# Patient Record
Sex: MALE | Race: WHITE | HISPANIC OR LATINO | Employment: OTHER | URBAN - METROPOLITAN AREA
[De-identification: names, ages, dates, MRNs, and addresses within clinical notes are randomized per-mention and may not be internally consistent; named-entity substitution may affect disease eponyms.]

---

## 2018-01-08 ENCOUNTER — HOSPITAL ENCOUNTER (OUTPATIENT)
Dept: RADIOLOGY | Facility: MEDICAL CENTER | Age: 73
End: 2018-01-08

## 2018-01-26 ENCOUNTER — APPOINTMENT (OUTPATIENT)
Dept: ADMISSIONS | Facility: MEDICAL CENTER | Age: 73
End: 2018-01-26
Attending: SURGERY
Payer: MEDICARE

## 2018-01-26 DIAGNOSIS — Z01.812 PRE-OPERATIVE LABORATORY EXAMINATION: ICD-10-CM

## 2018-01-26 LAB
INR PPP: 0.97 (ref 0.87–1.13)
PLATELET # BLD AUTO: 213 K/UL (ref 164–446)
PROTHROMBIN TIME: 12.6 SEC (ref 12–14.6)

## 2018-01-26 PROCEDURE — 85049 AUTOMATED PLATELET COUNT: CPT

## 2018-01-26 PROCEDURE — 85610 PROTHROMBIN TIME: CPT

## 2018-01-26 PROCEDURE — 36415 COLL VENOUS BLD VENIPUNCTURE: CPT

## 2018-01-26 RX ORDER — LOSARTAN POTASSIUM 100 MG/1
100 TABLET ORAL EVERY MORNING
COMMUNITY
End: 2018-04-13

## 2018-01-26 RX ORDER — AMLODIPINE BESYLATE 5 MG/1
5 TABLET ORAL EVERY MORNING
COMMUNITY
End: 2021-07-02

## 2018-01-29 ENCOUNTER — HOSPITAL ENCOUNTER (OUTPATIENT)
Facility: MEDICAL CENTER | Age: 73
End: 2018-01-29
Attending: SURGERY | Admitting: SURGERY
Payer: MEDICARE

## 2018-01-29 ENCOUNTER — APPOINTMENT (OUTPATIENT)
Dept: RADIOLOGY | Facility: MEDICAL CENTER | Age: 73
End: 2018-01-29
Attending: SURGERY
Payer: MEDICARE

## 2018-01-29 ENCOUNTER — APPOINTMENT (OUTPATIENT)
Dept: RADIOLOGY | Facility: MEDICAL CENTER | Age: 73
End: 2018-01-29
Attending: RADIOLOGY
Payer: MEDICARE

## 2018-01-29 VITALS
OXYGEN SATURATION: 93 % | BODY MASS INDEX: 25.96 KG/M2 | RESPIRATION RATE: 18 BRPM | TEMPERATURE: 98.2 F | WEIGHT: 171.3 LBS | HEART RATE: 81 BPM | HEIGHT: 68 IN | DIASTOLIC BLOOD PRESSURE: 86 MMHG | SYSTOLIC BLOOD PRESSURE: 147 MMHG

## 2018-01-29 DIAGNOSIS — R91.8 LUNG MASS: ICD-10-CM

## 2018-01-29 PROCEDURE — 71045 X-RAY EXAM CHEST 1 VIEW: CPT

## 2018-01-29 PROCEDURE — 160002 HCHG RECOVERY MINUTES (STAT)

## 2018-01-29 PROCEDURE — 700111 HCHG RX REV CODE 636 W/ 250 OVERRIDE (IP): Performed by: RADIOLOGY

## 2018-01-29 PROCEDURE — 99153 MOD SED SAME PHYS/QHP EA: CPT

## 2018-01-29 PROCEDURE — 88305 TISSUE EXAM BY PATHOLOGIST: CPT

## 2018-01-29 PROCEDURE — 700111 HCHG RX REV CODE 636 W/ 250 OVERRIDE (IP)

## 2018-01-29 RX ORDER — ONDANSETRON 2 MG/ML
4 INJECTION INTRAMUSCULAR; INTRAVENOUS PRN
Status: ACTIVE | OUTPATIENT
Start: 2018-01-29 | End: 2018-01-29

## 2018-01-29 RX ORDER — OXYCODONE HYDROCHLORIDE 5 MG/1
5 TABLET ORAL
Status: DISCONTINUED | OUTPATIENT
Start: 2018-01-29 | End: 2018-01-29 | Stop reason: HOSPADM

## 2018-01-29 RX ORDER — SODIUM CHLORIDE 9 MG/ML
INJECTION, SOLUTION INTRAVENOUS
Status: DISCONTINUED | OUTPATIENT
Start: 2018-01-29 | End: 2018-01-29 | Stop reason: HOSPADM

## 2018-01-29 RX ORDER — MIDAZOLAM HYDROCHLORIDE 1 MG/ML
.5-2 INJECTION INTRAMUSCULAR; INTRAVENOUS PRN
Status: ACTIVE | OUTPATIENT
Start: 2018-01-29 | End: 2018-01-29

## 2018-01-29 RX ORDER — HYDROMORPHONE HYDROCHLORIDE 2 MG/ML
0.5 INJECTION, SOLUTION INTRAMUSCULAR; INTRAVENOUS; SUBCUTANEOUS
Status: DISCONTINUED | OUTPATIENT
Start: 2018-01-29 | End: 2018-01-29 | Stop reason: HOSPADM

## 2018-01-29 RX ORDER — OXYCODONE HYDROCHLORIDE 5 MG/1
10 TABLET ORAL
Status: DISCONTINUED | OUTPATIENT
Start: 2018-01-29 | End: 2018-01-29 | Stop reason: HOSPADM

## 2018-01-29 RX ORDER — MIDAZOLAM HYDROCHLORIDE 1 MG/ML
INJECTION INTRAMUSCULAR; INTRAVENOUS
Status: COMPLETED
Start: 2018-01-29 | End: 2018-01-29

## 2018-01-29 RX ORDER — SODIUM CHLORIDE 9 MG/ML
500 INJECTION, SOLUTION INTRAVENOUS
Status: ACTIVE | OUTPATIENT
Start: 2018-01-29 | End: 2018-01-29

## 2018-01-29 RX ORDER — ONDANSETRON 2 MG/ML
INJECTION INTRAMUSCULAR; INTRAVENOUS
Status: COMPLETED
Start: 2018-01-29 | End: 2018-01-29

## 2018-01-29 RX ADMIN — FENTANYL CITRATE 50 MCG: 50 INJECTION, SOLUTION INTRAMUSCULAR; INTRAVENOUS at 10:59

## 2018-01-29 RX ADMIN — SODIUM CHLORIDE: 9 INJECTION, SOLUTION INTRAVENOUS at 09:25

## 2018-01-29 RX ADMIN — FENTANYL CITRATE 50 MCG: 50 INJECTION, SOLUTION INTRAMUSCULAR; INTRAVENOUS at 11:06

## 2018-01-29 RX ADMIN — MIDAZOLAM HYDROCHLORIDE 1 MG: 1 INJECTION INTRAMUSCULAR; INTRAVENOUS at 11:02

## 2018-01-29 RX ADMIN — MIDAZOLAM 1 MG: 1 INJECTION INTRAMUSCULAR; INTRAVENOUS at 11:02

## 2018-01-29 RX ADMIN — ONDANSETRON 4 MG: 2 INJECTION INTRAMUSCULAR; INTRAVENOUS at 10:56

## 2018-01-29 ASSESSMENT — PAIN SCALES - GENERAL
PAINLEVEL_OUTOF10: 0

## 2018-01-29 NOTE — OR NURSING
1138 patient arrived from IR s/p CT biopsy, anterior approach dressing cdi, plan of care discussed with patient and family agreeable. Vss, denies chest pain, s.o.b,  1336 small pneumo notified Dr montilla order received to keep patient NPO until 2nd x-ray. Patient not in respiratory distress.  1533 2nd x-ray still  Small pneumothorax notified Dr montilla order received to discharge patient home, follow up x-ray for tomorrow at 0845, currently patient denies shortness of breath, oxygen sat 94% RA  1545 discharge instructions given to patient, patient and family verbalize understanding of the orders, iv discontinued tip intact, currently vss, no c/o cp, s.o.b, patient wide awake, criteria met to transfer patient out of ppu.  1600 patient escorted via w/c with all his personal belongings.

## 2018-01-29 NOTE — PROGRESS NOTES
"CT guided biopsy of MARIA M lung lesion performed by Dr Andres via anterior approach. Patient tolerated procedure very well and was returned to PPU in good condition. Family updated and at bedside. Patient states he \"feels great\". Core sample (x 3) delivered to lab in formalin.  "

## 2018-01-29 NOTE — OR SURGEON
Immediate Post- Operative Note        PostOp Diagnosis:MARIA M MASS      Procedure(s): CT BX      Estimated Blood Loss: Less than 5 ml        Complications: None            1/29/2018     11:38 AM     Chuck Anrdes

## 2018-01-29 NOTE — DISCHARGE INSTRUCTIONS
ACTIVITY: Rest and take it easy for the first 24 hours.  A responsible adult is recommended to remain with you during that time.  It is normal to feel sleepy.  We encourage you to not do anything that requires balance, judgment or coordination.    MILD FLU-LIKE SYMPTOMS ARE NORMAL. YOU MAY EXPERIENCE GENERALIZED MUSCLE ACHES, THROAT IRRITATION, HEADACHE AND/OR SOME NAUSEA.    FOR 24 HOURS DO NOT:  Drive, operate machinery or run household appliances.  Drink beer or alcoholic beverages.   Make important decisions or sign legal documents.    SPECIAL INSTRUCTIONS: follow up with primary care physician as needed  If you experience chest pain, severe shortness of breath call 911 return to ER   Call Dr montilla with any questions [phone]  Follow up x-ray tomorrow at 0845 [facility] 0830 at [facility].    DIET: To avoid nausea, slowly advance diet as tolerated, avoiding spicy or greasy foods for the first day.  Add more substantial food to your diet according to your physician's instructions.  Babies can be fed formula or breast milk as soon as they are hungry.  INCREASE FLUIDS AND FIBER TO AVOID CONSTIPATION.    SURGICAL DRESSING/BATHING: keep band aid clean dry intact for 24 hours, you may remove dressing clean     FOLLOW-UP APPOINTMENT:  A follow-up appointment should be arranged with your doctor in [phone]; call to schedule.    You should CALL YOUR PHYSICIAN if you develop:  Fever greater than 101 degrees F.  Pain not relieved by medication, or persistent nausea or vomiting.  Excessive bleeding (blood soaking through dressing) or unexpected drainage from the wound.  Extreme redness or swelling around the incision site, drainage of pus or foul smelling drainage.  Inability to urinate or empty your bladder within 8 hours.  Problems with breathing or chest pain.    You should call 911 if you develop problems with breathing or chest pain.  If you are unable to contact your doctor or surgical  center, you should go to the nearest emergency room or urgent care center.  Physician's telephone #: [phone]    If any questions arise, call your doctor.  If your doctor is not available, please feel free to call the Surgical Center at [phone].  The Center is open Monday through Friday from 7AM to 7PM.  You can also call the HEALTH HOTLINE open 24 hours/day, 7 days/week and speak to a nurse at [phone], or toll free at [phone].    A registered nurse may call you a few days after your surgery to see how you are doing after your procedure.    MEDICATIONS: Resume taking daily medication.  Take prescribed pain medication with food.  If no medication is prescribed, you may take non-aspirin pain medication if needed.  PAIN MEDICATION CAN BE VERY CONSTIPATING.  Take a stool softener or laxative such as senokot, pericolace, or milk of magnesia if needed.  Lung Biopsy  A lung biopsy is a procedure in which a tissue sample is removed from the lung. The tissue can be examined under a microscope to help diagnose various lung disorders.   LET YOUR HEALTH CARE PROVIDER KNOW ABOUT:  · Any allergies you have.  · All medicines you are taking, including vitamins, herbs, eye drops, creams, and over-the-counter medicines.  · Previous problems you or members of your family have had with the use of anesthetics.  · Any blood disorders or bleeding problems that you have.  · Previous surgeries you have had.  · Medical conditions you have.  RISKS AND COMPLICATIONS  Generally, a lung biopsy is a safe procedure. However, problems can occur and include:  · Collapse of the lung.    · Bleeding.    · Infection.    BEFORE THE PROCEDURE  · Do not eat or drink anything after midnight on the night before the procedure or as directed by your health care provider.  · Ask your health care provider about changing or stopping your regular medicines. This is especially important if you are taking diabetes medicines or blood  thinners.  · Plan to have someone take you home after the procedure.  PROCEDURE  Various methods can be used to perform a lung biopsy:   · Needle biopsy. A biopsy needle is inserted into the lung. The needle is used to collect the tissue sample. A CT scanner may be used to guide the needle to the right place in the lung. For this method, a medicine is used to numb the area where the biopsy sample will be taken (local anesthetic).  · Bronchoscopy. A flexible tube (bronchoscope) is inserted into your lungs by going through your mouth or nose. A needle or forceps is passed through the bronchoscope to remove the tissue sample. For this method, medicine may be used to numb the back of your throat.  · Open biopsy. A cut (incision) is made in your chest. The tissue sample is then removed using surgical tools. The incision is closed with skin glue, skin adhesive strips, or stitches. For this method, you will be given medicine to make you sleep through the procedure (general anesthetic).  AFTER THE PROCEDURE  · Your recovery will be assessed and monitored.  · You might have soreness and tenderness at the site of the biopsy for a few days after the procedure.  · You might have a cough and some soreness in your throat for a few days if a bronchoscope was used.     This information is not intended to replace advice given to you by your health care provider. Make sure you discuss any questions you have with your health care provider.     Document Released: 03/08/2006 Document Revised: 01/08/2016 Document Reviewed: 06/01/2014  [employer] Interactive Patient Education ©2016 [employer]      If your physician has prescribed pain medication that includes Acetaminophen (Tylenol), do not take additional Acetaminophen (Tylenol) while taking the prescribed medication.    Depression / Suicide Risk    As you are discharged from this [facility] facility, it is important to learn how to keep safe from harming yourself.    Recognize the  warning signs:  · Abrupt changes in personality, positive or negative- including increase in energy   · Giving away possessions  · Change in eating patterns- significant weight changes-  positive or negative  · Change in sleeping patterns- unable to sleep or sleeping all the time   · Unwillingness or inability to communicate  · Depression  · Unusual sadness, discouragement and loneliness  · Talk of wanting to die  · Neglect of personal appearance   · Rebelliousness- reckless behavior  · Withdrawal from people/activities they love  · Confusion- inability to concentrate     If you or a loved one observes any of these behaviors or has concerns about self-harm, here's what you can do:  · Talk about it- your feelings and reasons for harming yourself  · Remove any means that you might use to hurt yourself (examples: pills, rope, extension cords, firearm)  · Get professional help from the community (Mental Health, Substance Abuse, psychological counseling)  · Do not be alone:Call your Safe Contact- someone whom you trust who will be there for you.  · Call your local CRISIS HOTLINE 819-8756 or 467-508-6185  · Call your local Children's Mobile Crisis Response Team [place] (593) 294-0674 or www.[employer]  · Call the toll free National Suicide Prevention Hotlines   · National Suicide Prevention Lifeline 797-240-QFIS (4965)  · National Hope Line Network 800-SUICIDE (116-0142)

## 2018-01-30 ENCOUNTER — HOSPITAL ENCOUNTER (EMERGENCY)
Facility: MEDICAL CENTER | Age: 73
End: 2018-01-30
Attending: EMERGENCY MEDICINE
Payer: MEDICARE

## 2018-01-30 ENCOUNTER — APPOINTMENT (OUTPATIENT)
Dept: RADIOLOGY | Facility: MEDICAL CENTER | Age: 73
End: 2018-01-30
Attending: EMERGENCY MEDICINE
Payer: MEDICARE

## 2018-01-30 ENCOUNTER — APPOINTMENT (OUTPATIENT)
Dept: RADIOLOGY | Facility: MEDICAL CENTER | Age: 73
End: 2018-01-30
Attending: RADIOLOGY
Payer: MEDICARE

## 2018-01-30 VITALS
WEIGHT: 177 LBS | RESPIRATION RATE: 14 BRPM | HEART RATE: 82 BPM | TEMPERATURE: 97.6 F | BODY MASS INDEX: 26.83 KG/M2 | HEIGHT: 68 IN | OXYGEN SATURATION: 94 % | SYSTOLIC BLOOD PRESSURE: 153 MMHG | DIASTOLIC BLOOD PRESSURE: 88 MMHG

## 2018-01-30 DIAGNOSIS — R91.8 LUNG MASS: ICD-10-CM

## 2018-01-30 DIAGNOSIS — J95.811 POSTPROCEDURAL PNEUMOTHORAX: ICD-10-CM

## 2018-01-30 DIAGNOSIS — J95.811 PNEUMOTHORAX OF LEFT LUNG AFTER BIOPSY: ICD-10-CM

## 2018-01-30 PROCEDURE — 99152 MOD SED SAME PHYS/QHP 5/>YRS: CPT

## 2018-01-30 PROCEDURE — A9270 NON-COVERED ITEM OR SERVICE: HCPCS | Performed by: RADIOLOGY

## 2018-01-30 PROCEDURE — 71045 X-RAY EXAM CHEST 1 VIEW: CPT

## 2018-01-30 PROCEDURE — 99284 EMERGENCY DEPT VISIT MOD MDM: CPT

## 2018-01-30 PROCEDURE — 700102 HCHG RX REV CODE 250 W/ 637 OVERRIDE(OP): Performed by: RADIOLOGY

## 2018-01-30 PROCEDURE — 36415 COLL VENOUS BLD VENIPUNCTURE: CPT

## 2018-01-30 PROCEDURE — 700111 HCHG RX REV CODE 636 W/ 250 OVERRIDE (IP)

## 2018-01-30 RX ORDER — MIDAZOLAM HYDROCHLORIDE 1 MG/ML
.5-2 INJECTION INTRAMUSCULAR; INTRAVENOUS PRN
Status: DISCONTINUED | OUTPATIENT
Start: 2018-01-30 | End: 2018-01-30 | Stop reason: HOSPADM

## 2018-01-30 RX ORDER — MIDAZOLAM HYDROCHLORIDE 1 MG/ML
INJECTION INTRAMUSCULAR; INTRAVENOUS
Status: COMPLETED
Start: 2018-01-30 | End: 2018-01-30

## 2018-01-30 RX ORDER — HYDROMORPHONE HYDROCHLORIDE 2 MG/ML
0.5 INJECTION, SOLUTION INTRAMUSCULAR; INTRAVENOUS; SUBCUTANEOUS
Status: DISCONTINUED | OUTPATIENT
Start: 2018-01-30 | End: 2018-01-30 | Stop reason: HOSPADM

## 2018-01-30 RX ORDER — SODIUM CHLORIDE 9 MG/ML
500 INJECTION, SOLUTION INTRAVENOUS
Status: DISCONTINUED | OUTPATIENT
Start: 2018-01-30 | End: 2018-01-30 | Stop reason: HOSPADM

## 2018-01-30 RX ORDER — ONDANSETRON 2 MG/ML
4 INJECTION INTRAMUSCULAR; INTRAVENOUS EVERY 8 HOURS PRN
Status: DISCONTINUED | OUTPATIENT
Start: 2018-01-30 | End: 2018-01-30 | Stop reason: HOSPADM

## 2018-01-30 RX ORDER — ONDANSETRON 2 MG/ML
4 INJECTION INTRAMUSCULAR; INTRAVENOUS PRN
Status: DISCONTINUED | OUTPATIENT
Start: 2018-01-30 | End: 2018-01-30 | Stop reason: HOSPADM

## 2018-01-30 RX ORDER — OXYCODONE HYDROCHLORIDE 5 MG/1
5 TABLET ORAL
Status: DISCONTINUED | OUTPATIENT
Start: 2018-01-30 | End: 2018-01-30 | Stop reason: HOSPADM

## 2018-01-30 RX ORDER — OXYCODONE HYDROCHLORIDE 10 MG/1
10 TABLET ORAL
Status: DISCONTINUED | OUTPATIENT
Start: 2018-01-30 | End: 2018-01-30 | Stop reason: HOSPADM

## 2018-01-30 RX ADMIN — FENTANYL CITRATE 25 MCG: 50 INJECTION, SOLUTION INTRAMUSCULAR; INTRAVENOUS at 12:09

## 2018-01-30 RX ADMIN — MIDAZOLAM HYDROCHLORIDE 1 MG: 1 INJECTION INTRAMUSCULAR; INTRAVENOUS at 12:09

## 2018-01-30 RX ADMIN — MIDAZOLAM 1 MG: 1 INJECTION INTRAMUSCULAR; INTRAVENOUS at 12:09

## 2018-01-30 RX ADMIN — OXYCODONE HYDROCHLORIDE 10 MG: 10 TABLET ORAL at 14:24

## 2018-01-30 ASSESSMENT — PAIN SCALES - GENERAL
PAINLEVEL_OUTOF10: 0

## 2018-01-30 ASSESSMENT — ENCOUNTER SYMPTOMS
ABDOMINAL PAIN: 0
DIZZINESS: 0
COUGH: 0
HEADACHES: 0
SHORTNESS OF BREATH: 0
FEVER: 0
BACK PAIN: 0

## 2018-01-30 NOTE — ED NOTES
Pt via EC to room, sent my MD After Xray results this am for R/O pneumothorax secondarfy to lung biopsy 1/29/18.  Denies C/P, SOB or increased WOB.  [facility]

## 2018-01-30 NOTE — OR SURGEON
Immediate Post- Operative Note        PostOp Diagnosis: INCREASING LEFT PNEUMOTHORAX S/P LUNG BIOPSY    Procedure(s): LEFT CHEST TUBE PLACEMENT    Estimated Blood Loss: Less than 5 ml        Complications: None            1/30/2018     12:34 PM     Chuck Andres

## 2018-01-30 NOTE — ED NOTES
received 2nd call from IR, some concern over blood collection. They want to monitor chest tube/valve to ensure patency. Will update family

## 2018-01-30 NOTE — ED NOTES
Received report from Berhane samuel. Pt will be back from IR. Family at bedside, will continue to monitor.

## 2018-01-30 NOTE — ED PROVIDER NOTES
ED PROVIDER NOTE    Scribed for Bijan Segal M.D. by Shi Nicholson. 1/30/2018, 1:27 PM.    This is an addendum to the note on Pito Black. For further details and full chart entry, see the previously signed ED Provider Note written by Dr. Pantoja (ERP).      12:05 PM - I discussed the patient's case with Dr. Pantoja (ERP) who will transfer care of the patient to me at this time. The patient has a final diagnosis of heimlich valve due to pneumothorax complication after lung biopsy.     1:28 PM - Patient was reevaluated at bedside. He reports to be feeling comfortable. I am waiting on results from chest x-ray.     DX-CHEST-PORTABLE (1 VIEW)   Final Result      Interval placement left chest tube and extension left lung. Trace if any residual left pneumothorax.      IR-CHEST TUBE FOR PNEUMO   Final Result         Successful percutaneous placement of left chest tube utilizing fluoroscopic guidance.            2:20 PM - Patient was reevaluated at bedside. He states he has minimal pain at this time. Patient and family informed of radiology results. The pneumothorax has radically improved. He will be discharged at this time. Patient knows how to operate his Heimlich valve should that be necessary. He will follow-up in 2 days for removal of this chest tube.    FINAL IMPRESSION   1. Postprocedural pneumothorax         Shi BUCK (Dimple), am scribing for, and in the presence of, Bijan Segal M.D..    Electronically signed by: Shi Nicholson (Dimple), 1/30/2018    Bijan BUCK M.D. personally performed the services described in this documentation, as scribed by Shi Nicholson in my presence, and it is both accurate and complete.    The note accurately reflects work and decisions made by me.  Bijan Segal  1/30/2018  8:06 PM

## 2018-01-30 NOTE — ED PROVIDER NOTES
ED Provider Note    ED Provider Note      Primary care provider: Fred Garcia P.A.-C.  Means of arrival: POV  History obtained from: Patient  History limited by: NOne    CHIEF COMPLAINT  Chief Complaint   Patient presents with   • Post-Op Complications       HPI  Pito Black is a 73 y.o. male who presents to the Emergency Department with a chief complaint of a pneumothorax. Patient was instructed to come by his physician. Patient underwent a lung biopsy 2 days ago. He was noted to have a pneumothorax yesterday. He was told to return today for reevaluation of this. It was noted to be slightly larger and he was instructed to come to the emergency department for chest tube insertion. They were told, that Dr. Andres from radiology, would meet him here. The patient has no complaints. He feels well. No fever, no shortness of breath, no chest pain he's been in his normal state of health.    REVIEW OF SYSTEMS  Review of Systems   Constitutional: Negative for fever.   HENT: Negative for congestion.    Respiratory: Negative for cough and shortness of breath.    Cardiovascular: Negative for chest pain.   Gastrointestinal: Negative for abdominal pain.   Musculoskeletal: Negative for back pain.   Neurological: Negative for dizziness and headaches.   All other systems reviewed and are negative.      PAST MEDICAL HISTORY   has a past medical history of Arthritis (2015); Asthma; Backpain; Breath shortness (2015); Cataract; Dental disorder; Diabetes; High cholesterol; and Hypertension (2015).    SURGICAL HISTORY   has a past surgical history that includes other orthopedic surgery; other; ear middle exploration (Right, 6/12/2015); and ossicular reconstruction (Right, 6/12/2015).    SOCIAL HISTORY  Social History   Substance Use Topics   • Smoking status: Former Smoker     Packs/day: 0.25     Years: 40.00     Types: Cigarettes     Quit date: 1/1/1995   • Smokeless tobacco: Never Used   • Alcohol use Yes      Comment: 6 beers a  "day      History   Drug Use No       FAMILY HISTORY  No family history on file.    CURRENT MEDICATIONS  Home Medications     Reviewed by Grey France, Student (Student Nurse) on 01/30/18 at 1012  Med List Status: Complete   Medication Last Dose Status   amlodipine (NORVASC) 5 MG Tab 1/30/2018 Active   aspirin EC (ECOTRIN) 81 MG TBEC 1/18/2018 Active   ezetimibe (ZETIA) 10 MG TABS 1/30/2018 Active   Fluticasone Furoate-Vilanterol (BREO ELLIPTA INH) 1/30/2018 Active   losartan (COZAAR) 100 MG Tab 1/30/2018 Active   psyllium (METAMUCIL) 58.12 % Pack 1/30/2018 Active                ALLERGIES  No Known Allergies    PHYSICAL EXAM  VITAL SIGNS: /88   Pulse 87   Temp 36.4 °C (97.6 °F)   Resp 19   Ht 1.727 m (5' 8\")   Wt 80.3 kg (177 lb)   SpO2 97%   BMI 26.91 kg/m²   Vitals reviewed.  Constitutional: Patient is oriented to person, place, and time. Appears well-developed and well-nourished. No distress.    Head: Normocephalic and atraumatic.   Eyes: Conjunctivae are normal.  Cardiovascular: Normal rate, regular rhythm and normal heart sounds.   Pulmonary/Chest: Effort normal and breath sounds normal. No respiratory distress, no wheezes, rhonchi, or rales.   Abdominal: Soft. Bowel sounds are normal. There is no tenderness, rebound or guarding, or peritoneal signs  Musculoskeletal: No edema   Neurological: No focal deficits.   Skin: Skin is warm and dry. No erythema. No pallor.   Psychiatric: Patient has a normal mood and affect.     LABS  Results for orders placed or performed in visit on 01/26/18   PT   Result Value Ref Range    PT 12.6 12.0 - 14.6 sec    INR 0.97 0.87 - 1.13   PLATELET COUNT   Result Value Ref Range    Platelet Count 213 164 - 446 K/uL       All labs reviewed by me.      RADIOLOGY  IR-CHEST TUBE FOR PNEUMO    (Results Pending)     The radiologist's interpretation of all radiological studies have been reviewed by me.    COURSE & MEDICAL DECISION MAKING  Pertinent Labs & Imaging studies " reviewed. (See chart for details)    Obtained and reviewed past medical records. I review chest x-rays from the 29th as well as today that showed a left-sided pneumothorax.    11:09 AM - Patient seen and examined at bedside. Is a well-appearing 73-year-old male who is being worked up for lung cancer. Recently underwent a lung biopsy and has a os procedural pneumothorax.     11:15 AM discussed with family, they were told Dr. Andres, would meet them here that he is Dr. Andres anticipating their arrival and planned for placemetn of Chest Tube.    11:25 AM, discussed with Dr. Andres, radiologist, who is indeed planning to place a Heimlich valve chest tube into this patient. He'll be taken to the IR suite. He will then need to be observed for 2 hours after this procedure if no problems develop, patient can be discharged to home. He will then follow-up in 2 days to have this removed. Patient was made aware of this plan and family and patient both in agreement.    Discussed with my partner, this plan. He'll be observed here, after Heimlich valve chest tube inserted. I anticipate discharge to home and return in a few days for removal.       FINAL IMPRESSION  1. Postprocedural pneumothorax     Status post Heimlich valve chest tube insertion

## 2018-01-30 NOTE — PROGRESS NOTES
IR Procedure Note:    Site Marked and Confirmed with MD, patient and RN pre procedure. Dr. Andres placed a left chest tube.  The pt tolerated the procedure well; ETCo2 baseline 15, with consistent waveform during the procedure.  Gauze, medipore, and sutures applied to chest tube, CDI and soft.  Pt alert and verbally appropriate post procedure, vital signs stable during procedure and transport, see flow sheet for vital signs.  Report given to Muna HORN.  RN transported pt to [facility].      [employer] Flexima APDL. REF: [id number].  Lot: [id number]

## 2018-01-30 NOTE — ED NOTES
Pt given discharge instructions. Pt verbalized understanding. VSS no acute distress noted, pt helped to lobby via wheelchair.

## 2018-02-02 ENCOUNTER — HOSPITAL ENCOUNTER (OUTPATIENT)
Dept: RADIOLOGY | Facility: MEDICAL CENTER | Age: 73
End: 2018-02-02
Attending: RADIOLOGY
Payer: MEDICARE

## 2018-02-02 DIAGNOSIS — J95.811 PNEUMOTHORAX OF LEFT LUNG AFTER BIOPSY: ICD-10-CM

## 2018-02-02 PROCEDURE — 71045 X-RAY EXAM CHEST 1 VIEW: CPT

## 2018-02-02 NOTE — PROGRESS NOTES
"CXR reviewed with Dr. Salazar. Slight increase in Lt PNTX following biopsy. Pt remains asymptomatic, denies dyspnea and pain. C/o discomfort at site of tube insertion. Has been \"taking it easy\" with tube in place.     No dyspnea or crepitus noted. Speaking in full sentences, no accessory muscle use noted. Chest tube to heimlich valve in place with dressing occluding end of heimlich valve, potentially trapping air. Heimlich valve replaced. Pt to return Monday 2/5 for f/u XR- ordered in EPIC.     Education done with patient regarding leaving end of valve unobstructed. Pt lives in [place] and instructed to present to his local ER if tube dislodges or if he experiences dyspnea or chest pain.   "

## 2018-02-05 ENCOUNTER — HOSPITAL ENCOUNTER (OUTPATIENT)
Dept: RADIOLOGY | Facility: MEDICAL CENTER | Age: 73
DRG: 310 | End: 2018-02-05
Attending: NURSE PRACTITIONER
Payer: MEDICARE

## 2018-02-05 DIAGNOSIS — J95.811 PNEUMOTHORAX OF LEFT LUNG AFTER BIOPSY: ICD-10-CM

## 2018-02-05 PROCEDURE — 71045 X-RAY EXAM CHEST 1 VIEW: CPT

## 2018-02-05 NOTE — PROGRESS NOTES
CXR reviewed with Dr. Salazar: improvement in Lt PNTX with chest tube to heimlich valve. Pt asymptomatic this am. No air leak noted. Tube clamped, will repeat CXR in 1 hour. If no enlargement in Lt PNTX will d/c chest tube. Pt and family educated on unclamping tube first if pt has onset of SOB or chest discomfort while tube is clamped, and instructed to then call radiology from phone in waiting room.

## 2018-02-05 NOTE — PROGRESS NOTES
CXR reviewed by Dr. Salazar and chest tube removed, dressed with petroleum guaze and tegaderm.  Patient tolerated well, instructed to return to ER if he has sudden onset of shortness of breath or other complications.

## 2018-02-06 ENCOUNTER — HOSPITAL ENCOUNTER (INPATIENT)
Facility: MEDICAL CENTER | Age: 73
LOS: 1 days | DRG: 310 | End: 2018-02-07
Attending: EMERGENCY MEDICINE | Admitting: HOSPITALIST
Payer: MEDICARE

## 2018-02-06 ENCOUNTER — RESOLUTE PROFESSIONAL BILLING HOSPITAL PROF FEE (OUTPATIENT)
Dept: HOSPITALIST | Facility: MEDICAL CENTER | Age: 73
End: 2018-02-06
Payer: MEDICARE

## 2018-02-06 ENCOUNTER — APPOINTMENT (OUTPATIENT)
Dept: RADIOLOGY | Facility: MEDICAL CENTER | Age: 73
DRG: 310 | End: 2018-02-06
Attending: EMERGENCY MEDICINE
Payer: MEDICARE

## 2018-02-06 DIAGNOSIS — I48.91 ATRIAL FIBRILLATION, UNSPECIFIED TYPE (HCC): ICD-10-CM

## 2018-02-06 PROBLEM — I10 HTN (HYPERTENSION): Status: ACTIVE | Noted: 2018-02-06

## 2018-02-06 PROBLEM — R91.8 LUNG MASS: Status: ACTIVE | Noted: 2018-02-06

## 2018-02-06 LAB
ALBUMIN SERPL BCP-MCNC: 3.9 G/DL (ref 3.2–4.9)
ALBUMIN/GLOB SERPL: 1.1 G/DL
ALP SERPL-CCNC: 49 U/L (ref 30–99)
ALT SERPL-CCNC: 28 U/L (ref 2–50)
ANION GAP SERPL CALC-SCNC: 8 MMOL/L (ref 0–11.9)
APTT PPP: 32.6 SEC (ref 24.7–36)
AST SERPL-CCNC: 26 U/L (ref 12–45)
BASOPHILS # BLD AUTO: 0.3 % (ref 0–1.8)
BASOPHILS # BLD: 0.03 K/UL (ref 0–0.12)
BILIRUB SERPL-MCNC: 0.6 MG/DL (ref 0.1–1.5)
BNP SERPL-MCNC: 133 PG/ML (ref 0–100)
BUN SERPL-MCNC: 19 MG/DL (ref 8–22)
CALCIUM SERPL-MCNC: 9.6 MG/DL (ref 8.5–10.5)
CHLORIDE SERPL-SCNC: 106 MMOL/L (ref 96–112)
CO2 SERPL-SCNC: 22 MMOL/L (ref 20–33)
CREAT SERPL-MCNC: 1.4 MG/DL (ref 0.5–1.4)
EKG IMPRESSION: NORMAL
EOSINOPHIL # BLD AUTO: 0.45 K/UL (ref 0–0.51)
EOSINOPHIL NFR BLD: 5.1 % (ref 0–6.9)
ERYTHROCYTE [DISTWIDTH] IN BLOOD BY AUTOMATED COUNT: 43.9 FL (ref 35.9–50)
GLOBULIN SER CALC-MCNC: 3.4 G/DL (ref 1.9–3.5)
GLUCOSE SERPL-MCNC: 117 MG/DL (ref 65–99)
HCT VFR BLD AUTO: 46.1 % (ref 42–52)
HGB BLD-MCNC: 16.3 G/DL (ref 14–18)
IMM GRANULOCYTES # BLD AUTO: 0.03 K/UL (ref 0–0.11)
IMM GRANULOCYTES NFR BLD AUTO: 0.3 % (ref 0–0.9)
INR PPP: 1.04 (ref 0.87–1.13)
LIPASE SERPL-CCNC: 54 U/L (ref 11–82)
LYMPHOCYTES # BLD AUTO: 1.93 K/UL (ref 1–4.8)
LYMPHOCYTES NFR BLD: 22 % (ref 22–41)
MAGNESIUM SERPL-MCNC: 2 MG/DL (ref 1.5–2.5)
MCH RBC QN AUTO: 33.9 PG (ref 27–33)
MCHC RBC AUTO-ENTMCNC: 35.4 G/DL (ref 33.7–35.3)
MCV RBC AUTO: 95.8 FL (ref 81.4–97.8)
MONOCYTES # BLD AUTO: 0.64 K/UL (ref 0–0.85)
MONOCYTES NFR BLD AUTO: 7.3 % (ref 0–13.4)
NEUTROPHILS # BLD AUTO: 5.71 K/UL (ref 1.82–7.42)
NEUTROPHILS NFR BLD: 65 % (ref 44–72)
NRBC # BLD AUTO: 0 K/UL
NRBC BLD-RTO: 0 /100 WBC
PLATELET # BLD AUTO: 186 K/UL (ref 164–446)
PMV BLD AUTO: 9.8 FL (ref 9–12.9)
POTASSIUM SERPL-SCNC: 4.6 MMOL/L (ref 3.6–5.5)
PROT SERPL-MCNC: 7.3 G/DL (ref 6–8.2)
PROTHROMBIN TIME: 13.3 SEC (ref 12–14.6)
RBC # BLD AUTO: 4.81 M/UL (ref 4.7–6.1)
SODIUM SERPL-SCNC: 136 MMOL/L (ref 135–145)
TROPONIN I SERPL-MCNC: 0.02 NG/ML (ref 0–0.04)
TSH SERPL DL<=0.005 MIU/L-ACNC: 0.63 UIU/ML (ref 0.38–5.33)
WBC # BLD AUTO: 8.8 K/UL (ref 4.8–10.8)

## 2018-02-06 PROCEDURE — 71045 X-RAY EXAM CHEST 1 VIEW: CPT

## 2018-02-06 PROCEDURE — 93005 ELECTROCARDIOGRAM TRACING: CPT | Performed by: EMERGENCY MEDICINE

## 2018-02-06 PROCEDURE — 96376 TX/PRO/DX INJ SAME DRUG ADON: CPT

## 2018-02-06 PROCEDURE — A9270 NON-COVERED ITEM OR SERVICE: HCPCS | Performed by: HOSPITALIST

## 2018-02-06 PROCEDURE — 99285 EMERGENCY DEPT VISIT HI MDM: CPT

## 2018-02-06 PROCEDURE — 85025 COMPLETE CBC W/AUTO DIFF WBC: CPT

## 2018-02-06 PROCEDURE — 700111 HCHG RX REV CODE 636 W/ 250 OVERRIDE (IP): Performed by: EMERGENCY MEDICINE

## 2018-02-06 PROCEDURE — 83735 ASSAY OF MAGNESIUM: CPT

## 2018-02-06 PROCEDURE — 84484 ASSAY OF TROPONIN QUANT: CPT

## 2018-02-06 PROCEDURE — 96374 THER/PROPH/DIAG INJ IV PUSH: CPT

## 2018-02-06 PROCEDURE — 84443 ASSAY THYROID STIM HORMONE: CPT

## 2018-02-06 PROCEDURE — 94760 N-INVAS EAR/PLS OXIMETRY 1: CPT

## 2018-02-06 PROCEDURE — 93005 ELECTROCARDIOGRAM TRACING: CPT

## 2018-02-06 PROCEDURE — 80053 COMPREHEN METABOLIC PANEL: CPT

## 2018-02-06 PROCEDURE — 85610 PROTHROMBIN TIME: CPT

## 2018-02-06 PROCEDURE — 99223 1ST HOSP IP/OBS HIGH 75: CPT | Mod: AI | Performed by: HOSPITALIST

## 2018-02-06 PROCEDURE — 83880 ASSAY OF NATRIURETIC PEPTIDE: CPT

## 2018-02-06 PROCEDURE — 770020 HCHG ROOM/CARE - TELE (206)

## 2018-02-06 PROCEDURE — 83690 ASSAY OF LIPASE: CPT

## 2018-02-06 PROCEDURE — 700111 HCHG RX REV CODE 636 W/ 250 OVERRIDE (IP): Performed by: HOSPITALIST

## 2018-02-06 PROCEDURE — 700105 HCHG RX REV CODE 258: Performed by: HOSPITALIST

## 2018-02-06 PROCEDURE — 700101 HCHG RX REV CODE 250: Performed by: EMERGENCY MEDICINE

## 2018-02-06 PROCEDURE — 96372 THER/PROPH/DIAG INJ SC/IM: CPT

## 2018-02-06 PROCEDURE — 85730 THROMBOPLASTIN TIME PARTIAL: CPT

## 2018-02-06 PROCEDURE — 36415 COLL VENOUS BLD VENIPUNCTURE: CPT

## 2018-02-06 PROCEDURE — 700102 HCHG RX REV CODE 250 W/ 637 OVERRIDE(OP): Performed by: HOSPITALIST

## 2018-02-06 RX ORDER — SODIUM CHLORIDE 9 MG/ML
INJECTION, SOLUTION INTRAVENOUS CONTINUOUS
Status: DISCONTINUED | OUTPATIENT
Start: 2018-02-06 | End: 2018-02-07 | Stop reason: HOSPADM

## 2018-02-06 RX ORDER — EZETIMIBE 10 MG/1
10 TABLET ORAL EVERY MORNING
Status: DISCONTINUED | OUTPATIENT
Start: 2018-02-07 | End: 2018-02-07 | Stop reason: HOSPADM

## 2018-02-06 RX ORDER — LORAZEPAM 2 MG/ML
1 INJECTION INTRAMUSCULAR ONCE
Status: COMPLETED | OUTPATIENT
Start: 2018-02-06 | End: 2018-02-06

## 2018-02-06 RX ORDER — POLYETHYLENE GLYCOL 3350 17 G/17G
1 POWDER, FOR SOLUTION ORAL
Status: DISCONTINUED | OUTPATIENT
Start: 2018-02-06 | End: 2018-02-07 | Stop reason: HOSPADM

## 2018-02-06 RX ORDER — BISACODYL 10 MG
10 SUPPOSITORY, RECTAL RECTAL
Status: DISCONTINUED | OUTPATIENT
Start: 2018-02-06 | End: 2018-02-07 | Stop reason: HOSPADM

## 2018-02-06 RX ORDER — METOPROLOL TARTRATE 1 MG/ML
5 INJECTION, SOLUTION INTRAVENOUS
Status: DISCONTINUED | OUTPATIENT
Start: 2018-02-06 | End: 2018-02-07 | Stop reason: HOSPADM

## 2018-02-06 RX ORDER — LOSARTAN POTASSIUM 50 MG/1
100 TABLET ORAL EVERY MORNING
Status: DISCONTINUED | OUTPATIENT
Start: 2018-02-07 | End: 2018-02-07 | Stop reason: HOSPADM

## 2018-02-06 RX ORDER — BUDESONIDE AND FORMOTEROL FUMARATE DIHYDRATE 160; 4.5 UG/1; UG/1
2 AEROSOL RESPIRATORY (INHALATION) 2 TIMES DAILY
Status: DISCONTINUED | OUTPATIENT
Start: 2018-02-06 | End: 2018-02-07 | Stop reason: HOSPADM

## 2018-02-06 RX ORDER — AMOXICILLIN 250 MG
2 CAPSULE ORAL 2 TIMES DAILY
Status: DISCONTINUED | OUTPATIENT
Start: 2018-02-06 | End: 2018-02-07 | Stop reason: HOSPADM

## 2018-02-06 RX ADMIN — METOPROLOL TARTRATE 5 MG: 5 INJECTION INTRAVENOUS at 15:27

## 2018-02-06 RX ADMIN — ENOXAPARIN SODIUM 80 MG: 100 INJECTION SUBCUTANEOUS at 17:18

## 2018-02-06 RX ADMIN — METOPROLOL TARTRATE 25 MG: 25 TABLET, FILM COATED ORAL at 19:58

## 2018-02-06 RX ADMIN — METOPROLOL TARTRATE 5 MG: 5 INJECTION INTRAVENOUS at 16:40

## 2018-02-06 RX ADMIN — SODIUM CHLORIDE: 9 INJECTION, SOLUTION INTRAVENOUS at 19:36

## 2018-02-06 RX ADMIN — ASPIRIN 81 MG: 81 TABLET, COATED ORAL at 19:58

## 2018-02-06 RX ADMIN — LORAZEPAM 1 MG: 2 INJECTION INTRAMUSCULAR; INTRAVENOUS at 19:57

## 2018-02-06 ASSESSMENT — ENCOUNTER SYMPTOMS
PND: 0
BACK PAIN: 0
ORTHOPNEA: 0
EYE PAIN: 0
SHORTNESS OF BREATH: 0
MYALGIAS: 0
SPEECH CHANGE: 0
TREMORS: 0
DEPRESSION: 0
DOUBLE VISION: 0
MEMORY LOSS: 0
FEVER: 0
NERVOUS/ANXIOUS: 0
PALPITATIONS: 0
STRIDOR: 0
WEAKNESS: 0
CLAUDICATION: 0
SORE THROAT: 0
HEARTBURN: 0
HEADACHES: 0
NECK PAIN: 0
CHILLS: 0
BLOOD IN STOOL: 0
VOMITING: 0
HEMOPTYSIS: 0
SENSORY CHANGE: 0
TINGLING: 0
CONSTIPATION: 0
NAUSEA: 0
BLURRED VISION: 0
PHOTOPHOBIA: 0
SPUTUM PRODUCTION: 0
DIZZINESS: 0
COUGH: 0

## 2018-02-06 ASSESSMENT — PAIN SCALES - GENERAL: PAINLEVEL_OUTOF10: 0

## 2018-02-06 ASSESSMENT — LIFESTYLE VARIABLES
TOTAL SCORE: 0
HAVE YOU EVER FELT YOU SHOULD CUT DOWN ON YOUR DRINKING: NO
DO YOU DRINK ALCOHOL: YES
TOTAL SCORE: 0
CONSUMPTION TOTAL: INCOMPLETE
EVER FELT BAD OR GUILTY ABOUT YOUR DRINKING: NO
EVER HAD A DRINK FIRST THING IN THE MORNING TO STEADY YOUR NERVES TO GET RID OF A HANGOVER: NO
EVER_SMOKED: YES
TOTAL SCORE: 0
HAVE PEOPLE ANNOYED YOU BY CRITICIZING YOUR DRINKING: NO

## 2018-02-06 ASSESSMENT — PATIENT HEALTH QUESTIONNAIRE - PHQ9
2. FEELING DOWN, DEPRESSED, IRRITABLE, OR HOPELESS: NOT AT ALL
1. LITTLE INTEREST OR PLEASURE IN DOING THINGS: NOT AT ALL
SUM OF ALL RESPONSES TO PHQ QUESTIONS 1-9: 0
SUM OF ALL RESPONSES TO PHQ9 QUESTIONS 1 AND 2: 0

## 2018-02-06 NOTE — ED PROVIDER NOTES
ED Provider Note    Scribed for Roger Rose M.D. by Ekaterina Person. 2/6/2018, 3:11 PM.    Primary care provider: Fred Garcia P.A.-C.  Means of arrival: Walk-in  History obtained from: Patient   History limited by: None    CHIEF COMPLAINT  Chief Complaint   Patient presents with   • Sent by MD     Irregular heart beat       HPI  Pito Black is a 73 y.o. male who presents to the Emergency Department for an irregular heart beat that was discovered while patient was at his doctors appointment just prior to arrival in the ED. He was told by doctor that he has new onset Atrial Fibrillation and advised to report to ED. Patient denies any palpitations, chest pain, or shortness of breath associated. He had a chest tube placed on 01/20/2018 secondary to a pneumothorax .     REVIEW OF SYSTEMS  Review of Systems   Constitutional: Negative for chills and fever.   Respiratory: Negative for shortness of breath.    Cardiovascular: Negative for chest pain and palpitations.        Positive for irregular heart beat   Gastrointestinal: Negative for nausea and vomiting.   All other systems reviewed and are negative.  C.    PAST MEDICAL HISTORY   has a past medical history of Arthritis (2015); Asthma; Backpain; Breath shortness (2015); Cataract; Dental disorder; Diabetes; High cholesterol; and Hypertension (2015).    SURGICAL HISTORY   has a past surgical history that includes other orthopedic surgery; other; ear middle exploration (Right, 6/12/2015); and ossicular reconstruction (Right, 6/12/2015).    SOCIAL HISTORY  Social History   Substance Use Topics   • Smoking status: Former Smoker     Packs/day: 0.25     Years: 40.00     Types: Cigarettes     Quit date: 1/1/1995   • Smokeless tobacco: Never Used   • Alcohol use Yes      Comment: 6 beers a day      History   Drug Use No       FAMILY HISTORY  No family history noted    CURRENT MEDICATIONS  No current facility-administered medications on file prior to encounter.   "    Current Outpatient Prescriptions on File Prior to Encounter   Medication Sig Dispense Refill   • psyllium (METAMUCIL) 58.12 % Pack Take 1 Packet by mouth every day.     • amlodipine (NORVASC) 5 MG Tab Take 5 mg by mouth every morning.     • losartan (COZAAR) 100 MG Tab Take 100 mg by mouth every morning.     • Fluticasone Furoate-Vilanterol (BREO ELLIPTA INH) Inhale 1 Puff by mouth every morning.     • aspirin EC (ECOTRIN) 81 MG TBEC Take 81 mg by mouth every morning.     • ezetimibe (ZETIA) 10 MG TABS Take 10 mg by mouth every morning.         ALLERGIES  No Known Allergies    PHYSICAL EXAM  VITAL SIGNS: /100   Pulse 97 Comment: Inconsistent pulse rate  Temp 36.9 °C (98.4 °F)   Resp 16   Ht 1.727 m (5' 8\")   Wt 79.4 kg (175 lb)   SpO2 96%   BMI 26.61 kg/m²     Constitutional: Well developed, Well nourished, No acute distress, Non-toxic appearance.   HENT: Normocephalic, Atraumatic, Bilateral external ears normal, oropharynx moist, No oral exudates, Nose normal.   Eyes: Pupils are equal round and react to light, extraocular motions are intact, conjunctiva is normal, there are no signs of exudate.   Neck: Supple, no meningeal signs.  Lymphatic: No lymphadenopathy noted.   Cardiovascular: Tachycardic rate and irregular rhythm without murmurs gallops or rubs.   Thorax & Lungs: No respiratory distress. Breathing comfortably. Lungs are clear to auscultation bilaterally, there are no wheezes no rales. Chest wall is nontender.  Abdomen: Soft, nontender, nondistended. Bowel sounds are present.   Skin: Warm, Dry, No erythema,   Back: No tenderness, No CVA tenderness.  Musculoskeletal: Good range of motion in all major joints. No tenderness to palpation or major deformities noted. Intact distal pulses, no clubbing, no cyanosis, no edema,   Neurologic: Alert & oriented x 3, Moving all extremities. No gross abnormalities.    Psychiatric: Affect normal, Judgment normal, Mood normal.     LABS  Results for orders " placed or performed during the hospital encounter of 02/06/18   CBC with Differential   Result Value Ref Range    WBC 8.8 4.8 - 10.8 K/uL    RBC 4.81 4.70 - 6.10 M/uL    Hemoglobin 16.3 14.0 - 18.0 g/dL    Hematocrit 46.1 42.0 - 52.0 %    MCV 95.8 81.4 - 97.8 fL    MCH 33.9 (H) 27.0 - 33.0 pg    MCHC 35.4 (H) 33.7 - 35.3 g/dL    RDW 43.9 35.9 - 50.0 fL    Platelet Count 186 164 - 446 K/uL    MPV 9.8 9.0 - 12.9 fL    Neutrophils-Polys 65.00 44.00 - 72.00 %    Lymphocytes 22.00 22.00 - 41.00 %    Monocytes 7.30 0.00 - 13.40 %    Eosinophils 5.10 0.00 - 6.90 %    Basophils 0.30 0.00 - 1.80 %    Immature Granulocytes 0.30 0.00 - 0.90 %    Nucleated RBC 0.00 /100 WBC    Neutrophils (Absolute) 5.71 1.82 - 7.42 K/uL    Lymphs (Absolute) 1.93 1.00 - 4.80 K/uL    Monos (Absolute) 0.64 0.00 - 0.85 K/uL    Eos (Absolute) 0.45 0.00 - 0.51 K/uL    Baso (Absolute) 0.03 0.00 - 0.12 K/uL    Immature Granulocytes (abs) 0.03 0.00 - 0.11 K/uL    NRBC (Absolute) 0.00 K/uL   Complete Metabolic Panel (CMP)   Result Value Ref Range    Sodium 136 135 - 145 mmol/L    Potassium 4.6 3.6 - 5.5 mmol/L    Chloride 106 96 - 112 mmol/L    Co2 22 20 - 33 mmol/L    Anion Gap 8.0 0.0 - 11.9    Glucose 117 (H) 65 - 99 mg/dL    Bun 19 8 - 22 mg/dL    Creatinine 1.40 0.50 - 1.40 mg/dL    Calcium 9.6 8.5 - 10.5 mg/dL    AST(SGOT) 26 12 - 45 U/L    ALT(SGPT) 28 2 - 50 U/L    Alkaline Phosphatase 49 30 - 99 U/L    Total Bilirubin 0.6 0.1 - 1.5 mg/dL    Albumin 3.9 3.2 - 4.9 g/dL    Total Protein 7.3 6.0 - 8.2 g/dL    Globulin 3.4 1.9 - 3.5 g/dL    A-G Ratio 1.1 g/dL   Btype Natriuretic Peptide (BNP)   Result Value Ref Range    B Natriuretic Peptide 133 (H) 0 - 100 pg/mL   Prothrombin Time (PT/INR)   Result Value Ref Range    PT 13.3 12.0 - 14.6 sec    INR 1.04 0.87 - 1.13   APTT   Result Value Ref Range    APTT 32.6 24.7 - 36.0 sec   Lipase   Result Value Ref Range    Lipase 54 11 - 82 U/L   Troponin STAT   Result Value Ref Range    Troponin I 0.02 0.00 -  0.04 ng/mL   TSH   Result Value Ref Range    TSH 0.630 0.380 - 5.330 uIU/mL   ESTIMATED GFR   Result Value Ref Range    GFR If African American >60 >60 mL/min/1.73 m 2    GFR If Non African American 50 (A) >60 mL/min/1.73 m 2   MAGNESIUM   Result Value Ref Range    Magnesium 2.0 1.5 - 2.5 mg/dL   EKG (NOW)   Result Value Ref Range    Report       [facility] Emergency Dept.    Test Date:  2018  Pt Name:    DOROTHEA SALINAS                  Department: ER  MRN:        [id number]                      Room:  Gender:     Male                         Technician: [id number]  :        1945                   Requested By:ER TRIAGE PROTOCOL  Order #:    [id number]                    Reading MD: WARNER REYES MD    Measurements  Intervals                                Axis  Rate:       120                          P:  RI:                                      QRS:        47  QRSD:       88                           T:          4  QT:         320  QTc:        453    Interpretive Statements  ATRIAL FIBRILLATION, V-RATE    BORDERLINE ST DEPRESSION, ANTEROLATERAL LEADS  Compared to ECG 2015 07:58:57  ST (T wave) deviation now present  Sinus rhythm no longer present  Early repolarization no longer present    Electronically Signed On 2018 15:09:26 PST by WARNER REYES MD       All labs reviewed by me.    EKG  See above.     RADIOLOGY  DX-CHEST-PORTABLE (1 VIEW)   Final Result      1.  No acute cardiac or pulmonary abnormalities are identified.      2.  No evidence of residual left-sided pneumothorax status post chest tube removal.      Echocardiogram Comp W/O Cont    (Results Pending)     The radiologist's interpretation of all radiological studies have been reviewed by me.    COURSE & MEDICAL DECISION MAKING  Pertinent Labs & Imaging studies reviewed. (See chart for details)    3:11 PM - Patient seen and examined at bedside. Discussed that patient will be started on anticoagulants and  placed in hospital for further evaluation. . Ordered EKG, TSH, CBC, CMP, BNP, PT/INR, APTT, lipase, troponin STAT, DX-chest  to evaluate his symptoms. The differential diagnoses include but are not limited to: A-fib RVR, new onset.      4:39 PM Patient treated with 5mg Lopressor.     5:00 PM Patient treated with Lovenox 80mg.     4:41 PM - I discussed the patient's case and the above findings with Dr. Jenkins (Hospitalist) who agrees to admit the patient.     4:52 PM Patient's pulse is back into the 90's.         Decision Making:  Patient presents for evaluation. Clinically, patient is a nature fibrillation with rapid ventricular response. I did give him 5 mg of metoprolol and his rate has gone down to the 80s 100s range. He'll be given a 2nd dose of 5 mg. The patient was also anticoagulated with Lovenox. At this point, we will admit the patient for further evaluation and care of his new onset atrial fibrillation of spoken to the hospitalist for this admission.    DISPOSITION:  Patient will be admitted to Dr. Jenkins (Hospitalist) in guarded condition.      FINAL IMPRESSION  1. Atrial fibrillation, unspecified type (CMS-HCC)          Ekaterina BUCK (Scribe), am scribing for, and in the presence of, Roger Rose M.D..    Electronically signed by: Ekaterina Person (Scribe), 2/6/2018    Rgoer BUCK M.D. personally performed the services described in this documentation, as scribed by Ekaterina Person in my presence, and it is both accurate and complete.    The note accurately reflects work and decisions made by me.  Roger Rose  2/6/2018  9:29 PM

## 2018-02-06 NOTE — ED NOTES
"Chief Complaint   Patient presents with   • Sent by MD     Irregular heart beat       /100   Pulse 97 Comment: Inconsistent pulse rate  Temp 36.9 °C (98.4 °F)   Resp 16   Ht 1.727 m (5' 8\")   Wt 79.4 kg (175 lb)   SpO2 96%   BMI 26.61 kg/m²     Sent by Dr. Galeano, during follow on biopsy on mass in left lung MD heard irregular heart beat, pt has no hx of afib. Came into [facility] ER. No complaints of dizziness. Pt had chest tube removed 2/6,  placed on 1/30 for a pneumothorax.   "

## 2018-02-07 ENCOUNTER — PATIENT OUTREACH (OUTPATIENT)
Dept: HEALTH INFORMATION MANAGEMENT | Facility: OTHER | Age: 73
End: 2018-02-07

## 2018-02-07 VITALS
BODY MASS INDEX: 26.06 KG/M2 | WEIGHT: 171.96 LBS | HEART RATE: 82 BPM | TEMPERATURE: 98.3 F | HEIGHT: 68 IN | RESPIRATION RATE: 18 BRPM | OXYGEN SATURATION: 97 % | DIASTOLIC BLOOD PRESSURE: 86 MMHG | SYSTOLIC BLOOD PRESSURE: 128 MMHG

## 2018-02-07 LAB
ALBUMIN SERPL BCP-MCNC: 3.3 G/DL (ref 3.2–4.9)
ALBUMIN/GLOB SERPL: 1 G/DL
ALP SERPL-CCNC: 55 U/L (ref 30–99)
ALT SERPL-CCNC: 21 U/L (ref 2–50)
ANION GAP SERPL CALC-SCNC: 6 MMOL/L (ref 0–11.9)
AST SERPL-CCNC: 17 U/L (ref 12–45)
BASOPHILS # BLD AUTO: 0.6 % (ref 0–1.8)
BASOPHILS # BLD: 0.05 K/UL (ref 0–0.12)
BILIRUB SERPL-MCNC: 0.6 MG/DL (ref 0.1–1.5)
BUN SERPL-MCNC: 20 MG/DL (ref 8–22)
CALCIUM SERPL-MCNC: 9 MG/DL (ref 8.5–10.5)
CHLORIDE SERPL-SCNC: 108 MMOL/L (ref 96–112)
CO2 SERPL-SCNC: 23 MMOL/L (ref 20–33)
CREAT SERPL-MCNC: 1.22 MG/DL (ref 0.5–1.4)
EOSINOPHIL # BLD AUTO: 0.23 K/UL (ref 0–0.51)
EOSINOPHIL NFR BLD: 2.6 % (ref 0–6.9)
ERYTHROCYTE [DISTWIDTH] IN BLOOD BY AUTOMATED COUNT: 44.7 FL (ref 35.9–50)
EST. AVERAGE GLUCOSE BLD GHB EST-MCNC: 128 MG/DL
GLOBULIN SER CALC-MCNC: 3.2 G/DL (ref 1.9–3.5)
GLUCOSE SERPL-MCNC: 113 MG/DL (ref 65–99)
HBA1C MFR BLD: 6.1 % (ref 0–5.6)
HCT VFR BLD AUTO: 47 % (ref 42–52)
HGB BLD-MCNC: 15.7 G/DL (ref 14–18)
IMM GRANULOCYTES # BLD AUTO: 0.04 K/UL (ref 0–0.11)
IMM GRANULOCYTES NFR BLD AUTO: 0.4 % (ref 0–0.9)
LV EJECT FRACT  99904: 55
LV EJECT FRACT MOD 2C 99903: 57.08
LV EJECT FRACT MOD 4C 99902: 59.96
LV EJECT FRACT MOD BP 99901: 57.64
LYMPHOCYTES # BLD AUTO: 2.1 K/UL (ref 1–4.8)
LYMPHOCYTES NFR BLD: 23.6 % (ref 22–41)
MCH RBC QN AUTO: 32.4 PG (ref 27–33)
MCHC RBC AUTO-ENTMCNC: 33.4 G/DL (ref 33.7–35.3)
MCV RBC AUTO: 97.1 FL (ref 81.4–97.8)
MONOCYTES # BLD AUTO: 0.6 K/UL (ref 0–0.85)
MONOCYTES NFR BLD AUTO: 6.7 % (ref 0–13.4)
NEUTROPHILS # BLD AUTO: 5.88 K/UL (ref 1.82–7.42)
NEUTROPHILS NFR BLD: 66.1 % (ref 44–72)
NRBC # BLD AUTO: 0 K/UL
NRBC BLD-RTO: 0 /100 WBC
PLATELET # BLD AUTO: 206 K/UL (ref 164–446)
PMV BLD AUTO: 9.8 FL (ref 9–12.9)
POTASSIUM SERPL-SCNC: 4.5 MMOL/L (ref 3.6–5.5)
PROT SERPL-MCNC: 6.5 G/DL (ref 6–8.2)
RBC # BLD AUTO: 4.84 M/UL (ref 4.7–6.1)
SODIUM SERPL-SCNC: 137 MMOL/L (ref 135–145)
WBC # BLD AUTO: 8.9 K/UL (ref 4.8–10.8)

## 2018-02-07 PROCEDURE — 99239 HOSP IP/OBS DSCHRG MGMT >30: CPT | Performed by: HOSPITALIST

## 2018-02-07 PROCEDURE — 80053 COMPREHEN METABOLIC PANEL: CPT

## 2018-02-07 PROCEDURE — 700105 HCHG RX REV CODE 258: Performed by: HOSPITALIST

## 2018-02-07 PROCEDURE — 700111 HCHG RX REV CODE 636 W/ 250 OVERRIDE (IP): Performed by: HOSPITALIST

## 2018-02-07 PROCEDURE — 93306 TTE W/DOPPLER COMPLETE: CPT

## 2018-02-07 PROCEDURE — 93306 TTE W/DOPPLER COMPLETE: CPT | Mod: 26 | Performed by: INTERNAL MEDICINE

## 2018-02-07 PROCEDURE — 83036 HEMOGLOBIN GLYCOSYLATED A1C: CPT

## 2018-02-07 PROCEDURE — 700102 HCHG RX REV CODE 250 W/ 637 OVERRIDE(OP): Performed by: HOSPITALIST

## 2018-02-07 PROCEDURE — A9270 NON-COVERED ITEM OR SERVICE: HCPCS | Performed by: HOSPITALIST

## 2018-02-07 PROCEDURE — 36415 COLL VENOUS BLD VENIPUNCTURE: CPT

## 2018-02-07 PROCEDURE — 85025 COMPLETE CBC W/AUTO DIFF WBC: CPT

## 2018-02-07 RX ADMIN — METOPROLOL TARTRATE 25 MG: 25 TABLET, FILM COATED ORAL at 10:19

## 2018-02-07 RX ADMIN — ASPIRIN 81 MG: 81 TABLET, COATED ORAL at 10:19

## 2018-02-07 RX ADMIN — LOSARTAN POTASSIUM 100 MG: 50 TABLET, FILM COATED ORAL at 10:19

## 2018-02-07 RX ADMIN — STANDARDIZED SENNA CONCENTRATE AND DOCUSATE SODIUM 2 TABLET: 8.6; 5 TABLET, FILM COATED ORAL at 10:18

## 2018-02-07 RX ADMIN — EZETIMIBE 10 MG: 10 TABLET ORAL at 10:19

## 2018-02-07 RX ADMIN — ENOXAPARIN SODIUM 80 MG: 100 INJECTION SUBCUTANEOUS at 10:18

## 2018-02-07 RX ADMIN — SODIUM CHLORIDE: 9 INJECTION, SOLUTION INTRAVENOUS at 05:19

## 2018-02-07 ASSESSMENT — PAIN SCALES - GENERAL
PAINLEVEL_OUTOF10: 0

## 2018-02-07 NOTE — ASSESSMENT & PLAN NOTE
Received 2X doses of IV lopressor 5mg, HR in the mid 90s.  Started metoprolol 25mg BID.  IV lopressor PRN if HR sustained >140's.  Echocardiogram ordered  Weight based lovenox started.  We will consult cardiology in the am.

## 2018-02-07 NOTE — CARE PLAN
Problem: Communication  Goal: The ability to communicate needs accurately and effectively will improve  Outcome: PROGRESSING AS EXPECTED    Intervention: [place] patient and significant other/support system to call light to alert staff of needs   02/07/18 0130   OTHER   Oriented to: All of the Following : Location of Bathroom, Visiting Policy, Unit Routine, Call Light and Bedside Controls, Bedside Rail Policy, Smoking Policy, Rights and Responsibilities, Bedside Report, and Patient Education Notebook         Problem: Safety  Goal: Will remain free from falls  Outcome: PROGRESSING AS EXPECTED  Fall risk assessed, fall precautions in place, pt verbalizes understanding of fall risk.

## 2018-02-07 NOTE — PROGRESS NOTES
Bedside report received, pt care assumed, tele box on. VSS, pt assessment complete. Pt aaox4, no signs of distress noted at this time. POC discussed with pt and verbalizes no questions. Pt reports no pain at this time. Pt denies any additional needs at this time. Bed in lowest position, pt educated on fall risk and verbalized understanding, call light within reach, hourly rounding in place.

## 2018-02-07 NOTE — PROGRESS NOTES
Patient arrived to the floor alert and oriented. Able to ambulate to the bed without assistance. Denies chest pain, SOB, or any discomfort at this time. Tele hooked up and reading afib at 100.

## 2018-02-07 NOTE — ASSESSMENT & PLAN NOTE
Controlled we will hold his home dose of Norvasc and start patient on metoprolol and continue his home dose of losartan

## 2018-02-07 NOTE — ED NOTES
Med rec complete per patient's family at bedside with a list.  Allergies reviewed - NKDA.  No antibiotics in last 30 days.

## 2018-02-07 NOTE — PROGRESS NOTES
Pt continues to deny pain or shortness of breath. All needs met, bed locked and in lowest position, call light within reach.

## 2018-02-07 NOTE — ASSESSMENT & PLAN NOTE
Hx of 2x biopsies, inconclusive per patient's son  Will defer to outpatient management, they follow regularly with Dr. kinney

## 2018-02-07 NOTE — DISCHARGE SUMMARY
CHIEF COMPLAINT ON ADMISSION  Chief Complaint   Patient presents with   • Sent by MD     Irregular heart beat       CODE STATUS  Full Code    HPI & HOSPITAL COURSE  This is a 73 y.o. male with asymptomatic new Afib, the patient underwent a biopsy for left upper lobe mass and they found irregular rhythm and EKG showed Afib, the patient denies any symptoms no chest pain or palpitation no syncope or SOB, the patient was admitted for a new Afib for workup, TSH was normal, Echo showed Mild aortic insufficiency and mild mitral regurgitation with EF 55%, the patient does not have tachycardia no RVR, and his rate was controlled with metoprolol, according to [id number] score which is 2 the patient maybe benefits from anticoagulation, since the patient needs more workup for the lung mass and maybe he will need a surgery we will continue on aspirin and follow up with cardiology clinic in 2-3 weeks and start with anticoagulation meds if needed, we discussed the plan of treatment with the patient and family and we answered all their questions, they understood and they accepted the low risk of stroke and  They agreed with the plan.  The patient will be discharged on a good situation on Asprin and metoprolol 25 mg 2 times a day (new med) and continue home meds lisinopril and norvasc and follow up with cardiology and pulmonary clinic.     The patient recovered much more quickly than anticipated on admission.    Therefore, he is discharged in good and stable condition with close outpatient follow-up.    SPECIFIC OUTPATIENT FOLLOW-UP  Cardiologist   pulmonologist   PCP      DISCHARGE PROBLEM LIST  Principal Problem:    New onset atrial fibrillation (CMS-HCC) POA: Yes  Active Problems:    Lung mass POA: Yes    HTN (hypertension) POA: Yes  Resolved Problems:    * No resolved hospital problems. *      FOLLOW UP  Future Appointments  Date Time Provider Department Center   3/1/2018 10:40 AM Chuck Rangel M.D. [facility] None     Fred Garcia,  [place]  [address] [place] [id number]  [phone]          Brittany Donato M.D.  [address]  [address]  [place] [id number] [phone]    In 2 weeks        MEDICATIONS ON DISCHARGE   Pito Black   Home Medication Instructions ANITRA:[id number]    Printed on:02/07/18 1129   Medication Information                      amlodipine (NORVASC) 5 MG Tab  Take 5 mg by mouth every morning.             aspirin EC (ECOTRIN) 81 MG TBEC  Take 81 mg by mouth every morning.             ezetimibe (ZETIA) 10 MG TABS  Take 10 mg by mouth every morning.             Fluticasone Furoate-Vilanterol (BREO ELLIPTA INH)  Inhale 1 Puff by mouth every morning.             losartan (COZAAR) 100 MG Tab  Take 100 mg by mouth every morning.             metoprolol (LOPRESSOR) 25 MG Tab  Take 1 Tab by mouth 2 Times a Day.             psyllium (METAMUCIL) 58.12 % Pack  Take 1 Packet by mouth every day.                 DIET  Orders Placed This Encounter   Procedures   • Diet NPO     Standing Status:   Standing     Number of Occurrences:   1     Order Specific Question:   Restrict to:     Answer:   Sips with Medications [3]       ACTIVITY  As tolerated.  Weight bearing as tolerated      CONSULTATIONS  None     PROCEDURES  None     LABORATORY  Lab Results   Component Value Date/Time    SODIUM 137 02/07/2018 03:27 AM    POTASSIUM 4.5 02/07/2018 03:27 AM    CHLORIDE 108 02/07/2018 03:27 AM    CO2 23 02/07/2018 03:27 AM    GLUCOSE 113 (H) 02/07/2018 03:27 AM    BUN 20 02/07/2018 03:27 AM    CREATININE 1.22 02/07/2018 03:27 AM        Lab Results   Component Value Date/Time    WBC 8.9 02/07/2018 03:27 AM    HEMOGLOBIN 15.7 02/07/2018 03:27 AM    HEMATOCRIT 47.0 02/07/2018 03:27 AM    PLATELETCT 206 02/07/2018 03:27 AM        Total time of the discharge process exceeds 45 minutes

## 2018-02-07 NOTE — H&P
Hospital Medicine History and Physical    Date of Service  2/6/2018    Chief Complaint  Chief Complaint   Patient presents with   • Sent by MD     Irregular heart beat       History of Presenting Illness  73 y.o. male with a past medical history of left upper lobe mass being followed by Dr. kinney, patient has had 2 IR biopsies which have been inconclusive. He also had a pneumothorax after lung biopsy which she had a chest tube that was placed and removed 1/20/18. Hence, patient presented 2/6/2018 after being told by his pulmonologist that he has an irregular heartbeat, and apparently was having a rapid heart rate. Patient denies having previous diagnosis of any arrhythmias or atrial fibrillation. On arrival patient's heart rate was noted to be 90s-150s. Patient was given 2 doses of IV metoprolol 5 mg which improved his heart rates to the mid 90s. Patient says that he has been asymptomatic all along hence we are unable to determine when he started having atrial fibrillation. At this time patient will be admitted for further workup including heart rate control, echocardiogram, anticoagulation and a cardiology consultation. Patient denies fevers/chills, chest pain, shortness of breath or nausea/vommiting.        Primary Care Physician  JAMI Rahman.MARGARITO.    Consultants  None    Code Status  Code: Full code    Review of Systems  Review of Systems   Constitutional: Negative for chills, fever and malaise/fatigue.   HENT: Negative for congestion, hearing loss, sore throat and tinnitus.    Eyes: Negative for blurred vision, double vision, photophobia and pain.   Respiratory: Negative for cough, hemoptysis, sputum production, shortness of breath and stridor.    Cardiovascular: Negative for chest pain, palpitations, orthopnea, claudication and PND.   Gastrointestinal: Negative for blood in stool, constipation, heartburn, melena, nausea and vomiting.   Genitourinary: Negative for dysuria, frequency and urgency.  "  Musculoskeletal: Negative for back pain, myalgias and neck pain.   Neurological: Negative for dizziness, tingling, tremors, sensory change, speech change, weakness and headaches.   Psychiatric/Behavioral: Negative for depression, memory loss and suicidal ideas. The patient is not nervous/anxious.           Past Medical History  Past Medical History:   Diagnosis Date   • Breath shortness 2015    with exertion   • Arthritis 2015    generalized    • Hypertension 2015    well controlled on meds   • Asthma    • Backpain    • Cataract    • Dental disorder     partial upper   • Diabetes     diet controlled   • High cholesterol        Surgical History  Past Surgical History:   Procedure Laterality Date   • EAR MIDDLE EXPLORATION Right 6/12/2015    Procedure: EAR MIDDLE EXPLORATION;  Surgeon: William Brandon M.D.;  Location: SURGERY SAME DAY [facility] OR[facility];  Service:    • OSSICULAR RECONSTRUCTION Right 6/12/2015    Procedure: OSSICULAR RECONSTRUCTION CHAIN POSSIBLE;  Surgeon: William Brandon M.D.;  Location: SURGERY SAME DAY [facility] ORS;  Service:    • OTHER      ear replacement \"many years ago\"   • OTHER ORTHOPEDIC SURGERY      right knee replacement 2005       Medications  No current facility-administered medications on file prior to encounter.      Current Outpatient Prescriptions on File Prior to Encounter   Medication Sig Dispense Refill   • psyllium (METAMUCIL) 58.12 % Pack Take 1 Packet by mouth every day.     • amlodipine (NORVASC) 5 MG Tab Take 5 mg by mouth every morning.     • losartan (COZAAR) 100 MG Tab Take 100 mg by mouth every morning.     • Fluticasone Furoate-Vilanterol (BREO ELLIPTA INH) Inhale 1 Puff by mouth every morning.     • aspirin EC (ECOTRIN) 81 MG TBEC Take 81 mg by mouth every morning.     • ezetimibe (ZETIA) 10 MG TABS Take 10 mg by mouth every morning.         Family History  No family history on file.   Mother had a stroke at a later age    Social History  Social History   Substance Use " Topics   • Smoking status: Former Smoker     Packs/day: 0.25     Years: 40.00     Types: Cigarettes     Quit date: 1995   • Smokeless tobacco: Never Used   • Alcohol use Yes      Comment: 6 beers a day       Allergies  No Known Allergies     Physical Exam  Laboratory   Hemodynamics  Temp (24hrs), Av.8 °C (98.2 °F), Min:36.4 °C (97.5 °F), Max:37.1 °C (98.7 °F)   Temperature: 37.1 °C (98.7 °F)  Pulse  Av.6  Min: 81  Max: 114 Heart Rate (Monitored): (!) 105  Blood Pressure : 130/87, NIBP: 131/99      Respiratory      Respiration: 16, Pulse Oximetry: 95 %             Physical Exam   Constitutional: He is oriented to person, place, and time. He appears well-developed and well-nourished. No distress.   HENT:   Head: Normocephalic and atraumatic.   Mouth/Throat: No oropharyngeal exudate.   Eyes: Conjunctivae are normal. Pupils are equal, round, and reactive to light. Right eye exhibits no discharge. No scleral icterus.   Neck: Neck supple. No JVD present. No thyromegaly present.   Cardiovascular: Intact distal pulses.    No murmur heard.  Irregularly irregular.     Pulmonary/Chest: Effort normal and breath sounds normal. No stridor. No respiratory distress. He has no wheezes. He has no rales.   Abdominal: Soft. Bowel sounds are normal. He exhibits no distension. There is no tenderness. There is no rebound.   Musculoskeletal: Normal range of motion. He exhibits no edema.   Neurological: He is alert and oriented to person, place, and time. No cranial nerve deficit.   Skin: Skin is warm. He is not diaphoretic. No erythema.   Psychiatric: He has a normal mood and affect. His behavior is normal. Thought content normal.         Assessment/Plan  * New onset atrial fibrillation (CMS-HCC)- (present on admission)   Assessment & Plan    Received 2X doses of IV lopressor 5mg, HR in the mid 90s.  Started metoprolol 25mg BID.  IV lopressor PRN if HR sustained >140's.  Echocardiogram ordered  Weight based lovenox  started.  We will consult cardiology in the am.         HTN (hypertension)- (present on admission)   Assessment & Plan    Controlled we will hold his home dose of Norvasc and start patient on metoprolol and continue his home dose of losartan        Lung mass- (present on admission)   Assessment & Plan    Hx of 2x biopsies, inconclusive per patient's son  Will defer to outpatient management, they follow regularly with Dr. kinney            I anticipate this patient will require at least two midnights for appropriate medical management, necessitating inpatient admission.    Prophylaxis: lovenox    Recent Labs      02/06/18   1504   WBC  8.8   RBC  4.81   HEMOGLOBIN  16.3   HEMATOCRIT  46.1   MCV  95.8   MCH  33.9*   MCHC  35.4*   RDW  43.9   PLATELETCT  186   MPV  9.8     Recent Labs      02/06/18   1504   SODIUM  136   POTASSIUM  4.6   CHLORIDE  106   CO2  22   GLUCOSE  117*   BUN  19   CREATININE  1.40   CALCIUM  9.6     Recent Labs      02/06/18   1504   ALTSGPT  28   ASTSGOT  26   ALKPHOSPHAT  49   TBILIRUBIN  0.6   LIPASE  54   GLUCOSE  117*     Recent Labs      02/06/18   1504   APTT  32.6   INR  1.04     Recent Labs      02/06/18   1504   BNPBTYPENAT  133*         Lab Results   Component Value Date    TROPONINI 0.02 02/06/2018       Imaging  DX-CHEST-PORTABLE (1 VIEW)   Final Result      1.  No acute cardiac or pulmonary abnormalities are identified.      2.  No evidence of residual left-sided pneumothorax status post chest tube removal.      Echocardiogram Comp W/O Cont    (Results Pending)

## 2018-02-07 NOTE — PROGRESS NOTES
Bedside report complete, assumed care. Patient is alert and oriented and able to make needs known. Denies pain, would like to eat. Asked primary team if patient could have diet but they would like for him to remain NPO until cardiology sees patient. Called cardiology and they have not been consulted. Will follow up with primary team. Discussed POC with patient and family. Educated on fall risk and call light use. Will continue to monitor.

## 2018-02-07 NOTE — PROGRESS NOTES
2 RN skin check:   Bilat elbows red and blanching. Sacrum red and blanching. Bilat heels dark pink and blanching. Healed scar on right knee. Pt is ambulatory. Pt wears glasses and hearing aid, ears red and blanching. Dressing in place over left chest from chest tube removal on 02/05/18 per patient. Dressing clean, dry and intact.

## 2018-02-07 NOTE — PROGRESS NOTES
Patient A&O x 4. Discharge instructions, medications and follow-up reviewed with pt. Pt educated on medication types and methods of action, educated on purpose and expected outcomes. Follow up appointment scheduled and reviewed with pt. Pt verbalized understanding and denies questions. Discharge paperwork and prescriptions given to pt. PIV removed, TeleBox removed. Pt discharged via transport with wheelchair.

## 2018-02-07 NOTE — DISCHARGE INSTRUCTIONS
Discharge Instructions    Discharged to home by car with relative. Discharged via wheelchair, hospital escort: Yes.  Special equipment needed: Not Applicable    Be sure to schedule a follow-up appointment with your primary care doctor or any specialists as instructed.     Discharge Plan:   Diet Plan: Discussed  Activity Level: Discussed  Confirmed Follow up Appointment: Appointment Scheduled  Confirmed Symptoms Management: Discussed  Medication Reconciliation Updated: Yes  Influenza Vaccine Indication: Patient Refuses    I understand that a diet low in cholesterol, fat, and sodium is recommended for good health. Unless I have been given specific instructions below for another diet, I accept this instruction as my diet prescription.   Other diet: regular    Special Instructions: None    · Is patient discharged on Warfarin / Coumadin?   No     Depression / Suicide Risk    As you are discharged from this [facility] facility, it is important to learn how to keep safe from harming yourself.    Recognize the warning signs:  · Abrupt changes in personality, positive or negative- including increase in energy   · Giving away possessions  · Change in eating patterns- significant weight changes-  positive or negative  · Change in sleeping patterns- unable to sleep or sleeping all the time   · Unwillingness or inability to communicate  · Depression  · Unusual sadness, discouragement and loneliness  · Talk of wanting to die  · Neglect of personal appearance   · Rebelliousness- reckless behavior  · Withdrawal from people/activities they love  · Confusion- inability to concentrate     If you or a loved one observes any of these behaviors or has concerns about self-harm, here's what you can do:  · Talk about it- your feelings and reasons for harming yourself  · Remove any means that you might use to hurt yourself (examples: pills, rope, extension cords, firearm)  · Get professional help from the community (Mental Health,  Substance Abuse, psychological counseling)  · Do not be alone:Call your Safe Contact- someone whom you trust who will be there for you.  · Call your local CRISIS HOTLINE 603-1685 or 747-314-7759  · Call your local Children's Mobile Crisis Response Team [place] (228) 690-6591 or www.PrintLess Plans  · Call the toll free National Suicide Prevention Hotlines   · National Suicide Prevention Lifeline 167-980-KZCB (0938)  · [place] [facility] 800-SUICIDE (293-2411)              Metoprolol extended-release tablets  What is this medicine?  METOPROLOL (me TOE proe lole) is a beta-blocker. Beta-blockers reduce the workload on the heart and help it to beat more regularly. This medicine is used to treat high blood pressure and to prevent chest pain. It is also used to after a heart attack and to prevent an additional heart attack from occurring.  This medicine may be used for other purposes; ask your health care provider or pharmacist if you have questions.  COMMON BRAND NAME(S): Toprol XL  What should I tell my health care provider before I take this medicine?  They need to know if you have any of these conditions:  -diabetes  -heart or vessel disease like slow heart rate, worsening heart failure, heart block, sick sinus syndrome or Raynaud's disease  -kidney disease  -liver disease  -lung or breathing disease, like asthma or emphysema  -pheochromocytoma  -thyroid disease  -an unusual or allergic reaction to metoprolol, other beta-blockers, medicines, foods, dyes, or preservatives  -pregnant or trying to get pregnant  -breast-feeding  How should I use this medicine?  Take this medicine by mouth with a glass of water. Follow the directions on the prescription label. Do not crush or chew. Take this medicine with or immediately after meals. Take your doses at regular intervals. Do not take more medicine than directed. Do not stop taking this medicine suddenly. This could lead to serious heart-related effects.  Talk  to your pediatrician regarding the use of this medicine in children. While this drug may be prescribed for children as young as 6 years for selected conditions, precautions do apply.  Overdosage: If you think you have taken too much of this medicine contact a poison control center or emergency room at once.  NOTE: This medicine is only for you. Do not share this medicine with others.  What if I miss a dose?  If you miss a dose, take it as soon as you can. If it is almost time for your next dose, take only that dose. Do not take double or extra doses.  What may interact with this medicine?  Do not take this medicine with any of the following medications:  -sotalol  This medicine may also interact with the following medications:  -clonidine  -digoxin  -dobutamine  -epinephrine  -isoproterenol  -medicine to control heart rhythm like quinidine, propafenone  -medicine for depression like monoamine oxidase (MAO) inhibitors, fluoxetine, and paroxetine  -medicine for blood pressure like calcium channel blockers  -reserpine  This list may not describe all possible interactions. Give your health care provider a list of all the medicines, herbs, non-prescription drugs, or dietary supplements you use. Also tell them if you smoke, drink alcohol, or use illegal drugs. Some items may interact with your medicine.  What should I watch for while using this medicine?  Visit your doctor or health care professional for regular check ups. Contact your doctor right away if your symptoms worsen. Check your blood pressure and pulse rate regularly. Ask your health care professional what your blood pressure and pulse rate should be, and when you should contact them.  You may get drowsy or dizzy. Do not drive, use machinery, or do anything that needs mental alertness until you know how this medicine affects you. Do not sit or stand up quickly, especially if you are an older patient. This reduces the risk of dizzy or fainting spells. Contact  your doctor if these symptoms continue. Alcohol may interfere with the effect of this medicine. Avoid alcoholic drinks.  What side effects may I notice from receiving this medicine?  Side effects that you should report to your doctor or health care professional as soon as possible:  -allergic reactions like skin rash, itching or hives  -cold or numb hands or feet  -depression  -difficulty breathing  -faint  -fever with sore throat  -irregular heartbeat, chest pain  -rapid weight gain  -swollen legs or ankles  Side effects that usually do not require medical attention (report to your doctor or health care professional if they continue or are bothersome):  -anxiety or nervousness  -change in sex drive or performance  -dry skin  -headache  -nightmares or trouble sleeping  -short term memory loss  -stomach upset or diarrhea  -unusually tired  This list may not describe all possible side effects. Call your doctor for medical advice about side effects. You may report side effects to FDA at 3-563-FDA-4440.  Where should I keep my medicine?  Keep out of the reach of children.  Store at room temperature between 15 and 30 degrees C (59 and 86 degrees F). Throw away any unused medicine after the expiration date.  NOTE: This sheet is a summary. It may not cover all possible information. If you have questions about this medicine, talk to your doctor, pharmacist, or health care provider.  © 2014, [employer]. (2/27/2009 5:08:10 PM)  Atrial Fibrillation  Atrial fibrillation is a type of irregular heart rhythm (arrhythmia). During atrial fibrillation, the upper chambers of the heart (atria) quiver continuously in a chaotic pattern. This causes an irregular and often rapid heart rate.   Atrial fibrillation is the result of the heart becoming overloaded with disorganized signals that tell it to beat. These signals are normally released one at a time by a part of the right atrium called the sinoatrial node. They then travel  from the atria to the lower chambers of the heart (ventricles), causing the atria and ventricles to contract and pump blood as they pass. In atrial fibrillation, parts of the atria outside of the sinoatrial node also release these signals. This results in two problems. First, the atria receive so many signals that they do not have time to fully contract. Second, the ventricles, which can only receive one signal at a time, beat irregularly and out of rhythm with the atria.   There are three types of atrial fibrillation:   · Paroxysmal. Paroxysmal atrial fibrillation starts suddenly and stops on its own within a week.  · Persistent. Persistent atrial fibrillation lasts for more than a week. It may stop on its own or with treatment.  · Permanent. Permanent atrial fibrillation does not go away. Episodes of atrial fibrillation may lead to permanent atrial fibrillation.  Atrial fibrillation can prevent your heart from pumping blood normally. It increases your risk of stroke and can lead to heart failure.   CAUSES   · Heart conditions, including a heart attack, heart failure, coronary artery disease, and heart valve conditions.    · Inflammation of the sac that surrounds the heart (pericarditis).  · Blockage of an artery in the lungs (pulmonary embolism).  · Pneumonia or other infections.  · Chronic lung disease.  · Thyroid problems, especially if the thyroid is overactive (hyperthyroidism).  · Caffeine, excessive alcohol use, and use of some illegal drugs.    · Use of some medicines, including certain decongestants and diet pills.  · Heart surgery.    · Birth defects.    Sometimes, no cause can be found. When this happens, the atrial fibrillation is called lone atrial fibrillation. The risk of complications from atrial fibrillation increases if you have lone atrial fibrillation and you are age 60 years or older.  RISK FACTORS  · Heart failure.  · Coronary artery disease.  · Diabetes mellitus.    · High blood pressure  (hypertension).    · Obesity.    · Other arrhythmias.    · Increased age.  SIGNS AND SYMPTOMS   · A feeling that your heart is beating rapidly or irregularly.    · A feeling of discomfort or pain in your chest.    · Shortness of breath.    · Sudden light-headedness or weakness.    · Getting tired easily when exercising.    · Urinating more often than normal (mainly when atrial fibrillation first begins).    In paroxysmal atrial fibrillation, symptoms may start and suddenly stop.  DIAGNOSIS   Your health care provider may be able to detect atrial fibrillation when taking your pulse. Your health care provider may have you take a test called an ambulatory electrocardiogram (ECG). An ECG records your heartbeat patterns over a 24-hour period. You may also have other tests, such as:  · Transthoracic echocardiogram (TTE). During echocardiography, sound waves are used to evaluate how blood flows through your heart.  · Transesophageal echocardiogram (JP).  · Stress test. There is more than one type of stress test. If a stress test is needed, ask your health care provider about which type is best for you.  · Chest X-ray exam.  · Blood tests.  · Computed tomography (CT).  TREATMENT   Treatment may include:  · Treating any underlying conditions. For example, if you have an overactive thyroid, treating the condition may correct atrial fibrillation.  · Taking medicine. Medicines may be given to control a rapid heart rate or to prevent blood clots, heart failure, or a stroke.  · Having a procedure to correct the rhythm of the heart:  ¨ Electrical cardioversion. During electrical cardioversion, a controlled, low-energy shock is delivered to the heart through your skin. If you have chest pain, very low blood pressure, or sudden heart failure, this procedure may need to be done as an emergency.  ¨ Catheter ablation. During this procedure, heart tissues that send the signals that cause atrial fibrillation are destroyed.  ¨ Surgical  ablation. During this surgery, thin lines of heart tissue that carry the abnormal signals are destroyed. This procedure can either be an open-heart surgery or a minimally invasive surgery. With the minimally invasive surgery, small cuts are made to access the heart instead of a large opening.  ¨ Pulmonary venous isolation. During this surgery, tissue around the veins that carry blood from the lungs (pulmonary veins) is destroyed. This tissue is thought to carry the abnormal signals.  HOME CARE INSTRUCTIONS   · Take medicines only as directed by your health care provider. Some medicines can make atrial fibrillation worse or recur.  · If blood thinners were prescribed by your health care provider, take them exactly as directed. Too much blood-thinning medicine can cause bleeding. If you take too little, you will not have the needed protection against stroke and other problems.  · Perform blood tests at home if directed by your health care provider. Perform blood tests exactly as directed.  · Quit smoking if you smoke.  · Do not drink alcohol.  · Do not drink caffeinated beverages such as coffee, soda, and some teas. You may drink decaffeinated coffee, soda, or tea.    · Maintain a healthy weight. Do not use diet pills unless your health care provider approves. They may make heart problems worse.    · Follow diet instructions as directed by your health care provider.  · Exercise regularly as directed by your health care provider.  · Keep all follow-up visits as directed by your health care provider. This is important.  PREVENTION   The following substances can cause atrial fibrillation to recur:   · Caffeinated beverages.  · Alcohol.  · Certain medicines, especially those used for breathing problems.  · Certain herbs and herbal medicines, such as those containing ephedra or ginseng.  · Illegal drugs, such as cocaine and amphetamines.  Sometimes medicines are given to prevent atrial fibrillation from recurring. Proper  treatment of any underlying condition is also important in helping prevent recurrence.   SEEK MEDICAL CARE IF:  · You notice a change in the rate, rhythm, or strength of your heartbeat.  · You suddenly begin urinating more frequently.  · You tire more easily when exerting yourself or exercising.  SEEK IMMEDIATE MEDICAL CARE IF:   · You have chest pain, abdominal pain, sweating, or weakness.  · You feel nauseous.  · You have shortness of breath.  · You suddenly have swollen feet and ankles.  · You feel dizzy.  · Your face or limbs feel numb or weak.  · You have a change in your vision or speech.  MAKE SURE YOU:   · Understand these instructions.  · Will watch your condition.  · Will get help right away if you are not doing well or get worse.     This information is not intended to replace advice given to you by your health care provider. Make sure you discuss any questions you have with your health care provider.     Document Released: 12/18/2006 Document Revised: 01/08/2016 Document Reviewed: 04/13/2016  [employer] Interactive Patient Education ©2016 [employer]

## 2018-02-07 NOTE — DISCHARGE PLANNING
WILLA met with pt and family bedside. WILLA informed pt of f/u heart appointment at [facility] for 03/01/18. WILLA printed and gave pt printed appointment. Willa gave pt IMM and signed copy was placed to go to medical records. Pt agreeable with dc plan.     Plan: WILLA to be available for any needs for pt.

## 2018-02-08 ENCOUNTER — TELEPHONE (OUTPATIENT)
Dept: CARDIOLOGY | Facility: MEDICAL CENTER | Age: 73
End: 2018-02-08

## 2018-02-08 NOTE — TELEPHONE ENCOUNTER
Pt son, Pito, calling to discuss if OK to wait for hospital follow up with Dr. Robin on 3/1/18. Pt was discharged from hospital yesterday but was not rounded on by Cardiology. Pt was told to schedule FU with Cardiology at discharge. Educated pt that I would not be able to put pt in with a APRN due to him being a new patient and not seeing any provider in hospital. Son states understanding and is OK with waiting until FU with Dr. Rangel. Pt to discuss cataract surgery on 2/13 with eye doctor and possibly reschedule until after FU with TF.

## 2018-02-14 ENCOUNTER — HOSPITAL ENCOUNTER (OUTPATIENT)
Dept: OTHER | Facility: MEDICAL CENTER | Age: 73
End: 2018-02-14
Attending: PHYSICIAN ASSISTANT
Payer: MEDICARE

## 2018-02-14 PROCEDURE — 94729 DIFFUSING CAPACITY: CPT | Mod: 26 | Performed by: INTERNAL MEDICINE

## 2018-02-14 PROCEDURE — 94726 PLETHYSMOGRAPHY LUNG VOLUMES: CPT | Mod: 26 | Performed by: INTERNAL MEDICINE

## 2018-02-14 PROCEDURE — 94060 EVALUATION OF WHEEZING: CPT

## 2018-02-14 PROCEDURE — 94060 EVALUATION OF WHEEZING: CPT | Mod: 26 | Performed by: INTERNAL MEDICINE

## 2018-02-14 PROCEDURE — 94729 DIFFUSING CAPACITY: CPT

## 2018-02-14 PROCEDURE — 94726 PLETHYSMOGRAPHY LUNG VOLUMES: CPT

## 2018-02-14 ASSESSMENT — PULMONARY FUNCTION TESTS
FVC_PERCENT_PREDICTED: 102
FEV1/FVC_PERCENT_LLN: 64
FEV1/FVC_PERCENT_PREDICTED: 93
FEV1_LLN: 2.26
FEV1_PERCENT_CHANGE: 0
FVC_PERCENT_PREDICTED: 100
FEV1/FVC_PERCENT_LLN: 64
FEV1_PERCENT_PREDICTED: 95
FEV1/FVC: 72.27
FEV1/FVC_PERCENT_PREDICTED: 92
FEV1/FVC: 71
FEV1/FVC_PERCENT_PREDICTED: 76
FEV1/FVC: 71
FVC: 3.64
FEV1/FVC_PREDICTED: 77
FEV1/FVC_PERCENT_PREDICTED: 95
FVC_LLN: 2.96
FVC_PREDICTED: 3.55
FEV1/FVC_PERCENT_CHANGE: 0
FEV1_PERCENT_CHANGE: -1
FEV1/FVC: 72
FVC: 3.57
FEV1/FVC_PERCENT_PREDICTED: 93
FEV1/FVC_PERCENT_CHANGE: 1
FEV1_PERCENT_PREDICTED: 95
FEV1_LLN: 2.26
FEV1: 2.58
FEV1_PREDICTED: 2.71
FVC_LLN: 2.96
FEV1: 2.59

## 2018-02-18 NOTE — PROCEDURES
DATE OF TEST:  02/14/2018    Technician comment, the patient had good effort and cooperation.  The results   of the test meet ATS standards for acceptability and repeatability.    SPIROMETRY:  1.  FVC was 3.64 liters, 102% of predicted.  2.  FEV1 was 2.59 liters, 95% of predicted.  3.  FEV1/FVC ratio was 71%.  4.  There was no significant response to bronchodilators.  5.  Flow volume loop was normal in shape and size.    LUNG VOLUMES:  1.  Total lung capacity was normal at 6.66 liters, 100% of predicted.  2.  Residual volume was mildly increased at 3.01 liters, 125% of predicted.    Diffusion capacity was normal at 114% of predicted.    IMPRESSION:  Other than mild air trapping, the patient had normal pulmonary   function test.  Clinical correlation is required.       ____________________________________     MD JAMEL Muhammad / FAUSTINA    DD:  02/18/2018 16:24:53  DT:  02/18/2018 13:15:18    D#:  [id number]  Job#:  [id number]

## 2018-02-21 ENCOUNTER — HOSPITAL ENCOUNTER (OUTPATIENT)
Dept: RADIOLOGY | Facility: MEDICAL CENTER | Age: 73
End: 2018-02-21
Attending: SURGERY
Payer: MEDICARE

## 2018-02-21 DIAGNOSIS — R91.8 LUNG MASS: ICD-10-CM

## 2018-02-21 PROCEDURE — 71260 CT THORAX DX C+: CPT

## 2018-03-01 ENCOUNTER — TELEPHONE (OUTPATIENT)
Dept: CARDIOLOGY | Facility: MEDICAL CENTER | Age: 73
End: 2018-03-01

## 2018-03-01 ENCOUNTER — OFFICE VISIT (OUTPATIENT)
Dept: CARDIOLOGY | Facility: MEDICAL CENTER | Age: 73
End: 2018-03-01
Payer: MEDICARE

## 2018-03-01 VITALS
HEIGHT: 68 IN | HEART RATE: 66 BPM | WEIGHT: 168 LBS | SYSTOLIC BLOOD PRESSURE: 120 MMHG | OXYGEN SATURATION: 95 % | RESPIRATION RATE: 14 BRPM | BODY MASS INDEX: 25.46 KG/M2 | DIASTOLIC BLOOD PRESSURE: 70 MMHG

## 2018-03-01 DIAGNOSIS — I48.91 NEW ONSET ATRIAL FIBRILLATION (HCC): ICD-10-CM

## 2018-03-01 LAB — EKG IMPRESSION: NORMAL

## 2018-03-01 PROCEDURE — 99205 OFFICE O/P NEW HI 60 MIN: CPT | Mod: 25 | Performed by: INTERNAL MEDICINE

## 2018-03-01 PROCEDURE — 93000 ELECTROCARDIOGRAM COMPLETE: CPT | Performed by: INTERNAL MEDICINE

## 2018-03-01 RX ORDER — DUREZOL 0.5 MG/ML
EMULSION OPHTHALMIC
Refills: 1 | COMMUNITY
Start: 2018-02-01 | End: 2018-04-13

## 2018-03-01 RX ORDER — BROMFENAC 0.76 MG/ML
SOLUTION/ DROPS OPHTHALMIC
Refills: 1 | COMMUNITY
Start: 2018-02-02 | End: 2018-04-13

## 2018-03-01 RX ORDER — AMLODIPINE BESYLATE 2.5 MG/1
TABLET ORAL
COMMUNITY
Start: 2018-02-20 | End: 2018-04-13

## 2018-03-01 RX ORDER — BESIFLOXACIN 6 MG/ML
SUSPENSION OPHTHALMIC
Refills: 1 | COMMUNITY
Start: 2018-02-02 | End: 2018-04-13

## 2018-03-01 NOTE — PROGRESS NOTES
Subjective:   Pito Black is a 73 y.o. male who presents today for evaluation of atrial fibrillation and preoperative state. The patient has been healthy all of his life and exercises regularly without symptoms. He was noted to have a mass in his chest which was biopsied  via bronchoscopy with pneumothorax occurring. He was admitted to the hospital overnight for that and was noted to be in atrial fibrillation. The patient was unaware of this rhythm and has no sensation of palpitations or irregular beating of his heart. There has been no change in his degree of exercise tolerance. No one has noted this arrhythmia previously. He was given metoprolol and generally takes 81 mg of aspirin a day. He comes in today for preop assessment. Anticoagulation was not started because he is scheduled for pulmonary resection.    There is no history of previous heart problems. He does have a brother with heart attack and coronary disease having undergone bypass surgery. Patient has been a smoker but quit 12 years ago. He does not have diabetes but has been noted to have elevated cholesterol and blood pressure.    Patient is asymptomatic with no exertional dyspnea, chest pain, pressure, tightness. No exercise impairment has been noted. He has no symptoms of stroke, TIA, or claudication. No symptoms of congestive heart failure such as orthopnea, PND, pedal edema.    Chief Complaint: Atrial fibrillation   Past Medical History:   Diagnosis Date   • Arthritis 2015    generalized    • Asthma    • Backpain    • Breath shortness 2015    with exertion   • Cataract    • Dental disorder     partial upper   • Diabetes     diet controlled   • High cholesterol    • Hypertension 2015    well controlled on meds     Past Surgical History:   Procedure Laterality Date   • EAR MIDDLE EXPLORATION Right 6/12/2015    Procedure: EAR MIDDLE EXPLORATION;  Surgeon: William Brandon M.D.;  Location: SURGERY SAME DAY [facility];  Service:    • OSSICULAR  "RECONSTRUCTION Right 6/12/2015    Procedure: OSSICULAR RECONSTRUCTION CHAIN POSSIBLE;  Surgeon: William Brandon M.D.;  Location: SURGERY SAME DAY [facility];  Service:    • OTHER      ear replacement \"many years ago\"   • OTHER ORTHOPEDIC SURGERY      right knee replacement 2005     History reviewed. No pertinent family history.  History   Smoking Status   • Former Smoker   • Packs/day: 0.25   • Years: 40.00   • Types: Cigarettes   • Quit date: 1/1/1995   Smokeless Tobacco   • Never Used     No Known Allergies  Outpatient Encounter Prescriptions as of 3/1/2018   Medication Sig Dispense Refill   • BREO ELLIPTA 200-25 MCG/INH AEROSOL POWDER, BREATH ACTIVATED Inhale 1 Puff by mouth every day.     • metoprolol (LOPRESSOR) 25 MG Tab Take 1 Tab by mouth 2 Times a Day. 60 Tab 4   • psyllium (METAMUCIL) 58.12 % Pack Take 1 Packet by mouth every day.     • amlodipine (NORVASC) 5 MG Tab Take 5 mg by mouth every morning.     • losartan (COZAAR) 100 MG Tab Take 100 mg by mouth every morning.     • Fluticasone Furoate-Vilanterol (BREO ELLIPTA INH) Inhale 1 Puff by mouth every morning.     • aspirin EC (ECOTRIN) 81 MG TBEC Take 81 mg by mouth every morning.     • ezetimibe (ZETIA) 10 MG TABS Take 10 mg by mouth every morning.     • amLODIPine (NORVASC) 2.5 MG Tab      • BESIVANCE 0.6 % Suspension   1   • BROMSITE 0.075 % Solution   1   • DUREZOL 0.05 % Emulsion   1     No facility-administered encounter medications on file as of 3/1/2018.      ROS. The review of systems she was evaluated with the patient. His only  positive response is hearing loss. All other responses were questioned and negative.     Objective:   /70   Pulse 66   Resp 14   Ht 1.727 m (5' 8\")   Wt 76.2 kg (168 lb)   SpO2 95%   BMI 25.54 kg/m²     Physical Exam   Exam:    Vitals:    03/01/18 1038   BP: 120/70   Pulse: 66   Resp: 14   SpO2: 95%   Weight: 76.2 kg (168 lb)   Height: 1.727 m (5' 8\")     Constitutional: Well developed, well nourished male " in no acute distress. Appears approximate stated age and provides a good history.   HEENT: Normocephalic, pupils are equal and responsive. bilateral arcus. Conjunctiva and sclera are clear. No xanthelasma. No diagonal ear lobe crease noted. Mucus membranes are moist and pink. Good oral hygiene  Neck: No JVD or HJR. JVP = 4-5cm H2O. Good carotid upstroke with no bruit. No lymphadenopathy, No thyroid enlargement.  Cardiovascular: Regular rhythm, no ectopics.  midsystolic blowing murmur at the apex and lower left sternal border. No diastolic murmur, gallop, rub or click. No lift, heave,  thrill, or cardiomegaly  Lungs: Normal effort, Clear to auscultation and percussion. No rales, rhonchi, wheezing Abdomen: Soft, non tender. No liver, kidney, spleen or mass palpable. The abdominal aorta is not palpable. Active bowel sounds are noted and there is no bruit  Extremities:  No cyanosis, edema, clubbing. Palpable posterior tibial and dorsal pedal pulses bilaterally.   Skin:   Warm and dry, no active lesions  Neurologic: Alert & oriented. Strength and sensation grossly intact. No lateralizing signs  Psychiatric:  Affect, mood, and judgement are appropriate    EKG reveals normal sinus rhythm today    Assessment:     1. New onset atrial fibrillation (CMS-HCC)  [facility] EKG (Clinic Performed)       Medical Decision Making:  Today's Assessment / Status / Plan:   Atrial fibrillation has been documented but the patient is in normal sinus rhythm today. He has what sounds like mitral valve prolapse and mild mitral regurgitation which is documented on echocardiography. His left ventricular function is normal. He was in atrial fibrillation time at the time of his echo. His rhythm is regular and his cardiac exam is normal today. He has no neck vein distention and no symptoms or signs of congestive heart failure. He has no angina or anginal like symptoms. Because he is in sinus rhythm I think it is reasonable to discontinue the  metoprolol which may provide some advantage during his surgery. If he develops atrial fibrillation during surgery, which is possible, rate control can be achieved with metoprolol and rhythm control with amiodarone, if necessary. Currently the patient takes 81 mg of aspirin a day which he has done for quite a while. This is probably reasonable for stroke prevention currently, but I suggested that when the risk of bleeding has passed, he could resume the aspirin at 162 mg per day. At age 75 there may be enough stroke risk to justify full anticoagulation. It would be reasonable to reevaluate the patient in one year if he remains stable.    The degree of mitral regurgitation is mild and he appears to have mitral valve prolapse by exam. He is asymptomatic so no therapy or further evaluation of mitral regurgitation is indicated at this point. He will  return in a year for repeat evaluation. At that time a decision regarding the necessity of repeat echo and anticoagulation will be made. If he has a malignancy in the chest, anticoagulation is probably indicated in spite of the low CHADS2-VAS score. He understands that aspirin gradually loses effectiveness as a stroke preventative as one ages. His daughter was present during this discussion and they both express understanding.    Because he is asymptomatic, his cardiac risk for the proposed surgical procedure is acceptably low. No further testing is indicated.

## 2018-03-01 NOTE — TELEPHONE ENCOUNTER
"Pending further pulmonary procedure information / MD information.     Dr. Robin informed this RN he dictated in his notes clearance for pulmonary surgery.    \"Because he is asymptomatic, his cardiac risk for the proposed surgical procedure is acceptably low. No further testing is indicated.\"  "

## 2018-03-02 NOTE — TELEPHONE ENCOUNTER
Spoke with patient's daughter.  She states that the following providers should be informed of clearance:    Dr. Cristhian Galeano with [facility], Consultation appointment only - scheduled for March 6th.  S/w Zaida in scheduling, she was informed to fax over clearance if needed.      Cc:  Dr. Jc Moyer - Pulmonolgist  And PCP VINCENT Rahman

## 2018-04-13 DIAGNOSIS — Z01.812 PRE-PROCEDURAL LABORATORY EXAMINATION: ICD-10-CM

## 2018-04-13 DIAGNOSIS — Z01.810 PRE-OPERATIVE CARDIOVASCULAR EXAMINATION: ICD-10-CM

## 2018-04-13 LAB
ANION GAP SERPL CALC-SCNC: 9 MMOL/L (ref 0–11.9)
BUN SERPL-MCNC: 20 MG/DL (ref 8–22)
CALCIUM SERPL-MCNC: 10 MG/DL (ref 8.5–10.5)
CHLORIDE SERPL-SCNC: 104 MMOL/L (ref 96–112)
CO2 SERPL-SCNC: 26 MMOL/L (ref 20–33)
CREAT SERPL-MCNC: 1.1 MG/DL (ref 0.5–1.4)
ERYTHROCYTE [DISTWIDTH] IN BLOOD BY AUTOMATED COUNT: 47.5 FL (ref 35.9–50)
GLUCOSE SERPL-MCNC: 99 MG/DL (ref 65–99)
HCT VFR BLD AUTO: 46.3 % (ref 42–52)
HGB BLD-MCNC: 15.9 G/DL (ref 14–18)
MCH RBC QN AUTO: 33.5 PG (ref 27–33)
MCHC RBC AUTO-ENTMCNC: 34.3 G/DL (ref 33.7–35.3)
MCV RBC AUTO: 97.7 FL (ref 81.4–97.8)
PLATELET # BLD AUTO: 186 K/UL (ref 164–446)
PMV BLD AUTO: 9.5 FL (ref 9–12.9)
POTASSIUM SERPL-SCNC: 4.7 MMOL/L (ref 3.6–5.5)
RBC # BLD AUTO: 4.74 M/UL (ref 4.7–6.1)
SODIUM SERPL-SCNC: 139 MMOL/L (ref 135–145)
WBC # BLD AUTO: 7.9 K/UL (ref 4.8–10.8)

## 2018-04-13 PROCEDURE — 85027 COMPLETE CBC AUTOMATED: CPT

## 2018-04-13 PROCEDURE — 80048 BASIC METABOLIC PNL TOTAL CA: CPT

## 2018-04-13 PROCEDURE — 36415 COLL VENOUS BLD VENIPUNCTURE: CPT

## 2018-04-13 RX ORDER — LOSARTAN POTASSIUM 100 MG/1
100 TABLET ORAL EVERY MORNING
COMMUNITY
End: 2021-10-14

## 2018-04-19 ENCOUNTER — HOSPITAL ENCOUNTER (INPATIENT)
Facility: MEDICAL CENTER | Age: 73
LOS: 1 days | DRG: 168 | End: 2018-04-20
Attending: SURGERY | Admitting: SURGERY
Payer: MEDICARE

## 2018-04-19 ENCOUNTER — APPOINTMENT (OUTPATIENT)
Dept: RADIOLOGY | Facility: MEDICAL CENTER | Age: 73
DRG: 168 | End: 2018-04-19
Attending: SURGERY
Payer: MEDICARE

## 2018-04-19 DIAGNOSIS — R91.8 LUNG MASS: ICD-10-CM

## 2018-04-19 LAB
ABO GROUP BLD: NORMAL
ABO GROUP BLD: NORMAL
BLD GP AB SCN SERPL QL: NORMAL
EKG IMPRESSION: NORMAL
GLUCOSE BLD-MCNC: 112 MG/DL (ref 65–99)
RH BLD: NORMAL
RH BLD: NORMAL

## 2018-04-19 PROCEDURE — 160002 HCHG RECOVERY MINUTES (STAT): Performed by: SURGERY

## 2018-04-19 PROCEDURE — 160041 HCHG SURGERY MINUTES - EA ADDL 1 MIN LEVEL 4: Performed by: SURGERY

## 2018-04-19 PROCEDURE — 160035 HCHG PACU - 1ST 60 MINS PHASE I: Performed by: SURGERY

## 2018-04-19 PROCEDURE — 93005 ELECTROCARDIOGRAM TRACING: CPT | Performed by: ANESTHESIOLOGY

## 2018-04-19 PROCEDURE — 700111 HCHG RX REV CODE 636 W/ 250 OVERRIDE (IP)

## 2018-04-19 PROCEDURE — 700101 HCHG RX REV CODE 250: Performed by: SURGERY

## 2018-04-19 PROCEDURE — 88331 PATH CONSLTJ SURG 1 BLK 1SPC: CPT

## 2018-04-19 PROCEDURE — 160009 HCHG ANES TIME/MIN: Performed by: SURGERY

## 2018-04-19 PROCEDURE — 501581 HCHG TROCAR: Performed by: SURGERY

## 2018-04-19 PROCEDURE — 500800 HCHG LAPAROSCOPIC J/L HOOK: Performed by: SURGERY

## 2018-04-19 PROCEDURE — 86901 BLOOD TYPING SEROLOGIC RH(D): CPT

## 2018-04-19 PROCEDURE — 501570 HCHG TROCAR, SEPARATOR: Performed by: SURGERY

## 2018-04-19 PROCEDURE — 700102 HCHG RX REV CODE 250 W/ 637 OVERRIDE(OP): Performed by: SURGERY

## 2018-04-19 PROCEDURE — 110454 HCHG SHELL REV 250: Performed by: SURGERY

## 2018-04-19 PROCEDURE — 501838 HCHG SUTURE GENERAL: Performed by: SURGERY

## 2018-04-19 PROCEDURE — 160029 HCHG SURGERY MINUTES - 1ST 30 MINS LEVEL 4: Performed by: SURGERY

## 2018-04-19 PROCEDURE — 82962 GLUCOSE BLOOD TEST: CPT

## 2018-04-19 PROCEDURE — 700101 HCHG RX REV CODE 250

## 2018-04-19 PROCEDURE — 500385 HCHG DRAIN, PLEUROVAC ADUL: Performed by: SURGERY

## 2018-04-19 PROCEDURE — 71045 X-RAY EXAM CHEST 1 VIEW: CPT

## 2018-04-19 PROCEDURE — 0BBG4ZX EXCISION OF LEFT UPPER LUNG LOBE, PERCUTANEOUS ENDOSCOPIC APPROACH, DIAGNOSTIC: ICD-10-PCS | Performed by: SURGERY

## 2018-04-19 PROCEDURE — 700102 HCHG RX REV CODE 250 W/ 637 OVERRIDE(OP)

## 2018-04-19 PROCEDURE — 93010 ELECTROCARDIOGRAM REPORT: CPT | Performed by: INTERNAL MEDICINE

## 2018-04-19 PROCEDURE — 500378 HCHG DRAIN, J-VAC ROUND 19FR: Performed by: SURGERY

## 2018-04-19 PROCEDURE — 501463 HCHG STAPLES, UNIV. ROTIC: Performed by: SURGERY

## 2018-04-19 PROCEDURE — 88341 IMHCHEM/IMCYTCHM EA ADD ANTB: CPT

## 2018-04-19 PROCEDURE — 86850 RBC ANTIBODY SCREEN: CPT

## 2018-04-19 PROCEDURE — A9270 NON-COVERED ITEM OR SERVICE: HCPCS

## 2018-04-19 PROCEDURE — 770006 HCHG ROOM/CARE - MED/SURG/GYN SEMI*

## 2018-04-19 PROCEDURE — 501445 HCHG STAPLER, SKIN DISP: Performed by: SURGERY

## 2018-04-19 PROCEDURE — A4314 CATH W/DRAINAGE 2-WAY LATEX: HCPCS | Performed by: SURGERY

## 2018-04-19 PROCEDURE — 700111 HCHG RX REV CODE 636 W/ 250 OVERRIDE (IP): Performed by: SURGERY

## 2018-04-19 PROCEDURE — 160036 HCHG PACU - EA ADDL 30 MINS PHASE I: Performed by: SURGERY

## 2018-04-19 PROCEDURE — 88342 IMHCHEM/IMCYTCHM 1ST ANTB: CPT

## 2018-04-19 PROCEDURE — A9270 NON-COVERED ITEM OR SERVICE: HCPCS | Performed by: SURGERY

## 2018-04-19 PROCEDURE — 88307 TISSUE EXAM BY PATHOLOGIST: CPT

## 2018-04-19 PROCEDURE — 86900 BLOOD TYPING SEROLOGIC ABO: CPT

## 2018-04-19 PROCEDURE — 500312 HCHG CONNECTOR, BLAKE DRAIN 1-WAY: Performed by: SURGERY

## 2018-04-19 PROCEDURE — 160048 HCHG OR STATISTICAL LEVEL 1-5: Performed by: SURGERY

## 2018-04-19 RX ORDER — ACETAMINOPHEN 500 MG
1000 TABLET ORAL EVERY 6 HOURS
Status: DISCONTINUED | OUTPATIENT
Start: 2018-04-19 | End: 2018-04-20 | Stop reason: HOSPADM

## 2018-04-19 RX ORDER — CELECOXIB 200 MG/1
CAPSULE ORAL
Status: COMPLETED
Start: 2018-04-19 | End: 2018-04-19

## 2018-04-19 RX ORDER — HALOPERIDOL 5 MG/ML
1 INJECTION INTRAMUSCULAR EVERY 6 HOURS PRN
Status: DISCONTINUED | OUTPATIENT
Start: 2018-04-19 | End: 2018-04-20 | Stop reason: HOSPADM

## 2018-04-19 RX ORDER — DEXTROSE MONOHYDRATE, SODIUM CHLORIDE, AND POTASSIUM CHLORIDE 50; 1.49; 9 G/1000ML; G/1000ML; G/1000ML
INJECTION, SOLUTION INTRAVENOUS CONTINUOUS
Status: DISCONTINUED | OUTPATIENT
Start: 2018-04-19 | End: 2018-04-20 | Stop reason: HOSPADM

## 2018-04-19 RX ORDER — CEFAZOLIN SODIUM 2 G/100ML
2 INJECTION, SOLUTION INTRAVENOUS EVERY 8 HOURS
Status: COMPLETED | OUTPATIENT
Start: 2018-04-19 | End: 2018-04-20

## 2018-04-19 RX ORDER — ACETAMINOPHEN 500 MG
TABLET ORAL
Status: COMPLETED
Start: 2018-04-19 | End: 2018-04-19

## 2018-04-19 RX ORDER — DIPHENHYDRAMINE HYDROCHLORIDE 50 MG/ML
25 INJECTION INTRAMUSCULAR; INTRAVENOUS EVERY 6 HOURS PRN
Status: DISCONTINUED | OUTPATIENT
Start: 2018-04-19 | End: 2018-04-19

## 2018-04-19 RX ORDER — GABAPENTIN 300 MG/1
CAPSULE ORAL
Status: COMPLETED
Start: 2018-04-19 | End: 2018-04-19

## 2018-04-19 RX ORDER — SODIUM CHLORIDE 9 MG/ML
INJECTION, SOLUTION INTRAVENOUS CONTINUOUS
Status: DISCONTINUED | OUTPATIENT
Start: 2018-04-19 | End: 2018-04-20 | Stop reason: HOSPADM

## 2018-04-19 RX ORDER — SCOLOPAMINE TRANSDERMAL SYSTEM 1 MG/1
1 PATCH, EXTENDED RELEASE TRANSDERMAL
Status: DISCONTINUED | OUTPATIENT
Start: 2018-04-19 | End: 2018-04-20 | Stop reason: HOSPADM

## 2018-04-19 RX ORDER — BUPIVACAINE HYDROCHLORIDE AND EPINEPHRINE 5; 5 MG/ML; UG/ML
INJECTION, SOLUTION EPIDURAL; INTRACAUDAL; PERINEURAL
Status: DISCONTINUED | OUTPATIENT
Start: 2018-04-19 | End: 2018-04-19 | Stop reason: HOSPADM

## 2018-04-19 RX ORDER — OXYCODONE HYDROCHLORIDE 5 MG/1
2.5 TABLET ORAL
Status: DISCONTINUED | OUTPATIENT
Start: 2018-04-19 | End: 2018-04-20 | Stop reason: HOSPADM

## 2018-04-19 RX ORDER — SODIUM CHLORIDE, SODIUM LACTATE, POTASSIUM CHLORIDE, CALCIUM CHLORIDE 600; 310; 30; 20 MG/100ML; MG/100ML; MG/100ML; MG/100ML
INJECTION, SOLUTION INTRAVENOUS
Status: DISCONTINUED | OUTPATIENT
Start: 2018-04-19 | End: 2018-04-19 | Stop reason: HOSPADM

## 2018-04-19 RX ORDER — DEXAMETHASONE SODIUM PHOSPHATE 4 MG/ML
4 INJECTION, SOLUTION INTRA-ARTICULAR; INTRALESIONAL; INTRAMUSCULAR; INTRAVENOUS; SOFT TISSUE
Status: DISCONTINUED | OUTPATIENT
Start: 2018-04-19 | End: 2018-04-20 | Stop reason: HOSPADM

## 2018-04-19 RX ORDER — CELECOXIB 200 MG/1
200 CAPSULE ORAL 2 TIMES DAILY WITH MEALS
Status: DISCONTINUED | OUTPATIENT
Start: 2018-04-19 | End: 2018-04-20 | Stop reason: HOSPADM

## 2018-04-19 RX ORDER — ONDANSETRON 2 MG/ML
4 INJECTION INTRAMUSCULAR; INTRAVENOUS EVERY 4 HOURS PRN
Status: DISCONTINUED | OUTPATIENT
Start: 2018-04-19 | End: 2018-04-20 | Stop reason: HOSPADM

## 2018-04-19 RX ORDER — TRAZODONE HYDROCHLORIDE 50 MG/1
50 TABLET ORAL
Status: COMPLETED | OUTPATIENT
Start: 2018-04-19 | End: 2018-04-20

## 2018-04-19 RX ORDER — OXYCODONE HCL 5 MG/5 ML
SOLUTION, ORAL ORAL
Status: DISPENSED
Start: 2018-04-19 | End: 2018-04-20

## 2018-04-19 RX ADMIN — SODIUM CHLORIDE: 9 INJECTION, SOLUTION INTRAVENOUS at 14:00

## 2018-04-19 RX ADMIN — CELECOXIB 200 MG: 200 CAPSULE ORAL at 14:55

## 2018-04-19 RX ADMIN — GABAPENTIN 300 MG: 300 CAPSULE ORAL at 14:55

## 2018-04-19 RX ADMIN — ACETAMINOPHEN 1000 MG: 500 TABLET, FILM COATED ORAL at 14:55

## 2018-04-19 RX ADMIN — ACETAMINOPHEN 1000 MG: 500 TABLET ORAL at 20:30

## 2018-04-19 RX ADMIN — POTASSIUM CHLORIDE, DEXTROSE MONOHYDRATE AND SODIUM CHLORIDE: 150; 5; 900 INJECTION, SOLUTION INTRAVENOUS at 20:33

## 2018-04-19 RX ADMIN — CELECOXIB 200 MG: 200 CAPSULE ORAL at 20:31

## 2018-04-19 RX ADMIN — CEFAZOLIN SODIUM 2 G: 2 INJECTION, SOLUTION INTRAVENOUS at 22:54

## 2018-04-19 ASSESSMENT — LIFESTYLE VARIABLES
EVER_SMOKED: YES
EVER FELT BAD OR GUILTY ABOUT YOUR DRINKING: NO
ON A TYPICAL DAY WHEN YOU DRINK ALCOHOL HOW MANY DRINKS DO YOU HAVE: 3
TOTAL SCORE: 0
EVER HAD A DRINK FIRST THING IN THE MORNING TO STEADY YOUR NERVES TO GET RID OF A HANGOVER: NO
ALCOHOL_USE: YES
HOW MANY TIMES IN THE PAST YEAR HAVE YOU HAD 5 OR MORE DRINKS IN A DAY: 120
CONSUMPTION TOTAL: POSITIVE
HAVE YOU EVER FELT YOU SHOULD CUT DOWN ON YOUR DRINKING: NO
EVER_SMOKED: YES
AVERAGE NUMBER OF DAYS PER WEEK YOU HAVE A DRINK CONTAINING ALCOHOL: 7
TOTAL SCORE: 0
HAVE PEOPLE ANNOYED YOU BY CRITICIZING YOUR DRINKING: NO
TOTAL SCORE: 0

## 2018-04-19 ASSESSMENT — PAIN SCALES - GENERAL
PAINLEVEL_OUTOF10: 0
PAINLEVEL_OUTOF10: 4
PAINLEVEL_OUTOF10: ASSUMED PAIN PRESENT
PAINLEVEL_OUTOF10: 0
PAINLEVEL_OUTOF10: ASSUMED PAIN PRESENT
PAINLEVEL_OUTOF10: 0
PAINLEVEL_OUTOF10: 0

## 2018-04-19 ASSESSMENT — PATIENT HEALTH QUESTIONNAIRE - PHQ9
2. FEELING DOWN, DEPRESSED, IRRITABLE, OR HOPELESS: NOT AT ALL
1. LITTLE INTEREST OR PLEASURE IN DOING THINGS: NOT AT ALL
SUM OF ALL RESPONSES TO PHQ9 QUESTIONS 1 AND 2: 0

## 2018-04-19 NOTE — PROGRESS NOTES
The Medication Reconciliation process has been completed by interviewing the patient with permission to do so in front of family.    Allergies have been reviewed  Antibiotic use in 30 days - none    Home Pharmacy:  [facility]

## 2018-04-20 ENCOUNTER — APPOINTMENT (OUTPATIENT)
Dept: RADIOLOGY | Facility: MEDICAL CENTER | Age: 73
DRG: 168 | End: 2018-04-20
Attending: SURGERY
Payer: MEDICARE

## 2018-04-20 VITALS
TEMPERATURE: 97.6 F | DIASTOLIC BLOOD PRESSURE: 71 MMHG | SYSTOLIC BLOOD PRESSURE: 133 MMHG | HEIGHT: 68 IN | OXYGEN SATURATION: 97 % | RESPIRATION RATE: 18 BRPM | HEART RATE: 121 BPM | BODY MASS INDEX: 25.49 KG/M2 | WEIGHT: 168.21 LBS

## 2018-04-20 PROCEDURE — 71045 X-RAY EXAM CHEST 1 VIEW: CPT

## 2018-04-20 PROCEDURE — 700102 HCHG RX REV CODE 250 W/ 637 OVERRIDE(OP): Performed by: SURGERY

## 2018-04-20 PROCEDURE — 700111 HCHG RX REV CODE 636 W/ 250 OVERRIDE (IP): Performed by: SURGERY

## 2018-04-20 PROCEDURE — A9270 NON-COVERED ITEM OR SERVICE: HCPCS | Performed by: SURGERY

## 2018-04-20 RX ORDER — OXYCODONE HYDROCHLORIDE 5 MG/1
2.5 TABLET ORAL EVERY 6 HOURS PRN
Qty: 10 TAB | Refills: 0 | Status: SHIPPED | OUTPATIENT
Start: 2018-04-20 | End: 2018-04-20

## 2018-04-20 RX ORDER — OXYCODONE HYDROCHLORIDE 5 MG/1
2.5 TABLET ORAL EVERY 6 HOURS PRN
Qty: 5 TAB | Refills: 0 | Status: SHIPPED | OUTPATIENT
Start: 2018-04-20 | End: 2018-04-23

## 2018-04-20 RX ORDER — ACETAMINOPHEN 500 MG
1000 TABLET ORAL EVERY 6 HOURS
Qty: 56 TAB | Refills: 0 | Status: SHIPPED | OUTPATIENT
Start: 2018-04-20 | End: 2018-04-27

## 2018-04-20 RX ADMIN — CEFAZOLIN SODIUM 2 G: 2 INJECTION, SOLUTION INTRAVENOUS at 05:32

## 2018-04-20 RX ADMIN — ACETAMINOPHEN 1000 MG: 500 TABLET ORAL at 05:23

## 2018-04-20 RX ADMIN — TRAZODONE HYDROCHLORIDE 50 MG: 50 TABLET ORAL at 00:45

## 2018-04-20 RX ADMIN — ACETAMINOPHEN 1000 MG: 500 TABLET ORAL at 00:44

## 2018-04-20 RX ADMIN — CELECOXIB 200 MG: 200 CAPSULE ORAL at 07:57

## 2018-04-20 RX ADMIN — OXYCODONE HYDROCHLORIDE 2.5 MG: 5 TABLET ORAL at 05:24

## 2018-04-20 ASSESSMENT — PAIN SCALES - GENERAL
PAINLEVEL_OUTOF10: 5
PAINLEVEL_OUTOF10: 7
PAINLEVEL_OUTOF10: 4

## 2018-04-20 NOTE — OR SURGEON
Immediate Post OP Note    PreOp Diagnosis:   1.  Left upper lobe mass  2.  Bilateral pulmonary ground glass opacities    PostOp Diagnosis: same    Procedure(s):  1.  Left THORACOSCOPY, WEDGE biopsy left upper lobe    Surgeon(s):  Cristhian Galeano M.D.    Anesthesiologist/Type of Anesthesia:  Anesthesiologist: Black Haynes M.D./General    Surgical Staff:  Circulator: Milady Guadalupe R.N.  Operating Room Assistant (ORA): Richie Taylor  Scrub Person: Junie Ewing  First Assist: JILL West    Specimens removed if any:  1.  Left upper lobe wedge for frozen section    Estimated Blood Loss: 25mL    Findings: History of stable MARIA M mass on serial imaging with no lymphadenopathy, and nondiagnostic biopsy x2.  Left upper lobe mass, frozen section consistent with probable NSCLC but not definitive, no plural based masses or pleural effusion    Complications: none    Outcome:  Transferred to PACU in stable condition    4/19/2018 5:09 PM Cristhian Galeano M.D.

## 2018-04-20 NOTE — PROGRESS NOTES
Bedside report received.  Assessment complete.  A&O x 4. Patient calls appropriately.  Patient up with obey assist.   Patient has 4/10 pain. Medicated per MAR.  Denies N&V. Tolerating full liquid diet.  Chest tube to L rib/chest, to wall suction  + void, + flatus  Patient denies SOB.  SCD's in place.  Patient family at bedside.  Review plan with of care with patient. Call light and personal belongings with in reach. Hourly rounding in place. All needs met at this time.

## 2018-04-20 NOTE — PROGRESS NOTES
Patient discharged to home. All lines removed. Discharge instructions and prescription reviewed and understanding verbalized. Patient states they will fill prescriptions. Patient states they will return to emergency if discussed symptoms arise. Patient left walking with family. Medications from drawer removed and sent macias to pharmacy or disposed of per protocol. Chart given to unit clerk.

## 2018-04-20 NOTE — PROGRESS NOTES
Pt arrived on unit around 2020 via [facility] with transport.  Ambulated from [facility] to hospital bed with SBA.   Assessment complete.  AA&Ox4. VSS. SpO2 >90% on 2L via NC. Denies SOB. Reporting 4/10 pain. Medicated per MAR.  Left sided chest tube set to LCS. Patent, no leaks noted. Dressing CDI.  Admit profile complete. No vax indicated.  Education provided regarding oral hygiene. Pt verbalized understanding.  Pt and family oriented to unit, room, and introduced to staff.  All needs met at this time. Call light within reach.

## 2018-04-20 NOTE — PROGRESS NOTES
2 RN skin check complete. Slight redness noted to bilateral ears. Silicone O2 tubing applied. No other areas of skin breakdown noted.      Patient refused use of bed alarm despite receiving education regarding safety. Treaded slippers in place and bed locked in lowest position. Reinforced proper use of call light prior to ambulation. Patient verbalized understanding.

## 2018-04-20 NOTE — CARE PLAN
Problem: Pain Management  Goal: Pain level will decrease to patient's comfort goal  Outcome: PROGRESSING AS EXPECTED  Pain well controlled with scheduled PO med at this time.    Problem: Respiratory:  Goal: Respiratory status will improve  Outcome: PROGRESSING AS EXPECTED  Patient weaned down to 0.5L of supplemental oxygen. SpO2 >90%.

## 2018-04-20 NOTE — OR NURSING
Pt stable w/o complaints of pain or nausea. EKG performed now for possible Afib. CT draining bloody fluid at 20cm continous suction.

## 2018-04-20 NOTE — DISCHARGE INSTRUCTIONS
Discharge Instructions    Discharged to home by car with relative. Discharged via wheelchair, hospital escort: Yes.  Special equipment needed: Not Applicable    Be sure to schedule a follow-up appointment with your primary care doctor or any specialists as instructed.     Discharge Plan:   Influenza Vaccine Indication: Not indicated: Previously immunized this influenza season and > 8 years of age    I understand that a diet low in cholesterol, fat, and sodium is recommended for good health. Unless I have been given specific instructions below for another diet, I accept this instruction as my diet prescription.   Other diet: Regular    Special Instructions: Use incentive Spirometer    · Is patient discharged on Warfarin / Coumadin?   No     Thoracoscopy, Care After  Refer to this sheet in the next few weeks. These instructions provide you with information about caring for yourself after your procedure. Your health care provider may also give you more specific instructions. Your treatment has been planned according to current medical practices, but problems sometimes occur. Call your health care provider if you have any problems or questions after your procedure.  WHAT TO EXPECT AFTER THE PROCEDURE:  After your procedure, it is common to feel sore for up to two weeks.  HOME CARE INSTRUCTIONS  · There are many different ways to close and cover an incision, including stitches (sutures), skin glue, and adhesive strips. Follow your health care provider's instructions about:  ¨ Incision care.  ¨ Bandage (dressing) changes and removal.  ¨ Incision closure removal.  · Check your incision area every day for signs of infection. Watch for:  ¨ Redness, swelling, or pain.  ¨ Fluid, blood, or pus.  · Take medicines only as directed by your health care provider.  · Try to cough often. Coughing helps to protect against lung infection (pneumonia). It may hurt to cough. If this happens, hold a pillow against your chest when you  cough.  · Take deep breaths. This also helps to protect against pneumonia.  · If you were given an incentive spirometer, use it as directed by your health care provider.  · Do not take baths, swim, or use a hot tub until your health care provider approves. You may take showers.  · Avoid lifting until your health care provider approves.  · Avoid driving until your health care provider approves.  · Do not travel by airplane after the chest tube is removed until your health care provider approves.  SEEK MEDICAL CARE IF:  · You have a fever.  · Pain medicines do not ease your pain.  · You have redness, swelling, or increasing pain in your incision area.  · You develop a cough that does not go away, or you are coughing up mucus that is yellow or green.  SEEK IMMEDIATE MEDICAL CARE IF:  · You have fluid, blood, or pus coming from your incision.  · There is a bad smell coming from your incision or dressing.  · You develop a rash.  · You have difficulty breathing.  · You cough up blood.  · You develop light-headedness or you feel faint.  · You develop chest pain.  · Your heartbeat feels irregular or very fast.     This information is not intended to replace advice given to you by your health care provider. Make sure you discuss any questions you have with your health care provider.     Document Released: 07/07/2006 Document Revised: 01/08/2016 Document Reviewed: 09/02/2015  [employer] Patient Education ©2016 [employer]      Depression / Suicide Risk    As you are discharged from this [facility] facility, it is important to learn how to keep safe from harming yourself.    Recognize the warning signs:  · Abrupt changes in personality, positive or negative- including increase in energy   · Giving away possessions  · Change in eating patterns- significant weight changes-  positive or negative  · Change in sleeping patterns- unable to sleep or sleeping all the time   · Unwillingness or inability to  communicate  · Depression  · Unusual sadness, discouragement and loneliness  · Talk of wanting to die  · Neglect of personal appearance   · Rebelliousness- reckless behavior  · Withdrawal from people/activities they love  · Confusion- inability to concentrate     If you or a loved one observes any of these behaviors or has concerns about self-harm, here's what you can do:  · Talk about it- your feelings and reasons for harming yourself  · Remove any means that you might use to hurt yourself (examples: pills, rope, extension cords, firearm)  · Get professional help from the community (Mental Health, Substance Abuse, psychological counseling)  · Do not be alone:Call your Safe Contact- someone whom you trust who will be there for you.  · Call your local CRISIS HOTLINE 990-9619 or 874-505-7914  · Call your local Children's Mobile Crisis Response Team [place] (497) 886-2093 or www.[employer]  · Call the toll free National Suicide Prevention Hotlines   · National Suicide Prevention Lifeline 277-804-IAWS (7948)  · National Hope Line Network 800-SUICIDE (315-7248)

## 2018-04-20 NOTE — DISCHARGE SUMMARY
CHIEF COMPLAINT ON ADMISSION  Lung mass, asymptomatic    CODE STATUS  Full Code    HPI & HOSPITAL COURSE  This is a 73 y.o. male here with a left upper lobe lung mass, s/p L thoracoscopy and wedge resection.  Final pathology pending, frozen section indicates probable NSCLC.  No acute postoperative events. His chest tube was removed on POD1.  At time of discharge he was eating, walking, voiding, and his pain was well controlled.  He was breathing comfortably on room air.      Exam on day of discharge:  NAD, awake and alert  Breathing nonlabored, room air  L chest VATS incisions intact, clean, dry    Therefore, he is discharged in good and stable condition with close outpatient follow-up.      DISCHARGE PROBLEM LIST  -Lung mass, left upper lobe    FOLLOW UP  Follow up with me in office 1-2 weeks    MEDICATIONS ON DISCHARGE   Pito Black   Home Medication Instructions ANITRA:[id number]    Printed on:04/20/18 0837   Medication Information                      acetaminophen (TYLENOL) 500 MG Tab  Take 2 Tabs by mouth every 6 hours for 7 days.             amlodipine (NORVASC) 5 MG Tab  Take 5 mg by mouth every morning.             aspirin EC (ECOTRIN) 81 MG TBEC  Take 81 mg by mouth every morning.             BREO ELLIPTA 200-25 MCG/INH AEROSOL POWDER, BREATH ACTIVATED  Inhale 1 Puff by mouth every day.             ezetimibe (ZETIA) 10 MG TABS  Take 10 mg by mouth every morning.             losartan (COZAAR) 100 MG Tab  Take 100 mg by mouth every day.             oxyCODONE immediate-release (ROXICODONE) 5 MG Tab  Take 0.5 Tabs by mouth every 6 hours as needed for up to 3 days.             psyllium (METAMUCIL) 58.12 % Pack  Take 1 Packet by mouth every day.                 DIET  Orders Placed This Encounter   Procedures   • Diet Order     Standing Status:   Standing     Number of Occurrences:   1     Order Specific Question:   Diet:     Answer:   Full Liquid [11]       ACTIVITY  As tolerated.  Weight bearing as  tolerated      CONSULTATIONS  none    PROCEDURES  4/19/18  Left Thoracoscopy, wedge resection left upper lobe lung mass    LABORATORY  Lab Results   Component Value Date/Time    SODIUM 139 04/13/2018 10:55 AM    POTASSIUM 4.7 04/13/2018 10:55 AM    CHLORIDE 104 04/13/2018 10:55 AM    CO2 26 04/13/2018 10:55 AM    GLUCOSE 99 04/13/2018 10:55 AM    BUN 20 04/13/2018 10:55 AM    CREATININE 1.10 04/13/2018 10:55 AM        Lab Results   Component Value Date/Time    WBC 7.9 04/13/2018 10:55 AM    HEMOGLOBIN 15.9 04/13/2018 10:55 AM    HEMATOCRIT 46.3 04/13/2018 10:55 AM    PLATELETCT 186 04/13/2018 10:55 AM        Total time of the discharge process exceeds 31 minutes       Thoracoscopy D/C instructions:    1. DIET: Upon discharge from the hospital you may resume your normal preoperative diet. Depending on how you are feeling and whether you have nausea or not, you may wish to stay with a bland diet for the first few days. However, you can advance this as quickly as you feel ready.    2. ACTIVITIES: After discharge from the hospital, you may resume full routine activities. However, there should be no heavy lifting (greater than 15 pounds) and no strenuous activities until after your follow-up visit. Otherwise, routine activities of daily living are acceptable.    3. DRIVING: You may drive whenever you are off pain medications and are able to perform the activities needed to drive, i.e. turning, bending, twisting, etc.    4. BATHING: You may get the wound wet at any time after leaving the hospital. You may shower, but do not submerge in a bath for at least a week. Dressings may come off after 24 hours.    5. BOWEL FUNCTION: A few patients, after this operation, will develop either frequent or loose stools after meals. This usually corrects itself after a few days, to a few weeks. If this occurs, do not worry; it is not unusual and will resolve. Much more common than loose stools, is constipation. The combination of pain  medication and decreased activity level can cause constipation in otherwise normal patients. If you feel this is occurring, take a laxative (Milk of Magnesia, Ex-Lax, Senokot, etc.) until the problem has resolved.    6. PAIN MEDICATION: You will be given a prescription for pain medication at discharge. Please take these as directed. It is important to remember not to take medications on an empty stomach as this may cause nausea.    7.CALL IF YOU HAVE: (1) Fevers to more than 1010 F, (2) Unusual chest or leg pain, (3) Drainage or fluid from incision that may be foul smelling, increased tenderness or soreness at the wound or the wound edges are no longer together, redness or swelling at the incision site. Please do not hesitate to call with any other questions.     8. APPOINTMENT: Contact our office at [phone] for a follow-up appointment in 1 to 2 weeks following your procedure.    If you have any additional questions, please do not hesitate to call the office and speak to either myself or the physician on call.    Office address:  [address] [place] [address], [place] [address]    Cristhian Galeano M.D.  [facility]  [phone]

## 2018-04-20 NOTE — OP REPORT
Operative Note    Date: 4/19/2018    PreOp Diagnosis:   1.  Left upper lobe mass  2.  Bilateral pulmonary ground glass opacities     PostOp Diagnosis: same     Procedure(s):  1.  Left THORACOSCOPY, WEDGE biopsy left upper lobe     Surgeon(s):  Cristhian Galeano M.D.     Anesthesiologist/Type of Anesthesia:  Anesthesiologist: Black Haynes M.D./General     Surgical Staff:  Circulator: Milady Guadalupe R.N.  Operating Room Assistant (ORA): Richie Taylor  Scrub Person: Junie Ewing  First Assist: JILL West     Specimens removed if any:  1.  Left upper lobe wedge for frozen section     Estimated Blood Loss: 25mL     Findings:  Left upper lobe mass, frozen section consistent with probable NSCLC but not definitive, no plural based masses or pleural effusion.  Given inconclusive result, elected to wait for final pathology and discussed with the patient before proceeding with interval phlebectomy versus radiation, versus close follow-up.     Complications: none     Outcome:  Transferred to PACU in stable condition    Indications:  73-year-old male with history of near indolent MARIA M mass on serial imaging with no lymphadenopathy, and nondiagnostic biopsy x2. Remote history of smoking. We discussed surgical wedge biopsy with possible completion lobectomy given frozen section findings.      Procedure in detail:  The patient was in the operating room and placed on the operating table in supine position. General endotracheal anesthesia was induced. The patient was then placed in right lateral decubitus position exposing the left chest. Care was taken to pad all pressure points. A sterile prep and drape was performed. A timeout was performed. Following injection of local anesthetic, a 1 cm incision was made in the posterior axillary line approximately the 8th intercostal space. The thoracoscope was introduced. There were 2 calcified pleural based lesions posteriorly, but no suspicious-looking lesions or pleural  effusion noted. There was a subpleural mass in the lateral portion of the left upper lobe which was palpable. This was removed with serial firings of the Endo LUIS FERNANDO stapler using blue loads. This was sent to pathology for frozen section analysis. Multiple pathologists reviewed it and were leaning towards probable malignancy. The margins appeared close. I elected to complete the operation, wait for final pathology to determine further treatment.  The staple line was covered with Evicel, and a 19 Swedish Casey drain was placed in an apical posterior position, sutured in with 0 silk, and connected to Pleur-evac drainage and suction. The lung was allowed to reinflate under direct vision and came up well. The thorascopic incision was closed in layers using 2-0 Vicryl and 4-0 Vicryl with Dermabond for the skin. Appropriate chest tube dressing was applied. Sponge, instrument, needle counts correct ×2. The patient was allowed to emerge from general anesthesia and was extubated and taken to the PACU in stable condition. Chest x-rays pending. I discussed my findings and plan with the patient's family who are in agreement    Cristhian Galeano M.D.  [facility]  [phone]

## 2018-05-22 ENCOUNTER — PATIENT OUTREACH (OUTPATIENT)
Dept: OTHER | Facility: MEDICAL CENTER | Age: 73
End: 2018-05-22

## 2018-05-22 NOTE — PROGRESS NOTES
Call placed to patient to introduce self and per request of Dr Galeano.  Spoke with wife, Cydney.  Provided information on cancer nurse navigation and role.  She is aware that patient will have follow up scan in about 3 months, but this has not been scheduled yet due to being so far out.  Pt currently not in treatment.  Wife, Cydney, is also a cancer patient and states did have paperwork on cancer nurse navigator for her diagnosis.  Provided contact information for this navigator and encouraged to call if needs or barriers to care arise.

## 2018-08-06 ENCOUNTER — APPOINTMENT (OUTPATIENT)
Dept: ADMISSIONS | Facility: MEDICAL CENTER | Age: 73
End: 2018-08-06
Attending: OPHTHALMOLOGY
Payer: MEDICARE

## 2018-08-06 RX ORDER — SENNOSIDES 8.6 MG
650 CAPSULE ORAL PRN
COMMUNITY
End: 2021-06-28

## 2018-08-07 ENCOUNTER — HOSPITAL ENCOUNTER (OUTPATIENT)
Facility: MEDICAL CENTER | Age: 73
End: 2018-08-07
Attending: OPHTHALMOLOGY | Admitting: OPHTHALMOLOGY
Payer: MEDICARE

## 2018-08-07 VITALS
SYSTOLIC BLOOD PRESSURE: 150 MMHG | RESPIRATION RATE: 18 BRPM | DIASTOLIC BLOOD PRESSURE: 88 MMHG | HEART RATE: 74 BPM | TEMPERATURE: 98.5 F | OXYGEN SATURATION: 96 % | WEIGHT: 164.68 LBS | BODY MASS INDEX: 25.04 KG/M2

## 2018-08-07 PROCEDURE — 99152 MOD SED SAME PHYS/QHP 5/>YRS: CPT | Performed by: OPHTHALMOLOGY

## 2018-08-07 PROCEDURE — 160035 HCHG PACU - 1ST 60 MINS PHASE I: Performed by: OPHTHALMOLOGY

## 2018-08-07 PROCEDURE — 500855 HCHG NEEDLE, IRRIG CYSTOTOME 27G: Performed by: OPHTHALMOLOGY

## 2018-08-07 PROCEDURE — 160002 HCHG RECOVERY MINUTES (STAT): Performed by: OPHTHALMOLOGY

## 2018-08-07 PROCEDURE — 700111 HCHG RX REV CODE 636 W/ 250 OVERRIDE (IP)

## 2018-08-07 PROCEDURE — 700101 HCHG RX REV CODE 250

## 2018-08-07 PROCEDURE — 160029 HCHG SURGERY MINUTES - 1ST 30 MINS LEVEL 4: Performed by: OPHTHALMOLOGY

## 2018-08-07 PROCEDURE — 501749 HCHG SHELL REV 276: Performed by: OPHTHALMOLOGY

## 2018-08-07 PROCEDURE — 160048 HCHG OR STATISTICAL LEVEL 1-5: Performed by: OPHTHALMOLOGY

## 2018-08-07 PROCEDURE — 500791 HCHG KNIFE, SLIT: Performed by: OPHTHALMOLOGY

## 2018-08-07 DEVICE — LENS PRE-LOADED TECNIS CLEAR MONO 6.0MM 22.5D: Type: IMPLANTABLE DEVICE | Status: FUNCTIONAL

## 2018-08-07 RX ORDER — PHENYLEPHRINE HYDROCHLORIDE 25 MG/ML
SOLUTION/ DROPS OPHTHALMIC
Status: COMPLETED
Start: 2018-08-07 | End: 2018-08-07

## 2018-08-07 RX ORDER — MOXIFLOXACIN 5 MG/ML
SOLUTION/ DROPS OPHTHALMIC
Status: COMPLETED
Start: 2018-08-07 | End: 2018-08-07

## 2018-08-07 RX ORDER — KETOROLAC TROMETHAMINE 5 MG/ML
SOLUTION OPHTHALMIC
Status: COMPLETED
Start: 2018-08-07 | End: 2018-08-07

## 2018-08-07 RX ORDER — TROPICAMIDE 10 MG/ML
SOLUTION/ DROPS OPHTHALMIC
Status: COMPLETED
Start: 2018-08-07 | End: 2018-08-07

## 2018-08-07 RX ORDER — TETRACAINE HYDROCHLORIDE 5 MG/ML
SOLUTION OPHTHALMIC
Status: DISCONTINUED
Start: 2018-08-07 | End: 2018-08-07 | Stop reason: HOSPADM

## 2018-08-07 RX ORDER — SODIUM CHLORIDE, SODIUM LACTATE, POTASSIUM CHLORIDE, CALCIUM CHLORIDE 600; 310; 30; 20 MG/100ML; MG/100ML; MG/100ML; MG/100ML
INJECTION, SOLUTION INTRAVENOUS CONTINUOUS
Status: DISCONTINUED | OUTPATIENT
Start: 2018-08-07 | End: 2018-08-07 | Stop reason: HOSPADM

## 2018-08-07 RX ORDER — TETRACAINE HYDROCHLORIDE 5 MG/ML
SOLUTION OPHTHALMIC
Status: COMPLETED
Start: 2018-08-07 | End: 2018-08-07

## 2018-08-07 RX ADMIN — PHENYLEPHRINE HYDROCHLORIDE 3 DROP: 2.5 SOLUTION/ DROPS OPHTHALMIC at 08:00

## 2018-08-07 RX ADMIN — SODIUM CHLORIDE, SODIUM LACTATE, POTASSIUM CHLORIDE, CALCIUM CHLORIDE: 600; 310; 30; 20 INJECTION, SOLUTION INTRAVENOUS at 08:10

## 2018-08-07 RX ADMIN — MOXIFLOXACIN HYDROCHLORIDE 3 DROP: 5 SOLUTION/ DROPS OPHTHALMIC at 08:00

## 2018-08-07 RX ADMIN — TROPICAMIDE 3 DROP: 10 SOLUTION/ DROPS OPHTHALMIC at 08:00

## 2018-08-07 RX ADMIN — KETOROLAC TROMETHAMINE 3 DROP: 5 SOLUTION OPHTHALMIC at 08:00

## 2018-08-07 RX ADMIN — TETRACAINE HYDROCHLORIDE 3 DROP: 5 SOLUTION OPHTHALMIC at 08:00

## 2018-08-07 ASSESSMENT — PAIN SCALES - GENERAL
PAINLEVEL_OUTOF10: 0
PAINLEVEL_OUTOF10: 0

## 2018-08-07 NOTE — DISCHARGE INSTRUCTIONS
HOME CARE INSTRUCTIONS FOR CATARACT SURGERY    ACTIVITY: Rest and take it easy for the first 24 hours. We strongly suggest that a responsible adult remain with you during that time. It is normal to feel sleepy. We encourage you to not do anything that requires balance, judgment or coordination. Be extra careful when walking (with a dilated eye, it is easier to trip and fall).     FOR 24 HOURS, DO NOT:       Drive, operate machinery or run household appliances.        Drink beer or alcoholic beverages.        Make important decisions or sign legal documents.     DIET: To avoid nausea, slowly advance diet as tolerated, avoiding spicy or greasy foods for the first meal.     MEDICATIONS: Resume taking daily medication. You may take Tylenol for mild discomfort, if needed.     SURGICAL DRESSING: Eye shield as instructed by your doctor. Dark glasses should be worn while in the sunlight.     Follow your Physician's instruction Sheet. Eye Kit Given    A follow-up appointment is scheduled with your doctor tomorrow at __0745_____ am.     You should call 911 if you develop problems with breathing or chest pain.  You should CALL YOUR PHYSICIAN if you develop: Sharp stabbing pain or sudden change in vision in your operative eye. If you are unable to contact your doctor or the surgical center, you should go to the nearest emergency room or urgent care center.   Physician's telephone # ____[phone]______________________    If any questions arise, call your doctor. If your doctor is not available, please feel free to call Same Day Surgery at [phone]. You can also call the [facility] Hotline open 24 hours/day, 7 days/week and speak to a nurse at [phone] or [phone].     I acknowledge receipt and understanding of these Home Care Instructions.    ______________________________     _______________________________            Signature of Patient / Responsible Adult                                                       RN  Signature    A registered nurse may call you a few days after your surgery to see how you are doing.   You may also receive a survey in the mail within the next two weeks and we ask that you take a few moments to complete and return the survey. Our goal is to provide you with very good care and we value your comments. Thank you for choosing [facility].

## 2018-08-07 NOTE — OR NURSING
0954 Pt transferred to PACU. Report received from OR RN and anesthesia. Pt is awake and alert. VS stable, respirations even and unlabored. R eye is dilated. No complaints of pain.     1005 Pt tolerating sips of coffee. Spouse and grandson on their way. Pt disconnected from monitor.     1022 Pt and spouse educated on discharge instructions. Verbalized understanding. All questions answered. All belongings are with patient.

## 2018-08-21 ENCOUNTER — HOSPITAL ENCOUNTER (OUTPATIENT)
Facility: MEDICAL CENTER | Age: 73
End: 2018-08-21
Attending: OPHTHALMOLOGY | Admitting: OPHTHALMOLOGY
Payer: MEDICARE

## 2018-08-21 VITALS
DIASTOLIC BLOOD PRESSURE: 68 MMHG | HEIGHT: 68 IN | TEMPERATURE: 98.1 F | OXYGEN SATURATION: 96 % | SYSTOLIC BLOOD PRESSURE: 150 MMHG | BODY MASS INDEX: 24.83 KG/M2 | RESPIRATION RATE: 19 BRPM | HEART RATE: 101 BPM | WEIGHT: 163.8 LBS

## 2018-08-21 PROCEDURE — 700101 HCHG RX REV CODE 250

## 2018-08-21 PROCEDURE — 160035 HCHG PACU - 1ST 60 MINS PHASE I: Performed by: OPHTHALMOLOGY

## 2018-08-21 PROCEDURE — 160048 HCHG OR STATISTICAL LEVEL 1-5: Performed by: OPHTHALMOLOGY

## 2018-08-21 PROCEDURE — 160002 HCHG RECOVERY MINUTES (STAT): Performed by: OPHTHALMOLOGY

## 2018-08-21 PROCEDURE — 700111 HCHG RX REV CODE 636 W/ 250 OVERRIDE (IP)

## 2018-08-21 PROCEDURE — 99152 MOD SED SAME PHYS/QHP 5/>YRS: CPT | Performed by: OPHTHALMOLOGY

## 2018-08-21 PROCEDURE — 160029 HCHG SURGERY MINUTES - 1ST 30 MINS LEVEL 4: Performed by: OPHTHALMOLOGY

## 2018-08-21 PROCEDURE — 501749 HCHG SHELL REV 276: Performed by: OPHTHALMOLOGY

## 2018-08-21 PROCEDURE — 500855 HCHG NEEDLE, IRRIG CYSTOTOME 27G: Performed by: OPHTHALMOLOGY

## 2018-08-21 PROCEDURE — 500791 HCHG KNIFE, SLIT: Performed by: OPHTHALMOLOGY

## 2018-08-21 DEVICE — LENS PRE-LOADED TECNIS CLEAR MONO 6.0MM 21D: Type: IMPLANTABLE DEVICE | Status: FUNCTIONAL

## 2018-08-21 RX ORDER — PHENYLEPHRINE HYDROCHLORIDE 25 MG/ML
SOLUTION/ DROPS OPHTHALMIC
Status: COMPLETED
Start: 2018-08-21 | End: 2018-08-21

## 2018-08-21 RX ORDER — TETRACAINE HYDROCHLORIDE 5 MG/ML
SOLUTION OPHTHALMIC
Status: COMPLETED
Start: 2018-08-21 | End: 2018-08-21

## 2018-08-21 RX ORDER — MOXIFLOXACIN 5 MG/ML
SOLUTION/ DROPS OPHTHALMIC
Status: DISCONTINUED
Start: 2018-08-21 | End: 2018-08-21 | Stop reason: HOSPADM

## 2018-08-21 RX ORDER — TETRACAINE HYDROCHLORIDE 5 MG/ML
SOLUTION OPHTHALMIC
Status: DISCONTINUED
Start: 2018-08-21 | End: 2018-08-21 | Stop reason: HOSPADM

## 2018-08-21 RX ORDER — KETOROLAC TROMETHAMINE 5 MG/ML
SOLUTION OPHTHALMIC
Status: COMPLETED
Start: 2018-08-21 | End: 2018-08-21

## 2018-08-21 RX ORDER — SODIUM CHLORIDE, SODIUM LACTATE, POTASSIUM CHLORIDE, CALCIUM CHLORIDE 600; 310; 30; 20 MG/100ML; MG/100ML; MG/100ML; MG/100ML
INJECTION, SOLUTION INTRAVENOUS CONTINUOUS
Status: DISCONTINUED | OUTPATIENT
Start: 2018-08-21 | End: 2018-08-21 | Stop reason: HOSPADM

## 2018-08-21 RX ORDER — TROPICAMIDE 10 MG/ML
SOLUTION/ DROPS OPHTHALMIC
Status: COMPLETED
Start: 2018-08-21 | End: 2018-08-21

## 2018-08-21 RX ORDER — MOXIFLOXACIN 5 MG/ML
SOLUTION/ DROPS OPHTHALMIC
Status: COMPLETED
Start: 2018-08-21 | End: 2018-08-21

## 2018-08-21 RX ADMIN — TETRACAINE HYDROCHLORIDE 1 DROP: 5 SOLUTION OPHTHALMIC at 08:20

## 2018-08-21 RX ADMIN — TROPICAMIDE 1 DROP: 10 SOLUTION/ DROPS OPHTHALMIC at 08:20

## 2018-08-21 RX ADMIN — MOXIFLOXACIN HYDROCHLORIDE 1 DROP: 5 SOLUTION/ DROPS OPHTHALMIC at 08:20

## 2018-08-21 RX ADMIN — SODIUM CHLORIDE, SODIUM LACTATE, POTASSIUM CHLORIDE, CALCIUM CHLORIDE 1000 ML: 600; 310; 30; 20 INJECTION, SOLUTION INTRAVENOUS at 08:35

## 2018-08-21 RX ADMIN — PHENYLEPHRINE HYDROCHLORIDE 1 DROP: 2.5 SOLUTION/ DROPS OPHTHALMIC at 08:20

## 2018-08-21 RX ADMIN — KETOROLAC TROMETHAMINE 1 DROP: 5 SOLUTION OPHTHALMIC at 08:20

## 2018-08-21 ASSESSMENT — PAIN SCALES - GENERAL: PAINLEVEL_OUTOF10: 0

## 2018-08-21 NOTE — OR NURSING
Received from OR.  Patient awake without complaint.  VSS Operative eye dilated.  Physician at side with instructions.   Family at side.  Patient given juice.  Discharge instructions to patient and family.

## 2018-08-21 NOTE — DISCHARGE INSTRUCTIONS
HOME CARE INSTRUCTIONS FOR CATARACT SURGERY    ACTIVITY: Rest and take it easy for the first 24 hours. We strongly suggest that a responsible adult remain with you during that time. It is normal to feel sleepy. We encourage you to not do anything that requires balance, judgment or coordination. Be extra careful when walking (with a dilated eye, it is easier to trip and fall).     FOR 24 HOURS, DO NOT:       Drive, operate machinery or run household appliances.        Drink beer or alcoholic beverages.        Make important decisions or sign legal documents.     DIET: To avoid nausea, slowly advance diet as tolerated, avoiding spicy or greasy foods for the first meal.     MEDICATIONS: Resume taking daily medication. You may take Tylenol for mild discomfort, if needed.     SURGICAL DRESSING: Eye shield as instructed by your doctor. Dark glasses should be worn while in the sunlight.     Follow your Physician's instruction Sheet. Eye Kit Given    A follow-up appointment is scheduled with your doctor tomorrow at 7:45 am.     You should call 911 if you develop problems with breathing or chest pain.  You should CALL YOUR PHYSICIAN if you develop: Sharp stabbing pain or sudden change in vision in your operative eye. If you are unable to contact your doctor or the surgical center, you should go to the nearest emergency room or urgent care center.   Physician's telephone # [phone]    If any questions arise, call your doctor. If your doctor is not available, please feel free to call Same Day Surgery at [phone]. You can also call the [employer] Hotline open 24 hours/day, 7 days/week and speak to a nurse at [phone] or [phone].     I acknowledge receipt and understanding of these Home Care Instructions.    ______________________________     _______________________________            Signature of Patient / Responsible Adult                                                       RN Signature    A registered nurse may  call you a few days after your surgery to see how you are doing.   You may also receive a survey in the mail within the next two weeks and we ask that you take a few moments to complete and return the survey. Our goal is to provide you with very good care and we value your comments. Thank you for choosing [facility].

## 2018-08-23 ENCOUNTER — HOSPITAL ENCOUNTER (OUTPATIENT)
Dept: RADIOLOGY | Facility: MEDICAL CENTER | Age: 73
End: 2018-08-23
Attending: SURGERY
Payer: MEDICARE

## 2018-08-23 DIAGNOSIS — C34.90 MALIGNANT NEOPLASM OF BRONCHUS AND LUNG (HCC): ICD-10-CM

## 2018-08-23 DIAGNOSIS — R91.8 PULMONARY NODULES: ICD-10-CM

## 2018-08-23 PROCEDURE — 700117 HCHG RX CONTRAST REV CODE 255: Performed by: SURGERY

## 2018-08-23 PROCEDURE — 78815 PET IMAGE W/CT SKULL-THIGH: CPT

## 2018-08-23 PROCEDURE — 71260 CT THORAX DX C+: CPT

## 2018-08-23 RX ADMIN — IOHEXOL 100 ML: 350 INJECTION, SOLUTION INTRAVENOUS at 16:02

## 2018-12-03 ENCOUNTER — PATIENT OUTREACH (OUTPATIENT)
Dept: OTHER | Facility: MEDICAL CENTER | Age: 73
End: 2018-12-03

## 2018-12-03 NOTE — LETTER
[facility]   [address]  [place] [address]  Phone: [phone] - Fax: [phone]              Date: 12/03/18    Pito Black  [address] [place], [place] [address]  [facility] [address]    Re: Treatment Summary and Survivorship Care Plan    Dear Pito,    Please find enclosed your cancer Treatment Summary and Follow Up Care Plan, also known as a survivorship care plan.  It contains important information for you including: a contact list of your treatment team, a summary of your diagnosis and treatment, follow up care and potential late effects of treatment, and resources you may be interested in.  Also enclosed is information about free classes and workshops for cancer survivors.    Why is a survivorship care plan important? This is a record for you to keep on file for future reference, but it is also is a communication tool that you can share with your non-cancer medical providers to inform them of potential long-term or late effects of your cancer treatment and recommended follow up screening needed to maintain optimal health.     We will be calling you to review the document and answer any questions you may have.  If you are interested we can schedule a meeting to review in person.    You can contact us at [phone] if you have questions or if you would like to schedule an appointment to review in person, at no charge, with a certified oncology nurse.  Our scheduling hours are from 8:00am - 4:30pm Monday through Friday.    Kind Regards,       Stefanie Beltrán RN, OCN  Cancer Nurse Navigator  [facility] Cancer Survivorship program

## 2018-12-10 ENCOUNTER — OFFICE VISIT (OUTPATIENT)
Dept: CARDIOLOGY | Facility: MEDICAL CENTER | Age: 73
End: 2018-12-10
Payer: MEDICARE

## 2018-12-10 VITALS
BODY MASS INDEX: 25.61 KG/M2 | DIASTOLIC BLOOD PRESSURE: 70 MMHG | SYSTOLIC BLOOD PRESSURE: 128 MMHG | HEIGHT: 68 IN | WEIGHT: 169 LBS | HEART RATE: 100 BPM | OXYGEN SATURATION: 95 % | RESPIRATION RATE: 14 BRPM

## 2018-12-10 DIAGNOSIS — I48.0 PAROXYSMAL ATRIAL FIBRILLATION (HCC): ICD-10-CM

## 2018-12-10 DIAGNOSIS — I10 ESSENTIAL HYPERTENSION: ICD-10-CM

## 2018-12-10 PROBLEM — I48.91 NEW ONSET ATRIAL FIBRILLATION (HCC): Status: RESOLVED | Noted: 2018-02-06 | Resolved: 2018-12-10

## 2018-12-10 PROCEDURE — 99213 OFFICE O/P EST LOW 20 MIN: CPT | Performed by: INTERNAL MEDICINE

## 2018-12-10 RX ORDER — GABAPENTIN 100 MG/1
CAPSULE ORAL
Refills: 1 | COMMUNITY
Start: 2018-11-24 | End: 2021-06-28

## 2018-12-10 RX ORDER — ESCITALOPRAM OXALATE 10 MG/1
TABLET ORAL
Refills: 1 | COMMUNITY
Start: 2018-11-08 | End: 2019-01-15

## 2018-12-10 NOTE — PROGRESS NOTES
Chief Complaint   Patient presents with   • Atrial Fibrillation       Subjective:   Pito Black is a 73 y.o. male who presents today in follow-up of atrial fibrillation.  At the time of his last visit the rhythm was normal.  In the meantime he has had a left upper lobe resection for adenocarcinoma of the lung.  There was no atrial fibrillation and occurred after the surgery.  The patient has no symptoms of palpitations but did not feel poorly when he was in atrial fibrillation previously.  He has no orthopnea, PND, pedal edema.  His appetite is been good.  Recent PET and CT revealed abnormalities which may be carcinoma but follow-up studies are due soon.  Patient takes aspirin for stroke prevention and nothing is necessary for rate control.  He uses losartan and amlodipine for blood pressure control.  He is having no trouble with these medications    Past Medical History:   Diagnosis Date   • Arrhythmia     hx of a fib   • Arthritis 2015    generalized, DDD back   • Asthma     inhaler    • Backpain 08/06/2018    9/10   • Cancer (HCC) 07/2017    left lung   • Cataract     bilateral, no surgery   • Dental disorder     lower partial, removable bridge   • Diabetes 08/06/2018    on no meds at this time, diet controlled   • Heart valve disease     mild mitral valve prolapse   • High cholesterol    • Hypertension      Past Surgical History:   Procedure Laterality Date   • CATARACT PHACO WITH IOL Left 8/21/2018    Procedure: CATARACT PHACO WITH IOL;  Surgeon: Juanito Hager M.D.;  Location: SURGERY SAME DAY [facility];  Service: Ophthalmology   • CATARACT PHACO WITH IOL Right 8/7/2018    Procedure: CATARACT PHACO WITH IOL;  Surgeon: Juanito Hager M.D.;  Location: SURGERY SAME DAY [facility];  Service: Ophthalmology   • THORACOSCOPY Left 4/19/2018    Procedure: THORACOSCOPY- WEDGE BIOPSY W/FROZEN SECTION;  Surgeon: Cristhian Galeano M.D.;  Location: SURGERY [facility];  Service: Thoracic   • EAR MIDDLE  "EXPLORATION Right 6/12/2015    Procedure: EAR MIDDLE EXPLORATION;  Surgeon: William Brandon M.D.;  Location: SURGERY SAME DAY [facility];  Service:    • OSSICULAR RECONSTRUCTION Right 6/12/2015    Procedure: OSSICULAR RECONSTRUCTION CHAIN POSSIBLE;  Surgeon: William Brandon M.D.;  Location: SURGERY SAME DAY [facility];  Service:    • OTHER      ear replacement \"many years ago\"   • OTHER ORTHOPEDIC SURGERY      right knee replacement 2005     History reviewed. No pertinent family history.  Social History     Social History   • Marital status:      Spouse name: N/A   • Number of children: N/A   • Years of education: N/A     Occupational History   • Not on file.     Social History Main Topics   • Smoking status: Former Smoker     Packs/day: 0.25     Years: 40.00     Types: Cigarettes     Quit date: 1/1/2005   • Smokeless tobacco: Never Used   • Alcohol use Yes      Comment: 6 beers a day   • Drug use: No   • Sexual activity: Not on file     Other Topics Concern   • Not on file     Social History Narrative   • No narrative on file     No Known Allergies  Outpatient Encounter Prescriptions as of 12/10/2018   Medication Sig Dispense Refill   • gabapentin (NEURONTIN) 100 MG Cap TK 1 C PO TID  1   • Doxylamine Succinate, Sleep, (SLEEP AID PO) Take 2 Tabs by mouth at bedtime as needed.     • acetaminophen (TYLENOL ARTHRITIS PAIN) 650 MG CR tablet Take 650 mg by mouth as needed for Moderate Pain.     • losartan (COZAAR) 100 MG Tab Take 100 mg by mouth every morning.     • BREO ELLIPTA 200-25 MCG/INH AEROSOL POWDER, BREATH ACTIVATED Inhale 1 Puff by mouth every morning.     • psyllium (METAMUCIL) 58.12 % Pack Take 1 Packet by mouth every day.     • amlodipine (NORVASC) 5 MG Tab Take 5 mg by mouth every morning.     • aspirin EC (ECOTRIN) 81 MG TBEC Take 81 mg by mouth every morning.     • ezetimibe (ZETIA) 10 MG TABS Take 10 mg by mouth every morning.     • escitalopram (LEXAPRO) 10 MG Tab TK 1 T PO QD  1     No " "facility-administered encounter medications on file as of 12/10/2018.      ROS.  See HPI     Objective:   /70 (BP Location: Left arm, Patient Position: Sitting, BP Cuff Size: Adult)   Pulse 100   Resp 14   Ht 1.727 m (5' 8\")   Wt 76.7 kg (169 lb)   SpO2 95%   BMI 25.70 kg/m²     Physical Exam General: WD, WN, male in NAD. Weight is unchanged  Neck: No  JVD.  Good carotid upstroke and no bruit  Chest: Clear to A & P  Heart: Regular rhythm, Normal S1 and S2. No murmur, gallop, rub, click  Ext: No edema  Neuro: Alert, oriented  Psych: normal mood, affect    Assessment:     1. Essential hypertension     2. Paroxysmal atrial fibrillation (HCC)         Medical Decision Making:  Today's Assessment / Status / Plan:   Patient's blood pressure is well controlled on his current regimen.  No changes are made.  He has a history of atrial fibrillation but made it through left upper lobe resection without this arrhythmia.  He remains in normal sinus rhythm at this time.  Follow-up CT scan has been scheduled.  The patient feels well and his weight is stable and he has no pulmonary symptoms.  We discussed the risk of stroke in this setting. If his lung cancer shows evidence of advancement on current CT scan, full anticoagulation would be indicated.  He has a history of mild mitral valve prolapse but the murmur was not audible today.  No specific treatment is necessary.  He will return in a year or sooner if symptoms dictate.  "

## 2018-12-13 ENCOUNTER — PATIENT OUTREACH (OUTPATIENT)
Dept: OTHER | Facility: MEDICAL CENTER | Age: 73
End: 2018-12-13

## 2018-12-13 NOTE — PROGRESS NOTES
Call placed for follow up and to review treatment summary.  Pt does have PET scan scheduled for tomorrow.  Left msg.

## 2018-12-14 ENCOUNTER — HOSPITAL ENCOUNTER (OUTPATIENT)
Dept: RADIOLOGY | Facility: MEDICAL CENTER | Age: 73
End: 2018-12-14
Attending: SURGERY
Payer: MEDICARE

## 2018-12-14 DIAGNOSIS — C34.90 MALIGNANT NEOPLASM OF LUNG, UNSPECIFIED LATERALITY, UNSPECIFIED PART OF LUNG (HCC): ICD-10-CM

## 2018-12-14 DIAGNOSIS — R91.8 PULMONARY NODULES: ICD-10-CM

## 2018-12-14 PROCEDURE — A9552 F18 FDG: HCPCS

## 2019-01-14 ENCOUNTER — PATIENT OUTREACH (OUTPATIENT)
Dept: OTHER | Facility: MEDICAL CENTER | Age: 74
End: 2019-01-14

## 2019-01-14 NOTE — PROGRESS NOTES
Received information from radiation therapy that patient had some concerns regarding transportation and housing.  Call placed to patient and spoke with wife, Cydney.  She states that she recently finished cancer treatment and patient does most of the driving.  She talked on it being difficult/wearing if he has to drive everyday for treatment.  She is thinking about maybe staying in town during treatment.  Provided her number for [employer] for possible resources.  She said Atilio also has some resources but they do not need assistance as much as others.  Encouraged her to follow up on resources and if they may qualify.  Discussed importance of accepting help/assistance to aid in getting treatment and decreasing hardship.  Will leave lodging form for them in radiation therapy, as navigator already has scheduled appointment.  Waiting to here what plan of care will be for future assistance/plan.  Continue to follow.

## 2019-01-15 ENCOUNTER — HOSPITAL ENCOUNTER (OUTPATIENT)
Dept: RADIATION ONCOLOGY | Facility: MEDICAL CENTER | Age: 74
End: 2019-01-31
Attending: RADIOLOGY
Payer: MEDICARE

## 2019-01-15 VITALS
HEIGHT: 68 IN | BODY MASS INDEX: 26.31 KG/M2 | OXYGEN SATURATION: 97 % | WEIGHT: 173.58 LBS | SYSTOLIC BLOOD PRESSURE: 162 MMHG | TEMPERATURE: 97.9 F | DIASTOLIC BLOOD PRESSURE: 94 MMHG | HEART RATE: 86 BPM

## 2019-01-15 DIAGNOSIS — R91.8 LUNG MASS: ICD-10-CM

## 2019-01-15 PROCEDURE — 99214 OFFICE O/P EST MOD 30 MIN: CPT | Performed by: RADIOLOGY

## 2019-01-15 PROCEDURE — 99205 OFFICE O/P NEW HI 60 MIN: CPT | Performed by: RADIOLOGY

## 2019-01-15 ASSESSMENT — PAIN SCALES - GENERAL: PAINLEVEL: NO PAIN

## 2019-01-15 NOTE — NON-PROVIDER
"Patient was seen today in clinic with Dr. Martino for lung mass.  Vitals signs and weight were obtained and pain assessment was completed.  Allergies and medications were reviewed with the patient.  Review of systems completed.     Vitals/Pain:  Vitals:    01/15/19 1058   BP: (!) 162/94   BP Location: Right arm   Patient Position: Sitting   Pulse: 86   Temp: 36.6 °C (97.9 °F)   TempSrc: Temporal   SpO2: 97%   Weight: 78.7 kg (173 lb 9.3 oz)   Height: 1.727 m (5' 8\")   Pain Score: No pain        Allergies:   Patient has no known allergies.    Current Medications:  Current Outpatient Prescriptions   Medication Sig Dispense Refill   • gabapentin (NEURONTIN) 100 MG Cap TK 1 C PO TID  1   • Doxylamine Succinate, Sleep, (SLEEP AID PO) Take 2 Tabs by mouth at bedtime as needed.     • acetaminophen (TYLENOL ARTHRITIS PAIN) 650 MG CR tablet Take 650 mg by mouth as needed for Moderate Pain.     • losartan (COZAAR) 100 MG Tab Take 100 mg by mouth every morning.     • BREO ELLIPTA 200-25 MCG/INH AEROSOL POWDER, BREATH ACTIVATED Inhale 1 Puff by mouth every morning.     • psyllium (METAMUCIL) 58.12 % Pack Take 1 Packet by mouth every day.     • amlodipine (NORVASC) 5 MG Tab Take 5 mg by mouth every morning.     • aspirin EC (ECOTRIN) 81 MG TBEC Take 81 mg by mouth every morning.     • ezetimibe (ZETIA) 10 MG TABS Take 10 mg by mouth every morning.       No current facility-administered medications for this encounter.          PCP:  Jose Contreras R.N.  "

## 2019-01-15 NOTE — CONSULTS
RADIATION ONCOLOGY CONSULT    DATE OF SERVICE: 1/15/2019    IDENTIFICATION:   1. Stage I non-small cell lung carcinoma, moderately differentiated adenocarcinoma histology, status post wedge resection in April 2018  2.  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 Black today in consultation at the request of Dr. Galeano for his lung cancer.  Patient is a 73-year-old gentleman who is a former smoker.  In 2018 he was identified with multiple bilateral pulmonary nodules.  At that time the dominant lesion was in the left upper lobe and was the only PET avid area.  Biopsy was attempted of this area.  This resulted in a pneumothorax and was negative for malignancy.  This was felt to be discordant with his imaging.  Therefore Dr. Galeano took him for a wedge resection on April 23, 2018.  This confirmed a moderately differentiated adenocarcinoma.  There were positive margins at the time of surgery.  No identifiable radiographic lesions remained.  Therefore at tumor board careful surveillance was recommended.  Patient has had stable findings with the exception of now increase in size of the right upper lobe lesion with PET avid uptake.  This is the only area of radiographic change.  The lesion currently measures 1.7 cm in size.  Given the multifocality of his lesions he has been sent for consideration of stereotactic radiosurgery.  Given the location patient would like to avoid biopsy given his previous history.    PAST MEDICAL HISTORY:   Past Medical History:   Diagnosis Date   • Arrhythmia     hx of a fib   • Arthritis 2015    generalized, DDD back   • Asthma     inhaler    • Backpain 08/06/2018    9/10   • Cancer (HCC) 07/2017    left lung   • Cataract     bilateral, no surgery   • Dental disorder     lower partial, removable bridge   • Diabetes 08/06/2018    on no meds at this time, diet controlled  "  • Heart valve disease     mild mitral valve prolapse   • High cholesterol    • Hypertension        PAST SURGICAL HISTORY:  Past Surgical History:   Procedure Laterality Date   • CATARACT PHACO WITH IOL Left 8/21/2018    Procedure: CATARACT PHACO WITH IOL;  Surgeon: Juanito Hager M.D.;  Location: SURGERY SAME DAY [facility];  Service: Ophthalmology   • CATARACT PHACO WITH IOL Right 8/7/2018    Procedure: CATARACT PHACO WITH IOL;  Surgeon: Juanito Hager M.D.;  Location: SURGERY SAME DAY [facility];  Service: Ophthalmology   • THORACOSCOPY Left 4/19/2018    Procedure: THORACOSCOPY- WEDGE BIOPSY W/FROZEN SECTION;  Surgeon: Cristhian Galeano M.D.;  Location: SURGERY [facility];  Service: Thoracic   • EAR MIDDLE EXPLORATION Right 6/12/2015    Procedure: EAR MIDDLE EXPLORATION;  Surgeon: William Brandon M.D.;  Location: SURGERY SAME DAY [facility];  Service:    • OSSICULAR RECONSTRUCTION Right 6/12/2015    Procedure: OSSICULAR RECONSTRUCTION CHAIN POSSIBLE;  Surgeon: William Brandon M.D.;  Location: SURGERY SAME DAY [facility];  Service:    • OTHER      ear replacement \"many years ago\"   • OTHER ORTHOPEDIC SURGERY      right knee replacement 2005       CURRENT MEDICATIONS:  Current Outpatient Prescriptions   Medication Sig Dispense Refill   • escitalopram (LEXAPRO) 10 MG Tab TK 1 T PO QD  1   • gabapentin (NEURONTIN) 100 MG Cap TK 1 C PO TID  1   • Doxylamine Succinate, Sleep, (SLEEP AID PO) Take 2 Tabs by mouth at bedtime as needed.     • acetaminophen (TYLENOL ARTHRITIS PAIN) 650 MG CR tablet Take 650 mg by mouth as needed for Moderate Pain.     • losartan (COZAAR) 100 MG Tab Take 100 mg by mouth every morning.     • BREO ELLIPTA 200-25 MCG/INH AEROSOL POWDER, BREATH ACTIVATED Inhale 1 Puff by mouth every morning.     • psyllium (METAMUCIL) 58.12 % Pack Take 1 Packet by mouth every day.     • amlodipine (NORVASC) 5 MG Tab Take 5 mg by mouth every morning.     • aspirin EC (ECOTRIN) 81 MG TBEC Take 81 " "mg by mouth every morning.     • ezetimibe (ZETIA) 10 MG TABS Take 10 mg by mouth every morning.       No current facility-administered medications for this encounter.        ALLERGIES:    Patient has no known allergies.    FAMILY HISTORY:    No family history on file.    SOCIAL HISTORY:    Social History   Substance Use Topics   • Smoking status: Former Smoker     Packs/day: 0.25     Years: 40.00     Types: Cigarettes     Quit date: 1/1/2005   • Smokeless tobacco: Never Used   • Alcohol use Yes      Comment: 6 beers a day     Patient is retired from Work  Lives with: Wife in [facility], she is a previous patient of mine for breast cancer    REVIEW OF SYSTEMS:  A complete review of systems was completed in patient's chart on 1/15/2019.  All are negative with relationship to this diagnosis with the exception of:  Patient denies any respiratory changes, he does not exhibit any shortness of breath, denies any chest pain, no new musculoskeletal aches or pains, no headaches or neurologic deficits    PHYSICAL EXAM:    Vitals:    01/15/19 1058   BP: (!) 162/94   BP Location: Right arm   Patient Position: Sitting   Pulse: 86   Temp: 36.6 °C (97.9 °F)   TempSrc: Temporal   SpO2: 97%   Weight: 78.7 kg (173 lb 9.3 oz)   Height: 1.727 m (5' 8\")   Pain Score: No pain      1= Restricted in physically strenuous activity, but ambulatory and able to carry out work of a light sedentary nature, e.g., light housework, office work.    PAIN:  0    GENERAL: No apparent distress.  HEENT:  Pupils are equal, round, and reactive to light.  Extraocular muscles   are intact. Sclerae nonicteric.  Conjunctivae pink.  Oral cavity, tongue   protrudes midline.   NECK:  Supple without evidence of thyromegaly.  NODES:  No peripheral adenopathy of the neck, supraclavicular fossa or axillae   bilaterally.  LUNGS:  Clear to ascultation bilaterally   HEART:  Regular rate and rhythm.  No murmur appreciated  ABDOMEN:  Soft. No evidence of " hepatosplenomegaly.  Positive bowel sounds.  EXTREMITIES:  Without Edema.  NEUROLOGIC:  Cranial nerves II through XII were intact. Normal stance and gait motor and sensory grossly within normal limits      IMPRESSION:   1. Stage I non-small cell lung carcinoma, moderately differentiated adenocarcinoma histology, status post wedge resection in April 2018  2.  Stage IA2 ([id number]) non-small cell lung carcinoma of the right upper lobe of lung, medically inoperable      RECOMMENDATIONS:   I discussed the diagnosis, prognosis, and treatment options over a 1 hr 5 min time period, 95% of that time dedicated to ongoing treatment management.  I discussed with the patient and his family his current imaging findings.  We discussed that without histologic confirmation we cannot guarantee this is of similar histology.  However with the radiographic changes by CT and PET scan the probability of a malignant process is overwhelming.  I agree this is a challenging location for CT-guided biopsy without the risk of pneumothorax given the depth and central location.  It is also felt to be challenging for super D transbronchial biopsy.  Therefore we discussed the possibility of stereotactic body radiosurgery without biopsy confirmation.  Given the lack of adenopathy or distant disease I have stage this as two independent stage I tumors.  He will need careful observation as well for the other nodularity in the lungs.  We plan on simulation with 4-dimensional planning to assess target motion and normal organ motion.  He was given a simulation for Thursday at 2 PM.  I anticipate 5 fractions given on an every other day basis to 6000 cGy.    We discussed the risks, benefits and side effects of treatment and the patient is amenable to treatment.  If patient has any questions or concerns, he should feel free to contact me.    Thank you for the opportunity to participate in his care.  If any questions or comments, please do not hesitate in  calling.

## 2019-01-23 ENCOUNTER — HOSPITAL ENCOUNTER (OUTPATIENT)
Dept: RADIATION ONCOLOGY | Facility: MEDICAL CENTER | Age: 74
End: 2019-01-23

## 2019-01-23 PROCEDURE — 77290 THER RAD SIMULAJ FIELD CPLX: CPT | Performed by: RADIOLOGY

## 2019-01-23 PROCEDURE — 77470 SPECIAL RADIATION TREATMENT: CPT | Mod: 26 | Performed by: RADIOLOGY

## 2019-01-23 PROCEDURE — 77334 RADIATION TREATMENT AID(S): CPT | Performed by: RADIOLOGY

## 2019-01-23 PROCEDURE — 77263 THER RADIOLOGY TX PLNG CPLX: CPT | Performed by: RADIOLOGY

## 2019-01-23 PROCEDURE — 77290 THER RAD SIMULAJ FIELD CPLX: CPT | Mod: 26 | Performed by: RADIOLOGY

## 2019-01-23 PROCEDURE — 77470 SPECIAL RADIATION TREATMENT: CPT | Performed by: RADIOLOGY

## 2019-01-23 PROCEDURE — 77334 RADIATION TREATMENT AID(S): CPT | Mod: 26 | Performed by: RADIOLOGY

## 2019-01-24 PROCEDURE — 77370 RADIATION PHYSICS CONSULT: CPT | Performed by: RADIOLOGY

## 2019-01-25 PROCEDURE — 77334 RADIATION TREATMENT AID(S): CPT | Performed by: RADIOLOGY

## 2019-01-25 PROCEDURE — 77300 RADIATION THERAPY DOSE PLAN: CPT | Performed by: RADIOLOGY

## 2019-01-25 PROCEDURE — 77295 3-D RADIOTHERAPY PLAN: CPT | Mod: 26 | Performed by: RADIOLOGY

## 2019-01-25 PROCEDURE — 77295 3-D RADIOTHERAPY PLAN: CPT | Performed by: RADIOLOGY

## 2019-01-25 PROCEDURE — 77293 RESPIRATOR MOTION MGMT SIMUL: CPT | Mod: 26 | Performed by: RADIOLOGY

## 2019-01-25 PROCEDURE — 77300 RADIATION THERAPY DOSE PLAN: CPT | Mod: 26 | Performed by: RADIOLOGY

## 2019-01-25 PROCEDURE — 77293 RESPIRATOR MOTION MGMT SIMUL: CPT | Performed by: RADIOLOGY

## 2019-01-25 PROCEDURE — 77334 RADIATION TREATMENT AID(S): CPT | Mod: 26 | Performed by: RADIOLOGY

## 2019-01-29 ENCOUNTER — HOSPITAL ENCOUNTER (OUTPATIENT)
Dept: RADIATION ONCOLOGY | Facility: MEDICAL CENTER | Age: 74
End: 2019-01-29

## 2019-01-29 PROCEDURE — 77435 SBRT MANAGEMENT: CPT | Performed by: RADIOLOGY

## 2019-01-29 PROCEDURE — 77280 THER RAD SIMULAJ FIELD SMPL: CPT | Performed by: RADIOLOGY

## 2019-01-29 PROCEDURE — 77373 STRTCTC BDY RAD THER TX DLVR: CPT | Performed by: RADIOLOGY

## 2019-01-29 PROCEDURE — 77280 THER RAD SIMULAJ FIELD SMPL: CPT | Mod: 26 | Performed by: RADIOLOGY

## 2019-01-31 ENCOUNTER — HOSPITAL ENCOUNTER (OUTPATIENT)
Dept: RADIATION ONCOLOGY | Facility: MEDICAL CENTER | Age: 74
End: 2019-01-31

## 2019-01-31 PROCEDURE — 77373 STRTCTC BDY RAD THER TX DLVR: CPT | Performed by: RADIOLOGY

## 2019-01-31 PROCEDURE — 77280 THER RAD SIMULAJ FIELD SMPL: CPT | Performed by: RADIOLOGY

## 2019-01-31 PROCEDURE — 77280 THER RAD SIMULAJ FIELD SMPL: CPT | Mod: 26 | Performed by: RADIOLOGY

## 2019-02-04 ENCOUNTER — HOSPITAL ENCOUNTER (OUTPATIENT)
Dept: RADIATION ONCOLOGY | Facility: MEDICAL CENTER | Age: 74
End: 2019-02-28
Attending: RADIOLOGY
Payer: MEDICARE

## 2019-02-04 ENCOUNTER — HOSPITAL ENCOUNTER (OUTPATIENT)
Dept: RADIATION ONCOLOGY | Facility: MEDICAL CENTER | Age: 74
End: 2019-02-04

## 2019-02-04 PROCEDURE — 77336 RADIATION PHYSICS CONSULT: CPT | Mod: XU | Performed by: RADIOLOGY

## 2019-02-04 PROCEDURE — 77280 THER RAD SIMULAJ FIELD SMPL: CPT | Performed by: RADIOLOGY

## 2019-02-04 PROCEDURE — 77280 THER RAD SIMULAJ FIELD SMPL: CPT | Mod: 26 | Performed by: RADIOLOGY

## 2019-02-04 PROCEDURE — 77373 STRTCTC BDY RAD THER TX DLVR: CPT | Performed by: RADIOLOGY

## 2019-02-06 ENCOUNTER — HOSPITAL ENCOUNTER (OUTPATIENT)
Dept: RADIATION ONCOLOGY | Facility: MEDICAL CENTER | Age: 74
End: 2019-02-06

## 2019-02-06 PROCEDURE — 77373 STRTCTC BDY RAD THER TX DLVR: CPT | Performed by: RADIOLOGY

## 2019-02-06 PROCEDURE — 77280 THER RAD SIMULAJ FIELD SMPL: CPT | Mod: 26 | Performed by: RADIOLOGY

## 2019-02-06 PROCEDURE — 77280 THER RAD SIMULAJ FIELD SMPL: CPT | Performed by: RADIOLOGY

## 2019-02-08 ENCOUNTER — HOSPITAL ENCOUNTER (OUTPATIENT)
Dept: RADIATION ONCOLOGY | Facility: MEDICAL CENTER | Age: 74
End: 2019-02-08

## 2019-02-08 PROCEDURE — 77373 STRTCTC BDY RAD THER TX DLVR: CPT | Performed by: RADIOLOGY

## 2019-02-08 PROCEDURE — 77280 THER RAD SIMULAJ FIELD SMPL: CPT | Performed by: RADIOLOGY

## 2019-02-08 PROCEDURE — 77280 THER RAD SIMULAJ FIELD SMPL: CPT | Mod: 26 | Performed by: RADIOLOGY

## 2019-03-04 ENCOUNTER — PATIENT OUTREACH (OUTPATIENT)
Dept: OTHER | Facility: MEDICAL CENTER | Age: 74
End: 2019-03-04

## 2019-03-04 NOTE — PROGRESS NOTES
"On March 4, 2019, Oncology Social Worker Katy Nelson attempted telephone call with pt.  Pt.'s wife Cydney answered the phone call and stated she would be happy to take message for pt.  Pt.'s wife states pt. has completed his radiation treatment and has been told he will not need chemotherapy.  Pt.'s wife states when she asks her  how he is doing he tells her \"I'm fine\" and states she knows he worries about it.  Pt.'s wife shared she asked pt.'s PCP to help but pt. refuses to take medication to help with his mental health.  Pt.'s wife states if he refuses there is nothing she can do to change his mind.  Next follow up appointment is scheduled for March 21, 2019.  No other concerns at this time.    "

## 2019-03-21 ENCOUNTER — HOSPITAL ENCOUNTER (OUTPATIENT)
Dept: RADIATION ONCOLOGY | Facility: MEDICAL CENTER | Age: 74
End: 2019-03-31
Attending: RADIOLOGY
Payer: MEDICARE

## 2019-03-21 ENCOUNTER — PATIENT OUTREACH (OUTPATIENT)
Dept: OTHER | Facility: MEDICAL CENTER | Age: 74
End: 2019-03-21

## 2019-03-21 VITALS
TEMPERATURE: 97.8 F | SYSTOLIC BLOOD PRESSURE: 159 MMHG | DIASTOLIC BLOOD PRESSURE: 97 MMHG | HEART RATE: 102 BPM | BODY MASS INDEX: 26.15 KG/M2 | OXYGEN SATURATION: 97 % | WEIGHT: 172 LBS

## 2019-03-21 DIAGNOSIS — Z65.8 PSYCHOSOCIAL DISTRESS: ICD-10-CM

## 2019-03-21 PROCEDURE — 99212 OFFICE O/P EST SF 10 MIN: CPT | Performed by: RADIOLOGY

## 2019-03-21 ASSESSMENT — PAIN SCALES - GENERAL: PAINLEVEL: NO PAIN

## 2019-03-21 NOTE — PROGRESS NOTES
RADIATION ONCOLOGY FOLLOW UP    DATE OF SERVICE: 3/21/2019    IDENTIFICATION:   1. Stage I non-small cell lung carcinoma, moderately differentiated adenocarcinoma histology, status post wedge resection in April 2018  2.  Stage IA2 ([id number]) non-small cell lung carcinoma of the right upper lobe of lung, medically inoperable, completing stereotactic radiosurgery to 6000 cGy in February 2019    INTERVAL HISTORY: I had the pleasure of seeing Mr. Black today in follow up for his lung cancer.  Patient states from a symptom standpoint he has not had any untoward effects he would attribute to his lung cancer lung cancer treatment.  He has not had any change in his respiratory status.  He does have a PET scan scheduled by Dr. Ruiz already on May 9, 2019.    PHYSICAL EXAM:    Vitals:    03/21/19 0938   BP: 159/97   Pulse: (!) 102   Temp: 36.6 °C (97.8 °F)   SpO2: 97%   Weight: 78 kg (172 lb)   Pain Score: No pain      1= Restricted in physically strenuous activity, but ambulatory and able to carry out work of a light sedentary nature, e.g., light housework, office work.      GENERAL: No apparent distress.  HEENT:  Pupils are equal, round, and reactive to light.  Extraocular muscles   are intact. Sclerae nonicteric.  Conjunctivae pink.  Oral cavity, tongue   protrudes midline.   NECK:  Supple without evidence of thyromegaly.  NODES:  No peripheral adenopathy of the neck, supraclavicular fossa or axillae   bilaterally.  LUNGS:  Clear to ascultation bilaterally   HEART:  Regular rate and rhythm.  No murmur appreciated  ABDOMEN:  Soft. No evidence of hepatosplenomegaly.  Positive bowel sounds.  EXTREMITIES:  Without Edema.  NEUROLOGIC:  Cranial nerves II through XII were intact. Normal stance and gait motor and sensory grossly within normal limits      IMPRESSION:   1. Stage I non-small cell lung carcinoma, moderately differentiated adenocarcinoma histology, status post wedge resection in April 2018  2.  Stage IA2 ([id number])  non-small cell lung carcinoma of the right upper lobe of lung, medically inoperable, completing stereotactic radiosurgery to 6000 cGy in February 2019    RECOMMENDATIONS:   I discussed the patient his findings over a 35 minute time period, 95% of that time dedicated to an ongoing survivorship plan.  Patient has his PET scan scheduled for May 9, 2019.  My office is trying to coordinate a follow-up visit on the same date as Dr. Ruiz as they travel from [place].  In addition we discussed with the patient we will try to minimize his visits to [place] if possible.  His wife sees Dr. Ruiz for another condition.  If it is easier for them to continue follow-up only and Dr. Ruiz's office I am happy to follow peripherally or try to coordinate visits on same date.    If patient has any questions or concerns, he should feel free to contact me.

## 2019-03-21 NOTE — PROGRESS NOTES
"Met with patient and wife after radiation oncology follow up.  Provided treatment summaries for both previous early stage lung cancer treated with surgery and current lung cancer treated with radiation.  Reviewed information and provided packet to patient.  Answered questions.  Pt scored self 5/10 on oncology distress scale.  He reports this is related to the \"unknown\".  He talked about concern regarding his response to treatment.  Provided additional education and support.  Pt does already have next scan and follow ups scheduled.  Treatment summaries scanned into EPIC.  Pt has completed active cancer nurse navigation.  Available if needed.  "

## 2019-03-21 NOTE — NON-PROVIDER
Patient was seen today in clinic with Dr. Martino for follow up.  Vitals signs and weight were obtained and pain assessment was completed.  Allergies and medications were reviewed with the patient.  Toxicities of treatment assessed.     Vitals/Pain:  Vitals:    03/21/19 0938   BP: 159/97   Pulse: (!) 102   Temp: 36.6 °C (97.8 °F)   SpO2: 97%   Weight: 78 kg (172 lb)   Pain Score: No pain        Allergies:   Patient has no known allergies.    Current Medications:  Current Outpatient Prescriptions   Medication Sig Dispense Refill   • gabapentin (NEURONTIN) 100 MG Cap TK 1 C PO TID  1   • Doxylamine Succinate, Sleep, (SLEEP AID PO) Take 2 Tabs by mouth at bedtime as needed.     • acetaminophen (TYLENOL ARTHRITIS PAIN) 650 MG CR tablet Take 650 mg by mouth as needed for Moderate Pain.     • losartan (COZAAR) 100 MG Tab Take 100 mg by mouth every morning.     • BREO ELLIPTA 200-25 MCG/INH AEROSOL POWDER, BREATH ACTIVATED Inhale 1 Puff by mouth every morning.     • psyllium (METAMUCIL) 58.12 % Pack Take 1 Packet by mouth every day.     • amlodipine (NORVASC) 5 MG Tab Take 5 mg by mouth every morning.     • aspirin EC (ECOTRIN) 81 MG TBEC Take 81 mg by mouth every morning.     • ezetimibe (ZETIA) 10 MG TABS Take 10 mg by mouth every morning.       No current facility-administered medications for this encounter.          PCP:  Jose rBadley, Med Ass't

## 2019-05-09 ENCOUNTER — HOSPITAL ENCOUNTER (OUTPATIENT)
Dept: RADIOLOGY | Facility: MEDICAL CENTER | Age: 74
End: 2019-05-09
Attending: INTERNAL MEDICINE
Payer: MEDICARE

## 2019-05-09 DIAGNOSIS — C34.11 MALIGNANT NEOPLASM OF UPPER LOBE OF RIGHT LUNG (HCC): ICD-10-CM

## 2019-05-09 DIAGNOSIS — C34.12 SQUAMOUS CELL CARCINOMA OF BRONCHUS IN LEFT UPPER LOBE (HCC): ICD-10-CM

## 2019-05-09 PROCEDURE — A9552 F18 FDG: HCPCS

## 2019-05-16 ENCOUNTER — HOSPITAL ENCOUNTER (OUTPATIENT)
Dept: RADIATION ONCOLOGY | Facility: MEDICAL CENTER | Age: 74
End: 2019-05-31
Attending: RADIOLOGY
Payer: MEDICARE

## 2019-05-16 VITALS
DIASTOLIC BLOOD PRESSURE: 81 MMHG | HEART RATE: 72 BPM | WEIGHT: 172 LBS | TEMPERATURE: 97.6 F | BODY MASS INDEX: 26.15 KG/M2 | SYSTOLIC BLOOD PRESSURE: 154 MMHG | OXYGEN SATURATION: 95 %

## 2019-05-16 DIAGNOSIS — C34.11 PRIMARY CANCER OF RIGHT UPPER LOBE OF LUNG (HCC): ICD-10-CM

## 2019-05-16 PROCEDURE — 99212 OFFICE O/P EST SF 10 MIN: CPT | Performed by: RADIOLOGY

## 2019-05-16 PROCEDURE — 99214 OFFICE O/P EST MOD 30 MIN: CPT | Performed by: RADIOLOGY

## 2019-05-16 ASSESSMENT — PAIN SCALES - GENERAL: PAINLEVEL: 7=MODERATE-SEVERE PAIN

## 2019-05-16 NOTE — PROGRESS NOTES
RADIATION ONCOLOGY FOLLOW UP    DATE OF SERVICE: 5/16/2019    IDENTIFICATION:   1. Stage I non-small cell lung carcinoma, moderately differentiated adenocarcinoma histology, status post wedge resection in April 2018  2.  Stage IA2 ([id number]) non-small cell lung carcinoma of the right upper lobe of lung, medically inoperable, completing stereotactic radiosurgery to 6000 cGy in February 2019    INTERVAL HISTORY: I had the pleasure of seeing Mr. Black today in follow up for his lung cancer.  Patient states from a symptom standpoint he continues to do quite well.  He does not have any symptoms that he would attribute to his lung cancer or lung cancer treatment.  [place] 1 to 2 hours/day without limitation.  His most recent PET scan shows no increased size in the right upper lobe lung lesion with only faint uptake consistent with posttreatment artifact.  No evidence of regional or distant spread is present.    PHYSICAL EXAM:    Vitals:    05/16/19 1058   BP: 154/81   Pulse: 72   Temp: 36.4 °C (97.6 °F)   SpO2: 95%   Weight: 78 kg (172 lb)   Pain Score: 7=Moderate-Severe Pain (chronic back pain )      1= Restricted in physically strenuous activity, but ambulatory and able to carry out work of a light sedentary nature, e.g., light housework, office work.        GENERAL: No apparent distress.  HEENT:  Pupils are equal, round, and reactive to light.  Extraocular muscles   are intact. Sclerae nonicteric.  Conjunctivae pink.  Oral cavity, tongue   protrudes midline.   NECK:  Supple without evidence of thyromegaly.  NODES:  No peripheral adenopathy of the neck, supraclavicular fossa or axillae   bilaterally.  LUNGS:  Clear to ascultation bilaterally   HEART:  Regular rate and rhythm.  No murmur appreciated  ABDOMEN:  Soft. No evidence of hepatosplenomegaly.  Positive bowel sounds.  EXTREMITIES:  Without Edema.  NEUROLOGIC:  Cranial nerves II through XII were intact. Normal stance and gait  motor and sensory grossly within normal limits      IMPRESSION:   1. Stage I non-small cell lung carcinoma, moderately differentiated adenocarcinoma histology, status post wedge resection in April 2018  2.  Stage IA2 ([id number]) non-small cell lung carcinoma of the right upper lobe of lung, medically inoperable, completing stereotactic radiosurgery to 6000 cGy in February 2019    RECOMMENDATIONS:   I discussed the patient his findings over a 30 minute time period, 95% of that time dedicated to an ongoing survivorship plan.  I will continue with his imaging on an every three-month basis for the first year.  This next coming scan will be CT scan of the chest with contrast.    If patient has any questions or concerns, he should feel free to contact me.

## 2019-05-16 NOTE — NON-PROVIDER
Patient was seen today in clinic with Dr. Martino for Follow up.  Vitals signs and weight were obtained and pain assessment was completed.  Allergies and medications were reviewed with the patient.  Toxicities of treatment assessed.     Vitals/Pain:  Vitals:    05/16/19 1058   BP: 154/81   Pulse: 72   Temp: 36.4 °C (97.6 °F)   SpO2: 95%   Weight: 78 kg (172 lb)   Pain Score: 7=Moderate-Severe Pain (chronic back pain )        Allergies:   Patient has no known allergies.    Current Medications:  Current Outpatient Prescriptions   Medication Sig Dispense Refill   • gabapentin (NEURONTIN) 100 MG Cap TK 1 C PO TID  1   • Doxylamine Succinate, Sleep, (SLEEP AID PO) Take 2 Tabs by mouth at bedtime as needed.     • acetaminophen (TYLENOL ARTHRITIS PAIN) 650 MG CR tablet Take 650 mg by mouth as needed for Moderate Pain.     • losartan (COZAAR) 100 MG Tab Take 100 mg by mouth every morning.     • BREO ELLIPTA 200-25 MCG/INH AEROSOL POWDER, BREATH ACTIVATED Inhale 1 Puff by mouth every morning.     • psyllium (METAMUCIL) 58.12 % Pack Take 1 Packet by mouth every day.     • amlodipine (NORVASC) 5 MG Tab Take 5 mg by mouth every morning.     • aspirin EC (ECOTRIN) 81 MG TBEC Take 81 mg by mouth every morning.     • ezetimibe (ZETIA) 10 MG TABS Take 10 mg by mouth every morning.       No current facility-administered medications for this encounter.          PCP:  Jose Bradley Med Ass't

## 2019-08-12 ENCOUNTER — HOSPITAL ENCOUNTER (OUTPATIENT)
Dept: RADIOLOGY | Facility: MEDICAL CENTER | Age: 74
End: 2019-08-12
Attending: SPECIALIST
Payer: MEDICARE

## 2019-08-12 ENCOUNTER — HOSPITAL ENCOUNTER (OUTPATIENT)
Dept: RADIOLOGY | Facility: MEDICAL CENTER | Age: 74
End: 2019-08-12
Attending: RADIOLOGY
Payer: MEDICARE

## 2019-08-12 DIAGNOSIS — R07.89 CHEST WALL PAIN: ICD-10-CM

## 2019-08-12 DIAGNOSIS — C34.11 PRIMARY CANCER OF RIGHT UPPER LOBE OF LUNG (HCC): ICD-10-CM

## 2019-08-12 PROCEDURE — 71260 CT THORAX DX C+: CPT

## 2019-08-12 PROCEDURE — 700117 HCHG RX CONTRAST REV CODE 255: Performed by: RADIOLOGY

## 2019-08-12 RX ADMIN — IOHEXOL 75 ML: 350 INJECTION, SOLUTION INTRAVENOUS at 11:30

## 2019-08-20 ENCOUNTER — HOSPITAL ENCOUNTER (OUTPATIENT)
Dept: RADIATION ONCOLOGY | Facility: MEDICAL CENTER | Age: 74
End: 2019-08-31
Attending: RADIOLOGY
Payer: MEDICARE

## 2019-08-20 VITALS
DIASTOLIC BLOOD PRESSURE: 93 MMHG | HEART RATE: 86 BPM | SYSTOLIC BLOOD PRESSURE: 150 MMHG | OXYGEN SATURATION: 97 % | TEMPERATURE: 98.1 F

## 2019-08-20 DIAGNOSIS — C34.11 PRIMARY CANCER OF RIGHT UPPER LOBE OF LUNG (HCC): ICD-10-CM

## 2019-08-20 PROCEDURE — 99212 OFFICE O/P EST SF 10 MIN: CPT | Performed by: RADIOLOGY

## 2019-08-20 PROCEDURE — 99214 OFFICE O/P EST MOD 30 MIN: CPT | Performed by: RADIOLOGY

## 2019-08-20 NOTE — NON-PROVIDER
Patient was seen today in clinic with Dr. Martino for Follow up.  Vitals signs and weight were obtained and pain assessment was completed.  Allergies and medications were reviewed with the patient.  Toxicities of treatment assessed.     Vitals/Pain:  Vitals:    08/20/19 1012   BP: 150/93   Pulse: 86   Temp: 36.7 °C (98.1 °F)   SpO2: 97%            Allergies:   Patient has no known allergies.    Current Medications:  Current Outpatient Medications   Medication Sig Dispense Refill   • gabapentin (NEURONTIN) 100 MG Cap TK 1 C PO TID  1   • Doxylamine Succinate, Sleep, (SLEEP AID PO) Take 2 Tabs by mouth at bedtime as needed.     • acetaminophen (TYLENOL ARTHRITIS PAIN) 650 MG CR tablet Take 650 mg by mouth as needed for Moderate Pain.     • losartan (COZAAR) 100 MG Tab Take 100 mg by mouth every morning.     • BREO ELLIPTA 200-25 MCG/INH AEROSOL POWDER, BREATH ACTIVATED Inhale 1 Puff by mouth every morning.     • psyllium (METAMUCIL) 58.12 % Pack Take 1 Packet by mouth every day.     • amlodipine (NORVASC) 5 MG Tab Take 5 mg by mouth every morning.     • aspirin EC (ECOTRIN) 81 MG TBEC Take 81 mg by mouth every morning.     • ezetimibe (ZETIA) 10 MG TABS Take 10 mg by mouth every morning.       No current facility-administered medications for this encounter.          PCP:  Jose Bradley, Med Ass't  \

## 2019-08-20 NOTE — PROGRESS NOTES
RADIATION ONCOLOGY FOLLOW UP    DATE OF SERVICE: 8/20/2019    IDENTIFICATION:   1. Stage I non-small cell lung carcinoma, moderately differentiated adenocarcinoma histology, status post wedge resection in April 2018  2.  Stage IA2 ([id number]) non-small cell lung carcinoma of the right upper lobe of lung, medically inoperable, completing stereotactic radiosurgery to 6000 cGy in February 2019    INTERVAL HISTORY: I had the pleasure of seeing Mr. Black today in follow up for his lung cancer.  From a symptom standpoint patient continues to do well from a respiratory standpoint.  His only complaint is of chronic back pain.  He is currently followed in the pain clinic for this.  He had a recent trigger point injection that only helped for a temporary time period.  His current CT scans continues to show a notable changes.  In the right upper lobe there is a airspace process likely consistent with post radiation changes.  There are enlarged lymph nodes which are stable.  These were previously seen on PET scan without any uptake therefore felt to be reactive.  There is a 7 mm spiculated nodule in the right upper lobe of lung of unknown significance.  There are bilateral groundglass opacities.  All these findings are consistent with previous scans we have had in his case.  Given the most recent PET scan it is felt that these are unlikely to be malignant.    PHYSICAL EXAM:    Vitals:    08/20/19 1012   BP: 150/93   Pulse: 86   Temp: 36.7 °C (98.1 °F)   SpO2: 97%          0= Fully active, able to carry on all pre-disease performance without restriction.      GENERAL: No apparent distress.  HEENT:  Pupils are equal, round, and reactive to light.  Extraocular muscles   are intact. Sclerae nonicteric.  Conjunctivae pink.  Oral cavity, tongue   protrudes midline.   NECK:  Supple without evidence of thyromegaly.  NODES:  No peripheral adenopathy of the neck, supraclavicular fossa or axillae   bilaterally.  LUNGS:  Clear to  ascultation bilaterally   HEART:  Regular rate and rhythm.  No murmur appreciated  ABDOMEN:  Soft. No evidence of hepatosplenomegaly.  Positive bowel sounds.  EXTREMITIES:  Without Edema.  NEUROLOGIC:  Cranial nerves II through XII were intact. Normal stance and gait motor and sensory grossly within normal limits      IMPRESSION:   1. Stage I non-small cell lung carcinoma, moderately differentiated adenocarcinoma histology, status post wedge resection in April 2018  2.  Stage IA2 ([id number]) non-small cell lung carcinoma of the right upper lobe of lung, medically inoperable, completing stereotactic radiosurgery to 6000 cGy in February 2019    RECOMMENDATIONS:   I discussed the patient his findings over a 35 minute time period, 95% of that time dedicated to an ongoing survivorship plan.  We discussed the findings in his lung.  All of his scans have shown evolving and resolving changes since of known him.  Given the time.  To the PET scan it is felt that these are most likely benign in nature.  I did state however will need to continue to follow these areas especially the 7 mm right upper lobe lesion.  Therefore I would like to see him back in 3 months time with a repeat CT scan.  I did discuss if his back pain continues to change he should contact me and we could order an MRI scan of the spine.  Currently this seems consistent with chronic changes he has had in the back and I am not recommending an MRI at this time.    If patient has any questions or concerns, he should feel free to contact me.

## 2019-09-20 ENCOUNTER — HOSPITAL ENCOUNTER (OUTPATIENT)
Dept: RADIOLOGY | Facility: MEDICAL CENTER | Age: 74
End: 2019-09-20
Attending: PHYSICIAN ASSISTANT
Payer: MEDICARE

## 2019-09-20 DIAGNOSIS — M54.6 PAIN IN THORACIC SPINE: ICD-10-CM

## 2019-09-20 PROCEDURE — 72146 MRI CHEST SPINE W/O DYE: CPT

## 2019-09-20 PROCEDURE — 72074 X-RAY EXAM THORAC SPINE4/>VW: CPT

## 2019-11-14 ENCOUNTER — HOSPITAL ENCOUNTER (OUTPATIENT)
Dept: RADIOLOGY | Facility: MEDICAL CENTER | Age: 74
End: 2019-11-14
Attending: RADIOLOGY
Payer: MEDICARE

## 2019-11-14 DIAGNOSIS — C34.11 PRIMARY CANCER OF RIGHT UPPER LOBE OF LUNG (HCC): ICD-10-CM

## 2019-11-14 PROCEDURE — 700117 HCHG RX CONTRAST REV CODE 255: Performed by: RADIOLOGY

## 2019-11-14 PROCEDURE — 71260 CT THORAX DX C+: CPT

## 2019-11-14 RX ADMIN — IOHEXOL 75 ML: 350 INJECTION, SOLUTION INTRAVENOUS at 10:45

## 2019-11-18 ENCOUNTER — HOSPITAL ENCOUNTER (OUTPATIENT)
Dept: RADIATION ONCOLOGY | Facility: MEDICAL CENTER | Age: 74
End: 2019-11-30
Attending: RADIOLOGY
Payer: MEDICARE

## 2019-11-18 VITALS
SYSTOLIC BLOOD PRESSURE: 126 MMHG | OXYGEN SATURATION: 98 % | DIASTOLIC BLOOD PRESSURE: 68 MMHG | WEIGHT: 177.32 LBS | BODY MASS INDEX: 26.96 KG/M2 | HEART RATE: 88 BPM | TEMPERATURE: 98 F

## 2019-11-18 DIAGNOSIS — C34.11 PRIMARY CANCER OF RIGHT UPPER LOBE OF LUNG (HCC): ICD-10-CM

## 2019-11-18 PROCEDURE — 99214 OFFICE O/P EST MOD 30 MIN: CPT | Performed by: RADIOLOGY

## 2019-11-18 PROCEDURE — 99212 OFFICE O/P EST SF 10 MIN: CPT | Performed by: RADIOLOGY

## 2019-11-18 RX ORDER — IBUPROFEN 200 MG
200 TABLET ORAL EVERY 6 HOURS PRN
COMMUNITY
End: 2021-06-28

## 2019-11-18 ASSESSMENT — PAIN SCALES - GENERAL: PAINLEVEL: NO PAIN

## 2019-11-18 NOTE — PROGRESS NOTES
RADIATION ONCOLOGY FOLLOW UP    DATE OF SERVICE: 11/18/2019    IDENTIFICATION:   1. Stage I non-small cell lung carcinoma, moderately differentiated adenocarcinoma histology, status post wedge resection in April 2018  2.  Stage IA2 ([id number]) non-small cell lung carcinoma of the right upper lobe of lung, medically inoperable, completing stereotactic radiosurgery to 6000 cGy in February 2019     INTERVAL HISTORY: I had the pleasure of seeing Mr. Black today in follow up for his lung cancer.  Patient states from a symptom standpoint he continues to do quite well.  He does not have any new respiratory symptoms he would attribute to his lung cancer or treatment.  He recently helped a friend build a garage without any limitations.  He is not requiring any supplemental oxygen.  His most recent CT scan does not show any overt areas of concern.  The post treatment and chronic changes in his lung appears stable.  There was a 4 millimeter new pulmonary nodule however he has had changes like this many times over the years without progression to disease.  We will continue to keep a close monitor on his lung status by CT scan.    PHYSICAL EXAM:    Vitals:    11/18/19 0919   BP: 126/68   BP Location: Left arm   Patient Position: Sitting   BP Cuff Size: Adult   Pulse: 88   Temp: 36.7 °C (98 °F)   TempSrc: Temporal   SpO2: 98%   Weight: 80.4 kg (177 lb 5.1 oz)   Pain Score: No pain      0= Fully active, able to carry on all pre-disease performance without restriction.    PAIN:  0  GENERAL: No apparent distress.  HEENT:  Pupils are equal, round, and reactive to light.  Extraocular muscles   are intact. Sclerae nonicteric.  Conjunctivae pink.  Oral cavity, tongue   protrudes midline.   NECK:  Supple without evidence of thyromegaly.  NODES:  No peripheral adenopathy of the neck, supraclavicular fossa or axillae   bilaterally.  LUNGS:  Clear to ascultation bilaterally   HEART:  Regular rate and rhythm.  No murmur appreciated  ABDOMEN:   Soft. No evidence of hepatosplenomegaly.  Positive bowel sounds.  EXTREMITIES:  Without Edema.  NEUROLOGIC:  Cranial nerves II through XII were intact. Normal stance and gait motor and sensory grossly within normal limits      IMPRESSION:   1. Stage I non-small cell lung carcinoma, moderately differentiated adenocarcinoma histology, status post wedge resection in April 2018  2.  Stage IA2 ([id number]) non-small cell lung carcinoma of the right upper lobe of lung, medically inoperable, completing stereotactic radiosurgery to 6000 cGy in February 2019     RECOMMENDATIONS:   I discussed with the patient his findings over a 35 minute time period, 95% of that time dedicated to an ongoing survivorship plan.  I will continue with his CT chest monitoring at an every 3-month interval.  He likes to coordinate his imaging and visits to accommodate seen both myself and Dr. Ruiz.    If patient has any questions or concerns, he should feel free to contact me.

## 2019-11-18 NOTE — NON-PROVIDER
Patient was seen today in clinic with Dr. Martino for follow-up.  Vitals signs and weight were obtained and pain assessment was completed.  Allergies and medications were reviewed with the patient.  Toxicities of treatment assessed.     Vitals/Pain:  Vitals:    11/18/19 0919   BP: 126/68   BP Location: Left arm   Patient Position: Sitting   BP Cuff Size: Adult   Pulse: 88   Temp: 36.7 °C (98 °F)   TempSrc: Temporal   SpO2: 98%   Weight: 80.4 kg (177 lb 5.1 oz)   Pain Score: No pain        Allergies:   Patient has no known allergies.    Current Medications:  Current Outpatient Medications   Medication Sig Dispense Refill   • ibuprofen (MOTRIN) 200 MG Tab Take 200 mg by mouth every 6 hours as needed (Q noc).     • gabapentin (NEURONTIN) 100 MG Cap TK 1 C PO TID  1   • losartan (COZAAR) 100 MG Tab Take 100 mg by mouth every morning.     • BREO ELLIPTA 200-25 MCG/INH AEROSOL POWDER, BREATH ACTIVATED Inhale 1 Puff by mouth every morning.     • psyllium (METAMUCIL) 58.12 % Pack Take 1 Packet by mouth every day.     • amlodipine (NORVASC) 5 MG Tab Take 5 mg by mouth every morning.     • aspirin EC (ECOTRIN) 81 MG TBEC Take 81 mg by mouth every morning.     • ezetimibe (ZETIA) 10 MG TABS Take 10 mg by mouth every morning.     • Doxylamine Succinate, Sleep, (SLEEP AID PO) Take 2 Tabs by mouth at bedtime as needed.     • acetaminophen (TYLENOL ARTHRITIS PAIN) 650 MG CR tablet Take 650 mg by mouth as needed for Moderate Pain.       No current facility-administered medications for this encounter.          PCP:  Jose Hernandez R.N.

## 2020-02-14 ENCOUNTER — HOSPITAL ENCOUNTER (OUTPATIENT)
Dept: RADIOLOGY | Facility: MEDICAL CENTER | Age: 75
End: 2020-02-14
Attending: RADIOLOGY
Payer: MEDICARE

## 2020-02-14 DIAGNOSIS — C34.11 PRIMARY CANCER OF RIGHT UPPER LOBE OF LUNG (HCC): ICD-10-CM

## 2020-02-14 PROCEDURE — 71260 CT THORAX DX C+: CPT

## 2020-02-14 PROCEDURE — 700117 HCHG RX CONTRAST REV CODE 255: Performed by: RADIOLOGY

## 2020-02-14 RX ADMIN — IOHEXOL 75 ML: 350 INJECTION, SOLUTION INTRAVENOUS at 15:39

## 2020-02-18 ENCOUNTER — HOSPITAL ENCOUNTER (OUTPATIENT)
Dept: RADIATION ONCOLOGY | Facility: MEDICAL CENTER | Age: 75
End: 2020-02-29
Attending: RADIOLOGY
Payer: MEDICARE

## 2020-02-18 VITALS
DIASTOLIC BLOOD PRESSURE: 77 MMHG | TEMPERATURE: 97.4 F | SYSTOLIC BLOOD PRESSURE: 133 MMHG | OXYGEN SATURATION: 98 % | HEART RATE: 51 BPM

## 2020-02-18 DIAGNOSIS — C34.11 PRIMARY CANCER OF RIGHT UPPER LOBE OF LUNG (HCC): ICD-10-CM

## 2020-02-18 PROCEDURE — 99406 BEHAV CHNG SMOKING 3-10 MIN: CPT | Performed by: RADIOLOGY

## 2020-02-18 PROCEDURE — 99212 OFFICE O/P EST SF 10 MIN: CPT | Performed by: RADIOLOGY

## 2020-02-18 PROCEDURE — 99214 OFFICE O/P EST MOD 30 MIN: CPT | Performed by: RADIOLOGY

## 2020-02-18 NOTE — PROGRESS NOTES
RADIATION ONCOLOGY FOLLOW UP    DATE OF SERVICE: 2/18/2020    IDENTIFICATION:   1. Stage I non-small cell lung carcinoma, moderately differentiated adenocarcinoma histology, status post wedge resection in April 2018  2.  Stage IA2 ([id number]) non-small cell lung carcinoma of the right upper lobe of lung, medically inoperable, completing stereotactic radiosurgery to 6000 cGy in February 2019    INTERVAL HISTORY: I had the pleasure of seeing Mr. Black today in follow up for his lung cancer.  From a symptom standpoint patient states he has been doing remarkably well.  He and his wife have continued to advance their physical activity with regular walks.  He has not noted any restrictions or respiratory changes.  His most recent CT scan shows no evidence of disease.    PHYSICAL EXAM:    Vitals:    02/18/20 1042   BP: 133/77   Pulse: (!) 51   Temp: 36.3 °C (97.4 °F)   SpO2: 98%   Pain Score: (New back pain- not on pain scale today)      1= Restricted in physically strenuous activity, but ambulatory and able to carry out work of a light sedentary nature, e.g., light housework, office work.    GENERAL: No apparent distress.  HEENT:  Pupils are equal, round, and reactive to light.  Extraocular muscles   are intact. Sclerae nonicteric.  Conjunctivae pink.  Oral cavity, tongue   protrudes midline.   NECK:  Supple without evidence of thyromegaly.  NODES:  No peripheral adenopathy of the neck, supraclavicular fossa or axillae   bilaterally.  LUNGS:  Clear to ascultation bilaterally   HEART:  Regular rate and rhythm.  No murmur appreciated  ABDOMEN:  Soft. No evidence of hepatosplenomegaly.  Positive bowel sounds.  EXTREMITIES:  Without Edema.  NEUROLOGIC:  Cranial nerves II through XII were intact. Normal stance and gait motor and sensory grossly within normal limits      IMPRESSION:   1. Stage I non-small cell lung carcinoma, moderately differentiated adenocarcinoma histology, status post wedge resection in April 2018  2.  Stage  IA2 ([id number]) non-small cell lung carcinoma of the right upper lobe of lung, medically inoperable, completing stereotactic radiosurgery to 6000 cGy in February 2019    RECOMMENDATIONS:   I discussed with the patient his findings over a 25 minute time period, 95% of that time dedicated to an ongoing survivorship plan.  I will now switch the periodicity of his CT scan of chest to every 6 months.  I will plan on seeing him back after his next imaging.    If patient has any questions or concerns, he should feel free to contact me.

## 2020-02-18 NOTE — NON-PROVIDER
Patient was seen today in clinic with Dr. Martino for Follow up.  Vitals signs and weight were obtained and pain assessment was completed.  Allergies and medications were reviewed with the patient.  Toxicities of treatment assessed.     Vitals/Pain:  Vitals:    02/18/20 1042   BP: 133/77   Pulse: (!) 51   Temp: 36.3 °C (97.4 °F)   SpO2: 98%   Pain Score: (New back pain- not on pain scale today)        Allergies:   Patient has no known allergies.    Current Medications:  Current Outpatient Medications   Medication Sig Dispense Refill   • ibuprofen (MOTRIN) 200 MG Tab Take 200 mg by mouth every 6 hours as needed (Q noc).     • gabapentin (NEURONTIN) 100 MG Cap TK 1 C PO TID  1   • Doxylamine Succinate, Sleep, (SLEEP AID PO) Take 2 Tabs by mouth at bedtime as needed.     • acetaminophen (TYLENOL ARTHRITIS PAIN) 650 MG CR tablet Take 650 mg by mouth as needed for Moderate Pain.     • losartan (COZAAR) 100 MG Tab Take 100 mg by mouth every morning.     • BREO ELLIPTA 200-25 MCG/INH AEROSOL POWDER, BREATH ACTIVATED Inhale 1 Puff by mouth every morning.     • psyllium (METAMUCIL) 58.12 % Pack Take 1 Packet by mouth every day.     • amlodipine (NORVASC) 5 MG Tab Take 5 mg by mouth every morning.     • aspirin EC (ECOTRIN) 81 MG TBEC Take 81 mg by mouth every morning.     • ezetimibe (ZETIA) 10 MG TABS Take 10 mg by mouth every morning.       No current facility-administered medications for this encounter.          PCP:  [facility]

## 2020-08-17 ENCOUNTER — HOSPITAL ENCOUNTER (OUTPATIENT)
Dept: RADIOLOGY | Facility: MEDICAL CENTER | Age: 75
End: 2020-08-17
Attending: RADIOLOGY
Payer: MEDICARE

## 2020-08-17 DIAGNOSIS — C34.11 PRIMARY CANCER OF RIGHT UPPER LOBE OF LUNG (HCC): ICD-10-CM

## 2020-08-17 PROCEDURE — 71260 CT THORAX DX C+: CPT

## 2020-08-17 PROCEDURE — 700117 HCHG RX CONTRAST REV CODE 255: Performed by: RADIOLOGY

## 2020-08-17 RX ADMIN — IOHEXOL 75 ML: 350 INJECTION, SOLUTION INTRAVENOUS at 09:00

## 2020-08-18 ENCOUNTER — HOSPITAL ENCOUNTER (OUTPATIENT)
Dept: RADIATION ONCOLOGY | Facility: MEDICAL CENTER | Age: 75
End: 2020-08-31
Attending: RADIOLOGY
Payer: MEDICARE

## 2020-08-18 VITALS — OXYGEN SATURATION: 96 % | HEART RATE: 67 BPM | TEMPERATURE: 97.2 F

## 2020-08-18 DIAGNOSIS — C34.11 PRIMARY CANCER OF RIGHT UPPER LOBE OF LUNG (HCC): ICD-10-CM

## 2020-08-18 PROCEDURE — 99212 OFFICE O/P EST SF 10 MIN: CPT | Performed by: RADIOLOGY

## 2020-08-18 PROCEDURE — 99214 OFFICE O/P EST MOD 30 MIN: CPT | Performed by: RADIOLOGY

## 2020-08-18 ASSESSMENT — PAIN SCALES - GENERAL: PAINLEVEL: NO PAIN

## 2020-08-18 NOTE — NON-PROVIDER
Patient was seen today in clinic with Dr. Martino for follow up.  Vitals signs and weight were obtained and pain assessment was completed.  Allergies and medications were reviewed with the patient.  Toxicities of treatment assessed.     Vitals/Pain:  Vitals:    08/18/20 1111   Pulse: 67   Temp: 36.2 °C (97.2 °F)   SpO2: 96%   Pain Score: No pain        Allergies:   Patient has no known allergies.    Current Medications:  Current Outpatient Medications   Medication Sig Dispense Refill   • ibuprofen (MOTRIN) 200 MG Tab Take 200 mg by mouth every 6 hours as needed (Q noc).     • gabapentin (NEURONTIN) 100 MG Cap TK 1 C PO TID  1   • Doxylamine Succinate, Sleep, (SLEEP AID PO) Take 2 Tabs by mouth at bedtime as needed.     • acetaminophen (TYLENOL ARTHRITIS PAIN) 650 MG CR tablet Take 650 mg by mouth as needed for Moderate Pain.     • losartan (COZAAR) 100 MG Tab Take 100 mg by mouth every morning.     • BREO ELLIPTA 200-25 MCG/INH AEROSOL POWDER, BREATH ACTIVATED Inhale 1 Puff by mouth every morning.     • psyllium (METAMUCIL) 58.12 % Pack Take 1 Packet by mouth every day.     • amlodipine (NORVASC) 5 MG Tab Take 5 mg by mouth every morning.     • aspirin EC (ECOTRIN) 81 MG TBEC Take 81 mg by mouth every morning.     • ezetimibe (ZETIA) 10 MG TABS Take 10 mg by mouth every morning.       No current facility-administered medications for this encounter.          PCP:  Jose Snyder, Med [facility]

## 2020-08-18 NOTE — PROGRESS NOTES
RADIATION ONCOLOGY FOLLOW UP    DATE OF SERVICE: 8/18/2020    IDENTIFICATION:   1. Stage I non-small cell lung carcinoma, moderately differentiated adenocarcinoma histology, status post wedge resection in April 2018  2.  Stage IA2 ([id number]) non-small cell lung carcinoma of the right upper lobe of lung, medically inoperable, completing stereotactic radiosurgery to 6000 cGy in February 2019     INTERVAL HISTORY: I had the pleasure of seeing Mr. lBack today in follow up for his lung cancer.  Patient states from a symptom standpoint he has had more of a nonproductive cough.  He has not developed any fever.  His most recent CT scan of chest does reveal some changes.  The area in the right upper lobe of lung appears consistent with posttreatment changes.  In the past he is always had some groundglass appearance and other nodularity but these have been stable.  On this exam he shows interval increase in most of these areas bilaterally in the lung.  There is not any evidence of mediastinal or hilar adenopathy or areas of concern for distant spread.    PHYSICAL EXAM:    Vitals:    08/18/20 1111   Pulse: 67   Temp: 36.2 °C (97.2 °F)   SpO2: 96%   Pain Score: No pain      0= Fully active, able to carry on all pre-disease performance without restriction.    GENERAL: No apparent distress.  HEENT:  Pupils are equal, round, and reactive to light.  Extraocular muscles   are intact. Sclerae nonicteric.  Conjunctivae pink.  Oral cavity, tongue   protrudes midline.   NECK:  Supple without evidence of thyromegaly.  NODES:  No peripheral adenopathy of the neck, supraclavicular fossa or axillae   bilaterally.  LUNGS:  Clear to ascultation bilaterally   HEART:  Regular rate and rhythm.  No murmur appreciated  ABDOMEN:  Soft. No evidence of hepatosplenomegaly.  Positive bowel sounds.  EXTREMITIES:  Without Edema.  NEUROLOGIC:  Cranial nerves II through XII were intact. Normal stance and gait motor and sensory grossly within normal  limits      IMPRESSION:   1. Stage I non-small cell lung carcinoma, moderately differentiated adenocarcinoma histology, status post wedge resection in April 2018  2.  Stage IA2 ([id number]) non-small cell lung carcinoma of the right upper lobe of lung, medically inoperable, completing stereotactic radiosurgery to 6000 cGy in February 2019    RECOMMENDATIONS:   I discussed with the patient his findings over a 25 minute time period, 95% of that time dedicated to an ongoing survivorship plan.  Discussed with the patient and his wife the current findings on his CT scan of chest.  Although he has had chronic changes on all of his scans these appear to be consistently enlarged.  This would be more concerning for multifocal lung disease as opposed to regional or distant spread.  I have suggested we proceed with a PET scan to further evaluate.  Patient is in agreement.  I will plan on seeing him after his PET scan.  In addition he is planning on seeing Dr. Ruiz today and follow-up in medical oncology.        If patient has any questions or concerns, he should feel free to contact me.

## 2020-08-24 ENCOUNTER — OFFICE VISIT (OUTPATIENT)
Dept: CARDIOLOGY | Facility: MEDICAL CENTER | Age: 75
End: 2020-08-24
Payer: MEDICARE

## 2020-08-24 VITALS
HEART RATE: 64 BPM | HEIGHT: 68 IN | WEIGHT: 158 LBS | OXYGEN SATURATION: 97 % | DIASTOLIC BLOOD PRESSURE: 58 MMHG | BODY MASS INDEX: 23.95 KG/M2 | SYSTOLIC BLOOD PRESSURE: 122 MMHG

## 2020-08-24 DIAGNOSIS — I49.9 IRREGULAR HEART RHYTHM: ICD-10-CM

## 2020-08-24 DIAGNOSIS — Z86.79 HISTORY OF ATRIAL FIBRILLATION: ICD-10-CM

## 2020-08-24 DIAGNOSIS — I10 ESSENTIAL HYPERTENSION: ICD-10-CM

## 2020-08-24 LAB — EKG IMPRESSION: NORMAL

## 2020-08-24 PROCEDURE — 99214 OFFICE O/P EST MOD 30 MIN: CPT | Performed by: INTERNAL MEDICINE

## 2020-08-24 PROCEDURE — 93000 ELECTROCARDIOGRAM COMPLETE: CPT | Performed by: INTERNAL MEDICINE

## 2020-08-24 ASSESSMENT — ENCOUNTER SYMPTOMS
MYALGIAS: 0
SHORTNESS OF BREATH: 1
WEIGHT LOSS: 1
SENSORY CHANGE: 0
BLURRED VISION: 0
INSOMNIA: 0
BLOOD IN STOOL: 0
FOCAL WEAKNESS: 0
PALPITATIONS: 0
DIZZINESS: 0
DOUBLE VISION: 0

## 2020-08-24 NOTE — PROGRESS NOTES
Chief Complaint   Patient presents with   • HTN (Controlled)     follow up   History of atrial fibrillation    Subjective:   Piot Black is a 75 y.o. male who presents today follow-up of above issues    He is a former patient of Dr. Chuck Robin.  He was last seen in December 2018.  In February 2018 he was noted to be in atrial fibrillation when he underwent a bronchoscopy.  The procedure was complicated by pneumothorax.  The patient was subsequently referred to our clinic but when he was seen a few weeks later he was already back in sinus rhythm without any intervention.  It was his only documented A. fib and there is no evidence of recurrent atrial fibrillation since then.    He has adenocarcinoma of the lung was treated with XRTin 4/2018.    Echocardiography in February 2018 show normal left finger systolic function and wall motion.   The left atrial dimensions was normal.  There were mild aortic and mitral insufficiency.    Past Medical History:   Diagnosis Date   • Arrhythmia     hx of a fib   • Arthritis 2015    generalized, DDD back   • Asthma     inhaler    • Backpain 08/06/2018 9/10   • Cancer (HCC) 07/2017    left lung   • Cataract     bilateral, no surgery   • Dental disorder     lower partial, removable bridge   • Diabetes 08/06/2018    on no meds at this time, diet controlled   • Heart valve disease     mild mitral valve prolapse   • High cholesterol    • Hypertension      Past Surgical History:   Procedure Laterality Date   • CATARACT PHACO WITH IOL Left 8/21/2018    Procedure: CATARACT PHACO WITH IOL;  Surgeon: Juanito Hager M.D.;  Location: SURGERY SAME DAY [facility];  Service: Ophthalmology   • CATARACT PHACO WITH IOL Right 8/7/2018    Procedure: CATARACT PHACO WITH IOL;  Surgeon: Juanito Hager M.D.;  Location: SURGERY SAME DAY [facility];  Service: Ophthalmology   • THORACOSCOPY Left 4/19/2018    Procedure: THORACOSCOPY- WEDGE BIOPSY W/FROZEN SECTION;  Surgeon: Cristhian SPENCE  "COREY Galeano;  Location: SURGERY [facility];  Service: Thoracic   • EAR MIDDLE EXPLORATION Right 6/12/2015    Procedure: EAR MIDDLE EXPLORATION;  Surgeon: William Brandon M.D.;  Location: SURGERY SAME DAY [facility];  Service:    • OSSICULAR RECONSTRUCTION Right 6/12/2015    Procedure: OSSICULAR RECONSTRUCTION CHAIN POSSIBLE;  Surgeon: William Brandon M.D.;  Location: SURGERY SAME DAY [facility];  Service:    • OTHER      ear replacement \"many years ago\"   • OTHER ORTHOPEDIC SURGERY      right knee replacement 2005     Family History   Problem Relation Age of Onset   • Cancer Father         stomach cancer     Social History     Socioeconomic History   • Marital status:      Spouse name: Not on file   • Number of children: Not on file   • Years of education: Not on file   • Highest education level: Not on file   Occupational History   • Not on file   Social Needs   • Financial resource strain: Not on file   • Food insecurity     Worry: Not on file     Inability: Not on file   • Transportation needs     Medical: Not on file     Non-medical: Not on file   Tobacco Use   • Smoking status: Former Smoker     Packs/day: 0.25     Years: 40.00     Pack years: 10.00     Types: Cigarettes     Quit date: 1/1/2005     Years since quitting: 15.6   • Smokeless tobacco: Never Used   Substance and Sexual Activity   • Alcohol use: Yes     Comment: 6 beers a day   • Drug use: No   • Sexual activity: Not on file   Lifestyle   • Physical activity     Days per week: Not on file     Minutes per session: Not on file   • Stress: Not on file   Relationships   • Social connections     Talks on phone: Not on file     Gets together: Not on file     Attends [employer]ars[employer] service: Not on file     Active member of club or organization: Not on file     Attends meetings of clubs or organizations: Not on file     Relationship status: Not on file   • Intimate partner violence     Fear of current or ex partner: Not on file     Emotionally " "abused: Not on file     Physically abused: Not on file     Forced sexual activity: Not on file   Other Topics Concern   • Not on file   Social History Narrative   • Not on file     No Known Allergies  Outpatient Encounter Medications as of 8/24/2020   Medication Sig Dispense Refill   • diphenhydrAMINE-APAP, sleep, (TYLENOL PM EXTRA STRENGTH PO) Take  by mouth.     • acetaminophen (TYLENOL ARTHRITIS PAIN) 650 MG CR tablet Take 650 mg by mouth as needed for Moderate Pain.     • losartan (COZAAR) 100 MG Tab Take 100 mg by mouth every morning.     • BREO ELLIPTA 200-25 MCG/INH AEROSOL POWDER, BREATH ACTIVATED Inhale 1 Puff by mouth every morning.     • amlodipine (NORVASC) 5 MG Tab Take 5 mg by mouth every morning.     • aspirin EC (ECOTRIN) 81 MG TBEC Take 81 mg by mouth every morning.     • ezetimibe (ZETIA) 10 MG TABS Take 10 mg by mouth every morning.     • ibuprofen (MOTRIN) 200 MG Tab Take 200 mg by mouth every 6 hours as needed (Q noc).     • gabapentin (NEURONTIN) 100 MG Cap TK 1 C PO TID  1   • Doxylamine Succinate, Sleep, (SLEEP AID PO) Take 2 Tabs by mouth at bedtime as needed.     • psyllium (METAMUCIL) 58.12 % Pack Take 1 Packet by mouth every day.       No facility-administered encounter medications on file as of 8/24/2020.      Review of Systems   Constitutional: Positive for weight loss.   HENT: Negative for nosebleeds.    Eyes: Negative for blurred vision and double vision.   Respiratory: Positive for shortness of breath.    Cardiovascular: Negative for chest pain and palpitations.   Gastrointestinal: Negative for blood in stool.   Genitourinary: Negative for hematuria.   Musculoskeletal: Negative for myalgias.   Neurological: Negative for dizziness, sensory change and focal weakness.   Psychiatric/Behavioral: The patient does not have insomnia.         Objective:   /58 (BP Location: Left arm, Patient Position: Sitting)   Pulse 64   Ht 1.727 m (5' 8\")   Wt 71.7 kg (158 lb)   SpO2 97%   BMI " 24.02 kg/m²     Physical Exam   Constitutional: He is oriented to person, place, and time. He appears well-developed and well-nourished.   Neck: No JVD present.   Cardiovascular: Normal rate and regular rhythm.  Occasional extrasystoles are present. Exam reveals no gallop.   No murmur heard.  Pulmonary/Chest: Effort normal and breath sounds normal. No respiratory distress. He has no wheezes. He has no rales.   Abdominal: Soft. He exhibits no distension. There is no abdominal tenderness.   Musculoskeletal:         General: No edema.   Neurological: He is alert and oriented to person, place, and time.   Skin: Skin is warm and dry. No erythema.   Psychiatric: He has a normal mood and affect. His behavior is normal.     EKG today by my review shows sinus rhythm with occasional PACs otherwise unremarkable.    Assessment:     1. History of atrial fibrillation  EKG   2. Essential hypertension     3. Irregular heart rhythm  EKG       Medical Decision Making:  Today's Assessment / Status / Plan:     His past A. fib was likely due to his pneumothorax/acute pulmonary issue at that time. There is no indication of any recurrent atrial fibrillation.  I advised him to follow-up with his primary care provider.  We will be happy to see him on an as-needed basis.  Thank you for allowing us to participate in the care of this patient

## 2020-08-28 ENCOUNTER — HOSPITAL ENCOUNTER (OUTPATIENT)
Dept: RADIOLOGY | Facility: MEDICAL CENTER | Age: 75
End: 2020-08-28
Attending: RADIOLOGY
Payer: MEDICARE

## 2020-08-28 DIAGNOSIS — C34.11 PRIMARY CANCER OF RIGHT UPPER LOBE OF LUNG (HCC): ICD-10-CM

## 2020-08-28 PROCEDURE — A9552 F18 FDG: HCPCS

## 2020-09-01 ENCOUNTER — HOSPITAL ENCOUNTER (OUTPATIENT)
Dept: RADIATION ONCOLOGY | Facility: MEDICAL CENTER | Age: 75
End: 2020-09-30
Attending: RADIOLOGY
Payer: MEDICARE

## 2020-09-01 VITALS — OXYGEN SATURATION: 97 % | HEART RATE: 88 BPM | TEMPERATURE: 97.9 F

## 2020-09-01 DIAGNOSIS — C34.11 PRIMARY CANCER OF RIGHT UPPER LOBE OF LUNG (HCC): ICD-10-CM

## 2020-09-01 PROCEDURE — 99212 OFFICE O/P EST SF 10 MIN: CPT | Performed by: RADIOLOGY

## 2020-09-01 PROCEDURE — 99214 OFFICE O/P EST MOD 30 MIN: CPT | Performed by: RADIOLOGY

## 2020-09-01 ASSESSMENT — PAIN SCALES - GENERAL: PAINLEVEL: NO PAIN

## 2020-09-01 NOTE — PROGRESS NOTES
RADIATION ONCOLOGY FOLLOW UP    DATE OF SERVICE: 9/1/2020    IDENTIFICATION:   1. Stage I non-small cell lung carcinoma, moderately differentiated adenocarcinoma histology, status post wedge resection in April 2018  2.  Stage IA2 ([id number]) non-small cell lung carcinoma of the right upper lobe of lung, medically inoperable, completing stereotactic radiosurgery to 6000 cGy in February 2019      INTERVAL HISTORY: I had the pleasure of seeing Mr. Black today in follow up for his lung cancer.  Patient presents back after his PET scan for reevaluation of his posttreatment and other pulmonary nodule work-up.  Fortunately on patient's PET scan is uptake does not suggest any meaningful active disease.  The lesion in the right upper lobe appears consistent with post radiation changes.  The more apical lesion in the right upper lobe and left lower lobe lesion have SUV of 2 or less lower than background.  Therefore these appear not overtly active.  There is no evidence of regional or distant spread.  From a symptom standpoint patient continues to do well he is exercising in the gym most days of the week.    PHYSICAL EXAM:    Vitals:    09/01/20 1119   Pulse: 88   Temp: 36.6 °C (97.9 °F)   SpO2: 97%   Pain Score: No pain      0= Fully active, able to carry on all pre-disease performance without restriction.      GENERAL: No apparent distress.  HEENT:  Pupils are equal, round, and reactive to light.  Extraocular muscles   are intact. Sclerae nonicteric.  Conjunctivae pink.  Oral cavity, tongue   protrudes midline.   NECK:  Supple without evidence of thyromegaly.  NODES:  No peripheral adenopathy of the neck, supraclavicular fossa or axillae   bilaterally.  LUNGS:  Clear to ascultation bilaterally   HEART:  Regular rate and rhythm.  No murmur appreciated  ABDOMEN:  Soft. No evidence of hepatosplenomegaly.  Positive bowel sounds.  EXTREMITIES:  Without Edema.  NEUROLOGIC:  Cranial nerves II through XII were intact. Normal stance  and gait motor and sensory grossly within normal limits      IMPRESSION:   1. Stage I non-small cell lung carcinoma, moderately differentiated adenocarcinoma histology, status post wedge resection in April 2018  2.  Stage IA2 ([id number]) non-small cell lung carcinoma of the right upper lobe of lung, medically inoperable, completing stereotactic radiosurgery to 6000 cGy in February 2019      RECOMMENDATIONS:   I discussed with the patient his findings over a 35 minute time period, 95% of that time dedicated to an ongoing survivorship plan.  I discussed with the patient and his family the findings on PET scan.  In reviewing his films these changes in the apex of the right lung and left lower lobe have been present for quite some time.  They have showed slight increase in size over this time.  Currently however they show no uptake on PET scan.  I plan on presenting his case at our multidisciplinary tumor board on Thursday.  He was also presented in the radiation oncology department.  Anticipate the recommendation will be for continued active surveillance with a 3-month CT scan.  I have currently ordered that CT scan of chest if there are any separate recommendations from the tumor board I will contact them directly.        If patient has any questions or concerns, he should feel free to contact me.

## 2020-09-01 NOTE — NON-PROVIDER
Patient was seen today in clinic with Dr. Martino for Follow up.  Vitals signs and weight were obtained and pain assessment was completed.  Allergies and medications were reviewed with the patient.  Toxicities of treatment assessed.     Vitals/Pain:  Vitals:    09/01/20 1119   Pulse: 88   Temp: 36.6 °C (97.9 °F)   SpO2: 97%   Pain Score: No pain        Allergies:   Patient has no known allergies.    Current Medications:  Current Outpatient Medications   Medication Sig Dispense Refill   • diphenhydrAMINE-APAP, sleep, (TYLENOL PM EXTRA STRENGTH PO) Take  by mouth.     • ibuprofen (MOTRIN) 200 MG Tab Take 200 mg by mouth every 6 hours as needed (Q noc).     • gabapentin (NEURONTIN) 100 MG Cap TK 1 C PO TID  1   • Doxylamine Succinate, Sleep, (SLEEP AID PO) Take 2 Tabs by mouth at bedtime as needed.     • acetaminophen (TYLENOL ARTHRITIS PAIN) 650 MG CR tablet Take 650 mg by mouth as needed for Moderate Pain.     • losartan (COZAAR) 100 MG Tab Take 100 mg by mouth every morning.     • BREO ELLIPTA 200-25 MCG/INH AEROSOL POWDER, BREATH ACTIVATED Inhale 1 Puff by mouth every morning.     • psyllium (METAMUCIL) 58.12 % Pack Take 1 Packet by mouth every day.     • amlodipine (NORVASC) 5 MG Tab Take 5 mg by mouth every morning.     • aspirin EC (ECOTRIN) 81 MG TBEC Take 81 mg by mouth every morning.     • ezetimibe (ZETIA) 10 MG TABS Take 10 mg by mouth every morning.       No current facility-administered medications for this encounter.          PCP:  Jose Snyder, Med Ass't

## 2020-12-03 ENCOUNTER — HOSPITAL ENCOUNTER (OUTPATIENT)
Dept: RADIOLOGY | Facility: MEDICAL CENTER | Age: 75
End: 2020-12-03
Attending: RADIOLOGY
Payer: MEDICARE

## 2020-12-03 DIAGNOSIS — C34.11 PRIMARY CANCER OF RIGHT UPPER LOBE OF LUNG (HCC): ICD-10-CM

## 2020-12-03 PROCEDURE — 71260 CT THORAX DX C+: CPT

## 2020-12-03 PROCEDURE — 700117 HCHG RX CONTRAST REV CODE 255: Performed by: RADIOLOGY

## 2020-12-03 RX ADMIN — IOHEXOL 75 ML: 350 INJECTION, SOLUTION INTRAVENOUS at 10:30

## 2020-12-07 ENCOUNTER — HOSPITAL ENCOUNTER (OUTPATIENT)
Dept: RADIATION ONCOLOGY | Facility: MEDICAL CENTER | Age: 75
End: 2020-12-31
Attending: RADIOLOGY
Payer: MEDICARE

## 2020-12-07 VITALS
TEMPERATURE: 97.9 F | OXYGEN SATURATION: 98 % | HEART RATE: 95 BPM | SYSTOLIC BLOOD PRESSURE: 153 MMHG | DIASTOLIC BLOOD PRESSURE: 78 MMHG

## 2020-12-07 DIAGNOSIS — C34.11 PRIMARY CANCER OF RIGHT UPPER LOBE OF LUNG (HCC): ICD-10-CM

## 2020-12-07 PROCEDURE — 99214 OFFICE O/P EST MOD 30 MIN: CPT | Performed by: RADIOLOGY

## 2020-12-07 PROCEDURE — 99212 OFFICE O/P EST SF 10 MIN: CPT | Performed by: RADIOLOGY

## 2020-12-07 ASSESSMENT — PAIN SCALES - GENERAL: PAINLEVEL: NO PAIN

## 2020-12-07 NOTE — PROGRESS NOTES
RADIATION ONCOLOGY FOLLOW UP    DATE OF SERVICE: 12/7/2020    IDENTIFICATION:   1. Stage I non-small cell lung carcinoma, moderately differentiated adenocarcinoma histology, status post wedge resection in April 2018  2.  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 Black today in follow up for his lung cancer.  From a symptom standpoint patient continues to do extremely well.  He continues to workout at the gym 4 days a week inclusive of very brisk 2 mile walk.  He states he has not had any respiratory difficulties doing this.  Patient's most recent CT scan shows either regression or only stable findings.  No current areas of concern.    PHYSICAL EXAM:    Vitals:    12/07/20 1041   BP: 153/78   Pulse: 95   Temp: 36.6 °C (97.9 °F)   SpO2: 98%   Pain Score: No pain      0= Fully active, able to carry on all pre-disease performance without restriction.      GENERAL: No apparent distress.  HEENT:  Pupils are equal, round, and reactive to light.  Extraocular muscles   are intact. Sclerae nonicteric.  Conjunctivae pink.  Oral cavity, tongue   protrudes midline.   NECK:  Supple without evidence of thyromegaly.  NODES:  No peripheral adenopathy of the neck, supraclavicular fossa or axillae   bilaterally.  LUNGS:  Clear to ascultation bilaterally   HEART:  Regular rate and rhythm.  No murmur appreciated  ABDOMEN:  Soft. No evidence of hepatosplenomegaly.  Positive bowel sounds.  EXTREMITIES:  Without Edema.  NEUROLOGIC:  Cranial nerves II through XII were intact. Normal stance and gait motor and sensory grossly within normal limits      IMPRESSION:   1. Stage I non-small cell lung carcinoma, moderately differentiated adenocarcinoma histology, status post wedge resection in April 2018  2.  Stage IA2 ([id number]) non-small cell lung carcinoma of the  right upper lobe of lung, medically inoperable, completing stereotactic radiosurgery to 6000 cGy in February 2019      RECOMMENDATIONS:   I discussed with the patient his findings over a 30 minute time period, 95% of that time dedicated to an ongoing survivorship plan.  I discussed with the patient and his wife the current findings on CT scan.  He areas which have either been treated or under observation are either showing evidence of regression or stability.  In addition he is currently asymptomatic and has an excellent performance status for his age and comorbidities.  Therefore I am comfortable to extending his CT follow-up to every 6 months.        If patient has any questions or concerns, he should feel free to contact me.

## 2020-12-07 NOTE — NON-PROVIDER
Patient was seen today in clinic with Dr. Martino for Follow up.  Vitals signs and weight were obtained and pain assessment was completed.  Allergies and medications were reviewed with the patient.  Toxicities of treatment assessed.     Vitals/Pain:  Vitals:    12/07/20 1041   BP: 153/78   Pulse: 95   Temp: 36.6 °C (97.9 °F)   SpO2: 98%   Pain Score: No pain        Allergies:   Patient has no known allergies.    Current Medications:  Current Outpatient Medications   Medication Sig Dispense Refill   • diphenhydrAMINE-APAP, sleep, (TYLENOL PM EXTRA STRENGTH PO) Take  by mouth.     • ibuprofen (MOTRIN) 200 MG Tab Take 200 mg by mouth every 6 hours as needed (Q noc).     • gabapentin (NEURONTIN) 100 MG Cap TK 1 C PO TID  1   • Doxylamine Succinate, Sleep, (SLEEP AID PO) Take 2 Tabs by mouth at bedtime as needed.     • acetaminophen (TYLENOL ARTHRITIS PAIN) 650 MG CR tablet Take 650 mg by mouth as needed for Moderate Pain.     • losartan (COZAAR) 100 MG Tab Take 100 mg by mouth every morning.     • BREO ELLIPTA 200-25 MCG/INH AEROSOL POWDER, BREATH ACTIVATED Inhale 1 Puff by mouth every morning.     • psyllium (METAMUCIL) 58.12 % Pack Take 1 Packet by mouth every day.     • amlodipine (NORVASC) 5 MG Tab Take 5 mg by mouth every morning.     • aspirin EC (ECOTRIN) 81 MG TBEC Take 81 mg by mouth every morning.     • ezetimibe (ZETIA) 10 MG TABS Take 10 mg by mouth every morning.       No current facility-administered medications for this encounter.          PCP:  Jose Snyder, Med Ass't

## 2021-06-07 ENCOUNTER — HOSPITAL ENCOUNTER (OUTPATIENT)
Dept: RADIOLOGY | Facility: MEDICAL CENTER | Age: 76
End: 2021-06-07
Attending: RADIOLOGY
Payer: MEDICARE

## 2021-06-07 DIAGNOSIS — C34.11 PRIMARY CANCER OF RIGHT UPPER LOBE OF LUNG (HCC): ICD-10-CM

## 2021-06-07 PROCEDURE — 71260 CT THORAX DX C+: CPT | Mod: MG

## 2021-06-07 PROCEDURE — 700117 HCHG RX CONTRAST REV CODE 255: Performed by: RADIOLOGY

## 2021-06-07 RX ADMIN — IOHEXOL 75 ML: 350 INJECTION, SOLUTION INTRAVENOUS at 10:30

## 2021-06-08 ENCOUNTER — HOSPITAL ENCOUNTER (OUTPATIENT)
Dept: RADIATION ONCOLOGY | Facility: MEDICAL CENTER | Age: 76
End: 2021-06-30
Attending: RADIOLOGY
Payer: MEDICARE

## 2021-06-08 VITALS
OXYGEN SATURATION: 97 % | DIASTOLIC BLOOD PRESSURE: 74 MMHG | SYSTOLIC BLOOD PRESSURE: 133 MMHG | HEART RATE: 67 BPM | TEMPERATURE: 98.3 F

## 2021-06-08 DIAGNOSIS — C34.90 MALIGNANT NEOPLASM OF UNSPECIFIED PART OF UNSPECIFIED BRONCHUS OR LUNG (HCC): ICD-10-CM

## 2021-06-08 DIAGNOSIS — C34.11 PRIMARY CANCER OF RIGHT UPPER LOBE OF LUNG (HCC): ICD-10-CM

## 2021-06-08 PROCEDURE — 99212 OFFICE O/P EST SF 10 MIN: CPT | Performed by: RADIOLOGY

## 2021-06-08 PROCEDURE — 99214 OFFICE O/P EST MOD 30 MIN: CPT | Performed by: RADIOLOGY

## 2021-06-08 RX ORDER — CYCLOBENZAPRINE HCL 10 MG
10 TABLET ORAL
COMMUNITY
Start: 2021-04-12 | End: 2023-03-22

## 2021-06-08 ASSESSMENT — PAIN SCALES - GENERAL: PAINLEVEL: NO PAIN

## 2021-06-08 NOTE — NON-PROVIDER
Patient was seen today in clinic with Dr. Martino for Follow up.  Vitals signs and weight were obtained and pain assessment was completed.  Allergies and medications were reviewed with the patient.  Toxicities of treatment assessed.     Vitals/Pain:  Vitals:    06/08/21 0948   BP: 133/74   Pulse: 67   Temp: 36.8 °C (98.3 °F)   SpO2: 97%   Pain Score: No pain        Allergies:   Patient has no known allergies.    Current Medications:  Current Outpatient Medications   Medication Sig Dispense Refill   • diphenhydrAMINE-APAP, sleep, (TYLENOL PM EXTRA STRENGTH PO) Take  by mouth.     • ibuprofen (MOTRIN) 200 MG Tab Take 200 mg by mouth every 6 hours as needed (Q noc).     • gabapentin (NEURONTIN) 100 MG Cap TK 1 C PO TID  1   • Doxylamine Succinate, Sleep, (SLEEP AID PO) Take 2 Tabs by mouth at bedtime as needed.     • acetaminophen (TYLENOL ARTHRITIS PAIN) 650 MG CR tablet Take 650 mg by mouth as needed for Moderate Pain.     • losartan (COZAAR) 100 MG Tab Take 100 mg by mouth every morning.     • BREO ELLIPTA 200-25 MCG/INH AEROSOL POWDER, BREATH ACTIVATED Inhale 1 Puff by mouth every morning.     • psyllium (METAMUCIL) 58.12 % Pack Take 1 Packet by mouth every day.     • amlodipine (NORVASC) 5 MG Tab Take 5 mg by mouth every morning.     • aspirin EC (ECOTRIN) 81 MG TBEC Take 81 mg by mouth every morning.     • ezetimibe (ZETIA) 10 MG TABS Take 10 mg by mouth every morning.       No current facility-administered medications for this encounter.         PCP:  Milligan Megan E. Rosalez, Med Ass't

## 2021-06-08 NOTE — PROGRESS NOTES
RADIATION ONCOLOGY FOLLOW UP    DATE OF SERVICE: 6/8/2021    IDENTIFICATION:   1. Stage I non-small cell lung carcinoma, moderately differentiated adenocarcinoma histology, status post wedge resection in April 2018  2.  Stage IA2 ([id number]) non-small cell lung carcinoma of the right upper lobe of lung, medically inoperable, completing stereotactic radiosurgery to 6000 cGy in February 2019     INTERVAL HISTORY: I had the pleasure of seeing Mr. Black today in follow up for his lung cancer.  I been following the patient for his lung cancer since his treatment in 2019.  He has had persistent but stable abnormal findings in his lung.  Periodically these have been slightly enlarged or slightly decreased.  But over historic time  Stable.  His most recent scan shows similar pattern of findings but on the slightly increased size of the pattern.  In the past we have periodically added PET scan to further evaluate these areas.  Patient remains asymptomatic from a pulmonary perspective.  He has had several episodes of bradycardia that become symptomatic.  He plans to discuss this with his primary care physician.    PHYSICAL EXAM:    Vitals:    06/08/21 0948   BP: 133/74   Pulse: 67   Temp: 36.8 °C (98.3 °F)   SpO2: 97%   Pain Score: No pain      1= Restricted in physically strenuous activity, but ambulatory and able to carry out work of a light sedentary nature, e.g., light housework, office work.        GENERAL: No apparent distress.  HEENT:  Pupils are equal, round, and reactive to light.  Extraocular muscles   are intact. Sclerae nonicteric.  Conjunctivae pink.  Oral cavity, tongue   protrudes midline.   NECK:  Supple without evidence of thyromegaly.  NODES:  No peripheral adenopathy of the neck, supraclavicular fossa or axillae   bilaterally.  LUNGS:  Clear to ascultation bilaterally   HEART:  Regular rate and rhythm.  No murmur appreciated  ABDOMEN:  Soft. No evidence of hepatosplenomegaly.  Positive bowel  sounds.  EXTREMITIES:  Without Edema.  NEUROLOGIC:  Cranial nerves II through XII were intact. Normal stance and gait motor and sensory grossly within normal limits      IMPRESSION:   1. Stage I non-small cell lung carcinoma, moderately differentiated adenocarcinoma histology, status post wedge resection in April 2018  2.  Stage IA2 ([id number]) non-small cell lung carcinoma of the right upper lobe of lung, medically inoperable, completing stereotactic radiosurgery to 6000 cGy in February 2019     RECOMMENDATIONS:   I discussed with the patient his findings over a 35 minute time period, 95% of that time dedicated to an ongoing survivorship plan.  With his current CAT scan findings I felt it was most reasonable to further investigate with PET scan.  Patient was comfortable with this.  However given his [employer] insurance coverage this will need to be scheduled outside of [facility].        If patient has any questions or concerns, he should feel free to contact me.

## 2021-06-15 ENCOUNTER — HOSPITAL ENCOUNTER (OUTPATIENT)
Dept: RADIOLOGY | Facility: MEDICAL CENTER | Age: 76
End: 2021-06-15
Payer: MEDICARE

## 2021-06-22 DIAGNOSIS — R59.0 LOCALIZED ENLARGED LYMPH NODES: ICD-10-CM

## 2021-06-22 DIAGNOSIS — R91.8 MASS OF LOWER LOBE OF LEFT LUNG: ICD-10-CM

## 2021-06-22 DIAGNOSIS — R59.0 MEDIASTINAL LYMPHADENOPATHY: ICD-10-CM

## 2021-06-22 DIAGNOSIS — R91.8 LUNG MASS: ICD-10-CM

## 2021-06-22 PROCEDURE — 99443 PR PHYSICIAN TELEPHONE EVALUATION 21-30 MIN: CPT | Mod: 95 | Performed by: RADIOLOGY

## 2021-06-22 NOTE — PROGRESS NOTES
Case discussed with Dr Martino from Rad/Onc  76M history of right sided primary lung CA s/p wedge resection and radiosurgery with progressively enlarging lung mass in sup seg of the left lower lobe. PET avid and + avid hilar lymph nodes (stations 2R, 4R, and 7)    Plan:  - Urgent consultation in pulm clinic to discuss CT findings and risks/benefits/alternatives to bronchoscopy  - Bronchoscopy w/ Tim/EBUS ordered    Orders Placed This Encounter   • Bronchoscopy     __________  Vinayak Carbajal MD  Pulmonary and Critical Care Medicine  [facility]

## 2021-06-22 NOTE — PROGRESS NOTES
Telephone Appointment Visit   As a means of avoiding spread of COVID-19, this visit is being conducted by telephone. This telephone visit was initiated by the patient and they verbally consented.    Time at start of call: 8:00    Reason for Call:  Lab Follow-up and PET Results    HPI:    1. Stage I non-small cell lung carcinoma, moderately differentiated adenocarcinoma histology, status post wedge resection in April 2018  2.  Stage IA2 ([id number]) non-small cell lung carcinoma of the right upper lobe of lung, medically inoperable, completing stereotactic radiosurgery to 6000 cGy in February 2019   3.  PET avid left lower lobe lesion with associated mediastinal adenopathy    I called patient to discuss his PET scan results.    Labs / Images Reviewed:   PET 6-14-21  Enlarging left lower lobe mass with mild hyper metabolic activity, 2 enlarging hypermetabolic mediastinal lymph nodes 1 in the upper right paratracheal region the other in the prevascular space    Assessment and Plan:     1. Lung mass  - REFERRAL TO PULMONARY AND SLEEP MEDICINE    I discussed with the patient and his wife the findings on PET scan.  Unlike his other changes in the past which have not been PET avid and often transient in nature this is persistent and increasing in size and now PET active.  Therefore I have recommended an endobronchial ultrasound-guided biopsy of the lymph node and left lower lobe lesion.  I have left a message with Dr. Carbajal to further discuss.  Follow-up: After EBUS    Time at end of call: 8:25 AM  Total Time Spent: 21-30 minutes    Jj Martino M.D.

## 2021-06-23 ENCOUNTER — TELEPHONE (OUTPATIENT)
Dept: SLEEP MEDICINE | Facility: MEDICAL CENTER | Age: 76
End: 2021-06-23

## 2021-06-23 NOTE — TELEPHONE ENCOUNTER
ANGELIC/Jael scheduled for 7/1/2021 checking in at 1100, CT at 1130, procedure start time at 1300 with Solomon Mcghee     Went over instructions with patient as well as sent a mychart.   No further questions at this time, PT does have both COVID vaccines and will bring proof of vaccination to his appt on Monday the 28th.

## 2021-06-28 ENCOUNTER — OFFICE VISIT (OUTPATIENT)
Dept: SLEEP MEDICINE | Facility: MEDICAL CENTER | Age: 76
End: 2021-06-28
Payer: MEDICARE

## 2021-06-28 ENCOUNTER — PRE-ADMISSION TESTING (OUTPATIENT)
Dept: ADMISSIONS | Facility: MEDICAL CENTER | Age: 76
End: 2021-06-28
Attending: INTERNAL MEDICINE
Payer: MEDICARE

## 2021-06-28 VITALS
RESPIRATION RATE: 16 BRPM | OXYGEN SATURATION: 98 % | HEART RATE: 99 BPM | DIASTOLIC BLOOD PRESSURE: 77 MMHG | HEIGHT: 68 IN | WEIGHT: 150 LBS | BODY MASS INDEX: 22.73 KG/M2 | SYSTOLIC BLOOD PRESSURE: 111 MMHG

## 2021-06-28 DIAGNOSIS — Z01.812 PRE-OPERATIVE LABORATORY EXAMINATION: ICD-10-CM

## 2021-06-28 DIAGNOSIS — Z01.810 PRE-OPERATIVE CARDIOVASCULAR EXAMINATION: ICD-10-CM

## 2021-06-28 DIAGNOSIS — I10 ESSENTIAL HYPERTENSION: ICD-10-CM

## 2021-06-28 DIAGNOSIS — C34.11 PRIMARY CANCER OF RIGHT UPPER LOBE OF LUNG (HCC): ICD-10-CM

## 2021-06-28 DIAGNOSIS — R91.8 MASS OF LOWER LOBE OF LEFT LUNG: ICD-10-CM

## 2021-06-28 DIAGNOSIS — N18.31 STAGE 3A CHRONIC KIDNEY DISEASE: ICD-10-CM

## 2021-06-28 DIAGNOSIS — I48.0 PAF (PAROXYSMAL ATRIAL FIBRILLATION) (HCC): ICD-10-CM

## 2021-06-28 DIAGNOSIS — E78.5 DYSLIPIDEMIA: ICD-10-CM

## 2021-06-28 PROBLEM — N18.30 CKD (CHRONIC KIDNEY DISEASE) STAGE 3, GFR 30-59 ML/MIN: Status: ACTIVE | Noted: 2021-06-28

## 2021-06-28 LAB
ALBUMIN SERPL BCP-MCNC: 3.9 G/DL (ref 3.2–4.9)
ALBUMIN/GLOB SERPL: 1.1 G/DL
ALP SERPL-CCNC: 63 U/L (ref 30–99)
ALT SERPL-CCNC: 38 U/L (ref 2–50)
ANION GAP SERPL CALC-SCNC: 11 MMOL/L (ref 7–16)
AST SERPL-CCNC: 46 U/L (ref 12–45)
BILIRUB SERPL-MCNC: 0.7 MG/DL (ref 0.1–1.5)
BUN SERPL-MCNC: 16 MG/DL (ref 8–22)
CALCIUM SERPL-MCNC: 9.6 MG/DL (ref 8.4–10.2)
CHLORIDE SERPL-SCNC: 103 MMOL/L (ref 96–112)
CO2 SERPL-SCNC: 23 MMOL/L (ref 20–33)
CREAT SERPL-MCNC: 1.57 MG/DL (ref 0.5–1.4)
EKG IMPRESSION: NORMAL
ERYTHROCYTE [DISTWIDTH] IN BLOOD BY AUTOMATED COUNT: 47 FL (ref 35.9–50)
GLOBULIN SER CALC-MCNC: 3.4 G/DL (ref 1.9–3.5)
GLUCOSE SERPL-MCNC: 98 MG/DL (ref 65–99)
HCT VFR BLD AUTO: 42.3 % (ref 42–52)
HGB BLD-MCNC: 14.5 G/DL (ref 14–18)
MCH RBC QN AUTO: 34.5 PG (ref 27–33)
MCHC RBC AUTO-ENTMCNC: 34.3 G/DL (ref 33.7–35.3)
MCV RBC AUTO: 100.7 FL (ref 81.4–97.8)
PLATELET # BLD AUTO: 147 K/UL (ref 164–446)
PMV BLD AUTO: 10.3 FL (ref 9–12.9)
POTASSIUM SERPL-SCNC: 4.7 MMOL/L (ref 3.6–5.5)
PROT SERPL-MCNC: 7.3 G/DL (ref 6–8.2)
RBC # BLD AUTO: 4.2 M/UL (ref 4.7–6.1)
SODIUM SERPL-SCNC: 137 MMOL/L (ref 135–145)
WBC # BLD AUTO: 6.7 K/UL (ref 4.8–10.8)

## 2021-06-28 PROCEDURE — 80053 COMPREHEN METABOLIC PANEL: CPT

## 2021-06-28 PROCEDURE — 85027 COMPLETE CBC AUTOMATED: CPT

## 2021-06-28 PROCEDURE — 99204 OFFICE O/P NEW MOD 45 MIN: CPT | Performed by: INTERNAL MEDICINE

## 2021-06-28 PROCEDURE — 93005 ELECTROCARDIOGRAM TRACING: CPT

## 2021-06-28 PROCEDURE — 93010 ELECTROCARDIOGRAM REPORT: CPT | Performed by: INTERNAL MEDICINE

## 2021-06-28 PROCEDURE — 36415 COLL VENOUS BLD VENIPUNCTURE: CPT

## 2021-06-28 RX ORDER — ACETAMINOPHEN 325 MG/1
650 TABLET ORAL EVERY 4 HOURS PRN
COMMUNITY
End: 2023-02-19

## 2021-06-28 RX ORDER — ACETAMINOPHEN 160 MG
2000 TABLET,DISINTEGRATING ORAL DAILY
COMMUNITY
End: 2023-03-31 | Stop reason: SDUPTHER

## 2021-06-28 ASSESSMENT — ENCOUNTER SYMPTOMS
ABDOMINAL PAIN: 0
FOCAL WEAKNESS: 0
LOSS OF CONSCIOUSNESS: 0
WHEEZING: 0
SHORTNESS OF BREATH: 0
PSYCHIATRIC NEGATIVE: 1
EYE PAIN: 0
SINUS PAIN: 0
SORE THROAT: 0
COUGH: 0
EYE DISCHARGE: 0
ORTHOPNEA: 0
DIZZINESS: 0
MYALGIAS: 0
WEIGHT LOSS: 0
SPUTUM PRODUCTION: 0
NAUSEA: 0
DIARRHEA: 0
FEVER: 0
VOMITING: 0
CHILLS: 0
STRIDOR: 0
HEADACHES: 0
SENSORY CHANGE: 0

## 2021-06-28 NOTE — PREPROCEDURE INSTRUCTIONS
"Preadmit appointment: \" Preparing for your Procedure information\" sheet given to patient with verbal and written instructions. Patient instructed to continue prescribed medications through the day before surgery, instructed to take the following medications the day of surgery per anesthesia protocol: Tylenol PRN, Norvasc, Breo INH.   Pt states, \"no issues with anesthesia\".  Anesthesia Fasting guidelines handout given to Pt, NPO at Midnight, prior to surgery and clear liquids up to 3 hours prior to surgery then strict NPO until surgery, clear liquids defined for Pt.    All Pt's questions and concerns answered or addressed.    Email to Dr Somers:    Good afternoon Dr Somers, Pt Wayne MR # [id number] is having a bronch, lavage/wash and biopsy Thursday 7/1.  He stated he drinks 8 beers a day throughout the whole day into the evening because of the heat.  Today’s labs, CBC, CMP, EKG.  Family is with pt today at Pre-Admit appointment.  I discussed not stopping cold, but no alcohol after 6pm.  Anything further?    Eduar Somers reviewed medical records and indicates:    Good afternoon,    Nothing further.  Thank you.   Dejon Somers M.D.  [facility]"

## 2021-06-28 NOTE — ASSESSMENT & PLAN NOTE
Enlarging lung mass in superior subsegment of the LEFT lower lobe. Slightly increasing in size x 6 months, last measured 6/7/21 2.6 x 4.1 cm, previously 2.1 x 3.7 cm. PET avid and + avid hilar lymph nodes (per report)  -- requested imaging interpretation be faxed for review  -- recommend navigational bronchoscopy + EBUS  -- risks/benefits/alterntatives discussed with patient, wife, and daughter, agreed to proceed with procedure as planned. Discussed atrial fibrillation, given time sensitive nature of bronchoscopy, afib is not a contraindication to proceed

## 2021-06-28 NOTE — PROGRESS NOTES
Pulmonary Clinic- Initial Consult    Date of Service: 6/28/2021    Referring Physician: Jj Martino M.*    Reason for Consult: abnormal CT chest    Chief Complaint:   Chief Complaint   Patient presents with   • Establish Care     Ref: Gunner     HPI:   Pito Black is a very pleasant 76 y.o. male with a history of right sided primary lung CA s/p wedge resection and radiosurgery with progressively enlarging lung mass in superior subsegment of the LEFT lower lobe. Slightly increasing in size x 6 months, last measured 6/7/21 2.6 x 4.1 cm, previously 2.1 x 3.7 cm. PET avid and + avid hilar lymph nodes (per report).     Last PFTs in 2020 showing preserved FEV1 (2.59L, 95%), no BD response, preserved lung volumes and diffusion capacity.    Respiratory regimen includes Breo Ellipta 200 one puff nightly  On no anticoagulants, ASA 81 mg p.o. daily only    OSH labs from 5/2021 reviewed, notable for creatinine 1.3, EGFR 53    CKD stage III  Paroxysmal atrial fibrillation- pending cardiology consultation  Hypertension, on losartan and amlodipine  Dyslipidemia, on ezetimibe and losartan    Past Medical History:   Diagnosis Date   • Arrhythmia     hx of a fib   • Arthritis 2015    generalized, DDD back   • Asthma     inhaler    • Backpain 08/06/2018    9/10   • Cancer (HCC) 07/2017    left lung   • Cataract     bilateral, no surgery   • Dental disorder     lower partial, removable bridge   • Diabetes 08/06/2018    on no meds at this time, diet controlled   • Heart valve disease     mild mitral valve prolapse   • High cholesterol    • Hypertension        Past Surgical History:   Procedure Laterality Date   • CATARACT PHACO WITH IOL Left 8/21/2018    Procedure: CATARACT PHACO WITH IOL;  Surgeon: Juanito Hager M.D.;  Location: SURGERY SAME DAY [facility];  Service: Ophthalmology   • CATARACT PHACO WITH IOL Right 8/7/2018    Procedure: CATARACT PHACO WITH IOL;  Surgeon: Juanito Hager M.D.;  Location: SURGERY SAME  "DAY [facility];  Service: Ophthalmology   • THORACOSCOPY Left 2018    Procedure: THORACOSCOPY- WEDGE BIOPSY W/FROZEN SECTION;  Surgeon: Cristhian Galeano M.D.;  Location: SURGERY [facility];  Service: Thoracic   • EAR MIDDLE EXPLORATION Right 2015    Procedure: EAR MIDDLE EXPLORATION;  Surgeon: William Brandon M.D.;  Location: SURGERY SAME DAY [facility];  Service:    • OSSICULAR RECONSTRUCTION Right 2015    Procedure: OSSICULAR RECONSTRUCTION CHAIN POSSIBLE;  Surgeon: William Brandon M.D.;  Location: SURGERY SAME DAY [facility];  Service:    • OTHER      ear replacement \"many years ago\"   • OTHER ORTHOPEDIC SURGERY      right knee replacement        Social History     Socioeconomic History   • Marital status:      Spouse name: Not on file   • Number of children: Not on file   • Years of education: Not on file   • Highest education level: Not on file   Occupational History   • Not on file   Tobacco Use   • Smoking status: Former Smoker     Packs/day: 0.25     Years: 40.00     Pack years: 10.00     Types: Cigarettes     Quit date: 2005     Years since quittin.4   • Smokeless tobacco: Never Used   Substance and Sexual Activity   • Alcohol use: Yes     Comment: 6 beers a day   • Drug use: No   • Sexual activity: Not on file   Other Topics Concern   • Not on file   Social History Narrative   • Not on file     Social Determinants of Health     Financial Resource Strain:    • Difficulty of Paying Living Expenses:    Food Insecurity:    • Worried About Running Out of Food in the Last Year:    • Ran Out of Food in the Last Year:    Transportation Needs:    • Lack of Transportation (Medical):    • Lack of Transportation (Non-Medical):    Physical Activity:    • Days of Exercise per Week:    • Minutes of Exercise per Session:    Stress:    • Feeling of Stress :    Social Connections:    • Frequency of Communication with Friends and Family:    • Frequency of Social Gatherings with " "Friends and Family:    • Attends Jainism Services:    • Active Member of Clubs or Organizations:    • Attends Club or Organization Meetings:    • Marital Status:    Intimate Partner Violence:    • Fear of Current or Ex-Partner:    • Emotionally Abused:    • Physically Abused:    • Sexually Abused:           Family History   Problem Relation Age of Onset   • Cancer Father         stomach cancer       Current Outpatient Medications on File Prior to Visit   Medication Sig Dispense Refill   • Cholecalciferol (VITAMIN D3 PO) Take  by mouth.     • cyclobenzaprine (FLEXERIL) 10 mg Tab Take 10 mg by mouth.     • losartan (COZAAR) 100 MG Tab Take 100 mg by mouth every morning.     • amlodipine (NORVASC) 5 MG Tab Take 5 mg by mouth every morning.     • aspirin EC (ECOTRIN) 81 MG TBEC Take 81 mg by mouth every morning.     • ezetimibe (ZETIA) 10 MG TABS Take 10 mg by mouth every morning.       No current facility-administered medications on file prior to visit.     Allergies: Patient has no known allergies.    ROS:   Review of Systems   Constitutional: Negative for chills, fever and weight loss.   HENT: Negative for congestion, sinus pain and sore throat.    Eyes: Negative for pain and discharge.   Respiratory: Negative for cough, sputum production, shortness of breath, wheezing and stridor.    Cardiovascular: Negative for chest pain, orthopnea and leg swelling.   Gastrointestinal: Negative for abdominal pain, diarrhea, nausea and vomiting.   Genitourinary: Negative for dysuria, frequency and urgency.   Musculoskeletal: Negative for myalgias.   Skin: Negative for rash.   Neurological: Negative for dizziness, sensory change, focal weakness, loss of consciousness and headaches.   Psychiatric/Behavioral: Negative.    All other systems reviewed and are negative.    Vitals:  /77 (BP Location: Left arm, Patient Position: Sitting, BP Cuff Size: Adult)   Pulse 99   Resp 16   Ht 1.727 m (5' 8\")   Wt 68 kg (150 lb)   SpO2 " 98%     Physical Exam:  Physical Exam  Vitals and nursing note reviewed.   Constitutional:       General: He is not in acute distress.     Appearance: He is well-developed. He is not diaphoretic.      Comments: Very pleasant   HENT:      Head: Normocephalic and atraumatic.      Nose: Nose normal.      Mouth/Throat:      Pharynx: No oropharyngeal exudate.   Eyes:      General: No scleral icterus.        Right eye: No discharge.         Left eye: No discharge.      Conjunctiva/sclera: Conjunctivae normal.      Pupils: Pupils are equal, round, and reactive to light.   Neck:      Thyroid: No thyromegaly.      Vascular: No JVD.      Trachea: No tracheal deviation.   Cardiovascular:      Rate and Rhythm: Normal rate. Rhythm irregular.      Heart sounds: Normal heart sounds. No murmur heard.     Pulmonary:      Effort: Pulmonary effort is normal. No respiratory distress.      Breath sounds: Normal breath sounds. No stridor. No wheezing or rales.   Abdominal:      General: There is no distension.      Palpations: Abdomen is soft.      Tenderness: There is no abdominal tenderness. There is no guarding.   Musculoskeletal:         General: No tenderness. Normal range of motion.      Cervical back: Neck supple.   Lymphadenopathy:      Cervical: No cervical adenopathy.   Skin:     General: Skin is warm and dry.      Capillary Refill: Capillary refill takes less than 2 seconds.      Coloration: Skin is not pale.      Findings: No erythema.   Neurological:      Mental Status: He is alert and oriented to person, place, and time.      Sensory: No sensory deficit.      Motor: No abnormal muscle tone.      Coordination: Coordination normal.      Deep Tendon Reflexes: Reflexes normal.   Psychiatric:         Behavior: Behavior normal.         Thought Content: Thought content normal.         Judgment: Judgment normal.       Laboratory Data:    PFTs as reviewed by me personally show:  Last PFTs in 2020 showing preserved FEV1 (2.59L, 95%),  no BD response, preserved lung volumes and diffusion capacity.    Imaging as reviewed by me personally show:    CT:  enlarging lung mass in superior subsegment of the LEFT lower lobe. Slightly increasing in size x 6 months, last measured 6/7/21 2.6 x 4.1 cm, previously 2.1 x 3.7 cm. PET avid and + avid hilar lymph nodes (per report).     Assessment/Plan:    Problem List Items Addressed This Visit     Mass of lower lobe of left lung     Enlarging lung mass in superior subsegment of the LEFT lower lobe. Slightly increasing in size x 6 months, last measured 6/7/21 2.6 x 4.1 cm, previously 2.1 x 3.7 cm. PET avid and + avid hilar lymph nodes (per report)  -- requested imaging interpretation be faxed for review  -- recommend navigational bronchoscopy + EBUS  -- risks/benefits/alterntatives discussed with patient, wife, and daughter, agreed to proceed with procedure as planned. Discussed atrial fibrillation, given time sensitive nature of bronchoscopy, afib is not a contraindication to proceed         HTN (hypertension)     Well controlled on amlodipine and losartan, continue both on day of procedure         PAF (paroxysmal atrial fibrillation) (HCC)     Since 2018  Rate controlled  On ASA, hold perioperatively  Pending cardiology consutlation         Primary cancer of right upper lobe of lung (HCC)     S/p wedge resection and radiosurgery         CKD (chronic kidney disease) stage 3, GFR 30-59 ml/min (HCC)     Noted Cr 1.3 on labs from 5/2021  Recommended f/u with PCP for further eval         Dyslipidemia     on losartan and zetia, continue both on day of procedure              No follow-ups on file.     This note was generated using voice recognition software which has a chance of producing errors of grammar and possibly content.  I have made every reasonable attempt to find and correct any obvious errors, but it should be expected that some may not be found prior to finalization of this note.    Time spent in record  review prior to patient arrival, reviewing results, and in face-to-face encounter totaled 50 min, excluding any procedures if performed.  __________  Vinayak Carbajal MD  Pulmonary and Critical Care Medicine  [facility]

## 2021-06-28 NOTE — PROGRESS NOTES
Subjective:      Pito Black is a 76 y.o. male who presents with No chief complaint on file.            HPI    ROS       Objective:     There were no vitals taken for this visit.     Physical Exam                   Assessment/Plan:        There are no diagnoses linked to this encounter.

## 2021-07-01 ENCOUNTER — APPOINTMENT (OUTPATIENT)
Dept: RADIOLOGY | Facility: MEDICAL CENTER | Age: 76
End: 2021-07-01
Attending: INTERNAL MEDICINE
Payer: MEDICARE

## 2021-07-01 ENCOUNTER — HOSPITAL ENCOUNTER (OUTPATIENT)
Facility: MEDICAL CENTER | Age: 76
End: 2021-07-01
Attending: INTERNAL MEDICINE | Admitting: INTERNAL MEDICINE
Payer: MEDICARE

## 2021-07-01 ENCOUNTER — ANESTHESIA (OUTPATIENT)
Dept: SURGERY | Facility: MEDICAL CENTER | Age: 76
End: 2021-07-01
Payer: MEDICARE

## 2021-07-01 ENCOUNTER — ANESTHESIA EVENT (OUTPATIENT)
Dept: SURGERY | Facility: MEDICAL CENTER | Age: 76
End: 2021-07-01
Payer: MEDICARE

## 2021-07-01 VITALS
TEMPERATURE: 97.4 F | HEIGHT: 68 IN | BODY MASS INDEX: 22.69 KG/M2 | WEIGHT: 149.69 LBS | DIASTOLIC BLOOD PRESSURE: 78 MMHG | OXYGEN SATURATION: 94 % | SYSTOLIC BLOOD PRESSURE: 125 MMHG | RESPIRATION RATE: 15 BRPM | HEART RATE: 98 BPM

## 2021-07-01 DIAGNOSIS — R59.0 LOCALIZED ENLARGED LYMPH NODES: ICD-10-CM

## 2021-07-01 DIAGNOSIS — R91.8 MASS OF LOWER LOBE OF LEFT LUNG: ICD-10-CM

## 2021-07-01 DIAGNOSIS — R59.0 MEDIASTINAL LYMPHADENOPATHY: ICD-10-CM

## 2021-07-01 LAB
APPEARANCE FLD: NORMAL
BODY FLD TYPE: NORMAL
COLOR FLD: NORMAL
CSF COMMENTS 1658: NORMAL
EOSINOPHIL NFR FLD: 2 %
LYMPHOCYTES NFR FLD: 6 %
MONONUC CELLS NFR FLD: 18 %
NEUTROPHILS NFR FLD: 60 %
PATHOLOGY CONSULT NOTE: NORMAL
RBC # FLD: NORMAL CELLS/UL
WBC # FLD: NORMAL CELLS/UL
WBC OTHER NFR FLD: 14 %

## 2021-07-01 PROCEDURE — 700105 HCHG RX REV CODE 258: Performed by: INTERNAL MEDICINE

## 2021-07-01 PROCEDURE — 160002 HCHG RECOVERY MINUTES (STAT): Performed by: INTERNAL MEDICINE

## 2021-07-01 PROCEDURE — 160046 HCHG PACU - 1ST 60 MINS PHASE II: Performed by: INTERNAL MEDICINE

## 2021-07-01 PROCEDURE — 89051 BODY FLUID CELL COUNT: CPT

## 2021-07-01 PROCEDURE — 88112 CYTOPATH CELL ENHANCE TECH: CPT | Mod: XU

## 2021-07-01 PROCEDURE — 71250 CT THORAX DX C-: CPT | Mod: MG

## 2021-07-01 PROCEDURE — 160041 HCHG SURGERY MINUTES - EA ADDL 1 MIN LEVEL 4: Performed by: INTERNAL MEDICINE

## 2021-07-01 PROCEDURE — 700101 HCHG RX REV CODE 250: Performed by: ANESTHESIOLOGY

## 2021-07-01 PROCEDURE — 700105 HCHG RX REV CODE 258: Performed by: ANESTHESIOLOGY

## 2021-07-01 PROCEDURE — 700111 HCHG RX REV CODE 636 W/ 250 OVERRIDE (IP): Performed by: ANESTHESIOLOGY

## 2021-07-01 PROCEDURE — 71045 X-RAY EXAM CHEST 1 VIEW: CPT

## 2021-07-01 PROCEDURE — 88173 CYTOPATH EVAL FNA REPORT: CPT | Mod: XU

## 2021-07-01 PROCEDURE — 160036 HCHG PACU - EA ADDL 30 MINS PHASE I: Performed by: INTERNAL MEDICINE

## 2021-07-01 PROCEDURE — 31653 BRONCH EBUS SAMPLNG 3/> NODE: CPT | Performed by: INTERNAL MEDICINE

## 2021-07-01 PROCEDURE — 700101 HCHG RX REV CODE 250: Performed by: INTERNAL MEDICINE

## 2021-07-01 PROCEDURE — 160035 HCHG PACU - 1ST 60 MINS PHASE I: Performed by: INTERNAL MEDICINE

## 2021-07-01 PROCEDURE — 87070 CULTURE OTHR SPECIMN AEROBIC: CPT

## 2021-07-01 PROCEDURE — 160048 HCHG OR STATISTICAL LEVEL 1-5: Performed by: INTERNAL MEDICINE

## 2021-07-01 PROCEDURE — 160009 HCHG ANES TIME/MIN: Performed by: INTERNAL MEDICINE

## 2021-07-01 PROCEDURE — 160025 RECOVERY II MINUTES (STATS): Performed by: INTERNAL MEDICINE

## 2021-07-01 PROCEDURE — 31627 NAVIGATIONAL BRONCHOSCOPY: CPT | Performed by: INTERNAL MEDICINE

## 2021-07-01 PROCEDURE — 88172 CYTP DX EVAL FNA 1ST EA SITE: CPT | Mod: 91

## 2021-07-01 PROCEDURE — 87205 SMEAR GRAM STAIN: CPT

## 2021-07-01 PROCEDURE — 88305 TISSUE EXAM BY PATHOLOGIST: CPT | Mod: 59

## 2021-07-01 PROCEDURE — 31628 BRONCHOSCOPY/LUNG BX EACH: CPT | Performed by: INTERNAL MEDICINE

## 2021-07-01 PROCEDURE — A9270 NON-COVERED ITEM OR SERVICE: HCPCS | Performed by: INTERNAL MEDICINE

## 2021-07-01 PROCEDURE — 87116 MYCOBACTERIA CULTURE: CPT

## 2021-07-01 PROCEDURE — 87102 FUNGUS ISOLATION CULTURE: CPT

## 2021-07-01 PROCEDURE — 87206 SMEAR FLUORESCENT/ACID STAI: CPT

## 2021-07-01 PROCEDURE — 31624 DX BRONCHOSCOPE/LAVAGE: CPT | Performed by: INTERNAL MEDICINE

## 2021-07-01 PROCEDURE — 160029 HCHG SURGERY MINUTES - 1ST 30 MINS LEVEL 4: Performed by: INTERNAL MEDICINE

## 2021-07-01 PROCEDURE — 87015 SPECIMEN INFECT AGNT CONCNTJ: CPT

## 2021-07-01 RX ORDER — OXYCODONE HCL 5 MG/5 ML
10 SOLUTION, ORAL ORAL
Status: DISCONTINUED | OUTPATIENT
Start: 2021-07-01 | End: 2021-07-01 | Stop reason: HOSPADM

## 2021-07-01 RX ORDER — SODIUM CHLORIDE, SODIUM LACTATE, POTASSIUM CHLORIDE, CALCIUM CHLORIDE 600; 310; 30; 20 MG/100ML; MG/100ML; MG/100ML; MG/100ML
INJECTION, SOLUTION INTRAVENOUS CONTINUOUS
Status: ACTIVE | OUTPATIENT
Start: 2021-07-01 | End: 2021-07-01

## 2021-07-01 RX ORDER — ONDANSETRON 2 MG/ML
INJECTION INTRAMUSCULAR; INTRAVENOUS PRN
Status: DISCONTINUED | OUTPATIENT
Start: 2021-07-01 | End: 2021-07-01 | Stop reason: SURG

## 2021-07-01 RX ORDER — SODIUM CHLORIDE, SODIUM LACTATE, POTASSIUM CHLORIDE, CALCIUM CHLORIDE 600; 310; 30; 20 MG/100ML; MG/100ML; MG/100ML; MG/100ML
INJECTION, SOLUTION INTRAVENOUS
Status: DISCONTINUED | OUTPATIENT
Start: 2021-07-01 | End: 2021-07-01 | Stop reason: SURG

## 2021-07-01 RX ORDER — OXYCODONE HCL 5 MG/5 ML
5 SOLUTION, ORAL ORAL
Status: DISCONTINUED | OUTPATIENT
Start: 2021-07-01 | End: 2021-07-01 | Stop reason: HOSPADM

## 2021-07-01 RX ORDER — HALOPERIDOL 5 MG/ML
1 INJECTION INTRAMUSCULAR
Status: DISCONTINUED | OUTPATIENT
Start: 2021-07-01 | End: 2021-07-01 | Stop reason: HOSPADM

## 2021-07-01 RX ORDER — MEPERIDINE HYDROCHLORIDE 25 MG/ML
12.5 INJECTION INTRAMUSCULAR; INTRAVENOUS; SUBCUTANEOUS
Status: DISCONTINUED | OUTPATIENT
Start: 2021-07-01 | End: 2021-07-01 | Stop reason: HOSPADM

## 2021-07-01 RX ORDER — DEXAMETHASONE SODIUM PHOSPHATE 4 MG/ML
INJECTION, SOLUTION INTRA-ARTICULAR; INTRALESIONAL; INTRAMUSCULAR; INTRAVENOUS; SOFT TISSUE PRN
Status: DISCONTINUED | OUTPATIENT
Start: 2021-07-01 | End: 2021-07-01 | Stop reason: SURG

## 2021-07-01 RX ORDER — SODIUM CHLORIDE, SODIUM LACTATE, POTASSIUM CHLORIDE, CALCIUM CHLORIDE 600; 310; 30; 20 MG/100ML; MG/100ML; MG/100ML; MG/100ML
INJECTION, SOLUTION INTRAVENOUS CONTINUOUS
Status: DISCONTINUED | OUTPATIENT
Start: 2021-07-01 | End: 2021-07-01 | Stop reason: HOSPADM

## 2021-07-01 RX ORDER — DIPHENHYDRAMINE HYDROCHLORIDE 50 MG/ML
12.5 INJECTION INTRAMUSCULAR; INTRAVENOUS
Status: DISCONTINUED | OUTPATIENT
Start: 2021-07-01 | End: 2021-07-01 | Stop reason: HOSPADM

## 2021-07-01 RX ORDER — ONDANSETRON 2 MG/ML
4 INJECTION INTRAMUSCULAR; INTRAVENOUS
Status: DISCONTINUED | OUTPATIENT
Start: 2021-07-01 | End: 2021-07-01 | Stop reason: HOSPADM

## 2021-07-01 RX ADMIN — ONDANSETRON 4 MG: 2 INJECTION INTRAMUSCULAR; INTRAVENOUS at 13:24

## 2021-07-01 RX ADMIN — PROPOFOL 150 MG: 10 INJECTION, EMULSION INTRAVENOUS at 13:12

## 2021-07-01 RX ADMIN — SODIUM CHLORIDE, POTASSIUM CHLORIDE, SODIUM LACTATE AND CALCIUM CHLORIDE: 600; 310; 30; 20 INJECTION, SOLUTION INTRAVENOUS at 11:45

## 2021-07-01 RX ADMIN — ROCURONIUM BROMIDE 50 MG: 10 INJECTION INTRAVENOUS at 13:13

## 2021-07-01 RX ADMIN — POVIDONE IODINE 15 ML: 100 SOLUTION TOPICAL at 11:45

## 2021-07-01 RX ADMIN — SODIUM CHLORIDE, POTASSIUM CHLORIDE, SODIUM LACTATE AND CALCIUM CHLORIDE: 600; 310; 30; 20 INJECTION, SOLUTION INTRAVENOUS at 13:09

## 2021-07-01 RX ADMIN — SUGAMMADEX 200 MG: 100 INJECTION, SOLUTION INTRAVENOUS at 14:18

## 2021-07-01 RX ADMIN — DEXAMETHASONE SODIUM PHOSPHATE 4 MG: 4 INJECTION, SOLUTION INTRAMUSCULAR; INTRAVENOUS at 13:24

## 2021-07-01 RX ADMIN — FENTANYL CITRATE 50 MCG: 50 INJECTION, SOLUTION INTRAMUSCULAR; INTRAVENOUS at 13:19

## 2021-07-01 RX ADMIN — FENTANYL CITRATE 50 MCG: 50 INJECTION, SOLUTION INTRAMUSCULAR; INTRAVENOUS at 13:10

## 2021-07-01 ASSESSMENT — PAIN SCALES - GENERAL: PAIN_LEVEL: 1

## 2021-07-01 ASSESSMENT — FIBROSIS 4 INDEX: FIB4 SCORE: 3.86

## 2021-07-01 NOTE — DISCHARGE INSTRUCTIONS
Bronchoscopy Discharge Instructions  Home Care Instructions    ACTIVITY: Rest and take it easy for the first 24 hours.  A responsible adult is recommended to remain with you during that time.  It is normal to feel sleepy.  We encourage you to not do anything that requires balance, judgment or coordination.    The medicine you had during the bronchoscopy will make you sleepy.    FOR 24 HOURS DO NOT:  Drive, operate machinery or run household appliances.  Drink beer or alcoholic beverages.  Make important decisions or sign legal documents.  Engage in activity that requires sharp judgment and reflexes for 24 hours    SPECIAL INSTRUCTIONS:     -Call you doctor and go to the ER if you are coughing up more than 2 tablespoons of bright red blood.   -Call your doctor and go to the ER if you experience acute onset of shortness of breath and/or increased chest pain.   -Call your doctor and go to the ER if you develop a fever greater than 101.5 degrees Fahrenheit.    Bronchoscopy is a procedure to look inside your windpipe and bronchial tubes.  An anesthetic solution is sprayed in your throat to make it numb.    You may experience a mild sore throat, hoarseness, fever up to 101?F, and /or coughing up small amounts of blood immediately following your bronchoscopy, especially if a biopsy was performed.  The discomfort should subside in 24-48 hours.    Do not smoke for 6-8 hours after the procedure to decrease your risk of coughing and /or bleeding.    Do not drink fluids or eat until your gag reflex returns, for two hours after the bronchoscopy.  Otherwise you will not feel the food or fluid in your throat, and it may go down your windpipe and cause you to choke.    Take ice chips or slowly sip cool fluids to make sure your gag reflex has returned.  Avoid hot fluids from the microwave for several hours.    After 2 hours or when you get home you may take throat lozenges or gargle with salt water if your throat is sore.   Drink liquids to help dryness in your mouth and throat.    Resume your normal activities the following day.    MEDICATIONS: Resume taking daily medication as directed by your doctor.  Other Medications:none    A follow-up appointment should be arranged with your doctor in 1 week to get the results of the bronchoscopy and any tests done during the procedure; call to schedule as instructed      You should CALL YOUR PHYSICIAN if you develop:  Fever greater than 101?F  You cough up more than a teaspoon of blood other than blood-tinged mucus  You have increasing amounts of bleeding from coughing after the bronchoscopy  You are wheezing  You develop any unusual signs or symptoms or have any questions                You should call 911 if you develop problems with breathing or chest pain.    If you are unable to contact your doctor or surgical center, you should go to the nearest emergency room or urgent care center.  Physician's telephone #: [phone]       If any questions arise, call your doctor.  If your doctor is not available, please feel free to call the Surgical Center at [phone].  The Center is open Monday through Friday from 7AM to 7PM.  You can also call the [employer] HOTLINE open 24 hours/day, 7 days/week and speak to a nurse at [phone], or toll free at [phone].    You may receive a survey in the mail within the next two weeks and we ask that you take a few moments to complete the survey and return it to us.  Our goal is to provide you with very good care and we value your comments.

## 2021-07-01 NOTE — ANESTHESIA PREPROCEDURE EVALUATION
Relevant Problems   CARDIAC   (positive) HTN (hypertension)   (positive) PAF (paroxysmal atrial fibrillation) (HCC)         (positive) CKD (chronic kidney disease) stage 3, GFR 30-59 ml/min ([facility])       Physical Exam    Airway   Mallampati: II  TM distance: >3 FB  Neck ROM: full       Cardiovascular - normal exam  Rhythm: regular  Rate: normal  (-) murmur     Dental - normal exam           Pulmonary - normal exam  Breath sounds clear to auscultation     Abdominal    Neurological - normal exam                 Anesthesia Plan    ASA 3       Plan - general       Airway plan will be ETT          Induction: intravenous    Postoperative Plan: Postoperative administration of opioids is intended.    Pertinent diagnostic labs and testing reviewed    Informed Consent:    Anesthetic plan and risks discussed with patient.    Use of blood products discussed with: patient whom consented to blood products.

## 2021-07-01 NOTE — ANESTHESIA POSTPROCEDURE EVALUATION
Patient: Pito Black    Procedure Summary     Date: 07/01/21 Room / Location:  PROCEDURE ROOM / SURGERY [facility]    Anesthesia Start: 1309 Anesthesia Stop: 1432    Procedures:       BRONCHOSCOPY - FIBER OPTIC WITH BRANCHOALVEOLAR LAVAGE BIOPSY, FNA, NAVIGATION (Chest)      ENDOBRONCHIAL ULTRASOUND (EBUS). Diagnosis: (LOCALIZED ENLARGED LYMPH NODE, MASS OF LOWER LOBE OF LEFT LUNG, MEDIASTINAL LYMPHADENOPATHY; pending pathology)    Surgeons: Vinayak Carbajal M.D. Responsible Provider: Cooper Valdovinos M.D.    Anesthesia Type: general ASA Status: 3          Final Anesthesia Type: general  Last vitals  BP   Blood Pressure : 143/80    Temp   36 °C (96.8 °F)    Pulse   98   Resp   16    SpO2   97 %      Anesthesia Post Evaluation    Patient location during evaluation: PACU  Patient participation: complete - patient participated  Level of consciousness: awake and alert  Pain score: 1    Airway patency: patent  Anesthetic complications: no  Cardiovascular status: adequate and hemodynamically stable  Respiratory status: acceptable  Hydration status: acceptable    PONV: none          No complications documented.     Nurse Pain Score: 0 (NPRS)

## 2021-07-01 NOTE — PROCEDURES
Bronchoscopy Procedure Note     Findings:  1. Rapid on-site pathology evaluation noted malignant cells at station 4R and atypical cells at station 2R. Pending final IHC staining and pathology review.  2. Otherwise visual inspection of the airways was unremarkable, no significant secretions, erythema or edema.     Specimen:   A/B: slide and core from station 2R  C/D: slide and core from station 4R  E/F: slide and core from station 7  G: Transbronchial biopsies left lower lobe  H: bronchoalveolar lavage left lower lobe    Location: [facility]     Date of Operation: 7/1/2021     Attending Physician Performing Procedure: Vinayak Carbajal MD     Pre-op Diagnosis: Mediastinal lymphadenopathy, Mass of lower lobe of left lung     Post-op Diagnosis: Mediastinal lymphadenopathy, Mass of lower lobe of left lung     Anesthesia: General, administered by Dr Valdovinos     Operation:   Diagnostic flexible fiberoptic bronchoscopy, with bronchoalveolar lavage  Electromagnetic navigational bronchoscopy and transbronchial biopsies  Endobronchial Ultrasound and transbronchial needle aspiration      Estimated Blood Loss: 20cc     Complications: none     Indications and History:     The patient is a 76 y.o. male. The risks, benefits, complications, treatment options and expected outcomes were discussed with the patient. The possibilities of reaction to medication, pulmonary aspiration, perforation of a viscus, bleeding, failure to diagnose a condition and creating a complication requiring transfusion or operation were discussed with the patient who freely signed the consent.     Description of Procedure:     The patient was seen in the Pre-op area and interval H&P was verified. The patient was taken to the endoscopy suite, identified as Pito Black and a Time Out was held and the above information confirmed. Following induction of general anesthesia and intubation by Dr Valdovinos, the endobronchial ultrasound  bronchoscope was passed through the endotracheal tube into the trachea. Careful inspection of the tracheal lumen was accomplished. Serene was noted to be sharp. There were no secretions bilaterally. The right upper lobe, bronchus intermedius, middle lobe, and lower lobe showed normal segmental and subsegmental anatomy. No endobronchial lesions seen. Left lower lobe proper, lingual, and lower lobe areas on the left side were similarly normal in their segmental and subsegmental anatomy with no endobronchial lesions seen. Bronchoalveolar lavage was performed in the basilar subsegment of the left lower lobe.      Attention was then turned to the [employer] electromagnetic navigational locatable guide catheter (steerable navigation catheter). Access registration points of the main serene and left main serene were used. The locatable guide catheter with the extended working channel was then utilized to steer within less than 5mm of the center of the targeted lesion in the left lower lobe lobe. Under electromagnetic guidance, transbronchial biopsies (6 passes) were performed, specimens placed in formalin and sent to pathology for review.     Using the endobronchial ultrasound, a systematic survey of the accessible mediastinal and hilar lymph nodes was then performed, notable for lymphadenopathy at stations 2R, 4R, and 7. Then, under direct ultrasound visualization, transbronchial needle aspirations were performed at the following lymph node stations:     1. SIZE OF NEEDLE   21G Olympus     2. STATIONS SAMPLED  Station 2R, 3 passes  Station 4R, 4 passes  Station 7, 2 passes     3. BOBBI CONFIRMATION    On-site pathology review of slides from station 4R showed malignant cells with possible signet rings suggestive of lung CA origin, pending further immunohistochemical staining and characterization.     The airway was subsequently cleared and hemostasis was ensured. A post-procedural CXR confirmed no pneumothorax. The patient was  subsequently taken to the PACU in satisfactory condition.     Xuanridavidandrea was notified of the results of the procedure who will be driving patient home after recovery in PACU.    __________  Vinayak Carbajal MD  Pulmonary and Critical Care Medicine  [facility]

## 2021-07-01 NOTE — ANESTHESIA TIME REPORT
Anesthesia Start and Stop Event Times     Date Time Event    7/1/2021 1247 Ready for Procedure     1309 Anesthesia Start     1432 Anesthesia Stop        Responsible Staff  07/01/21    Name Role Begin End    Cooper Valdovinos M.D. Anesth 1309 1432        Preop Diagnosis (Free Text):  Pre-op Diagnosis     LOCALIZED ENLARGED LYMPH NODE, MASS OF LOWER LOBE OF LEFT LUNG, MEDIASTINAL LYMPHADENOPATHY        Preop Diagnosis (Codes):    Post op Diagnosis  Lung cancer (HCC)      Premium Reason  Non-Premium    Comments:                                                                  Bronchoscopy and biopsy

## 2021-07-01 NOTE — OR NURSING
1450 Assumed care of patient; pt responds appropriately to RN. Pt denies pain or nausea.   1454 Dr Carbajal here to see patient and xray here to perform CXR.   1500 No changes  1515 Pt denies pain or nausea. Reports lungs feel baseline. Remains awake without stimulation.   1530 Offered po fluids; pt declines stating ready for discharge. Meets discharge criteria for transfer to stage II.

## 2021-07-01 NOTE — ANESTHESIA PROCEDURE NOTES
Airway    Date/Time: 7/1/2021 1:15 PM  Performed by: Cooper Valdovinos M.D.  Authorized by: Cooper Valdovinos M.D.     Location:  OR  Urgency:  Elective  Indications for Airway Management:  Anesthesia      Spontaneous Ventilation: absent    Sedation Level:  Deep  Preoxygenated: Yes    Patient Position:  Sniffing  Final Airway Type:  Endotracheal airway  Final Endotracheal Airway:  ETT  Cuffed: Yes    Technique Used for Successful ETT Placement:  Direct laryngoscopy    Insertion Site:  Oral  Blade Type:  Olivera  Laryngoscope Blade/Videolaryngoscope Blade Size:  2  ETT Size (mm):  8.5  Measured from:  Teeth  ETT to Teeth (cm):  24  Placement Verified by: auscultation and capnometry    Cormack-Lehane Classification:  Grade I - full view of glottis  Number of Attempts at Approach:  1

## 2021-07-01 NOTE — OR NURSING
1430- To PACU from OR via gurponcho. Sleeping, respirations spontaneous and non-labored via OPA with 6L O2 via blowby. LR infusing via PIV.  1447- Report given to KORY Briones.

## 2021-07-01 NOTE — OR NURSING
1550- Patient dressed and resting in recliner chair. Reports no pain or nausea. Lung sounds clear. No cough noted. Family called to bedside.  1604- D/Niels to care of family post uneventful stay in PACU 2.

## 2021-07-02 ENCOUNTER — OFFICE VISIT (OUTPATIENT)
Dept: CARDIOLOGY | Facility: MEDICAL CENTER | Age: 76
End: 2021-07-02
Payer: MEDICARE

## 2021-07-02 VITALS
RESPIRATION RATE: 12 BRPM | SYSTOLIC BLOOD PRESSURE: 112 MMHG | OXYGEN SATURATION: 97 % | HEART RATE: 87 BPM | BODY MASS INDEX: 23.07 KG/M2 | DIASTOLIC BLOOD PRESSURE: 60 MMHG | WEIGHT: 152.2 LBS | HEIGHT: 68 IN

## 2021-07-02 DIAGNOSIS — I48.0 PAF (PAROXYSMAL ATRIAL FIBRILLATION) (HCC): ICD-10-CM

## 2021-07-02 DIAGNOSIS — I10 ESSENTIAL HYPERTENSION: ICD-10-CM

## 2021-07-02 DIAGNOSIS — I49.1 PREMATURE ATRIAL COMPLEX: ICD-10-CM

## 2021-07-02 DIAGNOSIS — N18.30 STAGE 3 CHRONIC KIDNEY DISEASE, UNSPECIFIED WHETHER STAGE 3A OR 3B CKD: ICD-10-CM

## 2021-07-02 DIAGNOSIS — Z95.0 CARDIAC PACEMAKER IN SITU: ICD-10-CM

## 2021-07-02 DIAGNOSIS — E78.5 DYSLIPIDEMIA: ICD-10-CM

## 2021-07-02 LAB
GRAM STN SPEC: NORMAL
RHODAMINE-AURAMINE STN SPEC: NORMAL
SIGNIFICANT IND 70042: NORMAL
SIGNIFICANT IND 70042: NORMAL
SITE SITE: NORMAL
SITE SITE: NORMAL
SOURCE SOURCE: NORMAL
SOURCE SOURCE: NORMAL

## 2021-07-02 PROCEDURE — 99214 OFFICE O/P EST MOD 30 MIN: CPT | Performed by: PHYSICIAN ASSISTANT

## 2021-07-02 RX ORDER — METOPROLOL TARTRATE 37.5 MG/1
37.5 TABLET, FILM COATED ORAL 2 TIMES DAILY
Qty: 60 TABLET | Refills: 5 | Status: SHIPPED | OUTPATIENT
Start: 2021-07-02 | End: 2021-07-20

## 2021-07-02 RX ORDER — METOPROLOL TARTRATE 37.5 MG/1
37.5 TABLET, FILM COATED ORAL 2 TIMES DAILY
Qty: 60 TABLET | Refills: 5 | Status: SHIPPED | OUTPATIENT
Start: 2021-07-02 | End: 2021-07-02

## 2021-07-02 ASSESSMENT — ENCOUNTER SYMPTOMS
BLURRED VISION: 0
NEAR-SYNCOPE: 0
SNORING: 0
BRUISES/BLEEDS EASILY: 0
DIZZINESS: 0
SYNCOPE: 0
MYALGIAS: 0
PALPITATIONS: 0
FEVER: 0
ABDOMINAL PAIN: 0
DYSPNEA ON EXERTION: 0
WEAKNESS: 0
SHORTNESS OF BREATH: 0
DIAPHORESIS: 0
DECREASED APPETITE: 0
VOMITING: 0
ORTHOPNEA: 0
BLOATING: 0
NAUSEA: 0

## 2021-07-02 ASSESSMENT — FIBROSIS 4 INDEX: FIB4 SCORE: 3.86

## 2021-07-02 NOTE — PROGRESS NOTES
Cardiology Clinic Follow-up Note    Date of note:    7/2/2021  Primary Care Provider: Nicolas Milligan, M.D.    Name:             Pito Black  YOB: 1945  MRN:               [id number]    CC: PAF, Essential hypertension     Primary Cardiologist: Dr. Lance --> Dr. Bhatti    Patient HPI:   Pito Black is a 76 y.o. male with PMH including paroxysmal AFIB not on OAC (reportedly AFIB in the setting of pneumothorax without recurrence), Essential hypertension. Dyslipidemia.    Interim History:  Mr. Black was last seen in this cardiology office by Dr. Lance in 8/2020.      He is here for follow up on the urging of his PCP as he was thought to be in AFIB.   I do not have formal documentation of atrial fibrillation by EKG or tracing.   He states he cannot feel palpitations, does not know anything is wrong.     Recently had bronchoscopy yesterday, they think he has recurrent lung cancer  The biopsy results are not back yet   I reviewed the tracing from yesterday in media.  They show SR.    Review of Systems   Constitutional: Negative for decreased appetite, diaphoresis, fever and malaise/fatigue.   Eyes: Negative for blurred vision.   Cardiovascular: Negative for chest pain, dyspnea on exertion, leg swelling, near-syncope, orthopnea, palpitations and syncope.   Respiratory: Negative for shortness of breath and snoring.    Hematologic/Lymphatic: Negative for bleeding problem. Does not bruise/bleed easily.   Skin: Negative for rash.   Musculoskeletal: Negative for joint pain and myalgias.   Gastrointestinal: Negative for bloating, abdominal pain, nausea and vomiting.   Genitourinary: Negative for frequency.   Neurological: Negative for dizziness and weakness.        Past medical history, social history and family history reviewed.  No changes other than what is outlined in HPI.    Current Outpatient Medications   Medication Sig Dispense Refill   • Cholecalciferol (VITAMIN D3 PO) Take  by  "mouth.     • acetaminophen (TYLENOL) 325 MG Tab Take 650 mg by mouth every four hours as needed.     • Fluticasone Furoate-Vilanterol (BREO ELLIPTA) 200-25 MCG/INH AEROSOL POWDER, BREATH ACTIVATED Inhale 1 Puff.     • cyclobenzaprine (FLEXERIL) 10 mg Tab Take 10 mg by mouth.     • losartan (COZAAR) 100 MG Tab Take 100 mg by mouth every morning.     • amlodipine (NORVASC) 5 MG Tab Take 5 mg by mouth every morning.     • aspirin EC (ECOTRIN) 81 MG TBEC Take 81 mg by mouth every morning.     • ezetimibe (ZETIA) 10 MG TABS Take 10 mg by mouth every morning.       No current facility-administered medications for this visit.       No Known Allergies      Physical Exam:  Ambulatory Vitals  /60 (BP Location: Left arm, Patient Position: Sitting, BP Cuff Size: Adult)   Pulse 87   Resp 12   Ht 1.727 m (5' 8\")   Wt 69 kg (152 lb 3.2 oz)   SpO2 97%    BP Readings from Last 4 Encounters:   07/02/21 112/60   07/01/21 125/78   06/08/21 133/74   06/28/21 111/77       Weight/BMI: Body mass index is 23.14 kg/m².  Wt Readings from Last 4 Encounters:   07/02/21 69 kg (152 lb 3.2 oz)   07/01/21 67.9 kg (149 lb 11.1 oz)   06/28/21 68 kg (150 lb)   08/24/20 71.7 kg (158 lb)       General: no apparent distress  Lungs: normal effort, without crackles, no wheezing or rhonchi.  Heart: normal rate, IRregular rhythm, no murmur, no rub  Ext:  no lower extremity edema.  Neurological: No focal deficits, no facial asymmetry.  Normal speech.  Psychiatric: Appropriate affect, alert and oriented x 3.   Skin: Warm extremities, no rash.      Lab Data Review:  No results found for: CHOLSTRLTOT, LDL, HDL, TRIGLYCERIDE    Lab Results   Component Value Date/Time    SODIUM 137 06/28/2021 03:00 PM    POTASSIUM 4.7 06/28/2021 03:00 PM    CHLORIDE 103 06/28/2021 03:00 PM    CO2 23 06/28/2021 03:00 PM    GLUCOSE 98 06/28/2021 03:00 PM    BUN 16 06/28/2021 03:00 PM    CREATININE 1.57 (H) 06/28/2021 03:00 PM     Lab Results   Component Value Date/Time    " ALKPHOSPHAT 63 2021 03:00 PM    ASTSGOT 46 (H) 2021 03:00 PM    ALTSGPT 38 2021 03:00 PM    TBILIRUBIN 0.7 2021 03:00 PM      Lab Results   Component Value Date/Time    WBC 6.7 2021 03:10 PM         Cardiac Imaging and Procedures Review:      Personal interpretation of EKG dated 21:  SR with HR 90, frequent PACs, no ST changes.    [facility] 2018:  CONCLUSIONS  Normal left ventricular systolic function. Left ventricular ejection   fraction is visually estimated to be 55%.   Diastolic function is difficult to assess with atrial fibrillation.  Mild aortic insufficiency.   Mild mitral regurgitation.  No prior study is available for comparison.       Assessment and Clinical Decision Makin  Essential hypertension   2  Dyslipidemia  3  Hx of AFIB during pneumothorax without recurrrence.  4  PACs    I did not see any objective evidence of recurrent AFIB on chart review, but the EKG from  does show frequent PACs, and this is consistent with his exam today.  I recommend we get a 14 day event monitor to look for occult AFIB.  Will also d/c amlodipine and start metoprolol 37.5 mg BID.     Follow up by phone or in the office if needed after the event monitor is resulted.    Will transition his care to Dr. Bhatti.    Yulisa Farley PA-C     [facility]

## 2021-07-03 LAB
BACTERIA SPEC RESP CULT: NORMAL
GRAM STN SPEC: NORMAL
SIGNIFICANT IND 70042: NORMAL
SITE SITE: NORMAL
SOURCE SOURCE: NORMAL

## 2021-07-06 ENCOUNTER — HOSPITAL ENCOUNTER (OUTPATIENT)
Dept: RADIATION ONCOLOGY | Facility: MEDICAL CENTER | Age: 76
End: 2021-07-31
Attending: RADIOLOGY
Payer: MEDICARE

## 2021-07-06 DIAGNOSIS — C34.32 PRIMARY CANCER OF LEFT LOWER LOBE OF LUNG (HCC): ICD-10-CM

## 2021-07-06 PROBLEM — C34.12 PRIMARY CANCER OF LEFT UPPER LOBE OF LUNG (HCC): Status: ACTIVE | Noted: 2021-07-06

## 2021-07-06 PROCEDURE — 99443 PR PHYSICIAN TELEPHONE EVALUATION 21-30 MIN: CPT | Mod: 95 | Performed by: RADIOLOGY

## 2021-07-06 NOTE — PROGRESS NOTES
Telephone Appointment Visit   As a means of avoiding spread of COVID-19 and patient distance from care, this visit is being conducted by telephone. This telephone visit was initiated by the patient and they verbally consented.    Time at start of call: 9:00    Reason for Call:  Lab Follow-up    HPI:    1. Stage I non-small cell lung carcinoma, moderately differentiated adenocarcinoma histology, status post wedge resection in April 2018  2.  Stage IA2 ([id number]) non-small cell lung carcinoma of the right upper lobe of lung, medically inoperable, completing stereotactic radiosurgery to 6000 cGy in February 2019   3.  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  This does show malignancy consistent with non-small cell lung carcinoma.  This favors adenocarcinoma histology.  That the disease was positive in the station 4R location.  Therefore this is consistent with stage IIIa disease.  Per PET scan there is no evidence of any further regional or distant disease.  I discussed with the patient and his wife the difference between this and his previous malignancies.  With the regional involvement this would now require combined chemoradiation.  Anticipate 30 fractions of external beam radiation given over 6-week time in combination with weekly chemotherapy.  I have sent a message to Dr. Ruiz for him to evaluate the patient for chemotherapy.  I will set up a simulation for his radiation.    Labs / Images Reviewed:   Pathology from 7/1/2021  FINAL DIAGNOSIS:     A. Biopsy slide-station 2R:          Rare atypical cells, not diagnostic for malignancy.          Scattered lymphoid cells present.   B. Biopsy core-station 2R:          Scant cells with no diagnostic evidence of malignancy.   C. Biopsy slide-station 4R:          Positive for malignancy, consistent with adenocarcinoma.   D. Biopsy core-station  4R:          Rare atypical cells associated with blood clot and cartilage.   E. Biopsy slide-station 7:          No malignant cells or lymphoid tissue identified.          Predominantly blood present.   F. Biopsy core-station 7:          Rare atypical cells associated with blood clot and cartilage.   G. Transbronchial biopsy-lung mass left lower lobe:          Benign alveolar lung parenchyma with increased hemosiderin-laden           macrophages.   H. Left lower lobe bronchoalveolar lavage:          No malignant cells identified.          Reactive bronchial epithelial cells and hemosiderin-laden           macrophages present.     COMMENT:   None of the tissue blocks (B, D, F, G, H) contain adequate amounts of   tumor for ancillary studies. Some of the tumor cells have a signet ring   configuration, which is not exclusive to stomach but can occur in   almost any adenocarcinoma.  Assessment and Plan:     1. Primary cancer of left lower lobe of lung (HCC)  The plan is to proceed with combined chemoradiotherapy.    Follow-up: Simulation will be scheduled    Time at end of call: 9:25  Total Time Spent: 21-30 minutes    Jj Martino M.D.

## 2021-07-06 NOTE — CT SIMULATION
PATIENT NAME Pito Black   PRIMARY PHYSICIAN Milligan, Nicolas [phone]   REFERRING PHYSICIAN Cristhian Galeano M.D. 1945     Primary cancer of left lower lobe of lung (HCC)  Staging form: Lung, AJCC 8th Edition  - Clinical: Stage IIIA (cT2a, cN2, cM0) - Signed by Jj Martino M.D. on 7/6/2021         Treatment Planning CT Simulation      Order Questions     Question Answer Comment    Is this for a new course of treatment? Yes     Is this an Addendum? No     Implanted Device/Pacemaker No     Simulation Status Initial     Treatment Site Lung     Laterality None     Treatment Technique 3D CRT     Other Technique(s) 3D     Treatment Pattern/Frequency Daily     Concurrent Chemotherapy Yes     CT Technique 3D     Slice Thickness 3mm     Scan Extent Chest     Treatment Device(s) Vac Carolee     Patient Attire Gown     Patient Position Supine     Patient Orientation Head First     Arm Position Up     Treatment Machine No preference     Treatment Image Guidance CBCT     Frequency (CBCT) Daily     Image Guidance Match Bone     Treatment Planning Image Fusion CT/PET     Other Orders Special Tx Procedure      Weekly Physics Check             Comments     Previous SBRT on the R, special physics

## 2021-07-08 ENCOUNTER — HOSPITAL ENCOUNTER (OUTPATIENT)
Dept: RADIATION ONCOLOGY | Facility: MEDICAL CENTER | Age: 76
End: 2021-07-08
Payer: MEDICARE

## 2021-07-08 PROCEDURE — 77334 RADIATION TREATMENT AID(S): CPT | Mod: 26 | Performed by: RADIOLOGY

## 2021-07-08 PROCEDURE — 77334 RADIATION TREATMENT AID(S): CPT | Performed by: RADIOLOGY

## 2021-07-08 PROCEDURE — 77470 SPECIAL RADIATION TREATMENT: CPT | Mod: 26 | Performed by: RADIOLOGY

## 2021-07-08 PROCEDURE — 77263 THER RADIOLOGY TX PLNG CPLX: CPT | Performed by: RADIOLOGY

## 2021-07-08 PROCEDURE — 77290 THER RAD SIMULAJ FIELD CPLX: CPT | Performed by: RADIOLOGY

## 2021-07-08 PROCEDURE — 77470 SPECIAL RADIATION TREATMENT: CPT | Performed by: RADIOLOGY

## 2021-07-09 ENCOUNTER — PATIENT OUTREACH (OUTPATIENT)
Dept: OTHER | Facility: MEDICAL CENTER | Age: 76
End: 2021-07-09

## 2021-07-09 NOTE — PROGRESS NOTES
Sergio RN from [facility] called back.  Provided him with radiation time on Monday.  He scheduled patient for 11:30 chemo on 7/19.  They will give patient information when he comes in for the education on 7/15.    Call placed to wife with updated information.  Updated radiation oncology as well.

## 2021-07-09 NOTE — PROGRESS NOTES
Call placed to patient and spoke with wife, Cydney. Re-introduced self as worked with patient on previous lung cancer diagnosis.  Reviewed role.  Pt will be driving self for treatment, lives in [place].  He does have son who lives in [place], that he can stay with if needs to.  Currently no financial stressors and has support.  Discussed additional support resources available if needed.  Cydney said patient does have appointment at oncology office on 7/15, but no chemo appointment scheduled yet.  Navigator will follow up with office regarding chemo and need for concurrent treatment.  She denies other questions at this time.  Contact information provided.  She will call if questions or needs come up.    Call placed to [facility].  Left message for nurse regarding patient starting radiation on 7/19 and hoping to get chemo date set.

## 2021-07-13 NOTE — H&P
H&P reviewed. No change since prior consult/H&P dated 6/28/2021. Discussed about risks and benefits of undergoing bronchoscopy, including but not limited to bleeding, pneumothorax, respiratory failure requiring ventilatory support, infection, and possible death. Questions were encouraged and answered. Patient endorsed understanding and agreed to proceed as planned.  __________  Vinayak Carbajal MD  Pulmonary and Critical Care Medicine  [facility]

## 2021-07-14 ENCOUNTER — TELEPHONE (OUTPATIENT)
Dept: CARDIOLOGY | Facility: MEDICAL CENTER | Age: 76
End: 2021-07-14

## 2021-07-14 NOTE — TELEPHONE ENCOUNTER
Received fax from [facility] stating Metoprolol Tartrate 37.5mg tablet is not covered by plan but metoprolol tartrate 25mg tab is preferred.    Notification relayed to PAC for advise.  Fax sent to scanning via [email or web address], completed status received.

## 2021-07-15 ENCOUNTER — HOSPITAL ENCOUNTER (OUTPATIENT)
Facility: MEDICAL CENTER | Age: 76
End: 2021-07-15
Attending: NURSE PRACTITIONER
Payer: MEDICARE

## 2021-07-15 PROCEDURE — 86803 HEPATITIS C AB TEST: CPT

## 2021-07-15 PROCEDURE — 77338 DESIGN MLC DEVICE FOR IMRT: CPT | Performed by: RADIOLOGY

## 2021-07-15 PROCEDURE — 77338 DESIGN MLC DEVICE FOR IMRT: CPT | Mod: 26 | Performed by: RADIOLOGY

## 2021-07-15 PROCEDURE — 77301 RADIOTHERAPY DOSE PLAN IMRT: CPT | Performed by: RADIOLOGY

## 2021-07-15 PROCEDURE — 77300 RADIATION THERAPY DOSE PLAN: CPT | Performed by: RADIOLOGY

## 2021-07-15 PROCEDURE — 87340 HEPATITIS B SURFACE AG IA: CPT | Mod: GZ

## 2021-07-15 PROCEDURE — 77293 RESPIRATOR MOTION MGMT SIMUL: CPT | Mod: 26 | Performed by: RADIOLOGY

## 2021-07-15 PROCEDURE — 77293 RESPIRATOR MOTION MGMT SIMUL: CPT | Performed by: RADIOLOGY

## 2021-07-15 PROCEDURE — 77301 RADIOTHERAPY DOSE PLAN IMRT: CPT | Mod: 26 | Performed by: RADIOLOGY

## 2021-07-15 PROCEDURE — 86706 HEP B SURFACE ANTIBODY: CPT | Mod: GZ

## 2021-07-15 PROCEDURE — 77300 RADIATION THERAPY DOSE PLAN: CPT | Mod: 26 | Performed by: RADIOLOGY

## 2021-07-15 PROCEDURE — 86704 HEP B CORE ANTIBODY TOTAL: CPT | Mod: GZ

## 2021-07-16 ENCOUNTER — TELEPHONE (OUTPATIENT)
Dept: CARDIOLOGY | Facility: MEDICAL CENTER | Age: 76
End: 2021-07-16

## 2021-07-16 ENCOUNTER — TELEPHONE (OUTPATIENT)
Dept: SLEEP MEDICINE | Facility: MEDICAL CENTER | Age: 76
End: 2021-07-16

## 2021-07-16 DIAGNOSIS — I10 ESSENTIAL HYPERTENSION: ICD-10-CM

## 2021-07-16 LAB
HBV CORE AB SERPL QL IA: NONREACTIVE
HBV SURFACE AB SERPL IA-ACNC: 3.5 MIU/ML (ref 0–10)
HBV SURFACE AG SER QL: NORMAL
HCV AB SER QL: NORMAL

## 2021-07-16 PROCEDURE — 77370 RADIATION PHYSICS CONSULT: CPT | Performed by: RADIOLOGY

## 2021-07-16 NOTE — TELEPHONE ENCOUNTER
AH    Pts wife Cydney called stating AH changed Pts medication at his appt on 7/2 and Pt is starting chemo on Monday. Cydney wants to know if it is a good time for Pt to be changing medications. Please call Cydney back at [phone].    Thank you

## 2021-07-16 NOTE — TELEPHONE ENCOUNTER
Returned Cydney's call and reviewed findings. Advised Cydney to follow up with oncologist regarding update in medication list as she states PCP was only notified of medication changes.  She is requesting clarification regarding holter monitor and radiation treatment.  Advised to also ask Care Team monitoring pt with oncology treatment regarding heart monitor contraindications.  She verbalizes understanding and states no other concerns or questions at this time.  Pt is appreciative of information given.

## 2021-07-16 NOTE — TELEPHONE ENCOUNTER
Cydney contacted me today and had a few questions regarding changes in medications and a heart monitor ordered by cardiology. She states that cardio is putting him on Metoprolol and going to have him complete a heart monitor and they wanted to know if it was a good time to change the med and do the heart monitor. I advised she should follow up with Oncology as well as Cardiology. They would like a call back if possible.

## 2021-07-19 ENCOUNTER — HOSPITAL ENCOUNTER (OUTPATIENT)
Dept: RADIATION ONCOLOGY | Facility: MEDICAL CENTER | Age: 76
End: 2021-07-19
Payer: MEDICARE

## 2021-07-19 ENCOUNTER — PATIENT OUTREACH (OUTPATIENT)
Dept: OTHER | Facility: MEDICAL CENTER | Age: 76
End: 2021-07-19

## 2021-07-19 LAB
CHEMOTHERAPY INFUSION START DATE: NORMAL
CHEMOTHERAPY RECORDS: 2
CHEMOTHERAPY RECORDS: 6000
CHEMOTHERAPY RECORDS: NORMAL
CHEMOTHERAPY RX CANCER: NORMAL
DATE 1ST CHEMO CANCER: NORMAL
RAD ONC ARIA COURSE LAST TREATMENT DATE: NORMAL
RAD ONC ARIA COURSE TREATMENT ELAPSED DAYS: NORMAL
RAD ONC ARIA REFERENCE POINT DOSAGE GIVEN TO DATE: 2
RAD ONC ARIA REFERENCE POINT DOSAGE GIVEN TO DATE: 2.09
RAD ONC ARIA REFERENCE POINT ID: NORMAL
RAD ONC ARIA REFERENCE POINT ID: NORMAL
RAD ONC ARIA REFERENCE POINT SESSION DOSAGE GIVEN: 2
RAD ONC ARIA REFERENCE POINT SESSION DOSAGE GIVEN: 2.09

## 2021-07-19 PROCEDURE — 77280 THER RAD SIMULAJ FIELD SMPL: CPT | Performed by: RADIOLOGY

## 2021-07-19 PROCEDURE — 77386 HCHG IMRT DELIVERY COMPLEX: CPT | Performed by: RADIOLOGY

## 2021-07-19 NOTE — PROGRESS NOTES
Cydney left message on Friday with questions regarding changing heart medication and starting treatment.  Agree with information provided that they need to update treating providers with medication.  Appropriate management of all health aspects is also important.  Discussed with radiation oncology regarding heart monitor.  They have had patients with monitors before and have removed during treatment, document stop time and restart time as long as doing this was ok with cardiology.  Return call placed to wife, left message.  In message encouraged her to talk to radiation tomorrow after treatment for information regarding her request.

## 2021-07-20 ENCOUNTER — HOSPITAL ENCOUNTER (OUTPATIENT)
Dept: RADIATION ONCOLOGY | Facility: MEDICAL CENTER | Age: 76
End: 2021-07-20
Payer: MEDICARE

## 2021-07-20 LAB
CHEMOTHERAPY INFUSION START DATE: NORMAL
CHEMOTHERAPY RECORDS: 2
CHEMOTHERAPY RECORDS: 6000
CHEMOTHERAPY RECORDS: NORMAL
CHEMOTHERAPY RX CANCER: NORMAL
DATE 1ST CHEMO CANCER: NORMAL
RAD ONC ARIA COURSE LAST TREATMENT DATE: NORMAL
RAD ONC ARIA COURSE TREATMENT ELAPSED DAYS: NORMAL
RAD ONC ARIA REFERENCE POINT DOSAGE GIVEN TO DATE: 4
RAD ONC ARIA REFERENCE POINT DOSAGE GIVEN TO DATE: 4.18
RAD ONC ARIA REFERENCE POINT ID: NORMAL
RAD ONC ARIA REFERENCE POINT ID: NORMAL
RAD ONC ARIA REFERENCE POINT SESSION DOSAGE GIVEN: 2
RAD ONC ARIA REFERENCE POINT SESSION DOSAGE GIVEN: 2.09

## 2021-07-20 PROCEDURE — 77386 HCHG IMRT DELIVERY COMPLEX: CPT | Performed by: RADIOLOGY

## 2021-07-20 PROCEDURE — 77014 PR CT GUIDANCE PLACEMENT RAD THERAPY FIELDS: CPT | Mod: 26 | Performed by: RADIOLOGY

## 2021-07-20 RX ORDER — METOPROLOL TARTRATE 37.5 MG/1
37.5 TABLET, FILM COATED ORAL 2 TIMES DAILY
Qty: 60 TABLET | Refills: 5 | Status: SHIPPED | OUTPATIENT
Start: 2021-07-20 | End: 2021-07-20

## 2021-07-21 ENCOUNTER — NON-PROVIDER VISIT (OUTPATIENT)
Dept: CARDIOLOGY | Facility: MEDICAL CENTER | Age: 76
End: 2021-07-21
Payer: MEDICARE

## 2021-07-21 ENCOUNTER — TELEPHONE (OUTPATIENT)
Dept: CARDIOLOGY | Facility: MEDICAL CENTER | Age: 76
End: 2021-07-21

## 2021-07-21 ENCOUNTER — HOSPITAL ENCOUNTER (OUTPATIENT)
Dept: RADIATION ONCOLOGY | Facility: MEDICAL CENTER | Age: 76
End: 2021-07-21

## 2021-07-21 VITALS
HEART RATE: 87 BPM | TEMPERATURE: 97.8 F | DIASTOLIC BLOOD PRESSURE: 91 MMHG | SYSTOLIC BLOOD PRESSURE: 147 MMHG | OXYGEN SATURATION: 96 %

## 2021-07-21 DIAGNOSIS — R00.2 PALPITATIONS: ICD-10-CM

## 2021-07-21 DIAGNOSIS — I49.3 PVC (PREMATURE VENTRICULAR CONTRACTION): ICD-10-CM

## 2021-07-21 LAB
CHEMOTHERAPY INFUSION START DATE: NORMAL
CHEMOTHERAPY RECORDS: 2
CHEMOTHERAPY RECORDS: 6000
CHEMOTHERAPY RECORDS: NORMAL
CHEMOTHERAPY RX CANCER: NORMAL
DATE 1ST CHEMO CANCER: NORMAL
RAD ONC ARIA COURSE LAST TREATMENT DATE: NORMAL
RAD ONC ARIA COURSE TREATMENT ELAPSED DAYS: NORMAL
RAD ONC ARIA REFERENCE POINT DOSAGE GIVEN TO DATE: 6
RAD ONC ARIA REFERENCE POINT DOSAGE GIVEN TO DATE: 6.27
RAD ONC ARIA REFERENCE POINT ID: NORMAL
RAD ONC ARIA REFERENCE POINT ID: NORMAL
RAD ONC ARIA REFERENCE POINT SESSION DOSAGE GIVEN: 2
RAD ONC ARIA REFERENCE POINT SESSION DOSAGE GIVEN: 2.09

## 2021-07-21 PROCEDURE — 77014 PR CT GUIDANCE PLACEMENT RAD THERAPY FIELDS: CPT | Mod: 26 | Performed by: RADIOLOGY

## 2021-07-21 PROCEDURE — 77386 HCHG IMRT DELIVERY COMPLEX: CPT | Performed by: RADIOLOGY

## 2021-07-21 PROCEDURE — 93268 ECG RECORD/REVIEW: CPT | Performed by: INTERNAL MEDICINE

## 2021-07-21 RX ORDER — ONDANSETRON 4 MG/1
TABLET, FILM COATED ORAL
COMMUNITY
Start: 2021-07-18 | End: 2021-10-14

## 2021-07-21 ASSESSMENT — PAIN SCALES - GENERAL: PAINLEVEL: NO PAIN

## 2021-07-21 NOTE — TELEPHONE ENCOUNTER
Returned pt call and reviewed findings with pt wife, Cydney.  She states she had rx transferred from [facility] to [facility].  She states she will call [facility] to clarify cost of medication as 37.5 tab was $13.  [id number] given to Cydney to return this call tomorrow with decision.  She verbalizes understanding and states no other concerns or questions at this time.  Cdyney is appreciative of information given.    Awaiting for Cydney to return call.

## 2021-07-21 NOTE — TELEPHONE ENCOUNTER
Patient wife called and said they will stay with the Metoprolol 37.5 mg. She states they will pay out of pocket.    Thank you,    [facility]

## 2021-07-21 NOTE — ON TREATMENT VISIT
ON TREATMENT  NOTE  RADIATION ONCOLOGY DEPARTMENT    Patient name:  Pito Black    Primary Physician:  Nicolas Milligan, M.D. MRN: [id number]  CSN: [id number]   Referring physician:  Cristhian Galeano M.D. : 1945, 76 y.o.     ENCOUNTER DATE:  21    DIAGNOSIS:    Primary cancer of left lower lobe of lung (HCC)  Staging form: Lung, AJCC 8th Edition  - Clinical: Stage IIIA (cT2a, cN2, cM0) - Signed by Jj Martino M.D. on 2021      TREATMENT SUMMARY:  [facility] Treatment Information        Some values may be hidden. Unless noted otherwise, only the newest values recorded on each date are displayed.         Aria Treatment Summary 21   Course First Treatment Date 2021   Course Last Treatment Date 2021   L Lung&Nodes Plan from Course C2_LLLLung   Fraction 3 of 30   Elapsed Course Days 2 @ [id number]   Prescribed Fraction Dose 200 cGy   Prescribed Total Dose 6,000 cGy   LLL cp Reference Point from Course C2_LLLLung   Elapsed Course Days 2 @ [id number]   Session Dose 209 cGy   Total Dose 627 cGy   PTV_6000 LLL Reference Point from Course C2_LLLLung   Elapsed Course Days 2 @    Session Dose 200 cGy   Total Dose 600 cGy             SUBJECTIVE:  Tolerating therapy without event        VITAL SIGNS:  KPS: 100, Fully active, able to carry on all pre-disease performed without restriction (ECOG equivalent 0)  Encounter Vitals  Temperature: 36.6 °C (97.8 °F)  Blood Pressure : 147/91  Pulse: 87  Pulse Oximetry: 96 %  Pain Score: No pain  Pain Assessment 2021   Pain Score 0 0   Some recent data might be hidden          PHYSICAL EXAM:  No change      Toxicity Assessment 2019   Toxicity Assessment Chest Chest   Fatigue (lethargy, malaise, asthenia) None None   Radiation Dermatitis None None   Anorexia None None   Dyspepsia/heartburn None None   Dysphagia, Esophagitis, odynophagoa (painful swallowing) None None   RT Dysphagia-Esophageal None None    Cough Absent Absent   Dyspnea Normal Normal           IMPRESSION:  Cancer Staging  Primary cancer of left lower lobe of lung (HCC)  Staging form: Lung, AJCC 8th Edition  - Clinical: Stage IIIA (cT2a, cN2, cM0) - Signed by Jj Martino M.D. on 7/6/2021      PLAN:  No change in treatment plan    Disposition:  Treatment plan reviewed. Questions answered. Continue therapy outlined.     Angelic Thomas M.D.    Orders Placed This Encounter   • ondansetron (ZOFRAN) 4 MG Tab tablet

## 2021-07-21 NOTE — TELEPHONE ENCOUNTER
Home enrollment completed for the 14 day Zio AT (Live) Heart monitoring program per Yulisa Farley PA-C.  Monitor to be mailed to patient by [employer].  >Pending Baseline.  >Pending EOS.  .

## 2021-07-22 ENCOUNTER — HOSPITAL ENCOUNTER (OUTPATIENT)
Dept: RADIATION ONCOLOGY | Facility: MEDICAL CENTER | Age: 76
End: 2021-07-22
Payer: MEDICARE

## 2021-07-22 LAB
CHEMOTHERAPY INFUSION START DATE: NORMAL
CHEMOTHERAPY RECORDS: 2
CHEMOTHERAPY RECORDS: 6000
CHEMOTHERAPY RECORDS: NORMAL
CHEMOTHERAPY RX CANCER: NORMAL
DATE 1ST CHEMO CANCER: NORMAL
RAD ONC ARIA COURSE LAST TREATMENT DATE: NORMAL
RAD ONC ARIA COURSE TREATMENT ELAPSED DAYS: NORMAL
RAD ONC ARIA REFERENCE POINT DOSAGE GIVEN TO DATE: 8
RAD ONC ARIA REFERENCE POINT DOSAGE GIVEN TO DATE: 8.36
RAD ONC ARIA REFERENCE POINT ID: NORMAL
RAD ONC ARIA REFERENCE POINT ID: NORMAL
RAD ONC ARIA REFERENCE POINT SESSION DOSAGE GIVEN: 2
RAD ONC ARIA REFERENCE POINT SESSION DOSAGE GIVEN: 2.09

## 2021-07-22 PROCEDURE — 77386 HCHG IMRT DELIVERY COMPLEX: CPT | Performed by: RADIOLOGY

## 2021-07-22 PROCEDURE — 77014 PR CT GUIDANCE PLACEMENT RAD THERAPY FIELDS: CPT | Mod: 26 | Performed by: RADIOLOGY

## 2021-07-22 RX ORDER — METOPROLOL TARTRATE 37.5 MG/1
25 TABLET, FILM COATED ORAL 2 TIMES DAILY
COMMUNITY
Start: 2021-07-22 | End: 2021-08-05 | Stop reason: CLARIF

## 2021-07-23 ENCOUNTER — HOSPITAL ENCOUNTER (OUTPATIENT)
Dept: RADIATION ONCOLOGY | Facility: MEDICAL CENTER | Age: 76
End: 2021-07-23
Payer: MEDICARE

## 2021-07-23 LAB
CHEMOTHERAPY INFUSION START DATE: NORMAL
CHEMOTHERAPY RECORDS: 2
CHEMOTHERAPY RECORDS: 6000
CHEMOTHERAPY RECORDS: NORMAL
CHEMOTHERAPY RX CANCER: NORMAL
DATE 1ST CHEMO CANCER: NORMAL
RAD ONC ARIA COURSE LAST TREATMENT DATE: NORMAL
RAD ONC ARIA COURSE TREATMENT ELAPSED DAYS: NORMAL
RAD ONC ARIA REFERENCE POINT DOSAGE GIVEN TO DATE: 10
RAD ONC ARIA REFERENCE POINT DOSAGE GIVEN TO DATE: 10.46
RAD ONC ARIA REFERENCE POINT ID: NORMAL
RAD ONC ARIA REFERENCE POINT ID: NORMAL
RAD ONC ARIA REFERENCE POINT SESSION DOSAGE GIVEN: 2
RAD ONC ARIA REFERENCE POINT SESSION DOSAGE GIVEN: 2.09

## 2021-07-23 PROCEDURE — 77427 RADIATION TX MANAGEMENT X5: CPT | Performed by: RADIOLOGY

## 2021-07-23 PROCEDURE — 77386 HCHG IMRT DELIVERY COMPLEX: CPT | Performed by: RADIOLOGY

## 2021-07-23 PROCEDURE — 77336 RADIATION PHYSICS CONSULT: CPT | Performed by: RADIOLOGY

## 2021-07-23 PROCEDURE — 77014 PR CT GUIDANCE PLACEMENT RAD THERAPY FIELDS: CPT | Mod: 26 | Performed by: RADIOLOGY

## 2021-07-26 ENCOUNTER — HOSPITAL ENCOUNTER (OUTPATIENT)
Dept: RADIATION ONCOLOGY | Facility: MEDICAL CENTER | Age: 76
End: 2021-07-26
Payer: MEDICARE

## 2021-07-26 ENCOUNTER — TELEPHONE (OUTPATIENT)
Dept: CARDIOLOGY | Facility: MEDICAL CENTER | Age: 76
End: 2021-07-26

## 2021-07-26 LAB
CHEMOTHERAPY INFUSION START DATE: NORMAL
CHEMOTHERAPY RECORDS: 2
CHEMOTHERAPY RECORDS: 6000
CHEMOTHERAPY RECORDS: NORMAL
CHEMOTHERAPY RX CANCER: NORMAL
DATE 1ST CHEMO CANCER: NORMAL
RAD ONC ARIA COURSE LAST TREATMENT DATE: NORMAL
RAD ONC ARIA COURSE TREATMENT ELAPSED DAYS: NORMAL
RAD ONC ARIA REFERENCE POINT DOSAGE GIVEN TO DATE: 12
RAD ONC ARIA REFERENCE POINT DOSAGE GIVEN TO DATE: 12.55
RAD ONC ARIA REFERENCE POINT ID: NORMAL
RAD ONC ARIA REFERENCE POINT ID: NORMAL
RAD ONC ARIA REFERENCE POINT SESSION DOSAGE GIVEN: 2
RAD ONC ARIA REFERENCE POINT SESSION DOSAGE GIVEN: 2.09

## 2021-07-26 PROCEDURE — 77014 PR CT GUIDANCE PLACEMENT RAD THERAPY FIELDS: CPT | Mod: 26 | Performed by: RADIOLOGY

## 2021-07-26 PROCEDURE — 77386 HCHG IMRT DELIVERY COMPLEX: CPT | Performed by: RADIOLOGY

## 2021-07-26 NOTE — TELEPHONE ENCOUNTER
S/w with Katelynn regarding switching to Biotel event monitoring before tx begins for pt. Confirmed with Elizabeth at [facility]; pt can come by the office on 7- to return the Ziopatch for a Biotel before tx begins.

## 2021-07-27 ENCOUNTER — NON-PROVIDER VISIT (OUTPATIENT)
Dept: CARDIOLOGY | Facility: MEDICAL CENTER | Age: 76
End: 2021-07-27
Payer: MEDICARE

## 2021-07-27 ENCOUNTER — HOSPITAL ENCOUNTER (OUTPATIENT)
Dept: RADIATION ONCOLOGY | Facility: MEDICAL CENTER | Age: 76
End: 2021-07-27
Payer: MEDICARE

## 2021-07-27 ENCOUNTER — TELEPHONE (OUTPATIENT)
Dept: CARDIOLOGY | Facility: MEDICAL CENTER | Age: 76
End: 2021-07-27

## 2021-07-27 DIAGNOSIS — R00.2 PALPITATIONS: ICD-10-CM

## 2021-07-27 DIAGNOSIS — I49.3 PVC (PREMATURE VENTRICULAR CONTRACTION): ICD-10-CM

## 2021-07-27 LAB
CHEMOTHERAPY INFUSION START DATE: NORMAL
CHEMOTHERAPY RECORDS: 2
CHEMOTHERAPY RECORDS: 6000
CHEMOTHERAPY RECORDS: NORMAL
CHEMOTHERAPY RX CANCER: NORMAL
DATE 1ST CHEMO CANCER: NORMAL
RAD ONC ARIA COURSE LAST TREATMENT DATE: NORMAL
RAD ONC ARIA COURSE TREATMENT ELAPSED DAYS: NORMAL
RAD ONC ARIA REFERENCE POINT DOSAGE GIVEN TO DATE: 14
RAD ONC ARIA REFERENCE POINT DOSAGE GIVEN TO DATE: 14.64
RAD ONC ARIA REFERENCE POINT ID: NORMAL
RAD ONC ARIA REFERENCE POINT ID: NORMAL
RAD ONC ARIA REFERENCE POINT SESSION DOSAGE GIVEN: 2
RAD ONC ARIA REFERENCE POINT SESSION DOSAGE GIVEN: 2.09

## 2021-07-27 PROCEDURE — 77386 HCHG IMRT DELIVERY COMPLEX: CPT | Performed by: RADIOLOGY

## 2021-07-27 PROCEDURE — 77014 PR CT GUIDANCE PLACEMENT RAD THERAPY FIELDS: CPT | Mod: 26 | Performed by: RADIOLOGY

## 2021-07-27 NOTE — TELEPHONE ENCOUNTER
Patient received this device but isn't going to wear it because he is having radiation therapy and has been advised that will ruin this monitor.  I've called [employer] and advised them that he is sending this device back the the company unused.  He is getting a Contorion MCT today, 7/27/21, instead. See new enrollment note.

## 2021-07-27 NOTE — TELEPHONE ENCOUNTER
Patient enrolled in the 14 day Bio-Tel MCT Heart monitoring program per Yulisa Farley PA-C.  >In office hookup with Baseline transmitted.  >Pending EOS.

## 2021-07-28 ENCOUNTER — HOSPITAL ENCOUNTER (OUTPATIENT)
Dept: RADIATION ONCOLOGY | Facility: MEDICAL CENTER | Age: 76
End: 2021-07-28
Payer: MEDICARE

## 2021-07-28 VITALS
TEMPERATURE: 96.8 F | OXYGEN SATURATION: 99 % | SYSTOLIC BLOOD PRESSURE: 112 MMHG | DIASTOLIC BLOOD PRESSURE: 62 MMHG | HEART RATE: 85 BPM

## 2021-07-28 LAB
CHEMOTHERAPY INFUSION START DATE: NORMAL
CHEMOTHERAPY RECORDS: 2
CHEMOTHERAPY RECORDS: 6000
CHEMOTHERAPY RECORDS: NORMAL
CHEMOTHERAPY RX CANCER: NORMAL
DATE 1ST CHEMO CANCER: NORMAL
FUNGUS SPEC CULT: NORMAL
RAD ONC ARIA COURSE LAST TREATMENT DATE: NORMAL
RAD ONC ARIA COURSE TREATMENT ELAPSED DAYS: NORMAL
RAD ONC ARIA REFERENCE POINT DOSAGE GIVEN TO DATE: 16
RAD ONC ARIA REFERENCE POINT DOSAGE GIVEN TO DATE: 16.73
RAD ONC ARIA REFERENCE POINT ID: NORMAL
RAD ONC ARIA REFERENCE POINT ID: NORMAL
RAD ONC ARIA REFERENCE POINT SESSION DOSAGE GIVEN: 2
RAD ONC ARIA REFERENCE POINT SESSION DOSAGE GIVEN: 2.09
SIGNIFICANT IND 70042: NORMAL
SITE SITE: NORMAL
SOURCE SOURCE: NORMAL

## 2021-07-28 PROCEDURE — 77386 HCHG IMRT DELIVERY COMPLEX: CPT | Performed by: RADIOLOGY

## 2021-07-28 PROCEDURE — 77014 PR CT GUIDANCE PLACEMENT RAD THERAPY FIELDS: CPT | Mod: 26 | Performed by: RADIOLOGY

## 2021-07-28 ASSESSMENT — PAIN SCALES - GENERAL: PAINLEVEL: NO PAIN

## 2021-07-28 NOTE — ON TREATMENT VISIT
ON TREATMENT NOTE  RADIATION ONCOLOGY DEPARTMENT    Patient name:  Pito Black    Primary Physician:  Nicolas Milligan, M.D. MRN: [id number]  CSN: [id number]   Referring physician:  Cristhian Galeano M.D. : 1945, 76 y.o.     ENCOUNTER DATE:  21    DIAGNOSIS:    Primary cancer of left lower lobe of lung (HCC)  Staging form: Lung, AJCC 8th Edition  - Clinical: Stage IIIA (cT2a, cN2, cM0) - Signed by Jj Martino M.D. on 2021      TREATMENT SUMMARY:  [facility] Treatment Information        Some values may be hidden. Unless noted otherwise, only the newest values recorded on each date are displayed.         Aria Treatment Summary 21   Course First Treatment Date 2021   Course Last Treatment Date 2021   L Lung&Nodes Plan from Course C2_LLLLung   Fraction 7 of 30   Elapsed Course Days 8 @    Prescribed Fraction Dose 200 cGy   Prescribed Total Dose 6,000 cGy   LLL cp Reference Point from Course C2_LLLLung   Elapsed Course Days 8 @    Session Dose 209 cGy   Total Dose 1,464 cGy   PTV_6000 LLL Reference Point from Course C2_LLLLung   Elapsed Course Days 8 @    Session Dose 200 cGy   Total Dose 1,400 cGy              SUBJECTIVE:   Patient is doing well.  He does not have any untoward effects he would attribute to his radiation.    VITAL SIGNS:    Encounter Vitals  Temperature: 36 °C (96.8 °F)  Blood Pressure : 112/62  Pulse: 85  Pulse Oximetry: 99 %  Pain Score: No pain  Pain Assessment 2021   Pain Score 0 0 0   Some recent data might be hidden          PHYSICAL EXAM:  Physical Exam  Pulmonary:      Effort: Pulmonary effort is normal.      Breath sounds: Normal breath sounds.          TOXICITY  Toxicity Assessment 2019   Toxicity Assessment Chest Chest Chest   Fatigue (lethargy, malaise, asthenia) None None None   Radiation Dermatitis None None None   Anorexia None None None   Dyspepsia/heartburn  None None None   Dysphagia, Esophagitis, odynophagoa (painful swallowing) None None None   RT Dysphagia-Esophageal None None None   Cough Absent Absent Absent   Dyspnea Normal Normal Normal         IMPRESSION:  Cancer Staging  Primary cancer of left lower lobe of lung (HCC)  Staging form: Lung, AJCC 8th Edition  - Clinical: Stage IIIA (cT2a, cN2, cM0) - Signed by Jj Martino M.D. on 7/6/2021      PLAN:  No change in treatment plan    Disposition:  Treatment plan reviewed. Questions answered. Continue therapy outlined.     Jj Martino M.D.    No orders of the defined types were placed in this encounter.

## 2021-07-29 ENCOUNTER — TELEPHONE (OUTPATIENT)
Dept: CARDIOLOGY | Facility: MEDICAL CENTER | Age: 76
End: 2021-07-29

## 2021-07-29 ENCOUNTER — HOSPITAL ENCOUNTER (OUTPATIENT)
Dept: RADIATION ONCOLOGY | Facility: MEDICAL CENTER | Age: 76
End: 2021-07-29
Payer: MEDICARE

## 2021-07-29 LAB
CHEMOTHERAPY INFUSION START DATE: NORMAL
CHEMOTHERAPY RECORDS: 2
CHEMOTHERAPY RECORDS: 6000
CHEMOTHERAPY RECORDS: NORMAL
CHEMOTHERAPY RX CANCER: NORMAL
DATE 1ST CHEMO CANCER: NORMAL
RAD ONC ARIA COURSE LAST TREATMENT DATE: NORMAL
RAD ONC ARIA COURSE TREATMENT ELAPSED DAYS: NORMAL
RAD ONC ARIA REFERENCE POINT DOSAGE GIVEN TO DATE: 18
RAD ONC ARIA REFERENCE POINT DOSAGE GIVEN TO DATE: 18.82
RAD ONC ARIA REFERENCE POINT ID: NORMAL
RAD ONC ARIA REFERENCE POINT ID: NORMAL
RAD ONC ARIA REFERENCE POINT SESSION DOSAGE GIVEN: 2
RAD ONC ARIA REFERENCE POINT SESSION DOSAGE GIVEN: 2.09

## 2021-07-29 PROCEDURE — 77386 HCHG IMRT DELIVERY COMPLEX: CPT | Performed by: RADIOLOGY

## 2021-07-29 PROCEDURE — 77014 PR CT GUIDANCE PLACEMENT RAD THERAPY FIELDS: CPT | Mod: 26 | Performed by: RADIOLOGY

## 2021-07-29 NOTE — TELEPHONE ENCOUNTER
CATHERINE Mo from [facility] called asking for the last visit notes faxed to [phone].    Thank you

## 2021-07-30 ENCOUNTER — HOSPITAL ENCOUNTER (OUTPATIENT)
Dept: RADIATION ONCOLOGY | Facility: MEDICAL CENTER | Age: 76
End: 2021-07-30
Payer: MEDICARE

## 2021-07-30 LAB
CHEMOTHERAPY INFUSION START DATE: NORMAL
CHEMOTHERAPY RECORDS: 2
CHEMOTHERAPY RECORDS: 6000
CHEMOTHERAPY RECORDS: NORMAL
CHEMOTHERAPY RX CANCER: NORMAL
DATE 1ST CHEMO CANCER: NORMAL
RAD ONC ARIA COURSE LAST TREATMENT DATE: NORMAL
RAD ONC ARIA COURSE TREATMENT ELAPSED DAYS: NORMAL
RAD ONC ARIA REFERENCE POINT DOSAGE GIVEN TO DATE: 20
RAD ONC ARIA REFERENCE POINT DOSAGE GIVEN TO DATE: 20.91
RAD ONC ARIA REFERENCE POINT ID: NORMAL
RAD ONC ARIA REFERENCE POINT ID: NORMAL
RAD ONC ARIA REFERENCE POINT SESSION DOSAGE GIVEN: 2
RAD ONC ARIA REFERENCE POINT SESSION DOSAGE GIVEN: 2.09

## 2021-07-30 PROCEDURE — 77336 RADIATION PHYSICS CONSULT: CPT | Performed by: RADIOLOGY

## 2021-07-30 PROCEDURE — 77386 HCHG IMRT DELIVERY COMPLEX: CPT | Performed by: RADIOLOGY

## 2021-07-30 PROCEDURE — 77427 RADIATION TX MANAGEMENT X5: CPT | Performed by: RADIOLOGY

## 2021-07-30 PROCEDURE — 77014 PR CT GUIDANCE PLACEMENT RAD THERAPY FIELDS: CPT | Mod: 26 | Performed by: RADIOLOGY

## 2021-08-02 ENCOUNTER — HOSPITAL ENCOUNTER (OUTPATIENT)
Dept: RADIATION ONCOLOGY | Facility: MEDICAL CENTER | Age: 76
End: 2021-08-31
Attending: RADIOLOGY
Payer: MEDICARE

## 2021-08-02 ENCOUNTER — HOSPITAL ENCOUNTER (OUTPATIENT)
Dept: RADIATION ONCOLOGY | Facility: MEDICAL CENTER | Age: 76
End: 2021-08-02

## 2021-08-02 LAB
CHEMOTHERAPY INFUSION START DATE: NORMAL
CHEMOTHERAPY RECORDS: 2
CHEMOTHERAPY RECORDS: 6000
CHEMOTHERAPY RECORDS: NORMAL
CHEMOTHERAPY RX CANCER: NORMAL
DATE 1ST CHEMO CANCER: NORMAL
RAD ONC ARIA COURSE LAST TREATMENT DATE: NORMAL
RAD ONC ARIA COURSE TREATMENT ELAPSED DAYS: NORMAL
RAD ONC ARIA REFERENCE POINT DOSAGE GIVEN TO DATE: 22
RAD ONC ARIA REFERENCE POINT DOSAGE GIVEN TO DATE: 23
RAD ONC ARIA REFERENCE POINT ID: NORMAL
RAD ONC ARIA REFERENCE POINT ID: NORMAL
RAD ONC ARIA REFERENCE POINT SESSION DOSAGE GIVEN: 2
RAD ONC ARIA REFERENCE POINT SESSION DOSAGE GIVEN: 2.09

## 2021-08-02 PROCEDURE — 77386 HCHG IMRT DELIVERY COMPLEX: CPT | Performed by: RADIOLOGY

## 2021-08-02 PROCEDURE — 77014 PR CT GUIDANCE PLACEMENT RAD THERAPY FIELDS: CPT | Mod: 26 | Performed by: RADIOLOGY

## 2021-08-03 ENCOUNTER — HOSPITAL ENCOUNTER (OUTPATIENT)
Dept: RADIATION ONCOLOGY | Facility: MEDICAL CENTER | Age: 76
End: 2021-08-03
Payer: MEDICARE

## 2021-08-03 LAB
CHEMOTHERAPY INFUSION START DATE: NORMAL
CHEMOTHERAPY RECORDS: 2
CHEMOTHERAPY RECORDS: 6000
CHEMOTHERAPY RECORDS: NORMAL
CHEMOTHERAPY RX CANCER: NORMAL
DATE 1ST CHEMO CANCER: NORMAL
RAD ONC ARIA COURSE LAST TREATMENT DATE: NORMAL
RAD ONC ARIA COURSE TREATMENT ELAPSED DAYS: NORMAL
RAD ONC ARIA REFERENCE POINT DOSAGE GIVEN TO DATE: 24
RAD ONC ARIA REFERENCE POINT DOSAGE GIVEN TO DATE: 25.09
RAD ONC ARIA REFERENCE POINT ID: NORMAL
RAD ONC ARIA REFERENCE POINT ID: NORMAL
RAD ONC ARIA REFERENCE POINT SESSION DOSAGE GIVEN: 2
RAD ONC ARIA REFERENCE POINT SESSION DOSAGE GIVEN: 2.09

## 2021-08-03 PROCEDURE — 77014 PR CT GUIDANCE PLACEMENT RAD THERAPY FIELDS: CPT | Mod: 26 | Performed by: RADIOLOGY

## 2021-08-03 PROCEDURE — 77386 HCHG IMRT DELIVERY COMPLEX: CPT | Performed by: RADIOLOGY

## 2021-08-04 ENCOUNTER — HOSPITAL ENCOUNTER (OUTPATIENT)
Dept: RADIATION ONCOLOGY | Facility: MEDICAL CENTER | Age: 76
End: 2021-08-04
Payer: MEDICARE

## 2021-08-04 ENCOUNTER — TELEPHONE (OUTPATIENT)
Dept: CARDIOLOGY | Facility: MEDICAL CENTER | Age: 76
End: 2021-08-04

## 2021-08-04 VITALS
TEMPERATURE: 96.9 F | WEIGHT: 146 LBS | SYSTOLIC BLOOD PRESSURE: 112 MMHG | OXYGEN SATURATION: 99 % | BODY MASS INDEX: 22.2 KG/M2 | HEART RATE: 78 BPM | DIASTOLIC BLOOD PRESSURE: 59 MMHG

## 2021-08-04 LAB
CHEMOTHERAPY INFUSION START DATE: NORMAL
CHEMOTHERAPY RECORDS: 2
CHEMOTHERAPY RECORDS: 6000
CHEMOTHERAPY RECORDS: NORMAL
CHEMOTHERAPY RX CANCER: NORMAL
DATE 1ST CHEMO CANCER: NORMAL
RAD ONC ARIA COURSE LAST TREATMENT DATE: NORMAL
RAD ONC ARIA COURSE TREATMENT ELAPSED DAYS: NORMAL
RAD ONC ARIA REFERENCE POINT DOSAGE GIVEN TO DATE: 26
RAD ONC ARIA REFERENCE POINT DOSAGE GIVEN TO DATE: 27.19
RAD ONC ARIA REFERENCE POINT ID: NORMAL
RAD ONC ARIA REFERENCE POINT ID: NORMAL
RAD ONC ARIA REFERENCE POINT SESSION DOSAGE GIVEN: 2
RAD ONC ARIA REFERENCE POINT SESSION DOSAGE GIVEN: 2.09

## 2021-08-04 PROCEDURE — 77386 HCHG IMRT DELIVERY COMPLEX: CPT | Performed by: RADIOLOGY

## 2021-08-04 PROCEDURE — 77014 PR CT GUIDANCE PLACEMENT RAD THERAPY FIELDS: CPT | Mod: 26 | Performed by: RADIOLOGY

## 2021-08-04 ASSESSMENT — PAIN SCALES - GENERAL: PAINLEVEL: NO PAIN

## 2021-08-04 ASSESSMENT — FIBROSIS 4 INDEX: FIB4 SCORE: 3.86

## 2021-08-04 NOTE — ON TREATMENT VISIT
ON TREATMENT NOTE  RADIATION ONCOLOGY DEPARTMENT    Patient name:  Pito Black    Primary Physician:  Nicolas Milligan, M.D. MRN: [id number]  CSN: [id number]   Referring physician:  Cristhian Galeano M.D. : 1945, 76 y.o.     ENCOUNTER DATE:  21    DIAGNOSIS:    Primary cancer of left lower lobe of lung (HCC)  Staging form: Lung, AJCC 8th Edition  - Clinical: Stage IIIA (cT2a, cN2, cM0) - Signed by Jj Martino M.D. on 2021      TREATMENT SUMMARY:  [facility] Treatment Information        Some values may be hidden. Unless noted otherwise, only the newest values recorded on each date are displayed.         Aria Treatment Summary 21   Course First Treatment Date 2021   Course Last Treatment Date 2021   L Lung&Nodes Plan from Course C2_LLLLung   Fraction 13 of 30   Elapsed Course Days 16 @ [id number]   Prescribed Fraction Dose 200 cGy   Prescribed Total Dose 6,000 cGy   LLL cp Reference Point from Course C2_LLLLung   Elapsed Course Days 16 @ [id number]   Session Dose 209 cGy   Total Dose 2,719 cGy   PTV_6000 LLL Reference Point from Course C2_LLLLung   Elapsed Course Days 16 @ [id number]   Session Dose 200 cGy   Total Dose 2,600 cGy              SUBJECTIVE:   Patient is doing well.  He does not have any untoward effects he would attribute to his treatment.    VITAL SIGNS:    Encounter Vitals  Temperature: 36.1 °C (96.9 °F)  Blood Pressure : 112/59  Pulse: 78  Pulse Oximetry: 99 %  Weight: 66.2 kg (146 lb)  Pain Score: No pain  Pain Assessment 2021   Pain Score 0 0 0 0   Some recent data might be hidden          PHYSICAL EXAM:  Physical Exam  Pulmonary:      Effort: Pulmonary effort is normal.      Breath sounds: Normal breath sounds.          TOXICITY  Toxicity Assessment 2019   Toxicity Assessment Chest Chest Chest Chest   Fatigue (lethargy, malaise, asthenia) None None None None   Radiation Dermatitis None  None None None   Anorexia None None None None   Dyspepsia/heartburn None None None None   Dysphagia, Esophagitis, odynophagoa (painful swallowing) None None None None   RT Dysphagia-Esophageal None None None None   Cough Absent Absent Absent Absent   Dyspnea Normal Normal Normal Normal         IMPRESSION:  Cancer Staging  Primary cancer of left lower lobe of lung (HCC)  Staging form: Lung, AJCC 8th Edition  - Clinical: Stage IIIA (cT2a, cN2, cM0) - Signed by Jj Martino M.D. on 7/6/2021      PLAN:  No change in treatment plan    Disposition:  Treatment plan reviewed. Questions answered. Continue therapy outlined.     Jj Martino M.D.    No orders of the defined types were placed in this encounter.

## 2021-08-05 ENCOUNTER — HOSPITAL ENCOUNTER (OUTPATIENT)
Dept: RADIATION ONCOLOGY | Facility: MEDICAL CENTER | Age: 76
End: 2021-08-05
Payer: MEDICARE

## 2021-08-05 LAB
CHEMOTHERAPY INFUSION START DATE: NORMAL
CHEMOTHERAPY RECORDS: 2
CHEMOTHERAPY RECORDS: 6000
CHEMOTHERAPY RECORDS: NORMAL
CHEMOTHERAPY RX CANCER: NORMAL
DATE 1ST CHEMO CANCER: NORMAL
RAD ONC ARIA COURSE LAST TREATMENT DATE: NORMAL
RAD ONC ARIA COURSE TREATMENT ELAPSED DAYS: NORMAL
RAD ONC ARIA REFERENCE POINT DOSAGE GIVEN TO DATE: 28
RAD ONC ARIA REFERENCE POINT DOSAGE GIVEN TO DATE: 29.28
RAD ONC ARIA REFERENCE POINT ID: NORMAL
RAD ONC ARIA REFERENCE POINT ID: NORMAL
RAD ONC ARIA REFERENCE POINT SESSION DOSAGE GIVEN: 2
RAD ONC ARIA REFERENCE POINT SESSION DOSAGE GIVEN: 2.09

## 2021-08-05 PROCEDURE — 77014 PR CT GUIDANCE PLACEMENT RAD THERAPY FIELDS: CPT | Mod: 26 | Performed by: RADIOLOGY

## 2021-08-05 PROCEDURE — 77386 HCHG IMRT DELIVERY COMPLEX: CPT | Performed by: RADIOLOGY

## 2021-08-05 RX ORDER — METOPROLOL TARTRATE 37.5 MG/1
37.5 TABLET, FILM COATED ORAL 2 TIMES DAILY
COMMUNITY
End: 2021-08-19

## 2021-08-05 RX ORDER — METOPROLOL SUCCINATE 25 MG/1
37.5 TABLET, EXTENDED RELEASE ORAL 2 TIMES DAILY
COMMUNITY
End: 2021-08-05 | Stop reason: CLARIF

## 2021-08-06 ENCOUNTER — HOSPITAL ENCOUNTER (OUTPATIENT)
Dept: RADIATION ONCOLOGY | Facility: MEDICAL CENTER | Age: 76
End: 2021-08-06
Payer: MEDICARE

## 2021-08-06 LAB
CHEMOTHERAPY INFUSION START DATE: NORMAL
CHEMOTHERAPY RECORDS: 2
CHEMOTHERAPY RECORDS: 6000
CHEMOTHERAPY RECORDS: NORMAL
CHEMOTHERAPY RX CANCER: NORMAL
DATE 1ST CHEMO CANCER: NORMAL
RAD ONC ARIA COURSE LAST TREATMENT DATE: NORMAL
RAD ONC ARIA COURSE TREATMENT ELAPSED DAYS: NORMAL
RAD ONC ARIA REFERENCE POINT DOSAGE GIVEN TO DATE: 30
RAD ONC ARIA REFERENCE POINT DOSAGE GIVEN TO DATE: 31.37
RAD ONC ARIA REFERENCE POINT ID: NORMAL
RAD ONC ARIA REFERENCE POINT ID: NORMAL
RAD ONC ARIA REFERENCE POINT SESSION DOSAGE GIVEN: 2
RAD ONC ARIA REFERENCE POINT SESSION DOSAGE GIVEN: 2.09

## 2021-08-06 PROCEDURE — 77336 RADIATION PHYSICS CONSULT: CPT | Performed by: RADIOLOGY

## 2021-08-06 PROCEDURE — 77014 PR CT GUIDANCE PLACEMENT RAD THERAPY FIELDS: CPT | Mod: 26 | Performed by: RADIOLOGY

## 2021-08-06 PROCEDURE — 77427 RADIATION TX MANAGEMENT X5: CPT | Performed by: RADIOLOGY

## 2021-08-06 PROCEDURE — 77386 HCHG IMRT DELIVERY COMPLEX: CPT | Performed by: RADIOLOGY

## 2021-08-09 ENCOUNTER — HOSPITAL ENCOUNTER (OUTPATIENT)
Dept: RADIATION ONCOLOGY | Facility: MEDICAL CENTER | Age: 76
End: 2021-08-09
Payer: MEDICARE

## 2021-08-09 LAB
CHEMOTHERAPY INFUSION START DATE: NORMAL
CHEMOTHERAPY RECORDS: 2
CHEMOTHERAPY RECORDS: 6000
CHEMOTHERAPY RECORDS: NORMAL
CHEMOTHERAPY RX CANCER: NORMAL
DATE 1ST CHEMO CANCER: NORMAL
RAD ONC ARIA COURSE LAST TREATMENT DATE: NORMAL
RAD ONC ARIA COURSE TREATMENT ELAPSED DAYS: NORMAL
RAD ONC ARIA REFERENCE POINT DOSAGE GIVEN TO DATE: 32
RAD ONC ARIA REFERENCE POINT DOSAGE GIVEN TO DATE: 33.46
RAD ONC ARIA REFERENCE POINT ID: NORMAL
RAD ONC ARIA REFERENCE POINT ID: NORMAL
RAD ONC ARIA REFERENCE POINT SESSION DOSAGE GIVEN: 2
RAD ONC ARIA REFERENCE POINT SESSION DOSAGE GIVEN: 2.09

## 2021-08-09 PROCEDURE — 77014 PR CT GUIDANCE PLACEMENT RAD THERAPY FIELDS: CPT | Mod: 26 | Performed by: RADIOLOGY

## 2021-08-09 PROCEDURE — 77386 HCHG IMRT DELIVERY COMPLEX: CPT | Performed by: RADIOLOGY

## 2021-08-10 ENCOUNTER — HOSPITAL ENCOUNTER (OUTPATIENT)
Dept: RADIATION ONCOLOGY | Facility: MEDICAL CENTER | Age: 76
End: 2021-08-10
Payer: MEDICARE

## 2021-08-10 LAB
CHEMOTHERAPY INFUSION START DATE: NORMAL
CHEMOTHERAPY RECORDS: 2
CHEMOTHERAPY RECORDS: 6000
CHEMOTHERAPY RECORDS: NORMAL
CHEMOTHERAPY RX CANCER: NORMAL
DATE 1ST CHEMO CANCER: NORMAL
RAD ONC ARIA COURSE LAST TREATMENT DATE: NORMAL
RAD ONC ARIA COURSE TREATMENT ELAPSED DAYS: NORMAL
RAD ONC ARIA REFERENCE POINT DOSAGE GIVEN TO DATE: 34
RAD ONC ARIA REFERENCE POINT DOSAGE GIVEN TO DATE: 35.55
RAD ONC ARIA REFERENCE POINT ID: NORMAL
RAD ONC ARIA REFERENCE POINT ID: NORMAL
RAD ONC ARIA REFERENCE POINT SESSION DOSAGE GIVEN: 2
RAD ONC ARIA REFERENCE POINT SESSION DOSAGE GIVEN: 2.09

## 2021-08-10 PROCEDURE — 77386 HCHG IMRT DELIVERY COMPLEX: CPT | Performed by: RADIOLOGY

## 2021-08-10 PROCEDURE — 77014 PR CT GUIDANCE PLACEMENT RAD THERAPY FIELDS: CPT | Mod: 26 | Performed by: RADIOLOGY

## 2021-08-11 ENCOUNTER — HOSPITAL ENCOUNTER (OUTPATIENT)
Dept: RADIATION ONCOLOGY | Facility: MEDICAL CENTER | Age: 76
End: 2021-08-11
Payer: MEDICARE

## 2021-08-11 VITALS
SYSTOLIC BLOOD PRESSURE: 107 MMHG | TEMPERATURE: 97.2 F | HEART RATE: 40 BPM | OXYGEN SATURATION: 91 % | DIASTOLIC BLOOD PRESSURE: 70 MMHG

## 2021-08-11 LAB
CHEMOTHERAPY INFUSION START DATE: NORMAL
CHEMOTHERAPY RECORDS: 2
CHEMOTHERAPY RECORDS: 6000
CHEMOTHERAPY RECORDS: NORMAL
CHEMOTHERAPY RX CANCER: NORMAL
DATE 1ST CHEMO CANCER: NORMAL
RAD ONC ARIA COURSE LAST TREATMENT DATE: NORMAL
RAD ONC ARIA COURSE TREATMENT ELAPSED DAYS: NORMAL
RAD ONC ARIA REFERENCE POINT DOSAGE GIVEN TO DATE: 36
RAD ONC ARIA REFERENCE POINT DOSAGE GIVEN TO DATE: 37.64
RAD ONC ARIA REFERENCE POINT ID: NORMAL
RAD ONC ARIA REFERENCE POINT ID: NORMAL
RAD ONC ARIA REFERENCE POINT SESSION DOSAGE GIVEN: 2
RAD ONC ARIA REFERENCE POINT SESSION DOSAGE GIVEN: 2.09

## 2021-08-11 PROCEDURE — 77386 HCHG IMRT DELIVERY COMPLEX: CPT | Performed by: RADIOLOGY

## 2021-08-11 PROCEDURE — 77014 PR CT GUIDANCE PLACEMENT RAD THERAPY FIELDS: CPT | Mod: 26 | Performed by: RADIOLOGY

## 2021-08-11 ASSESSMENT — PAIN SCALES - GENERAL: PAINLEVEL: NO PAIN

## 2021-08-11 NOTE — ON TREATMENT VISIT
ON TREATMENT NOTE  RADIATION ONCOLOGY DEPARTMENT    Patient name:  Pito Black    Primary Physician:  Nicolas Milligan, M.D. MRN: [id number]  CSN: [id number]   Referring physician:  Cristhian Galeano M.D. : 1945, 76 y.o.     ENCOUNTER DATE:  21    DIAGNOSIS:    Primary cancer of left lower lobe of lung (HCC)  Staging form: Lung, AJCC 8th Edition  - Clinical: Stage IIIA (cT2a, cN2, cM0) - Signed by Jj Martino M.D. on 2021      TREATMENT SUMMARY:  [facility] Treatment Information        Some values may be hidden. Unless noted otherwise, only the newest values recorded on each date are displayed.         Aria Treatment Summary 21   Course First Treatment Date 2021   Course Last Treatment Date 2021   L Lung&Nodes Plan from Course C2_LLLLung   Fraction 18 of 30   Elapsed Course Days  @ [id number]   Prescribed Fraction Dose 200 cGy   Prescribed Total Dose 6,000 cGy   LLL cp Reference Point from Course C2_LLLLung   Elapsed Course Days  @ [id number]   Session Dose 209 cGy   Total Dose 3,764 cGy   PTV_6000 LLL Reference Point from Course C2_LLLLung   Elapsed Course Days  @ [id number]   Session Dose 200 cGy   Total Dose 3,600 cGy              SUBJECTIVE:   Patient did not self-report but on direct questioning is starting to experience some faint dysphagia.  He does not feel any intervention or medication is required at this point.  We did discuss the natural history and possible progression of the symptoms and what action would be taken.    VITAL SIGNS:    Encounter Vitals  Temperature: 36.2 °C (97.2 °F)  Blood Pressure : 107/70  Pulse: (!) 40  Pulse Oximetry: 91 %  Pain Score: No pain  Pain Assessment 2021   Pain Score 0 0 0 0 0   Some recent data might be hidden          PHYSICAL EXAM:  Physical Exam  Genitourinary:     Comments: Normal         TOXICITY  Toxicity Assessment 2019    Toxicity Assessment Chest Chest Chest Chest Chest   Fatigue (lethargy, malaise, asthenia) None None None None None   Radiation Dermatitis None None None None None   Anorexia None None None None None   Dyspepsia/heartburn None None None None None   Dysphagia, Esophagitis, odynophagoa (painful swallowing) None None None None None   RT Dysphagia-Esophageal None None None None None   Cough Absent Absent Absent Absent Absent   Dyspnea Normal Normal Normal Normal Normal         IMPRESSION:  Cancer Staging  Primary cancer of left lower lobe of lung (HCC)  Staging form: Lung, AJCC 8th Edition  - Clinical: Stage IIIA (cT2a, cN2, cM0) - Signed by Jj Martino M.D. on 7/6/2021      PLAN:  No change in treatment plan    Disposition:  Treatment plan reviewed. Questions answered. Continue therapy outlined.     Jj Martino M.D.    No orders of the defined types were placed in this encounter.

## 2021-08-12 ENCOUNTER — HOSPITAL ENCOUNTER (OUTPATIENT)
Dept: RADIATION ONCOLOGY | Facility: MEDICAL CENTER | Age: 76
End: 2021-08-12
Payer: MEDICARE

## 2021-08-12 LAB
CHEMOTHERAPY INFUSION START DATE: NORMAL
CHEMOTHERAPY RECORDS: 2
CHEMOTHERAPY RECORDS: 6000
CHEMOTHERAPY RECORDS: NORMAL
CHEMOTHERAPY RX CANCER: NORMAL
DATE 1ST CHEMO CANCER: NORMAL
RAD ONC ARIA COURSE LAST TREATMENT DATE: NORMAL
RAD ONC ARIA COURSE TREATMENT ELAPSED DAYS: NORMAL
RAD ONC ARIA REFERENCE POINT DOSAGE GIVEN TO DATE: 38
RAD ONC ARIA REFERENCE POINT DOSAGE GIVEN TO DATE: 39.73
RAD ONC ARIA REFERENCE POINT ID: NORMAL
RAD ONC ARIA REFERENCE POINT ID: NORMAL
RAD ONC ARIA REFERENCE POINT SESSION DOSAGE GIVEN: 2
RAD ONC ARIA REFERENCE POINT SESSION DOSAGE GIVEN: 2.09

## 2021-08-12 PROCEDURE — 77014 PR CT GUIDANCE PLACEMENT RAD THERAPY FIELDS: CPT | Mod: 26 | Performed by: RADIOLOGY

## 2021-08-12 PROCEDURE — 77386 HCHG IMRT DELIVERY COMPLEX: CPT | Performed by: RADIOLOGY

## 2021-08-13 ENCOUNTER — HOSPITAL ENCOUNTER (OUTPATIENT)
Dept: RADIATION ONCOLOGY | Facility: MEDICAL CENTER | Age: 76
End: 2021-08-13
Payer: MEDICARE

## 2021-08-13 ENCOUNTER — TELEPHONE (OUTPATIENT)
Dept: CARDIOLOGY | Facility: MEDICAL CENTER | Age: 76
End: 2021-08-13

## 2021-08-13 LAB
CHEMOTHERAPY INFUSION START DATE: NORMAL
CHEMOTHERAPY RECORDS: 2
CHEMOTHERAPY RECORDS: 6000
CHEMOTHERAPY RECORDS: NORMAL
CHEMOTHERAPY RX CANCER: NORMAL
DATE 1ST CHEMO CANCER: NORMAL
RAD ONC ARIA COURSE LAST TREATMENT DATE: NORMAL
RAD ONC ARIA COURSE TREATMENT ELAPSED DAYS: NORMAL
RAD ONC ARIA REFERENCE POINT DOSAGE GIVEN TO DATE: 40
RAD ONC ARIA REFERENCE POINT DOSAGE GIVEN TO DATE: 41.82
RAD ONC ARIA REFERENCE POINT ID: NORMAL
RAD ONC ARIA REFERENCE POINT ID: NORMAL
RAD ONC ARIA REFERENCE POINT SESSION DOSAGE GIVEN: 2
RAD ONC ARIA REFERENCE POINT SESSION DOSAGE GIVEN: 2.09

## 2021-08-13 PROCEDURE — 77336 RADIATION PHYSICS CONSULT: CPT | Performed by: RADIOLOGY

## 2021-08-13 PROCEDURE — 77014 PR CT GUIDANCE PLACEMENT RAD THERAPY FIELDS: CPT | Mod: 26 | Performed by: RADIOLOGY

## 2021-08-13 PROCEDURE — 77427 RADIATION TX MANAGEMENT X5: CPT | Performed by: RADIOLOGY

## 2021-08-13 PROCEDURE — 93228 REMOTE 30 DAY ECG REV/REPORT: CPT | Performed by: INTERNAL MEDICINE

## 2021-08-13 PROCEDURE — 77386 HCHG IMRT DELIVERY COMPLEX: CPT | Performed by: RADIOLOGY

## 2021-08-13 NOTE — TELEPHONE ENCOUNTER
Jay Matos M.D.   8/13/2021  3:50 PM PDT Back to Top    I agree with Ms. 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   Yulisa Farley P.A.-C.   8/13/2021  2:33 PM PDT   ------------------------------------------------------------------  Niraj Rodriges, would call and let patient know he is NOT having any atrial fibrillation.   He is have a lot of PACs (where the top of the heart is beating earlier than it's supposed to).        If he can tolerate more metoprolol, we could increase from 1.5 tab BID to 2 tabs (I would write him a new prescription for 50 mg tab to be taken BID.     Check and see what his BP and HR are doing at home.   He is getting chemotherapy, so he should have his BP and HR monitored there as well.     I am also happy to call him personally on Monday if needed.      Thanks, Yulisa Farley PA-C  8/13/2021  ----------------------------------------------------------------------  Called pt [phone] no answer, left VM for pt to call office at [phone] regarding monitor event and medication change per MICHELLE and CATHERINE.

## 2021-08-16 ENCOUNTER — HOSPITAL ENCOUNTER (OUTPATIENT)
Dept: RADIATION ONCOLOGY | Facility: MEDICAL CENTER | Age: 76
End: 2021-08-16
Payer: MEDICARE

## 2021-08-16 LAB
CHEMOTHERAPY INFUSION START DATE: NORMAL
CHEMOTHERAPY RECORDS: 2
CHEMOTHERAPY RECORDS: 6000
CHEMOTHERAPY RECORDS: NORMAL
CHEMOTHERAPY RX CANCER: NORMAL
DATE 1ST CHEMO CANCER: NORMAL
RAD ONC ARIA COURSE LAST TREATMENT DATE: NORMAL
RAD ONC ARIA COURSE TREATMENT ELAPSED DAYS: NORMAL
RAD ONC ARIA REFERENCE POINT DOSAGE GIVEN TO DATE: 42
RAD ONC ARIA REFERENCE POINT DOSAGE GIVEN TO DATE: 43.92
RAD ONC ARIA REFERENCE POINT ID: NORMAL
RAD ONC ARIA REFERENCE POINT ID: NORMAL
RAD ONC ARIA REFERENCE POINT SESSION DOSAGE GIVEN: 2
RAD ONC ARIA REFERENCE POINT SESSION DOSAGE GIVEN: 2.09

## 2021-08-16 PROCEDURE — 77014 PR CT GUIDANCE PLACEMENT RAD THERAPY FIELDS: CPT | Mod: 26 | Performed by: RADIOLOGY

## 2021-08-16 PROCEDURE — 77386 HCHG IMRT DELIVERY COMPLEX: CPT | Performed by: RADIOLOGY

## 2021-08-16 NOTE — TELEPHONE ENCOUNTER
Called pt [phone] no answer, left VM for pt to call me at [phone] to relay AH and CW recommendations.

## 2021-08-17 LAB
MYCOBACTERIUM SPEC CULT: NORMAL
RHODAMINE-AURAMINE STN SPEC: NORMAL
SIGNIFICANT IND 70042: NORMAL
SITE SITE: NORMAL
SOURCE SOURCE: NORMAL

## 2021-08-18 ENCOUNTER — PATIENT OUTREACH (OUTPATIENT)
Dept: OTHER | Facility: MEDICAL CENTER | Age: 76
End: 2021-08-18

## 2021-08-18 NOTE — PROGRESS NOTES
On August 18, 2021, Oncology Social Worker Katy Nelson attempted telephone contact with pt.  OSW Omar left voicemail message for pt. requesting pt. call back at earliest convenience as she was notified pt. is in need of assistance with lodging.  OSW Omar left contact information in voicemail message.

## 2021-08-19 ENCOUNTER — TELEPHONE (OUTPATIENT)
Dept: CARDIOLOGY | Facility: MEDICAL CENTER | Age: 76
End: 2021-08-19

## 2021-08-19 ENCOUNTER — HOSPITAL ENCOUNTER (OUTPATIENT)
Dept: RADIATION ONCOLOGY | Facility: MEDICAL CENTER | Age: 76
End: 2021-08-19
Payer: MEDICARE

## 2021-08-19 ENCOUNTER — PATIENT OUTREACH (OUTPATIENT)
Dept: OTHER | Facility: MEDICAL CENTER | Age: 76
End: 2021-08-19

## 2021-08-19 VITALS
DIASTOLIC BLOOD PRESSURE: 62 MMHG | SYSTOLIC BLOOD PRESSURE: 98 MMHG | TEMPERATURE: 97.4 F | OXYGEN SATURATION: 98 % | HEART RATE: 54 BPM

## 2021-08-19 LAB
CHEMOTHERAPY INFUSION START DATE: NORMAL
CHEMOTHERAPY RECORDS: 2
CHEMOTHERAPY RECORDS: 6000
CHEMOTHERAPY RECORDS: NORMAL
CHEMOTHERAPY RX CANCER: NORMAL
DATE 1ST CHEMO CANCER: NORMAL
RAD ONC ARIA COURSE LAST TREATMENT DATE: NORMAL
RAD ONC ARIA COURSE TREATMENT ELAPSED DAYS: NORMAL
RAD ONC ARIA REFERENCE POINT DOSAGE GIVEN TO DATE: 44
RAD ONC ARIA REFERENCE POINT DOSAGE GIVEN TO DATE: 46.01
RAD ONC ARIA REFERENCE POINT ID: NORMAL
RAD ONC ARIA REFERENCE POINT ID: NORMAL
RAD ONC ARIA REFERENCE POINT SESSION DOSAGE GIVEN: 2
RAD ONC ARIA REFERENCE POINT SESSION DOSAGE GIVEN: 2.09

## 2021-08-19 PROCEDURE — 77014 PR CT GUIDANCE PLACEMENT RAD THERAPY FIELDS: CPT | Mod: 26 | Performed by: RADIOLOGY

## 2021-08-19 PROCEDURE — 77386 HCHG IMRT DELIVERY COMPLEX: CPT | Performed by: RADIOLOGY

## 2021-08-19 RX ORDER — METOPROLOL TARTRATE 50 MG/1
50 TABLET, FILM COATED ORAL 2 TIMES DAILY
Qty: 180 TABLET | Refills: 3 | Status: SHIPPED | OUTPATIENT
Start: 2021-08-19 | End: 2021-09-24

## 2021-08-19 ASSESSMENT — PAIN SCALES - GENERAL: PAINLEVEL: NO PAIN
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
## 2021-08-19 NOTE — PROGRESS NOTES
On August 19, 2021, Oncology Social Worker Katy Nelson processed and submitted [facility] completed application on pt.'s behalf to [facility] for review.

## 2021-08-19 NOTE — TELEPHONE ENCOUNTER
----- Message from Zahira Godinez R.N. sent at 8/13/2021 12:26 PM PDT -----  Please advise on what to say to pt. No FV. Thank you.
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
## 2021-08-19 NOTE — TELEPHONE ENCOUNTER
Called pt [phone] spoke with pt wife Cydney. Pt and wife are on the way to appt. She wants me to call back in 20 min.   I will follow up.
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
## 2021-08-20 ENCOUNTER — HOSPITAL ENCOUNTER (OUTPATIENT)
Dept: RADIATION ONCOLOGY | Facility: MEDICAL CENTER | Age: 76
End: 2021-08-20
Payer: MEDICARE

## 2021-08-20 LAB
CHEMOTHERAPY INFUSION START DATE: NORMAL
CHEMOTHERAPY RECORDS: 2
CHEMOTHERAPY RECORDS: 6000
CHEMOTHERAPY RECORDS: NORMAL
CHEMOTHERAPY RX CANCER: NORMAL
DATE 1ST CHEMO CANCER: NORMAL
RAD ONC ARIA COURSE LAST TREATMENT DATE: NORMAL
RAD ONC ARIA COURSE TREATMENT ELAPSED DAYS: NORMAL
RAD ONC ARIA REFERENCE POINT DOSAGE GIVEN TO DATE: 46
RAD ONC ARIA REFERENCE POINT DOSAGE GIVEN TO DATE: 48.1
RAD ONC ARIA REFERENCE POINT ID: NORMAL
RAD ONC ARIA REFERENCE POINT ID: NORMAL
RAD ONC ARIA REFERENCE POINT SESSION DOSAGE GIVEN: 2
RAD ONC ARIA REFERENCE POINT SESSION DOSAGE GIVEN: 2.09

## 2021-08-20 PROCEDURE — 77386 HCHG IMRT DELIVERY COMPLEX: CPT | Performed by: RADIOLOGY

## 2021-08-20 PROCEDURE — 77014 PR CT GUIDANCE PLACEMENT RAD THERAPY FIELDS: CPT | Mod: 26 | Performed by: RADIOLOGY

## 2021-08-23 ENCOUNTER — HOSPITAL ENCOUNTER (OUTPATIENT)
Dept: RADIATION ONCOLOGY | Facility: MEDICAL CENTER | Age: 76
End: 2021-08-23
Payer: MEDICARE

## 2021-08-23 LAB
CHEMOTHERAPY INFUSION START DATE: NORMAL
CHEMOTHERAPY RECORDS: 2
CHEMOTHERAPY RECORDS: 6000
CHEMOTHERAPY RECORDS: NORMAL
CHEMOTHERAPY RX CANCER: NORMAL
DATE 1ST CHEMO CANCER: NORMAL
RAD ONC ARIA COURSE LAST TREATMENT DATE: NORMAL
RAD ONC ARIA COURSE TREATMENT ELAPSED DAYS: NORMAL
RAD ONC ARIA REFERENCE POINT DOSAGE GIVEN TO DATE: 48
RAD ONC ARIA REFERENCE POINT DOSAGE GIVEN TO DATE: 50.19
RAD ONC ARIA REFERENCE POINT ID: NORMAL
RAD ONC ARIA REFERENCE POINT ID: NORMAL
RAD ONC ARIA REFERENCE POINT SESSION DOSAGE GIVEN: 2
RAD ONC ARIA REFERENCE POINT SESSION DOSAGE GIVEN: 2.09

## 2021-08-23 PROCEDURE — 77386 HCHG IMRT DELIVERY COMPLEX: CPT | Performed by: RADIOLOGY

## 2021-08-23 PROCEDURE — 77014 PR CT GUIDANCE PLACEMENT RAD THERAPY FIELDS: CPT | Mod: 26 | Performed by: RADIOLOGY

## 2021-08-24 ENCOUNTER — HOSPITAL ENCOUNTER (OUTPATIENT)
Dept: RADIATION ONCOLOGY | Facility: MEDICAL CENTER | Age: 76
End: 2021-08-24
Payer: MEDICARE

## 2021-08-24 LAB
CHEMOTHERAPY INFUSION START DATE: NORMAL
CHEMOTHERAPY RECORDS: 2
CHEMOTHERAPY RECORDS: 6000
CHEMOTHERAPY RECORDS: NORMAL
CHEMOTHERAPY RX CANCER: NORMAL
DATE 1ST CHEMO CANCER: NORMAL
RAD ONC ARIA COURSE LAST TREATMENT DATE: NORMAL
RAD ONC ARIA COURSE TREATMENT ELAPSED DAYS: NORMAL
RAD ONC ARIA REFERENCE POINT DOSAGE GIVEN TO DATE: 50
RAD ONC ARIA REFERENCE POINT DOSAGE GIVEN TO DATE: 52.28
RAD ONC ARIA REFERENCE POINT ID: NORMAL
RAD ONC ARIA REFERENCE POINT ID: NORMAL
RAD ONC ARIA REFERENCE POINT SESSION DOSAGE GIVEN: 2
RAD ONC ARIA REFERENCE POINT SESSION DOSAGE GIVEN: 2.09

## 2021-08-24 PROCEDURE — 77427 RADIATION TX MANAGEMENT X5: CPT | Performed by: RADIOLOGY

## 2021-08-24 PROCEDURE — 77386 HCHG IMRT DELIVERY COMPLEX: CPT | Performed by: RADIOLOGY

## 2021-08-24 PROCEDURE — 77014 PR CT GUIDANCE PLACEMENT RAD THERAPY FIELDS: CPT | Mod: 26 | Performed by: RADIOLOGY

## 2021-08-24 PROCEDURE — 77336 RADIATION PHYSICS CONSULT: CPT | Performed by: RADIOLOGY

## 2021-08-25 ENCOUNTER — HOSPITAL ENCOUNTER (OUTPATIENT)
Dept: RADIATION ONCOLOGY | Facility: MEDICAL CENTER | Age: 76
End: 2021-08-25
Payer: MEDICARE

## 2021-08-25 VITALS
SYSTOLIC BLOOD PRESSURE: 108 MMHG | BODY MASS INDEX: 22.56 KG/M2 | WEIGHT: 148.37 LBS | HEART RATE: 64 BPM | TEMPERATURE: 98.1 F | DIASTOLIC BLOOD PRESSURE: 72 MMHG | OXYGEN SATURATION: 99 %

## 2021-08-25 LAB
CHEMOTHERAPY INFUSION START DATE: NORMAL
CHEMOTHERAPY RECORDS: 2
CHEMOTHERAPY RECORDS: 6000
CHEMOTHERAPY RECORDS: NORMAL
CHEMOTHERAPY RX CANCER: NORMAL
DATE 1ST CHEMO CANCER: NORMAL
RAD ONC ARIA COURSE LAST TREATMENT DATE: NORMAL
RAD ONC ARIA COURSE TREATMENT ELAPSED DAYS: NORMAL
RAD ONC ARIA REFERENCE POINT DOSAGE GIVEN TO DATE: 52
RAD ONC ARIA REFERENCE POINT DOSAGE GIVEN TO DATE: 54.37
RAD ONC ARIA REFERENCE POINT ID: NORMAL
RAD ONC ARIA REFERENCE POINT ID: NORMAL
RAD ONC ARIA REFERENCE POINT SESSION DOSAGE GIVEN: 2
RAD ONC ARIA REFERENCE POINT SESSION DOSAGE GIVEN: 2.09

## 2021-08-25 PROCEDURE — 77014 PR CT GUIDANCE PLACEMENT RAD THERAPY FIELDS: CPT | Mod: 26 | Performed by: RADIOLOGY

## 2021-08-25 PROCEDURE — 77386 HCHG IMRT DELIVERY COMPLEX: CPT | Performed by: RADIOLOGY

## 2021-08-25 ASSESSMENT — PAIN SCALES - GENERAL: PAINLEVEL: NO PAIN

## 2021-08-25 ASSESSMENT — FIBROSIS 4 INDEX: FIB4 SCORE: 3.86

## 2021-08-25 NOTE — ON TREATMENT VISIT
ON TREATMENT NOTE  RADIATION ONCOLOGY DEPARTMENT    Patient name:  Pito Black    Primary Physician:  Nicolas Milligan, M.D. MRN: [id number]  [facility]: [id number]   Referring physician:  Cristhian Galeano M.D. : 1945, 76 y.o.     ENCOUNTER DATE:  21    DIAGNOSIS:    Primary cancer of left lower lobe of lung (HCC)  Staging form: Lung, AJCC 8th Edition  - Clinical: Stage IIIA (cT2a, cN2, cM0) - Signed by Jj Martino M.D. on 2021      TREATMENT SUMMARY:  [facility] Treatment Information        Some values may be hidden. Unless noted otherwise, only the newest values recorded on each date are displayed.         Aria Treatment Summary 21   Course First Treatment Date 2021   Course Last Treatment Date 2021   L Lung&Nodes Plan from Course C2_LLLLung   Fraction 26 of 30   Elapsed Course Days 37 @ [id number]   Prescribed Fraction Dose 200 cGy   Prescribed Total Dose 6,000 cGy   LLL cp Reference Point from Course C2_LLLLung   Elapsed Course Days 37 @ [id number]   Session Dose 209 cGy   Total Dose 5,437 cGy   PTV_6000 LLL Reference Point from Course C2_LLLLung   Elapsed Course Days 37 @ [id number]   Session Dose 200 cGy   Total Dose 5,200 cGy              SUBJECTIVE:   Patient is doing well. He does not have any dysphagia or symptoms he would attribute to his radiation.    VITAL SIGNS:    Encounter Vitals  Temperature: 36.7 °C (98.1 °F)  Temp src: Temporal  Blood Pressure : 108/72  Pulse: 64  Pulse Oximetry: 99 %  Weight: 67.3 kg (148 lb 5.9 oz)  Pain Score: No pain  Pain Assessment 2021   Pain Assessment Denies Pain - - - -   Pain Score 0 0 0 0 0   Some recent data might be hidden          PHYSICAL EXAM:  Physical Exam  Pulmonary:      Effort: Pulmonary effort is normal.      Breath sounds: Normal breath sounds.          TOXICITY  Toxicity Assessment 2019   Toxicity  Assessment Chest Chest Chest Chest Chest Chest Chest   Fatigue (lethargy, malaise, asthenia) None None None None None None None   Radiation Dermatitis None None None None None None None   Anorexia None None None None None None None   Dyspepsia/heartburn None None None None None None None   Dysphagia, Esophagitis, odynophagoa (painful swallowing) None None None None None None None   RT Dysphagia-Esophageal None None None None None None None   Cough Absent Absent Absent Absent Absent Absent Absent   Dyspnea Normal Normal Normal Normal Normal Normal Normal         IMPRESSION:  Cancer Staging  Primary cancer of left lower lobe of lung (HCC)  Staging form: Lung, AJCC 8th Edition  - Clinical: Stage IIIA (cT2a, cN2, cM0) - Signed by Jj Martino M.D. on 7/6/2021      PLAN:  No change in treatment plan    Disposition:  Treatment plan reviewed. Questions answered. Continue therapy outlined.     Jj Martino M.D.    No orders of the defined types were placed in this encounter.

## 2021-08-26 ENCOUNTER — HOSPITAL ENCOUNTER (OUTPATIENT)
Dept: RADIATION ONCOLOGY | Facility: MEDICAL CENTER | Age: 76
End: 2021-08-26
Payer: MEDICARE

## 2021-08-26 LAB
CHEMOTHERAPY INFUSION START DATE: NORMAL
CHEMOTHERAPY RECORDS: 2
CHEMOTHERAPY RECORDS: 6000
CHEMOTHERAPY RECORDS: NORMAL
CHEMOTHERAPY RX CANCER: NORMAL
DATE 1ST CHEMO CANCER: NORMAL
RAD ONC ARIA COURSE LAST TREATMENT DATE: NORMAL
RAD ONC ARIA COURSE TREATMENT ELAPSED DAYS: NORMAL
RAD ONC ARIA REFERENCE POINT DOSAGE GIVEN TO DATE: 54
RAD ONC ARIA REFERENCE POINT DOSAGE GIVEN TO DATE: 56.46
RAD ONC ARIA REFERENCE POINT ID: NORMAL
RAD ONC ARIA REFERENCE POINT ID: NORMAL
RAD ONC ARIA REFERENCE POINT SESSION DOSAGE GIVEN: 2
RAD ONC ARIA REFERENCE POINT SESSION DOSAGE GIVEN: 2.09

## 2021-08-26 PROCEDURE — 77014 PR CT GUIDANCE PLACEMENT RAD THERAPY FIELDS: CPT | Mod: 26 | Performed by: RADIOLOGY

## 2021-08-26 PROCEDURE — 77386 HCHG IMRT DELIVERY COMPLEX: CPT | Performed by: RADIOLOGY

## 2021-08-27 ENCOUNTER — HOSPITAL ENCOUNTER (OUTPATIENT)
Dept: RADIATION ONCOLOGY | Facility: MEDICAL CENTER | Age: 76
End: 2021-08-27
Payer: MEDICARE

## 2021-08-27 LAB
CHEMOTHERAPY INFUSION START DATE: NORMAL
CHEMOTHERAPY RECORDS: 2
CHEMOTHERAPY RECORDS: 6000
CHEMOTHERAPY RECORDS: NORMAL
CHEMOTHERAPY RX CANCER: NORMAL
DATE 1ST CHEMO CANCER: NORMAL
RAD ONC ARIA COURSE LAST TREATMENT DATE: NORMAL
RAD ONC ARIA COURSE TREATMENT ELAPSED DAYS: NORMAL
RAD ONC ARIA REFERENCE POINT DOSAGE GIVEN TO DATE: 56
RAD ONC ARIA REFERENCE POINT DOSAGE GIVEN TO DATE: 58.55
RAD ONC ARIA REFERENCE POINT ID: NORMAL
RAD ONC ARIA REFERENCE POINT ID: NORMAL
RAD ONC ARIA REFERENCE POINT SESSION DOSAGE GIVEN: 2
RAD ONC ARIA REFERENCE POINT SESSION DOSAGE GIVEN: 2.09

## 2021-08-27 PROCEDURE — 77014 PR CT GUIDANCE PLACEMENT RAD THERAPY FIELDS: CPT | Mod: 26 | Performed by: RADIOLOGY

## 2021-08-27 PROCEDURE — 77386 HCHG IMRT DELIVERY COMPLEX: CPT | Performed by: RADIOLOGY

## 2021-08-30 ENCOUNTER — HOSPITAL ENCOUNTER (OUTPATIENT)
Dept: RADIATION ONCOLOGY | Facility: MEDICAL CENTER | Age: 76
End: 2021-08-30
Payer: MEDICARE

## 2021-08-30 LAB
CHEMOTHERAPY INFUSION START DATE: NORMAL
CHEMOTHERAPY RECORDS: 2
CHEMOTHERAPY RECORDS: 6000
CHEMOTHERAPY RECORDS: NORMAL
CHEMOTHERAPY RX CANCER: NORMAL
DATE 1ST CHEMO CANCER: NORMAL
RAD ONC ARIA COURSE LAST TREATMENT DATE: NORMAL
RAD ONC ARIA COURSE TREATMENT ELAPSED DAYS: NORMAL
RAD ONC ARIA REFERENCE POINT DOSAGE GIVEN TO DATE: 58
RAD ONC ARIA REFERENCE POINT DOSAGE GIVEN TO DATE: 60.65
RAD ONC ARIA REFERENCE POINT ID: NORMAL
RAD ONC ARIA REFERENCE POINT ID: NORMAL
RAD ONC ARIA REFERENCE POINT SESSION DOSAGE GIVEN: 2
RAD ONC ARIA REFERENCE POINT SESSION DOSAGE GIVEN: 2.09

## 2021-08-30 PROCEDURE — 77014 PR CT GUIDANCE PLACEMENT RAD THERAPY FIELDS: CPT | Mod: 26 | Performed by: RADIOLOGY

## 2021-08-30 PROCEDURE — 77386 HCHG IMRT DELIVERY COMPLEX: CPT | Performed by: RADIOLOGY

## 2021-08-31 ENCOUNTER — HOSPITAL ENCOUNTER (OUTPATIENT)
Dept: RADIATION ONCOLOGY | Facility: MEDICAL CENTER | Age: 76
End: 2021-08-31
Payer: MEDICARE

## 2021-08-31 LAB
CHEMOTHERAPY INFUSION START DATE: NORMAL
CHEMOTHERAPY RECORDS: 2
CHEMOTHERAPY RECORDS: 6000
CHEMOTHERAPY RECORDS: NORMAL
CHEMOTHERAPY RX CANCER: NORMAL
DATE 1ST CHEMO CANCER: NORMAL
RAD ONC ARIA COURSE LAST TREATMENT DATE: NORMAL
RAD ONC ARIA COURSE TREATMENT ELAPSED DAYS: NORMAL
RAD ONC ARIA REFERENCE POINT DOSAGE GIVEN TO DATE: 60
RAD ONC ARIA REFERENCE POINT DOSAGE GIVEN TO DATE: 62.74
RAD ONC ARIA REFERENCE POINT ID: NORMAL
RAD ONC ARIA REFERENCE POINT ID: NORMAL
RAD ONC ARIA REFERENCE POINT SESSION DOSAGE GIVEN: 2
RAD ONC ARIA REFERENCE POINT SESSION DOSAGE GIVEN: 2.09

## 2021-08-31 PROCEDURE — 77014 PR CT GUIDANCE PLACEMENT RAD THERAPY FIELDS: CPT | Mod: 26 | Performed by: RADIOLOGY

## 2021-08-31 PROCEDURE — 77386 HCHG IMRT DELIVERY COMPLEX: CPT | Performed by: RADIOLOGY

## 2021-08-31 PROCEDURE — 77427 RADIATION TX MANAGEMENT X5: CPT | Performed by: RADIOLOGY

## 2021-08-31 PROCEDURE — 77336 RADIATION PHYSICS CONSULT: CPT | Performed by: RADIOLOGY

## 2021-09-01 LAB
CHEMOTHERAPY INFUSION START DATE: NORMAL
CHEMOTHERAPY INFUSION STOP DATE: NORMAL
CHEMOTHERAPY RECORDS: 2
CHEMOTHERAPY RECORDS: 6000
CHEMOTHERAPY RECORDS: NORMAL
CHEMOTHERAPY RX CANCER: NORMAL
DATE 1ST CHEMO CANCER: NORMAL
RAD ONC ARIA COURSE LAST TREATMENT DATE: NORMAL
RAD ONC ARIA COURSE TREATMENT ELAPSED DAYS: NORMAL
RAD ONC ARIA REFERENCE POINT DOSAGE GIVEN TO DATE: 60
RAD ONC ARIA REFERENCE POINT DOSAGE GIVEN TO DATE: 62.74
RAD ONC ARIA REFERENCE POINT ID: NORMAL
RAD ONC ARIA REFERENCE POINT ID: NORMAL

## 2021-09-08 ENCOUNTER — PATIENT MESSAGE (OUTPATIENT)
Dept: CARDIOLOGY | Facility: MEDICAL CENTER | Age: 76
End: 2021-09-08

## 2021-09-08 ENCOUNTER — TELEPHONE (OUTPATIENT)
Dept: CARDIOLOGY | Facility: MEDICAL CENTER | Age: 76
End: 2021-09-08

## 2021-09-08 NOTE — TELEPHONE ENCOUNTER
Attempted to call pt, unable to reach.  Left voicemail to return this call at their earliest convenience.    [employer] message sent to pt regarding MD recommendations, awaiting response.

## 2021-09-08 NOTE — TELEPHONE ENCOUNTER
AH    Pts wife Cydney states since increasing Pts dose of metoprolol he is dizzy and so tired he cannot walk. Cydney states Pt has stopped metoprolol. Please call Cydney back at [phone].    Thank you

## 2021-09-16 NOTE — TELEPHONE ENCOUNTER
Received call from reji Duong, regarding Cydney returning call.  Call transferred to this RN.  S/w Cydney and assisted her with attaching BP log to [employer] message.  [phone] given to Cydney in case of further assistance needed.  Offered to review BP log at this time, she states she will go ahead and attach log to [employer].  She is requesting advise if FV is needed, reassurance given once BP log is received further advise will be made.  She verbalizes understanding and states no other concerns or questions at this time.  She is appreciative of information received.    Awaiting for BP log to be received.

## 2021-09-17 NOTE — PROGRESS NOTES
Shyanne,     His BP/HR numbers look great, that should not account for him being dizzy.    Is he dizzy when he stands up? Changing position? At rest or with exertion?     Did is start after we changed his medication from amlodipine to metoprolol?    Is he ill? Or exposed to covid - have had several people with dizziness as a symptom of covid.    Does he know if his oxygen levels are ok?    Let me know,     Yulisa

## 2021-09-23 ENCOUNTER — TELEPHONE (OUTPATIENT)
Dept: CARDIOLOGY | Facility: MEDICAL CENTER | Age: 76
End: 2021-09-23

## 2021-09-23 NOTE — TELEPHONE ENCOUNTER
Upon chart review, received MyChart message from pt.    Findings relayed to covering APRN for advise.

## 2021-09-23 NOTE — TELEPHONE ENCOUNTER
AH     PT wife Cydney called because PT is still experiencing symptoms from the metoprolol tartrate. She stated he is not currently taking the medication.     Last PB reading 9/11/2021 /62 Heart Rate 64    Symptoms: dizziness, weakness, cough and cold hands and feet.     Thank you,    Erendira SPENCE

## 2021-09-24 ENCOUNTER — PATIENT MESSAGE (OUTPATIENT)
Dept: CARDIOLOGY | Facility: MEDICAL CENTER | Age: 76
End: 2021-09-24

## 2021-09-24 RX ORDER — METOPROLOL TARTRATE 50 MG/1
25 TABLET, FILM COATED ORAL 2 TIMES DAILY
COMMUNITY
End: 2021-10-14

## 2021-09-24 NOTE — TELEPHONE ENCOUNTER
Reason for Call    Medical Advice        Medical Advice  LULÚ Arriaza.  You 23 hours ago (4:50 PM)     Probably best to not suddenly stop metoprolol, can have rebound tachycardia/HTN, better to wean off. Take 25 mg bid instead of the 50mg, for 1 week and get back to us if there is any improvement with dizziness, continue to check BP and HR 2 hours after taking. Sounds like Metop was started for frequent PAC's.   With those BP's should not need to restart amlodipine, but it will help to see daily BP log.     Thanks   Isamar    Message text      Attempted to call pt, unable to reach.  Left voicemail to return this call at their earliest convenience.    [employer] message sent to pt regarding APRN recommendations.    Medication list updated, see pt message encounter.

## 2021-09-28 NOTE — TELEPHONE ENCOUNTER
Attempted to return Cydney's call, unable to reach.  Left voicemail to return this call at their earliest convenience.    Upon chart review, pt reviewed [employer] message this morning at 0956 that was sent to pt 9/24/2021

## 2021-09-28 NOTE — TELEPHONE ENCOUNTER
Pts wife Cydney returned call. Tried reaching nurse, but unavailable. Please call Cydney back at [phone].    Thank you

## 2021-09-28 NOTE — TELEPHONE ENCOUNTER
"Received call from Joe,  regarding Cydney returning call.  Call transferred to this RN.  She states pt was seen by PCP and was deferred to ED for fluids and blood transfusion as his BP was \"running 90's systolic\" since starting Metoprolol 25mg, BID.  She is requesting for pt to be scheduled for FV with HK.  Reassurance given that message will be sent to scheduling pool if call is disconnected.  She verbalizes understanding and states no other concerns or questions at this time.  Call transferred to schedulers, [phone].  "

## 2021-10-14 ENCOUNTER — OFFICE VISIT (OUTPATIENT)
Dept: CARDIOLOGY | Facility: MEDICAL CENTER | Age: 76
End: 2021-10-14
Payer: MEDICARE

## 2021-10-14 VITALS
RESPIRATION RATE: 14 BRPM | OXYGEN SATURATION: 99 % | DIASTOLIC BLOOD PRESSURE: 72 MMHG | BODY MASS INDEX: 22.13 KG/M2 | WEIGHT: 146 LBS | SYSTOLIC BLOOD PRESSURE: 136 MMHG | HEIGHT: 68 IN | HEART RATE: 68 BPM

## 2021-10-14 DIAGNOSIS — E78.5 DYSLIPIDEMIA: ICD-10-CM

## 2021-10-14 DIAGNOSIS — N18.30 STAGE 3 CHRONIC KIDNEY DISEASE, UNSPECIFIED WHETHER STAGE 3A OR 3B CKD: ICD-10-CM

## 2021-10-14 DIAGNOSIS — I10 PRIMARY HYPERTENSION: ICD-10-CM

## 2021-10-14 DIAGNOSIS — I48.0 PAF (PAROXYSMAL ATRIAL FIBRILLATION) (HCC): ICD-10-CM

## 2021-10-14 PROCEDURE — 99214 OFFICE O/P EST MOD 30 MIN: CPT | Performed by: INTERNAL MEDICINE

## 2021-10-14 ASSESSMENT — ENCOUNTER SYMPTOMS
CHILLS: 0
BRUISES/BLEEDS EASILY: 0
ORTHOPNEA: 0
HEMOPTYSIS: 0
EYES NEGATIVE: 1
MUSCULOSKELETAL NEGATIVE: 1
STRIDOR: 0
WEAKNESS: 0
CLAUDICATION: 0
LOSS OF CONSCIOUSNESS: 0
WHEEZING: 0
PALPITATIONS: 0
COUGH: 0
GASTROINTESTINAL NEGATIVE: 1
CARDIOVASCULAR NEGATIVE: 1
PND: 0
SPUTUM PRODUCTION: 0
RESPIRATORY NEGATIVE: 1
NEUROLOGICAL NEGATIVE: 1
SHORTNESS OF BREATH: 0
CONSTITUTIONAL NEGATIVE: 1
DIZZINESS: 0
SORE THROAT: 0
FEVER: 0

## 2021-10-14 ASSESSMENT — FIBROSIS 4 INDEX: FIB4 SCORE: 3.86

## 2021-10-14 NOTE — PROGRESS NOTES
Chief Complaint   Patient presents with   • HTN (Controlled)   • Atrial Fibrillation     F/V Dx: History of atrial fibrillation       Subjective     Pito Black is a 76 y.o. male who presents today as a follow-up for remote history of atrial fibrillation lung cancer irregular heart disease had mitral valve prolapse.  Since he was last seen his been seen by numerous providers he was noted a tachycardia with abnormal heart rate.  They told him he has atrial fibrillation he has had numerous EKGs in the system which I reviewed which all showed normal sinus rhythm with frequent PACs.  He wore an event recorder which showed no atrial fibrillation but again only PACs.  He gets no symptoms with this.  He was briefly taken losartan amlodipine and metoprolol high-dose but was getting dizzy and lightheaded.  He since stopped all medication is feeling signal improved.  He brings in a log of his blood pressure which I reviewed with him with it shows controlled heart rate and an average systolic less than 130.    Past Medical History:   Diagnosis Date   • Arrhythmia     hx of a fib   • Arthritis 2015    generalized, DDD back   • Asthma     inhaler    • Backpain 08/06/2018    9/10   • Cancer (HCC) 07/2017    left lung   • Cataract     bilateral, no surgery   • Dental disorder     lower partial, removable bridge   • Diabetes 08/06/2018    on no meds at this time, diet controlled   • Heart valve disease     mild mitral valve prolapse   • High cholesterol    • Hypertension      Past Surgical History:   Procedure Laterality Date   • PB BRONCHOSCOPY,DIAGNOSTIC  7/1/2021    Procedure: BRONCHOSCOPY - FIBER OPTIC WITH BRANCHOALVEOLAR LAVAGE BIOPSY, FNA, NAVIGATION;  Surgeon: Vinayak Carbajal M.D.;  Location: [facility];  Service: Pulmonary   • ENDOBRONCHIAL US ADD-ON  7/1/2021    Procedure: ENDOBRONCHIAL ULTRASOUND (EBUS).;  Surgeon: Vinayak Carbajal M.D.;  Location: [facility];  Service: Pulmonary   •  "CATARACT PHACO WITH IOL Left 2018    Procedure: CATARACT PHACO WITH IOL;  Surgeon: Juanito Hager M.D.;  Location: SURGERY SAME DAY [facility];  Service: Ophthalmology   • CATARACT PHACO WITH IOL Right 2018    Procedure: CATARACT PHACO WITH IOL;  Surgeon: Juanito Hager M.D.;  Location: SURGERY SAME DAY [facility];  Service: Ophthalmology   • THORACOSCOPY Left 2018    Procedure: THORACOSCOPY- WEDGE BIOPSY W/FROZEN SECTION;  Surgeon: Cristhian Galeano M.D.;  Location: SURGERY [facility];  Service: Thoracic   • EAR MIDDLE EXPLORATION Right 2015    Procedure: EAR MIDDLE EXPLORATION;  Surgeon: William Brandon M.D.;  Location: SURGERY SAME DAY [facility];  Service:    • OSSICULAR RECONSTRUCTION Right 2015    Procedure: OSSICULAR RECONSTRUCTION CHAIN POSSIBLE;  Surgeon: William Brandon M.D.;  Location: SURGERY SAME DAY [facility];  Service:    • OTHER      ear replacement \"many years ago\"   • OTHER ORTHOPEDIC SURGERY      right knee replacement      Family History   Problem Relation Age of Onset   • Cancer Father         stomach cancer     Social History     Socioeconomic History   • Marital status:      Spouse name: Not on file   • Number of children: Not on file   • Years of education: Not on file   • Highest education level: Not on file   Occupational History   • Not on file   Tobacco Use   • Smoking status: Former Smoker     Packs/day: 0.25     Years: 40.00     Pack years: 10.00     Types: Cigarettes     Quit date: 2005     Years since quittin.7   • Smokeless tobacco: Never Used   Vaping Use   • Vaping Use: Never used   Substance and Sexual Activity   • Alcohol use: Yes     Alcohol/week: 12.6 - 21.0 oz     Types: 21 - 35 Cans of beer per week     Comment: 3-5 beers a day   • Drug use: No   • Sexual activity: Not on file   Other Topics Concern   • Not on file   Social History Narrative   • Not on file     Social Determinants of Health     Financial Resource " Strain:    • Difficulty of Paying Living Expenses:    Food Insecurity:    • Worried About Running Out of Food in the Last Year:    • Ran Out of Food in the Last Year:    Transportation Needs:    • Lack of Transportation (Medical):    • Lack of Transportation (Non-Medical):    Physical Activity:    • Days of Exercise per Week:    • Minutes of Exercise per Session:    Stress:    • Feeling of Stress :    Social Connections:    • Frequency of Communication with Friends and Family:    • Frequency of Social Gatherings with Friends and Family:    • Attends [employer] Services:    • Active Member of Clubs or Organizations:    • Attends Club or Organization Meetings:    • Marital Status:    Intimate Partner Violence:    • Fear of Current or Ex-Partner:    • Emotionally Abused:    • Physically Abused:    • Sexually Abused:      No Known Allergies  Outpatient Encounter Medications as of 10/14/2021   Medication Sig Dispense Refill   • Cholecalciferol (VITAMIN D3 PO) Take  by mouth.     • acetaminophen (TYLENOL) 325 MG Tab Take 650 mg by mouth every four hours as needed.     • Fluticasone Furoate-Vilanterol (BREO ELLIPTA) 200-25 MCG/INH AEROSOL POWDER, BREATH ACTIVATED Inhale 1 Puff.     • cyclobenzaprine (FLEXERIL) 10 mg Tab Take 10 mg by mouth.     • ezetimibe (ZETIA) 10 MG TABS Take 10 mg by mouth every morning.     • [DISCONTINUED] metoprolol tartrate (LOPRESSOR) 50 MG Tab Take 25 mg by mouth 2 times a day.     • [DISCONTINUED] ondansetron (ZOFRAN) 4 MG Tab tablet      • [DISCONTINUED] losartan (COZAAR) 100 MG Tab Take 100 mg by mouth every morning.     • [DISCONTINUED] aspirin EC (ECOTRIN) 81 MG TBEC Take 81 mg by mouth every morning.       No facility-administered encounter medications on file as of 10/14/2021.     Review of Systems   Constitutional: Negative.  Negative for chills, fever and malaise/fatigue.   HENT: Negative.  Negative for sore throat.    Eyes: Negative.    Respiratory: Negative.  Negative for cough,  "hemoptysis, sputum production, shortness of breath, wheezing and stridor.    Cardiovascular: Negative.  Negative for chest pain, palpitations, orthopnea, claudication, leg swelling and PND.   Gastrointestinal: Negative.    Genitourinary: Negative.    Musculoskeletal: Negative.    Skin: Negative.    Neurological: Negative.  Negative for dizziness, loss of consciousness and weakness.   Endo/Heme/Allergies: Negative.  Does not bruise/bleed easily.   All other systems reviewed and are negative.             Objective     /72 (BP Location: Left arm, Patient Position: Sitting, BP Cuff Size: Adult)   Pulse 68   Resp 14   Ht 1.727 m (5' 8\")   Wt 66.2 kg (146 lb)   SpO2 99%   BMI 22.20 kg/m²     Physical Exam  Vitals and nursing note reviewed.   Constitutional:       General: He is not in acute distress.     Appearance: He is well-developed. He is not diaphoretic.   HENT:      Head: Normocephalic and atraumatic.      Right Ear: External ear normal.      Left Ear: External ear normal.      Nose: Nose normal.      Mouth/Throat:      Pharynx: No oropharyngeal exudate.   Eyes:      General: No scleral icterus.        Right eye: No discharge.         Left eye: No discharge.      Conjunctiva/sclera: Conjunctivae normal.      Pupils: Pupils are equal, round, and reactive to light.   Neck:      Vascular: No JVD.   Cardiovascular:      Rate and Rhythm: Normal rate and regular rhythm.      Heart sounds: No murmur heard.   No friction rub. No gallop.    Pulmonary:      Effort: Pulmonary effort is normal. No respiratory distress.      Breath sounds: No stridor. No wheezing or rales.   Chest:      Chest wall: No tenderness.   Abdominal:      General: There is no distension.      Palpations: Abdomen is soft.      Tenderness: There is no guarding.   Musculoskeletal:         General: No tenderness or deformity. Normal range of motion.      Cervical back: Neck supple.   Skin:     General: Skin is warm and dry.      Coloration: " Skin is not pale.      Findings: No erythema or rash.   Neurological:      Mental Status: He is alert.      Cranial Nerves: No cranial nerve deficit.      Motor: No abnormal muscle tone.      Coordination: Coordination normal.      Deep Tendon Reflexes: Reflexes are normal and symmetric. Reflexes normal.   Psychiatric:         Behavior: Behavior normal.         Thought Content: Thought content normal.         Judgment: Judgment normal.            Holter monitor: Scanned into media reviewed by myself showing frequent PACs but no atrial fibrillation.    EKG: Dated 6/28/2021 personally viewed inter myself showing normal sinus rhythm with PACs.    EKG: Dated 8/24/2020 personally viewed inter myself showing normal sinus rhythm with frequent PACs.    EKG: Dated 4/19/2018 personally viewed inter myself showing normal sinus rhythm with frequent PACs.    Assessment & Plan     1. Stage 3 chronic kidney disease, unspecified whether stage 3a or 3b CKD (HCC)     2. Dyslipidemia     3. Primary hypertension     4. PAF (paroxysmal atrial fibrillation) ([facility])         Medical Decision Making: Today's Assessment/Status/Plan:        76-year-old male with remote atrial fibrillation in the setting of a pneumothorax and no recurrence in 2 years by EKG history or Holter monitor.  At this point I advised him he can continue to be off medications.  Recommend test.  Guidance as to what to look for.  I would not recommend any further medical therapy.  He can follow-up as needed.

## 2021-10-18 ENCOUNTER — HOSPITAL ENCOUNTER (OUTPATIENT)
Dept: RADIOLOGY | Facility: MEDICAL CENTER | Age: 76
End: 2021-10-18

## 2022-01-30 ENCOUNTER — HOSPITAL ENCOUNTER (OUTPATIENT)
Dept: RADIOLOGY | Facility: MEDICAL CENTER | Age: 77
End: 2022-01-30
Attending: INTERNAL MEDICINE
Payer: MEDICARE

## 2022-04-21 ENCOUNTER — HOSPITAL ENCOUNTER (OUTPATIENT)
Dept: RADIOLOGY | Facility: MEDICAL CENTER | Age: 77
End: 2022-04-21
Attending: INTERNAL MEDICINE
Payer: MEDICARE

## 2022-06-24 ENCOUNTER — HOSPITAL ENCOUNTER (OUTPATIENT)
Facility: MEDICAL CENTER | Age: 77
End: 2022-06-24
Attending: NURSE PRACTITIONER
Payer: MEDICARE

## 2022-06-24 LAB
ANION GAP SERPL CALC-SCNC: 11 MMOL/L (ref 7–16)
BUN SERPL-MCNC: 16 MG/DL (ref 8–22)
CALCIUM SERPL-MCNC: 9.5 MG/DL (ref 8.5–10.5)
CHLORIDE SERPL-SCNC: 105 MMOL/L (ref 96–112)
CO2 SERPL-SCNC: 25 MMOL/L (ref 20–33)
CREAT SERPL-MCNC: 1.37 MG/DL (ref 0.5–1.4)
GFR SERPLBLD CREATININE-BSD FMLA CKD-EPI: 53 ML/MIN/1.73 M 2
GLUCOSE SERPL-MCNC: 84 MG/DL (ref 65–99)
POTASSIUM SERPL-SCNC: 5 MMOL/L (ref 3.6–5.5)
SODIUM SERPL-SCNC: 141 MMOL/L (ref 135–145)

## 2022-06-24 PROCEDURE — 80048 BASIC METABOLIC PNL TOTAL CA: CPT

## 2022-11-14 NOTE — PROGRESS NOTES
Cancer treatment summary/Survivorship care plan and information packet mailed to patient. Will follow up with phone call to review and answer questions.  Pt currently in close surveillance.   DATE OF ADMISSION:  11/15/2022     PREOPERATIVE DIAGNOSES:  1.  Dysmenorrhea, dyspareunia, history of endometriosis.  2.  Pelvic pain.  3.  Desires conservative surgical management.     PLANNED PROCEDURE:  Pelvic examination under anesthesia, pelviscopy, lysis of   adhesions, excision and fulguration of endometriosis implants, cystoscopy with   bladder distention and other medically necessary procedures.     HISTORY OF PRESENT ILLNESS:  The patient is a 23-year-old nulligravid,   sexually active woman with an unsure last menstrual period and a Liletta IUD   in place for contraception and cycle control, who has a history of   endometriosis and who has been having worsening pelvic pain.  The patient has   the Liletta IUD, but this is not helping her dysmenorrhea, dyspareunia, or   dysuria.  The patient underwent a transvaginal pelvic ultrasound on 6/22/2022,   which demonstrated that her uterus measured 5.8x3.5x3.7 cm.  Her endometrial   stripe measures 0.37 cm.  An IUD was noted in the proper fundal position.  Her   right ovary measured 2.6x1.6x2.3 cm.  Her left ovary measured 3.4x2.1x2.9 cm.     The patient had previously taken Orilissa for her endometriosis, but this is   also not helping her pelvic pain.     The risks, benefits and alternatives of the above procedure were discussed   with the patient and she agrees to proceed.     PAST MEDICAL HISTORY:  Pelvic pain, migraine headaches and endometriosis.     PAST SURGICAL HISTORY:  Laparoscopic excision and fulguration of endometriosis   implants on 12/2013, nasal septoplasty in 2013.     OBSTETRICAL HISTORY:  She is nulligravid.     GYNECOLOGIC HISTORY:  She has a history of an ASCUS Pap smear, history of   chlamydia.  She uses a Liletta IUD for contraception and cycle control.     ALLERGIES:  No known drug allergies.     FAMILY HISTORY:  Noncontributory.     REVIEW OF SYSTEMS:  Negative.     CURRENT MEDICATIONS:  Phentermine.     PHYSICAL EXAMINATION:  VITAL  SIGNS:  Blood pressure 122/80, pulse 103, temperature 98.2.  GENERAL:  Alert, awake and oriented x3, in no acute distress.  LUNGS:  Clear to auscultation bilaterally.  HEART:  Regular rate and rhythm.  No murmurs, rubs or gallops.  S1, S2.  ABDOMEN:  Soft and nontender.  GENITOURINARY:  Deferred.  EXTREMITIES:  No clubbing, cyanosis or edema.     LABORATORY DATA:  Preoperative laboratory studies are pending.     ASSESSMENT AND PLAN:  A 23-year-old nulligravid, sexually active woman with an   unsure last menstrual period and a Liletta IUD in place for contraception,   cycle control and control of her endometriosis, who has worsening pelvic pain   along with dysmenorrhea, dyspareunia and dysuria, who desires conservative   surgical management.     We will proceed to the operating room for a pelvic examination under   anesthesia, pelviscopy, lysis of adhesions, excision and fulguration of   endometriosis implants, cystoscopy with bladder distention and other medically   necessary procedures.     Risks, benefits and alternatives were discussed with the patient and she   agrees to proceed.        ______________________________  MD JASVIR Mantilla/DALILA/CHARLES    DD:  11/14/2022 09:07  DT:  11/14/2022 09:53    Job#:  [id number]

## 2023-01-31 ENCOUNTER — HOSPITAL ENCOUNTER (INPATIENT)
Facility: MEDICAL CENTER | Age: 78
LOS: 2 days | DRG: 180 | End: 2023-02-02
Attending: HOSPITALIST | Admitting: STUDENT IN AN ORGANIZED HEALTH CARE EDUCATION/TRAINING PROGRAM
Payer: MEDICARE

## 2023-01-31 ENCOUNTER — HOSPITAL ENCOUNTER (OUTPATIENT)
Dept: RADIOLOGY | Facility: MEDICAL CENTER | Age: 78
End: 2023-01-31

## 2023-01-31 ENCOUNTER — HOSPITAL ENCOUNTER (EMERGENCY)
Facility: MEDICAL CENTER | Age: 78
End: 2023-01-31
Payer: MEDICARE

## 2023-01-31 ENCOUNTER — TELEPHONE (OUTPATIENT)
Dept: SLEEP MEDICINE | Facility: MEDICAL CENTER | Age: 78
End: 2023-01-31
Payer: MEDICARE

## 2023-01-31 VITALS
HEART RATE: 60 BPM | DIASTOLIC BLOOD PRESSURE: 77 MMHG | WEIGHT: 150.13 LBS | OXYGEN SATURATION: 94 % | RESPIRATION RATE: 14 BRPM | SYSTOLIC BLOOD PRESSURE: 142 MMHG | BODY MASS INDEX: 22.75 KG/M2 | TEMPERATURE: 97.2 F | HEIGHT: 68 IN

## 2023-01-31 DIAGNOSIS — R73.9 HYPERGLYCEMIA, DRUG-INDUCED: ICD-10-CM

## 2023-01-31 DIAGNOSIS — J90 BILATERAL PLEURAL EFFUSION: ICD-10-CM

## 2023-01-31 DIAGNOSIS — J98.4 PNEUMONITIS: ICD-10-CM

## 2023-01-31 DIAGNOSIS — T50.905A HYPERGLYCEMIA, DRUG-INDUCED: ICD-10-CM

## 2023-01-31 PROCEDURE — 99204 OFFICE O/P NEW MOD 45 MIN: CPT | Mod: AI,GC | Performed by: STUDENT IN AN ORGANIZED HEALTH CARE EDUCATION/TRAINING PROGRAM

## 2023-01-31 PROCEDURE — 93005 ELECTROCARDIOGRAM TRACING: CPT | Performed by: STUDENT IN AN ORGANIZED HEALTH CARE EDUCATION/TRAINING PROGRAM

## 2023-01-31 PROCEDURE — 770004 HCHG ROOM/CARE - ONCOLOGY PRIVATE *

## 2023-01-31 PROCEDURE — 302449 STATCHG TRIAGE ONLY (STATISTIC)

## 2023-01-31 RX ORDER — POLYETHYLENE GLYCOL 3350 17 G/17G
1 POWDER, FOR SOLUTION ORAL
Status: DISCONTINUED | OUTPATIENT
Start: 2023-01-31 | End: 2023-02-02 | Stop reason: HOSPADM

## 2023-01-31 RX ORDER — BISACODYL 10 MG
10 SUPPOSITORY, RECTAL RECTAL
Status: DISCONTINUED | OUTPATIENT
Start: 2023-01-31 | End: 2023-02-02 | Stop reason: HOSPADM

## 2023-01-31 RX ORDER — ASPIRIN 81 MG/1
81 TABLET, CHEWABLE ORAL DAILY
Status: DISCONTINUED | OUTPATIENT
Start: 2023-02-01 | End: 2023-02-02 | Stop reason: HOSPADM

## 2023-01-31 RX ORDER — BUDESONIDE AND FORMOTEROL FUMARATE DIHYDRATE 160; 4.5 UG/1; UG/1
2 AEROSOL RESPIRATORY (INHALATION)
Status: DISCONTINUED | OUTPATIENT
Start: 2023-02-01 | End: 2023-02-01

## 2023-01-31 RX ORDER — ASPIRIN 81 MG/1
81 TABLET ORAL DAILY
Status: ON HOLD | COMMUNITY
End: 2023-02-21

## 2023-01-31 RX ORDER — AMOXICILLIN 250 MG
2 CAPSULE ORAL 2 TIMES DAILY
Status: DISCONTINUED | OUTPATIENT
Start: 2023-02-01 | End: 2023-02-02 | Stop reason: HOSPADM

## 2023-01-31 RX ORDER — VITAMIN B COMPLEX
1000 TABLET ORAL DAILY
Status: DISCONTINUED | OUTPATIENT
Start: 2023-02-01 | End: 2023-02-02 | Stop reason: HOSPADM

## 2023-01-31 RX ORDER — ONDANSETRON 2 MG/ML
4 INJECTION INTRAMUSCULAR; INTRAVENOUS EVERY 4 HOURS PRN
Status: DISCONTINUED | OUTPATIENT
Start: 2023-01-31 | End: 2023-02-02 | Stop reason: HOSPADM

## 2023-01-31 RX ORDER — FLUTICASONE FUROATE AND VILANTEROL 200; 25 UG/1; UG/1
1 POWDER RESPIRATORY (INHALATION)
Status: DISCONTINUED | OUTPATIENT
Start: 2023-02-01 | End: 2023-01-31

## 2023-01-31 RX ORDER — LABETALOL HYDROCHLORIDE 5 MG/ML
10 INJECTION, SOLUTION INTRAVENOUS EVERY 4 HOURS PRN
Status: DISCONTINUED | OUTPATIENT
Start: 2023-01-31 | End: 2023-02-02 | Stop reason: HOSPADM

## 2023-01-31 RX ORDER — ENOXAPARIN SODIUM 100 MG/ML
40 INJECTION SUBCUTANEOUS DAILY
Status: DISCONTINUED | OUTPATIENT
Start: 2023-02-01 | End: 2023-02-02 | Stop reason: HOSPADM

## 2023-01-31 RX ORDER — ONDANSETRON 4 MG/1
4 TABLET, ORALLY DISINTEGRATING ORAL EVERY 4 HOURS PRN
Status: DISCONTINUED | OUTPATIENT
Start: 2023-01-31 | End: 2023-02-02 | Stop reason: HOSPADM

## 2023-01-31 RX ORDER — ACETAMINOPHEN 325 MG/1
650 TABLET ORAL EVERY 6 HOURS PRN
Status: DISCONTINUED | OUTPATIENT
Start: 2023-01-31 | End: 2023-02-02 | Stop reason: HOSPADM

## 2023-01-31 RX ORDER — EZETIMIBE 10 MG/1
10 TABLET ORAL EVERY MORNING
Status: DISCONTINUED | OUTPATIENT
Start: 2023-02-01 | End: 2023-02-02 | Stop reason: HOSPADM

## 2023-01-31 ASSESSMENT — LIFESTYLE VARIABLES
TOTAL SCORE: 2
TOTAL SCORE: 2
CONSUMPTION TOTAL: POSITIVE
HOW MANY TIMES IN THE PAST YEAR HAVE YOU HAD 5 OR MORE DRINKS IN A DAY: 5
DOES PATIENT WANT TO STOP DRINKING: NO
AVERAGE NUMBER OF DAYS PER WEEK YOU HAVE A DRINK CONTAINING ALCOHOL: 7
ON A TYPICAL DAY WHEN YOU DRINK ALCOHOL HOW MANY DRINKS DO YOU HAVE: 4
EVER HAD A DRINK FIRST THING IN THE MORNING TO STEADY YOUR NERVES TO GET RID OF A HANGOVER: NO
EVER FELT BAD OR GUILTY ABOUT YOUR DRINKING: YES
TOTAL SCORE: 2
HAVE YOU EVER FELT YOU SHOULD CUT DOWN ON YOUR DRINKING: YES
HAVE PEOPLE ANNOYED YOU BY CRITICIZING YOUR DRINKING: NO
ALCOHOL_USE: YES

## 2023-01-31 ASSESSMENT — COGNITIVE AND FUNCTIONAL STATUS - GENERAL
MOVING FROM LYING ON BACK TO SITTING ON SIDE OF FLAT BED: A LITTLE
DRESSING REGULAR UPPER BODY CLOTHING: A LITTLE
WALKING IN HOSPITAL ROOM: A LITTLE
DAILY ACTIVITIY SCORE: 22
DRESSING REGULAR LOWER BODY CLOTHING: A LITTLE
MOBILITY SCORE: 19
SUGGESTED CMS G CODE MODIFIER DAILY ACTIVITY: CJ
SUGGESTED CMS G CODE MODIFIER MOBILITY: CK
CLIMB 3 TO 5 STEPS WITH RAILING: A LITTLE
STANDING UP FROM CHAIR USING ARMS: A LITTLE
MOVING TO AND FROM BED TO CHAIR: A LITTLE

## 2023-01-31 ASSESSMENT — PATIENT HEALTH QUESTIONNAIRE - PHQ9
6. FEELING BAD ABOUT YOURSELF - OR THAT YOU ARE A FAILURE OR HAVE LET YOURSELF OR YOUR FAMILY DOWN: NOT AL ALL
2. FEELING DOWN, DEPRESSED, IRRITABLE, OR HOPELESS: NOT AT ALL
9. THOUGHTS THAT YOU WOULD BE BETTER OFF DEAD, OR OF HURTING YOURSELF: NOT AT ALL
3. TROUBLE FALLING OR STAYING ASLEEP OR SLEEPING TOO MUCH: SEVERAL DAYS
7. TROUBLE CONCENTRATING ON THINGS, SUCH AS READING THE NEWSPAPER OR WATCHING TELEVISION: NOT AT ALL
8. MOVING OR SPEAKING SO SLOWLY THAT OTHER PEOPLE COULD HAVE NOTICED. OR THE OPPOSITE, BEING SO FIGETY OR RESTLESS THAT YOU HAVE BEEN MOVING AROUND A LOT MORE THAN USUAL: NOT AT ALL
SUM OF ALL RESPONSES TO PHQ QUESTIONS 1-9: 3
4. FEELING TIRED OR HAVING LITTLE ENERGY: SEVERAL DAYS
1. LITTLE INTEREST OR PLEASURE IN DOING THINGS: SEVERAL DAYS
SUM OF ALL RESPONSES TO PHQ9 QUESTIONS 1 AND 2: 1
5. POOR APPETITE OR OVEREATING: NOT AT ALL

## 2023-01-31 ASSESSMENT — FIBROSIS 4 INDEX
FIB4 SCORE: 3.96

## 2023-01-31 NOTE — TELEPHONE ENCOUNTER
"Pulmonary consultation over the phone.    I received a phone call from Dr Perea from the [facility] medical facility about patient Pito Black who is 78 y.o. with the medical problems of post obstructive pneumonia, hx of lung cancer, abnormal CT chest with RUL mass and lung cancer, DM, HTN, CAD. Last chemo was Nov 2022 and was told cancer was gone.    Last week saw PCP for SOB, was started on oxygen Friday and CT chest ordered showed pleural effusions with new pneumonia.    -He was seen at [facility] under the Chief complain is: \"SOB\"  -At initial presentation, he was found with tachycardia, tachypnea, and placed on 3 lt NC with good sats, s/p nebulizations s/p 1 lt NS, cefepime and hemodynamic stable.  -Patient has a history of LAD and hx of lung mass of LLL from 3/14/22 s/p biospies of stations 2R, 4R positive for adenocarcinoma performed by Dr Carbajal.  -Pertinent labs are not available but reported over the phone: Wbc 8, no troponins, elevated pro bnp.  -Imaging is available and CT reports bilateral moderate size pleural effusion. CXR bilateral pneumonia.      I have seen the images of CT chest myself and my interpretation is bilateral moderate pleural effusions with a round like mass in RUL and air bronchograms. There is also an area of consolidation in the MARIA M/lingula with air bronchograms.  Per physician who called, the question was if patient needs to get a bronchoscopy? At this point I believe no because could pushed him into mechanical ventilation. Recommend to continue treatment with antibiotics, oxygen and supportive care, including diuretics.   Patient might need outpatient workup with EBUS and/or PET scan and if he gets intubated for any reason, can potentially do a flexible bronchoscopy  at that time.       Tristen Jennings MD FACP  Pulmonary/Critical Care  Available through [facility].    "

## 2023-02-01 ENCOUNTER — APPOINTMENT (OUTPATIENT)
Dept: SLEEP MEDICINE | Facility: MEDICAL CENTER | Age: 78
End: 2023-02-01
Payer: MEDICARE

## 2023-02-01 ENCOUNTER — APPOINTMENT (OUTPATIENT)
Dept: RADIOLOGY | Facility: MEDICAL CENTER | Age: 78
DRG: 180 | End: 2023-02-01
Attending: INTERNAL MEDICINE
Payer: MEDICARE

## 2023-02-01 PROBLEM — J18.9 PNEUMONIA: Status: ACTIVE | Noted: 2023-02-01

## 2023-02-01 PROBLEM — C34.90 PRIMARY LUNG ADENOCARCINOMA (HCC): Status: ACTIVE | Noted: 2023-02-01

## 2023-02-01 PROBLEM — J96.01 ACUTE RESPIRATORY FAILURE WITH HYPOXIA (HCC): Status: ACTIVE | Noted: 2023-02-01

## 2023-02-01 PROBLEM — R91.8 LUNG MASS: Status: ACTIVE | Noted: 2023-02-01

## 2023-02-01 PROBLEM — J90 BILATERAL PLEURAL EFFUSION: Status: ACTIVE | Noted: 2023-02-01

## 2023-02-01 LAB
ALBUMIN SERPL BCP-MCNC: 3.5 G/DL (ref 3.2–4.9)
ALBUMIN/GLOB SERPL: 0.9 G/DL
ALP SERPL-CCNC: 75 U/L (ref 30–99)
ALT SERPL-CCNC: 19 U/L (ref 2–50)
ANION GAP SERPL CALC-SCNC: 12 MMOL/L (ref 7–16)
APPEARANCE FLD: NORMAL
APTT PPP: 31.2 SEC (ref 24.7–36)
AST SERPL-CCNC: 28 U/L (ref 12–45)
BASOPHILS # BLD AUTO: 0.4 % (ref 0–1.8)
BASOPHILS # BLD: 0.03 K/UL (ref 0–0.12)
BILIRUB SERPL-MCNC: 0.6 MG/DL (ref 0.1–1.5)
BODY FLD TYPE: NORMAL
BUN SERPL-MCNC: 18 MG/DL (ref 8–22)
CALCIUM ALBUM COR SERPL-MCNC: 9.8 MG/DL (ref 8.5–10.5)
CALCIUM SERPL-MCNC: 9.4 MG/DL (ref 8.5–10.5)
CHLORIDE SERPL-SCNC: 98 MMOL/L (ref 96–112)
CO2 SERPL-SCNC: 23 MMOL/L (ref 20–33)
COLOR FLD: YELLOW
CREAT SERPL-MCNC: 1.15 MG/DL (ref 0.5–1.4)
CYTOLOGY REG CYTOL: NORMAL
EKG IMPRESSION: NORMAL
EOSINOPHIL # BLD AUTO: 0.1 K/UL (ref 0–0.51)
EOSINOPHIL NFR BLD: 1.5 % (ref 0–6.9)
ERYTHROCYTE [DISTWIDTH] IN BLOOD BY AUTOMATED COUNT: 48.6 FL (ref 35.9–50)
GFR SERPLBLD CREATININE-BSD FMLA CKD-EPI: 65 ML/MIN/1.73 M 2
GLOBULIN SER CALC-MCNC: 3.9 G/DL (ref 1.9–3.5)
GLUCOSE FLD-MCNC: 144 MG/DL
GLUCOSE SERPL-MCNC: 104 MG/DL (ref 65–99)
HCT VFR BLD AUTO: 46.3 % (ref 42–52)
HGB BLD-MCNC: 16.1 G/DL (ref 14–18)
HISTIOCYTES NFR FLD: 9 %
IMM GRANULOCYTES # BLD AUTO: 0.04 K/UL (ref 0–0.11)
IMM GRANULOCYTES NFR BLD AUTO: 0.6 % (ref 0–0.9)
INR PPP: 1.01 (ref 0.87–1.13)
LDH FLD L TO P-CCNC: 82 U/L
LDH SERPL L TO P-CCNC: 404 U/L (ref 107–266)
LYMPHOCYTES # BLD AUTO: 1.01 K/UL (ref 1–4.8)
LYMPHOCYTES NFR BLD: 14.9 % (ref 22–41)
LYMPHOCYTES NFR FLD: 68 %
MAGNESIUM SERPL-MCNC: 2 MG/DL (ref 1.5–2.5)
MCH RBC QN AUTO: 34.6 PG (ref 27–33)
MCHC RBC AUTO-ENTMCNC: 34.8 G/DL (ref 33.7–35.3)
MCV RBC AUTO: 99.6 FL (ref 81.4–97.8)
MESOTHL CELL NFR FLD: 12 %
MONOCYTES # BLD AUTO: 0.62 K/UL (ref 0–0.85)
MONOCYTES NFR BLD AUTO: 9.2 % (ref 0–13.4)
MONONUC CELLS NFR FLD: 4 %
NEUTROPHILS # BLD AUTO: 4.97 K/UL (ref 1.82–7.42)
NEUTROPHILS NFR BLD: 73.4 % (ref 44–72)
NEUTROPHILS NFR FLD: 7 %
NRBC # BLD AUTO: 0 K/UL
NRBC BLD-RTO: 0 /100 WBC
NT-PROBNP SERPL IA-MCNC: 3905 PG/ML (ref 0–125)
PH FLD: 8 [PH]
PLATELET # BLD AUTO: 154 K/UL (ref 164–446)
PMV BLD AUTO: 10.6 FL (ref 9–12.9)
POTASSIUM SERPL-SCNC: 4.4 MMOL/L (ref 3.6–5.5)
PROCALCITONIN SERPL-MCNC: 0.08 NG/ML
PROT FLD-MCNC: 3 G/DL
PROT SERPL-MCNC: 7.1 G/DL (ref 6–8.2)
PROT SERPL-MCNC: 7.4 G/DL (ref 6–8.2)
PROTHROMBIN TIME: 13.2 SEC (ref 12–14.6)
RBC # BLD AUTO: 4.65 M/UL (ref 4.7–6.1)
RBC # FLD: 2000 CELLS/UL
SODIUM SERPL-SCNC: 133 MMOL/L (ref 135–145)
WBC # BLD AUTO: 6.8 K/UL (ref 4.8–10.8)
WBC # FLD: 509 CELLS/UL

## 2023-02-01 PROCEDURE — 88342 IMHCHEM/IMCYTCHM 1ST ANTB: CPT

## 2023-02-01 PROCEDURE — 0W9B3ZZ DRAINAGE OF LEFT PLEURAL CAVITY, PERCUTANEOUS APPROACH: ICD-10-PCS | Performed by: RADIOLOGY

## 2023-02-01 PROCEDURE — 700102 HCHG RX REV CODE 250 W/ 637 OVERRIDE(OP): Performed by: INTERNAL MEDICINE

## 2023-02-01 PROCEDURE — 36415 COLL VENOUS BLD VENIPUNCTURE: CPT

## 2023-02-01 PROCEDURE — 85730 THROMBOPLASTIN TIME PARTIAL: CPT

## 2023-02-01 PROCEDURE — 88112 CYTOPATH CELL ENHANCE TECH: CPT

## 2023-02-01 PROCEDURE — 84157 ASSAY OF PROTEIN OTHER: CPT

## 2023-02-01 PROCEDURE — 89051 BODY FLUID CELL COUNT: CPT

## 2023-02-01 PROCEDURE — A9270 NON-COVERED ITEM OR SERVICE: HCPCS | Performed by: STUDENT IN AN ORGANIZED HEALTH CARE EDUCATION/TRAINING PROGRAM

## 2023-02-01 PROCEDURE — 85025 COMPLETE CBC W/AUTO DIFF WBC: CPT

## 2023-02-01 PROCEDURE — 700102 HCHG RX REV CODE 250 W/ 637 OVERRIDE(OP): Performed by: STUDENT IN AN ORGANIZED HEALTH CARE EDUCATION/TRAINING PROGRAM

## 2023-02-01 PROCEDURE — A9270 NON-COVERED ITEM OR SERVICE: HCPCS | Performed by: NURSE PRACTITIONER

## 2023-02-01 PROCEDURE — 99233 SBSQ HOSP IP/OBS HIGH 50: CPT | Performed by: INTERNAL MEDICINE

## 2023-02-01 PROCEDURE — 83735 ASSAY OF MAGNESIUM: CPT

## 2023-02-01 PROCEDURE — 71045 X-RAY EXAM CHEST 1 VIEW: CPT

## 2023-02-01 PROCEDURE — 88305 TISSUE EXAM BY PATHOLOGIST: CPT

## 2023-02-01 PROCEDURE — 84145 PROCALCITONIN (PCT): CPT

## 2023-02-01 PROCEDURE — 83880 ASSAY OF NATRIURETIC PEPTIDE: CPT

## 2023-02-01 PROCEDURE — 83615 LACTATE (LD) (LDH) ENZYME: CPT

## 2023-02-01 PROCEDURE — 87205 SMEAR GRAM STAIN: CPT

## 2023-02-01 PROCEDURE — C1729 CATH, DRAINAGE: HCPCS

## 2023-02-01 PROCEDURE — A9270 NON-COVERED ITEM OR SERVICE: HCPCS | Performed by: INTERNAL MEDICINE

## 2023-02-01 PROCEDURE — 700102 HCHG RX REV CODE 250 W/ 637 OVERRIDE(OP): Performed by: NURSE PRACTITIONER

## 2023-02-01 PROCEDURE — 87075 CULTR BACTERIA EXCEPT BLOOD: CPT

## 2023-02-01 PROCEDURE — 700111 HCHG RX REV CODE 636 W/ 250 OVERRIDE (IP): Performed by: INTERNAL MEDICINE

## 2023-02-01 PROCEDURE — 87070 CULTURE OTHR SPECIMN AEROBIC: CPT

## 2023-02-01 PROCEDURE — 87015 SPECIMEN INFECT AGNT CONCNTJ: CPT

## 2023-02-01 PROCEDURE — 82945 GLUCOSE OTHER FLUID: CPT

## 2023-02-01 PROCEDURE — 87040 BLOOD CULTURE FOR BACTERIA: CPT

## 2023-02-01 PROCEDURE — 85610 PROTHROMBIN TIME: CPT

## 2023-02-01 PROCEDURE — 80053 COMPREHEN METABOLIC PANEL: CPT

## 2023-02-01 PROCEDURE — 700111 HCHG RX REV CODE 636 W/ 250 OVERRIDE (IP): Performed by: STUDENT IN AN ORGANIZED HEALTH CARE EDUCATION/TRAINING PROGRAM

## 2023-02-01 PROCEDURE — 93010 ELECTROCARDIOGRAM REPORT: CPT | Performed by: INTERNAL MEDICINE

## 2023-02-01 PROCEDURE — 84155 ASSAY OF PROTEIN SERUM: CPT

## 2023-02-01 PROCEDURE — 83986 ASSAY PH BODY FLUID NOS: CPT

## 2023-02-01 PROCEDURE — 770004 HCHG ROOM/CARE - ONCOLOGY PRIVATE *

## 2023-02-01 RX ORDER — OMEPRAZOLE 20 MG/1
20 CAPSULE, DELAYED RELEASE ORAL DAILY
Status: DISCONTINUED | OUTPATIENT
Start: 2023-02-01 | End: 2023-02-01

## 2023-02-01 RX ORDER — AZITHROMYCIN 500 MG/5ML
500 INJECTION, POWDER, LYOPHILIZED, FOR SOLUTION INTRAVENOUS EVERY 24 HOURS
Status: DISCONTINUED | OUTPATIENT
Start: 2023-02-01 | End: 2023-02-01

## 2023-02-01 RX ORDER — CHOLECALCIFEROL (VITAMIN D3) 125 MCG
5 CAPSULE ORAL NIGHTLY PRN
Status: DISCONTINUED | OUTPATIENT
Start: 2023-02-01 | End: 2023-02-02 | Stop reason: HOSPADM

## 2023-02-01 RX ORDER — BUDESONIDE AND FORMOTEROL FUMARATE DIHYDRATE 160; 4.5 UG/1; UG/1
2 AEROSOL RESPIRATORY (INHALATION) 2 TIMES DAILY
Status: DISCONTINUED | OUTPATIENT
Start: 2023-02-01 | End: 2023-02-02 | Stop reason: HOSPADM

## 2023-02-01 RX ORDER — FAMOTIDINE 20 MG/1
20 TABLET, FILM COATED ORAL DAILY
Status: DISCONTINUED | OUTPATIENT
Start: 2023-02-01 | End: 2023-02-02 | Stop reason: HOSPADM

## 2023-02-01 RX ORDER — ALBUTEROL SULFATE 90 UG/1
2 AEROSOL, METERED RESPIRATORY (INHALATION)
Status: DISCONTINUED | OUTPATIENT
Start: 2023-02-01 | End: 2023-02-02 | Stop reason: HOSPADM

## 2023-02-01 RX ORDER — FUROSEMIDE 10 MG/ML
40 INJECTION INTRAMUSCULAR; INTRAVENOUS
Status: DISCONTINUED | OUTPATIENT
Start: 2023-02-01 | End: 2023-02-02

## 2023-02-01 RX ORDER — AZITHROMYCIN 250 MG/1
500 TABLET, FILM COATED ORAL DAILY
Status: DISCONTINUED | OUTPATIENT
Start: 2023-02-01 | End: 2023-02-02 | Stop reason: HOSPADM

## 2023-02-01 RX ORDER — CHOLECALCIFEROL (VITAMIN D3) 125 MCG
5 CAPSULE ORAL ONCE
Status: COMPLETED | OUTPATIENT
Start: 2023-02-01 | End: 2023-02-01

## 2023-02-01 RX ADMIN — Medication 5 MG: at 20:42

## 2023-02-01 RX ADMIN — FAMOTIDINE 20 MG: 20 TABLET, FILM COATED ORAL at 17:39

## 2023-02-01 RX ADMIN — BUDESONIDE AND FORMOTEROL FUMARATE DIHYDRATE 2 PUFF: 160; 4.5 AEROSOL RESPIRATORY (INHALATION) at 05:34

## 2023-02-01 RX ADMIN — AZITHROMYCIN MONOHYDRATE 500 MG: 250 TABLET ORAL at 20:42

## 2023-02-01 RX ADMIN — FUROSEMIDE 40 MG: 10 INJECTION, SOLUTION INTRAMUSCULAR; INTRAVENOUS at 05:33

## 2023-02-01 RX ADMIN — PREDNISONE 60 MG: 10 TABLET ORAL at 13:14

## 2023-02-01 RX ADMIN — CEFTRIAXONE SODIUM 1000 MG: 10 INJECTION, POWDER, FOR SOLUTION INTRAVENOUS at 01:26

## 2023-02-01 RX ADMIN — AZITHROMYCIN FOR INJECTION INJECTION, POWDER, LYOPHILIZED, FOR SOLUTION 500 MG: 500 INJECTION INTRAVENOUS at 01:34

## 2023-02-01 RX ADMIN — BUDESONIDE AND FORMOTEROL FUMARATE DIHYDRATE 2 PUFF: 160; 4.5 AEROSOL RESPIRATORY (INHALATION) at 17:39

## 2023-02-01 RX ADMIN — Medication 5 MG: at 00:55

## 2023-02-01 RX ADMIN — EZETIMIBE 10 MG: 10 TABLET ORAL at 05:33

## 2023-02-01 RX ADMIN — ASPIRIN 81 MG: 81 TABLET, CHEWABLE ORAL at 05:32

## 2023-02-01 RX ADMIN — Medication 1000 UNITS: at 05:33

## 2023-02-01 ASSESSMENT — ENCOUNTER SYMPTOMS
DIZZINESS: 0
ORTHOPNEA: 0
PALPITATIONS: 0
FOCAL WEAKNESS: 0
DIARRHEA: 0
SPUTUM PRODUCTION: 0
DEPRESSION: 0
FALLS: 0
CHILLS: 0
HEADACHES: 0
WHEEZING: 1
COUGH: 1
BLURRED VISION: 0
CONSTIPATION: 0
MYALGIAS: 0
VOMITING: 0
BLOOD IN STOOL: 0
NAUSEA: 0
DIAPHORESIS: 0
FEVER: 0
SHORTNESS OF BREATH: 1
ABDOMINAL PAIN: 0
SORE THROAT: 0
NERVOUS/ANXIOUS: 0
WEAKNESS: 0

## 2023-02-01 ASSESSMENT — PAIN DESCRIPTION - PAIN TYPE: TYPE: ACUTE PAIN

## 2023-02-01 ASSESSMENT — COPD QUESTIONNAIRES
HAVE YOU SMOKED AT LEAST 100 CIGARETTES IN YOUR ENTIRE LIFE: YES
DO YOU EVER COUGH UP ANY MUCUS OR PHLEGM?: NO/ONLY WITH OCCASIONAL COLDS OR INFECTIONS
DURING THE PAST 4 WEEKS HOW MUCH DID YOU FEEL SHORT OF BREATH: SOME OF THE TIME
COPD SCREENING SCORE: 6

## 2023-02-01 NOTE — CARE PLAN
The patient is Watcher - Medium risk of patient condition declining or worsening    Shift Goals  Clinical Goals: [place] to unit, monitor O2  Patient Goals: Rest    Progress made toward(s) clinical / shift goals:    Problem: Knowledge Deficit - Standard  Goal: Patient and family/care givers will demonstrate understanding of plan of care, disease process/condition, diagnostic tests and medications  Outcome: Progressing  Note: Pt A/Ox4, pt oriented to unit. Pt understands importance of IV diuretics and IV abx. Family updated on plan of care and all questions answered at this time.      Problem: Fall Risk  Goal: Patient will remain free from falls  Outcome: Progressing  Note: Bed alarm on, bed locked and in lowest position, personal belongings within reach, call light within reach, nonslip socks on. Pt up SBA, but educated on importance of using call light before getting out of bed.        Patient is not progressing towards the following goals: N/A

## 2023-02-01 NOTE — ASSESSMENT & PLAN NOTE
Moderate bilateral pleural effusions seen on CT thorax  Unclear etiology could be secondary to recurrence of malignancy versus empyema    Plan:  IV diuresis  IR consultation for bilateral thoracentesis, holding VTE prophylaxis -okay to restart postprocedure unless fluid results consistent with empyema requiring chest tube placement  Pleural fluid study analysis

## 2023-02-01 NOTE — ASSESSMENT & PLAN NOTE
NSCLC follow-up with Dr. Ruiz  Can continue to follow with Dr. Ruiz as outpatient  Recommended for outpatient PET scan

## 2023-02-01 NOTE — H&P
[facility] Internal Medicine History & Physical Note    Date of Service  1/31/2023    [facility] Team: AILIN   Attending: Mica Winston M.d.  Senior Resident: Dr. Cabrera  Contact Number: [phone]    Primary Care Physician  Nicolas Milligan, M.D.    Consultants  None    Code Status  Full Code    Chief Complaint  No chief complaint on file.      History of Presenting Illness (HPI):   Pito Black is a 78 y.o. male who presented 1/31/2023 with history of metastatic prostate cancer with mets to the lungs and to the bones, hypertension, dyslipidemia, chronic kidney disease with a baseline creatinine about 1.1-1.2, who presented with 1 week history of shortness of breath and he was ultimately seen at the [place] ED and found to have bilateral moderate pleural effusions as well as a right upper lobe mass in the left upper lobe lingula consolidation concerning for a postobstructive pneumonia.  Patient was transferred to [facility] as direct admit.    He is followed by Dr. Ruiz is for oncology and last chemotherapy was in 11/2022 and thoughts at that time per discussion with family is that the cancer is gone and he was planning for follow-up CT scan imaging and visit with his oncologist this week.    He was also scheduled to follow-up with with pulmonologist soon as well.    I discussed the plan of care with patient, family, and bedside RN.    Review of Systems  Review of Systems   Constitutional:  Negative for chills, diaphoresis and fever.   Eyes:  Negative for blurred vision.   Respiratory:  Positive for cough, shortness of breath and wheezing. Negative for sputum production.    Cardiovascular:  Negative for chest pain, palpitations, orthopnea and leg swelling.   Gastrointestinal:  Negative for abdominal pain, blood in stool, constipation, diarrhea, nausea and vomiting.   Genitourinary:  Negative for dysuria and hematuria.   Musculoskeletal:  Negative for myalgias.   Neurological:  Negative for  dizziness, focal weakness, weakness and headaches.   All other systems reviewed and are negative.    Past Medical History   has a past medical history of Arrhythmia, Arthritis (2015), Asthma, Backpain (08/06/2018), Cancer (HCC) (07/2017), Cataract, Dental disorder, Diabetes (08/06/2018), Heart valve disease, High cholesterol, and Hypertension.    Surgical History   has a past surgical history that includes other orthopedic surgery; other; ear middle exploration (Right, 6/12/2015); ossicular reconstruction (Right, 6/12/2015); thoracoscopy (Left, 4/19/2018); cataract phaco with iol (Right, 8/7/2018); cataract phaco with iol (Left, 8/21/2018); pr bronchoscopy,diagnostic (7/1/2021); and endobronchial us add-on (7/1/2021).     Family History  family history includes Cancer in his father.   Family history reviewed with patient.     Social History  Tobacco: Former smoker, greater than at least 10-15-pack-years  Alcohol: 5 beers a day  Recreational drugs (illegal or prescription): Denies any illicit drug  Living Situation: Lives with wife  Recent Travel: Denies any recent travel  Primary Care Provider: Reviewed    Other (stressors, spirituality, exposures): Denies any recent stressors    Allergies  No Known Allergies    Medications  Prior to Admission Medications   Prescriptions Last Dose Informant Patient Reported? Taking?   Cholecalciferol (VITAMIN D3 PO) 1/31/2023 at 0900  Yes No   Sig: Take  by mouth.   Fluticasone Furoate-Vilanterol (BREO ELLIPTA) 200-25 MCG/INH AEROSOL POWDER, BREATH ACTIVATED 1/31/2023 at 0900  Yes No   Sig: Inhale 1 Puff.   acetaminophen (TYLENOL) 325 MG Tab 1/30/2023 at 1800  Yes No   Sig: Take 650 mg by mouth every four hours as needed.   aspirin (ASA) 81 MG Chew Tab chewable tablet 1/31/2023 at 0900  Yes Yes   Sig: Chew 81 mg every day.   cyclobenzaprine (FLEXERIL) 10 mg Tab 1/30/2023 at 1800  Yes No   Sig: Take 10 mg by mouth.   ezetimibe (ZETIA) 10 MG TABS 1/31/2023 at 0900 Patient Yes No    Sig: Take 10 mg by mouth every morning.      Facility-Administered Medications: None       Physical Exam  Temp:  [36.2 °C (97.2 °F)-36.9 °C (98.5 °F)] 36.9 °C (98.5 °F)  Pulse:  [] 106  Resp:  [14-20] 20  BP: (142-154)/(77-86) 154/86  SpO2:  [92 %-99 %] 99 %  Blood Pressure : (!) 154/86   Temperature: 36.9 °C (98.5 °F)   Pulse: (!) 106   Respiration: 20   Pulse Oximetry: 99 %       Physical Exam  Vitals and nursing note reviewed.   Constitutional:       General: He is not in acute distress.     Appearance: Normal appearance. He is not diaphoretic.   HENT:      Head: Normocephalic and atraumatic.      Nose: Nose normal.      Mouth/Throat:      Mouth: Mucous membranes are moist.   Eyes:      Conjunctiva/sclera: Conjunctivae normal.      Pupils: Pupils are equal, round, and reactive to light.   Cardiovascular:      Rate and Rhythm: Regular rhythm. Tachycardia present.      Pulses: Normal pulses.      Heart sounds: No murmur heard.  Pulmonary:      Effort: Pulmonary effort is normal. No respiratory distress.      Breath sounds: No rhonchi or rales.      Comments: Mild expiratory wheezing noted  Abdominal:      General: Abdomen is flat. There is no distension.      Palpations: Abdomen is soft.      Tenderness: There is no abdominal tenderness. There is no guarding.   Musculoskeletal:      Cervical back: Neck supple.      Right lower leg: No edema.      Left lower leg: No edema.   Skin:     General: Skin is warm and dry.      Capillary Refill: Capillary refill takes less than 2 seconds.   Neurological:      Mental Status: He is alert and oriented to person, place, and time. Mental status is at baseline.   Psychiatric:         Mood and Affect: Mood normal.       Laboratory:          No results for input(s): ALTSGPT, ASTSGOT, ALKPHOSPHAT, TBILIRUBIN, DBILIRUBIN, GAMMAGT, AMYLASE, LIPASE, ALB, PREALBUMIN, GLUCOSE in the last 72 hours.      No results for input(s): NTPROBNP in the last 72 hours.      No results for  input(s): TROPONINT in the last 72 hours.    Imaging:  No orders to display       EKG:  I have personally reviewed the images and compared with prior images.    Assessment/Plan:  Problem Representation:   I anticipate this patient will require at least two midnights for appropriate medical management, necessitating inpatient admission because postobstructive pneumonia with bilateral pleural effusion    Patient will need a Med/Surg bed on MEDICAL service .  The need is secondary to need for IV antibiotics for postobstructive pneumonia and thoracentesis for bilateral pleural effusions.    * Pneumonia  Assessment & Plan  Left upper lobe lingula consolidation seen on CT scan  Increasing shortness of breath with associated hypoxia  Status post cefepime at [place] [facility]    Plan:  Continue with pneumonia coverage with ceftriaxone and azithromycin  Follow blood cultures and sputum cultures    Acute respiratory failure with hypoxia (HCC)  Assessment & Plan  Secondary to postobstructive pneumonia as well as bilateral pleural effusions    Plan:  Empiric community-acquired pneumonia coverage with antibiotics  Diuresis with IV Lasix  IR consulted for bilateral thoracentesis both diagnostic and therapeutic  Wean supplemental oxygen as tolerated  Incentive spirometry  Continue home inhalers  Continue with rescue albuterol inhaler  RT consult    Bilateral pleural effusion  Assessment & Plan  Moderate bilateral pleural effusions seen on CT thorax  Unclear etiology could be secondary to recurrence of malignancy versus empyema    Plan:  IV diuresis  IR consultation for bilateral thoracentesis, holding VTE prophylaxis -okay to restart postprocedure unless fluid results consistent with empyema requiring chest tube placement  Pleural fluid study analysis    Primary lung adenocarcinoma (HCC)  Assessment & Plan  History of primary lung adenocarcinoma  Followed by Dr. Ruiz  Last chemotherapy in 11/2022  Right upper lobe mass seen on CT  scan    Plan:  Patient was planning to follow-up with oncology outpatient this week   Day team to consider inpatient oncology consultation  Per recommendations from pulmonology was contacted prior to transfer, may need outpatient work-up possibly including EBUS or PET scan    Dyslipidemia- (present on admission)  Assessment & Plan  Plan:  Continue home Zetia    CKD (chronic kidney disease) stage 3, GFR 30-59 ml/min ([facility])- (present on admission)  Assessment & Plan  Creatinine at baseline    Plan:  Avoid nephrotoxins  Renally dose medications as needed  Continue to monitor function while on diuretics        VTE prophylaxis: SCDs/TEDs

## 2023-02-01 NOTE — ASSESSMENT & PLAN NOTE
History of primary lung adenocarcinoma  Followed by Dr. Ruiz  Last chemotherapy in 11/2022  Right upper lobe mass seen on CT scan    Plan:  Patient was planning to follow-up with oncology outpatient this week   Day team to consider inpatient oncology consultation  Per recommendations from pulmonology was contacted prior to transfer, may need outpatient work-up possibly including EBUS or PET scan

## 2023-02-01 NOTE — PROGRESS NOTES
[facility] Medicine Daily Progress Note    Date of Service  2/1/2023    Chief Complaint  Pito Black is a 78 y.o. male admitted 1/31/2023 with shortness of breath    Hospital Course  No notes on file    Mr. Pito Black is a 78 y.o. male with history of hypertension, hyperlipidemia, CKD, COPD, non-small cell lung cancer followed by Dr. Ruiz who presented on 1/31/2023 with shortness of breath x1 week.  Initially presented to [place] emergency room and the CT showed bilateral moderate pleural effusions, right upper lobe mass, concern for left upper lobe pneumonia.  Procalcitonin was negative.  Patient was started on ceftriaxone and azithromycin.        Interval Problem Update  Patient was seen and examined at bedside.  I have personally reviewed and interpreted vitals, labs, and imaging.    2/1.  Afebrile.  Tachycardia has improved.  Tachypnea is improved.  On 0-3 L nasal cannula.  Denies fever, chills, chest pains.  Reports his shortness of breath is improved after nebulizer treatments.  Discussed with oncology.  Plan for thoracentesis.  Continue antibiotics.  Started on steroids for pneumonitis.    I have discussed this patient's plan of care and discharge plan at IDT rounds today with Case Management, Nursing, Nursing leadership, and other members of the IDT team.    Consultants/Specialty  oncology    Code Status  Full Code    Disposition  Patient is not medically cleared for discharge.   Anticipate discharge to to home with close outpatient follow-up.  I have placed the appropriate orders for post-discharge needs.    Review of Systems  Review of Systems   Constitutional:  Negative for chills and fever.   HENT:  Negative for congestion and sore throat.    Eyes:  Negative for blurred vision.   Respiratory:  Positive for cough and shortness of breath.    Cardiovascular:  Negative for chest pain, palpitations and leg swelling.   Gastrointestinal:  Negative for abdominal pain, constipation, diarrhea,  nausea and vomiting.   Genitourinary:  Negative for dysuria, frequency and urgency.   Musculoskeletal:  Negative for falls.   Skin:  Negative for rash.   Neurological:  Negative for dizziness, weakness and headaches.   Psychiatric/Behavioral:  Negative for depression. The patient is not nervous/anxious.    All other systems reviewed and are negative.     Physical Exam  Temp:  [36.2 °C (97.2 °F)-36.9 °C (98.5 °F)] 36.3 °C (97.4 °F)  Pulse:  [] 66  Resp:  [14-24] 20  BP: (133-154)/(77-91) 133/83  SpO2:  [91 %-99 %] 97 %    Physical Exam  Vitals and nursing note reviewed.   Constitutional:       Appearance: Normal appearance. He is ill-appearing.   HENT:      Head: Normocephalic and atraumatic.      Nose: Nose normal.      Mouth/Throat:      Mouth: Mucous membranes are moist.      Pharynx: Oropharynx is clear.   Eyes:      Extraocular Movements: Extraocular movements intact.      Conjunctiva/sclera: Conjunctivae normal.   Cardiovascular:      Rate and Rhythm: Normal rate and regular rhythm.      Pulses: Normal pulses.      Heart sounds: Normal heart sounds. No murmur heard.    No friction rub. No gallop.   Pulmonary:      Effort: Pulmonary effort is normal. No respiratory distress.      Breath sounds: Wheezing present. No rales.   Chest:      Chest wall: No tenderness.   Abdominal:      General: Abdomen is flat. Bowel sounds are normal. There is no distension.      Palpations: Abdomen is soft. There is no mass.      Tenderness: There is no abdominal tenderness. There is no guarding.   Musculoskeletal:         General: Normal range of motion.      Cervical back: Normal range of motion and neck supple.   Skin:     General: Skin is warm.      Capillary Refill: Capillary refill takes less than 2 seconds.   Neurological:      General: No focal deficit present.      Mental Status: He is alert and oriented to person, place, and time. Mental status is at baseline.      Cranial Nerves: No cranial nerve deficit.       Motor: No weakness.   Psychiatric:         Mood and Affect: Mood normal.         Behavior: Behavior normal.       Fluids    Intake/Output Summary (Last 24 hours) at 2/1/2023 0813  Last data filed at 2/1/2023 0720  Gross per 24 hour   Intake --   Output 800 ml   Net -800 ml       Laboratory  Recent Labs     02/01/23  0019   WBC 6.8   RBC 4.65*   HEMOGLOBIN 16.1   HEMATOCRIT 46.3   MCV 99.6*   MCH 34.6*   MCHC 34.8   RDW 48.6   PLATELETCT 154*   MPV 10.6     Recent Labs     02/01/23  0019   SODIUM 133*   POTASSIUM 4.4   CHLORIDE 98   CO2 23   GLUCOSE 104*   BUN 18   CREATININE 1.15   CALCIUM 9.4     Recent Labs     02/01/23  0655   APTT 31.2   INR 1.01               Imaging  DX-CHEST-PORTABLE (1 VIEW)   Final Result      1.  Right upper lobe opacity slightly larger compared to prior study. While this may represent progressive scarring, consider follow-up with contrast-enhanced chest CT, especially if there is history of malignancy.   2.  Ill-defined opacity in the left mid/lower lung. Pneumonia can be considered in the appropriate clinical setting.         US-THORACENTESIS PUNCTURE LEFT    (Results Pending)        Assessment/Plan  * Acute respiratory failure with hypoxia (HCC)  Assessment & Plan  Secondary to community-acquired pneumonia versus malignancy related bilateral pleural effusions versus postradiation pneumonitis versus immunotherapy induced pneumonitis  Course of antibiotics for pneumonia  Started on prednisone for pneumonitis  Ordered for thoracentesis  Ordered ambulatory extremity    Lung mass- (present on admission)  Assessment & Plan  History of non-small cell lung cancer  Follow-up outpatient with oncology    Bilateral pleural effusion  Assessment & Plan  Ordered for left-sided thoracentesis    Pneumonia  Assessment & Plan  Continue ceftriaxone, azithromycin    Primary lung adenocarcinoma (HCC)  Assessment & Plan  NSCLC follow-up with Dr. Ruiz  Can continue to follow with Dr. Ruiz as  outpatient  Recommended for outpatient PET scan    Dyslipidemia- (present on admission)  Assessment & Plan  Continue ezetimibe    CKD (chronic kidney disease) stage 3, GFR 30-59 ml/min ([facility])- (present on admission)  Assessment & Plan  Continue to monitor kidney function and urine output         VTE prophylaxis: SCDs/TEDs    I have performed a physical exam and reviewed and updated ROS and Plan today (2/1/2023). In review of yesterday's note (1/31/2023), there are no changes except as documented above.

## 2023-02-01 NOTE — ASSESSMENT & PLAN NOTE
Secondary to community-acquired pneumonia versus malignancy related bilateral pleural effusions versus postradiation pneumonitis versus immunotherapy induced pneumonitis  Course of antibiotics for pneumonia  Started on prednisone for pneumonitis  Ordered for thoracentesis  Ordered ambulatory extremity

## 2023-02-01 NOTE — ASSESSMENT & PLAN NOTE
Secondary to postobstructive pneumonia as well as bilateral pleural effusions    Plan:  Empiric community-acquired pneumonia coverage with antibiotics  Diuresis with IV Lasix  IR consulted for bilateral thoracentesis both diagnostic and therapeutic  Wean supplemental oxygen as tolerated  Incentive spirometry  Continue home inhalers  Continue with rescue albuterol inhaler  RT consult

## 2023-02-01 NOTE — CONSULTS
Consult Note: Hematology    Date of consultation: 2/1/2023 11:36 AM    Referring provider: Luiz Early P.A*    Reason for consultation:  SOB and RUL mass    History of presenting illness:     Patient is a 78 year old male w/ a PMHx of  hypertension, dyslipidemia, chronic kidney disease (baseline cr 1.1-1.2), mild COPD (PFT (02/14/18): FEV1/FVC 71% with no bronchodilator response) on Breo followed by pulmonology, Vitamin D deficiency, pAF in the setting of pneumothorax 2/2 lung biopsy cleared by cardiology to be off anticoauglation, Stage I non-small cell lung carcinoma, moderately differentiated adenocarcinoma histology s/p wedge resection in 4/2018, Stage IA2 ([id number]) non-small cell lung carcinoma of the right upper lobe of lung medically inoperable completing stereotactic radiosurgery to 6000 cGy in 2/2019, Stage IIIA ([id number]) non-small cell lung carcinoma of the left lower lobe s/p weekly carboplatin+taxol+radiation from 7/19/21-08/13/21 on maintenance darvalumab immunotherapy followed by Dr. Ruiz, who presented to [place] [facility] on 1/31/23 with 1 week SOB, found to have bilateral moderate pleural effusions & right upper lobe mass and was transferred to [facility] for high level of care.    He states he has been having SOB for about 1-2 weeks. He has associated cough without purulent sputum. Denies any headache, dizziness, chest pain, palpitations, abdominal pain, nausea, vomiting, LE edema nor presyncope. He states he had sick contacts during diane and has had a non-productive cough since then. Denies any rhinorrhea, sore throat, ear pain, or eye discharge. He is vaccinated for COVID, received his pneumococcal vaccinations, is pending to receive new PCV 20 vaccination.  He has at least a 40 PY hx, stopped smoking 15 years ago, drinks 4-5 beers daily, denies any recreational use. Is able to ambulate without assistance and performs iADL. Denies hx of COPD, but states his pulmonologist started him on Breo  for asthma 5+ years ago. Denies hx of prostate cancer.    He feels his SOB has improved with C3+Azithromycin+Lasix. O2 requirements decreased from 3L NC to RA. States he is ready to go home.        Past Medical History:    Past Medical History:   Diagnosis Date    Arrhythmia     hx of a fib    Arthritis 2015    generalized, DDD back    Asthma     inhaler     Backpain 08/06/2018 9/10    Cancer (HCC) 07/2017    left lung    Cataract     bilateral, no surgery    Dental disorder     lower partial, removable bridge    Diabetes 08/06/2018    on no meds at this time, diet controlled    Heart valve disease     mild mitral valve prolapse    High cholesterol     Hypertension        Past surgical history:    Past Surgical History:   Procedure Laterality Date    KS BRONCHOSCOPY,DIAGNOSTIC  7/1/2021    Procedure: BRONCHOSCOPY - FIBER OPTIC WITH BRANCHOALVEOLAR LAVAGE BIOPSY, FNA, NAVIGATION;  Surgeon: Vinayak Carbajal M.D.;  Location: [facility];  Service: Pulmonary    ENDOBRONCHIAL US ADD-ON  7/1/2021    Procedure: ENDOBRONCHIAL ULTRASOUND (EBUS).;  Surgeon: Vinayak Carbajal M.D.;  Location: [facility];  Service: Pulmonary    CATARACT PHACO WITH IOL Left 8/21/2018    Procedure: CATARACT PHACO WITH IOL;  Surgeon: Juanito Hager M.D.;  Location: SURGERY SAME DAY [facility];  Service: Ophthalmology    CATARACT PHACO WITH IOL Right 8/7/2018    Procedure: CATARACT PHACO WITH IOL;  Surgeon: Juanito Hager M.D.;  Location: SURGERY SAME DAY [facility];  Service: Ophthalmology    THORACOSCOPY Left 4/19/2018    Procedure: THORACOSCOPY- WEDGE BIOPSY W/FROZEN SECTION;  Surgeon: Cristhian Galeano M.D.;  Location: [facility];  Service: Thoracic    EAR MIDDLE EXPLORATION Right 6/12/2015    Procedure: EAR MIDDLE EXPLORATION;  Surgeon: William Brandon M.D.;  Location: SURGERY SAME DAY [facility];  Service:     OSSICULAR RECONSTRUCTION Right 6/12/2015    Procedure: OSSICULAR RECONSTRUCTION CHAIN  "POSSIBLE;  Surgeon: William Brandon M.D.;  Location: SURGERY SAME DAY [facility];  Service:     OTHER      ear replacement \"many years ago\"    OTHER ORTHOPEDIC SURGERY      right knee replacement 2005       Allergies:  Patient has no known allergies.    Medications:    Current Facility-Administered Medications   Medication Dose Route Frequency Provider Last Rate Last Admin    cefTRIAXone (Rocephin) syringe 1,000 mg  1,000 mg Intravenous Q24HRS Marcus Cabrera M.D.   1,000 mg at 02/01/23 0126    azithromycin (ZITHROMAX) 500 mg in D5W 250 mL IVPB premix  500 mg Intravenous Q24HRS Marcus Cabrera M.D.   Stopped at 02/01/23 0234    furosemide (LASIX) injection 40 mg  40 mg Intravenous Q DAY Marcus Cabrera M.D.   40 mg at 02/01/23 0533    albuterol inhaler 2 Puff  2 Puff Inhalation Q4H PRN (RT) Marcus Cabrera M.D.        Respiratory Therapy Consult   Nebulization Continuous RT Marcus Cabrera M.D.        budesonide-formoterol (SYMBICORT) 160-4.5 MCG/ACT inhaler 2 Puff  2 Puff Inhalation BID Mica Winston M.D.   2 Puff at 02/01/23 0534    aspirin (ASA) chewable tab 81 mg  81 mg Oral DAILY Marcus Cabrera M.D.   81 mg at 02/01/23 0532    vitamin D3 (cholecalciferol) tablet 1,000 Units  1,000 Units Oral DAILY Marcus Cabrera M.D.   1,000 Units at 02/01/23 0533    ezetimibe (ZETIA) tablet 10 mg  10 mg Oral QAM Marcus Cabrera M.D.   10 mg at 02/01/23 0533    senna-docusate (PERICOLACE or SENOKOT S) 8.6-50 MG per tablet 2 Tablet  2 Tablet Oral BID Marcus Cabrera M.D.        And    polyethylene glycol/lytes (MIRALAX) PACKET 1 Packet  1 Packet Oral QDAY PRN Marcus Cabrera M.D.        And    magnesium hydroxide (MILK OF MAGNESIA) suspension 30 mL  30 mL Oral QDAY PRN Marcus Cabrera M.D.        And    bisacodyl (DULCOLAX) suppository 10 mg  10 mg Rectal QDAY PRN Marcus Cabrera M.D.        [Held by provider] enoxaparin (Lovenox) inj 40 mg  40 mg Subcutaneous DAILY AT 1800 " Marcus Cabrera M.D.        acetaminophen (Tylenol) tablet 650 mg  650 mg Oral Q6HRS PRN Marcus Cabrera M.D.        labetalol (NORMODYNE/TRANDATE) injection 10 mg  10 mg Intravenous Q4HRS PRN Marcus Cabrera M.D.        ondansetron (ZOFRAN) syringe/vial injection 4 mg  4 mg Intravenous Q4HRS PRN Marcus Cabrera M.D.        ondansetron (ZOFRAN ODT) dispertab 4 mg  4 mg Oral Q4HRS PRN Marcus Cabrera M.D.           Social History:     Social History     Socioeconomic History    Marital status:      Spouse name: Not on file    Number of children: Not on file    Years of education: Not on file    Highest education level: Not on file   Occupational History    Not on file   Tobacco Use    Smoking status: Former     Packs/day: 0.25     Years: 40.00     Pack years: 10.00     Types: Cigarettes     Quit date: 2005     Years since quittin.0    Smokeless tobacco: Never   Vaping Use    Vaping Use: Never used   Substance and Sexual Activity    Alcohol use: Yes     Alcohol/week: 12.6 - 21.0 oz     Types: 21 - 35 Cans of beer per week     Comment: 3-5 beers a day    Drug use: No    Sexual activity: Not on file   Other Topics Concern    Not on file   Social History Narrative    Not on file     Social Determinants of Health     Financial Resource Strain: Not on file   Food Insecurity: Not on file   Transportation Needs: Not on file   Physical Activity: Not on file   Stress: Not on file   Social Connections: Not on file   Intimate Partner Violence: Not on file   Housing Stability: Not on file       Family History:     Family History   Problem Relation Age of Onset    Cancer Father         stomach cancer       Review of Systems:  All other review of systems are negative except what was mentioned above in the HPI.    Constitutional: No fever, chills, weight loss ,malaise/fatigue.    HEENT: No new auditory or visual complaints. No sore throat and neck pain.     Respiratory: +Cough, +SOB. No sputum  "production, wheezing.    Cardiovascular: No new chest pain, palpitations, orthopnea and leg swelling.    Gastrointestinal: No heartburn, nausea, vomiting ,abdominal pain, hematochezia or melena     Genitourinary: Negative for dysuria, hematuria    Musculoskeletal: No new arthralgias or myalgias   Skin: Negative for rash and itching.    Neurological: Negative for focal weakness or headaches.    Endo/Heme/Allergies: No abnormal bleed/bruise.    Psychiatric/Behavioral: No new depression/anxiety.    Physical Exam:  Vitals:   /83   Pulse 66   Temp 36.3 °C (97.4 °F) (Temporal)   Resp 20   Ht 1.727 m (5' 7.99\")   Wt 68.1 kg (150 lb 2.1 oz)   SpO2 97%   BMI 22.83 kg/m²     General: Not in acute distress, alert and oriented x 3  HEENT: No pallor, icterus. Oropharynx clear.   Neck: Supple, no palpable masses.  Lymph nodes: No palpable cervical, supraclavicular, axillary or inguinal lymphadenopathy.    CVS: regular rate and rhythm, no rubs or gallops  RESP: Crackles bibasilar. No acute respiratory distress   ABD: Soft, non tender, non distended, positive bowel sounds, no palpable organomegaly  EXT: No edema or cyanosis  CNS: Alert and oriented x3, No focal deficits.  Skin- No rash      Labs:   Recent Labs     02/01/23  0019 02/01/23  0655   RBC 4.65*  --    HEMOGLOBIN 16.1  --    HEMATOCRIT 46.3  --    PLATELETCT 154*  --    PROTHROMBTM  --  13.2   APTT  --  31.2   INR  --  1.01     Lab Results   Component Value Date/Time    SODIUM 133 (L) 02/01/2023 12:19 AM    POTASSIUM 4.4 02/01/2023 12:19 AM    CHLORIDE 98 02/01/2023 12:19 AM    CO2 23 02/01/2023 12:19 AM    GLUCOSE 104 (H) 02/01/2023 12:19 AM    BUN 18 02/01/2023 12:19 AM    CREATININE 1.15 02/01/2023 12:19 AM        Assessment and Plan:    #Acute Hypoxic Respiratory Failure  #Bilateral Pleural Effusion  #Right upper Lobe Mass  #Stage I non-small cell lung carcinoma, moderately differentiated adenocarcinoma histology, status post wedge resection in April " 2018  #Stage IA2 ([id number]) non-small cell lung carcinoma of the right upper lobe of lung, medically inoperable, completing stereotactic radiosurgery to 6000 cGy in February 2019   #Stage IIIA ([id number]) non-small cell lung carcinoma of the left lower lobe  *Ddx includes community acquired pneumonia vs malignancy related bilateral pleural effusion vs post radiation pneumonitis vs immunotherapy induced pneumonitis from Durvalumab    -Continue with 5 day course of antimicrobial therapy for possible CAP  -Recommend thoracentesis for transudative/exudative and cytology analysis to follow up outpatient  -Recommend steroid taper therapy for possible post radiation pneumonitis, prednisone 40 mg x 1 week, prednisone 20 mg x 1 week, prednisone 10 mg x 1 week  -Patient seems clinically stable for discharge, recommend home O2 evaluation   -Recommend PET scan outpatient and follow with Dr. Ruiz    #Hypertension  #Hyperlipidemia  #Vitamin D Deficiency  #Chronic Kidney Disease Stage III    -Managed by primary team    Discussed with Dr. Stuart    He agreed and verbalized his agreement and understanding with the current plan.  I answered all questions and concerns he has at this time.  Thank you for allowing me to participate in his care.    Please note that this dictation was created using voice recognition software. I have made every reasonable attempt to correct obvious errors, but I expect that there are errors of grammar and possibly content that I did not discover before finalizing the note.      SIGNATURES:  Dwayne Muro D.O.    CC:  Nicolas Milligan, M.D.  Sharath, OZZIE WebbA*

## 2023-02-01 NOTE — ASSESSMENT & PLAN NOTE
Left upper lobe lingula consolidation seen on CT scan  Increasing shortness of breath with associated hypoxia  Status post cefepime at [place] [facility]    Plan:  Continue with pneumonia coverage with ceftriaxone and azithromycin  Follow blood cultures and sputum cultures

## 2023-02-01 NOTE — PROGRESS NOTES
4 Eyes Skin Assessment Completed by KORY Campoverde and KORY See.    Head WDL  Ears Scab to right ear, redness, blanching  Nose WDL  Mouth WDL  Neck WDL  Breast/Chest WDL  Shoulder Blades WDL  Spine WDL  (R) Arm/Elbow/Hand Bruising  (L) Arm/Elbow/Hand Redness and Blanching, bruising  Abdomen WDL  Groin WDL  Scrotum/Coccyx/Buttocks WDL  (R) Leg Bruising  (L) Leg Bruising  (R) Heel/Foot/Toe Dry, flaky skin  (L) Heel/Foot/Toe Dry, flaky skin          Devices In Places Pulse Ox and Nasal Cannula      Interventions In Place Gray Ear Foams and Waffle Overlay    Possible Skin Injury No    Pictures Uploaded Into Epic N/A  Wound Consult Placed N/A  RN Wound Prevention Protocol Ordered No

## 2023-02-01 NOTE — ASSESSMENT & PLAN NOTE
Creatinine at baseline    Plan:  Avoid nephrotoxins  Renally dose medications as needed  Continue to monitor function while on diuretics

## 2023-02-01 NOTE — DISCHARGE PLANNING
Care Transition Team Discharge Planning                Discharge Plan:  CM attempted to meet with patient at bedside to complete CTTA. Patient not in room- down for imaging. CM will attempt at a later time.

## 2023-02-02 ENCOUNTER — PHARMACY VISIT (OUTPATIENT)
Dept: PHARMACY | Facility: MEDICAL CENTER | Age: 78
End: 2023-02-02
Payer: COMMERCIAL

## 2023-02-02 VITALS
RESPIRATION RATE: 17 BRPM | HEIGHT: 68 IN | BODY MASS INDEX: 22.75 KG/M2 | OXYGEN SATURATION: 95 % | SYSTOLIC BLOOD PRESSURE: 136 MMHG | HEART RATE: 89 BPM | WEIGHT: 150.13 LBS | TEMPERATURE: 97.5 F | DIASTOLIC BLOOD PRESSURE: 83 MMHG

## 2023-02-02 PROBLEM — J96.01 ACUTE RESPIRATORY FAILURE WITH HYPOXIA (HCC): Status: RESOLVED | Noted: 2023-02-01 | Resolved: 2023-02-02

## 2023-02-02 PROBLEM — J98.4 PNEUMONITIS: Status: ACTIVE | Noted: 2023-02-01

## 2023-02-02 PROBLEM — R73.9 HYPERGLYCEMIA, DRUG-INDUCED: Status: ACTIVE | Noted: 2023-02-02

## 2023-02-02 PROBLEM — T50.905A HYPERGLYCEMIA, DRUG-INDUCED: Status: ACTIVE | Noted: 2023-02-02

## 2023-02-02 LAB
ANION GAP SERPL CALC-SCNC: 10 MMOL/L (ref 7–16)
BASOPHILS # BLD AUTO: 0.2 % (ref 0–1.8)
BASOPHILS # BLD: 0.01 K/UL (ref 0–0.12)
BUN SERPL-MCNC: 19 MG/DL (ref 8–22)
CALCIUM SERPL-MCNC: 9.2 MG/DL (ref 8.5–10.5)
CHLORIDE SERPL-SCNC: 97 MMOL/L (ref 96–112)
CO2 SERPL-SCNC: 25 MMOL/L (ref 20–33)
CREAT SERPL-MCNC: 1.22 MG/DL (ref 0.5–1.4)
EOSINOPHIL # BLD AUTO: 0 K/UL (ref 0–0.51)
EOSINOPHIL NFR BLD: 0 % (ref 0–6.9)
ERYTHROCYTE [DISTWIDTH] IN BLOOD BY AUTOMATED COUNT: 45.9 FL (ref 35.9–50)
GFR SERPLBLD CREATININE-BSD FMLA CKD-EPI: 61 ML/MIN/1.73 M 2
GLUCOSE SERPL-MCNC: 219 MG/DL (ref 65–99)
GRAM STN SPEC: NORMAL
HCT VFR BLD AUTO: 43.3 % (ref 42–52)
HGB BLD-MCNC: 15 G/DL (ref 14–18)
IMM GRANULOCYTES # BLD AUTO: 0.03 K/UL (ref 0–0.11)
IMM GRANULOCYTES NFR BLD AUTO: 0.5 % (ref 0–0.9)
LYMPHOCYTES # BLD AUTO: 0.67 K/UL (ref 1–4.8)
LYMPHOCYTES NFR BLD: 11.8 % (ref 22–41)
MAGNESIUM SERPL-MCNC: 1.9 MG/DL (ref 1.5–2.5)
MCH RBC QN AUTO: 33.9 PG (ref 27–33)
MCHC RBC AUTO-ENTMCNC: 34.6 G/DL (ref 33.7–35.3)
MCV RBC AUTO: 98 FL (ref 81.4–97.8)
MONOCYTES # BLD AUTO: 0.3 K/UL (ref 0–0.85)
MONOCYTES NFR BLD AUTO: 5.3 % (ref 0–13.4)
NEUTROPHILS # BLD AUTO: 4.68 K/UL (ref 1.82–7.42)
NEUTROPHILS NFR BLD: 82.2 % (ref 44–72)
NRBC # BLD AUTO: 0 K/UL
NRBC BLD-RTO: 0 /100 WBC
PHOSPHATE SERPL-MCNC: 3 MG/DL (ref 2.5–4.5)
PLATELET # BLD AUTO: 160 K/UL (ref 164–446)
PMV BLD AUTO: 10.1 FL (ref 9–12.9)
POTASSIUM SERPL-SCNC: 4.1 MMOL/L (ref 3.6–5.5)
RBC # BLD AUTO: 4.42 M/UL (ref 4.7–6.1)
SIGNIFICANT IND 70042: NORMAL
SITE SITE: NORMAL
SODIUM SERPL-SCNC: 132 MMOL/L (ref 135–145)
SOURCE SOURCE: NORMAL
WBC # BLD AUTO: 5.7 K/UL (ref 4.8–10.8)

## 2023-02-02 PROCEDURE — A9270 NON-COVERED ITEM OR SERVICE: HCPCS | Performed by: STUDENT IN AN ORGANIZED HEALTH CARE EDUCATION/TRAINING PROGRAM

## 2023-02-02 PROCEDURE — 85025 COMPLETE CBC W/AUTO DIFF WBC: CPT

## 2023-02-02 PROCEDURE — A9270 NON-COVERED ITEM OR SERVICE: HCPCS | Performed by: INTERNAL MEDICINE

## 2023-02-02 PROCEDURE — 83735 ASSAY OF MAGNESIUM: CPT

## 2023-02-02 PROCEDURE — RXMED WILLOW AMBULATORY MEDICATION CHARGE: Performed by: INTERNAL MEDICINE

## 2023-02-02 PROCEDURE — 700102 HCHG RX REV CODE 250 W/ 637 OVERRIDE(OP): Performed by: STUDENT IN AN ORGANIZED HEALTH CARE EDUCATION/TRAINING PROGRAM

## 2023-02-02 PROCEDURE — 700111 HCHG RX REV CODE 636 W/ 250 OVERRIDE (IP): Performed by: INTERNAL MEDICINE

## 2023-02-02 PROCEDURE — 700102 HCHG RX REV CODE 250 W/ 637 OVERRIDE(OP): Performed by: INTERNAL MEDICINE

## 2023-02-02 PROCEDURE — 99239 HOSP IP/OBS DSCHRG MGMT >30: CPT | Performed by: INTERNAL MEDICINE

## 2023-02-02 PROCEDURE — 700111 HCHG RX REV CODE 636 W/ 250 OVERRIDE (IP): Performed by: STUDENT IN AN ORGANIZED HEALTH CARE EDUCATION/TRAINING PROGRAM

## 2023-02-02 PROCEDURE — 84100 ASSAY OF PHOSPHORUS: CPT

## 2023-02-02 PROCEDURE — 80048 BASIC METABOLIC PNL TOTAL CA: CPT

## 2023-02-02 PROCEDURE — 36415 COLL VENOUS BLD VENIPUNCTURE: CPT

## 2023-02-02 RX ORDER — AMOXICILLIN AND CLAVULANATE POTASSIUM 500; 125 MG/1; MG/1
1 TABLET, FILM COATED ORAL EVERY 8 HOURS
Qty: 12 TABLET | Refills: 0 | Status: ACTIVE | OUTPATIENT
Start: 2023-02-03 | End: 2023-02-07

## 2023-02-02 RX ORDER — FUROSEMIDE 40 MG/1
40 TABLET ORAL
Status: DISCONTINUED | OUTPATIENT
Start: 2023-02-03 | End: 2023-02-02 | Stop reason: HOSPADM

## 2023-02-02 RX ORDER — FAMOTIDINE 20 MG/1
20 TABLET, FILM COATED ORAL DAILY
Qty: 30 TABLET | Refills: 0 | Status: SHIPPED | OUTPATIENT
Start: 2023-02-02 | End: 2023-03-22

## 2023-02-02 RX ORDER — AMOXICILLIN AND CLAVULANATE POTASSIUM 500; 125 MG/1; MG/1
1 TABLET, FILM COATED ORAL EVERY 8 HOURS
Status: DISCONTINUED | OUTPATIENT
Start: 2023-02-03 | End: 2023-02-02 | Stop reason: HOSPADM

## 2023-02-02 RX ORDER — AMOXICILLIN AND CLAVULANATE POTASSIUM 500; 125 MG/1; MG/1
1 TABLET, FILM COATED ORAL EVERY 8 HOURS
Status: DISCONTINUED | OUTPATIENT
Start: 2023-02-02 | End: 2023-02-02

## 2023-02-02 RX ORDER — PREDNISONE 10 MG/1
40 TABLET ORAL DAILY
Qty: 49 TABLET | Refills: 0 | Status: ON HOLD | OUTPATIENT
Start: 2023-02-02 | End: 2023-03-19

## 2023-02-02 RX ORDER — FUROSEMIDE 40 MG/1
40 TABLET ORAL DAILY
Qty: 30 TABLET | Refills: 0 | Status: SHIPPED | OUTPATIENT
Start: 2023-02-03 | End: 2023-03-22

## 2023-02-02 RX ADMIN — FUROSEMIDE 40 MG: 10 INJECTION, SOLUTION INTRAMUSCULAR; INTRAVENOUS at 05:21

## 2023-02-02 RX ADMIN — PREDNISONE 60 MG: 10 TABLET ORAL at 14:01

## 2023-02-02 RX ADMIN — ASPIRIN 81 MG: 81 TABLET, CHEWABLE ORAL at 05:20

## 2023-02-02 RX ADMIN — CEFTRIAXONE SODIUM 1000 MG: 10 INJECTION, POWDER, FOR SOLUTION INTRAVENOUS at 05:21

## 2023-02-02 RX ADMIN — METFORMIN HYDROCHLORIDE 500 MG: 500 TABLET ORAL at 09:29

## 2023-02-02 RX ADMIN — EZETIMIBE 10 MG: 10 TABLET ORAL at 05:20

## 2023-02-02 RX ADMIN — Medication 1000 UNITS: at 05:21

## 2023-02-02 RX ADMIN — BUDESONIDE AND FORMOTEROL FUMARATE DIHYDRATE 2 PUFF: 160; 4.5 AEROSOL RESPIRATORY (INHALATION) at 05:22

## 2023-02-02 RX ADMIN — SENNOSIDES AND DOCUSATE SODIUM 2 TABLET: 50; 8.6 TABLET ORAL at 05:21

## 2023-02-02 ASSESSMENT — ENCOUNTER SYMPTOMS
FEVER: 0
FOCAL WEAKNESS: 0
HEADACHES: 0
VOMITING: 0
SHORTNESS OF BREATH: 0
COUGH: 0
NAUSEA: 0
CHILLS: 0
DIZZINESS: 0
ABDOMINAL PAIN: 0
PALPITATIONS: 0

## 2023-02-02 ASSESSMENT — LIFESTYLE VARIABLES: SUBSTANCE_ABUSE: 0

## 2023-02-02 NOTE — DISCHARGE INSTRUCTIONS
Discharge Instructions per Dr. Cristhian Bower D.O.    DIET: Diet Order Diet: Regular; Fluid modifications: (optional): 2000 ml Fluid Restriction    ACTIVITY: As tolerated    A proper diet that is low in grease, fat, and salt, along with 30 minutes of exercise per day will lead to weight loss, and better controlled blood sugar and blood pressure.    DIAGNOSIS: Acute respiratory failure with hypoxia (HCC)    Follow up with your Primary Care Provider Nicolas Milligan, M.D. as scheduled or sooner if your symptoms persist or worsen.  Return to Emergency Room for sever chest pain, shortness of breath, signs of a stroke, or any other emergencies.

## 2023-02-02 NOTE — DISCHARGE SUMMARY
Discharge Summary    CHIEF COMPLAINT ON ADMISSION  No chief complaint on file.      Reason for Admission  Bilateral pneumonia, pulm mass     Admission Date  1/31/2023    CODE STATUS  Full Code    HPI & HOSPITAL COURSE  Mr. Pito Black is a 78 y.o. male with history of hypertension, hyperlipidemia, CKD, COPD, non-small cell lung cancer followed by Dr. Ruiz who presented on 1/31/2023 with shortness of breath x1 week.  Initially presented to [place] emergency room and the CT showed bilateral moderate pleural effusions, right upper lobe mass, concern for left upper lobe pneumonia.  Procalcitonin was negative.  Patient was started on ceftriaxone and azithromycin.  Patient was diuresed with furosemide     Status post left-sided thoracentesis on 2/1 with 1 L removed.  On day of discharge cultures show no growth.  Can follow-up on pathology and microbiology as outpatient.     Discussed with oncology.  Acute hypoxic story failure likely secondary to community-acquired pneumonia versus malignancy related bilateral pleural effusions versus postradiation pneumonitis versus immunotherapy induced pneumonitis.  Patient was transitioned from ceftriaxone azithromycin to Augmentin.  Transition to oral furosemide.  Started on prednisone for pneumonitis.  Patient passed ambulatory oximetry on room air.     Oncology recommends prednisone 40 mg for 1 week, 20 mg for 1 week, 10 mg for 1 week.  While on the prednisone he did have elevated blood sugars.  Started on low-dose metformin which I do not anticipate he will need after steroids are done being tapered.  Also started on famotidine for GI prophylaxis.  The antibiotic course as outpatient.  Medically stable for discharge home.  Follow-up with primary care and oncology as outpatient.       Therefore, he is discharged in fair and stable condition to home with close outpatient follow-up.    The patient met 2-midnight criteria for an inpatient stay at the time of  discharge.    Discharge Date  2/2/2023    FOLLOW UP ITEMS POST DISCHARGE  Thoracentesis culture and pathology    DISCHARGE DIAGNOSES  Principal Problem (Resolved):    Acute respiratory failure with hypoxia (HCC) POA: Yes  Active Problems:    CKD (chronic kidney disease) stage 3, GFR 30-59 ml/min (HCC) POA: Yes    Dyslipidemia POA: Yes    Primary lung adenocarcinoma (HCC) POA: Yes    Pneumonitis POA: Unknown    Bilateral pleural effusion POA: Yes    Lung mass POA: Yes    Hyperglycemia, drug-induced POA: Unknown      FOLLOW UP  Future Appointments   Date Time Provider Department Center   2/8/2023 11:30 AM Vinayak Carbajal M.D. [facility] None     Nicolas Milligan, M.D.  [address] [place] [address] [phone]    Follow up      Miguel Ruiz M.D.  [address]  [place] [address]  [phone]    Follow up        MEDICATIONS ON DISCHARGE     Medication List        START taking these medications        Instructions   amoxicillin-clavulanate 500-125 MG Tabs  Start taking on: February 3, 2023  Commonly known as: AUGMENTIN   Take 1 Tablet by mouth every 8 hours for 4 days.  Dose: 1 Tablet     famotidine 20 MG Tabs  Commonly known as: PEPCID   Take 1 Tablet by mouth every day.  Dose: 20 mg     furosemide 40 MG Tabs  Start taking on: February 3, 2023  Commonly known as: LASIX   Take 1 Tablet by mouth every day.  Dose: 40 mg     metFORMIN 500 MG Tabs  Commonly known as: GLUCOPHAGE   Take 1 Tablet by mouth every day.  Dose: 500 mg     predniSONE 10 MG Tabs  Commonly known as: DELTASONE   Take 4 Tablets by mouth every day. 40mg for 1 week.  Followed by 20mg for 1 week.  Followed by 10mg for 1 week  Dose: 40 mg            CONTINUE taking these medications        Instructions   acetaminophen 325 MG Tabs  Commonly known as: Tylenol   Take 650 mg by mouth every four hours as needed.  Dose: 650 mg     aspirin 81 MG Chew chewable tablet  Commonly known as: ASA   Chew 81 mg every day.  Dose: 81 mg     Breo  Ellipta 200-25 MCG/ACT Aepb  Generic drug: fluticasone furoate-vilanterol   Inhale 1 Puff.  Dose: 1 Puff     cyclobenzaprine 10 mg Tabs  Commonly known as: Flexeril   Take 10 mg by mouth.  Dose: 10 mg     ezetimibe 10 MG Tabs  Commonly known as: ZETIA   Take 10 mg by mouth every morning.  Dose: 10 mg     VITAMIN D3 PO   Take  by mouth.              Allergies  No Known Allergies    DIET  Orders Placed This Encounter   Procedures    Diet Order Diet: Regular; Fluid modifications: (optional): 2000 ml Fluid Restriction     Standing Status:   Standing     Number of Occurrences:   1     Order Specific Question:   Diet:     Answer:   Regular [1]     Order Specific Question:   Fluid modifications: (optional)     Answer:   2000 ml Fluid Restriction [11]       ACTIVITY  As tolerated.  Weight bearing as tolerated    CONSULTATIONS  Oncology    PROCEDURES  Thoracentesis    LABORATORY  Lab Results   Component Value Date    SODIUM 132 (L) 02/02/2023    POTASSIUM 4.1 02/02/2023    CHLORIDE 97 02/02/2023    CO2 25 02/02/2023    GLUCOSE 219 (H) 02/02/2023    BUN 19 02/02/2023    CREATININE 1.22 02/02/2023        Lab Results   Component Value Date    WBC 5.7 02/02/2023    HEMOGLOBIN 15.0 02/02/2023    HEMATOCRIT 43.3 02/02/2023    PLATELETCT 160 (L) 02/02/2023        I discussed medications and side effects with the patient.  I discussed prognosis and importance of medical compliance with the patient.  I counseled the patient about diet, exercise, weight loss, smoking cessation, and life style modifications.  All questions and concerns have been addressed.  Total time of the discharge process was 37 minutes.

## 2023-02-02 NOTE — PROGRESS NOTES
Date of visit: 2/2/2023  7:49 AM    Reason for consultation:  SOB and RUL [facility]     Hospital Course:     Patient is a 78 year old male w/ a PMHx of  hypertension, dyslipidemia, chronic kidney disease (baseline cr 1.1-1.2), mild COPD (PFT (02/14/18): FEV1/FVC 71% with no bronchodilator response) on Breo followed by pulmonology, Vitamin D deficiency, pAF in the setting of pneumothorax 2/2 lung biopsy cleared by cardiology to be off anticoauglation, Stage I non-small cell lung carcinoma, moderately differentiated adenocarcinoma histology s/p wedge resection in 4/2018, Stage IA2 ([id number]) non-small cell lung carcinoma of the right upper lobe of lung medically inoperable completing stereotactic radiosurgery to 6000 cGy in 2/2019, Stage IIIA ([id number]) non-small cell lung carcinoma of the left lower lobe s/p weekly carboplatin+taxol+radiation from 7/19/21-08/13/21 on maintenance darvalumab immunotherapy followed by Dr. Ruiz, who presented to [place] ED on 1/31/23 with 1 week SOB, found to have bilateral moderate pleural effusions & right upper lobe mass and was transferred to [facility] for high level of care.    1/31 - on C3+azithro+lasix 40 mg IV, s/p 1L left transudative thoracentesis. O2 requirements improved from  3L to RA     Interval Problem Update:    No acute complaints, no fever, chills, malaise, SOB, cough, CP, palpitations, abdominal pain, diarrhea, constipation, nausea, vomiting, LE edema. Had O2 evaluation done and does not require any O2 for discharge. States he is ready to go home, hoping to do PET scan sometime next week.   VSS , 95% on RA  Na stable from 133 to 132, BUN/Cr stable from 18/1.15 to 19/1.22  Transudative pleural effusion    -Tolerating therapy well, clinically stable to go home without O2 requirements, complete 5 day course of abx, continue with steroid taper, has pulmonology outpatient appointment in 1 week to follow up of bilateral pleural effusion and thoracentesis cytology  -Consider PFT  after resolution of symptoms  -Has appointment with Dr. Ruiz on 2/14/22, will hopefully do PET scan before appointment      Medications:         Current Facility-Administered Medications   Medication Dose Route Frequency Provider Last Rate Last Admin    metFORMIN (GLUCOPHAGE) tablet 500 mg  500 mg Oral BID WITH MEALS Cristhian Bower D.O.        cefTRIAXone (Rocephin) syringe 1,000 mg  1,000 mg Intravenous Q24HRS Marcus Cabrera M.D.   1,000 mg at 02/02/23 0521    furosemide (LASIX) injection 40 mg  40 mg Intravenous Q DAY Marcus Cabrera M.D.   40 mg at 02/02/23 0521    albuterol inhaler 2 Puff  2 Puff Inhalation Q4H PRN (RT) Marcus Cabrera M.D.        Respiratory Therapy Consult   Nebulization Continuous RT Marcus Cabrera M.D.        budesonide-formoterol (SYMBICORT) 160-4.5 MCG/ACT inhaler 2 Puff  2 Puff Inhalation BID Mica Winston M.D.   2 Puff at 02/02/23 0522    predniSONE (DELTASONE) tablet 60 mg  60 mg Oral DAILY WITH LUNCH NATALYA PerezOHeladio   60 mg at 02/01/23 1314    azithromycin (ZITHROMAX) tablet 500 mg  500 mg Oral DAILY NATALYA PerezOHeladio   500 mg at 02/01/23 2042    famotidine (PEPCID) tablet 20 mg  20 mg Oral DAILY NATALYA PerezOHeladio   20 mg at 02/01/23 1739    melatonin tablet 5 mg  5 mg Oral HS PRN Edith Alejandro A.P.R.NHeladio   5 mg at 02/01/23 2042    aspirin (ASA) chewable tab 81 mg  81 mg Oral DAILY Marcus Cabrera M.D.   81 mg at 02/02/23 0520    vitamin D3 (cholecalciferol) tablet 1,000 Units  1,000 Units Oral DAILY Marcus Cabrera M.D.   1,000 Units at 02/02/23 0521    ezetimibe (ZETIA) tablet 10 mg  10 mg Oral QAM Marcus Cabrera M.D.   10 mg at 02/02/23 0520    senna-docusate (PERICOLACE or SENOKOT S) 8.6-50 MG per tablet 2 Tablet  2 Tablet Oral BID Marcus Cabrera M.D.   2 Tablet at 02/02/23 0521    And    polyethylene glycol/lytes (MIRALAX) PACKET 1 Packet  1 Packet Oral QDAY PRN Marcus Cabrera M.D.        And    magnesium hydroxide  "(MILK OF MAGNESIA) suspension 30 mL  30 mL Oral QDAY PRN Marcus Cabrera M.D.        And    bisacodyl (DULCOLAX) suppository 10 mg  10 mg Rectal QDAY PRN Marcus Cabrera M.D.        [Held by provider] enoxaparin (Lovenox) inj 40 mg  40 mg Subcutaneous DAILY AT 1800 Marcus Cabrera M.D.        acetaminophen (Tylenol) tablet 650 mg  650 mg Oral Q6HRS PRN Marcus Cabrera M.D.        labetalol (NORMODYNE/TRANDATE) injection 10 mg  10 mg Intravenous Q4HRS PRN Marcus Cabrera M.D.        ondansetron (ZOFRAN) syringe/vial injection 4 mg  4 mg Intravenous Q4HRS PRN Marcus Cabrera M.D.        ondansetron (ZOFRAN ODT) dispertab 4 mg  4 mg Oral Q4HRS PRN Marcus Cabrera M.D.           Review of Systems:  All other review of systems are negative except what was mentioned above in the HPI.    Review of Systems   Constitutional:  Negative for chills, fever and malaise/fatigue.   Respiratory:  Negative for cough and shortness of breath.    Cardiovascular:  Negative for chest pain and palpitations.   Gastrointestinal:  Negative for abdominal pain, nausea and vomiting.   Genitourinary:  Negative for dysuria.   Skin:  Negative for rash.   Neurological:  Negative for dizziness, focal weakness and headaches.   Psychiatric/Behavioral:  Negative for substance abuse.    All other systems reviewed and are negative.     Physical Exam:  Vitals: /83   Pulse 89   Temp 36.4 °C (97.5 °F) (Temporal)   Resp 17   Ht 1.727 m (5' 7.99\")   Wt 68.1 kg (150 lb 2.1 oz)   SpO2 95%   BMI 22.83 kg/m²     Physical Exam  Vitals and nursing note reviewed.   Constitutional:       General: He is not in acute distress.     Appearance: Normal appearance.   HENT:      Head: Normocephalic and atraumatic.      Right Ear: External ear normal.      Left Ear: External ear normal.      Nose: Nose normal.      Mouth/Throat:      Mouth: Mucous membranes are moist.      Pharynx: Oropharynx is clear.   Eyes:      Extraocular " Movements: Extraocular movements intact.      Conjunctiva/sclera: Conjunctivae normal.      Pupils: Pupils are equal, round, and reactive to light.   Cardiovascular:      Rate and Rhythm: Normal rate and regular rhythm.      Pulses: Normal pulses.      Heart sounds: Normal heart sounds. No murmur heard.    No friction rub. No gallop.   Pulmonary:      Effort: Pulmonary effort is normal. No respiratory distress.      Breath sounds: Normal breath sounds. No wheezing or rales.      Comments: RUL rhonchi and Left middle land left lower quadrant rhonchi. Left thoracentesis site C/D/I without erythema, swelling, or drainage.  Abdominal:      General: Abdomen is flat. There is no distension.      Palpations: Abdomen is soft.      Tenderness: There is no abdominal tenderness. There is no guarding or rebound.   Musculoskeletal:         General: Normal range of motion.      Cervical back: Normal range of motion.      Right lower leg: No edema.      Left lower leg: No edema.   Skin:     General: Skin is warm.      Capillary Refill: Capillary refill takes less than 2 seconds.   Neurological:      General: No focal deficit present.      Mental Status: He is alert and oriented to person, place, and time. Mental status is at baseline.   Psychiatric:         Mood and Affect: Mood normal.         Behavior: Behavior normal.        Labs:   Recent Labs     02/01/23  0019 02/01/23  0655 02/02/23  0012   RBC 4.65*  --  4.42*   HEMOGLOBIN 16.1  --  15.0   HEMATOCRIT 46.3  --  43.3   PLATELETCT 154*  --  160*   PROTHROMBTM  --  13.2  --    APTT  --  31.2  --    INR  --  1.01  --      Lab Results   Component Value Date/Time    SODIUM 132 (L) 02/02/2023 12:12 AM    POTASSIUM 4.1 02/02/2023 12:12 AM    CHLORIDE 97 02/02/2023 12:12 AM    CO2 25 02/02/2023 12:12 AM    GLUCOSE 219 (H) 02/02/2023 12:12 AM    BUN 19 02/02/2023 12:12 AM    CREATININE 1.22 02/02/2023 12:12 AM        Imaging-         US-THORACENTESIS PUNCTURE LEFT   Final Result       1. Ultrasound guided left sided diagnostic and therapeutic thoracentesis.      2. 1000 mL of fluid withdrawn.      DX-CHEST-PORTABLE (1 VIEW)   Final Result      1.  Right upper lobe opacity slightly larger compared to prior study. While this may represent progressive scarring, consider follow-up with contrast-enhanced chest CT, especially if there is history of malignancy.   2.  Ill-defined opacity in the left mid/lower lung. Pneumonia can be considered in the appropriate clinical setting.              Assessment and Plan:    #Acute Hypoxic Respiratory Failure  #Bilateral Pleural Effusion  #Right upper Lobe Mass  #Stage I non-small cell lung carcinoma, moderately differentiated adenocarcinoma histology, status post wedge resection in April 2018  #Stage IA2 ([id number]) non-small cell lung carcinoma of the right upper lobe of lung, medically inoperable, completing stereotactic radiosurgery to 6000 cGy in February 2019   #Stage IIIA ([id number]) non-small cell lung carcinoma of the left lower lobe  *Ddx includes community acquired pneumonia vs malignancy related bilateral pleural effusion vs post radiation pneumonitis vs immunotherapy induced pneumonitis from Durvalumab     -Tolerating therapy well, clinically stable to go home without O2 requirements, complete 5 day course of abx, has pulmonology outpatient in 1 week to follow up of bilateral pleural effusion and thoracentesis cytology  -Consider PFT after resolution of symptoms  -Has appointment with Dr. Ruiz on 2/14/22, will hopefully do PET scan before appointment  -Recommend steroid taper therapy for possible post radiation pneumonitis, prednisone 40 mg x 1 week, prednisone 20 mg x 1 week, prednisone 10 mg x 1 week and follow up with Dr. Ruiz       #Hypertension  #Hyperlipidemia  #Vitamin D Deficiency  #Chronic Kidney Disease Stage III     -Managed by Hospitalist      Discussed case with Dr. Stuart    Thank you for the consult, hematology will continue to follow

## 2023-02-02 NOTE — CARE PLAN
The patient is Watcher - Medium risk of patient condition declining or worsening    Shift Goals  Clinical Goals: Monitor O2, rest  Patient Goals: Rest    Progress made toward(s) clinical / shift goals:    Problem: Knowledge Deficit - Standard  Goal: Patient and family/care givers will demonstrate understanding of plan of care, disease process/condition, diagnostic tests and medications  Outcome: Progressing  Note: Pt updated on plan of care, pt states understanding for continued diuretics and antibiotics. Pt maintaining O2 saturation >90 on RA-1L. Thoracentesis site assessed, old drainage to bandage. All questions answered at this time.      Problem: Fall Risk  Goal: Patient will remain free from falls  Outcome: Progressing  Note: Bed alarm on, bed locked and in lowest position, personal belongings within reach, call light within reach, nonslip socks on, pt uses call light appropriately.        Patient is not progressing towards the following goals: N/A

## 2023-02-02 NOTE — DISCHARGE PLANNING
Care Transition Team Final Discharge Disposition    Actual Discharge Information  Discharge Disposition: Discharged to home/self care (01)    CM notified that patient to discharge today during IDT rounds- no home needs with follow-up out patient.     CM met with patient and wife Cydney at bedside. CM explained IMM, patient's wife signed on his behalf, copy provided and original faxed to [facility] for chart.    No other CM needs at this time.

## 2023-02-06 LAB
BACTERIA BLD CULT: NORMAL
BACTERIA BLD CULT: NORMAL
BACTERIA FLD AEROBE CULT: NORMAL
BACTERIA SPEC ANAEROBE CULT: NORMAL
GRAM STN SPEC: NORMAL
SIGNIFICANT IND 70042: NORMAL
SITE SITE: NORMAL
SOURCE SOURCE: NORMAL

## 2023-02-08 ENCOUNTER — OFFICE VISIT (OUTPATIENT)
Dept: SLEEP MEDICINE | Facility: MEDICAL CENTER | Age: 78
End: 2023-02-08
Payer: MEDICARE

## 2023-02-08 VITALS
DIASTOLIC BLOOD PRESSURE: 68 MMHG | WEIGHT: 141 LBS | BODY MASS INDEX: 21.37 KG/M2 | SYSTOLIC BLOOD PRESSURE: 118 MMHG | OXYGEN SATURATION: 96 % | HEIGHT: 68 IN | HEART RATE: 80 BPM

## 2023-02-08 DIAGNOSIS — J98.4 PNEUMONITIS: ICD-10-CM

## 2023-02-08 DIAGNOSIS — J90 BILATERAL PLEURAL EFFUSION: ICD-10-CM

## 2023-02-08 DIAGNOSIS — J96.01 ACUTE HYPOXEMIC RESPIRATORY FAILURE (HCC): ICD-10-CM

## 2023-02-08 DIAGNOSIS — R06.02 SHORTNESS OF BREATH: ICD-10-CM

## 2023-02-08 PROCEDURE — 94761 N-INVAS EAR/PLS OXIMETRY MLT: CPT | Performed by: INTERNAL MEDICINE

## 2023-02-08 PROCEDURE — 99215 OFFICE O/P EST HI 40 MIN: CPT | Mod: 25 | Performed by: INTERNAL MEDICINE

## 2023-02-08 RX ORDER — FLUTICASONE FUROATE AND VILANTEROL 200; 25 UG/1; UG/1
1 POWDER RESPIRATORY (INHALATION) DAILY
Qty: 1 EACH | Refills: 11 | Status: SHIPPED | OUTPATIENT
Start: 2023-02-08 | End: 2023-06-08 | Stop reason: SDUPTHER

## 2023-02-08 ASSESSMENT — ENCOUNTER SYMPTOMS
EYE DISCHARGE: 0
LOSS OF CONSCIOUSNESS: 0
DIARRHEA: 0
DIZZINESS: 0
EYE PAIN: 0
STRIDOR: 0
ABDOMINAL PAIN: 0
VOMITING: 0
SINUS PAIN: 0
FEVER: 0
COUGH: 0
WEIGHT LOSS: 0
SORE THROAT: 0
MYALGIAS: 0
HEADACHES: 0
SENSORY CHANGE: 0
WHEEZING: 0
NAUSEA: 0
PSYCHIATRIC NEGATIVE: 1
SHORTNESS OF BREATH: 1
ORTHOPNEA: 0
FOCAL WEAKNESS: 0
SPUTUM PRODUCTION: 0
CHILLS: 0

## 2023-02-08 ASSESSMENT — FIBROSIS 4 INDEX: FIB4 SCORE: 3.13

## 2023-02-08 NOTE — ASSESSMENT & PLAN NOTE
History of most recently stage III non-small cell lung cancer of the left lower lobe treated with carboplatin/Taxol/radiation in 2021 recent admission for shortness of breath found to have bilateral pleural effusions, multifocal pneumonia groundglass opacities.  He was treated with 5-day course of antibiotics and steroids for suspected radiation versus checkpoint inhibitor pneumonitis.  Also underwent a thoracentesis which was transudative    He is slowly been symptomatically improving since discharge.  On room air today with no desaturations on multi oximetry walk test.  No fevers.    -- Continue to closely monitor his symptoms and recovery.  Continue prednisone taper as prescribed.  We will follow-up in March after PET/CT performed to determine if bronchoscopy or repeat thoracentesis is necessary.

## 2023-02-08 NOTE — ASSESSMENT & PLAN NOTE
S/p thoracentesis left side 1/2023, transudative  Closely monitor symptoms with steroid course, if dyspnea recurs, repeat CT as may need another thoracentesis

## 2023-02-08 NOTE — PROGRESS NOTES
Pulmonary Clinic Note    Chief Complaint:  Chief Complaint   Patient presents with    Follow-Up     Mass of lower lobe of left lung. Last seen 06/28/21    Results     CT-CTA 01/30/23     HPI:   Pito Black is a very pleasant 78 y.o. male with a history of stage IIIa non-small cell lung cancer of the left lower lobe treated with carboplatin/Taxol/radiation in 2021, stage Ia non-small cell lung cancer of the right upper lobe treated with SBRT in 2019, as well as stage I non-small cell lung cancer status post wedge resection in 2018.  He is followed by Dr. Ruiz for oncology care.    Pito presents to the pulmonary clinic today for follow-up after recent hospitalization for shortness of breath attributed to bilateral pleural effusions and right upper lobe mass with resultant postobstructive pneumonia.  He was treated with 5-day course of antibiotics and steroids for possible radiation versus immunotherapy induced pneumonitis.      He underwent a left-sided thoracentesis with 1 L fluid removed, fluid consistent with transudate.  Low total cell count, however lymphocytic predominant.  Cytology and cultures negative    Last PFTs in 2020 showing preserved FEV1 (2.59L, 95%), no BD response, preserved lung volumes and diffusion capacity.    Respiratory regimen includes Breo Ellipta 200 one puff nightly  On no anticoagulants, ASA 81 mg p.o. daily only    PMH otherwise notable for CKD stage III, PAF, hypertension, dyslipidemia    Interval events since hospitalization:  Since hospitalization has followed up with Dr. Riuz in the oncology clinic.  He is symptomatically slowly but surely improving.  He has been continued on prednisone with a plan to remain on prednisone until his PET scan is performed 3/2023.  Differential diagnosis recurrent disease versus checkpoint inhibitor pneumonitis versus radiation pneumonitis.    Multi oximetry walk test today in clinic demonstrates no desaturations on room air.    Past Medical History:  "  Diagnosis Date    Arrhythmia     hx of a fib    Arthritis 2015    generalized, DDD back    Asthma     inhaler     Backpain 08/06/2018    9/10    Cancer (HCC) 07/2017    left lung    Cataract     bilateral, no surgery    Dental disorder     lower partial, removable bridge    Diabetes 08/06/2018    on no meds at this time, diet controlled    Heart valve disease     mild mitral valve prolapse    High cholesterol     Hypertension        Past Surgical History:   Procedure Laterality Date    AZ BRONCHOSCOPY,DIAGNOSTIC  7/1/2021    Procedure: BRONCHOSCOPY - FIBER OPTIC WITH BRANCHOALVEOLAR LAVAGE BIOPSY, FNA, NAVIGATION;  Surgeon: Vinayak Carbajal M.D.;  Location: [facility];  Service: Pulmonary    ENDOBRONCHIAL US ADD-ON  7/1/2021    Procedure: ENDOBRONCHIAL ULTRASOUND (EBUS).;  Surgeon: Vinayak Carbajal M.D.;  Location: [facility];  Service: Pulmonary    CATARACT PHACO WITH IOL Left 8/21/2018    Procedure: CATARACT PHACO WITH IOL;  Surgeon: Juanito Hager M.D.;  Location: SURGERY SAME DAY [facility];  Service: Ophthalmology    CATARACT PHACO WITH IOL Right 8/7/2018    Procedure: CATARACT PHACO WITH IOL;  Surgeon: Juanito Hager M.D.;  Location: SURGERY SAME DAY [facility];  Service: Ophthalmology    THORACOSCOPY Left 4/19/2018    Procedure: THORACOSCOPY- WEDGE BIOPSY W/FROZEN SECTION;  Surgeon: Cristhian Galeano M.D.;  Location: [facility];  Service: Thoracic    EAR MIDDLE EXPLORATION Right 6/12/2015    Procedure: EAR MIDDLE EXPLORATION;  Surgeon: William Brandon M.D.;  Location: SURGERY SAME DAY [facility];  Service:     OSSICULAR RECONSTRUCTION Right 6/12/2015    Procedure: OSSICULAR RECONSTRUCTION CHAIN POSSIBLE;  Surgeon: William Brandon M.D.;  Location: SURGERY SAME DAY [facility];  Service:     OTHER      ear replacement \"many years ago\"    OTHER ORTHOPEDIC SURGERY      right knee replacement 2005       Social History     Socioeconomic History    Marital status: "      Spouse name: Not on file    Number of children: Not on file    Years of education: Not on file    Highest education level: Not on file   Occupational History    Not on file   Tobacco Use    Smoking status: Former     Packs/day: 0.25     Years: 40.00     Pack years: 10.00     Types: Cigarettes     Quit date: 2005     Years since quittin.1    Smokeless tobacco: Never   Vaping Use    Vaping Use: Never used   Substance and Sexual Activity    Alcohol use: Yes     Alcohol/week: 12.6 - 21.0 oz     Types: 21 - 35 Cans of beer per week     Comment: 3-5 beers a day    Drug use: No    Sexual activity: Not on file   Other Topics Concern    Not on file   Social History Narrative    Not on file     Social Determinants of Health     Financial Resource Strain: Not on file   Food Insecurity: Not on file   Transportation Needs: Not on file   Physical Activity: Not on file   Stress: Not on file   Social Connections: Not on file   Intimate Partner Violence: Not on file   Housing Stability: Not on file          Family History   Problem Relation Age of Onset    Cancer Father         stomach cancer       Current Outpatient Medications on File Prior to Visit   Medication Sig Dispense Refill    famotidine (PEPCID) 20 MG Tab Take 1 Tablet by mouth every day. 30 Tablet 0    furosemide (LASIX) 40 MG Tab Take 1 Tablet by mouth every day. 30 Tablet 0    metFORMIN (GLUCOPHAGE) 500 MG Tab Take 1 Tablet by mouth every day. 30 Tablet 0    predniSONE (DELTASONE) 10 MG Tab Take 4 Tablets (40 mg) by mouth every day for 7 days,THEN take 2 tablets (20 mg) every day for 7 days, THEN take 1 tablet (10 mg) every day for 7 days 49 Tablet 0    aspirin (ASA) 81 MG Chew Tab chewable tablet Chew 81 mg every day.      Cholecalciferol (VITAMIN D3 PO) Take  by mouth.      acetaminophen (TYLENOL) 325 MG Tab Take 650 mg by mouth every four hours as needed.      cyclobenzaprine (FLEXERIL) 10 mg Tab Take 10 mg by mouth.      ezetimibe (ZETIA) 10 MG  "TABS Take 10 mg by mouth every morning.       No current facility-administered medications on file prior to visit.       Allergies: Patient has no known allergies.      ROS:   Review of Systems   Constitutional:  Negative for chills, fever and weight loss.   HENT:  Negative for congestion, sinus pain and sore throat.    Eyes:  Negative for pain and discharge.   Respiratory:  Positive for shortness of breath. Negative for cough, sputum production, wheezing and stridor.    Cardiovascular:  Negative for chest pain, orthopnea and leg swelling.   Gastrointestinal:  Negative for abdominal pain, diarrhea, nausea and vomiting.   Genitourinary:  Negative for dysuria, frequency and urgency.   Musculoskeletal:  Negative for myalgias.   Skin:  Negative for rash.   Neurological:  Negative for dizziness, sensory change, focal weakness, loss of consciousness and headaches.   Psychiatric/Behavioral: Negative.     All other systems reviewed and are negative.    Vitals:  /68 (BP Location: Left arm, Patient Position: Sitting, BP Cuff Size: Adult)   Pulse 80   Ht 1.727 m (5' 8\")   Wt 64 kg (141 lb)   SpO2 96%     Physical Exam:  Physical Exam  Vitals and nursing note reviewed.   Constitutional:       General: He is not in acute distress.     Appearance: He is well-developed. He is ill-appearing. He is not diaphoretic.      Comments: Very pleasant  Accompanied by multiple family members   HENT:      Nose: Nose normal.      Mouth/Throat:      Pharynx: No oropharyngeal exudate.   Eyes:      General: No scleral icterus.        Right eye: No discharge.         Left eye: No discharge.      Conjunctiva/sclera: Conjunctivae normal.      Pupils: Pupils are equal, round, and reactive to light.   Neck:      Thyroid: No thyromegaly.      Vascular: No JVD.      Trachea: No tracheal deviation.   Cardiovascular:      Rate and Rhythm: Normal rate and regular rhythm.      Heart sounds: Normal heart sounds. No murmur heard.  Pulmonary:      " Effort: Pulmonary effort is normal. No respiratory distress.      Breath sounds: No stridor. No wheezing or rales.      Comments: Diminished breath sounds and dullness to percussion in the bilateral bases  no wheezes    Abdominal:      General: There is no distension.      Palpations: Abdomen is soft.      Tenderness: There is no abdominal tenderness. There is no guarding.   Musculoskeletal:         General: No tenderness. Normal range of motion.      Cervical back: Neck supple.   Lymphadenopathy:      Cervical: No cervical adenopathy.   Skin:     General: Skin is warm and dry.      Capillary Refill: Capillary refill takes less than 2 seconds.      Coloration: Skin is not pale.      Findings: No erythema.   Neurological:      Mental Status: He is alert and oriented to person, place, and time.      Sensory: No sensory deficit.      Motor: No abnormal muscle tone.      Coordination: Coordination normal.      Deep Tendon Reflexes: Reflexes normal.   Psychiatric:         Behavior: Behavior normal.         Thought Content: Thought content normal.         Judgment: Judgment normal.     Laboratory Data:    PFTs as reviewed by me personally show:  See HPI    Imaging as reviewed by me personally show:    See HPI    Assessment/Plan:    Problem List Items Addressed This Visit       Pneumonitis     History of most recently stage III non-small cell lung cancer of the left lower lobe treated with carboplatin/Taxol/radiation in 2021 recent admission for shortness of breath found to have bilateral pleural effusions, multifocal pneumonia groundglass opacities.  He was treated with 5-day course of antibiotics and steroids for suspected radiation versus checkpoint inhibitor pneumonitis.  Also underwent a thoracentesis which was transudative    He is slowly been symptomatically improving since discharge.  On room air today with no desaturations on multi oximetry walk test.  No fevers.    -- Continue to closely monitor his symptoms and  recovery.  Continue prednisone taper as prescribed.  We will follow-up in March after PET/CT performed to determine if bronchoscopy or repeat thoracentesis is necessary.         Bilateral pleural effusion     S/p thoracentesis left side 1/2023, transudative  Closely monitor symptoms with steroid course, if dyspnea recurs, repeat CT as may need another thoracentesis          Other Visit Diagnoses       Shortness of breath        Relevant Medications    fluticasone furoate-vilanterol (BREO ELLIPTA) 200-25 MCG/ACT AEROSOL POWDER, BREATH ACTIVATED    Acute hypoxemic respiratory failure (HCC)        Relevant Orders    Multiple Oximetry          Return in about 7 weeks (around 3/29/2023).     This note was generated using voice recognition software which has a chance of producing errors of grammar and possibly content.  I have made every reasonable attempt to find and correct any obvious errors, but it should be expected that some may not be found prior to finalization of this note.    Time spent in record review prior to patient arrival, reviewing results, and in face-to-face encounter totaled 52 min, excluding any procedures if performed.  __________  Vinayak Carbajal MD  Pulmonary and Critical Care Medicine  [facility]

## 2023-02-08 NOTE — PROCEDURES
Multi-Ox Readings  Multi Ox #1 Room air   O2 sat % at rest 98 (Pulse: 64)   O2 sat % on exertion 97 (Pulse: 117)   O2 sat average on exertion     Multi Ox #2     O2 sat % at rest     O2 sat % on exertion     O2 sat average on exertion       Oxygen Use     Oxygen Frequency     Duration of need     Is the patient mobile within the home?     CPAP Use?     BIPAP Use?     Servo Titration       Multi oximetry walk test today in clinic on room air demonstrated no hypoxia but noted tachycardia with ambulation.    I, Vinayak Carbajal M.D. am the author of this note (walk test interpretation).    __________  Vinayak Carbajal MD  Pulmonary and Critical Care Medicine  Count includes the [facility]

## 2023-02-19 ENCOUNTER — APPOINTMENT (OUTPATIENT)
Dept: RADIOLOGY | Facility: MEDICAL CENTER | Age: 78
DRG: 871 | End: 2023-02-19
Attending: EMERGENCY MEDICINE
Payer: MEDICARE

## 2023-02-19 ENCOUNTER — HOSPITAL ENCOUNTER (INPATIENT)
Facility: MEDICAL CENTER | Age: 78
LOS: 3 days | DRG: 871 | End: 2023-02-22
Attending: EMERGENCY MEDICINE | Admitting: STUDENT IN AN ORGANIZED HEALTH CARE EDUCATION/TRAINING PROGRAM
Payer: MEDICARE

## 2023-02-19 DIAGNOSIS — J18.9 PNEUMONIA OF BOTH LUNGS DUE TO INFECTIOUS ORGANISM, UNSPECIFIED PART OF LUNG: ICD-10-CM

## 2023-02-19 DIAGNOSIS — A41.9 SEPSIS WITHOUT ACUTE ORGAN DYSFUNCTION, DUE TO UNSPECIFIED ORGANISM (HCC): ICD-10-CM

## 2023-02-19 DIAGNOSIS — I26.99 PULMONARY EMBOLISM ON RIGHT (HCC): ICD-10-CM

## 2023-02-19 PROBLEM — E87.20 LACTIC ACIDOSIS: Status: ACTIVE | Noted: 2023-02-19

## 2023-02-19 PROBLEM — E55.9 VITAMIN D DEFICIENCY: Status: ACTIVE | Noted: 2023-02-19

## 2023-02-19 PROBLEM — D53.9 MACROCYTIC ANEMIA: Status: ACTIVE | Noted: 2023-02-19

## 2023-02-19 LAB
ALBUMIN SERPL BCP-MCNC: 4 G/DL (ref 3.2–4.9)
ALBUMIN/GLOB SERPL: 1.1 G/DL
ALP SERPL-CCNC: 68 U/L (ref 30–99)
ALT SERPL-CCNC: 38 U/L (ref 2–50)
ANION GAP SERPL CALC-SCNC: 11 MMOL/L (ref 7–16)
APPEARANCE UR: CLEAR
APTT PPP: 25.9 SEC (ref 24.7–36)
AST SERPL-CCNC: 26 U/L (ref 12–45)
BASOPHILS # BLD AUTO: 0.3 % (ref 0–1.8)
BASOPHILS # BLD: 0.04 K/UL (ref 0–0.12)
BILIRUB SERPL-MCNC: 0.9 MG/DL (ref 0.1–1.5)
BILIRUB UR QL STRIP.AUTO: NEGATIVE
BUN SERPL-MCNC: 20 MG/DL (ref 8–22)
CALCIUM ALBUM COR SERPL-MCNC: 9.5 MG/DL (ref 8.5–10.5)
CALCIUM SERPL-MCNC: 9.5 MG/DL (ref 8.5–10.5)
CHLORIDE SERPL-SCNC: 101 MMOL/L (ref 96–112)
CO2 SERPL-SCNC: 25 MMOL/L (ref 20–33)
COLOR UR: YELLOW
CREAT SERPL-MCNC: 1.24 MG/DL (ref 0.5–1.4)
EKG IMPRESSION: NORMAL
EOSINOPHIL # BLD AUTO: 0.2 K/UL (ref 0–0.51)
EOSINOPHIL NFR BLD: 1.5 % (ref 0–6.9)
ERYTHROCYTE [DISTWIDTH] IN BLOOD BY AUTOMATED COUNT: 46.5 FL (ref 35.9–50)
FLUAV RNA SPEC QL NAA+PROBE: NEGATIVE
FLUBV RNA SPEC QL NAA+PROBE: NEGATIVE
GFR SERPLBLD CREATININE-BSD FMLA CKD-EPI: 59 ML/MIN/1.73 M 2
GLOBULIN SER CALC-MCNC: 3.6 G/DL (ref 1.9–3.5)
GLUCOSE SERPL-MCNC: 157 MG/DL (ref 65–99)
GLUCOSE UR STRIP.AUTO-MCNC: 100 MG/DL
HCT VFR BLD AUTO: 47.6 % (ref 42–52)
HGB BLD-MCNC: 15.9 G/DL (ref 14–18)
IMM GRANULOCYTES # BLD AUTO: 0.09 K/UL (ref 0–0.11)
IMM GRANULOCYTES NFR BLD AUTO: 0.7 % (ref 0–0.9)
INR PPP: 1.03 (ref 0.87–1.13)
KETONES UR STRIP.AUTO-MCNC: NEGATIVE MG/DL
LACTATE SERPL-SCNC: 1.2 MMOL/L (ref 0.5–2)
LACTATE SERPL-SCNC: 2.5 MMOL/L (ref 0.5–2)
LEUKOCYTE ESTERASE UR QL STRIP.AUTO: NEGATIVE
LYMPHOCYTES # BLD AUTO: 1.32 K/UL (ref 1–4.8)
LYMPHOCYTES NFR BLD: 10 % (ref 22–41)
MAGNESIUM SERPL-MCNC: 2 MG/DL (ref 1.5–2.5)
MCH RBC QN AUTO: 33.5 PG (ref 27–33)
MCHC RBC AUTO-ENTMCNC: 33.4 G/DL (ref 33.7–35.3)
MCV RBC AUTO: 100.2 FL (ref 81.4–97.8)
MICRO URNS: ABNORMAL
MONOCYTES # BLD AUTO: 0.59 K/UL (ref 0–0.85)
MONOCYTES NFR BLD AUTO: 4.5 % (ref 0–13.4)
NEUTROPHILS # BLD AUTO: 10.95 K/UL (ref 1.82–7.42)
NEUTROPHILS NFR BLD: 83 % (ref 44–72)
NITRITE UR QL STRIP.AUTO: NEGATIVE
NRBC # BLD AUTO: 0 K/UL
NRBC BLD-RTO: 0 /100 WBC
PH UR STRIP.AUTO: 5.5 [PH] (ref 5–8)
PLATELET # BLD AUTO: 174 K/UL (ref 164–446)
PMV BLD AUTO: 9.8 FL (ref 9–12.9)
POTASSIUM SERPL-SCNC: 3.9 MMOL/L (ref 3.6–5.5)
PROCALCITONIN SERPL-MCNC: 0.06 NG/ML
PROT SERPL-MCNC: 7.6 G/DL (ref 6–8.2)
PROT UR QL STRIP: NEGATIVE MG/DL
PROTHROMBIN TIME: 13.4 SEC (ref 12–14.6)
RBC # BLD AUTO: 4.75 M/UL (ref 4.7–6.1)
RBC UR QL AUTO: NEGATIVE
RSV RNA SPEC QL NAA+PROBE: NEGATIVE
SARS-COV-2 RNA RESP QL NAA+PROBE: NOTDETECTED
SODIUM SERPL-SCNC: 137 MMOL/L (ref 135–145)
SP GR UR STRIP.AUTO: 1.01
SPECIMEN SOURCE: NORMAL
UFH PPP CHRO-ACNC: <0.1 IU/ML
UROBILINOGEN UR STRIP.AUTO-MCNC: 0.2 MG/DL
WBC # BLD AUTO: 13.2 K/UL (ref 4.8–10.8)

## 2023-02-19 PROCEDURE — 700117 HCHG RX CONTRAST REV CODE 255: Performed by: EMERGENCY MEDICINE

## 2023-02-19 PROCEDURE — 96375 TX/PRO/DX INJ NEW DRUG ADDON: CPT

## 2023-02-19 PROCEDURE — 770020 HCHG ROOM/CARE - TELE (206)

## 2023-02-19 PROCEDURE — 96366 THER/PROPH/DIAG IV INF ADDON: CPT

## 2023-02-19 PROCEDURE — 87633 RESP VIRUS 12-25 TARGETS: CPT

## 2023-02-19 PROCEDURE — 700102 HCHG RX REV CODE 250 W/ 637 OVERRIDE(OP): Performed by: STUDENT IN AN ORGANIZED HEALTH CARE EDUCATION/TRAINING PROGRAM

## 2023-02-19 PROCEDURE — 99223 1ST HOSP IP/OBS HIGH 75: CPT | Mod: AI,GC | Performed by: STUDENT IN AN ORGANIZED HEALTH CARE EDUCATION/TRAINING PROGRAM

## 2023-02-19 PROCEDURE — C9803 HOPD COVID-19 SPEC COLLECT: HCPCS | Performed by: EMERGENCY MEDICINE

## 2023-02-19 PROCEDURE — 87486 CHLMYD PNEUM DNA AMP PROBE: CPT

## 2023-02-19 PROCEDURE — 0241U HCHG SARS-COV-2 COVID-19 NFCT DS RESP RNA 4 TRGT MIC: CPT

## 2023-02-19 PROCEDURE — 96368 THER/DIAG CONCURRENT INF: CPT

## 2023-02-19 PROCEDURE — 36415 COLL VENOUS BLD VENIPUNCTURE: CPT

## 2023-02-19 PROCEDURE — A9270 NON-COVERED ITEM OR SERVICE: HCPCS | Performed by: STUDENT IN AN ORGANIZED HEALTH CARE EDUCATION/TRAINING PROGRAM

## 2023-02-19 PROCEDURE — 700111 HCHG RX REV CODE 636 W/ 250 OVERRIDE (IP): Performed by: STUDENT IN AN ORGANIZED HEALTH CARE EDUCATION/TRAINING PROGRAM

## 2023-02-19 PROCEDURE — 84145 PROCALCITONIN (PCT): CPT

## 2023-02-19 PROCEDURE — 81003 URINALYSIS AUTO W/O SCOPE: CPT

## 2023-02-19 PROCEDURE — 85520 HEPARIN ASSAY: CPT

## 2023-02-19 PROCEDURE — 700111 HCHG RX REV CODE 636 W/ 250 OVERRIDE (IP): Performed by: EMERGENCY MEDICINE

## 2023-02-19 PROCEDURE — 96365 THER/PROPH/DIAG IV INF INIT: CPT

## 2023-02-19 PROCEDURE — 85730 THROMBOPLASTIN TIME PARTIAL: CPT

## 2023-02-19 PROCEDURE — 700105 HCHG RX REV CODE 258: Performed by: EMERGENCY MEDICINE

## 2023-02-19 PROCEDURE — 87040 BLOOD CULTURE FOR BACTERIA: CPT

## 2023-02-19 PROCEDURE — 93005 ELECTROCARDIOGRAM TRACING: CPT | Performed by: EMERGENCY MEDICINE

## 2023-02-19 PROCEDURE — A9270 NON-COVERED ITEM OR SERVICE: HCPCS | Performed by: EMERGENCY MEDICINE

## 2023-02-19 PROCEDURE — 93005 ELECTROCARDIOGRAM TRACING: CPT

## 2023-02-19 PROCEDURE — 87798 DETECT AGENT NOS DNA AMP: CPT

## 2023-02-19 PROCEDURE — 700105 HCHG RX REV CODE 258: Performed by: STUDENT IN AN ORGANIZED HEALTH CARE EDUCATION/TRAINING PROGRAM

## 2023-02-19 PROCEDURE — 87086 URINE CULTURE/COLONY COUNT: CPT

## 2023-02-19 PROCEDURE — 83735 ASSAY OF MAGNESIUM: CPT

## 2023-02-19 PROCEDURE — 85610 PROTHROMBIN TIME: CPT

## 2023-02-19 PROCEDURE — 71045 X-RAY EXAM CHEST 1 VIEW: CPT

## 2023-02-19 PROCEDURE — 99285 EMERGENCY DEPT VISIT HI MDM: CPT

## 2023-02-19 PROCEDURE — 87581 M.PNEUMON DNA AMP PROBE: CPT

## 2023-02-19 PROCEDURE — 85025 COMPLETE CBC W/AUTO DIFF WBC: CPT

## 2023-02-19 PROCEDURE — 700102 HCHG RX REV CODE 250 W/ 637 OVERRIDE(OP): Performed by: EMERGENCY MEDICINE

## 2023-02-19 PROCEDURE — 71275 CT ANGIOGRAPHY CHEST: CPT

## 2023-02-19 PROCEDURE — 80053 COMPREHEN METABOLIC PANEL: CPT

## 2023-02-19 PROCEDURE — 83605 ASSAY OF LACTIC ACID: CPT

## 2023-02-19 RX ORDER — HEPARIN SODIUM 1000 [USP'U]/ML
40 INJECTION, SOLUTION INTRAVENOUS; SUBCUTANEOUS PRN
Status: DISCONTINUED | OUTPATIENT
Start: 2023-02-19 | End: 2023-02-20

## 2023-02-19 RX ORDER — FAMOTIDINE 20 MG/1
20 TABLET, FILM COATED ORAL DAILY
Status: DISCONTINUED | OUTPATIENT
Start: 2023-02-20 | End: 2023-02-22 | Stop reason: HOSPADM

## 2023-02-19 RX ORDER — PREDNISONE 10 MG/1
10 TABLET ORAL DAILY
Status: DISCONTINUED | OUTPATIENT
Start: 2023-02-20 | End: 2023-02-22 | Stop reason: HOSPADM

## 2023-02-19 RX ORDER — SODIUM CHLORIDE, SODIUM LACTATE, POTASSIUM CHLORIDE, AND CALCIUM CHLORIDE .6; .31; .03; .02 G/100ML; G/100ML; G/100ML; G/100ML
30 INJECTION, SOLUTION INTRAVENOUS ONCE
Status: COMPLETED | OUTPATIENT
Start: 2023-02-19 | End: 2023-02-19

## 2023-02-19 RX ORDER — ACETAMINOPHEN 500 MG
1000 TABLET ORAL ONCE
Status: COMPLETED | OUTPATIENT
Start: 2023-02-19 | End: 2023-02-19

## 2023-02-19 RX ORDER — CYCLOBENZAPRINE HCL 10 MG
10 TABLET ORAL
Status: DISCONTINUED | OUTPATIENT
Start: 2023-02-19 | End: 2023-02-22 | Stop reason: HOSPADM

## 2023-02-19 RX ORDER — BUDESONIDE AND FORMOTEROL FUMARATE DIHYDRATE 160; 4.5 UG/1; UG/1
2 AEROSOL RESPIRATORY (INHALATION) 2 TIMES DAILY
Status: DISCONTINUED | OUTPATIENT
Start: 2023-02-19 | End: 2023-02-22 | Stop reason: HOSPADM

## 2023-02-19 RX ORDER — LEVOFLOXACIN 5 MG/ML
750 INJECTION, SOLUTION INTRAVENOUS EVERY 24 HOURS
Status: DISCONTINUED | OUTPATIENT
Start: 2023-02-20 | End: 2023-02-19

## 2023-02-19 RX ORDER — VITAMIN B COMPLEX
2000 TABLET ORAL DAILY
Status: DISCONTINUED | OUTPATIENT
Start: 2023-02-20 | End: 2023-02-22 | Stop reason: HOSPADM

## 2023-02-19 RX ORDER — EZETIMIBE 10 MG/1
10 TABLET ORAL DAILY
Status: DISCONTINUED | OUTPATIENT
Start: 2023-02-20 | End: 2023-02-22 | Stop reason: HOSPADM

## 2023-02-19 RX ORDER — HEPARIN SODIUM 5000 [USP'U]/100ML
0-30 INJECTION, SOLUTION INTRAVENOUS CONTINUOUS
Status: DISCONTINUED | OUTPATIENT
Start: 2023-02-19 | End: 2023-02-20

## 2023-02-19 RX ORDER — BISACODYL 10 MG
10 SUPPOSITORY, RECTAL RECTAL
Status: DISCONTINUED | OUTPATIENT
Start: 2023-02-19 | End: 2023-02-19

## 2023-02-19 RX ORDER — POLYETHYLENE GLYCOL 3350 17 G/17G
1 POWDER, FOR SOLUTION ORAL
Status: DISCONTINUED | OUTPATIENT
Start: 2023-02-19 | End: 2023-02-22 | Stop reason: HOSPADM

## 2023-02-19 RX ORDER — AMOXICILLIN 250 MG
2 CAPSULE ORAL 2 TIMES DAILY
Status: DISCONTINUED | OUTPATIENT
Start: 2023-02-19 | End: 2023-02-22 | Stop reason: HOSPADM

## 2023-02-19 RX ORDER — ACETAMINOPHEN 500 MG
1000 TABLET ORAL EVERY 8 HOURS PRN
Status: DISCONTINUED | OUTPATIENT
Start: 2023-02-19 | End: 2023-02-22 | Stop reason: HOSPADM

## 2023-02-19 RX ORDER — AMOXICILLIN AND CLAVULANATE POTASSIUM 500; 125 MG/1; MG/1
1 TABLET, FILM COATED ORAL EVERY 8 HOURS
Status: SHIPPED | COMMUNITY
End: 2023-02-19

## 2023-02-19 RX ORDER — HEPARIN SODIUM 1000 [USP'U]/ML
80 INJECTION, SOLUTION INTRAVENOUS; SUBCUTANEOUS ONCE
Status: COMPLETED | OUTPATIENT
Start: 2023-02-19 | End: 2023-02-19

## 2023-02-19 RX ORDER — DIPHENHYDRAMINE HCL 25 MG
25 TABLET ORAL NIGHTLY PRN
Status: COMPLETED | OUTPATIENT
Start: 2023-02-19 | End: 2023-02-20

## 2023-02-19 RX ORDER — ACETAMINOPHEN/DIPHENHYDRAMINE 500MG-25MG
1-2 TABLET ORAL
COMMUNITY

## 2023-02-19 RX ADMIN — BUDESONIDE AND FORMOTEROL FUMARATE DIHYDRATE 2 PUFF: 160; 4.5 AEROSOL RESPIRATORY (INHALATION) at 21:23

## 2023-02-19 RX ADMIN — IOHEXOL 50 ML: 350 INJECTION, SOLUTION INTRAVENOUS at 18:08

## 2023-02-19 RX ADMIN — HEPARIN SODIUM 18 UNITS/KG/HR: 5000 INJECTION, SOLUTION INTRAVENOUS at 18:58

## 2023-02-19 RX ADMIN — HEPARIN SODIUM 5400 UNITS: 1000 INJECTION, SOLUTION INTRAVENOUS; SUBCUTANEOUS at 18:56

## 2023-02-19 RX ADMIN — CEFEPIME 2 G: 2 INJECTION, POWDER, FOR SOLUTION INTRAVENOUS at 21:23

## 2023-02-19 RX ADMIN — SODIUM CHLORIDE, POTASSIUM CHLORIDE, SODIUM LACTATE AND CALCIUM CHLORIDE 2007 ML: 600; 310; 30; 20 INJECTION, SOLUTION INTRAVENOUS at 15:23

## 2023-02-19 RX ADMIN — ACETAMINOPHEN 1000 MG: 500 TABLET, FILM COATED ORAL at 13:36

## 2023-02-19 ASSESSMENT — ENCOUNTER SYMPTOMS
ABDOMINAL PAIN: 0
DIZZINESS: 0
VOMITING: 0
SPUTUM PRODUCTION: 0
PALPITATIONS: 0
DEPRESSION: 0
SHORTNESS OF BREATH: 1
WHEEZING: 0
HEMOPTYSIS: 0
DIARRHEA: 1
BACK PAIN: 1
HEADACHES: 0
COUGH: 1
FEVER: 0
WEIGHT LOSS: 0
ORTHOPNEA: 0
CHILLS: 0
NAUSEA: 0

## 2023-02-19 ASSESSMENT — FIBROSIS 4 INDEX: FIB4 SCORE: 3.13

## 2023-02-19 ASSESSMENT — LIFESTYLE VARIABLES
SUBSTANCE_ABUSE: 0
DO YOU DRINK ALCOHOL: NO

## 2023-02-19 NOTE — ED NOTES
Med rec completed per patient and family at bedside.  Allergies reviewed with patient and family. NKDA.  Patient's preferred pharmacy: [facility] (Meds-to-Beds) for discharge medications, otherwise [facility] in [place], [place].    Patient was prescribed a 4 day course of Augmentin on 2/2/2023, which his family states he finished.  Patient is on a 3 week tapering dose course of prednisone prescribed 2/2/2023.    Patient and family state that the furosemide and metformin prescribed to patient upon discharge from previous admission at [facility] (1/31/2023 - 2/2/2023) were discontinued by patient's P.C.P. on Thursday 2/16/2023.

## 2023-02-19 NOTE — ED TRIAGE NOTES
"Chief Complaint   Patient presents with    Shortness of Breath     Hx lung cancer, on autoimmune therapy, last therapy in November. Pt was taking prednisone and lasix and recently had lasix dc'd on Thursday or Friday. Pt started experiencing worsening SOB/cough yesterday       BP (!) 160/71   Pulse (!) 118   Temp (!) 38.6 °C (101.5 °F) (Temporal)   Resp 18   Ht 1.727 m (5' 8\")   Wt 66.9 kg (147 lb 7.8 oz)   SpO2 95%   BMI 22.43 kg/m²     "

## 2023-02-19 NOTE — ED PROVIDER NOTES
ED Provider Note    CHIEF COMPLAINT  Chief Complaint   Patient presents with    Shortness of Breath     Hx lung cancer, on autoimmune therapy, last therapy in November. Pt was taking prednisone and lasix and recently had lasix dc'd on Thursday or Friday. Pt started experiencing worsening SOB/cough yesterday       EXTERNAL RECORDS REVIEWED  Inpatient Notes discharge summary completed on 2/2/2023.  The patient is here for hypoxia, possible pneumonia versus pneumonitis versus postradiation pneumonitis versus immunotherapy pneumonitis.  He had thoracentesis as well.  The patient was started on prednisone and is coming down off the prednisone per his oncologist.    HPI/ROS    OUTSIDE HISTORIAN(S):  Family patient's daughter is at bedside  Pito Black is a 78 y.o. male who presents with shortness of breath.  The patient has a significant history in the past lung cancer, radiation therapy, immunotherapy.  He has had pleural effusion as well.    Presents with increasing work of breathing and shortness of breath for the last 4 days.  He does have extensive history of lung cancer, immunotherapy, radiation therapy, pleural effusions with thoracentesis.  He had a fever earlier today as well.  He states he has had increasing work of breathing, cough.  His daughter is at bedside is discussing his care in excellent detail and states that he is on a low-dose prednisone now secondary to possible pneumonitis from the immunotherapy.  The patient was to return to the emergency department if he had increasing symptomatology such as this.  Patient denies chest pain, nausea, vomiting, swelling of his lower extremities.    PAST MEDICAL HISTORY   has a past medical history of Arrhythmia, Arthritis (2015), Asthma, Backpain (08/06/2018), Cancer (HCC) (07/2017), Cataract, Dental disorder, Diabetes (08/06/2018), Heart valve disease, High cholesterol, and Hypertension.    SURGICAL HISTORY   has a past surgical history that includes other  "orthopedic surgery; other; ear middle exploration (Right, 2015); ossicular reconstruction (Right, 2015); thoracoscopy (Left, 2018); cataract phaco with iol (Right, 2018); cataract phaco with iol (Left, 2018); bronchoscopy,diagnostic (2021); and endobronchial us add-on (2021).    FAMILY HISTORY  Family History   Problem Relation Age of Onset    Cancer Father         stomach cancer       SOCIAL HISTORY  Social History     Tobacco Use    Smoking status: Former     Packs/day: 0.25     Years: 40.00     Pack years: 10.00     Types: Cigarettes     Quit date: 2005     Years since quittin.1    Smokeless tobacco: Never   Vaping Use    Vaping Use: Never used   Substance and Sexual Activity    Alcohol use: Not Currently     Alcohol/week: 12.6 - 21.0 oz     Types: 21 - 35 Cans of beer per week     Comment: 3-5 beers a day, quit 2/3/2023    Drug use: No    Sexual activity: Not on file       CURRENT MEDICATIONS  Home Medications       Reviewed by Edmundo Shah (Pharmacy Tech) on 23 at 1350  Med List Status: Complete     Medication Last Dose Status   amoxicillin-clavulanate (AUGMENTIN) 500-125 MG Tab 2023 Active   aspirin 81 MG EC tablet 2023 Active   Cholecalciferol (VITAMIN D3) 2000 UNIT Cap 2023 Active   cyclobenzaprine (FLEXERIL) 10 mg Tab 2023 Active   diphenhydrAMINE-APAP, sleep, (TYLENOL PM EXTRA STRENGTH)  MG Tab \"ABOUT A WEEK AGO\" Active   ezetimibe (ZETIA) 10 MG TABS 2023 Active   famotidine (PEPCID) 20 MG Tab 2023 Active   fluticasone furoate-vilanterol (BREO ELLIPTA) 200-25 MCG/ACT AEROSOL POWDER, BREATH ACTIVATED 2023 Active   furosemide (LASIX) 40 MG Tab 2023 Active   metFORMIN (GLUCOPHAGE) 500 MG Tab 2023 Active   predniSONE (DELTASONE) 10 MG Tab 2023 Active                    ALLERGIES  No Known Allergies    PHYSICAL EXAM  VITAL SIGNS: /70   Pulse (!) 107   Temp 37.4 °C (99.4 °F) (Temporal)   Resp 18  " " Ht 1.727 m (5' 8\")   Wt 66.9 kg (147 lb 7.8 oz)   SpO2 92%   BMI 22.43 kg/m²      Nursing notes and vitals reviewed.  Constitutional: Well developed, Well nourished, No acute distress, Non-toxic appearance.   Eyes: PERRLA, EOMI, Conjunctiva normal, No discharge.   Cardiovascular: Normal heart rate, Normal rhythm, No murmurs, No rubs, No gallops.   Thorax & Lungs: No respiratory distress, rales or rhonchi bilaterally, decreased breath sounds bilaterally   Abdomen: Bowel sounds normal, Soft, No tenderness, No guarding, No rebound, No masses, No pulsatile masses.   Skin: Warm, Dry, No erythema, No rash.   Extremities: No deformity, no edema, good range of motion range of motion upper lower extremes bilaterally  Neurologic: Alert & oriented x 3, no focal abnormalities noted, acting appropriately on examination  Psychiatric: Affect normal for clinical presentation.      DIAGNOSTIC STUDIES / PROCEDURES  EKG  I have independently interpreted this EKG  Monitor reveals sinus tachycardia    LABS  Results for orders placed or performed during the hospital encounter of 02/19/23   Lactic acid (lactate): Repeat if initial lactic acid result is greater than 2   Result Value Ref Range    Lactic Acid 2.5 (H) 0.5 - 2.0 mmol/L   Lactic acid (lactate): Repeat if initial lactic acid result is greater than 2   Result Value Ref Range    Lactic Acid 1.2 0.5 - 2.0 mmol/L   CBC With Differential   Result Value Ref Range    WBC 13.2 (H) 4.8 - 10.8 K/uL    RBC 4.75 4.70 - 6.10 M/uL    Hemoglobin 15.9 14.0 - 18.0 g/dL    Hematocrit 47.6 42.0 - 52.0 %    .2 (H) 81.4 - 97.8 fL    MCH 33.5 (H) 27.0 - 33.0 pg    MCHC 33.4 (L) 33.7 - 35.3 g/dL    RDW 46.5 35.9 - 50.0 fL    Platelet Count 174 164 - 446 K/uL    MPV 9.8 9.0 - 12.9 fL    Neutrophils-Polys 83.00 (H) 44.00 - 72.00 %    Lymphocytes 10.00 (L) 22.00 - 41.00 %    Monocytes 4.50 0.00 - 13.40 %    Eosinophils 1.50 0.00 - 6.90 %    Basophils 0.30 0.00 - 1.80 %    Immature " Granulocytes 0.70 0.00 - 0.90 %    Nucleated RBC 0.00 /100 WBC    Neutrophils (Absolute) 10.95 (H) 1.82 - 7.42 K/uL    Lymphs (Absolute) 1.32 1.00 - 4.80 K/uL    Monos (Absolute) 0.59 0.00 - 0.85 K/uL    Eos (Absolute) 0.20 0.00 - 0.51 K/uL    Baso (Absolute) 0.04 0.00 - 0.12 K/uL    Immature Granulocytes (abs) 0.09 0.00 - 0.11 K/uL    NRBC (Absolute) 0.00 K/uL   Comp Metabolic Panel   Result Value Ref Range    Sodium 137 135 - 145 mmol/L    Potassium 3.9 3.6 - 5.5 mmol/L    Chloride 101 96 - 112 mmol/L    Co2 25 20 - 33 mmol/L    Anion Gap 11.0 7.0 - 16.0    Glucose 157 (H) 65 - 99 mg/dL    Bun 20 8 - 22 mg/dL    Creatinine 1.24 0.50 - 1.40 mg/dL    Calcium 9.5 8.5 - 10.5 mg/dL    AST(SGOT) 26 12 - 45 U/L    ALT(SGPT) 38 2 - 50 U/L    Alkaline Phosphatase 68 30 - 99 U/L    Total Bilirubin 0.9 0.1 - 1.5 mg/dL    Albumin 4.0 3.2 - 4.9 g/dL    Total Protein 7.6 6.0 - 8.2 g/dL    Globulin 3.6 (H) 1.9 - 3.5 g/dL    A-G Ratio 1.1 g/dL   Urinalysis    Specimen: Urine   Result Value Ref Range    Color Yellow     Character Clear     Specific Gravity 1.015 <1.035    Ph 5.5 5.0 - 8.0    Glucose 100 (A) Negative mg/dL    Ketones Negative Negative mg/dL    Protein Negative Negative mg/dL    Bilirubin Negative Negative    Urobilinogen, Urine 0.2 Negative    Nitrite Negative Negative    Leukocyte Esterase Negative Negative    Occult Blood Negative Negative    Micro Urine Req see below    CORRECTED CALCIUM   Result Value Ref Range    Correct Calcium 9.5 8.5 - 10.5 mg/dL   ESTIMATED GFR   Result Value Ref Range    GFR (CKD-EPI) 59 (A) >60 mL/min/1.73 m 2   CoV-2, FLU A/B, and RSV by PCR (2-4 Hours CEPHEID) : Collect NP swab in VTM    Specimen: Respirate   Result Value Ref Range    Influenza virus A RNA Negative Negative    Influenza virus B, PCR Negative Negative    RSV, PCR Negative Negative    SARS-CoV-2 by PCR NotDetected     SARS-CoV-2 Source NP Swab    EKG   Result Value Ref Range    Report       [facility]  Emergency Dept.    Test Date:  2023  Pt Name:    DOROTHEA SALINAS                  Department: ER  MRN:        [id number]                      Room:  Gender:     Male                         Technician: [id number]  :        1945                   Requested By:ER TRIAGE PROTOCOL  Order #:    [id number]                    Reading MD: YEIMY LOPEZ DO    Measurements  Intervals                                Axis  Rate:       124                          P:          56  AR:         126                          QRS:        21  QRSD:       98                           T:          94  QT:         305  QTc:        438    Interpretive Statements  Sinus tachycardia  Probable LVH with secondary repol abnrm  Anterior ST elevation, probably due to LVH  Compared to ECG 2023 00:28:27  Left ventricular hypertrophy now present  ST (T wave) deviation now present  Electronically Signed On 2023 13:45:27 PST by YEIMY LOPEZ DO           RADIOLOGY  I have independently interpreted the diagnostic imaging associated with this visit and am waiting the final reading from the radiologist.   My preliminary interpretation is a follows: Chest x-ray negative for focal infiltrate  Radiologist interpretation:   CT-CTA CHEST PULMONARY ARTERY W/ RECONS   Final Result      1.  Segmental RIGHT lower lobe pulmonary embolus   2.  No CT evidence of RIGHT heart strain   3.  BILATERAL pneumonia, slightly increased in the LEFT lung and grossly unchanged in the RIGHT lung since the prior study   4.  Trace RIGHT pleural effusion   5.  Small LEFT pleural effusion   6.  Enlarged subcarinal lymph node      Findings were communicated with and acknowledged by YEIMY LOPEZ via Voalte Me on 2023 6:16 PM.      DX-CHEST-PORTABLE (1 VIEW)   Final Result      Stable patchy bilateral infiltrates and interstitial prominence.            COURSE & MEDICAL DECISION MAKING    ED Observation Status? No; Patient does not meet criteria  for ED Observation.     INITIAL ASSESSMENT, COURSE AND PLAN  Care Narrative: This is a charming 70 gentleman presents with tachycardia, shortness of breath, cough.  Here in the emergency department, x-ray does show evidence of pneumonia.  He has a leukocytosis but does not have evidence of sepsis.  He received cefepime IV for this and for his tachycardia received 1 L fluid bolus.  His heart rate did slowly come down.  CT scan was obtained that showed evidence of a right lower lobe segmental pulmonary embolism but no evidence of RV strain.  In addition, CT did reveal evidence of bilateral pneumonia but no evidence profound pleural effusion.  I have started the patient on IV antibiotics, IV fluids, heparin.  I discussed the patient with [facility] internal medicine for hospitalization he will be following up on this patient going to CT results of pulmonary embolism as well as pneumonia.  Prior to hospitalization, the patient was speaking in full sentences with no evidence of impending respiratory stress requiring intubation.    CRITICAL CARE  The very real possibilty of a deterioration of this patient's condition required the highest level of my preparedness for sudden, emergent intervention.  I provided critical care services, which included medication orders, frequent reevaluations of the patient's condition and response to treatment, ordering and reviewing test results, and discussing the case with various consultants.  The critical care time associated with the care of the patient was 35 minutes. Review chart for interventions. This time is exclusive of any other billable procedures.       FINAL DIAGNOSIS  Pneumonia  Right lower lobe pulmonary embolism  Lung cancer  Critical care time 35 minutes       Electronically signed by: Gurinder Fleming D.O., 2/19/2023 1:38 PM

## 2023-02-20 PROBLEM — A41.9 SEPSIS WITHOUT ACUTE ORGAN DYSFUNCTION (HCC): Status: ACTIVE | Noted: 2023-02-20

## 2023-02-20 LAB
ALBUMIN SERPL BCP-MCNC: 3.3 G/DL (ref 3.2–4.9)
ALBUMIN/GLOB SERPL: 1 G/DL
ALP SERPL-CCNC: 56 U/L (ref 30–99)
ALT SERPL-CCNC: 33 U/L (ref 2–50)
ANION GAP SERPL CALC-SCNC: 11 MMOL/L (ref 7–16)
AST SERPL-CCNC: 26 U/L (ref 12–45)
B PARAP IS1001 DNA NPH QL NAA+NON-PROBE: NOT DETECTED
B PERT.PT PRMT NPH QL NAA+NON-PROBE: NOT DETECTED
BASOPHILS # BLD AUTO: 0.2 % (ref 0–1.8)
BASOPHILS # BLD: 0.02 K/UL (ref 0–0.12)
BILIRUB SERPL-MCNC: 0.7 MG/DL (ref 0.1–1.5)
BUN SERPL-MCNC: 15 MG/DL (ref 8–22)
C PNEUM DNA NPH QL NAA+NON-PROBE: NOT DETECTED
CALCIUM ALBUM COR SERPL-MCNC: 9.6 MG/DL (ref 8.5–10.5)
CALCIUM SERPL-MCNC: 9 MG/DL (ref 8.5–10.5)
CHLORIDE SERPL-SCNC: 105 MMOL/L (ref 96–112)
CO2 SERPL-SCNC: 23 MMOL/L (ref 20–33)
CREAT SERPL-MCNC: 0.94 MG/DL (ref 0.5–1.4)
EOSINOPHIL # BLD AUTO: 0.07 K/UL (ref 0–0.51)
EOSINOPHIL NFR BLD: 0.8 % (ref 0–6.9)
ERYTHROCYTE [DISTWIDTH] IN BLOOD BY AUTOMATED COUNT: 47.1 FL (ref 35.9–50)
FLUAV RNA NPH QL NAA+NON-PROBE: NOT DETECTED
FLUBV RNA NPH QL NAA+NON-PROBE: NOT DETECTED
GFR SERPLBLD CREATININE-BSD FMLA CKD-EPI: 83 ML/MIN/1.73 M 2
GLOBULIN SER CALC-MCNC: 3.3 G/DL (ref 1.9–3.5)
GLUCOSE SERPL-MCNC: 94 MG/DL (ref 65–99)
HADV DNA NPH QL NAA+NON-PROBE: NOT DETECTED
HCOV 229E RNA NPH QL NAA+NON-PROBE: NOT DETECTED
HCOV HKU1 RNA NPH QL NAA+NON-PROBE: NOT DETECTED
HCOV NL63 RNA NPH QL NAA+NON-PROBE: NOT DETECTED
HCOV OC43 RNA NPH QL NAA+NON-PROBE: NOT DETECTED
HCT VFR BLD AUTO: 44.2 % (ref 42–52)
HGB BLD-MCNC: 14.8 G/DL (ref 14–18)
HMPV RNA NPH QL NAA+NON-PROBE: NOT DETECTED
HPIV1 RNA NPH QL NAA+NON-PROBE: NOT DETECTED
HPIV2 RNA NPH QL NAA+NON-PROBE: NOT DETECTED
HPIV3 RNA NPH QL NAA+NON-PROBE: DETECTED
HPIV4 RNA NPH QL NAA+NON-PROBE: NOT DETECTED
IMM GRANULOCYTES # BLD AUTO: 0.07 K/UL (ref 0–0.11)
IMM GRANULOCYTES NFR BLD AUTO: 0.8 % (ref 0–0.9)
LACTATE SERPL-SCNC: 1.1 MMOL/L (ref 0.5–2)
LYMPHOCYTES # BLD AUTO: 0.89 K/UL (ref 1–4.8)
LYMPHOCYTES NFR BLD: 9.9 % (ref 22–41)
M PNEUMO DNA NPH QL NAA+NON-PROBE: NOT DETECTED
MCH RBC QN AUTO: 33.6 PG (ref 27–33)
MCHC RBC AUTO-ENTMCNC: 33.5 G/DL (ref 33.7–35.3)
MCV RBC AUTO: 100.5 FL (ref 81.4–97.8)
MONOCYTES # BLD AUTO: 0.43 K/UL (ref 0–0.85)
MONOCYTES NFR BLD AUTO: 4.8 % (ref 0–13.4)
NEUTROPHILS # BLD AUTO: 7.54 K/UL (ref 1.82–7.42)
NEUTROPHILS NFR BLD: 83.5 % (ref 44–72)
NRBC # BLD AUTO: 0 K/UL
NRBC BLD-RTO: 0 /100 WBC
PLATELET # BLD AUTO: 137 K/UL (ref 164–446)
PMV BLD AUTO: 10.2 FL (ref 9–12.9)
POTASSIUM SERPL-SCNC: 4.1 MMOL/L (ref 3.6–5.5)
PROT SERPL-MCNC: 6.6 G/DL (ref 6–8.2)
RBC # BLD AUTO: 4.4 M/UL (ref 4.7–6.1)
RSV RNA NPH QL NAA+NON-PROBE: NOT DETECTED
RV+EV RNA NPH QL NAA+NON-PROBE: NOT DETECTED
SARS-COV-2 RNA NPH QL NAA+NON-PROBE: NOTDETECTED
SODIUM SERPL-SCNC: 139 MMOL/L (ref 135–145)
UFH PPP CHRO-ACNC: 0.75 IU/ML
UFH PPP CHRO-ACNC: 0.97 IU/ML
WBC # BLD AUTO: 9 K/UL (ref 4.8–10.8)

## 2023-02-20 PROCEDURE — 85025 COMPLETE CBC W/AUTO DIFF WBC: CPT

## 2023-02-20 PROCEDURE — 83605 ASSAY OF LACTIC ACID: CPT

## 2023-02-20 PROCEDURE — 770020 HCHG ROOM/CARE - TELE (206)

## 2023-02-20 PROCEDURE — 700111 HCHG RX REV CODE 636 W/ 250 OVERRIDE (IP): Performed by: STUDENT IN AN ORGANIZED HEALTH CARE EDUCATION/TRAINING PROGRAM

## 2023-02-20 PROCEDURE — 36415 COLL VENOUS BLD VENIPUNCTURE: CPT

## 2023-02-20 PROCEDURE — 99233 SBSQ HOSP IP/OBS HIGH 50: CPT | Performed by: STUDENT IN AN ORGANIZED HEALTH CARE EDUCATION/TRAINING PROGRAM

## 2023-02-20 PROCEDURE — A9270 NON-COVERED ITEM OR SERVICE: HCPCS | Performed by: STUDENT IN AN ORGANIZED HEALTH CARE EDUCATION/TRAINING PROGRAM

## 2023-02-20 PROCEDURE — 80053 COMPREHEN METABOLIC PANEL: CPT

## 2023-02-20 PROCEDURE — 700111 HCHG RX REV CODE 636 W/ 250 OVERRIDE (IP): Performed by: EMERGENCY MEDICINE

## 2023-02-20 PROCEDURE — 51798 US URINE CAPACITY MEASURE: CPT

## 2023-02-20 PROCEDURE — 96366 THER/PROPH/DIAG IV INF ADDON: CPT

## 2023-02-20 PROCEDURE — 85520 HEPARIN ASSAY: CPT | Mod: 91

## 2023-02-20 PROCEDURE — 700105 HCHG RX REV CODE 258: Performed by: STUDENT IN AN ORGANIZED HEALTH CARE EDUCATION/TRAINING PROGRAM

## 2023-02-20 PROCEDURE — 700102 HCHG RX REV CODE 250 W/ 637 OVERRIDE(OP): Performed by: STUDENT IN AN ORGANIZED HEALTH CARE EDUCATION/TRAINING PROGRAM

## 2023-02-20 RX ORDER — QUETIAPINE FUMARATE 25 MG/1
25 TABLET, FILM COATED ORAL NIGHTLY
Status: DISCONTINUED | OUTPATIENT
Start: 2023-02-20 | End: 2023-02-22 | Stop reason: HOSPADM

## 2023-02-20 RX ADMIN — EZETIMIBE 10 MG: 10 TABLET ORAL at 05:03

## 2023-02-20 RX ADMIN — CEFEPIME 2 G: 2 INJECTION, POWDER, FOR SOLUTION INTRAVENOUS at 17:14

## 2023-02-20 RX ADMIN — Medication 2000 UNITS: at 05:03

## 2023-02-20 RX ADMIN — BUDESONIDE AND FORMOTEROL FUMARATE DIHYDRATE 2 PUFF: 160; 4.5 AEROSOL RESPIRATORY (INHALATION) at 05:07

## 2023-02-20 RX ADMIN — APIXABAN 10 MG: 5 TABLET, FILM COATED ORAL at 10:16

## 2023-02-20 RX ADMIN — DIPHENHYDRAMINE HYDROCHLORIDE 25 MG: 25 TABLET ORAL at 00:19

## 2023-02-20 RX ADMIN — ACETAMINOPHEN 1000 MG: 500 TABLET, FILM COATED ORAL at 05:03

## 2023-02-20 RX ADMIN — QUETIAPINE FUMARATE 25 MG: 25 TABLET ORAL at 21:08

## 2023-02-20 RX ADMIN — BUDESONIDE AND FORMOTEROL FUMARATE DIHYDRATE 2 PUFF: 160; 4.5 AEROSOL RESPIRATORY (INHALATION) at 17:17

## 2023-02-20 RX ADMIN — PREDNISONE 10 MG: 10 TABLET ORAL at 05:06

## 2023-02-20 RX ADMIN — HEPARIN SODIUM 16 UNITS/KG/HR: 5000 INJECTION, SOLUTION INTRAVENOUS at 01:46

## 2023-02-20 RX ADMIN — APIXABAN 10 MG: 5 TABLET, FILM COATED ORAL at 17:17

## 2023-02-20 RX ADMIN — CEFEPIME 2 G: 2 INJECTION, POWDER, FOR SOLUTION INTRAVENOUS at 10:16

## 2023-02-20 RX ADMIN — FAMOTIDINE 20 MG: 20 TABLET, FILM COATED ORAL at 05:06

## 2023-02-20 RX ADMIN — ASPIRIN 81 MG: 81 TABLET, COATED ORAL at 05:03

## 2023-02-20 RX ADMIN — SENNOSIDES AND DOCUSATE SODIUM 2 TABLET: 50; 8.6 TABLET ORAL at 05:04

## 2023-02-20 ASSESSMENT — LIFESTYLE VARIABLES
DOES PATIENT WANT TO STOP DRINKING: NO
TOTAL SCORE: 0
HOW MANY TIMES IN THE PAST YEAR HAVE YOU HAD 5 OR MORE DRINKS IN A DAY: 0
CONSUMPTION TOTAL: NEGATIVE
HAVE YOU EVER FELT YOU SHOULD CUT DOWN ON YOUR DRINKING: NO
EVER HAD A DRINK FIRST THING IN THE MORNING TO STEADY YOUR NERVES TO GET RID OF A HANGOVER: NO
SUBSTANCE_ABUSE: 0
TOTAL SCORE: 0
AVERAGE NUMBER OF DAYS PER WEEK YOU HAVE A DRINK CONTAINING ALCOHOL: 1
EVER FELT BAD OR GUILTY ABOUT YOUR DRINKING: NO
ON A TYPICAL DAY WHEN YOU DRINK ALCOHOL HOW MANY DRINKS DO YOU HAVE: 1
TOTAL SCORE: 0
HAVE PEOPLE ANNOYED YOU BY CRITICIZING YOUR DRINKING: NO
ALCOHOL_USE: YES

## 2023-02-20 ASSESSMENT — COGNITIVE AND FUNCTIONAL STATUS - GENERAL
CLIMB 3 TO 5 STEPS WITH RAILING: A LOT
EATING MEALS: A LITTLE
DAILY ACTIVITIY SCORE: 16
TOILETING: A LOT
HELP NEEDED FOR BATHING: A LITTLE
MOVING TO AND FROM BED TO CHAIR: A LITTLE
SUGGESTED CMS G CODE MODIFIER MOBILITY: CK
MOBILITY SCORE: 15
DRESSING REGULAR LOWER BODY CLOTHING: A LOT
STANDING UP FROM CHAIR USING ARMS: A LOT
DRESSING REGULAR UPPER BODY CLOTHING: A LITTLE
SUGGESTED CMS G CODE MODIFIER DAILY ACTIVITY: CK
WALKING IN HOSPITAL ROOM: A LITTLE
PERSONAL GROOMING: A LITTLE
TURNING FROM BACK TO SIDE WHILE IN FLAT BAD: A LITTLE
MOVING FROM LYING ON BACK TO SITTING ON SIDE OF FLAT BED: A LOT

## 2023-02-20 ASSESSMENT — ENCOUNTER SYMPTOMS
PALPITATIONS: 0
HEADACHES: 0
NAUSEA: 0
VOMITING: 0
DIZZINESS: 0
HEMOPTYSIS: 0
SHORTNESS OF BREATH: 1
CHILLS: 0
SPUTUM PRODUCTION: 0
ABDOMINAL PAIN: 0
WHEEZING: 0
DIARRHEA: 1
FEVER: 0
DEPRESSION: 0
BACK PAIN: 1
WEIGHT LOSS: 0
ORTHOPNEA: 0
COUGH: 1

## 2023-02-20 ASSESSMENT — FIBROSIS 4 INDEX
FIB4 SCORE: 1.89
FIB4 SCORE: 2.58
FIB4 SCORE: 2.4

## 2023-02-20 ASSESSMENT — PATIENT HEALTH QUESTIONNAIRE - PHQ9
SUM OF ALL RESPONSES TO PHQ9 QUESTIONS 1 AND 2: 0
1. LITTLE INTEREST OR PLEASURE IN DOING THINGS: NOT AT ALL
1. LITTLE INTEREST OR PLEASURE IN DOING THINGS: NOT AT ALL
2. FEELING DOWN, DEPRESSED, IRRITABLE, OR HOPELESS: NOT AT ALL
SUM OF ALL RESPONSES TO PHQ9 QUESTIONS 1 AND 2: 0
2. FEELING DOWN, DEPRESSED, IRRITABLE, OR HOPELESS: NOT AT ALL

## 2023-02-20 ASSESSMENT — CHA2DS2 SCORE
HYPERTENSION: YES
CHA2DS2 VASC SCORE: 3
AGE 75 OR GREATER: YES
SEX: MALE

## 2023-02-20 ASSESSMENT — PAIN DESCRIPTION - PAIN TYPE
TYPE: ACUTE PAIN
TYPE: ACUTE PAIN

## 2023-02-20 NOTE — ASSESSMENT & PLAN NOTE
*CTA (02/19/23): With segmental right lower lobe pulm embolus  *Stable on RA    -Telemetry monitoring  -Supplemental Oxygen as needed  -Heparin GTT, will likely transition to DOAC and continue until remission of ongoing non small cell lung cancer

## 2023-02-20 NOTE — ED NOTES
Jamil Winston has not read message sent earlier, message sent to Jamil Muro regarding contact to [facility], reply that [facility] would be updated

## 2023-02-20 NOTE — DIETARY
NUTRITION SERVICES - Wound noted in nutrition admit screen. Pressure injury not yet noted in chart review done by this RD.     Please consult as appropriate or if needs change.    RD following per dept policy.

## 2023-02-20 NOTE — ASSESSMENT & PLAN NOTE
SIRS criteria identified on my evaluation include:  Fever, with temperature greater than 101 deg F and Tachycardia, with heart rate greater than 90 BPM  Bilateral pneumonia, recently hospitalized with pneumonia and discharged on 2/2, treated with IV antibiotics  Patient febrile continues to have bilateral consolidation on imaging.  Procalcitonin and viral panel negative however since he is febrile will resume antibiotics  Fluids per sepsis protocol given in the ED

## 2023-02-20 NOTE — DISCHARGE PLANNING
Care Transition Team Assessment    RN LINDA spoke with patient at bedside and son via phone. Patient lives in [place], [place] with wife. Per both patient and son, patient is independent with all ADL's. No DME or HH services. Per primary nurse, patient confused last night and pulling out lines. CM will continue to follow for appropriate discharge needs/disposition.    Information Source  Orientation Level: Oriented X4  Information Given By: Patient  Who is responsible for making decisions for patient? : Patient    Elopement Risk  Legal Hold: No  Ambulatory or Self Mobile in Wheelchair: No-Not an Elopement Risk  Disoriented: No  Psychiatric Symptoms: None  History of Wandering: No  Elopement this Admit: No  Vocalizing Wanting to Leave: No  Displays Behaviors, Body Language Wanting to Leave: No-Not at Risk for Elopement  Elopement Risk: Not at Risk for Elopement    Interdisciplinary Discharge Planning  Lives with - Patient's Self Care Capacity: Spouse  Patient or legal guardian wants to designate a caregiver: No  Support Systems: Spouse / Significant Other, Children  Do You Take your Prescribed Medications Regularly: Yes  Mobility Issues: Yes  Prior Services: None  Assistance Needed: No  Durable Medical Equipment: Not Applicable    Discharge Preparedness  What is your plan after discharge?: Uncertain - pending medical team collaboration  What are your discharge supports?: Spouse, Child  Prior Functional Level: Independent with Activities of Daily Living  Difficulity with ADLs: None  Difficulity with IADLs: None    Functional Assesment  Prior Functional Level: Independent with Activities of Daily Living    Vision / Hearing Impairment  Vision Impairment : Yes  Hearing Impairment : No    Domestic Abuse  Have you ever been the victim of abuse or violence?: No  Physical Abuse or Sexual Abuse: No  Verbal Abuse or Emotional Abuse: No  Possible Abuse/Neglect Reported to:: Not Applicable    Anticipated Discharge  Information  Discharge Disposition: Discharged to home/self care (01)

## 2023-02-20 NOTE — PROGRESS NOTES
4 Eyes Skin Assessment Completed by KORY Bunch and BELTRAN Matthews.    Head WDL  Ears WDL  Nose WDL  Mouth WDL  Neck WDL  Breast/Chest WDL  Shoulder Blades WDL  Spine Redness  (R) Arm/Elbow/Hand WDL  (L) Arm/Elbow/Hand WDL  Abdomen WDL  Groin WDL  Scrotum/Coccyx/Buttocks Redness  (R) Leg WDL  (L) Leg WDL  (R) Heel/Foot/Toe WDL  (L) Heel/Foot/Toe WDL          Devices In Places Tele Box, Blood Pressure Cuff, and Pulse Ox      Interventions In Place Gray Ear Foams, NC W/Ear Foams, Sacral Mepilex, Pillows, Heels Loaded W/Pillows, and Pressure Redistribution Mattress    Possible Skin Injury No    Pictures Uploaded Into Epic N/A  Wound Consult Placed N/A  RN Wound Prevention Protocol Ordered Yes

## 2023-02-20 NOTE — ED NOTES
Call from [facility] regarding pt's orders for remote tele monitoring and med-surg floor admission, informed orders need to match before bed placement/admission, voalte sent to [facility] with update

## 2023-02-20 NOTE — CARE PLAN
Problem: Knowledge Deficit - Standard  Goal: Patient and family/care givers will demonstrate understanding of plan of care, disease process/condition, diagnostic tests and medications  Outcome: Progressing     Problem: Safety - Medical Restraint  Goal: Remains free of injury from restraints (Restraint for Interference with Medical Device)  Outcome: Progressing  Goal: Free from restraint(s) (Restraint for Interference with Medical Device)  Outcome: Progressing   The patient is Watcher - Medium risk of patient condition declining or worsening    Shift Goals  Clinical Goals: Monitor hemodynamics, monitor breathing  Patient Goals: Sleep/cough  Family Goals: CINDY

## 2023-02-20 NOTE — ASSESSMENT & PLAN NOTE
*Cleared by cardiology to DC DOAC due to provoked AF w/ wedge resection  *EKG (02/19/23): With sinus tachycardia with , QTc 438  *Stable    -Continue to monitor

## 2023-02-20 NOTE — ED NOTES
Report received from prior RN. Pt alert. Resp normal/unlabored. Bed side rails up/in low position. Pt updated to POC and all questions answered.     Pt comfortable in bed, no needs at current time, heparin running at appropriate rate

## 2023-02-20 NOTE — ASSESSMENT & PLAN NOTE
*CTA (02/19/23): bilateral pneumonia with slight increase in left and unchange in right, trace right pleural effusion, small left pleural effusion  *Unresolved since last admission with C3+ azithromycin and 5 day course of augmentin  *Stable on RA    -Blood cultures ordered  -MRSA nares ordered  -Procalcitonin ordered  -Supportive care with O2 therapy as needed and aspiration precautions  -Empiric abx therapy with Cefepime  -Continue Prednisone therapy for possible radiation vs immunotherapy induced pneumonitis, currently tapered from 40 mg daily to 10 mg daily, plan to continue until PET scan in March per pulmonary note  -Consider bronchoscopy inpatient with pulmonary due to possible planned bronchoscopy outpatient in March with unresolved SOB for fluid analysis or tissue analysis

## 2023-02-20 NOTE — ASSESSMENT & PLAN NOTE
*CTA (02/19/23):  trace right pleural effusion, small left pleural effusion  *Likely secondary to infectious vs chemotherapy vs radiotherapy induced pneumonitis, dc'ed furosemide, hx of thoracentesis with transudate effusion last admissoin  *Stable    -Continue to monitor

## 2023-02-20 NOTE — ASSESSMENT & PLAN NOTE
*Stable, likely secondary to acute stress response    -Labetalol 10 mg q6h PRN for sustained SBP >180

## 2023-02-20 NOTE — ED NOTES
Charge Dane with update regarding pt level of care/service order being med-surg and [facility] stating via voalte not being able to change, charge able to modify order for tele

## 2023-02-20 NOTE — ED NOTES
Pt resting in [place] without complaints.  Updated on wait time for CT, pt verbalized understanding.    Call light in reach.

## 2023-02-20 NOTE — CARE PLAN
Problem: Knowledge Deficit - Standard  Goal: Patient and family/care givers will demonstrate understanding of plan of care, disease process/condition, diagnostic tests and medications  Outcome: Progressing     Problem: Safety - Medical Restraint  Goal: Remains free of injury from restraints (Restraint for Interference with Medical Device)  Outcome: Progressing  Goal: Free from restraint(s) (Restraint for Interference with Medical Device)  Outcome: Progressing   The patient is Watcher - Medium risk of patient condition declining or worsening    Shift Goals  Clinical Goals: Monitor hemodynamics, monitor breathing  Patient Goals: Sleep/cough  Family Goals: CINDY    Progress made toward(s) clinical / shift goals:  Pt with increased confusion. Attempting to pull lines out and devices. Monitor PE symptoms    Patient is not progressing towards the following goals:

## 2023-02-20 NOTE — ASSESSMENT & PLAN NOTE
*stage I non-small cell lung cancer status post wedge resection in 2018, stage Ia non-small cell lung cancer of the right upper lobe treated with SBRT in 2019,  stage IIIa non-small cell lung cancer of the left lower lobe treated with carboplatin/Taxol/radiation in 2021    -Follow up with Oncology Dr. Ruiz outpatient

## 2023-02-20 NOTE — PROGRESS NOTES
Bedside report received from night RN; assumed pt care. Pt assessment complete. Pt A&Ox2 to self and place Reviewed plan of care with pt. Pt tele monitored. Chart and labs reviewed. Bed in lowest position, and 2 side rails up. Pt educated on call light; call light with in reach.'

## 2023-02-20 NOTE — ASSESSMENT & PLAN NOTE
No results found for: CHOLSTRLTOT, LDL, HDL, TRIGLYCERIDE       -Continue home Ezetimibe 10 mg daily

## 2023-02-20 NOTE — PROGRESS NOTES
Patient with increasing confusion, impulsiveness, and puling out lines/devices. Patient is unsafe to be up alone so 2 pt soft restraints were initiated. Bed alarm is activated. Will continue to monitor closely.

## 2023-02-20 NOTE — ASSESSMENT & PLAN NOTE
*CBC (2/19/2023):Hgb 15.9, .2  *Hx of alcohol use with 4-5 beers daily  *Likely secondary to alcohol use  *Stable    -Continue to monitor

## 2023-02-20 NOTE — H&P
[facility] Internal Medicine History & Physical Note    Date of Service  2/19/2023    [facility] Team: AILIN   Attending: Dr. Winston  Senior Resident: Dr. Muro  Contact Number: [phone]    Primary Care Physician  Nicolas Milligan, M.D.    Consultants  N/A    Specialist Names: N/A    Code Status  Full Code    Chief Complaint  Chief Complaint   Patient presents with    Shortness of Breath     Hx lung cancer, on autoimmune therapy, last therapy in November. Pt was taking prednisone and lasix and recently had lasix dc'd on Thursday or Friday. Pt started experiencing worsening SOB/cough yesterday       History of Presenting Illness (HPI):   Patient is a 78 y.o. male w/ a PMHx of pAF, HTN, DLD, CKD Stage III (Baseline Cr ~1.2), stage I non-small cell lung cancer status post wedge resection in 2018, stage Ia non-small cell lung cancer of the right upper lobe treated with SBRT in 2019,  stage IIIa non-small cell lung cancer of the left lower lobe treated with carboplatin/Taxol/radiation in 2021 who is followed by Oncologist Dr. Ruiz, recent admission on 1/31/23 - 02/02/23 for immunotherapy vs radiation therpay induced pneumonitis dc'ed on steorid taper, recent pulmonary visit on 02/03/23 with plan for repeat PET/CT on March for bronchoscopy vs repeat thoracentesis who presented to the ED on 02/19/23 for worsening SOB.    Patient states it started about 2 days ago. Had associated cough without productive sputum. Has finished course of antibiotics, discontinued Metformin and Lasix since last admission. O2 was ordered last admission, but did not need O2 due to thoracentesis improving his SOB.  Denies any fever, chills, chest pain, palpitations, abdominal pain, nausea, vomiting, LE pain, LE edema.        Quit in 2015, 30+ PY, drinks 4-5 beers a day, last drink 2-3 weeks ago when he left the hospital, no recreation drug use. Denies any recent travel, sick contacts. Lives in [place], able to walk around house before getting SOB at  baseline. Vaccinated for influenza vaccine, COVID,  pneumonia.     In the ED, febrile with 101.5, tachycardic in 120s, 95% on RA  WBC 13.2 (last WBC 5.7 on 2/2/23), Hgb 15.9, .2  Glucose 159, CR 1.24 (baseline), lactic acid 2.5-1.2  Urinalysis with glucose 100  Influenza/RSV/COVID-negative  CXR with stable patchy bilateral filtrates and interstitial prominence  CTA (02/19/23): With segmental right lower lobe pulm embolus, bilateral pneumonia with slight increase in left and unchange in right, trace right pleural effusion, small left pleural effusion  EKG with sinus tachycardia with , QTc 438  S/p acetaminophen, 1L LR, heparin GTT in the ED      I discussed the plan of care with patient, bedside RN, and pharmacy.    Review of Systems  Review of Systems   Constitutional:  Negative for chills, fever, malaise/fatigue and weight loss.   Respiratory:  Positive for cough and shortness of breath. Negative for hemoptysis, sputum production and wheezing.    Cardiovascular:  Negative for chest pain, palpitations, orthopnea and leg swelling.   Gastrointestinal:  Positive for diarrhea. Negative for abdominal pain, nausea and vomiting.   Genitourinary:  Negative for dysuria.   Musculoskeletal:  Positive for back pain (low back (Chronic)).   Skin:  Negative for rash.   Neurological:  Negative for dizziness and headaches.   Psychiatric/Behavioral:  Negative for depression and substance abuse.    All other systems reviewed and are negative.    Past Medical History   has a past medical history of Arrhythmia, Arthritis (2015), Asthma, Backpain (08/06/2018), Cancer (HCC) (07/2017), Cataract, Dental disorder, Diabetes (08/06/2018), Heart valve disease, High cholesterol, and Hypertension.    Surgical History   has a past surgical history that includes other orthopedic surgery; other; ear middle exploration (Right, 6/12/2015); ossicular reconstruction (Right, 6/12/2015); thoracoscopy (Left, 4/19/2018); cataract phaco with iol  "(Right, 8/7/2018); cataract phaco with iol (Left, 8/21/2018); pr bronchoscopy,diagnostic (7/1/2021); and endobronchial us add-on (7/1/2021).     Family History  family history includes Cancer in his father.   Family history reviewed with patient.     Social History  Tobacco: Please refer to HPI  Alcohol: Please refer to HPI  Recreational drugs (illegal or prescription): Please refer to HPI  Living Situation: Please refer to HPI  Recent Travel: Please refer to HPI  Primary Care Provider: Reviewed      Allergies  No Known Allergies    Medications  Prior to Admission Medications   Prescriptions Last Dose Informant Patient Reported? Taking?   Cholecalciferol (VITAMIN D3) 2000 UNIT Cap 2/19/2023 at 0800 Patient, Family Member Yes No   Sig: Take 2,000 Units by mouth every day.   amoxicillin-clavulanate (AUGMENTIN) 500-125 MG Tab 2/6/2023 at FINISHED Patient, Family Member Yes Yes   Sig: Take 1 Tablet by mouth every 8 hours. 4 day course prescribed 2/2/2023. (FINISHED)   aspirin 81 MG EC tablet 2/19/2023 at 0800 Patient, Family Member Yes No   Sig: Take 81 mg by mouth every day.   cyclobenzaprine (FLEXERIL) 10 mg Tab 2/18/2023 at 1900 Patient, Family Member Yes No   Sig: Take 10 mg by mouth at bedtime.   diphenhydrAMINE-APAP, sleep, (TYLENOL PM EXTRA STRENGTH)  MG Tab \"ABOUT A WEEK AGO\" at PRN Patient, Family Member Yes Yes   Sig: Take 1-2 Tablets by mouth at bedtime as needed (Mild Pain or Trouble Sleeping).   ezetimibe (ZETIA) 10 MG TABS 2/19/2023 at 0800 Patient, Family Member Yes No   Sig: Take 10 mg by mouth every day.   famotidine (PEPCID) 20 MG Tab 2/19/2023 at 0800 Patient, Family Member No No   Sig: Take 1 Tablet by mouth every day.   fluticasone furoate-vilanterol (BREO ELLIPTA) 200-25 MCG/ACT AEROSOL POWDER, BREATH ACTIVATED 2/19/2023 at 0800 Patient, Family Member No No   Sig: Inhale 1 Puff every day. Rinse mouth after use.   furosemide (LASIX) 40 MG Tab 2/16/2023 at DISCONTINUED BY P.C.P. Patient, Family " Member No No   Sig: Take 1 Tablet by mouth every day.   metFORMIN (GLUCOPHAGE) 500 MG Tab 2/16/2023 at DISCONTINUED BY P.C.P. Patient, Family Member No No   Sig: Take 1 Tablet by mouth every day.   predniSONE (DELTASONE) 10 MG Tab 2/19/2023 at 1200 Patient, Family Member No No   Sig: Take 4 Tablets (40 mg) by mouth every day for 7 days,THEN take 2 tablets (20 mg) every day for 7 days, THEN take 1 tablet (10 mg) every day for 7 days      Facility-Administered Medications: None       Physical Exam  Temp:  [37.4 °C (99.4 °F)-38.6 °C (101.5 °F)] 37.4 °C (99.4 °F)  Pulse:  [104-124] 109  Resp:  [16-20] 20  BP: (105-176)/(57-84) 114/77  SpO2:  [91 %-97 %] 96 %  Blood Pressure : 118/70   Temperature: 37.4 °C (99.4 °F)   Pulse: (!) 107   Respiration: 18   Pulse Oximetry: 92 %       Physical Exam  Vitals and nursing note reviewed.   Constitutional:       Appearance: Normal appearance.   HENT:      Head: Normocephalic and atraumatic.      Right Ear: External ear normal.      Left Ear: External ear normal.      Nose: Nose normal.      Mouth/Throat:      Mouth: Mucous membranes are moist.      Pharynx: Oropharynx is clear.   Eyes:      Extraocular Movements: Extraocular movements intact.      Pupils: Pupils are equal, round, and reactive to light.   Cardiovascular:      Rate and Rhythm: Normal rate and regular rhythm.      Pulses: Normal pulses.      Heart sounds: Normal heart sounds.   Pulmonary:      Breath sounds: No wheezing or rales.      Comments: Diminished breath sounds bilaterally  Abdominal:      General: There is no distension.      Palpations: Abdomen is soft.      Tenderness: There is no abdominal tenderness. There is no guarding or rebound.   Musculoskeletal:         General: Normal range of motion.      Cervical back: Normal range of motion.   Skin:     General: Skin is warm.      Capillary Refill: Capillary refill takes less than 2 seconds.   Neurological:      General: No focal deficit present.      Mental  Status: He is alert and oriented to person, place, and time.   Psychiatric:         Mood and Affect: Mood normal.         Behavior: Behavior normal.         Thought Content: Thought content normal.         Judgment: Judgment normal.       Laboratory:  Recent Labs     02/19/23  1208   WBC 13.2*   RBC 4.75   HEMOGLOBIN 15.9   HEMATOCRIT 47.6   .2*   MCH 33.5*   MCHC 33.4*   RDW 46.5   PLATELETCT 174   MPV 9.8     Recent Labs     02/19/23  1208   SODIUM 137   POTASSIUM 3.9   CHLORIDE 101   CO2 25   GLUCOSE 157*   BUN 20   CREATININE 1.24   CALCIUM 9.5     Recent Labs     02/19/23  1208   ALTSGPT 38   ASTSGOT 26   ALKPHOSPHAT 68   TBILIRUBIN 0.9   GLUCOSE 157*     Recent Labs     02/19/23  1825   APTT 25.9   INR 1.03     No results for input(s): NTPROBNP in the last 72 hours.      No results for input(s): TROPONINT in the last 72 hours.    Imaging:  CT-CTA CHEST PULMONARY ARTERY W/ RECONS   Final Result      1.  Segmental RIGHT lower lobe pulmonary embolus   2.  No CT evidence of RIGHT heart strain   3.  BILATERAL pneumonia, slightly increased in the LEFT lung and grossly unchanged in the RIGHT lung since the prior study   4.  Trace RIGHT pleural effusion   5.  Small LEFT pleural effusion   6.  Enlarged subcarinal lymph node      Findings were communicated with and acknowledged by YEIMY LOPEZ via Vo[employer] on 2/19/2023 6:16 PM.      DX-CHEST-PORTABLE (1 VIEW)   Final Result      Stable patchy bilateral infiltrates and interstitial prominence.          X-Ray:  I have personally reviewed the images and compared with prior images.  EKG:  I have personally reviewed the images and compared with prior images.    Assessment/Plan:  Problem Representation:   I anticipate this patient will require at least two midnights for appropriate medical management, necessitating inpatient admission because of anticoagulation therapy for pulmonary embolism and unresolved community acquired pneumonia since last  admission    Patient will need a Telemetry bed on Medicine service .  The need is secondary to history of atrial fibrillation and tachycardia with pulmonary embolism.    * Pulmonary embolism on right (HCC)- (present on admission)  Assessment & Plan  *CTA (02/19/23): With segmental right lower lobe pulm embolus  *Stable on RA    -Telemetry monitoring  -Supplemental Oxygen as needed  -Heparin GTT, will likely transition to DOAC and continue until remission of ongoing non small cell lung cancer    Community acquired pneumonia  Assessment & Plan  *CTA (02/19/23): bilateral pneumonia with slight increase in left and unchange in right, trace right pleural effusion, small left pleural effusion  *Unresolved since last admission with C3+ azithromycin and 5 day course of augmentin  *Stable on RA    -Blood cultures ordered  -MRSA nares ordered  -Procalcitonin ordered  -Supportive care with O2 therapy as needed and aspiration precautions  -Empiric abx therapy with Cefepime  -Continue Prednisone therapy for possible radiation vs immunotherapy induced pneumonitis, currently tapered from 40 mg daily to 10 mg daily, plan to continue until PET scan in March per pulmonary note  -Consider bronchoscopy inpatient with pulmonary due to possible planned bronchoscopy outpatient in March with unresolved SOB for fluid analysis or tissue analysis    Lactic acidosis  Assessment & Plan  *LA (12/01/22): 2.5    -IVF  -Resolved    Bilateral pleural effusion- (present on admission)  Assessment & Plan  *CTA (02/19/23):  trace right pleural effusion, small left pleural effusion  *Likely secondary to infectious vs chemotherapy vs radiotherapy induced pneumonitis, dc'ed furosemide, hx of thoracentesis with transudate effusion last admissoin  *Stable    -Continue to monitor    Primary cancer of left lower lobe of lung (HCC)- (present on admission)  Assessment & Plan  *stage I non-small cell lung cancer status post wedge resection in 2018, stage Ia  non-small cell lung cancer of the right upper lobe treated with SBRT in 2019,  stage IIIa non-small cell lung cancer of the left lower lobe treated with carboplatin/Taxol/radiation in 2021    -Follow up with Oncology Dr. Ruiz outpatient    Macrocytic anemia  Assessment & Plan  *CBC (2/19/2023):Hgb 15.9, .2  *Hx of alcohol use with 4-5 beers daily  *Likely secondary to alcohol use  *Stable    -Continue to monitor    Vitamin D deficiency  Assessment & Plan    -Continue home Cholecalciferol 2000 units daily    Dyslipidemia- (present on admission)  Assessment & Plan  No results found for: CHOLSTRLTOT, LDL, HDL, TRIGLYCERIDE       -Continue home Ezetimibe 10 mg daily    CKD (chronic kidney disease) stage 3, GFR 30-59 ml/min ([facility])- (present on admission)  Assessment & Plan  *Baseline Cr ~1.2, Stable    -Maintain euvolemia and monitor fluid responsiveness (avoid NaCL and renal congestion)  -MAP >65  -Avoid nephrotoxins and renally dose medications  -Monitor renal function and urine output  -We will continue to control blood sugar, lipids, and blood pressure    PAF (paroxysmal atrial fibrillation) ([facility])- (present on admission)  Assessment & Plan  *Cleared by cardiology to DC DOAC due to provoked AF w/ wedge resection  *EKG (02/19/23): With sinus tachycardia with , QTc 438  *Stable    -Continue to monitor    Hypertension  Assessment & Plan  *Stable, likely secondary to acute stress response    -Labetalol 10 mg q6h PRN for sustained SBP >180    Thoracic back pain- (present on admission)  Assessment & Plan    -Continue home Flexeril PRN        VTE prophylaxis: therapeutic anticoagulation with heparin GTT

## 2023-02-20 NOTE — ASSESSMENT & PLAN NOTE
*Baseline Cr ~1.2, Stable    -Maintain euvolemia and monitor fluid responsiveness (avoid NaCL and renal congestion)  -MAP >65  -Avoid nephrotoxins and renally dose medications  -Monitor renal function and urine output  -We will continue to control blood sugar, lipids, and blood pressure

## 2023-02-21 PROBLEM — B34.8 PARAINFLUENZA: Status: ACTIVE | Noted: 2023-02-21

## 2023-02-21 LAB
ANION GAP SERPL CALC-SCNC: 9 MMOL/L (ref 7–16)
BACTERIA UR CULT: NORMAL
BUN SERPL-MCNC: 19 MG/DL (ref 8–22)
CALCIUM SERPL-MCNC: 8.2 MG/DL (ref 8.5–10.5)
CHLORIDE SERPL-SCNC: 105 MMOL/L (ref 96–112)
CO2 SERPL-SCNC: 22 MMOL/L (ref 20–33)
CREAT SERPL-MCNC: 1.05 MG/DL (ref 0.5–1.4)
ERYTHROCYTE [DISTWIDTH] IN BLOOD BY AUTOMATED COUNT: 46.2 FL (ref 35.9–50)
GFR SERPLBLD CREATININE-BSD FMLA CKD-EPI: 73 ML/MIN/1.73 M 2
GLUCOSE SERPL-MCNC: 129 MG/DL (ref 65–99)
HCT VFR BLD AUTO: 36.2 % (ref 42–52)
HGB BLD-MCNC: 12.4 G/DL (ref 14–18)
MCH RBC QN AUTO: 34 PG (ref 27–33)
MCHC RBC AUTO-ENTMCNC: 34.3 G/DL (ref 33.7–35.3)
MCV RBC AUTO: 99.2 FL (ref 81.4–97.8)
PLATELET # BLD AUTO: 112 K/UL (ref 164–446)
PMV BLD AUTO: 10.3 FL (ref 9–12.9)
POTASSIUM SERPL-SCNC: 3.8 MMOL/L (ref 3.6–5.5)
PROCALCITONIN SERPL-MCNC: 0.49 NG/ML
RBC # BLD AUTO: 3.65 M/UL (ref 4.7–6.1)
SIGNIFICANT IND 70042: NORMAL
SITE SITE: NORMAL
SODIUM SERPL-SCNC: 136 MMOL/L (ref 135–145)
SOURCE SOURCE: NORMAL
WBC # BLD AUTO: 6 K/UL (ref 4.8–10.8)

## 2023-02-21 PROCEDURE — A9270 NON-COVERED ITEM OR SERVICE: HCPCS | Performed by: STUDENT IN AN ORGANIZED HEALTH CARE EDUCATION/TRAINING PROGRAM

## 2023-02-21 PROCEDURE — 99232 SBSQ HOSP IP/OBS MODERATE 35: CPT | Performed by: STUDENT IN AN ORGANIZED HEALTH CARE EDUCATION/TRAINING PROGRAM

## 2023-02-21 PROCEDURE — 770020 HCHG ROOM/CARE - TELE (206)

## 2023-02-21 PROCEDURE — 700111 HCHG RX REV CODE 636 W/ 250 OVERRIDE (IP): Performed by: STUDENT IN AN ORGANIZED HEALTH CARE EDUCATION/TRAINING PROGRAM

## 2023-02-21 PROCEDURE — 80048 BASIC METABOLIC PNL TOTAL CA: CPT

## 2023-02-21 PROCEDURE — 700105 HCHG RX REV CODE 258: Performed by: STUDENT IN AN ORGANIZED HEALTH CARE EDUCATION/TRAINING PROGRAM

## 2023-02-21 PROCEDURE — 700102 HCHG RX REV CODE 250 W/ 637 OVERRIDE(OP): Performed by: STUDENT IN AN ORGANIZED HEALTH CARE EDUCATION/TRAINING PROGRAM

## 2023-02-21 PROCEDURE — 85027 COMPLETE CBC AUTOMATED: CPT

## 2023-02-21 PROCEDURE — 84145 PROCALCITONIN (PCT): CPT

## 2023-02-21 RX ORDER — LEVOFLOXACIN 500 MG/1
500 TABLET, FILM COATED ORAL DAILY
Qty: 4 TABLET | Refills: 0 | Status: ACTIVE | OUTPATIENT
Start: 2023-02-21 | End: 2023-02-25

## 2023-02-21 RX ADMIN — CEFEPIME 2 G: 2 INJECTION, POWDER, FOR SOLUTION INTRAVENOUS at 17:38

## 2023-02-21 RX ADMIN — BUDESONIDE AND FORMOTEROL FUMARATE DIHYDRATE 2 PUFF: 160; 4.5 AEROSOL RESPIRATORY (INHALATION) at 17:29

## 2023-02-21 RX ADMIN — PREDNISONE 10 MG: 10 TABLET ORAL at 06:06

## 2023-02-21 RX ADMIN — EZETIMIBE 10 MG: 10 TABLET ORAL at 06:07

## 2023-02-21 RX ADMIN — QUETIAPINE FUMARATE 25 MG: 25 TABLET ORAL at 21:32

## 2023-02-21 RX ADMIN — SENNOSIDES AND DOCUSATE SODIUM 2 TABLET: 50; 8.6 TABLET ORAL at 06:06

## 2023-02-21 RX ADMIN — APIXABAN 10 MG: 5 TABLET, FILM COATED ORAL at 06:06

## 2023-02-21 RX ADMIN — APIXABAN 10 MG: 5 TABLET, FILM COATED ORAL at 17:29

## 2023-02-21 RX ADMIN — Medication 2000 UNITS: at 06:06

## 2023-02-21 RX ADMIN — CEFEPIME 2 G: 2 INJECTION, POWDER, FOR SOLUTION INTRAVENOUS at 08:17

## 2023-02-21 RX ADMIN — BUDESONIDE AND FORMOTEROL FUMARATE DIHYDRATE 2 PUFF: 160; 4.5 AEROSOL RESPIRATORY (INHALATION) at 08:14

## 2023-02-21 RX ADMIN — CEFEPIME 2 G: 2 INJECTION, POWDER, FOR SOLUTION INTRAVENOUS at 00:47

## 2023-02-21 RX ADMIN — FAMOTIDINE 20 MG: 20 TABLET, FILM COATED ORAL at 06:07

## 2023-02-21 ASSESSMENT — FIBROSIS 4 INDEX: FIB4 SCORE: 3.15

## 2023-02-21 ASSESSMENT — PATIENT HEALTH QUESTIONNAIRE - PHQ9
SUM OF ALL RESPONSES TO PHQ9 QUESTIONS 1 AND 2: 0
2. FEELING DOWN, DEPRESSED, IRRITABLE, OR HOPELESS: NOT AT ALL
1. LITTLE INTEREST OR PLEASURE IN DOING THINGS: NOT AT ALL

## 2023-02-21 ASSESSMENT — PAIN DESCRIPTION - PAIN TYPE: TYPE: ACUTE PAIN

## 2023-02-21 NOTE — DISCHARGE SUMMARY
Discharge Summary    CHIEF COMPLAINT ON ADMISSION  Chief Complaint   Patient presents with    Shortness of Breath     Hx lung cancer, on autoimmune therapy, last therapy in November. Pt was taking prednisone and lasix and recently had lasix dc'd on Thursday or Friday. Pt started experiencing worsening SOB/cough yesterday       Reason for Admission  Cough      Admission Date  2/19/2023    CODE STATUS  Full Code    HPI & HOSPITAL COURSE    Patient is a 78 y.o. male w/ a PMHx of pAF, HTN, DLD, CKD Stage III (Baseline Cr ~1.2), stage I non-small cell lung cancer status post wedge resection in 2018, stage Ia non-small cell lung cancer of the right upper lobe treated with SBRT in 2019,  stage IIIa non-small cell lung cancer of the left lower lobe treated with carboplatin/Taxol/radiation in 2021 who is followed by Oncologist Dr. Ruiz, recent admission on 1/31/23 - 02/02/23 for immunotherapy vs radiation therpay induced pneumonitis dc'ed on steorid taper, recent pulmonary visit on 02/03/23 with plan for repeat PET/CT on March for bronchoscopy vs repeat thoracentesis who presented to the ED on 02/19/23 for worsening SOB.     In the ED, febrile with 101.5, tachycardic in 120s, 95% on RA  WBC 13.2 (last WBC 5.7 on 2/2/23), Hgb 15.9, .2  Glucose 159, CR 1.24 (baseline), lactic acid 2.5-1.2  Urinalysis with glucose 100  Influenza/RSV/COVID-negative  CXR with stable patchy bilateral filtrates and interstitial prominence  CTA (02/19/23): With segmental right lower lobe pulm embolus, bilateral pneumonia with slight increase in left and unchange in right, trace right pleural effusion, small left pleural effusion    He continues to require oxygen.  Home oxygen was ordered.  Leukocytosis resolved.  Parainfluenza positive.  Blood culture negative so far.  He will discharge home with Eliquis for PE and levofloxacin to complete 7 days course of antibiotic.    During hospital course patient remain  hemodynamically stable  ,asymptomatic ,labs remain unremarkable .  Patient will be discharged with close follow up with PCP, oncology.  Advised for medicine compliance.Discharge plan was discussed with patient in details .  Patient agreed with discharge plan  and  all questions answered.          Therefore, he is discharged in good and stable condition to home with close outpatient follow-up.    The patient met 2-midnight criteria for an inpatient stay at the time of discharge.    Discharge Date  2/21/2023          DISCHARGE DIAGNOSES  Principal Problem:    Pulmonary embolism on right (HCC) POA: Yes  Active Problems:    Thoracic back pain POA: Yes    Hypertension POA: Yes    PAF (paroxysmal atrial fibrillation) ([facility]) POA: Yes    CKD (chronic kidney disease) stage 3, GFR 30-59 ml/min ([facility]) POA: Yes    Dyslipidemia POA: Yes    Primary cancer of left lower lobe of lung ([facility]) POA: Yes    Pneumonia of both lungs due to infectious organism POA: Yes    Bilateral pleural effusion POA: Yes    Vitamin D deficiency POA: Yes    Macrocytic anemia POA: Yes    Lactic acidosis POA: Yes    Sepsis without acute organ dysfunction ([facility]) POA: Yes  Resolved Problems:    * No resolved hospital problems. *      FOLLOW UP  Future Appointments   Date Time Provider Department Center   3/31/2023  1:40 PM Vinayak Carbajal M.D. [facility]     No follow-up provider specified.    MEDICATIONS ON DISCHARGE     Medication List        START taking these medications        Instructions   * apixaban 5mg Tabs  Commonly known as: ELIQUIS   Take 2 Tablets by mouth 2 times a day for 5 days. Indications: DVT/PE  Dose: 10 mg     * apixaban 5mg Tabs  Start taking on: February 27, 2023  Commonly known as: ELIQUIS   Take 1 Tablet by mouth 2 times a day for 30 days. Indications: DVT/PE  Dose: 5 mg     levoFLOXacin 500 MG tablet  Commonly known as: LEVAQUIN   Take 1 Tablet by mouth every day for 4 days.  Dose: 500 mg           * This list has 2 medication(s) that are the same as  other medications prescribed for you. Read the directions carefully, and ask your doctor or other care provider to review them with you.                CONTINUE taking these medications        Instructions   aspirin 81 MG EC tablet   Take 81 mg by mouth every day.  Dose: 81 mg     cyclobenzaprine 10 mg Tabs  Commonly known as: Flexeril   Take 10 mg by mouth at bedtime.  Dose: 10 mg     ezetimibe 10 MG Tabs  Commonly known as: ZETIA   Take 10 mg by mouth every day.  Dose: 10 mg     Famotidine Maximum Strength 20 MG Tabs  Generic drug: famotidine   Take 1 Tablet by mouth every day.  Dose: 20 mg     fluticasone furoate-vilanterol 200-25 MCG/ACT Aepb  Commonly known as: Breo Ellipta   Inhale 1 Puff every day. Rinse mouth after use.  Dose: 1 Puff     furosemide 40 MG Tabs  Commonly known as: LASIX   Take 1 Tablet by mouth every day.  Dose: 40 mg     metFORMIN 500 MG Tabs  Commonly known as: GLUCOPHAGE   Take 1 Tablet by mouth every day.  Dose: 500 mg     predniSONE 10 MG Tabs  Commonly known as: DELTASONE   Take 4 Tablets (40 mg) by mouth every day for 7 days,THEN take 2 tablets (20 mg) every day for 7 days, THEN take 1 tablet (10 mg) every day for 7 days  Dose: 40 mg     Tylenol PM Extra Strength  MG Tabs  Generic drug: diphenhydrAMINE-APAP (sleep)   Take 1-2 Tablets by mouth at bedtime as needed (Mild Pain or Trouble Sleeping).  Dose: 1-2 Tablet     Vitamin D3 2000 UNIT Caps   Take 2,000 Units by mouth every day.  Dose: 2,000 Units              Allergies  No Known Allergies    DIET  Orders Placed This Encounter   Procedures    Diet Order Diet: Regular     Standing Status:   Standing     Number of Occurrences:   1     Order Specific Question:   Diet:     Answer:   Regular [1]       ACTIVITY  As tolerated.  Weight bearing as tolerated    CONSULTATIONS  Cardiology     PROCEDURES  CT-CTA CHEST PULMONARY ARTERY W/ RECONS   Final Result      1.  Segmental RIGHT lower lobe pulmonary embolus   2.  No CT evidence of RIGHT  heart strain   3.  BILATERAL pneumonia, slightly increased in the LEFT lung and grossly unchanged in the RIGHT lung since the prior study   4.  Trace RIGHT pleural effusion   5.  Small LEFT pleural effusion   6.  Enlarged subcarinal lymph node      Findings were communicated with and acknowledged by YEIMY LOPEZ via Voalte Me on 2/19/2023 6:16 PM.      DX-CHEST-PORTABLE (1 VIEW)   Final Result      Stable patchy bilateral infiltrates and interstitial prominence.          LABORATORY  Lab Results   Component Value Date    SODIUM 136 02/21/2023    POTASSIUM 3.8 02/21/2023    CHLORIDE 105 02/21/2023    CO2 22 02/21/2023    GLUCOSE 129 (H) 02/21/2023    BUN 19 02/21/2023    CREATININE 1.05 02/21/2023        Lab Results   Component Value Date    WBC 6.0 02/21/2023    HEMOGLOBIN 12.4 (L) 02/21/2023    HEMATOCRIT 36.2 (L) 02/21/2023    PLATELETCT 112 (L) 02/21/2023        Total time of the discharge process exceeds 37 minutes.

## 2023-02-21 NOTE — PROGRESS NOTES
Assumed care of patient, report received from NOC RN.  Patient is resting in bed, calm, no c/o pain.  Daughter currently bedside.  Patient belongings and call light within reach, bed in low and locked position.  No voiced needs at this time.

## 2023-02-21 NOTE — PROGRESS NOTES
Telemetry Shift Summary     Rhythm ST  HR Range 102-118  Ectopy OPVC/TRIG/PVC/COUP  Measurements.15/.06/.39   Normal Values  Rhythm SR  HR Range:   Measurements: 0.12-0.20/0.06-0.10/0.30-0.52

## 2023-02-21 NOTE — CARE PLAN
Problem: Knowledge Deficit - Standard  Goal: Patient and family/care givers will demonstrate understanding of plan of care, disease process/condition, diagnostic tests and medications  Outcome: Progressing   The patient is Stable - Low risk of patient condition declining or worsening    Shift Goals  Clinical Goals: Monitor vitals/Tele  Patient Goals: Rest  Family Goals: CINDY    Progress made toward(s) clinical / shift goals:      Patient is not progressing towards the following goals:

## 2023-02-21 NOTE — CARE PLAN
The patient is Stable - Low risk of patient condition declining or worsening    Shift Goals  Clinical Goals: stable VS, IV abx  Patient Goals: rest  Family Goals: CINDY    Progress made toward(s) clinical / shift goals:    Problem: Knowledge Deficit - Standard  Goal: Patient and family/care givers will demonstrate understanding of plan of care, disease process/condition, diagnostic tests and medications  Outcome: Progressing     Problem: Safety - Medical Restraint  Goal: Free from restraint(s) (Restraint for Interference with Medical Device)  Outcome: Progressing     Problem: Respiratory  Goal: Patient will achieve/maintain optimum respiratory ventilation and gas exchange  Outcome: Progressing

## 2023-02-21 NOTE — PROGRESS NOTES
[facility] Medicine Daily Progress Note    Date of Service  2/20/2023    Chief Complaint  Pito Black is a 78 y.o. male admitted 2/19/2023 with malaise fever chills       Hospital Course  Patient is a 78 y.o. male w/ a PMHx of pAF, HTN, DLD, CKD Stage III (Baseline Cr ~1.2), stage I non-small cell lung cancer status post wedge resection in 2018, stage Ia non-small cell lung cancer of the right upper lobe treated with SBRT in 2019,  stage IIIa non-small cell lung cancer of the left lower lobe treated with carboplatin/Taxol/radiation in 2021 who is followed by Oncologist Dr. Ruiz, recent admission on 1/31/23 - 02/02/23 for immunotherapy vs radiation therpay induced pneumonitis dc'ed on steorid taper, recent pulmonary visit on 02/03/23 with plan for repeat PET/CT on March for bronchoscopy vs repeat thoracentesis who presented to the ED on 02/19/23 for worsening SOB.     Patient states it started about 2 days ago. Had associated cough without productive sputum. Has finished course of antibiotics, discontinued Metformin and Lasix since last admission. O2 was ordered last admission, but did not need O2 due to thoracentesis improving his SOB.  Denies any fever, chills, chest pain, palpitations, abdominal pain, nausea, vomiting, LE pain, LE edema.         Quit in 2015, 30+ PY, drinks 4-5 beers a day, last drink 2-3 weeks ago when he left the hospital, no recreation drug use. Denies any recent travel, sick contacts. Lives in [place], able to walk around house before getting SOB at baseline. Vaccinated for influenza vaccine, COVID,  pneumonia.      In the ED, febrile with 101.5, tachycardic in 120s, 95% on RA  WBC 13.2 (last WBC 5.7 on 2/2/23), Hgb 15.9, .2  Glucose 159, CR 1.24 (baseline), lactic acid 2.5-1.2  Urinalysis with glucose 100  Influenza/RSV/COVID-negative  CXR with stable patchy bilateral filtrates and interstitial prominence  CTA (02/19/23): With segmental right lower lobe pulm embolus,  bilateral pneumonia with slight increase in left and unchange in right, trace right pleural effusion, small left pleural effusion  EKG with sinus tachycardia with , QTc 438  S/p acetaminophen, 1L LR, heparin GTT in the ED    Interval Problem Update  2/20/2023:  Patient has positive sponsor medical therapy during hospitalization.  At time of evaluation patient has no complaints of fever denies chills at present.  All findings were discussed in detail with patient's wife and also daughter at bedside.  Continue with symptomatic support.  Continue with steroids as patient had previously been taking these secondary to malignancy treatment.  Patient had been placed on heparin infusion continuously for right segmental pulmonary emboli this has been an continued and patient has been initiated on apixaban 10 mg twice daily for total 7 days then 5 mg twice daily thereafter.  Patient will need walk test prior to discharge on 7/21/2023 to determine home oxygen needs.  Otherwise no acute events overnight.    I have discussed this patient's plan of care and discharge plan at IDT rounds today with Case Management, Nursing, Nursing leadership, and other members of the IDT team.    Consultants/Specialty  None    Code Status  Full Code    Disposition  Patient is not medically cleared for discharge.   Anticipate discharge to to home with close outpatient follow-up.  I have placed the appropriate orders for post-discharge needs.    Review of Systems  Review of Systems   Constitutional:  Negative for chills, fever, malaise/fatigue and weight loss.   Respiratory:  Positive for cough and shortness of breath. Negative for hemoptysis, sputum production and wheezing.    Cardiovascular:  Negative for chest pain, palpitations, orthopnea and leg swelling.   Gastrointestinal:  Positive for diarrhea. Negative for abdominal pain, nausea and vomiting.   Genitourinary:  Negative for dysuria.   Musculoskeletal:  Positive for back pain (low back  (Chronic)).   Skin:  Negative for rash.   Neurological:  Negative for dizziness and headaches.   Psychiatric/Behavioral:  Negative for depression and substance abuse.    All other systems reviewed and are negative.     Physical Exam  Temp:  [36.4 °C (97.5 °F)-38.3 °C (100.9 °F)] 37.2 °C (99 °F)  Pulse:  [] 95  Resp:  [12-20] 12  BP: ()/(56-87) 93/56  SpO2:  [90 %-98 %] 98 %    Physical Exam  Vitals and nursing note reviewed.   Constitutional:       Appearance: Normal appearance.   HENT:      Head: Normocephalic and atraumatic.      Right Ear: External ear normal.      Left Ear: External ear normal.      Nose: Nose normal.      Mouth/Throat:      Mouth: Mucous membranes are moist.      Pharynx: Oropharynx is clear.   Eyes:      Extraocular Movements: Extraocular movements intact.      Pupils: Pupils are equal, round, and reactive to light.   Cardiovascular:      Rate and Rhythm: Normal rate and regular rhythm.      Pulses: Normal pulses.      Heart sounds: Normal heart sounds.   Pulmonary:      Breath sounds: No wheezing or rales.      Comments: Diminished breath sounds bilaterally  Abdominal:      General: There is no distension.      Palpations: Abdomen is soft.      Tenderness: There is no abdominal tenderness. There is no guarding or rebound.   Musculoskeletal:         General: Normal range of motion.      Cervical back: Normal range of motion.   Skin:     General: Skin is warm.      Capillary Refill: Capillary refill takes less than 2 seconds.   Neurological:      General: No focal deficit present.      Mental Status: He is alert and oriented to person, place, and time.   Psychiatric:         Mood and Affect: Mood normal.         Behavior: Behavior normal.         Thought Content: Thought content normal.         Judgment: Judgment normal.       Fluids    Intake/Output Summary (Last 24 hours) at 2/20/2023 1928  Last data filed at 2/20/2023 0519  Gross per 24 hour   Intake 100 ml   Output 400 ml   Net  -300 ml       Laboratory  Recent Labs     02/19/23  1208 02/20/23  0040   WBC 13.2* 9.0   RBC 4.75 4.40*   HEMOGLOBIN 15.9 14.8   HEMATOCRIT 47.6 44.2   .2* 100.5*   MCH 33.5* 33.6*   MCHC 33.4* 33.5*   RDW 46.5 47.1   PLATELETCT 174 137*   MPV 9.8 10.2     Recent Labs     02/19/23  1208 02/20/23  0237   SODIUM 137 139   POTASSIUM 3.9 4.1   CHLORIDE 101 105   CO2 25 23   GLUCOSE 157* 94   BUN 20 15   CREATININE 1.24 0.94   CALCIUM 9.5 9.0     Recent Labs     02/19/23  1825   APTT 25.9   INR 1.03               Imaging  CT-CTA CHEST PULMONARY ARTERY W/ RECONS   Final Result      1.  Segmental RIGHT lower lobe pulmonary embolus   2.  No CT evidence of RIGHT heart strain   3.  BILATERAL pneumonia, slightly increased in the LEFT lung and grossly unchanged in the RIGHT lung since the prior study   4.  Trace RIGHT pleural effusion   5.  Small LEFT pleural effusion   6.  Enlarged subcarinal lymph node      Findings were communicated with and acknowledged by YEIMY LOPEZ via Voalte Me on 2/19/2023 6:16 PM.      DX-CHEST-PORTABLE (1 VIEW)   Final Result      Stable patchy bilateral infiltrates and interstitial prominence.           Assessment/Plan  * Pulmonary embolism on right (HCC)- (present on admission)  Assessment & Plan  *CTA (02/19/23): With segmental right lower lobe pulm embolus  *Stable on RA    -Telemetry monitoring  -Supplemental Oxygen as needed  -Heparin GTT, will likely transition to DOAC and continue until remission of ongoing non small cell lung cancer    Sepsis without acute organ dysfunction (HCC)- (present on admission)  Assessment & Plan  SIRS criteria identified on my evaluation include:  Fever, with temperature greater than 101 deg F and Tachycardia, with heart rate greater than 90 BPM  Bilateral pneumonia, recently hospitalized with pneumonia and discharged on 2/2, treated with IV antibiotics  Patient febrile continues to have bilateral consolidation on imaging.  Procalcitonin and viral  panel negative however since he is febrile will resume antibiotics  Fluids per sepsis protocol given in the ED    Lactic acidosis- (present on admission)  Assessment & Plan  *LA (12/01/22): 2.5    -IVF  -Resolved    Macrocytic anemia- (present on admission)  Assessment & Plan  *CBC (2/19/2023):Hgb 15.9, .2  *Hx of alcohol use with 4-5 beers daily  *Likely secondary to alcohol use  *Stable    -Continue to monitor    Vitamin D deficiency- (present on admission)  Assessment & Plan    -Continue home Cholecalciferol 2000 units daily    Bilateral pleural effusion- (present on admission)  Assessment & Plan  *CTA (02/19/23):  trace right pleural effusion, small left pleural effusion  *Likely secondary to infectious vs chemotherapy vs radiotherapy induced pneumonitis, dc'ed furosemide, hx of thoracentesis with transudate effusion last admissoin  *Stable    -Continue to monitor    Pneumonia of both lungs due to infectious organism- (present on admission)  Assessment & Plan  *CTA (02/19/23): bilateral pneumonia with slight increase in left and unchange in right, trace right pleural effusion, small left pleural effusion  *Unresolved since last admission with C3+ azithromycin and 5 day course of augmentin  *Stable on RA    -Blood cultures ordered  -MRSA nares ordered  -Procalcitonin ordered  -Supportive care with O2 therapy as needed and aspiration precautions  -Empiric abx therapy with Cefepime  -Continue Prednisone therapy for possible radiation vs immunotherapy induced pneumonitis, currently tapered from 40 mg daily to 10 mg daily, plan to continue until PET scan in March per pulmonary note  -Consider bronchoscopy inpatient with pulmonary due to possible planned bronchoscopy outpatient in March with unresolved SOB for fluid analysis or tissue analysis    Primary cancer of left lower lobe of lung (HCC)- (present on admission)  Assessment & Plan  *stage I non-small cell lung cancer status post wedge resection in 2018,  stage Ia non-small cell lung cancer of the right upper lobe treated with SBRT in 2019,  stage IIIa non-small cell lung cancer of the left lower lobe treated with carboplatin/Taxol/radiation in 2021    -Follow up with Oncology Dr. Ruiz outpatient    Dyslipidemia- (present on admission)  Assessment & Plan  No results found for: CHOLSTRLTOT, LDL, HDL, TRIGLYCERIDE       -Continue home Ezetimibe 10 mg daily    CKD (chronic kidney disease) stage 3, GFR 30-59 ml/min (HCC)- (present on admission)  Assessment & Plan  *Baseline Cr ~1.2, Stable    -Maintain euvolemia and monitor fluid responsiveness (avoid NaCL and renal congestion)  -MAP >65  -Avoid nephrotoxins and renally dose medications  -Monitor renal function and urine output  -We will continue to control blood sugar, lipids, and blood pressure    PAF (paroxysmal atrial fibrillation) ([facility])- (present on admission)  Assessment & Plan  *Cleared by cardiology to DC DOAC due to provoked AF w/ wedge resection  *EKG (02/19/23): With sinus tachycardia with , QTc 438  *Stable    -Continue to monitor    Hypertension- (present on admission)  Assessment & Plan  *Stable, likely secondary to acute stress response    -Labetalol 10 mg q6h PRN for sustained SBP >180    Thoracic back pain- (present on admission)  Assessment & Plan    -Continue home Flexeril PRN         VTE prophylaxis: SCDs/TEDs and therapeutic anticoagulation with apixaban 10 mg twice daily for 7 days then 5 mg twice daily thereafter    I have performed a physical exam and reviewed and updated ROS and Plan today (2/20/2023). In review of yesterday's note (2/19/2023), there are no changes except as documented above.

## 2023-02-21 NOTE — FACE TO FACE
"Face to Face Note  -  Durable Medical Equipment    Zan Tam M.D. - NPI: [id number]  I certify that this patient is under my care and that they had a durable medical equipment(DME)face to face encounter by the clinical nurse specialist working collaboratively with me that meets the physician DME face-to-face encounter requirements with this patient on:    Date of encounter:   Patient:                    MRN:                       YOB: 2023  Pito Black  [id number]  1945     The encounter with the patient was in whole, or in part, for the following medical condition, which is the primary reason for durable medical equipment:  Other - PNA/PE    I certify that, based on my findings, the following durable medical equipment is medically necessary:    Oxygen   HOME O2 Saturation Measurements:(Values must be present for Home Oxygen orders)  Room air sat at rest: 88  Room air sat with amb: 84  With liters of O2: 3, O2 sat at rest with O2: 94  With Liters of O2: 3, O2 sat with amb with O2 : 93  Is the patient mobile?: Yes  If patient feels more short of breath, they can go up to 6 liters per minute and contact healthcare provider.    Supporting Symptoms: The patient requires supplemental oxygen, as the following interventions have been tried with limited or no improvement: \"Bronchodilators and/or steroid inhalers.    My Clinical findings support the need for the above equipment due to:  Hypoxia  "

## 2023-02-21 NOTE — HOSPITAL COURSE
Patient is a 78 y.o. male w/ a PMHx of pAF, HTN, DLD, CKD Stage III (Baseline Cr ~1.2), stage I non-small cell lung cancer status post wedge resection in 2018, stage Ia non-small cell lung cancer of the right upper lobe treated with SBRT in 2019,  stage IIIa non-small cell lung cancer of the left lower lobe treated with carboplatin/Taxol/radiation in 2021 who is followed by Oncologist Dr. Ruiz, recent admission on 1/31/23 - 02/02/23 for immunotherapy vs radiation therpay induced pneumonitis dc'ed on steorid taper, recent pulmonary visit on 02/03/23 with plan for repeat PET/CT on March for bronchoscopy vs repeat thoracentesis who presented to the ED on 02/19/23 for worsening SOB.     Patient states it started about 2 days ago. Had associated cough without productive sputum. Has finished course of antibiotics, discontinued Metformin and Lasix since last admission. O2 was ordered last admission, but did not need O2 due to thoracentesis improving his SOB.  Denies any fever, chills, chest pain, palpitations, abdominal pain, nausea, vomiting, LE pain, LE edema.         Quit in 2015, 30+ PY, drinks 4-5 beers a day, last drink 2-3 weeks ago when he left the hospital, no recreation drug use. Denies any recent travel, sick contacts. Lives in [place], able to walk around house before getting SOB at baseline. Vaccinated for influenza vaccine, COVID,  pneumonia.      In the ED, febrile with 101.5, tachycardic in 120s, 95% on RA  WBC 13.2 (last WBC 5.7 on 2/2/23), Hgb 15.9, .2  Glucose 159, CR 1.24 (baseline), lactic acid 2.5-1.2  Urinalysis with glucose 100  Influenza/RSV/COVID-negative  CXR with stable patchy bilateral filtrates and interstitial prominence  CTA (02/19/23): With segmental right lower lobe pulm embolus, bilateral pneumonia with slight increase in left and unchange in right, trace right pleural effusion, small left pleural effusion  EKG with sinus tachycardia with , QTc 438  S/p acetaminophen, 1L  LR, heparin GTT in the ED

## 2023-02-22 VITALS
HEART RATE: 99 BPM | DIASTOLIC BLOOD PRESSURE: 77 MMHG | SYSTOLIC BLOOD PRESSURE: 117 MMHG | OXYGEN SATURATION: 97 % | RESPIRATION RATE: 18 BRPM | BODY MASS INDEX: 22.55 KG/M2 | TEMPERATURE: 98.4 F | HEIGHT: 68 IN | WEIGHT: 148.8 LBS

## 2023-02-22 PROCEDURE — 700105 HCHG RX REV CODE 258: Performed by: STUDENT IN AN ORGANIZED HEALTH CARE EDUCATION/TRAINING PROGRAM

## 2023-02-22 PROCEDURE — A9270 NON-COVERED ITEM OR SERVICE: HCPCS | Performed by: STUDENT IN AN ORGANIZED HEALTH CARE EDUCATION/TRAINING PROGRAM

## 2023-02-22 PROCEDURE — 700111 HCHG RX REV CODE 636 W/ 250 OVERRIDE (IP): Performed by: STUDENT IN AN ORGANIZED HEALTH CARE EDUCATION/TRAINING PROGRAM

## 2023-02-22 PROCEDURE — 700102 HCHG RX REV CODE 250 W/ 637 OVERRIDE(OP): Performed by: STUDENT IN AN ORGANIZED HEALTH CARE EDUCATION/TRAINING PROGRAM

## 2023-02-22 PROCEDURE — 99239 HOSP IP/OBS DSCHRG MGMT >30: CPT | Performed by: STUDENT IN AN ORGANIZED HEALTH CARE EDUCATION/TRAINING PROGRAM

## 2023-02-22 RX ADMIN — Medication 2000 UNITS: at 04:43

## 2023-02-22 RX ADMIN — BUDESONIDE AND FORMOTEROL FUMARATE DIHYDRATE 2 PUFF: 160; 4.5 AEROSOL RESPIRATORY (INHALATION) at 04:43

## 2023-02-22 RX ADMIN — APIXABAN 10 MG: 5 TABLET, FILM COATED ORAL at 04:43

## 2023-02-22 RX ADMIN — CEFEPIME 2 G: 2 INJECTION, POWDER, FOR SOLUTION INTRAVENOUS at 00:46

## 2023-02-22 RX ADMIN — EZETIMIBE 10 MG: 10 TABLET ORAL at 04:43

## 2023-02-22 RX ADMIN — CEFEPIME 2 G: 2 INJECTION, POWDER, FOR SOLUTION INTRAVENOUS at 07:56

## 2023-02-22 RX ADMIN — FAMOTIDINE 20 MG: 20 TABLET, FILM COATED ORAL at 04:43

## 2023-02-22 RX ADMIN — PREDNISONE 10 MG: 10 TABLET ORAL at 04:43

## 2023-02-22 RX ADMIN — SENNOSIDES AND DOCUSATE SODIUM 2 TABLET: 50; 8.6 TABLET ORAL at 04:43

## 2023-02-22 NOTE — PROGRESS NOTES
Telemetry Shift Summary     Rhythm SR-ST  HR Range   Ectopy RBIG/PVC/PAC/COUP/TRIP, 4BVT  Measurements .15/.08/.36    Normal Values  Rhythm SR  HR Range:   Measurements: 0.12-0.20/0.06-0.10/0.30-0.52

## 2023-02-22 NOTE — CARE PLAN
Problem: Knowledge Deficit - Standard  Goal: Patient and family/care givers will demonstrate understanding of plan of care, disease process/condition, diagnostic tests and medications  Outcome: Progressing     Problem: Safety - Medical Restraint  Goal: Free from restraint(s) (Restraint for Interference with Medical Device)  Outcome: Progressing   The patient is Stable - Low risk of patient condition declining or worsening    Shift Goals  Clinical Goals: IV Antibiotics, monitor vitals  Patient Goals: Rest, Discharge  Family Goals: CINDY    Progress made toward(s) clinical / shift goals:        Patient is not progressing towards the following goals:

## 2023-02-22 NOTE — PROGRESS NOTES
Patient arrived to [facility]. NE paper work, meds, and follow up appointments reviewed with patient and wife. All questions and concerns addressed. Ride home with friend.

## 2023-02-22 NOTE — PROGRESS NOTES
Transferred care to Adela HORN. Patient resting. Patient is not in distress at this time. Call light within reach.

## 2023-02-22 NOTE — DISCHARGE PLANNING
Received Choice form at 0734  Agency/Facility Name: [facility]  Referral sent per Choice form @ [phone]  Agency/Facility Name: [facility]  Outcome: DPA left voicemail regarding oxygen order with request of call back.     1003  Agency/Facility Name: [facility]  Spoke To: Rachel  Outcome: DPA called to update, Pt had a new home O2 done and with the new number Pt no longer qualifies.

## 2023-02-22 NOTE — DISCHARGE SUMMARY
Discharge Summary    CHIEF COMPLAINT ON ADMISSION  Chief Complaint   Patient presents with    Shortness of Breath     Hx lung cancer, on autoimmune therapy, last therapy in November. Pt was taking prednisone and lasix and recently had lasix dc'd on Thursday or Friday. Pt started experiencing worsening SOB/cough yesterday       Reason for Admission  Cough      Admission Date  2/19/2023    CODE STATUS  Full Code    HPI & HOSPITAL COURSE    Patient is a 78 y.o. male w/ a PMHx of pAF, HTN, DLD, CKD Stage III (Baseline Cr ~1.2), stage I non-small cell lung cancer status post wedge resection in 2018, stage Ia non-small cell lung cancer of the right upper lobe treated with SBRT in 2019,  stage IIIa non-small cell lung cancer of the left lower lobe treated with carboplatin/Taxol/radiation in 2021 who is followed by Oncologist Dr. Ruiz, recent admission on 1/31/23 - 02/02/23 for immunotherapy vs radiation therpay induced pneumonitis dc'ed on steorid taper, recent pulmonary visit on 02/03/23 with plan for repeat PET/CT on March for bronchoscopy vs repeat thoracentesis who presented to the ED on 02/19/23 for worsening SOB.     In the ED, febrile with 101.5, tachycardic in 120s, 95% on RA  WBC 13.2 (last WBC 5.7 on 2/2/23), Hgb 15.9, .2  Glucose 159, CR 1.24 (baseline), lactic acid 2.5-1.2  Urinalysis with glucose 100  Influenza/RSV/COVID-negative  CXR with stable patchy bilateral filtrates and interstitial prominence  CTA (02/19/23): With segmental right lower lobe pulm embolus, bilateral pneumonia with slight increase in left and unchange in right, trace right pleural effusion, small left pleural effusion    During hospital course patient oxygen requirement improved and he was able to saturate over 90 on ambulation on room air   Leukocytosis resolved.  Parainfluenza positive.  Blood culture negative so far.  He will be  discharge home with Eliquis for PE and levofloxacin to complete 7 days course of antibiotic.      During hospital course patient remain  hemodynamically stable ,asymptomatic ,labs remain unremarkable .  Patient will be discharged with close follow up with PCP, oncology.  He has follow-up with pulmonology Dr. Carbajal on 3/31/2023.  Advised for medicine compliance.Discharge plan was discussed with patient in details .  Patient agreed with discharge plan  and  all questions answered.       Therefore, he is discharged in good and stable condition to home with close outpatient follow-up.    The patient met 2-midnight criteria for an inpatient stay at the time of discharge.    Discharge Date  2/22/2023        DISCHARGE DIAGNOSES  Principal Problem:    Pulmonary embolism on right (HCC) POA: Yes  Active Problems:    Thoracic back pain POA: Yes    Hypertension POA: Yes    PAF (paroxysmal atrial fibrillation) (HCC) POA: Yes    CKD (chronic kidney disease) stage 3, GFR 30-59 ml/min (HCC) POA: Yes    Dyslipidemia POA: Yes    Primary cancer of left lower lobe of lung (HCC) POA: Yes    Pneumonia of both lungs due to infectious organism POA: Yes    Bilateral pleural effusion POA: Yes    Vitamin D deficiency POA: Yes    Macrocytic anemia POA: Yes    Lactic acidosis POA: Yes    Sepsis without acute organ dysfunction (HCC) POA: Yes    Parainfluenza POA: Unknown  Resolved Problems:    * No resolved hospital problems. *      FOLLOW UP  Future Appointments   Date Time Provider Department Center   3/31/2023  1:40 PM [facility]     Nicolas Milligan, M.D.  [address] [address]  [phone]    Schedule an appointment as soon as possible for a visit  As needed, for hospital stay follow up appointment.      MEDICATIONS ON DISCHARGE     Medication List        START taking these medications        Instructions   * apixaban 5mg Tabs  Commonly known as: ELIQUIS   Take 2 Tablets by mouth 2 times a day for 5 days. Indications: DVT/PE  Dose: 10 mg     * apixaban 5mg Tabs  Start taking on: February  27, 2023  Commonly known as: ELIQUIS   Take 1 Tablet by mouth 2 times a day for 30 days. Indications: DVT/PE  Dose: 5 mg     levoFLOXacin 500 MG tablet  Commonly known as: LEVAQUIN   Take 1 Tablet by mouth every day for 4 days.  Dose: 500 mg           * This list has 2 medication(s) that are the same as other medications prescribed for you. Read the directions carefully, and ask your doctor or other care provider to review them with you.                CONTINUE taking these medications        Instructions   cyclobenzaprine 10 mg Tabs  Commonly known as: Flexeril   Take 10 mg by mouth at bedtime.  Dose: 10 mg     ezetimibe 10 MG Tabs  Commonly known as: ZETIA   Take 10 mg by mouth every day.  Dose: 10 mg     Famotidine Maximum Strength 20 MG Tabs  Generic drug: famotidine   Take 1 Tablet by mouth every day.  Dose: 20 mg     fluticasone furoate-vilanterol 200-25 MCG/ACT Aepb  Commonly known as: Breo Ellipta   Inhale 1 Puff every day. Rinse mouth after use.  Dose: 1 Puff     furosemide 40 MG Tabs  Commonly known as: LASIX   Take 1 Tablet by mouth every day.  Dose: 40 mg     metFORMIN 500 MG Tabs  Commonly known as: GLUCOPHAGE   Take 1 Tablet by mouth every day.  Dose: 500 mg     predniSONE 10 MG Tabs  Commonly known as: DELTASONE   Take 4 Tablets (40 mg) by mouth every day for 7 days,THEN take 2 tablets (20 mg) every day for 7 days, THEN take 1 tablet (10 mg) every day for 7 days  Dose: 40 mg     Tylenol PM Extra Strength  MG Tabs  Generic drug: diphenhydrAMINE-APAP (sleep)   Take 1-2 Tablets by mouth at bedtime as needed (Mild Pain or Trouble Sleeping).  Dose: 1-2 Tablet     Vitamin D3 2000 UNIT Caps   Take 2,000 Units by mouth every day.  Dose: 2,000 Units            STOP taking these medications      aspirin 81 MG EC tablet              Allergies  No Known Allergies    DIET  Orders Placed This Encounter   Procedures    Diet Order Diet: Regular     Standing Status:   Standing     Number of Occurrences:   1      Order Specific Question:   Diet:     Answer:   Regular [1]       ACTIVITY  As tolerated.  Weight bearing as tolerated    CONSULTATIONS  Pulmonology     PROCEDURES  CT-CTA CHEST PULMONARY ARTERY W/ RECONS   Final Result      1.  Segmental RIGHT lower lobe pulmonary embolus   2.  No CT evidence of RIGHT heart strain   3.  BILATERAL pneumonia, slightly increased in the LEFT lung and grossly unchanged in the RIGHT lung since the prior study   4.  Trace RIGHT pleural effusion   5.  Small LEFT pleural effusion   6.  Enlarged subcarinal lymph node      Findings were communicated with and acknowledged by YEIMY LOPEZ via Voalte Me on 2/19/2023 6:16 PM.      DX-CHEST-PORTABLE (1 VIEW)   Final Result      Stable patchy bilateral infiltrates and interstitial prominence.            LABORATORY  Lab Results   Component Value Date    SODIUM 136 02/21/2023    POTASSIUM 3.8 02/21/2023    CHLORIDE 105 02/21/2023    CO2 22 02/21/2023    GLUCOSE 129 (H) 02/21/2023    BUN 19 02/21/2023    CREATININE 1.05 02/21/2023        Lab Results   Component Value Date    WBC 6.0 02/21/2023    HEMOGLOBIN 12.4 (L) 02/21/2023    HEMATOCRIT 36.2 (L) 02/21/2023    PLATELETCT 112 (L) 02/21/2023        Total time of the discharge process exceeds 37 minutes.

## 2023-02-22 NOTE — PROGRESS NOTES
Assumed care this pm from day shift RN. Patient sitting up in bed with no signs of distress. Patient AxO 4, O2 @ 2 L thru nasal cannula, VSS. Call bell and personal items within reach, bed locked in low position. Bed exit alarm on. Hourly rounding in place. Tele box in place, monitor room notified.     Isolation Precautions:    Patient is on droplet and contact isolation for parainfluenza     Patient educated on reason for isolation, how the infection may be transmitted, and how to help prevent transmission to others.     Patient educated that droplet and contact precautions involves staff and visitors wearing PPE, follow  Standard Precautions and perform meticulous hand hygiene in order to prevent transmission of infection. (Contact Precautions: gown and gloves; Special Contact Precautions: gown and gloves, with the added requirement of soap and water for hand hygiene; Droplet Precautions: surgical mask worn by staff and visitors in the room, and worn by the patient when out of the room; Airborne Precautions: involves staff wearing PPE to include an N95 Respirator or Controlled Air Purifying Respirator (CAPR).  Visitors should be limited and may wear an N95 mask).         Patient transport and mobilization on unit. Patient educated that they may leave their room, but prior to exiting, the patient needs to have on a fresh patient gown, ensure the potentially infectious area is covered, perform appropriate hand hygiene immediately prior to exiting the room. (*For Airborne Precautions: Patient educated that time out of the room should be minimized and limited to transport to diagnostic procedures or other activities. Patient is to wear surgical mask when required to leave the patient room).

## 2023-02-22 NOTE — PROGRESS NOTES
Report received from Elizabeth Chapman. Assumed pt care. Pt resting in bed. Pt A&O x 4. Fall precautions in place, call light and belongings within reach, bed in lowest position. No signs of distress.

## 2023-02-24 LAB
BACTERIA BLD CULT: NORMAL
BACTERIA BLD CULT: NORMAL
SIGNIFICANT IND 70042: NORMAL
SIGNIFICANT IND 70042: NORMAL
SITE SITE: NORMAL
SITE SITE: NORMAL
SOURCE SOURCE: NORMAL
SOURCE SOURCE: NORMAL

## 2023-03-11 ENCOUNTER — HOSPITAL ENCOUNTER (OUTPATIENT)
Dept: RADIOLOGY | Facility: MEDICAL CENTER | Age: 78
End: 2023-03-11
Attending: INTERNAL MEDICINE
Payer: MEDICARE

## 2023-03-16 ENCOUNTER — HOSPITAL ENCOUNTER (INPATIENT)
Facility: MEDICAL CENTER | Age: 78
LOS: 2 days | DRG: 291 | End: 2023-03-19
Attending: EMERGENCY MEDICINE | Admitting: STUDENT IN AN ORGANIZED HEALTH CARE EDUCATION/TRAINING PROGRAM
Payer: MEDICARE

## 2023-03-16 ENCOUNTER — APPOINTMENT (OUTPATIENT)
Dept: RADIOLOGY | Facility: MEDICAL CENTER | Age: 78
DRG: 291 | End: 2023-03-16
Attending: EMERGENCY MEDICINE
Payer: MEDICARE

## 2023-03-16 DIAGNOSIS — R06.02 SHORTNESS OF BREATH: ICD-10-CM

## 2023-03-16 DIAGNOSIS — J90 CHRONIC BILATERAL PLEURAL EFFUSIONS: ICD-10-CM

## 2023-03-16 DIAGNOSIS — I50.9 ACUTE CONGESTIVE HEART FAILURE, UNSPECIFIED HEART FAILURE TYPE (HCC): ICD-10-CM

## 2023-03-16 DIAGNOSIS — I48.0 PAF (PAROXYSMAL ATRIAL FIBRILLATION) (HCC): ICD-10-CM

## 2023-03-16 DIAGNOSIS — I50.20 HEART FAILURE WITH REDUCED EJECTION FRACTION (HCC): ICD-10-CM

## 2023-03-16 DIAGNOSIS — Z85.118 HISTORY OF LUNG CANCER: ICD-10-CM

## 2023-03-16 LAB
ALBUMIN SERPL BCP-MCNC: 3.8 G/DL (ref 3.2–4.9)
ALBUMIN/GLOB SERPL: 1.1 G/DL
ALP SERPL-CCNC: 65 U/L (ref 30–99)
ALT SERPL-CCNC: 17 U/L (ref 2–50)
ANION GAP SERPL CALC-SCNC: 14 MMOL/L (ref 7–16)
AST SERPL-CCNC: 27 U/L (ref 12–45)
BASOPHILS # BLD AUTO: 0.4 % (ref 0–1.8)
BASOPHILS # BLD: 0.03 K/UL (ref 0–0.12)
BILIRUB SERPL-MCNC: 0.6 MG/DL (ref 0.1–1.5)
BUN SERPL-MCNC: 22 MG/DL (ref 8–22)
CALCIUM ALBUM COR SERPL-MCNC: 9.8 MG/DL (ref 8.5–10.5)
CALCIUM SERPL-MCNC: 9.6 MG/DL (ref 8.5–10.5)
CHLORIDE SERPL-SCNC: 102 MMOL/L (ref 96–112)
CO2 SERPL-SCNC: 20 MMOL/L (ref 20–33)
CREAT SERPL-MCNC: 1.54 MG/DL (ref 0.5–1.4)
EKG IMPRESSION: NORMAL
EOSINOPHIL # BLD AUTO: 0.11 K/UL (ref 0–0.51)
EOSINOPHIL NFR BLD: 1.6 % (ref 0–6.9)
ERYTHROCYTE [DISTWIDTH] IN BLOOD BY AUTOMATED COUNT: 47.8 FL (ref 35.9–50)
GFR SERPLBLD CREATININE-BSD FMLA CKD-EPI: 46 ML/MIN/1.73 M 2
GLOBULIN SER CALC-MCNC: 3.6 G/DL (ref 1.9–3.5)
GLUCOSE SERPL-MCNC: 143 MG/DL (ref 65–99)
HCT VFR BLD AUTO: 43.5 % (ref 42–52)
HGB BLD-MCNC: 14.9 G/DL (ref 14–18)
IMM GRANULOCYTES # BLD AUTO: 0.06 K/UL (ref 0–0.11)
IMM GRANULOCYTES NFR BLD AUTO: 0.9 % (ref 0–0.9)
LYMPHOCYTES # BLD AUTO: 1.9 K/UL (ref 1–4.8)
LYMPHOCYTES NFR BLD: 28.2 % (ref 22–41)
MCH RBC QN AUTO: 32.9 PG (ref 27–33)
MCHC RBC AUTO-ENTMCNC: 34.3 G/DL (ref 33.7–35.3)
MCV RBC AUTO: 96 FL (ref 81.4–97.8)
MONOCYTES # BLD AUTO: 0.64 K/UL (ref 0–0.85)
MONOCYTES NFR BLD AUTO: 9.5 % (ref 0–13.4)
NEUTROPHILS # BLD AUTO: 3.99 K/UL (ref 1.82–7.42)
NEUTROPHILS NFR BLD: 59.4 % (ref 44–72)
NRBC # BLD AUTO: 0 K/UL
NRBC BLD-RTO: 0 /100 WBC
PLATELET # BLD AUTO: 160 K/UL (ref 164–446)
PMV BLD AUTO: 9.7 FL (ref 9–12.9)
POTASSIUM SERPL-SCNC: 4.3 MMOL/L (ref 3.6–5.5)
PROT SERPL-MCNC: 7.4 G/DL (ref 6–8.2)
RBC # BLD AUTO: 4.53 M/UL (ref 4.7–6.1)
SODIUM SERPL-SCNC: 136 MMOL/L (ref 135–145)
WBC # BLD AUTO: 6.7 K/UL (ref 4.8–10.8)

## 2023-03-16 PROCEDURE — 36415 COLL VENOUS BLD VENIPUNCTURE: CPT

## 2023-03-16 PROCEDURE — 93005 ELECTROCARDIOGRAM TRACING: CPT

## 2023-03-16 PROCEDURE — 84484 ASSAY OF TROPONIN QUANT: CPT

## 2023-03-16 PROCEDURE — 83880 ASSAY OF NATRIURETIC PEPTIDE: CPT

## 2023-03-16 PROCEDURE — 85610 PROTHROMBIN TIME: CPT

## 2023-03-16 PROCEDURE — 96374 THER/PROPH/DIAG INJ IV PUSH: CPT

## 2023-03-16 PROCEDURE — 80053 COMPREHEN METABOLIC PANEL: CPT

## 2023-03-16 PROCEDURE — 93005 ELECTROCARDIOGRAM TRACING: CPT | Performed by: EMERGENCY MEDICINE

## 2023-03-16 PROCEDURE — 85730 THROMBOPLASTIN TIME PARTIAL: CPT

## 2023-03-16 PROCEDURE — 85025 COMPLETE CBC W/AUTO DIFF WBC: CPT

## 2023-03-16 PROCEDURE — 71045 X-RAY EXAM CHEST 1 VIEW: CPT

## 2023-03-16 PROCEDURE — 700111 HCHG RX REV CODE 636 W/ 250 OVERRIDE (IP): Performed by: EMERGENCY MEDICINE

## 2023-03-16 PROCEDURE — 96375 TX/PRO/DX INJ NEW DRUG ADDON: CPT

## 2023-03-16 PROCEDURE — 99285 EMERGENCY DEPT VISIT HI MDM: CPT

## 2023-03-16 RX ORDER — CEFTRIAXONE 2 G/1
2000 INJECTION, POWDER, FOR SOLUTION INTRAMUSCULAR; INTRAVENOUS ONCE
Status: COMPLETED | OUTPATIENT
Start: 2023-03-16 | End: 2023-03-17

## 2023-03-16 RX ORDER — FUROSEMIDE 10 MG/ML
40 INJECTION INTRAMUSCULAR; INTRAVENOUS ONCE
Status: COMPLETED | OUTPATIENT
Start: 2023-03-16 | End: 2023-03-16

## 2023-03-16 RX ADMIN — CEFTRIAXONE SODIUM 2000 MG: 2 INJECTION, POWDER, FOR SOLUTION INTRAMUSCULAR; INTRAVENOUS at 23:58

## 2023-03-16 RX ADMIN — FUROSEMIDE 40 MG: 10 INJECTION, SOLUTION INTRAMUSCULAR; INTRAVENOUS at 23:59

## 2023-03-16 ASSESSMENT — FIBROSIS 4 INDEX: FIB4 SCORE: 3.15

## 2023-03-17 ENCOUNTER — APPOINTMENT (OUTPATIENT)
Dept: RADIOLOGY | Facility: MEDICAL CENTER | Age: 78
DRG: 291 | End: 2023-03-17
Attending: STUDENT IN AN ORGANIZED HEALTH CARE EDUCATION/TRAINING PROGRAM
Payer: MEDICARE

## 2023-03-17 ENCOUNTER — APPOINTMENT (OUTPATIENT)
Dept: RADIOLOGY | Facility: MEDICAL CENTER | Age: 78
DRG: 291 | End: 2023-03-17
Attending: INTERNAL MEDICINE
Payer: MEDICARE

## 2023-03-17 ENCOUNTER — APPOINTMENT (OUTPATIENT)
Dept: CARDIOLOGY | Facility: MEDICAL CENTER | Age: 78
DRG: 291 | End: 2023-03-17
Attending: INTERNAL MEDICINE
Payer: MEDICARE

## 2023-03-17 PROBLEM — I13.10 CARDIORENAL SYNDROME: Status: ACTIVE | Noted: 2023-03-17

## 2023-03-17 PROBLEM — Z71.89 ACP (ADVANCE CARE PLANNING): Status: ACTIVE | Noted: 2023-03-17

## 2023-03-17 PROBLEM — E87.70 VOLUME OVERLOAD: Status: ACTIVE | Noted: 2023-03-17

## 2023-03-17 PROBLEM — E11.9 DIABETES (HCC): Status: ACTIVE | Noted: 2023-03-17

## 2023-03-17 LAB
ALBUMIN SERPL BCP-MCNC: 3.7 G/DL (ref 3.2–4.9)
AMYLASE FLD-CCNC: 21 U/L
APPEARANCE FLD: CLEAR
APPEARANCE UR: CLEAR
APTT PPP: 38 SEC (ref 24.7–36)
BASOPHILS # BLD AUTO: 0.3 % (ref 0–1.8)
BASOPHILS # BLD: 0.02 K/UL (ref 0–0.12)
BILIRUB UR QL STRIP.AUTO: NEGATIVE
BODY FLD TYPE: NORMAL
BUN SERPL-MCNC: 20 MG/DL (ref 8–22)
CALCIUM ALBUM COR SERPL-MCNC: 9.8 MG/DL (ref 8.5–10.5)
CALCIUM SERPL-MCNC: 9.6 MG/DL (ref 8.5–10.5)
CHLORIDE SERPL-SCNC: 103 MMOL/L (ref 96–112)
CO2 SERPL-SCNC: 22 MMOL/L (ref 20–33)
COLOR FLD: YELLOW
COLOR UR: YELLOW
CORTIS SERPL-MCNC: 4.7 UG/DL (ref 0–23)
CREAT SERPL-MCNC: 1.58 MG/DL (ref 0.5–1.4)
CRP SERPL HS-MCNC: 0.55 MG/DL (ref 0–0.75)
CYTOLOGY REG CYTOL: NORMAL
EOSINOPHIL # BLD AUTO: 0.12 K/UL (ref 0–0.51)
EOSINOPHIL NFR BLD: 1.9 % (ref 0–6.9)
ERYTHROCYTE [DISTWIDTH] IN BLOOD BY AUTOMATED COUNT: 47.5 FL (ref 35.9–50)
ERYTHROCYTE [SEDIMENTATION RATE] IN BLOOD BY WESTERGREN METHOD: 39 MM/HOUR (ref 0–20)
GFR SERPLBLD CREATININE-BSD FMLA CKD-EPI: 44 ML/MIN/1.73 M 2
GLUCOSE FLD-MCNC: 138 MG/DL
GLUCOSE SERPL-MCNC: 148 MG/DL (ref 65–99)
GLUCOSE UR STRIP.AUTO-MCNC: NEGATIVE MG/DL
GRAM STN SPEC: NORMAL
HCT VFR BLD AUTO: 43.3 % (ref 42–52)
HGB BLD-MCNC: 14.6 G/DL (ref 14–18)
HISTIOCYTES NFR FLD: 10 %
IMM GRANULOCYTES # BLD AUTO: 0.03 K/UL (ref 0–0.11)
IMM GRANULOCYTES NFR BLD AUTO: 0.5 % (ref 0–0.9)
INR PPP: 1.25 (ref 0.87–1.13)
INR PPP: 1.32 (ref 0.87–1.13)
KETONES UR STRIP.AUTO-MCNC: NEGATIVE MG/DL
LDH FLD L TO P-CCNC: 66 U/L
LEUKOCYTE ESTERASE UR QL STRIP.AUTO: NEGATIVE
LV EJECT FRACT  99904: 35
LV EJECT FRACT MOD 2C 99903: 42.68
LV EJECT FRACT MOD 4C 99902: 53.96
LV EJECT FRACT MOD BP 99901: 49.03
LYMPHOCYTES # BLD AUTO: 1.59 K/UL (ref 1–4.8)
LYMPHOCYTES NFR BLD: 25.4 % (ref 22–41)
LYMPHOCYTES NFR FLD: 61 %
MAGNESIUM SERPL-MCNC: 1.9 MG/DL (ref 1.5–2.5)
MCH RBC QN AUTO: 32.5 PG (ref 27–33)
MCHC RBC AUTO-ENTMCNC: 33.7 G/DL (ref 33.7–35.3)
MCV RBC AUTO: 96.4 FL (ref 81.4–97.8)
MICRO URNS: NORMAL
MONOCYTES # BLD AUTO: 0.61 K/UL (ref 0–0.85)
MONOCYTES NFR BLD AUTO: 9.7 % (ref 0–13.4)
MONONUC CELLS NFR FLD: 17 %
NEUTROPHILS # BLD AUTO: 3.9 K/UL (ref 1.82–7.42)
NEUTROPHILS NFR BLD: 62.2 % (ref 44–72)
NEUTROPHILS NFR FLD: 12 %
NITRITE UR QL STRIP.AUTO: NEGATIVE
NRBC # BLD AUTO: 0 K/UL
NRBC BLD-RTO: 0 /100 WBC
NT-PROBNP SERPL IA-MCNC: 2552 PG/ML (ref 0–125)
NT-PROBNP SERPL IA-MCNC: 2818 PG/ML (ref 0–125)
PH FLD: 8 [PH]
PH UR STRIP.AUTO: 5.5 [PH] (ref 5–8)
PHOSPHATE SERPL-MCNC: 4.5 MG/DL (ref 2.5–4.5)
PLATELET # BLD AUTO: 154 K/UL (ref 164–446)
PMV BLD AUTO: 9.6 FL (ref 9–12.9)
POTASSIUM SERPL-SCNC: 4.7 MMOL/L (ref 3.6–5.5)
PROCALCITONIN SERPL-MCNC: 0.11 NG/ML
PROT FLD-MCNC: 2.6 G/DL
PROT UR QL STRIP: NEGATIVE MG/DL
PROTHROMBIN TIME: 15.5 SEC (ref 12–14.6)
PROTHROMBIN TIME: 16.1 SEC (ref 12–14.6)
RBC # BLD AUTO: 4.49 M/UL (ref 4.7–6.1)
RBC # FLD: <2000 CELLS/UL
RBC UR QL AUTO: NEGATIVE
SIGNIFICANT IND 70042: NORMAL
SITE SITE: NORMAL
SODIUM SERPL-SCNC: 138 MMOL/L (ref 135–145)
SOURCE SOURCE: NORMAL
SP GR UR STRIP.AUTO: 1.01
TROPONIN T SERPL-MCNC: 76 NG/L (ref 6–19)
TROPONIN T SERPL-MCNC: 80 NG/L (ref 6–19)
UROBILINOGEN UR STRIP.AUTO-MCNC: 0.2 MG/DL
WBC # BLD AUTO: 6.3 K/UL (ref 4.8–10.8)
WBC # FLD: 305 CELLS/UL

## 2023-03-17 PROCEDURE — 83880 ASSAY OF NATRIURETIC PEPTIDE: CPT

## 2023-03-17 PROCEDURE — 87102 FUNGUS ISOLATION CULTURE: CPT

## 2023-03-17 PROCEDURE — 71045 X-RAY EXAM CHEST 1 VIEW: CPT

## 2023-03-17 PROCEDURE — 82570 ASSAY OF URINE CREATININE: CPT

## 2023-03-17 PROCEDURE — 87116 MYCOBACTERIA CULTURE: CPT

## 2023-03-17 PROCEDURE — 82945 GLUCOSE OTHER FLUID: CPT

## 2023-03-17 PROCEDURE — 83986 ASSAY PH BODY FLUID NOS: CPT

## 2023-03-17 PROCEDURE — 87070 CULTURE OTHR SPECIMN AEROBIC: CPT

## 2023-03-17 PROCEDURE — 770004 HCHG ROOM/CARE - ONCOLOGY PRIVATE *

## 2023-03-17 PROCEDURE — 84145 PROCALCITONIN (PCT): CPT

## 2023-03-17 PROCEDURE — 81003 URINALYSIS AUTO W/O SCOPE: CPT

## 2023-03-17 PROCEDURE — 88305 TISSUE EXAM BY PATHOLOGIST: CPT

## 2023-03-17 PROCEDURE — 88112 CYTOPATH CELL ENHANCE TECH: CPT

## 2023-03-17 PROCEDURE — 84484 ASSAY OF TROPONIN QUANT: CPT

## 2023-03-17 PROCEDURE — 99223 1ST HOSP IP/OBS HIGH 75: CPT | Mod: AI | Performed by: STUDENT IN AN ORGANIZED HEALTH CARE EDUCATION/TRAINING PROGRAM

## 2023-03-17 PROCEDURE — 84157 ASSAY OF PROTEIN OTHER: CPT

## 2023-03-17 PROCEDURE — 85610 PROTHROMBIN TIME: CPT

## 2023-03-17 PROCEDURE — 76775 US EXAM ABDO BACK WALL LIM: CPT

## 2023-03-17 PROCEDURE — 86140 C-REACTIVE PROTEIN: CPT

## 2023-03-17 PROCEDURE — A9270 NON-COVERED ITEM OR SERVICE: HCPCS | Performed by: INTERNAL MEDICINE

## 2023-03-17 PROCEDURE — 700117 HCHG RX CONTRAST REV CODE 255: Performed by: INTERNAL MEDICINE

## 2023-03-17 PROCEDURE — 85025 COMPLETE CBC W/AUTO DIFF WBC: CPT

## 2023-03-17 PROCEDURE — 700102 HCHG RX REV CODE 250 W/ 637 OVERRIDE(OP): Performed by: STUDENT IN AN ORGANIZED HEALTH CARE EDUCATION/TRAINING PROGRAM

## 2023-03-17 PROCEDURE — 700111 HCHG RX REV CODE 636 W/ 250 OVERRIDE (IP): Performed by: STUDENT IN AN ORGANIZED HEALTH CARE EDUCATION/TRAINING PROGRAM

## 2023-03-17 PROCEDURE — 89051 BODY FLUID CELL COUNT: CPT

## 2023-03-17 PROCEDURE — 87205 SMEAR GRAM STAIN: CPT | Mod: 91

## 2023-03-17 PROCEDURE — 93306 TTE W/DOPPLER COMPLETE: CPT

## 2023-03-17 PROCEDURE — 700102 HCHG RX REV CODE 250 W/ 637 OVERRIDE(OP): Performed by: INTERNAL MEDICINE

## 2023-03-17 PROCEDURE — 84300 ASSAY OF URINE SODIUM: CPT

## 2023-03-17 PROCEDURE — 93306 TTE W/DOPPLER COMPLETE: CPT | Mod: 26 | Performed by: INTERNAL MEDICINE

## 2023-03-17 PROCEDURE — 82533 TOTAL CORTISOL: CPT

## 2023-03-17 PROCEDURE — 0W9B3ZX DRAINAGE OF LEFT PLEURAL CAVITY, PERCUTANEOUS APPROACH, DIAGNOSTIC: ICD-10-PCS | Performed by: RADIOLOGY

## 2023-03-17 PROCEDURE — 87086 URINE CULTURE/COLONY COUNT: CPT

## 2023-03-17 PROCEDURE — 87206 SMEAR FLUORESCENT/ACID STAI: CPT

## 2023-03-17 PROCEDURE — 83735 ASSAY OF MAGNESIUM: CPT

## 2023-03-17 PROCEDURE — 83615 LACTATE (LD) (LDH) ENZYME: CPT

## 2023-03-17 PROCEDURE — 82150 ASSAY OF AMYLASE: CPT

## 2023-03-17 PROCEDURE — 80069 RENAL FUNCTION PANEL: CPT

## 2023-03-17 PROCEDURE — 4410569 US-THORACENTESIS PUNCTURE

## 2023-03-17 PROCEDURE — 87040 BLOOD CULTURE FOR BACTERIA: CPT

## 2023-03-17 PROCEDURE — A9270 NON-COVERED ITEM OR SERVICE: HCPCS | Performed by: STUDENT IN AN ORGANIZED HEALTH CARE EDUCATION/TRAINING PROGRAM

## 2023-03-17 PROCEDURE — 87015 SPECIMEN INFECT AGNT CONCNTJ: CPT

## 2023-03-17 PROCEDURE — 85652 RBC SED RATE AUTOMATED: CPT

## 2023-03-17 RX ORDER — OXYCODONE HYDROCHLORIDE 5 MG/1
2.5 TABLET ORAL
Status: DISCONTINUED | OUTPATIENT
Start: 2023-03-17 | End: 2023-03-19 | Stop reason: HOSPADM

## 2023-03-17 RX ORDER — ACETAMINOPHEN 325 MG/1
650 TABLET ORAL EVERY 6 HOURS PRN
Status: DISCONTINUED | OUTPATIENT
Start: 2023-03-17 | End: 2023-03-19 | Stop reason: HOSPADM

## 2023-03-17 RX ORDER — VITAMIN B COMPLEX
2000 TABLET ORAL DAILY
Status: DISCONTINUED | OUTPATIENT
Start: 2023-03-17 | End: 2023-03-19 | Stop reason: HOSPADM

## 2023-03-17 RX ORDER — HYDROMORPHONE HYDROCHLORIDE 1 MG/ML
0.25 INJECTION, SOLUTION INTRAMUSCULAR; INTRAVENOUS; SUBCUTANEOUS
Status: DISCONTINUED | OUTPATIENT
Start: 2023-03-17 | End: 2023-03-19 | Stop reason: HOSPADM

## 2023-03-17 RX ORDER — MAGNESIUM SULFATE 1 G/100ML
1 INJECTION INTRAVENOUS ONCE
Status: COMPLETED | OUTPATIENT
Start: 2023-03-17 | End: 2023-03-17

## 2023-03-17 RX ORDER — EZETIMIBE 10 MG/1
10 TABLET ORAL EVERY EVENING
Status: DISCONTINUED | OUTPATIENT
Start: 2023-03-17 | End: 2023-03-19 | Stop reason: HOSPADM

## 2023-03-17 RX ORDER — BUDESONIDE AND FORMOTEROL FUMARATE DIHYDRATE 160; 4.5 UG/1; UG/1
2 AEROSOL RESPIRATORY (INHALATION)
Status: DISCONTINUED | OUTPATIENT
Start: 2023-03-17 | End: 2023-03-19 | Stop reason: HOSPADM

## 2023-03-17 RX ORDER — ACETAMINOPHEN 500 MG
500-1000 TABLET ORAL
Status: DISCONTINUED | OUTPATIENT
Start: 2023-03-17 | End: 2023-03-19 | Stop reason: HOSPADM

## 2023-03-17 RX ORDER — FAMOTIDINE 20 MG/1
20 TABLET, FILM COATED ORAL EVERY EVENING
Status: DISCONTINUED | OUTPATIENT
Start: 2023-03-17 | End: 2023-03-19 | Stop reason: HOSPADM

## 2023-03-17 RX ORDER — LABETALOL HYDROCHLORIDE 5 MG/ML
10 INJECTION, SOLUTION INTRAVENOUS EVERY 4 HOURS PRN
Status: DISCONTINUED | OUTPATIENT
Start: 2023-03-17 | End: 2023-03-19 | Stop reason: HOSPADM

## 2023-03-17 RX ORDER — GUAIFENESIN/DEXTROMETHORPHAN 100-10MG/5
10 SYRUP ORAL EVERY 6 HOURS PRN
Status: DISCONTINUED | OUTPATIENT
Start: 2023-03-17 | End: 2023-03-19 | Stop reason: HOSPADM

## 2023-03-17 RX ORDER — ONDANSETRON 2 MG/ML
4 INJECTION INTRAMUSCULAR; INTRAVENOUS EVERY 4 HOURS PRN
Status: DISCONTINUED | OUTPATIENT
Start: 2023-03-17 | End: 2023-03-19 | Stop reason: HOSPADM

## 2023-03-17 RX ORDER — FLUTICASONE FUROATE AND VILANTEROL 200; 25 UG/1; UG/1
1 POWDER RESPIRATORY (INHALATION) DAILY
Status: DISCONTINUED | OUTPATIENT
Start: 2023-03-17 | End: 2023-03-17

## 2023-03-17 RX ORDER — DIPHENHYDRAMINE HCL 25 MG
25-50 TABLET ORAL NIGHTLY PRN
Status: DISCONTINUED | OUTPATIENT
Start: 2023-03-17 | End: 2023-03-19 | Stop reason: HOSPADM

## 2023-03-17 RX ORDER — AMOXICILLIN 250 MG
2 CAPSULE ORAL 2 TIMES DAILY
Status: DISCONTINUED | OUTPATIENT
Start: 2023-03-17 | End: 2023-03-19 | Stop reason: HOSPADM

## 2023-03-17 RX ORDER — IPRATROPIUM BROMIDE AND ALBUTEROL SULFATE 2.5; .5 MG/3ML; MG/3ML
3 SOLUTION RESPIRATORY (INHALATION)
Status: DISCONTINUED | OUTPATIENT
Start: 2023-03-17 | End: 2023-03-19 | Stop reason: HOSPADM

## 2023-03-17 RX ORDER — OXYCODONE HYDROCHLORIDE 5 MG/1
5 TABLET ORAL
Status: DISCONTINUED | OUTPATIENT
Start: 2023-03-17 | End: 2023-03-19 | Stop reason: HOSPADM

## 2023-03-17 RX ORDER — POLYETHYLENE GLYCOL 3350 17 G/17G
1 POWDER, FOR SOLUTION ORAL
Status: DISCONTINUED | OUTPATIENT
Start: 2023-03-17 | End: 2023-03-19 | Stop reason: HOSPADM

## 2023-03-17 RX ORDER — CYCLOBENZAPRINE HCL 10 MG
10 TABLET ORAL
Status: DISCONTINUED | OUTPATIENT
Start: 2023-03-17 | End: 2023-03-19 | Stop reason: HOSPADM

## 2023-03-17 RX ORDER — SODIUM CHLORIDE, SODIUM LACTATE, POTASSIUM CHLORIDE, AND CALCIUM CHLORIDE .6; .31; .03; .02 G/100ML; G/100ML; G/100ML; G/100ML
500 INJECTION, SOLUTION INTRAVENOUS
Status: DISCONTINUED | OUTPATIENT
Start: 2023-03-17 | End: 2023-03-19 | Stop reason: HOSPADM

## 2023-03-17 RX ORDER — BISACODYL 10 MG
10 SUPPOSITORY, RECTAL RECTAL
Status: DISCONTINUED | OUTPATIENT
Start: 2023-03-17 | End: 2023-03-19 | Stop reason: HOSPADM

## 2023-03-17 RX ORDER — ONDANSETRON 4 MG/1
4 TABLET, ORALLY DISINTEGRATING ORAL EVERY 4 HOURS PRN
Status: DISCONTINUED | OUTPATIENT
Start: 2023-03-17 | End: 2023-03-19 | Stop reason: HOSPADM

## 2023-03-17 RX ADMIN — MAGNESIUM SULFATE HEPTAHYDRATE 1 G: 1 INJECTION, SOLUTION INTRAVENOUS at 06:38

## 2023-03-17 RX ADMIN — EZETIMIBE 10 MG: 10 TABLET ORAL at 17:25

## 2023-03-17 RX ADMIN — Medication 2000 UNITS: at 06:34

## 2023-03-17 RX ADMIN — FAMOTIDINE 20 MG: 20 TABLET, FILM COATED ORAL at 17:26

## 2023-03-17 RX ADMIN — APIXABAN 5 MG: 5 TABLET, FILM COATED ORAL at 18:00

## 2023-03-17 RX ADMIN — HUMAN ALBUMIN MICROSPHERES AND PERFLUTREN 3 ML: 10; .22 INJECTION, SOLUTION INTRAVENOUS at 20:06

## 2023-03-17 RX ADMIN — BUDESONIDE AND FORMOTEROL FUMARATE DIHYDRATE 2 PUFF: 160; 4.5 AEROSOL RESPIRATORY (INHALATION) at 08:03

## 2023-03-17 RX ADMIN — CYCLOBENZAPRINE 10 MG: 10 TABLET, FILM COATED ORAL at 21:29

## 2023-03-17 RX ADMIN — ACETAMINOPHEN 1000 MG: 500 TABLET, FILM COATED ORAL at 21:29

## 2023-03-17 RX ADMIN — BUDESONIDE AND FORMOTEROL FUMARATE DIHYDRATE 2 PUFF: 160; 4.5 AEROSOL RESPIRATORY (INHALATION) at 21:29

## 2023-03-17 RX ADMIN — DOCUSATE SODIUM 50 MG AND SENNOSIDES 8.6 MG 2 TABLET: 8.6; 5 TABLET, FILM COATED ORAL at 17:25

## 2023-03-17 ASSESSMENT — ENCOUNTER SYMPTOMS
DEPRESSION: 0
SPUTUM PRODUCTION: 0
DOUBLE VISION: 0
PALPITATIONS: 0
HEADACHES: 0
ABDOMINAL PAIN: 0
ORTHOPNEA: 1
DIZZINESS: 0
VOMITING: 0
NAUSEA: 0
COUGH: 1
HEARTBURN: 0
BLURRED VISION: 0
MYALGIAS: 0
BRUISES/BLEEDS EASILY: 0
SHORTNESS OF BREATH: 1
FEVER: 0
CHILLS: 0

## 2023-03-17 ASSESSMENT — PAIN DESCRIPTION - PAIN TYPE: TYPE: ACUTE PAIN

## 2023-03-17 ASSESSMENT — PATIENT HEALTH QUESTIONNAIRE - PHQ9
5. POOR APPETITE OR OVEREATING: NOT AT ALL
7. TROUBLE CONCENTRATING ON THINGS, SUCH AS READING THE NEWSPAPER OR WATCHING TELEVISION: NOT AT ALL
5. POOR APPETITE OR OVEREATING: NOT AT ALL
SUM OF ALL RESPONSES TO PHQ QUESTIONS 1-9: 3
3. TROUBLE FALLING OR STAYING ASLEEP OR SLEEPING TOO MUCH: SEVERAL DAYS
4. FEELING TIRED OR HAVING LITTLE ENERGY: SEVERAL DAYS
SUM OF ALL RESPONSES TO PHQ QUESTIONS 1-9: 3
8. MOVING OR SPEAKING SO SLOWLY THAT OTHER PEOPLE COULD HAVE NOTICED. OR THE OPPOSITE, BEING SO FIGETY OR RESTLESS THAT YOU HAVE BEEN MOVING AROUND A LOT MORE THAN USUAL: NOT AT ALL
6. FEELING BAD ABOUT YOURSELF - OR THAT YOU ARE A FAILURE OR HAVE LET YOURSELF OR YOUR FAMILY DOWN: NOT AL ALL
8. MOVING OR SPEAKING SO SLOWLY THAT OTHER PEOPLE COULD HAVE NOTICED. OR THE OPPOSITE, BEING SO FIGETY OR RESTLESS THAT YOU HAVE BEEN MOVING AROUND A LOT MORE THAN USUAL: NOT AT ALL
4. FEELING TIRED OR HAVING LITTLE ENERGY: SEVERAL DAYS
1. LITTLE INTEREST OR PLEASURE IN DOING THINGS: SEVERAL DAYS
SUM OF ALL RESPONSES TO PHQ9 QUESTIONS 1 AND 2: 1
6. FEELING BAD ABOUT YOURSELF - OR THAT YOU ARE A FAILURE OR HAVE LET YOURSELF OR YOUR FAMILY DOWN: NOT AL ALL
9. THOUGHTS THAT YOU WOULD BE BETTER OFF DEAD, OR OF HURTING YOURSELF: NOT AT ALL
1. LITTLE INTEREST OR PLEASURE IN DOING THINGS: SEVERAL DAYS
SUM OF ALL RESPONSES TO PHQ9 QUESTIONS 1 AND 2: 1
9. THOUGHTS THAT YOU WOULD BE BETTER OFF DEAD, OR OF HURTING YOURSELF: NOT AT ALL
7. TROUBLE CONCENTRATING ON THINGS, SUCH AS READING THE NEWSPAPER OR WATCHING TELEVISION: NOT AT ALL
3. TROUBLE FALLING OR STAYING ASLEEP OR SLEEPING TOO MUCH: SEVERAL DAYS
2. FEELING DOWN, DEPRESSED, IRRITABLE, OR HOPELESS: NOT AT ALL
2. FEELING DOWN, DEPRESSED, IRRITABLE, OR HOPELESS: NOT AT ALL

## 2023-03-17 ASSESSMENT — COGNITIVE AND FUNCTIONAL STATUS - GENERAL
STANDING UP FROM CHAIR USING ARMS: A LITTLE
SUGGESTED CMS G CODE MODIFIER DAILY ACTIVITY: CJ
MOBILITY SCORE: 19
DAILY ACTIVITIY SCORE: 22
WALKING IN HOSPITAL ROOM: A LITTLE
SUGGESTED CMS G CODE MODIFIER MOBILITY: CK
DRESSING REGULAR LOWER BODY CLOTHING: A LITTLE
DRESSING REGULAR UPPER BODY CLOTHING: A LITTLE
MOVING TO AND FROM BED TO CHAIR: A LITTLE
MOVING FROM LYING ON BACK TO SITTING ON SIDE OF FLAT BED: A LITTLE
CLIMB 3 TO 5 STEPS WITH RAILING: A LITTLE

## 2023-03-17 ASSESSMENT — LIFESTYLE VARIABLES
ALCOHOL_USE: NO
EVER HAD A DRINK FIRST THING IN THE MORNING TO STEADY YOUR NERVES TO GET RID OF A HANGOVER: NO
TOTAL SCORE: 0
EVER FELT BAD OR GUILTY ABOUT YOUR DRINKING: NO
HAVE YOU EVER FELT YOU SHOULD CUT DOWN ON YOUR DRINKING: NO
CONSUMPTION TOTAL: NEGATIVE
ON A TYPICAL DAY WHEN YOU DRINK ALCOHOL HOW MANY DRINKS DO YOU HAVE: 0
HOW MANY TIMES IN THE PAST YEAR HAVE YOU HAD 5 OR MORE DRINKS IN A DAY: 0
TOTAL SCORE: 0
AVERAGE NUMBER OF DAYS PER WEEK YOU HAVE A DRINK CONTAINING ALCOHOL: 0
HAVE PEOPLE ANNOYED YOU BY CRITICIZING YOUR DRINKING: NO
TOTAL SCORE: 0
SUBSTANCE_ABUSE: 0

## 2023-03-17 NOTE — H&P
Hospital Medicine History & Physical Note    Date of Service  3/17/2023    Primary Care Physician  Nicolas Milligan, M.D.    Consultants  None    Code Status  DNAR/DNI    Chief Complaint  Chief Complaint   Patient presents with    Shortness of Breath     Pt presents with a history of lung cancer. Earlier in March, pt received a scan and was told he has fluid on his lungs. Pt has had to have the fluid drained before and immediately feels better afterwards. Pt states he has an appointment tomorrow morning to have fluid removed from both lungs, however pt is unable to ambulate this evening without almost passing out- pt has no home O2. Pt tried to contact his doctor for diuretics but was unable to get an answer.        History of Presenting Illness  Pito Black is a 78 y.o. male with history of lung cancer in remission s/p wedge resection 2018 and s/p chemoradiation therapy followed by Dr. Ruiz, chronic A-fib on Eliquis, hypertension, CKD 3, who presented 3/16/2023 with shortness of breath.  Patient had outpatient PET scan concerning for vascular congestion/pleural effusion.  He had prior left-sided thoracocentesis with symptomatic relief.  Patient has outpatient IR appointment for left thoracocentesis 3/17/2023 at 10 AM and outpatient follow-up with pulmonology, Dr. Carbajal.  Patient was visited his son earlier today, reported complaining of shortness of breath, subsequently brought into ER by son for evaluation.  CXR notable pulmonary edema (L>R).  He was given Lasix 40 mg IV in ED.  Admitted to medicine service for further evaluation and treatment.    I discussed the plan of care with patient, family, and bedside RN.    Review of Systems  Review of Systems   Constitutional:  Positive for malaise/fatigue. Negative for chills and fever.   HENT:  Negative for hearing loss and tinnitus.    Eyes:  Negative for blurred vision and double vision.   Respiratory:  Positive for cough and shortness of breath. Negative  for sputum production.    Cardiovascular:  Positive for orthopnea. Negative for chest pain and palpitations.   Gastrointestinal:  Negative for abdominal pain, heartburn, nausea and vomiting.   Genitourinary:  Negative for dysuria and hematuria.   Musculoskeletal:  Negative for joint pain and myalgias.   Skin:  Negative for itching and rash.   Neurological:  Negative for dizziness and headaches.   Endo/Heme/Allergies:  Negative for environmental allergies. Does not bruise/bleed easily.   Psychiatric/Behavioral:  Negative for depression and substance abuse.    All other systems reviewed and are negative.    Past Medical History   has a past medical history of Arrhythmia, Arthritis (2015), Asthma, Backpain (08/06/2018), Cancer (HCC) (07/2017), Cataract, Dental disorder, Diabetes (08/06/2018), Heart valve disease, High cholesterol, and Hypertension.    Surgical History   has a past surgical history that includes other orthopedic surgery; other; ear middle exploration (Right, 6/12/2015); ossicular reconstruction (Right, 6/12/2015); thoracoscopy (Left, 4/19/2018); cataract phaco with iol (Right, 8/7/2018); cataract phaco with iol (Left, 8/21/2018); pr bronchoscopy,diagnostic (7/1/2021); and endobronchial us add-on (7/1/2021).     Family History  family history includes Cancer in his father.   Family history reviewed with patient. There is family history that is pertinent to the chief complaint.     Social History   reports that he quit smoking about 18 years ago. His smoking use included cigarettes. He has a 10.00 pack-year smoking history. He has never used smokeless tobacco. He reports that he does not currently use alcohol after a past usage of about 12.6 - 21.0 oz per week. He reports that he does not use drugs.    Allergies  No Known Allergies    Medications  Prior to Admission Medications   Prescriptions Last Dose Informant Patient Reported? Taking?   Cholecalciferol (VITAMIN D3) 2000 UNIT Cap  Patient, Family  Member Yes No   Sig: Take 2,000 Units by mouth every day.   apixaban (ELIQUIS) 5mg Tab   No No   Sig: Take 1 Tablet by mouth 2 times a day for 30 days. Indications: DVT/PE   cyclobenzaprine (FLEXERIL) 10 mg Tab  Patient, Family Member Yes No   Sig: Take 10 mg by mouth at bedtime.   diphenhydrAMINE-APAP, sleep, (TYLENOL PM EXTRA STRENGTH)  MG Tab  Patient, Family Member Yes No   Sig: Take 1-2 Tablets by mouth at bedtime as needed (Mild Pain or Trouble Sleeping).   ezetimibe (ZETIA) 10 MG TABS  Patient, Family Member Yes No   Sig: Take 10 mg by mouth every day.   famotidine (PEPCID) 20 MG Tab  Patient, Family Member No No   Sig: Take 1 Tablet by mouth every day.   fluticasone furoate-vilanterol (BREO ELLIPTA) 200-25 MCG/ACT AEROSOL POWDER, BREATH ACTIVATED  Patient, Family Member No No   Sig: Inhale 1 Puff every day. Rinse mouth after use.   furosemide (LASIX) 40 MG Tab  Patient, Family Member No No   Sig: Take 1 Tablet by mouth every day.   metFORMIN (GLUCOPHAGE) 500 MG Tab  Patient, Family Member No No   Sig: Take 1 Tablet by mouth every day.   predniSONE (DELTASONE) 10 MG Tab  Patient, Family Member No No   Sig: Take 4 Tablets (40 mg) by mouth every day for 7 days,THEN take 2 tablets (20 mg) every day for 7 days, THEN take 1 tablet (10 mg) every day for 7 days      Facility-Administered Medications: None       Physical Exam  Temp:  [36.1 °C (97 °F)-36.9 °C (98.4 °F)] 36.1 °C (97 °F)  Pulse:  [] 102  Resp:  [20-24] 20  BP: (121-143)/(64-91) 129/79  SpO2:  [95 %-100 %] 100 %  Blood Pressure : (!) 141/75   Temperature: 36.9 °C (98.4 °F)   Pulse: (!) 111   Respiration: (!) 24   Pulse Oximetry: 100 %       Physical Exam  Vitals and nursing note reviewed.   Constitutional:       General: He is not in acute distress.     Appearance: He is ill-appearing.   HENT:      Head: Normocephalic and atraumatic.      Nose: Nose normal.      Mouth/Throat:      Mouth: Mucous membranes are moist.      Pharynx: Oropharynx is  clear.   Eyes:      General: No scleral icterus.     Extraocular Movements: Extraocular movements intact.   Cardiovascular:      Pulses: Normal pulses.      Heart sounds:     No friction rub.   Pulmonary:      Effort: No respiratory distress.      Breath sounds: Wheezing and rales present.   Chest:      Chest wall: No tenderness.   Abdominal:      General: There is no distension.      Tenderness: There is no abdominal tenderness. There is no guarding or rebound.   Musculoskeletal:         General: Swelling (trace LE) present. No tenderness.   Skin:     General: Skin is warm and dry.      Capillary Refill: Capillary refill takes less than 2 seconds.   Neurological:      General: No focal deficit present.      Mental Status: He is alert and oriented to person, place, and time.   Psychiatric:         Mood and Affect: Mood normal.       Laboratory:  Recent Labs     03/16/23 2213 03/17/23  0127   WBC 6.7 6.3   RBC 4.53* 4.49*   HEMOGLOBIN 14.9 14.6   HEMATOCRIT 43.5 43.3   MCV 96.0 96.4   MCH 32.9 32.5   MCHC 34.3 33.7   RDW 47.8 47.5   PLATELETCT 160* 154*   MPV 9.7 9.6     Recent Labs     03/16/23 2213 03/17/23  0127   SODIUM 136 138   POTASSIUM 4.3 4.7   CHLORIDE 102 103   CO2 20 22   GLUCOSE 143* 148*   BUN 22 20   CREATININE 1.54* 1.58*   CALCIUM 9.6 9.6     Recent Labs     03/16/23 2213 03/17/23  0127   ALTSGPT 17  --    ASTSGOT 27  --    ALKPHOSPHAT 65  --    TBILIRUBIN 0.6  --    GLUCOSE 143* 148*     Recent Labs     03/16/23 2213 03/17/23  0127   APTT 38.0*  --    INR 1.32* 1.25*     Recent Labs     03/16/23 2213 03/17/23  0127   NTPROBNP 2818* 2552*         Recent Labs     03/16/23 2213 03/17/23  0127   TROPONINT 80* 76*       Imaging:  US-RENAL   Final Result         1.  Mildly atrophic bilateral kidneys, left greater than right.      DX-CHEST-PORTABLE (1 VIEW)   Final Result         1.  Pulmonary edema and/or infiltrates.   2.  Small bilateral pleural effusions, left greater than right.   3.   Cardiomegaly   4.  Atherosclerosis      US-THORACENTESIS PUNCTURE LEFT    (Results Pending)   EC-ECHOCARDIOGRAM COMPLETE W/O CONT    (Results Pending)           X-Ray:  I have personally reviewed the images and compared with prior images.  EKG:  I have personally reviewed the images and compared with prior images.    Assessment/Plan:  Justification for Admission Status  I anticipate this patient will require at least 2 midnights of hospitalization, therefore appropriate for inpatient status.        * Volume overload- (present on admission)  Assessment & Plan  Pulmonary edema, pleural effusion  Gentle diuresis  Check echo    Pleural effusion, left  Assessment & Plan  Thoracocentesis ordered  Gentle diuresis    Diabetes (HCC)  Assessment & Plan  ISS    Cardiorenal syndrome  Assessment & Plan  Suspected  Monitor renal function while on diuretic  Check echo, renal ultrasound    Acute respiratory failure with hypoxia (HCC)  Assessment & Plan  On 3-4L  Likely due to above  Nebs, diuresis  Check echo    S/p ceftriaxone in ED, no further abx given WNL procalcitonin    Dyslipidemia- (present on admission)  Assessment & Plan  Zetia    Primary cancer of right upper lobe of lung (HCC)- (present on admission)  Assessment & Plan  In remission per patient  Followed by Dr. Ruiz (medical oncology) and Dr. Carbajal (pulmonary)    PAF (paroxysmal atrial fibrillation) (HCC)- (present on admission)  Assessment & Plan  Hold Eliquis in anticipation of procedure--reported last taken 3/16 p.m.    ACP (advance care planning)  Assessment & Plan  Discussed in ED. Son and pt stated pt has DNR/DNI POLST -- updated.        VTE prophylaxis: SCDs/TEDs

## 2023-03-17 NOTE — PROGRESS NOTES
4 Eyes Skin Assessment Completed by Clint RN and KORY Li.    Head WDL  Ears WDL  Nose WDL  Mouth WDL  Neck WDL  Breast/Chest WDL  Shoulder Blades WDL  Spine WDL  (R) Arm/Elbow/Hand Bruising  (L) Arm/Elbow/Hand Bruising  Abdomen WDL  Groin WDL  Scrotum/Coccyx/Buttocks WDL  (R) Leg WDL  (L) Leg WDL  (R) Heel/Foot/Toe Boggy  (L) Heel/Foot/Toe Boggy          Devices In Places Tele Box and Nasal Cannula      Interventions In Place Gray Ear Foams    Possible Skin Injury No    Pictures Uploaded Into Epic N/A  Wound Consult Placed N/A  RN Wound Prevention Protocol Ordered No

## 2023-03-17 NOTE — PROGRESS NOTES
[facility] Medicine Daily Progress Note    Date of Service  3/17/2023    Chief Complaint  Pito Black is a 78 y.o. male admitted 3/16/2023 with shortness of breath    Hospital Course  No notes on file    Pito Black is a 78 y.o. male with history of lung cancer in remission s/p wedge resection 2018 and s/p chemoradiation therapy followed by Dr. Ruiz, chronic A-fib on Eliquis, hypertension, CKD 3, who presented 3/16/2023 with shortness of breath.  Patient had outpatient PET scan concerning for vascular congestion/pleural effusion.  He had prior left-sided thoracocentesis with symptomatic relief.  Patient has outpatient IR appointment for left thoracocentesis 3/17/2023 at 10 AM and outpatient follow-up with pulmonology, Dr. Carbajal.  Reports night prior to presentation he had sudden onset of severe shortness of breath and could not wait for his appointment.  His son brought him to the emergency room.  CXR notable pulmonary edema (L>R).  He was given Lasix 40 mg IV in ED.  Admitted to medicine service for further evaluation and treatment.    Troponin was slightly elevated at 80.  BNP 2818.  Patient did have an acute kidney injury with a creatinine of 1.54.    Patient had only mild crackles on exam.  Minimal swelling to lower extremities.    Renal ultrasound showed mildly atrophic bilateral kidneys, left greater than right.    This post left thoracentesis was 1600 mL removed.    Interval Problem Update  Afebrile.  Still tachycardic, tachypneic.  On 3-4L.  Troponins trended down.  Denies fevers, chills, chest pains.  Shortness of breath has resolved since thoracentesis.  Follow-up echocardiogram.    I have discussed this patient's plan of care and discharge plan at IDT rounds today with Case Management, Nursing, Nursing leadership, and other members of the IDT team.    Consultants/Specialty  None    Code Status  DNAR/DNI    Disposition  Patient is not medically cleared for discharge.   Anticipate discharge  to to home with close outpatient follow-up.  I have placed the appropriate orders for post-discharge needs.    Review of Systems  ROS     Physical Exam  Temp:  [36.1 °C (97 °F)-36.9 °C (98.4 °F)] 36.1 °C (97 °F)  Pulse:  [] 98  Resp:  [18-24] 18  BP: (115-143)/(64-91) 115/80  SpO2:  [95 %-100 %] 100 %    Physical Exam    Fluids  No intake or output data in the 24 hours ending 03/17/23 0644    Laboratory  Recent Labs     03/16/23 2213 03/17/23  0127   WBC 6.7 6.3   RBC 4.53* 4.49*   HEMOGLOBIN 14.9 14.6   HEMATOCRIT 43.5 43.3   MCV 96.0 96.4   MCH 32.9 32.5   MCHC 34.3 33.7   RDW 47.8 47.5   PLATELETCT 160* 154*   MPV 9.7 9.6     Recent Labs     03/16/23 2213 03/17/23  0127   SODIUM 136 138   POTASSIUM 4.3 4.7   CHLORIDE 102 103   CO2 20 22   GLUCOSE 143* 148*   BUN 22 20   CREATININE 1.54* 1.58*   CALCIUM 9.6 9.6     Recent Labs     03/16/23 2213 03/17/23  0127   APTT 38.0*  --    INR 1.32* 1.25*               Imaging  US-THORACENTESIS PUNCTURE LEFT   Final Result      1. Ultrasound guided left sided diagnostic thoracentesis.      2. 1600 mLs of fluid withdrawn 1200 MLS was sent to lab for analysis..      DX-CHEST-PORTABLE (1 VIEW)   Final Result      1.  Mild pulmonary edema and/or interstitial parenchymal scarring.      2.  Resolution of left-sided pleural effusion without evidence of pneumothorax.      US-RENAL   Final Result         1.  Mildly atrophic bilateral kidneys, left greater than right.      DX-CHEST-PORTABLE (1 VIEW)   Final Result         1.  Pulmonary edema and/or infiltrates.   2.  Small bilateral pleural effusions, left greater than right.   3.  Cardiomegaly   4.  Atherosclerosis      EC-ECHOCARDIOGRAM COMPLETE W/O CONT    (Results Pending)        Assessment/Plan  * Volume overload- (present on admission)  Assessment & Plan  Pulmonary edema, pleural effusion  Gentle diuresis  Check echo    ACP (advance care planning)  Assessment & Plan  Discussed in ED. Son and pt stated pt has DNR/DNI  POLST -- updated.    Diabetes (HCC)  Assessment & Plan  ISS    Cardiorenal syndrome  Assessment & Plan  Suspected  Monitor renal function while on diuretic  Check echo, renal ultrasound    Acute respiratory failure with hypoxia (HCC)  Assessment & Plan  On 3-4L  Likely due to above  Nebs, diuresis  Check echo    S/p ceftriaxone in ED, no further abx given WNL procalcitonin    Pleural effusion, left  Assessment & Plan  Thoracocentesis ordered  Gentle diuresis    Dyslipidemia- (present on admission)  Assessment & Plan  Zetia    Primary cancer of right upper lobe of lung (HCC)- (present on admission)  Assessment & Plan  In remission per patient  Followed by Dr. Ruiz (medical oncology) and Dr. Carbajal (pulmonary)    PAF (paroxysmal atrial fibrillation) (HCC)- (present on admission)  Assessment & Plan  Hold Eliquis in anticipation of procedure--reported last taken 3/16 p.m.         VTE prophylaxis: therapeutic anticoagulation with Apixaban    I have performed a physical exam and reviewed and updated ROS and Plan today (3/17/2023). In review of yesterday's note (3/16/2023), there are no changes except as documented above.

## 2023-03-17 NOTE — CARE PLAN
The patient is Stable - Low risk of patient condition declining or worsening    Shift Goals  Clinical Goals: wean O2, thoracentesis  Patient Goals: rest, eat  Family Goals: CINDY    Progress made toward(s) clinical / shift goals:  thoracentesis completed today. Patient now on RA. Pending echo. SB assist in room, all fall precautions in place.     Problem: Respiratory  Goal: Patient will achieve/maintain optimum respiratory ventilation and gas exchange  Description: Target End Date:  Prior to discharge or change in level of care    Document on Assessment flowsheet    1.  Assess and monitor rate, rhythm, depth and effort of respiration  2.  Breath sounds assessed qshift and/or as needed  3.  Assess O2 saturation, administer/titrate oxygen as ordered  4.  Position patient for maximum ventilatory efficiency  5.  Turn, cough, and deep breath with splinting to improve effectiveness  6.  Collaborate with RT to administer medication/treatments per order  7.  Encourage use of incentive spirometer and encourage patient to cough after use and utilize splinting techniques if applicable  8.  Airway suctioning  9.  Monitor sputum production for changes in color, consistency and frequency  10. Perform frequent oral hygiene  11. Alternate physical activity with rest periods  Outcome: Progressing     Problem: Knowledge Deficit - Standard  Goal: Patient and family/care givers will demonstrate understanding of plan of care, disease process/condition, diagnostic tests and medications  Description: Target End Date:  1-3 days or as soon as patient condition allows    Document in Patient Education    1.  Patient and family/caregiver oriented to unit, equipment, visitation policy and means for communicating concern  2.  Complete/review Learning Assessment  3.  Assess knowledge level of disease process/condition, treatment plan, diagnostic tests and medications  4.  Explain disease process/condition, treatment plan, diagnostic tests and  medications  Outcome: Progressing     Problem: Fluid Volume  Goal: Fluid volume balance will be maintained  Description: Target End Date:  Prior to discharge or change in level of care    Document on I/O flowsheet    1.  Monitor intake and output as ordered  2.  Promote oral intake as appropriate  3.  Report inadequate intake or output to physician  4.  Administer IV therapy as ordered  5.  Weights per provider order  6.  Assess for signs and symptoms of bleeding  7.  Monitor for signs of fluid overload (respiratory changes, edema, weight gain, increased abdominal girth)  8.  Monitor of signs for inadequate fluid volume (poor skin turgor, dry mucous membranes)  9.  Instruct patient on adherence to fluid restrictions  Outcome: Progressing     Patient is not progressing towards the following goals:

## 2023-03-17 NOTE — ED NOTES
Patient ambulated to green 25 with a steady gait and all belongings. Patient changed into gown and placed on the monitor. Call light within reach. ERP to see.

## 2023-03-17 NOTE — ED PROVIDER NOTES
ER Provider Note    Scribed for Stefanie Valles D.o. by Jorge Goetz. 3/16/2023  10:52 PM    Primary Care Provider: Nicolas Milligan, M.D.    CHIEF COMPLAINT  Chief Complaint   Patient presents with    Shortness of Breath     Pt presents with a history of lung cancer. Earlier in March, pt received a scan and was told he has fluid on his lungs. Pt has had to have the fluid drained before and immediately feels better afterwards. Pt states he has an appointment tomorrow morning to have fluid removed from both lungs, however pt is unable to ambulate this evening without almost passing out- pt has no home O2. Pt tried to contact his doctor for diuretics but was unable to get an answer.      LIMITATION TO HISTORY   None    HPI/ROS  OUTSIDE HISTORIAN(S):  Family His son gave additional history    EXTERNAL RECORDS REVIEWED  Inpatient Notes The patient was admitted on 1/31/23 for bilateral pneumonia. He was initially seen at the [facility] where a CT showed bilateral moderate pleural effusions, right upper lobe mass, concern for left upper lobe pneumoniabilateral moderate pleural effusions, right upper lobe mass, concern for left upper lobe pneumonia. The patient had a thoracentesis performed on 2/1. He was also admitted for shortness of breath on 2/19/23 after a CTA showed segmental right lower lobe pulm embolus, bilateral pneumonia with slight increase in left and unchange in right, trace right pleural effusion, small left pleural effusion     Pito Black is a 78 y.o. male with a history of lung cancer, who presents to the ED for evaluation of shortness of breath onset prior to arrival. The patient is followed by Dr. Ruiz (Oncology) and Dr. Carbajal (Pulmonology). He is not currently undergoing chemotherapy or radiation therapy and does not utilize any supplemental O2 at baseline. The patient's son states that following imaging in January 2023 he was found to have pleural effusions and was ultimately admitted for a  thoracentesis. He reports being discharged with supplemental O2, but promptly returned in February 2023 for a pulmonary embolism. After this encounter, the patient was discontinued from supplemental O2. In early March 2023 the patient underwent a PET scan which was concerning for pleural effusions. He was seen by Dr. Ruiz today to discuss these results and ultimately scheduled an IR thoracentesis on 3/17/23. While sitting in a reclining chair at his son's house earlier [facility], the patient suddenly began increasingly short of breath. His O2 SAT's were noted to be 88% at home and his son took the patient to the ED over concerns of hypoxia in the context of his recent imaging results. Associated symptoms include back pain and dyspnea on exertion. The patient denies any chest pain, fever, diarrhea, constipation, dysuria, productive cough, or lower extremity swelling. His shortness of breath is exacerbated by exertion. No alleviating factors noted. The patient denies any prior history of CHF.    PAST MEDICAL HISTORY  Past Medical History:   Diagnosis Date    Arrhythmia     hx of a fib    Arthritis 2015    generalized, DDD back    Asthma     inhaler     Backpain 08/06/2018    9/10    Cancer (HCC) 07/2017    left lung    Cataract     bilateral, no surgery    Dental disorder     lower partial, removable bridge    Diabetes 08/06/2018    on no meds at this time, diet controlled    Heart valve disease     mild mitral valve prolapse    High cholesterol     Hypertension        SURGICAL HISTORY  Past Surgical History:   Procedure Laterality Date    NC BRONCHOSCOPY,DIAGNOSTIC  7/1/2021    Procedure: BRONCHOSCOPY - FIBER OPTIC WITH BRANCHOALVEOLAR LAVAGE BIOPSY, FNA, NAVIGATION;  Surgeon: Vinaayk Carbajal M.D.;  Location: [facility];  Service: Pulmonary    ENDOBRONCHIAL US ADD-ON  7/1/2021    Procedure: ENDOBRONCHIAL ULTRASOUND (EBUS).;  Surgeon: Vinayak Carbajal M.D.;  Location: [facility];  Service:  "Pulmonary    CATARACT PHACO WITH IOL Left 8/21/2018    Procedure: CATARACT PHACO WITH IOL;  Surgeon: Juanito Hager M.D.;  Location: SURGERY SAME DAY [facility];  Service: Ophthalmology    CATARACT PHACO WITH IOL Right 8/7/2018    Procedure: CATARACT PHACO WITH IOL;  Surgeon: Juanito Hager M.D.;  Location: SURGERY SAME DAY [facility];  Service: Ophthalmology    THORACOSCOPY Left 4/19/2018    Procedure: THORACOSCOPY- WEDGE BIOPSY W/FROZEN SECTION;  Surgeon: Cristhian Galeano M.D.;  Location: SURGERY [facility];  Service: Thoracic    EAR MIDDLE EXPLORATION Right 6/12/2015    Procedure: EAR MIDDLE EXPLORATION;  Surgeon: William Brandon M.D.;  Location: SURGERY SAME DAY [facility];  Service:     OSSICULAR RECONSTRUCTION Right 6/12/2015    Procedure: OSSICULAR RECONSTRUCTION CHAIN POSSIBLE;  Surgeon: William Brandon M.D.;  Location: SURGERY SAME DAY [facility];  Service:     OTHER      ear replacement \"many years ago\"    OTHER ORTHOPEDIC SURGERY      right knee replacement 2005       FAMILY HISTORY  Family History   Problem Relation Age of Onset    Cancer Father         stomach cancer       SOCIAL HISTORY   reports that he quit smoking about 18 years ago. His smoking use included cigarettes. He has a 10.00 pack-year smoking history. He has never used smokeless tobacco. He reports that he does not currently use alcohol after a past usage of about 12.6 - 21.0 oz per week. He reports that he does not use drugs.    CURRENT MEDICATIONS  Previous Medications    APIXABAN (ELIQUIS) 5MG TAB    Take 1 Tablet by mouth 2 times a day for 30 days. Indications: DVT/PE    CHOLECALCIFEROL (VITAMIN D3) 2000 UNIT CAP    Take 2,000 Units by mouth every day.    CYCLOBENZAPRINE (FLEXERIL) 10 MG TAB    Take 10 mg by mouth at bedtime.    DIPHENHYDRAMINE-APAP, SLEEP, (TYLENOL PM EXTRA STRENGTH)  MG TAB    Take 1-2 Tablets by mouth at bedtime as needed (Mild Pain or Trouble Sleeping).    EZETIMIBE (ZETIA) 10 MG TABS    Take " "10 mg by mouth every day.    FAMOTIDINE (PEPCID) 20 MG TAB    Take 1 Tablet by mouth every day.    FLUTICASONE FUROATE-VILANTEROL (BREO ELLIPTA) 200-25 MCG/ACT AEROSOL POWDER, BREATH ACTIVATED    Inhale 1 Puff every day. Rinse mouth after use.    FUROSEMIDE (LASIX) 40 MG TAB    Take 1 Tablet by mouth every day.    METFORMIN (GLUCOPHAGE) 500 MG TAB    Take 1 Tablet by mouth every day.    PREDNISONE (DELTASONE) 10 MG TAB    Take 4 Tablets (40 mg) by mouth every day for 7 days,THEN take 2 tablets (20 mg) every day for 7 days, THEN take 1 tablet (10 mg) every day for 7 days       ALLERGIES  Patient has no known allergies.    PHYSICAL EXAM  BP (!) 141/75   Pulse (!) 111   Temp 36.9 °C (98.4 °F) (Temporal)   Resp (!) 24   Ht 1.727 m (5' 8\")   Wt 65.8 kg (145 lb)   SpO2 100%   BMI 22.05 kg/m²     Constitutional: Patient is well developed, well nourished.  Elderly male in moderate respiratory distress.  HENT: Normocephalic, Nose normal with no mucosal edema or drainage. Oropharynx moist without erythema or exudates.  Eyes: PERRL, EOMI, Conjunctiva without erythema   Neck: Supple   Lymphatic: No lymphadenopathy noted.   Cardiovascular: Normal heart rate and Regular rhythm.   Thorax & Lungs: Clear and equal breath sounds with good excursion. No respiratory distress, no rhonchi, wheezing or rales.   Abdomen: Bowel sounds normal in all four quadrants. Soft,nontender, no rebound , guarding, palpable masses.   Skin: Warm, Dry, No rashes.   Extremities: Peripheral pulses 4/4 No edema, No tenderness  Musculoskeletal: Normal range of motion in all major joints. No tenderness to palpation or major deformities noted.   Neurologic: Alert & oriented x 3, Normal motor function, Normal sensory function, No lateralizing or focal deficits noted. DTR's 4/4 bilaterally.  Psychiatric: Affect normal, Judgment normal, Mood normal.    DIAGNOSTIC STUDIES & PROCEDURES    Labs:   Results for orders placed or performed during the hospital " encounter of 03/16/23   CBC with Differential   Result Value Ref Range    WBC 6.7 4.8 - 10.8 K/uL    RBC 4.53 (L) 4.70 - 6.10 M/uL    Hemoglobin 14.9 14.0 - 18.0 g/dL    Hematocrit 43.5 42.0 - 52.0 %    MCV 96.0 81.4 - 97.8 fL    MCH 32.9 27.0 - 33.0 pg    MCHC 34.3 33.7 - 35.3 g/dL    RDW 47.8 35.9 - 50.0 fL    Platelet Count 160 (L) 164 - 446 K/uL    MPV 9.7 9.0 - 12.9 fL    Neutrophils-Polys 59.40 44.00 - 72.00 %    Lymphocytes 28.20 22.00 - 41.00 %    Monocytes 9.50 0.00 - 13.40 %    Eosinophils 1.60 0.00 - 6.90 %    Basophils 0.40 0.00 - 1.80 %    Immature Granulocytes 0.90 0.00 - 0.90 %    Nucleated RBC 0.00 /100 WBC    Neutrophils (Absolute) 3.99 1.82 - 7.42 K/uL    Lymphs (Absolute) 1.90 1.00 - 4.80 K/uL    Monos (Absolute) 0.64 0.00 - 0.85 K/uL    Eos (Absolute) 0.11 0.00 - 0.51 K/uL    Baso (Absolute) 0.03 0.00 - 0.12 K/uL    Immature Granulocytes (abs) 0.06 0.00 - 0.11 K/uL    NRBC (Absolute) 0.00 K/uL   Comp Metabolic Panel   Result Value Ref Range    Sodium 136 135 - 145 mmol/L    Potassium 4.3 3.6 - 5.5 mmol/L    Chloride 102 96 - 112 mmol/L    Co2 20 20 - 33 mmol/L    Anion Gap 14.0 7.0 - 16.0    Glucose 143 (H) 65 - 99 mg/dL    Bun 22 8 - 22 mg/dL    Creatinine 1.54 (H) 0.50 - 1.40 mg/dL    Calcium 9.6 8.5 - 10.5 mg/dL    AST(SGOT) 27 12 - 45 U/L    ALT(SGPT) 17 2 - 50 U/L    Alkaline Phosphatase 65 30 - 99 U/L    Total Bilirubin 0.6 0.1 - 1.5 mg/dL    Albumin 3.8 3.2 - 4.9 g/dL    Total Protein 7.4 6.0 - 8.2 g/dL    Globulin 3.6 (H) 1.9 - 3.5 g/dL    A-G Ratio 1.1 g/dL   CORRECTED CALCIUM   Result Value Ref Range    Correct Calcium 9.8 8.5 - 10.5 mg/dL   ESTIMATED GFR   Result Value Ref Range    GFR (CKD-EPI) 46 (A) >60 mL/min/1.73 m 2   EKG (NOW)   Result Value Ref Range    Report       [facility] Dept[facility]    Test Date:  2023-03-16  Pt Name:    DOROTHEA SALINAS                  Department: ER  MRN:        [id number]                      Room:  Gender:     Male                          Technician: [id number]  :        1945                   Requested By:ER TRIAGE PROTOCOL  Order #:    [id number]                    Reading MD:    Measurements  Intervals                                Axis  Rate:       114                          P:          34  AR:         131                          QRS:        21  QRSD:       94                           T:          86  QT:         340  QTc:        469    Interpretive Statements  Sinus tachycardia  Paired ventricular premature complexes  Borderline repolarization abnormality  Compared to ECG 2023 12:18:31  Ventricular premature complex(es) now present  Left ventricular hypertrophy no longer present  ST (T wave) deviation no longer present       All labs reviewed by me.    EK Lead EKG interpreted by me as noted above.    Radiology:   The attending Emergency Physician has independently interpreted the diagnostic imaging associated with this visit and is awaiting the final reading from the radiologist, which will be displayed below.    Preliminary interpretation by ERP is as follows: Increased pulmonary vasculature, left lower lobe infiltrates, bilateral pleural effusions  Radiologist interpretation  DX-CHEST-PORTABLE (1 VIEW)   Final Result         1.  Pulmonary edema and/or infiltrates.   2.  Small bilateral pleural effusions, left greater than right.   3.  Cardiomegaly   4.  Atherosclerosis      US-THORACENTESIS PUNCTURE LEFT    (Results Pending)   EC-ECHOCARDIOGRAM COMPLETE W/O CONT    (Results Pending)   US-RENAL    (Results Pending)        COURSE & MEDICAL DECISION MAKING    ED Observation Status? No; Patient does not meet criteria for ED Observation.     INITIAL ASSESSMENT AND PLAN  Care Narrative:       10:52 PM - Patient seen and evaluated at bedside. Pito Black is a 78 y.o. male with a history of lung cancer, who presents with shortness of breath onset prior to arrival. Patient will be treated with Rocephin 2000 mg and Lasix  40 mg for his symptoms. Ordered EKG, CMP, CBC with diff, Prothrombin Time (INR), APTT, pBNP, Troponin, Troponin, Blood Cultures, and DX-Chest to evaluate. He understands and agrees to the plan of care. Differential diagnoses include but are not limited to: CHF, pneumonia, kidney failure    Laboratories reveal a normal white count with a stable H&H, glucose 143, BUN and creatinine 22 and 1.54 respectively.  Remaining labs are really unremarkable.     12:17 AM - I discussed the patient's case and the above findings with Dr. Venegas (Hospitalist) who will assess the patient for hospitalization.     12:22 AM - Dr. Venegas is at beside explaining the plan for admission                 DISPOSITION AND DISCUSSIONS  I have discussed management of the patient with the following physicians and EDWIN's: Dr. Venegas (Hospitalist)    Discussion of management with other [facility] or appropriate source(s): None     Escalation of care considered, and ultimately performed: Patient will be escalated and admitted into the hospital for aggressive diuresis and follow-up.    Barriers to care at this time, including but not limited to: None.     Decision tools and prescription drugs considered including, but not limited to: Heart score 2.    DISPOSITION:  Patient will be hospitalized by Dr. Venegas in guarded condition.    FINAL IMPRESSION   1. Shortness of breath    2. Acute congestive heart failure, unspecified heart failure type (HCC)    3. Chronic bilateral pleural effusions    4. History of lung cancer         Jorge BUCK (Dimple), am scribing for, and in the presence of, Stefanie Valles D.O..    Electronically signed by: Jorge Goetz (Dimple), 3/16/2023    IStefanie D.O. personally performed the services described in this documentation, as scribed by Jorge Goetz in my presence, and it is both accurate and complete.    The note accurately reflects work and decisions made by me.  Stefanie Valles D.O.  3/17/2023  7:05 AM

## 2023-03-17 NOTE — DISCHARGE PLANNING
Care Transition Team Assessment    LMSW met with pt at bedside to complete assessment. Pt A&Ox4 and able to verify the information on the face sheet. Pt lives with his S/O in a single-story house that has one step to enter.  Prior to this hospitalization pt was independent at home with ADLs and most IADLs. Pt does not use any DME at baseline. Pt reported that his S/O is good support for him. Pt is retired and receives SSI monthly deposits. Pt denies any SA or MH concerns. Pt does not have an advance directive.       Information Source  Orientation Level: Oriented X4  Information Given By: Patient  Informant's Name: Pito  Who is responsible for making decisions for patient? : Patient    Readmission Evaluation  Is this a readmission?: No    Elopement Risk  Legal Hold: No  Ambulatory or Self Mobile in Wheelchair: Yes  Disoriented: No  Psychiatric Symptoms: None  History of Wandering: No  Elopement this Admit: No  Vocalizing Wanting to Leave: No  Displays Behaviors, Body Language Wanting to Leave: No-Not at Risk for Elopement  Elopement Risk: Not at Risk for Elopement    Interdisciplinary Discharge Planning  Lives with - Patient's Self Care Capacity: Unable To Determine At This Time  Patient or legal guardian wants to designate a caregiver: No  Support Systems: Children  Housing / Facility: Unable To Determine At This Time  Durable Medical Equipment: Not Applicable    Discharge Preparedness  What is your plan after discharge?: Home with help  What are your discharge supports?: Spouse  Prior Functional Level: Ambulatory  Difficulity with ADLs: None  Difficulity with IADLs: None    Functional Assesment  Prior Functional Level: Ambulatory    Finances  Financial Barriers to Discharge: No  Prescription Coverage: Yes    Vision / Hearing Impairment  Right Eye Vision: Wears Glasses, Impaired  Left Eye Vision: Wears Glasses, Impaired  Hearing Impairment: Both Ears, Hearing Device(s) Available         Advance Directive  Advance  Directive?: None    Domestic Abuse  Have you ever been the victim of abuse or violence?: No  Physical Abuse or Sexual Abuse: No  Verbal Abuse or Emotional Abuse: No  Possible Abuse/Neglect Reported to:: Not Applicable    Psychological Assessment  History of Substance Abuse: None  History of Psychiatric Problems: No    Discharge Risks or Barriers  Discharge risks or barriers?: No    Anticipated Discharge Information  Discharge Disposition: Discharged to home/self care (01)

## 2023-03-17 NOTE — ASSESSMENT & PLAN NOTE
On 3-4L  Likely due to above  Nebs, diuresis  Check echo    S/p ceftriaxone in ED, no further abx given WNL procalcitonin

## 2023-03-17 NOTE — PROGRESS NOTES
Bedside report received in ED. Pt brought up to floor and placed on monitor, monitor room notified. Patient A&O x 4.  POC discussed with patient. Patient oriented to room.  Patient verbalized understanding.  Call light and belongings with in reach.  Bed locked and in lowest position, alarm and fall precautions in place.  All needs are met at this time.

## 2023-03-17 NOTE — ED NOTES
Pt placed on 3L/min of O2 due to SpO2 being 81% on room air. Once placed on 3L/min, oxygen increased to 95%.   HENRY

## 2023-03-17 NOTE — CARE PLAN
Problem: Knowledge Deficit - Standard  Goal: Patient and family/care givers will demonstrate understanding of plan of care, disease process/condition, diagnostic tests and medications  Outcome: Progressing  Note: Discussed POC and medications with patient.  Patient verbalized understanding.     The patient is Stable - Low risk of patient condition declining or worsening    Shift Goals  Clinical Goals: Wean O2  Patient Goals: rest

## 2023-03-17 NOTE — ED TRIAGE NOTES
"Chief Complaint   Patient presents with    Shortness of Breath     Pt presents with a history of lung cancer. Earlier in March, pt received a scan and was told he has fluid on his lungs. Pt has had to have the fluid drained before and immediately feels better afterwards. Pt states he has an appointment tomorrow morning to have fluid removed from both lungs, however pt is unable to ambulate this evening without almost passing out- pt has no home O2. Pt tried to contact his doctor for diuretics but was unable to get an answer.      BP (!) 143/91   Pulse (!) 123   Temp 36.9 °C (98.4 °F) (Temporal)   Resp 18   Ht 1.727 m (5' 8\")   Wt 65.8 kg (145 lb)   SpO2 95%   BMI 22.05 kg/m²     Pt is ambulatory in and out of triage. Appropriate PPE worn throughout entire encounter. Pt placed back in the lobby and educated about triage process.    "

## 2023-03-18 PROBLEM — I50.20 HEART FAILURE WITH REDUCED EJECTION FRACTION (HCC): Status: ACTIVE | Noted: 2023-03-18

## 2023-03-18 LAB
ANION GAP SERPL CALC-SCNC: 10 MMOL/L (ref 7–16)
BASOPHILS # BLD AUTO: 0.5 % (ref 0–1.8)
BASOPHILS # BLD: 0.03 K/UL (ref 0–0.12)
BUN SERPL-MCNC: 20 MG/DL (ref 8–22)
CALCIUM SERPL-MCNC: 9.1 MG/DL (ref 8.5–10.5)
CHLORIDE SERPL-SCNC: 105 MMOL/L (ref 96–112)
CO2 SERPL-SCNC: 23 MMOL/L (ref 20–33)
CREAT SERPL-MCNC: 1.32 MG/DL (ref 0.5–1.4)
CREAT UR-MCNC: 222.31 MG/DL
EOSINOPHIL # BLD AUTO: 0.17 K/UL (ref 0–0.51)
EOSINOPHIL NFR BLD: 2.6 % (ref 0–6.9)
ERYTHROCYTE [DISTWIDTH] IN BLOOD BY AUTOMATED COUNT: 48.4 FL (ref 35.9–50)
FUNGUS SPEC FUNGUS STN: NORMAL
GFR SERPLBLD CREATININE-BSD FMLA CKD-EPI: 55 ML/MIN/1.73 M 2
GLUCOSE SERPL-MCNC: 181 MG/DL (ref 65–99)
HCT VFR BLD AUTO: 40.6 % (ref 42–52)
HGB BLD-MCNC: 13.6 G/DL (ref 14–18)
IMM GRANULOCYTES # BLD AUTO: 0.04 K/UL (ref 0–0.11)
IMM GRANULOCYTES NFR BLD AUTO: 0.6 % (ref 0–0.9)
LDH SERPL L TO P-CCNC: 223 U/L (ref 107–266)
LYMPHOCYTES # BLD AUTO: 1.6 K/UL (ref 1–4.8)
LYMPHOCYTES NFR BLD: 24.5 % (ref 22–41)
MAGNESIUM SERPL-MCNC: 2 MG/DL (ref 1.5–2.5)
MCH RBC QN AUTO: 32.8 PG (ref 27–33)
MCHC RBC AUTO-ENTMCNC: 33.5 G/DL (ref 33.7–35.3)
MCV RBC AUTO: 97.8 FL (ref 81.4–97.8)
MONOCYTES # BLD AUTO: 0.75 K/UL (ref 0–0.85)
MONOCYTES NFR BLD AUTO: 11.5 % (ref 0–13.4)
NEUTROPHILS # BLD AUTO: 3.94 K/UL (ref 1.82–7.42)
NEUTROPHILS NFR BLD: 60.3 % (ref 44–72)
NRBC # BLD AUTO: 0 K/UL
NRBC BLD-RTO: 0 /100 WBC
PHOSPHATE SERPL-MCNC: 3.2 MG/DL (ref 2.5–4.5)
PLATELET # BLD AUTO: 155 K/UL (ref 164–446)
PMV BLD AUTO: 10.3 FL (ref 9–12.9)
POTASSIUM SERPL-SCNC: 4.2 MMOL/L (ref 3.6–5.5)
RBC # BLD AUTO: 4.15 M/UL (ref 4.7–6.1)
RHODAMINE-AURAMINE STN SPEC: NORMAL
SIGNIFICANT IND 70042: NORMAL
SIGNIFICANT IND 70042: NORMAL
SITE SITE: NORMAL
SITE SITE: NORMAL
SODIUM SERPL-SCNC: 138 MMOL/L (ref 135–145)
SODIUM UR-SCNC: 63 MMOL/L
SOURCE SOURCE: NORMAL
SOURCE SOURCE: NORMAL
WBC # BLD AUTO: 6.5 K/UL (ref 4.8–10.8)

## 2023-03-18 PROCEDURE — 80048 BASIC METABOLIC PNL TOTAL CA: CPT

## 2023-03-18 PROCEDURE — 83615 LACTATE (LD) (LDH) ENZYME: CPT

## 2023-03-18 PROCEDURE — A9270 NON-COVERED ITEM OR SERVICE: HCPCS | Performed by: INTERNAL MEDICINE

## 2023-03-18 PROCEDURE — 700102 HCHG RX REV CODE 250 W/ 637 OVERRIDE(OP): Performed by: STUDENT IN AN ORGANIZED HEALTH CARE EDUCATION/TRAINING PROGRAM

## 2023-03-18 PROCEDURE — A9270 NON-COVERED ITEM OR SERVICE: HCPCS | Performed by: STUDENT IN AN ORGANIZED HEALTH CARE EDUCATION/TRAINING PROGRAM

## 2023-03-18 PROCEDURE — 770004 HCHG ROOM/CARE - ONCOLOGY PRIVATE *

## 2023-03-18 PROCEDURE — 84100 ASSAY OF PHOSPHORUS: CPT

## 2023-03-18 PROCEDURE — 99232 SBSQ HOSP IP/OBS MODERATE 35: CPT | Performed by: INTERNAL MEDICINE

## 2023-03-18 PROCEDURE — 36415 COLL VENOUS BLD VENIPUNCTURE: CPT

## 2023-03-18 PROCEDURE — 99222 1ST HOSP IP/OBS MODERATE 55: CPT | Performed by: INTERNAL MEDICINE

## 2023-03-18 PROCEDURE — 700102 HCHG RX REV CODE 250 W/ 637 OVERRIDE(OP): Performed by: INTERNAL MEDICINE

## 2023-03-18 PROCEDURE — 85025 COMPLETE CBC W/AUTO DIFF WBC: CPT

## 2023-03-18 PROCEDURE — 83735 ASSAY OF MAGNESIUM: CPT

## 2023-03-18 RX ORDER — FUROSEMIDE 20 MG/1
20 TABLET ORAL
Status: DISCONTINUED | OUTPATIENT
Start: 2023-03-18 | End: 2023-03-19 | Stop reason: HOSPADM

## 2023-03-18 RX ORDER — METOPROLOL SUCCINATE 25 MG/1
25 TABLET, EXTENDED RELEASE ORAL
Status: DISCONTINUED | OUTPATIENT
Start: 2023-03-18 | End: 2023-03-19 | Stop reason: HOSPADM

## 2023-03-18 RX ADMIN — Medication 2000 UNITS: at 05:34

## 2023-03-18 RX ADMIN — APIXABAN 5 MG: 5 TABLET, FILM COATED ORAL at 17:03

## 2023-03-18 RX ADMIN — CYCLOBENZAPRINE 10 MG: 10 TABLET, FILM COATED ORAL at 20:58

## 2023-03-18 RX ADMIN — FAMOTIDINE 20 MG: 20 TABLET, FILM COATED ORAL at 17:03

## 2023-03-18 RX ADMIN — FUROSEMIDE 20 MG: 20 TABLET ORAL at 11:41

## 2023-03-18 RX ADMIN — EZETIMIBE 10 MG: 10 TABLET ORAL at 17:03

## 2023-03-18 RX ADMIN — BUDESONIDE AND FORMOTEROL FUMARATE DIHYDRATE 2 PUFF: 160; 4.5 AEROSOL RESPIRATORY (INHALATION) at 09:34

## 2023-03-18 RX ADMIN — DOCUSATE SODIUM 50 MG AND SENNOSIDES 8.6 MG 2 TABLET: 8.6; 5 TABLET, FILM COATED ORAL at 05:33

## 2023-03-18 RX ADMIN — BUDESONIDE AND FORMOTEROL FUMARATE DIHYDRATE 2 PUFF: 160; 4.5 AEROSOL RESPIRATORY (INHALATION) at 20:58

## 2023-03-18 RX ADMIN — METOPROLOL SUCCINATE 25 MG: 25 TABLET, EXTENDED RELEASE ORAL at 11:41

## 2023-03-18 RX ADMIN — APIXABAN 5 MG: 5 TABLET, FILM COATED ORAL at 05:33

## 2023-03-18 ASSESSMENT — ENCOUNTER SYMPTOMS
CONSTIPATION: 0
SHORTNESS OF BREATH: 0
DEPRESSION: 0
PALPITATIONS: 0
WEAKNESS: 0
CHILLS: 0
FEVER: 0
NAUSEA: 0
COUGH: 0
BLURRED VISION: 0
DIARRHEA: 0
HEADACHES: 0
DIZZINESS: 0
VOMITING: 0
SORE THROAT: 0
NERVOUS/ANXIOUS: 0
FALLS: 0
ABDOMINAL PAIN: 0

## 2023-03-18 ASSESSMENT — PAIN DESCRIPTION - PAIN TYPE
TYPE: ACUTE PAIN

## 2023-03-18 NOTE — PROGRESS NOTES
Received bedside report from  day shift RN , pt care assumed. VSS, pt assessment completed Pt A&O x4 denies pain at this time. POC discussed with pt and verbalizes no questions. Pt denies any additional needs at this time. Bed locked and in lowest position, bed alarm  Pt educated on fall risk and verbalized understanding, call light within reach, hourly rounding initiated.

## 2023-03-18 NOTE — PROGRESS NOTES
[facility] Medicine Daily Progress Note    Date of Service  3/18/2023    Chief Complaint  Pito Black is a 78 y.o. male admitted 3/16/2023 with shortness of breath    Hospital Course  No notes on file    Pito Black is a 78 y.o. male with history of lung cancer in remission s/p wedge resection 2018 and s/p chemoradiation therapy followed by Dr. Ruiz, chronic A-fib on Eliquis, hypertension, CKD 3, who presented 3/16/2023 with shortness of breath.  Patient had outpatient PET scan concerning for vascular congestion/pleural effusion.  He had prior left-sided thoracocentesis with symptomatic relief.  Patient has outpatient IR appointment for left thoracocentesis 3/17/2023 at 10 AM and outpatient follow-up with pulmonology, Dr. Carbajal.  Reports night prior to presentation he had sudden onset of severe shortness of breath and could not wait for his appointment.  His son brought him to the emergency room.  CXR notable pulmonary edema (L>R).  He was given Lasix 40 mg IV in ED.  Admitted to medicine service for further evaluation and treatment.    Troponin was slightly elevated at 80.  BNP 2818.  Patient did have an acute kidney injury with a creatinine of 1.54.    Patient had only mild crackles on exam.  Minimal swelling to lower extremities.    Renal ultrasound showed mildly atrophic bilateral kidneys, left greater than right.    This post left thoracentesis was 1600 mL removed.    Echo showed EF 35%, global hypokinesis with regional variation, mild aortic insufficiency, moderate mitral regurg.  Patient was also having nonsustained runs of V. tach and PSVT on telemetry.  Cardiology was consulted.  Patient was started on Toprol for heart failure.  With tachycardia and frequent ectopy.  Restarted diuresis    Interval Problem Update  Patient was seen and examined at bedside.  I have personally reviewed and interpreted vitals, labs, and imaging.    3/17.  Afebrile.  Still tachycardic, tachypneic.  On 3-4L.   Troponins trended down.  Denies fevers, chills, chest pains.  Shortness of breath has resolved since thoracentesis.  Follow-up echocardiogram.  3/18.  Afebrile.  Tachycardia is improved.  On room air.  Cr improved to 1.32.  Denies fevers, chills, chest pains, palpitations.  Reports shortness of breath has resolved.  Now on room air.  Has had runs of V. tach, PSVT on telemetry but has been asymptomatic.  Discussed echocardiogram.  Consulted cardiology.  Started on beta-blocker, diuresis.    I have discussed this patient's plan of care and discharge plan at IDT rounds today with Case Management, Nursing, Nursing leadership, and other members of the IDT team.    Consultants/Specialty  None    Code Status  Full Code    Disposition  Patient is not medically cleared for discharge.   Anticipate discharge to to home with close outpatient follow-up.  I have placed the appropriate orders for post-discharge needs.    Review of Systems  Review of Systems   Constitutional:  Negative for chills and fever.   HENT:  Negative for congestion and sore throat.    Eyes:  Negative for blurred vision.   Respiratory:  Negative for cough and shortness of breath.    Cardiovascular:  Negative for chest pain, palpitations and leg swelling.   Gastrointestinal:  Negative for abdominal pain, constipation, diarrhea, nausea and vomiting.   Genitourinary:  Negative for dysuria, frequency and urgency.   Musculoskeletal:  Negative for falls.   Skin:  Negative for rash.   Neurological:  Negative for dizziness, weakness and headaches.   Psychiatric/Behavioral:  Negative for depression. The patient is not nervous/anxious.    All other systems reviewed and are negative.     Physical Exam  Temp:  [35.8 °C (96.5 °F)-36.8 °C (98.2 °F)] 36.3 °C (97.3 °F)  Pulse:  [] 64  Resp:  [14-16] 16  BP: (102-126)/(67-81) 122/81  SpO2:  [94 %-100 %] 94 %    Physical Exam  Vitals and nursing note reviewed.   Constitutional:       Appearance: Normal appearance. He is  ill-appearing.   HENT:      Head: Normocephalic and atraumatic.      Nose: Nose normal.      Mouth/Throat:      Mouth: Mucous membranes are moist.      Pharynx: Oropharynx is clear.   Eyes:      Extraocular Movements: Extraocular movements intact.      Conjunctiva/sclera: Conjunctivae normal.   Cardiovascular:      Rate and Rhythm: Normal rate and regular rhythm.      Pulses: Normal pulses.      Heart sounds: Normal heart sounds. No murmur heard.    No friction rub. No gallop.   Pulmonary:      Effort: Pulmonary effort is normal. No respiratory distress.      Breath sounds: Rales present. No wheezing.   Chest:      Chest wall: No tenderness.   Abdominal:      General: Abdomen is flat. Bowel sounds are normal. There is no distension.      Palpations: Abdomen is soft. There is no mass.      Tenderness: There is no abdominal tenderness. There is no guarding.   Musculoskeletal:         General: Normal range of motion.      Cervical back: Normal range of motion and neck supple.   Skin:     General: Skin is warm.      Capillary Refill: Capillary refill takes less than 2 seconds.   Neurological:      General: No focal deficit present.      Mental Status: He is alert and oriented to person, place, and time. Mental status is at baseline.      Cranial Nerves: No cranial nerve deficit.      Motor: No weakness.   Psychiatric:         Mood and Affect: Mood normal.         Behavior: Behavior normal.       Fluids  No intake or output data in the 24 hours ending 03/18/23 0559    Laboratory  Recent Labs     03/16/23 2213 03/17/23 0127 03/18/23  0114   WBC 6.7 6.3 6.5   RBC 4.53* 4.49* 4.15*   HEMOGLOBIN 14.9 14.6 13.6*   HEMATOCRIT 43.5 43.3 40.6*   MCV 96.0 96.4 97.8   MCH 32.9 32.5 32.8   MCHC 34.3 33.7 33.5*   RDW 47.8 47.5 48.4   PLATELETCT 160* 154* 155*   MPV 9.7 9.6 10.3       Recent Labs     03/16/23 2213 03/17/23 0127 03/18/23  0114   SODIUM 136 138 138   POTASSIUM 4.3 4.7 4.2   CHLORIDE 102 103 105   CO2 20 22 23    GLUCOSE 143* 148* 181*   BUN 22 20 20   CREATININE 1.54* 1.58* 1.32   CALCIUM 9.6 9.6 9.1       Recent Labs     03/16/23  2213 03/17/23  0127   APTT 38.0*  --    INR 1.32* 1.25*                 Imaging  EC-ECHOCARDIOGRAM COMPLETE W/ CONT   Final Result      US-THORACENTESIS PUNCTURE LEFT   Final Result      1. Ultrasound guided left sided diagnostic thoracentesis.      2. 1600 mLs of fluid withdrawn 1200 MLS was sent to lab for analysis..      DX-CHEST-PORTABLE (1 VIEW)   Final Result      1.  Mild pulmonary edema and/or interstitial parenchymal scarring.      2.  Resolution of left-sided pleural effusion without evidence of pneumothorax.      US-RENAL   Final Result         1.  Mildly atrophic bilateral kidneys, left greater than right.      DX-CHEST-PORTABLE (1 VIEW)   Final Result         1.  Pulmonary edema and/or infiltrates.   2.  Small bilateral pleural effusions, left greater than right.   3.  Cardiomegaly   4.  Atherosclerosis             Assessment/Plan  * Volume overload- (present on admission)  Assessment & Plan  Pulmonary edema, pleural effusion  Gentle diuresis  Check echo    Heart failure with reduced ejection fraction (HCC)  Assessment & Plan  Echo shows EF 35%, global hypokinesis, aortic insufficiency, mitral regurg  Cardiology consulted  Started on metoprolol, furosemide    ACP (advance care planning)  Assessment & Plan  Discussed in ED. Son and pt stated pt has DNR/DNI POLST -- updated.    Diabetes (HCC)  Assessment & Plan  ISS    Cardiorenal syndrome  Assessment & Plan  Suspected  Monitor renal function while on diuretic  echo, renal ultrasound.  Noted    Acute respiratory failure with hypoxia (HCC)  Assessment & Plan  On 3-4L  Likely due to above  Nebs, diuresis  Check echo    S/p ceftriaxone in ED, no further abx given WNL procalcitonin    Pleural effusion, left  Assessment & Plan  Thoracocentesis ordered  Gentle diuresis    Dyslipidemia- (present on admission)  Assessment &  Plan  Zetia    Primary cancer of right upper lobe of lung (HCC)- (present on admission)  Assessment & Plan  In remission per patient  Followed by Dr. Ruiz (medical oncology) and Dr. Carbajal (pulmonary)    PAF (paroxysmal atrial fibrillation) (HCC)- (present on admission)  Assessment & Plan  Hold Eliquis in anticipation of procedure--reported last taken 3/16 p.m.         VTE prophylaxis: therapeutic anticoagulation with Apixaban    I have performed a physical exam and reviewed and updated ROS and Plan today (3/18/2023). In review of yesterday's note (3/17/2023), there are no changes except as documented above.

## 2023-03-18 NOTE — CONSULTS
Cardiology Consult Note    DOS: 3/18/2023    Consulting physician: Cristhian Bower DO    Chief complaint/Reason for consult: New diagnosis of CHF    HPI:  Pt is a 77 yo M with history of lung CA s/p wedge resection in 2018, subsequent recurrent cancer in other lobes, x/p XRT. Course complicated by recurrent pleural effusions. Most recently underwent thoracentesis. There was concern for being infectious/parapneumonic though cultures have not been revealing. He was started on prednisone also for potential pneumonitis. He was scheduled for IR thoracentesis on 3/17/2023 but became acutely short of breath and hypoxic and was brought to the emergency room. Of note, CTA on 2/23 showed segmental PE. Here echo showed EF 30-35% which is new finding compared to prior. Cardiology consulted for newly depressed LV function.    ROS (+ in BOLD):  Constitutional: Fevers/chills/fatigue/weightloss  HEENT: Blurry vision/eye pain/sore throat/hearing loss  Respiratory: Shortness of breath/cough  Cardiovascular: Chest pain/palpitations/edema/orthopnea/syncope  GI: Nausea/vomitting/diarrhea  MSK: Arthralgias/myagias/muscle weakness  Skin: Rash/sores  Neurological: Numbness/tremors/vertigo  Endocrine: Excessive thirst/polyuria/cold intolerance/heat intolerance  Psych: Depression/anxiety    Past Medical History:   Diagnosis Date    Arrhythmia     hx of a fib    Arthritis 2015    generalized, DDD back    Asthma     inhaler     Backpain 08/06/2018 9/10    Cancer (HCC) 07/2017    left lung    Cataract     bilateral, no surgery    Dental disorder     lower partial, removable bridge    Diabetes 08/06/2018    on no meds at this time, diet controlled    Heart valve disease     mild mitral valve prolapse    High cholesterol     Hypertension        Past Surgical History:   Procedure Laterality Date    AL BRONCHOSCOPY,DIAGNOSTIC  7/1/2021    Procedure: BRONCHOSCOPY - FIBER OPTIC WITH BRANCHOALVEOLAR LAVAGE BIOPSY, FNA, NAVIGATION;  Surgeon: Vinayak WALLS  "COREY Carbajal;  Location: SURGERY [facility];  Service: Pulmonary    ENDOBRONCHIAL US ADD-ON  2021    Procedure: ENDOBRONCHIAL ULTRASOUND (EBUS).;  Surgeon: Vinayak Carbajal M.D.;  Location: SURGERY [facility];  Service: Pulmonary    CATARACT PHACO WITH IOL Left 2018    Procedure: CATARACT PHACO WITH IOL;  Surgeon: Juanito Hager M.D.;  Location: SURGERY SAME DAY [facility];  Service: Ophthalmology    CATARACT PHACO WITH IOL Right 2018    Procedure: CATARACT PHACO WITH IOL;  Surgeon: Juanito Hager M.D.;  Location: SURGERY SAME DAY [facility];  Service: Ophthalmology    THORACOSCOPY Left 2018    Procedure: THORACOSCOPY- WEDGE BIOPSY W/FROZEN SECTION;  Surgeon: Cristhian Galeano M.D.;  Location: SURGERY [facility];  Service: Thoracic    EAR MIDDLE EXPLORATION Right 2015    Procedure: EAR MIDDLE EXPLORATION;  Surgeon: William Brandon M.D.;  Location: SURGERY SAME DAY [facility];  Service:     OSSICULAR RECONSTRUCTION Right 2015    Procedure: OSSICULAR RECONSTRUCTION CHAIN POSSIBLE;  Surgeon: William Brandon M.D.;  Location: SURGERY SAME DAY [facility];  Service:     OTHER      ear replacement \"many years ago\"    OTHER ORTHOPEDIC SURGERY      right knee replacement        Social History     Socioeconomic History    Marital status:      Spouse name: Not on file    Number of children: Not on file    Years of education: Not on file    Highest education level: Not on file   Occupational History    Not on file   Tobacco Use    Smoking status: Former     Packs/day: 0.25     Years: 40.00     Pack years: 10.00     Types: Cigarettes     Quit date: 2005     Years since quittin.2    Smokeless tobacco: Never   Vaping Use    Vaping Use: Never used   Substance and Sexual Activity    Alcohol use: Not Currently     Alcohol/week: 12.6 - 21.0 oz     Types: 21 - 35 Cans of beer per week     Comment: 3-5 beers a day, quit 2/3/2023    Drug use: No    Sexual activity: Not " on file   Other Topics Concern    Not on file   Social History Narrative    Not on file     Social Determinants of Health     Financial Resource Strain: Not on file   Food Insecurity: Not on file   Transportation Needs: Not on file   Physical Activity: Not on file   Stress: Not on file   Social Connections: Not on file   Intimate Partner Violence: Not on file   Housing Stability: Not on file       Family History   Problem Relation Age of Onset    Cancer Father         stomach cancer       No Known Allergies    Current Facility-Administered Medications   Medication Dose Route Frequency Provider Last Rate Last Admin    senna-docusate (PERICOLACE or SENOKOT S) 8.6-50 MG per tablet 2 Tablet  2 Tablet Oral BID Herminio Venegas M.D.   2 Tablet at 03/18/23 0533    And    polyethylene glycol/lytes (MIRALAX) PACKET 1 Packet  1 Packet Oral QDAY PRN Herminio Venegas M.D.        And    magnesium hydroxide (MILK OF MAGNESIA) suspension 30 mL  30 mL Oral QDAY PRN Herminio Venegas M.D.        And    bisacodyl (DULCOLAX) suppository 10 mg  10 mg Rectal QDAY PRN Herminio Venegas M.D.        lactated ringers infusion (BOLUS)  500 mL Intravenous Once PRN Herminio Venegas M.D.        acetaminophen (Tylenol) tablet 650 mg  650 mg Oral Q6HRS PRN Herminio Venegas M.D.        ondansetron (ZOFRAN) syringe/vial injection 4 mg  4 mg Intravenous Q4HRS PRN Herminio Venegas M.D.        ondansetron (ZOFRAN ODT) dispertab 4 mg  4 mg Oral Q4HRS PRN Herminio Venegas M.D.        labetalol (NORMODYNE/TRANDATE) injection 10 mg  10 mg Intravenous Q4HRS PRN Herminio Venegas M.D.        guaiFENesin dextromethorphan (ROBITUSSIN DM) 100-10 MG/5ML syrup 10 mL  10 mL Oral Q6HRS PRN Herminio Venegas M.D.        vitamin D3 (cholecalciferol) tablet 2,000 Units  2,000 Units Oral DAILY Herminio Venegas M.D.   2,000 Units at 03/18/23 0534    cyclobenzaprine (Flexeril) tablet 10 mg  10 mg Oral QHS Herminio Venegas M.D.   10 mg at 03/17/23 2129    ezetimibe (ZETIA) tablet 10 mg  10 mg Oral Q EVENING  Herminio Venegas M.D.   10 mg at 03/17/23 1725    famotidine (PEPCID) tablet 20 mg  20 mg Oral Q EVENING Herminio Venegas M.D.   20 mg at 03/17/23 1726    Pharmacy Consult Request ...Pain Management Review 1 Each  1 Each Other PHARMACY TO DOSE Herminio Venegas M.D.        oxyCODONE immediate-release (ROXICODONE) tablet 2.5 mg  2.5 mg Oral Q3HRS PRN Herminio Venegas M.D.        Or    oxyCODONE immediate-release (ROXICODONE) tablet 5 mg  5 mg Oral Q3HRS PRN Herminio Venegas M.D.        Or    HYDROmorphone (Dilaudid) injection 0.25 mg  0.25 mg Intravenous Q3HRS PRN Herminio Venegas M.D.        ipratropium-albuterol (DUONEB) nebulizer solution  3 mL Nebulization Q4H PRN (RT) Herminio Venegas M.D.        budesonide-formoterol (SYMBICORT) 160-4.5 MCG/ACT inhaler 2 Puff  2 Puff Inhalation BID (RT) Herminio Venegas M.D.   2 Puff at 03/18/23 0934    diphenhydrAMINE (BENADRYL) tablet/capsule 25-50 mg  25-50 mg Oral HS PRN Herminio Venegas M.D.        And    acetaminophen (TYLENOL) tablet 500-1,000 mg  500-1,000 mg Oral QHS PRN Herminio Venegas M.D.   1,000 mg at 03/17/23 2129    apixaban (ELIQUIS) tablet 5 mg  5 mg Oral BID Cristhian Bower D.O.   5 mg at 03/18/23 0533       Physical Exam:  Vitals:    03/17/23 2043 03/18/23 0116 03/18/23 0530 03/18/23 0703   BP: 121/74 103/67 122/81 115/76   Pulse: 62 (!) 105 64 98   Resp: 16 16 16 19   Temp: 36.8 °C (98.2 °F) 36.6 °C (97.9 °F) 36.3 °C (97.3 °F) 36.2 °C (97.1 °F)   TempSrc: Temporal Temporal Temporal Temporal   SpO2: 96% 94% 94% 97%   Weight:       Height:         General appearance: NAD, conversant   Eyes: anicteric sclerae, moist conjunctivae; no lid-lag; PERRLA  HENT: Atraumatic; oropharynx clear with moist mucous membranes and no mucosal ulcerations; normal hard and soft palate  Neck: Trachea midline; FROM, supple, no thyromegaly or lymphadenopathy  Lungs: Decreased at bases  CV: RRR, tachy, 3/6 systolic murmur, no JVD   Abdomen: Soft, non-tender; no masses or HSM  Extremities: No peripheral edema or  extremity lymphadenopathy  Skin: Normal temperature, turgor and texture; no rash, ulcers or subcutaneous nodules  Psych: Appropriate affect, alert and oriented to person, place and time    Data:  Labs reviewed    Prior echo/stress results reviewed:  LVEF 30-35%    CXR interpreted by me:  L sided effusion looks resolved, still vascular congestion    EKG interpreted by me:   Sinus tachy, frequent ectopy, fused    Impression/Plan:  1) Acute on chronic systolic CHF  2) Hypoxic respiratory failure  3) Pleural effusion  4) Lung CA  5) PE    -Not sure the etiology of his HF though unclear why he has been taken off BB, he is fairly persistently tachycardic with very frequent ectopy and this could be contributing to his LV dysfunction and the pulmonary edema/effusions  -He does not report symptoms of ischemia and I do not see an urgent need to define his coronary anatomy right now  -I would put him back on Toprol to slow his sinus rate down in addition to GDMT (e.g. ACEI) and diuretics as needed and have him follow-up with the HF clinic  -If function does not improve on GDMT and slowing of rate, further eval including cath and eval of  can be considered    Roxann Arizmendi MD

## 2023-03-18 NOTE — CARE PLAN
The patient is Stable - Low risk of patient condition declining or worsening    Shift Goals  Clinical Goals: no sob at rest or minimal exertion  Patient Goals: rest pain control  Family Goals: CINDY    Progress made toward(s) clinical / shift goals:  on going    Patient is   Problem: Respiratory  Goal: Patient will achieve/maintain optimum respiratory ventilation and gas exchange  Outcome: Progressing     Problem: Hemodynamics  Goal: Patient's hemodynamics, fluid balance and neurologic status will be stable or improve  Outcome: Progressing     Problem: Mechanical Ventilation  Goal: Patient will be able to express needs and understand communication  Outcome: Progressing     Problem: Knowledge Deficit - Standard  Goal: Patient and family/care givers will demonstrate understanding of plan of care, disease process/condition, diagnostic tests and medications  Outcome: Progressing   progressing towards the following goals:

## 2023-03-18 NOTE — CARE PLAN
Problem: Urinary - Renal Perfusion  Goal: Ability to achieve and maintain adequate renal perfusion and functioning will improve  Outcome: Progressing     Problem: Knowledge Deficit - Standard  Goal: Patient and family/care givers will demonstrate understanding of plan of care, disease process/condition, diagnostic tests and medications  Outcome: Progressing     The patient is Watcher - Medium risk of patient condition declining or worsening    Shift Goals  Clinical Goals: no sob at rest or minimal exertion  Patient Goals: rest pain control  Family Goals: CINDY    Progress made toward(s) clinical / shift goals:  Pt educated on med changes    Patient is not progressing towards the following goals:

## 2023-03-19 ENCOUNTER — PHARMACY VISIT (OUTPATIENT)
Dept: PHARMACY | Facility: MEDICAL CENTER | Age: 78
End: 2023-03-19
Payer: COMMERCIAL

## 2023-03-19 VITALS
DIASTOLIC BLOOD PRESSURE: 76 MMHG | BODY MASS INDEX: 21.98 KG/M2 | RESPIRATION RATE: 17 BRPM | HEART RATE: 96 BPM | WEIGHT: 145 LBS | OXYGEN SATURATION: 96 % | HEIGHT: 68 IN | TEMPERATURE: 97.5 F | SYSTOLIC BLOOD PRESSURE: 116 MMHG

## 2023-03-19 LAB
ANION GAP SERPL CALC-SCNC: 11 MMOL/L (ref 7–16)
BACTERIA UR CULT: ABNORMAL
BACTERIA UR CULT: ABNORMAL
BUN SERPL-MCNC: 19 MG/DL (ref 8–22)
CALCIUM SERPL-MCNC: 9.3 MG/DL (ref 8.5–10.5)
CHLORIDE SERPL-SCNC: 102 MMOL/L (ref 96–112)
CO2 SERPL-SCNC: 23 MMOL/L (ref 20–33)
CREAT SERPL-MCNC: 1.36 MG/DL (ref 0.5–1.4)
ERYTHROCYTE [DISTWIDTH] IN BLOOD BY AUTOMATED COUNT: 48.1 FL (ref 35.9–50)
GFR SERPLBLD CREATININE-BSD FMLA CKD-EPI: 53 ML/MIN/1.73 M 2
GLUCOSE SERPL-MCNC: 126 MG/DL (ref 65–99)
HCT VFR BLD AUTO: 42.9 % (ref 42–52)
HGB BLD-MCNC: 14.1 G/DL (ref 14–18)
MAGNESIUM SERPL-MCNC: 1.8 MG/DL (ref 1.5–2.5)
MCH RBC QN AUTO: 32.2 PG (ref 27–33)
MCHC RBC AUTO-ENTMCNC: 32.9 G/DL (ref 33.7–35.3)
MCV RBC AUTO: 97.9 FL (ref 81.4–97.8)
PHOSPHATE SERPL-MCNC: 3.8 MG/DL (ref 2.5–4.5)
PLATELET # BLD AUTO: 164 K/UL (ref 164–446)
PMV BLD AUTO: 10.1 FL (ref 9–12.9)
POTASSIUM SERPL-SCNC: 4.3 MMOL/L (ref 3.6–5.5)
RBC # BLD AUTO: 4.38 M/UL (ref 4.7–6.1)
SIGNIFICANT IND 70042: ABNORMAL
SITE SITE: ABNORMAL
SODIUM SERPL-SCNC: 136 MMOL/L (ref 135–145)
SOURCE SOURCE: ABNORMAL
WBC # BLD AUTO: 7.4 K/UL (ref 4.8–10.8)

## 2023-03-19 PROCEDURE — 36415 COLL VENOUS BLD VENIPUNCTURE: CPT

## 2023-03-19 PROCEDURE — 99239 HOSP IP/OBS DSCHRG MGMT >30: CPT | Performed by: INTERNAL MEDICINE

## 2023-03-19 PROCEDURE — A9270 NON-COVERED ITEM OR SERVICE: HCPCS | Performed by: STUDENT IN AN ORGANIZED HEALTH CARE EDUCATION/TRAINING PROGRAM

## 2023-03-19 PROCEDURE — 700102 HCHG RX REV CODE 250 W/ 637 OVERRIDE(OP): Performed by: INTERNAL MEDICINE

## 2023-03-19 PROCEDURE — A9270 NON-COVERED ITEM OR SERVICE: HCPCS | Performed by: INTERNAL MEDICINE

## 2023-03-19 PROCEDURE — 83735 ASSAY OF MAGNESIUM: CPT

## 2023-03-19 PROCEDURE — RXMED WILLOW AMBULATORY MEDICATION CHARGE: Performed by: INTERNAL MEDICINE

## 2023-03-19 PROCEDURE — 80048 BASIC METABOLIC PNL TOTAL CA: CPT

## 2023-03-19 PROCEDURE — 84100 ASSAY OF PHOSPHORUS: CPT

## 2023-03-19 PROCEDURE — 85027 COMPLETE CBC AUTOMATED: CPT

## 2023-03-19 PROCEDURE — 700102 HCHG RX REV CODE 250 W/ 637 OVERRIDE(OP): Performed by: STUDENT IN AN ORGANIZED HEALTH CARE EDUCATION/TRAINING PROGRAM

## 2023-03-19 RX ORDER — METOPROLOL SUCCINATE 25 MG/1
25 TABLET, EXTENDED RELEASE ORAL DAILY
Qty: 30 TABLET | Refills: 0 | Status: ON HOLD | OUTPATIENT
Start: 2023-03-20 | End: 2023-03-26 | Stop reason: SDUPTHER

## 2023-03-19 RX ADMIN — APIXABAN 5 MG: 5 TABLET, FILM COATED ORAL at 04:42

## 2023-03-19 RX ADMIN — Medication 2000 UNITS: at 04:42

## 2023-03-19 RX ADMIN — BUDESONIDE AND FORMOTEROL FUMARATE DIHYDRATE 2 PUFF: 160; 4.5 AEROSOL RESPIRATORY (INHALATION) at 09:33

## 2023-03-19 NOTE — CARE PLAN
Problem: Urinary - Renal Perfusion  Goal: Ability to achieve and maintain adequate renal perfusion and functioning will improve  Outcome: Progressing     Problem: Knowledge Deficit - Standard  Goal: Patient and family/care givers will demonstrate understanding of plan of care, disease process/condition, diagnostic tests and medications  Outcome: Progressing     The patient is Watcher - Medium risk of patient condition declining or worsening    Shift Goals  Clinical Goals: no sob at rest or minimal exertion  Patient Goals: rest pain control  Family Goals: CINDY    Progress made toward(s) clinical / shift goals:  Pt educated on med changes    Patient is not progressing towards the following goals:    The patient is Stable - Low risk of patient condition declining or worsening    Shift Goals  Clinical Goals: Titrate O2  Patient Goals: Rest  Family Goals: CINDY    Progress made toward(s) clinical / shift goals:      Patient is not progressing towards the following goals:

## 2023-03-19 NOTE — CARE PLAN
The patient is Watcher - Medium risk of patient condition declining or worsening    Shift Goals  Clinical Goals: Titrate O2  Patient Goals: Rest  Family Goals: CINDY    Progress made toward(s) clinical / shift goals:    Problem: Knowledge Deficit - Standard  Goal: Patient and family/care givers will demonstrate understanding of plan of care, disease process/condition, diagnostic tests and medications  Outcome: Progressing       Patient is not progressing towards the following goals:

## 2023-03-19 NOTE — DISCHARGE SUMMARY
Discharge Summary    CHIEF COMPLAINT ON ADMISSION  Chief Complaint   Patient presents with    Shortness of Breath     Pt presents with a history of lung cancer. Earlier in March, pt received a scan and was told he has fluid on his lungs. Pt has had to have the fluid drained before and immediately feels better afterwards. Pt states he has an appointment tomorrow morning to have fluid removed from both lungs, however pt is unable to ambulate this evening without almost passing out- pt has no home O2. Pt tried to contact his doctor for diuretics but was unable to get an answer.        Reason for Admission  SOB     Admission Date  3/16/2023    CODE STATUS  Full Code    HPI & HOSPITAL COURSE  Pito Black is a 78 y.o. male with history of lung cancer in remission s/p wedge resection 2018 and s/p chemoradiation therapy followed by Dr. Ruiz, chronic A-fib on Eliquis, hypertension, CKD 3, who presented 3/16/2023 with shortness of breath.  Patient had outpatient PET scan concerning for vascular congestion/pleural effusion.  He had prior left-sided thoracocentesis with symptomatic relief.  Patient has outpatient IR appointment for left thoracocentesis 3/17/2023 at 10 AM and outpatient follow-up with pulmonology, Dr. Carbajal.  Reports night prior to presentation he had sudden onset of severe shortness of breath and could not wait for his appointment.  His son brought him to the emergency room.  CXR notable pulmonary edema (L>R).  He was given Lasix 40 mg IV in ED.  Admitted to medicine service for further evaluation and treatment.     Troponin was slightly elevated at 80.  BNP 2818.  Patient did have an acute kidney injury with a creatinine of 1.54.     Patient had only mild crackles on exam.  Minimal swelling to lower extremities.     Renal ultrasound showed mildly atrophic bilateral kidneys, left greater than right.     Status post left thoracentesis was 1600 mL removed.  Pleural fluid appear transudative.  Pleural  fluid and blood cultures negative.  Pathology pending.     Echo showed EF 35%, global hypokinesis with regional variation, mild aortic insufficiency, moderate mitral regurg.  Patient was also having nonsustained runs of V. tach and PSVT on telemetry.  Cardiology was consulted.  Patient was started on Toprol for heart failure, tachycardia and frequent ectopy.  Restarted diuresis.  Patient was weaned to room air.     Make stable to discharge home.  Follow-up with primary care, oncology, cardiology as outpatient.    Therefore, he is discharged in fair and stable condition to home with close outpatient follow-up.    The patient met 2-midnight criteria for an inpatient stay at the time of discharge.    Discharge Date  3/19/2023    FOLLOW UP ITEMS POST DISCHARGE  Cytology from thoracentesis.    DISCHARGE DIAGNOSES  Principal Problem:    Volume overload POA: Yes  Active Problems:    PAF (paroxysmal atrial fibrillation) (HCC) POA: Yes    Primary cancer of right upper lobe of lung (HCC) POA: Yes    Dyslipidemia POA: Yes    Pleural effusion, left POA: Yes    Acute respiratory failure with hypoxia (HCC) POA: Unknown    Cardiorenal syndrome POA: Yes    Diabetes (HCC) POA: Yes    ACP (advance care planning) POA: Yes    Heart failure with reduced ejection fraction (HCC) POA: Yes  Resolved Problems:    * No resolved hospital problems. *      FOLLOW UP  Future Appointments   Date Time Provider Department Center   3/22/2023 11:20 AM JILL Welsh [facility] JONA [facility]   3/31/2023  1:40 PM Vinayak Carbajal M.D. [facility] None     Miguel Ruiz M.D.  [address] [facility] [address]  [place] [address]  [phone]    Follow up      Vinayak Carbajal M.D.  [address]  [address]  [place] [address]  [phone]          Jay Monge M.D.  [address]  [address]  [place] [address] [phone]    Follow up        MEDICATIONS ON DISCHARGE     Medication List        START taking these medications        Instructions    metoprolol SR 25 MG Tb24  Start taking on: March 20, 2023  Commonly known as: TOPROL XL   Take 1 Tablet by mouth every day.  Dose: 25 mg            CONTINUE taking these medications        Instructions   apixaban 5mg Tabs  Commonly known as: ELIQUIS   Take 1 Tablet by mouth 2 times a day for 30 days. Indications: DVT/PE  Dose: 5 mg     cyclobenzaprine 10 mg Tabs  Commonly known as: Flexeril   Take 10 mg by mouth at bedtime.  Dose: 10 mg     ezetimibe 10 MG Tabs  Commonly known as: ZETIA   Take 10 mg by mouth every day.  Dose: 10 mg     Famotidine Maximum Strength 20 MG Tabs  Generic drug: famotidine   Take 1 Tablet by mouth every day.  Dose: 20 mg     fluticasone furoate-vilanterol 200-25 MCG/ACT Aepb  Commonly known as: Breo Ellipta   Inhale 1 Puff every day. Rinse mouth after use.  Dose: 1 Puff     furosemide 40 MG Tabs  Commonly known as: LASIX   Take 1 Tablet by mouth every day.  Dose: 40 mg     metFORMIN 500 MG Tabs  Commonly known as: GLUCOPHAGE   Take 1 Tablet by mouth every day.  Dose: 500 mg     Tylenol PM Extra Strength  MG Tabs  Generic drug: diphenhydrAMINE-APAP (sleep)   Take 1-2 Tablets by mouth at bedtime as needed (Mild Pain or Trouble Sleeping).  Dose: 1-2 Tablet     Vitamin D3 2000 UNIT Caps   Take 2,000 Units by mouth every day.  Dose: 2,000 Units            STOP taking these medications      predniSONE 10 MG Tabs  Commonly known as: DELTASONE              Allergies  No Known Allergies    DIET  Orders Placed This Encounter   Procedures    Diet Order Diet: Regular     Standing Status:   Standing     Number of Occurrences:   1     Order Specific Question:   Diet:     Answer:   Regular [1]       ACTIVITY  As tolerated.  Weight bearing as tolerated    CONSULTATIONS  Cardiology    PROCEDURES  [facility]    LABORATORY  Lab Results   Component Value Date    SODIUM 136 03/19/2023    POTASSIUM 4.3 03/19/2023    CHLORIDE 102 03/19/2023    CO2 23 03/19/2023    GLUCOSE 126 (H) 03/19/2023    BUN 19  03/19/2023    CREATININE 1.36 03/19/2023        Lab Results   Component Value Date    WBC 7.4 03/19/2023    HEMOGLOBIN 14.1 03/19/2023    HEMATOCRIT 42.9 03/19/2023    PLATELETCT 164 03/19/2023        I discussed medications and side effects with the patient.  I discussed prognosis and importance of medical compliance with the patient.  I counseled the patient about diet, exercise, weight loss, smoking cessation, and life style modifications.  All questions and concerns have been addressed.  Total time of the discharge process was 39 minutes.

## 2023-03-19 NOTE — ASSESSMENT & PLAN NOTE
Echo shows EF 35%, global hypokinesis, aortic insufficiency, mitral regurg  Cardiology consulted  Started on metoprolol, furosemide

## 2023-03-19 NOTE — DISCHARGE INSTRUCTIONS
Discharge Instructions per Dr. Cristhian Bower D.O.    DIET: Diet Order Diet: Regular    ACTIVITY: As tolerated    A proper diet that is low in grease, fat, and salt, along with 30 minutes of exercise per day will lead to weight loss, and better controlled blood sugar and blood pressure.    DIAGNOSIS: Volume overload    Follow up with your Primary Care Provider Nicolas Milligan, M.D. as scheduled or sooner if your symptoms persist or worsen.  Return to Emergency Room for sever chest pain, shortness of breath, signs of a stroke, or any other emergencies.

## 2023-03-22 ENCOUNTER — TELEPHONE (OUTPATIENT)
Dept: MEDICAL GROUP | Facility: MEDICAL CENTER | Age: 78
End: 2023-03-22

## 2023-03-22 ENCOUNTER — OFFICE VISIT (OUTPATIENT)
Dept: MEDICAL GROUP | Facility: PHYSICIAN GROUP | Age: 78
End: 2023-03-22
Payer: MEDICARE

## 2023-03-22 VITALS
DIASTOLIC BLOOD PRESSURE: 64 MMHG | TEMPERATURE: 98.2 F | OXYGEN SATURATION: 90 % | WEIGHT: 141 LBS | RESPIRATION RATE: 18 BRPM | HEIGHT: 68 IN | HEART RATE: 102 BPM | SYSTOLIC BLOOD PRESSURE: 92 MMHG | BODY MASS INDEX: 21.37 KG/M2

## 2023-03-22 DIAGNOSIS — N18.31 STAGE 3A CHRONIC KIDNEY DISEASE: ICD-10-CM

## 2023-03-22 DIAGNOSIS — I50.20 HEART FAILURE WITH REDUCED EJECTION FRACTION (HCC): ICD-10-CM

## 2023-03-22 DIAGNOSIS — Z23 NEED FOR VACCINATION: ICD-10-CM

## 2023-03-22 DIAGNOSIS — C34.92 PRIMARY ADENOCARCINOMA OF LEFT LUNG (HCC): ICD-10-CM

## 2023-03-22 DIAGNOSIS — C34.32 PRIMARY CANCER OF LEFT LOWER LOBE OF LUNG (HCC): ICD-10-CM

## 2023-03-22 DIAGNOSIS — E11.65 TYPE 2 DIABETES MELLITUS WITH HYPERGLYCEMIA, WITHOUT LONG-TERM CURRENT USE OF INSULIN (HCC): ICD-10-CM

## 2023-03-22 DIAGNOSIS — E78.5 DYSLIPIDEMIA: ICD-10-CM

## 2023-03-22 DIAGNOSIS — G89.29 CHRONIC BILATERAL THORACIC BACK PAIN: ICD-10-CM

## 2023-03-22 DIAGNOSIS — M54.14 THORACIC RADICULOPATHY: ICD-10-CM

## 2023-03-22 DIAGNOSIS — I26.99 PULMONARY EMBOLISM ON RIGHT (HCC): ICD-10-CM

## 2023-03-22 DIAGNOSIS — M54.6 CHRONIC BILATERAL THORACIC BACK PAIN: ICD-10-CM

## 2023-03-22 PROBLEM — J96.01 ACUTE RESPIRATORY FAILURE WITH HYPOXIA (HCC): Status: RESOLVED | Noted: 2023-02-01 | Resolved: 2023-03-22

## 2023-03-22 LAB
BACTERIA BLD CULT: NORMAL
BACTERIA BLD CULT: NORMAL
BACTERIA FLD AEROBE CULT: NORMAL
GRAM STN SPEC: NORMAL
SIGNIFICANT IND 70042: NORMAL
SITE SITE: NORMAL
SOURCE SOURCE: NORMAL

## 2023-03-22 PROCEDURE — G0506 COMP ASSES CARE PLAN CCM SVC: HCPCS

## 2023-03-22 PROCEDURE — 90471 IMMUNIZATION ADMIN: CPT

## 2023-03-22 PROCEDURE — 99214 OFFICE O/P EST MOD 30 MIN: CPT | Mod: 25

## 2023-03-22 PROCEDURE — 90715 TDAP VACCINE 7 YRS/> IM: CPT

## 2023-03-22 RX ORDER — CYCLOBENZAPRINE HCL 10 MG
10 TABLET ORAL
Qty: 100 TABLET | Refills: 3 | Status: SHIPPED | OUTPATIENT
Start: 2023-03-22 | End: 2023-03-31 | Stop reason: SDUPTHER

## 2023-03-22 RX ORDER — EZETIMIBE 10 MG/1
10 TABLET ORAL DAILY
Qty: 100 TABLET | Refills: 3 | Status: SHIPPED | OUTPATIENT
Start: 2023-03-22 | End: 2023-03-31 | Stop reason: SDUPTHER

## 2023-03-22 ASSESSMENT — FIBROSIS 4 INDEX: FIB4 SCORE: 3.11

## 2023-03-22 ASSESSMENT — ENCOUNTER SYMPTOMS
SHORTNESS OF BREATH: 1
BACK PAIN: 1
FEVER: 0

## 2023-03-22 NOTE — TELEPHONE ENCOUNTER
LULÚ Villagomez.  You 3 hours ago (10:17 AM)     VL  I am confused... Dr. Ruiz is never seen this patient.  I do see this patient is seen by [facility], but not [facility].  I do not think there is anything to do unless Dr. Ruiz or his staff reaches out to us and a more direct manner with specific concerns.  Thank you.

## 2023-03-22 NOTE — PROGRESS NOTES
"Subjective:     CC: Pt presents today to establish care with me. Prior PCP dr. Mace in [place], who left the practice. Recent hospitalization for pneumonia, PE and respiratory failure, which he is improving, denies SOB, difficulty breathing, is on room air.  Presents today with his wife, they are relocating from [place] to [place] this month.     HPI:   Pito presents today with    Problem   Heart Failure With Reduced Ejection Fraction (Hcc)   Primary Cancer of Left Lower Lobe of Lung (Hcc)   Dyslipidemia   Radiculopathy   Thoracic Back Pain   Acute Respiratory Failure With Hypoxia (Hcc) (Resolved)     Health Maintenance: Completed  Infectious disease screening/Immunizaitons  -STI screening: declines  Practices safe sex.  -HIV Screening: declines  -Immunizaitons:   Influenza: annually  Tetanus: up-to-date: 3/22/23  Anticipatory Guidance:  Elicits he is not eating a variety of fresh veggies/fruits, eats a variety of mostly lean meats,   Limited fast food/junk food  Soda: 2-3 cans daily sugar free soda daily; Water: 3 bottles daily  Exercise: recommended 150 minutes/week: not currently. SOB with exertion  Substance Abuse: no  Safe in relationship. Yes/  Seat belts, bike helmet, gun safety discussed.  Sun protection used.    ROS:  Review of Systems   Constitutional:  Negative for fever.   Respiratory:  Positive for shortness of breath (with exertion).    Musculoskeletal:  Positive for back pain.   All other systems reviewed and are negative.    Objective:     Exam:  BP 92/64 (BP Location: Right arm, Patient Position: Sitting, BP Cuff Size: Small adult)   Pulse (!) 102   Temp 36.8 °C (98.2 °F) (Temporal)   Resp 18   Ht 1.727 m (5' 8\")   Wt 64 kg (141 lb)   SpO2 90%   BMI 21.44 kg/m²  Body mass index is 21.44 kg/m².    Physical Exam  Vitals reviewed.   HENT:      Head: Normocephalic and atraumatic.      Right Ear: Tympanic membrane, ear canal and external ear normal.      Left Ear: Tympanic " membrane, ear canal and external ear normal.   Eyes:      Extraocular Movements: Extraocular movements intact.      Conjunctiva/sclera: Conjunctivae normal.      Pupils: Pupils are equal, round, and reactive to light.   Neck:      Thyroid: No thyromegaly.      Trachea: Trachea normal.   Cardiovascular:      Rate and Rhythm: Normal rate and regular rhythm.      Pulses: Normal pulses.      Heart sounds: Normal heart sounds. No murmur heard.  Pulmonary:      Effort: Accessory muscle usage present.      Breath sounds: Examination of the right-upper field reveals wheezing. Examination of the right-lower field reveals decreased breath sounds. Examination of the left-lower field reveals decreased breath sounds. Decreased breath sounds and wheezing present.   Musculoskeletal:         General: No swelling, tenderness or deformity.      Cervical back: Full passive range of motion without pain.      Comments: Reduced ROM to back, bilateral back pain without sciatica   Lymphadenopathy:      Cervical: No cervical adenopathy.   Skin:     General: Skin is warm.      Capillary Refill: Capillary refill takes less than 2 seconds.   Neurological:      General: No focal deficit present.      Mental Status: He is alert and oriented to person, place, and time.      Cranial Nerves: No cranial nerve deficit.      Sensory: No sensory deficit.      Motor: No weakness.       Assessment & Plan:     78 y.o. male with the following -     Problem List Items Addressed This Visit       Radiculopathy     Chronic, stable. Taking flexeril 10 mg nightly for chronic back pain.          Relevant Medications    cyclobenzaprine (FLEXERIL) 10 mg Tab    Thoracic back pain     Chronic, stable. Continue flexeril 10 mg daily.         Relevant Medications    cyclobenzaprine (FLEXERIL) 10 mg Tab    CKD (chronic kidney disease) stage 3, GFR 30-59 ml/min ([facility])    Relevant Orders    REFERRAL TO CARE MANAGEMENT     Charge for Chronic Care Management Program     Dyslipidemia     Chronic, stable continue zetia 10 mg daily          Relevant Medications    ezetimibe (ZETIA) 10 MG Tab    Other Relevant Orders    Lipid Profile    Primary cancer of left lower lobe of lung (HCC)     Chronic, managed by [facility] Ru[facility].          Primary lung adenocarcinoma (HCC)    Relevant Orders    REFERRAL TO CARE MANAGEMENT     Charge for Chronic Care Management Program    Pulmonary embolism on right (HCC)    Relevant Medications    ezetimibe (ZETIA) 10 MG Tab    Other Relevant Orders    REFERRAL TO CARE MANAGEMENT     Charge for Chronic Care Management Program    Diabetes (HCC)    Relevant Orders    MICROALBUMIN CREAT RATIO URINE    Hemoglobin A1c    REFERRAL TO CARE MANAGEMENT     Charge for Chronic Care Management Program    Heart failure with reduced ejection fraction (HCC)     Chronic, managed by cardiology. Continue metoprolol 30 mg daily         Relevant Medications    ezetimibe (ZETIA) 10 MG Tab     Other Visit Diagnoses       Need for vaccination        Relevant Orders    Tdap =>8yo IM          Patient was educated in proper administration of medication(s) ordered today including safety, possible SE, risks, benefits, rationale and alternatives to therapy.   Supportive care, differential diagnoses, and indications for immediate follow-up discussed with patient.    Pathogenesis of diagnosis discussed including typical length and natural progression.    Instructed to return to clinic or nearest emergency department for any change in condition, further concerns, or worsening of symptoms.  Patient states understanding of the plan of care and discharge instructions.    I have placed the below orders and discussed them with an approved delegating provider.  The MA is performing the below orders under the direction of Dr. Whitley.    I spent a total of 34 minutes with record review, exam, communication with the patient, communication with other providers, and documentation of  this encounter.    Return in about 3 months (around 6/22/2023) for weight check, monofilament.    Please note that this dictation was created using voice recognition software. I have made every reasonable attempt to correct obvious errors, but I expect that there are errors of grammar and possibly content that I did not discover before finalizing the note.

## 2023-03-22 NOTE — TELEPHONE ENCOUNTER
VOICEMAIL: 03/16/23  1. Caller Name: unknown calling for pt                      Call Back Number: [phone]    2. Message: A person called in for pt and lm. They seen Dr Ruiz and Dr Ruiz wanted pt to contact us to get pt on dieretics.   He has more fluid in his lungs.    3. Patient approves office to leave a detailed voicemail/MyChart message: N\A      Please advise

## 2023-03-22 NOTE — TELEPHONE ENCOUNTER
Called and spoke with pt. Pts spouse said he was on lasix in the hospital but when released he was taken off on it.     Pt has an upcoming appt on 03/31/23. They will discuss with him then.

## 2023-03-24 ENCOUNTER — HOSPITAL ENCOUNTER (INPATIENT)
Facility: MEDICAL CENTER | Age: 78
LOS: 2 days | DRG: 291 | End: 2023-03-26
Attending: EMERGENCY MEDICINE | Admitting: STUDENT IN AN ORGANIZED HEALTH CARE EDUCATION/TRAINING PROGRAM
Payer: MEDICARE

## 2023-03-24 ENCOUNTER — APPOINTMENT (OUTPATIENT)
Dept: RADIOLOGY | Facility: MEDICAL CENTER | Age: 78
DRG: 291 | End: 2023-03-24
Attending: EMERGENCY MEDICINE
Payer: MEDICARE

## 2023-03-24 DIAGNOSIS — I48.0 PAF (PAROXYSMAL ATRIAL FIBRILLATION) (HCC): ICD-10-CM

## 2023-03-24 DIAGNOSIS — R79.89 ELEVATED TROPONIN: ICD-10-CM

## 2023-03-24 DIAGNOSIS — I50.20 HEART FAILURE WITH REDUCED EJECTION FRACTION (HCC): ICD-10-CM

## 2023-03-24 DIAGNOSIS — J90 CHRONIC BILATERAL PLEURAL EFFUSIONS: ICD-10-CM

## 2023-03-24 DIAGNOSIS — R09.02 HYPOXIA: ICD-10-CM

## 2023-03-24 PROBLEM — J96.01 ACUTE HYPOXEMIC RESPIRATORY FAILURE (HCC): Status: ACTIVE | Noted: 2023-03-24

## 2023-03-24 PROBLEM — I47.29 NONSUSTAINED VENTRICULAR TACHYCARDIA (HCC): Status: ACTIVE | Noted: 2023-03-24

## 2023-03-24 LAB
ALBUMIN SERPL BCP-MCNC: 3.7 G/DL (ref 3.2–4.9)
ALBUMIN/GLOB SERPL: 0.9 G/DL
ALP SERPL-CCNC: 69 U/L (ref 30–99)
ALT SERPL-CCNC: 24 U/L (ref 2–50)
ANION GAP SERPL CALC-SCNC: 11 MMOL/L (ref 7–16)
AST SERPL-CCNC: 29 U/L (ref 12–45)
BASOPHILS # BLD AUTO: 0.4 % (ref 0–1.8)
BASOPHILS # BLD: 0.04 K/UL (ref 0–0.12)
BILIRUB SERPL-MCNC: 0.5 MG/DL (ref 0.1–1.5)
BUN SERPL-MCNC: 19 MG/DL (ref 8–22)
CALCIUM ALBUM COR SERPL-MCNC: 10.4 MG/DL (ref 8.5–10.5)
CALCIUM SERPL-MCNC: 10.2 MG/DL (ref 8.5–10.5)
CHLORIDE SERPL-SCNC: 106 MMOL/L (ref 96–112)
CO2 SERPL-SCNC: 21 MMOL/L (ref 20–33)
CREAT SERPL-MCNC: 1.47 MG/DL (ref 0.5–1.4)
EKG IMPRESSION: NORMAL
EOSINOPHIL # BLD AUTO: 0.18 K/UL (ref 0–0.51)
EOSINOPHIL NFR BLD: 2 % (ref 0–6.9)
ERYTHROCYTE [DISTWIDTH] IN BLOOD BY AUTOMATED COUNT: 48.2 FL (ref 35.9–50)
GFR SERPLBLD CREATININE-BSD FMLA CKD-EPI: 48 ML/MIN/1.73 M 2
GLOBULIN SER CALC-MCNC: 4 G/DL (ref 1.9–3.5)
GLUCOSE SERPL-MCNC: 143 MG/DL (ref 65–99)
HCT VFR BLD AUTO: 45.8 % (ref 42–52)
HGB BLD-MCNC: 15.3 G/DL (ref 14–18)
IMM GRANULOCYTES # BLD AUTO: 0.05 K/UL (ref 0–0.11)
IMM GRANULOCYTES NFR BLD AUTO: 0.6 % (ref 0–0.9)
LYMPHOCYTES # BLD AUTO: 2.54 K/UL (ref 1–4.8)
LYMPHOCYTES NFR BLD: 28.1 % (ref 22–41)
MCH RBC QN AUTO: 32.6 PG (ref 27–33)
MCHC RBC AUTO-ENTMCNC: 33.4 G/DL (ref 33.7–35.3)
MCV RBC AUTO: 97.4 FL (ref 81.4–97.8)
MONOCYTES # BLD AUTO: 0.92 K/UL (ref 0–0.85)
MONOCYTES NFR BLD AUTO: 10.2 % (ref 0–13.4)
NEUTROPHILS # BLD AUTO: 5.32 K/UL (ref 1.82–7.42)
NEUTROPHILS NFR BLD: 58.7 % (ref 44–72)
NRBC # BLD AUTO: 0 K/UL
NRBC BLD-RTO: 0 /100 WBC
NT-PROBNP SERPL IA-MCNC: 1876 PG/ML (ref 0–125)
PLATELET # BLD AUTO: 221 K/UL (ref 164–446)
PMV BLD AUTO: 9.9 FL (ref 9–12.9)
POTASSIUM SERPL-SCNC: 5 MMOL/L (ref 3.6–5.5)
PROT SERPL-MCNC: 7.7 G/DL (ref 6–8.2)
RBC # BLD AUTO: 4.7 M/UL (ref 4.7–6.1)
SODIUM SERPL-SCNC: 138 MMOL/L (ref 135–145)
TROPONIN T SERPL-MCNC: 79 NG/L (ref 6–19)
WBC # BLD AUTO: 9.1 K/UL (ref 4.8–10.8)

## 2023-03-24 PROCEDURE — 36415 COLL VENOUS BLD VENIPUNCTURE: CPT

## 2023-03-24 PROCEDURE — 99223 1ST HOSP IP/OBS HIGH 75: CPT | Mod: AI,25 | Performed by: STUDENT IN AN ORGANIZED HEALTH CARE EDUCATION/TRAINING PROGRAM

## 2023-03-24 PROCEDURE — 93005 ELECTROCARDIOGRAM TRACING: CPT | Performed by: EMERGENCY MEDICINE

## 2023-03-24 PROCEDURE — 93005 ELECTROCARDIOGRAM TRACING: CPT

## 2023-03-24 PROCEDURE — 700117 HCHG RX CONTRAST REV CODE 255: Performed by: EMERGENCY MEDICINE

## 2023-03-24 PROCEDURE — A9270 NON-COVERED ITEM OR SERVICE: HCPCS | Performed by: STUDENT IN AN ORGANIZED HEALTH CARE EDUCATION/TRAINING PROGRAM

## 2023-03-24 PROCEDURE — 85025 COMPLETE CBC W/AUTO DIFF WBC: CPT

## 2023-03-24 PROCEDURE — 84484 ASSAY OF TROPONIN QUANT: CPT

## 2023-03-24 PROCEDURE — 700111 HCHG RX REV CODE 636 W/ 250 OVERRIDE (IP): Performed by: EMERGENCY MEDICINE

## 2023-03-24 PROCEDURE — 99285 EMERGENCY DEPT VISIT HI MDM: CPT

## 2023-03-24 PROCEDURE — 99497 ADVNCD CARE PLAN 30 MIN: CPT | Performed by: STUDENT IN AN ORGANIZED HEALTH CARE EDUCATION/TRAINING PROGRAM

## 2023-03-24 PROCEDURE — 96374 THER/PROPH/DIAG INJ IV PUSH: CPT

## 2023-03-24 PROCEDURE — 700102 HCHG RX REV CODE 250 W/ 637 OVERRIDE(OP): Performed by: STUDENT IN AN ORGANIZED HEALTH CARE EDUCATION/TRAINING PROGRAM

## 2023-03-24 PROCEDURE — 71275 CT ANGIOGRAPHY CHEST: CPT

## 2023-03-24 PROCEDURE — 71045 X-RAY EXAM CHEST 1 VIEW: CPT

## 2023-03-24 PROCEDURE — 80053 COMPREHEN METABOLIC PANEL: CPT

## 2023-03-24 PROCEDURE — 770020 HCHG ROOM/CARE - TELE (206)

## 2023-03-24 PROCEDURE — 83880 ASSAY OF NATRIURETIC PEPTIDE: CPT

## 2023-03-24 RX ORDER — BUDESONIDE AND FORMOTEROL FUMARATE DIHYDRATE 160; 4.5 UG/1; UG/1
2 AEROSOL RESPIRATORY (INHALATION)
Status: DISCONTINUED | OUTPATIENT
Start: 2023-03-25 | End: 2023-03-26 | Stop reason: HOSPADM

## 2023-03-24 RX ORDER — ACETAMINOPHEN 325 MG/1
650 TABLET ORAL EVERY 6 HOURS PRN
Status: DISCONTINUED | OUTPATIENT
Start: 2023-03-24 | End: 2023-03-26 | Stop reason: HOSPADM

## 2023-03-24 RX ORDER — METOPROLOL SUCCINATE 25 MG/1
25 TABLET, EXTENDED RELEASE ORAL DAILY
Status: DISCONTINUED | OUTPATIENT
Start: 2023-03-25 | End: 2023-03-26 | Stop reason: HOSPADM

## 2023-03-24 RX ORDER — EZETIMIBE 10 MG/1
10 TABLET ORAL
Status: DISCONTINUED | OUTPATIENT
Start: 2023-03-25 | End: 2023-03-26 | Stop reason: HOSPADM

## 2023-03-24 RX ORDER — FUROSEMIDE 40 MG/1
40 TABLET ORAL DAILY
Status: ON HOLD | COMMUNITY
End: 2023-03-26 | Stop reason: SDUPTHER

## 2023-03-24 RX ORDER — CYCLOBENZAPRINE HCL 10 MG
10 TABLET ORAL
Status: DISCONTINUED | OUTPATIENT
Start: 2023-03-24 | End: 2023-03-26 | Stop reason: HOSPADM

## 2023-03-24 RX ORDER — FUROSEMIDE 10 MG/ML
20 INJECTION INTRAMUSCULAR; INTRAVENOUS ONCE
Status: COMPLETED | OUTPATIENT
Start: 2023-03-24 | End: 2023-03-24

## 2023-03-24 RX ADMIN — IOHEXOL 60 ML: 350 INJECTION, SOLUTION INTRAVENOUS at 14:59

## 2023-03-24 RX ADMIN — FUROSEMIDE 20 MG: 20 INJECTION, SOLUTION INTRAMUSCULAR; INTRAVENOUS at 17:00

## 2023-03-24 RX ADMIN — CYCLOBENZAPRINE 10 MG: 10 TABLET, FILM COATED ORAL at 20:06

## 2023-03-24 ASSESSMENT — LIFESTYLE VARIABLES
DOES PATIENT WANT TO STOP DRINKING: NO
TOTAL SCORE: 0
ON A TYPICAL DAY WHEN YOU DRINK ALCOHOL HOW MANY DRINKS DO YOU HAVE: 0
ALCOHOL_USE: NO
TOTAL SCORE: 0
EVER FELT BAD OR GUILTY ABOUT YOUR DRINKING: NO
CONSUMPTION TOTAL: NEGATIVE
HAVE PEOPLE ANNOYED YOU BY CRITICIZING YOUR DRINKING: NO
AVERAGE NUMBER OF DAYS PER WEEK YOU HAVE A DRINK CONTAINING ALCOHOL: 0
HAVE YOU EVER FELT YOU SHOULD CUT DOWN ON YOUR DRINKING: NO
EVER HAD A DRINK FIRST THING IN THE MORNING TO STEADY YOUR NERVES TO GET RID OF A HANGOVER: NO
HOW MANY TIMES IN THE PAST YEAR HAVE YOU HAD 5 OR MORE DRINKS IN A DAY: 0
TOTAL SCORE: 0

## 2023-03-24 ASSESSMENT — COGNITIVE AND FUNCTIONAL STATUS - GENERAL
MOBILITY SCORE: 24
SUGGESTED CMS G CODE MODIFIER MOBILITY: CH
SUGGESTED CMS G CODE MODIFIER DAILY ACTIVITY: CH
DAILY ACTIVITIY SCORE: 24

## 2023-03-24 ASSESSMENT — ENCOUNTER SYMPTOMS
SHORTNESS OF BREATH: 1
NEUROLOGICAL NEGATIVE: 1
PSYCHIATRIC NEGATIVE: 1
EYES NEGATIVE: 1
GASTROINTESTINAL NEGATIVE: 1
MUSCULOSKELETAL NEGATIVE: 1

## 2023-03-24 ASSESSMENT — FIBROSIS 4 INDEX
FIB4 SCORE: 2.09
FIB4 SCORE: 3.11

## 2023-03-24 NOTE — ASSESSMENT & PLAN NOTE
Noted to have several episodes of nonsustained V. tach and PSVT on telemetry during last admission.  Cardiology consulted at that time, recommended metoprolol, can continue metoprolol.  Telemetry monitoring overnight.

## 2023-03-24 NOTE — TELEPHONE ENCOUNTER
Spoke with Dr Cheatham. It would be better for pt to go to the ED in [place] to get a CXR to see how much fluid is in his lungs and they should be able to drain pt there if needed.     Called and spoke with pt's son. Notified him of this. He is on his way to [place]. Told him to go to ED here and they can re-assess him here. He understood.

## 2023-03-24 NOTE — ED PROVIDER NOTES
ER Provider Note    Scribed for Stefanie Basurto M.d. by Gayla Brooks. 3/24/2023  1:40 PM    Primary Care Provider: JILL Welsh    CHIEF COMPLAINT  Chief Complaint   Patient presents with    Shortness of Breath     Started this morning.  Seen for same last week. Reports fluid build up on lungs. Worse with exertion. New dx of CHF.  Denies being d/c's with a diuretic.      EXTERNAL RECORDS REVIEWED  Inpatient Notes Patient was recently hospitalized for pneumonia, PE and Respiratory Failure. He also has a history of lung cancer in remission after resection. He has heart failure with an EF of 35%. Hx of pleural effusion and pulmonary edema.     HPI/ROS  LIMITATION TO HISTORY   Select: : None  OUTSIDE HISTORIAN(S):  Family Pito Black is a 78 y.o. male with a history of blood clots and lung cancer who presents to the ED complaining of chronic shortness of breath worsening this morning. He endorses associated cough. He is not on oxygen at home. Per family, the patient was seen for shortness of breath last week here in the ED and had a thoracocentesis of his left lung. The patient is on Eliqus. Per family, the patient feels improved following thoracocentesis. He reports feeling worse when he ambulates. The patient is followed by Dr. Dang, oncology, who typically orders for him to have a thoracocentesis. He denies any feet swelling, chest pain or fever. The patient is planning to get in with Cardiovascular for his CHF but has not been able to be seen yet. The patient was seen in January and was taken off Lasix and found to have a blood clot shortly after. The patient is not on antibiotics. Family denies any exposure to COVID.     PAST MEDICAL HISTORY  Past Medical History:   Diagnosis Date    Acute respiratory failure with hypoxia (HCC) 02/01/2023    Arrhythmia     hx of a fib    Arthritis 2015    generalized, DDD back    Asthma     inhaler     Backpain 08/06/2018    9/10    Cancer ([facility])  "07/2017    left lung    Cataract     bilateral, no surgery    Congestive heart failure (HCC)     Dental disorder     lower partial, removable bridge    Diabetes 08/06/2018    on no meds at this time, diet controlled    Heart valve disease     mild mitral valve prolapse    High cholesterol     Hypertension        SURGICAL HISTORY  Past Surgical History:   Procedure Laterality Date    OK BRONCHOSCOPY,DIAGNOSTIC  7/1/2021    Procedure: BRONCHOSCOPY - FIBER OPTIC WITH BRANCHOALVEOLAR LAVAGE BIOPSY, FNA, NAVIGATION;  Surgeon: Vinayak Carbajal M.D.;  Location: SURGERY [facility];  Service: Pulmonary    ENDOBRONCHIAL US ADD-ON  7/1/2021    Procedure: ENDOBRONCHIAL ULTRASOUND (EBUS).;  Surgeon: Vinayak Carbajal M.D.;  Location: SURGERY [facility];  Service: Pulmonary    CATARACT PHACO WITH IOL Left 8/21/2018    Procedure: CATARACT PHACO WITH IOL;  Surgeon: Juanito Hager M.D.;  Location: SURGERY SAME DAY [facility];  Service: Ophthalmology    CATARACT PHACO WITH IOL Right 8/7/2018    Procedure: CATARACT PHACO WITH IOL;  Surgeon: Juanito Hager M.D.;  Location: SURGERY SAME DAY [facility];  Service: Ophthalmology    THORACOSCOPY Left 4/19/2018    Procedure: THORACOSCOPY- WEDGE BIOPSY W/FROZEN SECTION;  Surgeon: Cristhian Galeano M.D.;  Location: SURGERY [facility];  Service: Thoracic    EAR MIDDLE EXPLORATION Right 6/12/2015    Procedure: EAR MIDDLE EXPLORATION;  Surgeon: William Brandon M.D.;  Location: SURGERY SAME DAY [facility];  Service:     OSSICULAR RECONSTRUCTION Right 6/12/2015    Procedure: OSSICULAR RECONSTRUCTION CHAIN POSSIBLE;  Surgeon: William Brandon M.D.;  Location: SURGERY SAME DAY [facility];  Service:     OTHER      ear replacement \"many years ago\"    OTHER ORTHOPEDIC SURGERY      right knee replacement 2005       FAMILY HISTORY  Family History   Problem Relation Age of Onset    Stroke Mother     Hypertension Mother     Diabetes Mother     Cancer Father         stomach cancer    " Heart Disease Brother     Ovarian Cancer Neg Hx     Tubal Cancer Neg Hx     Peritoneal Cancer Neg Hx     Colorectal Cancer Neg Hx     Breast Cancer Neg Hx     Hyperlipidemia Neg Hx        SOCIAL HISTORY   reports that he quit smoking about 18 years ago. His smoking use included cigarettes. He has a 10.00 pack-year smoking history. He has never used smokeless tobacco. He reports that he does not currently use alcohol after a past usage of about 12.6 - 21.0 oz per week. He reports that he does not use drugs.    CURRENT MEDICATIONS  Current Discharge Medication List        CONTINUE these medications which have NOT CHANGED    Details   furosemide (LASIX) 40 MG Tab Take 40 mg by mouth every day.      cyclobenzaprine (FLEXERIL) 10 mg Tab Take 1 Tablet by mouth at bedtime.  Qty: 100 Tablet, Refills: 3    Associated Diagnoses: Chronic bilateral thoracic back pain      ezetimibe (ZETIA) 10 MG Tab Take 1 Tablet by mouth every day.  Qty: 100 Tablet, Refills: 3    Associated Diagnoses: Dyslipidemia      metoprolol SR (TOPROL XL) 25 MG TABLET SR 24 HR Take 1 Tablet by mouth every day.  Qty: 30 Tablet, Refills: 0    Associated Diagnoses: Heart failure with reduced ejection fraction ([facility]); PAF (paroxysmal atrial fibrillation) ([facility])      apixaban (ELIQUIS) 5mg Tab Take 1 Tablet by mouth 2 times a day for 30 days. Indications: DVT/PE  Qty: 60 Tablet, Refills: 0    Associated Diagnoses: Pulmonary embolism on right ([facility])      diphenhydrAMINE-APAP, sleep, (TYLENOL PM EXTRA STRENGTH)  MG Tab Take 1-2 Tablets by mouth at bedtime as needed (Mild Pain or Trouble Sleeping).      fluticasone furoate-vilanterol (BREO ELLIPTA) 200-25 MCG/ACT AEROSOL POWDER, BREATH ACTIVATED Inhale 1 Puff every day. Rinse mouth after use.  Qty: 1 Each, Refills: 11    Associated Diagnoses: Shortness of breath      Cholecalciferol (VITAMIN D3) 2000 UNIT Cap Take 2,000 Units by mouth every day.             ALLERGIES  Patient has no known  "allergies.    PHYSICAL EXAM  /75   Pulse (!) 102   Temp 36.7 °C (98.1 °F) (Temporal)   Resp 16   Ht 1.727 m (5' 8\")   Wt 63.7 kg (140 lb 6.9 oz)   SpO2 93%   BMI 21.35 kg/m²   Constitutional: Well developed, No acute distress, Non-toxic appearance.   HENT: Normocephalic, Atraumatic, Bilateral external ears normal, Nose normal.   Eyes: PERRL, EOMI, Conjunctiva normal.  Neck: Normal range of motion, No tenderness, Supple, No stridor.   Cardiovascular: Tachycardic heart rate, Normal rhythm.  Thorax & Lungs: Normal breath sounds, No respiratory distress, No wheezing, No chest tenderness.   Abdomen: Benign abdominal exam, no tenderness, no distention  Skin: Warm, Dry, No erythema, No rash.   Back: No tenderness, No CVA tenderness.   Extremities: Intact distal pulses, No edema, No tenderness   Neurologic: Alert & oriented x 3, Normal motor function, Normal sensory function, No focal deficits noted.  Psychiatric: appropriate    DIAGNOSTIC STUDIES    Labs:   Results for orders placed or performed during the hospital encounter of 03/24/23   CBC with Differential   Result Value Ref Range    WBC 9.1 4.8 - 10.8 K/uL    RBC 4.70 4.70 - 6.10 M/uL    Hemoglobin 15.3 14.0 - 18.0 g/dL    Hematocrit 45.8 42.0 - 52.0 %    MCV 97.4 81.4 - 97.8 fL    MCH 32.6 27.0 - 33.0 pg    MCHC 33.4 (L) 33.7 - 35.3 g/dL    RDW 48.2 35.9 - 50.0 fL    Platelet Count 221 164 - 446 K/uL    MPV 9.9 9.0 - 12.9 fL    Neutrophils-Polys 58.70 44.00 - 72.00 %    Lymphocytes 28.10 22.00 - 41.00 %    Monocytes 10.20 0.00 - 13.40 %    Eosinophils 2.00 0.00 - 6.90 %    Basophils 0.40 0.00 - 1.80 %    Immature Granulocytes 0.60 0.00 - 0.90 %    Nucleated RBC 0.00 /100 WBC    Neutrophils (Absolute) 5.32 1.82 - 7.42 K/uL    Lymphs (Absolute) 2.54 1.00 - 4.80 K/uL    Monos (Absolute) 0.92 (H) 0.00 - 0.85 K/uL    Eos (Absolute) 0.18 0.00 - 0.51 K/uL    Baso (Absolute) 0.04 0.00 - 0.12 K/uL    Immature Granulocytes (abs) 0.05 0.00 - 0.11 K/uL    NRBC " (Absolute) 0.00 K/uL   Comp Metabolic Panel   Result Value Ref Range    Sodium 138 135 - 145 mmol/L    Potassium 5.0 3.6 - 5.5 mmol/L    Chloride 106 96 - 112 mmol/L    Co2 21 20 - 33 mmol/L    Anion Gap 11.0 7.0 - 16.0    Glucose 143 (H) 65 - 99 mg/dL    Bun 19 8 - 22 mg/dL    Creatinine 1.47 (H) 0.50 - 1.40 mg/dL    Calcium 10.2 8.5 - 10.5 mg/dL    AST(SGOT) 29 12 - 45 U/L    ALT(SGPT) 24 2 - 50 U/L    Alkaline Phosphatase 69 30 - 99 U/L    Total Bilirubin 0.5 0.1 - 1.5 mg/dL    Albumin 3.7 3.2 - 4.9 g/dL    Total Protein 7.7 6.0 - 8.2 g/dL    Globulin 4.0 (H) 1.9 - 3.5 g/dL    A-G Ratio 0.9 g/dL   proBrain Natriuretic Peptide, NT   Result Value Ref Range    NT-proBNP 1876 (H) 0 - 125 pg/mL   CORRECTED CALCIUM   Result Value Ref Range    Correct Calcium 10.4 8.5 - 10.5 mg/dL   ESTIMATED GFR   Result Value Ref Range    GFR (CKD-EPI) 48 (A) >60 mL/min/1.73 m 2   TROPONIN   Result Value Ref Range    Troponin T 79 (H) 6 - 19 ng/L   EKG (NOW)   Result Value Ref Range    Report       [facility] Emergency Dept.    Test Date:  2023  Pt Name:    DOROTHEA SALINAS                  Department: ER  MRN:        [id number]                      Room:       [facility]  Gender:     Male                         Technician: [id number]  :        1945                   Requested By:ER TRIAGE PROTOCOL  Order #:    [id number]                    Reading MD: Stefanie Thorne MD    Measurements  Intervals                                Axis  Rate:       106                          P:          49  FL:         131                          QRS:        20  QRSD:       101                          T:          107  QT:         354  QTc:        471    Interpretive Statements  Sinus tachycardia  Ventricular trigeminy  Nonspecific repol abnormality, lateral leads  Compared to ECG 2023 21:25:53  No significant changes  Electronically Signed On 3- 16:13:24 PDT by Stefanie Thorne MD         Radiology:   The attending  emergency physician has independently interpreted the diagnostic imaging associated with this visit and am waiting the final reading from the radiologist.   Preliminary interpretation is a follows: no pneumothorax  Radiologist interpretation:     CT-CTA CHEST PULMONARY ARTERY W/ RECONS   Final Result         1. No CT evidence of pulmonary embolism.      2. Worsening bilateral pleural effusions.      3. Irregular nodular and airspace opacities in the left lingula and right upper lobe are grossly similar and could be infection, inflammation or malignancy.            DX-CHEST-PORTABLE (1 VIEW)   Final Result         No significant interval change.      Patchy airspace opacities in the right upper lobe and left lower lobe, similar to prior        COURSE & MEDICAL DECISION MAKING     ED Observation Status? No; Patient does not meet criteria for ED Observation.     INITIAL ASSESSMENT, COURSE AND PLAN  Care Narrative: Patient presents to the emergency department for evaluation of shortness of breath.  Patient has a history of PE but is currently on anticoagulation.  He also has a history of bilateral pleural effusions that require intermittent draining.  He also was recently diagnosed with CHF with an EF of 35%.  Exam does not suggest CHF.  There is no leg edema.  There is no crackles on exam.  Patient initially was not hypoxic but when he ambulates he drops into the low 80s.  Patient denies recent fevers or cough to suggest an infectious etiology.  Will obtain x-ray and labs to further evaluate his shortness of breath.    1:42 PM - Patient seen and examined at bedside. Discussed plan of care, including labs and radiology. Patient agrees to the plan of care. Ordered for CT-CTA chest pulmonary artery with recons, Dx-chest, Troponin, PNP, Corrected Calcium, CBC with differential, CMP and EKG to evaluate his symptoms. Xray does not show any fluid to be drained. Differential diagnoses include but not limited to: CHF, pleural  effusion and infection, doubt PE.    Laboratory studies show an elevated BNP.  Which actually improved from previous BNP levels.  He is not currently on any diuretics at home.  He does have some mild renal insufficiency.  And I am concerned that the Lasix may make him even drier but I do think we will try some Lasix to see if it helps with his respiratory distress.  Chest x-ray was nondiagnostic.  Therefore I felt CT imaging was necessary to further evaluate if there was large pleural effusions causing his shortness of breath.  His troponin returned and is elevated.  However it appears to be at baseline from previous troponin levels.  EKG shows no evidence of an acute MI.  Not complaining of any active chest pain.    4:12 PM - Patient seen at bedside. The patient is hypoxic on 2 liters. I updated him on all diagnostic results and the plan of care.     The imaging has returned and does show evidence of pleural effusion worse on the left.  I suspect this the etiology of his shortness of breath.  Patient will be hospitalized for diuresis and possible thoracentesis to help with the shortness of breath.  He is to tachypneic with ambulation for discharge especially in light of  not having oxygen at home.    4:42 PM - Patient will be treated with Lasix 20 mg.     Discussed the case with the hospitalist who admit the patient.      DISPOSITION AND DISCUSSIONS  I have discussed management of the patient with the following physicians and EDWIN's:  Dr. Weiss, hospitalist      Barriers to care at this time, including but not limited to:  None .       DISPOSITION:  Patient will be hospitalized by Dr. Weiss in guarded condition.    FINAL DIANGOSIS  1. Hypoxia    2. Chronic bilateral pleural effusions    3. Elevated troponin           The note accurately reflects work and decisions made by me.  Stefanie Basurto M.D.  3/24/2023  6:45 PM

## 2023-03-24 NOTE — H&P
Hospital Medicine History & Physical Note    Date of Service  3/24/2023    Primary Care Physician  LULÚ Welsh.    Consultants  None    Code Status  Full Code    Chief Complaint  Chief Complaint   Patient presents with    Shortness of Breath     Started this morning.  Seen for same last week. Reports fluid build up on lungs. Worse with exertion. New dx of CHF.  Denies being d/c's with a diuretic.        History of Presenting Illness  Pito Black is a 78 y.o. male who presented 3/24/2023 with progressively worsening shortness of breath.    Has a history of lung cancer in remission status post wedge resection in 2018 and chemotherapy and radiation, A-fib on Eliquis, hypertension, CKD.  Recently presented to [facility] several days ago.  He was found to have pulmonary edema with left side greater than right side.  Thoracentesis was performed on his left lung, 1.6 L was removed.  Pleural fluid appears transudative.  Pleural fluid and blood cultures negative.  Pathology eventually resulted in no evidence of malignancy.  Echocardiogram done at the time showing EF 35% with global hypokinesis with regional variation, mild aortic insufficiency, moderate mitral regurgitation.  Hospital course complicated by patient developing nonsustained runs of V. tach and PSVT to which patient was then started on metoprolol.    Since patient has been at home, he has been doing well.  Does not require oxygen normally at home.  However for the last few days, he noted to have worsening shortness of breath upon ambulation.  He denies chest pain fever chills palpitations lower extremity swelling.    In the ED, patient tachycardic and hypoxic. Remarkable labs include Cr 1.47, troponin 79, BNP 1876. CTPA shows no pulmonary embolism and worsening bilateral pleural effusion.     I discussed the plan of care with patient.    Review of Systems  Review of Systems   Constitutional:  Positive for malaise/fatigue.   HENT:  Negative.     Eyes: Negative.    Respiratory:  Positive for shortness of breath.    Cardiovascular:  Negative for chest pain.   Gastrointestinal: Negative.    Genitourinary: Negative.    Musculoskeletal: Negative.    Skin: Negative.    Neurological: Negative.    Endo/Heme/Allergies: Negative.    Psychiatric/Behavioral: Negative.       Past Medical History   has a past medical history of Acute respiratory failure with hypoxia ([facility]) (02/01/2023), Arrhythmia, Arthritis (2015), Asthma, Backpain (08/06/2018), Cancer ([facility]) (07/2017), Cataract, Congestive heart failure ([facility]), Dental disorder, Diabetes (08/06/2018), Heart valve disease, High cholesterol, and Hypertension.    Surgical History   has a past surgical history that includes other orthopedic surgery; other; ear middle exploration (Right, 6/12/2015); ossicular reconstruction (Right, 6/12/2015); thoracoscopy (Left, 4/19/2018); cataract phaco with iol (Right, 8/7/2018); cataract phaco with iol (Left, 8/21/2018); pr bronchoscopy,diagnostic (7/1/2021); and endobronchial us add-on (7/1/2021).     Family History  family history includes Cancer in his father; Diabetes in his mother; Heart Disease in his brother; Hypertension in his mother; Stroke in his mother.   Family history reviewed with patient. There is no family history that is pertinent to the chief complaint.     Social History   reports that he quit smoking about 18 years ago. His smoking use included cigarettes. He has a 10.00 pack-year smoking history. He has never used smokeless tobacco. He reports that he does not currently use alcohol after a past usage of about 12.6 - 21.0 oz per week. He reports that he does not use drugs.    Allergies  No Known Allergies    Medications  Prior to Admission Medications   Prescriptions Last Dose Informant Patient Reported? Taking?   Cholecalciferol (VITAMIN D3) 2000 UNIT Cap  Patient, Family Member Yes No   Sig: Take 2,000 Units by mouth every day.   apixaban (ELIQUIS) 5mg  Tab   No No   Sig: Take 1 Tablet by mouth 2 times a day for 30 days. Indications: DVT/PE   cyclobenzaprine (FLEXERIL) 10 mg Tab   No No   Sig: Take 1 Tablet by mouth at bedtime.   diphenhydrAMINE-APAP, sleep, (TYLENOL PM EXTRA STRENGTH)  MG Tab  Patient, Family Member Yes No   Sig: Take 1-2 Tablets by mouth at bedtime as needed (Mild Pain or Trouble Sleeping).   ezetimibe (ZETIA) 10 MG Tab   No No   Sig: Take 1 Tablet by mouth every day.   fluticasone furoate-vilanterol (BREO ELLIPTA) 200-25 MCG/ACT AEROSOL POWDER, BREATH ACTIVATED  Patient, Family Member No No   Sig: Inhale 1 Puff every day. Rinse mouth after use.   metoprolol SR (TOPROL XL) 25 MG TABLET SR 24 HR   No No   Sig: Take 1 Tablet by mouth every day.      Facility-Administered Medications: None       Physical Exam  Temp:  [36.7 °C (98.1 °F)] 36.7 °C (98.1 °F)  Pulse:  [] 101  Resp:  [16-20] 18  BP: (117-129)/(61-79) 121/61  SpO2:  [83 %-99 %] 99 %  Blood Pressure : 121/61   Temperature: 36.7 °C (98.1 °F)   Pulse: (!) 101   Respiration: 18   Pulse Oximetry: 99 %       Physical Exam  Constitutional:       Appearance: Normal appearance. He is normal weight.   HENT:      Head: Normocephalic.      Nose: Nose normal.      Mouth/Throat:      Mouth: Mucous membranes are moist.   Eyes:      Pupils: Pupils are equal, round, and reactive to light.   Cardiovascular:      Rate and Rhythm: Normal rate. Rhythm irregular.   Pulmonary:      Effort: Pulmonary effort is normal.      Comments: Saturating 93% on nasal cannula  Crackles heard on both lung bases bilaterally, left greater than right  Abdominal:      General: Abdomen is flat. Bowel sounds are normal.      Palpations: Abdomen is soft.   Musculoskeletal:         General: Normal range of motion.      Cervical back: Neck supple.   Skin:     General: Skin is warm.   Neurological:      General: No focal deficit present.      Mental Status: He is alert and oriented to person, place, and time. Mental status  is at baseline.   Psychiatric:         Mood and Affect: Mood normal.         Behavior: Behavior normal.         Thought Content: Thought content normal.         Judgment: Judgment normal.       Laboratory:  Recent Labs     03/24/23  1310   WBC 9.1   RBC 4.70   HEMOGLOBIN 15.3   HEMATOCRIT 45.8   MCV 97.4   MCH 32.6   MCHC 33.4*   RDW 48.2   PLATELETCT 221   MPV 9.9     Recent Labs     03/24/23  1310   SODIUM 138   POTASSIUM 5.0   CHLORIDE 106   CO2 21   GLUCOSE 143*   BUN 19   CREATININE 1.47*   CALCIUM 10.2     Recent Labs     03/24/23  1310   ALTSGPT 24   ASTSGOT 29   ALKPHOSPHAT 69   TBILIRUBIN 0.5   GLUCOSE 143*         Recent Labs     03/24/23  1310   NTPROBNP 1876*         Recent Labs     03/24/23  1310   TROPONINT 79*       Imaging:  CT-CTA CHEST PULMONARY ARTERY W/ RECONS   Final Result         1. No CT evidence of pulmonary embolism.      2. Worsening bilateral pleural effusions.      3. Irregular nodular and airspace opacities in the left lingula and right upper lobe are grossly similar and could be infection, inflammation or malignancy.            DX-CHEST-PORTABLE (1 VIEW)   Final Result         No significant interval change.      Patchy airspace opacities in the right upper lobe and left lower lobe, similar to prior          X-Ray:  I have personally reviewed the images and compared with prior images.    Assessment/Plan:  Justification for Admission Status  I anticipate this patient will require at least two midnights for appropriate medical management, necessitating inpatient admission because patient has acute hypoxemic respiratory failure secondary to bilateral pleural effusion.  He will need a formal pulmonology consult with thoracentesis    Patient will need a Telemetry bed on MEDICAL service .  The need is secondary to hypoxemia and history of nonsustained V. tach.    * Acute hypoxemic respiratory failure (HCC)- (present on admission)  Assessment & Plan  Spoke with ERP.  Currently patient is  requiring oxygen when at baseline he does not need it.  CTPA does not demonstrate pulmonary embolism however does redemonstrate bilateral pleural effusions with left side greater than right.  He recently had 1.6 L drained on last admission, and will need a pulmonary consult in a.m. for another diagnostic/therapeutic thoracentesis.  I do not think that Lasix will be enough to resolve the pleural effusions as monotherapy.  He will need to be monitored overnight for worsening respiratory distress, hypoxemia.  Eliquis will be held for now as anticipate thoracentesis would be completed tomorrow    Nonsustained ventricular tachycardia  Assessment & Plan  Noted to have several episodes of nonsustained V. tach and PSVT on telemetry during last admission.  Cardiology consulted at that time, recommended metoprolol, can continue metoprolol.  Telemetry monitoring overnight.    ACP (advance care planning)- (present on admission)  Assessment & Plan  I discussed advance care planning with the patient and family for at least 16 minutes, including diagnosis, prognosis, plan of care, risks and benefits of any therapies that could be offered, as well as alternatives including palliation and hospice, as appropriate.  Family members and patient states that they would like to discuss this with each other but for now would like to leave patient as full code.        VTE prophylaxis: pharmacologic prophylaxis contraindicated due to thoracentesis in am

## 2023-03-24 NOTE — TELEPHONE ENCOUNTER
Pt son, Pito called this morning. Pt is having SOB again. He thinks he might have more fluid in his lungs. He was on Lasix in the hospital but was discharged with no lasix.  Told pt's son, Pito. It would be better for him to be seen at the hospital. Pt's son stated Dr Carbajal said if this happens again to let him know and we can possible do an in-pt fluid removal at our office.Notified him, I will talk with one of our providers in the office to advise. He understood.

## 2023-03-24 NOTE — ED TRIAGE NOTES
"Chief Complaint   Patient presents with    Shortness of Breath     Started this morning.  Seen for same last week. Reports fluid build up on lungs. Worse with exertion. New dx of CHF.  Denies being d/c's with a diuretic.      Ambulatory to triage for above. Protocol ordered.     /75   Pulse (!) 102   Temp 36.7 °C (98.1 °F) (Temporal)   Resp 16   Ht 1.727 m (5' 8\")   Wt 63.7 kg (140 lb 6.9 oz)   SpO2 93%   BMI 21.35 kg/m²     "

## 2023-03-25 ENCOUNTER — APPOINTMENT (OUTPATIENT)
Dept: RADIOLOGY | Facility: MEDICAL CENTER | Age: 78
DRG: 291 | End: 2023-03-25
Attending: GENERAL PRACTICE
Payer: MEDICARE

## 2023-03-25 PROBLEM — I50.23 ACUTE ON CHRONIC SYSTOLIC HEART FAILURE (HCC): Status: ACTIVE | Noted: 2023-03-18

## 2023-03-25 LAB
AMYLASE FLD-CCNC: 23 U/L
APPEARANCE FLD: CLEAR
BASOPHILS NFR FLD: 2 %
BODY FLD TYPE: NORMAL
COLOR FLD: YELLOW
CYTOLOGY REG CYTOL: NORMAL
EOSINOPHIL NFR FLD: 14 %
GLUCOSE FLD-MCNC: 166 MG/DL
GRAM STN SPEC: NORMAL
HISTIOCYTES NFR FLD: 33 %
INR PPP: 1.13 (ref 0.87–1.13)
LDH FLD L TO P-CCNC: 78 U/L
LYMPHOCYTES NFR FLD: 4 %
MESOTHL CELL NFR FLD: 16 %
MONONUC CELLS NFR FLD: 20 %
NEUTROPHILS NFR FLD: 11 %
PH FLD: 8 [PH]
PROT FLD-MCNC: 2.8 G/DL
PROTHROMBIN TIME: 14.4 SEC (ref 12–14.6)
RBC # FLD: <2000 CELLS/UL
SIGNIFICANT IND 70042: NORMAL
SITE SITE: NORMAL
SOURCE SOURCE: NORMAL
WBC # FLD: 392 CELLS/UL

## 2023-03-25 PROCEDURE — 82150 ASSAY OF AMYLASE: CPT

## 2023-03-25 PROCEDURE — 87015 SPECIMEN INFECT AGNT CONCNTJ: CPT

## 2023-03-25 PROCEDURE — 0W9B3ZZ DRAINAGE OF LEFT PLEURAL CAVITY, PERCUTANEOUS APPROACH: ICD-10-PCS | Performed by: RADIOLOGY

## 2023-03-25 PROCEDURE — 87102 FUNGUS ISOLATION CULTURE: CPT

## 2023-03-25 PROCEDURE — 770020 HCHG ROOM/CARE - TELE (206)

## 2023-03-25 PROCEDURE — 83986 ASSAY PH BODY FLUID NOS: CPT

## 2023-03-25 PROCEDURE — 83615 LACTATE (LD) (LDH) ENZYME: CPT

## 2023-03-25 PROCEDURE — A9270 NON-COVERED ITEM OR SERVICE: HCPCS | Performed by: GENERAL PRACTICE

## 2023-03-25 PROCEDURE — 88112 CYTOPATH CELL ENHANCE TECH: CPT

## 2023-03-25 PROCEDURE — A9270 NON-COVERED ITEM OR SERVICE: HCPCS | Performed by: STUDENT IN AN ORGANIZED HEALTH CARE EDUCATION/TRAINING PROGRAM

## 2023-03-25 PROCEDURE — 84157 ASSAY OF PROTEIN OTHER: CPT

## 2023-03-25 PROCEDURE — 85610 PROTHROMBIN TIME: CPT

## 2023-03-25 PROCEDURE — 82945 GLUCOSE OTHER FLUID: CPT

## 2023-03-25 PROCEDURE — 700102 HCHG RX REV CODE 250 W/ 637 OVERRIDE(OP): Performed by: GENERAL PRACTICE

## 2023-03-25 PROCEDURE — 87116 MYCOBACTERIA CULTURE: CPT

## 2023-03-25 PROCEDURE — 700102 HCHG RX REV CODE 250 W/ 637 OVERRIDE(OP): Performed by: STUDENT IN AN ORGANIZED HEALTH CARE EDUCATION/TRAINING PROGRAM

## 2023-03-25 PROCEDURE — 99233 SBSQ HOSP IP/OBS HIGH 50: CPT | Performed by: GENERAL PRACTICE

## 2023-03-25 PROCEDURE — 89051 BODY FLUID CELL COUNT: CPT

## 2023-03-25 PROCEDURE — 87070 CULTURE OTHR SPECIMN AEROBIC: CPT

## 2023-03-25 PROCEDURE — 87206 SMEAR FLUORESCENT/ACID STAI: CPT

## 2023-03-25 PROCEDURE — 88305 TISSUE EXAM BY PATHOLOGIST: CPT

## 2023-03-25 PROCEDURE — 87205 SMEAR GRAM STAIN: CPT | Mod: 91

## 2023-03-25 PROCEDURE — 36415 COLL VENOUS BLD VENIPUNCTURE: CPT

## 2023-03-25 PROCEDURE — 4410569 US-THORACENTESIS PUNCTURE

## 2023-03-25 PROCEDURE — 71045 X-RAY EXAM CHEST 1 VIEW: CPT

## 2023-03-25 RX ORDER — PROCHLORPERAZINE EDISYLATE 5 MG/ML
10 INJECTION INTRAMUSCULAR; INTRAVENOUS EVERY 6 HOURS PRN
Status: DISCONTINUED | OUTPATIENT
Start: 2023-03-25 | End: 2023-03-26 | Stop reason: HOSPADM

## 2023-03-25 RX ORDER — BENZONATATE 100 MG/1
100 CAPSULE ORAL 3 TIMES DAILY PRN
Status: DISCONTINUED | OUTPATIENT
Start: 2023-03-25 | End: 2023-03-26 | Stop reason: HOSPADM

## 2023-03-25 RX ORDER — TRAMADOL HYDROCHLORIDE 50 MG/1
50 TABLET ORAL EVERY 6 HOURS PRN
Status: DISCONTINUED | OUTPATIENT
Start: 2023-03-25 | End: 2023-03-26 | Stop reason: HOSPADM

## 2023-03-25 RX ORDER — SPIRONOLACTONE 25 MG/1
25 TABLET ORAL
Status: DISCONTINUED | OUTPATIENT
Start: 2023-03-26 | End: 2023-03-26 | Stop reason: HOSPADM

## 2023-03-25 RX ORDER — FUROSEMIDE 20 MG/1
20 TABLET ORAL
Status: DISCONTINUED | OUTPATIENT
Start: 2023-03-26 | End: 2023-03-26 | Stop reason: HOSPADM

## 2023-03-25 RX ORDER — IPRATROPIUM BROMIDE AND ALBUTEROL SULFATE 2.5; .5 MG/3ML; MG/3ML
3 SOLUTION RESPIRATORY (INHALATION)
Status: DISCONTINUED | OUTPATIENT
Start: 2023-03-25 | End: 2023-03-26 | Stop reason: HOSPADM

## 2023-03-25 RX ORDER — OXYCODONE HYDROCHLORIDE 5 MG/1
5 TABLET ORAL EVERY 6 HOURS PRN
Status: DISCONTINUED | OUTPATIENT
Start: 2023-03-25 | End: 2023-03-26 | Stop reason: HOSPADM

## 2023-03-25 RX ADMIN — BUDESONIDE AND FORMOTEROL FUMARATE DIHYDRATE 2 PUFF: 160; 4.5 AEROSOL RESPIRATORY (INHALATION) at 09:19

## 2023-03-25 RX ADMIN — OXYCODONE HYDROCHLORIDE 5 MG: 5 TABLET ORAL at 20:24

## 2023-03-25 RX ADMIN — CYCLOBENZAPRINE 10 MG: 10 TABLET, FILM COATED ORAL at 20:24

## 2023-03-25 RX ADMIN — APIXABAN 5 MG: 5 TABLET, FILM COATED ORAL at 20:24

## 2023-03-25 RX ADMIN — EZETIMIBE 10 MG: 10 TABLET ORAL at 20:24

## 2023-03-25 ASSESSMENT — CHA2DS2 SCORE
HYPERTENSION: YES
PRIOR STROKE OR TIA OR THROMBOEMBOLISM: NO
AGE 75 OR GREATER: YES
CHF OR LEFT VENTRICULAR DYSFUNCTION: YES
CHA2DS2 VASC SCORE: 4
DIABETES: NO
SEX: MALE
VASCULAR DISEASE: NO

## 2023-03-25 ASSESSMENT — PAIN DESCRIPTION - PAIN TYPE: TYPE: ACUTE PAIN

## 2023-03-25 ASSESSMENT — ENCOUNTER SYMPTOMS: SHORTNESS OF BREATH: 1

## 2023-03-25 ASSESSMENT — FIBROSIS 4 INDEX: FIB4 SCORE: 2.09

## 2023-03-25 NOTE — ASSESSMENT & PLAN NOTE
On this admission, patient hypoxic requiring 1 L of oxygen.  CTA negative for PE however noted worsening bilateral pleural effusions L>R. Patient is s/p left thoracentesis with 1050mL removed. Discussed with radiology, no need for right thoracentesis. Patient started on Lasix and Aldactone.

## 2023-03-25 NOTE — OR SURGEON
EXAMINATION:  3/25/2023 12:45 PM    HISTORY/REASON FOR EXAM:  Left pleural effusion is larger than right pleural effusion.         TECHNIQUE/EXAM DESCRIPTION:   Ultrasound-guided thoracentesis.    Indication:  LEFT pleural fluid collection.    COMPARISON:  None    PROCEDURE:     Informed consent was obtained. A timeout was taken. A     pleural effusion was localized with real-time ultrasound guidance. The left posterior chest wall was prepped and draped in a sterile manner. Following local anesthesia with 1% lidocaine, a 5 German Yueh pigtail catheter was advanced into the pleural space with trocar technique and pleural fluid was drained. The patient tolerated the procedure well without evidence of complication. A post thoracentesis chest radiograph is forthcoming.    FINDINGS:         Fluid was sent to the laboratory. Specimen 1050 mL. No blood loss.    Fluid character: straw colored    IMPRESSION:  1. Ultrasound guided left sided diagnostic and therapeutic thoracentesis.    2. 1050 mL of fluid withdrawn.

## 2023-03-25 NOTE — PROGRESS NOTES
Patient arrived to floor. Assumed care at 1730. Assessment complete, A&Ox 4  Admission record complete, Patient on monitor, Monitor room notified. Pt oriented to room, educated on call light/extension/phone system, RN and CNA extension numbers provided to pt, white board updated. Fall assessment complete , patient educated to call for assistance, pt verbalizes understanding. Pt denies pain or any additional needs at this time. Questions and concerns addressed. Call light, phone, and personal belongings within reach.

## 2023-03-25 NOTE — ASSESSMENT & PLAN NOTE
I discussed advance care planning with the patient and family for at least 16 minutes, including diagnosis, prognosis, plan of care, risks and benefits of any therapies that could be offered, as well as alternatives including palliation and hospice, as appropriate.  Family members and patient states that they would like to discuss this with each other but for now would like to leave patient as full code.

## 2023-03-25 NOTE — CARE PLAN
The patient is Stable - Low risk of patient condition declining or worsening         Progress made toward(s) clinical / shift goals:  Progressing      Problem: Knowledge Deficit - Standard  Goal: Patient and family/care givers will demonstrate understanding of plan of care, disease process/condition, diagnostic tests and medications  Outcome: Progressing

## 2023-03-25 NOTE — ED NOTES
Med Rec complete per patient with family @ bedside  No oral antibiotics in the last 30 days per family  Allergies reviewed, no known allergies  Preferred Pharmacy: [facility] [address]

## 2023-03-25 NOTE — HOSPITAL COURSE
This is a 70-year-old male with past medical history of lung cancer in remission s/p wedge resection 2018, chemotherapy, (Dr. Ruiz) and radiation, hypertension, atrial fibrillation on Eliquis, chronic systolic heart failure LVEF 35%, PE and CKD3 who presented to the ED on 3/24/2023 with shortness of breath.    Patient had recent admissions earlier this year for shortness of breath, significant for pleural effusion, left >R, he underwent a thoracentesis and had 1600 mL removed, pathology negative for malignancy.  Of note patient had previous thoracentesis performed in February, negative for malignancy as well.  TTE was performed, patient noted to have acute systolic heart failure with LVEF of 35%. Hospital course complicated by patient developing nonsustained runs of V. tach and PSVT to which patient was then started on metoprolol.Cardiology was consulted patient started on appropriate GDMT.  Patient was discharged on room air at that time.  Patient follows with Dr. Carbajal outpatient for pulmonology.    On this admission, patient hypoxic requiring 1 L of oxygen.  CTA negative for PE however noted worsening bilateral pleural effusions L>R. Patient is s/p left thoracentesis with 1050mL removed. Discussed with radiology, no need for right thoracentesis. Patient started on Lasix and Aldactone.  Renal function stable after initiation of diuretics, no LOCO noted.  On day of discharge, home O2 evaluation was performed, patient does not require any oxygen at rest or on exertion.    Patient has close follow-up with cardiology, next appointment is April 4th.

## 2023-03-26 ENCOUNTER — PHARMACY VISIT (OUTPATIENT)
Dept: PHARMACY | Facility: MEDICAL CENTER | Age: 78
End: 2023-03-26
Payer: COMMERCIAL

## 2023-03-26 VITALS
HEIGHT: 68 IN | RESPIRATION RATE: 16 BRPM | WEIGHT: 135.8 LBS | HEART RATE: 54 BPM | BODY MASS INDEX: 20.58 KG/M2 | OXYGEN SATURATION: 92 % | SYSTOLIC BLOOD PRESSURE: 118 MMHG | DIASTOLIC BLOOD PRESSURE: 74 MMHG | TEMPERATURE: 97.7 F

## 2023-03-26 LAB
ANION GAP SERPL CALC-SCNC: 9 MMOL/L (ref 7–16)
BUN SERPL-MCNC: 20 MG/DL (ref 8–22)
CALCIUM SERPL-MCNC: 8.9 MG/DL (ref 8.5–10.5)
CHLORIDE SERPL-SCNC: 105 MMOL/L (ref 96–112)
CO2 SERPL-SCNC: 23 MMOL/L (ref 20–33)
CREAT SERPL-MCNC: 1.23 MG/DL (ref 0.5–1.4)
ERYTHROCYTE [DISTWIDTH] IN BLOOD BY AUTOMATED COUNT: 46.9 FL (ref 35.9–50)
GFR SERPLBLD CREATININE-BSD FMLA CKD-EPI: 60 ML/MIN/1.73 M 2
GLUCOSE SERPL-MCNC: 166 MG/DL (ref 65–99)
HCT VFR BLD AUTO: 39.2 % (ref 42–52)
HGB BLD-MCNC: 13.4 G/DL (ref 14–18)
MAGNESIUM SERPL-MCNC: 1.9 MG/DL (ref 1.5–2.5)
MCH RBC QN AUTO: 32.6 PG (ref 27–33)
MCHC RBC AUTO-ENTMCNC: 34.2 G/DL (ref 33.7–35.3)
MCV RBC AUTO: 95.4 FL (ref 81.4–97.8)
PHOSPHATE SERPL-MCNC: 3.7 MG/DL (ref 2.5–4.5)
PLATELET # BLD AUTO: 166 K/UL (ref 164–446)
PMV BLD AUTO: 10.1 FL (ref 9–12.9)
POTASSIUM SERPL-SCNC: 3.8 MMOL/L (ref 3.6–5.5)
RBC # BLD AUTO: 4.11 M/UL (ref 4.7–6.1)
RHODAMINE-AURAMINE STN SPEC: NORMAL
SIGNIFICANT IND 70042: NORMAL
SITE SITE: NORMAL
SODIUM SERPL-SCNC: 137 MMOL/L (ref 135–145)
SOURCE SOURCE: NORMAL
WBC # BLD AUTO: 7.6 K/UL (ref 4.8–10.8)

## 2023-03-26 PROCEDURE — 85027 COMPLETE CBC AUTOMATED: CPT

## 2023-03-26 PROCEDURE — RXMED WILLOW AMBULATORY MEDICATION CHARGE: Performed by: GENERAL PRACTICE

## 2023-03-26 PROCEDURE — 83735 ASSAY OF MAGNESIUM: CPT

## 2023-03-26 PROCEDURE — 80048 BASIC METABOLIC PNL TOTAL CA: CPT

## 2023-03-26 PROCEDURE — 99239 HOSP IP/OBS DSCHRG MGMT >30: CPT | Performed by: GENERAL PRACTICE

## 2023-03-26 PROCEDURE — 700102 HCHG RX REV CODE 250 W/ 637 OVERRIDE(OP): Performed by: GENERAL PRACTICE

## 2023-03-26 PROCEDURE — A9270 NON-COVERED ITEM OR SERVICE: HCPCS | Performed by: GENERAL PRACTICE

## 2023-03-26 PROCEDURE — 36415 COLL VENOUS BLD VENIPUNCTURE: CPT

## 2023-03-26 PROCEDURE — 84100 ASSAY OF PHOSPHORUS: CPT

## 2023-03-26 RX ORDER — METOPROLOL SUCCINATE 25 MG/1
25 TABLET, EXTENDED RELEASE ORAL DAILY
Qty: 30 TABLET | Refills: 0 | Status: SHIPPED | OUTPATIENT
Start: 2023-03-26 | End: 2023-04-04 | Stop reason: SDUPTHER

## 2023-03-26 RX ORDER — FUROSEMIDE 40 MG/1
40 TABLET ORAL DAILY
Qty: 30 TABLET | Refills: 0 | Status: SHIPPED | OUTPATIENT
Start: 2023-03-26 | End: 2023-04-04 | Stop reason: SDUPTHER

## 2023-03-26 RX ORDER — SPIRONOLACTONE 25 MG/1
25 TABLET ORAL DAILY
Qty: 30 TABLET | Refills: 0 | Status: SHIPPED | OUTPATIENT
Start: 2023-03-27 | End: 2023-04-04 | Stop reason: SDUPTHER

## 2023-03-26 RX ADMIN — BUDESONIDE AND FORMOTEROL FUMARATE DIHYDRATE 2 PUFF: 160; 4.5 AEROSOL RESPIRATORY (INHALATION) at 10:30

## 2023-03-26 RX ADMIN — APIXABAN 5 MG: 5 TABLET, FILM COATED ORAL at 05:03

## 2023-03-26 RX ADMIN — SPIRONOLACTONE 25 MG: 25 TABLET ORAL at 05:04

## 2023-03-26 RX ADMIN — FUROSEMIDE 20 MG: 20 TABLET ORAL at 05:03

## 2023-03-26 NOTE — DISCHARGE INSTRUCTIONS
Diet    Heart Healthy Diet    A Heart-Healthy diet includes increasing healthy fats and limiting unhealthy fats.  Focus on eating a balance of foods, including fruits and vegetables, low-fat or nonfat dairy, lean protein, nuts and legumes, whole grains, and heart-healthy oils and fats.  Monitor your salt (sodium) intake, especially if you have high blood pressure.  Lose weight if you are overweight. Losing just 5-10% of your body weight can help your overall health and prevent diseases such as diabetes and heart disease.    HEART FAILURE DIET    Limit your daily fluid intake to: 2000 ml  Limit your daily sodium intake to: 2 grams  Limit your daily calorie intake to: 2000 calories    A Heart Failure Diet includes limiting your intake of sodium and fluids and monitoring your calorie intake to lose or maintain a healthy weight. Do not eat more than your recommended daily amount of sodium. Check food labels for the amount of sodium per serving. As a general rule keep sodium amount less than calories. Do not add salt to food. Do not drink more than your recommended daily amount of fluids.  Limit or avoid alcohol.  Check your weight every day.

## 2023-03-26 NOTE — DISCHARGE SUMMARY
Discharge Summary    CHIEF COMPLAINT ON ADMISSION  Chief Complaint   Patient presents with    Shortness of Breath     Started this morning.  Seen for same last week. Reports fluid build up on lungs. Worse with exertion. New dx of CHF.  Denies being d/c's with a diuretic.        Reason for Admission  SOB     Admission Date  3/24/2023    CODE STATUS  Full Code    HPI & HOSPITAL COURSE  This is a 70-year-old male with past medical history of lung cancer in remission s/p wedge resection 2018, chemotherapy, (Dr. Ruiz) and radiation, hypertension, atrial fibrillation on Eliquis, chronic systolic heart failure LVEF 35%, PE and CKD3 who presented to the ED on 3/24/2023 with shortness of breath.    Patient had recent admissions earlier this year for shortness of breath, significant for pleural effusion, left >R, he underwent a thoracentesis and had 1600 mL removed, pathology negative for malignancy.  Of note patient had previous thoracentesis performed in February, negative for malignancy as well.  TTE was performed, patient noted to have acute systolic heart failure with LVEF of 35%. Hospital course complicated by patient developing nonsustained runs of V. tach and PSVT to which patient was then started on metoprolol.Cardiology was consulted patient started on appropriate GDMT.  Patient was discharged on room air at that time.  Patient follows with Dr. Carbajal outpatient for pulmonology.    On this admission, patient hypoxic requiring 1 L of oxygen.  CTA negative for PE however noted worsening bilateral pleural effusions L>R. Patient is s/p left thoracentesis with 1050mL removed. Discussed with radiology, no need for right thoracentesis. Patient started on Lasix and Aldactone.  Renal function stable after initiation of diuretics, no LOCO noted.  On day of discharge, home O2 evaluation was performed, patient does not require any oxygen at rest or on exertion.    Patient has close follow-up with cardiology, next appointment  is April 4th.    Therefore, he is discharged in good and stable condition to home with close outpatient follow-up.    The patient met 2-midnight criteria for an inpatient stay at the time of discharge.    Discharge Date  3/26/2023    FOLLOW UP ITEMS POST DISCHARGE  Primary care physician  Cardiology  Pulmonology    DISCHARGE DIAGNOSES  Principal Problem:    Acute hypoxemic respiratory failure (HCC) POA: Yes  Active Problems:    Acute on chronic systolic heart failure (HCC) POA: Yes    PAF (paroxysmal atrial fibrillation) (HCC) POA: Yes    CKD (chronic kidney disease) stage 3, GFR 30-59 ml/min (HCC) POA: Yes    Dyslipidemia POA: Yes    Primary lung adenocarcinoma (HCC) POA: Yes    Pleural effusion, left POA: Yes    Pulmonary embolism on right (HCC) POA: Yes    ACP (advance care planning) POA: Yes    Nonsustained ventricular tachycardia POA: Unknown  Resolved Problems:    * No resolved hospital problems. *      FOLLOW UP  Future Appointments   Date Time Provider Department Center   3/31/2023  1:40 PM [facility]   4/4/2023  2:00 PM JILL Clark [facility] None   6/19/2023 10:40 AM JILL Welsh [facility]     JILL Welsh  [address] [address]  [phone]    Follow up        MEDICATIONS ON DISCHARGE     Medication List        START taking these medications        Instructions   spironolactone 25 MG Tabs  Start taking on: March 27, 2023  Commonly known as: ALDACTONE   Take 1 Tablet by mouth every day for 30 days.  Dose: 25 mg            CONTINUE taking these medications        Instructions   apixaban 5mg Tabs  Commonly known as: ELIQUIS   Take 1 Tablet by mouth 2 times a day for 30 days. Indications: DVT/PE  Dose: 5 mg     cyclobenzaprine 10 mg Tabs  Commonly known as: Flexeril   Take 1 Tablet by mouth at bedtime.  Dose: 10 mg     ezetimibe 10 MG Tabs  Commonly known as: ZETIA   Take 1 Tablet by mouth every day.  Dose:  10 mg     fluticasone furoate-vilanterol 200-25 MCG/ACT Aepb  Commonly known as: Breo Ellipta   Inhale 1 Puff every day. Rinse mouth after use.  Dose: 1 Puff     furosemide 40 MG Tabs  Commonly known as: LASIX   Take 1 Tablet by mouth every day for 30 days.  Dose: 40 mg     metoprolol SR 25 MG Tb24  Commonly known as: TOPROL XL   Take 1 Tablet by mouth every day for 30 days.  Dose: 25 mg     Tylenol PM Extra Strength  MG Tabs  Generic drug: diphenhydrAMINE-APAP (sleep)   Take 1-2 Tablets by mouth at bedtime as needed (Mild Pain or Trouble Sleeping).  Dose: 1-2 Tablet     Vitamin D3 2000 UNIT Caps   Take 2,000 Units by mouth every day.  Dose: 2,000 Units              Allergies  No Known Allergies    DIET  Orders Placed This Encounter   Procedures    Diet Order Diet: Cardiac; Second Modifier: (optional): 2 Gram Sodium; Fluid modifications: (optional): 1500 ml Fluid Restriction     Standing Status:   Standing     Number of Occurrences:   1     Order Specific Question:   Diet:     Answer:   Cardiac [6]     Order Specific Question:   Second Modifier: (optional)     Answer:   2 Gram Sodium [7]     Order Specific Question:   Fluid modifications: (optional)     Answer:   1500 ml Fluid Restriction [9]       ACTIVITY  As tolerated.  Weight bearing as tolerated    CONSULTATIONS  None    PROCEDURES  Thoracentesis    LABORATORY  Lab Results   Component Value Date    SODIUM 137 03/26/2023    POTASSIUM 3.8 03/26/2023    CHLORIDE 105 03/26/2023    CO2 23 03/26/2023    GLUCOSE 166 (H) 03/26/2023    BUN 20 03/26/2023    CREATININE 1.23 03/26/2023        Lab Results   Component Value Date    WBC 7.6 03/26/2023    HEMOGLOBIN 13.4 (L) 03/26/2023    HEMATOCRIT 39.2 (L) 03/26/2023    PLATELETCT 166 03/26/2023      US-THORACENTESIS PUNCTURE LEFT   Final Result      1. Ultrasound guided left sided diagnostic and therapeutic thoracentesis.      2. 1050 mL of fluid withdrawn.      DX-CHEST-PORTABLE (1 VIEW)   Final Result      1.   Negative for pneumothorax      2.  Right upper lobe pneumonia or atelectasis      3.  Atelectasis of left lung base         CT-CTA CHEST PULMONARY ARTERY W/ RECONS   Final Result         1. No CT evidence of pulmonary embolism.      2. Worsening bilateral pleural effusions.      3. Irregular nodular and airspace opacities in the left lingula and right upper lobe are grossly similar and could be infection, inflammation or malignancy.            DX-CHEST-PORTABLE (1 VIEW)   Final Result         No significant interval change.      Patchy airspace opacities in the right upper lobe and left lower lobe, similar to prior           Total time of the discharge process exceeds 45 minutes.

## 2023-03-26 NOTE — PROGRESS NOTES
Hospital Medicine Daily Progress Note    Date of Service  3/25/2023    Chief Complaint  Pito Black is a 78 y.o. male admitted 3/24/2023 with shortness of breath    Hospital Course  This is a 70-year-old male with past medical history of lung cancer in remission s/p wedge resection 2018, chemotherapy, (Dr. Ruiz) and radiation, hypertension, atrial fibrillation on Eliquis, chronic systolic heart failure LVEF 35% and CKD3 who presented to the ED on 3/24/2023 with shortness of breath.    Patient had recent admissions earlier this year for shortness of breath, significant for pleural effusion, left >R, he underwent a thoracentesis and had 1600 mL removed, pathology negative for malignancy.  Of note patient had previous thoracentesis performed in February, negative for malignancy as well.  TTE was performed, patient noted to have acute systolic heart failure with LVEF of 35%. Hospital course complicated by patient developing nonsustained runs of V. tach and PSVT to which patient was then started on metoprolol.Cardiology was consulted patient started on appropriate GDMT.  Patient was discharged on room air at that time.  Patient follows with Dr. Carbajal outpatient for pulmonology.    On this admission, patient hypoxic requiring 1 L of oxygen.  CTA negative for PE however noted worsening bilateral pleural effusions L>R. Patient is s/p left thoracentesis with 1050mL removed. Discussed with radiology, no need for right thoracentesis. Patient started on Lasix and Aldactone.     Interval Problem Update  Patient noted to have BL pleural effusions L>R. S/P thora 1050mL removed. Labs transudate.     Discussed with radiology no need for R thora.    Wean off of O2.     Serial CBC, BMP - monitor for any renal dysfunction as started lasix and aldactone (hx HFrEF LVEF 35%).     I have discussed this patient's plan of care and discharge plan at IDT rounds today with Case Management, Nursing, Nursing leadership, and other  members of the IDT team.    Consultants/Specialty  None    Code Status  Full Code    Disposition  Patient is not medically cleared for discharge.   Anticipate discharge to to home with close outpatient follow-up.  I have placed the appropriate orders for post-discharge needs.    Review of Systems  Review of Systems   Respiratory:  Positive for shortness of breath.    All other systems reviewed and are negative.     Physical Exam  Temp:  [36.4 °C (97.5 °F)-37.2 °C (99 °F)] 37.2 °C (99 °F)  Pulse:  [] 102  Resp:  [16-20] 16  BP: (100-133)/(48-96) 124/83  SpO2:  [91 %-100 %] 96 %    Physical Exam  Vitals and nursing note reviewed.   Constitutional:       General: He is not in acute distress.     Appearance: Normal appearance.      Interventions: Nasal cannula in place.   HENT:      Head: Normocephalic and atraumatic.   Eyes:      Extraocular Movements: Extraocular movements intact.      Conjunctiva/sclera: Conjunctivae normal.      Pupils: Pupils are equal, round, and reactive to light.   Cardiovascular:      Rate and Rhythm: Normal rate and regular rhythm.      Pulses: Normal pulses.      Heart sounds: No murmur heard.    No friction rub. No gallop.   Pulmonary:      Effort: Pulmonary effort is normal. No respiratory distress.      Breath sounds: Rales present. No wheezing or rhonchi.   Abdominal:      General: Bowel sounds are normal. There is no distension.      Palpations: Abdomen is soft.      Tenderness: There is no abdominal tenderness.   Musculoskeletal:         General: No swelling or tenderness. Normal range of motion.      Cervical back: Normal range of motion and neck supple. No muscular tenderness.      Right lower leg: No edema.      Left lower leg: No edema.   Skin:     General: Skin is warm and dry.      Capillary Refill: Capillary refill takes less than 2 seconds.      Findings: No bruising, erythema or rash.   Neurological:      General: No focal deficit present.      Mental Status: He is alert  and oriented to person, place, and time.       Fluids    Intake/Output Summary (Last 24 hours) at 3/25/2023 2147  Last data filed at 3/25/2023 2019  Gross per 24 hour   Intake 600 ml   Output 400 ml   Net 200 ml       Laboratory  Recent Labs     03/24/23  1310   WBC 9.1   RBC 4.70   HEMOGLOBIN 15.3   HEMATOCRIT 45.8   MCV 97.4   MCH 32.6   MCHC 33.4*   RDW 48.2   PLATELETCT 221   MPV 9.9     Recent Labs     03/24/23  1310   SODIUM 138   POTASSIUM 5.0   CHLORIDE 106   CO2 21   GLUCOSE 143*   BUN 19   CREATININE 1.47*   CALCIUM 10.2     Recent Labs     03/25/23  0716   INR 1.13               Imaging  US-THORACENTESIS PUNCTURE LEFT   Final Result      1. Ultrasound guided left sided diagnostic and therapeutic thoracentesis.      2. 1050 mL of fluid withdrawn.      DX-CHEST-PORTABLE (1 VIEW)   Final Result      1.  Negative for pneumothorax      2.  Right upper lobe pneumonia or atelectasis      3.  Atelectasis of left lung base         CT-CTA CHEST PULMONARY ARTERY W/ RECONS   Final Result         1. No CT evidence of pulmonary embolism.      2. Worsening bilateral pleural effusions.      3. Irregular nodular and airspace opacities in the left lingula and right upper lobe are grossly similar and could be infection, inflammation or malignancy.            DX-CHEST-PORTABLE (1 VIEW)   Final Result         No significant interval change.      Patchy airspace opacities in the right upper lobe and left lower lobe, similar to prior           Assessment/Plan  * Acute hypoxemic respiratory failure (HCC)- (present on admission)  Assessment & Plan  On this admission, patient hypoxic requiring 1 L of oxygen.  CTA negative for PE however noted worsening bilateral pleural effusions L>R. Patient is s/p left thoracentesis with 1050mL removed. Discussed with radiology, no need for right thoracentesis. Patient started on Lasix and Aldactone.     Acute on chronic systolic heart failure (HCC)- (present on admission)  Assessment &  Plan  Patient had recent admissions earlier this year for shortness of breath, significant for pleural effusion, left >R, he underwent a thoracentesis and had 1600 mL removed, pathology negative for malignancy.  Of note patient had previous thoracentesis performed in February, negative for malignancy as well.  TTE was performed, patient noted to have acute systolic heart failure with LVEF of 35%. Hospital course complicated by patient developing nonsustained runs of V. tach and PSVT to which patient was then started on metoprolol.Cardiology was consulted patient started on appropriate GDMT.  Patient was discharged on room air at that time.  Patient follows with Dr. Carbajal outpatient for pulmonology.    On this admission, patient hypoxic requiring 1 L of oxygen.  CTA negative for PE however noted worsening bilateral pleural effusions L>R. Patient is s/p left thoracentesis with 1050mL removed. Discussed with radiology, no need for right thoracentesis. Patient started on Lasix and Aldactone.     Nonsustained ventricular tachycardia  Assessment & Plan  Noted to have several episodes of nonsustained V. tach and PSVT on telemetry during last admission.  Cardiology consulted at that time, recommended metoprolol, can continue metoprolol.  Telemetry monitoring overnight.    ACP (advance care planning)- (present on admission)  Assessment & Plan  I discussed advance care planning with the patient and family for at least 16 minutes, including diagnosis, prognosis, plan of care, risks and benefits of any therapies that could be offered, as well as alternatives including palliation and hospice, as appropriate.  Family members and patient states that they would like to discuss this with each other but for now would like to leave patient as full code.    Pulmonary embolism on right (HCC)- (present on admission)  Assessment & Plan  Resume Eliquis    Pleural effusion, left- (present on admission)  Assessment & Plan  Recurrent 2/2  HFrEF   S/P thora  Negative malignancy   Diuresis   Wean off of O2    Primary lung adenocarcinoma (HCC)- (present on admission)  Assessment & Plan  Oncology follow up    Dyslipidemia- (present on admission)  Assessment & Plan  Resume Zetia    CKD (chronic kidney disease) stage 3, GFR 30-59 ml/min (HCC)- (present on admission)  Assessment & Plan  Stable  Serial BMP  Avoid nephrotoxic agents    PAF (paroxysmal atrial fibrillation) (HCC)- (present on admission)  Assessment & Plan  Resume Metoprolol and Eliquis  Monitor on tele         VTE prophylaxis: therapeutic anticoagulation with Eliquis    I have performed a physical exam and reviewed and updated ROS and Plan today (3/25/2023). In review of yesterday's note (3/24/2023), there are no changes except as documented above.

## 2023-03-26 NOTE — ASSESSMENT & PLAN NOTE
Patient had recent admissions earlier this year for shortness of breath, significant for pleural effusion, left >R, he underwent a thoracentesis and had 1600 mL removed, pathology negative for malignancy.  Of note patient had previous thoracentesis performed in February, negative for malignancy as well.  TTE was performed, patient noted to have acute systolic heart failure with LVEF of 35%. Hospital course complicated by patient developing nonsustained runs of V. tach and PSVT to which patient was then started on metoprolol.Cardiology was consulted patient started on appropriate GDMT.  Patient was discharged on room air at that time.  Patient follows with Dr. Carbajal outpatient for pulmonology.    On this admission, patient hypoxic requiring 1 L of oxygen.  CTA negative for PE however noted worsening bilateral pleural effusions L>R. Patient is s/p left thoracentesis with 1050mL removed. Discussed with radiology, no need for right thoracentesis. Patient started on Lasix and Aldactone.

## 2023-03-26 NOTE — CARE PLAN
The patient is Stable - Low risk of patient condition declining or worsening    Shift Goals  Clinical Goals: pain control  Patient Goals: rest  Family Goals: n/a      Problem: Knowledge Deficit - Standard  Goal: Patient and family/care givers will demonstrate understanding of plan of care, disease process/condition, diagnostic tests and medications  Outcome: Progressing     Problem: Pain - Standard  Goal: Alleviation of pain or a reduction in pain to the patient’s comfort goal  Outcome: Progressing

## 2023-03-27 ENCOUNTER — PATIENT OUTREACH (OUTPATIENT)
Dept: MEDICAL GROUP | Facility: PHYSICIAN GROUP | Age: 78
End: 2023-03-27
Payer: MEDICARE

## 2023-03-27 LAB
CYTOLOGY REG CYTOL: NORMAL
FUNGUS SPEC FUNGUS STN: NORMAL
SIGNIFICANT IND 70042: NORMAL
SITE SITE: NORMAL
SOURCE SOURCE: NORMAL

## 2023-03-27 NOTE — PROGRESS NOTES
Patient outreach for TCM follow up completed with patient's son, Pito.  Reviewed discharge instructions and new and current medications. Pito verbalized understanding of medication types and confirmed education was given per AVS.  Scheduled appointment with PCP for 03/31/23 .

## 2023-03-27 NOTE — PROGRESS NOTES
Subjective:     Pito Black is a 78 y.o. male who presents for Hospital Follow-up.    POST DISCHARGE CALL:  Discharge Date:3/26/2023   Date of Outreach Call: 3/27/2023 10:15 AM  Now that you're home, how are you doing? Good  Did you receive any new prescriptions? Yes  Were you able to fill those medications? Yes  How did you fill those prescriptions? Meds to Beds  If not able to fill prescriptions, why? *    Do you have questions about your medications? Yes  Do you have a follow-up appointment scheduled?Yes  Any issues or paperwork you wish to discuss with your PCP? should he be taking supplemental potassium?  Does patient qualify for CCM Program? No  If patient qualifies, was CCM Program Introduced? *  If patient does not qualify, comment? *  Number of Attempts: 1  Current or previous attempts completed within two business days of discharge? Yes  Provided education regarding treatment plan, medication, self-management, ADLs? Yes  Has patient completed Advance Directive? If yes, advise them to bring to appointment. No  Care Manager phone number provided? Yes  Is there anything else I can help you with? Yes  Chief Complaint? shortness of breath  Admitting Dx? shortness of breath  Discharge Diagnosis? Acute hypoxemic respiratory failure  Additional Comments? HFV w/ cardiology 4/4/23    HPI:   Recently hospitalized for Principal Problem:    Acute hypoxemic respiratory failure (HCC) POA: Yes  Active Problems:    Acute on chronic systolic heart failure (HCC) POA: Yes    PAF (paroxysmal atrial fibrillation) (HCC) POA: Yes    CKD (chronic kidney disease) stage 3, GFR 30-59 ml/min (HCC) POA: Yes    Dyslipidemia POA: Yes    Primary lung adenocarcinoma (HCC) POA: Yes    Pleural effusion, left POA: Yes    Pulmonary embolism on right (HCC) POA: Yes    ACP (advance care planning) POA: Yes    Nonsustained ventricular tachycardia POA: Unknown    Current medicines (including reconciliation performed today)  Current  Outpatient Medications   Medication Sig Dispense Refill    furosemide (LASIX) 40 MG Tab Take 1 Tablet by mouth every day for 30 days. 30 Tablet 0    metoprolol SR (TOPROL XL) 25 MG TABLET SR 24 HR Take 1 Tablet by mouth every day for 30 days. 30 Tablet 0    spironolactone (ALDACTONE) 25 MG Tab Take 1 Tablet by mouth every day for 30 days. 30 Tablet 0    cyclobenzaprine (FLEXERIL) 10 mg Tab Take 1 Tablet by mouth at bedtime. 100 Tablet 3    ezetimibe (ZETIA) 10 MG Tab Take 1 Tablet by mouth every day. 100 Tablet 3    diphenhydrAMINE-APAP, sleep, (TYLENOL PM EXTRA STRENGTH)  MG Tab Take 1-2 Tablets by mouth at bedtime as needed (Mild Pain or Trouble Sleeping).      fluticasone furoate-vilanterol (BREO ELLIPTA) 200-25 MCG/ACT AEROSOL POWDER, BREATH ACTIVATED Inhale 1 Puff every day. Rinse mouth after use. 1 Each 11    Cholecalciferol (VITAMIN D3) 2000 UNIT Cap Take 2,000 Units by mouth every day.       No current facility-administered medications for this visit.       Allergies:   Patient has no known allergies.    Social History     Tobacco Use    Smoking status: Former     Packs/day: 0.25     Years: 40.00     Pack years: 10.00     Types: Cigarettes     Quit date: 2005     Years since quittin.2    Smokeless tobacco: Never   Vaping Use    Vaping Use: Never used   Substance Use Topics    Alcohol use: Not Currently     Alcohol/week: 12.6 - 21.0 oz     Types: 21 - 35 Cans of beer per week     Comment: 3-5 beers a day, quit 2/3/2023    Drug use: No       Review of Systems   Constitutional:  Positive for malaise/fatigue.   Respiratory:  Negative for shortness of breath (only with exertion).    Cardiovascular:  Negative for chest pain and palpitations.   Musculoskeletal:  Positive for back pain (left subscapular back pain).   Neurological:  Negative for dizziness and headaches.       Objective:     Vitals:    23 1542   BP: 104/62   BP Location: Right arm   Patient Position: Sitting   BP Cuff Size: Small  "adult   Pulse: 100   Temp: 36.6 °C (97.9 °F)   TempSrc: Temporal   SpO2: 97%   Weight: 62.1 kg (137 lb)   Height: 1.727 m (5' 8\")     Body mass index is 20.83 kg/m².    Physical Exam  Constitutional:       General: He is not in acute distress.     Appearance: Normal appearance. He is not ill-appearing.   HENT:      Head: Normocephalic and atraumatic.   Cardiovascular:      Rate and Rhythm: Normal rate and regular rhythm.   Pulmonary:      Effort: Pulmonary effort is normal. No respiratory distress.   Skin:     General: Skin is warm and dry.      Capillary Refill: Capillary refill takes less than 2 seconds.   Neurological:      General: No focal deficit present.      Mental Status: He is alert and oriented to person, place, and time.   Psychiatric:         Mood and Affect: Mood normal.         Behavior: Behavior normal.       Assessment and Plan:   1. Chronic congestive heart failure, unspecified heart failure type (HCC)  - Basic Metabolic Panel; Future    2. Stage 3a chronic kidney disease (HCC)  - Basic Metabolic Panel; Future      - Chart and discharge summary were reviewed.   - Hospitalization and results reviewed with patient.   - Medications reviewed including instructions regarding high risk medications, dosing and side effects.  - Recommended Services: Referral to [facility] Care Coordination Services- ordered 3/22/23  - Advance directive/POLST on file?  No - polst provided, will fill out at next visit, discussed advanced directive, have one, will bring copy next visit    Follow-up:Return in about 3 months (around 6/19/2023). With me  F/u with cardiology 4/4/23, pulmonology 4/6/23    Face-to-face transitional care management services with HIGH (today's visit is within days post discharge & LACE+ score 59+) medical decision complexity were provided.     LACE+ Historical Score Over Time (0-28: Low, 29-58: Medium, 59+: High): 76    I spent a total of 24 minutes with record review, exam, communication with the " patient, communication with other providers, and documentation of this encounter.

## 2023-03-28 NOTE — DOCUMENTATION QUERY
[facility] access hospital                                                                       Query Response Note      PATIENT:               DOROTHEA SALINAS  ACCT #:                  [id number]  MRN:                     [id number]  :                      1945  ADMIT DATE:       3/24/2023 1:20 PM  DISCH DATE:        3/26/2023 1:03 PM  RESPONDING  PROVIDER #:        [id number]           QUERY TEXT:    Pulmonary embolism is documented in the Problem List of the Progress Note and DC Summary.   Per the H&P, CTPA shows no pulmonary embolism.   After further study, can the diagnosis of pulmonary embolism be clarified?    The patient's clinical indicators include:  Progress Note and DC Summary:  Pulmonary embolism on right POA, resume eliquis  ED Provider Note: history of blood clots and lung cancer   H&P:  CTPA shows no pulmonary embolism and worsening bilateral pleural effusion   3/24 CT Chest: no CT evidence of pulmonary embolism   Risk Factors: history of cancer and blood clots  Treatment: Eliquis, CT Chest    Thank you,  Jumana Marsh RN, BSN  Clinical   Connect via Book[employer]  Options provided:   -- History of pulmonary embolism   -- Chronic pulmonary embolism   -- Other explanation, please specify   -- Unable to determine      Query created by: Jumana Marsh on 3/28/2023 7:27 AM    RESPONSE TEXT:    Unable to determine          Electronically signed by:  YANELI PETERSON MD 3/28/2023 7:39 AM

## 2023-03-30 ENCOUNTER — PATIENT OUTREACH (OUTPATIENT)
Dept: HEALTH INFORMATION MANAGEMENT | Facility: OTHER | Age: 78
End: 2023-03-30
Payer: MEDICARE

## 2023-03-30 DIAGNOSIS — I50.20 HEART FAILURE WITH REDUCED EJECTION FRACTION (HCC): ICD-10-CM

## 2023-03-30 DIAGNOSIS — I48.0 PAF (PAROXYSMAL ATRIAL FIBRILLATION) (HCC): ICD-10-CM

## 2023-03-30 LAB
BACTERIA FLD AEROBE CULT: NORMAL
GRAM STN SPEC: NORMAL
SIGNIFICANT IND 70042: NORMAL
SITE SITE: NORMAL
SOURCE SOURCE: NORMAL

## 2023-03-30 NOTE — PROGRESS NOTES
"3/30/23  Referral received from Kalli Payne. Pt and his wife are moving into town from [place].    Call placed to Pito (pt's son and approved contact). He states that his father has an appointment with Kalli Payne tomorrow 3/31/23 he is hoping to make a plan with her about pt's medications, particularly his diuretic. This CHW asked if there were any needs as far as food, transportation, or housing. Pt's son states \"no\". He mentions that the reason why they are moving from [place] to [place] is because they will both be closer to him (about 3 minutes away.)     Pt is asking whether getting both of his parent's pills in \"pill packs\" will help. CHW let pt's son know that I would look into that for him. Lastly, pt's son is requesting that his mom be added to this CHW's caseload as well. CHW will add both the pt and his wife to General Care Management and to my caseload.    PLAN: CHW will look into getting pt and his wife pill packs. CHW will call pt's son next week.   "

## 2023-03-31 ENCOUNTER — OFFICE VISIT (OUTPATIENT)
Dept: MEDICAL GROUP | Facility: PHYSICIAN GROUP | Age: 78
End: 2023-03-31
Payer: MEDICARE

## 2023-03-31 ENCOUNTER — TELEPHONE (OUTPATIENT)
Dept: HEALTH INFORMATION MANAGEMENT | Facility: OTHER | Age: 78
End: 2023-03-31
Payer: MEDICARE

## 2023-03-31 VITALS
HEART RATE: 100 BPM | WEIGHT: 137 LBS | TEMPERATURE: 97.9 F | SYSTOLIC BLOOD PRESSURE: 104 MMHG | OXYGEN SATURATION: 97 % | BODY MASS INDEX: 20.76 KG/M2 | DIASTOLIC BLOOD PRESSURE: 62 MMHG | HEIGHT: 68 IN

## 2023-03-31 DIAGNOSIS — G89.29 CHRONIC BILATERAL THORACIC BACK PAIN: ICD-10-CM

## 2023-03-31 DIAGNOSIS — N18.31 STAGE 3A CHRONIC KIDNEY DISEASE: ICD-10-CM

## 2023-03-31 DIAGNOSIS — M54.6 CHRONIC BILATERAL THORACIC BACK PAIN: ICD-10-CM

## 2023-03-31 DIAGNOSIS — E55.9 VITAMIN D DEFICIENCY: ICD-10-CM

## 2023-03-31 DIAGNOSIS — E78.5 DYSLIPIDEMIA: ICD-10-CM

## 2023-03-31 DIAGNOSIS — I50.9 CHRONIC CONGESTIVE HEART FAILURE, UNSPECIFIED HEART FAILURE TYPE (HCC): ICD-10-CM

## 2023-03-31 PROCEDURE — 99496 TRANSJ CARE MGMT HIGH F2F 7D: CPT

## 2023-03-31 RX ORDER — EZETIMIBE 10 MG/1
10 TABLET ORAL DAILY
Qty: 100 TABLET | Refills: 3 | Status: SHIPPED | OUTPATIENT
Start: 2023-03-31 | End: 2023-06-26 | Stop reason: SDUPTHER

## 2023-03-31 RX ORDER — ACETAMINOPHEN 160 MG
2000 TABLET,DISINTEGRATING ORAL DAILY
Qty: 100 CAPSULE | Refills: 3 | Status: SHIPPED | OUTPATIENT
Start: 2023-03-31 | End: 2023-06-26 | Stop reason: SDUPTHER

## 2023-03-31 RX ORDER — ACETAMINOPHEN 160 MG
2000 TABLET,DISINTEGRATING ORAL DAILY
Qty: 100 CAPSULE | Refills: 3 | Status: SHIPPED | OUTPATIENT
Start: 2023-03-31 | End: 2023-03-31

## 2023-03-31 RX ORDER — CYCLOBENZAPRINE HCL 10 MG
10 TABLET ORAL
Qty: 100 TABLET | Refills: 3 | Status: SHIPPED | OUTPATIENT
Start: 2023-03-31 | End: 2023-06-26 | Stop reason: SDUPTHER

## 2023-03-31 ASSESSMENT — ENCOUNTER SYMPTOMS
PALPITATIONS: 0
SHORTNESS OF BREATH: 0
HEADACHES: 0
BACK PAIN: 1
DIZZINESS: 0

## 2023-03-31 ASSESSMENT — FIBROSIS 4 INDEX: FIB4 SCORE: 2.78

## 2023-03-31 NOTE — TELEPHONE ENCOUNTER
"Pt requesting meds be sent to [facility] pharmacy so that pt can receive \"pill packs\" of his medications.   "

## 2023-03-31 NOTE — TELEPHONE ENCOUNTER
3/31/223  Spoke with pt's son about getting pt's meds over to [facility] pharmacy so that they can do pill packs. CHW will route over refill request for pt's cardiology meds to pt's cardiologist today. CHW instructed pt's son to let Kalli Payne know to send any meds she prescribes today over to the [facility] pharmacy. [facility] RN Nena assist in reminding Kalli Payne APRARIEL.     PLAN: CHW will send refill request to pt's cardiologist.

## 2023-03-31 NOTE — DOCUMENTATION QUERY
"                                                                         [facility]                                                                       Query Response Note      PATIENT:               DOROTHEA SALINAS  ACCT #:                  [id number]  MRN:                     [id number]  :                      1945  ADMIT DATE:       3/16/2023 9:36 PM  DISCH DATE:        3/19/2023 11:55 AM  RESPONDING  PROVIDER #:        [id number]           QUERY TEXT:    Patient has been diagnosed as having \"Heart failure with reduced ejection fraction.\"    Patient with mild BLE edema, CXR with pleural effusions and pulmonary edema, BNP 2818 and ECHO with EF 35%.     Per ED note, patient denies prior history of CHF, was treated this admission with IV diuretics, Toprol, thoracentesis, and supplemental oxygen support.    Can the acuity of the heart failure be further specified based on the above clinical indicators and required treatments?      The patient's Clinical Indicators include:  77 yo M admitted with SOB    Clinical Indicators:  BNP 2818;  Minimal swelling to lower extremities.;   Echo showed EF 35%;   CXR notable pulmonary edema (L>R).    -ED note : \"denies  any prior history of CHF.\"  -Cardiology consult: \" newly depressed LV function\" ; \"New diagnosis of CHF\" ; \"Acute on chronic systolic CHF\"  -DCS: \"Heart failure with reduced ejection fraction\"    Risk Factors: Chronic Afib, CKD 3, HTN, hx of Lung CA, advanced age    Treatments:  IV diuretics, Toprol, thoracentesis, and supplemental oxygen support, imaging, labs    Contact me with any questions.    Thank you for your time and attention,  Erinn Baer RN, [email or web address]  Connect via email, Voalte or messenger.  Options provided:   -- Acute   -- Acute on Chronic   -- Other explanation, (please specify other explanation)   -- Unable to determine      Query created by: Erinn Baer on 3/28/2023 10:04 AM    RESPONSE TEXT:    Acute          Electronically " signed by:  RAIZA CORTEZ MD 3/30/2023 7:40 PM

## 2023-04-03 RX ORDER — METOPROLOL SUCCINATE 25 MG/1
25 TABLET, EXTENDED RELEASE ORAL DAILY
Qty: 30 TABLET | Refills: 0 | OUTPATIENT
Start: 2023-04-03 | End: 2023-05-03

## 2023-04-03 RX ORDER — FUROSEMIDE 40 MG/1
40 TABLET ORAL DAILY
Qty: 30 TABLET | Refills: 0 | OUTPATIENT
Start: 2023-04-03 | End: 2023-05-03

## 2023-04-03 RX ORDER — SPIRONOLACTONE 25 MG/1
25 TABLET ORAL DAILY
Qty: 30 TABLET | Refills: 0 | OUTPATIENT
Start: 2023-04-03 | End: 2023-05-03

## 2023-04-04 ENCOUNTER — OFFICE VISIT (OUTPATIENT)
Dept: CARDIOLOGY | Facility: MEDICAL CENTER | Age: 78
End: 2023-04-04
Attending: INTERNAL MEDICINE
Payer: MEDICARE

## 2023-04-04 VITALS
RESPIRATION RATE: 14 BRPM | SYSTOLIC BLOOD PRESSURE: 92 MMHG | OXYGEN SATURATION: 98 % | WEIGHT: 135 LBS | DIASTOLIC BLOOD PRESSURE: 56 MMHG | HEIGHT: 68 IN | BODY MASS INDEX: 20.46 KG/M2 | HEART RATE: 91 BPM

## 2023-04-04 DIAGNOSIS — I50.9 HEART FAILURE, NYHA CLASS 2 (HCC): ICD-10-CM

## 2023-04-04 DIAGNOSIS — I48.0 PAF (PAROXYSMAL ATRIAL FIBRILLATION) (HCC): ICD-10-CM

## 2023-04-04 DIAGNOSIS — R06.02 SOB (SHORTNESS OF BREATH): ICD-10-CM

## 2023-04-04 DIAGNOSIS — I50.20 HEART FAILURE WITH REDUCED EJECTION FRACTION (HCC): ICD-10-CM

## 2023-04-04 DIAGNOSIS — I49.3 PVC (PREMATURE VENTRICULAR CONTRACTION): ICD-10-CM

## 2023-04-04 DIAGNOSIS — I50.20 ACC/AHA STAGE C SYSTOLIC HEART FAILURE (HCC): ICD-10-CM

## 2023-04-04 LAB — EKG IMPRESSION: NORMAL

## 2023-04-04 PROCEDURE — 93000 ELECTROCARDIOGRAM COMPLETE: CPT | Performed by: INTERNAL MEDICINE

## 2023-04-04 PROCEDURE — 99214 OFFICE O/P EST MOD 30 MIN: CPT | Mod: 25 | Performed by: NURSE PRACTITIONER

## 2023-04-04 PROCEDURE — 94618 PULMONARY STRESS TESTING: CPT | Performed by: NURSE PRACTITIONER

## 2023-04-04 RX ORDER — METOPROLOL SUCCINATE 50 MG/1
50 TABLET, EXTENDED RELEASE ORAL DAILY
Qty: 100 TABLET | Refills: 0 | Status: SHIPPED | OUTPATIENT
Start: 2023-04-04 | End: 2023-06-26 | Stop reason: SDUPTHER

## 2023-04-04 RX ORDER — SPIRONOLACTONE 25 MG/1
25 TABLET ORAL DAILY
Qty: 100 TABLET | Refills: 0 | Status: SHIPPED | OUTPATIENT
Start: 2023-04-04 | End: 2023-06-26 | Stop reason: SDUPTHER

## 2023-04-04 RX ORDER — FUROSEMIDE 40 MG/1
40 TABLET ORAL DAILY
Qty: 100 TABLET | Refills: 0 | Status: SHIPPED | OUTPATIENT
Start: 2023-04-04 | End: 2023-06-26 | Stop reason: SDUPTHER

## 2023-04-04 RX ORDER — METOPROLOL SUCCINATE 25 MG/1
25 TABLET, EXTENDED RELEASE ORAL DAILY
Qty: 100 TABLET | Refills: 0 | Status: SHIPPED | OUTPATIENT
Start: 2023-04-04 | End: 2023-04-04

## 2023-04-04 ASSESSMENT — MINNESOTA LIVING WITH HEART FAILURE QUESTIONNAIRE (MLHF)
WALKING ABOUT OR CLIMBING STAIRS DIFFICULT: 2
MAKING YOU WORRY: 3
MAKING YOU STAY IN A HOSPITAL: 5
MAKING YOU FEEL DEPRESSED: 3
LOSS OF SELF CONTROL IN YOUR LIFE: 2
DIFFICULTY SOCIALIZING WITH FAMILY OR FRIENDS: 0
GIVING YOU SIDE EFFECTS FROM TREATMENTS: 0
WORKING AROUND THE HOUSE OR YARD DIFFICULT: 2
DIFFICULTY WORKING TO EARN A LIVING: 0
FEELING LIKE A BURDEN TO FAMILY AND FRIENDS: 2
DIFFICULTY GOING AWAY FROM HOME: 2
MAKING YOU SHORT OF BREATH: 4
SWELLING IN ANKLES OR LEGS: 0
HAVING TO SIT OR LIE DOWN DURING THE DAY: 3
TOTAL_SCORE: 41
DIFFICULTY SLEEPING WELL AT NIGHT: 4
DIFFICULTY WITH SEXUAL ACTIVITIES: 0
EATING LESS FOODS YOU LIKE: 0
TIRED, FATIGUED OR LOW ON ENERGY: 4
DIFFICULTY TO CONCENTRATE OR REMEMBERING THINGS: 0
COSTING YOU MONEY FOR MEDICAL CARE: 5
DIFFICULTY WITH RECREATIONAL PASTIMES, SPORTS, HOBBIES: 0

## 2023-04-04 ASSESSMENT — ENCOUNTER SYMPTOMS
CLAUDICATION: 0
COUGH: 0
ORTHOPNEA: 0
ABDOMINAL PAIN: 0
FEVER: 0
PALPITATIONS: 0
SHORTNESS OF BREATH: 1
DIZZINESS: 0
PND: 0
MYALGIAS: 0

## 2023-04-04 ASSESSMENT — FIBROSIS 4 INDEX: FIB4 SCORE: 2.78

## 2023-04-04 ASSESSMENT — 6 MINUTE WALK TEST (6MWT): TOTAL DISTANCE WALKED (METERS): 292

## 2023-04-04 NOTE — PROGRESS NOTES
Chief Complaint   Patient presents with    Atrial Fibrillation     F/V Dx: PAF (paroxysmal atrial fibrillation) (HCC)      Hypertension    Dyslipidemia       Subjective     Pito Black is a 78 y.o. male who presents today for follow up on his Heart Failure with his family-Son, Pito and wife, Cydney.    New patient of the office.  Patient has had multiple hospitalizations since January, but most recently he was hospitalized from 3/24/2023 through 3/26/2023 with shortness of breath.  Patient was found to be hypoxic and required oxygen.  CTA was negative for PE, but did show worsening bilateral pleural effusions L>R.  He had a left thoracentesis.  He was started on Lasix and Aldactone.  Patient was discharged off oxygen.    He had a prior hospitalization from 3/17/2023 through 3/19/2023 for shortness of breath and had an echocardiogram which showed an EF of 35%.  Chest x-ray showed pulmonary edema.  Patient was started on IV diuretics.  He did have another thoracentesis.  Patient was found to be in LOCO.  Patient also had nonsustained runs of VT/PSVT.  Cardiology was consulted. Toprol was started for his heart failure, tachycardia and frequent ectopy    Hospitalization from 2/20/2023 through 2/22/2023.  He presented with shortness of breath.  During this visit, patient, he was found to have a PE and influenza positive and started on antibiotic.    Hospitalization from 1/31/2023 through 2/2/2023.  Patient presented with to [facility] with shortness of breath and found to have bilateral pleural effusions, right upper lobe mass and concern for left upper lobe pneumonia.  He was started on antibiotics and diuresed with Lasix.  He had a thoracentesis on 2/1/2023 with 1 L removed.  Patient had a recent immunotherapy.  Patient was diagnosed with acute hypoxic respiratory failure likely secondary to community-acquired pneumonia versus malignancy related to pleural effusions versus postradiation pneumonitis versus  immunotherapy induced pneumonitis.    Patient reports feeling better.  He does report having some episodes of shortness of breath with exertion.  He denies chest pain, palpitations, orthopnea, PND, edema or dizziness/lightheadedness.    He is not weighing himself at home.    He does report a low-sodium diet.    He reports compliance with his medications.    Additionally, patient has the following medical problems:     -History of lung cancer in remission, s/p wedge resection in 2018, had chemotherapy and radiation    -Hypertension    -Atrial fibrillation: Taking Eliquis    -CKD, stage 3    Past Medical History:   Diagnosis Date    Acute respiratory failure with hypoxia ([facility]) 02/01/2023    Arrhythmia     hx of a fib    Arthritis 2015    generalized, DDD back    Asthma     inhaler     Backpain 08/06/2018    9/10    Cancer ([facility]) 07/2017    left lung    Cataract     bilateral, no surgery    Congestive heart failure ([facility])     Dental disorder     lower partial, removable bridge    Diabetes 08/06/2018    on no meds at this time, diet controlled    Heart valve disease     mild mitral valve prolapse    High cholesterol     Hypertension      Past Surgical History:   Procedure Laterality Date    UT BRONCHOSCOPY,DIAGNOSTIC  7/1/2021    Procedure: BRONCHOSCOPY - FIBER OPTIC WITH BRANCHOALVEOLAR LAVAGE BIOPSY, FNA, NAVIGATION;  Surgeon: Vinayak Carbajal M.D.;  Location: [facility];  Service: Pulmonary    ENDOBRONCHIAL US ADD-ON  7/1/2021    Procedure: ENDOBRONCHIAL ULTRASOUND (EBUS).;  Surgeon: Vinayak Carbajal M.D.;  Location: [facility];  Service: Pulmonary    CATARACT PHACO WITH IOL Left 8/21/2018    Procedure: CATARACT PHACO WITH IOL;  Surgeon: Juanito Hager M.D.;  Location: SURGERY SAME DAY [facility];  Service: Ophthalmology    CATARACT PHACO WITH IOL Right 8/7/2018    Procedure: CATARACT PHACO WITH IOL;  Surgeon: Juanito Hager M.D.;  Location: SURGERY SAME DAY [facility];  Service:  "Ophthalmology    THORACOSCOPY Left 2018    Procedure: THORACOSCOPY- WEDGE BIOPSY W/FROZEN SECTION;  Surgeon: Cristhian Galeano M.D.;  Location: SURGERY [facility];  Service: Thoracic    EAR MIDDLE EXPLORATION Right 2015    Procedure: EAR MIDDLE EXPLORATION;  Surgeon: William Brandon M.D.;  Location: SURGERY SAME DAY [facility];  Service:     OSSICULAR RECONSTRUCTION Right 2015    Procedure: OSSICULAR RECONSTRUCTION CHAIN POSSIBLE;  Surgeon: William Brandon M.D.;  Location: SURGERY SAME DAY [facility];  Service:     OTHER      ear replacement \"many years ago\"    OTHER ORTHOPEDIC SURGERY      right knee replacement      Family History   Problem Relation Age of Onset    Stroke Mother     Hypertension Mother     Diabetes Mother     Cancer Father         stomach cancer    Heart Disease Brother     Alcohol abuse Brother     Ovarian Cancer Neg Hx     Tubal Cancer Neg Hx     Peritoneal Cancer Neg Hx     Colorectal Cancer Neg Hx     Breast Cancer Neg Hx     Hyperlipidemia Neg Hx      Social History     Socioeconomic History    Marital status:      Spouse name: Not on file    Number of children: Not on file    Years of education: Not on file    Highest education level: Not on file   Occupational History    Not on file   Tobacco Use    Smoking status: Former     Packs/day: 0.25     Years: 40.00     Pack years: 10.00     Types: Cigarettes     Quit date: 2005     Years since quittin.2    Smokeless tobacco: Never   Vaping Use    Vaping Use: Never used   Substance and Sexual Activity    Alcohol use: Not Currently     Alcohol/week: 12.6 - 21.0 oz     Types: 21 - 35 Cans of beer per week     Comment: 12 beers a day (coors), quit 2/3/2023    Drug use: No    Sexual activity: Not on file   Other Topics Concern    Not on file   Social History Narrative    Not on file     Social Determinants of Health     Financial Resource Strain: Not on file   Food Insecurity: Not on file   Transportation " Needs: Not on file   Physical Activity: Not on file   Stress: Not on file   Social Connections: Not on file   Intimate Partner Violence: Not on file   Housing Stability: Not on file     No Known Allergies  Outpatient Encounter Medications as of 4/4/2023   Medication Sig Dispense Refill    furosemide (LASIX) 40 MG Tab Take 1 Tablet by mouth every day for 100 days. 100 Tablet 0    spironolactone (ALDACTONE) 25 MG Tab Take 1 Tablet by mouth every day for 100 days. 100 Tablet 0    apixaban (ELIQUIS) 5mg Tab Take 5 mg by mouth 2 times a day.      metoprolol SR (TOPROL XL) 50 MG TABLET SR 24 HR Take 1 Tablet by mouth every day for 100 days. 100 Tablet 0    cyclobenzaprine (FLEXERIL) 10 mg Tab Take 1 Tablet by mouth at bedtime. 100 Tablet 3    ezetimibe (ZETIA) 10 MG Tab Take 1 Tablet by mouth every day. 100 Tablet 3    Cholecalciferol (VITAMIN D3) 2000 UNIT Cap Take 1 Capsule by mouth every day. 100 Capsule 3    diphenhydrAMINE-APAP, sleep, (TYLENOL PM EXTRA STRENGTH)  MG Tab Take 1-2 Tablets by mouth at bedtime as needed (Mild Pain or Trouble Sleeping).      fluticasone furoate-vilanterol (BREO ELLIPTA) 200-25 MCG/ACT AEROSOL POWDER, BREATH ACTIVATED Inhale 1 Puff every day. Rinse mouth after use. 1 Each 11    [DISCONTINUED] metoprolol SR (TOPROL XL) 25 MG TABLET SR 24 HR Take 1 Tablet by mouth every day for 100 days. 100 Tablet 0     No facility-administered encounter medications on file as of 4/4/2023.     Review of Systems   Constitutional:  Negative for fever and malaise/fatigue.   Respiratory:  Positive for shortness of breath. Negative for cough.    Cardiovascular:  Negative for chest pain, palpitations, orthopnea, claudication, leg swelling and PND.   Gastrointestinal:  Negative for abdominal pain.   Musculoskeletal:  Negative for myalgias.   Neurological:  Negative for dizziness.   All other systems reviewed and are negative.           Objective     BP 92/56 (BP Location: Left arm, Patient Position:  "Sitting, BP Cuff Size: Adult)   Pulse 91   Resp 14   Ht 1.727 m (5' 8\")   Wt 61.2 kg (135 lb)   SpO2 98%   BMI 20.53 kg/m²     Physical Exam  Vitals reviewed.   Constitutional:       Appearance: He is well-developed.   HENT:      Head: Normocephalic and atraumatic.   Eyes:      Pupils: Pupils are equal, round, and reactive to light.   Neck:      Vascular: No JVD.   Cardiovascular:      Rate and Rhythm: Normal rate and regular rhythm.      Heart sounds: Normal heart sounds.   Pulmonary:      Effort: Pulmonary effort is normal. No respiratory distress.      Breath sounds: Normal breath sounds. No wheezing or rales.   Abdominal:      General: Bowel sounds are normal.      Palpations: Abdomen is soft.   Musculoskeletal:         General: Normal range of motion.      Cervical back: Normal range of motion and neck supple.      Right lower leg: No edema.      Left lower leg: No edema.   Skin:     General: Skin is warm and dry.   Neurological:      General: No focal deficit present.      Mental Status: He is alert and oriented to person, place, and time.   Psychiatric:         Behavior: Behavior normal.     No results found for: CHOLSTRLTOT, LDL, HDL, TRIGLYCERIDE    Lab Results   Component Value Date/Time    SODIUM 137 03/26/2023 12:56 AM    POTASSIUM 3.8 03/26/2023 12:56 AM    CHLORIDE 105 03/26/2023 12:56 AM    CO2 23 03/26/2023 12:56 AM    GLUCOSE 166 (H) 03/26/2023 12:56 AM    BUN 20 03/26/2023 12:56 AM    CREATININE 1.23 03/26/2023 12:56 AM     Lab Results   Component Value Date/Time    ALKPHOSPHAT 69 03/24/2023 01:10 PM    ASTSGOT 29 03/24/2023 01:10 PM    ALTSGPT 24 03/24/2023 01:10 PM    TBILIRUBIN 0.5 03/24/2023 01:10 PM      Transthoracic Echo Report 2/7/2018  Normal left ventricular systolic function. Left ventricular ejection fraction is visually estimated to be 55%.   Diastolic function is difficult to assess with atrial fibrillation.  Mild aortic insufficiency.   Mild mitral regurgitation.  No prior " study is available for comparison.      [facility] 7/277/2021-8/9/2021  SUMMARY   9 days of monitoring demonstrated sinus rhythm with frequent atrial ectopy.  Occasional ventricular ectopy including up to 14 beats of monomorphic VT which has similar morphology to PVCs.  Symptoms correlated with ventricular tachycardia and atrial tachycardia as well as frequent ectopy.     Transthoracic Echo Report 3/17/2023  Prior echocardiogram 2/7/2018, there is now reduction in left ventricular systolic function and presence of moderate mitral regurgitation.  Severely reduced left ventricular systolic function. The left   ventricular ejection fraction is visually estimated to be 30 to 35%.   Global hypokinesis with regional variation.  Mild aortic insufficiency.  The aortic valve is not well visualized. Calcified aortic valve leaflets. Transvalvular gradients are - Peak: 13 mmHg, Mean: 7 mmHg.    Aortic valve gradients may be underestimated due to low ejection fraction.   Moderate mitral regurgitation.   Normal aortic root for body surface area. The ascending aorta diameter is 3.2 cm.       Date 6MWT MLWHF   4/4/2023 292 m in 6 minutes 41                     Assessment & Plan     1. PVC (premature ventricular contraction)  EKG    Basic Metabolic Panel (BMP)    proBrain Natriuretic Peptide, NT    metoprolol SR (TOPROL XL) 50 MG TABLET SR 24 HR      2. ACC/AHA stage C systolic heart failure (HCC)  EKG    Basic Metabolic Panel (BMP)    proBrain Natriuretic Peptide, NT    metoprolol SR (TOPROL XL) 50 MG TABLET SR 24 HR      3. Heart failure, NYHA class 2 (HCC)  Basic Metabolic Panel (BMP)    proBrain Natriuretic Peptide, NT      4. SOB (shortness of breath)            Medical Decision Making: Today's Assessment/Status/Plan:        HFrEF, Stage C, Class 2, LVEF 35%: Based on physical examination findings, patient is euvolemic. No JVD, lungs are clear to auscultation, no pitting edema in bilateral lower extremities, no ascites.   -Heart  failure due to unknown cause, patient did not have ischemic work-up.  Will discuss at follow-up visit  -Discussed Heart failure trajectory and prognosis with patient. Will continue to optimize medical therapy as tolerated. Advanced HF treatment, need for remote monitoring consideration at every visit.   -ACE-I/ARB/ARNI: Consider starting if blood pressure improves  -Evidence Based Beta-blocker: Increase metoprolol SR to 50 mg daily  -Aldosterone Antagonist: Continue spironolactone 25 mg daily  -Diuretic:  try cutting furosemide to 20 mg Every other day alternating with 40 mg.    -SGLT2 inhibitor: Consider at follow-up visit  -Other: Consider as needed (Hydralazine/Isosorbide, Vericiguat, Corlanor)  -Labs: BMP, NT proBNP-Labs Ordered, to be done in 3 weeks, Will continue to closely monitor for side effects of patient's high risk medication(s) including renal function, liver function NTproBNP/cardiac markers, and electrolytes as needed  -discussed importance of exercise and regular activity  -Discussed/reviewed maintaining a low Sodium and hydration recommendations  -Repeat Echo in 3-6 months, if LVEF not >35%, then will discuss/consider ICD for primary Prevention. L  -moderate MR seen on echo: Will refer to structural heart clinic for Mitraclip evaluation if EF or MR does not improve  -Reinforced s/sx of worsening heart failure with patient and weight monitoring. Pt verbalizes understanding. Pt to call office or RTC if present.    -PUMP line number [phone] (PUMP) reviewed with patient  -Heart Failure Education:   Discussed the Definition of heart failure (linking disease, symptoms, and treatment) and causes of heart failure  Recognition of escalating symptoms and concrete plan for response to particular symptoms  Discussed the indication for ACE-I/ARB/ARNI, Beta-Blocker, Aldosterone antagonist, SGLT2 inhibitor  Risk modification for heart failure progression  Specific diet recommendations: Continue low-sodium diet  and adequate hydration   discussed patient to Stay active  Importance of treatment adherence  Behavioral strategies to promote treatment adherence  Patient declines formal education at this time  -Advanced care planning: Advance directive and POLST to be discussed at future visit  -Pharmacotherapy Referral: Patient not referred to pharmacotherapy at this time  -Compliance Barriers: None   -Genetic testing Consideration: none  -Consideration for LVAD and/or Heart transplant as necessary     -EKG today shows sinus rhythm 94    PE:  -Continue Eliquis 5 mg twice a day     bilateral pleural effusion:  -s/p thoracentesis x3  -Will follow  -Patient does have follow-up with pulmonary this week    Dyslipidemia:  -no lipid panel on file  -Continue ezetimibe 10 mg daily    FU in clinic in 3 week with labs. Sooner if needed.    Patient verbalizes understanding and agrees with the plan of care.     PLEASE NOTE: This Note was created using voice recognition Software. I have made every reasonable attempt to correct obvious errors, but I expect that there are errors of grammar and possibly content that I did not discover before finalizing the note

## 2023-04-04 NOTE — PATIENT INSTRUCTIONS
Try taking Furosemide 20 mg alternating with 40 mg every other day.    Increase Metoprolol SR to 50 mg daily

## 2023-04-04 NOTE — TELEPHONE ENCOUNTER
Apparently his other meds were prescribed by Hospitalist. He hasn't established with a Cardiologist, so I am sending them to you.     Bryan'd up below the ones that needs refills. I updated the quantity to 100, but did not give refills? Unsure what you preferred.     I noted the prescriptions to be placed in pill packs. Thank you!

## 2023-04-06 ENCOUNTER — PATIENT OUTREACH (OUTPATIENT)
Dept: HEALTH INFORMATION MANAGEMENT | Facility: OTHER | Age: 78
End: 2023-04-06

## 2023-04-06 ENCOUNTER — OFFICE VISIT (OUTPATIENT)
Dept: SLEEP MEDICINE | Facility: MEDICAL CENTER | Age: 78
End: 2023-04-06
Attending: INTERNAL MEDICINE
Payer: MEDICARE

## 2023-04-06 VITALS
OXYGEN SATURATION: 96 % | DIASTOLIC BLOOD PRESSURE: 84 MMHG | WEIGHT: 133 LBS | RESPIRATION RATE: 16 BRPM | SYSTOLIC BLOOD PRESSURE: 128 MMHG | HEIGHT: 68 IN | BODY MASS INDEX: 20.16 KG/M2 | HEART RATE: 80 BPM

## 2023-04-06 DIAGNOSIS — I50.22 CHRONIC SYSTOLIC HEART FAILURE (HCC): ICD-10-CM

## 2023-04-06 DIAGNOSIS — R06.09 DYSPNEA ON EXERTION: ICD-10-CM

## 2023-04-06 DIAGNOSIS — I26.99 PULMONARY EMBOLISM ON RIGHT (HCC): ICD-10-CM

## 2023-04-06 DIAGNOSIS — J90 PLEURAL EFFUSION, LEFT: ICD-10-CM

## 2023-04-06 DIAGNOSIS — C34.32 PRIMARY CANCER OF LEFT LOWER LOBE OF LUNG (HCC): ICD-10-CM

## 2023-04-06 PROCEDURE — 99215 OFFICE O/P EST HI 40 MIN: CPT | Performed by: INTERNAL MEDICINE

## 2023-04-06 PROCEDURE — 99212 OFFICE O/P EST SF 10 MIN: CPT | Performed by: INTERNAL MEDICINE

## 2023-04-06 ASSESSMENT — ENCOUNTER SYMPTOMS
WHEEZING: 0
COUGH: 0
EYE PAIN: 0
SHORTNESS OF BREATH: 1
ORTHOPNEA: 0
PSYCHIATRIC NEGATIVE: 1
SINUS PAIN: 0
WEIGHT LOSS: 0
SPUTUM PRODUCTION: 0
ABDOMINAL PAIN: 0
SENSORY CHANGE: 0
FOCAL WEAKNESS: 0
EYE DISCHARGE: 0
STRIDOR: 0
FEVER: 0
VOMITING: 0
LOSS OF CONSCIOUSNESS: 0
SORE THROAT: 0
DIARRHEA: 0
HEADACHES: 0
DIZZINESS: 0
MYALGIAS: 0
CHILLS: 0
NAUSEA: 0

## 2023-04-06 ASSESSMENT — FIBROSIS 4 INDEX: FIB4 SCORE: 2.78

## 2023-04-06 NOTE — ASSESSMENT & PLAN NOTE
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   -- Scheduled to f/u Dr Ruiz 4/27/23, plan of care as outlined above.

## 2023-04-06 NOTE — PROGRESS NOTES
"4/6/23  Call placed to pt's son, and the person whom is the contact for the patient and his wife. LVM stating that this CHW was just checking in to see how things were going. CHW also stated that I reached out to the [facility] to make sure that they were able to put his meds in pill packs and that they were working on it, but a few were \"fill to soon.\"     PLAN: CHW will check in with pt in one week's time.   "

## 2023-04-06 NOTE — LETTER
Vinayak Carbajal M.D.  [facility] Pulmonary Medicine - Operated by [facility]   [address], [address] [place] [address]  Phone: [phone] - Fax: [phone]           Encounter Date: 4/6/2023  Provider: Vinayak Carbajal M.D.  Location of Care: [facility] PULMONARY MEDICINE - OPERATED BY [facility]  [address] [facility] [place] [address] [phone]      Patient:   Pito Black   MR Number: [id number]   YOB: 1945     PROGRESS NOTE:  Pulmonary Clinic Note    Chief Complaint:  Chief Complaint   Patient presents with    Follow-Up     7 week fv   last seen on 2/8/2023 -Dr. Carbajal      HPI:   Pito Black is a very pleasant 78 y.o. 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  He is followed by Dr. Ruiz for oncology care.  He follows up in the pulmonary clinic after hospitalization in early 2023 for shortness of breath attributed to bilateral pleural effusions and right upper lobe mass with resultant postobstructive pneumonia treated with antibiotics and steroids for possible radiation versus immunotherapy induced pneumonitis. He underwent a left-sided thoracentesis with 1 L fluid removed, fluid consistent with transudate.  Low total cell count, however lymphocytic predominant.  Cytology and cultures negative    Last PFTs in 2020 showing preserved FEV1 (2.59L, 95%), no BD response, preserved lung volumes and diffusion capacity.    Respiratory regimen includes Breo Ellipta 200 one puff nightly  On no anticoagulants, ASA 81 mg p.o. daily only    PMH otherwise notable for CKD stage III, PAF, hypertension, dyslipidemia    Interval events since last clinic visit in 2/2023:  Since last visit he was hospitalized  briefly in February for parainfluenza pneumonia and right lower lobe PE was discharged on Eliquis and supplemental oxygen.  He was hospitalized earlier this month for chest pain, found to have reaccumulation of pleural effusion underwent a left-sided thoracentesis with 600 cc removed.  TTE at admission showed an ejection fraction of 35% with global hypokinesis and having nonsustained runs of VT and PSVT.  He started on Toprol and diuretics.  He was hospitalized again just last week again for shortness of breath, reaccumulation of pleural effusions left greater than right, underwent a left thoracentesis again with over a liter of fluid removed. PE had resolved on repeat CT. Pleural fluid cultures and pathology/cytology were unremarkable on both taps.    He underwent a PET/CT scan 3/8/2023.  Results of this imaging are not available for personal review, however from provided records from Dr. Ruiz's office, the subcarinal lymph node has decreased to normal size with no increased activity.  There are no areas of metastatic disease or disease recurrence.  PET/CT did demonstrate mild pulmonary edema as well as an increased large left pleural effusion and moderate right-sided pleural effusion. This imaging was PRIOR to the above referenced hospitalizations/thoracenteses.     Last chest imaging and labs were at time of last thoracentesis 3/25, Cr 1.23    Current diuretic regimen Lasix 40 daily, Aldactone 25 daily. Tolerating Eliquis well.  Wt 141 -> 133#    Multi oximetry walk test last visit showed no desaturations on room air.    Subjectively, he reports stable mMRC 3 symptoms (stops for breath after walking 100 yards or after a few minutes). He does report symptomatic improvement after thoracenteses    Past Medical History:   Diagnosis Date    Acute respiratory failure with hypoxia (HCC) 02/01/2023    Arrhythmia     hx of a fib    Arthritis 2015    generalized, DDD back    Asthma     inhaler     Backpain 08/06/2018     "9/10    Cancer (HCC) 07/2017    left lung    Cataract     bilateral, no surgery    Congestive heart failure (HCC)     Dental disorder     lower partial, removable bridge    Diabetes 08/06/2018    on no meds at this time, diet controlled    Heart valve disease     mild mitral valve prolapse    High cholesterol     Hypertension        Past Surgical History:   Procedure Laterality Date    MA BRONCHOSCOPY,DIAGNOSTIC  7/1/2021    Procedure: BRONCHOSCOPY - FIBER OPTIC WITH BRANCHOALVEOLAR LAVAGE BIOPSY, FNA, NAVIGATION;  Surgeon: Vinayak Carbajal M.D.;  Location: SURGERY [facility];  Service: Pulmonary    ENDOBRONCHIAL US ADD-ON  7/1/2021    Procedure: ENDOBRONCHIAL ULTRASOUND (EBUS).;  Surgeon: Vinayak Carbajal M.D.;  Location: [facility];  Service: Pulmonary    CATARACT PHACO WITH IOL Left 8/21/2018    Procedure: CATARACT PHACO WITH IOL;  Surgeon: Juanito Hager M.D.;  Location: SURGERY SAME DAY [facility];  Service: Ophthalmology    CATARACT PHACO WITH IOL Right 8/7/2018    Procedure: CATARACT PHACO WITH IOL;  Surgeon: Juanito Hager M.D.;  Location: SURGERY SAME DAY [facility];  Service: Ophthalmology    THORACOSCOPY Left 4/19/2018    Procedure: THORACOSCOPY- WEDGE BIOPSY W/FROZEN SECTION;  Surgeon: Cristhian Galeano M.D.;  Location: [facility];  Service: Thoracic    EAR MIDDLE EXPLORATION Right 6/12/2015    Procedure: EAR MIDDLE EXPLORATION;  Surgeon: William Brandon M.D.;  Location: SURGERY SAME DAY [facility];  Service:     OSSICULAR RECONSTRUCTION Right 6/12/2015    Procedure: OSSICULAR RECONSTRUCTION CHAIN POSSIBLE;  Surgeon: William Brandon M.D.;  Location: SURGERY SAME DAY [facility];  Service:     OTHER      ear replacement \"many years ago\"    OTHER ORTHOPEDIC SURGERY      right knee replacement 2005       Social History     Socioeconomic History    Marital status:      Spouse name: Not on file    Number of children: Not on file    Years of education: Not on file "    Highest education level: Not on file   Occupational History    Not on file   Tobacco Use    Smoking status: Former     Packs/day: 0.25     Years: 40.00     Pack years: 10.00     Types: Cigarettes     Quit date: 2005     Years since quittin.2    Smokeless tobacco: Never   Vaping Use    Vaping Use: Never used   Substance and Sexual Activity    Alcohol use: Not Currently     Alcohol/week: 12.6 - 21.0 oz     Types: 21 - 35 Cans of beer per week     Comment: 12 beers a day (coors), quit 2/3/2023    Drug use: No    Sexual activity: Not on file   Other Topics Concern    Not on file   Social History Narrative    Not on file     Social Determinants of Health     Financial Resource Strain: Not on file   Food Insecurity: Not on file   Transportation Needs: Not on file   Physical Activity: Not on file   Stress: Not on file   Social Connections: Not on file   Intimate Partner Violence: Not on file   Housing Stability: Not on file          Family History   Problem Relation Age of Onset    Stroke Mother     Hypertension Mother     Diabetes Mother     Cancer Father         stomach cancer    Heart Disease Brother     Alcohol abuse Brother     Ovarian Cancer Neg Hx     Tubal Cancer Neg Hx     Peritoneal Cancer Neg Hx     Colorectal Cancer Neg Hx     Breast Cancer Neg Hx     Hyperlipidemia Neg Hx        Current Outpatient Medications on File Prior to Visit   Medication Sig Dispense Refill    furosemide (LASIX) 40 MG Tab Take 1 Tablet by mouth every day for 100 days. 100 Tablet 0    spironolactone (ALDACTONE) 25 MG Tab Take 1 Tablet by mouth every day for 100 days. 100 Tablet 0    apixaban (ELIQUIS) 5mg Tab Take 5 mg by mouth 2 times a day.      metoprolol SR (TOPROL XL) 50 MG TABLET SR 24 HR Take 1 Tablet by mouth every day for 100 days. 100 Tablet 0    cyclobenzaprine (FLEXERIL) 10 mg Tab Take 1 Tablet by mouth at bedtime. 100 Tablet 3    ezetimibe (ZETIA) 10 MG Tab Take 1 Tablet by mouth every day. 100 Tablet 3     "Cholecalciferol (VITAMIN D3) 2000 UNIT Cap Take 1 Capsule by mouth every day. 100 Capsule 3    diphenhydrAMINE-APAP, sleep, (TYLENOL PM EXTRA STRENGTH)  MG Tab Take 1-2 Tablets by mouth at bedtime as needed (Mild Pain or Trouble Sleeping).      fluticasone furoate-vilanterol (BREO ELLIPTA) 200-25 MCG/ACT AEROSOL POWDER, BREATH ACTIVATED Inhale 1 Puff every day. Rinse mouth after use. 1 Each 11     No current facility-administered medications on file prior to visit.       Allergies: Patient has no known allergies.      ROS:   Review of Systems   Constitutional:  Negative for chills, fever and weight loss.   HENT:  Negative for congestion, sinus pain and sore throat.    Eyes:  Negative for pain and discharge.   Respiratory:  Positive for shortness of breath. Negative for cough, sputum production, wheezing and stridor.    Cardiovascular:  Negative for chest pain, orthopnea and leg swelling.   Gastrointestinal:  Negative for abdominal pain, diarrhea, nausea and vomiting.   Genitourinary:  Negative for dysuria, frequency and urgency.   Musculoskeletal:  Negative for myalgias.   Skin:  Negative for rash.   Neurological:  Negative for dizziness, sensory change, focal weakness, loss of consciousness and headaches.   Psychiatric/Behavioral: Negative.     All other systems reviewed and are negative.    Vitals:  /84 (BP Location: Right arm, Patient Position: Sitting, BP Cuff Size: Adult)   Pulse 80   Resp 16   Ht 1.727 m (5' 8\")   Wt 60.3 kg (133 lb)   SpO2 96%     Physical Exam:  Physical Exam  Vitals and nursing note reviewed.   Constitutional:       General: He is not in acute distress.     Appearance: He is well-developed. He is ill-appearing. He is not diaphoretic.      Comments: Very pleasant  Accompanied by supportive wife   HENT:      Nose: Nose normal.      Mouth/Throat:      Pharynx: No oropharyngeal exudate.   Eyes:      General: No scleral icterus.        Right eye: No discharge.         Left eye: " No discharge.      Conjunctiva/sclera: Conjunctivae normal.      Pupils: Pupils are equal, round, and reactive to light.   Neck:      Thyroid: No thyromegaly.      Vascular: No JVD.      Trachea: No tracheal deviation.   Cardiovascular:      Rate and Rhythm: Normal rate and regular rhythm.      Heart sounds: Normal heart sounds. No murmur heard.  Pulmonary:      Effort: Pulmonary effort is normal. No respiratory distress.      Breath sounds: No stridor. No wheezing or rales.      Comments: Good aeration/breath sounds in the bilateral bases  scattered wheezes    Abdominal:      General: There is no distension.      Palpations: Abdomen is soft.      Tenderness: There is no abdominal tenderness. There is no guarding.   Musculoskeletal:         General: No tenderness. Normal range of motion.      Cervical back: Neck supple.   Lymphadenopathy:      Cervical: No cervical adenopathy.   Skin:     General: Skin is warm and dry.      Capillary Refill: Capillary refill takes less than 2 seconds.      Coloration: Skin is not pale.      Findings: No erythema.   Neurological:      Mental Status: He is alert and oriented to person, place, and time.      Sensory: No sensory deficit.      Motor: No abnormal muscle tone.      Coordination: Coordination normal.      Deep Tendon Reflexes: Reflexes normal.   Psychiatric:         Behavior: Behavior normal.         Thought Content: Thought content normal.         Judgment: Judgment normal.     Laboratory Data:    PFTs as reviewed by me personally show:  See HPI    Imaging as reviewed by me personally show:    See HPI    Assessment/Plan:    Problem List Items Addressed This Visit       Dyspnea on exertion     Multifactorial: hx of pneumonitis (1/23), segmental PE (2/23), recent parainfluenza pneumonia (2/23), recently identified HFrEF (3/23) complicated by recurrent transudative pleural effusions. I think the effusions are the overwhelming contributing factor to his dyspnea at the moment.  This is consistent with his symptoms improving after the thoracenteses. These are transudative likely 2/2 HFrEF, with no e/o infection or malignancy on cultures/cytology. He has since been started on a regimen of lasix/aldactone. He has lost 8lbs since his last clinic visit. He reports his mMRC 3 dyspnea is stable. No desats on mulitox on last clinic visit.  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   -- Scheduled to f/u Dr Ruiz 4/27/23, plan of care as outlined above.         Pleural effusion, left     S/p thoracentesis x 2, transudative 2/2 HF  Plan of care as outlined above.         Relevant Orders    DX-CHEST-2 VIEWS    Pulmonary embolism on right (HCC)     2/23: Segmental/Subsegmental  Started eliquis, resolved on repeat CTA 3/2023  -- as long as risks of bleeding continue to be outweighed by benefits of anticoagulation, recommend indefinite therapeutic anticoagulation with DOAC in setting of underlying malignancy         Chronic systolic heart failure (HCC)     HFrEF, Stage C, Class 2, LVEF 35%  Established in heart failure clinic, started on GDMT  Diuretic regimen recently adjusted, recommend daily weights, Na/fluid restriction, etc.  Scheudled for f/u 5/2023          Return in about 8 weeks (around 6/1/2023).     This note was generated using voice recognition software which has a chance of producing errors of grammar and possibly content.  I have made every reasonable attempt to find and correct any obvious errors, but it  should be expected that some may not be found prior to finalization of this note.    Time spent in record review prior to patient arrival, reviewing results, and in face-to-face encounter totaled 48 min, excluding any procedures if performed.  __________  Vinayak Carbajal MD  Pulmonary and Critical Care Medicine  Count includes the [facility]       Electronically signed by Vinayak Carbajal M.D.  on 04/06/23    Miguel Ruiz M.D.  [address] [place] [address] [place] [address] [place] [address]  Via Fax: [phone]

## 2023-04-06 NOTE — ASSESSMENT & PLAN NOTE
2/23: Segmental/Subsegmental  Started eliquis, resolved on repeat CTA 3/2023  -- as long as risks of bleeding continue to be outweighed by benefits of anticoagulation, recommend indefinite therapeutic anticoagulation with DOAC in setting of underlying malignancy

## 2023-04-06 NOTE — ASSESSMENT & PLAN NOTE
HFrEF, Stage C, Class 2, LVEF 35%  Established in heart failure clinic, started on GDMT  Diuretic regimen recently adjusted, recommend daily weights, Na/fluid restriction, etc.  Scheudled for f/u 5/2023

## 2023-04-06 NOTE — PROGRESS NOTES
Pulmonary Clinic Note    Chief Complaint:  Chief Complaint   Patient presents with    Follow-Up     7 week fv   last seen on 2/8/2023 -Dr. Carbajal      HPI:   Pito Black is a very pleasant 78 y.o. 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  He is followed by Dr. Ruiz for oncology care.  He follows up in the pulmonary clinic after hospitalization in early 2023 for shortness of breath attributed to bilateral pleural effusions and right upper lobe mass with resultant postobstructive pneumonia treated with antibiotics and steroids for possible radiation versus immunotherapy induced pneumonitis. He underwent a left-sided thoracentesis with 1 L fluid removed, fluid consistent with transudate.  Low total cell count, however lymphocytic predominant.  Cytology and cultures negative    Last PFTs in 2020 showing preserved FEV1 (2.59L, 95%), no BD response, preserved lung volumes and diffusion capacity.    Respiratory regimen includes Breo Ellipta 200 one puff nightly  On no anticoagulants, ASA 81 mg p.o. daily only    PMH otherwise notable for CKD stage III, PAF, hypertension, dyslipidemia    Interval events since last clinic visit in 2/2023:  Since last visit he was hospitalized briefly in February for parainfluenza pneumonia and right lower lobe PE was discharged on Eliquis and supplemental oxygen.  He was hospitalized earlier this month for chest pain, found to have reaccumulation of pleural effusion underwent a left-sided thoracentesis with 600 cc removed.  TTE at admission showed an ejection fraction of 35% with global hypokinesis and having nonsustained runs of VT and PSVT.  He started on Toprol and diuretics.  He was hospitalized again just last week again for shortness of breath, reaccumulation of pleural effusions left greater  than right, underwent a left thoracentesis again with over a liter of fluid removed. PE had resolved on repeat CT. Pleural fluid cultures and pathology/cytology were unremarkable on both taps.    He underwent a PET/CT scan 3/8/2023.  Results of this imaging are not available for personal review, however from provided records from Dr. Ruiz's office, the subcarinal lymph node has decreased to normal size with no increased activity.  There are no areas of metastatic disease or disease recurrence.  PET/CT did demonstrate mild pulmonary edema as well as an increased large left pleural effusion and moderate right-sided pleural effusion. This imaging was PRIOR to the above referenced hospitalizations/thoracenteses.     Last chest imaging and labs were at time of last thoracentesis 3/25, Cr 1.23    Current diuretic regimen Lasix 40 daily, Aldactone 25 daily. Tolerating Eliquis well.  Wt 141 -> 133#    Multi oximetry walk test last visit showed no desaturations on room air.    Subjectively, he reports stable mMRC 3 symptoms (stops for breath after walking 100 yards or after a few minutes). He does report symptomatic improvement after thoracenteses    Past Medical History:   Diagnosis Date    Acute respiratory failure with hypoxia (HCC) 02/01/2023    Arrhythmia     hx of a fib    Arthritis 2015    generalized, DDD back    Asthma     inhaler     Backpain 08/06/2018    9/10    Cancer (HCC) 07/2017    left lung    Cataract     bilateral, no surgery    Congestive heart failure (HCC)     Dental disorder     lower partial, removable bridge    Diabetes 08/06/2018    on no meds at this time, diet controlled    Heart valve disease     mild mitral valve prolapse    High cholesterol     Hypertension        Past Surgical History:   Procedure Laterality Date    FL BRONCHOSCOPY,DIAGNOSTIC  7/1/2021    Procedure: BRONCHOSCOPY - FIBER OPTIC WITH BRANCHOALVEOLAR LAVAGE BIOPSY, FNA, NAVIGATION;  Surgeon: Vinayak Carbajal M.D.;  Location:  "SURGERY [facility];  Service: Pulmonary    ENDOBRONCHIAL US ADD-ON  2021    Procedure: ENDOBRONCHIAL ULTRASOUND (EBUS).;  Surgeon: Vinayak Carbajal M.D.;  Location: SURGERY [facility];  Service: Pulmonary    CATARACT PHACO WITH IOL Left 2018    Procedure: CATARACT PHACO WITH IOL;  Surgeon: Juanito Hager M.D.;  Location: SURGERY SAME DAY [facility];  Service: Ophthalmology    CATARACT PHACO WITH IOL Right 2018    Procedure: CATARACT PHACO WITH IOL;  Surgeon: Juanito Hager M.D.;  Location: SURGERY SAME DAY [facility];  Service: Ophthalmology    THORACOSCOPY Left 2018    Procedure: THORACOSCOPY- WEDGE BIOPSY W/FROZEN SECTION;  Surgeon: Cristhian Galeano M.D.;  Location: SURGERY [facility];  Service: Thoracic    EAR MIDDLE EXPLORATION Right 2015    Procedure: EAR MIDDLE EXPLORATION;  Surgeon: William Brandon M.D.;  Location: SURGERY SAME DAY [facility];  Service:     OSSICULAR RECONSTRUCTION Right 2015    Procedure: OSSICULAR RECONSTRUCTION CHAIN POSSIBLE;  Surgeon: William Brandon M.D.;  Location: SURGERY SAME DAY [facility];  Service:     OTHER      ear replacement \"many years ago\"    OTHER ORTHOPEDIC SURGERY      right knee replacement        Social History     Socioeconomic History    Marital status:      Spouse name: Not on file    Number of children: Not on file    Years of education: Not on file    Highest education level: Not on file   Occupational History    Not on file   Tobacco Use    Smoking status: Former     Packs/day: 0.25     Years: 40.00     Pack years: 10.00     Types: Cigarettes     Quit date: 2005     Years since quittin.2    Smokeless tobacco: Never   Vaping Use    Vaping Use: Never used   Substance and Sexual Activity    Alcohol use: Not Currently     Alcohol/week: 12.6 - 21.0 oz     Types: 21 - 35 Cans of beer per week     Comment: 12 beers a day (coors), quit 2/3/2023    Drug use: No    Sexual activity: Not on file   Other " Topics Concern    Not on file   Social History Narrative    Not on file     Social Determinants of Health     Financial Resource Strain: Not on file   Food Insecurity: Not on file   Transportation Needs: Not on file   Physical Activity: Not on file   Stress: Not on file   Social Connections: Not on file   Intimate Partner Violence: Not on file   Housing Stability: Not on file          Family History   Problem Relation Age of Onset    Stroke Mother     Hypertension Mother     Diabetes Mother     Cancer Father         stomach cancer    Heart Disease Brother     Alcohol abuse Brother     Ovarian Cancer Neg Hx     Tubal Cancer Neg Hx     Peritoneal Cancer Neg Hx     Colorectal Cancer Neg Hx     Breast Cancer Neg Hx     Hyperlipidemia Neg Hx        Current Outpatient Medications on File Prior to Visit   Medication Sig Dispense Refill    furosemide (LASIX) 40 MG Tab Take 1 Tablet by mouth every day for 100 days. 100 Tablet 0    spironolactone (ALDACTONE) 25 MG Tab Take 1 Tablet by mouth every day for 100 days. 100 Tablet 0    apixaban (ELIQUIS) 5mg Tab Take 5 mg by mouth 2 times a day.      metoprolol SR (TOPROL XL) 50 MG TABLET SR 24 HR Take 1 Tablet by mouth every day for 100 days. 100 Tablet 0    cyclobenzaprine (FLEXERIL) 10 mg Tab Take 1 Tablet by mouth at bedtime. 100 Tablet 3    ezetimibe (ZETIA) 10 MG Tab Take 1 Tablet by mouth every day. 100 Tablet 3    Cholecalciferol (VITAMIN D3) 2000 UNIT Cap Take 1 Capsule by mouth every day. 100 Capsule 3    diphenhydrAMINE-APAP, sleep, (TYLENOL PM EXTRA STRENGTH)  MG Tab Take 1-2 Tablets by mouth at bedtime as needed (Mild Pain or Trouble Sleeping).      fluticasone furoate-vilanterol (BREO ELLIPTA) 200-25 MCG/ACT AEROSOL POWDER, BREATH ACTIVATED Inhale 1 Puff every day. Rinse mouth after use. 1 Each 11     No current facility-administered medications on file prior to visit.       Allergies: Patient has no known allergies.      ROS:   Review of Systems  "  Constitutional:  Negative for chills, fever and weight loss.   HENT:  Negative for congestion, sinus pain and sore throat.    Eyes:  Negative for pain and discharge.   Respiratory:  Positive for shortness of breath. Negative for cough, sputum production, wheezing and stridor.    Cardiovascular:  Negative for chest pain, orthopnea and leg swelling.   Gastrointestinal:  Negative for abdominal pain, diarrhea, nausea and vomiting.   Genitourinary:  Negative for dysuria, frequency and urgency.   Musculoskeletal:  Negative for myalgias.   Skin:  Negative for rash.   Neurological:  Negative for dizziness, sensory change, focal weakness, loss of consciousness and headaches.   Psychiatric/Behavioral: Negative.     All other systems reviewed and are negative.    Vitals:  /84 (BP Location: Right arm, Patient Position: Sitting, BP Cuff Size: Adult)   Pulse 80   Resp 16   Ht 1.727 m (5' 8\")   Wt 60.3 kg (133 lb)   SpO2 96%     Physical Exam:  Physical Exam  Vitals and nursing note reviewed.   Constitutional:       General: He is not in acute distress.     Appearance: He is well-developed. He is ill-appearing. He is not diaphoretic.      Comments: Very pleasant  Accompanied by supportive wife   HENT:      Nose: Nose normal.      Mouth/Throat:      Pharynx: No oropharyngeal exudate.   Eyes:      General: No scleral icterus.        Right eye: No discharge.         Left eye: No discharge.      Conjunctiva/sclera: Conjunctivae normal.      Pupils: Pupils are equal, round, and reactive to light.   Neck:      Thyroid: No thyromegaly.      Vascular: No JVD.      Trachea: No tracheal deviation.   Cardiovascular:      Rate and Rhythm: Normal rate and regular rhythm.      Heart sounds: Normal heart sounds. No murmur heard.  Pulmonary:      Effort: Pulmonary effort is normal. No respiratory distress.      Breath sounds: No stridor. No wheezing or rales.      Comments: Good aeration/breath sounds in the bilateral " bases  scattered wheezes    Abdominal:      General: There is no distension.      Palpations: Abdomen is soft.      Tenderness: There is no abdominal tenderness. There is no guarding.   Musculoskeletal:         General: No tenderness. Normal range of motion.      Cervical back: Neck supple.   Lymphadenopathy:      Cervical: No cervical adenopathy.   Skin:     General: Skin is warm and dry.      Capillary Refill: Capillary refill takes less than 2 seconds.      Coloration: Skin is not pale.      Findings: No erythema.   Neurological:      Mental Status: He is alert and oriented to person, place, and time.      Sensory: No sensory deficit.      Motor: No abnormal muscle tone.      Coordination: Coordination normal.      Deep Tendon Reflexes: Reflexes normal.   Psychiatric:         Behavior: Behavior normal.         Thought Content: Thought content normal.         Judgment: Judgment normal.     Laboratory Data:    PFTs as reviewed by me personally show:  See HPI    Imaging as reviewed by me personally show:    See HPI    Assessment/Plan:    Problem List Items Addressed This Visit       Dyspnea on exertion     Multifactorial: hx of pneumonitis (1/23), segmental PE (2/23), recent parainfluenza pneumonia (2/23), recently identified HFrEF (3/23) complicated by recurrent transudative pleural effusions. I think the effusions are the overwhelming contributing factor to his dyspnea at the moment. This is consistent with his symptoms improving after the thoracenteses. These are transudative likely 2/2 HFrEF, with no e/o infection or malignancy on cultures/cytology. He has since been started on a regimen of lasix/aldactone. He has lost 8lbs since his last clinic visit. He reports his mMRC 3 dyspnea is stable. No desats on mulitox on last clinic visit.  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   -- Scheduled to f/u Dr Ruiz 4/27/23, plan of care as outlined above.         Pleural effusion, left     S/p thoracentesis x 2, transudative 2/2 HF  Plan of care as outlined above.         Relevant Orders    DX-CHEST-2 VIEWS    Pulmonary embolism on right (HCC)     2/23: Segmental/Subsegmental  Started eliquis, resolved on repeat CTA 3/2023  -- as long as risks of bleeding continue to be outweighed by benefits of anticoagulation, recommend indefinite therapeutic anticoagulation with DOAC in setting of underlying malignancy         Chronic systolic heart failure (HCC)     HFrEF, Stage C, Class 2, LVEF 35%  Established in heart failure clinic, started on GDMT  Diuretic regimen recently adjusted, recommend daily weights, Na/fluid restriction, etc.  Scheudled for f/u 5/2023          Return in about 8 weeks (around 6/1/2023).     This note was generated using voice recognition software which has a chance of producing errors of grammar and possibly content.  I have made every reasonable attempt to find and correct any obvious errors, but it should be expected that some may not be found prior to finalization of this note.    Time spent in record review prior to patient arrival, reviewing results, and in face-to-face encounter totaled 48 min, excluding any procedures if performed.  __________  Vinayak Carbajal MD  Pulmonary and Critical Care Medicine  [facility]

## 2023-04-06 NOTE — ASSESSMENT & PLAN NOTE
Multifactorial: hx of pneumonitis (1/23), segmental PE (2/23), recent parainfluenza pneumonia (2/23), recently identified HFrEF (3/23) complicated by recurrent transudative pleural effusions. I think the effusions are the overwhelming contributing factor to his dyspnea at the moment. This is consistent with his symptoms improving after the thoracenteses. These are transudative likely 2/2 HFrEF, with no e/o infection or malignancy on cultures/cytology. He has since been started on a regimen of lasix/aldactone. He has lost 8lbs since his last clinic visit. He reports his mMRC 3 dyspnea is stable. No desats on mulitox on last clinic visit.  -- Cont diuretic regimen managed by cardiology clinic for HFrEF  -- 2v CXR should show stability/improvement in effusions which would support dx cardiogenic pulm edema/effusions

## 2023-04-11 LAB
FUNGUS SPEC CULT: NORMAL
FUNGUS SPEC FUNGUS STN: NORMAL
SIGNIFICANT IND 70042: NORMAL
SITE SITE: NORMAL
SOURCE SOURCE: NORMAL

## 2023-04-12 PROCEDURE — RXMED WILLOW AMBULATORY MEDICATION CHARGE: Performed by: FAMILY MEDICINE

## 2023-04-13 ENCOUNTER — PHARMACY VISIT (OUTPATIENT)
Dept: PHARMACY | Facility: MEDICAL CENTER | Age: 78
End: 2023-04-13
Payer: COMMERCIAL

## 2023-04-14 DIAGNOSIS — I26.99 PULMONARY EMBOLISM ON RIGHT (HCC): ICD-10-CM

## 2023-04-18 ENCOUNTER — PATIENT OUTREACH (OUTPATIENT)
Dept: HEALTH INFORMATION MANAGEMENT | Facility: OTHER | Age: 78
End: 2023-04-18
Payer: MEDICARE

## 2023-04-18 ENCOUNTER — APPOINTMENT (OUTPATIENT)
Dept: RADIOLOGY | Facility: MEDICAL CENTER | Age: 78
End: 2023-04-18
Attending: INTERNAL MEDICINE
Payer: MEDICARE

## 2023-04-18 NOTE — PROGRESS NOTES
4/18/23   Spoke with son of pt. States that pt is doing ok. CHW let pt's son know that last I spoke to the [facility] pharmacy, they were getting the pill packs ready. CHW offered to provide [facility] phone number, however, pt's son states that he already has it.    PLAN CHW will check in in about a week. Pt should be relocating to the [place] area in a week or so. CHW will do SDoH screening during next phone call.     ADDENDUM: CHW called [facility]. They advised that generally, pts have their RX filled at the [facility] if they do not have RX coverage. Chart review reveals that pt has Medicare Part A & B as well as AARP Plan F. These plans do not cover RX, only Medicare Part D. CHW reached out to Pito, pt's son to ask whether his parts have RX coverage. He is going to call his sister and parents to find out. Pito will call me back.

## 2023-04-26 ENCOUNTER — PATIENT OUTREACH (OUTPATIENT)
Dept: HEALTH INFORMATION MANAGEMENT | Facility: OTHER | Age: 78
End: 2023-04-26
Payer: MEDICARE

## 2023-04-26 NOTE — PROGRESS NOTES
"4/26/23  Spoke with pt's daughter. She stated that she reached out to [facility] who stated that they are using \"[employer]\" as coverage for pt's prescriptions. This CHW is unfamiliar with that coverage/program. However, CHW will call [facility] to see if [employer] is something they are familiar with and can use for the pt. In the meantime, CHW will email pt's daughter information on how to add part D coverage to her parent's insurance plan.     PLAN: CHW to follow up in a week or so.   "

## 2023-05-05 ENCOUNTER — HOSPITAL ENCOUNTER (OUTPATIENT)
Dept: LAB | Facility: MEDICAL CENTER | Age: 78
End: 2023-05-05
Attending: NURSE PRACTITIONER
Payer: MEDICARE

## 2023-05-05 ENCOUNTER — HOSPITAL ENCOUNTER (OUTPATIENT)
Dept: LAB | Facility: MEDICAL CENTER | Age: 78
End: 2023-05-05
Attending: INTERNAL MEDICINE
Payer: MEDICARE

## 2023-05-05 DIAGNOSIS — I49.3 PVC (PREMATURE VENTRICULAR CONTRACTION): ICD-10-CM

## 2023-05-05 DIAGNOSIS — I50.9 HEART FAILURE, NYHA CLASS 2 (HCC): ICD-10-CM

## 2023-05-05 DIAGNOSIS — I50.20 ACC/AHA STAGE C SYSTOLIC HEART FAILURE (HCC): ICD-10-CM

## 2023-05-05 LAB
ALBUMIN SERPL BCP-MCNC: 3.9 G/DL (ref 3.2–4.9)
ALBUMIN/GLOB SERPL: 1.2 G/DL
ALP SERPL-CCNC: 58 U/L (ref 30–99)
ALT SERPL-CCNC: 19 U/L (ref 2–50)
ANION GAP SERPL CALC-SCNC: 11 MMOL/L (ref 7–16)
ANION GAP SERPL CALC-SCNC: 12 MMOL/L (ref 7–16)
AST SERPL-CCNC: 22 U/L (ref 12–45)
BASOPHILS # BLD AUTO: 0.4 % (ref 0–1.8)
BASOPHILS # BLD: 0.03 K/UL (ref 0–0.12)
BILIRUB SERPL-MCNC: 0.4 MG/DL (ref 0.1–1.5)
BUN SERPL-MCNC: 22 MG/DL (ref 8–22)
BUN SERPL-MCNC: 23 MG/DL (ref 8–22)
CALCIUM ALBUM COR SERPL-MCNC: 9.9 MG/DL (ref 8.5–10.5)
CALCIUM SERPL-MCNC: 9.8 MG/DL (ref 8.5–10.5)
CALCIUM SERPL-MCNC: 9.8 MG/DL (ref 8.5–10.5)
CHLORIDE SERPL-SCNC: 101 MMOL/L (ref 96–112)
CHLORIDE SERPL-SCNC: 98 MMOL/L (ref 96–112)
CO2 SERPL-SCNC: 22 MMOL/L (ref 20–33)
CO2 SERPL-SCNC: 24 MMOL/L (ref 20–33)
CREAT SERPL-MCNC: 1.44 MG/DL (ref 0.5–1.4)
CREAT SERPL-MCNC: 1.49 MG/DL (ref 0.5–1.4)
EOSINOPHIL # BLD AUTO: 0.24 K/UL (ref 0–0.51)
EOSINOPHIL NFR BLD: 3.1 % (ref 0–6.9)
ERYTHROCYTE [DISTWIDTH] IN BLOOD BY AUTOMATED COUNT: 49.4 FL (ref 35.9–50)
GFR SERPLBLD CREATININE-BSD FMLA CKD-EPI: 48 ML/MIN/1.73 M 2
GFR SERPLBLD CREATININE-BSD FMLA CKD-EPI: 50 ML/MIN/1.73 M 2
GLOBULIN SER CALC-MCNC: 3.3 G/DL (ref 1.9–3.5)
GLUCOSE SERPL-MCNC: 133 MG/DL (ref 65–99)
GLUCOSE SERPL-MCNC: 133 MG/DL (ref 65–99)
HCT VFR BLD AUTO: 45.5 % (ref 42–52)
HGB BLD-MCNC: 15 G/DL (ref 14–18)
IMM GRANULOCYTES # BLD AUTO: 0.03 K/UL (ref 0–0.11)
IMM GRANULOCYTES NFR BLD AUTO: 0.4 % (ref 0–0.9)
LYMPHOCYTES # BLD AUTO: 2.06 K/UL (ref 1–4.8)
LYMPHOCYTES NFR BLD: 26.4 % (ref 22–41)
MCH RBC QN AUTO: 32.3 PG (ref 27–33)
MCHC RBC AUTO-ENTMCNC: 33 G/DL (ref 33.7–35.3)
MCV RBC AUTO: 98.1 FL (ref 81.4–97.8)
MONOCYTES # BLD AUTO: 0.66 K/UL (ref 0–0.85)
MONOCYTES NFR BLD AUTO: 8.5 % (ref 0–13.4)
NEUTROPHILS # BLD AUTO: 4.79 K/UL (ref 1.82–7.42)
NEUTROPHILS NFR BLD: 61.2 % (ref 44–72)
NRBC # BLD AUTO: 0 K/UL
NRBC BLD-RTO: 0 /100 WBC
NT-PROBNP SERPL IA-MCNC: 1800 PG/ML (ref 0–125)
PLATELET # BLD AUTO: 217 K/UL (ref 164–446)
PMV BLD AUTO: 10.9 FL (ref 9–12.9)
POTASSIUM SERPL-SCNC: 4.6 MMOL/L (ref 3.6–5.5)
POTASSIUM SERPL-SCNC: 5 MMOL/L (ref 3.6–5.5)
PROT SERPL-MCNC: 7.2 G/DL (ref 6–8.2)
RBC # BLD AUTO: 4.64 M/UL (ref 4.7–6.1)
SODIUM SERPL-SCNC: 133 MMOL/L (ref 135–145)
SODIUM SERPL-SCNC: 135 MMOL/L (ref 135–145)
TSH SERPL DL<=0.005 MIU/L-ACNC: 0.84 UIU/ML (ref 0.38–5.33)
WBC # BLD AUTO: 7.8 K/UL (ref 4.8–10.8)

## 2023-05-05 PROCEDURE — 80048 BASIC METABOLIC PNL TOTAL CA: CPT

## 2023-05-05 PROCEDURE — 85025 COMPLETE CBC W/AUTO DIFF WBC: CPT

## 2023-05-05 PROCEDURE — 84443 ASSAY THYROID STIM HORMONE: CPT

## 2023-05-05 PROCEDURE — 83880 ASSAY OF NATRIURETIC PEPTIDE: CPT

## 2023-05-05 PROCEDURE — 80053 COMPREHEN METABOLIC PANEL: CPT

## 2023-05-05 PROCEDURE — 36415 COLL VENOUS BLD VENIPUNCTURE: CPT

## 2023-05-09 ENCOUNTER — OFFICE VISIT (OUTPATIENT)
Dept: CARDIOLOGY | Facility: MEDICAL CENTER | Age: 78
End: 2023-05-09
Payer: MEDICARE

## 2023-05-09 VITALS
WEIGHT: 147 LBS | RESPIRATION RATE: 16 BRPM | OXYGEN SATURATION: 100 % | HEIGHT: 68 IN | DIASTOLIC BLOOD PRESSURE: 60 MMHG | BODY MASS INDEX: 22.28 KG/M2 | SYSTOLIC BLOOD PRESSURE: 102 MMHG | HEART RATE: 84 BPM

## 2023-05-09 DIAGNOSIS — N18.31 STAGE 3A CHRONIC KIDNEY DISEASE: ICD-10-CM

## 2023-05-09 DIAGNOSIS — I50.9 HEART FAILURE, NYHA CLASS 2 (HCC): ICD-10-CM

## 2023-05-09 DIAGNOSIS — E78.5 DYSLIPIDEMIA: ICD-10-CM

## 2023-05-09 DIAGNOSIS — I50.20 ACC/AHA STAGE C SYSTOLIC HEART FAILURE (HCC): ICD-10-CM

## 2023-05-09 DIAGNOSIS — R06.02 SOB (SHORTNESS OF BREATH): ICD-10-CM

## 2023-05-09 DIAGNOSIS — I26.99 PULMONARY EMBOLISM ON RIGHT (HCC): ICD-10-CM

## 2023-05-09 DIAGNOSIS — I10 ESSENTIAL HYPERTENSION: ICD-10-CM

## 2023-05-09 DIAGNOSIS — I48.0 PAF (PAROXYSMAL ATRIAL FIBRILLATION) (HCC): ICD-10-CM

## 2023-05-09 PROCEDURE — 99214 OFFICE O/P EST MOD 30 MIN: CPT | Performed by: NURSE PRACTITIONER

## 2023-05-09 ASSESSMENT — MINNESOTA LIVING WITH HEART FAILURE QUESTIONNAIRE (MLHF)
COSTING YOU MONEY FOR MEDICAL CARE: 0
DIFFICULTY GOING AWAY FROM HOME: 3
DIFFICULTY SOCIALIZING WITH FAMILY OR FRIENDS: 4
DIFFICULTY WITH RECREATIONAL PASTIMES, SPORTS, HOBBIES: 4
TIRED, FATIGUED OR LOW ON ENERGY: 4
WORKING AROUND THE HOUSE OR YARD DIFFICULT: 4
GIVING YOU SIDE EFFECTS FROM TREATMENTS: 4
HAVING TO SIT OR LIE DOWN DURING THE DAY: 4
MAKING YOU FEEL DEPRESSED: 4
DIFFICULTY TO CONCENTRATE OR REMEMBERING THINGS: 4
DIFFICULTY WITH SEXUAL ACTIVITIES: 0
FEELING LIKE A BURDEN TO FAMILY AND FRIENDS: 4
LOSS OF SELF CONTROL IN YOUR LIFE: 4
SWELLING IN ANKLES OR LEGS: 0
WALKING ABOUT OR CLIMBING STAIRS DIFFICULT: 4
DIFFICULTY WORKING TO EARN A LIVING: 4
EATING LESS FOODS YOU LIKE: 1
TOTAL_SCORE: 64
DIFFICULTY SLEEPING WELL AT NIGHT: 4
MAKING YOU STAY IN A HOSPITAL: 0
MAKING YOU WORRY: 4
MAKING YOU SHORT OF BREATH: 4

## 2023-05-09 ASSESSMENT — ENCOUNTER SYMPTOMS
MYALGIAS: 0
SHORTNESS OF BREATH: 1
PALPITATIONS: 0
CLAUDICATION: 0
ABDOMINAL PAIN: 0
ORTHOPNEA: 0
PND: 0
BACK PAIN: 1
COUGH: 0
DIZZINESS: 0
FEVER: 0

## 2023-05-09 ASSESSMENT — FIBROSIS 4 INDEX: FIB4 SCORE: 1.81

## 2023-05-09 NOTE — PROGRESS NOTES
Chief Complaint   Patient presents with    Premature Ventricular Contractions (PVCs)    Congestive Heart Failure     F/V Dx: ACC/AHA stage C systolic heart failure    Hypertension       Subjective     Pito Black is a 78 y.o. male who presents today for follow up on his Heart Failure with his wife, Cydney.    Current patient of the heart failure clinic.  He was last seen in clinic on 4/4/2023.  During that visit, patient was recommended to increase metoprolol succinate to 50 mg daily and try decreasing diuretic slightly by alternating between 20 and 40 mg daily.    Patient reports he did well with the med changes, but mentions on 1 particular day, patient was getting up quickly to get the door at his home and suddenly fell to his knees.  He states he did not pass out, but fell.  He does not think he tripped on anything.  He mentions he had not eaten that day, only had some coffee.    He otherwise denies chest pain, palpitations, orthopnea, PND, edema or dizziness/lightheadedness.  He does mention continued shortness of breath with exertion, though mild and chronic back pain.  He also mentions urinary frequency at night.    His home weights have been stable at 133 to 135 pounds.  His systolic blood pressures have been ranging between 's.    He does report a low-sodium diet.    He reports compliance with his medications.    Additionally, patient has the following medical problems:     -Hospitalization from 3/24/2023 through 3/26/2023 with shortness of breath.  Patient was found to be hypoxic and required oxygen.  CTA was negative for PE, but did show worsening bilateral pleural effusions L>R.  He had a left thoracentesis.  He was started on Lasix and Aldactone.  Patient was discharged off oxygen.    -hospitalization from 3/17/2023 through 3/19/2023 for shortness of breath and had an echocardiogram which showed an EF of 35%.  Chest x-ray showed pulmonary edema.  Patient was started on IV diuretics.  He  did have another thoracentesis.  Patient was found to be in LOCO.  Patient also had nonsustained runs of VT/PSVT.  Cardiology was consulted. Toprol was started for his heart failure, tachycardia and frequent ectopy    -Hospitalization from 2/20/2023 through 2/22/2023.  He presented with shortness of breath.  During this visit, patient, he was found to have a PE and influenza positive and started on antibiotic.    -Hospitalization from 1/31/2023 through 2/2/2023.  Patient presented with to [facility] with shortness of breath and found to have bilateral pleural effusions, right upper lobe mass and concern for left upper lobe pneumonia.  He was started on antibiotics and diuresed with Lasix.  He had a thoracentesis on 2/1/2023 with 1 L removed.  Patient had a recent immunotherapy.  Patient was diagnosed with acute hypoxic respiratory failure likely secondary to community-acquired pneumonia versus malignancy related to pleural effusions versus postradiation pneumonitis versus immunotherapy induced pneumonitis.    -History of lung cancer in remission, s/p wedge resection in 2018, had chemotherapy and radiation    -Hypertension    -Atrial fibrillation: Taking Eliquis    -CKD, stage 3    Past Medical History:   Diagnosis Date    Acute respiratory failure with hypoxia (HCC) 02/01/2023    Arrhythmia     hx of a fib    Arthritis 2015    generalized, DDD back    Asthma     inhaler     Backpain 08/06/2018    9/10    Cancer (HCC) 07/2017    left lung    Cataract     bilateral, no surgery    Congestive heart failure (HCC)     Dental disorder     lower partial, removable bridge    Diabetes 08/06/2018    on no meds at this time, diet controlled    Heart valve disease     mild mitral valve prolapse    High cholesterol     Hypertension      Past Surgical History:   Procedure Laterality Date    KS BRONCHOSCOPY,DIAGNOSTIC  7/1/2021    Procedure: BRONCHOSCOPY - FIBER OPTIC WITH BRANCHOALVEOLAR LAVAGE BIOPSY, FNA, NAVIGATION;   "Surgeon: Vinayak Carbajal M.D.;  Location: SURGERY [facility];  Service: Pulmonary    ENDOBRONCHIAL US ADD-ON  7/1/2021    Procedure: ENDOBRONCHIAL ULTRASOUND (EBUS).;  Surgeon: Vinayak Carbajal M.D.;  Location: SURGERY [facility];  Service: Pulmonary    CATARACT PHACO WITH IOL Left 8/21/2018    Procedure: CATARACT PHACO WITH IOL;  Surgeon: Juanito Hager M.D.;  Location: SURGERY SAME DAY [facility];  Service: Ophthalmology    CATARACT PHACO WITH IOL Right 8/7/2018    Procedure: CATARACT PHACO WITH IOL;  Surgeon: Juanito Hager M.D.;  Location: SURGERY SAME DAY [facility];  Service: Ophthalmology    THORACOSCOPY Left 4/19/2018    Procedure: THORACOSCOPY- WEDGE BIOPSY W/FROZEN SECTION;  Surgeon: Cristhian Galeano M.D.;  Location: SURGERY [facility];  Service: Thoracic    EAR MIDDLE EXPLORATION Right 6/12/2015    Procedure: EAR MIDDLE EXPLORATION;  Surgeon: William Brandon M.D.;  Location: SURGERY SAME DAY [facility];  Service:     OSSICULAR RECONSTRUCTION Right 6/12/2015    Procedure: OSSICULAR RECONSTRUCTION CHAIN POSSIBLE;  Surgeon: William Brandon M.D.;  Location: SURGERY SAME DAY [facility];  Service:     OTHER      ear replacement \"many years ago\"    OTHER ORTHOPEDIC SURGERY      right knee replacement 2005     Family History   Problem Relation Age of Onset    Stroke Mother     Hypertension Mother     Diabetes Mother     Cancer Father         stomach cancer    Heart Disease Brother     Alcohol abuse Brother     Ovarian Cancer Neg Hx     Tubal Cancer Neg Hx     Peritoneal Cancer Neg Hx     Colorectal Cancer Neg Hx     Breast Cancer Neg Hx     Hyperlipidemia Neg Hx      Social History     Socioeconomic History    Marital status:      Spouse name: Not on file    Number of children: Not on file    Years of education: Not on file    Highest education level: Not on file   Occupational History    Not on file   Tobacco Use    Smoking status: Former     Packs/day: 0.25     Years: 40.00     " Pack years: 10.00     Types: Cigarettes     Quit date: 2005     Years since quittin.3    Smokeless tobacco: Never   Vaping Use    Vaping Use: Never used   Substance and Sexual Activity    Alcohol use: Not Currently     Alcohol/week: 12.6 - 21.0 oz     Types: 21 - 35 Cans of beer per week     Comment: 12 beers a day (coors), quit 2/3/2023    Drug use: No    Sexual activity: Not on file   Other Topics Concern    Not on file   Social History Narrative    Not on file     Social Determinants of Health     Financial Resource Strain: Not on file   Food Insecurity: Not on file   Transportation Needs: Not on file   Physical Activity: Not on file   Stress: Not on file   Social Connections: Not on file   Intimate Partner Violence: Not on file   Housing Stability: Not on file     No Known Allergies  Outpatient Encounter Medications as of 2023   Medication Sig Dispense Refill    apixaban (ELIQUIS) 5mg Tab Take 1 Tablet by mouth 2 times a day. 60 Tablet 3    furosemide (LASIX) 40 MG Tab Take 1 Tablet by mouth every day for 100 days. 100 Tablet 0    spironolactone (ALDACTONE) 25 MG Tab Take 1 Tablet by mouth every day for 100 days. 100 Tablet 0    metoprolol SR (TOPROL XL) 50 MG TABLET SR 24 HR Take 1 Tablet by mouth every day for 100 days. 100 Tablet 0    cyclobenzaprine (FLEXERIL) 10 mg Tab Take 1 Tablet by mouth at bedtime. 100 Tablet 3    ezetimibe (ZETIA) 10 MG Tab Take 1 Tablet by mouth every day. 100 Tablet 3    Cholecalciferol (VITAMIN D3) 2000 UNIT Cap Take 1 Capsule by mouth every day. 100 Capsule 3    diphenhydrAMINE-APAP, sleep, (TYLENOL PM EXTRA STRENGTH)  MG Tab Take 1-2 Tablets by mouth at bedtime as needed (Mild Pain or Trouble Sleeping).      fluticasone furoate-vilanterol (BREO ELLIPTA) 200-25 MCG/ACT AEROSOL POWDER, BREATH ACTIVATED Inhale 1 Puff every day. Rinse mouth after use. 1 Each 11     No facility-administered encounter medications on file as of 2023.     Review of Systems  "  Constitutional:  Negative for fever and malaise/fatigue.   Respiratory:  Positive for shortness of breath. Negative for cough.    Cardiovascular:  Negative for chest pain, palpitations, orthopnea, claudication, leg swelling and PND.   Gastrointestinal:  Negative for abdominal pain.   Genitourinary:  Positive for frequency (Nocturia).   Musculoskeletal:  Positive for back pain. Negative for myalgias.   Neurological:  Negative for dizziness.   All other systems reviewed and are negative.           Objective     /60 (BP Location: Left arm, Patient Position: Sitting, BP Cuff Size: Adult)   Pulse 84   Resp 16   Ht 1.727 m (5' 8\")   Wt 66.7 kg (147 lb)   SpO2 100%   BMI 22.35 kg/m²     Physical Exam  Vitals reviewed.   Constitutional:       Appearance: He is well-developed.   HENT:      Head: Normocephalic and atraumatic.   Eyes:      Pupils: Pupils are equal, round, and reactive to light.   Neck:      Vascular: No JVD.   Cardiovascular:      Rate and Rhythm: Normal rate and regular rhythm.      Heart sounds: Normal heart sounds.   Pulmonary:      Effort: Pulmonary effort is normal. No respiratory distress.      Breath sounds: Normal breath sounds. No wheezing or rales.   Abdominal:      General: Bowel sounds are normal.      Palpations: Abdomen is soft.   Musculoskeletal:         General: Normal range of motion.      Cervical back: Normal range of motion and neck supple.      Right lower leg: No edema.      Left lower leg: No edema.   Skin:     General: Skin is warm and dry.   Neurological:      General: No focal deficit present.      Mental Status: He is alert and oriented to person, place, and time.   Psychiatric:         Behavior: Behavior normal.     No results found for: CHOLSTRLTOT, LDL, HDL, TRIGLYCERIDE    Lab Results   Component Value Date/Time    SODIUM 135 05/05/2023 11:41 AM    POTASSIUM 5.0 05/05/2023 11:41 AM    CHLORIDE 101 05/05/2023 11:41 AM    CO2 22 05/05/2023 11:41 AM    GLUCOSE 133 (H) " 05/05/2023 11:41 AM    BUN 23 (H) 05/05/2023 11:41 AM    CREATININE 1.44 (H) 05/05/2023 11:41 AM     Lab Results   Component Value Date/Time    ALKPHOSPHAT 58 05/05/2023 11:41 AM    ASTSGOT 22 05/05/2023 11:41 AM    ALTSGPT 19 05/05/2023 11:41 AM    TBILIRUBIN 0.4 05/05/2023 11:41 AM      Transthoracic Echo Report 2/7/2018  Normal left ventricular systolic function. Left ventricular ejection fraction is visually estimated to be 55%.   Diastolic function is difficult to assess with atrial fibrillation.  Mild aortic insufficiency.   Mild mitral regurgitation.  No prior study is available for comparison.      DepoMedel 7/277/2021-8/9/2021  SUMMARY   9 days of monitoring demonstrated sinus rhythm with frequent atrial ectopy.  Occasional ventricular ectopy including up to 14 beats of monomorphic VT which has similar morphology to PVCs.  Symptoms correlated with ventricular tachycardia and atrial tachycardia as well as frequent ectopy.     Transthoracic Echo Report 3/17/2023  Prior echocardiogram 2/7/2018, there is now reduction in left ventricular systolic function and presence of moderate mitral regurgitation.  Severely reduced left ventricular systolic function. The left   ventricular ejection fraction is visually estimated to be 30 to 35%.   Global hypokinesis with regional variation.  Mild aortic insufficiency.  The aortic valve is not well visualized. Calcified aortic valve leaflets. Transvalvular gradients are - Peak: 13 mmHg, Mean: 7 mmHg.    Aortic valve gradients may be underestimated due to low ejection fraction.   Moderate mitral regurgitation.   Normal aortic root for body surface area. The ascending aorta diameter is 3.2 cm.       Date 6MWT MLWHF   4/4/2023 292 m in 6 minutes 41   5/9/2023 --- 60                Assessment & Plan     1. ACC/AHA stage C systolic heart failure (HCC)  Basic Metabolic Panel (BMP)    EC-ECHOCARDIOGRAM COMPLETE W/O CONT      2. SOB (shortness of breath)  Basic Metabolic Panel (BMP)     EC-ECHOCARDIOGRAM COMPLETE W/O CONT      3. Heart failure, NYHA class 2 ([facility])  Basic Metabolic Panel (BMP)    EC-ECHOCARDIOGRAM COMPLETE W/O CONT      4. PAF (paroxysmal atrial fibrillation) ([facility])  Basic Metabolic Panel (BMP)    EC-ECHOCARDIOGRAM COMPLETE W/O CONT      5. Stage 3a chronic kidney disease ([facility])        6. Dyslipidemia        7. Essential hypertension        8. Pulmonary embolism on right ([facility])            Medical Decision Making: Today's Assessment/Status/Plan:        HFrEF, Stage C, Class 2, LVEF 35%: Based on physical examination findings, patient is euvolemic. No JVD, lungs are clear to auscultation, no pitting edema in bilateral lower extremities, no ascites.   -Heart failure due to unknown cause, patient did not have ischemic work-up.  Did discuss, but will schedule repeat echo first and if no improvement of EF then will send patient for angiogram as he is not currently having any ischemia symptoms necessarily  -Discussed Heart failure trajectory and prognosis with patient. Will continue to optimize medical therapy as tolerated. Advanced HF treatment, need for remote monitoring consideration at every visit.   -ACE-I/ARB/ARNI: Consider starting if blood pressure improves, BP continues to be low  -Evidence Based Beta-blocker: Continue metoprolol SR to 50 mg daily  -Aldosterone Antagonist: Continue spironolactone 25 mg daily  -Diuretic:  try decreasing furosemide to 20 mg daily  -SGLT2 inhibitor: Consider at follow-up visit  -Other: Consider as needed (Hydralazine/Isosorbide, Vericiguat, Corlanor)  -Labs: BMP-Labs Ordered, to be done in 3-4 weeks, Will continue to closely monitor for side effects of patient's high risk medication(s) including renal function, liver function NTproBNP/cardiac markers, and electrolytes as needed  -discussed importance of exercise and regular activity  -Discussed/reviewed maintaining a low Sodium and hydration recommendations  -Repeat Echo in 3-6 months, schedule at this  time, if LVEF not >35%, then will discuss/consider ICD for primary Prevention.   -moderate MR seen on echo: Will refer to structural heart clinic for Mitraclip evaluation if EF or MR does not improve  -Reinforced s/sx of worsening heart failure with patient and weight monitoring. Pt verbalizes understanding. Pt to call office or RTC if present.    -PUMP line number [phone] (PUMP) reviewed with patient  -Heart Failure Education:   Discussed the Definition of heart failure (linking disease, symptoms, and treatment) and causes of heart failure  Recognition of escalating symptoms and concrete plan for response to particular symptoms  Discussed the indication for ACE-I/ARB/ARNI, Beta-Blocker, Aldosterone antagonist, SGLT2 inhibitor  Risk modification for heart failure progression  Specific diet recommendations: Continue low-sodium diet and adequate hydration   discussed patient to Stay active  Importance of treatment adherence  Behavioral strategies to promote treatment adherence  Patient declines formal education at this time  -Advanced care planning: Advance directive and POLST to be discussed at future visit  -Pharmacotherapy Referral: Patient not referred to pharmacotherapy at this time  -Compliance Barriers: None   -Genetic testing Consideration: none  -Consideration for LVAD and/or Heart transplant as necessary       PE:  -Continue Eliquis 5 mg twice a day     bilateral pleural effusion:  -s/p thoracentesis x3  -Will follow    Paroxysmal A-fib:  -Continue Eliquis 5 mg twice a day  -Continue metoprolol succinate 50 mg daily    Dyslipidemia:  -no lipid panel on file, looks like PCP ordered labs due before follow-up, encouraged patient to complete before next visit  -Continue ezetimibe 10 mg daily    FU in clinic in 6 week with labs. Sooner if needed.    Patient verbalizes understanding and agrees with the plan of care.     PLEASE NOTE: This Note was created using voice recognition Software. I have made every  reasonable attempt to correct obvious errors, but I expect that there are errors of grammar and possibly content that I did not discover before finalizing the note

## 2023-05-11 ENCOUNTER — PATIENT OUTREACH (OUTPATIENT)
Dept: HEALTH INFORMATION MANAGEMENT | Facility: OTHER | Age: 78
End: 2023-05-11
Payer: MEDICARE

## 2023-05-11 NOTE — PROGRESS NOTES
5/11/2023  Called pt's daughter and approved contact. She states that her parents are doing well and getting settled in. They are officially moved into their house here locally in [place]. She states that she got a call pt's wife (her mom) stating that they are losing their secondary coverage at the end of this month. Pt's daughter recently had surgery and wasn't feeling well, so they did not have chance to read the email this CHW sent regarding part D coverage options. Pt's daughter states that she plans on going over next week and getting it all sorted out for them. This CHW strongly suggested visiting the Medicare Store here in [place]. They take walk-ins and can advise them about the best route to take. Pt's daughter agreeable to this.       PLAN: CHW will close this pt and his wife off of caseload. Pt and his wife are settled into [place]. Pt's daughter getting patient's drug coverage sorted. No additional needs at this time- they have CCM contact information.

## 2023-05-23 ENCOUNTER — TELEPHONE (OUTPATIENT)
Dept: CARDIOLOGY | Facility: MEDICAL CENTER | Age: 78
End: 2023-05-23
Payer: MEDICARE

## 2023-05-23 NOTE — TELEPHONE ENCOUNTER
BRETT      Caller: Cydney (pt's wife)    Medication Name and Dosage: apixaban (ELIQUIS) 5mg Tab        Please call your pharmacy and have them send us a refill request or speak to a live representative, RX number may have changed.    Medication amount left: 0    Preferred Pharmacy: [id number] - [place], [place] - [address]    Other questions (Topic): n/a    Callback Number (Will only call for issues): [phone]      Thank you    -Sergio SPARKS

## 2023-05-25 DIAGNOSIS — I26.99 PULMONARY EMBOLISM ON RIGHT (HCC): ICD-10-CM

## 2023-05-25 NOTE — TELEPHONE ENCOUNTER
BRETT    Caller: Cydney Black (Spouse)    Topic/issue: Spouse states that she called the PCP's office and they told her it's a cardio issue. I tried to tell spouse that I can get her back to the PCP's office or she can reach out to them VIA [employer] but she doesn't know where to go from here.    Callback Number: [phone]    Thank you,  -Asia SPARKS

## 2023-05-25 NOTE — TELEPHONE ENCOUNTER
Re-reviewed chart, though PCP has previously prescribed Eliquis for PE, pt also has a-fib, rx sent. Cydney notified.

## 2023-05-26 DIAGNOSIS — I26.99 PULMONARY EMBOLISM ON RIGHT (HCC): ICD-10-CM

## 2023-06-01 ENCOUNTER — TELEPHONE (OUTPATIENT)
Dept: HEALTH INFORMATION MANAGEMENT | Facility: OTHER | Age: 78
End: 2023-06-01
Payer: MEDICARE

## 2023-06-02 ENCOUNTER — HOSPITAL ENCOUNTER (OUTPATIENT)
Dept: LAB | Facility: MEDICAL CENTER | Age: 78
End: 2023-06-02
Payer: MEDICARE

## 2023-06-02 ENCOUNTER — HOSPITAL ENCOUNTER (OUTPATIENT)
Dept: LAB | Facility: MEDICAL CENTER | Age: 78
End: 2023-06-02
Attending: INTERNAL MEDICINE
Payer: MEDICARE

## 2023-06-02 ENCOUNTER — APPOINTMENT (OUTPATIENT)
Dept: RADIOLOGY | Facility: IMAGING CENTER | Age: 78
End: 2023-06-02
Attending: INTERNAL MEDICINE
Payer: MEDICARE

## 2023-06-02 DIAGNOSIS — E78.5 DYSLIPIDEMIA: ICD-10-CM

## 2023-06-02 DIAGNOSIS — I50.9 HEART FAILURE, NYHA CLASS 2 (HCC): ICD-10-CM

## 2023-06-02 DIAGNOSIS — I50.9 CHRONIC CONGESTIVE HEART FAILURE, UNSPECIFIED HEART FAILURE TYPE (HCC): ICD-10-CM

## 2023-06-02 DIAGNOSIS — I48.0 PAF (PAROXYSMAL ATRIAL FIBRILLATION) (HCC): ICD-10-CM

## 2023-06-02 DIAGNOSIS — I50.20 ACC/AHA STAGE C SYSTOLIC HEART FAILURE (HCC): ICD-10-CM

## 2023-06-02 DIAGNOSIS — J90 PLEURAL EFFUSION: ICD-10-CM

## 2023-06-02 DIAGNOSIS — N18.31 STAGE 3A CHRONIC KIDNEY DISEASE: ICD-10-CM

## 2023-06-02 DIAGNOSIS — E11.65 TYPE 2 DIABETES MELLITUS WITH HYPERGLYCEMIA, WITHOUT LONG-TERM CURRENT USE OF INSULIN (HCC): ICD-10-CM

## 2023-06-02 DIAGNOSIS — R06.02 SOB (SHORTNESS OF BREATH): ICD-10-CM

## 2023-06-02 LAB
ALBUMIN SERPL BCP-MCNC: 4.2 G/DL (ref 3.2–4.9)
ALBUMIN/GLOB SERPL: 1.1 G/DL
ALP SERPL-CCNC: 74 U/L (ref 30–99)
ALT SERPL-CCNC: 22 U/L (ref 2–50)
ANION GAP SERPL CALC-SCNC: 14 MMOL/L (ref 7–16)
ANION GAP SERPL CALC-SCNC: 14 MMOL/L (ref 7–16)
AST SERPL-CCNC: 20 U/L (ref 12–45)
BASOPHILS # BLD AUTO: 0.4 % (ref 0–1.8)
BASOPHILS # BLD: 0.03 K/UL (ref 0–0.12)
BILIRUB SERPL-MCNC: 0.4 MG/DL (ref 0.1–1.5)
BUN SERPL-MCNC: 24 MG/DL (ref 8–22)
BUN SERPL-MCNC: 24 MG/DL (ref 8–22)
CALCIUM ALBUM COR SERPL-MCNC: 9.8 MG/DL (ref 8.5–10.5)
CALCIUM SERPL-MCNC: 10 MG/DL (ref 8.5–10.5)
CALCIUM SERPL-MCNC: 9.9 MG/DL (ref 8.5–10.5)
CHLORIDE SERPL-SCNC: 103 MMOL/L (ref 96–112)
CHLORIDE SERPL-SCNC: 103 MMOL/L (ref 96–112)
CHOLEST SERPL-MCNC: 148 MG/DL (ref 100–199)
CO2 SERPL-SCNC: 24 MMOL/L (ref 20–33)
CO2 SERPL-SCNC: 24 MMOL/L (ref 20–33)
CREAT SERPL-MCNC: 1.19 MG/DL (ref 0.5–1.4)
CREAT SERPL-MCNC: 1.22 MG/DL (ref 0.5–1.4)
EOSINOPHIL # BLD AUTO: 0.34 K/UL (ref 0–0.51)
EOSINOPHIL NFR BLD: 4.3 % (ref 0–6.9)
ERYTHROCYTE [DISTWIDTH] IN BLOOD BY AUTOMATED COUNT: 47.6 FL (ref 35.9–50)
FASTING STATUS PATIENT QL REPORTED: NORMAL
GFR SERPLBLD CREATININE-BSD FMLA CKD-EPI: 61 ML/MIN/1.73 M 2
GFR SERPLBLD CREATININE-BSD FMLA CKD-EPI: 62 ML/MIN/1.73 M 2
GLOBULIN SER CALC-MCNC: 3.7 G/DL (ref 1.9–3.5)
GLUCOSE SERPL-MCNC: 143 MG/DL (ref 65–99)
GLUCOSE SERPL-MCNC: 144 MG/DL (ref 65–99)
HCT VFR BLD AUTO: 47.7 % (ref 42–52)
HDLC SERPL-MCNC: 44 MG/DL
HGB BLD-MCNC: 16 G/DL (ref 14–18)
IMM GRANULOCYTES # BLD AUTO: 0.04 K/UL (ref 0–0.11)
IMM GRANULOCYTES NFR BLD AUTO: 0.5 % (ref 0–0.9)
LDLC SERPL CALC-MCNC: 92 MG/DL
LYMPHOCYTES # BLD AUTO: 2.54 K/UL (ref 1–4.8)
LYMPHOCYTES NFR BLD: 32.1 % (ref 22–41)
MCH RBC QN AUTO: 32.2 PG (ref 27–33)
MCHC RBC AUTO-ENTMCNC: 33.5 G/DL (ref 32.3–36.5)
MCV RBC AUTO: 96 FL (ref 81.4–97.8)
MONOCYTES # BLD AUTO: 0.67 K/UL (ref 0–0.85)
MONOCYTES NFR BLD AUTO: 8.5 % (ref 0–13.4)
NEUTROPHILS # BLD AUTO: 4.3 K/UL (ref 1.82–7.42)
NEUTROPHILS NFR BLD: 54.2 % (ref 44–72)
NRBC # BLD AUTO: 0 K/UL
NRBC BLD-RTO: 0 /100 WBC (ref 0–0.2)
PLATELET # BLD AUTO: 212 K/UL (ref 164–446)
PMV BLD AUTO: 10.4 FL (ref 9–12.9)
POTASSIUM SERPL-SCNC: 5.1 MMOL/L (ref 3.6–5.5)
POTASSIUM SERPL-SCNC: 5.2 MMOL/L (ref 3.6–5.5)
PROT SERPL-MCNC: 7.9 G/DL (ref 6–8.2)
RBC # BLD AUTO: 4.97 M/UL (ref 4.7–6.1)
SODIUM SERPL-SCNC: 141 MMOL/L (ref 135–145)
SODIUM SERPL-SCNC: 141 MMOL/L (ref 135–145)
TRIGL SERPL-MCNC: 58 MG/DL (ref 0–149)
TSH SERPL DL<=0.005 MIU/L-ACNC: 1.1 UIU/ML (ref 0.38–5.33)
WBC # BLD AUTO: 7.9 K/UL (ref 4.8–10.8)

## 2023-06-02 PROCEDURE — 80061 LIPID PANEL: CPT

## 2023-06-02 PROCEDURE — 80053 COMPREHEN METABOLIC PANEL: CPT

## 2023-06-02 PROCEDURE — 82043 UR ALBUMIN QUANTITATIVE: CPT

## 2023-06-02 PROCEDURE — 71046 X-RAY EXAM CHEST 2 VIEWS: CPT | Mod: TC

## 2023-06-02 PROCEDURE — 85025 COMPLETE CBC W/AUTO DIFF WBC: CPT

## 2023-06-02 PROCEDURE — 36415 COLL VENOUS BLD VENIPUNCTURE: CPT

## 2023-06-02 PROCEDURE — 84443 ASSAY THYROID STIM HORMONE: CPT

## 2023-06-02 PROCEDURE — 83036 HEMOGLOBIN GLYCOSYLATED A1C: CPT

## 2023-06-02 PROCEDURE — 82570 ASSAY OF URINE CREATININE: CPT

## 2023-06-02 PROCEDURE — 80048 BASIC METABOLIC PNL TOTAL CA: CPT

## 2023-06-03 LAB
CREAT UR-MCNC: 30.15 MG/DL
EST. AVERAGE GLUCOSE BLD GHB EST-MCNC: 140 MG/DL
HBA1C MFR BLD: 6.5 % (ref 4–5.6)
MICROALBUMIN UR-MCNC: <1.2 MG/DL
MICROALBUMIN/CREAT UR: NORMAL MG/G (ref 0–30)

## 2023-06-07 ENCOUNTER — APPOINTMENT (OUTPATIENT)
Dept: SLEEP MEDICINE | Facility: MEDICAL CENTER | Age: 78
End: 2023-06-07
Attending: INTERNAL MEDICINE
Payer: MEDICARE

## 2023-06-07 ASSESSMENT — ENCOUNTER SYMPTOMS
ORTHOPNEA: 0
COUGH: 0
WHEEZING: 0
EYE PAIN: 0
SPUTUM PRODUCTION: 0
DIZZINESS: 0
SENSORY CHANGE: 0
NAUSEA: 0
FEVER: 0
LOSS OF CONSCIOUSNESS: 0
WEIGHT LOSS: 0
SORE THROAT: 0
HEADACHES: 0
CHILLS: 0
SINUS PAIN: 0
MYALGIAS: 0
DIARRHEA: 0
VOMITING: 0
STRIDOR: 0
ABDOMINAL PAIN: 0
PSYCHIATRIC NEGATIVE: 1
FOCAL WEAKNESS: 0
EYE DISCHARGE: 0

## 2023-06-07 NOTE — PROGRESS NOTES
Pulmonary Clinic Note    Chief Complaint:  Chief Complaint   Patient presents with    Asthma     Last Seen 04/06/2023 with Dr. Carbajal     Results     CXR 2 VIEW - 06/02/2023     HPI:   Pito Black is a very pleasant 78 y.o. 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  He is followed by Dr. Ruiz for oncology care.      He is followed in the pulmonary clinic since hospitalization in early 2023 for hypoxemic respiratory failure which was attributed to bilateral pleural effusions and right upper lobe mass with resultant postobstructive pneumonia treated with antibiotics and steroids for possible radiation versus immunotherapy induced pneumonitis. He underwent a left-sided thoracentesis with 1 L fluid removed, fluid consistent with transudate.  Low total cell count, however lymphocytic predominant.  Cytology and cultures negative    He was hospitalized again in 2/2023 for parainfluenza pneumonia and right lower lobe PE discharged on Eliquis.    Hospitalized twice in 3/2023 for reaccumulation of left pleural effusion, underwent a left-sided thoracentesis with 600 cc and 1 L removed.  TTE at that time showed an ejection fraction of 35% with global hypokinesis and having nonsustained runs of VT and PSVT.  He was started on Toprol and diuretics. PE had resolved on repeat CT. Pleural fluid cultures and pathology/cytology were unremarkable on both taps.    PET/CT 3/8/2023 demonstrated subcarinal lymph node had decreased to normal size with no increased activity.  There are no areas of metastatic disease or disease recurrence.  PET/CT did demonstrate mild pulmonary edema as well as an increased large left pleural effusion and moderate right-sided pleural effusion. This imaging was PRIOR to the above referenced  hospitalizations/thoracenteses.     Last PFTs 2020 showing preserved FEV1 (2.59L, 95%), no BD response, preserved lung volumes and diffusion capacity.    Respiratory regimen includes Breo Ellipta 200 one puff nightly  On no anticoagulants, ASA 81 mg p.o. daily only    PMH otherwise notable for CKD stage III, PAF, hypertension, dyslipidemia    Interval events since last clinic visit in 4/2023:  Last clinic visit, overall impression was that his dyspnea exertion was multifactorial due to issues listed above, however pleural effusions are the overwhelming contributing factor to his dyspnea.  This was consistent with his symptoms improving after each thoracentesis.  Effusions are transudative, likely due to HFrEF.  He was advised to continue his diuretic regimen.      Subjectively today he reports he is breathing much better than prior.  CXR also improved.  Multi oximetry walk test last visit showed no desaturations on room air.  CXR earlier this week demonstrated small left pleural effusion with no other significant reaccumulation.  Last seen by Dr. Ruiz in 5/2023, currently under surveillance.  Plans to follow-up with Dr. Schulz in July with repeat CT chest.    Current diuretic regimen Lasix 40 daily, Aldactone 25 daily. Tolerating Eliquis well.  Wt 141 -> 133 -> 138#    Past Medical History:   Diagnosis Date    Acute respiratory failure with hypoxia (HCC) 02/01/2023    Arrhythmia     hx of a fib    Arthritis 2015    generalized, DDD back    Asthma     inhaler     Backpain 08/06/2018    9/10    Cancer (HCC) 07/2017    left lung    Cataract     bilateral, no surgery    Congestive heart failure (HCC)     Dental disorder     lower partial, removable bridge    Diabetes 08/06/2018    on no meds at this time, diet controlled    Heart valve disease     mild mitral valve prolapse    High cholesterol     Hypertension        Past Surgical History:   Procedure Laterality Date    SC BRONCHOSCOPY,DIAGNOSTIC  7/1/2021     "Procedure: BRONCHOSCOPY - FIBER OPTIC WITH BRANCHOALVEOLAR LAVAGE BIOPSY, FNA, NAVIGATION;  Surgeon: Vinayak Carbajal M.D.;  Location: SURGERY [facility];  Service: Pulmonary    ENDOBRONCHIAL US ADD-ON  2021    Procedure: ENDOBRONCHIAL ULTRASOUND (EBUS).;  Surgeon: Vinayak Carbajal M.D.;  Location: SURGERY [facility];  Service: Pulmonary    CATARACT PHACO WITH IOL Left 2018    Procedure: CATARACT PHACO WITH IOL;  Surgeon: Juanito Hager M.D.;  Location: SURGERY SAME DAY [facility];  Service: Ophthalmology    CATARACT PHACO WITH IOL Right 2018    Procedure: CATARACT PHACO WITH IOL;  Surgeon: Juanito Hager M.D.;  Location: SURGERY SAME DAY [facility];  Service: Ophthalmology    THORACOSCOPY Left 2018    Procedure: THORACOSCOPY- WEDGE BIOPSY W/FROZEN SECTION;  Surgeon: Cristhian Galeano M.D.;  Location: SURGERY [facility];  Service: Thoracic    EAR MIDDLE EXPLORATION Right 2015    Procedure: EAR MIDDLE EXPLORATION;  Surgeon: William Brandon M.D.;  Location: SURGERY SAME DAY [facility];  Service:     OSSICULAR RECONSTRUCTION Right 2015    Procedure: OSSICULAR RECONSTRUCTION CHAIN POSSIBLE;  Surgeon: William Brandon M.D.;  Location: SURGERY SAME DAY [facility];  Service:     OTHER      ear replacement \"many years ago\"    OTHER ORTHOPEDIC SURGERY      right knee replacement        Social History     Socioeconomic History    Marital status:      Spouse name: Not on file    Number of children: Not on file    Years of education: Not on file    Highest education level: Not on file   Occupational History    Not on file   Tobacco Use    Smoking status: Former     Packs/day: 0.25     Years: 40.00     Pack years: 10.00     Types: Cigarettes     Quit date: 2005     Years since quittin.4    Smokeless tobacco: Never   Vaping Use    Vaping Use: Never used   Substance and Sexual Activity    Alcohol use: Not Currently     Alcohol/week: 12.6 - 21.0 oz     Types: 21 " - 35 Cans of beer per week     Comment: 12 beers a day (coors), quit 2/3/2023    Drug use: No    Sexual activity: Not on file   Other Topics Concern    Not on file   Social History Narrative    Not on file     Social Determinants of Health     Financial Resource Strain: Not on file   Food Insecurity: Not on file   Transportation Needs: Not on file   Physical Activity: Not on file   Stress: Not on file   Social Connections: Not on file   Intimate Partner Violence: Not on file   Housing Stability: Not on file          Family History   Problem Relation Age of Onset    Stroke Mother     Hypertension Mother     Diabetes Mother     Cancer Father         stomach cancer    Heart Disease Brother     Alcohol abuse Brother     Ovarian Cancer Neg Hx     Tubal Cancer Neg Hx     Peritoneal Cancer Neg Hx     Colorectal Cancer Neg Hx     Breast Cancer Neg Hx     Hyperlipidemia Neg Hx        Current Outpatient Medications on File Prior to Visit   Medication Sig Dispense Refill    apixaban (ELIQUIS) 5mg Tab Take 1 Tablet by mouth 2 times a day. 180 Tablet 3    furosemide (LASIX) 40 MG Tab Take 1 Tablet by mouth every day for 100 days. 100 Tablet 0    spironolactone (ALDACTONE) 25 MG Tab Take 1 Tablet by mouth every day for 100 days. 100 Tablet 0    metoprolol SR (TOPROL XL) 50 MG TABLET SR 24 HR Take 1 Tablet by mouth every day for 100 days. 100 Tablet 0    cyclobenzaprine (FLEXERIL) 10 mg Tab Take 1 Tablet by mouth at bedtime. 100 Tablet 3    ezetimibe (ZETIA) 10 MG Tab Take 1 Tablet by mouth every day. 100 Tablet 3    Cholecalciferol (VITAMIN D3) 2000 UNIT Cap Take 1 Capsule by mouth every day. 100 Capsule 3    diphenhydrAMINE-APAP, sleep, (TYLENOL PM EXTRA STRENGTH)  MG Tab Take 1-2 Tablets by mouth at bedtime as needed (Mild Pain or Trouble Sleeping).       No current facility-administered medications on file prior to visit.       Allergies: Patient has no known allergies.      ROS:   Review of Systems   Constitutional:   "Negative for chills, fever and weight loss.   HENT:  Negative for congestion, sinus pain and sore throat.    Eyes:  Negative for pain and discharge.   Respiratory:  Negative for cough, sputum production, shortness of breath, wheezing and stridor.    Cardiovascular:  Negative for chest pain, orthopnea and leg swelling.   Gastrointestinal:  Negative for abdominal pain, diarrhea, nausea and vomiting.   Genitourinary:  Negative for dysuria, frequency and urgency.   Musculoskeletal:  Negative for myalgias.   Skin:  Negative for rash.   Neurological:  Negative for dizziness, sensory change, focal weakness, loss of consciousness and headaches.   Psychiatric/Behavioral: Negative.     All other systems reviewed and are negative.    Vitals:  /50 (BP Location: Left arm, Patient Position: Sitting, BP Cuff Size: Adult)   Pulse 94   Resp 16   Ht 1.727 m (5' 8\")   Wt 63 kg (138 lb 12.8 oz)   SpO2 98%     Physical Exam:  Physical Exam  Vitals and nursing note reviewed.   Constitutional:       General: He is not in acute distress.     Appearance: Normal appearance. He is well-developed. He is not ill-appearing or diaphoretic.      Comments: Very pleasant  Accompanied by supportive wife   HENT:      Nose: Nose normal.      Mouth/Throat:      Pharynx: No oropharyngeal exudate.   Eyes:      General: No scleral icterus.        Right eye: No discharge.         Left eye: No discharge.      Conjunctiva/sclera: Conjunctivae normal.      Pupils: Pupils are equal, round, and reactive to light.   Neck:      Thyroid: No thyromegaly.      Vascular: No JVD.      Trachea: No tracheal deviation.   Cardiovascular:      Rate and Rhythm: Normal rate and regular rhythm.      Heart sounds: Normal heart sounds. No murmur heard.  Pulmonary:      Effort: Pulmonary effort is normal. No respiratory distress.      Breath sounds: No stridor. No wheezing or rales.      Comments: Good aeration/breath sounds in the bilateral bases    Abdominal:      " General: There is no distension.      Palpations: Abdomen is soft.      Tenderness: There is no abdominal tenderness. There is no guarding.   Musculoskeletal:         General: No tenderness. Normal range of motion.      Cervical back: Neck supple.   Lymphadenopathy:      Cervical: No cervical adenopathy.   Skin:     General: Skin is warm and dry.      Capillary Refill: Capillary refill takes less than 2 seconds.      Coloration: Skin is not pale.      Findings: No erythema.   Neurological:      Mental Status: He is alert and oriented to person, place, and time.      Sensory: No sensory deficit.      Motor: No abnormal muscle tone.      Coordination: Coordination normal.      Deep Tendon Reflexes: Reflexes normal.   Psychiatric:         Behavior: Behavior normal.         Thought Content: Thought content normal.         Judgment: Judgment normal.       Laboratory Data:    PFTs as reviewed by me personally show:  See HPI    Imaging as reviewed by me personally show:    See HPI    Assessment/Plan:    Problem List Items Addressed This Visit       Dyspnea on exertion     Multifactorial: hx of pneumonitis (1/23), segmental PE (2/23), recent parainfluenza pneumonia (2/23), recently identified HFrEF (3/23) complicated by recurrent transudative pleural effusions. I think the effusions are the overwhelming contributing factor to his dyspnea at the moment. This is consistent with his symptoms improving after the thoracenteses. These are transudative likely 2/2 HFrEF, with no e/o infection or malignancy on cultures/cytology. He has since been started on a regimen of lasix/aldactone. He has lost 8lbs since his last clinic visit. He reports his mMRC 3 dyspnea is stable. No desats on mulitox on last clinic visit.  -- Improving significantly with diuretic optimization. Counseled about salt restriction, daily weights, fluid restriction, diuretic compliance.         Primary cancer of left lower lobe of lung (HCC)     Stage AIII NSCLC  left lower lobe treated with carboplatin/Taxol/radiation in 2021, admitted 1/2023 for suspected radiation versus checkpoint inhibitor pneumonitis treated with steroids with symptomatic imrpovement, then readmitted for parainfluenza pna, PE, and recurrent effusions 2/2 HFrEF.   -- Plan of care as outlined above.         Pleural effusion, left     S/p thoracentesis x 2, transudative 2/2 HF  Plan of care as outlined above.         Pulmonary embolism on right (HCC)     2/23: Segmental/Subsegmental  Started eliquis, resolved on repeat CTA 3/2023  -- as long as risks of bleeding continue to be outweighed by benefits of anticoagulation, recommend indefinite therapeutic anticoagulation with DOAC in setting of underlying malignancy         Chronic systolic heart failure (HCC)     HFrEF, Stage C, Class 2, LVEF 35%  Established in heart failure clinic, started on GDMT  Recommend daily weights, Na/fluid restriction, etc.          Other Visit Diagnoses       Shortness of breath        Relevant Medications    fluticasone furoate-vilanterol (BREO ELLIPTA) 200-25 MCG/ACT AEROSOL POWDER, BREATH ACTIVATED          Return in about 6 months (around 12/8/2023).     This note was generated using voice recognition software which has a chance of producing errors of grammar and possibly content.  I have made every reasonable attempt to find and correct any obvious errors, but it should be expected that some may not be found prior to finalization of this note.    Time spent in record review prior to patient arrival, reviewing results, and in face-to-face encounter totaled 38 min, excluding any procedures if performed.  __________  Vinayak Carbajal MD  Pulmonary and Critical Care Medicine  [facility]

## 2023-06-08 ENCOUNTER — OFFICE VISIT (OUTPATIENT)
Dept: SLEEP MEDICINE | Facility: MEDICAL CENTER | Age: 78
End: 2023-06-08
Attending: INTERNAL MEDICINE
Payer: MEDICARE

## 2023-06-08 VITALS
BODY MASS INDEX: 21.04 KG/M2 | WEIGHT: 138.8 LBS | DIASTOLIC BLOOD PRESSURE: 50 MMHG | RESPIRATION RATE: 16 BRPM | HEIGHT: 68 IN | SYSTOLIC BLOOD PRESSURE: 104 MMHG | OXYGEN SATURATION: 98 % | HEART RATE: 94 BPM

## 2023-06-08 DIAGNOSIS — I26.99 PULMONARY EMBOLISM ON RIGHT (HCC): ICD-10-CM

## 2023-06-08 DIAGNOSIS — C34.32 PRIMARY CANCER OF LEFT LOWER LOBE OF LUNG (HCC): ICD-10-CM

## 2023-06-08 DIAGNOSIS — J90 PLEURAL EFFUSION, LEFT: ICD-10-CM

## 2023-06-08 DIAGNOSIS — R06.02 SHORTNESS OF BREATH: ICD-10-CM

## 2023-06-08 DIAGNOSIS — R06.09 DYSPNEA ON EXERTION: ICD-10-CM

## 2023-06-08 DIAGNOSIS — I50.22 CHRONIC SYSTOLIC HEART FAILURE (HCC): ICD-10-CM

## 2023-06-08 PROCEDURE — 3074F SYST BP LT 130 MM HG: CPT | Performed by: INTERNAL MEDICINE

## 2023-06-08 PROCEDURE — 99214 OFFICE O/P EST MOD 30 MIN: CPT | Performed by: INTERNAL MEDICINE

## 2023-06-08 PROCEDURE — 3078F DIAST BP <80 MM HG: CPT | Performed by: INTERNAL MEDICINE

## 2023-06-08 PROCEDURE — 99212 OFFICE O/P EST SF 10 MIN: CPT | Performed by: INTERNAL MEDICINE

## 2023-06-08 RX ORDER — FLUTICASONE FUROATE AND VILANTEROL 200; 25 UG/1; UG/1
1 POWDER RESPIRATORY (INHALATION) DAILY
Qty: 1 EACH | Refills: 11 | Status: SHIPPED
Start: 2023-06-08 | End: 2023-08-05

## 2023-06-08 ASSESSMENT — ENCOUNTER SYMPTOMS: SHORTNESS OF BREATH: 0

## 2023-06-08 ASSESSMENT — FIBROSIS 4 INDEX: FIB4 SCORE: 1.57

## 2023-06-08 NOTE — ASSESSMENT & PLAN NOTE
Multifactorial: hx of pneumonitis (1/23), segmental PE (2/23), recent parainfluenza pneumonia (2/23), recently identified HFrEF (3/23) complicated by recurrent transudative pleural effusions. I think the effusions are the overwhelming contributing factor to his dyspnea at the moment. This is consistent with his symptoms improving after the thoracenteses. These are transudative likely 2/2 HFrEF, with no e/o infection or malignancy on cultures/cytology. He has since been started on a regimen of lasix/aldactone. He has lost 8lbs since his last clinic visit. He reports his mMRC 3 dyspnea is stable. No desats on mulitox on last clinic visit.  -- Improving significantly with diuretic optimization. Counseled about salt restriction, daily weights, fluid restriction, diuretic compliance.

## 2023-06-08 NOTE — ASSESSMENT & PLAN NOTE
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   -- Plan of care as outlined above.

## 2023-06-08 NOTE — ASSESSMENT & PLAN NOTE
HFrEF, Stage C, Class 2, LVEF 35%  Established in heart failure clinic, started on GDMT  Recommend daily weights, Na/fluid restriction, etc.

## 2023-06-16 ENCOUNTER — HOSPITAL ENCOUNTER (OUTPATIENT)
Dept: RADIOLOGY | Facility: MEDICAL CENTER | Age: 78
End: 2023-06-16
Attending: INTERNAL MEDICINE
Payer: MEDICARE

## 2023-06-16 DIAGNOSIS — C34.11 MALIGNANT NEOPLASM OF UPPER LOBE OF RIGHT LUNG (HCC): ICD-10-CM

## 2023-06-16 DIAGNOSIS — C34.12 SQUAMOUS CELL CARCINOMA OF BRONCHUS IN LEFT UPPER LOBE (HCC): ICD-10-CM

## 2023-06-16 PROCEDURE — 700117 HCHG RX CONTRAST REV CODE 255: Performed by: INTERNAL MEDICINE

## 2023-06-16 PROCEDURE — 71260 CT THORAX DX C+: CPT

## 2023-06-16 RX ADMIN — IOHEXOL 25 ML: 240 INJECTION, SOLUTION INTRATHECAL; INTRAVASCULAR; INTRAVENOUS; ORAL at 12:45

## 2023-06-16 RX ADMIN — IOHEXOL 100 ML: 350 INJECTION, SOLUTION INTRAVENOUS at 12:25

## 2023-06-19 ENCOUNTER — OFFICE VISIT (OUTPATIENT)
Dept: MEDICAL GROUP | Facility: PHYSICIAN GROUP | Age: 78
End: 2023-06-19
Payer: MEDICARE

## 2023-06-19 ENCOUNTER — PATIENT OUTREACH (OUTPATIENT)
Dept: HEALTH INFORMATION MANAGEMENT | Facility: OTHER | Age: 78
End: 2023-06-19

## 2023-06-19 VITALS
DIASTOLIC BLOOD PRESSURE: 62 MMHG | OXYGEN SATURATION: 98 % | HEIGHT: 68 IN | HEART RATE: 79 BPM | BODY MASS INDEX: 20.98 KG/M2 | TEMPERATURE: 97.7 F | WEIGHT: 138.4 LBS | SYSTOLIC BLOOD PRESSURE: 122 MMHG

## 2023-06-19 DIAGNOSIS — E53.8 B12 DEFICIENCY: ICD-10-CM

## 2023-06-19 DIAGNOSIS — D68.69 OTHER THROMBOPHILIA (HCC): ICD-10-CM

## 2023-06-19 DIAGNOSIS — E11.9 DIABETES (HCC): ICD-10-CM

## 2023-06-19 DIAGNOSIS — E11.65 TYPE 2 DIABETES MELLITUS WITH HYPERGLYCEMIA, WITHOUT LONG-TERM CURRENT USE OF INSULIN (HCC): ICD-10-CM

## 2023-06-19 DIAGNOSIS — E26.1 SECONDARY HYPERALDOSTERONISM (HCC): ICD-10-CM

## 2023-06-19 DIAGNOSIS — L60.2 LONG TOENAIL: ICD-10-CM

## 2023-06-19 DIAGNOSIS — R06.09 DYSPNEA ON EXERTION: ICD-10-CM

## 2023-06-19 PROBLEM — T50.905A HYPERGLYCEMIA, DRUG-INDUCED: Status: RESOLVED | Noted: 2023-02-02 | Resolved: 2023-06-19

## 2023-06-19 PROBLEM — J18.9 PNEUMONIA OF BOTH LUNGS DUE TO INFECTIOUS ORGANISM: Status: RESOLVED | Noted: 2023-02-01 | Resolved: 2023-06-19

## 2023-06-19 PROBLEM — R73.9 HYPERGLYCEMIA, DRUG-INDUCED: Status: RESOLVED | Noted: 2023-02-02 | Resolved: 2023-06-19

## 2023-06-19 PROCEDURE — 3078F DIAST BP <80 MM HG: CPT

## 2023-06-19 PROCEDURE — 3074F SYST BP LT 130 MM HG: CPT

## 2023-06-19 PROCEDURE — 99214 OFFICE O/P EST MOD 30 MIN: CPT

## 2023-06-19 RX ORDER — CYANOCOBALAMIN 1000 UG/ML
1000 INJECTION, SOLUTION INTRAMUSCULAR; SUBCUTANEOUS ONCE
Status: COMPLETED | OUTPATIENT
Start: 2023-06-19 | End: 2023-06-19

## 2023-06-19 RX ORDER — METFORMIN HYDROCHLORIDE 750 MG/1
750 TABLET, EXTENDED RELEASE ORAL DAILY
Qty: 100 TABLET | Refills: 3 | Status: SHIPPED | OUTPATIENT
Start: 2023-06-19 | End: 2023-07-05 | Stop reason: SDUPTHER

## 2023-06-19 RX ADMIN — CYANOCOBALAMIN 1000 MCG: 1000 INJECTION, SOLUTION INTRAMUSCULAR; SUBCUTANEOUS at 12:32

## 2023-06-19 ASSESSMENT — ENCOUNTER SYMPTOMS: SHORTNESS OF BREATH: 1

## 2023-06-19 ASSESSMENT — FIBROSIS 4 INDEX: FIB4 SCORE: 1.57

## 2023-06-19 NOTE — ASSESSMENT & PLAN NOTE
Chronic, unchanged. Feels short of breath with exertion. Recent CT with stable, slight improved findings.   Continue lasix 20 mg every other day  Provided DMV paperwork for disabled parking

## 2023-06-19 NOTE — PROGRESS NOTES
"Subjective:     CC: Diagnoses of B12 deficiency, Dyspnea on exertion, Diabetes (HCC), Type 2 diabetes mellitus with hyperglycemia, without long-term current use of insulin (HCC), Secondary hyperaldosteronism (HCC), Other thrombophilia (HCC), and Long toenail were pertinent to this visit.    HPI:   DOROTHEA presents today with    Problem   Secondary Hyperaldosteronism (Hcc)   Other Thrombophilia (Hcc)   Dyspnea On Exertion   Type 2 Diabetes Mellitus With Hyperglycemia (Hcc)    This is a chronic condition.  Current medications:  Insulin:   Biguanide: took metformin historically will restart metformin xr 750 mg daily.  GLP1-RA:    SGLT-2i:    Consider for cardiorenal protection  DPP4-I:   TZD:   Pk:  Sulfonyluria:     Last A1c: 6/2/23 6.5%  Last Microalb/Cr ratio: 6/2/23 <1.2  Fasting sugars:  Last diabetic foot exam: 6/19/23  Last retinal eye exam: has upcoming appointment with optometry  ACEi/ARB?   Statin? Ezetimibe 10 mg daily  Aspirin? eliquis 5 mg BID  Concomitant HTN? Metoprolol sr 50 mg daily  Nightly foot checks? encouraged       Hyperglycemia, Drug-Induced (Resolved)   Pneumonia of Both Lungs Due to Infectious Organism (Resolved)     ROS:  Review of Systems   Constitutional:  Positive for malaise/fatigue.   Respiratory:  Positive for shortness of breath (with exertion, unchanged).    All other systems reviewed and are negative.      Objective:     Exam:  /62 (BP Location: Right arm, Patient Position: Sitting, BP Cuff Size: Adult)   Pulse 79   Temp 36.5 °C (97.7 °F) (Temporal)   Ht 1.727 m (5' 8\")   Wt 62.8 kg (138 lb 6.4 oz)   SpO2 98%   BMI 21.04 kg/m²  Body mass index is 21.04 kg/m².    Physical Exam  Vitals reviewed.   Constitutional:       General: He is not in acute distress.     Appearance: Normal appearance. He is not ill-appearing.   HENT:      Head: Normocephalic and atraumatic.   Cardiovascular:      Rate and Rhythm: Normal rate.      Pulses:           Dorsalis pedis pulses are 1+ on the " right side and 1+ on the left side.        Posterior tibial pulses are 1+ on the right side and 1+ on the left side.   Pulmonary:      Effort: Pulmonary effort is normal.      Breath sounds: Normal breath sounds.      Comments: Normal while at rest in clinic  Feet:      Right foot:      Protective Sensation: 10 sites tested.  10 sites sensed.      Skin integrity: Dry skin present.      Toenail Condition: Right toenails are long.      Left foot:      Protective Sensation: 10 sites tested.  10 sites sensed.      Skin integrity: Dry skin present.      Toenail Condition: Left toenails are long.      Comments: Monofilament testing with a 10 gram force: sensation intact: intact bilaterally  Visual Inspection: Feet without maceration, ulcers, fissures.  Pedal pulses: intact bilaterally    Skin:     General: Skin is warm and dry.      Capillary Refill: Capillary refill takes less than 2 seconds.   Neurological:      General: No focal deficit present.      Mental Status: He is alert and oriented to person, place, and time.   Psychiatric:         Mood and Affect: Mood normal.         Behavior: Behavior normal.         Labs:    Latest Reference Range & Units 06/02/23 09:40 06/02/23 09:42   WBC 4.8 - 10.8 K/uL 7.9    RBC 4.70 - 6.10 M/uL 4.97    Hemoglobin 14.0 - 18.0 g/dL 16.0    Hematocrit 42.0 - 52.0 % 47.7    MCV 81.4 - 97.8 fL 96.0    MCH 27.0 - 33.0 pg 32.2    MCHC 32.3 - 36.5 g/dL 33.5    RDW 35.9 - 50.0 fL 47.6    Platelet Count 164 - 446 K/uL 212    MPV 9.0 - 12.9 fL 10.4    Neutrophils-Polys 44.00 - 72.00 % 54.20    Neutrophils (Absolute) 1.82 - 7.42 K/uL 4.30    Lymphocytes 22.00 - 41.00 % 32.10    Lymphs (Absolute) 1.00 - 4.80 K/uL 2.54    Monocytes 0.00 - 13.40 % 8.50    Monos (Absolute) 0.00 - 0.85 K/uL 0.67    Eosinophils 0.00 - 6.90 % 4.30    Eos (Absolute) 0.00 - 0.51 K/uL 0.34    Basophils 0.00 - 1.80 % 0.40    Baso (Absolute) 0.00 - 0.12 K/uL 0.03    Immature Granulocytes 0.00 - 0.90 % 0.50    Immature  Granulocytes (abs) 0.00 - 0.11 K/uL 0.04    Nucleated RBC 0.00 - 0.20 /100 WBC 0.00    NRBC (Absolute) K/uL 0.00    Sodium 135 - 145 mmol/L 141 141   Potassium 3.6 - 5.5 mmol/L 5.2 5.1   Chloride 96 - 112 mmol/L 103 103   Co2 20 - 33 mmol/L 24 24   Anion Gap 7.0 - 16.0  14.0 14.0   Glucose 65 - 99 mg/dL 144 (H) 143 (H)   Bun 8 - 22 mg/dL 24 (H) 24 (H)   Creatinine 0.50 - 1.40 mg/dL 1.22 1.19   GFR (CKD-EPI) >60 mL/min/1.73 m 2 61 62   Calcium 8.5 - 10.5 mg/dL 10.0 9.9   Correct Calcium 8.5 - 10.5 mg/dL 9.8    AST(SGOT) 12 - 45 U/L 20    ALT(SGPT) 2 - 50 U/L 22    Alkaline Phosphatase 30 - 99 U/L 74    Total Bilirubin 0.1 - 1.5 mg/dL 0.4    Albumin 3.2 - 4.9 g/dL 4.2    Total Protein 6.0 - 8.2 g/dL 7.9    Globulin 1.9 - 3.5 g/dL 3.7 (H)    A-G Ratio g/dL 1.1    Glycohemoglobin 4.0 - 5.6 %  6.5 (H)   Estim. Avg Glu mg/dL  140   Fasting Status   Fasting   Cholesterol,Tot 100 - 199 mg/dL  148   Triglycerides 0 - 149 mg/dL  58   HDL >=40 mg/dL  44   LDL <100 mg/dL  92   Micro Alb Creat Ratio 0 - 30 mg/g  see below   Creatinine, Urine mg/dL  30.15   Microalbumin, Urine Random mg/dL  <1.2   TSH 0.380 - 5.330 uIU/mL 1.100    (H): Data is abnormally high    Assessment & Plan:     78 y.o. male with the following -     Problem List Items Addressed This Visit       Type 2 diabetes mellitus with hyperglycemia (HCC)     Chronic, unstable. Reports increase in sweets intake recently. Has been prediabetic historically, was on metformin in the past.   Will restart metformin 750 xr daily,  Plan to repeat A1c 3-6 months.         Relevant Medications    metFORMIN ER (GLUCOPHAGE XR) 750 MG TABLET SR 24 HR    Other Relevant Orders    Referral to Diabetic Education    Referral to Podiatry    Dyspnea on exertion     Chronic, unchanged. Feels short of breath with exertion. Recent CT with stable, slight improved findings.   Continue lasix 20 mg every other day  Provided DMV paperwork for disabled parking          Secondary hyperaldosteronism  (HCC)    Other thrombophilia (HCC)     Other Visit Diagnoses       B12 deficiency        Relevant Medications    cyanocobalamin (VITAMIN B-12) injection 1,000 mcg (Completed)    Long toenail        Relevant Orders    Referral to Podiatry          Patient was educated in proper administration of medication(s) ordered today including safety, possible SE, risks, benefits, rationale and alternatives to therapy.   Supportive care, differential diagnoses, and indications for immediate follow-up discussed with patient.    Pathogenesis of diagnosis discussed including typical length and natural progression.    Instructed to return to clinic or nearest emergency department for any change in condition, further concerns, or worsening of symptoms.  Patient states understanding of the plan of care and discharge instructions.    Return in about 3 months (around 9/19/2023), or if symptoms worsen or fail to improve, for A1C, Annual Wellness.    Please note that this dictation was created using voice recognition software. I have made every reasonable attempt to correct obvious errors, but I expect that there are errors of grammar and possibly content that I did not discover before finalizing the note.

## 2023-06-19 NOTE — PROGRESS NOTES
Spoke with Pito and his wife, Cydney. Introduced CCM program and myself. Provided a CCM brochure with my contact info. They will consider program and call me.

## 2023-06-19 NOTE — ASSESSMENT & PLAN NOTE
Chronic, unstable. Reports increase in sweets intake recently. Has been prediabetic historically, was on metformin in the past.   Will restart metformin 750 xr daily,  Plan to repeat A1c 3-6 months.

## 2023-06-20 ENCOUNTER — OFFICE VISIT (OUTPATIENT)
Dept: CARDIOLOGY | Facility: MEDICAL CENTER | Age: 78
End: 2023-06-20
Attending: NURSE PRACTITIONER
Payer: MEDICARE

## 2023-06-20 VITALS
OXYGEN SATURATION: 96 % | WEIGHT: 138 LBS | DIASTOLIC BLOOD PRESSURE: 66 MMHG | SYSTOLIC BLOOD PRESSURE: 112 MMHG | HEART RATE: 83 BPM | HEIGHT: 68 IN | BODY MASS INDEX: 20.92 KG/M2 | RESPIRATION RATE: 16 BRPM

## 2023-06-20 DIAGNOSIS — N18.31 STAGE 3A CHRONIC KIDNEY DISEASE: ICD-10-CM

## 2023-06-20 DIAGNOSIS — I50.9 HEART FAILURE, NYHA CLASS 2 (HCC): ICD-10-CM

## 2023-06-20 DIAGNOSIS — I10 ESSENTIAL HYPERTENSION: ICD-10-CM

## 2023-06-20 DIAGNOSIS — I50.20 ACC/AHA STAGE C SYSTOLIC HEART FAILURE (HCC): ICD-10-CM

## 2023-06-20 DIAGNOSIS — E78.5 DYSLIPIDEMIA: ICD-10-CM

## 2023-06-20 DIAGNOSIS — I26.99 PULMONARY EMBOLISM ON RIGHT (HCC): ICD-10-CM

## 2023-06-20 DIAGNOSIS — I48.0 PAF (PAROXYSMAL ATRIAL FIBRILLATION) (HCC): ICD-10-CM

## 2023-06-20 PROCEDURE — 99213 OFFICE O/P EST LOW 20 MIN: CPT | Performed by: NURSE PRACTITIONER

## 2023-06-20 PROCEDURE — 99214 OFFICE O/P EST MOD 30 MIN: CPT | Performed by: NURSE PRACTITIONER

## 2023-06-20 PROCEDURE — 3078F DIAST BP <80 MM HG: CPT | Performed by: NURSE PRACTITIONER

## 2023-06-20 PROCEDURE — 3074F SYST BP LT 130 MM HG: CPT | Performed by: NURSE PRACTITIONER

## 2023-06-20 PROCEDURE — 99212 OFFICE O/P EST SF 10 MIN: CPT | Performed by: NURSE PRACTITIONER

## 2023-06-20 RX ORDER — DAPAGLIFLOZIN 10 MG/1
10 TABLET, FILM COATED ORAL DAILY
Qty: 90 TABLET | Refills: 3 | Status: SHIPPED | OUTPATIENT
Start: 2023-06-20 | End: 2023-06-26 | Stop reason: SDUPTHER

## 2023-06-20 ASSESSMENT — ENCOUNTER SYMPTOMS
BACK PAIN: 1
COUGH: 0
SHORTNESS OF BREATH: 1
ORTHOPNEA: 0
MYALGIAS: 0
CLAUDICATION: 0
DIZZINESS: 0
PND: 0
FEVER: 0
PALPITATIONS: 0
ABDOMINAL PAIN: 0

## 2023-06-20 ASSESSMENT — FIBROSIS 4 INDEX: FIB4 SCORE: 1.57

## 2023-06-20 NOTE — PROGRESS NOTES
Chief Complaint   Patient presents with    Congestive Heart Failure     F/V Dx: ACC/AHA stage C systolic heart failure (HCC)    Premature Ventricular Contractions (PVCs)       Subjective     Pito Black is a 78 y.o. male who presents today for follow up on his Heart Failure with his wife, Cydney.    Current patient of the heart failure clinic.  He was last seen in clinic on 5/9/2023.  During that visit, patient was recommended to get follow-up lab testing and schedule out his echocardiogram.    Since last visit, he has been doing about the same.  He does continue to have episodes of shortness of breath, back pain and nocturia.    He denies chest pain, palpitation, orthopnea, PND, edema or dizziness/lightheadedness.    Since his last visit, he has tried taking his diuretic as needed.  He states he has stopped it for about a week, but then resumed it every other day at 20 mg due to a cough.    His home weights are ranging between 137-138 pounds.    His home blood pressures continue to range around the low 100s, systolic.    He does report a low-sodium diet.    He reports compliance with his medications.    Patient reports he saw his primary care and was diagnosed with diabetes.  He plans to start metformin once he receives it from his mail order pharmacy.    Additionally, patient has the following medical problems:     -Hospitalization from 3/24/2023 through 3/26/2023 with shortness of breath.  Patient was found to be hypoxic and required oxygen.  CTA was negative for PE, but did show worsening bilateral pleural effusions L>R.  He had a left thoracentesis.  He was started on Lasix and Aldactone.  Patient was discharged off oxygen.    -hospitalization from 3/17/2023 through 3/19/2023 for shortness of breath and had an echocardiogram which showed an EF of 35%.  Chest x-ray showed pulmonary edema.  Patient was started on IV diuretics.  He did have another thoracentesis.  Patient was found to be in LOCO.  Patient  also had nonsustained runs of VT/PSVT.  Cardiology was consulted. Toprol was started for his heart failure, tachycardia and frequent ectopy    -Hospitalization from 2/20/2023 through 2/22/2023.  He presented with shortness of breath.  During this visit, patient, he was found to have a PE and influenza positive and started on antibiotic.    -Hospitalization from 1/31/2023 through 2/2/2023.  Patient presented with to [facility] with shortness of breath and found to have bilateral pleural effusions, right upper lobe mass and concern for left upper lobe pneumonia.  He was started on antibiotics and diuresed with Lasix.  He had a thoracentesis on 2/1/2023 with 1 L removed.  Patient had a recent immunotherapy.  Patient was diagnosed with acute hypoxic respiratory failure likely secondary to community-acquired pneumonia versus malignancy related to pleural effusions versus postradiation pneumonitis versus immunotherapy induced pneumonitis.    -History of lung cancer in remission, s/p wedge resection in 2018, had chemotherapy and radiation    -Hypertension    -Atrial fibrillation: Taking Eliquis    -CKD, stage 3    Past Medical History:   Diagnosis Date    Acute respiratory failure with hypoxia (HCC) 02/01/2023    Arrhythmia     hx of a fib    Arthritis 2015    generalized, DDD back    Asthma     inhaler     Backpain 08/06/2018 9/10    Cancer (HCC) 07/2017    left lung    Cataract     bilateral, no surgery    Congestive heart failure (HCC)     Dental disorder     lower partial, removable bridge    Diabetes 08/06/2018    on no meds at this time, diet controlled    Heart valve disease     mild mitral valve prolapse    High cholesterol     Hypertension      Past Surgical History:   Procedure Laterality Date    GA BRONCHOSCOPY,DIAGNOSTIC  7/1/2021    Procedure: BRONCHOSCOPY - FIBER OPTIC WITH BRANCHOALVEOLAR LAVAGE BIOPSY, FNA, NAVIGATION;  Surgeon: Vinayak Carbajal M.D.;  Location: SURGERY [facility];  Service:  "Pulmonary    ENDOBRONCHIAL US ADD-ON  7/1/2021    Procedure: ENDOBRONCHIAL ULTRASOUND (EBUS).;  Surgeon: Vinayak Carbajal M.D.;  Location: SURGERY [facility];  Service: Pulmonary    CATARACT PHACO WITH IOL Left 8/21/2018    Procedure: CATARACT PHACO WITH IOL;  Surgeon: Juanito Hager M.D.;  Location: SURGERY SAME DAY [facility];  Service: Ophthalmology    CATARACT PHACO WITH IOL Right 8/7/2018    Procedure: CATARACT PHACO WITH IOL;  Surgeon: Juanito Hager M.D.;  Location: SURGERY SAME DAY [facility];  Service: Ophthalmology    THORACOSCOPY Left 4/19/2018    Procedure: THORACOSCOPY- WEDGE BIOPSY W/FROZEN SECTION;  Surgeon: Cristhian Galeano M.D.;  Location: SURGERY [facility];  Service: Thoracic    EAR MIDDLE EXPLORATION Right 6/12/2015    Procedure: EAR MIDDLE EXPLORATION;  Surgeon: William Brandon M.D.;  Location: SURGERY SAME DAY [facility];  Service:     OSSICULAR RECONSTRUCTION Right 6/12/2015    Procedure: OSSICULAR RECONSTRUCTION CHAIN POSSIBLE;  Surgeon: William Brandon M.D.;  Location: SURGERY SAME DAY [facility];  Service:     OTHER      ear replacement \"many years ago\"    OTHER ORTHOPEDIC SURGERY      right knee replacement 2005     Family History   Problem Relation Age of Onset    Stroke Mother     Hypertension Mother     Diabetes Mother     Cancer Father         stomach cancer    Heart Disease Brother     Alcohol abuse Brother     Ovarian Cancer Neg Hx     Tubal Cancer Neg Hx     Peritoneal Cancer Neg Hx     Colorectal Cancer Neg Hx     Breast Cancer Neg Hx     Hyperlipidemia Neg Hx      Social History     Socioeconomic History    Marital status:      Spouse name: Not on file    Number of children: Not on file    Years of education: Not on file    Highest education level: Not on file   Occupational History    Not on file   Tobacco Use    Smoking status: Former     Packs/day: 0.25     Years: 40.00     Pack years: 10.00     Types: Cigarettes     Quit date: 1/1/2005     Years " since quittin.4    Smokeless tobacco: Never   Vaping Use    Vaping Use: Never used   Substance and Sexual Activity    Alcohol use: Not Currently     Alcohol/week: 12.6 - 21.0 oz     Types: 21 - 35 Cans of beer per week     Comment: 12 beers a day (coors), quit 2/3/2023    Drug use: No    Sexual activity: Not on file   Other Topics Concern    Not on file   Social History Narrative    Not on file     Social Determinants of Health     Financial Resource Strain: Not on file   Food Insecurity: Not on file   Transportation Needs: Not on file   Physical Activity: Not on file   Stress: Not on file   Social Connections: Not on file   Intimate Partner Violence: Not on file   Housing Stability: Not on file     No Known Allergies  Outpatient Encounter Medications as of 2023   Medication Sig Dispense Refill    dapagliflozin propanediol (FARXIGA) 10 MG Tab Take 1 Tablet by mouth every day. 90 Tablet 3    fluticasone furoate-vilanterol (BREO ELLIPTA) 200-25 MCG/ACT AEROSOL POWDER, BREATH ACTIVATED Inhale 1 Puff every day. Rinse mouth after use. 1 Each 11    apixaban (ELIQUIS) 5mg Tab Take 1 Tablet by mouth 2 times a day. 180 Tablet 3    furosemide (LASIX) 40 MG Tab Take 1 Tablet by mouth every day for 100 days. (Patient taking differently: Take 40 mg by mouth every day. Pt taking 20mg every other day) 100 Tablet 0    spironolactone (ALDACTONE) 25 MG Tab Take 1 Tablet by mouth every day for 100 days. 100 Tablet 0    metoprolol SR (TOPROL XL) 50 MG TABLET SR 24 HR Take 1 Tablet by mouth every day for 100 days. 100 Tablet 0    cyclobenzaprine (FLEXERIL) 10 mg Tab Take 1 Tablet by mouth at bedtime. 100 Tablet 3    ezetimibe (ZETIA) 10 MG Tab Take 1 Tablet by mouth every day. 100 Tablet 3    Cholecalciferol (VITAMIN D3) 2000 UNIT Cap Take 1 Capsule by mouth every day. 100 Capsule 3    diphenhydrAMINE-APAP, sleep, (TYLENOL PM EXTRA STRENGTH)  MG Tab Take 1-2 Tablets by mouth at bedtime as needed (Mild Pain or Trouble  "Sleeping).      metFORMIN ER (GLUCOPHAGE XR) 750 MG TABLET SR 24 HR Take 1 Tablet by mouth every day. (Patient not taking: Reported on 6/20/2023) 100 Tablet 3     No facility-administered encounter medications on file as of 6/20/2023.     Review of Systems   Constitutional:  Negative for fever and malaise/fatigue.   Respiratory:  Positive for shortness of breath. Negative for cough.    Cardiovascular:  Negative for chest pain, palpitations, orthopnea, claudication, leg swelling and PND.   Gastrointestinal:  Negative for abdominal pain.   Genitourinary:  Positive for frequency (Nocturia).   Musculoskeletal:  Positive for back pain. Negative for myalgias.   Neurological:  Negative for dizziness.   All other systems reviewed and are negative.             Objective     /66 (BP Location: Left arm, Patient Position: Sitting, BP Cuff Size: Adult)   Pulse 83   Resp 16   Ht 1.727 m (5' 8\")   Wt 62.6 kg (138 lb)   SpO2 96%   BMI 20.98 kg/m²     Physical Exam  Vitals reviewed.   Constitutional:       Appearance: He is well-developed.   HENT:      Head: Normocephalic and atraumatic.   Eyes:      Pupils: Pupils are equal, round, and reactive to light.   Neck:      Vascular: No JVD.   Cardiovascular:      Rate and Rhythm: Normal rate. Rhythm irregular.      Heart sounds: Normal heart sounds.   Pulmonary:      Effort: Pulmonary effort is normal. No respiratory distress.      Breath sounds: Normal breath sounds. No wheezing or rales.   Abdominal:      General: Bowel sounds are normal.      Palpations: Abdomen is soft.   Musculoskeletal:         General: Normal range of motion.      Cervical back: Normal range of motion and neck supple.      Right lower leg: No edema.      Left lower leg: No edema.   Skin:     General: Skin is warm and dry.   Neurological:      General: No focal deficit present.      Mental Status: He is alert and oriented to person, place, and time.   Psychiatric:         Behavior: Behavior normal. "       Lab Results   Component Value Date/Time    CHOLSTRLTOT 148 06/02/2023 09:42 AM    LDL 92 06/02/2023 09:42 AM    HDL 44 06/02/2023 09:42 AM    TRIGLYCERIDE 58 06/02/2023 09:42 AM       Lab Results   Component Value Date/Time    SODIUM 141 06/02/2023 09:42 AM    POTASSIUM 5.1 06/02/2023 09:42 AM    CHLORIDE 103 06/02/2023 09:42 AM    CO2 24 06/02/2023 09:42 AM    GLUCOSE 143 (H) 06/02/2023 09:42 AM    BUN 24 (H) 06/02/2023 09:42 AM    CREATININE 1.19 06/02/2023 09:42 AM     Lab Results   Component Value Date/Time    ALKPHOSPHAT 74 06/02/2023 09:40 AM    ASTSGOT 20 06/02/2023 09:40 AM    ALTSGPT 22 06/02/2023 09:40 AM    TBILIRUBIN 0.4 06/02/2023 09:40 AM      Transthoracic Echo Report 2/7/2018  Normal left ventricular systolic function. Left ventricular ejection fraction is visually estimated to be 55%.   Diastolic function is difficult to assess with atrial fibrillation.  Mild aortic insufficiency.   Mild mitral regurgitation.  No prior study is available for comparison.      Biotel 7/277/2021-8/9/2021  SUMMARY   9 days of monitoring demonstrated sinus rhythm with frequent atrial ectopy.  Occasional ventricular ectopy including up to 14 beats of monomorphic VT which has similar morphology to PVCs.  Symptoms correlated with ventricular tachycardia and atrial tachycardia as well as frequent ectopy.     Transthoracic Echo Report 3/17/2023  Prior echocardiogram 2/7/2018, there is now reduction in left ventricular systolic function and presence of moderate mitral regurgitation.  Severely reduced left ventricular systolic function. The left   ventricular ejection fraction is visually estimated to be 30 to 35%.   Global hypokinesis with regional variation.  Mild aortic insufficiency.  The aortic valve is not well visualized. Calcified aortic valve leaflets. Transvalvular gradients are - Peak: 13 mmHg, Mean: 7 mmHg.    Aortic valve gradients may be underestimated due to low ejection fraction.   Moderate mitral  regurgitation.   Normal aortic root for body surface area. The ascending aorta diameter is 3.2 cm.       Date 6MWT MLWHF   4/4/2023 292 m in 6 minutes 41   5/9/2023 --- 60                Assessment & Plan     1. ACC/AHA stage C systolic heart failure (HCC)  dapagliflozin propanediol (FARXIGA) 10 MG Tab    Basic Metabolic Panel      2. Heart failure, NYHA class 2 (HCC)  dapagliflozin propanediol (FARXIGA) 10 MG Tab    Basic Metabolic Panel      3. Pulmonary embolism on right (HCC)        4. PAF (paroxysmal atrial fibrillation) (HCC)        5. Stage 3a chronic kidney disease (HCC)        6. Dyslipidemia        7. Essential hypertension        8. Stage 3 chronic kidney disease, unspecified whether stage 3a or 3b CKD ([facility])            Medical Decision Making: Today's Assessment/Status/Plan:        HFrEF, Stage C, Class 2, LVEF 35%: Based on physical examination findings, patient is euvolemic. No JVD, lungs are clear to auscultation, no pitting edema in bilateral lower extremities, no ascites.   -Heart failure due to unknown cause, patient did not have ischemic work-up.  Patient requesting to repeat echo first and if no improvement of EF then will send patient for angiogram as he is not currently having any ischemia symptoms necessarily  -Discussed Heart failure trajectory and prognosis with patient. Will continue to optimize medical therapy as tolerated. Advanced HF treatment, need for remote monitoring consideration at every visit.   -ACE-I/ARB/ARNI: Consider starting if blood pressure improves, BP continues to be low  -Evidence Based Beta-blocker: Continue metoprolol SR 50 mg daily  -Aldosterone Antagonist: Continue spironolactone 25 mg daily  -Diuretic: Continue furosemide 20 mg every other day, he can take up to 40 mg daily as needed  -SGLT2 inhibitor: Discussed starting SGLT2 inhibitor, he is agreeable, start Farxiga 10 mg daily  -Other: Consider as needed (Hydralazine/Isosorbide, Vericiguat, Corlanor)  -Labs:  BMP-Labs Ordered, to be done in 3-4 weeks, Will continue to closely monitor for side effects of patient's high risk medication(s) including renal function, liver function NTproBNP/cardiac markers, and electrolytes as needed  -discussed importance of exercise and regular activity  -Discussed/reviewed maintaining a low Sodium and hydration recommendations  -Repeat echo scheduled in September, if LVEF not >35%, then will discuss/consider ICD for primary Prevention.   -moderate MR seen on echo: Will refer to structural heart clinic for Mitraclip evaluation if EF or MR does not improve  -Reinforced s/sx of worsening heart failure with patient and weight monitoring. Pt verbalizes understanding. Pt to call office or RTC if present.    -PUMP line number [phone] (PUMP) reviewed with patient  -Heart Failure Education:   Discussed the Definition of heart failure (linking disease, symptoms, and treatment) and causes of heart failure  Recognition of escalating symptoms and concrete plan for response to particular symptoms  Discussed the indication for SGLT2 inhibitor  Risk modification for heart failure progression  Specific diet recommendations: Continue low-sodium diet and adequate hydration   discussed patient to Stay active  Importance of treatment adherence  Behavioral strategies to promote treatment adherence  Patient declines formal education at this time  -Advanced care planning: Advance directive and POLST to be discussed at future visit  -Pharmacotherapy Referral: Patient not referred to pharmacotherapy at this time  -Compliance Barriers: None   -Genetic testing Consideration: none  -Consideration for LVAD and/or Heart transplant as necessary       PE:  -Continue Eliquis 5 mg twice a day     bilateral pleural effusion:  -s/p thoracentesis x3  -Will follow    Paroxysmal A-fib:  -Continue Eliquis 5 mg twice a day  -Continue metoprolol succinate 50 mg daily    Dyslipidemia:  -Recent LDL 92 on 6/2/2023  -Continue  ezetimibe 10 mg daily    New onset diabetes:  -Plans on starting metformin once he receives her mail order pharmacy  -Followed by PCP    FU in clinic in 6 week with labs. Sooner if needed.    Patient verbalizes understanding and agrees with the plan of care.     PLEASE NOTE: This Note was created using voice recognition Software. I have made every reasonable attempt to correct obvious errors, but I expect that there are errors of grammar and possibly content that I did not discover before finalizing the note

## 2023-06-26 ENCOUNTER — TELEPHONE (OUTPATIENT)
Dept: CARDIOLOGY | Facility: MEDICAL CENTER | Age: 78
End: 2023-06-26

## 2023-06-26 DIAGNOSIS — I50.20 ACC/AHA STAGE C SYSTOLIC HEART FAILURE (HCC): ICD-10-CM

## 2023-06-26 DIAGNOSIS — E78.5 DYSLIPIDEMIA: ICD-10-CM

## 2023-06-26 DIAGNOSIS — I50.20 HEART FAILURE WITH REDUCED EJECTION FRACTION (HCC): ICD-10-CM

## 2023-06-26 DIAGNOSIS — I50.9 HEART FAILURE, NYHA CLASS 2 (HCC): ICD-10-CM

## 2023-06-26 DIAGNOSIS — I49.3 PVC (PREMATURE VENTRICULAR CONTRACTION): ICD-10-CM

## 2023-06-26 DIAGNOSIS — G89.29 CHRONIC BILATERAL THORACIC BACK PAIN: ICD-10-CM

## 2023-06-26 DIAGNOSIS — E55.9 VITAMIN D DEFICIENCY: ICD-10-CM

## 2023-06-26 DIAGNOSIS — M54.6 CHRONIC BILATERAL THORACIC BACK PAIN: ICD-10-CM

## 2023-06-26 RX ORDER — METOPROLOL SUCCINATE 50 MG/1
50 TABLET, EXTENDED RELEASE ORAL DAILY
Qty: 100 TABLET | Refills: 0 | Status: CANCELLED | OUTPATIENT
Start: 2023-06-26 | End: 2023-10-04

## 2023-06-26 RX ORDER — DAPAGLIFLOZIN 10 MG/1
10 TABLET, FILM COATED ORAL DAILY
Qty: 100 TABLET | Refills: 2 | Status: SHIPPED | OUTPATIENT
Start: 2023-06-26 | End: 2023-08-01 | Stop reason: SDUPTHER

## 2023-06-26 NOTE — TELEPHONE ENCOUNTER
BRETT     Caller: Cydney- patients spouse    Medication Name and Dosage:  dapagliflozin propanediol (FARXIGA) 10 MG Tab [[id number]    Please call your pharmacy and have them send us a refill request or speak to a live representative, RX number may have changed.    Medication amount left: 0    Preferred Pharmacy: [facility]     Other questions (Topic): Due to change of insurance, patient would like medication to be filled at [facility]. Please advise.     Callback Number (Will only call for issues): [phone]    Thank you,   Genevieve ALVARADO

## 2023-06-27 ENCOUNTER — TELEPHONE (OUTPATIENT)
Dept: PHARMACY | Facility: MEDICAL CENTER | Age: 78
End: 2023-06-27
Payer: MEDICARE

## 2023-06-27 RX ORDER — ACETAMINOPHEN 160 MG
2000 TABLET,DISINTEGRATING ORAL DAILY
Qty: 100 CAPSULE | Refills: 3 | Status: SHIPPED | OUTPATIENT
Start: 2023-06-27

## 2023-06-27 RX ORDER — CYCLOBENZAPRINE HCL 10 MG
10 TABLET ORAL
Qty: 100 TABLET | Refills: 3 | Status: SHIPPED | OUTPATIENT
Start: 2023-06-27

## 2023-06-27 RX ORDER — EZETIMIBE 10 MG/1
10 TABLET ORAL DAILY
Qty: 100 TABLET | Refills: 3 | Status: SHIPPED | OUTPATIENT
Start: 2023-06-27 | End: 2023-08-01 | Stop reason: SDUPTHER

## 2023-06-27 RX ORDER — FUROSEMIDE 40 MG/1
40 TABLET ORAL DAILY
Qty: 100 TABLET | Refills: 0 | Status: SHIPPED | OUTPATIENT
Start: 2023-06-27 | End: 2023-10-05

## 2023-06-27 RX ORDER — SPIRONOLACTONE 25 MG/1
25 TABLET ORAL DAILY
Qty: 100 TABLET | Refills: 0 | Status: SHIPPED | OUTPATIENT
Start: 2023-06-27 | End: 2023-08-01 | Stop reason: SDUPTHER

## 2023-06-27 RX ORDER — METOPROLOL SUCCINATE 50 MG/1
50 TABLET, EXTENDED RELEASE ORAL DAILY
Qty: 100 TABLET | Refills: 0 | Status: SHIPPED | OUTPATIENT
Start: 2023-06-27 | End: 2023-08-01 | Stop reason: SDUPTHER

## 2023-06-27 NOTE — TELEPHONE ENCOUNTER
New order for from [facility] for Farxiga 10mg tablets    (No PA required at this time)    Ran Test Claim-   Co-pay-$162.53 for 100 tablets for 100 days Co-pay-$41 for 30 tablets for 30 days            Screen for Financial Assistance (FA)    Medicare Patient will be in Coverage Gap  After 30 days supply filled

## 2023-06-28 PROCEDURE — RXMED WILLOW AMBULATORY MEDICATION CHARGE: Performed by: NURSE PRACTITIONER

## 2023-06-28 PROCEDURE — RXMED WILLOW AMBULATORY MEDICATION CHARGE

## 2023-06-29 ENCOUNTER — PHARMACY VISIT (OUTPATIENT)
Dept: PHARMACY | Facility: MEDICAL CENTER | Age: 78
End: 2023-06-29
Payer: MEDICARE

## 2023-07-05 DIAGNOSIS — E11.65 TYPE 2 DIABETES MELLITUS WITH HYPERGLYCEMIA, WITHOUT LONG-TERM CURRENT USE OF INSULIN (HCC): ICD-10-CM

## 2023-07-05 PROCEDURE — RXMED WILLOW AMBULATORY MEDICATION CHARGE

## 2023-07-05 RX ORDER — METFORMIN HYDROCHLORIDE 750 MG/1
750 TABLET, EXTENDED RELEASE ORAL DAILY
Qty: 100 TABLET | Refills: 3 | Status: SHIPPED | OUTPATIENT
Start: 2023-07-05 | End: 2023-10-23

## 2023-07-06 ENCOUNTER — PHARMACY VISIT (OUTPATIENT)
Dept: PHARMACY | Facility: MEDICAL CENTER | Age: 78
End: 2023-07-06
Payer: MEDICARE

## 2023-07-19 NOTE — TELEPHONE ENCOUNTER
Cydney(wife) called stating insurance doesn't cover rx Breo - she wants to know if something else can be sent to [facility] at [place] . Please contact her once rx has been sent. thanks

## 2023-07-21 PROCEDURE — RXMED WILLOW AMBULATORY MEDICATION CHARGE: Performed by: NURSE PRACTITIONER

## 2023-07-24 ENCOUNTER — DOCUMENTATION (OUTPATIENT)
Dept: PHARMACY | Facility: MEDICAL CENTER | Age: 78
End: 2023-07-24
Payer: MEDICARE

## 2023-07-24 NOTE — PROGRESS NOTES
I SPOKE WITH WILLI ABOUT [facility]OROTHEA'S TIMOTHYGA REFILL. 30/30DS $42.35. DEL ALBA 07/24 BY AIYANA. PATIENT STATED THEY ARE DOING WELL ON MEDS. 6  DOSES ON HAND. NO MISSED DOSES. NO CONCERNS FOR [facility]. WILLI APPRECIATED CALL TO  FOLLOW UP/REFILL MEDS  Delivery: 07/24 AIYANA

## 2023-07-25 ENCOUNTER — PHARMACY VISIT (OUTPATIENT)
Dept: PHARMACY | Facility: MEDICAL CENTER | Age: 78
End: 2023-07-25
Payer: MEDICARE

## 2023-07-31 ENCOUNTER — HOSPITAL ENCOUNTER (OUTPATIENT)
Dept: LAB | Facility: MEDICAL CENTER | Age: 78
End: 2023-07-31
Attending: NURSE PRACTITIONER
Payer: MEDICARE

## 2023-07-31 DIAGNOSIS — I50.20 ACC/AHA STAGE C SYSTOLIC HEART FAILURE (HCC): ICD-10-CM

## 2023-07-31 DIAGNOSIS — I50.9 HEART FAILURE, NYHA CLASS 2 (HCC): ICD-10-CM

## 2023-07-31 LAB
ANION GAP SERPL CALC-SCNC: 11 MMOL/L (ref 7–16)
BUN SERPL-MCNC: 25 MG/DL (ref 8–22)
CALCIUM SERPL-MCNC: 9.7 MG/DL (ref 8.5–10.5)
CHLORIDE SERPL-SCNC: 104 MMOL/L (ref 96–112)
CO2 SERPL-SCNC: 23 MMOL/L (ref 20–33)
CREAT SERPL-MCNC: 1.24 MG/DL (ref 0.5–1.4)
GFR SERPLBLD CREATININE-BSD FMLA CKD-EPI: 59 ML/MIN/1.73 M 2
GLUCOSE SERPL-MCNC: 109 MG/DL (ref 65–99)
POTASSIUM SERPL-SCNC: 4.7 MMOL/L (ref 3.6–5.5)
SODIUM SERPL-SCNC: 138 MMOL/L (ref 135–145)

## 2023-07-31 PROCEDURE — 36415 COLL VENOUS BLD VENIPUNCTURE: CPT

## 2023-07-31 PROCEDURE — 80048 BASIC METABOLIC PNL TOTAL CA: CPT

## 2023-08-01 ENCOUNTER — OFFICE VISIT (OUTPATIENT)
Dept: CARDIOLOGY | Facility: MEDICAL CENTER | Age: 78
End: 2023-08-01
Attending: NURSE PRACTITIONER
Payer: MEDICARE

## 2023-08-01 VITALS
OXYGEN SATURATION: 98 % | BODY MASS INDEX: 20.92 KG/M2 | SYSTOLIC BLOOD PRESSURE: 100 MMHG | WEIGHT: 138 LBS | DIASTOLIC BLOOD PRESSURE: 54 MMHG | HEART RATE: 66 BPM | HEIGHT: 68 IN | RESPIRATION RATE: 18 BRPM

## 2023-08-01 DIAGNOSIS — I50.9 HEART FAILURE, NYHA CLASS 2 (HCC): ICD-10-CM

## 2023-08-01 DIAGNOSIS — I48.0 PAF (PAROXYSMAL ATRIAL FIBRILLATION) (HCC): ICD-10-CM

## 2023-08-01 DIAGNOSIS — E78.5 DYSLIPIDEMIA: ICD-10-CM

## 2023-08-01 DIAGNOSIS — I26.99 PULMONARY EMBOLISM ON RIGHT (HCC): ICD-10-CM

## 2023-08-01 DIAGNOSIS — I50.20 ACC/AHA STAGE C SYSTOLIC HEART FAILURE (HCC): ICD-10-CM

## 2023-08-01 DIAGNOSIS — D68.69 SECONDARY HYPERCOAGULABLE STATE (HCC): ICD-10-CM

## 2023-08-01 PROCEDURE — 99212 OFFICE O/P EST SF 10 MIN: CPT | Performed by: NURSE PRACTITIONER

## 2023-08-01 PROCEDURE — 3078F DIAST BP <80 MM HG: CPT | Performed by: NURSE PRACTITIONER

## 2023-08-01 PROCEDURE — 3074F SYST BP LT 130 MM HG: CPT | Performed by: NURSE PRACTITIONER

## 2023-08-01 PROCEDURE — 99214 OFFICE O/P EST MOD 30 MIN: CPT | Performed by: NURSE PRACTITIONER

## 2023-08-01 RX ORDER — EZETIMIBE 10 MG/1
10 TABLET ORAL DAILY
Qty: 100 TABLET | Refills: 3 | Status: SHIPPED | OUTPATIENT
Start: 2023-08-01 | End: 2023-10-23

## 2023-08-01 RX ORDER — METOPROLOL SUCCINATE 50 MG/1
50 TABLET, EXTENDED RELEASE ORAL DAILY
Qty: 100 TABLET | Refills: 3 | Status: SHIPPED | OUTPATIENT
Start: 2023-08-01 | End: 2023-10-23

## 2023-08-01 RX ORDER — SPIRONOLACTONE 25 MG/1
25 TABLET ORAL DAILY
Qty: 100 TABLET | Refills: 3 | Status: SHIPPED | OUTPATIENT
Start: 2023-08-01 | End: 2023-10-23

## 2023-08-01 RX ORDER — DAPAGLIFLOZIN 10 MG/1
10 TABLET, FILM COATED ORAL DAILY
Qty: 100 TABLET | Refills: 3 | Status: SHIPPED | OUTPATIENT
Start: 2023-08-01 | End: 2023-10-03 | Stop reason: SDUPTHER

## 2023-08-01 ASSESSMENT — ENCOUNTER SYMPTOMS
FEVER: 0
MYALGIAS: 0
DIZZINESS: 0
SHORTNESS OF BREATH: 1
CLAUDICATION: 0
COUGH: 0
PND: 0
ORTHOPNEA: 0
PALPITATIONS: 0
BACK PAIN: 1
ABDOMINAL PAIN: 0

## 2023-08-01 ASSESSMENT — FIBROSIS 4 INDEX: FIB4 SCORE: 1.57

## 2023-08-01 NOTE — PROGRESS NOTES
Chief Complaint   Patient presents with    CHF (Chronic)     F/V Dx: ACC/AHA stage C systolic heart failure (HCC)       Subjective     Pito Black is a 78 y.o. male who presents today for follow up on his Heart Failure with his wife, Cydney.    Current patient of the heart failure clinic.  He was last seen in clinic on 6/202023.  During that visit, patient was recommended to Start Farxiga 10 mg Daily.  He reports it took a couple of weeks to get the medication, but he has been taking it without difficulty.    For his symptoms, he continues to report having back pain which limits his mobility.  He mentions having mild episodes of shortness of breath.  He denies chest pain, palpitations, orthopnea, PND, edema or dizziness/lightheadedness.    His home weights are ranging between 137-138 pounds.    His home blood pressures continue to range around 89-low 100s, systolic.    He does report a low-sodium diet.    He reports compliance with his medications.    Patient and wife requesting medications to be sent to [facility] on [address].    He and his wife have now moved into town from [place] due to her multiple health problems and being in town for appointments.    Additionally, patient has the following medical problems:     -Hospitalization from 3/24/2023 through 3/26/2023 with shortness of breath.  Patient was found to be hypoxic and required oxygen.  CTA was negative for PE, but did show worsening bilateral pleural effusions L>R.  He had a left thoracentesis.  He was started on Lasix and Aldactone.  Patient was discharged off oxygen.    -hospitalization from 3/17/2023 through 3/19/2023 for shortness of breath and had an echocardiogram which showed an EF of 35%.  Chest x-ray showed pulmonary edema.  Patient was started on IV diuretics.  He did have another thoracentesis.  Patient was found to be in LOCO.  Patient also had nonsustained runs of VT/PSVT.  Cardiology was consulted. Toprol was started for his heart  failure, tachycardia and frequent ectopy    -Hospitalization from 2/20/2023 through 2/22/2023.  He presented with shortness of breath.  During this visit, patient, he was found to have a PE and influenza positive and started on antibiotic.    -Hospitalization from 1/31/2023 through 2/2/2023.  Patient presented with to [facility] with shortness of breath and found to have bilateral pleural effusions, right upper lobe mass and concern for left upper lobe pneumonia.  He was started on antibiotics and diuresed with Lasix.  He had a thoracentesis on 2/1/2023 with 1 L removed.  Patient had a recent immunotherapy.  Patient was diagnosed with acute hypoxic respiratory failure likely secondary to community-acquired pneumonia versus malignancy related to pleural effusions versus postradiation pneumonitis versus immunotherapy induced pneumonitis.    -History of lung cancer in remission, s/p wedge resection in 2018, had chemotherapy and radiation    -Hypertension    -Atrial fibrillation: Taking Eliquis    -CKD, stage 3    Past Medical History:   Diagnosis Date    Acute respiratory failure with hypoxia (HCC) 02/01/2023    Arrhythmia     hx of a fib    Arthritis 2015    generalized, DDD back    Asthma     inhaler     Backpain 08/06/2018    9/10    Cancer (HCC) 07/2017    left lung    Cataract     bilateral, no surgery    Congestive heart failure (HCC)     Dental disorder     lower partial, removable bridge    Diabetes 08/06/2018    on no meds at this time, diet controlled    Heart valve disease     mild mitral valve prolapse    High cholesterol     Hypertension      Past Surgical History:   Procedure Laterality Date    MD BRONCHOSCOPY,DIAGNOSTIC  7/1/2021    Procedure: BRONCHOSCOPY - FIBER OPTIC WITH BRANCHOALVEOLAR LAVAGE BIOPSY, FNA, NAVIGATION;  Surgeon: Vinayak Carbajal M.D.;  Location: SURGERY [facility];  Service: Pulmonary    ENDOBRONCHIAL US ADD-ON  7/1/2021    Procedure: ENDOBRONCHIAL ULTRASOUND (EBUS).;   "Surgeon: Vinayak Carbajal M.D.;  Location: SURGERY [facility];  Service: Pulmonary    CATARACT PHACO WITH IOL Left 2018    Procedure: CATARACT PHACO WITH IOL;  Surgeon: Juanito Hager M.D.;  Location: SURGERY SAME DAY [facility];  Service: Ophthalmology    CATARACT PHACO WITH IOL Right 2018    Procedure: CATARACT PHACO WITH IOL;  Surgeon: Juanito Hager M.D.;  Location: SURGERY SAME DAY [facility];  Service: Ophthalmology    THORACOSCOPY Left 2018    Procedure: THORACOSCOPY- WEDGE BIOPSY W/FROZEN SECTION;  Surgeon: Cristhian Galeano M.D.;  Location: SURGERY [facility];  Service: Thoracic    EAR MIDDLE EXPLORATION Right 2015    Procedure: EAR MIDDLE EXPLORATION;  Surgeon: William Brandon M.D.;  Location: SURGERY SAME DAY [facility];  Service:     OSSICULAR RECONSTRUCTION Right 2015    Procedure: OSSICULAR RECONSTRUCTION CHAIN POSSIBLE;  Surgeon: William Brandon M.D.;  Location: SURGERY SAME DAY [facility];  Service:     OTHER      ear replacement \"many years ago\"    OTHER ORTHOPEDIC SURGERY      right knee replacement      Family History   Problem Relation Age of Onset    Stroke Mother     Hypertension Mother     Diabetes Mother     Cancer Father         stomach cancer    Heart Disease Brother     Alcohol abuse Brother     Ovarian Cancer Neg Hx     Tubal Cancer Neg Hx     Peritoneal Cancer Neg Hx     Colorectal Cancer Neg Hx     Breast Cancer Neg Hx     Hyperlipidemia Neg Hx      Social History     Socioeconomic History    Marital status:      Spouse name: Not on file    Number of children: Not on file    Years of education: Not on file    Highest education level: Not on file   Occupational History    Not on file   Tobacco Use    Smoking status: Former     Packs/day: 0.25     Years: 40.00     Pack years: 10.00     Types: Cigarettes     Quit date: 2005     Years since quittin.5    Smokeless tobacco: Never   Vaping Use    Vaping Use: Never used   Substance " and Sexual Activity    Alcohol use: Not Currently     Alcohol/week: 12.6 - 21.0 oz     Types: 21 - 35 Cans of beer per week     Comment: 12 beers a day (coors), quit 2/3/2023    Drug use: No    Sexual activity: Not on file   Other Topics Concern    Not on file   Social History Narrative    Not on file     Social Determinants of Health     Financial Resource Strain: Not on file   Food Insecurity: Not on file   Transportation Needs: Not on file   Physical Activity: Not on file   Stress: Not on file   Social Connections: Not on file   Intimate Partner Violence: Not on file   Housing Stability: Not on file     No Known Allergies  Outpatient Encounter Medications as of 8/1/2023   Medication Sig Dispense Refill    dapagliflozin propanediol (FARXIGA) 10 MG Tab Take 1 Tablet by mouth every day. 100 Tablet 3    ezetimibe (ZETIA) 10 MG Tab Take 1 Tablet by mouth every day. 100 Tablet 3    metoprolol SR (TOPROL XL) 50 MG TABLET SR 24 HR Take 1 Tablet by mouth every day. 100 Tablet 3    spironolactone (ALDACTONE) 25 MG Tab Take 1 Tablet by mouth every day. 100 Tablet 3    metFORMIN ER (GLUCOPHAGE XR) 750 MG TABLET SR 24 HR Take 1 Tablet by mouth every day. 100 Tablet 3    metFORMIN ER (GLUCOPHAGE XR) 750 MG TABLET SR 24 HR Take 1 tablet by mouth every day. 100 Tablet 3    cyclobenzaprine (FLEXERIL) 10 mg Tab Take 1 Tablet by mouth at bedtime. 100 Tablet 3    Cholecalciferol (VITAMIN D3) 2000 UNIT Cap Take 1 Capsule by mouth every day. 100 Capsule 3    fluticasone furoate-vilanterol (BREO ELLIPTA) 200-25 MCG/ACT AEROSOL POWDER, BREATH ACTIVATED Inhale 1 Puff every day. Rinse mouth after use. 1 Each 11    apixaban (ELIQUIS) 5mg Tab Take 1 Tablet by mouth 2 times a day. 180 Tablet 3    diphenhydrAMINE-APAP, sleep, (TYLENOL PM EXTRA STRENGTH)  MG Tab Take 1-2 Tablets by mouth at bedtime as needed (Mild Pain or Trouble Sleeping).      furosemide (LASIX) 40 MG Tab Take 1 Tablet by mouth every day for 100 days. (Patient not  "taking: Reported on 8/1/2023) 100 Tablet 0    [DISCONTINUED] ezetimibe (ZETIA) 10 MG Tab Take 1 Tablet by mouth every day. 100 Tablet 3    [DISCONTINUED] spironolactone (ALDACTONE) 25 MG Tab Take 1 Tablet by mouth every day for 100 days. 100 Tablet 0    [DISCONTINUED] metoprolol SR (TOPROL XL) 50 MG TABLET SR 24 HR Take 1 Tablet by mouth every day for 100 days. 100 Tablet 0    [DISCONTINUED] dapagliflozin propanediol (FARXIGA) 10 MG Tab Take 1 Tablet by mouth every day. 100 Tablet 2     No facility-administered encounter medications on file as of 8/1/2023.     Review of Systems   Constitutional:  Negative for fever and malaise/fatigue.   Respiratory:  Positive for shortness of breath. Negative for cough.    Cardiovascular:  Negative for chest pain, palpitations, orthopnea, claudication, leg swelling and PND.   Gastrointestinal:  Negative for abdominal pain.   Musculoskeletal:  Positive for back pain. Negative for myalgias.   Neurological:  Negative for dizziness.   All other systems reviewed and are negative.           Objective     /54 (BP Location: Left arm, Patient Position: Sitting, BP Cuff Size: Adult)   Pulse 66   Resp 18   Ht 1.727 m (5' 8\")   Wt 62.6 kg (138 lb)   SpO2 98%   BMI 20.98 kg/m²     Physical Exam  Vitals reviewed.   Constitutional:       Appearance: He is well-developed.   HENT:      Head: Normocephalic and atraumatic.   Eyes:      Pupils: Pupils are equal, round, and reactive to light.   Neck:      Vascular: No JVD.   Cardiovascular:      Rate and Rhythm: Normal rate. Rhythm irregular.      Heart sounds: Normal heart sounds.   Pulmonary:      Effort: Pulmonary effort is normal. No respiratory distress.      Breath sounds: Normal breath sounds. No wheezing or rales.   Abdominal:      General: Bowel sounds are normal.      Palpations: Abdomen is soft.   Musculoskeletal:         General: Normal range of motion.      Cervical back: Normal range of motion and neck supple.      Right lower " leg: No edema.      Left lower leg: No edema.   Skin:     General: Skin is warm and dry.   Neurological:      General: No focal deficit present.      Mental Status: He is alert and oriented to person, place, and time.   Psychiatric:         Behavior: Behavior normal.       Lab Results   Component Value Date/Time    CHOLSTRLTOT 148 06/02/2023 09:42 AM    LDL 92 06/02/2023 09:42 AM    HDL 44 06/02/2023 09:42 AM    TRIGLYCERIDE 58 06/02/2023 09:42 AM       Lab Results   Component Value Date/Time    SODIUM 138 07/31/2023 09:04 AM    POTASSIUM 4.7 07/31/2023 09:04 AM    CHLORIDE 104 07/31/2023 09:04 AM    CO2 23 07/31/2023 09:04 AM    GLUCOSE 109 (H) 07/31/2023 09:04 AM    BUN 25 (H) 07/31/2023 09:04 AM    CREATININE 1.24 07/31/2023 09:04 AM     Lab Results   Component Value Date/Time    ALKPHOSPHAT 74 06/02/2023 09:40 AM    ASTSGOT 20 06/02/2023 09:40 AM    ALTSGPT 22 06/02/2023 09:40 AM    TBILIRUBIN 0.4 06/02/2023 09:40 AM      Transthoracic Echo Report 2/7/2018  Normal left ventricular systolic function. Left ventricular ejection fraction is visually estimated to be 55%.   Diastolic function is difficult to assess with atrial fibrillation.  Mild aortic insufficiency.   Mild mitral regurgitation.  No prior study is available for comparison.      Biotel 7/277/2021-8/9/2021  SUMMARY   9 days of monitoring demonstrated sinus rhythm with frequent atrial ectopy.  Occasional ventricular ectopy including up to 14 beats of monomorphic VT which has similar morphology to PVCs.  Symptoms correlated with ventricular tachycardia and atrial tachycardia as well as frequent ectopy.     Transthoracic Echo Report 3/17/2023  Prior echocardiogram 2/7/2018, there is now reduction in left ventricular systolic function and presence of moderate mitral regurgitation.  Severely reduced left ventricular systolic function. The left   ventricular ejection fraction is visually estimated to be 30 to 35%.   Global hypokinesis with regional  variation.  Mild aortic insufficiency.  The aortic valve is not well visualized. Calcified aortic valve leaflets. Transvalvular gradients are - Peak: 13 mmHg, Mean: 7 mmHg.    Aortic valve gradients may be underestimated due to low ejection fraction.   Moderate mitral regurgitation.   Normal aortic root for body surface area. The ascending aorta diameter is 3.2 cm.       Date 6MWT MLWHF   4/4/2023 292 m in 6 minutes 41   5/9/2023 --- 60                Assessment & Plan     1. ACC/AHA stage C systolic heart failure (HCC)  dapagliflozin propanediol (FARXIGA) 10 MG Tab    metoprolol SR (TOPROL XL) 50 MG TABLET SR 24 HR    spironolactone (ALDACTONE) 25 MG Tab      2. Heart failure, NYHA class 2 (HCC)  dapagliflozin propanediol (FARXIGA) 10 MG Tab      3. Dyslipidemia  ezetimibe (ZETIA) 10 MG Tab      4. Pulmonary embolism on right (HCC)        5. PAF (paroxysmal atrial fibrillation) ([facility])        6. Secondary hypercoagulable state ([facility])            Medical Decision Making: Today's Assessment/Status/Plan:        HFrEF, Stage C, Class 1-2, LVEF 35%: Based on physical examination findings, patient is euvolemic. No JVD, lungs are clear to auscultation, no pitting edema in bilateral lower extremities, no ascites.   -Heart failure due to unknown cause, patient did not have ischemic work-up.  Patient requesting to repeat echo first and if no improvement of EF then will send patient for angiogram as he is not currently having any ischemia symptoms necessarily  -Discussed Heart failure trajectory and prognosis with patient. Will continue to optimize medical therapy as tolerated. Advanced HF treatment, need for remote monitoring consideration at every visit.   -ACE-I/ARB/ARNI: Consider starting if blood pressure improves, BP continues to be low  -Evidence Based Beta-blocker: Continue metoprolol SR 50 mg daily  -Aldosterone Antagonist: Continue spironolactone 25 mg daily  -Diuretic: Continue furosemide up to 40 mg daily as needed,  patient reports no recent use  -SGLT2 inhibitor: Continue Farxiga 10 mg daily  -Other: Consider as needed (Hydralazine/Isosorbide, Vericiguat, Corlanor)  -Labs: No labs due at this time, Will continue to closely monitor for side effects of patient's high risk medication(s) including renal function, liver function NTproBNP/cardiac markers, and electrolytes as needed  -discussed importance of exercise and regular activity  -Discussed/reviewed maintaining a low Sodium and hydration recommendations  -Repeat echo scheduled in September, if LVEF not >35%, then will discuss/consider ICD for primary Prevention.   -moderate MR seen on echo: Will refer to structural heart clinic for Mitraclip evaluation if EF or MR does not improve  -Reinforced s/sx of worsening heart failure with patient and weight monitoring. Pt verbalizes understanding. Pt to call office or RTC if present.    -PUMP line number [phone] (PUMP) reviewed with patient  -Heart Failure Education:   Discussed the Definition of heart failure (linking disease, symptoms, and treatment) and causes of heart failure  Recognition of escalating symptoms and concrete plan for response to particular symptoms  Risk modification for heart failure progression  Specific diet recommendations: Continue low-sodium diet and adequate hydration   discussed patient to Stay active  Importance of treatment adherence  Behavioral strategies to promote treatment adherence  Patient declines formal education at this time  -Advanced care planning: Advance directive and POLST to be discussed at future visit  -Pharmacotherapy Referral: Patient not referred to pharmacotherapy at this time  -Compliance Barriers: None   -Genetic testing Consideration: none  -Consideration for LVAD and/or Heart transplant as necessary       PE:  -Continue Eliquis 5 mg twice a day     bilateral pleural effusion:  -s/p thoracentesis x3  -Will follow    Paroxysmal A-fib:  -Continue Eliquis 5 mg twice a day  -Continue  metoprolol succinate 50 mg daily    Dyslipidemia:  -Recent LDL 92 on 6/2/2023  -Continue ezetimibe 10 mg daily    New onset diabetes:  -Recently started on metformin  -Followed by PCP    FU in clinic in 6 weeks after echo completed. Sooner if needed.    Patient verbalizes understanding and agrees with the plan of care.     PLEASE NOTE: This Note was created using voice recognition Software. I have made every reasonable attempt to correct obvious errors, but I expect that there are errors of grammar and possibly content that I did not discover before finalizing the note

## 2023-08-02 ENCOUNTER — TELEPHONE (OUTPATIENT)
Dept: SLEEP MEDICINE | Facility: MEDICAL CENTER | Age: 78
End: 2023-08-02
Payer: MEDICARE

## 2023-08-02 NOTE — TELEPHONE ENCOUNTER
VOICEMAIL: 07/28/23  1. Caller Name: Cydney-spouse                      Call Back Number: [phone] (home)      2. Message: Cydney called and lm. His Breo is not covered by his insurance.     3. Patient approves office to leave a detailed voicemail/MyChart message: N\A    08/02/23:  Called and spoke with pharmacy. Pt has picked up medication and Insurance covered medication, pt had to pay $95 for a month supply.    Called pt and lm, I was returning their call and asked for a return call.

## 2023-08-05 DIAGNOSIS — R06.09 DYSPNEA ON EXERTION: ICD-10-CM

## 2023-08-05 RX ORDER — FLUTICASONE PROPIONATE AND SALMETEROL 250; 50 UG/1; UG/1
1 POWDER RESPIRATORY (INHALATION) 2 TIMES DAILY
Qty: 1 EACH | Refills: 5 | Status: SHIPPED | OUTPATIENT
Start: 2023-08-05 | End: 2023-10-23 | Stop reason: SDUPTHER

## 2023-08-05 NOTE — PROGRESS NOTES
Breo not covered  Rx for Advair submitted    Orders Placed This Encounter    fluticasone-salmeterol (ADVAIR DISKUS) 250-50 MCG/ACT AEROSOL POWDER, BREATH ACTIVATED     __________  Vinayak Carbajal MD  Pulmonary and Critical Care Medicine  [facility]

## 2023-08-07 RX ORDER — FLUTICASONE PROPIONATE AND SALMETEROL 250; 50 UG/1; UG/1
1 POWDER RESPIRATORY (INHALATION) EVERY 12 HOURS
Qty: 1 EACH | Refills: 5 | Status: SHIPPED | OUTPATIENT
Start: 2023-08-07 | End: 2023-10-23

## 2023-08-07 RX ORDER — FLUTICASONE PROPIONATE AND SALMETEROL 250; 50 UG/1; UG/1
1 POWDER RESPIRATORY (INHALATION) EVERY 12 HOURS
COMMUNITY
End: 2023-08-07 | Stop reason: SDUPTHER

## 2023-08-07 NOTE — TELEPHONE ENCOUNTER
Called and spoke with pt's wife, Cydney. Notified her of this. Pt goes to a different pharmacy. Updated pt's pharmacy in chart and re-pended rx.

## 2023-08-07 NOTE — TELEPHONE ENCOUNTER
Vinayak Carbajal M.D.  You 2 days ago       Rx for advair submitted, could you please inform the pt? Thank you

## 2023-08-14 ENCOUNTER — TELEPHONE (OUTPATIENT)
Dept: INTERNAL MEDICINE | Facility: OTHER | Age: 78
End: 2023-08-14

## 2023-08-14 ENCOUNTER — NON-PROVIDER VISIT (OUTPATIENT)
Dept: INTERNAL MEDICINE | Facility: OTHER | Age: 78
End: 2023-08-14
Payer: MEDICARE

## 2023-08-14 VITALS — WEIGHT: 133 LBS | HEIGHT: 68 IN | BODY MASS INDEX: 20.16 KG/M2

## 2023-08-14 DIAGNOSIS — I13.10 CARDIORENAL SYNDROME WITH RENAL FAILURE, STAGE 1-4 OR UNSPECIFIED CHRONIC KIDNEY DISEASE, WITHOUT HEART FAILURE: ICD-10-CM

## 2023-08-14 DIAGNOSIS — N18.31 STAGE 3A CHRONIC KIDNEY DISEASE: ICD-10-CM

## 2023-08-14 DIAGNOSIS — I50.9 HEART FAILURE, NYHA CLASS 2 (HCC): ICD-10-CM

## 2023-08-14 DIAGNOSIS — E11.65 TYPE 2 DIABETES MELLITUS WITH HYPERGLYCEMIA, WITHOUT LONG-TERM CURRENT USE OF INSULIN (HCC): ICD-10-CM

## 2023-08-14 DIAGNOSIS — I50.20 ACC/AHA STAGE C SYSTOLIC HEART FAILURE (HCC): ICD-10-CM

## 2023-08-14 DIAGNOSIS — I10 PRIMARY HYPERTENSION: ICD-10-CM

## 2023-08-14 PROCEDURE — 97802 MEDICAL NUTRITION INDIV IN: CPT | Performed by: DIETITIAN, REGISTERED

## 2023-08-14 RX ORDER — DAPAGLIFLOZIN 10 MG/1
10 TABLET, FILM COATED ORAL DAILY
Qty: 100 TABLET | Refills: 2 | OUTPATIENT
Start: 2023-08-14

## 2023-08-14 ASSESSMENT — FIBROSIS 4 INDEX: FIB4 SCORE: 1.57

## 2023-08-14 NOTE — PATIENT INSTRUCTIONS
I will add in a lunch during my day and additional opportunities to fuel my body for weight gain  - smoothies within carb recommendation for diabetes eating plan  - soups with legumes+veggies  - power up my tuna or egg salad    I will watch for my added sugars to keep to 5 to 10% of total added sugar per nutrition label

## 2023-08-14 NOTE — PROGRESS NOTES
"Pito Black is a 78 y.o. year old, male initial evaluation for   Cydney is wife and caretaker,  59 years; plans to visit [place] again with grandchildren    ROS: Lung cancer, h/o CKD, cardio renal syndrome    Wellness Vision:  I want to learn what I am supposed to do with my diabetes.    My Health Profile:    PHYSICAL ASSESSMENT  Current Height:  68\"  Ht Readings from Last 1 Encounters:   08/01/23 1.727 m (5' 8\")     Current Weight:  133#  Wt Readings from Last 1 Encounters:   08/01/23 62.6 kg (138 lb)     BMI: 20  Goal Weight:  lost 40-50 lbs w/chronic medical issues  UBW: 175-180#    FLUID INTAKE:  3-bottles water, diet 7 up  Typical Beverages: black coffee+sugar sub  Servings Alcohol/D/WK/MO: none, quit when diagnosed with cancer in 2018    ACTIVITY REVIEW: ADL  Current Exercise: used to be more active,   Hobbies:     PERTINENT LABS:    Latest Reference Range & Units 06/02/23 09:42   Glycohemoglobin 4.0 - 5.6 % 6.5 (H)        Latest Reference Range & Units 07/31/23 09:04   GFR (CKD-EPI) >60 mL/min/1.73 m 2 59 !     Patient Behaviors (indicate frequency)  Meal/Snack Pattern: 2 meals/day; breakfast and dinner    0600:wake up    0800  Scrambled egg+ham+flour tortilla or toast, premier protein shake  Trying to gain weight    1800: homemade soups  with beans/lentils, chicken and vegetables    Dines away from Home: seldom  Locations:    Who lives in home: wife, self  Additional Patient Behavior Information: main cook-self    PES: Unintentional weight loss, h/o B12 deficiency,T2DM h/o lung cancer r/t poor appetite, difficulty to cook meals as evidenced by 24% weight loss x 6 years    NUTRITION COMMENTS:  Pito is a 77 yo who has been managing his diabetes with diet control; mainly wants to know how to eat and gain ideas to help him gain weight.    Pito realizes he has not been intentional in fueling his body for more nutrition    B12 deficiency in the past, metformin restarted, may not be helpful with h/o of " deficiency. Advised to manage diabetes via diet/lifestyle medicine and maintain A1c <7.5% in light of weight loss, lung cancer history; aim for repletion and restoration of strength and health.      RTC x next available    Time Spent: 60 minutes

## 2023-08-14 NOTE — TELEPHONE ENCOUNTER
Tylor Mahan,    I saw Pito and his wife today. Pito has lost additional weight since his last weight check, I provided ideas for more calorie and protein intake for repletion/restoration of his strength and weight gain within diabetes guidelines.     Pito and wife were concerned about his A1c, but I advised that his current number at 6.5% was acceptable in light of his weight loss, cardiorenal and lung cancer history. Can aim for <7.5-8% A1c and monitor metformin with h/o of B12 deficiency. Will follow with you.    Thank you for your referral,  Charis Rogers RDN, [facility]

## 2023-09-07 ENCOUNTER — HOSPITAL ENCOUNTER (OUTPATIENT)
Dept: CARDIOLOGY | Facility: MEDICAL CENTER | Age: 78
End: 2023-09-07
Attending: NURSE PRACTITIONER
Payer: MEDICARE

## 2023-09-07 DIAGNOSIS — I50.9 HEART FAILURE, NYHA CLASS 2 (HCC): ICD-10-CM

## 2023-09-07 DIAGNOSIS — I48.0 PAF (PAROXYSMAL ATRIAL FIBRILLATION) (HCC): ICD-10-CM

## 2023-09-07 DIAGNOSIS — R06.02 SOB (SHORTNESS OF BREATH): ICD-10-CM

## 2023-09-07 DIAGNOSIS — I50.20 ACC/AHA STAGE C SYSTOLIC HEART FAILURE (HCC): ICD-10-CM

## 2023-09-07 LAB
LV EJECT FRACT  99904: 35
LV EJECT FRACT MOD 2C 99903: 35.18
LV EJECT FRACT MOD 4C 99902: 33.49
LV EJECT FRACT MOD BP 99901: 38.3

## 2023-09-07 PROCEDURE — 700117 HCHG RX CONTRAST REV CODE 255: Performed by: NURSE PRACTITIONER

## 2023-09-07 PROCEDURE — 93306 TTE W/DOPPLER COMPLETE: CPT | Mod: 26 | Performed by: INTERNAL MEDICINE

## 2023-09-07 PROCEDURE — 93306 TTE W/DOPPLER COMPLETE: CPT

## 2023-09-07 RX ADMIN — HUMAN ALBUMIN MICROSPHERES AND PERFLUTREN 3 ML: 10; .22 INJECTION, SOLUTION INTRAVENOUS at 12:00

## 2023-09-11 ENCOUNTER — OFFICE VISIT (OUTPATIENT)
Dept: CARDIOLOGY | Facility: MEDICAL CENTER | Age: 78
End: 2023-09-11
Attending: NURSE PRACTITIONER
Payer: MEDICARE

## 2023-09-11 VITALS
BODY MASS INDEX: 20.31 KG/M2 | OXYGEN SATURATION: 97 % | RESPIRATION RATE: 16 BRPM | HEART RATE: 76 BPM | SYSTOLIC BLOOD PRESSURE: 108 MMHG | WEIGHT: 134 LBS | HEIGHT: 68 IN | DIASTOLIC BLOOD PRESSURE: 60 MMHG

## 2023-09-11 DIAGNOSIS — I48.0 PAF (PAROXYSMAL ATRIAL FIBRILLATION) (HCC): ICD-10-CM

## 2023-09-11 DIAGNOSIS — I50.20 ACC/AHA STAGE C SYSTOLIC HEART FAILURE (HCC): ICD-10-CM

## 2023-09-11 DIAGNOSIS — R06.02 SOB (SHORTNESS OF BREATH): ICD-10-CM

## 2023-09-11 DIAGNOSIS — I10 ESSENTIAL HYPERTENSION: ICD-10-CM

## 2023-09-11 DIAGNOSIS — N18.31 STAGE 3A CHRONIC KIDNEY DISEASE: ICD-10-CM

## 2023-09-11 DIAGNOSIS — I26.99 PULMONARY EMBOLISM ON RIGHT (HCC): ICD-10-CM

## 2023-09-11 DIAGNOSIS — D68.69 SECONDARY HYPERCOAGULABLE STATE (HCC): ICD-10-CM

## 2023-09-11 DIAGNOSIS — E78.5 DYSLIPIDEMIA: ICD-10-CM

## 2023-09-11 DIAGNOSIS — I50.9 HEART FAILURE, NYHA CLASS 2 (HCC): ICD-10-CM

## 2023-09-11 DIAGNOSIS — I49.3 PVC (PREMATURE VENTRICULAR CONTRACTION): ICD-10-CM

## 2023-09-11 PROCEDURE — 3078F DIAST BP <80 MM HG: CPT | Performed by: NURSE PRACTITIONER

## 2023-09-11 PROCEDURE — 99214 OFFICE O/P EST MOD 30 MIN: CPT | Performed by: NURSE PRACTITIONER

## 2023-09-11 PROCEDURE — 3074F SYST BP LT 130 MM HG: CPT | Performed by: NURSE PRACTITIONER

## 2023-09-11 PROCEDURE — 99213 OFFICE O/P EST LOW 20 MIN: CPT | Performed by: NURSE PRACTITIONER

## 2023-09-11 ASSESSMENT — FIBROSIS 4 INDEX: FIB4 SCORE: 1.57

## 2023-09-11 ASSESSMENT — ENCOUNTER SYMPTOMS
COUGH: 0
ORTHOPNEA: 0
ABDOMINAL PAIN: 0
PALPITATIONS: 0
DIZZINESS: 0
SHORTNESS OF BREATH: 1
PND: 0
FEVER: 0
MYALGIAS: 0
CLAUDICATION: 0

## 2023-09-11 NOTE — PROGRESS NOTES
Chief Complaint   Patient presents with    Congestive Heart Failure     F/V DX: ACC/AHA stage C systolic heart failure (HCC)       Subjective     Pito Black is a 78 y.o. male who presents today for follow up on his Heart Failure with his wife, Cydney.    Current patient of the heart failure clinic.  He was last seen in clinic on 8/1/2023.  During that visit, patient was sent for a repeat echo.    Patient is here to follow up on his echo results.    Patient feels well, denies chest pain, shortness of breath at rest and with ADLs, palpitations, dizziness/lightheadedness, orthopnea, PND or Edema.  He mentions having some CHAPIN.     His home weights are stable around 136-137 lbs.     He has not been checking his blood pressures at home recently.    He does report a low-sodium diet.    He reports compliance with his medications.    Patient hoping to go to [facility] next year to take his family to the place where he was born.    Additionally, patient has the following medical problems:     -Hospitalization from 3/24/2023 through 3/26/2023 with shortness of breath.  Patient was found to be hypoxic and required oxygen.  CTA was negative for PE, but did show worsening bilateral pleural effusions L>R.  He had a left thoracentesis.  He was started on Lasix and Aldactone.  Patient was discharged off oxygen.    -hospitalization from 3/17/2023 through 3/19/2023 for shortness of breath and had an echocardiogram which showed an EF of 35%.  Chest x-ray showed pulmonary edema.  Patient was started on IV diuretics.  He did have another thoracentesis.  Patient was found to be in LOCO.  Patient also had nonsustained runs of VT/PSVT.  Cardiology was consulted. Toprol was started for his heart failure, tachycardia and frequent ectopy    -Hospitalization from 2/20/2023 through 2/22/2023.  He presented with shortness of breath.  During this visit, patient, he was found to have a PE and influenza positive and started on  antibiotic.    -Hospitalization from 1/31/2023 through 2/2/2023.  Patient presented with to [facility] with shortness of breath and found to have bilateral pleural effusions, right upper lobe mass and concern for left upper lobe pneumonia.  He was started on antibiotics and diuresed with Lasix.  He had a thoracentesis on 2/1/2023 with 1 L removed.  Patient had a recent immunotherapy.  Patient was diagnosed with acute hypoxic respiratory failure likely secondary to community-acquired pneumonia versus malignancy related to pleural effusions versus postradiation pneumonitis versus immunotherapy induced pneumonitis.    -History of lung cancer in remission, s/p wedge resection in 2018, had chemotherapy and radiation    -Hypertension    -Atrial fibrillation: Taking Eliquis    -CKD, stage 3    Past Medical History:   Diagnosis Date    Acute respiratory failure with hypoxia ([facility]) 02/01/2023    Arrhythmia     hx of a fib    Arthritis 2015    generalized, DDD back    Asthma     inhaler     Backpain 08/06/2018    9/10    Cancer ([facility]) 07/2017    left lung    Cataract     bilateral, no surgery    Congestive heart failure ([facility])     Dental disorder     lower partial, removable bridge    Diabetes 08/06/2018    on no meds at this time, diet controlled    Heart valve disease     mild mitral valve prolapse    High cholesterol     Hypertension      Past Surgical History:   Procedure Laterality Date    [place] BRONCHOSCOPY,DIAGNOSTIC  7/1/2021    Procedure: BRONCHOSCOPY - FIBER OPTIC WITH BRANCHOALVEOLAR LAVAGE BIOPSY, FNA, NAVIGATION;  Surgeon: Vinayak Carbajal M.D.;  Location: SURGERY [facility];  Service: Pulmonary    ENDOBRONCHIAL US ADD-ON  7/1/2021    Procedure: ENDOBRONCHIAL ULTRASOUND (EBUS).;  Surgeon: Vinayak Carbajal M.D.;  Location: [facility];  Service: Pulmonary    CATARACT PHACO WITH IOL Left 8/21/2018    Procedure: CATARACT PHACO WITH IOL;  Surgeon: Juanito Hager M.D.;  Location: SURGERY SAME DAY  "[facility];  Service: Ophthalmology    CATARACT PHACO WITH IOL Right 2018    Procedure: CATARACT PHACO WITH IOL;  Surgeon: Juanito Hager M.D.;  Location: SURGERY SAME DAY [facility];  Service: Ophthalmology    THORACOSCOPY Left 2018    Procedure: THORACOSCOPY- WEDGE BIOPSY W/FROZEN SECTION;  Surgeon: Cristhian Galeano M.D.;  Location: SURGERY [facility];  Service: Thoracic    EAR MIDDLE EXPLORATION Right 2015    Procedure: EAR MIDDLE EXPLORATION;  Surgeon: William Brandon M.D.;  Location: SURGERY SAME DAY [facility];  Service:     OSSICULAR RECONSTRUCTION Right 2015    Procedure: OSSICULAR RECONSTRUCTION CHAIN POSSIBLE;  Surgeon: William Brandon M.D.;  Location: SURGERY SAME DAY [facility];  Service:     OTHER      ear replacement \"many years ago\"    OTHER ORTHOPEDIC SURGERY      right knee replacement      Family History   Problem Relation Age of Onset    Stroke Mother     Hypertension Mother     Diabetes Mother     Cancer Father         stomach cancer    Heart Disease Brother     Alcohol abuse Brother     Ovarian Cancer Neg Hx     Tubal Cancer Neg Hx     Peritoneal Cancer Neg Hx     Colorectal Cancer Neg Hx     Breast Cancer Neg Hx     Hyperlipidemia Neg Hx      Social History     Socioeconomic History    Marital status:      Spouse name: Not on file    Number of children: Not on file    Years of education: Not on file    Highest education level: Not on file   Occupational History    Not on file   Tobacco Use    Smoking status: Former     Current packs/day: 0.00     Average packs/day: 0.3 packs/day for 40.0 years (10.0 ttl pk-yrs)     Types: Cigarettes     Start date: 1965     Quit date: 2005     Years since quittin.7    Smokeless tobacco: Never   Vaping Use    Vaping Use: Never used   Substance and Sexual Activity    Alcohol use: Not Currently     Alcohol/week: 12.6 - 21.0 oz     Types: 21 - 35 Cans of beer per week     Comment: 12 beers a day (coors), quit " 2/3/2023    Drug use: No    Sexual activity: Not on file   Other Topics Concern    Not on file   Social History Narrative    Not on file     Social Determinants of Health     Financial Resource Strain: Not on file   Food Insecurity: Not on file   Transportation Needs: Not on file   Physical Activity: Not on file   Stress: Not on file   Social Connections: Not on file   Intimate Partner Violence: Not on file   Housing Stability: Not on file     No Known Allergies  Outpatient Encounter Medications as of 9/11/2023   Medication Sig Dispense Refill    fluticasone-salmeterol (ADVAIR) 250-50 MCG/ACT AEROSOL POWDER, BREATH ACTIVATED Inhale 1 Puff every 12 hours. 1 Each 5    fluticasone-salmeterol (ADVAIR DISKUS) 250-50 MCG/ACT AEROSOL POWDER, BREATH ACTIVATED Inhale 1 Puff 2 times a day. Rinse mouth after each use 1 Each 5    dapagliflozin propanediol (FARXIGA) 10 MG Tab Take 1 Tablet by mouth every day. 100 Tablet 3    ezetimibe (ZETIA) 10 MG Tab Take 1 Tablet by mouth every day. 100 Tablet 3    metoprolol SR (TOPROL XL) 50 MG TABLET SR 24 HR Take 1 Tablet by mouth every day. 100 Tablet 3    spironolactone (ALDACTONE) 25 MG Tab Take 1 Tablet by mouth every day. 100 Tablet 3    metFORMIN ER (GLUCOPHAGE XR) 750 MG TABLET SR 24 HR Take 1 Tablet by mouth every day. 100 Tablet 3    metFORMIN ER (GLUCOPHAGE XR) 750 MG TABLET SR 24 HR Take 1 tablet by mouth every day. 100 Tablet 3    cyclobenzaprine (FLEXERIL) 10 mg Tab Take 1 Tablet by mouth at bedtime. 100 Tablet 3    Cholecalciferol (VITAMIN D3) 2000 UNIT Cap Take 1 Capsule by mouth every day. 100 Capsule 3    apixaban (ELIQUIS) 5mg Tab Take 1 Tablet by mouth 2 times a day. 180 Tablet 3    diphenhydrAMINE-APAP, sleep, (TYLENOL PM EXTRA STRENGTH)  MG Tab Take 1-2 Tablets by mouth at bedtime as needed (Mild Pain or Trouble Sleeping).      furosemide (LASIX) 40 MG Tab Take 1 Tablet by mouth every day for 100 days. (Patient not taking: Reported on 8/1/2023) 100 Tablet 0  "    No facility-administered encounter medications on file as of 9/11/2023.     Review of Systems   Constitutional:  Negative for fever and malaise/fatigue.   Respiratory:  Positive for shortness of breath. Negative for cough.    Cardiovascular:  Negative for chest pain, palpitations, orthopnea, claudication, leg swelling and PND.   Gastrointestinal:  Negative for abdominal pain.   Musculoskeletal:  Negative for myalgias.   Neurological:  Negative for dizziness.   All other systems reviewed and are negative.           Objective     /60 (BP Location: Left arm, Patient Position: Sitting, BP Cuff Size: Adult)   Pulse 76   Resp 16   Ht 1.727 m (5' 8\")   Wt 60.8 kg (134 lb)   SpO2 97%   BMI 20.37 kg/m²     Physical Exam  Vitals reviewed.   Constitutional:       Appearance: He is well-developed.   HENT:      Head: Normocephalic and atraumatic.   Eyes:      Pupils: Pupils are equal, round, and reactive to light.   Neck:      Vascular: No JVD.   Cardiovascular:      Rate and Rhythm: Normal rate. Rhythm irregular.      Heart sounds: Normal heart sounds.   Pulmonary:      Effort: Pulmonary effort is normal. No respiratory distress.      Breath sounds: Normal breath sounds. No wheezing or rales.   Abdominal:      General: Bowel sounds are normal.      Palpations: Abdomen is soft.   Musculoskeletal:         General: Normal range of motion.      Cervical back: Normal range of motion and neck supple.      Right lower leg: No edema.      Left lower leg: No edema.   Skin:     General: Skin is warm and dry.   Neurological:      General: No focal deficit present.      Mental Status: He is alert and oriented to person, place, and time.   Psychiatric:         Behavior: Behavior normal.       Lab Results   Component Value Date/Time    CHOLSTRLTOT 148 06/02/2023 09:42 AM    LDL 92 06/02/2023 09:42 AM    HDL 44 06/02/2023 09:42 AM    TRIGLYCERIDE 58 06/02/2023 09:42 AM       Lab Results   Component Value Date/Time    SODIUM " 138 07/31/2023 09:04 AM    POTASSIUM 4.7 07/31/2023 09:04 AM    CHLORIDE 104 07/31/2023 09:04 AM    CO2 23 07/31/2023 09:04 AM    GLUCOSE 109 (H) 07/31/2023 09:04 AM    BUN 25 (H) 07/31/2023 09:04 AM    CREATININE 1.24 07/31/2023 09:04 AM     Lab Results   Component Value Date/Time    ALKPHOSPHAT 74 06/02/2023 09:40 AM    ASTSGOT 20 06/02/2023 09:40 AM    ALTSGPT 22 06/02/2023 09:40 AM    TBILIRUBIN 0.4 06/02/2023 09:40 AM      Transthoracic Echo Report 2/7/2018  Normal left ventricular systolic function. Left ventricular ejection fraction is visually estimated to be 55%.   Diastolic function is difficult to assess with atrial fibrillation.  Mild aortic insufficiency.   Mild mitral regurgitation.  No prior study is available for comparison.      Biotel 7/277/2021-8/9/2021  SUMMARY   9 days of monitoring demonstrated sinus rhythm with frequent atrial ectopy.  Occasional ventricular ectopy including up to 14 beats of monomorphic VT which has similar morphology to PVCs.  Symptoms correlated with ventricular tachycardia and atrial tachycardia as well as frequent ectopy.     Transthoracic Echo Report 3/17/2023  Prior echocardiogram 2/7/2018, there is now reduction in left ventricular systolic function and presence of moderate mitral regurgitation.  Severely reduced left ventricular systolic function. The left   ventricular ejection fraction is visually estimated to be 30 to 35%.   Global hypokinesis with regional variation.  Mild aortic insufficiency.  The aortic valve is not well visualized. Calcified aortic valve leaflets. Transvalvular gradients are - Peak: 13 mmHg, Mean: 7 mmHg.    Aortic valve gradients may be underestimated due to low ejection fraction.   Moderate mitral regurgitation.   Normal aortic root for body surface area. The ascending aorta diameter is 3.2 cm.    Transthoracic Echo Report 9/7/2023  Moderately reduced left ventricular systolic function. The left   ventricular ejection fraction is visually  estimated to be 35%.  Normal right ventricular size and systolic function.  Moderately dilated left atrium.  Moderate mitral regurgitation.  Mild aortic insufficiency.  No pericardial effusion.     Compared to the prior study on 3/17/2023, similar findings.         Date 6MWT MLWHF   4/4/2023 292 m in 6 minutes 41   5/9/2023 --- 60                Assessment & Plan     1. ACC/AHA stage C systolic heart failure ([facility])  [id number] PET W/CT ATTENUATION    Basic Metabolic Panel    proBrain Natriuretic Peptide, NT      2. Heart failure, NYHA class 2 ([facility])        3. SOB (shortness of breath)  [id number] PET W/CT ATTENUATION    Basic Metabolic Panel    proBrain Natriuretic Peptide, NT      4. PAF (paroxysmal atrial fibrillation) ([facility])  [id number] PET W/CT ATTENUATION    Basic Metabolic Panel    proBrain Natriuretic Peptide, NT      5. Dyslipidemia  [id number] PET W/CT ATTENUATION    Basic Metabolic Panel    proBrain Natriuretic Peptide, NT      6. Pulmonary embolism on right ([facility])  [id number] PET W/CT ATTENUATION    Basic Metabolic Panel    proBrain Natriuretic Peptide, NT      7. Stage 3a chronic kidney disease ([facility])  [id number] PET W/CT ATTENUATION    Basic Metabolic Panel    proBrain Natriuretic Peptide, NT      8. PVC (premature ventricular contraction)  [id number] PET W/CT ATTENUATION    Basic Metabolic Panel    proBrain Natriuretic Peptide, NT      9. Essential hypertension  [id number] PET W/CT ATTENUATION    Basic Metabolic Panel    proBrain Natriuretic Peptide, NT      10. Secondary hypercoagulable state ([facility])            Medical Decision Making: Today's Assessment/Status/Plan:        HFrEF, Stage C, Class 1-2, LVEF 35%: Based on physical examination findings, patient is euvolemic. No JVD, lungs are clear to auscultation, no pitting edema in bilateral lower extremities, no ascites.   -Heart failure due to unknown cause, patient did not have ischemic work-up.   Echocardiogram did not show improvement of LVEF, at this time, discussed obtaining cardiac PET stress test to evaluate for ischemia.  Patient currently does not have any symptoms aside from dyspnea on exertion.  -Discussed Heart failure trajectory and prognosis with patient. Will continue to optimize medical therapy as tolerated. Advanced HF treatment, need for remote monitoring consideration at every visit.   -ACE-I/ARB/ARNI: Discussed that we could try starting medications, but patient and wife report that blood pressures continue to be on the low side with office visits  -Evidence Based Beta-blocker: Continue metoprolol SR 50 mg daily  -Aldosterone Antagonist: Continue spironolactone 25 mg daily  -Diuretic: Continue furosemide up to 40 mg daily as needed, patient reports no recent use  -SGLT2 inhibitor: Continue Farxiga 10 mg daily  -Other: Consider as needed (Hydralazine/Isosorbide, Vericiguat, Corlanor)  -Labs: BMP, and NT proBNP due before next visit, Will continue to closely monitor for side effects of patient's high risk medication(s) including renal function, liver function NTproBNP/cardiac markers, and electrolytes as needed  -discussed importance of exercise and regular activity  -Discussed/reviewed maintaining a low Sodium and hydration recommendations  -Discussed with patient that his EF did not improve greater than 35%, did discuss ICD for primary prevention, but will follow-up more after stress test.   -moderate MR seen on echo: Will discuss referral to structural heart clinic for Mitraclip evaluation as EF or MR did not improve  -Reinforced s/sx of worsening heart failure with patient and weight monitoring. Pt verbalizes understanding. Pt to call office or RTC if present.    -PUMP line number [id number] (PUMP) reviewed with patient  -Heart Failure Education:   Discussed the Definition of heart failure (linking disease, symptoms, and treatment) and causes of heart failure  Recognition of escalating  symptoms and concrete plan for response to particular symptoms  Risk modification for heart failure progression  Specific diet recommendations: Continue low-sodium diet and adequate hydration   discussed patient to Stay active  Importance of treatment adherence  Behavioral strategies to promote treatment adherence  Patient declines formal education at this time  -Advanced care planning: Advance directive and POLST to be discussed at future visit  -Pharmacotherapy Referral: Patient not referred to pharmacotherapy at this time  -Compliance Barriers: None   -Genetic testing Consideration: none  -Consideration for LVAD and/or Heart transplant as necessary       PE:  -Continue Eliquis 5 mg twice a day     bilateral pleural effusion:  -s/p thoracentesis x3  -Will follow    Paroxysmal A-fib:  -Continue Eliquis 5 mg twice a day  -Continue metoprolol succinate 50 mg daily    Dyslipidemia:  -Recent LDL 92 on 6/2/2023  -Continue ezetimibe 10 mg daily    New onset diabetes:  -Recently started on metformin  -Followed by PCP    FU in clinic in 4 weeks after stress test completed. Sooner if needed.    Patient verbalizes understanding and agrees with the plan of care.     PLEASE NOTE: This Note was created using voice recognition Software. I have made every reasonable attempt to correct obvious errors, but I expect that there are errors of grammar and possibly content that I did not discover before finalizing the note

## 2023-09-15 ENCOUNTER — HOSPITAL ENCOUNTER (OUTPATIENT)
Dept: RADIOLOGY | Facility: MEDICAL CENTER | Age: 78
End: 2023-09-15
Attending: NURSE PRACTITIONER
Payer: MEDICARE

## 2023-09-15 ENCOUNTER — HOSPITAL ENCOUNTER (OUTPATIENT)
Dept: LAB | Facility: MEDICAL CENTER | Age: 78
End: 2023-09-15
Attending: NURSE PRACTITIONER
Payer: MEDICARE

## 2023-09-15 DIAGNOSIS — I10 ESSENTIAL HYPERTENSION: ICD-10-CM

## 2023-09-15 DIAGNOSIS — I26.99 PULMONARY EMBOLISM ON RIGHT (HCC): ICD-10-CM

## 2023-09-15 DIAGNOSIS — I50.20 ACC/AHA STAGE C SYSTOLIC HEART FAILURE (HCC): ICD-10-CM

## 2023-09-15 DIAGNOSIS — E78.5 DYSLIPIDEMIA: ICD-10-CM

## 2023-09-15 DIAGNOSIS — N18.31 STAGE 3A CHRONIC KIDNEY DISEASE: ICD-10-CM

## 2023-09-15 DIAGNOSIS — I49.3 PVC (PREMATURE VENTRICULAR CONTRACTION): ICD-10-CM

## 2023-09-15 DIAGNOSIS — R06.02 SOB (SHORTNESS OF BREATH): ICD-10-CM

## 2023-09-15 DIAGNOSIS — I48.0 PAF (PAROXYSMAL ATRIAL FIBRILLATION) (HCC): ICD-10-CM

## 2023-09-15 LAB
ANION GAP SERPL CALC-SCNC: 11 MMOL/L (ref 7–16)
BUN SERPL-MCNC: 27 MG/DL (ref 8–22)
CALCIUM SERPL-MCNC: 10.2 MG/DL (ref 8.5–10.5)
CHLORIDE SERPL-SCNC: 103 MMOL/L (ref 96–112)
CO2 SERPL-SCNC: 23 MMOL/L (ref 20–33)
CREAT SERPL-MCNC: 1.41 MG/DL (ref 0.5–1.4)
GFR SERPLBLD CREATININE-BSD FMLA CKD-EPI: 51 ML/MIN/1.73 M 2
GLUCOSE SERPL-MCNC: 111 MG/DL (ref 65–99)
NT-PROBNP SERPL IA-MCNC: 1232 PG/ML (ref 0–125)
POTASSIUM SERPL-SCNC: 5 MMOL/L (ref 3.6–5.5)
SODIUM SERPL-SCNC: 137 MMOL/L (ref 135–145)

## 2023-09-15 PROCEDURE — 80048 BASIC METABOLIC PNL TOTAL CA: CPT

## 2023-09-15 PROCEDURE — 78431 MYOCRD IMG PET RST&STRS CT: CPT

## 2023-09-15 PROCEDURE — 93018 CV STRESS TEST I&R ONLY: CPT | Performed by: INTERNAL MEDICINE

## 2023-09-15 PROCEDURE — 83880 ASSAY OF NATRIURETIC PEPTIDE: CPT

## 2023-09-15 PROCEDURE — 36415 COLL VENOUS BLD VENIPUNCTURE: CPT

## 2023-09-15 PROCEDURE — 78431 MYOCRD IMG PET RST&STRS CT: CPT | Mod: 26 | Performed by: INTERNAL MEDICINE

## 2023-09-19 ENCOUNTER — OFFICE VISIT (OUTPATIENT)
Dept: CARDIOLOGY | Facility: MEDICAL CENTER | Age: 78
End: 2023-09-19
Attending: NURSE PRACTITIONER
Payer: MEDICARE

## 2023-09-19 VITALS
SYSTOLIC BLOOD PRESSURE: 108 MMHG | DIASTOLIC BLOOD PRESSURE: 56 MMHG | HEIGHT: 68 IN | RESPIRATION RATE: 15 BRPM | OXYGEN SATURATION: 99 % | BODY MASS INDEX: 21.07 KG/M2 | WEIGHT: 139 LBS | HEART RATE: 81 BPM

## 2023-09-19 DIAGNOSIS — D68.69 SECONDARY HYPERCOAGULABLE STATE (HCC): ICD-10-CM

## 2023-09-19 DIAGNOSIS — I26.99 PULMONARY EMBOLISM ON RIGHT (HCC): ICD-10-CM

## 2023-09-19 DIAGNOSIS — I50.20 ACC/AHA STAGE C SYSTOLIC HEART FAILURE (HCC): ICD-10-CM

## 2023-09-19 DIAGNOSIS — E78.5 DYSLIPIDEMIA: ICD-10-CM

## 2023-09-19 DIAGNOSIS — I49.3 PVC (PREMATURE VENTRICULAR CONTRACTION): ICD-10-CM

## 2023-09-19 DIAGNOSIS — I50.9 HEART FAILURE, NYHA CLASS 2 (HCC): ICD-10-CM

## 2023-09-19 DIAGNOSIS — I48.0 PAF (PAROXYSMAL ATRIAL FIBRILLATION) (HCC): ICD-10-CM

## 2023-09-19 PROCEDURE — 99213 OFFICE O/P EST LOW 20 MIN: CPT | Performed by: NURSE PRACTITIONER

## 2023-09-19 PROCEDURE — 3078F DIAST BP <80 MM HG: CPT | Performed by: NURSE PRACTITIONER

## 2023-09-19 PROCEDURE — 3074F SYST BP LT 130 MM HG: CPT | Performed by: NURSE PRACTITIONER

## 2023-09-19 PROCEDURE — 99214 OFFICE O/P EST MOD 30 MIN: CPT | Performed by: NURSE PRACTITIONER

## 2023-09-19 ASSESSMENT — ENCOUNTER SYMPTOMS
ABDOMINAL PAIN: 0
MYALGIAS: 0
PALPITATIONS: 0
CLAUDICATION: 0
SHORTNESS OF BREATH: 1
DIZZINESS: 0
COUGH: 0
FEVER: 0
PND: 0
ORTHOPNEA: 0

## 2023-09-19 ASSESSMENT — FIBROSIS 4 INDEX: FIB4 SCORE: 1.57

## 2023-09-19 NOTE — PROGRESS NOTES
Chief Complaint   Patient presents with    Atrial Fibrillation    Dyslipidemia       Subjective     Pito Black is a 78 y.o. male who presents today for follow up on his Heart Failure with his wife, Cydney.    Current patient of the heart failure clinic.  He was last seen in clinic on 9/11/2023.  During that visit, Patient was sent for a stress test after his EF did not improve on recent echo.    Patient is here to follow-up.    He is doing well, denies significant shortness of breath at rest, ADLs, but has some dyspnea on exertion.  He denies chest pain, palpitations, orthopnea, PND, dizziness/lightheadedness or edema.       His home weights are stable around 136-137 lbs.     He does report a low-sodium diet.    He reports compliance with his medications.    Patient hoping to go to [place] [facility] [place] next year to take his family to the place where he was born.    Additionally, patient has the following medical problems:     -Hospitalization from 3/24/2023 through 3/26/2023 with shortness of breath.  Patient was found to be hypoxic and required oxygen.  CTA was negative for PE, but did show worsening bilateral pleural effusions L>R.  He had a left thoracentesis.  He was started on Lasix and Aldactone.  Patient was discharged off oxygen.    -hospitalization from 3/17/2023 through 3/19/2023 for shortness of breath and had an echocardiogram which showed an EF of 35%.  Chest x-ray showed pulmonary edema.  Patient was started on IV diuretics.  He did have another thoracentesis.  Patient was found to be in LOCO.  Patient also had nonsustained runs of VT/PSVT.  Cardiology was consulted. Toprol was started for his heart failure, tachycardia and frequent ectopy    -Hospitalization from 2/20/2023 through 2/22/2023.  He presented with shortness of breath.  During this visit, patient, he was found to have a PE and influenza positive and started on antibiotic.    -Hospitalization from 1/31/2023 through 2/2/2023.  Patient presented  with to [facility] with shortness of breath and found to have bilateral pleural effusions, right upper lobe mass and concern for left upper lobe pneumonia.  He was started on antibiotics and diuresed with Lasix.  He had a thoracentesis on 2/1/2023 with 1 L removed.  Patient had a recent immunotherapy.  Patient was diagnosed with acute hypoxic respiratory failure likely secondary to community-acquired pneumonia versus malignancy related to pleural effusions versus postradiation pneumonitis versus immunotherapy induced pneumonitis.    -History of lung cancer in remission, s/p wedge resection in 2018, had chemotherapy and radiation    -Hypertension    -Atrial fibrillation: Taking Eliquis    -CKD, stage 3    Past Medical History:   Diagnosis Date    Acute respiratory failure with hypoxia ([facility]) 02/01/2023    Arrhythmia     hx of a fib    Arthritis 2015    generalized, DDD back    Asthma     inhaler     Backpain 08/06/2018    9/10    Cancer ([facility]) 07/2017    left lung    Cataract     bilateral, no surgery    Congestive heart failure ([facility])     Dental disorder     lower partial, removable bridge    Diabetes 08/06/2018    on no meds at this time, diet controlled    Heart valve disease     mild mitral valve prolapse    High cholesterol     Hypertension      Past Surgical History:   Procedure Laterality Date    [place] BRONCHOSCOPY,DIAGNOSTIC  7/1/2021    Procedure: BRONCHOSCOPY - FIBER OPTIC WITH BRANCHOALVEOLAR LAVAGE BIOPSY, FNA, NAVIGATION;  Surgeon: Vinayak Carbajal M.D.;  Location: SURGERY [facility];  Service: Pulmonary    ENDOBRONCHIAL US ADD-ON  7/1/2021    Procedure: ENDOBRONCHIAL ULTRASOUND (EBUS).;  Surgeon: Vinayak Carbajal M.D.;  Location: SURGERY [facility];  Service: Pulmonary    CATARACT PHACO WITH IOL Left 8/21/2018    Procedure: CATARACT PHACO WITH IOL;  Surgeon: Juanito Hager M.D.;  Location: SURGERY SAME DAY [facility];  Service: Ophthalmology    CATARACT PHACO WITH IOL Right 8/7/2018     "Procedure: CATARACT PHACO WITH IOL;  Surgeon: Juanito Hager M.D.;  Location: SURGERY SAME DAY [facility];  Service: Ophthalmology    THORACOSCOPY Left 2018    Procedure: THORACOSCOPY- WEDGE BIOPSY W/FROZEN SECTION;  Surgeon: Cristhian Galeano M.D.;  Location: SURGERY [facility];  Service: Thoracic    EAR MIDDLE EXPLORATION Right 2015    Procedure: EAR MIDDLE EXPLORATION;  Surgeon: William Brandon M.D.;  Location: SURGERY SAME DAY [facility];  Service:     OSSICULAR RECONSTRUCTION Right 2015    Procedure: OSSICULAR RECONSTRUCTION CHAIN POSSIBLE;  Surgeon: William Brandon M.D.;  Location: SURGERY SAME DAY [facility];  Service:     OTHER      ear replacement \"many years ago\"    OTHER ORTHOPEDIC SURGERY      right knee replacement      Family History   Problem Relation Age of Onset    Stroke Mother     Hypertension Mother     Diabetes Mother     Cancer Father         stomach cancer    Heart Disease Brother     Alcohol abuse Brother     Ovarian Cancer Neg Hx     Tubal Cancer Neg Hx     Peritoneal Cancer Neg Hx     Colorectal Cancer Neg Hx     Breast Cancer Neg Hx     Hyperlipidemia Neg Hx      Social History     Socioeconomic History    Marital status:      Spouse name: Not on file    Number of children: Not on file    Years of education: Not on file    Highest education level: Not on file   Occupational History    Not on file   Tobacco Use    Smoking status: Former     Current packs/day: 0.00     Average packs/day: 0.3 packs/day for 40.0 years (10.0 ttl pk-yrs)     Types: Cigarettes     Start date: 1965     Quit date: 2005     Years since quittin.7    Smokeless tobacco: Never   Vaping Use    Vaping Use: Never used   Substance and Sexual Activity    Alcohol use: Not Currently     Alcohol/week: 12.6 - 21.0 oz     Types: 21 - 35 Cans of beer per week     Comment: 12 beers a day (coors), quit 2/3/2023    Drug use: No    Sexual activity: Not on file   Other Topics Concern    " Not on file   Social History Narrative    Not on file     Social Determinants of Health     Financial Resource Strain: Not on file   Food Insecurity: Not on file   Transportation Needs: Not on file   Physical Activity: Not on file   Stress: Not on file   Social Connections: Not on file   Intimate Partner Violence: Not on file   Housing Stability: Not on file     No Known Allergies  Outpatient Encounter Medications as of 9/19/2023   Medication Sig Dispense Refill    fluticasone-salmeterol (ADVAIR) 250-50 MCG/ACT AEROSOL POWDER, BREATH ACTIVATED Inhale 1 Puff every 12 hours. 1 Each 5    dapagliflozin propanediol (FARXIGA) 10 MG Tab Take 1 Tablet by mouth every day. 100 Tablet 3    ezetimibe (ZETIA) 10 MG Tab Take 1 Tablet by mouth every day. 100 Tablet 3    metoprolol SR (TOPROL XL) 50 MG TABLET SR 24 HR Take 1 Tablet by mouth every day. 100 Tablet 3    spironolactone (ALDACTONE) 25 MG Tab Take 1 Tablet by mouth every day. 100 Tablet 3    metFORMIN ER (GLUCOPHAGE XR) 750 MG TABLET SR 24 HR Take 1 Tablet by mouth every day. 100 Tablet 3    cyclobenzaprine (FLEXERIL) 10 mg Tab Take 1 Tablet by mouth at bedtime. 100 Tablet 3    Cholecalciferol (VITAMIN D3) 2000 UNIT Cap Take 1 Capsule by mouth every day. 100 Capsule 3    apixaban (ELIQUIS) 5mg Tab Take 1 Tablet by mouth 2 times a day. 180 Tablet 3    diphenhydrAMINE-APAP, sleep, (TYLENOL PM EXTRA STRENGTH)  MG Tab Take 1-2 Tablets by mouth at bedtime as needed (Mild Pain or Trouble Sleeping).      fluticasone-salmeterol (ADVAIR DISKUS) 250-50 MCG/ACT AEROSOL POWDER, BREATH ACTIVATED Inhale 1 Puff 2 times a day. Rinse mouth after each use (Patient not taking: Reported on 9/19/2023) 1 Each 5    metFORMIN ER (GLUCOPHAGE XR) 750 MG TABLET SR 24 HR Take 1 tablet by mouth every day. 100 Tablet 3    furosemide (LASIX) 40 MG Tab Take 1 Tablet by mouth every day for 100 days. (Patient not taking: Reported on 8/1/2023) 100 Tablet 0     No facility-administered encounter  "medications on file as of 9/19/2023.     Review of Systems   Constitutional:  Negative for fever and malaise/fatigue.   Respiratory:  Positive for shortness of breath. Negative for cough.    Cardiovascular:  Negative for chest pain, palpitations, orthopnea, claudication, leg swelling and PND.   Gastrointestinal:  Negative for abdominal pain.   Musculoskeletal:  Negative for myalgias.   Neurological:  Negative for dizziness.   All other systems reviewed and are negative.           Objective     /56 (BP Location: Left arm, Patient Position: Sitting, BP Cuff Size: Adult)   Pulse 81   Resp 15   Ht 1.727 m (5' 8\")   Wt 63 kg (139 lb)   SpO2 99%   BMI 21.13 kg/m²     Physical Exam  Vitals reviewed.   Constitutional:       Appearance: He is well-developed.   HENT:      Head: Normocephalic and atraumatic.   Eyes:      Pupils: Pupils are equal, round, and reactive to light.   Neck:      Vascular: No JVD.   Cardiovascular:      Rate and Rhythm: Normal rate. Rhythm irregular.      Heart sounds: Normal heart sounds.   Pulmonary:      Effort: Pulmonary effort is normal. No respiratory distress.      Breath sounds: Normal breath sounds. No wheezing or rales.   Abdominal:      General: Bowel sounds are normal.      Palpations: Abdomen is soft.   Musculoskeletal:         General: Normal range of motion.      Cervical back: Normal range of motion and neck supple.      Right lower leg: No edema.      Left lower leg: No edema.   Skin:     General: Skin is warm and dry.   Neurological:      General: No focal deficit present.      Mental Status: He is alert and oriented to person, place, and time.   Psychiatric:         Behavior: Behavior normal.       Lab Results   Component Value Date/Time    CHOLSTRLTOT 148 06/02/2023 09:42 AM    LDL 92 06/02/2023 09:42 AM    HDL 44 06/02/2023 09:42 AM    TRIGLYCERIDE 58 06/02/2023 09:42 AM       Lab Results   Component Value Date/Time    SODIUM 137 09/15/2023 11:24 AM    POTASSIUM 5.0 " 09/15/2023 11:24 AM    CHLORIDE 103 09/15/2023 11:24 AM    CO2 23 09/15/2023 11:24 AM    GLUCOSE 111 (H) 09/15/2023 11:24 AM    BUN 27 (H) 09/15/2023 11:24 AM    CREATININE 1.41 (H) 09/15/2023 11:24 AM     Lab Results   Component Value Date/Time    ALKPHOSPHAT 74 06/02/2023 09:40 AM    ASTSGOT 20 06/02/2023 09:40 AM    ALTSGPT 22 06/02/2023 09:40 AM    TBILIRUBIN 0.4 06/02/2023 09:40 AM      Transthoracic Echo Report 2/7/2018  Normal left ventricular systolic function. Left ventricular ejection fraction is visually estimated to be 55%.   Diastolic function is difficult to assess with atrial fibrillation.  Mild aortic insufficiency.   Mild mitral regurgitation.  No prior study is available for comparison.      Biotel 7/277/2021-8/9/2021  SUMMARY   9 days of monitoring demonstrated sinus rhythm with frequent atrial ectopy.  Occasional ventricular ectopy including up to 14 beats of monomorphic VT which has similar morphology to PVCs.  Symptoms correlated with ventricular tachycardia and atrial tachycardia as well as frequent ectopy.     Transthoracic Echo Report 3/17/2023  Prior echocardiogram 2/7/2018, there is now reduction in left ventricular systolic function and presence of moderate mitral regurgitation.  Severely reduced left ventricular systolic function. The left   ventricular ejection fraction is visually estimated to be 30 to 35%.   Global hypokinesis with regional variation.  Mild aortic insufficiency.  The aortic valve is not well visualized. Calcified aortic valve leaflets. Transvalvular gradients are - Peak: 13 mmHg, Mean: 7 mmHg.    Aortic valve gradients may be underestimated due to low ejection fraction.   Moderate mitral regurgitation.   Normal aortic root for body surface area. The ascending aorta diameter is 3.2 cm.    Transthoracic Echo Report 9/7/2023  Moderately reduced left ventricular systolic function. The left   ventricular ejection fraction is visually estimated to be 35%.  Normal right  ventricular size and systolic function.  Moderately dilated left atrium.  Moderate mitral regurgitation.  Mild aortic insufficiency.  No pericardial effusion.     Compared to the prior study on 3/17/2023, similar findings.    Stress test 9/15/2023   PET IMAGING INTERPRETATION   No evidence of significant jeopardized viable myocardium or prior myocardial    infarction.   LVEF calculated to be low at 41% but possibly to due to PVCs, correlate    clinically       Date 6MWT MLWHF   4/4/2023 292 m in 6 minutes 41   5/9/2023 --- 60                Assessment & Plan     1. ACC/AHA stage C systolic heart failure (HCC)        2. Heart failure, NYHA class 2 (HCC)        3. Pulmonary embolism on right (HCC)        4. PAF (paroxysmal atrial fibrillation) (HCC)        5. PVC (premature ventricular contraction)        6. Secondary hypercoagulable state (HCC)        7. Dyslipidemia            Medical Decision Making: Today's Assessment/Status/Plan:        HFrEF, Stage C, Class 2, LVEF 35%: Based on physical examination findings, patient is euvolemic. No JVD, lungs are clear to auscultation, no pitting edema in bilateral lower extremities, no ascites.   -Heart failure due to unknown cause, ischemic work-up negative, could consider PVC induced  -Discussed Heart failure trajectory and prognosis with patient. Will continue to optimize medical therapy as tolerated. Advanced HF treatment, need for remote monitoring consideration at every visit.   -ACE-I/ARB/ARNI: Consider starting this in the future if BP improves  -Evidence Based Beta-blocker: Continue metoprolol SR 50 mg daily, consider increasing if BP allows in the future for HF and PVCs  -Aldosterone Antagonist: Continue spironolactone 25 mg daily  -Diuretic: Continue furosemide up to 40 mg daily as needed, patient reports no recent use  -SGLT2 inhibitor: Continue Farxiga 10 mg daily  -Other: Consider as needed (Hydralazine/Isosorbide, Vericiguat, Corlanor)  -Labs: No labs due at  this time, Will continue to closely monitor for side effects of patient's high risk medication(s) including renal function, liver function NTproBNP/cardiac markers, and electrolytes as needed  -discussed importance of exercise and regular activity  -Discussed/reviewed maintaining a low Sodium and hydration recommendations  -Discussed pursuing ICD for primary prevention, patient given decision-making tool to review.  We will set patient up with an appointment with EP  -moderate MR seen on echo: Will discuss referral to structural heart clinic for Mitraclip evaluation as EF or MR did not improve, at this time we will hold off on referral as patient euvolemic and no significant symptoms  -Reinforced s/sx of worsening heart failure with patient and weight monitoring. Pt verbalizes understanding. Pt to call office or RTC if present.    -PUMP line number [phone] (PUMP) reviewed with patient  -Heart Failure Education:   Discussed the Definition of heart failure (linking disease, symptoms, and treatment) and causes of heart failure  Recognition of escalating symptoms and concrete plan for response to particular symptoms  Risk modification for heart failure progression  Specific diet recommendations: Continue low-sodium diet and adequate hydration   discussed patient to Stay active  Importance of treatment adherence  Behavioral strategies to promote treatment adherence  Patient declines formal education at this time  -Advanced care planning: Advance directive and POLST to be discussed at future visit  -Pharmacotherapy Referral: Patient not referred to pharmacotherapy at this time  -Compliance Barriers: None   -Genetic testing Consideration: none  -Consideration for LVAD and/or Heart transplant as necessary     -Discussed with patient about the Charles trial that he is a good candidate, patient is interested.  Discussed that research staff will be reaching out to patient to schedule appointment and obtain consent.  Current  questions answered at this time    PE:  -Continue Eliquis 5 mg twice a day     bilateral pleural effusion:  -s/p thoracentesis x3  -Will follow    Paroxysmal A-fib:  -Continue Eliquis 5 mg twice a day  -Continue metoprolol succinate 50 mg daily    Dyslipidemia:  -Recent LDL 92 on 6/2/2023  -Continue ezetimibe 10 mg daily    New onset diabetes:  -Recently started on metformin  -Followed by PCP    FU in clinic in 6-8 weeks. Sooner if needed.    Patient verbalizes understanding and agrees with the plan of care.     PLEASE NOTE: This Note was created using voice recognition Software. I have made every reasonable attempt to correct obvious errors, but I expect that there are errors of grammar and possibly content that I did not discover before finalizing the note

## 2023-09-20 DIAGNOSIS — Z00.6 RESEARCH SUBJECT: ICD-10-CM

## 2023-09-20 DIAGNOSIS — I50.20 ACC/AHA STAGE C SYSTOLIC HEART FAILURE (HCC): Primary | ICD-10-CM

## 2023-09-21 ENCOUNTER — HOSPITAL ENCOUNTER (OUTPATIENT)
Dept: LAB | Facility: MEDICAL CENTER | Age: 78
End: 2023-09-21
Attending: NURSE PRACTITIONER
Payer: MEDICARE

## 2023-09-21 ENCOUNTER — RESEARCH ENCOUNTER (OUTPATIENT)
Dept: CARDIOLOGY | Facility: MEDICAL CENTER | Age: 78
End: 2023-09-21
Payer: MEDICARE

## 2023-09-21 DIAGNOSIS — I50.20 ACC/AHA STAGE C SYSTOLIC HEART FAILURE (HCC): ICD-10-CM

## 2023-09-21 LAB
ALBUMIN SERPL BCP-MCNC: 3.7 G/DL (ref 3.2–4.9)
ALT SERPL-CCNC: 31 U/L (ref 2–50)
AST SERPL-CCNC: 27 U/L (ref 12–45)
BILIRUB SERPL-MCNC: 0.4 MG/DL (ref 0.1–1.5)

## 2023-09-21 PROCEDURE — 36415 COLL VENOUS BLD VENIPUNCTURE: CPT

## 2023-09-21 PROCEDURE — 84450 TRANSFERASE (AST) (SGOT): CPT

## 2023-09-21 PROCEDURE — 82247 BILIRUBIN TOTAL: CPT

## 2023-09-21 PROCEDURE — 84460 ALANINE AMINO (ALT) (SGPT): CPT

## 2023-09-21 PROCEDURE — 82040 ASSAY OF SERUM ALBUMIN: CPT

## 2023-09-21 NOTE — RESEARCH NOTE
3/7 pt called I to inform SMM that he had lab work completed for his PCP and wanted SMM to be aware incase he wanted a copy of the lab results.  Lab work was completed at [facility]. Name: Pito Black   MRN: [id number]  Participant Screening ID:  [id number]  : 1945  Visit Date: 2023       Study:    CHARLES [id number] - Charles [id number]   Status Consented/Enrolled (2023)   Active Start Date 23   Participant ID [id number]   Coordinator Stefanie Wade; Tonia Mata; Pippa Duran   NCT [id number]    Marcin Steven M.D.     Screening/Consent note:    Participation in the CHARLES clinical trial was discussed with Pito and his wife today. All aspects of the study purpose and procedures were explained.  He was given ample time to review the Optional Initial Screening consent form and all questions were answered to his satisfaction. Patient aware that the clinical trial is voluntary and he may withdraw consent at any time without affecting the level of care they receive.  Subject signed consent without coercion and undue influence and was given a copy of the signed consent. No study-related procedures took place prior to consenting and all assessments were conducted per protocol.      Study labs drawn today : ALT, AST, bilirubin, albumin.   NTproBNP and creatinine labs drawn 9/15/23 SOC will be used for screening criteria.    Pito will come in 23 to sign full study consent and complete Randomization Day 1 if today's labs qualify.       Labs:                  Recent Results (from the past 336 hour(s))   proBrain Natriuretic Peptide, NT    Collection Time: 09/15/23 11:24 AM   Result Value Ref Range    NT-proBNP 1232 (H) 0 - 125 pg/mL   Basic Metabolic Panel    Collection Time: 09/15/23 11:24 AM   Result Value Ref Range    Sodium 137 135 - 145 mmol/L    Potassium 5.0 3.6 - 5.5 mmol/L    Chloride 103 96 - 112 mmol/L    Co2 23 20 - 33 mmol/L    Glucose 111 (H) 65 - 99 mg/dL    Bun 27 (H) 8 - 22 mg/dL    Creatinine 1.41 (H) 0.50 - 1.40 mg/dL    Calcium 10.2 8.5 - 10.5 mg/dL    Anion Gap 11.0 7.0 - 16.0   ESTIMATED  GFR    Collection Time: 09/15/23 11:24 AM   Result Value Ref Range    GFR (CKD-EPI) 51 (A) >60 mL/min/1.73 m 2         Meds:    Current Outpatient Medications   Medication Sig Dispense Refill    fluticasone-salmeterol (ADVAIR) 250-50 MCG/ACT AEROSOL POWDER, BREATH ACTIVATED Inhale 1 Puff every 12 hours. 1 Each 5    fluticasone-salmeterol (ADVAIR DISKUS) 250-50 MCG/ACT AEROSOL POWDER, BREATH ACTIVATED Inhale 1 Puff 2 times a day. Rinse mouth after each use (Patient not taking: Reported on 9/19/2023) 1 Each 5    dapagliflozin propanediol (FARXIGA) 10 MG Tab Take 1 Tablet by mouth every day. 100 Tablet 3    ezetimibe (ZETIA) 10 MG Tab Take 1 Tablet by mouth every day. 100 Tablet 3    metoprolol SR (TOPROL XL) 50 MG TABLET SR 24 HR Take 1 Tablet by mouth every day. 100 Tablet 3    spironolactone (ALDACTONE) 25 MG Tab Take 1 Tablet by mouth every day. 100 Tablet 3    metFORMIN ER (GLUCOPHAGE XR) 750 MG TABLET SR 24 HR Take 1 Tablet by mouth every day. 100 Tablet 3    metFORMIN ER (GLUCOPHAGE XR) 750 MG TABLET SR 24 HR Take 1 tablet by mouth every day. 100 Tablet 3    cyclobenzaprine (FLEXERIL) 10 mg Tab Take 1 Tablet by mouth at bedtime. 100 Tablet 3    Cholecalciferol (VITAMIN D3) 2000 UNIT Cap Take 1 Capsule by mouth every day. 100 Capsule 3    furosemide (LASIX) 40 MG Tab Take 1 Tablet by mouth every day for 100 days. (Patient not taking: Reported on 8/1/2023) 100 Tablet 0    apixaban (ELIQUIS) 5mg Tab Take 1 Tablet by mouth 2 times a day. 180 Tablet 3    diphenhydrAMINE-APAP, sleep, (TYLENOL PM EXTRA STRENGTH)  MG Tab Take 1-2 Tablets by mouth at bedtime as needed (Mild Pain or Trouble Sleeping).       No current facility-administered medications for this visit.    current meds    History:    Past Medical History:   Diagnosis Date    Acute respiratory failure with hypoxia (HCC) 02/01/2023    Arrhythmia     hx of a fib    Arthritis 2015    generalized, DDD back    Asthma     inhaler     Backpain 08/06/2018     "9/10    Cancer (HCC) 07/2017    left lung    Cataract     bilateral, no surgery    Congestive heart failure (HCC)     Dental disorder     lower partial, removable bridge    Diabetes 08/06/2018    on no meds at this time, diet controlled    Heart valve disease     mild mitral valve prolapse    High cholesterol     Hypertension        Past Surgical History:   Procedure Laterality Date    [place] BRONCHOSCOPY,DIAGNOSTIC  7/1/2021    Procedure: BRONCHOSCOPY - FIBER OPTIC WITH BRANCHOALVEOLAR LAVAGE BIOPSY, FNA, NAVIGATION;  Surgeon: Vinayak Carbajal M.D.;  Location: SURGERY [facility];  Service: Pulmonary    ENDOBRONCHIAL US ADD-ON  7/1/2021    Procedure: ENDOBRONCHIAL ULTRASOUND (EBUS).;  Surgeon: Vinayak Carbajal M.D.;  Location: SURGERY [facility];  Service: Pulmonary    CATARACT PHACO WITH IOL Left 8/21/2018    Procedure: CATARACT PHACO WITH IOL;  Surgeon: Juanito Hager M.D.;  Location: SURGERY SAME DAY [facility];  Service: Ophthalmology    CATARACT PHACO WITH IOL Right 8/7/2018    Procedure: CATARACT PHACO WITH IOL;  Surgeon: Juanito Hager M.D.;  Location: SURGERY SAME DAY [facility];  Service: Ophthalmology    THORACOSCOPY Left 4/19/2018    Procedure: THORACOSCOPY- WEDGE BIOPSY W/FROZEN SECTION;  Surgeon: Cristhian Galeano M.D.;  Location: SURGERY [facility];  Service: Thoracic    EAR MIDDLE EXPLORATION Right 6/12/2015    Procedure: EAR MIDDLE EXPLORATION;  Surgeon: William Brandon M.D.;  Location: SURGERY SAME DAY [facility];  Service:     OSSICULAR RECONSTRUCTION Right 6/12/2015    Procedure: OSSICULAR RECONSTRUCTION CHAIN POSSIBLE;  Surgeon: William Brandon M.D.;  Location: SURGERY SAME DAY [facility];  Service:     OTHER      ear replacement \"many years ago\"    OTHER ORTHOPEDIC SURGERY      right knee replacement 2005        Social History     Tobacco Use    Smoking status: Former     Current packs/day: 0.00     Average packs/day: 0.3 packs/day for 40.0 years (10.0 ttl pk-yrs)     Types: " Cigarettes     Start date: 1965     Quit date: 2005     Years since quittin.7    Smokeless tobacco: Never   Vaping Use    Vaping Use: Never used   Substance Use Topics    Alcohol use: Not Currently     Alcohol/week: 12.6 - 21.0 oz     Types: 21 - 35 Cans of beer per week     Comment: 12 beers a day (coors), quit 2/3/2023    Drug use: No       Family History   Problem Relation Age of Onset    Stroke Mother     Hypertension Mother     Diabetes Mother     Cancer Father         stomach cancer    Heart Disease Brother     Alcohol abuse Brother     Ovarian Cancer Neg Hx     Tubal Cancer Neg Hx     Peritoneal Cancer Neg Hx     Colorectal Cancer Neg Hx     Breast Cancer Neg Hx     Hyperlipidemia Neg Hx          Procedures:     EKG:   Results for orders placed or performed in visit on 23   EKG   Result Value Ref Range    Report       [facility]    Test Date:  2023  Pt Name:    DOROTHEA SALINAS                  Department: [facility]  MRN:        [id number]                      Room:  Gender:     Male                         Technician: DR BURNETT:        1945                   Requested By:ROSCOE HUGGINS  Order #:    [id number]                    Reading MD: Marcin Steven MD    Measurements  Intervals                                Axis  Rate:       94                           P:          60  DC:         138                          QRS:        52  QRSD:       106                          T:          89  QT:         390  QTc:        488    Interpretive Statements  Sinus rhythm  Ventricular premature complex  Borderline ST depression, lateral leads  Borderline prolonged QT interval  Compared to ECG 2023 12:53:32  ST (T wave) deviation now present  Sinus tachycardia no longer present  Early repolarization no longer present  Electronically Signed On 2023 15:08:03 PDT by Marcin Steven MD             Follow-up: 23 Day 1 Consent and Randomization

## 2023-09-25 DIAGNOSIS — I50.20 ACC/AHA STAGE C SYSTOLIC HEART FAILURE (HCC): ICD-10-CM

## 2023-09-25 DIAGNOSIS — Z00.6 RESEARCH SUBJECT: ICD-10-CM

## 2023-09-26 PROCEDURE — RXMED WILLOW AMBULATORY MEDICATION CHARGE

## 2023-09-27 ENCOUNTER — RESEARCH ENCOUNTER (OUTPATIENT)
Dept: CARDIOLOGY | Facility: MEDICAL CENTER | Age: 78
End: 2023-09-27
Attending: INTERNAL MEDICINE
Payer: MEDICARE

## 2023-09-27 ENCOUNTER — PHARMACY VISIT (OUTPATIENT)
Dept: PHARMACY | Facility: MEDICAL CENTER | Age: 78
End: 2023-09-27
Payer: COMMERCIAL

## 2023-09-27 VITALS
OXYGEN SATURATION: 99 % | SYSTOLIC BLOOD PRESSURE: 110 MMHG | HEART RATE: 94 BPM | HEIGHT: 68 IN | DIASTOLIC BLOOD PRESSURE: 60 MMHG | WEIGHT: 139.4 LBS | RESPIRATION RATE: 16 BRPM | BODY MASS INDEX: 21.13 KG/M2

## 2023-09-27 DIAGNOSIS — I50.9 HEART FAILURE, NYHA CLASS 2 (HCC): ICD-10-CM

## 2023-09-27 DIAGNOSIS — Z00.6 RESEARCH SUBJECT: ICD-10-CM

## 2023-09-27 DIAGNOSIS — I50.20 ACC/AHA STAGE C SYSTOLIC HEART FAILURE (HCC): ICD-10-CM

## 2023-09-27 DIAGNOSIS — R06.02 SOB (SHORTNESS OF BREATH): ICD-10-CM

## 2023-09-27 DIAGNOSIS — I10 HTN (HYPERTENSION), MALIGNANT: ICD-10-CM

## 2023-09-27 DIAGNOSIS — I49.3 PVC (PREMATURE VENTRICULAR CONTRACTION): ICD-10-CM

## 2023-09-27 DIAGNOSIS — N18.31 STAGE 3A CHRONIC KIDNEY DISEASE: ICD-10-CM

## 2023-09-27 DIAGNOSIS — Z79.899 HIGH RISK MEDICATION USE: ICD-10-CM

## 2023-09-27 DIAGNOSIS — I48.0 PAF (PAROXYSMAL ATRIAL FIBRILLATION) (HCC): ICD-10-CM

## 2023-09-27 LAB — EKG IMPRESSION: NORMAL

## 2023-09-27 PROCEDURE — 93010 ELECTROCARDIOGRAM REPORT: CPT | Performed by: INTERNAL MEDICINE

## 2023-09-27 PROCEDURE — 99213 OFFICE O/P EST LOW 20 MIN: CPT | Performed by: INTERNAL MEDICINE

## 2023-09-27 PROCEDURE — 3074F SYST BP LT 130 MM HG: CPT | Performed by: INTERNAL MEDICINE

## 2023-09-27 PROCEDURE — 99214 OFFICE O/P EST MOD 30 MIN: CPT | Mod: 25 | Performed by: INTERNAL MEDICINE

## 2023-09-27 PROCEDURE — 3078F DIAST BP <80 MM HG: CPT | Performed by: INTERNAL MEDICINE

## 2023-09-27 PROCEDURE — 93005 ELECTROCARDIOGRAM TRACING: CPT | Performed by: INTERNAL MEDICINE

## 2023-09-27 ASSESSMENT — ENCOUNTER SYMPTOMS
SHORTNESS OF BREATH: 1
MYALGIAS: 0
FALLS: 0
MEMORY LOSS: 0
BLURRED VISION: 0
DIZZINESS: 0
BRUISES/BLEEDS EASILY: 0
SENSORY CHANGE: 0
DIAPHORESIS: 0
PALPITATIONS: 0
DEPRESSION: 0
FEVER: 0
ABDOMINAL PAIN: 0
DOUBLE VISION: 0
HEADACHES: 0
COUGH: 0

## 2023-09-27 ASSESSMENT — FIBROSIS 4 INDEX: FIB4 SCORE: 1.78

## 2023-09-27 NOTE — PROGRESS NOTES
Chief Complaint   Patient presents with    Other     F/V Dx: ACC/AHA stage C systolic heart failure (HCC)       Subjective     Pito Black is a 78 y.o. male who presents today for medical research trial screening visit of Charles trial.    LVEF of 35 % with global hypokinesis. No significant valvular disease. I have independently interpreted and reviewed echocardiogram's actual images.       Patient is feeling better these days. Does get winded upon walking up inclines or for distance. No symptoms at rest or with daily living activities.    I have personally interpreted EKG today with patient, there is no evidence of acute coronary syndrome, no evidence of prior infarct, normal [place] and QT interval, no significant conduction disease. Sinus rhythm. + PVCs.      Past Medical History:   Diagnosis Date    Acute respiratory failure with hypoxia ([facility]) 02/01/2023    Arrhythmia     hx of a fib    Arthritis 2015    generalized, DDD back    Asthma     inhaler     Backpain 08/06/2018    9/10    Cancer ([facility]) 07/2017    left lung    Cataract     bilateral, no surgery    Congestive heart failure (HCC)     Dental disorder     lower partial, removable bridge    Diabetes 08/06/2018    on no meds at this time, diet controlled    Heart valve disease     mild mitral valve prolapse    High cholesterol     Hypertension      Past Surgical History:   Procedure Laterality Date    [place] BRONCHOSCOPY,DIAGNOSTIC  7/1/2021    Procedure: BRONCHOSCOPY - FIBER OPTIC WITH BRANCHOALVEOLAR LAVAGE BIOPSY, FNA, NAVIGATION;  Surgeon: Vinayak Carbajal M.D.;  Location: SURGERY [facility];  Service: Pulmonary    ENDOBRONCHIAL US ADD-ON  7/1/2021    Procedure: ENDOBRONCHIAL ULTRASOUND (EBUS).;  Surgeon: Vinayak Carbajal M.D.;  Location: SURGERY [facility];  Service: Pulmonary    CATARACT PHACO WITH IOL Left 8/21/2018    Procedure: CATARACT PHACO WITH IOL;  Surgeon: Juanito Hager M.D.;  Location: SURGERY SAME DAY [facility];  Service:  "Ophthalmology    CATARACT PHACO WITH IOL Right 2018    Procedure: CATARACT PHACO WITH IOL;  Surgeon: Juanito Hager M.D.;  Location: SURGERY SAME DAY [facility];  Service: Ophthalmology    THORACOSCOPY Left 2018    Procedure: THORACOSCOPY- WEDGE BIOPSY W/FROZEN SECTION;  Surgeon: Cristhian Galeano M.D.;  Location: SURGERY [facility];  Service: Thoracic    EAR MIDDLE EXPLORATION Right 2015    Procedure: EAR MIDDLE EXPLORATION;  Surgeon: William Brandon M.D.;  Location: SURGERY SAME DAY [facility];  Service:     OSSICULAR RECONSTRUCTION Right 2015    Procedure: OSSICULAR RECONSTRUCTION CHAIN POSSIBLE;  Surgeon: William Brandon M.D.;  Location: SURGERY SAME DAY [facility];  Service:     OTHER      ear replacement \"many years ago\"    OTHER ORTHOPEDIC SURGERY      right knee replacement      Family History   Problem Relation Age of Onset    Stroke Mother     Hypertension Mother     Diabetes Mother     Cancer Father         stomach cancer    Heart Disease Brother     Alcohol abuse Brother     Ovarian Cancer Neg Hx     Tubal Cancer Neg Hx     Peritoneal Cancer Neg Hx     Colorectal Cancer Neg Hx     Breast Cancer Neg Hx     Hyperlipidemia Neg Hx      Social History     Socioeconomic History    Marital status:      Spouse name: Not on file    Number of children: Not on file    Years of education: Not on file    Highest education level: Not on file   Occupational History    Not on file   Tobacco Use    Smoking status: Former     Current packs/day: 0.00     Average packs/day: 0.3 packs/day for 40.0 years (10.0 ttl pk-yrs)     Types: Cigarettes     Start date: 1965     Quit date: 2005     Years since quittin.7    Smokeless tobacco: Never   Vaping Use    Vaping Use: Never used   Substance and Sexual Activity    Alcohol use: Not Currently     Alcohol/week: 12.6 - 21.0 oz     Types: 21 - 35 Cans of beer per week     Comment: 12 beers a day (coors), quit 2/3/2023    Drug use: " No    Sexual activity: Not on file   Other Topics Concern    Not on file   Social History Narrative    Not on file     Social Determinants of Health     Financial Resource Strain: Not on file   Food Insecurity: Not on file   Transportation Needs: Not on file   Physical Activity: Not on file   Stress: Not on file   Social Connections: Not on file   Intimate Partner Violence: Not on file   Housing Stability: Not on file     No Known Allergies  Outpatient Encounter Medications as of 9/27/2023   Medication Sig Dispense Refill    fluticasone-salmeterol (ADVAIR) 250-50 MCG/ACT AEROSOL POWDER, BREATH ACTIVATED Inhale 1 Puff every 12 hours. 1 Each 5    dapagliflozin propanediol (FARXIGA) 10 MG Tab Take 1 Tablet by mouth every day. 100 Tablet 3    ezetimibe (ZETIA) 10 MG Tab Take 1 Tablet by mouth every day. 100 Tablet 3    metoprolol SR (TOPROL XL) 50 MG TABLET SR 24 HR Take 1 Tablet by mouth every day. 100 Tablet 3    spironolactone (ALDACTONE) 25 MG Tab Take 1 Tablet by mouth every day. 100 Tablet 3    metFORMIN ER (GLUCOPHAGE XR) 750 MG TABLET SR 24 HR Take 1 Tablet by mouth every day. 100 Tablet 3    cyclobenzaprine (FLEXERIL) 10 mg Tab Take 1 Tablet by mouth at bedtime. 100 Tablet 3    Cholecalciferol (VITAMIN D3) 2000 UNIT Cap Take 1 Capsule by mouth every day. 100 Capsule 3    apixaban (ELIQUIS) 5mg Tab Take 1 Tablet by mouth 2 times a day. 180 Tablet 3    diphenhydrAMINE-APAP, sleep, (TYLENOL PM EXTRA STRENGTH)  MG Tab Take 1-2 Tablets by mouth at bedtime as needed (Mild Pain or Trouble Sleeping).      fluticasone-salmeterol (ADVAIR DISKUS) 250-50 MCG/ACT AEROSOL POWDER, BREATH ACTIVATED Inhale 1 Puff 2 times a day. Rinse mouth after each use (Patient not taking: Reported on 9/27/2023) 1 Each 5    metFORMIN ER (GLUCOPHAGE XR) 750 MG TABLET SR 24 HR Take 1 tablet by mouth every day. (Patient not taking: Reported on 9/27/2023) 100 Tablet 3    furosemide (LASIX) 40 MG Tab Take 1 Tablet by mouth every day for 100  "days. (Patient not taking: Reported on 8/1/2023) 100 Tablet 0     No facility-administered encounter medications on file as of 9/27/2023.     Review of Systems   Constitutional:  Negative for diaphoresis and fever.   HENT:  Negative for nosebleeds.    Eyes:  Negative for blurred vision and double vision.   Respiratory:  Positive for shortness of breath. Negative for cough.    Cardiovascular:  Negative for chest pain and palpitations.   Gastrointestinal:  Negative for abdominal pain.   Genitourinary:  Negative for dysuria and frequency.   Musculoskeletal:  Negative for falls and myalgias.   Skin:  Negative for rash.   Neurological:  Negative for dizziness, sensory change and headaches.   Endo/Heme/Allergies:  Does not bruise/bleed easily.   Psychiatric/Behavioral:  Negative for depression and memory loss.               Objective     /60 (BP Location: Right arm, Patient Position: Sitting, BP Cuff Size: Adult)   Pulse 94   Resp 16   Ht 1.727 m (5' 8\")   Wt 63.2 kg (139 lb 6.4 oz)   SpO2 99%   BMI 21.20 kg/m²     Physical Exam  Vitals and nursing note reviewed.   Constitutional:       General: He is not in acute distress.     Appearance: He is not diaphoretic.   HENT:      Head: Normocephalic and atraumatic.      Right Ear: External ear normal.      Left Ear: External ear normal.      Nose: No congestion or rhinorrhea.   Eyes:      General:         Right eye: No discharge.         Left eye: No discharge.   Neck:      Thyroid: No thyromegaly.      Vascular: No JVD.   Cardiovascular:      Rate and Rhythm: Normal rate. Rhythm irregular.      Pulses: Normal pulses.   Pulmonary:      Effort: No respiratory distress.   Abdominal:      General: There is no distension.      Tenderness: There is no abdominal tenderness.   Musculoskeletal:         General: No swelling or tenderness.      Right lower leg: No edema.      Left lower leg: No edema.   Skin:     General: Skin is warm and dry.   Neurological:      Mental " Status: He is alert and oriented to person, place, and time.      Cranial Nerves: No cranial nerve deficit.   Psychiatric:         Behavior: Behavior normal.              Assessment & Plan     1. ACC/AHA stage C systolic heart failure (HCC)        2. Heart failure, NYHA class 2 (HCC)        3. PAF (paroxysmal atrial fibrillation) (HCC)  EKG      4. PVC (premature ventricular contraction)        5. Stage 3a chronic kidney disease (HCC)        6. SOB (shortness of breath)        7. HTN (hypertension), malignant        8. High risk medication use        9. Research subject            Medical Decision Making: Today's Assessment/Status/Plan:        Screening/Consent note:    Participation in the [facility] clinical trial was discussed with patient today. All aspects of the study purpose and procedures were explained.  Subject was given ample time to review the consent and all questions were answered to their satisfaction. Patient aware that the clinical trial is voluntary and they may withdraw consent at any time without affecting the level of care they receive.  Subject signed consent without coercion and undue influence and was given a copy of the signed consent. No study-related procedures took place prior to consenting and all assessments were conducted per protocol.    Full physical exam conducted today.   NYHA 2    At this point, subject meets all study Inclusion and no exclusion criteria.   Patient will continue with any needed study assessments and return for Randomization visit if these meet study criteria.

## 2023-09-28 ENCOUNTER — HOSPITAL ENCOUNTER (OUTPATIENT)
Dept: CARDIOLOGY | Facility: MEDICAL CENTER | Age: 78
End: 2023-09-28
Attending: NURSE PRACTITIONER
Payer: MEDICARE

## 2023-09-28 DIAGNOSIS — Z00.6 RESEARCH SUBJECT: ICD-10-CM

## 2023-09-28 DIAGNOSIS — I50.20 ACC/AHA STAGE C SYSTOLIC HEART FAILURE (HCC): ICD-10-CM

## 2023-09-28 LAB
LV EJECT FRACT  99904: 35
LV EJECT FRACT MOD 2C 99903: 44.22
LV EJECT FRACT MOD 4C 99902: 45.22
LV EJECT FRACT MOD BP 99901: 43.87

## 2023-09-28 PROCEDURE — 93308 TTE F-UP OR LMTD: CPT | Mod: 26 | Performed by: INTERNAL MEDICINE

## 2023-09-28 PROCEDURE — 93325 DOPPLER ECHO COLOR FLOW MAPG: CPT

## 2023-09-28 NOTE — RESEARCH NOTE
Name: Pito Black   MRN: [id number]  Participant ID:  [id number]  : 1945  Visit Date/Time: 2023 9:00 AM      Study:    CHARLES [id number] - Charles [id number]   Status Consented/Enrolled (2023)   Active Start Date 23   Participant ID [id number]   Coordinator Stefanie Wade; Tonia Mata; Pippa Duran   NCT [id number]    Marcin Steven M.D.     Screening/Consent note:    Participation in the CHARLES clinical trial was discussed with Pito and his wife today. All aspects of the study purpose and procedures were explained by myself and Dr. Steven.  He was given ample time to review the consents and all questions were answered to his satisfaction. Patient aware that the clinical trial is voluntary and he may withdraw consent at any time without affecting the level of care they receive.  Subject signed both the full main study consent and the Optional FBR consent without coercion and undue influence and was given a copy of the signed consent. No study-related procedures took place prior to consenting and all assessments were conducted per protocol.    Patient had signed the pre-trial ICF on 23 with needed local study labs drawn afterwards.     Physical Exam performed by Dr. Steven, see his note.    Med Hx and ConMeds reviewed.    Farxiga was started 23. Study requires an echo to be done at least 3 months after initiation of any GDMT, therefor patient came in to sign full study consent and will have a limited echo done 23.      Study Patient Identification Card given.    Vitals:   .7 cm  WT 63.2 kg    OMRON BP taken right arm with pt seated quietly for 10 mins starting at 8:50am  9:00am 117/66 HR 60  9:02am 118/72 HR 94  9:04am 126/74 HR 71  Average 120/71 HR 75       Meds with start dates confirmed with patient:  Dapagliflozin 10mg qd              Ezetimbe 10mg qd                    Metoprolol 50mg  qd                  4/APR/2023  Spironolactone 25mg qd         24/MAR/2023  Furosemide 40mg qd              31/JAN/2023  Apixaban 5mg BID                  19/FEB/2023  Metformin ER 750mg qd         19/JUN/2023  Tylenol PM 500mg 2tab PRN  24/AUG/2020  Cholecalciferol 2000IU qd       28/JUN/2021  Fluticasone-salmeterol 250-50 mcg/act 1 puff BID 5/AUG/2023     Med HX with start dates or year confirmed with patient:  DM                                        06/AUG/2018 to current  HLP                                      28/JUN/2021  to current  HTN                                      6/FEB/2018   to current  A-Fib                                     6/FEB/2018   to current  Anaemia                               19/FEB/2023  Ongoing-no  Heart Failure                        17/MAR/2023  HFH                                      16/MAR/2023     Non Small Cell Lung Ca     2018   Ongoing-no  Vit D Deficiency                  2021   Ongoing-yes  Back Pain                            2013   Ongoing-yes  CKD                                     2021   Ongoing-yes    Labs:                  Recent Results (from the past 336 hour(s))   proBrain Natriuretic Peptide, NT    Collection Time: 09/15/23 11:24 AM   Result Value Ref Range    NT-proBNP 1232 (H) 0 - 125 pg/mL   Basic Metabolic Panel    Collection Time: 09/15/23 11:24 AM   Result Value Ref Range    Sodium 137 135 - 145 mmol/L    Potassium 5.0 3.6 - 5.5 mmol/L    Chloride 103 96 - 112 mmol/L    Co2 23 20 - 33 mmol/L    Glucose 111 (H) 65 - 99 mg/dL    Bun 27 (H) 8 - 22 mg/dL    Creatinine 1.41 (H) 0.50 - 1.40 mg/dL    Calcium 10.2 8.5 - 10.5 mg/dL    Anion Gap 11.0 7.0 - 16.0   ESTIMATED GFR    Collection Time: 09/15/23 11:24 AM   Result Value Ref Range    GFR (CKD-EPI) 51 (A) >60 mL/min/1.73 m 2   ALBUMIN    Collection Time: 09/21/23 11:31 AM   Result Value Ref Range    Albumin 3.7 3.2 - 4.9 g/dL   BILIRUBIN TOTAL    Collection Time: 09/21/23 11:31 AM   Result Value Ref Range     Total Bilirubin 0.4 0.1 - 1.5 mg/dL   ASPARTATE AMINO-JACKSON    Collection Time: 23 11:31 AM   Result Value Ref Range    AST(SGOT) 27 12 - 45 U/L   ALANINE AMINO-TRANS    Collection Time: 23 11:31 AM   Result Value Ref Range    ALT(SGPT) 31 2 - 50 U/L   EKG    Collection Time: 23  8:35 AM   Result Value Ref Range    Report       [facility]    Test Date:  2023  Pt Name:    DOROTHEA SALINAS                  Department: [facility]  MRN:        [id number]                      Room:  Gender:     Male                         Technician: KS  :        1945                   Requested By:MARCIN STEVEN  Order #:    [id number]                    Reading MD: Marcin Steven MD    Measurements  Intervals                                Axis  Rate:       101                          P:          54  IA:         144                          QRS:        78  QRSD:       97                           T:          -19  QT:         334  QTc:        433    Interpretive Statements  Sinus tachycardia  Multiform ventricular premature complexes  Probable LVH with secondary repol abnrm  Compared to ECG 2023 15:01:05  Sinus rhythm no longer present  ST (T wave) deviation no longer present  Electronically Signed On 2023 13:56:40 PDT by Marcin Steven MD           Meds:      Current Outpatient Medications   Medication Sig Dispense Refill    fluticasone-salmeterol (ADVAIR) 250-50 MCG/ACT AEROSOL POWDER, BREATH ACTIVATED Inhale 1 Puff every 12 hours. 1 Each 5    fluticasone-salmeterol (ADVAIR DISKUS) 250-50 MCG/ACT AEROSOL POWDER, BREATH ACTIVATED Inhale 1 Puff 2 times a day. Rinse mouth after each use (Patient not taking: Reported on 2023) 1 Each 5    dapagliflozin propanediol (FARXIGA) 10 MG Tab Take 1 Tablet by mouth every day. 100 Tablet 3    ezetimibe (ZETIA) 10 MG Tab Take 1 Tablet by mouth every day. 100 Tablet 3    metoprolol SR (TOPROL XL) 50 MG TABLET SR 24 HR Take 1 Tablet by  mouth every day. 100 Tablet 3    spironolactone (ALDACTONE) 25 MG Tab Take 1 Tablet by mouth every day. 100 Tablet 3    metFORMIN ER (GLUCOPHAGE XR) 750 MG TABLET SR 24 HR Take 1 Tablet by mouth every day. 100 Tablet 3    metFORMIN ER (GLUCOPHAGE XR) 750 MG TABLET SR 24 HR Take 1 tablet by mouth every day. (Patient not taking: Reported on 9/27/2023) 100 Tablet 3    cyclobenzaprine (FLEXERIL) 10 mg Tab Take 1 Tablet by mouth at bedtime. 100 Tablet 3    Cholecalciferol (VITAMIN D3) 2000 UNIT Cap Take 1 Capsule by mouth every day. 100 Capsule 3    furosemide (LASIX) 40 MG Tab Take 1 Tablet by mouth every day for 100 days. (Patient not taking: Reported on 8/1/2023) 100 Tablet 0    apixaban (ELIQUIS) 5mg Tab Take 1 Tablet by mouth 2 times a day. 180 Tablet 3    diphenhydrAMINE-APAP, sleep, (TYLENOL PM EXTRA STRENGTH)  MG Tab Take 1-2 Tablets by mouth at bedtime as needed (Mild Pain or Trouble Sleeping).       No current facility-administered medications for this visit.    current meds    History:    Past Medical History:   Diagnosis Date    Acute respiratory failure with hypoxia (HCC) 02/01/2023    Arrhythmia     hx of a fib    Arthritis 2015    generalized, DDD back    Asthma     inhaler     Backpain 08/06/2018    9/10    Cancer (HCC) 07/2017    left lung    Cataract     bilateral, no surgery    Congestive heart failure (HCC)     Dental disorder     lower partial, removable bridge    Diabetes 08/06/2018    on no meds at this time, diet controlled    Heart valve disease     mild mitral valve prolapse    High cholesterol     Hypertension        Past Surgical History:   Procedure Laterality Date    CO BRONCHOSCOPY,DIAGNOSTIC  7/1/2021    Procedure: BRONCHOSCOPY - FIBER OPTIC WITH BRANCHOALVEOLAR LAVAGE BIOPSY, FNA, NAVIGATION;  Surgeon: Vinayak Carbajal M.D.;  Location: SURGERY [facility];  Service: Pulmonary    ENDOBRONCHIAL US ADD-ON  7/1/2021    Procedure: ENDOBRONCHIAL ULTRASOUND (EBUS).;  Surgeon: Vinayak WALLS  "COREY Carbajal;  Location: SURGERY [facility];  Service: Pulmonary    CATARACT PHACO WITH IOL Left 2018    Procedure: CATARACT PHACO WITH IOL;  Surgeon: Juanito Hager M.D.;  Location: SURGERY SAME DAY [facility];  Service: Ophthalmology    CATARACT PHACO WITH IOL Right 2018    Procedure: CATARACT PHACO WITH IOL;  Surgeon: Juanito Hager M.D.;  Location: SURGERY SAME DAY [facility];  Service: Ophthalmology    THORACOSCOPY Left 2018    Procedure: THORACOSCOPY- WEDGE BIOPSY W/FROZEN SECTION;  Surgeon: Cristhian Galeano M.D.;  Location: SURGERY [facility];  Service: Thoracic    EAR MIDDLE EXPLORATION Right 2015    Procedure: EAR MIDDLE EXPLORATION;  Surgeon: William Brandon M.D.;  Location: SURGERY SAME DAY [facility];  Service:     OSSICULAR RECONSTRUCTION Right 2015    Procedure: OSSICULAR RECONSTRUCTION CHAIN POSSIBLE;  Surgeon: William Brandon M.D.;  Location: SURGERY SAME DAY [facility];  Service:     OTHER      ear replacement \"many years ago\"    OTHER ORTHOPEDIC SURGERY      right knee replacement         Social History     Tobacco Use    Smoking status: Former     Current packs/day: 0.00     Average packs/day: 0.3 packs/day for 40.0 years (10.0 ttl pk-yrs)     Types: Cigarettes     Start date: 1965     Quit date: 2005     Years since quittin.7    Smokeless tobacco: Never   Vaping Use    Vaping Use: Never used   Substance Use Topics    Alcohol use: Not Currently     Alcohol/week: 12.6 - 21.0 oz     Types: 21 - 35 Cans of beer per week     Comment: 12 beers a day (coors), quit 2/3/2023    Drug use: No       Family History   Problem Relation Age of Onset    Stroke Mother     Hypertension Mother     Diabetes Mother     Cancer Father         stomach cancer    Heart Disease Brother     Alcohol abuse Brother     Ovarian Cancer Neg Hx     Tubal Cancer Neg Hx     Peritoneal Cancer Neg Hx     Colorectal Cancer Neg Hx     Breast Cancer Neg Hx     Hyperlipidemia Neg " Hx          Procedures:     EKG:   Results for orders placed or performed in visit on 23   EKG   Result Value Ref Range    Report       [facility] [place] [facility]    Test Date:  2023  Pt Name:    DOROTHEA SALINAS                  Department: [facility]  MRN:        [id number]                      Room:  Gender:     Male                         Technician: KS  :        1945                   Requested By:MARCIN STEVEN  Order #:    [id number]                    Reading MD: Marcin Steven MD    Measurements  Intervals                                Axis  Rate:       101                          P:          54  PA:         144                          QRS:        78  QRSD:       97                           T:          -19  QT:         334  QTc:        433    Interpretive Statements  Sinus tachycardia  Multiform ventricular premature complexes  Probable LVH with secondary repol abnrm  Compared to ECG 2023 15:01:05  Sinus rhythm no longer present  ST (T wave) deviation no longer present  Electronically Signed On 2023 13:56:40 PDT by Marcin Steven MD                 Follow-up: Randomization visit 10/5/23

## 2023-09-29 ENCOUNTER — TELEPHONE (OUTPATIENT)
Dept: PHARMACY | Facility: MEDICAL CENTER | Age: 78
End: 2023-09-29
Payer: MEDICARE

## 2023-09-29 ENCOUNTER — TELEPHONE (OUTPATIENT)
Dept: SCHEDULING | Facility: IMAGING CENTER | Age: 78
End: 2023-09-29
Payer: MEDICARE

## 2023-09-29 NOTE — TELEPHONE ENCOUNTER
MICHELLE Lamas (spouse) . she had previously canceled services due to being told by her ins that she can only use [facility]. She saw on my chart that [facility]from[facility]   MG was filled by mail order from us but had 2 different delivery dates. I explained that first one was for #20 tabs and that they mailed out the remaining #80 next day once order came in. She would like to return her  back on our services. Reached out to Liaabdifatah Kumar to let her know. Also with Stacy's permission, gave her number to Cydney to call as well.

## 2023-10-03 DIAGNOSIS — I50.9 HEART FAILURE, NYHA CLASS 2 (HCC): ICD-10-CM

## 2023-10-03 DIAGNOSIS — I50.20 ACC/AHA STAGE C SYSTOLIC HEART FAILURE (HCC): ICD-10-CM

## 2023-10-03 DIAGNOSIS — I50.20 HEART FAILURE WITH REDUCED EJECTION FRACTION (HCC): ICD-10-CM

## 2023-10-03 DIAGNOSIS — I49.3 PVC (PREMATURE VENTRICULAR CONTRACTION): ICD-10-CM

## 2023-10-03 DIAGNOSIS — E78.5 DYSLIPIDEMIA: ICD-10-CM

## 2023-10-03 PROCEDURE — RXMED WILLOW AMBULATORY MEDICATION CHARGE

## 2023-10-04 ENCOUNTER — TELEPHONE (OUTPATIENT)
Dept: VASCULAR LAB | Facility: MEDICAL CENTER | Age: 78
End: 2023-10-04
Payer: MEDICARE

## 2023-10-04 NOTE — TELEPHONE ENCOUNTER
Is the patient due for a refill? Yes-Pharmacy Change    Was the patient seen the past year? Yes    Date of last office visit: 9/27/2023    Does the patient have an upcoming appointment?  No    Provider to refill:TT    Does the patients insurance require a 100 day supply?  Yes

## 2023-10-04 NOTE — TELEPHONE ENCOUNTER
Called and spoke to Pt's wife, Cydney @ [phone] to discuss Pt's upcoming refills. Per Cydney, Pt would like to ask more about [facility] specialty and what services can we offer that differs from other retail or specialty pharmacies. This liaison gave her the information about financial assistance--which Pt's wife was never been told by other Rx coordinators in the past. Pt's wife is the main contact for the both of them and the Pt. Pt's wife consented to apply her and the Pt for Eliquis financial assistance. This liaison assisted the couple for the process and how long will it take for the determination if both of them are approved or not. Pt's wife wanted to include Pt's Farxiga FA application and her Myrbetriq if there's any available assistance as well. I informed the Pt's wife that there are available applications for the other two medications and we can discuss it later on. She also thanked me that I was able to accomodate them with the help that they needed especially her main concerns were copays are high with their eliquis medications and cannot afford when they reached up to [facility] thru their insurance. This liaison sent the eliquis PAP application in her email.    BRUCE Corrales, PhT  Pharmacy Liaison  P: [phone]  10/4/2023 10:55 AM

## 2023-10-05 ENCOUNTER — TELEPHONE (OUTPATIENT)
Dept: CARDIOLOGY | Facility: MEDICAL CENTER | Age: 78
End: 2023-10-05
Payer: MEDICARE

## 2023-10-05 ENCOUNTER — RESEARCH ENCOUNTER (OUTPATIENT)
Dept: CARDIOLOGY | Facility: MEDICAL CENTER | Age: 78
End: 2023-10-05
Payer: MEDICARE

## 2023-10-05 ENCOUNTER — TELEPHONE (OUTPATIENT)
Dept: SLEEP MEDICINE | Facility: MEDICAL CENTER | Age: 78
End: 2023-10-05

## 2023-10-05 ENCOUNTER — PHARMACY VISIT (OUTPATIENT)
Dept: PHARMACY | Facility: MEDICAL CENTER | Age: 78
End: 2023-10-05
Payer: COMMERCIAL

## 2023-10-05 ENCOUNTER — RESEARCH ENCOUNTER (OUTPATIENT)
Dept: CARDIOLOGY | Facility: MEDICAL CENTER | Age: 78
End: 2023-10-05
Attending: INTERNAL MEDICINE
Payer: MEDICARE

## 2023-10-05 VITALS
SYSTOLIC BLOOD PRESSURE: 124 MMHG | HEIGHT: 68 IN | DIASTOLIC BLOOD PRESSURE: 74 MMHG | WEIGHT: 138.89 LBS | HEART RATE: 69 BPM | BODY MASS INDEX: 21.05 KG/M2

## 2023-10-05 DIAGNOSIS — I50.20 ACC/AHA STAGE C SYSTOLIC HEART FAILURE (HCC): ICD-10-CM

## 2023-10-05 DIAGNOSIS — I50.9 HEART FAILURE, NYHA CLASS 2 (HCC): ICD-10-CM

## 2023-10-05 PROCEDURE — RXMED WILLOW AMBULATORY MEDICATION CHARGE: Performed by: INTERNAL MEDICINE

## 2023-10-05 ASSESSMENT — FIBROSIS 4 INDEX: FIB4 SCORE: 1.78

## 2023-10-05 NOTE — PROGRESS NOTES
Patient here today for ENRRIQUE study randomization visit. Patient vitals reviewed.  Discussed the implications of participating in research project.  At this time patient is eager to pursue enrollment in the research project.  He is well aware of his ability to participate in his willingness to participate.  He is also aware of the fact that he may withdraw from the study at any given time for any reason    NYHA 2    EKG performed today and reviewed with findings of sinus rhythm with intermittent conducted PACs.  Nonspecific ST-T wave abnormalities in V4 through V6.    See CRC note for all other study assessments.    Patient meets all Inclusion and no Exclusion study requirements.    Patient was randomized with starting dose of MK-1242(vericiguat) 2.5mg or Placebo.

## 2023-10-05 NOTE — RESEARCH NOTE
Name: Pito Black   MRN: [id number]  Participant Randomization ID:  [id number]  Participant Screening ID: [id number]  : 1945  Visit Date/Time: 10/5/2023       Study:    CHARLES [id number] - Charles [id number]   Status Consented/Enrolled (2023)   Active Start Date 23   Participant ID [id number]   Coordinator Stefanie Wade; Tonia Mata; Pippa Duran   NCT [id number]    Marcin Steven M.D.     Pito and his wife Cydney here today for continuation in the CHARLES trial. Med Hx and ConMeds reviewed, see list below.    Patient states no medication changes or changes to his health since last visit on 23. No AE/ROZINA to report.      Dr. Smith spoke with them to answer any questions, view vitals, and assess NYHA. See his note.      Patient meets all inclusion and no exclusion criteria and was randomized today.     Patient card updated with randomization number.    QOL's completed on study provided iPAD after randomization and before any other assessments.    EKG done @10:41am after lying down for 10 mins. Interpreted and signed by Dr. Smith today.     Central labs drawn at 11:02am, processed and shipped per protocol.     Research Pharmacist dispensed study medication vericiguat or Placebo 2.5mg tablet to patient, coached patient on how to take it and answered his questions.    Component ID: [id number]  Lot Trace ID: [id number]     CRC witnessed first dose study med taken @11:14am along with a granola bar. Next dose to be taken tomorrow with Dinner. Patient instructed to bring bottle and any remaining pills to next visit.    Patient aware to call if experiences any new symptoms including SBP reading of 100mmHg.      Pre-Dose Vitals:  OMRON BP taken right arm with pt seated quietly for 10 mins starting at 9:47am      Vitals:    10/05/23 0957 10/05/23 0959 10/05/23 1001   BP: 115/73 116/74 120/79   BP Location: Right arm     Patient Position: Sitting   "   Pulse: 71 61 74   Weight: 63 kg (138 lb 14.2 oz)     Height: 1.727 m (5' 7.99\")         Post-Dose Vitals:  OMRON BP taken right arm with pt seated quietly for 10 min starting at 12:37p  12:47pm 111/71 HR 71  12:49pm 116/74 HR 61  12:51pm 124/74 HR 69       Labs:                  Recent Results (from the past 336 hour(s))   EKG    Collection Time: 23  8:35 AM   Result Value Ref Range    Report       [facility]    Test Date:  2023  Pt Name:    DOROTHEA SALINAS                  Department: [facility]  MRN:        [id number]                      Room:  Gender:     Male                         Technician: KS  :        1945                   Requested By:MARCIN STEVEN  Order #:    [id number]                    Reading MD: Marcin Steven MD    Measurements  Intervals                                Axis  Rate:       101                          P:          54  NV:         144                          QRS:        78  QRSD:       97                           T:          -19  QT:         334  QTc:        433    Interpretive Statements  Sinus tachycardia  Multiform ventricular premature complexes  Probable LVH with secondary repol abnrm  Compared to ECG 2023 15:01:05  Sinus rhythm no longer present  ST (T wave) deviation no longer present  Electronically Signed On 2023 13:56:40 PDT by Marcin Steven MD     EC-ECHOCARDIOGRAM LTD W/O CONT    Collection Time: 23  2:44 PM   Result Value Ref Range    Eject.Frac. MOD BP 43.87     Eject.Frac. MOD 4C 45.22     Eject.Frac. MOD 2C 44.22     Left Ventrical Ejection Fraction 35            Follow-up: Visit 3 on 10/20/23.       "

## 2023-10-05 NOTE — TELEPHONE ENCOUNTER
----- Message from Sanjuana Bhatti M.D. sent at 10/5/2023  1:19 PM PDT -----  Please refer to EP. Thank you.   ----- Message -----  From: Lisseth Pereira R.N.  Sent: 10/5/2023  10:04 AM PDT  To: COREY Britton Dr,  I spoke with patient and spouse.  They state they have spoken with BRETT and TT and do want to pursue with ICD.

## 2023-10-06 RX ORDER — EZETIMIBE 10 MG/1
10 TABLET ORAL DAILY
Qty: 100 TABLET | Refills: 3 | Status: SHIPPED | OUTPATIENT
Start: 2023-10-06

## 2023-10-06 RX ORDER — SPIRONOLACTONE 25 MG/1
25 TABLET ORAL DAILY
Qty: 100 TABLET | Refills: 3 | Status: SHIPPED | OUTPATIENT
Start: 2023-10-06 | End: 2024-04-20

## 2023-10-06 NOTE — TELEPHONE ENCOUNTER
Caller Name: Pito Black                 Call Back Number: [phone] (home)         Patient approves a detailed voicemail message: N\A    Have we ever prescribed this med? Yes.  If yes, what date?     Last OV: 6/8/23 with Dr. Carbajal     Next OV: n/a     DX:     Medications: Metformin HCL 750MG Tablet

## 2023-10-09 ENCOUNTER — PHARMACY VISIT (OUTPATIENT)
Dept: PHARMACY | Facility: MEDICAL CENTER | Age: 78
End: 2023-10-09
Payer: COMMERCIAL

## 2023-10-09 DIAGNOSIS — I50.20 ACC/AHA STAGE C SYSTOLIC HEART FAILURE (HCC): ICD-10-CM

## 2023-10-09 DIAGNOSIS — I50.9 HEART FAILURE, NYHA CLASS 2 (HCC): ICD-10-CM

## 2023-10-09 RX ORDER — METOPROLOL SUCCINATE 50 MG/1
50 TABLET, EXTENDED RELEASE ORAL DAILY
Qty: 100 TABLET | Refills: 3 | Status: SHIPPED | OUTPATIENT
Start: 2023-10-09 | End: 2024-04-20

## 2023-10-09 RX ORDER — DAPAGLIFLOZIN 10 MG/1
10 TABLET, FILM COATED ORAL DAILY
Qty: 100 TABLET | Refills: 3 | Status: SHIPPED | OUTPATIENT
Start: 2023-10-09

## 2023-10-10 ENCOUNTER — TELEPHONE (OUTPATIENT)
Dept: HEALTH INFORMATION MANAGEMENT | Facility: OTHER | Age: 78
End: 2023-10-10
Payer: MEDICARE

## 2023-10-13 ENCOUNTER — PHARMACY VISIT (OUTPATIENT)
Dept: PHARMACY | Facility: MEDICAL CENTER | Age: 78
End: 2023-10-13
Payer: COMMERCIAL

## 2023-10-13 PROCEDURE — RXMED WILLOW AMBULATORY MEDICATION CHARGE: Performed by: INTERNAL MEDICINE

## 2023-10-16 DIAGNOSIS — I26.99 PULMONARY EMBOLISM ON RIGHT (HCC): ICD-10-CM

## 2023-10-16 PROCEDURE — RXMED WILLOW AMBULATORY MEDICATION CHARGE: Performed by: NURSE PRACTITIONER

## 2023-10-16 NOTE — TELEPHONE ENCOUNTER
Contact:  Phone number:[phone] (mobile)    Name of person spoken with and relationship to patient: WILLI (WIFE)   Patient’s Adherence:  How patient is doing on medication: Very Well    How many missed doses and reason: 0 N/A    Any new medications: no    Any new conditions: no    Any new allergies: no    Any new side effects: no    Any new diagnoses: no    How many doses remaining: 10    Did patient want to speak with pharmacist: No   Delivery:  Delivery date and method: 10/17/2023 via [facility]    Needs by Date: 01/13/2024    Signature required: No     Any additional details for :  LEAVE INSIDE THE MAILBOX   Teach Appointment Date:  N/A   Shipping Address:  [address] [address]   Medication(name,strength and dose):  ELIQUIS 5MG TABLETS    Copay:  $180 (PRICILLA B  PRICING)   Payment Method:  Credit card on file   Supplies:  NA   Additional Information:  Called and spoke to Pt @ [phone] to give her a status update for the copay. Sylvester Kolb, Pt and her  is approved for $70/30DS ; $180/90DS special pricing through [facility] pharmacy. This liaison informed the Pt that prices will be awarded to Pt if they cannot afford to pay their actual copay through their insurance due to donut hole. Pt verbalized understanding and has no questions or concerns at this time.        Stacy Pierre, BRUCE, PhT  Pharmacy Liaison (Rx Coordinator)  P: [phone]  10/16/2023 3:23 PM

## 2023-10-17 ENCOUNTER — APPOINTMENT (OUTPATIENT)
Dept: MEDICAL GROUP | Facility: PHYSICIAN GROUP | Age: 78
End: 2023-10-17
Payer: MEDICARE

## 2023-10-17 ENCOUNTER — PHARMACY VISIT (OUTPATIENT)
Dept: PHARMACY | Facility: MEDICAL CENTER | Age: 78
End: 2023-10-17
Payer: COMMERCIAL

## 2023-10-20 ENCOUNTER — PHARMACY VISIT (OUTPATIENT)
Dept: PHARMACY | Facility: MEDICAL CENTER | Age: 78
End: 2023-10-20
Payer: COMMERCIAL

## 2023-10-20 ENCOUNTER — RESEARCH ENCOUNTER (OUTPATIENT)
Dept: CARDIOLOGY | Facility: MEDICAL CENTER | Age: 78
End: 2023-10-20
Attending: INTERNAL MEDICINE
Payer: MEDICARE

## 2023-10-20 VITALS
BODY MASS INDEX: 21.12 KG/M2 | HEART RATE: 81 BPM | SYSTOLIC BLOOD PRESSURE: 121 MMHG | DIASTOLIC BLOOD PRESSURE: 78 MMHG | WEIGHT: 138.89 LBS

## 2023-10-20 PROCEDURE — RXMED WILLOW AMBULATORY MEDICATION CHARGE: Performed by: INTERNAL MEDICINE

## 2023-10-20 SDOH — ECONOMIC STABILITY: TRANSPORTATION INSECURITY
IN THE PAST 12 MONTHS, HAS LACK OF RELIABLE TRANSPORTATION KEPT YOU FROM MEDICAL APPOINTMENTS, MEETINGS, WORK OR FROM GETTING THINGS NEEDED FOR DAILY LIVING?: NO

## 2023-10-20 SDOH — ECONOMIC STABILITY: FOOD INSECURITY: WITHIN THE PAST 12 MONTHS, YOU WORRIED THAT YOUR FOOD WOULD RUN OUT BEFORE YOU GOT MONEY TO BUY MORE.: NEVER TRUE

## 2023-10-20 SDOH — ECONOMIC STABILITY: HOUSING INSECURITY
IN THE LAST 12 MONTHS, WAS THERE A TIME WHEN YOU DID NOT HAVE A STEADY PLACE TO SLEEP OR SLEPT IN A SHELTER (INCLUDING NOW)?: NO

## 2023-10-20 SDOH — HEALTH STABILITY: PHYSICAL HEALTH: ON AVERAGE, HOW MANY MINUTES DO YOU ENGAGE IN EXERCISE AT THIS LEVEL?: 20 MIN

## 2023-10-20 SDOH — ECONOMIC STABILITY: INCOME INSECURITY: IN THE LAST 12 MONTHS, WAS THERE A TIME WHEN YOU WERE NOT ABLE TO PAY THE MORTGAGE OR RENT ON TIME?: NO

## 2023-10-20 SDOH — ECONOMIC STABILITY: TRANSPORTATION INSECURITY
IN THE PAST 12 MONTHS, HAS LACK OF TRANSPORTATION KEPT YOU FROM MEETINGS, WORK, OR FROM GETTING THINGS NEEDED FOR DAILY LIVING?: NO

## 2023-10-20 SDOH — ECONOMIC STABILITY: TRANSPORTATION INSECURITY
IN THE PAST 12 MONTHS, HAS THE LACK OF TRANSPORTATION KEPT YOU FROM MEDICAL APPOINTMENTS OR FROM GETTING MEDICATIONS?: NO

## 2023-10-20 SDOH — ECONOMIC STABILITY: INCOME INSECURITY: HOW HARD IS IT FOR YOU TO PAY FOR THE VERY BASICS LIKE FOOD, HOUSING, MEDICAL CARE, AND HEATING?: NOT HARD AT ALL

## 2023-10-20 SDOH — ECONOMIC STABILITY: FOOD INSECURITY: WITHIN THE PAST 12 MONTHS, THE FOOD YOU BOUGHT JUST DIDN'T LAST AND YOU DIDN'T HAVE MONEY TO GET MORE.: NEVER TRUE

## 2023-10-20 SDOH — HEALTH STABILITY: PHYSICAL HEALTH: ON AVERAGE, HOW MANY DAYS PER WEEK DO YOU ENGAGE IN MODERATE TO STRENUOUS EXERCISE (LIKE A BRISK WALK)?: 0 DAYS

## 2023-10-20 SDOH — ECONOMIC STABILITY: HOUSING INSECURITY: IN THE LAST 12 MONTHS, HOW MANY PLACES HAVE YOU LIVED?: 1

## 2023-10-20 SDOH — HEALTH STABILITY: MENTAL HEALTH
STRESS IS WHEN SOMEONE FEELS TENSE, NERVOUS, ANXIOUS, OR CAN'T SLEEP AT NIGHT BECAUSE THEIR MIND IS TROUBLED. HOW STRESSED ARE YOU?: TO SOME EXTENT

## 2023-10-20 ASSESSMENT — LIFESTYLE VARIABLES
HOW OFTEN DO YOU HAVE SIX OR MORE DRINKS ON ONE OCCASION: NEVER
SKIP TO QUESTIONS 9-10: 1
AUDIT-C TOTAL SCORE: 0
HOW MANY STANDARD DRINKS CONTAINING ALCOHOL DO YOU HAVE ON A TYPICAL DAY: PATIENT DOES NOT DRINK
HOW OFTEN DO YOU HAVE A DRINK CONTAINING ALCOHOL: NEVER

## 2023-10-20 ASSESSMENT — SOCIAL DETERMINANTS OF HEALTH (SDOH)
HOW OFTEN DO YOU GET TOGETHER WITH FRIENDS OR RELATIVES?: NEVER
DO YOU BELONG TO ANY CLUBS OR ORGANIZATIONS SUCH AS CHURCH GROUPS UNIONS, FRATERNAL OR ATHLETIC GROUPS, OR SCHOOL GROUPS?: NO
IN A TYPICAL WEEK, HOW MANY TIMES DO YOU TALK ON THE PHONE WITH FAMILY, FRIENDS, OR NEIGHBORS?: ONCE A WEEK
HOW OFTEN DO YOU ATTENT MEETINGS OF THE CLUB OR ORGANIZATION YOU BELONG TO?: NEVER
DO YOU BELONG TO ANY CLUBS OR ORGANIZATIONS SUCH AS CHURCH GROUPS UNIONS, FRATERNAL OR ATHLETIC GROUPS, OR SCHOOL GROUPS?: NO
HOW OFTEN DO YOU ATTEND CHURCH OR RELIGIOUS SERVICES?: NEVER
HOW OFTEN DO YOU ATTEND CHURCH OR RELIGIOUS SERVICES?: NEVER
HOW OFTEN DO YOU GET TOGETHER WITH FRIENDS OR RELATIVES?: NEVER
HOW HARD IS IT FOR YOU TO PAY FOR THE VERY BASICS LIKE FOOD, HOUSING, MEDICAL CARE, AND HEATING?: NOT HARD AT ALL
HOW OFTEN DO YOU HAVE A DRINK CONTAINING ALCOHOL: NEVER
HOW MANY DRINKS CONTAINING ALCOHOL DO YOU HAVE ON A TYPICAL DAY WHEN YOU ARE DRINKING: PATIENT DOES NOT DRINK
HOW OFTEN DO YOU HAVE SIX OR MORE DRINKS ON ONE OCCASION: NEVER
IN A TYPICAL WEEK, HOW MANY TIMES DO YOU TALK ON THE PHONE WITH FAMILY, FRIENDS, OR NEIGHBORS?: ONCE A WEEK
HOW OFTEN DO YOU ATTENT MEETINGS OF THE CLUB OR ORGANIZATION YOU BELONG TO?: NEVER
WITHIN THE PAST 12 MONTHS, YOU WORRIED THAT YOUR FOOD WOULD RUN OUT BEFORE YOU GOT THE MONEY TO BUY MORE: NEVER TRUE

## 2023-10-20 ASSESSMENT — FIBROSIS 4 INDEX: FIB4 SCORE: 1.78

## 2023-10-20 NOTE — RESEARCH NOTE
Name: Pito Black   MRN: [id number]  Participant Randomization ID:  [id number]  Participant Screening ID: [id number]   : 1945  Visit Date/Time: 10/20/2023 11:00 AM      Study:    CHARLES [id number] - Charles [id number]   Status Consented/Enrolled (2023)   Active Start Date 23   Participant ID [id number]   Coordinator Stefanie Wade Bridget I   NCT [id number]    Marcin Steven M.D.     Pito and his wife Cydney here today for continuation in the CHARLES trial. Patient states no medication changes or changes to his health since last visit. He states he has not missed a dose of study medication and is taking them as directed with dinner.    Dr. Steven spoke with patient, reviewed his vitals, and assessed NYHA 2. He authorized increasing study medication from 2.5 mg to 5 mg today. See his note.    Next visit scheduled 23. Pt will increase dose to 10 mg if SBP is over 100.    No AE/ROZINA or events of interest reported.    Study Drug Dispensed:  Component ID: [id number]  Lot Trace ID: [id number]    Study Drug Returned:  Component ID: [id number]  Lot Trace ID: [id number]  Pills returned: 18  Last pill taken 10/19/23  Compliance 100%    Vitals:  OMRON BP taken right arm with pt seated quietly for 10 mins starting at 11:10 am  Average: 120/79 Pulse 81    Vitals:    10/20/23 1122 10/20/23 1124 10/20/23 1126   BP: 116/80 123/79 121/78   BP Location: Right arm     Patient Position: Sitting     Pulse: 81 81 81   Weight: 63 kg (138 lb 14.2 oz)             Meds:      Current Outpatient Medications   Medication Sig Dispense Refill    vericiguat or PLACEBO (STUDY DRUG) 5 MG tablet Take 1 Tablet by mouth every day. Participant ID #[id number] Tablet 11    apixaban (ELIQUIS) 5mg Tab Take 1 Tablet by mouth 2 times a day. 180 Tablet 3    metoprolol SR (TOPROL XL) 50 MG TABLET SR 24 HR Take 1 Tablet by mouth every day for 100 days. 100 Tablet 3    dapagliflozin propanediol  (FARXIGA) 10 MG Tab Take 1 tablet by mouth every day. 100 Tablet 3    ezetimibe (ZETIA) 10 MG Tab Take 1 Tablet by mouth every day. 100 Tablet 3    spironolactone (ALDACTONE) 25 MG Tab Take 1 Tablet by mouth every day for 100 days. 100 Tablet 3    fluticasone-salmeterol (ADVAIR) 250-50 MCG/ACT AEROSOL POWDER, BREATH ACTIVATED Inhale 1 Puff every 12 hours. 1 Each 5    fluticasone-salmeterol (ADVAIR DISKUS) 250-50 MCG/ACT AEROSOL POWDER, BREATH ACTIVATED Inhale 1 Puff 2 times a day. Rinse mouth after each use (Patient not taking: Reported on 9/27/2023) 1 Each 5    ezetimibe (ZETIA) 10 MG Tab Take 1 Tablet by mouth every day. 100 Tablet 3    metoprolol SR (TOPROL XL) 50 MG TABLET SR 24 HR Take 1 Tablet by mouth every day. 100 Tablet 3    spironolactone (ALDACTONE) 25 MG Tab Take 1 Tablet by mouth every day. 100 Tablet 3    metFORMIN ER (GLUCOPHAGE XR) 750 MG TABLET SR 24 HR Take 1 Tablet by mouth every day. 100 Tablet 3    metFORMIN ER (GLUCOPHAGE XR) 750 MG TABLET SR 24 HR Take 1 tablet by mouth every day. (Patient not taking: Reported on 9/27/2023) 100 Tablet 3    cyclobenzaprine (FLEXERIL) 10 mg Tab Take 1 Tablet by mouth at bedtime. 100 Tablet 3    Cholecalciferol (VITAMIN D3) 2000 UNIT Cap Take 1 Capsule by mouth every day. 100 Capsule 3    diphenhydrAMINE-APAP, sleep, (TYLENOL PM EXTRA STRENGTH)  MG Tab Take 1-2 Tablets by mouth at bedtime as needed (Mild Pain or Trouble Sleeping).       No current facility-administered medications for this visit.    current meds    Follow-up: No follow-ups on file.

## 2023-10-23 ENCOUNTER — PATIENT OUTREACH (OUTPATIENT)
Dept: HEALTH INFORMATION MANAGEMENT | Facility: OTHER | Age: 78
End: 2023-10-23

## 2023-10-23 ENCOUNTER — OFFICE VISIT (OUTPATIENT)
Dept: MEDICAL GROUP | Facility: PHYSICIAN GROUP | Age: 78
End: 2023-10-23
Payer: MEDICARE

## 2023-10-23 VITALS
TEMPERATURE: 97.8 F | RESPIRATION RATE: 16 BRPM | HEIGHT: 68 IN | DIASTOLIC BLOOD PRESSURE: 56 MMHG | BODY MASS INDEX: 21.49 KG/M2 | SYSTOLIC BLOOD PRESSURE: 106 MMHG | OXYGEN SATURATION: 96 % | WEIGHT: 141.8 LBS | HEART RATE: 82 BPM

## 2023-10-23 DIAGNOSIS — I50.20 HEART FAILURE WITH REDUCED EJECTION FRACTION (HCC): ICD-10-CM

## 2023-10-23 DIAGNOSIS — N18.31 STAGE 3A CHRONIC KIDNEY DISEASE: ICD-10-CM

## 2023-10-23 DIAGNOSIS — Z23 NEED FOR VACCINATION: ICD-10-CM

## 2023-10-23 DIAGNOSIS — I15.2 HYPERTENSION ASSOCIATED WITH TYPE 2 DIABETES MELLITUS (HCC): ICD-10-CM

## 2023-10-23 DIAGNOSIS — E11.59 HYPERTENSION ASSOCIATED WITH TYPE 2 DIABETES MELLITUS (HCC): ICD-10-CM

## 2023-10-23 DIAGNOSIS — R06.09 DYSPNEA ON EXERTION: ICD-10-CM

## 2023-10-23 DIAGNOSIS — I48.0 PAF (PAROXYSMAL ATRIAL FIBRILLATION) (HCC): ICD-10-CM

## 2023-10-23 DIAGNOSIS — E11.69 HYPERLIPIDEMIA ASSOCIATED WITH TYPE 2 DIABETES MELLITUS (HCC): ICD-10-CM

## 2023-10-23 DIAGNOSIS — E78.5 HYPERLIPIDEMIA ASSOCIATED WITH TYPE 2 DIABETES MELLITUS (HCC): ICD-10-CM

## 2023-10-23 PROBLEM — E11.65 TYPE 2 DIABETES MELLITUS WITH HYPERGLYCEMIA (HCC): Status: RESOLVED | Noted: 2023-03-17 | Resolved: 2023-10-23

## 2023-10-23 PROBLEM — E11.22 TYPE 2 DIABETES MELLITUS WITH CHRONIC KIDNEY DISEASE, WITHOUT LONG-TERM CURRENT USE OF INSULIN (HCC): Status: ACTIVE | Noted: 2023-10-23

## 2023-10-23 PROBLEM — E11.51 TYPE 2 DIABETES MELLITUS WITH DIABETIC PERIPHERAL ANGIOPATHY WITHOUT GANGRENE, WITHOUT LONG-TERM CURRENT USE OF INSULIN (HCC): Status: ACTIVE | Noted: 2023-10-23

## 2023-10-23 PROCEDURE — 1126F AMNT PAIN NOTED NONE PRSNT: CPT

## 2023-10-23 PROCEDURE — G0438 PPPS, INITIAL VISIT: HCPCS | Mod: 25

## 2023-10-23 PROCEDURE — 3078F DIAST BP <80 MM HG: CPT

## 2023-10-23 PROCEDURE — G0008 ADMIN INFLUENZA VIRUS VAC: HCPCS

## 2023-10-23 PROCEDURE — 90662 IIV NO PRSV INCREASED AG IM: CPT

## 2023-10-23 PROCEDURE — 3074F SYST BP LT 130 MM HG: CPT

## 2023-10-23 RX ORDER — FLUTICASONE PROPIONATE AND SALMETEROL 250; 50 UG/1; UG/1
1 POWDER RESPIRATORY (INHALATION) 2 TIMES DAILY
Qty: 180 EACH | Refills: 3 | Status: SHIPPED | OUTPATIENT
Start: 2023-10-23 | End: 2024-01-19

## 2023-10-23 ASSESSMENT — PAIN SCALES - GENERAL: PAINLEVEL: NO PAIN

## 2023-10-23 ASSESSMENT — ENCOUNTER SYMPTOMS: GENERAL WELL-BEING: GOOD

## 2023-10-23 ASSESSMENT — FIBROSIS 4 INDEX: FIB4 SCORE: 1.78

## 2023-10-23 ASSESSMENT — ACTIVITIES OF DAILY LIVING (ADL): BATHING_REQUIRES_ASSISTANCE: 0

## 2023-10-23 ASSESSMENT — PATIENT HEALTH QUESTIONNAIRE - PHQ9: CLINICAL INTERPRETATION OF PHQ2 SCORE: 0

## 2023-10-23 NOTE — LETTER
October 30, 2023         Pito Black          Current Outpatient Medications:     fluticasone-salmeterol, 1 Puff, Inhalation, BID    vericiguat or PLACEBO, 5 mg, Oral, DAILY    apixaban, 5 mg, Oral, BID    metoprolol SR, 50 mg, Oral, DAILY    Farxiga, 10 mg, Oral, DAILY    ezetimibe, 10 mg, Oral, DAILY    spironolactone, 25 mg, Oral, DAILY    metFORMIN ER, Take 1 tablet by mouth every day.    cyclobenzaprine, 10 mg, Oral, QHS    Vitamin D3, 2,000 Units, Oral, DAILY    Tylenol PM Extra Strength, 1-2 Tablet, Oral, QHS PRN

## 2023-10-23 NOTE — PROGRESS NOTES
Chief Complaint   Patient presents with    Annual Exam       HPI:  Pito Black is a 78 y.o. here for Medicare Annual Wellness Visit     Patient Active Problem List    Diagnosis Date Noted    Type 2 diabetes mellitus with chronic kidney disease, without long-term current use of insulin (HCC) 10/23/2023    Secondary hyperaldosteronism (HCC) 06/19/2023    Other thrombophilia (HCC) 06/19/2023    Dyspnea on exertion 04/06/2023    Acute hypoxemic respiratory failure (HCC) 03/24/2023    Nonsustained ventricular tachycardia (HCC) 03/24/2023    Chronic systolic heart failure (HCC) 03/18/2023    Volume overload 03/17/2023    Cardiorenal syndrome 03/17/2023    ACP (advance care planning) 03/17/2023    Parainfluenza 02/21/2023    Sepsis without acute organ dysfunction (HCC) 02/20/2023    Pulmonary embolism on right (HCC) 02/19/2023    Vitamin D deficiency 02/19/2023    Macrocytic anemia 02/19/2023    Lactic acidosis 02/19/2023    Primary lung adenocarcinoma (HCC) 02/01/2023    Pleural effusion, left 02/01/2023    Lung mass 02/01/2023    Primary cancer of left lower lobe of lung (HCC) 07/06/2021    CKD (chronic kidney disease) stage 3, GFR 30-59 ml/min (HCC) 06/28/2021    Hyperlipidemia associated with type 2 diabetes mellitus (HCC) 06/28/2021    Primary cancer of right upper lobe of lung (HCC) 11/18/2019    Mass of lower lobe of left lung 02/06/2018    Hypertension associated with type 2 diabetes mellitus (HCC) 02/06/2018    PAF (paroxysmal atrial fibrillation) ([facility]) 02/06/2018    Dysfunction of eustachian tube 06/12/2015    Neural hearing loss, bilateral 06/12/2015    Radiculopathy 11/20/2013    Thoracic back pain 11/20/2013       Current Outpatient Medications   Medication Sig Dispense Refill    fluticasone-salmeterol (ADVAIR DISKUS) 250-50 MCG/ACT AEROSOL POWDER, BREATH ACTIVATED Inhale 1 Puff 2 times a day. Rinse mouth after each use 180 Each 3    vericiguat or PLACEBO (STUDY DRUG) 5 MG tablet Take 1 Tablet by mouth  every day. Participant ID #[id number] 33 Tablet 11    apixaban (ELIQUIS) 5mg Tab Take 1 Tablet by mouth 2 times a day. 180 Tablet 3    dapagliflozin propanediol (FARXIGA) 10 MG Tab Take 1 tablet by mouth every day. 100 Tablet 3    ezetimibe (ZETIA) 10 MG Tab Take 1 Tablet by mouth every day. 100 Tablet 3    metFORMIN ER (GLUCOPHAGE XR) 750 MG TABLET SR 24 HR Take 1 tablet by mouth every day. 100 Tablet 3    cyclobenzaprine (FLEXERIL) 10 mg Tab Take 1 Tablet by mouth at bedtime. 100 Tablet 3    Cholecalciferol (VITAMIN D3) 2000 UNIT Cap Take 1 Capsule by mouth every day. 100 Capsule 3    diphenhydrAMINE-APAP, sleep, (TYLENOL PM EXTRA STRENGTH)  MG Tab Take 1-2 Tablets by mouth at bedtime as needed (Mild Pain or Trouble Sleeping).      metoprolol SR (TOPROL XL) 50 MG TABLET SR 24 HR Take 1 Tablet by mouth every day for 100 days. 100 Tablet 3    spironolactone (ALDACTONE) 25 MG Tab Take 1 Tablet by mouth every day for 100 days. 100 Tablet 3     No current facility-administered medications for this visit.          Current supplements as per medication list.     Allergies: Patient has no known allergies.    Current social contact/activities: Reports he used to take his boat out on [place], used to like to fish. Enjoys watching football. Son close by.      He  reports that he quit smoking about 18 years ago. His smoking use included cigarettes. He started smoking about 58 years ago. He has a 10.0 pack-year smoking history. He has never used smokeless tobacco. He reports that he does not currently use alcohol after a past usage of about 12.6 - 21.0 oz of alcohol per week. He reports that he does not use drugs.  Counseling given: Not Answered      ROS:    Gait: Uses no assistive device  Ostomy: No  Other tubes: No  Amputations: No  Chronic oxygen use: No  Last eye exam: 10/12/2023  Wears hearing aids: No   : Denies any urinary leakage during the last 6 months    Screening:  Had eye exam x 2 weeks ago   Depression  Screening  Little interest or pleasure in doing things?  0 - not at all  Feeling down, depressed , or hopeless? 0 - not at all  Patient Health Questionnaire Score: 0     If depressive symptoms identified deferred to follow up visit unless specifically addressed in assessment and plan.    Interpretation of PHQ-9 Total Score   Score Severity   1-4 No Depression   5-9 Mild Depression   10-14 Moderate Depression   15-19 Moderately Severe Depression   20-27 Severe Depression    Screening for Cognitive Impairment  Do you or any of your friends or family members have any concern about your memory? No  Three Minute Recall (Banana, Sunrise, Chair) 1/3 Patient had a hard time remembering sunrise and chair did remember banana  Jeromy clock face with all 12 numbers and set the hands to show 20 past 8.  Yes    Cognitive concerns identified deferred for follow up unless specifically addressed in assessment and plan.    Fall Risk Assessment  Has the patient had two or more falls in the last year or any fall with injury in the last year?  No    Safety Assessment  Do you always wear your seatbelt?  Yes  Any changes to home needed to function safely? No  Difficulty hearing.  Yes  Patient counseled about all safety risks that were identified.    Functional Assessment ADLs  Are there any barriers preventing you from cooking for yourself or meeting nutritional needs?  No.    Are there any barriers preventing you from driving safely or obtaining transportation?  No.    Are there any barriers preventing you from using a telephone or calling for help?  No    Are there any barriers preventing you from shopping?  No.    Are there any barriers preventing you from taking care of your own finances?  No    Are there any barriers preventing you from managing your medications?  Yes Wife takes care of his medications   Are there any barriers preventing you from showering, bathing or dressing yourself? No    Are there any barriers preventing you from  doing housework or laundry? No  Are there any barriers preventing you from using the toilet?No  Are you currently engaging in any exercise or physical activity?   Not currently      Self-Assessment of Health  What is your perception of your health? Good  Do you sleep more than six hours a night? Yes  In the past 7 days, how much did pain keep you from doing your normal work? A lot  Do you spend quality time with family or friends (virtually or in person)? No  Do you usually eat a heart healthy diet that constists of a variety of fruits, vegetables, whole grains and fiber? No  Do you eat foods high in fat and/or Fast Food more than three times per week? No    Advance Care Planning  Do you have an Advance Directive, Living Will, Durable Power of , or POLST? Yes  Advance Directive Living Will     is not on file - instructed patient to bring in a copy to scan into their chart      Health Maintenance Summary            Overdue - Zoster (Shingles) Vaccines (1 of 2) Overdue - never done      No completion history exists for this topic.              Overdue - Hepatitis B Vaccine (Hep B) (1 of 3 - Risk 3-dose series) Overdue - never done      No completion history exists for this topic.              Ordered - Influenza Vaccine (1) Ordered on 10/23/2023      10/01/2022  Imm Admin: Influenza Vaccine Adult HD    12/07/2017  Imm Admin: Influenza Vaccine Adult HD              Postponed - COVID-19 Vaccine (3 - Mixed Product risk series) Postponed until 2/8/2024 02/11/2021  Imm Admin: PFIZER PURPLE CAP SARS-COV-2 VACCINATION (12+)    01/21/2021  Imm Admin: COVID-19 Vaccine, unspecified - HISTORICAL DATA              A1c Screening (Every 6 Months) Next due on 12/2/2023 06/02/2023  Hemoglobin A1c    02/07/2018  HEMOGLOBIN A1C              Fasting Lipid Profile (Yearly) Tentatively due on 6/2/2024 06/02/2023  Lipid Profile              Diabetes: Urine Protein Screening (Yearly) Next due on 6/2/2024       06/02/2023  MICROALBUMIN CREAT RATIO URINE              Diabetes: Monofilament / LE Exam (Yearly) Next due on 6/19/2024 06/19/2023  Diabetic Monofilament LE Exam    06/19/2023  [employer]: FINDINGS - TESTS - NEUROLOGY - MONOFILAMENT TEST - RIGHT FOOT - NUMBER OF SITES TESTED    06/19/2023  [employer]: FINDINGS - TESTS - NEUROLOGY - MONOFILAMENT TEST - LEFT FOOT - NUMBER OF SITES TESTED    06/19/2023  [employer]: WORKFLOW - DIABETES - DIABETIC FOOT EXAM PERFORMED              Ordered - SERUM CREATININE (Yearly) Ordered on 10/9/2023      09/15/2023  Basic Metabolic Panel    07/31/2023  Basic Metabolic Panel    06/02/2023  Comp Metabolic Panel    06/02/2023  Basic Metabolic Panel (BMP)    05/05/2023  Comp Metabolic Panel    Only the first 5 history entries have been loaded, but more history exists.              Diabetes: Retinopathy Screening (Yearly) Next due on 10/12/2024      10/12/2023  AMB EXTERNAL RETINAL SCREENING RESULTS              Annual Wellness Visit (Every 366 Days) Next due on 10/23/2024      10/23/2023  Level of Service: INITIAL ANNUAL WELLNESS VISIT-INCLUDES PPPS              IMM DTaP/Tdap/Td Vaccine (2 - Td or Tdap) Next due on 3/22/2033      03/22/2023  Imm Admin: Tdap Vaccine              Pneumococcal Vaccine: 65+ Years (Series Information) Completed      02/07/2017  Imm Admin: Pneumococcal Conjugate Vaccine (Prevnar/PCV-13)    11/20/2013  Imm Admin: Pneumococcal polysaccharide vaccine (PPSV-23)              Hepatitis C Screening  Tentatively Complete      07/15/2021  Hepatitis C Antibody component of HEP C VIRUS ANTIBODY              Hepatitis A Vaccine (Hep A) (Series Information) Aged Out      No completion history exists for this topic.              HPV Vaccines (Series Information) Aged Out      No completion history exists for this topic.              Polio Vaccine (Inactivated Polio) (Series Information) Aged Out      No completion history exists for this topic.              Meningococcal  Immunization (Series Information) Aged Out      No completion history exists for this topic.                    Patient Care Team:  JILL Welsh as PCP - General (Nurse Practitioner Family)  Jj Martino M.D. as Consulting Physician (Radiation Oncology)  Terell Ruiz M.D. (Medical Oncology)  Vinayak Carbajal M.D. (Pulmonary Medicine)  JILL Clark (Cardiovascular Disease (Cardiology))        Social History     Tobacco Use    Smoking status: Former     Current packs/day: 0.00     Average packs/day: 0.3 packs/day for 40.0 years (10.0 ttl pk-yrs)     Types: Cigarettes     Start date: 1965     Quit date: 2005     Years since quittin.8    Smokeless tobacco: Never   Vaping Use    Vaping Use: Never used   Substance Use Topics    Alcohol use: Not Currently     Alcohol/week: 12.6 - 21.0 oz     Types: 21 - 35 Cans of beer per week     Comment: 12 beers a day (coors), quit 2/3/2023    Drug use: No     Family History   Problem Relation Age of Onset    Stroke Mother     Hypertension Mother     Diabetes Mother     Cancer Father         stomach cancer    Heart Disease Brother     Alcohol abuse Brother     Ovarian Cancer Neg Hx     Tubal Cancer Neg Hx     Peritoneal Cancer Neg Hx     Colorectal Cancer Neg Hx     Breast Cancer Neg Hx     Hyperlipidemia Neg Hx      He  has a past medical history of Acute respiratory failure with hypoxia (HCC) (2023), Arrhythmia, Arthritis (), Asthma, Backpain (2018), Cancer (HCC) (2017), Cataract, Congestive heart failure (HCC), Dental disorder, Diabetes (2018), Heart valve disease, High cholesterol, Hypertension, and Type 2 diabetes mellitus with hyperglycemia (HCC) (3/17/2023).   Past Surgical History:   Procedure Laterality Date    ND BRONCHOSCOPY,DIAGNOSTIC  2021    Procedure: BRONCHOSCOPY - FIBER OPTIC WITH BRANCHOALVEOLAR LAVAGE BIOPSY, FNA, NAVIGATION;  Surgeon: Vinayak Carbajal M.D.;  Location: SURGERY  "[facility];  Service: Pulmonary    ENDOBRONCHIAL US ADD-ON  7/1/2021    Procedure: ENDOBRONCHIAL ULTRASOUND (EBUS).;  Surgeon: Vinayak Carbajal M.D.;  Location: SURGERY [facility];  Service: Pulmonary    CATARACT PHACO WITH IOL Left 8/21/2018    Procedure: CATARACT PHACO WITH IOL;  Surgeon: Juanito Hager M.D.;  Location: SURGERY SAME DAY [facility];  Service: Ophthalmology    CATARACT PHACO WITH IOL Right 8/7/2018    Procedure: CATARACT PHACO WITH IOL;  Surgeon: Juanito Hager M.D.;  Location: SURGERY SAME DAY [facility];  Service: Ophthalmology    THORACOSCOPY Left 4/19/2018    Procedure: THORACOSCOPY- WEDGE BIOPSY W/FROZEN SECTION;  Surgeon: Cristhian Galeano M.D.;  Location: SURGERY [facility];  Service: Thoracic    EAR MIDDLE EXPLORATION Right 6/12/2015    Procedure: EAR MIDDLE EXPLORATION;  Surgeon: William Brandon M.D.;  Location: SURGERY SAME DAY [facility];  Service:     OSSICULAR RECONSTRUCTION Right 6/12/2015    Procedure: OSSICULAR RECONSTRUCTION CHAIN POSSIBLE;  Surgeon: William Brandon M.D.;  Location: SURGERY SAME DAY [facility];  Service:     OTHER      ear replacement \"many years ago\"    OTHER ORTHOPEDIC SURGERY      right knee replacement 2005       Exam:   /56 (BP Location: Left arm, Patient Position: Sitting, BP Cuff Size: Adult)   Pulse 82   Temp 36.6 °C (97.8 °F) (Temporal)   Resp 16   Ht 1.727 m (5' 8\")   Wt 64.3 kg (141 lb 12.8 oz)   SpO2 96%  Body mass index is 21.56 kg/m².    Hearing patients has hearing aids   Dentition lower dentures  Alert, oriented in no acute distress.  Eye contact is good, speech goal directed, affect calm    Assessment and Plan. The following treatment and monitoring plan is recommended:    Problem List Items Addressed This Visit       Hypertension associated with type 2 diabetes mellitus (HCC)     Chronic, stable.  Continue spironolactone 25 mg daily, metoprolol SR 50 mg daily,         CKD (chronic kidney disease) stage 3, GFR 30-59 " ml/min (HCC)     Stable  No uremic symptoms  Renal dose of medication  Avoid nephrotoxins  Continue same medication regimen             Hyperlipidemia associated with type 2 diabetes mellitus (HCC)     Chronic, stable.  Continue Zetia 10 mg daily.         Dyspnea on exertion    Relevant Medications    fluticasone-salmeterol (ADVAIR DISKUS) 250-50 MCG/ACT AEROSOL POWDER, BREATH ACTIVATED    Type 2 diabetes mellitus with chronic kidney disease, without long-term current use of insulin (HCC)     Chronic, stable.  Continue metformin  mg daily, continue Farxiga 10 mg daily.          Other Visit Diagnoses       Need for vaccination        Relevant Orders    Influenza Vaccine, High Dose (65+ Only)            Services suggested: Referral to [facility] Coordination Services  Health Care Screening: Age-appropriate preventive services recommended by USPTF and ACIP covered by Medicare were discussed today. Services ordered if indicated and agreed upon by the patient.  Referrals offered: Community-based lifestyle interventions to reduce health risks and promote self-management and wellness, fall prevention, nutrition, physical activity, tobacco-use cessation, weight loss, and mental health services as per orders if indicated.    Discussion today about general wellness and lifestyle habits:    Prevent falls and reduce trip hazards; Cautioned about securing or removing rugs.  Have a working fire alarm and carbon monoxide detector;   Engage in regular physical activity and social activities     Follow-up: Return in about 3 months (around 1/23/2024).

## 2023-10-23 NOTE — PROGRESS NOTES
Met with Pito and his spouse, Cydney, before Pito's appt sheldon/Kerry. Gave an overview of CCM services offered. Pito was not interested, but was in agreement that we could enroll him, Pito just prefers his wife manage/ discuss his medical concerns/ needs ans his wife would be the contact for Pito. Cydney stated they will discuss it further and she was agreeable to having me call later in the week to determine whether Pito will enroll or not.

## 2023-10-24 ENCOUNTER — PHARMACY VISIT (OUTPATIENT)
Dept: PHARMACY | Facility: MEDICAL CENTER | Age: 78
End: 2023-10-24
Payer: COMMERCIAL

## 2023-10-24 PROCEDURE — RXMED WILLOW AMBULATORY MEDICATION CHARGE

## 2023-10-24 NOTE — TELEPHONE ENCOUNTER
Contact:  Phone number:[phone] (mobile)    Name of person spoken with and relationship to patient: WILLI, WIFE   Patient’s Adherence:  How patient is doing on medication: Very Well    How many missed doses and reason: 0 N/A    Any new medications: no    Any new conditions: no    Any new allergies: no    Any new side effects: no    Any new diagnoses: no    How many doses remainin    Did patient want to speak with pharmacist: No   Delivery:  Delivery date and method: 10/24/2023 via     Needs by Date: 2024    Signature required: No     Any additional details for :  PUSH DOOR DOE (IF ACCESSIBLE) AND HAND IT TO PT. IF PT IS NOT AVAILABLE, LEAVE AT FRONT DOOR    Teach Appointment Date:  N/A   Shipping Address:  [address] [place] [id number]   Medication(name,strength and dose):  ADVAIR DISKUS 250-50 MCG/ACT AEROSOL POWDER, BREATH ACTIVATED    Copay:  $264.13   Payment Method:  Credit card on file   Supplies:  NA   Additional Information:  Pt's wife called and requested to have the Advair filled as the Pt is out of medication. Advair medication was originally filled at [facility] and this liaison called the [facility] to request for a transfer in. Spoke to Edmundo Briceño and was able to transfer the medication with no issues at all. Verified with Edmundo Briceño that Pt is eligible to received the medication via  for today, 10/24. Called the Pt's wife back and let her know that we can set the coordination of delivery for today. Pt's wife has no questions or concerns at this time.        BRUCE Corrales, T  Pharmacy Liaison (Rx Coordinator)  P: [phone]  10/24/2023 9:29 AM

## 2023-10-26 NOTE — PROGRESS NOTES
Spoke with Cydney, scheduled an intake on 10/30/23 at 1300 at their home. Verified address in chart was correct.

## 2023-10-30 ENCOUNTER — PATIENT OUTREACH (OUTPATIENT)
Dept: HEALTH INFORMATION MANAGEMENT | Facility: OTHER | Age: 78
End: 2023-10-30
Payer: MEDICARE

## 2023-10-30 DIAGNOSIS — E11.59 HYPERTENSION ASSOCIATED WITH TYPE 2 DIABETES MELLITUS (HCC): ICD-10-CM

## 2023-10-30 DIAGNOSIS — E11.22 TYPE 2 DIABETES MELLITUS WITH STAGE 3A CHRONIC KIDNEY DISEASE, WITHOUT LONG-TERM CURRENT USE OF INSULIN (HCC): ICD-10-CM

## 2023-10-30 DIAGNOSIS — I50.20 HEART FAILURE WITH REDUCED EJECTION FRACTION (HCC): ICD-10-CM

## 2023-10-30 DIAGNOSIS — N18.31 TYPE 2 DIABETES MELLITUS WITH STAGE 3A CHRONIC KIDNEY DISEASE, WITHOUT LONG-TERM CURRENT USE OF INSULIN (HCC): ICD-10-CM

## 2023-10-30 DIAGNOSIS — I50.22 CHRONIC SYSTOLIC HEART FAILURE (HCC): ICD-10-CM

## 2023-10-30 DIAGNOSIS — N18.31 STAGE 3A CHRONIC KIDNEY DISEASE: ICD-10-CM

## 2023-10-30 DIAGNOSIS — I15.2 HYPERTENSION ASSOCIATED WITH TYPE 2 DIABETES MELLITUS (HCC): ICD-10-CM

## 2023-10-30 PROCEDURE — 99490 CHRNC CARE MGMT STAFF 1ST 20: CPT

## 2023-10-30 SDOH — HEALTH STABILITY: PHYSICAL HEALTH: ON AVERAGE, HOW MANY MINUTES DO YOU ENGAGE IN EXERCISE AT THIS LEVEL?: 0 MIN

## 2023-10-30 SDOH — ECONOMIC STABILITY: FOOD INSECURITY: WITHIN THE PAST 12 MONTHS, THE FOOD YOU BOUGHT JUST DIDN'T LAST AND YOU DIDN'T HAVE MONEY TO GET MORE.: NEVER TRUE

## 2023-10-30 SDOH — ECONOMIC STABILITY: INCOME INSECURITY: IN THE LAST 12 MONTHS, WAS THERE A TIME WHEN YOU WERE NOT ABLE TO PAY THE MORTGAGE OR RENT ON TIME?: NO

## 2023-10-30 SDOH — ECONOMIC STABILITY: HOUSING INSECURITY: IN THE LAST 12 MONTHS, HOW MANY PLACES HAVE YOU LIVED?: 1

## 2023-10-30 SDOH — ECONOMIC STABILITY: FOOD INSECURITY: WITHIN THE PAST 12 MONTHS, YOU WORRIED THAT YOUR FOOD WOULD RUN OUT BEFORE YOU GOT MONEY TO BUY MORE.: NEVER TRUE

## 2023-10-30 SDOH — HEALTH STABILITY: PHYSICAL HEALTH: ON AVERAGE, HOW MANY DAYS PER WEEK DO YOU ENGAGE IN MODERATE TO STRENUOUS EXERCISE (LIKE A BRISK WALK)?: 0 DAYS

## 2023-10-30 ASSESSMENT — PATIENT HEALTH QUESTIONNAIRE - PHQ9: CLINICAL INTERPRETATION OF PHQ2 SCORE: 0

## 2023-10-30 ASSESSMENT — SOCIAL DETERMINANTS OF HEALTH (SDOH)
IN THE PAST 12 MONTHS, HAS THE ELECTRIC, GAS, OIL, OR WATER COMPANY THREATENED TO SHUT OFF SERVICE IN YOUR HOME?: NO
HOW OFTEN DO YOU ATTENT MEETINGS OF THE CLUB OR ORGANIZATION YOU BELONG TO?: NEVER
IN A TYPICAL WEEK, HOW MANY TIMES DO YOU TALK ON THE PHONE WITH FAMILY, FRIENDS, OR NEIGHBORS?: MORE THAN THREE TIMES A WEEK
DO YOU BELONG TO ANY CLUBS OR ORGANIZATIONS SUCH AS CHURCH GROUPS UNIONS, FRATERNAL OR ATHLETIC GROUPS, OR SCHOOL GROUPS?: NO
HOW HARD IS IT FOR YOU TO PAY FOR THE VERY BASICS LIKE FOOD, HOUSING, MEDICAL CARE, AND HEATING?: NOT VERY HARD
HOW OFTEN DO YOU GET TOGETHER WITH FRIENDS OR RELATIVES?: ONCE A WEEK
HOW OFTEN DO YOU ATTEND CHURCH OR RELIGIOUS SERVICES?: NEVER

## 2023-10-30 ASSESSMENT — LIFESTYLE VARIABLES
HOW OFTEN DO YOU HAVE SIX OR MORE DRINKS ON ONE OCCASION: NEVER
HOW MANY STANDARD DRINKS CONTAINING ALCOHOL DO YOU HAVE ON A TYPICAL DAY: PATIENT DOES NOT DRINK

## 2023-10-30 NOTE — PROGRESS NOTES
INITIAL CARE MANAGEMENT CARE PLAN/ASSESSMENT       Pito is a pleasant 79yo male. Pito and his spouse, Cydney live in their own home. They recently relocated from [facility] to the area due to increased medical needs. Pito gave verbal permission to enroll in the Chronic Care Management (CCM) program. Pito qualifies for the program with a risk score of nine (9) and the diagnoses of Heart Failure (HF), Hypertension (HTN), Atrial Fibrillation (Afib), Diabetes Mellitus (DM), a history of Pulmonary Embolism (PE) and lung cancer. Pito wears hearing aids, he does not use an assistive device to ambulate and denies any falls within the last year. They have a son that lives in [place], another son and daughter live in [place]. Pito likes to walk and fish, hasn't been interested in pursuing these activities since moving to [place]. In completing the Social Determinants of Health (SDoH), Pito would benefit from increased socialization,  increasing his activity levels and reducing his stress. Pito's 6/2/23 HgbA1c was 6.5. His last GFR was 51 and his BNP was 1232 on 9/15/23. His blood pressure readings in office visits has been: 106/56 (10/23/23), 110/60 (9/27/23) and 108/56 (9/19/23).        Medication Self-Management Goals: Reviewed medications listed below with patient.    Current Outpatient Medications:     fluticasone-salmeterol (ADVAIR DISKUS) 250-50 MCG/ACT AEROSOL POWDER, BREATH ACTIVATED, Inhale 1 Puff 2 times a day. Rinse mouth after each use, Disp: 180 Each, Rfl: 3    vericiguat or PLACEBO (STUDY DRUG) 5 MG tablet, Take 1 Tablet by mouth every day. Participant ID #[id number], Disp: 33 Tablet, Rfl: 11    apixaban (ELIQUIS) 5mg Tab, Take 1 Tablet by mouth 2 times a day., Disp: 180 Tablet, Rfl: 3    metoprolol SR (TOPROL XL) 50 MG TABLET SR 24 HR, Take 1 Tablet by mouth every day for 100 days., Disp: 100 Tablet, Rfl: 3    dapagliflozin propanediol (FARXIGA) 10 MG Tab, Take 1 tablet by mouth every day., Disp: 100 Tablet, Rfl:  3    ezetimibe (ZETIA) 10 MG Tab, Take 1 Tablet by mouth every day., Disp: 100 Tablet, Rfl: 3    spironolactone (ALDACTONE) 25 MG Tab, Take 1 Tablet by mouth every day for 100 days., Disp: 100 Tablet, Rfl: 3    metFORMIN ER (GLUCOPHAGE XR) 750 MG TABLET SR 24 HR, Take 1 tablet by mouth every day., Disp: 100 Tablet, Rfl: 3    cyclobenzaprine (FLEXERIL) 10 mg Tab, Take 1 Tablet by mouth at bedtime., Disp: 100 Tablet, Rfl: 3    Cholecalciferol (VITAMIN D3) 2000 UNIT Cap, Take 1 Capsule by mouth every day., Disp: 100 Capsule, Rfl: 3    diphenhydrAMINE-APAP, sleep, (TYLENOL PM EXTRA STRENGTH)  MG Tab, Take 1-2 Tablets by mouth at bedtime as needed (Mild Pain or Trouble Sleeping)., Disp: , Rfl:     Goal:  Pito will adhere to medication regimen as prescribed.          Physical/Functional/Environmental Status:     Activities of Daily Living:  Bathing: independent   Dressing: independent  Grooming: independent  Mouth Care: independent  Toileting: independent  Climbing a Flight of Stairs: independent    Independent Activities of Daily Living:  Shopping: independent  Cooking: independent  Managing Medications: total dependence  Using the phone and looking up numbers: independent  Driving or using public transportation: independent  Managing Finances: independent        10/30/2023     4:32 PM   STEADI Fall Risk   STEADI Risk for Falling Score 1   One or more falls in the last year No   Advised to use a cane or walker to get around safely No   Feels unsteady when walking No   Steadies self on furniture while walking at home No   Worried about falling No   Needs to push with hands when rising from a chair No   Has trouble stepping up onto a curb / using stairs No   Often has to rush to the toilet No   Has lost some feeling in feet No   Takes medicine that makes him/her feel lightheaded or more tired than usual No   Takes medicine to sleep or improve mood No   Often feels sad or depressed Yes       Goal:  Pito will remain  free of falls. Staff to provide fall risk reduction education.         Social Determinants of Health     Financial Status:      Financial Resource Strain: Low Risk  (10/30/2023)    Overall Financial Resource Strain (CARDIA)     Difficulty of Paying Living Expenses: Not very hard         Referred to CHW/SW:  NA       Transportation Status:      Transportation Needs: No Transportation Needs (10/30/2023)    PRAPARE - Transportation     Lack of Transportation (Medical): No     Lack of Transportation (Non-Medical): No        Referred to CHW/SW:  NA      Food Insecurity:      Food Insecurity: No Food Insecurity (10/30/2023)    Hunger Vital Sign     Worried About Running Out of Food in the Last Year: Never true     Ran Out of Food in the Last Year: Never true        Referred to CHW/SW:  NA       Housing Status:     Housing Stability: Low Risk  (10/30/2023)    Housing Stability Vital Sign     Unable to Pay for Housing in the Last Year: No     Number of Places Lived in the Last Year: 1     Unstable Housing in the Last Year: No        Referred to CHW/SW:  NA      Social Connections:     Social Connections: Moderately Isolated (10/30/2023)    Social Connection and Isolation Panel [NHANES]     Frequency of Communication with Friends and Family: More than three times a week     Frequency of Social Gatherings with Friends and Family: Once a week     Attends [employer] Services: Never     Active Member of Clubs or Organizations: No     Attends Club or Organization Meetings: Never     Marital Status:         Referred to CHW/SW:  Yes      Mental/Behavioral/Psychosocial Status:        3/24/2023     5:43 PM 10/23/2023    11:00 AM 10/30/2023     3:57 PM   Depression Screen (PHQ-2/PHQ-9)   PHQ-2 Total Score 0     PHQ-2 Total Score  0 0       Interpretation of PHQ-9 Total Score   Score Severity   1-4 No Depression   5-9 Mild Depression   10-14 Moderate Depression   15-19 Moderately Severe Depression   20-27 Severe Depression        Goal:  Pito to report and staff to monitor for increased mental health needs.        Chronic Care Management Care Plan      Goal:  Pito will be able to verbalize non-pharmacological ways to manage blood pressure.   Barriers: Knowledge   Interventions: Staff to provide resources, education.     Start Date: 10/30/2023  End Date:      Goal:  Pito will establish a regular exercise routine to improve his daily activity levels.   Barriers: comorbidities, recent move  Interventions: Staff to provide resources and education on need to exercise on a regular basis.     Start Date: 10/30/2023  End Date:      Goal:  Pito will be able to verbalize hepatotoxicities he should avoid.   Barriers: knowledge of CKD process  Interventions: Staff to provide resources, education and guidance.     Start Date: 10/30/2023  End Date:      Goal:  Pito will be able to explain how he monitors for signs/symptoms of worsening heart failure and what Pito should do in response.   Barriers: knowledge  Interventions: Staff to provide educational resources, provide explanations and ensure Pito is monitoring his symptoms.     Start Date: 10/30/2023  End Date:           Discussion:  Based on medical and social needs.     Goals: Created     Next Scheduled patient outreach: 11/30/2023

## 2023-10-31 ENCOUNTER — TELEPHONE (OUTPATIENT)
Dept: MEDICAL GROUP | Facility: PHYSICIAN GROUP | Age: 78
End: 2023-10-31
Payer: MEDICARE

## 2023-10-31 NOTE — TELEPHONE ENCOUNTER
VOICEMAIL  1. Caller Name: Cydney Black                      Call Back Number: [phone] (home)     2. Message: Pito Lamas's wife is calling reporting Pito is have a toothache, he is scheduled with his dentist at the end of November and are unsure what to do. They are requesting medication for this issue.     3. Patient approves office to leave a detailed voicemail/MyChart message: yes

## 2023-11-01 ENCOUNTER — TELEPHONE (OUTPATIENT)
Dept: HEALTH INFORMATION MANAGEMENT | Facility: OTHER | Age: 78
End: 2023-11-01
Payer: MEDICARE

## 2023-11-01 NOTE — TELEPHONE ENCOUNTER
Cydney called back, verified they will arrive tomorrow for 0900 appt at 0845. Discussed use of Tylenol to alleviate pain prn.

## 2023-11-01 NOTE — TELEPHONE ENCOUNTER
Called pt, no answer. LVM that I scheduled an appt tomorrow w/pcp at 0900 for toothache. Advised to call me back if that appt does not work, also mentioned if pain requires being seen sooner Pito can present to UC today.

## 2023-11-02 ENCOUNTER — OFFICE VISIT (OUTPATIENT)
Dept: MEDICAL GROUP | Facility: PHYSICIAN GROUP | Age: 78
End: 2023-11-02
Payer: MEDICARE

## 2023-11-02 ENCOUNTER — RESEARCH ENCOUNTER (OUTPATIENT)
Dept: CARDIOLOGY | Facility: MEDICAL CENTER | Age: 78
End: 2023-11-02
Attending: INTERNAL MEDICINE
Payer: MEDICARE

## 2023-11-02 ENCOUNTER — PATIENT OUTREACH (OUTPATIENT)
Dept: HEALTH INFORMATION MANAGEMENT | Facility: OTHER | Age: 78
End: 2023-11-02

## 2023-11-02 ENCOUNTER — PHARMACY VISIT (OUTPATIENT)
Dept: PHARMACY | Facility: MEDICAL CENTER | Age: 78
End: 2023-11-02
Payer: COMMERCIAL

## 2023-11-02 ENCOUNTER — TELEPHONE (OUTPATIENT)
Dept: CARDIOLOGY | Facility: MEDICAL CENTER | Age: 78
End: 2023-11-02

## 2023-11-02 ENCOUNTER — DOCUMENTATION (OUTPATIENT)
Dept: CARDIOLOGY | Facility: MEDICAL CENTER | Age: 78
End: 2023-11-02

## 2023-11-02 VITALS
HEIGHT: 68 IN | HEART RATE: 78 BPM | OXYGEN SATURATION: 93 % | WEIGHT: 144 LBS | SYSTOLIC BLOOD PRESSURE: 110 MMHG | DIASTOLIC BLOOD PRESSURE: 74 MMHG | BODY MASS INDEX: 21.82 KG/M2 | TEMPERATURE: 98.6 F

## 2023-11-02 VITALS
WEIGHT: 143.96 LBS | BODY MASS INDEX: 21.89 KG/M2 | HEART RATE: 76 BPM | SYSTOLIC BLOOD PRESSURE: 124 MMHG | DIASTOLIC BLOOD PRESSURE: 81 MMHG

## 2023-11-02 DIAGNOSIS — E11.59 HYPERTENSION ASSOCIATED WITH TYPE 2 DIABETES MELLITUS (HCC): ICD-10-CM

## 2023-11-02 DIAGNOSIS — I15.2 HYPERTENSION ASSOCIATED WITH TYPE 2 DIABETES MELLITUS (HCC): ICD-10-CM

## 2023-11-02 DIAGNOSIS — K08.89 PAIN, DENTAL: ICD-10-CM

## 2023-11-02 PROCEDURE — 3078F DIAST BP <80 MM HG: CPT

## 2023-11-02 PROCEDURE — 99214 OFFICE O/P EST MOD 30 MIN: CPT

## 2023-11-02 PROCEDURE — RXMED WILLOW AMBULATORY MEDICATION CHARGE: Performed by: NURSE PRACTITIONER

## 2023-11-02 PROCEDURE — 3074F SYST BP LT 130 MM HG: CPT

## 2023-11-02 RX ORDER — AMOXICILLIN AND CLAVULANATE POTASSIUM 875; 125 MG/1; MG/1
1 TABLET, FILM COATED ORAL 2 TIMES DAILY
Qty: 14 TABLET | Refills: 0 | Status: SHIPPED | OUTPATIENT
Start: 2023-11-02 | End: 2023-11-08 | Stop reason: SDUPTHER

## 2023-11-02 ASSESSMENT — ENCOUNTER SYMPTOMS
FEVER: 0
SINUS PAIN: 1

## 2023-11-02 ASSESSMENT — FIBROSIS 4 INDEX
FIB4 SCORE: 1.78

## 2023-11-02 NOTE — RESEARCH NOTE
Name: Pito Black   MRN: [id number]  Participant Randomization ID: [id number]  : 1945  Visit Date/Time: 2023 11:00 AM      Study:    CHARLES [id number] - Charles [id number]   Status Consented/Enrolled (2023)   Active Start Date 23   Participant ID [id number]   Coordinator Stefanie Wade Bridget I   NCT [id number]    Marcin Steven M.D.     Assessment and Plan:  Pito and his wife here for Charles study visit 4, day 28. Patient states he was seen earlier today for a tooth ache and was given a prescription for Augmentin 875-125mg 1 pill BID for 7 days.    No ROZINA or ECI reported.    DON Clark spoke with patient, reviewed vitals, assessed NYHA 2, and authorized increasing study medication dose to 10mg.    Study Drug Dispensed: 3 bottles with 66 pills in each  Component ID/ Lot Trace ID: [id number]     Study Drug Returned: Patient forgot 1 pill in home pill box, will return on  SOC visit.  He states he has not missed a dose of study medication and is taking them as directed with dinner.   Component ID: [id number]  Lot Trace ID: [id number]  Pills returned: 16  Last pill taken 23 at 7pm with dinner.    Vitals:  OMRON BP taken right arm with pt seated quietly for 10 mins starting at 10:50 am  Average: 128/75 Pulse 81    Vitals:    23 1107 23 1109 23 1111 23 1235   BP: 124/81 128/84 125/82 124/81   BP Location: Right arm   Right arm   Patient Position: Sitting   Sitting   Pulse: 76 75 76 76   Weight: 65.3 kg (143 lb 15.4 oz)   65.3 kg (143 lb 15.4 oz)       Labs:                  Study labs drawn 11:20am. Processed and shipped today per protocol.  Ambient tracking #[id number]  [employer] tracking #[id number]      Meds:    Current Outpatient Medications   Medication Sig Dispense Refill    amoxicillin-clavulanate (AUGMENTIN) 875-125 MG Tab Take 1 Tablet by mouth 2 times a  day. 14 Tablet 0    vericiguat or PLACEBO (STUDY DRUG) 10 MG tablet Take 1 Tablet by mouth every day. Participant ID #[id number] Tablet 11    fluticasone-salmeterol (ADVAIR DISKUS) 250-50 MCG/ACT AEROSOL POWDER, BREATH ACTIVATED Inhale 1 Puff 2 times a day. Rinse mouth after each use 180 Each 3    apixaban (ELIQUIS) 5mg Tab Take 1 Tablet by mouth 2 times a day. 180 Tablet 3    metoprolol SR (TOPROL XL) 50 MG TABLET SR 24 HR Take 1 Tablet by mouth every day for 100 days. 100 Tablet 3    dapagliflozin propanediol (FARXIGA) 10 MG Tab Take 1 tablet by mouth every day. 100 Tablet 3    ezetimibe (ZETIA) 10 MG Tab Take 1 Tablet by mouth every day. 100 Tablet 3    spironolactone (ALDACTONE) 25 MG Tab Take 1 Tablet by mouth every day for 100 days. 100 Tablet 3    metFORMIN ER (GLUCOPHAGE XR) 750 MG TABLET SR 24 HR Take 1 tablet by mouth every day. 100 Tablet 3    cyclobenzaprine (FLEXERIL) 10 mg Tab Take 1 Tablet by mouth at bedtime. 100 Tablet 3    Cholecalciferol (VITAMIN D3) 2000 UNIT Cap Take 1 Capsule by mouth every day. 100 Capsule 3    diphenhydrAMINE-APAP, sleep, (TYLENOL PM EXTRA STRENGTH)  MG Tab Take 1-2 Tablets by mouth at bedtime as needed (Mild Pain or Trouble Sleeping).       No current facility-administered medications for this visit.    current meds       Follow-up: Visit 5/Week 24 due 4/18/23

## 2023-11-02 NOTE — ASSESSMENT & PLAN NOTE
Acute, unstable. Having severe pain to left upper molar, worse with touching the tooth, eating, brushing teeth. Unable to eat food on the left side, reports radiating pain into sinus of left face.   Has dentis appointment at the end of the month for evaluation/treatment  Pe with gingival swelling and redness around molars of left upper jaw  Will provide amox-clav 7 day course

## 2023-11-02 NOTE — ASSESSMENT & PLAN NOTE
Chronic, stable.  Blood pressure in clinic today 110/74.  Continue metoprolol SR 50 mg daily, spironolactone 25 mg daily

## 2023-11-02 NOTE — TELEPHONE ENCOUNTER
Charles Study Visit # 4    Reviewed BP-stable    HFrEF, Stage C, NYHA class 2    Pt has tooth ache, plans on starting Abx today.    Has CHAPIN, denies chest pain, palpitations, orthopnea, PND, edema, dizziness/lightheadedness or shortness of breath at rest.    Reports no other problems.    Ok to increase to 10 mg drug/placebo     Is subject currently taking an ACEi or ARB?  No: Contraindication    Is subject currently taking an MRA?  Yes    Is subject currently taking sacubitril + valsartan?  No: Contraindication    Is subject currently taking SGLT2i?  Yes    Is subject currently taking a long acting nitrate plus hydralazine?  No: Not indicated by treatment guidelines    Is subject currently taking Ivabradine?  No: Not indicated by treatment guidelines    Is subject currently taking Omecamtiv Mecarbil?  No: Not indicated by treatment guidelines    Does the subject have an ICD?  No: Other specify Considering    Does the subject have a biventricular pacemaker?  No: Not indicated by treatment guidelines    Does the subject have an implanted pulmonary artery pressure monitor(CardioMEMS)?  No: Other specify Not considered at this time

## 2023-11-02 NOTE — PROGRESS NOTES
"Community Health Worker Follow-Up    Reason for outreach: CHW Ena received referral from Nena HORN stating \"Please send Pito info on socialization activities, physical activity resources, send food bank resources.\"      Specific Resources Provided:  Housing/Shelter: N/A  Transportation: N/A  Food: Food Bank Info and Distribution Info for [place]  Financial: N/A  Social Supports: [facility] Weekly Activities, [facility] Activities  Other: Seniors Mobility Exercises [facility] Exercise Schedule.    -Follow Up Needs: Ensure pt received resources.      "

## 2023-11-02 NOTE — PROGRESS NOTES
"Subjective:     CC: Diagnoses of Pain, dental and Hypertension associated with type 2 diabetes mellitus (HCC) were pertinent to this visit.    HPI:   Pito presents today with    Problem   Pain, Dental   Hypertension Associated With Type 2 Diabetes Mellitus (Hcc)     ROS:  Review of Systems   Constitutional:  Negative for fever.   HENT:  Positive for sinus pain.         Dental pain   All other systems reviewed and are negative.      Objective:     Exam:  /74 (BP Location: Left arm, Patient Position: Sitting, BP Cuff Size: Small adult)   Pulse 78   Temp 37 °C (98.6 °F) (Temporal)   Ht 1.727 m (5' 8\")   Wt 65.3 kg (144 lb)   SpO2 93%   BMI 21.90 kg/m²  Body mass index is 21.9 kg/m².    Physical Exam  Vitals reviewed.   Constitutional:       General: He is not in acute distress.     Appearance: Normal appearance. He is not ill-appearing.   HENT:      Head: Normocephalic and atraumatic.      Mouth/Throat:      Dentition: Dental tenderness, gingival swelling and dental abscesses present.     Cardiovascular:      Rate and Rhythm: Normal rate.      Pulses: Normal pulses.   Pulmonary:      Effort: Pulmonary effort is normal. No respiratory distress.   Skin:     General: Skin is warm and dry.      Findings: No rash.   Neurological:      General: No focal deficit present.      Mental Status: He is alert and oriented to person, place, and time.   Psychiatric:         Mood and Affect: Mood normal.         Behavior: Behavior normal.         Assessment & Plan:     78 y.o. male with the following -     Problem List Items Addressed This Visit       Hypertension associated with type 2 diabetes mellitus (HCC)     Chronic, stable.  Blood pressure in clinic today 110/74.  Continue metoprolol SR 50 mg daily, spironolactone 25 mg daily         Pain, dental     Acute, unstable. Having severe pain to left upper molar, worse with touching the tooth, eating, brushing teeth. Unable to eat food on the left side, reports radiating " pain into sinus of left face.   Has dentis appointment at the end of the month for evaluation/treatment  Pe with gingival swelling and redness around molars of left upper jaw  Will provide amox-clav 7 day course         Relevant Medications    amoxicillin-clavulanate (AUGMENTIN) 875-125 MG Tab     Patient was educated in proper administration of medication(s) ordered today including safety, possible SE, risks, benefits, rationale and alternatives to therapy.   Supportive care, differential diagnoses, and indications for immediate follow-up discussed with patient.    Pathogenesis of diagnosis discussed including typical length and natural progression.    Instructed to return to clinic or nearest emergency department for any change in condition, further concerns, or worsening of symptoms.  Patient states understanding of the plan of care and discharge instructions.    Return if symptoms worsen or fail to improve.    Please note that this dictation was created using voice recognition software. I have made every reasonable attempt to correct obvious errors, but I expect that there are errors of grammar and possibly content that I did not discover before finalizing the note.

## 2023-11-03 ENCOUNTER — PHARMACY VISIT (OUTPATIENT)
Dept: PHARMACY | Facility: MEDICAL CENTER | Age: 78
End: 2023-11-03
Payer: COMMERCIAL

## 2023-11-03 PROCEDURE — RXMED WILLOW AMBULATORY MEDICATION CHARGE: Performed by: INTERNAL MEDICINE

## 2023-11-07 NOTE — PROGRESS NOTES
Community Health Worker Note    Discussed: CHW Ena spoke with pts wife Cydney. Cydney stated resources were received. Cydney expressed gratitude. CHW encouraged Cydney to reach out with any questions, concerns, or needs. Pt and Cydney have contact information.     Follow-Up Needs: None at this time.    Plan: CHW to not follow. CHW follows wife Cydney, will provide service through her.

## 2023-11-08 DIAGNOSIS — K08.89 PAIN, DENTAL: ICD-10-CM

## 2023-11-08 RX ORDER — AMOXICILLIN AND CLAVULANATE POTASSIUM 875; 125 MG/1; MG/1
1 TABLET, FILM COATED ORAL 2 TIMES DAILY
Qty: 14 TABLET | Refills: 0 | Status: SHIPPED | OUTPATIENT
Start: 2023-11-08 | End: 2023-11-28 | Stop reason: SDUPTHER

## 2023-11-08 NOTE — TELEPHONE ENCOUNTER
Received request via: Patient    Was the patient seen in the last year in this department? Yes    Does the patient have an active prescription (recently filled or refills available) for medication(s) requested? No    Does the patient have residential Plus and need 100 day supply (blood pressure, diabetes and cholesterol meds only)? Medication is not for cholesterol, blood pressure or diabetes

## 2023-11-08 NOTE — PROGRESS NOTES
Patient here today for ENRRIQUE study visit. Patient vitals reviewed.      No AE/ROZINA or Clinical Events of Interest noted.      NYHA 2.      See CRC note for all other study assessments.      Increasing dose of MK-1242(vericiguat) to 5mg or Placebo.     Marcin Steven M.D.

## 2023-11-13 ENCOUNTER — PHARMACY VISIT (OUTPATIENT)
Dept: PHARMACY | Facility: MEDICAL CENTER | Age: 78
End: 2023-11-13
Payer: COMMERCIAL

## 2023-11-13 PROCEDURE — RXMED WILLOW AMBULATORY MEDICATION CHARGE

## 2023-11-14 ENCOUNTER — RESEARCH ENCOUNTER (OUTPATIENT)
Dept: CARDIOLOGY | Facility: MEDICAL CENTER | Age: 78
End: 2023-11-14

## 2023-11-14 ENCOUNTER — HOSPITAL ENCOUNTER (OUTPATIENT)
Dept: LAB | Facility: MEDICAL CENTER | Age: 78
End: 2023-11-14
Attending: NURSE PRACTITIONER
Payer: MEDICARE

## 2023-11-14 ENCOUNTER — OFFICE VISIT (OUTPATIENT)
Dept: CARDIOLOGY | Facility: MEDICAL CENTER | Age: 78
End: 2023-11-14
Attending: NURSE PRACTITIONER
Payer: MEDICARE

## 2023-11-14 VITALS
WEIGHT: 143.4 LBS | SYSTOLIC BLOOD PRESSURE: 104 MMHG | RESPIRATION RATE: 15 BRPM | HEART RATE: 77 BPM | HEIGHT: 68 IN | OXYGEN SATURATION: 97 % | DIASTOLIC BLOOD PRESSURE: 58 MMHG | BODY MASS INDEX: 21.73 KG/M2

## 2023-11-14 DIAGNOSIS — Z79.899 HIGH RISK MEDICATION USE: ICD-10-CM

## 2023-11-14 DIAGNOSIS — I50.9 HEART FAILURE, NYHA CLASS 2 (HCC): ICD-10-CM

## 2023-11-14 DIAGNOSIS — I26.99 PULMONARY EMBOLISM ON RIGHT (HCC): ICD-10-CM

## 2023-11-14 DIAGNOSIS — I50.20 ACC/AHA STAGE C SYSTOLIC HEART FAILURE (HCC): ICD-10-CM

## 2023-11-14 DIAGNOSIS — E78.5 DYSLIPIDEMIA: ICD-10-CM

## 2023-11-14 DIAGNOSIS — I48.0 PAF (PAROXYSMAL ATRIAL FIBRILLATION) (HCC): ICD-10-CM

## 2023-11-14 DIAGNOSIS — D68.69 SECONDARY HYPERCOAGULABLE STATE (HCC): ICD-10-CM

## 2023-11-14 LAB
ANION GAP SERPL CALC-SCNC: 11 MMOL/L (ref 7–16)
BUN SERPL-MCNC: 26 MG/DL (ref 8–22)
CALCIUM SERPL-MCNC: 9.4 MG/DL (ref 8.5–10.5)
CHLORIDE SERPL-SCNC: 104 MMOL/L (ref 96–112)
CO2 SERPL-SCNC: 25 MMOL/L (ref 20–33)
CREAT SERPL-MCNC: 1.4 MG/DL (ref 0.5–1.4)
GFR SERPLBLD CREATININE-BSD FMLA CKD-EPI: 51 ML/MIN/1.73 M 2
GLUCOSE SERPL-MCNC: 107 MG/DL (ref 65–99)
POTASSIUM SERPL-SCNC: 4.7 MMOL/L (ref 3.6–5.5)
SODIUM SERPL-SCNC: 140 MMOL/L (ref 135–145)

## 2023-11-14 PROCEDURE — 99214 OFFICE O/P EST MOD 30 MIN: CPT | Performed by: NURSE PRACTITIONER

## 2023-11-14 PROCEDURE — 3074F SYST BP LT 130 MM HG: CPT | Performed by: NURSE PRACTITIONER

## 2023-11-14 PROCEDURE — 80048 BASIC METABOLIC PNL TOTAL CA: CPT

## 2023-11-14 PROCEDURE — 99213 OFFICE O/P EST LOW 20 MIN: CPT | Performed by: NURSE PRACTITIONER

## 2023-11-14 PROCEDURE — 36415 COLL VENOUS BLD VENIPUNCTURE: CPT

## 2023-11-14 PROCEDURE — 3078F DIAST BP <80 MM HG: CPT | Performed by: NURSE PRACTITIONER

## 2023-11-14 ASSESSMENT — FIBROSIS 4 INDEX: FIB4 SCORE: 1.78

## 2023-11-14 ASSESSMENT — ENCOUNTER SYMPTOMS
FEVER: 0
PALPITATIONS: 0
PND: 0
COUGH: 0
CLAUDICATION: 0
ABDOMINAL PAIN: 0
MYALGIAS: 0
SHORTNESS OF BREATH: 1
DIZZINESS: 0
ORTHOPNEA: 0

## 2023-11-14 NOTE — PROGRESS NOTES
Chief Complaint   Patient presents with    Congestive Heart Failure     F/V DX:ACC/AHA stage C systolic heart failure (HCC)           Subjective     Pito Black is a 78 y.o. male who presents today for follow up on his Heart Failure with his wife, Cydney.    Current patient of the heart failure clinic.  He was last seen in clinic on 9/19/2023.  During that visit, Patient was referred to EP to discuss ICD for primary prevention.    Since that visit, patient was also enrolled in the Charles trial.  Patient did have a screening follow-up on 11/2/2023.    Patient feels well, denies chest pain, shortness of breath, palpitations, dizziness/lightheadedness, orthopnea, PND or Edema.       His home weights are stable around 138 lbs.     He does report a low-sodium diet.    He reports compliance with his medications.    Pt is not exercising or walking much, but is considering getting a treadmill.    Patient hoping to go to [facility] next year to take his family to the place where he was born.    Additionally, patient has the following medical problems:     -Hospitalization from 3/24/2023 through 3/26/2023 with shortness of breath.  Patient was found to be hypoxic and required oxygen.  CTA was negative for PE, but did show worsening bilateral pleural effusions L>R.  He had a left thoracentesis.  He was started on Lasix and Aldactone.  Patient was discharged off oxygen.    -hospitalization from 3/17/2023 through 3/19/2023 for shortness of breath and had an echocardiogram which showed an EF of 35%.  Chest x-ray showed pulmonary edema.  Patient was started on IV diuretics.  He did have another thoracentesis.  Patient was found to be in LOCO.  Patient also had nonsustained runs of VT/PSVT.  Cardiology was consulted. Toprol was started for his heart failure, tachycardia and frequent ectopy    -Hospitalization from 2/20/2023 through 2/22/2023.  He presented with shortness of breath.  During this visit, patient, he was found to  have a PE and influenza positive and started on antibiotic.    -Hospitalization from 1/31/2023 through 2/2/2023.  Patient presented with to [facility] with shortness of breath and found to have bilateral pleural effusions, right upper lobe mass and concern for left upper lobe pneumonia.  He was started on antibiotics and diuresed with Lasix.  He had a thoracentesis on 2/1/2023 with 1 L removed.  Patient had a recent immunotherapy.  Patient was diagnosed with acute hypoxic respiratory failure likely secondary to community-acquired pneumonia versus malignancy related to pleural effusions versus postradiation pneumonitis versus immunotherapy induced pneumonitis.    -History of lung cancer in remission, s/p wedge resection in 2018, had chemotherapy and radiation    -Hypertension    -Atrial fibrillation: Taking Eliquis    -CKD, stage 3    Past Medical History:   Diagnosis Date    Acute respiratory failure with hypoxia ([facility]) 02/01/2023    Arrhythmia     hx of a fib    Arthritis 2015    generalized, DDD back    Asthma     inhaler     Backpain 08/06/2018    9/10    Cancer ([facility]) 07/2017    left lung    Cataract     bilateral, no surgery    Congestive heart failure ([facility])     Dental disorder     lower partial, removable bridge    Diabetes 08/06/2018    on no meds at this time, diet controlled    Heart valve disease     mild mitral valve prolapse    High cholesterol     Hypertension     Type 2 diabetes mellitus with hyperglycemia ([facility]) 3/17/2023    This is a chronic condition. Current medications: Insulin:  Biguanide: took metformin historically will restart metformin xr 750 mg daily. GLP1-RA:   SGLT-2i:    Consider for cardiorenal protection DPP4-I:  TZD:  Pk: Sulfonyluria:   Last A1c: 6/2/23 6.5% Last Microalb/Cr ratio: 6/2/23 <1.2 Fasting sugars: Last diabetic foot exam: 6/19/23 Last retinal eye exam: has upcoming appointment with optome     Past Surgical History:   Procedure Laterality Date    OK  "BRONCHOSCOPY,DIAGNOSTIC  7/1/2021    Procedure: BRONCHOSCOPY - FIBER OPTIC WITH BRANCHOALVEOLAR LAVAGE BIOPSY, FNA, NAVIGATION;  Surgeon: Vinayak Carbajal M.D.;  Location: SURGERY [facility];  Service: Pulmonary    ENDOBRONCHIAL US ADD-ON  7/1/2021    Procedure: ENDOBRONCHIAL ULTRASOUND (EBUS).;  Surgeon: Vinayak Carbajal M.D.;  Location: SURGERY [facility];  Service: Pulmonary    CATARACT PHACO WITH IOL Left 8/21/2018    Procedure: CATARACT PHACO WITH IOL;  Surgeon: Juanito Hager M.D.;  Location: SURGERY SAME DAY [facility];  Service: Ophthalmology    CATARACT PHACO WITH IOL Right 8/7/2018    Procedure: CATARACT PHACO WITH IOL;  Surgeon: Juanito Hager M.D.;  Location: SURGERY SAME DAY [facility];  Service: Ophthalmology    THORACOSCOPY Left 4/19/2018    Procedure: THORACOSCOPY- WEDGE BIOPSY W/FROZEN SECTION;  Surgeon: Cristhian Galeano M.D.;  Location: SURGERY [facility];  Service: Thoracic    EAR MIDDLE EXPLORATION Right 6/12/2015    Procedure: EAR MIDDLE EXPLORATION;  Surgeon: William Brandon M.D.;  Location: SURGERY SAME DAY [facility];  Service:     OSSICULAR RECONSTRUCTION Right 6/12/2015    Procedure: OSSICULAR RECONSTRUCTION CHAIN POSSIBLE;  Surgeon: William Brandon M.D.;  Location: SURGERY SAME DAY [facility];  Service:     OTHER      ear replacement \"many years ago\"    OTHER ORTHOPEDIC SURGERY      right knee replacement 2005     Family History   Problem Relation Age of Onset    Stroke Mother     Hypertension Mother     Diabetes Mother     Cancer Father         stomach cancer    Heart Disease Brother     Alcohol abuse Brother     Ovarian Cancer Neg Hx     Tubal Cancer Neg Hx     Peritoneal Cancer Neg Hx     Colorectal Cancer Neg Hx     Breast Cancer Neg Hx     Hyperlipidemia Neg Hx      Social History     Socioeconomic History    Marital status:      Spouse name: Not on file    Number of children: Not on file    Years of education: Not on file    Highest education level: " 12th grade   Occupational History    Not on file   Tobacco Use    Smoking status: Former     Current packs/day: 0.00     Average packs/day: 0.3 packs/day for 40.0 years (10.0 ttl pk-yrs)     Types: Cigarettes     Start date: 1965     Quit date: 2005     Years since quittin.8    Smokeless tobacco: Never   Vaping Use    Vaping Use: Never used   Substance and Sexual Activity    Alcohol use: Not Currently     Alcohol/week: 12.6 - 21.0 oz     Types: 21 - 35 Cans of beer per week     Comment: 12 beers a day (coors), quit 2/3/2023    Drug use: No    Sexual activity: Not on file   Other Topics Concern    Not on file   Social History Narrative    Not on file     Social Determinants of Health     Financial Resource Strain: Low Risk  (10/30/2023)    Overall Financial Resource Strain (CARDIA)     Difficulty of Paying Living Expenses: Not very hard   Food Insecurity: No Food Insecurity (10/30/2023)    Hunger Vital Sign     Worried About Running Out of Food in the Last Year: Never true     Ran Out of Food in the Last Year: Never true   Transportation Needs: No Transportation Needs (10/30/2023)    PRAPARE - Transportation     Lack of Transportation (Medical): No     Lack of Transportation (Non-Medical): No   Physical Activity: Inactive (10/30/2023)    Exercise Vital Sign     Days of Exercise per Week: 0 days     Minutes of Exercise per Session: 0 min   Stress: Stress Concern Present (10/20/2023)    [employer] - Occupational Stress Questionnaire     Feeling of Stress : To some extent   Social Connections: Moderately Isolated (10/30/2023)    Social Connection and Isolation Panel [NHANES]     Frequency of Communication with Friends and Family: More than three times a week     Frequency of Social Gatherings with Friends and Family: Once a week     Attends Sikhism Services: Never     Active Member of Clubs or Organizations: No     Attends Club or Organization Meetings: Never     Marital  Status:    Intimate Partner Violence: Not on file   Housing Stability: Low Risk  (10/30/2023)    Housing Stability Vital Sign     Unable to Pay for Housing in the Last Year: No     Number of Places Lived in the Last Year: 1     Unstable Housing in the Last Year: No     No Known Allergies  Outpatient Encounter Medications as of 11/14/2023   Medication Sig Dispense Refill    amoxicillin-clavulanate (AUGMENTIN) 875-125 MG Tab Take 1 Tablet by mouth 2 times a day. 14 Tablet 0    vericiguat or PLACEBO (STUDY DRUG) 10 MG tablet Take 1 Tablet by mouth every day. Participant ID #[id number] 33 Tablet 11    fluticasone-salmeterol (ADVAIR DISKUS) 250-50 MCG/ACT AEROSOL POWDER, BREATH ACTIVATED Inhale 1 Puff 2 times a day. Rinse mouth after each use 180 Each 3    apixaban (ELIQUIS) 5mg Tab Take 1 Tablet by mouth 2 times a day. 180 Tablet 3    metoprolol SR (TOPROL XL) 50 MG TABLET SR 24 HR Take 1 Tablet by mouth every day for 100 days. 100 Tablet 3    dapagliflozin propanediol (FARXIGA) 10 MG Tab Take 1 tablet by mouth every day. 100 Tablet 3    ezetimibe (ZETIA) 10 MG Tab Take 1 Tablet by mouth every day. 100 Tablet 3    spironolactone (ALDACTONE) 25 MG Tab Take 1 Tablet by mouth every day for 100 days. 100 Tablet 3    metFORMIN ER (GLUCOPHAGE XR) 750 MG TABLET SR 24 HR Take 1 tablet by mouth every day. 100 Tablet 3    cyclobenzaprine (FLEXERIL) 10 mg Tab Take 1 Tablet by mouth at bedtime. 100 Tablet 3    Cholecalciferol (VITAMIN D3) 2000 UNIT Cap Take 1 Capsule by mouth every day. 100 Capsule 3    diphenhydrAMINE-APAP, sleep, (TYLENOL PM EXTRA STRENGTH)  MG Tab Take 1-2 Tablets by mouth at bedtime as needed (Mild Pain or Trouble Sleeping).      COVID-19 mRNA Vac-Martha,Pfizer, (COVID-19 VACCINE) 30 MCG/0.3ML Suspension injection Inject  into the shoulder, thigh, or buttocks. (Patient not taking: Reported on 11/14/2023) 0.3 mL 0     No facility-administered encounter medications on file as of 11/14/2023.     Review of  "Systems   Constitutional:  Negative for fever and malaise/fatigue.   Respiratory:  Positive for shortness of breath. Negative for cough.    Cardiovascular:  Negative for chest pain, palpitations, orthopnea, claudication, leg swelling and PND.   Gastrointestinal:  Negative for abdominal pain.   Musculoskeletal:  Negative for myalgias.   Neurological:  Negative for dizziness.   All other systems reviewed and are negative.           Objective     /58 (BP Location: Left arm, Patient Position: Sitting, BP Cuff Size: Adult)   Pulse 77   Resp 15   Ht 1.727 m (5' 8\")   Wt 65 kg (143 lb 6.4 oz)   SpO2 97%   BMI 21.80 kg/m²     Physical Exam  Vitals reviewed.   Constitutional:       Appearance: He is well-developed.   HENT:      Head: Normocephalic and atraumatic.   Eyes:      Pupils: Pupils are equal, round, and reactive to light.   Neck:      Vascular: No JVD.   Cardiovascular:      Rate and Rhythm: Normal rate. Rhythm irregular.      Heart sounds: Normal heart sounds.   Pulmonary:      Effort: Pulmonary effort is normal. No respiratory distress.      Breath sounds: Normal breath sounds. No wheezing or rales.   Abdominal:      General: Bowel sounds are normal.      Palpations: Abdomen is soft.   Musculoskeletal:         General: Normal range of motion.      Cervical back: Normal range of motion and neck supple.      Right lower leg: No edema.      Left lower leg: No edema.   Skin:     General: Skin is warm and dry.   Neurological:      General: No focal deficit present.      Mental Status: He is alert and oriented to person, place, and time.   Psychiatric:         Behavior: Behavior normal.       Lab Results   Component Value Date/Time    CHOLSTRLTOT 148 06/02/2023 09:42 AM    LDL 92 06/02/2023 09:42 AM    HDL 44 06/02/2023 09:42 AM    TRIGLYCERIDE 58 06/02/2023 09:42 AM       Lab Results   Component Value Date/Time    SODIUM 137 09/15/2023 11:24 AM    POTASSIUM 5.0 09/15/2023 11:24 AM    CHLORIDE 103 09/15/2023 " 11:24 AM    CO2 23 09/15/2023 11:24 AM    GLUCOSE 111 (H) 09/15/2023 11:24 AM    BUN 27 (H) 09/15/2023 11:24 AM    CREATININE 1.41 (H) 09/15/2023 11:24 AM     Lab Results   Component Value Date/Time    ALKPHOSPHAT 74 06/02/2023 09:40 AM    ASTSGOT 27 09/21/2023 11:31 AM    ALTSGPT 31 09/21/2023 11:31 AM    TBILIRUBIN 0.4 09/21/2023 11:31 AM      Transthoracic Echo Report 2/7/2018  Normal left ventricular systolic function. Left ventricular ejection fraction is visually estimated to be 55%.   Diastolic function is difficult to assess with atrial fibrillation.  Mild aortic insufficiency.   Mild mitral regurgitation.  No prior study is available for comparison.      Biotel 7/277/2021-8/9/2021  SUMMARY   9 days of monitoring demonstrated sinus rhythm with frequent atrial ectopy.  Occasional ventricular ectopy including up to 14 beats of monomorphic VT which has similar morphology to PVCs.  Symptoms correlated with ventricular tachycardia and atrial tachycardia as well as frequent ectopy.     Transthoracic Echo Report 3/17/2023  Prior echocardiogram 2/7/2018, there is now reduction in left ventricular systolic function and presence of moderate mitral regurgitation.  Severely reduced left ventricular systolic function. The left   ventricular ejection fraction is visually estimated to be 30 to 35%.   Global hypokinesis with regional variation.  Mild aortic insufficiency.  The aortic valve is not well visualized. Calcified aortic valve leaflets. Transvalvular gradients are - Peak: 13 mmHg, Mean: 7 mmHg.    Aortic valve gradients may be underestimated due to low ejection fraction.   Moderate mitral regurgitation.   Normal aortic root for body surface area. The ascending aorta diameter is 3.2 cm.    Transthoracic Echo Report 9/7/2023  Moderately reduced left ventricular systolic function. The left   ventricular ejection fraction is visually estimated to be 35%.  Normal right ventricular size and systolic function.  Moderately  dilated left atrium.  Moderate mitral regurgitation.  Mild aortic insufficiency.  No pericardial effusion.     Compared to the prior study on 3/17/2023, similar findings.    Stress test 9/15/2023   PET IMAGING INTERPRETATION   No evidence of significant jeopardized viable myocardium or prior myocardial    infarction.   LVEF calculated to be low at 41% but possibly to due to PVCs, correlate clinically       Date 6MWT MLWHF   4/4/2023 292 m in 6 minutes 41   5/9/2023 --- 60                Assessment & Plan     1. ACC/AHA stage C systolic heart failure (HCC)  EC-ECHOCARDIOGRAM COMPLETE W/O CONT      2. Heart failure, NYHA class 2 (HCC)  EC-ECHOCARDIOGRAM COMPLETE W/O CONT      3. Pulmonary embolism on right (HCC)        4. PAF (paroxysmal atrial fibrillation) (HCC)        5. High risk medication use        6. Secondary hypercoagulable state (HCC)        7. Dyslipidemia            Medical Decision Making: Today's Assessment/Status/Plan:        HFrEF, Stage C, Class 2, LVEF 35%: Based on physical examination findings, patient is euvolemic. No JVD, lungs are clear to auscultation, no pitting edema in bilateral lower extremities, no ascites.   -Heart failure due to unknown cause, ischemic work-up negative, could consider PVC induced  -Discussed Heart failure trajectory and prognosis with patient. Will continue to optimize medical therapy as tolerated. Advanced HF treatment, need for remote monitoring consideration at every visit.   -ACE-I/ARB/ARNI: Consider starting this in the future if BP improves  -Evidence Based Beta-blocker: Continue metoprolol SR 50 mg daily, consider increasing if BP allows in the future for HF and PVCs  -Aldosterone Antagonist: Continue spironolactone 25 mg daily  -Diuretic: Continue furosemide up to 40 mg daily as needed, patient reports no recent use  -SGLT2 inhibitor: Continue Farxiga 10 mg daily  -Other: Consider as needed (Hydralazine/Isosorbide, Vericiguat, Corlanor) patient a part of [facility]  trial he is either taking Vericiguat versus placebo  -Labs: Patient to complete BMP this week, Will continue to closely monitor for side effects of patient's high risk medication(s) including renal function, liver function NTproBNP/cardiac markers, and electrolytes as needed  -discussed importance of exercise and regular activity  -Discussed/reviewed maintaining a low Sodium and hydration recommendations  -Patient does have follow-up visit with EP this Friday to discuss ICD for primary prevention  -moderate MR seen on echo: Will discuss referral to structural heart clinic for Mitraclip evaluation as EF or MR did not improve, at this time we will hold off on referral as patient euvolemic and no significant symptoms  -Repeat echo at this time  -Reinforced s/sx of worsening heart failure with patient and weight monitoring. Pt verbalizes understanding. Pt to call office or RTC if present.    -PUMP line number [phone] (PUMP) reviewed with patient  -Heart Failure Education:   Discussed the Definition of heart failure (linking disease, symptoms, and treatment) and causes of heart failure  Recognition of escalating symptoms and concrete plan for response to particular symptoms  Risk modification for heart failure progression  Specific diet recommendations: Continue low-sodium diet and adequate hydration   discussed patient to Stay active  Importance of treatment adherence  Behavioral strategies to promote treatment adherence  Patient declines formal education at this time  -Advanced care planning: Advance directive and POLST to be discussed at future visit  -Pharmacotherapy Referral: Patient not referred to pharmacotherapy at this time  -Compliance Barriers: None   -Genetic testing Consideration: none  -Consideration for LVAD and/or Heart transplant as necessary     -Discussed with patient about the Charles trial that he is a good candidate, patient is interested.  Discussed that research staff will be reaching out to  patient to schedule appointment and obtain consent.  Current questions answered at this time    PE:  -Continue Eliquis 5 mg twice a day     bilateral pleural effusion:  -s/p thoracentesis x3  -Will follow    Paroxysmal A-fib:  -Continue Eliquis 5 mg twice a day  -Continue metoprolol succinate 50 mg daily    Dyslipidemia:  -Recent LDL 92 on 6/2/2023  -Continue ezetimibe 10 mg daily    New onset diabetes:  -Recently started on metformin  -Followed by PCP    FU in clinic in 2 to 3 months after echo and labs. Sooner if needed.    Patient verbalizes understanding and agrees with the plan of care.     PLEASE NOTE: This Note was created using voice recognition Software. I have made every reasonable attempt to correct obvious errors, but I expect that there are errors of grammar and possibly content that I did not discover before finalizing the note

## 2023-11-14 NOTE — RESEARCH NOTE
Pito here today for SOC visit in Cardiology. He returned 1 study medication 2.5 or placebo pill from previous bottle that was accidentally left in pill box.

## 2023-11-17 ENCOUNTER — OFFICE VISIT (OUTPATIENT)
Dept: CARDIOLOGY | Facility: MEDICAL CENTER | Age: 78
End: 2023-11-17
Attending: STUDENT IN AN ORGANIZED HEALTH CARE EDUCATION/TRAINING PROGRAM
Payer: MEDICARE

## 2023-11-17 ENCOUNTER — TELEPHONE (OUTPATIENT)
Dept: CARDIOLOGY | Facility: MEDICAL CENTER | Age: 78
End: 2023-11-17

## 2023-11-17 VITALS
OXYGEN SATURATION: 95 % | HEIGHT: 68 IN | BODY MASS INDEX: 21.52 KG/M2 | RESPIRATION RATE: 14 BRPM | SYSTOLIC BLOOD PRESSURE: 116 MMHG | WEIGHT: 142 LBS | DIASTOLIC BLOOD PRESSURE: 64 MMHG | HEART RATE: 83 BPM

## 2023-11-17 DIAGNOSIS — I50.20 ACC/AHA STAGE C SYSTOLIC HEART FAILURE (HCC): ICD-10-CM

## 2023-11-17 PROCEDURE — 3074F SYST BP LT 130 MM HG: CPT | Performed by: INTERNAL MEDICINE

## 2023-11-17 PROCEDURE — 99215 OFFICE O/P EST HI 40 MIN: CPT | Performed by: INTERNAL MEDICINE

## 2023-11-17 PROCEDURE — 93005 ELECTROCARDIOGRAM TRACING: CPT | Performed by: INTERNAL MEDICINE

## 2023-11-17 PROCEDURE — 3078F DIAST BP <80 MM HG: CPT | Performed by: INTERNAL MEDICINE

## 2023-11-17 PROCEDURE — 99213 OFFICE O/P EST LOW 20 MIN: CPT | Performed by: INTERNAL MEDICINE

## 2023-11-17 ASSESSMENT — FIBROSIS 4 INDEX: FIB4 SCORE: 1.78

## 2023-11-17 NOTE — PROGRESS NOTES
Arrhythmia Clinic Note (New patient)     DOS: 11/17/2023    Referring physician: Dr Bhatti    Chief complaint/Reason for consult: Heart failure    HPI: 77 y/o M with chronic systolic heart failure and NICM EF 30-35%. Referred to discuss ICD. Has CHAPIN but no chest pain. Family present with him. Prior lung cancer in remission.     ROS (+ highlighted in bold):  Constitutional: Fevers/chills/fatigue/weightloss  HEENT: Blurry vision/eye pain/sore throat/hearing loss  Respiratory: Shortness of breath/cough  Cardiovascular: Chest pain/palpitations/edema/orthopnea/syncope  GI: Nausea/vomitting/diarrhea  MSK: Arthralgias/myagias/muscle weakness  Skin: Rash/sores  Neurological: Numbness/tremors/vertigo  Endocrine: Excessive thirst/polyuria/cold intolerance/heat intolerance  Psych: Depression/anxiety    Past Medical History:   Diagnosis Date    Acute respiratory failure with hypoxia ([facility]) 02/01/2023    Arrhythmia     hx of a fib    Arthritis 2015    generalized, DDD back    Asthma     inhaler     Backpain 08/06/2018    9/10    Cancer (Former[facility]) 07/2017    left lung    Cataract     bilateral, no surgery    Congestive heart failure (HCC)     Dental disorder     lower partial, removable bridge    Diabetes 08/06/2018    on no meds at this time, diet controlled    Heart valve disease     mild mitral valve prolapse    High cholesterol     Hypertension     Type 2 diabetes mellitus with hyperglycemia ([facility]) 3/17/2023    This is a chronic condition. Current medications: Insulin:  Biguanide: took metformin historically will restart metformin xr 750 mg daily. GLP1-RA:   SGLT-2i:    Consider for cardiorenal protection DPP4-I:  TZD:  Pk: Sulfonyluria:   Last A1c: 6/2/23 6.5% Last Microalb/Cr ratio: 6/2/23 <1.2 Fasting sugars: Last diabetic foot exam: 6/19/23 Last retinal eye exam: has upcoming appointment with optome       Past Surgical History:   Procedure Laterality Date    AL BRONCHOSCOPY,DIAGNOSTIC  7/1/2021    Procedure: BRONCHOSCOPY - FIBER  "OPTIC WITH BRANCHOALVEOLAR LAVAGE BIOPSY, FNA, NAVIGATION;  Surgeon: Vinayak Carbajal M.D.;  [facility];  Service: Pulmonary    ENDOBRONCHIAL US ADD-ON  2021    Procedure: ENDOBRONCHIAL ULTRASOUND (EBUS).;  Surgeon: Vinayak Carbajal M.D.;  [facility];  Service: Pulmonary    CATARACT PHACO WITH IOL Left 2018    Procedure: CATARACT PHACO WITH IOL;  Surgeon: Juanito Hager M.D.;  Location: SURGERY [facility] DAY [facility];  Service: Ophthalmology    CATARACT PHACO WITH IOL Right 2018    Procedure: CATARACT PHACO WITH IOL;  Surgeon: Juanito Hager M.D.;  Location: SURGERY [facility] DAY [facility];  Service: Ophthalmology    THORACOSCOPY Left 2018    Procedure: THORACOSCOPY- WEDGE BIOPSY W/FROZEN SECTION;  Surgeon: Cristhian Galeano M.D.;  Location: [facility];  Service: Thoracic    EAR MIDDLE EXPLORATION Right 2015    Procedure: EAR MIDDLE EXPLORATION;  Surgeon: William Brandon M.D.;  Location: SURGERY [facility] DAY [facility];  Service:     OSSICULAR RECONSTRUCTION Right 2015    Procedure: OSSICULAR RECONSTRUCTION CHAIN POSSIBLE;  Surgeon: William Brandon M.D.;  Location: SURGERY [facility];  Service:     OTHER      ear replacement \"many years ago\"    OTHER ORTHOPEDIC SURGERY      right knee replacement        Social History     Socioeconomic History    Marital status:      Spouse name: Not on file    Number of children: Not on file    Years of education: Not on file    Highest education level: 12th grade   Occupational History    Not on file   Tobacco Use    Smoking status: Former     Current packs/day: 0.00     Average packs/day: 0.3 packs/day for 40.0 years (10.0 ttl pk-yrs)     Types: Cigarettes     Start date: 1965     Quit date: 2005     Years since quittin.8    Smokeless tobacco: Never   Vaping Use    Vaping Use: Never used   Substance and Sexual Activity    Alcohol use: Not Currently     " Alcohol/week: 12.6 - 21.0 oz     Types: 21 - 35 Cans of beer per week     Comment: 12 beers a day (coors), quit 2/3/2023    Drug use: No    Sexual activity: Not on file   Other Topics Concern    Not on file   Social History Narrative    Not on file     Social Determinants of Health     Financial Resource Strain: Low Risk  (10/30/2023)    Overall Financial Resource Strain (CARDIA)     Difficulty of Paying Living Expenses: Not very hard   Food Insecurity: No Food Insecurity (10/30/2023)    Hunger Vital Sign     Worried About Running Out of Food in the Last Year: Never true     Ran Out of Food in the Last Year: Never true   Transportation Needs: No Transportation Needs (10/30/2023)    PRAPARE - Transportation     Lack of Transportation (Medical): No     Lack of Transportation (Non-Medical): No   Physical Activity: Inactive (10/30/2023)    Exercise Vital Sign     Days of Exercise per Week: 0 days     Minutes of Exercise per Session: 0 min   Stress: Stress Concern Present (10/20/2023)    [employer] [place] [employer] - Occupational Stress Questionnaire     Feeling of Stress : To some extent   Social Connections: Moderately Isolated (10/30/2023)    Social Connection and Isolation Panel [NHANES]     Frequency of Communication with Friends and Family: More than three times a week     Frequency of Social Gatherings with Friends and Family: Once a week     Attends Gnosticism Services: Never     Active Member of Clubs or Organizations: No     Attends Club or Organization Meetings: Never     Marital Status:    Intimate Partner Violence: Not on file   Housing Stability: Low Risk  (10/30/2023)    Housing Stability Vital Sign     Unable to Pay for Housing in the Last Year: No     Number of Places Lived in the Last Year: 1     Unstable Housing in the Last Year: No       Family History   Problem Relation Age of Onset    Stroke Mother     Hypertension Mother     Diabetes Mother     Cancer Father         stomach  cancer    Heart Disease Brother     Alcohol abuse Brother     Ovarian Cancer Neg Hx     Tubal Cancer Neg Hx     Peritoneal Cancer Neg Hx     Colorectal Cancer Neg Hx     Breast Cancer Neg Hx     Hyperlipidemia Neg Hx        No Known Allergies    Current Outpatient Medications   Medication Sig Dispense Refill    amoxicillin-clavulanate (AUGMENTIN) 875-125 MG Tab Take 1 Tablet by mouth 2 times a day. 14 Tablet 0    COVID-19 mRNA Vac-Martha,Pfizer, (COVID-19 VACCINE) 30 MCG/0.3ML Suspension injection Inject  into the shoulder, thigh, or buttocks. 0.3 mL 0    vericiguat or PLACEBO (STUDY DRUG) 10 MG tablet Take 1 Tablet by mouth every day. Participant ID #[id number] 33 Tablet 11    fluticasone-salmeterol (ADVAIR DISKUS) 250-50 MCG/ACT AEROSOL POWDER, BREATH ACTIVATED Inhale 1 Puff 2 times a day. Rinse mouth after each use 180 Each 3    apixaban (ELIQUIS) 5mg Tab Take 1 Tablet by mouth 2 times a day. 180 Tablet 3    metoprolol SR (TOPROL XL) 50 MG TABLET SR 24 HR Take 1 Tablet by mouth every day for 100 days. 100 Tablet 3    dapagliflozin propanediol (FARXIGA) 10 MG Tab Take 1 tablet by mouth every day. 100 Tablet 3    ezetimibe (ZETIA) 10 MG Tab Take 1 Tablet by mouth every day. 100 Tablet 3    spironolactone (ALDACTONE) 25 MG Tab Take 1 Tablet by mouth every day for 100 days. 100 Tablet 3    metFORMIN ER (GLUCOPHAGE XR) 750 MG TABLET SR 24 HR Take 1 tablet by mouth every day. 100 Tablet 3    cyclobenzaprine (FLEXERIL) 10 mg Tab Take 1 Tablet by mouth at bedtime. 100 Tablet 3    Cholecalciferol (VITAMIN D3) 2000 UNIT Cap Take 1 Capsule by mouth every day. 100 Capsule 3    diphenhydrAMINE-APAP, sleep, (TYLENOL PM EXTRA STRENGTH)  MG Tab Take 1-2 Tablets by mouth at bedtime as needed (Mild Pain or Trouble Sleeping).       No current facility-administered medications for this visit.       Physical Exam:  Vitals:    11/17/23 1243   BP: 116/64   BP Location: Left arm   Patient Position: Sitting   BP Cuff Size: Adult   Pulse:  "83   Resp: 14   SpO2: 95%   Weight: 64.4 kg (142 lb)   Height: 1.727 m (5' 8\")     General appearance: NAD, conversant   Eyes: anicteric sclerae, moist conjunctivae; no lid-lag; PERRLA  HENT: Atraumatic; oropharynx clear with moist mucous membranes and no mucosal ulcerations; normal hard and soft palate  Neck: Trachea midline; FROM, supple, no thyromegaly or lymphadenopathy  Lungs: CTA, with normal respiratory effort and no intercostal retractions  CV: RRR, no MRGs, no JVD   Abdomen: Soft, non-tender; no masses or HSM  Extremities: No peripheral edema or extremity lymphadenopathy  Skin: Normal temperature, turgor and texture; no rash, ulcers or subcutaneous nodules  Psych: Appropriate affect, alert and oriented to person, place and time    Data:  Lipids:   Lab Results   Component Value Date/Time    CHOLSTRLTOT 148 06/02/2023 09:42 AM    TRIGLYCERIDE 58 06/02/2023 09:42 AM    HDL 44 06/02/2023 09:42 AM    LDL 92 06/02/2023 09:42 AM        BMP:  Lab Results   Component Value Date/Time    SODIUM 140 11/14/2023 1106    POTASSIUM 4.7 11/14/2023 1106    CHLORIDE 104 11/14/2023 1106    CO2 25 11/14/2023 1106    GLUCOSE 107 (H) 11/14/2023 1106    BUN 26 (H) 11/14/2023 1106    CREATININE 1.40 11/14/2023 1106    CALCIUM 9.4 11/14/2023 1106    ANION 11.0 11/14/2023 1106        TSH:   Lab Results   Component Value Date/Time    TSHULTRASEN 1.100 06/02/2023 0940        THYROXINE (T4):   No results found for: \"FREEDIR\"     CBC:   Lab Results   Component Value Date/Time    WBC 7.9 06/02/2023 09:40 AM    RBC 4.97 06/02/2023 09:40 AM    HEMOGLOBIN 16.0 06/02/2023 09:40 AM    HEMATOCRIT 47.7 06/02/2023 09:40 AM    MCV 96.0 06/02/2023 09:40 AM    MCH 32.2 06/02/2023 09:40 AM    MCHC 33.5 06/02/2023 09:40 AM    RDW 47.6 06/02/2023 09:40 AM    PLATELETCT 212 06/02/2023 09:40 AM    MPV 10.4 06/02/2023 09:40 AM    NEUTSPOLYS 54.20 06/02/2023 09:40 AM    LYMPHOCYTES 32.10 06/02/2023 09:40 AM    MONOCYTES 8.50 06/02/2023 09:40 AM    " EOSINOPHILS 4.30 06/02/2023 09:40 AM    EOSINOPHILS 14 03/25/2023 01:40 PM    BASOPHILS 0.40 06/02/2023 09:40 AM    IMMGRAN 0.50 06/02/2023 09:40 AM    NRBC 0.00 06/02/2023 09:40 AM    NEUTS 4.30 06/02/2023 09:40 AM    LYMPHS 2.54 06/02/2023 09:40 AM    LYMPHS 4 03/25/2023 01:40 PM    MONOS 0.67 06/02/2023 09:40 AM    EOS 0.34 06/02/2023 09:40 AM    BASO 0.03 06/02/2023 09:40 AM    IMMGRANAB 0.04 06/02/2023 09:40 AM    NRBCAB 0.00 06/02/2023 09:40 AM        CBC w/o DIFF  Lab Results   Component Value Date/Time    WBC 7.9 06/02/2023 09:40 AM    RBC 4.97 06/02/2023 09:40 AM    HEMOGLOBIN 16.0 06/02/2023 09:40 AM    MCV 96.0 06/02/2023 09:40 AM    MCH 32.2 06/02/2023 09:40 AM    MCHC 33.5 06/02/2023 09:40 AM    RDW 47.6 06/02/2023 09:40 AM    MPV 10.4 06/02/2023 09:40 AM       Prior echo/stress results reviewed: EF <35%      EKG interpreted by me: SR with PVC    Event monitor: 5% burden PVC    Impression/Plan:  1. ACC/AHA stage C systolic heart failure (HCC)  EKG    CL-IMPLANTABLE CARDIOVERTER DEFIBRILLATOR        NICM EF <35%  Chronic systolic heart failure    - Discussed primary prevention ICD given NICM and HFrEF NYHA II, GDMT >3 months without recent revasc. The risk, benefits, and alternatives to ICD placement were discussed in great detail, specific risks mentioned including bleeding, infection, cardiac perforation with possible tamponade requiring pericardiocentesis or open heart surgery. The [place] patient decision making tool was used to decide on ICD implantation.  In addition the possibility of lead dislodgment, inappropriate shocks, pneumothorax, hemothorax were discussed. Also mentioned were the possibility of death, stroke, and myocardial infarction. The patient verbalized understanding of these potential complications and wishes to proceed with this procedure.     Plan single chamber ICD      Juanito Maynard MD  Cardiac Electrophysiology

## 2023-11-21 ENCOUNTER — APPOINTMENT (OUTPATIENT)
Dept: ADMISSIONS | Facility: MEDICAL CENTER | Age: 78
End: 2023-11-21
Attending: INTERNAL MEDICINE
Payer: MEDICARE

## 2023-11-21 NOTE — TELEPHONE ENCOUNTER
Case emailed to cath lab scheduling.   
Gayatri Lora, Med Ass't  Christina Ledesma         Previous Messages       ----- Message -----  From: Juanito Maynard M.D.  Sent: 11/17/2023   1:34 PM PST  To: Gayatri Lora, Med Ass't    Please schedule ICD with mart Flores 2 days thanks   
Patient is scheduled for a ICD Insert on 12- with Dr. Maynard. Patient has been instructed to check in at 1:00 pm for 3:00 procedure. Instructed to hold Eliquis 2 days prior. Hold Metformin the morning of the procedure. Message sent to authorizations. Sent [employer] message to pt with instructions. Nina @ "[employer] notified of case. TRANI sent to Caesar.  
no

## 2023-11-24 LAB — EKG IMPRESSION: NORMAL

## 2023-11-24 PROCEDURE — 93010 ELECTROCARDIOGRAM REPORT: CPT | Performed by: INTERNAL MEDICINE

## 2023-11-27 DIAGNOSIS — K08.89 PAIN, DENTAL: ICD-10-CM

## 2023-11-27 PROCEDURE — RXMED WILLOW AMBULATORY MEDICATION CHARGE: Performed by: INTERNAL MEDICINE

## 2023-11-27 RX ORDER — AMOXICILLIN AND CLAVULANATE POTASSIUM 875; 125 MG/1; MG/1
1 TABLET, FILM COATED ORAL 2 TIMES DAILY
Qty: 14 TABLET | Refills: 0 | OUTPATIENT
Start: 2023-11-27

## 2023-11-27 NOTE — TELEPHONE ENCOUNTER
Contact:  Phone number:[phone] (mobile)    Name of person spoken with and relationship to patient: CYDNEY, WIFE    Patient’s Adherence:  How patient is doing on medication: Very Well    How many missed doses and reason: 0 N/A    Any new medications: no    Any new conditions: no    Any new allergies: no    Any new side effects: no    Any new diagnoses: no    How many doses remainin    Did patient want to speak with pharmacist: No   Delivery:  Delivery date and method: 2023 via [facility] [address]    Needs by Date: 2024    Signature required: No     Any additional details for :  PLEASE KNOCK AT DOOR, AND HAND IT TO PT.   Teach Appointment Date:  N/A   Shipping Address:  [address] [place] [id number]   Medication(name,strength and dose):  FARXIGA 10MG TABLETS    Copay:  $135.32   Payment Method:  Credit card on file   Supplies:  N/A   Additional Information:  Called and spoke to Pt's Wife, Cydney regarding with Pt's medication refill. Per Cydney, she states that Pt is running low with the medication for his Farxiga, and she requested to fill one of her medications---Ropinorole. This liaison initiated the process and asked if Pt is okay to set deliveries out for both medications on . Pt's wife agreed to ship it out for . Expected delivered date will be . Pt's wife has no questions or concerns at this time.        BRUCE Corrales, PhT  Pharmacy Liaison (Rx Coordinator)  P: [phone]  2023 1:37 PM

## 2023-11-28 DIAGNOSIS — K08.89 PAIN, DENTAL: ICD-10-CM

## 2023-11-28 PROCEDURE — RXMED WILLOW AMBULATORY MEDICATION CHARGE

## 2023-11-28 RX ORDER — AMOXICILLIN AND CLAVULANATE POTASSIUM 875; 125 MG/1; MG/1
1 TABLET, FILM COATED ORAL 2 TIMES DAILY
Qty: 14 TABLET | Refills: 0 | Status: ON HOLD | OUTPATIENT
Start: 2023-11-28 | End: 2023-12-07

## 2023-12-01 ENCOUNTER — PHARMACY VISIT (OUTPATIENT)
Dept: PHARMACY | Facility: MEDICAL CENTER | Age: 78
End: 2023-12-01
Payer: COMMERCIAL

## 2023-12-04 ENCOUNTER — HOSPITAL ENCOUNTER (OUTPATIENT)
Dept: RADIOLOGY | Facility: MEDICAL CENTER | Age: 78
End: 2023-12-04
Attending: INTERNAL MEDICINE
Payer: MEDICARE

## 2023-12-04 ENCOUNTER — PRE-ADMISSION TESTING (OUTPATIENT)
Dept: ADMISSIONS | Facility: MEDICAL CENTER | Age: 78
End: 2023-12-04
Attending: INTERNAL MEDICINE
Payer: MEDICARE

## 2023-12-04 DIAGNOSIS — C34.12 MALIGNANT NEOPLASM OF UPPER LOBE BRONCHUS, LEFT (HCC): ICD-10-CM

## 2023-12-04 DIAGNOSIS — R53.83 OTHER FATIGUE: ICD-10-CM

## 2023-12-04 DIAGNOSIS — Z51.11 ENCOUNTER FOR ANTINEOPLASTIC CHEMOTHERAPY: ICD-10-CM

## 2023-12-04 DIAGNOSIS — Z79.01 LONG TERM (CURRENT) USE OF ANTICOAGULANTS: ICD-10-CM

## 2023-12-04 DIAGNOSIS — Z51.12 ENCOUNTER FOR ANTINEOPLASTIC IMMUNOTHERAPY: ICD-10-CM

## 2023-12-04 DIAGNOSIS — Z79.899 OTHER LONG TERM (CURRENT) DRUG THERAPY: ICD-10-CM

## 2023-12-04 DIAGNOSIS — C34.11 MALIGNANT NEOPLASM OF UPPER LOBE OF RIGHT LUNG (HCC): ICD-10-CM

## 2023-12-04 PROCEDURE — 71260 CT THORAX DX C+: CPT

## 2023-12-04 PROCEDURE — 700117 HCHG RX CONTRAST REV CODE 255: Performed by: INTERNAL MEDICINE

## 2023-12-04 RX ADMIN — IOHEXOL 100 ML: 350 INJECTION, SOLUTION INTRAVENOUS at 10:07

## 2023-12-06 ENCOUNTER — PRE-ADMISSION TESTING (OUTPATIENT)
Dept: ADMISSIONS | Facility: MEDICAL CENTER | Age: 78
End: 2023-12-06
Attending: INTERNAL MEDICINE
Payer: MEDICARE

## 2023-12-06 DIAGNOSIS — Z01.812 PRE-OPERATIVE LABORATORY EXAMINATION: ICD-10-CM

## 2023-12-06 DIAGNOSIS — Z01.810 PRE-OPERATIVE CARDIOVASCULAR EXAMINATION: ICD-10-CM

## 2023-12-06 LAB
ALBUMIN SERPL BCP-MCNC: 3.7 G/DL (ref 3.2–4.9)
ALBUMIN/GLOB SERPL: 0.9 G/DL
ALP SERPL-CCNC: 52 U/L (ref 30–99)
ALT SERPL-CCNC: 29 U/L (ref 2–50)
ANION GAP SERPL CALC-SCNC: 9 MMOL/L (ref 7–16)
AST SERPL-CCNC: 22 U/L (ref 12–45)
BILIRUB SERPL-MCNC: 0.5 MG/DL (ref 0.1–1.5)
BUN SERPL-MCNC: 39 MG/DL (ref 8–22)
CALCIUM ALBUM COR SERPL-MCNC: 9.7 MG/DL (ref 8.5–10.5)
CALCIUM SERPL-MCNC: 9.5 MG/DL (ref 8.5–10.5)
CHLORIDE SERPL-SCNC: 102 MMOL/L (ref 96–112)
CO2 SERPL-SCNC: 25 MMOL/L (ref 20–33)
CREAT SERPL-MCNC: 1.31 MG/DL (ref 0.5–1.4)
EKG IMPRESSION: NORMAL
ERYTHROCYTE [DISTWIDTH] IN BLOOD BY AUTOMATED COUNT: 45.3 FL (ref 35.9–50)
GFR SERPLBLD CREATININE-BSD FMLA CKD-EPI: 55 ML/MIN/1.73 M 2
GLOBULIN SER CALC-MCNC: 4 G/DL (ref 1.9–3.5)
GLUCOSE SERPL-MCNC: 99 MG/DL (ref 65–99)
HCT VFR BLD AUTO: 47.2 % (ref 42–52)
HGB BLD-MCNC: 16 G/DL (ref 14–18)
INR PPP: 1.25 (ref 0.87–1.13)
MCH RBC QN AUTO: 32.1 PG (ref 27–33)
MCHC RBC AUTO-ENTMCNC: 33.9 G/DL (ref 32.3–36.5)
MCV RBC AUTO: 94.6 FL (ref 81.4–97.8)
PLATELET # BLD AUTO: 161 K/UL (ref 164–446)
PMV BLD AUTO: 10.6 FL (ref 9–12.9)
POTASSIUM SERPL-SCNC: 4.7 MMOL/L (ref 3.6–5.5)
PROT SERPL-MCNC: 7.7 G/DL (ref 6–8.2)
PROTHROMBIN TIME: 15.9 SEC (ref 12–14.6)
RBC # BLD AUTO: 4.99 M/UL (ref 4.7–6.1)
SODIUM SERPL-SCNC: 136 MMOL/L (ref 135–145)
WBC # BLD AUTO: 9.7 K/UL (ref 4.8–10.8)

## 2023-12-06 PROCEDURE — 85610 PROTHROMBIN TIME: CPT

## 2023-12-06 PROCEDURE — 80053 COMPREHEN METABOLIC PANEL: CPT

## 2023-12-06 PROCEDURE — 93005 ELECTROCARDIOGRAM TRACING: CPT

## 2023-12-06 PROCEDURE — 36415 COLL VENOUS BLD VENIPUNCTURE: CPT

## 2023-12-06 PROCEDURE — 85027 COMPLETE CBC AUTOMATED: CPT

## 2023-12-06 PROCEDURE — 93010 ELECTROCARDIOGRAM REPORT: CPT | Performed by: INTERNAL MEDICINE

## 2023-12-07 ENCOUNTER — APPOINTMENT (OUTPATIENT)
Dept: RADIOLOGY | Facility: MEDICAL CENTER | Age: 78
End: 2023-12-07
Attending: INTERNAL MEDICINE
Payer: MEDICARE

## 2023-12-07 ENCOUNTER — PHARMACY VISIT (OUTPATIENT)
Dept: PHARMACY | Facility: MEDICAL CENTER | Age: 78
End: 2023-12-07
Payer: COMMERCIAL

## 2023-12-07 ENCOUNTER — APPOINTMENT (OUTPATIENT)
Dept: CARDIOLOGY | Facility: MEDICAL CENTER | Age: 78
End: 2023-12-07
Attending: INTERNAL MEDICINE
Payer: MEDICARE

## 2023-12-07 ENCOUNTER — HOSPITAL ENCOUNTER (OUTPATIENT)
Facility: MEDICAL CENTER | Age: 78
End: 2023-12-07
Attending: INTERNAL MEDICINE | Admitting: INTERNAL MEDICINE
Payer: MEDICARE

## 2023-12-07 VITALS
HEIGHT: 68 IN | WEIGHT: 139.55 LBS | HEART RATE: 77 BPM | DIASTOLIC BLOOD PRESSURE: 70 MMHG | OXYGEN SATURATION: 96 % | SYSTOLIC BLOOD PRESSURE: 111 MMHG | RESPIRATION RATE: 16 BRPM | BODY MASS INDEX: 21.15 KG/M2 | TEMPERATURE: 97.2 F

## 2023-12-07 DIAGNOSIS — I50.20 ACC/AHA STAGE C SYSTOLIC HEART FAILURE (HCC): ICD-10-CM

## 2023-12-07 DIAGNOSIS — Z95.810 ICD (IMPLANTABLE CARDIOVERTER-DEFIBRILLATOR) IN PLACE: ICD-10-CM

## 2023-12-07 LAB
EKG IMPRESSION: NORMAL
GLUCOSE BLD STRIP.AUTO-MCNC: 111 MG/DL (ref 65–99)

## 2023-12-07 PROCEDURE — 160035 HCHG PACU - 1ST 60 MINS PHASE I

## 2023-12-07 PROCEDURE — 71045 X-RAY EXAM CHEST 1 VIEW: CPT

## 2023-12-07 PROCEDURE — 99153 MOD SED SAME PHYS/QHP EA: CPT

## 2023-12-07 PROCEDURE — 93010 ELECTROCARDIOGRAM REPORT: CPT | Performed by: INTERNAL MEDICINE

## 2023-12-07 PROCEDURE — 700101 HCHG RX REV CODE 250

## 2023-12-07 PROCEDURE — 160002 HCHG RECOVERY MINUTES (STAT)

## 2023-12-07 PROCEDURE — 700111 HCHG RX REV CODE 636 W/ 250 OVERRIDE (IP): Mod: JZ

## 2023-12-07 PROCEDURE — 700117 HCHG RX CONTRAST REV CODE 255: Performed by: INTERNAL MEDICINE

## 2023-12-07 PROCEDURE — RXMED WILLOW AMBULATORY MEDICATION CHARGE: Performed by: NURSE PRACTITIONER

## 2023-12-07 PROCEDURE — 33249 INSJ/RPLCMT DEFIB W/LEAD(S): CPT | Performed by: INTERNAL MEDICINE

## 2023-12-07 PROCEDURE — 82962 GLUCOSE BLOOD TEST: CPT

## 2023-12-07 PROCEDURE — 99152 MOD SED SAME PHYS/QHP 5/>YRS: CPT | Performed by: INTERNAL MEDICINE

## 2023-12-07 PROCEDURE — 93005 ELECTROCARDIOGRAM TRACING: CPT | Performed by: INTERNAL MEDICINE

## 2023-12-07 PROCEDURE — 160047 HCHG PACU  - EA ADDL 30 MINS PHASE II

## 2023-12-07 PROCEDURE — 160046 HCHG PACU - 1ST 60 MINS PHASE II

## 2023-12-07 RX ORDER — CEPHALEXIN 500 MG/1
500 CAPSULE ORAL 3 TIMES DAILY
Qty: 15 CAPSULE | Refills: 0 | Status: SHIPPED | OUTPATIENT
Start: 2023-12-07 | End: 2023-12-20

## 2023-12-07 RX ORDER — LIDOCAINE HYDROCHLORIDE 20 MG/ML
INJECTION, SOLUTION INFILTRATION; PERINEURAL
Status: COMPLETED
Start: 2023-12-07 | End: 2023-12-07

## 2023-12-07 RX ORDER — BUPIVACAINE HYDROCHLORIDE 5 MG/ML
INJECTION, SOLUTION EPIDURAL; INTRACAUDAL
Status: COMPLETED
Start: 2023-12-07 | End: 2023-12-07

## 2023-12-07 RX ORDER — CEFAZOLIN SODIUM 1 G/3ML
INJECTION, POWDER, FOR SOLUTION INTRAMUSCULAR; INTRAVENOUS
Status: COMPLETED
Start: 2023-12-07 | End: 2023-12-07

## 2023-12-07 RX ORDER — MIDAZOLAM HYDROCHLORIDE 1 MG/ML
INJECTION INTRAMUSCULAR; INTRAVENOUS
Status: COMPLETED
Start: 2023-12-07 | End: 2023-12-07

## 2023-12-07 RX ADMIN — LIDOCAINE HYDROCHLORIDE: 20 INJECTION, SOLUTION INFILTRATION; PERINEURAL at 16:05

## 2023-12-07 RX ADMIN — CEFAZOLIN 1000 MG: 1 INJECTION, POWDER, FOR SOLUTION INTRAMUSCULAR; INTRAVENOUS at 16:07

## 2023-12-07 RX ADMIN — FENTANYL CITRATE 100 MCG: 50 INJECTION, SOLUTION INTRAMUSCULAR; INTRAVENOUS at 16:18

## 2023-12-07 RX ADMIN — MIDAZOLAM HYDROCHLORIDE 4 MG: 1 INJECTION, SOLUTION INTRAMUSCULAR; INTRAVENOUS at 16:37

## 2023-12-07 RX ADMIN — IOHEXOL 10 ML: 350 INJECTION, SOLUTION INTRAVENOUS at 16:34

## 2023-12-07 RX ADMIN — BUPIVACAINE HYDROCHLORIDE: 5 INJECTION, SOLUTION EPIDURAL; INTRACAUDAL at 16:07

## 2023-12-07 ASSESSMENT — FIBROSIS 4 INDEX: FIB4 SCORE: 1.98

## 2023-12-07 ASSESSMENT — PAIN DESCRIPTION - PAIN TYPE: TYPE: SURGICAL PAIN

## 2023-12-07 NOTE — OR NURSING
Assumed care in preop Ppu patient is alert and oriented x 4 ambulatory . Wife and daughter at bedside. Denies any chest pain or any discomfort.   Surgical conset , meds npo verified and consent form signed by the patient.   Iv started at left AC by jimmie Urbina fsbs checked.   Waiting for Dr. Maynard.

## 2023-12-07 NOTE — DISCHARGE INSTRUCTIONS
Defibrillator Discharge Instructions/[facility]    1.  No showers until seen in follow up; may take sponge bath.  Keep dressing dry & in place until seen at for you follow up visit at the cardiology office.     2.  No lifting over 10 lbs with affected arm for six weeks.  3.  Do not raise affected arm above shoulder level or behind head for six weeks.  4.  Avoid excessive pushing, pulling, or twisting for six weeks.  5.  No driving for the first week.  6.  Ok to place indirect ICE pack to site for comfort.   7.  Call our office ([phone]) if you notice any increased swelling, redness, warmth, or drainage at the implant site.  8.  Needs to be seen in emergency if you develop fever > 101F or uncontrolled pain.  9.  Always check with device clinic before any planned MRI to see if device is MRI compatible.  10.  No routine dental work or cleanings for 3 months.  11.  May remove arm sling after one day, but please wear if you have trouble remembering to keep your arm down.  Please wear at night as a reminder.   12. Do not place cell phones or mobile devices directly over implanted device.   13. You will need to be seen at least twice in the device clinic for checks while the pacemaker is healing.  Expect appointment approximately one week after implant and 6-7 weeks post implant.     ICD Instructions  If you have 1 shock: if you are feeling well post shock, please notify cardiology office and be seen in office for check.  If you are feeling poorly after shock, you or someone with you needs to call 911.  If you experience 2 Shocks or more in 24 hours, please call 911.     HOME CARE INSTRUCTIONS    ACTIVITY: Rest and take it easy for the first 24 hours.  A responsible adult is recommended to remain with you during that time.  It is normal to feel sleepy.  We encourage you to not do anything that requires balance, judgment or coordination.    FOR 24 HOURS DO NOT:  Drive, operate machinery or run household  appliances.  Drink beer or alcoholic beverages.  Make important decisions or sign legal documents.    DIET: To avoid nausea, slowly advance diet as tolerated, avoiding spicy or greasy foods for the first day.  Add more substantial food to your diet according to your physician's instructions.  Babies can be fed formula or breast milk as soon as they are hungry.  INCREASE FLUIDS AND FIBER TO AVOID CONSTIPATION.    SURGICAL DRESSING/BATHING: Keep dressing dry for 1 week until follow-up appointment. Sponge bath only. Do not submerge in water or bath for 7 days.    MEDICATIONS: Resume taking daily medication.  Take prescribed pain medication with food.  If no medication is prescribed, you may take non-aspirin pain medication if needed.  PAIN MEDICATION CAN BE VERY CONSTIPATING.  Take a stool softener or laxative such as senokot, pericolace, or milk of magnesia if needed.    Prescription given for _____.    A follow-up appointment should be arranged with your doctor in 1 week with Device Clinic [phone]; call to schedule.    You should CALL YOUR PHYSICIAN if you develop:  Fever greater than 101 degrees F.  Pain not relieved by medication, or persistent nausea or vomiting.  Excessive bleeding (blood soaking through dressing) or unexpected drainage from the wound.  Extreme redness or swelling around the incision site, drainage of pus or foul smelling drainage.  Inability to urinate or empty your bladder within 8 hours.  Problems with breathing or chest pain.    You should call 911 if you develop problems with breathing or chest pain.  If you are unable to contact your doctor or surgical center, you should go to the nearest emergency room or urgent care center.  Physician's telephone #: Dr. Maynard [phone]    MILD FLU-LIKE SYMPTOMS ARE NORMAL.  YOU MAY EXPERIENCE GENERALIZED MUSCLE ACHES, THROAT IRRITATION, HEADACHE AND/OR SOME NAUSEA.    If any questions arise, call your doctor.  If your doctor is not available,  please feel free to call the Surgical Center at [phone].  The Center is open Monday through Friday from 7AM to 7PM.      A registered nurse may call you a few days after your surgery to see how you are doing after your procedure.    You may also receive a survey in the mail within the next two weeks and we ask that you take a few moments to complete the survey and return it to us.  Our goal is to provide you with very good care and we value your comments.     Depression / Suicide Risk    As you are discharged from this [facility] facility, it is important to learn how to keep safe from harming yourself.    Recognize the warning signs:  Abrupt changes in personality, positive or negative- including increase in energy   Giving away possessions  Change in eating patterns- significant weight changes-  positive or negative  Change in sleeping patterns- unable to sleep or sleeping all the time   Unwillingness or inability to communicate  Depression  Unusual sadness, discouragement and loneliness  Talk of wanting to die  Neglect of personal appearance   Rebelliousness- reckless behavior  Withdrawal from people/activities they love  Confusion- inability to concentrate     If you or a loved one observes any of these behaviors or has concerns about self-harm, here's what you can do:  Talk about it- your feelings and reasons for harming yourself  Remove any means that you might use to hurt yourself (examples: pills, rope, extension cords, firearm)  Get professional help from the community (Mental Health, Substance Abuse, psychological counseling)  Do not be alone:Call your Safe Contact- someone whom you trust who will be there for you.  Call your local CRISIS HOTLINE 960-6020 or 331-204-7947  Call your local Children's Mobile Crisis Response Team [place] (448) 016-5637 or www.Drik  Call the toll free National Suicide Prevention Hotlines   National Suicide Prevention Lifeline 634-586-HLEG  (8188)  [facility] 800-SUICIDE (086-8455)    I acknowledge receipt and understanding of these Home Care instructions.

## 2023-12-08 NOTE — OP REPORT
Electrophysiology Procedure Note  [facility]        Date of procedure: 12/7/2023     Procedure Performed: Placement of AICD, moderate sedation administered by RN and supervised by physician     Indication: Chronic systolic heart failure, NICM, EF <35%     Physician(s): Juanito Maynard MD     Anesthesia: Moderate sedation,  start time 1614, stop time 1644  The moderate sedation document has been reviewed, signed and scanned into media      Medications:  4mg Versed, 100mcg Fentanyl  2g Ancef     Specimen(s) Removed: None     Estimated Blood Loss:  30cc    Complications: None     Pre Procedure ECG: SR     Post Procedure ECG: SR     DESCRIPTION OF PROCEDURE: After informed written consent, the patient was brought to the electrophysiology lab in the fasting, unsedated state. The patient was prepped and draped in the usual sterile fashion. The procedure was performed under moderate sedation with local anesthetic. A left infraclavicular incision was made with a scalpel and the pre-pectoral device pocket was created using a combination of blunt dissection and electrocautery. The modified Seldinger technique was used to gain access to the left axillary vein. A peel-away hemostasis sheath was placed in the vein. Under fluoroscopic guidance, the ICD lead was introduced into the heart. The ventricular lead was advanced into the RVOT position the pulled back and advanced to the RV apex (we were not able to advance very apically under fluoroscopy potentially due to cardiac rotation). The lead was tested and had satisfactory sensing and pacing parameters. High output pacing did not produce extracardiac stimulation.  The lead was sutured to the underlying pectoral muscle with interrupted silk over a silastic suture sleeve. The device pocket was irrigated with antibiotic solution, inspected, and no bleeding was seen. The leads were connected to the ICD pulse generator and the device was inserted into the  pocket. The wound was closed with three layers of absorbable sutures and covered with Steri-Strips.   I personally supervised the administration of moderate sedation by the RN and observed the level of consciousness and physiologic status throughout the procedure.  Following recovery from sedation, the patient was transferred to a monitored bed in good condition.    IMPLANTED DEVICE INFORMATION:    Pulse generator is a [employer]riguez model [id number]   Serial number [id number]      LEAD INFORMATION:  1. Right ventricular lead is a [employer]/Abbott model [id number], serial number [id number], R wave 11.9 millivolts, threshold 840 Volts, pacing impedance 1.5 Ohms.     DEVICE PROGRAMMING:    Denny therapy: VVI 40  Tachy therapy:  VF >250bpm VT> 200 bpm Monitor >171 bpm    DEFIBRILLATION THRESHOLD TESTING:    Deferred     FLUOROSCOPY TIME: 6.3 min    SUMMARY/CONCLUSIONS:  1. Successful single chamber ICD implantation.    RECOMMENDATIONS:  1. Admit to monitored bed.  2. PA and lateral chest x-ray.  3. Implantable cardioverter defibrillator interrogation prior to hospital discharge.  4. Follow-up in device clinic for wound check and device interrogation.

## 2023-12-08 NOTE — CONSULTS
Electrophysiology Procedure Note  [facility]      Date of procedure: 12/7/2023     Procedure Performed: Placement of AICD, moderate sedation administered by RN and supervised by physician    Indication: Chronic systolic heart failure, NICM, EF <35%    Physician(s): Juanito Maynard MD    Anesthesia: Moderate sedation,  start time 1614, stop time 1644  The moderate sedation document has been reviewed, signed and scanned into media     Medications:  4mg Versed, 100mcg Fentanyl  2g Ancef    Specimen(s) Removed: None     Estimated Blood Loss:  30cc    Complications: None    Pre Procedure ECG: SR    Post Procedure ECG: SR    DESCRIPTION OF PROCEDURE: After informed written consent, the patient was brought to the electrophysiology lab in the fasting, unsedated state. The patient was prepped and draped in the usual sterile fashion. The procedure was performed under moderate sedation with local anesthetic. A left infraclavicular incision was made with a scalpel and the pre-pectoral device pocket was created using a combination of blunt dissection and electrocautery. The modified Seldinger technique was used to gain access to the left axillary vein. A peel-away hemostasis sheath was placed in the vein. Under fluoroscopic guidance, the ICD lead was introduced into the heart. The ventricular lead was advanced into the RVOT position the pulled back and advanced to the RV apex (we were not able to advance very apically under fluoroscopy potentially due to cardiac rotation). The lead was tested and had satisfactory sensing and pacing parameters. High output pacing did not produce extracardiac stimulation.  The lead was sutured to the underlying pectoral muscle with interrupted silk over a silastic suture sleeve. The device pocket was irrigated with antibiotic solution, inspected, and no bleeding was seen. The leads were connected to the ICD pulse generator and the device was inserted into the pocket. The wound  was closed with three layers of absorbable sutures and covered with Steri-Strips.   I personally supervised the administration of moderate sedation by the RN and observed the level of consciousness and physiologic status throughout the procedure.  Following recovery from sedation, the patient was transferred to a monitored bed in good condition.    IMPLANTED DEVICE INFORMATION:    Pulse generator is a [employer] model [id number]   Serial number [id number]      LEAD INFORMATION:  1. Right ventricular lead is a [employer]Abbott model [id number], serial number [id number], R wave 11.9 millivolts, threshold 840 Volts, pacing impedance 1.5 Ohms.    DEVICE PROGRAMMING:    Denny therapy: VVI 40  Tachy therapy:  VF >250bpm VT> 200 bpm Monitor >171 bpm    DEFIBRILLATION THRESHOLD TESTING:    Deferred    FLUOROSCOPY TIME: 6.3 min    SUMMARY/CONCLUSIONS:  1. Successful single chamber ICD implantation.    RECOMMENDATIONS:  1. Admit to monitored bed.  2. PA and lateral chest x-ray.  3. Implantable cardioverter defibrillator interrogation prior to hospital discharge.  4. Follow-up in device clinic for wound check and device interrogation.

## 2023-12-13 ENCOUNTER — NON-PROVIDER VISIT (OUTPATIENT)
Dept: CARDIOLOGY | Facility: MEDICAL CENTER | Age: 78
End: 2023-12-13

## 2023-12-13 ENCOUNTER — NON-PROVIDER VISIT (OUTPATIENT)
Dept: CARDIOLOGY | Facility: MEDICAL CENTER | Age: 78
End: 2023-12-13
Attending: INTERNAL MEDICINE
Payer: MEDICARE

## 2023-12-13 DIAGNOSIS — I48.0 PAF (PAROXYSMAL ATRIAL FIBRILLATION) (HCC): ICD-10-CM

## 2023-12-13 DIAGNOSIS — I47.29 NONSUSTAINED VENTRICULAR TACHYCARDIA (HCC): ICD-10-CM

## 2023-12-13 DIAGNOSIS — Z95.810 AICD (AUTOMATIC CARDIOVERTER/DEFIBRILLATOR) PRESENT: ICD-10-CM

## 2023-12-13 PROCEDURE — 93282 PRGRMG EVAL IMPLANTABLE DFB: CPT | Mod: 26 | Performed by: INTERNAL MEDICINE

## 2023-12-13 PROCEDURE — 93282 PRGRMG EVAL IMPLANTABLE DFB: CPT | Performed by: INTERNAL MEDICINE

## 2023-12-13 NOTE — CARDIAC REMOTE MONITOR - SCAN
Device transmission reviewed. Device demonstrated appropriate function.       Electronically Signed by: Cristhian Martinez M.D.    12/13/2023  12:07 PM

## 2023-12-13 NOTE — PROGRESS NOTES
Wound site is healing well. Pt advised to watch for increased redness, swelling, oozing or fever. Pt verbalized understanding.See flowsheet.    Billing and remote monitoring explained and acknowledged.

## 2023-12-17 ASSESSMENT — ENCOUNTER SYMPTOMS
WHEEZING: 0
DIARRHEA: 0
COUGH: 0
DIZZINESS: 0
FEVER: 0
ABDOMINAL PAIN: 0
EYE DISCHARGE: 0
EYE PAIN: 0
WEIGHT LOSS: 0
VOMITING: 0
LOSS OF CONSCIOUSNESS: 0
SORE THROAT: 0
FOCAL WEAKNESS: 0
SINUS PAIN: 0
SENSORY CHANGE: 0
CHILLS: 0
SPUTUM PRODUCTION: 0
NAUSEA: 0
HEADACHES: 0
STRIDOR: 0
SHORTNESS OF BREATH: 0
MYALGIAS: 0
ORTHOPNEA: 0
PSYCHIATRIC NEGATIVE: 1

## 2023-12-18 ENCOUNTER — OFFICE VISIT (OUTPATIENT)
Dept: SLEEP MEDICINE | Facility: MEDICAL CENTER | Age: 78
End: 2023-12-18
Attending: INTERNAL MEDICINE
Payer: MEDICARE

## 2023-12-18 VITALS
BODY MASS INDEX: 21.22 KG/M2 | HEART RATE: 77 BPM | HEIGHT: 68 IN | WEIGHT: 140 LBS | DIASTOLIC BLOOD PRESSURE: 60 MMHG | OXYGEN SATURATION: 98 % | SYSTOLIC BLOOD PRESSURE: 104 MMHG

## 2023-12-18 DIAGNOSIS — I50.22 CHRONIC SYSTOLIC HEART FAILURE (HCC): ICD-10-CM

## 2023-12-18 DIAGNOSIS — C34.32 PRIMARY CANCER OF LEFT LOWER LOBE OF LUNG (HCC): ICD-10-CM

## 2023-12-18 DIAGNOSIS — R91.1 PULMONARY NODULE 1 CM OR GREATER IN DIAMETER: ICD-10-CM

## 2023-12-18 DIAGNOSIS — J90 PLEURAL EFFUSION, LEFT: ICD-10-CM

## 2023-12-18 DIAGNOSIS — C34.92 PRIMARY ADENOCARCINOMA OF LEFT LUNG (HCC): ICD-10-CM

## 2023-12-18 DIAGNOSIS — I26.99 PULMONARY EMBOLISM ON RIGHT (HCC): ICD-10-CM

## 2023-12-18 PROCEDURE — 99214 OFFICE O/P EST MOD 30 MIN: CPT | Performed by: INTERNAL MEDICINE

## 2023-12-18 PROCEDURE — 3078F DIAST BP <80 MM HG: CPT | Performed by: INTERNAL MEDICINE

## 2023-12-18 PROCEDURE — 3074F SYST BP LT 130 MM HG: CPT | Performed by: INTERNAL MEDICINE

## 2023-12-18 PROCEDURE — 99212 OFFICE O/P EST SF 10 MIN: CPT | Performed by: INTERNAL MEDICINE

## 2023-12-18 ASSESSMENT — FIBROSIS 4 INDEX: FIB4 SCORE: 1.98

## 2023-12-18 NOTE — PROGRESS NOTES
Pulmonary Clinic Note    Chief Complaint:  Chief Complaint   Patient presents with    Follow-Up     SOB/CHAPIN. Last seen 06/08/23    Results     Other: CXR 12/07/23, CT-TAP 12/04/23     HPI:   Pito Black is a very pleasant 78 y.o. 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  He is followed by Dr. Schulz for oncology care.      He is followed in the pulmonary clinic since hospitalization in early 2023 for hypoxemic respiratory failure which was attributed to bilateral pleural effusions and right upper lobe mass with resultant postobstructive pneumonia treated with antibiotics and steroids for possible radiation versus immunotherapy induced pneumonitis. He was hospitalized again in 2/2023 for parainfluenza pneumonia and right lower lobe PE discharged on Eliquis.  TTE 10/3/2023 showed an EF of 35% with global hypokinesis    Repeated thoracenteses have been consistent with transudate. Low total cell count, however lymphocytic predominant.  Cytology and cultures negative    Last PFTs 2020 showing preserved FEV1 (2.59L, 95%), no BD response, preserved lung volumes and diffusion capacity.    PMH otherwise notable for CKD stage III, PAF, hypertension, dyslipidemia    Interval events since last clinic visit in 6/2023:  Today reports he is breathing much better than prior.      CT chest 12/4/2023 showed postsurgical changes in the left upper lobe, stable small consolidation with air bronchograms in the left lower lobe with stable atelectasis/posttreatment scarring in the right upper lobe. Imaging did note interval enlargement of pulmonary nodule abutting the posterior wall of the right mainstem bronchus now measuring 1.7 cm, previously measuring 1 cm in 6/2023, as well slight interval enlargement and subcarinal lymphadenopathy    Plans to  follow-up with Dr. Schulz later this week    Multi oximetry walk test last visit showed no desaturations on room air.    In addition to the above, he underwent placement of AICD 2 weeks ago    Wt 138# -> 140    Past Medical History:   Diagnosis Date    Acute respiratory failure with hypoxia (HCC) 02/01/2023    Arrhythmia     hx of a fib    Arthritis 2015    generalized, DDD back    Asthma     inhaler     Backpain 08/06/2018    9/10    Cancer (HCC) 07/2017    left lung    Cataract     IOL bilateral    Congestive heart failure (HCC)     Dental disorder     lower partial, removable bridge    Diabetes 08/06/2018    on no meds at this time, diet controlled    Heart valve disease     mild mitral valve prolapse    High cholesterol     Hypertension     Type 2 diabetes mellitus with hyperglycemia ([facility]) 03/17/2023    This is a chronic condition. Current medications: Insulin:  Biguanide: took metformin historically will restart metformin xr 750 mg daily. GLP1-RA:   SGLT-2i:    Consider for cardiorenal protection DPP4-I:  TZD:  Pk: Sulfonyluria:   Last A1c: 6/2/23 6.5% Last Microalb/Cr ratio: 6/2/23 <1.2 Fasting sugars: Last diabetic foot exam: 6/19/23 Last retinal eye exam: has upcoming appointment with optome       Past Surgical History:   Procedure Laterality Date    GA BRONCHOSCOPY,DIAGNOSTIC  7/1/2021    Procedure: BRONCHOSCOPY - FIBER OPTIC WITH BRANCHOALVEOLAR LAVAGE BIOPSY, FNA, NAVIGATION;  Surgeon: Vinayak Carbajal M.D.;  Location: SURGERY [facility];  Service: Pulmonary    ENDOBRONCHIAL US ADD-ON  7/1/2021    Procedure: ENDOBRONCHIAL ULTRASOUND (EBUS).;  Surgeon: Vinayak Carbajal M.D.;  Location: SURGERY [facility];  Service: Pulmonary    CATARACT PHACO WITH IOL Left 8/21/2018    Procedure: CATARACT PHACO WITH IOL;  Surgeon: Juanito Hager M.D.;  Location: SURGERY SAME DAY [facility];  Service: Ophthalmology    CATARACT PHACO WITH IOL Right 8/7/2018    Procedure: CATARACT PHACO WITH IOL;  Surgeon:  "Juanito Hager M.D.;  Location: SURGERY SAME DAY [facility];  Service: Ophthalmology    THORACOSCOPY Left 2018    Procedure: THORACOSCOPY- WEDGE BIOPSY W/FROZEN SECTION;  Surgeon: Cristhian Galeano M.D.;  Location: SURGERY [facility];  Service: Thoracic    EAR MIDDLE EXPLORATION Right 2015    Procedure: EAR MIDDLE EXPLORATION;  Surgeon: William Brandon M.D.;  Location: SURGERY SAME DAY [facility];  Service:     OSSICULAR RECONSTRUCTION Right 2015    Procedure: OSSICULAR RECONSTRUCTION CHAIN POSSIBLE;  Surgeon: William Brandon M.D.;  Location: SURGERY SAME DAY [facility];  Service:     OTHER      ear replacement \"many years ago\"    OTHER ORTHOPEDIC SURGERY      right knee replacement        Social History     Socioeconomic History    Marital status:      Spouse name: Not on file    Number of children: Not on file    Years of education: Not on file    Highest education level: 12th grade   Occupational History    Not on file   Tobacco Use    Smoking status: Former     Current packs/day: 0.00     Average packs/day: 0.3 packs/day for 40.0 years (10.0 ttl pk-yrs)     Types: Cigarettes     Start date: 1965     Quit date: 2005     Years since quittin.9    Smokeless tobacco: Never   Vaping Use    Vaping Use: Never used   Substance and Sexual Activity    Alcohol use: Not Currently     Alcohol/week: 12.6 - 21.0 oz     Types: 21 - 35 Cans of beer per week     Comment: 12 beers a day (coors), quit 2/3/2023    Drug use: No    Sexual activity: Not on file   Other Topics Concern    Not on file   Social History Narrative    Not on file     Social Determinants of Health     Financial Resource Strain: Low Risk  (10/30/2023)    Overall Financial Resource Strain (CARDIA)     Difficulty of Paying Living Expenses: Not very hard   Food Insecurity: No Food Insecurity (10/30/2023)    Hunger Vital Sign     Worried About Running Out of Food in the Last Year: Never true     Ran Out of Food in " the Last Year: Never true   Transportation Needs: No Transportation Needs (10/30/2023)    PRAPARE - Transportation     Lack of Transportation (Medical): No     Lack of Transportation (Non-Medical): No   Physical Activity: Inactive (10/30/2023)    Exercise Vital Sign     Days of Exercise per Week: 0 days     Minutes of Exercise per Session: 0 min   Stress: Stress Concern Present (10/20/2023)    [employer] [place] [employer] - Occupational Stress Questionnaire     Feeling of Stress : To some extent   Social Connections: Moderately Isolated (10/30/2023)    Social Connection and Isolation Panel [NHANES]     Frequency of Communication with Friends and Family: More than three times a week     Frequency of Social Gatherings with Friends and Family: Once a week     Attends Jain Services: Never     Active Member of Clubs or Organizations: No     Attends Club or Organization Meetings: Never     Marital Status:    Intimate Partner Violence: Not on file   Housing Stability: Low Risk  (10/30/2023)    Housing Stability Vital Sign     Unable to Pay for Housing in the Last Year: No     Number of Places Lived in the Last Year: 1     Unstable Housing in the Last Year: No          Family History   Problem Relation Age of Onset    Stroke Mother     Hypertension Mother     Diabetes Mother     Cancer Father         stomach cancer    Heart Disease Brother     Alcohol abuse Brother     Ovarian Cancer Neg Hx     Tubal Cancer Neg Hx     Peritoneal Cancer Neg Hx     Colorectal Cancer Neg Hx     Breast Cancer Neg Hx     Hyperlipidemia Neg Hx        Current Outpatient Medications on File Prior to Visit   Medication Sig Dispense Refill    cephALEXin (KEFLEX) 500 MG Cap Take 1 Capsule by mouth 3 times a day. 15 Capsule 0    COVID-19 mRNA Vac-Martha,Pfizer, (COVID-19 VACCINE) 30 MCG/0.3ML Suspension injection Inject  into the shoulder, thigh, or buttocks. 0.3 mL 0    vericiguat or PLACEBO (STUDY DRUG) 10 MG tablet Take 1  Tablet by mouth every day. Participant ID #[id number] Tablet 11    fluticasone-salmeterol (ADVAIR DISKUS) 250-50 MCG/ACT AEROSOL POWDER, BREATH ACTIVATED Inhale 1 Puff 2 times a day. Rinse mouth after each use 180 Each 3    apixaban (ELIQUIS) 5mg Tab Take 1 Tablet by mouth 2 times a day. 180 Tablet 3    metoprolol SR (TOPROL XL) 50 MG TABLET SR 24 HR Take 1 Tablet by mouth every day for 100 days. 100 Tablet 3    dapagliflozin propanediol (FARXIGA) 10 MG Tab Take 1 tablet by mouth every day. 100 Tablet 3    ezetimibe (ZETIA) 10 MG Tab Take 1 Tablet by mouth every day. 100 Tablet 3    spironolactone (ALDACTONE) 25 MG Tab Take 1 Tablet by mouth every day for 100 days. 100 Tablet 3    metFORMIN ER (GLUCOPHAGE XR) 750 MG TABLET SR 24 HR Take 1 tablet by mouth every day. 100 Tablet 3    cyclobenzaprine (FLEXERIL) 10 mg Tab Take 1 Tablet by mouth at bedtime. 100 Tablet 3    Cholecalciferol (VITAMIN D3) 2000 UNIT Cap Take 1 Capsule by mouth every day. 100 Capsule 3    diphenhydrAMINE-APAP, sleep, (TYLENOL PM EXTRA STRENGTH)  MG Tab Take 1-2 Tablets by mouth at bedtime as needed (Mild Pain or Trouble Sleeping).       No current facility-administered medications on file prior to visit.       Allergies: Patient has no known allergies.      ROS:   Review of Systems   Constitutional:  Negative for chills, fever and weight loss.   HENT:  Negative for congestion, sinus pain and sore throat.    Eyes:  Negative for pain and discharge.   Respiratory:  Negative for cough, sputum production, shortness of breath, wheezing and stridor.    Cardiovascular:  Negative for chest pain, orthopnea and leg swelling.   Gastrointestinal:  Negative for abdominal pain, diarrhea, nausea and vomiting.   Genitourinary:  Negative for dysuria, frequency and urgency.   Musculoskeletal:  Negative for myalgias.   Skin:  Negative for rash.   Neurological:  Negative for dizziness, sensory change, focal weakness, loss of consciousness and headaches.  "  Psychiatric/Behavioral: Negative.     All other systems reviewed and are negative.    Vitals:  /60 (BP Location: Left arm, Patient Position: Sitting, BP Cuff Size: Adult)   Pulse 77   Ht 1.727 m (5' 8\")   Wt 63.5 kg (140 lb)   SpO2 98%     Physical Exam:  Physical Exam  Vitals and nursing note reviewed.   Constitutional:       General: He is not in acute distress.     Appearance: Normal appearance. He is well-developed. He is not ill-appearing or diaphoretic.      Comments: Very pleasant  Accompanied by supportive wife   HENT:      Nose: Nose normal.      Mouth/Throat:      Pharynx: No oropharyngeal exudate.   Eyes:      General: No scleral icterus.        Right eye: No discharge.         Left eye: No discharge.      Conjunctiva/sclera: Conjunctivae normal.      Pupils: Pupils are equal, round, and reactive to light.   Neck:      Thyroid: No thyromegaly.      Vascular: No JVD.      Trachea: No tracheal deviation.   Cardiovascular:      Rate and Rhythm: Normal rate and regular rhythm.      Heart sounds: Normal heart sounds. No murmur heard.  Pulmonary:      Effort: Pulmonary effort is normal. No respiratory distress.      Breath sounds: No stridor. No wheezing or rales.      Comments: Good aeration/breath sounds in the bilateral bases    Abdominal:      General: There is no distension.      Palpations: Abdomen is soft.      Tenderness: There is no abdominal tenderness. There is no guarding.   Musculoskeletal:         General: No tenderness. Normal range of motion.      Cervical back: Neck supple.   Lymphadenopathy:      Cervical: No cervical adenopathy.   Skin:     General: Skin is warm and dry.      Capillary Refill: Capillary refill takes less than 2 seconds.      Coloration: Skin is not pale.      Findings: No erythema.   Neurological:      Mental Status: He is alert and oriented to person, place, and time.      Sensory: No sensory deficit.      Motor: No abnormal muscle tone.      Coordination: " Coordination normal.      Deep Tendon Reflexes: Reflexes normal.   Psychiatric:         Behavior: Behavior normal.         Thought Content: Thought content normal.         Judgment: Judgment normal.       Laboratory Data:    PFTs as reviewed by me personally show:  See HPI    Imaging as reviewed by me personally show:    See HPI    Assessment/Plan:    Problem List Items Addressed This Visit       Primary lung adenocarcinoma (HCC)     stage I NSCLC status post wedge resection in 2018  stage Ia NSCLC  of the right upper lobe treated with SBRT in 2019  stage IIIa NSCLC of the left lower lobe treated with carboplatin/Taxol/radiation in 2021    --  surveillance CT 12/2023 shows new/growing nodule adjacent to the posterior wall of the right mainstem bronchus. Discussed with Dr Schulz (pt scheduled to see later this week) and requesting to proceed with bronchoscopy. Plan of care discussed with pt and wife and they are in agreement. Will need to hold apixiban 3 days prior to procedure         Pleural effusion, left     S/p thoracentesis x 2, transudative 2/2 HF  Resolved with diuretic optimization         Pulmonary embolism on right (HCC)     2/23: Segmental/Subsegmental  Started eliquis, resolved on repeat CTA 3/2023  -- as long as risks of bleeding continue to be outweighed by benefits of anticoagulation, recommend indefinite therapeutic anticoagulation with DOAC in setting of underlying malignancy         Chronic systolic heart failure (HCC)     HFrEF, Stage C, Class 2, LVEF 35% s/p AICD  GDMT         Primary cancer of left lower lobe of lung (HCC)    Relevant Orders    Bronchoscopy     Other Visit Diagnoses       Pulmonary nodule 1 cm or greater in diameter        Relevant Orders    Bronchoscopy          Return in about 6 weeks (around 1/29/2024).     This note was generated using voice recognition software which has a chance of producing errors of grammar and possibly content.  I have made every reasonable attempt to  find and correct any obvious errors, but it should be expected that some may not be found prior to finalization of this note.    Time spent in record review prior to patient arrival, reviewing results, and in face-to-face encounter totaled 38 min, excluding any procedures if performed.  __________  Vinayak Carbajal MD  Pulmonary and Critical Care Medicine  [facility]

## 2023-12-18 NOTE — ASSESSMENT & PLAN NOTE
stage I NSCLC status post wedge resection in 2018  stage Ia NSCLC  of the right upper lobe treated with SBRT in 2019  stage IIIa NSCLC of the left lower lobe treated with carboplatin/Taxol/radiation in 2021    --  surveillance CT 12/2023 shows new/growing nodule adjacent to the posterior wall of the right mainstem bronchus. Discussed with Dr Schulz (pt scheduled to see later this week) and requesting to proceed with bronchoscopy. Plan of care discussed with pt and wife and they are in agreement. Will need to hold apixiban 3 days prior to procedure

## 2023-12-19 ENCOUNTER — APPOINTMENT (OUTPATIENT)
Dept: ADMISSIONS | Facility: MEDICAL CENTER | Age: 78
End: 2023-12-19
Attending: INTERNAL MEDICINE
Payer: MEDICARE

## 2023-12-20 ENCOUNTER — PRE-ADMISSION TESTING (OUTPATIENT)
Dept: ADMISSIONS | Facility: MEDICAL CENTER | Age: 78
End: 2023-12-20
Attending: INTERNAL MEDICINE
Payer: MEDICARE

## 2023-12-20 DIAGNOSIS — Z01.812 PRE-OPERATIVE LABORATORY EXAMINATION: ICD-10-CM

## 2023-12-20 NOTE — PREPROCEDURE INSTRUCTIONS
PreAdmit Telephone Appointment: Reviewed the Preparing for your procedure handout with patient over the phone. Patient's wife, Cydney after permission from patient noted, instructed per pharmacy guidelines regarding taking, holding or contacting provider for instructions on regularly prescribed medications before surgery. Instructed to take the following medications the day of surgery with a sip of water per pharmacy medication guidelines: Advair, Metoprolol. Advised to check with cardiologist regarding medication Vericigual for further instructions prior to procedure and postop.  Pt's wife reports they have been instructed on when to hold Eliquis      Confirmed with patient where to check in morning of surgery. Handouts/Brochure/Video emailed to patient. Denies any issues with anesthesia

## 2023-12-21 ENCOUNTER — TELEPHONE (OUTPATIENT)
Dept: CARDIOLOGY | Facility: MEDICAL CENTER | Age: 78
End: 2023-12-21
Payer: MEDICARE

## 2023-12-21 NOTE — TELEPHONE ENCOUNTER
Spoke with patients wife and relayed instructions to continue taking study medication as usual.      ----- Message from Marcin Steven M.D. sent at 12/21/2023  8:41 AM PST -----  Regarding: RE: Charles study medication question  Ok to continue  ----- Message -----  From: Tonia Mata Clinical Research Coordinator  Sent: 12/21/2023   7:47 AM PST  To: #  Subject: Charles study medication question                 Niraj Steven,  Pito is having a bronchoscopy 12/28. When he did his preadmit, the RN advised him to check with cardiologist regarding medication Vericiguat for further instructions prior to procedure and postop.  His wife called asking if there are any special instructions regarding the Vericiguat for this procedure.    Before I say anything, I wanted to check with you. Should he continue to take it as he does or should he hold it prior to or after the bronchoscopy?    Thank you!  Tonia

## 2023-12-23 ENCOUNTER — HOSPITAL ENCOUNTER (OUTPATIENT)
Dept: LAB | Facility: MEDICAL CENTER | Age: 78
End: 2023-12-23
Attending: INTERNAL MEDICINE
Payer: MEDICARE

## 2023-12-23 DIAGNOSIS — Z01.812 PRE-OPERATIVE LABORATORY EXAMINATION: ICD-10-CM

## 2023-12-23 LAB
EST. AVERAGE GLUCOSE BLD GHB EST-MCNC: 126 MG/DL
HBA1C MFR BLD: 6 % (ref 4–5.6)

## 2023-12-23 PROCEDURE — 36415 COLL VENOUS BLD VENIPUNCTURE: CPT

## 2023-12-23 PROCEDURE — 83036 HEMOGLOBIN GLYCOSYLATED A1C: CPT

## 2023-12-27 ENCOUNTER — TELEPHONE (OUTPATIENT)
Dept: CARDIOLOGY | Facility: MEDICAL CENTER | Age: 78
End: 2023-12-27
Payer: MEDICARE

## 2023-12-27 NOTE — TELEPHONE ENCOUNTER
Last OV: 11/17/23  Proposed Surgery: Fiberoptic Bronchoscopy, Endobronchial ultrasound   Surgery Date: 12/28/23  Requesting Office Name: [facility]  Fax Number: [phone]  Preference of Location (default is surgery center unless specified by Cardiologist or [facility])  Prior Clearance Addressed: No      Anticoags/Antiplatelets: Apixaban   Anticoags/Antiplatelet managed by Cardiology? YES    Outstanding Cardiac Imaging : No  Stent, Cardiac Devices, or Catheterization: Yes  Within the last 6 months. Forward to provider to review.  Ablation, TAVR/Valve (including open heart), Cardioversion: No  Recent Cardiac Hospitalization: Yes  Date:  12/7/23            When: Hospitalized in the last 6 months. Forward to provider to review.   History (cardiac history):   Past Medical History:   Diagnosis Date    Acute respiratory failure with hypoxia (HCC) 02/01/2023    AICD (automatic cardioverter/defibrillator) present 12/07/2023    Arrhythmia     hx of a fib    Arthritis 2015    generalized, DDD back    Asthma     inhaler     Backpain 08/06/2018    9/10    Blood clotting disorder (HCC) 02/2023    Pulmonary    Breath shortness     with exertion    Cancer ([facility]) 07/2017    left lung    Cataract     IOL bilateral    Congestive heart failure ([facility])     cardiologist, [facility], Catherine KAUR    Dental disorder     lower partial, removable bridge    Diabetes 08/06/2018    on no meds at this time, diet controlled    Heart valve disease     mild mitral valve prolapse    High cholesterol     Hypertension     Pneumonia 02/2023    Renal disorder     CKD stage 3    Type 2 diabetes mellitus with hyperglycemia ([facility]) 03/17/2023    This is a chronic condition. Current medications: Insulin:  Biguanide: took metformin historically will restart metformin xr 750 mg daily. GLP1-RA:   SGLT-2i:    Consider for cardiorenal protection DPP4-I:  TZD:  Pk: Sulfonyluria:   Last A1c: 6/2/23 6.5% Last Microalb/Cr ratio: 6/2/23 <1.2  Fasting sugars: Last diabetic foot exam: 6/19/23 Last retinal eye exam: has upcoming appointment with optome             Surgical Clearance Letter Sent: No Provider to advise.   **Scan clearance request letter into [facility].**

## 2023-12-27 NOTE — LETTER
"PROCEDURE/SURGERY CLEARANCE FORM      Encounter Date: 12/27/2023    Patient: Pito Black  YOB: 1945    CARDIOLOGIST:  JILL Clark    REFERRING DOCTOR:  No ref. provider found      The above patient is cleared to have the following procedure/surgery: Fiberoptic Bronchoscopy, Endobronchial ultrasound                                             Additional comments:    \"He can proceed, instructions given to procedural team about his ICD from [place]\". BRETT.                Electronically signed     LULÚ Clark.  " Wound Care (No Sutures): Petrolatum

## 2023-12-28 ENCOUNTER — ANESTHESIA (OUTPATIENT)
Dept: SURGERY | Facility: MEDICAL CENTER | Age: 78
End: 2023-12-28
Payer: MEDICARE

## 2023-12-28 ENCOUNTER — HOSPITAL ENCOUNTER (OUTPATIENT)
Facility: MEDICAL CENTER | Age: 78
End: 2023-12-28
Attending: INTERNAL MEDICINE | Admitting: INTERNAL MEDICINE
Payer: MEDICARE

## 2023-12-28 ENCOUNTER — ANESTHESIA EVENT (OUTPATIENT)
Dept: SURGERY | Facility: MEDICAL CENTER | Age: 78
End: 2023-12-28
Payer: MEDICARE

## 2023-12-28 ENCOUNTER — APPOINTMENT (OUTPATIENT)
Dept: RADIOLOGY | Facility: MEDICAL CENTER | Age: 78
End: 2023-12-28
Attending: INTERNAL MEDICINE
Payer: MEDICARE

## 2023-12-28 VITALS
BODY MASS INDEX: 21.75 KG/M2 | SYSTOLIC BLOOD PRESSURE: 112 MMHG | RESPIRATION RATE: 18 BRPM | HEIGHT: 68 IN | WEIGHT: 143.52 LBS | HEART RATE: 85 BPM | OXYGEN SATURATION: 96 % | DIASTOLIC BLOOD PRESSURE: 65 MMHG | TEMPERATURE: 97 F

## 2023-12-28 DIAGNOSIS — C34.11 PRIMARY CANCER OF RIGHT UPPER LOBE OF LUNG (HCC): ICD-10-CM

## 2023-12-28 LAB — GLUCOSE BLD STRIP.AUTO-MCNC: 78 MG/DL (ref 65–99)

## 2023-12-28 PROCEDURE — 160025 RECOVERY II MINUTES (STATS): Performed by: INTERNAL MEDICINE

## 2023-12-28 PROCEDURE — 88173 CYTOPATH EVAL FNA REPORT: CPT

## 2023-12-28 PROCEDURE — 160002 HCHG RECOVERY MINUTES (STAT): Performed by: INTERNAL MEDICINE

## 2023-12-28 PROCEDURE — 31624 DX BRONCHOSCOPE/LAVAGE: CPT | Performed by: INTERNAL MEDICINE

## 2023-12-28 PROCEDURE — 31627 NAVIGATIONAL BRONCHOSCOPY: CPT | Performed by: INTERNAL MEDICINE

## 2023-12-28 PROCEDURE — 71250 CT THORAX DX C-: CPT

## 2023-12-28 PROCEDURE — 71045 X-RAY EXAM CHEST 1 VIEW: CPT

## 2023-12-28 PROCEDURE — 700111 HCHG RX REV CODE 636 W/ 250 OVERRIDE (IP): Performed by: ANESTHESIOLOGY

## 2023-12-28 PROCEDURE — 160042 HCHG SURGERY MINUTES - EA ADDL 1 MIN LEVEL 5: Performed by: INTERNAL MEDICINE

## 2023-12-28 PROCEDURE — 88177 CYTP FNA EVAL EA ADDL: CPT

## 2023-12-28 PROCEDURE — 82962 GLUCOSE BLOOD TEST: CPT

## 2023-12-28 PROCEDURE — 160009 HCHG ANES TIME/MIN: Performed by: INTERNAL MEDICINE

## 2023-12-28 PROCEDURE — 700101 HCHG RX REV CODE 250: Performed by: ANESTHESIOLOGY

## 2023-12-28 PROCEDURE — 88112 CYTOPATH CELL ENHANCE TECH: CPT | Mod: XU

## 2023-12-28 PROCEDURE — 88172 CYTP DX EVAL FNA 1ST EA SITE: CPT

## 2023-12-28 PROCEDURE — 700105 HCHG RX REV CODE 258: Performed by: INTERNAL MEDICINE

## 2023-12-28 PROCEDURE — 31629 BRONCHOSCOPY/NEEDLE BX EACH: CPT | Performed by: INTERNAL MEDICINE

## 2023-12-28 PROCEDURE — 160031 HCHG SURGERY MINUTES - 1ST 30 MINS LEVEL 5: Performed by: INTERNAL MEDICINE

## 2023-12-28 PROCEDURE — 160046 HCHG PACU - 1ST 60 MINS PHASE II: Performed by: INTERNAL MEDICINE

## 2023-12-28 PROCEDURE — 502714 HCHG ROBOTIC SURGERY SERVICES: Performed by: INTERNAL MEDICINE

## 2023-12-28 PROCEDURE — C1887 CATHETER, GUIDING: HCPCS | Performed by: INTERNAL MEDICINE

## 2023-12-28 PROCEDURE — 160048 HCHG OR STATISTICAL LEVEL 1-5: Performed by: INTERNAL MEDICINE

## 2023-12-28 PROCEDURE — 160036 HCHG PACU - EA ADDL 30 MINS PHASE I: Performed by: INTERNAL MEDICINE

## 2023-12-28 PROCEDURE — 88305 TISSUE EXAM BY PATHOLOGIST: CPT

## 2023-12-28 PROCEDURE — 31652 BRONCH EBUS SAMPLNG 1/2 NODE: CPT | Performed by: INTERNAL MEDICINE

## 2023-12-28 PROCEDURE — 160035 HCHG PACU - 1ST 60 MINS PHASE I: Performed by: INTERNAL MEDICINE

## 2023-12-28 RX ORDER — ROCURONIUM BROMIDE 10 MG/ML
INJECTION, SOLUTION INTRAVENOUS PRN
Status: DISCONTINUED | OUTPATIENT
Start: 2023-12-28 | End: 2023-12-28 | Stop reason: SURG

## 2023-12-28 RX ORDER — HALOPERIDOL 5 MG/ML
1 INJECTION INTRAMUSCULAR
Status: DISCONTINUED | OUTPATIENT
Start: 2023-12-28 | End: 2023-12-28 | Stop reason: HOSPADM

## 2023-12-28 RX ORDER — HYDROMORPHONE HYDROCHLORIDE 1 MG/ML
0.1 INJECTION, SOLUTION INTRAMUSCULAR; INTRAVENOUS; SUBCUTANEOUS
Status: DISCONTINUED | OUTPATIENT
Start: 2023-12-28 | End: 2023-12-28 | Stop reason: HOSPADM

## 2023-12-28 RX ORDER — SODIUM CHLORIDE, SODIUM LACTATE, POTASSIUM CHLORIDE, CALCIUM CHLORIDE 600; 310; 30; 20 MG/100ML; MG/100ML; MG/100ML; MG/100ML
INJECTION, SOLUTION INTRAVENOUS CONTINUOUS
Status: DISCONTINUED | OUTPATIENT
Start: 2023-12-28 | End: 2023-12-28 | Stop reason: HOSPADM

## 2023-12-28 RX ORDER — HYDROMORPHONE HYDROCHLORIDE 1 MG/ML
0.2 INJECTION, SOLUTION INTRAMUSCULAR; INTRAVENOUS; SUBCUTANEOUS
Status: DISCONTINUED | OUTPATIENT
Start: 2023-12-28 | End: 2023-12-28 | Stop reason: HOSPADM

## 2023-12-28 RX ORDER — OXYCODONE HCL 5 MG/5 ML
5 SOLUTION, ORAL ORAL
Status: DISCONTINUED | OUTPATIENT
Start: 2023-12-28 | End: 2023-12-28 | Stop reason: HOSPADM

## 2023-12-28 RX ORDER — SODIUM CHLORIDE, SODIUM LACTATE, POTASSIUM CHLORIDE, CALCIUM CHLORIDE 600; 310; 30; 20 MG/100ML; MG/100ML; MG/100ML; MG/100ML
INJECTION, SOLUTION INTRAVENOUS CONTINUOUS
Status: ACTIVE | OUTPATIENT
Start: 2023-12-28 | End: 2023-12-28

## 2023-12-28 RX ORDER — OXYCODONE HCL 5 MG/5 ML
10 SOLUTION, ORAL ORAL
Status: DISCONTINUED | OUTPATIENT
Start: 2023-12-28 | End: 2023-12-28 | Stop reason: HOSPADM

## 2023-12-28 RX ORDER — EPHEDRINE SULFATE 50 MG/ML
5 INJECTION, SOLUTION INTRAVENOUS
Status: DISCONTINUED | OUTPATIENT
Start: 2023-12-28 | End: 2023-12-28 | Stop reason: HOSPADM

## 2023-12-28 RX ORDER — METOPROLOL TARTRATE 1 MG/ML
1 INJECTION, SOLUTION INTRAVENOUS
Status: DISCONTINUED | OUTPATIENT
Start: 2023-12-28 | End: 2023-12-28 | Stop reason: HOSPADM

## 2023-12-28 RX ORDER — HYDRALAZINE HYDROCHLORIDE 20 MG/ML
5 INJECTION INTRAMUSCULAR; INTRAVENOUS
Status: DISCONTINUED | OUTPATIENT
Start: 2023-12-28 | End: 2023-12-28 | Stop reason: HOSPADM

## 2023-12-28 RX ORDER — DIPHENHYDRAMINE HYDROCHLORIDE 50 MG/ML
12.5 INJECTION INTRAMUSCULAR; INTRAVENOUS
Status: DISCONTINUED | OUTPATIENT
Start: 2023-12-28 | End: 2023-12-28 | Stop reason: HOSPADM

## 2023-12-28 RX ORDER — HYDROMORPHONE HYDROCHLORIDE 1 MG/ML
0.4 INJECTION, SOLUTION INTRAMUSCULAR; INTRAVENOUS; SUBCUTANEOUS
Status: DISCONTINUED | OUTPATIENT
Start: 2023-12-28 | End: 2023-12-28 | Stop reason: HOSPADM

## 2023-12-28 RX ORDER — ONDANSETRON 2 MG/ML
4 INJECTION INTRAMUSCULAR; INTRAVENOUS
Status: DISCONTINUED | OUTPATIENT
Start: 2023-12-28 | End: 2023-12-28 | Stop reason: HOSPADM

## 2023-12-28 RX ORDER — LIDOCAINE HYDROCHLORIDE 40 MG/ML
SOLUTION TOPICAL PRN
Status: DISCONTINUED | OUTPATIENT
Start: 2023-12-28 | End: 2023-12-28 | Stop reason: SURG

## 2023-12-28 RX ORDER — DEXAMETHASONE SODIUM PHOSPHATE 4 MG/ML
INJECTION, SOLUTION INTRA-ARTICULAR; INTRALESIONAL; INTRAMUSCULAR; INTRAVENOUS; SOFT TISSUE PRN
Status: DISCONTINUED | OUTPATIENT
Start: 2023-12-28 | End: 2023-12-28 | Stop reason: SURG

## 2023-12-28 RX ORDER — LIDOCAINE HYDROCHLORIDE 20 MG/ML
INJECTION, SOLUTION EPIDURAL; INFILTRATION; INTRACAUDAL; PERINEURAL PRN
Status: DISCONTINUED | OUTPATIENT
Start: 2023-12-28 | End: 2023-12-28 | Stop reason: SURG

## 2023-12-28 RX ADMIN — LIDOCAINE HYDROCHLORIDE 4 ML: 40 SOLUTION TOPICAL at 13:19

## 2023-12-28 RX ADMIN — SODIUM CHLORIDE, POTASSIUM CHLORIDE, SODIUM LACTATE AND CALCIUM CHLORIDE: 600; 310; 30; 20 INJECTION, SOLUTION INTRAVENOUS at 13:12

## 2023-12-28 RX ADMIN — LIDOCAINE HYDROCHLORIDE 50 MG: 20 INJECTION, SOLUTION EPIDURAL; INFILTRATION; INTRACAUDAL at 13:17

## 2023-12-28 RX ADMIN — ROCURONIUM BROMIDE 15 MG: 50 INJECTION, SOLUTION INTRAVENOUS at 14:22

## 2023-12-28 RX ADMIN — ROCURONIUM BROMIDE 5 MG: 50 INJECTION, SOLUTION INTRAVENOUS at 14:44

## 2023-12-28 RX ADMIN — ROCURONIUM BROMIDE 15 MG: 50 INJECTION, SOLUTION INTRAVENOUS at 14:00

## 2023-12-28 RX ADMIN — ROCURONIUM BROMIDE 35 MG: 50 INJECTION, SOLUTION INTRAVENOUS at 13:17

## 2023-12-28 RX ADMIN — PROPOFOL 10 MG: 10 INJECTION, EMULSION INTRAVENOUS at 14:44

## 2023-12-28 RX ADMIN — DEXAMETHASONE SODIUM PHOSPHATE 8 MG: 4 INJECTION INTRA-ARTICULAR; INTRALESIONAL; INTRAMUSCULAR; INTRAVENOUS; SOFT TISSUE at 13:51

## 2023-12-28 RX ADMIN — PROPOFOL 15 MG: 10 INJECTION, EMULSION INTRAVENOUS at 13:59

## 2023-12-28 RX ADMIN — PROPOFOL 90 MG: 10 INJECTION, EMULSION INTRAVENOUS at 13:17

## 2023-12-28 RX ADMIN — PROPOFOL 15 MG: 10 INJECTION, EMULSION INTRAVENOUS at 13:39

## 2023-12-28 RX ADMIN — PROPOFOL 10 MG: 10 INJECTION, EMULSION INTRAVENOUS at 14:22

## 2023-12-28 ASSESSMENT — PAIN DESCRIPTION - PAIN TYPE: TYPE: ACUTE PAIN

## 2023-12-28 ASSESSMENT — FIBROSIS 4 INDEX: FIB4 SCORE: 1.98

## 2023-12-28 NOTE — DISCHARGE INSTRUCTIONS
If any questions arise, call your provider.  If your provider is not available, please feel free to call the [facility] at [phone].    MEDICATIONS: Resume taking daily medication.  Take prescribed pain medication with food.  If no medication is prescribed, you may take non-aspirin pain medication if needed.  PAIN MEDICATION CAN BE VERY CONSTIPATING.  Take a stool softener or laxative such as senokot, pericolace, or milk of magnesia if needed.    Last pain medication given at NONE GIVEN in recovery    What to Expect Post Anesthesia    Rest and take it easy for the first 24 hours.  A responsible adult is recommended to remain with you during that time.  It is normal to feel sleepy.  We encourage you to not do anything that requires balance, judgment or coordination.    FOR 24 HOURS DO NOT:  Drive, operate machinery or run household appliances.  Drink beer or alcoholic beverages.  Make important decisions or sign legal documents.    To avoid nausea, slowly advance diet as tolerated, avoiding spicy or greasy foods for the first day.  Add more substantial food to your diet according to your provider's instructions. INCREASE FLUIDS AND FIBER TO AVOID CONSTIPATION.    MILD FLU-LIKE SYMPTOMS ARE NORMAL.  YOU MAY EXPERIENCE GENERALIZED MUSCLE ACHES, THROAT IRRITATION, HEADACHE AND/OR SOME NAUSEA.    Flexible Bronchoscopy, Care After  This sheet gives you information about how to care for yourself after your test. Your doctor may also give you more specific instructions. If you have problems or questions, contact your doctor.  Follow these instructions at home:  Eating and drinking  Do not eat or drink anything (not even water) for 2 hours after your test, or until your numbing medicine (local anesthetic) wears off.  When your numbness is gone and your cough and gag reflexes have come back, you may:  Eat only soft foods.  Slowly drink liquids.  The day after the test, go back to your normal diet.  Driving  Do not  drive for 24 hours if you were given a medicine to help you relax (sedative).  Do not drive or use heavy machinery while taking prescription pain medicine.  General instructions    Take over-the-counter and prescription medicines only as told by your doctor.  Return to your normal activities as told. Ask what activities are safe for you.  Do not use any products that have nicotine or tobacco in them. This includes cigarettes and e-cigarettes. If you need help quitting, ask your doctor.  Keep all follow-up visits as told by your doctor. This is important. It is very important if you had a tissue sample (biopsy) taken.  Get help right away if:  You have shortness of breath that gets worse.  You get light-headed.  You feel like you are going to pass out (faint).  You have chest pain.  You cough up:  More than a little blood.  More blood than before.  Summary  Do not eat or drink anything (not even water) for 2 hours after your test, or until your numbing medicine wears off.  Do not use cigarettes. Do not use e-cigarettes.  Get help right away if you have chest pain.  This information is not intended to replace advice given to you by your health care provider. Make sure you discuss any questions you have with your health care provider.  Document Released: 10/15/2010 Document Revised: 11/30/2018 Document Reviewed: 01/05/2018  Elsevier Patient Education © 2020 [employer]

## 2023-12-28 NOTE — PROCEDURES
Fiberoptic Bronchoscopy/Endotracheal Ultrasound(EBUS) and robotics     Date/Time of Procedure:12/28/2023    Indication(s): right upper lobe nodule growing in size    Consent: Informed, written consent was obtained prior to the procedure; risks, benefits, & alternatives were discussed.    Universal Protocol/Time Out: A Time Out with checklist was performed prior to the procedure to ensure correct identification of the patient and procedure.    Pre-Procedure Diagnosis: r/oo ca hx of ca    Allergies:Patient has no known allergies.     Sedation/Analgesia/Anesthesia: Sedation as per anesthesia.    Additional Modalities:    [x] Fluoroscopy  [x] Radial Ultrasound  [x] Linear Endobronchial Ultrasound  [x] Rapid On-Site Evaluation    Route of Entry:  [_] Nasal [_] Oral [X] ET tube [_] Trach [_] LMA      Findings: After the patient is adequately anesthetized (see procedure for anesthesia), the flexible bronchoscope was passed through the endotracheal tube visualizing the distal trachea:  Trachea: normal appearing   Monica: sharp  Right lung: RUL, RML, and RLL all level one subsegments visualized. No endobronchial lesion  Left lung: MARIA M,   LLL , All level one subsegments visualized. No endobronchial lesion     ENDOBRONCHIAL US    Using the endobronchial ultrasound, a systematic survey of the accessible mediastinal and hilar lymph nodes was performed, notable for lymphadenopathy at stations 11s. Then, under direct ultrasound visualization, transbronchial needle aspirations were performed at the following lymph node stations:     1. SIZE OF NEEDLE   22 and 25G [employer]     2. STATIONS SAMPLED   10 R     3. BOBBI CONFIRMATION   +blood and fibrin    Robotic Navigational Bronchoscopy    Robotic navigation bronchoscopy was performed with Ion platform.  Partial registration was used.    Ion robotic catheter was used to engage the rul main stem  lung.  Target lesion is about 1.5 cm in diameter.   Under navigational guidance  the ion robotic catheter was advanced to 0.2 cm away from the planned target.     Also in the anterior aspect of the rul  navigation was used for target 2 which was eccentric and 1.5 cm    Radial EBUS was performed to confirm that the location of the nodule is  eccentric.    Transbronchial needle aspiration was performed with 19 gauge needle through the extended working channel catheter.  Total 12 samples were collected between target 1 and 2 which were juarez in the rul  Samples sent for pathology.    Bronchial alveolar lavage was performed the extended working channel catheter.  Instilled 20 cc of NS, suction returned with 11 cc of NS.  Samples sent for cytology.    BOBBI findings: concerning for malignancy      Specimens: _  [x] Bronchoalveolar Lavage (BAL): cytology  [x] Transbronchial Needle Aspiration (TBNA): pathology  [x] Endobronchial Ultrasound (EBUS) TBNA:  pathology    Impression and plan  Hx of adenocarcinoma stage III s/p chemo/radiation  Now with growing lung nodule below right main stem and also marked infiltrate vs scar in the upper lobe  The right nodule showed fibrin and blood on BOBBI  The rul scar showed atypical cells concerning for malignancy    Reviewed with daughter and will follow up with final    CXR pending

## 2023-12-28 NOTE — OR NURSING
1506: Patient arrived to PACU. Respirations even and unlabored. VSS.     1515: Sleeping and rouses to verbal stimuli, respirations even and unlabored, family updated on plan of care    1530: Respirations even and unlabored, denies pain/nausea, resting quietly    1545: No change in status, respirations even and unlabored, denies pain/nausea, awaiting x-ray    1600: x-ray at bedside    1620: discharge order placed and meets criteria for stage II, report called to Yeyo ERICKSON RN     1634: Transported to stage II

## 2023-12-28 NOTE — ANESTHESIA PREPROCEDURE EVALUATION
Case: [id number] Date/Time: 12/28/23 1224    Procedures:       FIBER OPTIC BRONCHOSCOPY WITH POSSIBLE WASH, BRUSH, BRONCHOALVEOLAR LAVAGE, BIOPSY AND FINE NEEDLE ASPIRATION, ENDOBRONCHIAL ULTRASOUND AND NAVIGATION, ROBOTICS      ENDOBRONCHIAL ULTRASOUND (EBUS)    Pre-op diagnosis: PRIMARY CANCER LEFT LOWER LOBE OF LUNG, PULMONARY NODULE    Location:  PROCEDURE ROOM / SURGERY [facility]    Surgeons: Miriam Martin M.D.          EF 35  Recent  AICD  Relevant Problems   PULMONARY   (positive) Dyspnea on exertion      CARDIAC   (positive) Dyspnea on exertion   (positive) Hypertension associated with type 2 diabetes mellitus (HCC)   (positive) PAF (paroxysmal atrial fibrillation) (HCC)   (positive) Pulmonary embolism on right (HCC)         (positive) CKD (chronic kidney disease) stage 3, GFR 30-59 ml/min (HCC)   (positive) Cardiorenal syndrome      ENDO   (positive) Type 2 diabetes mellitus with chronic kidney disease, without long-term current use of insulin (HCC)       Physical Exam    Airway   Mallampati: II  TM distance: >3 FB  Neck ROM: full       Cardiovascular - normal exam  Rhythm: regular  Rate: normal  (-) murmur     Dental - normal exam           Pulmonary - normal exam  Breath sounds clear to auscultation     Abdominal    Neurological - normal exam         Other findings: Horse voice, partials              Anesthesia Plan    ASA 4   ASA physical status 4 criteria: severe reduction of ejection fractions    Plan - general       Airway plan will be ETT          Induction: intravenous    Postoperative Plan: Postoperative administration of opioids is intended.    Pertinent diagnostic labs and testing reviewed    Informed Consent:    Anesthetic plan and risks discussed with patient.    Use of blood products discussed with: patient whom consented to blood products.

## 2023-12-28 NOTE — ANESTHESIA PROCEDURE NOTES
Airway    Date/Time: 12/28/2023 1:19 PM    Performed by: Roger José M.D.  Authorized by: Roger José M.D.    Location:  OR  Urgency:  Elective  Indications for Airway Management:  Anesthesia      Spontaneous Ventilation: absent    Sedation Level:  Deep  Preoxygenated: Yes    Patient Position:  Sniffing  Final Airway Type:  Endotracheal airway  Final Endotracheal Airway:  ETT  Cuffed: Yes    Technique Used for Successful ETT Placement:  Direct laryngoscopy    Insertion Site:  Oral  Blade Type:  Olivera  Laryngoscope Blade/Videolaryngoscope Blade Size:  2  ETT Size (mm):  8.5  Measured from:  Teeth  ETT to Teeth (cm):  21  Placement Verified by: auscultation and capnometry    Cormack-Lehane Classification:  Grade I - full view of glottis  Number of Attempts at Approach:  1

## 2023-12-28 NOTE — ANESTHESIA TIME REPORT
Anesthesia Start and Stop Event Times       Date Time Event    12/28/2023 1222 Ready for Procedure     1312 Anesthesia Start     1511 Anesthesia Stop          Responsible Staff  12/28/23      Name Role Begin End    Roger José M.D. Anesth 1312 1511          Overtime Reason:  overtime    Comments:

## 2023-12-29 LAB
CYTOLOGY REG CYTOL: NORMAL
PATHOLOGY CONSULT NOTE: NORMAL

## 2023-12-29 NOTE — OR NURSING
1634: Patient arrived to phase 2. Patient changed out of surgical gown into clothes. Patient is A&Ox4. Patient reports no pain and no nausea. Vital signs taken and patient assessed. Asked the patient if they had to use the bathroom.    1650: IV removed and discharge instructions read. All questions answered.     1657: Discharged to care of responsible adult via wheelchair by KORY Blanco.

## 2023-12-29 NOTE — ANESTHESIA POSTPROCEDURE EVALUATION
Patient: Pito Black    Procedure Summary       Date: 12/28/23 Room / Location:  PROCEDURE ROOM / SURGERY [facility]    Anesthesia Start: 1312 Anesthesia Stop: 1511    Procedures:       FIBER OPTIC BRONCHOSCOPY WITH BRONCHOALVEOLAR LAVAGE AND FINE NEEDLE ASPIRATION, ENDOBRONCHIAL ULTRASOUND AND NAVIGATION, ROBOTICS (Chest)      ENDOBRONCHIAL ULTRASOUND (EBUS) (Chest) Diagnosis: (PRIMARY CANCER LEFT LOWER LOBE OF LUNG, PULMONARY NODULE, pending lab results)    Surgeons: Miriam Martin M.D. Responsible Provider: Roger José M.D.    Anesthesia Type: general ASA Status: 4            Final Anesthesia Type: general  Last vitals  BP   Blood Pressure : 112/65    Temp   36.1 °C (97 °F)    Pulse   85   Resp   18    SpO2   96 %      Anesthesia Post Evaluation    Patient location during evaluation: PACU  Patient participation: complete - patient participated  Level of consciousness: awake and alert    Airway patency: patent  Anesthetic complications: no  Cardiovascular status: hemodynamically stable  Respiratory status: acceptable  Hydration status: euvolemic    PONV: none          No notable events documented.     Nurse Pain Score: 0 (NPRS)

## 2024-01-01 ENCOUNTER — APPOINTMENT (OUTPATIENT)
Dept: RADIOLOGY | Facility: MEDICAL CENTER | Age: 79
DRG: 189 | End: 2024-01-01
Attending: EMERGENCY MEDICINE
Payer: MEDICARE

## 2024-01-01 ENCOUNTER — HOSPITAL ENCOUNTER (INPATIENT)
Facility: MEDICAL CENTER | Age: 79
LOS: 3 days | DRG: 189 | End: 2024-09-10
Attending: EMERGENCY MEDICINE | Admitting: STUDENT IN AN ORGANIZED HEALTH CARE EDUCATION/TRAINING PROGRAM
Payer: MEDICARE

## 2024-01-01 ENCOUNTER — APPOINTMENT (OUTPATIENT)
Dept: RADIOLOGY | Facility: MEDICAL CENTER | Age: 79
DRG: 193 | End: 2024-01-01
Attending: EMERGENCY MEDICINE
Payer: MEDICARE

## 2024-01-01 ENCOUNTER — HOME CARE VISIT (OUTPATIENT)
Dept: HOME HEALTH SERVICES | Facility: HOME HEALTHCARE | Age: 79
End: 2024-01-01

## 2024-01-01 ENCOUNTER — APPOINTMENT (OUTPATIENT)
Dept: ONCOLOGY | Facility: MEDICAL CENTER | Age: 79
End: 2024-01-01
Attending: INTERNAL MEDICINE
Payer: MEDICARE

## 2024-01-01 ENCOUNTER — APPOINTMENT (OUTPATIENT)
Dept: SPEECH THERAPY | Facility: REHABILITATION | Age: 79
DRG: 057 | End: 2024-01-01
Attending: PHYSICAL MEDICINE & REHABILITATION
Payer: MEDICARE

## 2024-01-01 ENCOUNTER — TELEPHONE (OUTPATIENT)
Dept: HEALTH INFORMATION MANAGEMENT | Facility: OTHER | Age: 79
End: 2024-01-01
Payer: MEDICARE

## 2024-01-01 ENCOUNTER — PHARMACY VISIT (OUTPATIENT)
Dept: PHARMACY | Facility: MEDICAL CENTER | Age: 79
End: 2024-01-01
Payer: COMMERCIAL

## 2024-01-01 ENCOUNTER — HOME HEALTH ADMISSION (OUTPATIENT)
Dept: HOME HEALTH SERVICES | Facility: HOME HEALTHCARE | Age: 79
End: 2024-01-01
Payer: MEDICARE

## 2024-01-01 ENCOUNTER — APPOINTMENT (OUTPATIENT)
Dept: CARDIOLOGY | Facility: MEDICAL CENTER | Age: 79
DRG: 193 | End: 2024-01-01
Attending: INTERNAL MEDICINE
Payer: MEDICARE

## 2024-01-01 ENCOUNTER — PATIENT OUTREACH (OUTPATIENT)
Dept: HEALTH INFORMATION MANAGEMENT | Facility: OTHER | Age: 79
End: 2024-01-01
Payer: MEDICARE

## 2024-01-01 ENCOUNTER — HOSPITAL ENCOUNTER (INPATIENT)
Facility: MEDICAL CENTER | Age: 79
LOS: 4 days | DRG: 193 | End: 2024-09-06
Attending: EMERGENCY MEDICINE | Admitting: STUDENT IN AN ORGANIZED HEALTH CARE EDUCATION/TRAINING PROGRAM
Payer: MEDICARE

## 2024-01-01 VITALS
HEART RATE: 104 BPM | RESPIRATION RATE: 7 BRPM | SYSTOLIC BLOOD PRESSURE: 117 MMHG | TEMPERATURE: 97.5 F | OXYGEN SATURATION: 96 % | HEIGHT: 67 IN | BODY MASS INDEX: 20.76 KG/M2 | DIASTOLIC BLOOD PRESSURE: 61 MMHG | WEIGHT: 132.28 LBS

## 2024-01-01 VITALS
OXYGEN SATURATION: 98 % | DIASTOLIC BLOOD PRESSURE: 66 MMHG | WEIGHT: 132.06 LBS | TEMPERATURE: 97.9 F | RESPIRATION RATE: 22 BRPM | SYSTOLIC BLOOD PRESSURE: 118 MMHG | HEART RATE: 111 BPM | HEIGHT: 68 IN | BODY MASS INDEX: 20.01 KG/M2

## 2024-01-01 DIAGNOSIS — G89.29 CHRONIC BILATERAL THORACIC BACK PAIN: ICD-10-CM

## 2024-01-01 DIAGNOSIS — I63.9 CEREBROVASCULAR ACCIDENT (CVA), UNSPECIFIED MECHANISM (HCC): ICD-10-CM

## 2024-01-01 DIAGNOSIS — I63.511 RIGHT MIDDLE CEREBRAL ARTERY STROKE (HCC): ICD-10-CM

## 2024-01-01 DIAGNOSIS — C34.92 PRIMARY ADENOCARCINOMA OF LEFT LUNG (HCC): ICD-10-CM

## 2024-01-01 DIAGNOSIS — I50.20 HFREF (HEART FAILURE WITH REDUCED EJECTION FRACTION) (HCC): ICD-10-CM

## 2024-01-01 DIAGNOSIS — R68.89 INCREASED OXYGEN DEMAND: ICD-10-CM

## 2024-01-01 DIAGNOSIS — C34.90 PRIMARY ADENOCARCINOMA OF LUNG, UNSPECIFIED LATERALITY (HCC): ICD-10-CM

## 2024-01-01 DIAGNOSIS — C34.32 PRIMARY CANCER OF LEFT LOWER LOBE OF LUNG (HCC): ICD-10-CM

## 2024-01-01 DIAGNOSIS — J81.1 CHRONIC PULMONARY EDEMA: ICD-10-CM

## 2024-01-01 DIAGNOSIS — R06.03 RESPIRATORY DISTRESS: ICD-10-CM

## 2024-01-01 DIAGNOSIS — J96.01 ACUTE HYPOXIC RESPIRATORY FAILURE (HCC): ICD-10-CM

## 2024-01-01 DIAGNOSIS — I63.9 RIGHT SIDED CEREBRAL HEMISPHERE CEREBROVASCULAR ACCIDENT (CVA) (HCC): ICD-10-CM

## 2024-01-01 DIAGNOSIS — N18.31 STAGE 3A CHRONIC KIDNEY DISEASE: ICD-10-CM

## 2024-01-01 DIAGNOSIS — R91.8 LUNG MASS: ICD-10-CM

## 2024-01-01 DIAGNOSIS — R13.12 OROPHARYNGEAL DYSPHAGIA: ICD-10-CM

## 2024-01-01 DIAGNOSIS — I50.22 CHRONIC SYSTOLIC HEART FAILURE (HCC): ICD-10-CM

## 2024-01-01 DIAGNOSIS — I48.91 ATRIAL FIBRILLATION, UNSPECIFIED TYPE (HCC): ICD-10-CM

## 2024-01-01 DIAGNOSIS — J44.9 CHRONIC OBSTRUCTIVE PULMONARY DISEASE, UNSPECIFIED COPD TYPE (HCC): ICD-10-CM

## 2024-01-01 DIAGNOSIS — J90 PLEURAL EFFUSION, LEFT: ICD-10-CM

## 2024-01-01 DIAGNOSIS — M54.6 CHRONIC BILATERAL THORACIC BACK PAIN: ICD-10-CM

## 2024-01-01 LAB
ALBUMIN SERPL BCP-MCNC: 2.6 G/DL (ref 3.2–4.9)
ALBUMIN SERPL BCP-MCNC: 2.9 G/DL (ref 3.2–4.9)
ALBUMIN SERPL BCP-MCNC: 3 G/DL (ref 3.2–4.9)
ALBUMIN SERPL BCP-MCNC: 3.1 G/DL (ref 3.2–4.9)
ALBUMIN/GLOB SERPL: 0.8 G/DL
ALBUMIN/GLOB SERPL: 0.8 G/DL
ALP SERPL-CCNC: 77 U/L (ref 30–99)
ALP SERPL-CCNC: 84 U/L (ref 30–99)
ALT SERPL-CCNC: 45 U/L (ref 2–50)
ALT SERPL-CCNC: 51 U/L (ref 2–50)
ANION GAP SERPL CALC-SCNC: 11 MMOL/L (ref 7–16)
ANION GAP SERPL CALC-SCNC: 15 MMOL/L (ref 7–16)
APPEARANCE UR: CLEAR
APPEARANCE UR: CLEAR
AST SERPL-CCNC: 35 U/L (ref 12–45)
AST SERPL-CCNC: 46 U/L (ref 12–45)
BACTERIA BLD CULT: NORMAL
BACTERIA BLD CULT: NORMAL
BACTERIA UR CULT: NORMAL
BACTERIA UR CULT: NORMAL
BASE EXCESS BLDA CALC-SCNC: 1 MMOL/L (ref -4–3)
BASOPHILS # BLD AUTO: 0.2 % (ref 0–1.8)
BASOPHILS # BLD AUTO: 0.3 % (ref 0–1.8)
BASOPHILS # BLD AUTO: 0.3 % (ref 0–1.8)
BASOPHILS # BLD: 0.03 K/UL (ref 0–0.12)
BASOPHILS # BLD: 0.04 K/UL (ref 0–0.12)
BASOPHILS # BLD: 0.04 K/UL (ref 0–0.12)
BILIRUB SERPL-MCNC: 0.4 MG/DL (ref 0.1–1.5)
BILIRUB SERPL-MCNC: 0.4 MG/DL (ref 0.1–1.5)
BILIRUB UR QL STRIP.AUTO: NEGATIVE
BILIRUB UR QL STRIP.AUTO: NEGATIVE
BLOOD CULTURE HOLD CXBCH: NORMAL
BODY TEMPERATURE: 36.4 CENTIGRADE
BUN SERPL-MCNC: 37 MG/DL (ref 8–22)
BUN SERPL-MCNC: 38 MG/DL (ref 8–22)
BUN SERPL-MCNC: 44 MG/DL (ref 8–22)
BUN SERPL-MCNC: 58 MG/DL (ref 8–22)
CALCIUM ALBUM COR SERPL-MCNC: 9.2 MG/DL (ref 8.5–10.5)
CALCIUM ALBUM COR SERPL-MCNC: 9.3 MG/DL (ref 8.5–10.5)
CALCIUM ALBUM COR SERPL-MCNC: 9.6 MG/DL (ref 8.5–10.5)
CALCIUM ALBUM COR SERPL-MCNC: 9.7 MG/DL (ref 8.5–10.5)
CALCIUM SERPL-MCNC: 8.2 MG/DL (ref 8.5–10.5)
CALCIUM SERPL-MCNC: 8.3 MG/DL (ref 8.5–10.5)
CALCIUM SERPL-MCNC: 8.9 MG/DL (ref 8.5–10.5)
CALCIUM SERPL-MCNC: 8.9 MG/DL (ref 8.5–10.5)
CHLORIDE SERPL-SCNC: 100 MMOL/L (ref 96–112)
CHLORIDE SERPL-SCNC: 101 MMOL/L (ref 96–112)
CHLORIDE SERPL-SCNC: 104 MMOL/L (ref 96–112)
CHLORIDE SERPL-SCNC: 104 MMOL/L (ref 96–112)
CO2 SERPL-SCNC: 22 MMOL/L (ref 20–33)
CO2 SERPL-SCNC: 25 MMOL/L (ref 20–33)
CO2 SERPL-SCNC: 27 MMOL/L (ref 20–33)
CO2 SERPL-SCNC: 27 MMOL/L (ref 20–33)
COLOR UR: YELLOW
COLOR UR: YELLOW
CREAT SERPL-MCNC: 1.09 MG/DL (ref 0.5–1.4)
CREAT SERPL-MCNC: 1.13 MG/DL (ref 0.5–1.4)
CREAT SERPL-MCNC: 1.16 MG/DL (ref 0.5–1.4)
CREAT SERPL-MCNC: 1.41 MG/DL (ref 0.5–1.4)
CRP SERPL HS-MCNC: 10.09 MG/DL (ref 0–0.75)
EKG IMPRESSION: NORMAL
EOSINOPHIL # BLD AUTO: 0.07 K/UL (ref 0–0.51)
EOSINOPHIL # BLD AUTO: 0.23 K/UL (ref 0–0.51)
EOSINOPHIL # BLD AUTO: 0.36 K/UL (ref 0–0.51)
EOSINOPHIL NFR BLD: 0.4 % (ref 0–6.9)
EOSINOPHIL NFR BLD: 1.5 % (ref 0–6.9)
EOSINOPHIL NFR BLD: 3.4 % (ref 0–6.9)
ERYTHROCYTE [DISTWIDTH] IN BLOOD BY AUTOMATED COUNT: 50.4 FL (ref 35.9–50)
ERYTHROCYTE [DISTWIDTH] IN BLOOD BY AUTOMATED COUNT: 51.1 FL (ref 35.9–50)
ERYTHROCYTE [DISTWIDTH] IN BLOOD BY AUTOMATED COUNT: 51.3 FL (ref 35.9–50)
ERYTHROCYTE [DISTWIDTH] IN BLOOD BY AUTOMATED COUNT: 52.5 FL (ref 35.9–50)
ERYTHROCYTE [DISTWIDTH] IN BLOOD BY AUTOMATED COUNT: 56.7 FL (ref 35.9–50)
FLUAV RNA SPEC QL NAA+PROBE: NEGATIVE
FLUAV RNA SPEC QL NAA+PROBE: NEGATIVE
FLUBV RNA SPEC QL NAA+PROBE: NEGATIVE
FLUBV RNA SPEC QL NAA+PROBE: NEGATIVE
GFR SERPLBLD CREATININE-BSD FMLA CKD-EPI: 50 ML/MIN/1.73 M 2
GFR SERPLBLD CREATININE-BSD FMLA CKD-EPI: 64 ML/MIN/1.73 M 2
GFR SERPLBLD CREATININE-BSD FMLA CKD-EPI: 66 ML/MIN/1.73 M 2
GFR SERPLBLD CREATININE-BSD FMLA CKD-EPI: 69 ML/MIN/1.73 M 2
GLOBULIN SER CALC-MCNC: 3.6 G/DL (ref 1.9–3.5)
GLOBULIN SER CALC-MCNC: 3.9 G/DL (ref 1.9–3.5)
GLUCOSE BLD STRIP.AUTO-MCNC: 192 MG/DL (ref 65–99)
GLUCOSE BLD STRIP.AUTO-MCNC: 219 MG/DL (ref 65–99)
GLUCOSE BLD STRIP.AUTO-MCNC: 221 MG/DL (ref 65–99)
GLUCOSE BLD STRIP.AUTO-MCNC: 225 MG/DL (ref 65–99)
GLUCOSE BLD STRIP.AUTO-MCNC: 240 MG/DL (ref 65–99)
GLUCOSE BLD STRIP.AUTO-MCNC: 242 MG/DL (ref 65–99)
GLUCOSE BLD STRIP.AUTO-MCNC: 250 MG/DL (ref 65–99)
GLUCOSE BLD STRIP.AUTO-MCNC: 253 MG/DL (ref 65–99)
GLUCOSE BLD STRIP.AUTO-MCNC: 296 MG/DL (ref 65–99)
GLUCOSE BLD STRIP.AUTO-MCNC: 329 MG/DL (ref 65–99)
GLUCOSE SERPL-MCNC: 176 MG/DL (ref 65–99)
GLUCOSE SERPL-MCNC: 260 MG/DL (ref 65–99)
GLUCOSE SERPL-MCNC: 323 MG/DL (ref 65–99)
GLUCOSE SERPL-MCNC: 326 MG/DL (ref 65–99)
GLUCOSE UR STRIP.AUTO-MCNC: >=1000 MG/DL
GLUCOSE UR STRIP.AUTO-MCNC: NEGATIVE MG/DL
HCO3 BLDA-SCNC: 23 MMOL/L (ref 17–25)
HCT VFR BLD AUTO: 33.9 % (ref 42–52)
HCT VFR BLD AUTO: 35.1 % (ref 42–52)
HCT VFR BLD AUTO: 35.4 % (ref 42–52)
HCT VFR BLD AUTO: 38.1 % (ref 42–52)
HCT VFR BLD AUTO: 39.3 % (ref 42–52)
HCT VFR BLD AUTO: 42.9 % (ref 42–52)
HGB BLD-MCNC: 11.1 G/DL (ref 14–18)
HGB BLD-MCNC: 11.4 G/DL (ref 14–18)
HGB BLD-MCNC: 11.5 G/DL (ref 14–18)
HGB BLD-MCNC: 12.1 G/DL (ref 14–18)
HGB BLD-MCNC: 12.2 G/DL (ref 14–18)
HGB BLD-MCNC: 13.8 G/DL (ref 14–18)
IMM GRANULOCYTES # BLD AUTO: 0.07 K/UL (ref 0–0.11)
IMM GRANULOCYTES # BLD AUTO: 0.14 K/UL (ref 0–0.11)
IMM GRANULOCYTES # BLD AUTO: 0.15 K/UL (ref 0–0.11)
IMM GRANULOCYTES NFR BLD AUTO: 0.7 % (ref 0–0.9)
IMM GRANULOCYTES NFR BLD AUTO: 0.8 % (ref 0–0.9)
IMM GRANULOCYTES NFR BLD AUTO: 0.9 % (ref 0–0.9)
INHALED O2 FLOW RATE: 3 L/MIN (ref 2–10)
INHALED O2 FLOW RATE: ABNORMAL L/MIN
KETONES UR STRIP.AUTO-MCNC: ABNORMAL MG/DL
KETONES UR STRIP.AUTO-MCNC: NEGATIVE MG/DL
LACTATE SERPL-SCNC: 1.5 MMOL/L (ref 0.5–2)
LACTATE SERPL-SCNC: 1.5 MMOL/L (ref 0.5–2)
LACTATE SERPL-SCNC: 1.8 MMOL/L (ref 0.5–2)
LACTATE SERPL-SCNC: 1.9 MMOL/L (ref 0.5–2)
LACTATE SERPL-SCNC: 1.9 MMOL/L (ref 0.5–2)
LACTATE SERPL-SCNC: 2 MMOL/L (ref 0.5–2)
LACTATE SERPL-SCNC: 2.1 MMOL/L (ref 0.5–2)
LACTATE SERPL-SCNC: 3.7 MMOL/L (ref 0.5–2)
LEUKOCYTE ESTERASE UR QL STRIP.AUTO: NEGATIVE
LEUKOCYTE ESTERASE UR QL STRIP.AUTO: NEGATIVE
LV EJECT FRACT  99904: 55
LV EJECT FRACT MOD 2C 99903: 68.27
LV EJECT FRACT MOD 4C 99902: 36.49
LV EJECT FRACT MOD BP 99901: 57.95
LYMPHOCYTES # BLD AUTO: 1.07 K/UL (ref 1–4.8)
LYMPHOCYTES # BLD AUTO: 1.17 K/UL (ref 1–4.8)
LYMPHOCYTES # BLD AUTO: 2.06 K/UL (ref 1–4.8)
LYMPHOCYTES NFR BLD: 10 % (ref 22–41)
LYMPHOCYTES NFR BLD: 13 % (ref 22–41)
LYMPHOCYTES NFR BLD: 6.4 % (ref 22–41)
MCH RBC QN AUTO: 30.5 PG (ref 27–33)
MCH RBC QN AUTO: 30.6 PG (ref 27–33)
MCH RBC QN AUTO: 31.1 PG (ref 27–33)
MCH RBC QN AUTO: 31.2 PG (ref 27–33)
MCH RBC QN AUTO: 31.4 PG (ref 27–33)
MCHC RBC AUTO-ENTMCNC: 31 G/DL (ref 32.3–36.5)
MCHC RBC AUTO-ENTMCNC: 31.8 G/DL (ref 32.3–36.5)
MCHC RBC AUTO-ENTMCNC: 32.2 G/DL (ref 32.3–36.5)
MCHC RBC AUTO-ENTMCNC: 32.2 G/DL (ref 32.3–36.5)
MCHC RBC AUTO-ENTMCNC: 32.7 G/DL (ref 32.3–36.5)
MCV RBC AUTO: 100.5 FL (ref 81.4–97.8)
MCV RBC AUTO: 94.9 FL (ref 81.4–97.8)
MCV RBC AUTO: 96 FL (ref 81.4–97.8)
MCV RBC AUTO: 96.2 FL (ref 81.4–97.8)
MCV RBC AUTO: 96.5 FL (ref 81.4–97.8)
MICRO URNS: ABNORMAL
MICRO URNS: NORMAL
MONOCYTES # BLD AUTO: 0.44 K/UL (ref 0–0.85)
MONOCYTES # BLD AUTO: 0.89 K/UL (ref 0–0.85)
MONOCYTES # BLD AUTO: 1.06 K/UL (ref 0–0.85)
MONOCYTES NFR BLD AUTO: 2.4 % (ref 0–13.4)
MONOCYTES NFR BLD AUTO: 6.7 % (ref 0–13.4)
MONOCYTES NFR BLD AUTO: 8.3 % (ref 0–13.4)
NEUTROPHILS # BLD AUTO: 12.3 K/UL (ref 1.82–7.42)
NEUTROPHILS # BLD AUTO: 16.54 K/UL (ref 1.82–7.42)
NEUTROPHILS # BLD AUTO: 8.27 K/UL (ref 1.82–7.42)
NEUTROPHILS NFR BLD: 77.3 % (ref 44–72)
NEUTROPHILS NFR BLD: 77.6 % (ref 44–72)
NEUTROPHILS NFR BLD: 89.8 % (ref 44–72)
NITRITE UR QL STRIP.AUTO: NEGATIVE
NITRITE UR QL STRIP.AUTO: NEGATIVE
NRBC # BLD AUTO: 0 K/UL
NRBC # BLD AUTO: 0 K/UL
NRBC # BLD AUTO: 0.05 K/UL
NRBC BLD-RTO: 0 /100 WBC (ref 0–0.2)
NRBC BLD-RTO: 0 /100 WBC (ref 0–0.2)
NRBC BLD-RTO: 0.3 /100 WBC (ref 0–0.2)
NT-PROBNP SERPL IA-MCNC: 3469 PG/ML (ref 0–125)
NT-PROBNP SERPL IA-MCNC: 6673 PG/ML (ref 0–125)
PCO2 BLDA: 30.2 MMHG (ref 26–37)
PCO2 TEMP ADJ BLDA: 29.4 MMHG (ref 26–37)
PH BLDA: 7.5 [PH] (ref 7.4–7.5)
PH TEMP ADJ BLDA: 7.51 [PH] (ref 7.4–7.5)
PH UR STRIP.AUTO: 7 [PH] (ref 5–8)
PH UR STRIP.AUTO: 7 [PH] (ref 5–8)
PHOSPHATE SERPL-MCNC: 3.1 MG/DL (ref 2.5–4.5)
PHOSPHATE SERPL-MCNC: 3.9 MG/DL (ref 2.5–4.5)
PLATELET # BLD AUTO: 106 K/UL (ref 164–446)
PLATELET # BLD AUTO: 110 K/UL (ref 164–446)
PLATELET # BLD AUTO: 114 K/UL (ref 164–446)
PLATELET # BLD AUTO: 92 K/UL (ref 164–446)
PLATELET # BLD AUTO: 93 K/UL (ref 164–446)
PLATELETS.RETICULATED NFR BLD AUTO: 4.1 % (ref 0.6–13.1)
PLATELETS.RETICULATED NFR BLD AUTO: 4.8 % (ref 0.6–13.1)
PMV BLD AUTO: 11 FL (ref 9–12.9)
PMV BLD AUTO: 11.1 FL (ref 9–12.9)
PMV BLD AUTO: 11.1 FL (ref 9–12.9)
PMV BLD AUTO: 11.3 FL (ref 9–12.9)
PMV BLD AUTO: 11.5 FL (ref 9–12.9)
PO2 BLDA: 95.5 MMHG (ref 64–87)
PO2 TEMP ADJ BLDA: 92.1 MMHG (ref 64–87)
POTASSIUM SERPL-SCNC: 3.8 MMOL/L (ref 3.6–5.5)
POTASSIUM SERPL-SCNC: 4.4 MMOL/L (ref 3.6–5.5)
POTASSIUM SERPL-SCNC: 4.5 MMOL/L (ref 3.6–5.5)
POTASSIUM SERPL-SCNC: 4.9 MMOL/L (ref 3.6–5.5)
PREALB SERPL-MCNC: 12.2 MG/DL (ref 18–38)
PROCALCITONIN SERPL-MCNC: 0.15 NG/ML
PROT SERPL-MCNC: 6.5 G/DL (ref 6–8.2)
PROT SERPL-MCNC: 7 G/DL (ref 6–8.2)
PROT UR QL STRIP: NEGATIVE MG/DL
PROT UR QL STRIP: NEGATIVE MG/DL
RBC # BLD AUTO: 3.53 M/UL (ref 4.7–6.1)
RBC # BLD AUTO: 3.67 M/UL (ref 4.7–6.1)
RBC # BLD AUTO: 3.91 M/UL (ref 4.7–6.1)
RBC # BLD AUTO: 3.96 M/UL (ref 4.7–6.1)
RBC # BLD AUTO: 4.52 M/UL (ref 4.7–6.1)
RBC UR QL AUTO: NEGATIVE
RBC UR QL AUTO: NEGATIVE
RSV RNA SPEC QL NAA+PROBE: NEGATIVE
RSV RNA SPEC QL NAA+PROBE: NEGATIVE
SAO2 % BLDA: 97.2 % (ref 93–99)
SARS-COV-2 RNA RESP QL NAA+PROBE: NOTDETECTED
SARS-COV-2 RNA RESP QL NAA+PROBE: NOTDETECTED
SIGNIFICANT IND 70042: NORMAL
SITE SITE: NORMAL
SODIUM SERPL-SCNC: 137 MMOL/L (ref 135–145)
SODIUM SERPL-SCNC: 139 MMOL/L (ref 135–145)
SODIUM SERPL-SCNC: 141 MMOL/L (ref 135–145)
SODIUM SERPL-SCNC: 144 MMOL/L (ref 135–145)
SOURCE SOURCE: NORMAL
SP GR UR STRIP.AUTO: 1.02
SP GR UR STRIP.AUTO: 1.03
TROPONIN T SERPL-MCNC: 109 NG/L (ref 6–19)
TSH SERPL DL<=0.005 MIU/L-ACNC: 0.7 UIU/ML (ref 0.38–5.33)
UROBILINOGEN UR STRIP.AUTO-MCNC: 1 MG/DL
UROBILINOGEN UR STRIP.AUTO-MCNC: 1 MG/DL
WBC # BLD AUTO: 10.7 K/UL (ref 4.8–10.8)
WBC # BLD AUTO: 11.5 K/UL (ref 4.8–10.8)
WBC # BLD AUTO: 15.8 K/UL (ref 4.8–10.8)
WBC # BLD AUTO: 17.3 K/UL (ref 4.8–10.8)
WBC # BLD AUTO: 18.4 K/UL (ref 4.8–10.8)

## 2024-01-01 PROCEDURE — 94760 N-INVAS EAR/PLS OXIMETRY 1: CPT

## 2024-01-01 PROCEDURE — 83605 ASSAY OF LACTIC ACID: CPT

## 2024-01-01 PROCEDURE — A9270 NON-COVERED ITEM OR SERVICE: HCPCS

## 2024-01-01 PROCEDURE — 700101 HCHG RX REV CODE 250: Performed by: INTERNAL MEDICINE

## 2024-01-01 PROCEDURE — 36415 COLL VENOUS BLD VENIPUNCTURE: CPT

## 2024-01-01 PROCEDURE — 85055 RETICULATED PLATELET ASSAY: CPT

## 2024-01-01 PROCEDURE — 0241U HCHG SARS-COV-2 COVID-19 NFCT DS RESP RNA 4 TRGT ED POC: CPT

## 2024-01-01 PROCEDURE — G0316 PR PROLONGED IP/OBS E&M EA 15 MIN: HCPCS | Performed by: INTERNAL MEDICINE

## 2024-01-01 PROCEDURE — 81003 URINALYSIS AUTO W/O SCOPE: CPT

## 2024-01-01 PROCEDURE — 94640 AIRWAY INHALATION TREATMENT: CPT

## 2024-01-01 PROCEDURE — 700102 HCHG RX REV CODE 250 W/ 637 OVERRIDE(OP): Performed by: INTERNAL MEDICINE

## 2024-01-01 PROCEDURE — 700111 HCHG RX REV CODE 636 W/ 250 OVERRIDE (IP): Mod: JZ | Performed by: STUDENT IN AN ORGANIZED HEALTH CARE EDUCATION/TRAINING PROGRAM

## 2024-01-01 PROCEDURE — 700111 HCHG RX REV CODE 636 W/ 250 OVERRIDE (IP): Mod: JZ

## 2024-01-01 PROCEDURE — 85025 COMPLETE CBC W/AUTO DIFF WBC: CPT

## 2024-01-01 PROCEDURE — 86140 C-REACTIVE PROTEIN: CPT

## 2024-01-01 PROCEDURE — 700101 HCHG RX REV CODE 250

## 2024-01-01 PROCEDURE — A9270 NON-COVERED ITEM OR SERVICE: HCPCS | Performed by: NURSE PRACTITIONER

## 2024-01-01 PROCEDURE — 96376 TX/PRO/DX INJ SAME DRUG ADON: CPT

## 2024-01-01 PROCEDURE — 71045 X-RAY EXAM CHEST 1 VIEW: CPT

## 2024-01-01 PROCEDURE — 700111 HCHG RX REV CODE 636 W/ 250 OVERRIDE (IP)

## 2024-01-01 PROCEDURE — 770006 HCHG ROOM/CARE - MED/SURG/GYN SEMI*

## 2024-01-01 PROCEDURE — 92526 ORAL FUNCTION THERAPY: CPT

## 2024-01-01 PROCEDURE — 97162 PT EVAL MOD COMPLEX 30 MIN: CPT

## 2024-01-01 PROCEDURE — 96365 THER/PROPH/DIAG IV INF INIT: CPT

## 2024-01-01 PROCEDURE — 700102 HCHG RX REV CODE 250 W/ 637 OVERRIDE(OP)

## 2024-01-01 PROCEDURE — 700102 HCHG RX REV CODE 250 W/ 637 OVERRIDE(OP): Performed by: NURSE PRACTITIONER

## 2024-01-01 PROCEDURE — 99233 SBSQ HOSP IP/OBS HIGH 50: CPT | Performed by: INTERNAL MEDICINE

## 2024-01-01 PROCEDURE — 84145 PROCALCITONIN (PCT): CPT

## 2024-01-01 PROCEDURE — 700101 HCHG RX REV CODE 250: Performed by: EMERGENCY MEDICINE

## 2024-01-01 PROCEDURE — 99222 1ST HOSP IP/OBS MODERATE 55: CPT | Performed by: INTERNAL MEDICINE

## 2024-01-01 PROCEDURE — A9270 NON-COVERED ITEM OR SERVICE: HCPCS | Performed by: STUDENT IN AN ORGANIZED HEALTH CARE EDUCATION/TRAINING PROGRAM

## 2024-01-01 PROCEDURE — 99239 HOSP IP/OBS DSCHRG MGMT >30: CPT | Performed by: INTERNAL MEDICINE

## 2024-01-01 PROCEDURE — 84484 ASSAY OF TROPONIN QUANT: CPT

## 2024-01-01 PROCEDURE — 700105 HCHG RX REV CODE 258: Performed by: STUDENT IN AN ORGANIZED HEALTH CARE EDUCATION/TRAINING PROGRAM

## 2024-01-01 PROCEDURE — A9270 NON-COVERED ITEM OR SERVICE: HCPCS | Performed by: INTERNAL MEDICINE

## 2024-01-01 PROCEDURE — 82962 GLUCOSE BLOOD TEST: CPT

## 2024-01-01 PROCEDURE — RXMED WILLOW AMBULATORY MEDICATION CHARGE

## 2024-01-01 PROCEDURE — 700105 HCHG RX REV CODE 258: Performed by: EMERGENCY MEDICINE

## 2024-01-01 PROCEDURE — 97167 OT EVAL HIGH COMPLEX 60 MIN: CPT

## 2024-01-01 PROCEDURE — 99231 SBSQ HOSP IP/OBS SF/LOW 25: CPT | Performed by: INTERNAL MEDICINE

## 2024-01-01 PROCEDURE — 770020 HCHG ROOM/CARE - TELE (206)

## 2024-01-01 PROCEDURE — 99232 SBSQ HOSP IP/OBS MODERATE 35: CPT | Mod: GC | Performed by: STUDENT IN AN ORGANIZED HEALTH CARE EDUCATION/TRAINING PROGRAM

## 2024-01-01 PROCEDURE — 99233 SBSQ HOSP IP/OBS HIGH 50: CPT | Mod: 25 | Performed by: INTERNAL MEDICINE

## 2024-01-01 PROCEDURE — 700102 HCHG RX REV CODE 250 W/ 637 OVERRIDE(OP): Performed by: STUDENT IN AN ORGANIZED HEALTH CARE EDUCATION/TRAINING PROGRAM

## 2024-01-01 PROCEDURE — 83605 ASSAY OF LACTIC ACID: CPT | Mod: 91

## 2024-01-01 PROCEDURE — 85027 COMPLETE CBC AUTOMATED: CPT

## 2024-01-01 PROCEDURE — 700111 HCHG RX REV CODE 636 W/ 250 OVERRIDE (IP): Performed by: INTERNAL MEDICINE

## 2024-01-01 PROCEDURE — RXMED WILLOW AMBULATORY MEDICATION CHARGE: Performed by: INTERNAL MEDICINE

## 2024-01-01 PROCEDURE — 85018 HEMOGLOBIN: CPT

## 2024-01-01 PROCEDURE — 94669 MECHANICAL CHEST WALL OSCILL: CPT

## 2024-01-01 PROCEDURE — 82962 GLUCOSE BLOOD TEST: CPT | Mod: 91

## 2024-01-01 PROCEDURE — 99285 EMERGENCY DEPT VISIT HI MDM: CPT

## 2024-01-01 PROCEDURE — 84134 ASSAY OF PREALBUMIN: CPT

## 2024-01-01 PROCEDURE — 700117 HCHG RX CONTRAST REV CODE 255: Performed by: EMERGENCY MEDICINE

## 2024-01-01 PROCEDURE — 82803 BLOOD GASES ANY COMBINATION: CPT

## 2024-01-01 PROCEDURE — 84443 ASSAY THYROID STIM HORMONE: CPT

## 2024-01-01 PROCEDURE — 700111 HCHG RX REV CODE 636 W/ 250 OVERRIDE (IP): Mod: JZ | Performed by: EMERGENCY MEDICINE

## 2024-01-01 PROCEDURE — 700111 HCHG RX REV CODE 636 W/ 250 OVERRIDE (IP): Mod: JZ | Performed by: INTERNAL MEDICINE

## 2024-01-01 PROCEDURE — 87040 BLOOD CULTURE FOR BACTERIA: CPT | Mod: 91

## 2024-01-01 PROCEDURE — 99232 SBSQ HOSP IP/OBS MODERATE 35: CPT | Performed by: HOSPITALIST

## 2024-01-01 PROCEDURE — 99222 1ST HOSP IP/OBS MODERATE 55: CPT | Mod: AI,GC | Performed by: STUDENT IN AN ORGANIZED HEALTH CARE EDUCATION/TRAINING PROGRAM

## 2024-01-01 PROCEDURE — 99238 HOSP IP/OBS DSCHRG MGMT 30/<: CPT | Mod: GC | Performed by: FAMILY MEDICINE

## 2024-01-01 PROCEDURE — 87040 BLOOD CULTURE FOR BACTERIA: CPT

## 2024-01-01 PROCEDURE — 93306 TTE W/DOPPLER COMPLETE: CPT | Mod: 26 | Performed by: INTERNAL MEDICINE

## 2024-01-01 PROCEDURE — 83880 ASSAY OF NATRIURETIC PEPTIDE: CPT

## 2024-01-01 PROCEDURE — 700101 HCHG RX REV CODE 250: Performed by: STUDENT IN AN ORGANIZED HEALTH CARE EDUCATION/TRAINING PROGRAM

## 2024-01-01 PROCEDURE — 80069 RENAL FUNCTION PANEL: CPT

## 2024-01-01 PROCEDURE — 80053 COMPREHEN METABOLIC PANEL: CPT

## 2024-01-01 PROCEDURE — 85014 HEMATOCRIT: CPT

## 2024-01-01 PROCEDURE — 96375 TX/PRO/DX INJ NEW DRUG ADDON: CPT

## 2024-01-01 PROCEDURE — 97116 GAIT TRAINING THERAPY: CPT

## 2024-01-01 PROCEDURE — 770001 HCHG ROOM/CARE - MED/SURG/GYN PRIV*

## 2024-01-01 PROCEDURE — 93005 ELECTROCARDIOGRAM TRACING: CPT

## 2024-01-01 PROCEDURE — 87086 URINE CULTURE/COLONY COUNT: CPT

## 2024-01-01 PROCEDURE — 93005 ELECTROCARDIOGRAM TRACING: CPT | Performed by: EMERGENCY MEDICINE

## 2024-01-01 PROCEDURE — 93306 TTE W/DOPPLER COMPLETE: CPT

## 2024-01-01 PROCEDURE — 94660 CPAP INITIATION&MGMT: CPT

## 2024-01-01 PROCEDURE — 92610 EVALUATE SWALLOWING FUNCTION: CPT

## 2024-01-01 PROCEDURE — 71260 CT THORAX DX C+: CPT

## 2024-01-01 PROCEDURE — 99231 SBSQ HOSP IP/OBS SF/LOW 25: CPT | Mod: GC | Performed by: FAMILY MEDICINE

## 2024-01-01 RX ORDER — FUROSEMIDE 10 MG/ML
20 INJECTION INTRAMUSCULAR; INTRAVENOUS EVERY 8 HOURS
Status: DISCONTINUED | OUTPATIENT
Start: 2024-01-01 | End: 2024-01-01

## 2024-01-01 RX ORDER — HYDROXYZINE HYDROCHLORIDE 50 MG/1
25 TABLET, FILM COATED ORAL 3 TIMES DAILY PRN
Status: DISCONTINUED | OUTPATIENT
Start: 2024-01-01 | End: 2024-01-01

## 2024-01-01 RX ORDER — EZETIMIBE 10 MG/1
10 TABLET ORAL DAILY
Status: DISCONTINUED | OUTPATIENT
Start: 2024-01-01 | End: 2024-01-01 | Stop reason: HOSPADM

## 2024-01-01 RX ORDER — ONDANSETRON 4 MG/1
8 TABLET, ORALLY DISINTEGRATING ORAL EVERY 8 HOURS PRN
Status: DISCONTINUED | OUTPATIENT
Start: 2024-01-01 | End: 2024-01-01 | Stop reason: HOSPADM

## 2024-01-01 RX ORDER — ALBUTEROL SULFATE 5 MG/ML
2.5 SOLUTION RESPIRATORY (INHALATION) ONCE
COMMUNITY

## 2024-01-01 RX ORDER — TIOTROPIUM BROMIDE INHALATION SPRAY 3.12 UG/1
5 SPRAY, METERED RESPIRATORY (INHALATION) DAILY
COMMUNITY

## 2024-01-01 RX ORDER — ONDANSETRON 4 MG/1
8 TABLET, ORALLY DISINTEGRATING ORAL EVERY 8 HOURS PRN
Status: DISCONTINUED | OUTPATIENT
Start: 2024-01-01 | End: 2024-01-01

## 2024-01-01 RX ORDER — LACTULOSE 10 G/15ML
10 SOLUTION ORAL
Status: DISCONTINUED | OUTPATIENT
Start: 2024-01-01 | End: 2024-01-01 | Stop reason: HOSPADM

## 2024-01-01 RX ORDER — ATORVASTATIN CALCIUM 20 MG/1
20 TABLET, FILM COATED ORAL EVERY EVENING
Status: DISCONTINUED | OUTPATIENT
Start: 2024-01-01 | End: 2024-01-01 | Stop reason: HOSPADM

## 2024-01-01 RX ORDER — ALBUTEROL SULFATE 90 UG/1
2 INHALANT RESPIRATORY (INHALATION) EVERY 4 HOURS PRN
Status: DISCONTINUED | OUTPATIENT
Start: 2024-01-01 | End: 2024-01-01

## 2024-01-01 RX ORDER — VITAMIN B COMPLEX
2000 TABLET ORAL DAILY
Status: DISCONTINUED | OUTPATIENT
Start: 2024-01-01 | End: 2024-01-01

## 2024-01-01 RX ORDER — SCOLOPAMINE TRANSDERMAL SYSTEM 1 MG/1
1 PATCH, EXTENDED RELEASE TRANSDERMAL
Status: DISCONTINUED | OUTPATIENT
Start: 2024-01-01 | End: 2024-01-01 | Stop reason: HOSPADM

## 2024-01-01 RX ORDER — LORAZEPAM 2 MG/ML
1 INJECTION INTRAMUSCULAR
Status: DISCONTINUED | OUTPATIENT
Start: 2024-01-01 | End: 2024-01-01 | Stop reason: HOSPADM

## 2024-01-01 RX ORDER — PREDNISONE 20 MG/1
40 TABLET ORAL DAILY
Status: DISCONTINUED | OUTPATIENT
Start: 2024-01-01 | End: 2024-01-01 | Stop reason: HOSPADM

## 2024-01-01 RX ORDER — FUROSEMIDE 10 MG/ML
20 INJECTION INTRAMUSCULAR; INTRAVENOUS
Status: DISCONTINUED | OUTPATIENT
Start: 2024-01-01 | End: 2024-01-01

## 2024-01-01 RX ORDER — DOCUSATE SODIUM 50 MG/5ML
100 LIQUID ORAL 2 TIMES DAILY
Status: DISCONTINUED | OUTPATIENT
Start: 2024-01-01 | End: 2024-01-01 | Stop reason: HOSPADM

## 2024-01-01 RX ORDER — PREDNISONE 5 MG/1
5 TABLET ORAL DAILY
Status: DISCONTINUED | OUTPATIENT
Start: 2024-09-16 | End: 2024-01-01

## 2024-01-01 RX ORDER — PREDNISONE 10 MG/1
10 TABLET ORAL DAILY
Status: DISCONTINUED | OUTPATIENT
Start: 2024-09-13 | End: 2024-01-01

## 2024-01-01 RX ORDER — ALBUTEROL SULFATE 5 MG/ML
SOLUTION RESPIRATORY (INHALATION)
Status: COMPLETED
Start: 2024-01-01 | End: 2024-01-01

## 2024-01-01 RX ORDER — POLYETHYLENE GLYCOL 3350 17 G/17G
1 POWDER, FOR SOLUTION ORAL
Status: DISCONTINUED | OUTPATIENT
Start: 2024-01-01 | End: 2024-01-01

## 2024-01-01 RX ORDER — ONDANSETRON 2 MG/ML
8 INJECTION INTRAMUSCULAR; INTRAVENOUS EVERY 8 HOURS PRN
Status: DISCONTINUED | OUTPATIENT
Start: 2024-01-01 | End: 2024-01-01

## 2024-01-01 RX ORDER — BISOPROLOL FUMARATE 5 MG/1
5 TABLET, FILM COATED ORAL
Status: DISCONTINUED | OUTPATIENT
Start: 2024-01-01 | End: 2024-01-01

## 2024-01-01 RX ORDER — FUROSEMIDE 40 MG
40 TABLET ORAL
Status: DISCONTINUED | OUTPATIENT
Start: 2024-01-01 | End: 2024-01-01 | Stop reason: HOSPADM

## 2024-01-01 RX ORDER — FUROSEMIDE 40 MG
40 TABLET ORAL
Status: DISCONTINUED | OUTPATIENT
Start: 2024-01-01 | End: 2024-01-01

## 2024-01-01 RX ORDER — BISOPROLOL FUMARATE 5 MG/1
5 TABLET, FILM COATED ORAL DAILY
Status: DISCONTINUED | OUTPATIENT
Start: 2024-01-01 | End: 2024-01-01

## 2024-01-01 RX ORDER — LORAZEPAM 2 MG/ML
1 INJECTION INTRAMUSCULAR ONCE
Status: COMPLETED | OUTPATIENT
Start: 2024-01-01 | End: 2024-01-01

## 2024-01-01 RX ORDER — LOSARTAN POTASSIUM 25 MG/1
25 TABLET ORAL EVERY EVENING
Qty: 30 TABLET | Refills: 0 | Status: ON HOLD | OUTPATIENT
Start: 2024-01-01 | End: 2024-01-01

## 2024-01-01 RX ORDER — FUROSEMIDE 10 MG/ML
40 INJECTION INTRAMUSCULAR; INTRAVENOUS
Status: DISCONTINUED | OUTPATIENT
Start: 2024-01-01 | End: 2024-01-01

## 2024-01-01 RX ORDER — LEVALBUTEROL INHALATION SOLUTION 1.25 MG/3ML
1.25 SOLUTION RESPIRATORY (INHALATION)
Status: DISCONTINUED | OUTPATIENT
Start: 2024-01-01 | End: 2024-01-01 | Stop reason: HOSPADM

## 2024-01-01 RX ORDER — ONDANSETRON 2 MG/ML
8 INJECTION INTRAMUSCULAR; INTRAVENOUS EVERY 8 HOURS PRN
Status: DISCONTINUED | OUTPATIENT
Start: 2024-01-01 | End: 2024-01-01 | Stop reason: HOSPADM

## 2024-01-01 RX ORDER — LANSOPRAZOLE 30 MG/1
30 TABLET, ORALLY DISINTEGRATING, DELAYED RELEASE ORAL DAILY
Status: DISCONTINUED | OUTPATIENT
Start: 2024-01-01 | End: 2024-01-01

## 2024-01-01 RX ORDER — AMOXICILLIN 250 MG
2 CAPSULE ORAL 2 TIMES DAILY
Status: DISCONTINUED | OUTPATIENT
Start: 2024-01-01 | End: 2024-01-01

## 2024-01-01 RX ORDER — SPIRONOLACTONE 25 MG/1
25 TABLET ORAL
Status: DISCONTINUED | OUTPATIENT
Start: 2024-01-01 | End: 2024-01-01

## 2024-01-01 RX ORDER — BISOPROLOL FUMARATE 5 MG/1
5 TABLET, FILM COATED ORAL
Status: DISCONTINUED | OUTPATIENT
Start: 2024-01-01 | End: 2024-01-01 | Stop reason: HOSPADM

## 2024-01-01 RX ORDER — LACTULOSE 10 G/15ML
10 SOLUTION ORAL
Status: DISCONTINUED | OUTPATIENT
Start: 2024-01-01 | End: 2024-01-01

## 2024-01-01 RX ORDER — METOPROLOL TARTRATE 25 MG/1
25 TABLET, FILM COATED ORAL EVERY 8 HOURS
Status: DISCONTINUED | OUTPATIENT
Start: 2024-01-01 | End: 2024-01-01

## 2024-01-01 RX ORDER — PREDNISONE 10 MG/1
TABLET ORAL
Qty: 20 TABLET | Refills: 0 | Status: SHIPPED | OUTPATIENT
Start: 2024-01-01 | End: 2024-09-18

## 2024-01-01 RX ORDER — DEXTROSE MONOHYDRATE 25 G/50ML
25 INJECTION, SOLUTION INTRAVENOUS
Status: DISCONTINUED | OUTPATIENT
Start: 2024-01-01 | End: 2024-01-01

## 2024-01-01 RX ORDER — LEVALBUTEROL INHALATION SOLUTION 1.25 MG/3ML
1.25 SOLUTION RESPIRATORY (INHALATION)
Status: DISCONTINUED | OUTPATIENT
Start: 2024-01-01 | End: 2024-01-01

## 2024-01-01 RX ORDER — LOSARTAN POTASSIUM 50 MG/1
25 TABLET ORAL EVERY EVENING
Status: DISCONTINUED | OUTPATIENT
Start: 2024-01-01 | End: 2024-01-01 | Stop reason: HOSPADM

## 2024-01-01 RX ORDER — TRAZODONE HYDROCHLORIDE 50 MG/1
50 TABLET, FILM COATED ORAL NIGHTLY PRN
Status: DISCONTINUED | OUTPATIENT
Start: 2024-01-01 | End: 2024-01-01

## 2024-01-01 RX ORDER — LOSARTAN POTASSIUM 25 MG/1
25 TABLET ORAL EVERY EVENING
Status: DISCONTINUED | OUTPATIENT
Start: 2024-01-01 | End: 2024-01-01

## 2024-01-01 RX ORDER — HYDROXYZINE HYDROCHLORIDE 25 MG/1
25 TABLET, FILM COATED ORAL 3 TIMES DAILY PRN
COMMUNITY

## 2024-01-01 RX ORDER — TRAZODONE HYDROCHLORIDE 50 MG/1
50 TABLET, FILM COATED ORAL NIGHTLY PRN
COMMUNITY

## 2024-01-01 RX ORDER — AZITHROMYCIN 500 MG/5ML
500 INJECTION, POWDER, LYOPHILIZED, FOR SOLUTION INTRAVENOUS ONCE
Status: COMPLETED | OUTPATIENT
Start: 2024-01-01 | End: 2024-01-01

## 2024-01-01 RX ORDER — OXYCODONE HYDROCHLORIDE 5 MG/1
5 TABLET ORAL
Status: DISCONTINUED | OUTPATIENT
Start: 2024-01-01 | End: 2024-01-01

## 2024-01-01 RX ORDER — IPRATROPIUM BROMIDE AND ALBUTEROL SULFATE 2.5; .5 MG/3ML; MG/3ML
3 SOLUTION RESPIRATORY (INHALATION)
Status: COMPLETED | OUTPATIENT
Start: 2024-01-01 | End: 2024-01-01

## 2024-01-01 RX ORDER — ALBUTEROL SULFATE 90 UG/1
2 INHALANT RESPIRATORY (INHALATION) EVERY 4 HOURS PRN
Status: ON HOLD | COMMUNITY
End: 2024-01-01

## 2024-01-01 RX ORDER — IBUPROFEN 400 MG/1
600 TABLET, FILM COATED ORAL EVERY 6 HOURS PRN
Status: DISCONTINUED | OUTPATIENT
Start: 2024-01-01 | End: 2024-01-01

## 2024-01-01 RX ORDER — LANSOPRAZOLE 30 MG/1
30 TABLET, ORALLY DISINTEGRATING, DELAYED RELEASE ORAL DAILY
Status: ON HOLD | COMMUNITY
End: 2024-01-01

## 2024-01-01 RX ORDER — LEVALBUTEROL INHALATION SOLUTION 1.25 MG/3ML
1.25 SOLUTION RESPIRATORY (INHALATION) 4 TIMES DAILY
Status: DISCONTINUED | OUTPATIENT
Start: 2024-01-01 | End: 2024-01-01

## 2024-01-01 RX ORDER — LEVALBUTEROL INHALATION SOLUTION 1.25 MG/3ML
SOLUTION RESPIRATORY (INHALATION)
Status: COMPLETED
Start: 2024-01-01 | End: 2024-01-01

## 2024-01-01 RX ORDER — BISACODYL 10 MG
10 SUPPOSITORY, RECTAL RECTAL
Status: DISCONTINUED | OUTPATIENT
Start: 2024-01-01 | End: 2024-01-01 | Stop reason: HOSPADM

## 2024-01-01 RX ORDER — NYSTATIN 100000 [USP'U]/ML
5 SUSPENSION ORAL 4 TIMES DAILY
Status: DISCONTINUED | OUTPATIENT
Start: 2024-01-01 | End: 2024-01-01 | Stop reason: HOSPADM

## 2024-01-01 RX ORDER — LEVALBUTEROL INHALATION SOLUTION 1.25 MG/3ML
1.25 SOLUTION RESPIRATORY (INHALATION) 4 TIMES DAILY
Qty: 75 ML | Refills: 0 | Status: ON HOLD | OUTPATIENT
Start: 2024-01-01 | End: 2024-01-01

## 2024-01-01 RX ORDER — ALBUTEROL SULFATE 5 MG/ML
2.5 SOLUTION RESPIRATORY (INHALATION)
Status: DISCONTINUED | OUTPATIENT
Start: 2024-01-01 | End: 2024-01-01

## 2024-01-01 RX ORDER — FUROSEMIDE 20 MG
20 TABLET ORAL DAILY
Status: ON HOLD | COMMUNITY
End: 2024-01-01

## 2024-01-01 RX ORDER — DOCUSATE SODIUM 100 MG/1
100 CAPSULE, LIQUID FILLED ORAL EVERY 12 HOURS
Status: DISCONTINUED | OUTPATIENT
Start: 2024-01-01 | End: 2024-01-01

## 2024-01-01 RX ORDER — EZETIMIBE 10 MG/1
10 TABLET ORAL DAILY
Status: DISCONTINUED | OUTPATIENT
Start: 2024-01-01 | End: 2024-01-01

## 2024-01-01 RX ORDER — ONDANSETRON 8 MG/1
8 TABLET, ORALLY DISINTEGRATING ORAL EVERY 8 HOURS PRN
Qty: 10 TABLET | Refills: 0 | Status: SHIPPED | OUTPATIENT
Start: 2024-01-01

## 2024-01-01 RX ORDER — AMOXICILLIN 250 MG
2 CAPSULE ORAL EVERY EVENING
Status: DISCONTINUED | OUTPATIENT
Start: 2024-01-01 | End: 2024-01-01

## 2024-01-01 RX ORDER — PREDNISONE 20 MG/1
20 TABLET ORAL DAILY
Status: DISCONTINUED | OUTPATIENT
Start: 2024-01-01 | End: 2024-01-01

## 2024-01-01 RX ORDER — ONDANSETRON 2 MG/ML
4 INJECTION INTRAMUSCULAR; INTRAVENOUS ONCE
Status: COMPLETED | OUTPATIENT
Start: 2024-01-01 | End: 2024-01-01

## 2024-01-01 RX ORDER — MORPHINE SULFATE 4 MG/ML
4 INJECTION INTRAVENOUS ONCE
Status: COMPLETED | OUTPATIENT
Start: 2024-01-01 | End: 2024-01-01

## 2024-01-01 RX ORDER — CARBOXYMETHYLCELLULOSE SODIUM 5 MG/ML
1 SOLUTION/ DROPS OPHTHALMIC PRN
Status: DISCONTINUED | OUTPATIENT
Start: 2024-01-01 | End: 2024-01-01 | Stop reason: HOSPADM

## 2024-01-01 RX ORDER — BISACODYL 10 MG
10 SUPPOSITORY, RECTAL RECTAL
Status: DISCONTINUED | OUTPATIENT
Start: 2024-01-01 | End: 2024-01-01

## 2024-01-01 RX ORDER — LORAZEPAM 2 MG/ML
1 CONCENTRATE ORAL
Qty: 30 ML | Refills: 0 | Status: SHIPPED | OUTPATIENT
Start: 2024-01-01 | End: 2024-09-25

## 2024-01-01 RX ORDER — SCOLOPAMINE TRANSDERMAL SYSTEM 1 MG/1
1 PATCH, EXTENDED RELEASE TRANSDERMAL
Qty: 4 PATCH | Refills: 3 | Status: SHIPPED | OUTPATIENT
Start: 2024-09-12

## 2024-01-01 RX ORDER — LOSARTAN POTASSIUM 50 MG/1
25 TABLET ORAL EVERY EVENING
Status: DISCONTINUED | OUTPATIENT
Start: 2024-01-01 | End: 2024-01-01

## 2024-01-01 RX ORDER — IBUPROFEN 400 MG/1
600 TABLET, FILM COATED ORAL EVERY 6 HOURS PRN
Status: DISCONTINUED | OUTPATIENT
Start: 2024-01-01 | End: 2024-01-01 | Stop reason: HOSPADM

## 2024-01-01 RX ORDER — DEXTROSE MONOHYDRATE 25 G/50ML
25 INJECTION, SOLUTION INTRAVENOUS
Status: DISCONTINUED | OUTPATIENT
Start: 2024-01-01 | End: 2024-01-01 | Stop reason: HOSPADM

## 2024-01-01 RX ORDER — TRAZODONE HYDROCHLORIDE 100 MG/1
50 TABLET ORAL ONCE
Status: COMPLETED | OUTPATIENT
Start: 2024-01-01 | End: 2024-01-01

## 2024-01-01 RX ORDER — LORAZEPAM 2 MG/ML
1 CONCENTRATE ORAL
Status: DISCONTINUED | OUTPATIENT
Start: 2024-01-01 | End: 2024-01-01 | Stop reason: HOSPADM

## 2024-01-01 RX ORDER — SODIUM CHLORIDE, SODIUM LACTATE, POTASSIUM CHLORIDE, AND CALCIUM CHLORIDE .6; .31; .03; .02 G/100ML; G/100ML; G/100ML; G/100ML
500 INJECTION, SOLUTION INTRAVENOUS
Status: DISCONTINUED | OUTPATIENT
Start: 2024-01-01 | End: 2024-01-01 | Stop reason: HOSPADM

## 2024-01-01 RX ORDER — MORPHINE SULFATE 4 MG/ML
4 INJECTION INTRAVENOUS ONCE
Status: DISCONTINUED | OUTPATIENT
Start: 2024-01-01 | End: 2024-01-01

## 2024-01-01 RX ORDER — OXYCODONE HYDROCHLORIDE 5 MG/1
5 TABLET ORAL
COMMUNITY

## 2024-01-01 RX ORDER — LEVALBUTEROL INHALATION SOLUTION 0.63 MG/3ML
0.63 SOLUTION RESPIRATORY (INHALATION)
Status: DISCONTINUED | OUTPATIENT
Start: 2024-01-01 | End: 2024-01-01

## 2024-01-01 RX ORDER — FUROSEMIDE 40 MG
40 TABLET ORAL DAILY
Status: DISCONTINUED | OUTPATIENT
Start: 2024-01-01 | End: 2024-01-01

## 2024-01-01 RX ORDER — MORPHINE SULFATE 4 MG/ML
2 INJECTION INTRAVENOUS
Status: DISCONTINUED | OUTPATIENT
Start: 2024-01-01 | End: 2024-01-01

## 2024-01-01 RX ORDER — FUROSEMIDE 40 MG
40 TABLET ORAL
Qty: 15 TABLET | Refills: 0 | Status: SHIPPED | OUTPATIENT
Start: 2024-01-01 | End: 2024-09-12

## 2024-01-01 RX ORDER — IPRATROPIUM BROMIDE AND ALBUTEROL SULFATE 2.5; .5 MG/3ML; MG/3ML
3 SOLUTION RESPIRATORY (INHALATION)
Status: DISCONTINUED | OUTPATIENT
Start: 2024-01-01 | End: 2024-01-01

## 2024-01-01 RX ORDER — BISOPROLOL FUMARATE 5 MG/1
5 TABLET, FILM COATED ORAL DAILY
Qty: 30 TABLET | Refills: 0 | Status: SHIPPED | OUTPATIENT
Start: 2024-01-01

## 2024-01-01 RX ORDER — MORPHINE SULFATE 4 MG/ML
2 INJECTION INTRAVENOUS ONCE
Status: DISPENSED | OUTPATIENT
Start: 2024-01-01 | End: 2024-01-01

## 2024-01-01 RX ORDER — AMOXICILLIN 250 MG
2 CAPSULE ORAL 2 TIMES DAILY
Status: ON HOLD | COMMUNITY
End: 2024-01-01

## 2024-01-01 RX ORDER — FUROSEMIDE 40 MG
40 TABLET ORAL DAILY
Qty: 14 TABLET | Refills: 0 | Status: ON HOLD | OUTPATIENT
Start: 2024-01-01 | End: 2024-01-01

## 2024-01-01 RX ORDER — NYSTATIN 100000 [USP'U]/ML
500000 SUSPENSION ORAL 4 TIMES DAILY
Status: ON HOLD | COMMUNITY
End: 2024-01-01

## 2024-01-01 RX ORDER — CARBOXYMETHYLCELLULOSE SODIUM 5 MG/ML
1 SOLUTION/ DROPS OPHTHALMIC PRN
Qty: 15 ML | Refills: 2 | Status: SHIPPED | OUTPATIENT
Start: 2024-01-01 | End: 2024-09-12

## 2024-01-01 RX ADMIN — IPRATROPIUM BROMIDE 0.5 MG: 0.5 SOLUTION RESPIRATORY (INHALATION) at 07:30

## 2024-01-01 RX ADMIN — AMPICILLIN AND SULBACTAM 3 G: 1; 2 INJECTION, POWDER, FOR SOLUTION INTRAMUSCULAR; INTRAVENOUS at 12:02

## 2024-01-01 RX ADMIN — PREDNISONE 40 MG: 20 TABLET ORAL at 04:53

## 2024-01-01 RX ADMIN — INSULIN HUMAN 3 UNITS: 100 INJECTION, SOLUTION PARENTERAL at 18:00

## 2024-01-01 RX ADMIN — LEVALBUTEROL HYDROCHLORIDE 1.25 MG: 1.25 SOLUTION RESPIRATORY (INHALATION) at 14:30

## 2024-01-01 RX ADMIN — AMOXICILLIN AND CLAVULANATE POTASSIUM 1 TABLET: 875; 125 TABLET, FILM COATED ORAL at 05:16

## 2024-01-01 RX ADMIN — SCOPOLAMINE 1 PATCH: 1.5 PATCH, EXTENDED RELEASE TRANSDERMAL at 11:00

## 2024-01-01 RX ADMIN — BISOPROLOL FUMARATE 5 MG: 5 TABLET ORAL at 06:12

## 2024-01-01 RX ADMIN — MORPHINE SULFATE 5 MG: 10 INJECTION INTRAVENOUS at 17:44

## 2024-01-01 RX ADMIN — APIXABAN 5 MG: 5 TABLET, FILM COATED ORAL at 06:11

## 2024-01-01 RX ADMIN — BISOPROLOL FUMARATE 5 MG: 5 TABLET ORAL at 05:56

## 2024-01-01 RX ADMIN — ALBUTEROL SULFATE 2 PUFF: 90 AEROSOL, METERED RESPIRATORY (INHALATION) at 20:13

## 2024-01-01 RX ADMIN — LORAZEPAM 1 MG: 2 INJECTION INTRAMUSCULAR; INTRAVENOUS at 09:35

## 2024-01-01 RX ADMIN — IPRATROPIUM BROMIDE 0.5 MG: 0.5 SOLUTION RESPIRATORY (INHALATION) at 07:44

## 2024-01-01 RX ADMIN — LEVALBUTEROL HYDROCHLORIDE 1.25 MG: 1.25 SOLUTION RESPIRATORY (INHALATION) at 10:27

## 2024-01-01 RX ADMIN — AMPICILLIN AND SULBACTAM 3 G: 1; 2 INJECTION, POWDER, FOR SOLUTION INTRAMUSCULAR; INTRAVENOUS at 05:42

## 2024-01-01 RX ADMIN — PREDNISONE 30 MG: 20 TABLET ORAL at 05:56

## 2024-01-01 RX ADMIN — APIXABAN 5 MG: 5 TABLET, FILM COATED ORAL at 04:53

## 2024-01-01 RX ADMIN — OXYCODONE HYDROCHLORIDE 5 MG: 5 TABLET ORAL at 23:13

## 2024-01-01 RX ADMIN — IPRATROPIUM BROMIDE 0.5 MG: 0.5 SOLUTION RESPIRATORY (INHALATION) at 06:58

## 2024-01-01 RX ADMIN — TIOTROPIUM BROMIDE INHALATION SPRAY 5 MCG: 3.12 SPRAY, METERED RESPIRATORY (INHALATION) at 20:37

## 2024-01-01 RX ADMIN — TIOTROPIUM BROMIDE INHALATION SPRAY 5 MCG: 3.12 SPRAY, METERED RESPIRATORY (INHALATION) at 09:09

## 2024-01-01 RX ADMIN — Medication 5 MG: at 22:08

## 2024-01-01 RX ADMIN — IPRATROPIUM BROMIDE 0.5 MG: 0.5 SOLUTION RESPIRATORY (INHALATION) at 14:30

## 2024-01-01 RX ADMIN — AMPICILLIN AND SULBACTAM 3 G: 1; 2 INJECTION, POWDER, FOR SOLUTION INTRAMUSCULAR; INTRAVENOUS at 18:22

## 2024-01-01 RX ADMIN — IPRATROPIUM BROMIDE 0.5 MG: 0.5 SOLUTION RESPIRATORY (INHALATION) at 23:00

## 2024-01-01 RX ADMIN — Medication 500000 UNITS: at 23:11

## 2024-01-01 RX ADMIN — FUROSEMIDE 20 MG: 10 INJECTION, SOLUTION INTRAVENOUS at 05:31

## 2024-01-01 RX ADMIN — EZETIMIBE 10 MG: 10 TABLET ORAL at 05:31

## 2024-01-01 RX ADMIN — APIXABAN 5 MG: 5 TABLET, FILM COATED ORAL at 06:37

## 2024-01-01 RX ADMIN — IPRATROPIUM BROMIDE 0.5 MG: 0.5 SOLUTION RESPIRATORY (INHALATION) at 10:07

## 2024-01-01 RX ADMIN — BISOPROLOL FUMARATE 5 MG: 5 TABLET ORAL at 05:15

## 2024-01-01 RX ADMIN — INSULIN HUMAN 3 UNITS: 100 INJECTION, SOLUTION PARENTERAL at 09:29

## 2024-01-01 RX ADMIN — PREDNISONE 40 MG: 20 TABLET ORAL at 06:04

## 2024-01-01 RX ADMIN — ONDANSETRON 4 MG: 2 INJECTION INTRAMUSCULAR; INTRAVENOUS at 11:13

## 2024-01-01 RX ADMIN — LOSARTAN POTASSIUM 25 MG: 25 TABLET, FILM COATED ORAL at 17:49

## 2024-01-01 RX ADMIN — Medication 500000 UNITS: at 15:02

## 2024-01-01 RX ADMIN — TIOTROPIUM BROMIDE INHALATION SPRAY 5 MCG: 3.12 SPRAY, METERED RESPIRATORY (INHALATION) at 11:19

## 2024-01-01 RX ADMIN — LEVALBUTEROL HYDROCHLORIDE 1.25 MG: 1.25 SOLUTION RESPIRATORY (INHALATION) at 07:44

## 2024-01-01 RX ADMIN — IPRATROPIUM BROMIDE AND ALBUTEROL SULFATE 3 ML: 2.5; .5 SOLUTION RESPIRATORY (INHALATION) at 06:28

## 2024-01-01 RX ADMIN — INSULIN HUMAN 2 UNITS: 100 INJECTION, SOLUTION PARENTERAL at 12:15

## 2024-01-01 RX ADMIN — ATORVASTATIN CALCIUM 20 MG: 20 TABLET, FILM COATED ORAL at 17:12

## 2024-01-01 RX ADMIN — ALBUTEROL SULFATE 2 PUFF: 90 AEROSOL, METERED RESPIRATORY (INHALATION) at 05:33

## 2024-01-01 RX ADMIN — AMPICILLIN AND SULBACTAM 3 G: 1; 2 INJECTION, POWDER, FOR SOLUTION INTRAMUSCULAR; INTRAVENOUS at 23:24

## 2024-01-01 RX ADMIN — LEVALBUTEROL HYDROCHLORIDE 1.25 MG: 1.25 SOLUTION RESPIRATORY (INHALATION) at 23:29

## 2024-01-01 RX ADMIN — LEVALBUTEROL HYDROCHLORIDE 1.25 MG: 1.25 SOLUTION RESPIRATORY (INHALATION) at 23:00

## 2024-01-01 RX ADMIN — AZITHROMYCIN 500 MG: 500 INJECTION, POWDER, LYOPHILIZED, FOR SOLUTION INTRAVENOUS at 02:24

## 2024-01-01 RX ADMIN — LANSOPRAZOLE 30 MG: 30 TABLET, ORALLY DISINTEGRATING, DELAYED RELEASE ORAL at 15:44

## 2024-01-01 RX ADMIN — IPRATROPIUM BROMIDE 0.5 MG: 0.5 SOLUTION RESPIRATORY (INHALATION) at 23:29

## 2024-01-01 RX ADMIN — MOMETASONE FUROATE AND FORMOTEROL FUMARATE DIHYDRATE 2 PUFF: 200; 5 AEROSOL RESPIRATORY (INHALATION) at 11:56

## 2024-01-01 RX ADMIN — PREDNISONE 40 MG: 20 TABLET ORAL at 06:15

## 2024-01-01 RX ADMIN — LEVALBUTEROL HYDROCHLORIDE 1.25 MG: 1.25 SOLUTION RESPIRATORY (INHALATION) at 20:22

## 2024-01-01 RX ADMIN — APIXABAN 5 MG: 5 TABLET, FILM COATED ORAL at 05:31

## 2024-01-01 RX ADMIN — Medication 2000 UNITS: at 05:16

## 2024-01-01 RX ADMIN — ATORVASTATIN CALCIUM 20 MG: 20 TABLET, FILM COATED ORAL at 17:52

## 2024-01-01 RX ADMIN — EZETIMIBE 10 MG: 10 TABLET ORAL at 05:15

## 2024-01-01 RX ADMIN — MORPHINE SULFATE 2 MG: 4 INJECTION, SOLUTION INTRAMUSCULAR; INTRAVENOUS at 07:37

## 2024-01-01 RX ADMIN — INSULIN GLARGINE-YFGN 6 UNITS: 100 INJECTION, SOLUTION SUBCUTANEOUS at 17:48

## 2024-01-01 RX ADMIN — FUROSEMIDE 40 MG: 10 INJECTION INTRAMUSCULAR; INTRAVENOUS at 04:53

## 2024-01-01 RX ADMIN — AMOXICILLIN AND CLAVULANATE POTASSIUM 1 TABLET: 875; 125 TABLET, FILM COATED ORAL at 05:56

## 2024-01-01 RX ADMIN — ALBUTEROL SULFATE 2.5 MG: 5 SOLUTION RESPIRATORY (INHALATION) at 12:11

## 2024-01-01 RX ADMIN — BISOPROLOL FUMARATE 5 MG: 5 TABLET ORAL at 06:04

## 2024-01-01 RX ADMIN — AMOXICILLIN AND CLAVULANATE POTASSIUM 1 TABLET: 875; 125 TABLET, FILM COATED ORAL at 17:52

## 2024-01-01 RX ADMIN — MOMETASONE FUROATE AND FORMOTEROL FUMARATE DIHYDRATE 2 PUFF: 200; 5 AEROSOL RESPIRATORY (INHALATION) at 20:37

## 2024-01-01 RX ADMIN — IPRATROPIUM BROMIDE 0.5 MG: 0.5 SOLUTION RESPIRATORY (INHALATION) at 14:41

## 2024-01-01 RX ADMIN — LEVALBUTEROL HYDROCHLORIDE 1.25 MG: 1.25 SOLUTION RESPIRATORY (INHALATION) at 14:40

## 2024-01-01 RX ADMIN — TIOTROPIUM BROMIDE INHALATION SPRAY 5 MCG: 3.12 SPRAY, METERED RESPIRATORY (INHALATION) at 05:59

## 2024-01-01 RX ADMIN — IPRATROPIUM BROMIDE 0.5 MG: 0.5 SOLUTION RESPIRATORY (INHALATION) at 14:03

## 2024-01-01 RX ADMIN — AMPICILLIN AND SULBACTAM 3 G: 1; 2 INJECTION, POWDER, FOR SOLUTION INTRAMUSCULAR; INTRAVENOUS at 06:43

## 2024-01-01 RX ADMIN — MORPHINE SULFATE 5 MG: 10 INJECTION INTRAVENOUS at 11:31

## 2024-01-01 RX ADMIN — FUROSEMIDE 40 MG: 40 TABLET ORAL at 05:16

## 2024-01-01 RX ADMIN — Medication 5 MG: at 21:52

## 2024-01-01 RX ADMIN — AMPICILLIN AND SULBACTAM 3 G: 1; 2 INJECTION, POWDER, FOR SOLUTION INTRAMUSCULAR; INTRAVENOUS at 06:06

## 2024-01-01 RX ADMIN — APIXABAN 5 MG: 5 TABLET, FILM COATED ORAL at 05:15

## 2024-01-01 RX ADMIN — IPRATROPIUM BROMIDE 0.5 MG: 0.5 SOLUTION RESPIRATORY (INHALATION) at 15:06

## 2024-01-01 RX ADMIN — METOPROLOL TARTRATE 25 MG: 25 TABLET, FILM COATED ORAL at 13:23

## 2024-01-01 RX ADMIN — MORPHINE SULFATE 10 MG: 10 INJECTION INTRAVENOUS at 01:15

## 2024-01-01 RX ADMIN — AMPICILLIN AND SULBACTAM 3 G: 1; 2 INJECTION, POWDER, FOR SOLUTION INTRAMUSCULAR; INTRAVENOUS at 12:39

## 2024-01-01 RX ADMIN — MORPHINE SULFATE 10 MG: 10 INJECTION INTRAVENOUS at 08:21

## 2024-01-01 RX ADMIN — IPRATROPIUM BROMIDE 0.5 MG: 0.5 SOLUTION RESPIRATORY (INHALATION) at 18:37

## 2024-01-01 RX ADMIN — FUROSEMIDE 40 MG: 10 INJECTION INTRAMUSCULAR; INTRAVENOUS at 17:04

## 2024-01-01 RX ADMIN — MORPHINE SULFATE 5 MG: 10 INJECTION INTRAVENOUS at 11:05

## 2024-01-01 RX ADMIN — SPIRONOLACTONE 25 MG: 25 TABLET ORAL at 06:04

## 2024-01-01 RX ADMIN — SPIRONOLACTONE 25 MG: 25 TABLET ORAL at 17:13

## 2024-01-01 RX ADMIN — IPRATROPIUM BROMIDE 0.5 MG: 0.5 SOLUTION RESPIRATORY (INHALATION) at 10:11

## 2024-01-01 RX ADMIN — IPRATROPIUM BROMIDE 0.5 MG: 0.5 SOLUTION RESPIRATORY (INHALATION) at 06:27

## 2024-01-01 RX ADMIN — APIXABAN 5 MG: 5 TABLET, FILM COATED ORAL at 05:56

## 2024-01-01 RX ADMIN — APIXABAN 5 MG: 5 TABLET, FILM COATED ORAL at 17:53

## 2024-01-01 RX ADMIN — Medication 2000 UNITS: at 05:56

## 2024-01-01 RX ADMIN — MORPHINE SULFATE 2 MG: 4 INJECTION, SOLUTION INTRAMUSCULAR; INTRAVENOUS at 15:16

## 2024-01-01 RX ADMIN — LEVALBUTEROL HYDROCHLORIDE 1.25 MG: 1.25 SOLUTION RESPIRATORY (INHALATION) at 10:08

## 2024-01-01 RX ADMIN — LEVALBUTEROL 0.63 MG: 0.63 SOLUTION RESPIRATORY (INHALATION) at 21:54

## 2024-01-01 RX ADMIN — BISOPROLOL FUMARATE 5 MG: 5 TABLET ORAL at 17:13

## 2024-01-01 RX ADMIN — INSULIN HUMAN 9 UNITS: 100 INJECTION, SOLUTION PARENTERAL at 15:18

## 2024-01-01 RX ADMIN — AMPICILLIN AND SULBACTAM 3 G: 1; 2 INJECTION, POWDER, FOR SOLUTION INTRAMUSCULAR; INTRAVENOUS at 18:48

## 2024-01-01 RX ADMIN — LEVALBUTEROL HYDROCHLORIDE 1.25 MG: 1.25 SOLUTION RESPIRATORY (INHALATION) at 06:27

## 2024-01-01 RX ADMIN — IPRATROPIUM BROMIDE 0.5 MG: 0.5 SOLUTION RESPIRATORY (INHALATION) at 02:42

## 2024-01-01 RX ADMIN — MOMETASONE FUROATE AND FORMOTEROL FUMARATE DIHYDRATE 2 PUFF: 200; 5 AEROSOL RESPIRATORY (INHALATION) at 21:37

## 2024-01-01 RX ADMIN — AMOXICILLIN AND CLAVULANATE POTASSIUM 1 TABLET: 875; 125 TABLET, FILM COATED ORAL at 19:45

## 2024-01-01 RX ADMIN — DOCUSATE SODIUM 100 MG: 50 LIQUID ORAL at 05:57

## 2024-01-01 RX ADMIN — IPRATROPIUM BROMIDE AND ALBUTEROL SULFATE 3 ML: 2.5; .5 SOLUTION RESPIRATORY (INHALATION) at 00:42

## 2024-01-01 RX ADMIN — IPRATROPIUM BROMIDE 0.5 MG: 0.5 SOLUTION RESPIRATORY (INHALATION) at 20:22

## 2024-01-01 RX ADMIN — LEVALBUTEROL HYDROCHLORIDE 1.25 MG: 1.25 SOLUTION RESPIRATORY (INHALATION) at 15:06

## 2024-01-01 RX ADMIN — APIXABAN 5 MG: 5 TABLET, FILM COATED ORAL at 19:48

## 2024-01-01 RX ADMIN — PREDNISONE 40 MG: 20 TABLET ORAL at 17:05

## 2024-01-01 RX ADMIN — LEVALBUTEROL HYDROCHLORIDE 1.25 MG: 1.25 SOLUTION RESPIRATORY (INHALATION) at 19:35

## 2024-01-01 RX ADMIN — TIOTROPIUM BROMIDE INHALATION SPRAY 5 MCG: 3.12 SPRAY, METERED RESPIRATORY (INHALATION) at 12:01

## 2024-01-01 RX ADMIN — IPRATROPIUM BROMIDE 0.5 MG: 0.5 SOLUTION RESPIRATORY (INHALATION) at 19:34

## 2024-01-01 RX ADMIN — AMPICILLIN AND SULBACTAM 3 G: 1; 2 INJECTION, POWDER, FOR SOLUTION INTRAMUSCULAR; INTRAVENOUS at 12:13

## 2024-01-01 RX ADMIN — MORPHINE SULFATE 5 MG: 10 INJECTION INTRAVENOUS at 05:58

## 2024-01-01 RX ADMIN — FUROSEMIDE 40 MG: 10 INJECTION INTRAMUSCULAR; INTRAVENOUS at 06:03

## 2024-01-01 RX ADMIN — ATORVASTATIN CALCIUM 20 MG: 20 TABLET, FILM COATED ORAL at 17:50

## 2024-01-01 RX ADMIN — LORAZEPAM 1 MG: 2 INJECTION INTRAMUSCULAR; INTRAVENOUS at 11:19

## 2024-01-01 RX ADMIN — FUROSEMIDE 40 MG: 40 TABLET ORAL at 06:11

## 2024-01-01 RX ADMIN — LEVALBUTEROL HYDROCHLORIDE 1.25 MG: 1.25 SOLUTION RESPIRATORY (INHALATION) at 02:42

## 2024-01-01 RX ADMIN — LORAZEPAM 1 MG: 2 INJECTION INTRAMUSCULAR; INTRAVENOUS at 13:10

## 2024-01-01 RX ADMIN — LEVALBUTEROL HYDROCHLORIDE 1.25 MG: 1.25 SOLUTION RESPIRATORY (INHALATION) at 18:37

## 2024-01-01 RX ADMIN — Medication 6 MG: at 21:25

## 2024-01-01 RX ADMIN — Medication 500000 UNITS: at 09:18

## 2024-01-01 RX ADMIN — INSULIN GLARGINE-YFGN 6 UNITS: 100 INJECTION, SOLUTION SUBCUTANEOUS at 19:43

## 2024-01-01 RX ADMIN — MORPHINE SULFATE 5 MG: 10 INJECTION INTRAVENOUS at 14:10

## 2024-01-01 RX ADMIN — PIPERACILLIN AND TAZOBACTAM 3.38 G: 3; .375 INJECTION, POWDER, FOR SOLUTION INTRAVENOUS at 11:11

## 2024-01-01 RX ADMIN — MOMETASONE FUROATE AND FORMOTEROL FUMARATE DIHYDRATE 2 PUFF: 200; 5 AEROSOL RESPIRATORY (INHALATION) at 09:07

## 2024-01-01 RX ADMIN — AMPICILLIN AND SULBACTAM 3 G: 1; 2 INJECTION, POWDER, FOR SOLUTION INTRAMUSCULAR; INTRAVENOUS at 17:53

## 2024-01-01 RX ADMIN — Medication 6 MG: at 20:33

## 2024-01-01 RX ADMIN — IPRATROPIUM BROMIDE 0.5 MG: 0.5 SOLUTION RESPIRATORY (INHALATION) at 10:26

## 2024-01-01 RX ADMIN — AMPICILLIN AND SULBACTAM 3 G: 1; 2 INJECTION, POWDER, FOR SOLUTION INTRAMUSCULAR; INTRAVENOUS at 23:47

## 2024-01-01 RX ADMIN — INSULIN HUMAN 3 UNITS: 100 INJECTION, SOLUTION PARENTERAL at 17:17

## 2024-01-01 RX ADMIN — LANSOPRAZOLE 30 MG: 30 TABLET, ORALLY DISINTEGRATING, DELAYED RELEASE ORAL at 05:16

## 2024-01-01 RX ADMIN — AMPICILLIN AND SULBACTAM 3 G: 1; 2 INJECTION, POWDER, FOR SOLUTION INTRAMUSCULAR; INTRAVENOUS at 05:01

## 2024-01-01 RX ADMIN — AMPICILLIN AND SULBACTAM 3 G: 1; 2 INJECTION, POWDER, FOR SOLUTION INTRAMUSCULAR; INTRAVENOUS at 17:52

## 2024-01-01 RX ADMIN — LEVALBUTEROL HYDROCHLORIDE 1.25 MG: 1.25 SOLUTION RESPIRATORY (INHALATION) at 06:58

## 2024-01-01 RX ADMIN — SENNOSIDES AND DOCUSATE SODIUM 2 TABLET: 50; 8.6 TABLET ORAL at 19:47

## 2024-01-01 RX ADMIN — AMPICILLIN AND SULBACTAM 3 G: 1; 2 INJECTION, POWDER, FOR SOLUTION INTRAMUSCULAR; INTRAVENOUS at 12:23

## 2024-01-01 RX ADMIN — TRAZODONE HYDROCHLORIDE 50 MG: 100 TABLET ORAL at 23:26

## 2024-01-01 RX ADMIN — PREDNISONE 30 MG: 20 TABLET ORAL at 05:16

## 2024-01-01 RX ADMIN — AMPICILLIN AND SULBACTAM 3 G: 1; 2 INJECTION, POWDER, FOR SOLUTION INTRAMUSCULAR; INTRAVENOUS at 06:03

## 2024-01-01 RX ADMIN — FUROSEMIDE 40 MG: 40 TABLET ORAL at 05:56

## 2024-01-01 RX ADMIN — FUROSEMIDE 40 MG: 40 TABLET ORAL at 17:51

## 2024-01-01 RX ADMIN — ATORVASTATIN CALCIUM 20 MG: 20 TABLET, FILM COATED ORAL at 17:05

## 2024-01-01 RX ADMIN — INSULIN HUMAN 3 UNITS: 100 INJECTION, SOLUTION PARENTERAL at 22:21

## 2024-01-01 RX ADMIN — FUROSEMIDE 40 MG: 40 TABLET ORAL at 11:33

## 2024-01-01 RX ADMIN — MORPHINE SULFATE 4 MG: 4 INJECTION, SOLUTION INTRAMUSCULAR; INTRAVENOUS at 11:21

## 2024-01-01 RX ADMIN — INSULIN HUMAN 1 UNITS: 100 INJECTION, SOLUTION PARENTERAL at 06:06

## 2024-01-01 RX ADMIN — LEVALBUTEROL HYDROCHLORIDE 1.25 MG: 1.25 SOLUTION RESPIRATORY (INHALATION) at 14:03

## 2024-01-01 RX ADMIN — INSULIN HUMAN 6 UNITS: 100 INJECTION, SOLUTION PARENTERAL at 13:41

## 2024-01-01 RX ADMIN — EZETIMIBE 10 MG: 10 TABLET ORAL at 06:37

## 2024-01-01 RX ADMIN — EZETIMIBE 10 MG: 10 TABLET ORAL at 06:05

## 2024-01-01 RX ADMIN — SENNOSIDES AND DOCUSATE SODIUM 2 TABLET: 50; 8.6 TABLET ORAL at 17:51

## 2024-01-01 RX ADMIN — APIXABAN 5 MG: 5 TABLET, FILM COATED ORAL at 17:05

## 2024-01-01 RX ADMIN — LEVALBUTEROL HYDROCHLORIDE 1.25 MG: 1.25 SOLUTION RESPIRATORY (INHALATION) at 07:28

## 2024-01-01 RX ADMIN — EZETIMIBE 10 MG: 10 TABLET ORAL at 05:56

## 2024-01-01 RX ADMIN — APIXABAN 5 MG: 5 TABLET, FILM COATED ORAL at 17:51

## 2024-01-01 RX ADMIN — Medication 5 MG: at 21:36

## 2024-01-01 RX ADMIN — OXYCODONE HYDROCHLORIDE 5 MG: 5 TABLET ORAL at 09:18

## 2024-01-01 RX ADMIN — EZETIMIBE 10 MG: 10 TABLET ORAL at 04:53

## 2024-01-01 RX ADMIN — AMPICILLIN AND SULBACTAM 3 G: 1; 2 INJECTION, POWDER, FOR SOLUTION INTRAMUSCULAR; INTRAVENOUS at 12:21

## 2024-01-01 RX ADMIN — IOHEXOL 75 ML: 350 INJECTION, SOLUTION INTRAVENOUS at 13:00

## 2024-01-01 RX ADMIN — LEVALBUTEROL HYDROCHLORIDE 1.25 MG: 1.25 SOLUTION RESPIRATORY (INHALATION) at 10:11

## 2024-01-01 RX ADMIN — LOSARTAN POTASSIUM 25 MG: 50 TABLET, FILM COATED ORAL at 17:50

## 2024-01-01 RX ADMIN — EZETIMIBE 10 MG: 10 TABLET ORAL at 06:11

## 2024-01-01 RX ADMIN — ALBUTEROL SULFATE 2.5 MG: 2.5 SOLUTION RESPIRATORY (INHALATION) at 12:11

## 2024-01-01 RX ADMIN — LANSOPRAZOLE 30 MG: 30 TABLET, ORALLY DISINTEGRATING, DELAYED RELEASE ORAL at 05:56

## 2024-01-01 RX ADMIN — IPRATROPIUM BROMIDE 0.5 MG: 0.5 SOLUTION RESPIRATORY (INHALATION) at 20:02

## 2024-01-01 RX ADMIN — AMPICILLIN AND SULBACTAM 3 G: 1; 2 INJECTION, POWDER, FOR SOLUTION INTRAMUSCULAR; INTRAVENOUS at 00:09

## 2024-01-01 RX ADMIN — INSULIN HUMAN 3 UNITS: 100 INJECTION, SOLUTION PARENTERAL at 09:03

## 2024-01-01 RX ADMIN — INSULIN HUMAN 3 UNITS: 100 INJECTION, SOLUTION PARENTERAL at 21:34

## 2024-01-01 RX ADMIN — MORPHINE SULFATE 5 MG: 10 INJECTION INTRAVENOUS at 23:11

## 2024-01-01 RX ADMIN — AMPICILLIN AND SULBACTAM 3 G: 1; 2 INJECTION, POWDER, FOR SOLUTION INTRAMUSCULAR; INTRAVENOUS at 23:26

## 2024-01-01 RX ADMIN — Medication 500000 UNITS: at 20:33

## 2024-01-01 RX ADMIN — MORPHINE SULFATE 2 MG: 4 INJECTION, SOLUTION INTRAMUSCULAR; INTRAVENOUS at 02:29

## 2024-01-01 ASSESSMENT — ENCOUNTER SYMPTOMS
DOUBLE VISION: 0
WEIGHT LOSS: 1
SENSORY CHANGE: 0
HEARTBURN: 0
FLANK PAIN: 0
SINUS PAIN: 0
GASTROINTESTINAL NEGATIVE: 1
SPUTUM PRODUCTION: 0
FOCAL WEAKNESS: 0
MUSCULOSKELETAL NEGATIVE: 1
EYES NEGATIVE: 1
TINGLING: 0
CARDIOVASCULAR NEGATIVE: 1
CARDIOVASCULAR NEGATIVE: 1
BACK PAIN: 0
BLOOD IN STOOL: 0
FEVER: 0
WHEEZING: 0
DIAPHORESIS: 0
EYE REDNESS: 0
PHOTOPHOBIA: 0
WEAKNESS: 1
DIAPHORESIS: 0
EYE DISCHARGE: 0
DIZZINESS: 0
BRUISES/BLEEDS EASILY: 0
FEVER: 0
EYE DISCHARGE: 0
PSYCHIATRIC NEGATIVE: 1
CHILLS: 0
DEPRESSION: 0
FOCAL WEAKNESS: 0
BRUISES/BLEEDS EASILY: 0
POLYDIPSIA: 0
DOUBLE VISION: 0
HEMATOCHEZIA: 0
BLOOD IN STOOL: 0
PHOTOPHOBIA: 0
DIAPHORESIS: 0
SEIZURES: 0
SHORTNESS OF BREATH: 1
SINUS PAIN: 0
POLYDIPSIA: 0
CHILLS: 0
INSOMNIA: 0
FLANK PAIN: 0
STRIDOR: 0
PSYCHIATRIC NEGATIVE: 1
PND: 0
SEIZURES: 0
ABDOMINAL PAIN: 0
BACK PAIN: 0
STRIDOR: 0
DOUBLE VISION: 0
BLOOD IN STOOL: 0
HEARTBURN: 0
GASTROINTESTINAL NEGATIVE: 1
DIAPHORESIS: 0
COUGH: 1
ORTHOPNEA: 0
DOUBLE VISION: 0
WEIGHT LOSS: 1
EYE REDNESS: 0
DIZZINESS: 0
PND: 0
PSYCHIATRIC NEGATIVE: 1
SENSORY CHANGE: 0
SEIZURES: 0
STRIDOR: 0
TINGLING: 0
FALLS: 0
PND: 0
DEPRESSION: 0
ABDOMINAL PAIN: 0
MUSCULOSKELETAL NEGATIVE: 1
GASTROINTESTINAL NEGATIVE: 1
CARDIOVASCULAR NEGATIVE: 1
FEVER: 0
EYE DISCHARGE: 0
MYALGIAS: 0
FALLS: 0
EYE REDNESS: 0
FEVER: 0
FALLS: 0
SPUTUM PRODUCTION: 0
SENSORY CHANGE: 0
ORTHOPNEA: 0
PHOTOPHOBIA: 0
WHEEZING: 0
FLANK PAIN: 0
PND: 0
CHILLS: 0
TINGLING: 0
MYALGIAS: 0
SHORTNESS OF BREATH: 1
FOCAL WEAKNESS: 0
ABDOMINAL PAIN: 0
ORTHOPNEA: 0
HEARTBURN: 0
ORTHOPNEA: 0
EYES NEGATIVE: 1
SPUTUM PRODUCTION: 0
BRUISES/BLEEDS EASILY: 0
ABDOMINAL PAIN: 0
BLOOD IN STOOL: 0
HEMOPTYSIS: 0
PSYCHIATRIC NEGATIVE: 1
SEIZURES: 0
WEAKNESS: 1
GASTROINTESTINAL NEGATIVE: 1
COUGH: 1
SINUS PAIN: 0
INSOMNIA: 0
CARDIOVASCULAR NEGATIVE: 1
POLYDIPSIA: 0
WEIGHT LOSS: 1
INSOMNIA: 0
PHOTOPHOBIA: 0
WEAKNESS: 1
FOCAL WEAKNESS: 0
SPUTUM PRODUCTION: 0
MYALGIAS: 0
SHORTNESS OF BREATH: 1
DIZZINESS: 0
FLANK PAIN: 0
POLYDIPSIA: 0
WHEEZING: 0
SENSORY CHANGE: 0
SHORTNESS OF BREATH: 1
EYE REDNESS: 0
SINUS PAIN: 0
MUSCULOSKELETAL NEGATIVE: 1
COUGH: 1
BACK PAIN: 0
MUSCULOSKELETAL NEGATIVE: 1
MYALGIAS: 0
EYES NEGATIVE: 1
BACK PAIN: 0
EYES NEGATIVE: 1
DEPRESSION: 0
DIZZINESS: 0
TINGLING: 0
WHEEZING: 0
WEIGHT LOSS: 1
COUGH: 0
CHILLS: 0
COUGH: 1
HEARTBURN: 0
FALLS: 0
WEAKNESS: 1
BRUISES/BLEEDS EASILY: 0
INSOMNIA: 0
DEPRESSION: 0
EYE DISCHARGE: 0
STRIDOR: 0

## 2024-01-01 ASSESSMENT — CHA2DS2 SCORE
VASCULAR DISEASE: YES
AGE 65 TO 74: NO
CHF OR LEFT VENTRICULAR DYSFUNCTION: YES
DIABETES: YES
SEX: MALE
DIABETES: YES
PRIOR STROKE OR TIA OR THROMBOEMBOLISM: NO
AGE 65 TO 74: NO
PRIOR STROKE OR TIA OR THROMBOEMBOLISM: YES
CHA2DS2 VASC SCORE: 5
AGE 75 OR GREATER: YES
AGE 65 TO 74: NO
SEX: MALE
VASCULAR DISEASE: NO
HYPERTENSION: YES
CHF OR LEFT VENTRICULAR DYSFUNCTION: YES
CHA2DS2 VASC SCORE: 7
AGE 75 OR GREATER: YES
VASCULAR DISEASE: NO
DIABETES: NO
CHF OR LEFT VENTRICULAR DYSFUNCTION: YES
HYPERTENSION: YES
SEX: MALE
HYPERTENSION: YES
PRIOR STROKE OR TIA OR THROMBOEMBOLISM: NO
AGE 75 OR GREATER: YES
CHA2DS2 VASC SCORE: 5

## 2024-01-01 ASSESSMENT — PAIN DESCRIPTION - PAIN TYPE
TYPE: ACUTE PAIN

## 2024-01-01 ASSESSMENT — COGNITIVE AND FUNCTIONAL STATUS - GENERAL
MOVING FROM LYING ON BACK TO SITTING ON SIDE OF FLAT BED: A LITTLE
MOVING TO AND FROM BED TO CHAIR: A LITTLE
PERSONAL GROOMING: A LITTLE
MOBILITY SCORE: 17
EATING MEALS: TOTAL
TURNING FROM BACK TO SIDE WHILE IN FLAT BAD: A LITTLE
DAILY ACTIVITIY SCORE: 16
TOILETING: A LITTLE
CLIMB 3 TO 5 STEPS WITH RAILING: A LOT
DRESSING REGULAR LOWER BODY CLOTHING: A LITTLE
WALKING IN HOSPITAL ROOM: A LITTLE
STANDING UP FROM CHAIR USING ARMS: A LITTLE
SUGGESTED CMS G CODE MODIFIER MOBILITY: CK
SUGGESTED CMS G CODE MODIFIER DAILY ACTIVITY: CK
HELP NEEDED FOR BATHING: A LITTLE
DRESSING REGULAR UPPER BODY CLOTHING: A LITTLE

## 2024-01-01 ASSESSMENT — SOCIAL DETERMINANTS OF HEALTH (SDOH)
WITHIN THE PAST 12 MONTHS, YOU WORRIED THAT YOUR FOOD WOULD RUN OUT BEFORE YOU GOT THE MONEY TO BUY MORE: PATIENT DECLINED
WITHIN THE LAST YEAR, HAVE TO BEEN RAPED OR FORCED TO HAVE ANY KIND OF SEXUAL ACTIVITY BY YOUR PARTNER OR EX-PARTNER?: NO
WITHIN THE PAST 12 MONTHS, THE FOOD YOU BOUGHT JUST DIDN'T LAST AND YOU DIDN'T HAVE MONEY TO GET MORE: PATIENT DECLINED
WITHIN THE LAST YEAR, HAVE YOU BEEN HUMILIATED OR EMOTIONALLY ABUSED IN OTHER WAYS BY YOUR PARTNER OR EX-PARTNER?: NO
WITHIN THE LAST YEAR, HAVE YOU BEEN KICKED, HIT, SLAPPED, OR OTHERWISE PHYSICALLY HURT BY YOUR PARTNER OR EX-PARTNER?: NO
WITHIN THE LAST YEAR, HAVE YOU BEEN AFRAID OF YOUR PARTNER OR EX-PARTNER?: NO
IN THE PAST 12 MONTHS, HAS THE ELECTRIC, GAS, OIL, OR WATER COMPANY THREATENED TO SHUT OFF SERVICE IN YOUR HOME?: PATIENT DECLINED

## 2024-01-01 ASSESSMENT — ACTIVITIES OF DAILY LIVING (ADL): TOILETING: INDEPENDENT

## 2024-01-01 ASSESSMENT — VISUAL ACUITY
OU: 1

## 2024-01-01 ASSESSMENT — COPD QUESTIONNAIRES
DO YOU EVER COUGH UP ANY MUCUS OR PHLEGM?: NO/ONLY WITH OCCASIONAL COLDS OR INFECTIONS
DURING THE PAST 4 WEEKS HOW MUCH DID YOU FEEL SHORT OF BREATH: NONE/LITTLE OF THE TIME
COPD SCREENING SCORE: 5
HAVE YOU SMOKED AT LEAST 100 CIGARETTES IN YOUR ENTIRE LIFE: YES

## 2024-01-01 ASSESSMENT — FIBROSIS 4 INDEX
FIB4 SCORE: 5.27
FIB4 SCORE: 3.62
FIB4 SCORE: 3.75

## 2024-01-01 ASSESSMENT — LIFESTYLE VARIABLES
SUBSTANCE_ABUSE: 0

## 2024-01-01 ASSESSMENT — PULMONARY FUNCTION TESTS: EPAP_CMH2O: 5

## 2024-01-01 ASSESSMENT — GAIT ASSESSMENTS
GAIT LEVEL OF ASSIST: CONTACT GUARD ASSIST
DISTANCE (FEET): 60
ASSISTIVE DEVICE: FRONT WHEEL WALKER
DEVIATION: BRADYKINETIC;SHUFFLED GAIT

## 2024-01-02 ENCOUNTER — HOSPITAL ENCOUNTER (OUTPATIENT)
Dept: RADIOLOGY | Facility: MEDICAL CENTER | Age: 79
End: 2024-01-02
Payer: MEDICARE

## 2024-01-02 PROCEDURE — RXMED WILLOW AMBULATORY MEDICATION CHARGE: Performed by: INTERNAL MEDICINE

## 2024-01-02 PROCEDURE — RXMED WILLOW AMBULATORY MEDICATION CHARGE

## 2024-01-02 PROCEDURE — RXMED WILLOW AMBULATORY MEDICATION CHARGE: Performed by: NURSE PRACTITIONER

## 2024-01-03 PROCEDURE — RXMED WILLOW AMBULATORY MEDICATION CHARGE

## 2024-01-03 NOTE — TELEPHONE ENCOUNTER
Contact:  Phone number:[phone] (mobile)    Name of person spoken with and relationship to patient: CYDNEY (WIFE)    Patient’s Adherence:  How patient is doing on medication: Very Well    How many missed doses and reason: 0 N/A    Any new medications: no    Any new conditions: no    Any new allergies: no    Any new side effects: no    Any new diagnoses: no    How many doses remainin (CYCLOBENZAPRINE) ; 86 (ELIQUIS) ; 71 (EZETIMIBE) ; 6 (FARXIGA) ; 18(METFORMIN) ; 6 (METOPROLOL) ; 5 (SPIRONOLACTONE)    Did patient want to speak with pharmacist: No   Delivery:  Delivery date and method: 1/3/2024 via     Needs by Date: N/A    Signature required: No     Any additional details for :  PLEASE KNOCK OR PUSH DOOR DOE, AND LEAVE AT FRONT DOOR    Teach Appointment Date:  N/A   Shipping Address:  [address] [place], [place] [id number]   Medication(name,strength and dose):  FARXIGA 10MG TABLETS ; ELIQUIS 5MG TABLETS ; CYCLOBENZAPRINE 10MG TABLETS ; EZETIMIBE 10MG TABLETS ; METFORMIN ER 750MG TB24 ; METOPROLOL SR 50MG TB24 ; SPIRONOLACTONE 25MG TABLETS    Copay:  $229.84   Payment Method:  Credit card on file   Supplies:  N/A   Additional Information:  Pt's wife, Cydney, called requesting for her {Pt's medication to be filled. I explained to Pt's wife that insurance changed and there might be a high chance that all of their medications will be eligible to be filled. Pt's wife consented to fill both hers and Pt's medications, and most of it are all covered through their insurance. She is aware that both profiles of her and Pt have a total copay of $417.84. Pt's wife also updated their CC info with me verbally and will notify Pharmacy for the change of info. Pt's wife also questioned about filling the Pt's Vitamin D3, but because it is considered as an over-the-counter medication, I advised her to just buy it at the pharmacy for a cheaper price. Other than that, Pt's wife has no questions or concerns at this  time.      Stacy Pierre, BRUCE, PhT  Pharmacy Liaison (Rx Coordinator)  P: [phone]  1/2/2024 4:10 PM

## 2024-01-04 ENCOUNTER — TELEPHONE (OUTPATIENT)
Dept: SLEEP MEDICINE | Facility: MEDICAL CENTER | Age: 79
End: 2024-01-04
Payer: MEDICARE

## 2024-01-04 ENCOUNTER — HOSPITAL ENCOUNTER (OUTPATIENT)
Dept: RADIOLOGY | Facility: MEDICAL CENTER | Age: 79
End: 2024-01-04
Attending: INTERNAL MEDICINE
Payer: MEDICARE

## 2024-01-04 VITALS — DIASTOLIC BLOOD PRESSURE: 59 MMHG | HEART RATE: 91 BPM | SYSTOLIC BLOOD PRESSURE: 118 MMHG | OXYGEN SATURATION: 97 %

## 2024-01-04 DIAGNOSIS — C34.11 MALIGNANT NEOPLASM OF RIGHT UPPER LOBE OF LUNG (HCC): ICD-10-CM

## 2024-01-04 DIAGNOSIS — Z79.899 ENCOUNTER FOR LONG-TERM (CURRENT) USE OF HIGH-RISK MEDICATION: ICD-10-CM

## 2024-01-04 DIAGNOSIS — Z79.01 LONG TERM (CURRENT) USE OF ANTICOAGULANTS: ICD-10-CM

## 2024-01-04 DIAGNOSIS — R53.83 OTHER FATIGUE: ICD-10-CM

## 2024-01-04 DIAGNOSIS — Z51.11 ENCOUNTER FOR ANTINEOPLASTIC CHEMOTHERAPY: ICD-10-CM

## 2024-01-04 DIAGNOSIS — C34.12 MALIGNANT NEOPLASM OF UPPER LOBE BRONCHUS, LEFT (HCC): ICD-10-CM

## 2024-01-04 DIAGNOSIS — Z51.12 ENCOUNTER FOR ANTINEOPLASTIC IMMUNOTHERAPY: ICD-10-CM

## 2024-01-04 PROCEDURE — 70553 MRI BRAIN STEM W/O & W/DYE: CPT

## 2024-01-04 PROCEDURE — 700117 HCHG RX CONTRAST REV CODE 255: Performed by: INTERNAL MEDICINE

## 2024-01-04 PROCEDURE — A9579 GAD-BASE MR CONTRAST NOS,1ML: HCPCS | Performed by: INTERNAL MEDICINE

## 2024-01-04 RX ADMIN — GADOTERIDOL 13 ML: 279.3 INJECTION, SOLUTION INTRAVENOUS at 11:57

## 2024-01-04 NOTE — PROGRESS NOTES
Pt's AICD set to MRI safe mode by [employer] rep Ana prior to MRI. Patient's defib therapies turned off for scan. Patient educated to use emergency call button should he experience any unusual chest pain/pressure/burning/ etc. Patient verbalized understanding. During MRI scan, pt monitored with BP, ECG, and SPO2. Pt tolerated scan well. Discharged ambulatory after device reset to pre-MRI mode by rep

## 2024-01-04 NOTE — TELEPHONE ENCOUNTER
Called patient and his daughter to review results but unable to speak to them and left voice mail    Will send message to DR. Zeus SWEENEY Station 10R, fine needle aspiration, slides:          No malignant cells identified.          Blood and benign bronchial epithelial cells.          No significantly increased lymphocytes present to confirm lymph           node sampling,   B. Stain 10R, core biopsy:          No malignant cells identified.          Few clusters of benign bronchial epithelial cells, blood and           fragment of anthracotically pigmented and fibrotic tissue with           crush artifact.          No significantly increased lymphocytes present to confirm lymph           node sampling.   C. Right upper lobe, fine needle aspiration, slides:          Few clusters of atypical cells, concerning for but not entirely           diagnostic for malignancy, given the limited number of cells           present for evaluation.   D. Right upper lobe, core biopsy:          No malignant cells identified.          Predominantly clotted blood with few scattered clusters of           benign bronchial epithelial cells.   E. Right upper lobe, bronchoalveolar lavage:          No malignant cells identified.          Clusters of benign, reactive-appearing bronchial epithelial           cells admixed with pigmented alveolar histiocytes, mixed           inflammatory cells and mucinous debris including Curschmann's           spirals.         Addendum:    Was able to talk to daughter on 1/4/2024 who would discuss results with her father  He has an appt with oncology next week

## 2024-01-05 ENCOUNTER — PHARMACY VISIT (OUTPATIENT)
Dept: PHARMACY | Facility: MEDICAL CENTER | Age: 79
End: 2024-01-05
Payer: COMMERCIAL

## 2024-01-08 ENCOUNTER — OFFICE VISIT (OUTPATIENT)
Dept: MEDICAL GROUP | Facility: PHYSICIAN GROUP | Age: 79
End: 2024-01-08
Payer: MEDICARE

## 2024-01-08 ENCOUNTER — PATIENT OUTREACH (OUTPATIENT)
Dept: HEALTH INFORMATION MANAGEMENT | Facility: OTHER | Age: 79
End: 2024-01-08

## 2024-01-08 VITALS
DIASTOLIC BLOOD PRESSURE: 62 MMHG | HEART RATE: 84 BPM | WEIGHT: 142 LBS | RESPIRATION RATE: 20 BRPM | TEMPERATURE: 98.7 F | SYSTOLIC BLOOD PRESSURE: 122 MMHG | OXYGEN SATURATION: 95 % | HEIGHT: 68 IN | BODY MASS INDEX: 21.52 KG/M2

## 2024-01-08 DIAGNOSIS — Z02.89 ENCOUNTER FOR COMPLETION OF FORM WITH PATIENT: ICD-10-CM

## 2024-01-08 DIAGNOSIS — G47.00 INSOMNIA, UNSPECIFIED TYPE: ICD-10-CM

## 2024-01-08 DIAGNOSIS — E11.59 HYPERTENSION ASSOCIATED WITH TYPE 2 DIABETES MELLITUS (HCC): ICD-10-CM

## 2024-01-08 DIAGNOSIS — E11.22 TYPE 2 DIABETES MELLITUS WITH STAGE 3A CHRONIC KIDNEY DISEASE, WITHOUT LONG-TERM CURRENT USE OF INSULIN (HCC): ICD-10-CM

## 2024-01-08 DIAGNOSIS — M54.6 CHRONIC BILATERAL THORACIC BACK PAIN: ICD-10-CM

## 2024-01-08 DIAGNOSIS — I15.2 HYPERTENSION ASSOCIATED WITH TYPE 2 DIABETES MELLITUS (HCC): ICD-10-CM

## 2024-01-08 DIAGNOSIS — N18.31 TYPE 2 DIABETES MELLITUS WITH STAGE 3A CHRONIC KIDNEY DISEASE, WITHOUT LONG-TERM CURRENT USE OF INSULIN (HCC): ICD-10-CM

## 2024-01-08 DIAGNOSIS — I50.20 HEART FAILURE WITH REDUCED EJECTION FRACTION (HCC): ICD-10-CM

## 2024-01-08 DIAGNOSIS — G89.29 CHRONIC BILATERAL THORACIC BACK PAIN: ICD-10-CM

## 2024-01-08 DIAGNOSIS — N18.31 STAGE 3A CHRONIC KIDNEY DISEASE: ICD-10-CM

## 2024-01-08 PROCEDURE — 3074F SYST BP LT 130 MM HG: CPT

## 2024-01-08 PROCEDURE — 99214 OFFICE O/P EST MOD 30 MIN: CPT

## 2024-01-08 PROCEDURE — 3078F DIAST BP <80 MM HG: CPT

## 2024-01-08 ASSESSMENT — FIBROSIS 4 INDEX: FIB4 SCORE: 1.98

## 2024-01-08 NOTE — PROGRESS NOTES
HCC Gap Form    Diagnosis to address: N18.31 - Stage 3a chronic kidney disease (HCC)  Assessment and plan: Chronic, stable. Continue with current defined treatment plan: avoid nephrotoxins, keep blood pressure/blood sugar within goals. Follow-up at least annually.  Diagnosis: E11.69, E78.5 - Hyperlipidemia associated with type 2 diabetes mellitus (HCC)  Assessment and plan: Chronic, stable. Continue with current defined treatment plan: continue ezetimibe 10 mg daily.. Follow-up at least annually.  Diagnosis: E11.22, N18.31 - Type 2 diabetes mellitus with stage 3a chronic kidney disease, without long-term current use of insulin (HCC)  Assessment and plan: Chronic, stable. Continue with current defined treatment plan: continue farxiga 10 mg dialy, continue metformin 750 mg daily. Follow-up at least annually.  Diagnosis: E11.59, I15.2 - Hypertension associated with type 2 diabetes mellitus (HCC)  Assessment and plan: Chronic, stable. Continue with current defined treatment plan: continue spironolactone 25 mg daily. Follow-up at least annually.  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 Continue with current defined treatment plan: continue spirnolactone 25 mg daily. Follow-up at least annually.  Diagnosis: D68.69 - Other thrombophilia (HCC)  Assessment and plan: Chronic, stable. Continue with current defined treatment plan: continue eliquis 5 mg daily. Follow-up at least annually.  Diagnosis: I50.22 - Chronic systolic heart failure (HCC)  Assessment and plan: Chronic, stable, as based on today's assessment and impact on other conditions evaluated today. Continue with current treatment plan: continue eliquis, farxiga,  Follow-up with specialist as directed, but at least annually.  Diagnosis: C34.32 - Primary cancer of left lower lobe of lung (HCC)  Assessment and plan: Chronic, stable, as based on today's assessment and impact on other conditions evaluated today.  "Continue with current treatment plan: denies SOB/difficulty breathing. Has appointment scheduled with oncology tomorrow. Follow-up with specialist as directed, but at least annually.  Diagnosis: C34.92 - Primary adenocarcinoma of left lung (HCC)  Assessment and plan: Chronic, stable, as based on today's assessment and impact on other conditions evaluated today. Continue with current treatment plan: has appointment with oncology for review of PET/MRI this week Follow-up with specialist as directed, but at least annually.  Diagnosis: C34.11 - Primary cancer of right upper lobe of lung (HCC)  Assessment and plan: Chronic, stable, as based on today's assessment and impact on other conditions evaluated today. Continue with current treatment plan: recent MRI/PET done, with follow up with oncology scheduled. Follow-up with specialist as directed, but at least annually.  Last edited 01/08/24 11:32 PST by LULÚ Welsh.        Subjective:     CC: Diagnoses of Insomnia, unspecified type, Chronic bilateral thoracic back pain, and Encounter for completion of form with patient were pertinent to this visit.    Pt presents accompanied by his wife today. Reports insurance will no longer pay for cyclobenzaprine for muscle spasms, and   HPI:   Pito presents today with    Problem   Insomnia   Thoracic Back Pain       ROS:  Review of Systems   All other systems reviewed and are negative.      Objective:     Exam:  /62 (BP Location: Right arm, Patient Position: Sitting, BP Cuff Size: Adult)   Pulse 84   Temp 37.1 °C (98.7 °F) (Temporal)   Resp 20   Ht 1.727 m (5' 8\")   Wt 64.4 kg (142 lb)   SpO2 95%   BMI 21.59 kg/m²  Body mass index is 21.59 kg/m².    Physical Exam  Vitals reviewed.   Constitutional:       General: He is not in acute distress.     Appearance: Normal appearance. He is not ill-appearing.   HENT:      Head: Normocephalic and atraumatic.   Cardiovascular:      Rate and Rhythm: Normal rate.     "  Pulses: Normal pulses.   Pulmonary:      Effort: Pulmonary effort is normal. No respiratory distress.   Skin:     General: Skin is warm and dry.      Findings: No rash.   Neurological:      General: No focal deficit present.      Mental Status: He is alert and oriented to person, place, and time.   Psychiatric:         Mood and Affect: Mood normal.         Behavior: Behavior normal.       Assessment & Plan:     78 y.o. male with the following -     Problem List Items Addressed This Visit       Thoracic back pain     Chronic, stable. Has been taking cyclobenzaprine 10 mg nightly for this, with sufficient relief. Reports insurance will no longer authorize this, and recommend tizanidine. Will consider if out of pocket cost prohibitive.            Insomnia     Chronic, unstable. Reports laying in bed for about 8 hours, but frequently wakes throughout evening. Is taking mid-morning nap 30-60 minutes. Denies nocturia. Denies daytime sleepiness. Occasionally taking tylenol PM for this.           Other Visit Diagnoses       Encounter for completion of form with patient        fmla paperwork for daughter, who is helping with cancer dx/dr appointments          Patient was educated in proper administration of medication(s) ordered today including safety, possible SE, risks, benefits, rationale and alternatives to therapy.   Supportive care, differential diagnoses, and indications for immediate follow-up discussed with patient.    Pathogenesis of diagnosis discussed including typical length and natural progression.    Instructed to return to clinic or nearest emergency department for any change in condition, further concerns, or worsening of symptoms.  Patient states understanding of the plan of care and discharge instructions.    Return in about 3 months (around 4/8/2024) for wellness.    Please note that this dictation was created using voice recognition software. I have made every reasonable attempt to correct obvious  errors, but I expect that there are errors of grammar and possibly content that I did not discover before finalizing the note.

## 2024-01-08 NOTE — ASSESSMENT & PLAN NOTE
Chronic, unstable. Reports laying in bed for about 8 hours, but frequently wakes throughout evening. Is taking mid-morning nap 30-60 minutes. Denies nocturia. Denies daytime sleepiness. Occasionally taking tylenol PM for this.

## 2024-01-08 NOTE — PROGRESS NOTES
"Assessment/ Education: Spoke with Cydney and Pito this morning after Pito's pc appt. Pito's cancer has returned, they have an appt w/Oncologist, Dr. Schulz @ [facility] tomorrow. The imaging performed on 12/4/23 revealed a right mainstem nodule that was 1cm in June of 2023, but now measures 1.7cm. Pito and Cydney report they are \"hanging in there\". Both deny any current needs. Pito's blood pressure has been controlled with readings in office: 104/60 (12/18/23), 116/64 (11/17/2023) and 104/58 (11/14/23). His last HgbA1c was 6.0 on 12/23/23, down from 6.5 seven months earlier.   Pito has upcoming appts scheduled for pacer check (1/18/24), Cardiology (2/14/24), an ECHO (2/6/24) and Pulmonology (3/1/24).     Plan of Care and Goals; continue current    Barriers: New cancer diagnosis    Progress:     Next outreach: 2/8/2024                           "

## 2024-01-08 NOTE — ASSESSMENT & PLAN NOTE
Chronic, stable. Has been taking cyclobenzaprine 10 mg nightly for this, with sufficient relief. Reports insurance will no longer authorize this, and recommend tizanidine. Will consider if out of pocket cost prohibitive.

## 2024-01-09 ENCOUNTER — TELEPHONE (OUTPATIENT)
Dept: SLEEP MEDICINE | Facility: MEDICAL CENTER | Age: 79
End: 2024-01-09
Payer: MEDICARE

## 2024-01-09 DIAGNOSIS — R59.0 MEDIASTINAL ADENOPATHY: ICD-10-CM

## 2024-01-09 DIAGNOSIS — C34.92 PRIMARY ADENOCARCINOMA OF LEFT LUNG (HCC): ICD-10-CM

## 2024-01-09 NOTE — TELEPHONE ENCOUNTER
Discussed bronch biopsy results with Dr Schulz, which were concerning but not conclusive for disease recurrence  Subsequent PET CT shows PET avidity at lesion abutting R mainstem posterior wall, as well as 10R and 7 lymph node stations  Recommend repeat iON/EBUS, if again nondiagnostic, will need mediastinoscopy    Dr Schulz to discuss POC with patient, orders placed for bronchoscopy    Orders Placed This Encounter    Bronchoscopy     __________  Vinayak Carbajal MD  Pulmonary and Critical Care Medicine  [facility]

## 2024-01-11 ENCOUNTER — APPOINTMENT (OUTPATIENT)
Dept: ADMISSIONS | Facility: MEDICAL CENTER | Age: 79
End: 2024-01-11
Attending: INTERNAL MEDICINE
Payer: MEDICARE

## 2024-01-11 ENCOUNTER — PHARMACY VISIT (OUTPATIENT)
Dept: PHARMACY | Facility: MEDICAL CENTER | Age: 79
End: 2024-01-11
Payer: COMMERCIAL

## 2024-01-12 ENCOUNTER — PRE-ADMISSION TESTING (OUTPATIENT)
Dept: ADMISSIONS | Facility: MEDICAL CENTER | Age: 79
End: 2024-01-12
Attending: INTERNAL MEDICINE
Payer: MEDICARE

## 2024-01-12 VITALS — HEIGHT: 68 IN | BODY MASS INDEX: 21.59 KG/M2

## 2024-01-12 NOTE — PREPROCEDURE INSTRUCTIONS
Pt preadmitted via phone, with wife (permission given from pt), instructions emailed. Questions answered.Patient's wife instructed per pharmacy guidelines regarding taking, holding or contacting provider for instructions on regularly prescribed medications before surgery. Instructed to take the following medications the day of surgery with a sip of water per pharmacy medication guidelines-advair inh and metoprolol. Per pt's wife, they have instructions from provider to hold eliquis starting 1/13/2024. METs score >4.

## 2024-01-16 ENCOUNTER — HOSPITAL ENCOUNTER (OUTPATIENT)
Facility: MEDICAL CENTER | Age: 79
End: 2024-01-16
Attending: INTERNAL MEDICINE | Admitting: INTERNAL MEDICINE
Payer: MEDICARE

## 2024-01-16 ENCOUNTER — TELEPHONE (OUTPATIENT)
Dept: PHARMACY | Facility: MEDICAL CENTER | Age: 79
End: 2024-01-16

## 2024-01-16 ENCOUNTER — APPOINTMENT (OUTPATIENT)
Dept: RADIOLOGY | Facility: MEDICAL CENTER | Age: 79
End: 2024-01-16
Attending: INTERNAL MEDICINE
Payer: MEDICARE

## 2024-01-16 ENCOUNTER — ANESTHESIA EVENT (OUTPATIENT)
Dept: SURGERY | Facility: MEDICAL CENTER | Age: 79
End: 2024-01-16
Payer: MEDICARE

## 2024-01-16 ENCOUNTER — ANESTHESIA (OUTPATIENT)
Dept: SURGERY | Facility: MEDICAL CENTER | Age: 79
End: 2024-01-16
Payer: MEDICARE

## 2024-01-16 VITALS
OXYGEN SATURATION: 94 % | HEART RATE: 86 BPM | TEMPERATURE: 97.2 F | RESPIRATION RATE: 18 BRPM | HEIGHT: 68 IN | BODY MASS INDEX: 21.2 KG/M2 | SYSTOLIC BLOOD PRESSURE: 95 MMHG | WEIGHT: 139.88 LBS | DIASTOLIC BLOOD PRESSURE: 59 MMHG

## 2024-01-16 LAB — GLUCOSE BLD STRIP.AUTO-MCNC: 103 MG/DL (ref 65–99)

## 2024-01-16 PROCEDURE — 160002 HCHG RECOVERY MINUTES (STAT): Performed by: INTERNAL MEDICINE

## 2024-01-16 PROCEDURE — 88305 TISSUE EXAM BY PATHOLOGIST: CPT | Mod: 59

## 2024-01-16 PROCEDURE — 71250 CT THORAX DX C-: CPT

## 2024-01-16 PROCEDURE — 160025 RECOVERY II MINUTES (STATS): Performed by: INTERNAL MEDICINE

## 2024-01-16 PROCEDURE — 160042 HCHG SURGERY MINUTES - EA ADDL 1 MIN LEVEL 5: Performed by: INTERNAL MEDICINE

## 2024-01-16 PROCEDURE — 160031 HCHG SURGERY MINUTES - 1ST 30 MINS LEVEL 5: Performed by: INTERNAL MEDICINE

## 2024-01-16 PROCEDURE — 88342 IMHCHEM/IMCYTCHM 1ST ANTB: CPT

## 2024-01-16 PROCEDURE — 160046 HCHG PACU - 1ST 60 MINS PHASE II: Performed by: INTERNAL MEDICINE

## 2024-01-16 PROCEDURE — 700101 HCHG RX REV CODE 250: Performed by: ANESTHESIOLOGY

## 2024-01-16 PROCEDURE — 160035 HCHG PACU - 1ST 60 MINS PHASE I: Performed by: INTERNAL MEDICINE

## 2024-01-16 PROCEDURE — 160048 HCHG OR STATISTICAL LEVEL 1-5: Performed by: INTERNAL MEDICINE

## 2024-01-16 PROCEDURE — 88173 CYTOPATH EVAL FNA REPORT: CPT | Mod: 91

## 2024-01-16 PROCEDURE — C1887 CATHETER, GUIDING: HCPCS | Performed by: INTERNAL MEDICINE

## 2024-01-16 PROCEDURE — 88341 IMHCHEM/IMCYTCHM EA ADD ANTB: CPT | Mod: 91

## 2024-01-16 PROCEDURE — 82962 GLUCOSE BLOOD TEST: CPT

## 2024-01-16 PROCEDURE — 700105 HCHG RX REV CODE 258: Performed by: INTERNAL MEDICINE

## 2024-01-16 PROCEDURE — 160009 HCHG ANES TIME/MIN: Performed by: INTERNAL MEDICINE

## 2024-01-16 PROCEDURE — 700111 HCHG RX REV CODE 636 W/ 250 OVERRIDE (IP): Performed by: ANESTHESIOLOGY

## 2024-01-16 PROCEDURE — 502714 HCHG ROBOTIC SURGERY SERVICES: Performed by: INTERNAL MEDICINE

## 2024-01-16 PROCEDURE — 31652 BRONCH EBUS SAMPLNG 1/2 NODE: CPT | Performed by: INTERNAL MEDICINE

## 2024-01-16 PROCEDURE — 88172 CYTP DX EVAL FNA 1ST EA SITE: CPT

## 2024-01-16 RX ORDER — SODIUM CHLORIDE, SODIUM LACTATE, POTASSIUM CHLORIDE, CALCIUM CHLORIDE 600; 310; 30; 20 MG/100ML; MG/100ML; MG/100ML; MG/100ML
INJECTION, SOLUTION INTRAVENOUS CONTINUOUS
Status: DISCONTINUED | OUTPATIENT
Start: 2024-01-16 | End: 2024-01-16 | Stop reason: HOSPADM

## 2024-01-16 RX ORDER — ONDANSETRON 2 MG/ML
4 INJECTION INTRAMUSCULAR; INTRAVENOUS
Status: DISCONTINUED | OUTPATIENT
Start: 2024-01-16 | End: 2024-01-16 | Stop reason: HOSPADM

## 2024-01-16 RX ORDER — DEXAMETHASONE SODIUM PHOSPHATE 4 MG/ML
INJECTION, SOLUTION INTRA-ARTICULAR; INTRALESIONAL; INTRAMUSCULAR; INTRAVENOUS; SOFT TISSUE PRN
Status: DISCONTINUED | OUTPATIENT
Start: 2024-01-16 | End: 2024-01-16 | Stop reason: SURG

## 2024-01-16 RX ORDER — HALOPERIDOL 5 MG/ML
1 INJECTION INTRAMUSCULAR
Status: DISCONTINUED | OUTPATIENT
Start: 2024-01-16 | End: 2024-01-16 | Stop reason: HOSPADM

## 2024-01-16 RX ORDER — DIPHENHYDRAMINE HYDROCHLORIDE 50 MG/ML
6.25 INJECTION INTRAMUSCULAR; INTRAVENOUS
Status: DISCONTINUED | OUTPATIENT
Start: 2024-01-16 | End: 2024-01-16 | Stop reason: HOSPADM

## 2024-01-16 RX ORDER — EPHEDRINE SULFATE 50 MG/ML
10 INJECTION, SOLUTION INTRAVENOUS
Status: DISCONTINUED | OUTPATIENT
Start: 2024-01-16 | End: 2024-01-16 | Stop reason: HOSPADM

## 2024-01-16 RX ORDER — ONDANSETRON 2 MG/ML
INJECTION INTRAMUSCULAR; INTRAVENOUS PRN
Status: DISCONTINUED | OUTPATIENT
Start: 2024-01-16 | End: 2024-01-16 | Stop reason: SURG

## 2024-01-16 RX ORDER — ROCURONIUM BROMIDE 10 MG/ML
INJECTION, SOLUTION INTRAVENOUS PRN
Status: DISCONTINUED | OUTPATIENT
Start: 2024-01-16 | End: 2024-01-16 | Stop reason: SURG

## 2024-01-16 RX ORDER — LIDOCAINE HYDROCHLORIDE 20 MG/ML
INJECTION, SOLUTION EPIDURAL; INFILTRATION; INTRACAUDAL; PERINEURAL PRN
Status: DISCONTINUED | OUTPATIENT
Start: 2024-01-16 | End: 2024-01-16 | Stop reason: SURG

## 2024-01-16 RX ORDER — LIDOCAINE HYDROCHLORIDE 40 MG/ML
SOLUTION TOPICAL PRN
Status: DISCONTINUED | OUTPATIENT
Start: 2024-01-16 | End: 2024-01-16 | Stop reason: SURG

## 2024-01-16 RX ADMIN — SODIUM CHLORIDE, POTASSIUM CHLORIDE, SODIUM LACTATE AND CALCIUM CHLORIDE: 600; 310; 30; 20 INJECTION, SOLUTION INTRAVENOUS at 14:57

## 2024-01-16 RX ADMIN — FENTANYL CITRATE 50 MCG: 50 INJECTION, SOLUTION INTRAMUSCULAR; INTRAVENOUS at 15:01

## 2024-01-16 RX ADMIN — PROPOFOL 140 MG: 10 INJECTION, EMULSION INTRAVENOUS at 15:01

## 2024-01-16 RX ADMIN — LIDOCAINE HYDROCHLORIDE 50 MG: 20 INJECTION, SOLUTION EPIDURAL; INFILTRATION; INTRACAUDAL at 15:01

## 2024-01-16 RX ADMIN — FENTANYL CITRATE 50 MCG: 50 INJECTION, SOLUTION INTRAMUSCULAR; INTRAVENOUS at 15:40

## 2024-01-16 RX ADMIN — ROCURONIUM BROMIDE 10 MG: 50 INJECTION, SOLUTION INTRAVENOUS at 15:40

## 2024-01-16 RX ADMIN — DEXAMETHASONE SODIUM PHOSPHATE 8 MG: 4 INJECTION INTRA-ARTICULAR; INTRALESIONAL; INTRAMUSCULAR; INTRAVENOUS; SOFT TISSUE at 15:04

## 2024-01-16 RX ADMIN — ONDANSETRON 4 MG: 2 INJECTION INTRAMUSCULAR; INTRAVENOUS at 16:09

## 2024-01-16 RX ADMIN — LIDOCAINE HYDROCHLORIDE 4 ML: 40 SOLUTION TOPICAL at 15:01

## 2024-01-16 RX ADMIN — SUGAMMADEX 200 MG: 100 INJECTION, SOLUTION INTRAVENOUS at 16:14

## 2024-01-16 RX ADMIN — ROCURONIUM BROMIDE 50 MG: 50 INJECTION, SOLUTION INTRAVENOUS at 15:01

## 2024-01-16 ASSESSMENT — PAIN DESCRIPTION - PAIN TYPE
TYPE: SURGICAL PAIN
TYPE: SURGICAL PAIN
TYPE: ACUTE PAIN

## 2024-01-16 ASSESSMENT — FIBROSIS 4 INDEX: FIB4 SCORE: 1.98

## 2024-01-16 NOTE — TELEPHONE ENCOUNTER
Contact: [id number]  Pito Black           Phone number: [phone]    Name of person spoken with and relationship to patient: Cydney, spouse   Patient’s Adherence:            How patient is doing on medication: well     How many missed doses and reason: 0    Any new medications: no    Any new conditions: no    Any new allergies: no    Any new side effects: no     Any new diagnoses: no     How many doses remaining:  10 (5 days) prior to receiving this fill    Did patient want to speak with pharmacist: no  Delivery:            Delivery date and method:  patient already received today. Adherence questions completed    Needs by Date:  na    Signature required:  no    Any additional details for :  cameron  Teach Appointment Date:  cameron  Shipping Address:  [address] [place] [id number]  Medication(name, strength and dose):  Advair Diskus 250-50 MCG/ACT Aepb 1 puff bid  Copay: $100   Payment Method: ccof  Supplies:  na  Additional info:  MICHELLE Lamas, spouse. She  stated doing well on the medication. However, they received a letter stating medication will no longer be covered and needing to switch to Dulera. They gave them a courtesy 30 day fill this time. I informed her to contact his Dr to let him know as well. She stated patient is going in for another Biopsy today, so Dr will most likely want to know about this new change and see if he authorizes that or not. Also sent an FYI to Liaabdifatah Kumar about it. . No further questions/concerns at this time

## 2024-01-16 NOTE — ANESTHESIA PREPROCEDURE EVALUATION
Case: [id number] Date/Time: 01/16/24 1515    Procedures:       FIBER OPTIC BRONCHOSCOPY WITH POSSIBLE WASH, BRUSH, BRONCHOALVEOLAR LAVAGE, BIOPSY AND FINE NEEDLE ASPIRATION, ENDOBRONCHIAL ULTRASOUND AND NAVIGATION, ROBOTICS      ENDOBRONCHIAL ULTRASOUND (EBUS)    Pre-op diagnosis: PRIMARY ADENOCARCINOMA OF LEFT LUNG, MEDIASTINAL ADENOPATHY    Location:  PROCEDURE ROOM / SURGERY [facility]    Surgeons: Vinayak Carbajal M.D.          EF 30%, AICD 12/7/23, not paced  Relevant Problems   PULMONARY   (positive) Dyspnea on exertion      CARDIAC   (positive) Dyspnea on exertion   (positive) Hypertension associated with type 2 diabetes mellitus (HCC)   (positive) PAF (paroxysmal atrial fibrillation) (HCC)   (positive) Pulmonary embolism on right (HCC)         (positive) CKD (chronic kidney disease) stage 3, GFR 30-59 ml/min (HCC)   (positive) Cardiorenal syndrome      ENDO   (positive) Type 2 diabetes mellitus with chronic kidney disease, without long-term current use of insulin (HCC)      Other   (positive) Chronic systolic heart failure (HCC)   (positive) Nonsustained ventricular tachycardia (HCC)   (positive) Primary lung adenocarcinoma (HCC)       Physical Exam    Airway   Mallampati: II  TM distance: >3 FB  Neck ROM: full       Cardiovascular - normal exam  Rhythm: regular  Rate: normal  (-) murmur     Dental - normal exam           Pulmonary - normal exam  Breath sounds clear to auscultation     Abdominal    Neurological - normal exam                   Anesthesia Plan    ASA 4   ASA physical status 4 criteria: severe reduction of ejection fractions    Plan - general       Airway plan will be ETT          Induction: intravenous    Postoperative Plan: Postoperative administration of opioids is intended.    Pertinent diagnostic labs and testing reviewed    Informed Consent:    Anesthetic plan and risks discussed with patient.

## 2024-01-16 NOTE — ANESTHESIA PROCEDURE NOTES
Airway    Date/Time: 1/16/2024 3:01 PM    Performed by: Jamal Licona M.D.  Authorized by: Jamal Licona M.D.    Location:  OR  Urgency:  Elective  Difficult Airway: No    Indications for Airway Management:  Anesthesia      Spontaneous Ventilation: absent    Sedation Level:  Deep  Preoxygenated: Yes    Patient Position:  Sniffing  Mask Difficulty Assessment:  1 - vent by mask  Final Airway Type:  Endotracheal airway  Final Endotracheal Airway:  ETT  Cuffed: Yes    Technique Used for Successful ETT Placement:  Direct laryngoscopy  Devices/Methods Used in Placement:  Intubating stylet    Insertion Site:  Oral  Blade Type:  Serena  Laryngoscope Blade/Videolaryngoscope Blade Size:  3  ETT Size (mm):  8.5  Measured from:  Teeth  ETT to Teeth (cm):  23  Placement Verified by: auscultation and capnometry    Cormack-Lehane Classification:  Grade I - full view of glottis  Number of Attempts at Approach:  1

## 2024-01-17 LAB — PATHOLOGY CONSULT NOTE: NORMAL

## 2024-01-17 NOTE — PROCEDURES
Bronchoscopy with Endobronchial Biopsy/[facility]NA Procedure Note    Findings:  Unremarkable inspection of the airways    Specimen:  A: Station 11S- slides. BOBBI: Lesional malignant cells present  B: Station 11S- core  C: Station 7- slides. BOBBI: Lesional malignant cells present  D: Station 7- core    Location: [facility]    Date of Operation: 1/16/2024     Attending Physician Performing Procedure: Vinayak Carbajal M.D.     Pre-op Diagnosis: Mediastinal adenopathy, Pulmonary nodule     Post-op Diagnosis: Mediastinal adenopathy, Pulmonary nodule     Anesthesia: General, administered by Dr Licona    Operation:   Diagnostic flexible fiberoptic bronchoscopy, with endobronchial Ultrasound and transbronchial needle aspiration    Estimated Blood Loss: 50cc    Complications: none    Indications and History:    The patient is a 78 y.o. male. The risks, benefits, complications, treatment options and expected outcomes were discussed with the patient. The possibilities of reaction to medication, pulmonary aspiration, perforation of a viscus, bleeding, failure to diagnose a condition and creating a complication requiring transfusion or operation were discussed with the patient who freely signed the consent.    Description of Procedure:    The patient was seen in the Pre-op area and interval H&P was verified. The patient was taken to the endoscopy suite, identified as Pito Black and a Time Out was held and the above information confirmed. After the induction of general anesthesia, the patient was positioned supine and the endobronchial ultrasound bronchoscope was passed through the ET tube. The scope was then passed into the trachea. Careful inspection of the tracheal lumen was accomplished.     Using the endobronchial ultrasound, a systematic survey of the accessible mediastinal and hilar lymph nodes was performed, notable for lymphadenopathy at stations 11s and 7. Then, under direct ultrasound  visualization, transbronchial needle aspirations were performed at the following lymph node stations:    1. SIZE OF NEEDLE   22 ga    2. STATIONS SAMPLED  Station 11 s (6 passes)  Station 7   (5 passes)    3. BOBBI CONFIRMATION  Malignant Cells     The endobronchial ultrasound was then withdrawn and the bronchoscope was subsequently passed into the left main and then left upper and lower bronchi and segmental bronchi. The scope was then withdrawn and advanced into the right main bronchus and then into the RUL, RML, and RLL bronchi and segmental bronchi.     Trachea: normal bronchial mucosa  Monica: normal bronchial mucosa  Right main bronchus: normal bronchial mucosa  Right upper lobe bronchus: normal bronchial mucosa  Right middle lobe bronchus: normal bronchial mucosa  Right lower lobe bronchus: normal bronchial mucosa  Left main bronchus: normal bronchial mucosa  Left upper lobe bronchus: normal bronchial mucosa  Left lower lobe bronchus: normal bronchial mucosa    The Patient was subsequently taken to the PACU in satisfactory condition.    Cydney (wife) was notified of the results of the procedure who will be driving patient home after recovery in PACU.    __________  Vinayak Carbajal MD  Pulmonary and Critical Care Medicine  [facility]

## 2024-01-17 NOTE — ANESTHESIA POSTPROCEDURE EVALUATION
Patient: Pito Black    Procedure Summary       Date: 01/16/24 Room / Location:  PROCEDURE ROOM / SURGERY [facility]    Anesthesia Start: 1457 Anesthesia Stop: 1621    Procedures:       FIBER OPTIC BRONCHOSCOPY WITH  WASH, BRUSH, BRONCHOALVEOLAR LAVAGE, BIOPSY AND FINE NEEDLE ASPIRATION, ENDOBRONCHIAL ULTRASOUND AND NAVIGATION, ROBOTICS      ENDOBRONCHIAL ULTRASOUND (EBUS) Diagnosis:       Adenocarcinoma of left lung (HCC)      (ADENOCARCINOMA OF LEFT LUNG)    Surgeons: Vinayak Carbajal M.D. Responsible Provider: Jamal Licona M.D.    Anesthesia Type: general ASA Status: 4            Final Anesthesia Type: general  Last vitals  BP   Blood Pressure : 102/57    Temp   36.6 °C (97.9 °F)    Pulse   78   Resp   16    SpO2   100 %      Anesthesia Post Evaluation    Patient location during evaluation: PACU  Patient participation: complete - patient participated  Level of consciousness: awake and alert    Airway patency: patent  Anesthetic complications: no  Cardiovascular status: hemodynamically stable  Respiratory status: acceptable  Hydration status: euvolemic    PONV: none          No notable events documented.     Nurse Pain Score: 0 (NPRS)

## 2024-01-17 NOTE — OR NURSING
1708: Patient arrived to phase II from pacu 1 via gurney. Report received from RN. Respirations spontaneous and non-labored, VSS.     1715: Family at bedside.     1749: Patient and family presented discharge instructions, all questions answered. Patient able to tolerate PO fluids. PIV removed and tip intact. Patient discharged with family and all belongings post uneventful stay in PACU 2.

## 2024-01-17 NOTE — ANESTHESIA TIME REPORT
Anesthesia Start and Stop Event Times       Date Time Event    1/16/2024 1447 Ready for Procedure     1457 Anesthesia Start     1621 Anesthesia Stop          Responsible Staff  01/16/24      Name Role Begin End    Jamal Licona M.D. Anesth 1457 1621          Overtime Reason:  no overtime (within assigned shift)    Comments:

## 2024-01-17 NOTE — OR NURSING
1619: Patient arrived from special procedures via gurney.  No complaints of pain or nausea at this time.    Sedation/Resp Status: Non responsive to verbal with OPA in place.  Respirations spontaneous and non-labored.    HR 81SR; VSS on 6L 02 via simple mask.     1627: OPA out for return of spontaneous eye opening/gag reflex - respirations continue spontaneous and non-labored.     1630: Dr. Carbajal at bedside to see patient. Per Dr. Carbajal no x-ray needed.     1632: Instructed on IS use, demonstrated good efforts to 1500 for 4 breaths.     1634: No complaints of pain or nausea at this time, VSS.     1642: Family called with updates.     1649: No complaints of pain or nausea at this time, VSS.     1708: Report given to Yeyo HOANG RN. Patient transferred to stage 2 by CNA.

## 2024-01-17 NOTE — DISCHARGE INSTRUCTIONS
Bronchoscopy Discharge Instructions  Home Care Instructions    ACTIVITY: Rest and take it easy for the first 24 hours.  A responsible adult is recommended to remain with you during that time.  It is normal to feel sleepy.  We encourage you to not do anything that requires balance, judgment or coordination.    The medicine you had during the bronchoscopy will make you sleepy.    FOR 24 HOURS DO NOT:  Drive, operate machinery or run household appliances.  Drink beer or alcoholic beverages.  Make important decisions or sign legal documents.  Engage in activity that requires sharp judgment and reflexes for 24 hours    SPECIAL INSTRUCTIONS:     Bronchoscopy is a procedure to look inside your windpipe and bronchial tubes.  An anesthetic solution is sprayed in your throat to make it numb.    You may experience a mild sore throat, hoarseness, fever up to 101?F, and /or coughing up small amounts of blood immediately following your bronchoscopy, especially if a biopsy was performed.  The discomfort should subside in 24-48 hours.    Do not smoke for 6-8 hours after the procedure to decrease your risk of coughing and /or bleeding.    Do not drink fluids or eat until your gag reflex returns, for two hours after the bronchoscopy.  Otherwise you will not feel the food or fluid in your throat, and it may go down your windpipe and cause you to choke.    Take ice chips or slowly sip cool fluids to make sure your gag reflex has returned.  Avoid hot fluids from the microwave for several hours.    After 2 hours or when you get home you may take throat lozenges or gargle with salt water if your throat is sore.  Drink liquids to help dryness in your mouth and throat.    Resume your normal activities the following day.    MEDICATIONS: Resume taking daily medication as directed by your doctor.      A follow-up appointment should be arranged with your doctor in 1 week to get the results of the bronchoscopy and any tests done during  the procedure; call to schedule.      You should CALL YOUR PHYSICIAN if you develop:  Fever greater than 101?F  You cough up more than a teaspoon of blood other than blood-tinged mucus  You have increasing amounts of bleeding from coughing after the bronchoscopy  You are wheezing  You develop any unusual signs or symptoms or have any questions                You should call 911 if you develop problems with breathing or chest pain.    If you are unable to contact your doctor or surgical center, you should go to the nearest emergency room or urgent care center.  Physician's telephone #: [phone]      If any questions arise, call your doctor.  If your doctor is not available, please feel free to call the Surgical Center at [phone].  The Center is open Monday through Friday from 7AM to 7PM.  You can also call the [employer] HOTLINE open 24 hours/day, 7 days/week and speak to a nurse at [phone], or toll free at [phone].    You may receive a survey in the mail within the next two weeks and we ask that you take a few moments to complete the survey and return it to us.  Our goal is to provide you with very good care and we value your comments.    What to Expect Post Anesthesia    Rest and take it easy for the first 24 hours.  A responsible adult is recommended to remain with you during that time.  It is normal to feel sleepy.  We encourage you to not do anything that requires balance, judgment or coordination.    FOR 24 HOURS DO NOT:  Drive, operate machinery or run household appliances.  Drink beer or alcoholic beverages.  Make important decisions or sign legal documents.    To avoid nausea, slowly advance diet as tolerated, avoiding spicy or greasy foods for the first day.  Add more substantial food to your diet according to your provider's instructions.  Babies can be fed formula or breast milk as soon as they are hungry.  INCREASE FLUIDS AND FIBER TO AVOID CONSTIPATION.    MILD FLU-LIKE SYMPTOMS ARE  NORMAL.  YOU MAY EXPERIENCE GENERALIZED MUSCLE ACHES, THROAT IRRITATION, HEADACHE AND/OR SOME NAUSEA.

## 2024-01-18 ENCOUNTER — NON-PROVIDER VISIT (OUTPATIENT)
Dept: CARDIOLOGY | Facility: MEDICAL CENTER | Age: 79
End: 2024-01-18
Attending: INTERNAL MEDICINE
Payer: MEDICARE

## 2024-01-18 DIAGNOSIS — I50.22 CHRONIC SYSTOLIC HEART FAILURE (HCC): ICD-10-CM

## 2024-01-18 DIAGNOSIS — Z95.810 ICD (IMPLANTABLE CARDIOVERTER-DEFIBRILLATOR), SINGLE, IN SITU: ICD-10-CM

## 2024-01-18 PROCEDURE — 93282 PRGRMG EVAL IMPLANTABLE DFB: CPT | Performed by: NURSE PRACTITIONER

## 2024-01-18 PROCEDURE — 93282 PRGRMG EVAL IMPLANTABLE DFB: CPT | Mod: 26 | Performed by: NURSE PRACTITIONER

## 2024-01-18 ASSESSMENT — ENCOUNTER SYMPTOMS
FOCAL WEAKNESS: 0
CHILLS: 0
SENSORY CHANGE: 0
EYE DISCHARGE: 0
ABDOMINAL PAIN: 0
COUGH: 0
STRIDOR: 0
SINUS PAIN: 0
FEVER: 0
SPUTUM PRODUCTION: 0
EYE PAIN: 0
VOMITING: 0
DIARRHEA: 0
WHEEZING: 0
WEIGHT LOSS: 0
SORE THROAT: 0
HEADACHES: 0
DIZZINESS: 0
ORTHOPNEA: 0
MYALGIAS: 0
LOSS OF CONSCIOUSNESS: 0
SHORTNESS OF BREATH: 0
NAUSEA: 0
PSYCHIATRIC NEGATIVE: 1

## 2024-01-18 NOTE — H&P
Pulmonary History & Physical Note    Date of Service  1/15/2024    Primary Care Physician  JILL Welsh    Code Status  Prior    Chief Complaint  Repeat bronchoscopy after previous procedure nondiagnostic    History of Presenting Illness  Pito Black is a 78 y.o. male who presented 1/16/2024 with abnormal CT chest.  He underwent a bronchoscopy last month with endobronchial ultrasound/transit bronchial fine-needle aspiration which was nondiagnostic.  His case was discussed with his oncologist and he was advised for repeat biopsy to attempt to obtain tissue for suspected lung cancer.  Otherwise no changes in his clinical status.    Review of Systems  Review of Systems   Constitutional:  Negative for chills, fever and weight loss.   HENT:  Negative for congestion, sinus pain and sore throat.    Eyes:  Negative for pain and discharge.   Respiratory:  Negative for cough, sputum production, shortness of breath, wheezing and stridor.    Cardiovascular:  Negative for chest pain, orthopnea and leg swelling.   Gastrointestinal:  Negative for abdominal pain, diarrhea, nausea and vomiting.   Genitourinary:  Negative for dysuria, frequency and urgency.   Musculoskeletal:  Negative for myalgias.   Skin:  Negative for rash.   Neurological:  Negative for dizziness, sensory change, focal weakness, loss of consciousness and headaches.   Psychiatric/Behavioral: Negative.     All other systems reviewed and are negative.      Past Medical History   has a past medical history of Acute respiratory failure with hypoxia ([facility]) (02/01/2023), AICD (automatic cardioverter/defibrillator) present (12/07/2023), Arrhythmia, Arthritis (2015), Asthma, Backpain (08/06/2018), Blood clotting disorder ([facility]) (02/2023), Breath shortness, Cancer ([facility]) (07/2017), Cataract, Congestive heart failure ([facility]), Dental disorder, Diabetes (08/06/2018), Heart valve disease, High cholesterol, Hypertension, Macrocytic anemia, Pneumonia  (02/2023), Renal disorder, Thrombophilia (HCC), and Type 2 diabetes mellitus with hyperglycemia (HCC) (03/17/2023).    Surgical History   has a past surgical history that includes knee arthroplasty total (2005); ear reconstruction (Bilateral); ear middle exploration (Right, 06/12/2015); ossicular reconstruction (Right, 06/12/2015); thoracoscopy (Left, 04/19/2018); cataract phaco with iol (Right, 08/07/2018); cataract phaco with iol (Left, 08/21/2018); pr bronchoscopy,diagnostic (07/01/2021); endobronchial us add-on (07/01/2021); pr bronchoscopy,diagnostic (N/A, 12/28/2023); endobronchial us add-on (N/A, 12/28/2023); pr bronchoscopy,diagnostic (N/A, 1/16/2024); and endobronchial us add-on (N/A, 1/16/2024).     Family History  family history includes Alcohol abuse in his brother; Cancer in his father; Diabetes in his mother; Heart Disease in his brother; Hypertension in his mother; Stroke in his mother.   Family history reviewed with patient. There is no family history that is pertinent to the chief complaint.     Social History   reports that he quit smoking about 19 years ago. His smoking use included cigarettes. He started smoking about 59 years ago. He has a 10.0 pack-year smoking history. He has never used smokeless tobacco. He reports that he does not currently use alcohol after a past usage of about 12.6 - 21.0 oz of alcohol per week. He reports that he does not use drugs.    Allergies  No Known Allergies    Medications  Prior to Admission Medications   Prescriptions Last Dose Informant Patient Reported? Taking?   Cholecalciferol (VITAMIN D3) 2000 UNIT Cap 1/12/2024  No No   Sig: Take 1 Capsule by mouth every day.   apixaban (ELIQUIS) 5mg Tab 1/13/2024  No No   Sig: Take 1 Tablet by mouth 2 times a day.   cyclobenzaprine (FLEXERIL) 10 mg Tab 1/13/2024  No No   Sig: Take 1 Tablet by mouth at bedtime.   dapagliflozin propanediol (FARXIGA) 10 MG Tab 1/13/2024  No No   Sig: Take 1 tablet by mouth every day.    diphenhydrAMINE-APAP, sleep, (TYLENOL PM EXTRA STRENGTH)  MG Tab 1/15/2024 Patient, Family Member Yes No   Sig: Take 1-2 Tablets by mouth at bedtime as needed (Mild Pain or Trouble Sleeping).   ezetimibe (ZETIA) 10 MG Tab 1/13/2024  No No   Sig: Take 1 Tablet by mouth every day.   Patient taking differently: Take 10 mg by mouth every morning.   fluticasone-salmeterol (ADVAIR DISKUS) 250-50 MCG/ACT AEROSOL POWDER, BREATH ACTIVATED 1/16/2024  No No   Sig: Inhale 1 Puff 2 times a day. Rinse mouth after each use   metFORMIN ER (GLUCOPHAGE XR) 750 MG TABLET SR 24 HR 1/12/2024  No No   Sig: Take 1 tablet by mouth every day.   Patient taking differently: Take  by mouth every evening.   metoprolol SR (TOPROL XL) 50 MG TABLET SR 24 HR 1/16/2024  No No   Sig: Take 1 Tablet by mouth every day for 100 days.   Patient taking differently: Take 50 mg by mouth every morning.   spironolactone (ALDACTONE) 25 MG Tab 1/15/2024  No No   Sig: Take 1 Tablet by mouth every day for 100 days.   Patient taking differently: Take 25 mg by mouth every morning.   vericiguat or PLACEBO (STUDY DRUG) 10 MG tablet 1/15/2024  No No   Sig: Take 1 Tablet by mouth every day. Participant ID #[id number]   Patient taking differently: Take 10 mg by mouth every evening.      Facility-Administered Medications: None       Physical Exam     Blood Pressure : 95/59   Temperature: 36.2 °C (97.2 °F)   Pulse: 86   Respiration: 18   Pulse Oximetry: 94 %       Physical Exam  Vitals and nursing note reviewed.   Constitutional:       General: He is not in acute distress.     Appearance: Normal appearance. He is well-developed. He is not ill-appearing or diaphoretic.      Comments: Very pleasant  Accompanied by supportive wife   HENT:      Nose: Nose normal.      Mouth/Throat:      Pharynx: No oropharyngeal exudate.   Eyes:      General: No scleral icterus.        Right eye: No discharge.         Left eye: No discharge.      Conjunctiva/sclera: Conjunctivae normal.     "  Pupils: Pupils are equal, round, and reactive to light.   Neck:      Thyroid: No thyromegaly.      Vascular: No JVD.      Trachea: No tracheal deviation.   Cardiovascular:      Rate and Rhythm: Normal rate and regular rhythm.      Heart sounds: Normal heart sounds. No murmur heard.  Pulmonary:      Effort: Pulmonary effort is normal. No respiratory distress.      Breath sounds: No stridor. No wheezing or rales.      Comments: Good aeration/breath sounds in the bilateral bases    Abdominal:      General: There is no distension.      Palpations: Abdomen is soft.      Tenderness: There is no abdominal tenderness. There is no guarding.   Musculoskeletal:         General: No tenderness. Normal range of motion.      Cervical back: Neck supple.   Lymphadenopathy:      Cervical: No cervical adenopathy.   Skin:     General: Skin is warm and dry.      Capillary Refill: Capillary refill takes less than 2 seconds.      Coloration: Skin is not pale.      Findings: No erythema.   Neurological:      Mental Status: He is alert and oriented to person, place, and time.      Sensory: No sensory deficit.      Motor: No abnormal muscle tone.      Coordination: Coordination normal.      Deep Tendon Reflexes: Reflexes normal.   Psychiatric:         Behavior: Behavior normal.         Thought Content: Thought content normal.         Judgment: Judgment normal.       Laboratory:          No results for input(s): \"ALTSGPT\", \"ASTSGOT\", \"ALKPHOSPHAT\", \"TBILIRUBIN\", \"DBILIRUBIN\", \"GAMMAGT\", \"AMYLASE\", \"LIPASE\", \"ALB\", \"PREALBUMIN\", \"GLUCOSE\" in the last 72 hours.      No results for input(s): \"NTPROBNP\" in the last 72 hours.      No results for input(s): \"TROPONINT\" in the last 72 hours.    Imaging:  CT-CHEST (THORAX) W/O   Final Result      1.  Findings appear similar since prior chest CT study 12/28/2023.   2.  Extensive pulmonary opacities which are stable since prior, see above.   3.  Right perihilar lung nodule which is stable since prior. "   4.  Enlarged subcarinal mediastinal lymph node.      Fleischner Society pulmonary nodule recommendations:   Not Applicable           X-Ray:  I have personally reviewed the images and compared with prior images.    Assessment/Plan:    #Abnormal CT lung  #PET AVID mediastinal adenopathy  #Pulmonary nodule    -Discussed bronch biopsy results with Dr Schulz, which were concerning but not conclusive for disease recurrence  Subsequent PET CT shows PET avidity at lesion abutting R mainstem posterior wall, as well as 10R and 7 lymph node stations  Recommend repeat iON/EBUS, if again nondiagnostic, will need mediastinoscopy     Discussed about risks and benefits of undergoing bronchoscopy, including but not limited to bleeding, pneumothorax, respiratory failure requiring ventilatory support, infection, and possible death. Questions were encouraged and answered. Patient endorsed understanding and agreed to proceed as planned.  __________  Vinayak Carbajal MD  Pulmonary and Critical Care Medicine  [facility]

## 2024-01-18 NOTE — PROGRESS NOTES
Wound site is healing well. Pt advised to watch for increased redness, swelling, oozing or fever. Pt verbalized understanding. See flowsheet. Billing and remote monitoring explained and acknowledged.

## 2024-01-19 DIAGNOSIS — R06.09 DYSPNEA ON EXERTION: ICD-10-CM

## 2024-01-19 NOTE — PROGRESS NOTES
Switch from Advair to Dulera 2/2 insurance coverage cahnge    Orders Placed This Encounter    mometasone-formoterol (DULERA) 100-5 MCG/ACT Aerosol

## 2024-01-22 ENCOUNTER — TELEPHONE (OUTPATIENT)
Dept: SLEEP MEDICINE | Facility: MEDICAL CENTER | Age: 79
End: 2024-01-22
Payer: MEDICARE

## 2024-01-22 NOTE — TELEPHONE ENCOUNTER
Received rx for Dluera, RTKIN, VINCENT rojas.    Submitted via [facility] [id number]

## 2024-01-23 NOTE — TELEPHONE ENCOUNTER
From: Claudio Pena  To: Hans Dorantes MD  Sent: 12/21/2017 10:34 AM CST  Subject: Non-Urgent Medical Question    Thanks for the response. Seems pointless to do the ultrasound, so let's go ahead with the MRI.  I would prefer do both the elbow and fore Request Reference Number: [id number]. RAMONA -5MCG is approved through 01/22/2025. Your patient may now fill this prescription and it will be covered.  Authorization Expiration Date: 1/21/2025

## 2024-02-02 DIAGNOSIS — R06.09 DYSPNEA ON EXERTION: ICD-10-CM

## 2024-02-02 RX ORDER — FLUTICASONE PROPIONATE AND SALMETEROL 250; 50 UG/1; UG/1
1 POWDER RESPIRATORY (INHALATION) 2 TIMES DAILY
Qty: 180 EACH | Refills: 0 | OUTPATIENT
Start: 2024-02-02

## 2024-02-02 NOTE — TELEPHONE ENCOUNTER
Received request via: Pharmacy    Was the patient seen in the last year in this department? Yes    Does the patient have an active prescription (recently filled or refills available) for medication(s) requested? No    Pharmacy Name:   [facility]     Does the patient have [facility] Plus and need 100 day supply (blood pressure, diabetes and cholesterol meds only)? Medication is not for blood pressure, diabetes or cholesterol.

## 2024-02-06 ENCOUNTER — HOSPITAL ENCOUNTER (OUTPATIENT)
Dept: CARDIOLOGY | Facility: MEDICAL CENTER | Age: 79
End: 2024-02-06
Attending: INTERNAL MEDICINE
Payer: MEDICARE

## 2024-02-06 ENCOUNTER — HOSPITAL ENCOUNTER (OUTPATIENT)
Dept: RADIATION ONCOLOGY | Facility: MEDICAL CENTER | Age: 79
End: 2024-02-29
Attending: RADIOLOGY
Payer: MEDICARE

## 2024-02-06 ENCOUNTER — PATIENT OUTREACH (OUTPATIENT)
Dept: ONCOLOGY | Facility: MEDICAL CENTER | Age: 79
End: 2024-02-06

## 2024-02-06 ENCOUNTER — PHARMACY VISIT (OUTPATIENT)
Dept: PHARMACY | Facility: MEDICAL CENTER | Age: 79
End: 2024-02-06
Payer: COMMERCIAL

## 2024-02-06 VITALS
SYSTOLIC BLOOD PRESSURE: 118 MMHG | HEART RATE: 71 BPM | BODY MASS INDEX: 21.15 KG/M2 | HEIGHT: 68 IN | OXYGEN SATURATION: 99 % | DIASTOLIC BLOOD PRESSURE: 75 MMHG | WEIGHT: 139.55 LBS

## 2024-02-06 DIAGNOSIS — C34.92 PRIMARY ADENOCARCINOMA OF LEFT LUNG (HCC): ICD-10-CM

## 2024-02-06 DIAGNOSIS — Z65.8 PSYCHOSOCIAL DISTRESS: ICD-10-CM

## 2024-02-06 DIAGNOSIS — R06.09 DYSPNEA ON EXERTION: ICD-10-CM

## 2024-02-06 DIAGNOSIS — I50.9 HEART FAILURE, NYHA CLASS 2 (HCC): ICD-10-CM

## 2024-02-06 DIAGNOSIS — I50.20 ACC/AHA STAGE C SYSTOLIC HEART FAILURE (HCC): ICD-10-CM

## 2024-02-06 LAB — LV EJECT FRACT  99904: 45

## 2024-02-06 PROCEDURE — RXMED WILLOW AMBULATORY MEDICATION CHARGE: Performed by: INTERNAL MEDICINE

## 2024-02-06 PROCEDURE — 93306 TTE W/DOPPLER COMPLETE: CPT

## 2024-02-06 PROCEDURE — 99214 OFFICE O/P EST MOD 30 MIN: CPT | Mod: 25 | Performed by: RADIOLOGY

## 2024-02-06 PROCEDURE — 93306 TTE W/DOPPLER COMPLETE: CPT | Mod: 26 | Performed by: INTERNAL MEDICINE

## 2024-02-06 PROCEDURE — 99417 PROLNG OP E/M EACH 15 MIN: CPT | Performed by: RADIOLOGY

## 2024-02-06 PROCEDURE — 99215 OFFICE O/P EST HI 40 MIN: CPT | Performed by: RADIOLOGY

## 2024-02-06 RX ORDER — ZOSTER VACCINE RECOMBINANT, ADJUVANTED 50 MCG/0.5
KIT INTRAMUSCULAR
Qty: 0.5 ML | Refills: 0 | Status: SHIPPED | OUTPATIENT
Start: 2024-02-06 | End: 2024-02-27

## 2024-02-06 ASSESSMENT — PAIN SCALES - GENERAL: PAINLEVEL: 8=MODERATE-SEVERE PAIN

## 2024-02-06 ASSESSMENT — FIBROSIS 4 INDEX: FIB4 SCORE: 2

## 2024-02-06 NOTE — CONSULTS
RADIATION ONCOLOGY CONSULT    DATE OF SERVICE: 2/6/2024    IDENTIFICATION:   1. Stage I non-small cell lung carcinoma of the left upper lobe, moderately differentiated adenocarcinoma histology, status post wedge resection in April 2018  2.  Stage IA2 ([id number]) non-small cell lung carcinoma of the right upper lobe of lung, medically inoperable, completing stereotactic radiosurgery to 6000 cGy in February 2019   3.  Stage IIIA ([id number]) non-small cell lung carcinoma of the left lower lobe of lung in July 2021, treated with combined chemoradiotherapy to 6000 cGy followed by maintenance durvalumab.    4. Stage IIIA (T1N2M0) non-small cell lung carcinoma of the right middle lobe, adenocarcinoma histology    HISTORY OF PRESENT ILLNESS: I had the pleasure of seeing Mr. Black today in consultation at the request of Dr. Schulz for his most recent developments of his lung cancer.  Thus far patient has seem to develop distinct primaries as opposed to metastatic evolution of his tumor.  These have always been an distinct lobes of the lung and sequential in nature.  This new lesion appears to be in similar vein potentially a new primary.  As such Dr. Schulz contacted me for discussion over his his case since he has had heavily pretreated lungs.  The intent to see if any further radiotherapy would be safely able to deliver.  We did a fusion of his previous treatment fields with his most recent imaging.  Unfortunately there does show overlap especially as it relates to the subcarinal lymph node.  Therefore at minimum his right mainstem bronchus or serene would go past safe tolerance.  Therefore Dr. Schulz running more specific genomic sequencing for targeted therapies.  We agreed to neoadjuvant systemic therapy then reevaluate both his tolerance and progress before considering retreatment radiation given the eloquent location.  I am seeing the patient today to further discuss the conversation he and I had.    PAST MEDICAL  HISTORY:   Past Medical History:   Diagnosis Date    Acute respiratory failure with hypoxia ([facility]) 02/01/2023    AICD (automatic cardioverter/defibrillator) present 12/07/2023    Arrhythmia     hx of a fib    Arthritis 2015    generalized, DDD back    Asthma     inhaler     Backpain 08/06/2018    9/10    Blood clotting disorder (HCC) 02/2023    Pulmonary, right lung    Breath shortness     with exertion    Cancer ([facility]) 07/2017    left lung- adenocarcinoma    Cataract     IOL bilateral    Congestive heart failure ([facility])     cardiologist, [facility], Catherine KAUR    Dental disorder     lower partial, removable bridge    Diabetes 08/06/2018    on no meds at this time, diet controlled    Heart valve disease     mild mitral valve prolapse    High cholesterol     Hypertension     Macrocytic anemia     Pneumonia 02/2023    Renal disorder     CKD stage 3    Thrombophilia ([facility])     Type 2 diabetes mellitus with hyperglycemia ([facility]) 03/17/2023    This is a chronic condition. Current medications: Insulin:  Biguanide: took metformin historically will restart metformin xr 750 mg daily. GLP1-RA:   SGLT-2i:    Consider for cardiorenal protection DPP4-I:  TZD:  Pk: Sulfonyluria:   Last A1c: 6/2/23 6.5% Last Microalb/Cr ratio: 6/2/23 <1.2 Fasting sugars: Last diabetic foot exam: 6/19/23 Last retinal eye exam: has upcoming appointment with optome       PAST SURGICAL HISTORY:  Past Surgical History:   Procedure Laterality Date    OH BRONCHOSCOPY,DIAGNOSTIC N/A 1/16/2024    Procedure: FIBER OPTIC BRONCHOSCOPY WITH  WASH, BRUSH, BRONCHOALVEOLAR LAVAGE, BIOPSY AND FINE NEEDLE ASPIRATION, ENDOBRONCHIAL ULTRASOUND AND NAVIGATION, ROBOTICS;  Surgeon: Vinayak Carbajal M.D.;  Location: [facility];  Service: Pulmonary Robotic    ENDOBRONCHIAL US ADD-ON N/A 1/16/2024    Procedure: ENDOBRONCHIAL ULTRASOUND (EBUS);  Surgeon: Vinayak Carbajal M.D.;  Location: [facility];  Service: Pulmonary Robotic    OH  "BRONCHOSCOPY,DIAGNOSTIC N/A 12/28/2023    Procedure: FIBER OPTIC BRONCHOSCOPY WITH BRONCHOALVEOLAR LAVAGE AND FINE NEEDLE ASPIRATION, ENDOBRONCHIAL ULTRASOUND AND NAVIGATION, ROBOTICS;  Surgeon: Miriam Martin M.D.;  Location: [facility];  Service: Pulmonary Robotic    ENDOBRONCHIAL US ADD-ON N/A 12/28/2023    Procedure: ENDOBRONCHIAL ULTRASOUND (EBUS);  Surgeon: Miriam Martin M.D.;  Location: [facility];  Service: Pulmonary Robotic    NH BRONCHOSCOPY,DIAGNOSTIC  07/01/2021    Procedure: BRONCHOSCOPY - FIBER OPTIC WITH BRANCHOALVEOLAR LAVAGE BIOPSY, FNA, NAVIGATION;  Surgeon: Vinayak Carbajal M.D.;  Location: [facility];  Service: Pulmonary    ENDOBRONCHIAL US ADD-ON  07/01/2021    Procedure: ENDOBRONCHIAL ULTRASOUND (EBUS).;  Surgeon: Vinayak Carbajal M.D.;  Location: [facility];  Service: Pulmonary    CATARACT PHACO WITH IOL Left 08/21/2018    Procedure: CATARACT PHACO WITH IOL;  Surgeon: Juanito Hager M.D.;  Location: SURGERY SAME DAY [facility];  Service: Ophthalmology    CATARACT PHACO WITH IOL Right 08/07/2018    Procedure: CATARACT PHACO WITH IOL;  Surgeon: Juanito Hager M.D.;  Location: SURGERY SAME DAY [facility];  Service: Ophthalmology    THORACOSCOPY Left 04/19/2018    Procedure: THORACOSCOPY- WEDGE BIOPSY W/FROZEN SECTION;  Surgeon: Cristhian Galeano M.D.;  Location: [facility];  Service: Thoracic    EAR MIDDLE EXPLORATION Right 06/12/2015    Procedure: EAR MIDDLE EXPLORATION;  Surgeon: William Brandon M.D.;  Location: SURGERY SAME DAY [facility];  Service:     OSSICULAR RECONSTRUCTION Right 06/12/2015    Procedure: OSSICULAR RECONSTRUCTION CHAIN POSSIBLE;  Surgeon: William Brandon M.D.;  Location: SURGERY SAME DAY [facility];  Service:     KNEE ARTHROPLASTY TOTAL  2005    EAR RECONSTRUCTION Bilateral     ear replacement \"many years ago\"       CURRENT MEDICATIONS:  Current Outpatient Medications   Medication Sig " Dispense Refill    vericiguat or PLACEBO (STUDY DRUG) 10 MG tablet Take 1 Tablet by mouth every day. Participant ID #[id number] (Patient taking differently: Take 10 mg by mouth every evening.) 33 Tablet 11    apixaban (ELIQUIS) 5mg Tab Take 1 Tablet by mouth 2 times a day. 180 Tablet 3    metoprolol SR (TOPROL XL) 50 MG TABLET SR 24 HR Take 1 Tablet by mouth every day for 100 days. (Patient taking differently: Take 50 mg by mouth every morning.) 100 Tablet 3    dapagliflozin propanediol (FARXIGA) 10 MG Tab Take 1 tablet by mouth every day. 100 Tablet 3    ezetimibe (ZETIA) 10 MG Tab Take 1 Tablet by mouth every day. (Patient taking differently: Take 10 mg by mouth every morning.) 100 Tablet 3    spironolactone (ALDACTONE) 25 MG Tab Take 1 Tablet by mouth every day for 100 days. (Patient taking differently: Take 25 mg by mouth every morning.) 100 Tablet 3    metFORMIN ER (GLUCOPHAGE XR) 750 MG TABLET SR 24 HR Take 1 tablet by mouth every day. (Patient taking differently: Take  by mouth every evening.) 100 Tablet 3    cyclobenzaprine (FLEXERIL) 10 mg Tab Take 1 Tablet by mouth at bedtime. 100 Tablet 3    Cholecalciferol (VITAMIN D3) 2000 UNIT Cap Take 1 Capsule by mouth every day. 100 Capsule 3    diphenhydrAMINE-APAP, sleep, (TYLENOL PM EXTRA STRENGTH)  MG Tab Take 1-2 Tablets by mouth at bedtime as needed (Mild Pain or Trouble Sleeping).      mometasone-formoterol (DULERA) 100-5 MCG/ACT Aerosol Inhale 2 Puffs 2 times a day. Use spacer. Rinse mouth after use. (Patient not taking: Reported on 2/6/2024) 13 g 11     No current facility-administered medications for this encounter.       ALLERGIES:    Patient has no known allergies.    FAMILY HISTORY:    Family History   Problem Relation Age of Onset    Stroke Mother     Hypertension Mother     Diabetes Mother     Cancer Father         stomach cancer    Heart Disease Brother     Alcohol abuse Brother     Ovarian Cancer Neg Hx     Tubal Cancer Neg Hx     Peritoneal  "Cancer Neg Hx     Colorectal Cancer Neg Hx     Breast Cancer Neg Hx     Hyperlipidemia Neg Hx        SOCIAL HISTORY:    Social History     Tobacco Use    Smoking status: Former     Current packs/day: 0.00     Average packs/day: 0.3 packs/day for 40.0 years (10.0 ttl pk-yrs)     Types: Cigarettes     Start date: 1965     Quit date: 2005     Years since quittin.1    Smokeless tobacco: Never   Vaping Use    Vaping Use: Never used   Substance Use Topics    Alcohol use: Not Currently     Alcohol/week: 12.6 - 21.0 oz     Types: 21 - 35 Cans of beer per week     Comment: 12 beers a day (coors), quit 2/3/2017    Drug use: No     Patient is retired from [employer].  Lives with: Spouse    REVIEW OF SYSTEMS:  A complete review of systems was completed in patient's chart on 2024.  All are negative with relationship to this diagnosis with the exception of:  Right now patient remains asymptomatic.  Of note he tolerated all previous treatments extremely well.  He maintains a very high performance status.    PHYSICAL EXAM:    Vitals:    24 0954   BP: 118/75   BP Location: Right arm   Patient Position: Sitting   BP Cuff Size: Adult   Pulse: 71   SpO2: 99%   Weight: 63.3 kg (139 lb 8.8 oz)   Height: 1.727 m (5' 8\")   Pain Score: 8=Moderate-Severe Pain      1= Restricted in physically strenuous activity, but ambulatory and able to carry out work of a light sedentary nature, e.g., light housework, office work.    PAIN:  8  What makes the pain better: Tylenol PM PRN   What makes the pain worse: N/A chronic   Pain controlled with current regimen: no  Pain related to condition being seen here for: no    GENERAL: No apparent distress.  HEENT:  Pupils are equal, round, and reactive to light.  Extraocular muscles   are intact. Sclerae nonicteric.  Conjunctivae pink.  Oral cavity, tongue   protrudes midline.   NECK:  Supple without evidence of thyromegaly.  NODES:  No peripheral adenopathy of the neck, " supraclavicular fossa or axillae   bilaterally.  LUNGS:  Clear to ascultation bilaterally   HEART:  Regular rate and rhythm.  No murmur appreciated  ABDOMEN:  Soft. No evidence of hepatosplenomegaly.  Positive bowel sounds.  EXTREMITIES:  Without Edema.  NEUROLOGIC:  Cranial nerves II through XII were intact. Normal stance and gait motor and sensory grossly within normal limits     IMPRESSION:   1. Stage I non-small cell lung carcinoma of the left upper lobe, moderately differentiated adenocarcinoma histology, status post wedge resection in April 2018  2.  Stage IA2 ([id number]) non-small cell lung carcinoma of the right upper lobe of lung, medically inoperable, completing stereotactic radiosurgery to 6000 cGy in February 2019   3.  Stage IIIA ([id number]) non-small cell lung carcinoma of the left lower lobe of lung in July 2021, treated with combined chemoradiotherapy to 6000 cGy followed by maintenance durvalumab.    4. Stage IIIA (T1N2M0) non-small cell lung carcinoma of the right middle lobe, adenocarcinoma histology      RECOMMENDATIONS:   I discussed the diagnosis, prognosis, and treatment options over a 1 hr 5 min time period, 95% of that time dedicated to ongoing treatment management.  I discussed with the patient, his wife, and his daughter from [place] the current findings.  First we discussed the distinction between new primary or recurrent disease.  With similar histology it is impossible to tell which scenario he would fit into but by time and location appears more consistent with another primary lesion.  Therefore our first intention was to try to approach curative intent treatment.  Especially since he has tolerated previous treatments extremely well and with good response.  Unfortunately on our fusion of his previous fields with his current disease there would be notable overlap at the serene and right mainstem bronchus.  We discussed the challenges with tolerance in this location if he were to suffer  "complete fibrosis or collapse of that lung or both.  Therefore our recommendation is to look for targeted agents and utilize systemic therapy at minimum in the neoadjuvant setting.  This would either allow for the potential of lower dose radiation.  Or the utilization of radiation once it is deemed \"necessary\".  Patient and family both agree they would like to minimize the risk of severe toxicity and agree with the current treatment plan.  They understand if they have any questions throughout this process they can contact me at any time.      Thank you for the opportunity to participate in his care.  If any questions or comments, please do not hesitate in calling.             "

## 2024-02-06 NOTE — PROGRESS NOTES
"Patient was seen today in clinic with Dr. Martino for consultation.  Vitals signs and weight were obtained and pain assessment was completed.  Allergies and medications were reviewed with the patient.      Vitals/Pain:  Vitals:    02/06/24 0954   BP: 118/75   BP Location: Right arm   Patient Position: Sitting   BP Cuff Size: Adult   Pulse: 71   SpO2: 99%   Weight: 63.3 kg (139 lb 8.8 oz)   Height: 1.727 m (5' 8\")   Pain Score: 8=Moderate-Severe Pain  Pain Scale: 0-10  Pain Assessement: Initial  Pain Location, Orientation and Scale: Back: Mid and Lower : Chronic  : 8  What makes the pain better: Tylenol PM PRN   What makes the pain worse: N/A chronic       Allergies:   Patient has no known allergies.    Current Medications:  Current Outpatient Medications   Medication Sig Dispense Refill    vericiguat or PLACEBO (STUDY DRUG) 10 MG tablet Take 1 Tablet by mouth every day. Participant ID #[id number] (Patient taking differently: Take 10 mg by mouth every evening.) 33 Tablet 11    apixaban (ELIQUIS) 5mg Tab Take 1 Tablet by mouth 2 times a day. 180 Tablet 3    metoprolol SR (TOPROL XL) 50 MG TABLET SR 24 HR Take 1 Tablet by mouth every day for 100 days. (Patient taking differently: Take 50 mg by mouth every morning.) 100 Tablet 3    dapagliflozin propanediol (FARXIGA) 10 MG Tab Take 1 tablet by mouth every day. 100 Tablet 3    ezetimibe (ZETIA) 10 MG Tab Take 1 Tablet by mouth every day. (Patient taking differently: Take 10 mg by mouth every morning.) 100 Tablet 3    spironolactone (ALDACTONE) 25 MG Tab Take 1 Tablet by mouth every day for 100 days. (Patient taking differently: Take 25 mg by mouth every morning.) 100 Tablet 3    metFORMIN ER (GLUCOPHAGE XR) 750 MG TABLET SR 24 HR Take 1 tablet by mouth every day. (Patient taking differently: Take  by mouth every evening.) 100 Tablet 3    cyclobenzaprine (FLEXERIL) 10 mg Tab Take 1 Tablet by mouth at bedtime. 100 Tablet 3    Cholecalciferol (VITAMIN D3) 2000 UNIT Cap Take 1 " Capsule by mouth every day. 100 Capsule 3    diphenhydrAMINE-APAP, sleep, (TYLENOL PM EXTRA STRENGTH)  MG Tab Take 1-2 Tablets by mouth at bedtime as needed (Mild Pain or Trouble Sleeping).      mometasone-formoterol (DULERA) 100-5 MCG/ACT Aerosol Inhale 2 Puffs 2 times a day. Use spacer. Rinse mouth after use. (Patient not taking: Reported on 2/6/2024) 13 g 11     No current facility-administered medications for this encounter.         PCP:  Kerry East R.N.

## 2024-02-06 NOTE — LETTER
[facility]    [address]  [place] [address]  Phone: [phone] - Fax: [phone]            Pito Black,  [address] [address] [facility] [address] [facility] [address]     Date: 02/06/24    Dear Pito,    I am a Cancer Nurse Navigator, a certified oncology nurse. My role is to assess any needs you may have with education, guidance and support. I am available to you and your family through your treatment at [facility].       I am available to address your needs during your journey with the following services:     Care Coordination  I can assist you in facilitating communication between your cancer care treatment team to ensure timely treatment and follow-up.  I can also assist with transition of care back to your primary care provider, or other specialist, as needed.  My goal is to bridge gaps for you throughout the course of your active treatment.       Education Services  Understanding the recommended treatment course by your physician is key. I can provide educational resources personalized to your cancer diagnosis to help you understand your diagnosis and treatment. Please let me know if you would like to receive information about your diagnosis and treatment plan.  I am here to help.     Support Services/Resource Information  [facility] we offer a full scope of support services.  I can assist you with referral information to:  Cancer Clinical Trials & Research  Nutrition counseling  Support groups  Complementary Therapies such as Mind-Body Techniques Meditation  Patient Financial Advocates    [facility], an [employer] affiliate office, our volunteers can assist you with accessing our lending library, support services information, head coverings and comfort items  Community and national resources, included eligibility based lilliam assistance and pharmaceutical access programs, if you are in need of additional information.     [facility]  offers services that include:  Behavioral Health  Genetic counseling & testing  Acupuncture  Lymphedema prevention/treatment program  Palliative care services.        I hope you have an excellent patient experience.  Please feel free to share with me your comments regarding the care you have received- we value your feedback.    Sincerely,     Stefanie Beltrán R.N.  Cancer Nurse Navigator    Office: [phone]  Main:  [phone]  Email:  [email or web address]

## 2024-02-07 DIAGNOSIS — C34.32 PRIMARY CANCER OF LEFT LOWER LOBE OF LUNG (HCC): ICD-10-CM

## 2024-02-07 RX ORDER — FLUTICASONE PROPIONATE AND SALMETEROL 250; 50 UG/1; UG/1
1 POWDER RESPIRATORY (INHALATION) EVERY 12 HOURS
Qty: 180 EACH | Refills: 3 | Status: SHIPPED | OUTPATIENT
Start: 2024-02-07 | End: 2024-02-12 | Stop reason: SDUPTHER

## 2024-02-07 NOTE — PROGRESS NOTES
"Cancer Nurse Navigation Assessment:      Assessment/Discussion: Call placed to patient, introduced self and explained role of navigator. Initially spoke with wife, Cydney.  The live in [place] and said that transportation may be a concern in the future.  They are aware of transportation benefits through [facility] [address].    Talked to patient as well.  He reports is doing ok.  Does not have any questions.  Pt scored self 7/10 on oncology distress scale saying \"what are you going to do about it\", referring to you can't change what is going on.    TRANSPORTATION:   Has transportation  FINANCIAL:   Denies current issues  SUPPORT SYSTEM:   has support  PSYCHOLOGICAL:    Denies additional needs   DST:  7     Resources Provided/Intervention: Introduction letter, cancer support service calendar sent via [employer]        "

## 2024-02-12 DIAGNOSIS — C34.32 PRIMARY CANCER OF LEFT LOWER LOBE OF LUNG (HCC): ICD-10-CM

## 2024-02-12 RX ORDER — FLUTICASONE PROPIONATE AND SALMETEROL 250; 50 UG/1; UG/1
1 POWDER RESPIRATORY (INHALATION) EVERY 12 HOURS
Qty: 1 EACH | Refills: 11 | Status: SHIPPED | OUTPATIENT
Start: 2024-02-12 | End: 2024-02-12

## 2024-02-12 RX ORDER — FLUTICASONE PROPIONATE AND SALMETEROL 250; 50 UG/1; UG/1
1 POWDER RESPIRATORY (INHALATION) EVERY 12 HOURS
Qty: 1 EACH | Refills: 11 | Status: SHIPPED
Start: 2024-02-12

## 2024-02-13 NOTE — PROGRESS NOTES
Resubmit rx for Wixela as previous pharmacy not in stock    Orders Placed This Encounter    DISCONTD: fluticasone-salmeterol (WIXELA INHUB) 250-50 MCG/ACT AEROSOL POWDER, BREATH ACTIVATED    fluticasone-salmeterol (WIXELA INHUB) 250-50 MCG/ACT AEROSOL POWDER, BREATH ACTIVATED

## 2024-02-14 ENCOUNTER — APPOINTMENT (OUTPATIENT)
Dept: CARDIOLOGY | Facility: MEDICAL CENTER | Age: 79
End: 2024-02-14
Attending: NURSE PRACTITIONER
Payer: MEDICARE

## 2024-02-16 ENCOUNTER — HOSPITAL ENCOUNTER (OUTPATIENT)
Facility: MEDICAL CENTER | Age: 79
End: 2024-02-16
Attending: INTERNAL MEDICINE
Payer: MEDICARE

## 2024-02-16 LAB
ALBUMIN SERPL BCP-MCNC: 4 G/DL (ref 3.2–4.9)
ALBUMIN/GLOB SERPL: 1.1 G/DL
ALP SERPL-CCNC: 68 U/L (ref 30–99)
ALT SERPL-CCNC: 30 U/L (ref 2–50)
ANION GAP SERPL CALC-SCNC: 12 MMOL/L (ref 7–16)
AST SERPL-CCNC: 26 U/L (ref 12–45)
BILIRUB SERPL-MCNC: 0.3 MG/DL (ref 0.1–1.5)
BUN SERPL-MCNC: 37 MG/DL (ref 8–22)
CALCIUM ALBUM COR SERPL-MCNC: 9.6 MG/DL (ref 8.5–10.5)
CALCIUM SERPL-MCNC: 9.6 MG/DL (ref 8.5–10.5)
CHLORIDE SERPL-SCNC: 104 MMOL/L (ref 96–112)
CO2 SERPL-SCNC: 25 MMOL/L (ref 20–33)
CREAT SERPL-MCNC: 1.32 MG/DL (ref 0.5–1.4)
GFR SERPLBLD CREATININE-BSD FMLA CKD-EPI: 55 ML/MIN/1.73 M 2
GLOBULIN SER CALC-MCNC: 3.5 G/DL (ref 1.9–3.5)
GLUCOSE SERPL-MCNC: 119 MG/DL (ref 65–99)
POTASSIUM SERPL-SCNC: 5.1 MMOL/L (ref 3.6–5.5)
PROT SERPL-MCNC: 7.5 G/DL (ref 6–8.2)
SODIUM SERPL-SCNC: 141 MMOL/L (ref 135–145)
TSH SERPL DL<=0.005 MIU/L-ACNC: 1.11 UIU/ML (ref 0.38–5.33)

## 2024-02-16 PROCEDURE — 80053 COMPREHEN METABOLIC PANEL: CPT

## 2024-02-16 PROCEDURE — 84443 ASSAY THYROID STIM HORMONE: CPT

## 2024-02-26 ENCOUNTER — PATIENT OUTREACH (OUTPATIENT)
Dept: HEALTH INFORMATION MANAGEMENT | Facility: OTHER | Age: 79
End: 2024-02-26
Payer: MEDICARE

## 2024-02-26 DIAGNOSIS — E11.59 HYPERTENSION ASSOCIATED WITH TYPE 2 DIABETES MELLITUS (HCC): ICD-10-CM

## 2024-02-26 DIAGNOSIS — I15.2 HYPERTENSION ASSOCIATED WITH TYPE 2 DIABETES MELLITUS (HCC): ICD-10-CM

## 2024-02-26 DIAGNOSIS — N18.31 STAGE 3A CHRONIC KIDNEY DISEASE: ICD-10-CM

## 2024-02-26 DIAGNOSIS — Z71.89 ACP (ADVANCE CARE PLANNING): ICD-10-CM

## 2024-02-26 NOTE — PROGRESS NOTES
Called Pito Cydney answered, they are out running errands, no time to talk. Pito's blood pressure has been controlled with OV readings: 118/75 (2/6/24), 122/62 (1/8/24) and 104/60 (12/18/23). His last GFR was 55 on 2/16/24.  Pito does not have an Advance Directive on file. Placed Advance Directive packet and list of AD classes in today's outgoing mail.

## 2024-02-27 ENCOUNTER — OFFICE VISIT (OUTPATIENT)
Dept: CARDIOLOGY | Facility: MEDICAL CENTER | Age: 79
End: 2024-02-27
Attending: NURSE PRACTITIONER
Payer: MEDICARE

## 2024-02-27 VITALS
WEIGHT: 146 LBS | OXYGEN SATURATION: 98 % | DIASTOLIC BLOOD PRESSURE: 56 MMHG | SYSTOLIC BLOOD PRESSURE: 110 MMHG | RESPIRATION RATE: 16 BRPM | HEART RATE: 78 BPM | HEIGHT: 68 IN | BODY MASS INDEX: 22.13 KG/M2

## 2024-02-27 DIAGNOSIS — E78.5 DYSLIPIDEMIA: ICD-10-CM

## 2024-02-27 DIAGNOSIS — I50.9 HEART FAILURE, NYHA CLASS 2 (HCC): ICD-10-CM

## 2024-02-27 DIAGNOSIS — I26.99 PULMONARY EMBOLISM ON RIGHT (HCC): ICD-10-CM

## 2024-02-27 DIAGNOSIS — I48.0 PAF (PAROXYSMAL ATRIAL FIBRILLATION) (HCC): ICD-10-CM

## 2024-02-27 DIAGNOSIS — Z95.810 ICD (IMPLANTABLE CARDIOVERTER-DEFIBRILLATOR), SINGLE, IN SITU: ICD-10-CM

## 2024-02-27 DIAGNOSIS — Z79.899 HIGH RISK MEDICATION USE: ICD-10-CM

## 2024-02-27 DIAGNOSIS — I50.20 ACC/AHA STAGE C SYSTOLIC HEART FAILURE (HCC): ICD-10-CM

## 2024-02-27 DIAGNOSIS — Z95.810 AICD (AUTOMATIC CARDIOVERTER/DEFIBRILLATOR) PRESENT: ICD-10-CM

## 2024-02-27 DIAGNOSIS — D68.69 SECONDARY HYPERCOAGULABLE STATE (HCC): ICD-10-CM

## 2024-02-27 PROCEDURE — 99212 OFFICE O/P EST SF 10 MIN: CPT | Performed by: NURSE PRACTITIONER

## 2024-02-27 PROCEDURE — 99214 OFFICE O/P EST MOD 30 MIN: CPT | Performed by: NURSE PRACTITIONER

## 2024-02-27 PROCEDURE — 3074F SYST BP LT 130 MM HG: CPT | Performed by: NURSE PRACTITIONER

## 2024-02-27 PROCEDURE — 3078F DIAST BP <80 MM HG: CPT | Performed by: NURSE PRACTITIONER

## 2024-02-27 ASSESSMENT — ENCOUNTER SYMPTOMS
DIZZINESS: 0
SHORTNESS OF BREATH: 1
ABDOMINAL PAIN: 0
COUGH: 0
PND: 0
CLAUDICATION: 0
ORTHOPNEA: 0
MYALGIAS: 0
FEVER: 0
PALPITATIONS: 0

## 2024-02-27 ASSESSMENT — FIBROSIS 4 INDEX: FIB4 SCORE: 2.33

## 2024-02-27 NOTE — PROGRESS NOTES
Chief Complaint   Patient presents with    Atrial Fibrillation     F/V Dx: PAF (paroxysmal atrial fibrillation) (HCC)    Hyperlipidemia     F/V Dx: Hyperlipidemia associated with type 2 diabetes mellitus (HCC)         Subjective     Pito Black is a 79 y.o. male who presents today for follow up on his Heart Failure with his wife, Cydney.    Current patient of the heart failure clinic.  He was last seen in clinic on 11/14/2023 with myself.  During that visit, patient sent for lab testing and he was pending a follow-up with EP to discuss ICD.  Patient was also sent for repeat echocardiogram.    Patient did see EP on 11/17/2023 and was recommended for ICD for primary prevention.  Patient had ICD placement on 12/7/2023.    He reports he has been feeling well and denies shocks.    In the interim, patient reports he also was diagnosed with recurrent lung cancer.  Patient plans on starting immunotherapy under Dr. Zeus hampton.  He will be following up with Dr. Zeus hampton    Patient does report some shortness of breath with exertion, but denies chest pain, palpitations, dizziness/lightheadedness, orthopnea, PND or Edema.       His home weights are stable around 139 lbs.     He does report a low-sodium diet.    He reports compliance with his medications.    Pt is not exercising or walking much.    Patient hoping to go to [place] next year to take his family to the place where he was born, but likely that will be postponed due to his cancer diagnosis.    Additionally, patient has the following medical problems:     -Hospitalization from 3/24/2023 through 3/26/2023 with shortness of breath.  Patient was found to be hypoxic and required oxygen.  CTA was negative for PE, but did show worsening bilateral pleural effusions L>R.  He had a left thoracentesis.  He was started on Lasix and Aldactone.  Patient was discharged off oxygen.    -hospitalization from 3/17/2023 through 3/19/2023 for shortness of breath and had an  echocardiogram which showed an EF of 35%.  Chest x-ray showed pulmonary edema.  Patient was started on IV diuretics.  He did have another thoracentesis.  Patient was found to be in LOCO.  Patient also had nonsustained runs of VT/PSVT.  Cardiology was consulted. Toprol was started for his heart failure, tachycardia and frequent ectopy    -Hospitalization from 2/20/2023 through 2/22/2023.  He presented with shortness of breath.  During this visit, patient, he was found to have a PE and influenza positive and started on antibiotic.    -Hospitalization from 1/31/2023 through 2/2/2023.  Patient presented with to [facility] with shortness of breath and found to have bilateral pleural effusions, right upper lobe mass and concern for left upper lobe pneumonia.  He was started on antibiotics and diuresed with Lasix.  He had a thoracentesis on 2/1/2023 with 1 L removed.  Patient had a recent immunotherapy.  Patient was diagnosed with acute hypoxic respiratory failure likely secondary to community-acquired pneumonia versus malignancy related to pleural effusions versus postradiation pneumonitis versus immunotherapy induced pneumonitis.    -History of lung cancer in remission, s/p wedge resection in 2018, had chemotherapy and radiation    -Hypertension    -Atrial fibrillation: Taking Eliquis    -CKD, stage 3    Past Medical History:   Diagnosis Date    Acute respiratory failure with hypoxia ([facility]) 02/01/2023    AICD (automatic cardioverter/defibrillator) present 12/07/2023    Arrhythmia     hx of a fib    Arthritis 2015    generalized, DDD back    Asthma     inhaler     Backpain 08/06/2018    9/10    Blood clotting disorder (HCC) 02/2023    Pulmonary, right lung    Breath shortness     with exertion    Cancer (HCC) 07/2017    left lung- adenocarcinoma    Cataract     IOL bilateral    Congestive heart failure (HCC)     cardiologist, [facility], Catherine KAUR    Dental disorder     lower partial, removable bridge     Diabetes 08/06/2018    on no meds at this time, diet controlled    Heart valve disease     mild mitral valve prolapse    High cholesterol     Hypertension     Macrocytic anemia     Pneumonia 02/2023    Renal disorder     CKD stage 3    Thrombophilia (HCC)     Type 2 diabetes mellitus with hyperglycemia (HCC) 03/17/2023    This is a chronic condition. Current medications: Insulin:  Biguanide: took metformin historically will restart metformin xr 750 mg daily. GLP1-RA:   SGLT-2i:    Consider for cardiorenal protection DPP4-I:  TZD:  Pk: Sulfonyluria:   Last A1c: 6/2/23 6.5% Last Microalb/Cr ratio: 6/2/23 <1.2 Fasting sugars: Last diabetic foot exam: 6/19/23 Last retinal eye exam: has upcoming appointment with optome     Past Surgical History:   Procedure Laterality Date    KS BRONCHOSCOPY,DIAGNOSTIC N/A 1/16/2024    Procedure: FIBER OPTIC BRONCHOSCOPY WITH  WASH, BRUSH, BRONCHOALVEOLAR LAVAGE, BIOPSY AND FINE NEEDLE ASPIRATION, ENDOBRONCHIAL ULTRASOUND AND NAVIGATION, ROBOTICS;  Surgeon: Vinayak Carbajal M.D.;  Location: [facility];  Service: Pulmonary Robotic    ENDOBRONCHIAL US ADD-ON N/A 1/16/2024    Procedure: ENDOBRONCHIAL ULTRASOUND (EBUS);  Surgeon: Vinayak Carbajal M.D.;  Location: [facility];  Service: Pulmonary Robotic    KS BRONCHOSCOPY,DIAGNOSTIC N/A 12/28/2023    Procedure: FIBER OPTIC BRONCHOSCOPY WITH BRONCHOALVEOLAR LAVAGE AND FINE NEEDLE ASPIRATION, ENDOBRONCHIAL ULTRASOUND AND NAVIGATION, ROBOTICS;  Surgeon: Miriam Martin M.D.;  Location: [facility];  Service: Pulmonary Robotic    ENDOBRONCHIAL US ADD-ON N/A 12/28/2023    Procedure: ENDOBRONCHIAL ULTRASOUND (EBUS);  Surgeon: Miriam Martin M.D.;  Location: [facility];  Service: Pulmonary Robotic    KS BRONCHOSCOPY,DIAGNOSTIC  07/01/2021    Procedure: BRONCHOSCOPY - FIBER OPTIC WITH BRANCHOALVEOLAR LAVAGE BIOPSY, FNA, NAVIGATION;  Surgeon: Vinayak Carbajal M.D.;  Location: [facility]  "[facility];  Service: Pulmonary    ENDOBRONCHIAL US ADD-ON  07/01/2021    Procedure: ENDOBRONCHIAL ULTRASOUND (EBUS).;  Surgeon: Vinayak Carbajal M.D.;  Location: SURGERY [facility];  Service: Pulmonary    CATARACT PHACO WITH IOL Left 08/21/2018    Procedure: CATARACT PHACO WITH IOL;  Surgeon: Juanito Hager M.D.;  Location: SURGERY SAME DAY [facility];  Service: Ophthalmology    CATARACT PHACO WITH IOL Right 08/07/2018    Procedure: CATARACT PHACO WITH IOL;  Surgeon: Juanito Hager M.D.;  Location: SURGERY SAME DAY [facility];  Service: Ophthalmology    THORACOSCOPY Left 04/19/2018    Procedure: THORACOSCOPY- WEDGE BIOPSY W/FROZEN SECTION;  Surgeon: Cristhian Galeano M.D.;  Location: SURGERY [facility];  Service: Thoracic    EAR MIDDLE EXPLORATION Right 06/12/2015    Procedure: EAR MIDDLE EXPLORATION;  Surgeon: William Brandon M.D.;  Location: SURGERY SAME DAY [facility];  Service:     OSSICULAR RECONSTRUCTION Right 06/12/2015    Procedure: OSSICULAR RECONSTRUCTION CHAIN POSSIBLE;  Surgeon: William Brandon M.D.;  Location: SURGERY SAME DAY [facility];  Service:     KNEE ARTHROPLASTY TOTAL  2005    EAR RECONSTRUCTION Bilateral     ear replacement \"many years ago\"     Family History   Problem Relation Age of Onset    Stroke Mother     Hypertension Mother     Diabetes Mother     Cancer Father         stomach cancer    Heart Disease Brother     Alcohol abuse Brother     Ovarian Cancer Neg Hx     Tubal Cancer Neg Hx     Peritoneal Cancer Neg Hx     Colorectal Cancer Neg Hx     Breast Cancer Neg Hx     Hyperlipidemia Neg Hx      Social History     Socioeconomic History    Marital status:      Spouse name: Not on file    Number of children: Not on file    Years of education: Not on file    Highest education level: 12th grade   Occupational History    Not on file   Tobacco Use    Smoking status: Former     Current packs/day: 0.00     Average packs/day: 0.3 packs/day for 40.0 years (10.0 ttl pk-yrs) "     Types: Cigarettes     Start date: 1965     Quit date: 2005     Years since quittin.1    Smokeless tobacco: Never   Vaping Use    Vaping Use: Never used   Substance and Sexual Activity    Alcohol use: Not Currently     Alcohol/week: 12.6 - 21.0 oz     Types: 21 - 35 Cans of beer per week     Comment: 12 beers a day (coors), quit 2/3/2017    Drug use: No    Sexual activity: Not on file   Other Topics Concern    Not on file   Social History Narrative    Not on file     Social Determinants of Health     Financial Resource Strain: Low Risk  (10/30/2023)    Overall Financial Resource Strain (CARDIA)     Difficulty of Paying Living Expenses: Not very hard   Food Insecurity: No Food Insecurity (10/30/2023)    Hunger Vital Sign     Worried About Running Out of Food in the Last Year: Never true     Ran Out of Food in the Last Year: Never true   Transportation Needs: No Transportation Needs (10/30/2023)    PRAPARE - Transportation     Lack of Transportation (Medical): No     Lack of Transportation (Non-Medical): No   Physical Activity: Inactive (10/30/2023)    Exercise Vital Sign     Days of Exercise per Week: 0 days     Minutes of Exercise per Session: 0 min   Stress: Stress Concern Present (10/20/2023)    [employer] - Occupational Stress Questionnaire     Feeling of Stress : To some extent   Social Connections: Moderately Isolated (10/30/2023)    Social Connection and Isolation Panel [NHANES]     Frequency of Communication with Friends and Family: More than three times a week     Frequency of Social Gatherings with Friends and Family: Once a week     Attends Zoroastrian Services: Never     Active Member of Clubs or Organizations: No     Attends Club or Organization Meetings: Never     Marital Status:    Intimate Partner Violence: Not on file   Housing Stability: Low Risk  (10/30/2023)    Housing Stability Vital Sign     Unable to Pay for Housing in the Last Year: No      Number of Places Lived in the Last Year: 1     Unstable Housing in the Last Year: No     No Known Allergies  Outpatient Encounter Medications as of 2/27/2024   Medication Sig Dispense Refill    fluticasone-salmeterol (WIXELA INHUB) 250-50 MCG/ACT AEROSOL POWDER, BREATH ACTIVATED Inhale 1 Puff every 12 hours. 1 Each 11    vericiguat or PLACEBO (STUDY DRUG) 10 MG tablet Take 1 Tablet by mouth every day. Participant ID #[id number] (Patient taking differently: Take 10 mg by mouth every evening.) 33 Tablet 11    apixaban (ELIQUIS) 5mg Tab Take 1 Tablet by mouth 2 times a day. 180 Tablet 3    metoprolol SR (TOPROL XL) 50 MG TABLET SR 24 HR Take 1 Tablet by mouth every day for 100 days. (Patient taking differently: Take 50 mg by mouth every morning.) 100 Tablet 3    dapagliflozin propanediol (FARXIGA) 10 MG Tab Take 1 tablet by mouth every day. 100 Tablet 3    ezetimibe (ZETIA) 10 MG Tab Take 1 Tablet by mouth every day. (Patient taking differently: Take 10 mg by mouth every morning.) 100 Tablet 3    spironolactone (ALDACTONE) 25 MG Tab Take 1 Tablet by mouth every day for 100 days. (Patient taking differently: Take 25 mg by mouth every morning.) 100 Tablet 3    metFORMIN ER (GLUCOPHAGE XR) 750 MG TABLET SR 24 HR Take 1 tablet by mouth every day. (Patient taking differently: Take  by mouth every evening.) 100 Tablet 3    cyclobenzaprine (FLEXERIL) 10 mg Tab Take 1 Tablet by mouth at bedtime. 100 Tablet 3    Cholecalciferol (VITAMIN D3) 2000 UNIT Cap Take 1 Capsule by mouth every day. 100 Capsule 3    diphenhydrAMINE-APAP, sleep, (TYLENOL PM EXTRA STRENGTH)  MG Tab Take 1-2 Tablets by mouth at bedtime as needed (Mild Pain or Trouble Sleeping).      [DISCONTINUED] Zoster Vac Recomb Adjuvanted (SHINGRIX) 50 MCG/0.5ML Recon Susp Inject  into the shoulder, thigh, or buttocks. (Patient not taking: Reported on 2/27/2024) 0.5 mL 0     No facility-administered encounter medications on file as of 2/27/2024.     Review of Systems  "  Constitutional:  Negative for fever and malaise/fatigue.   Respiratory:  Positive for shortness of breath. Negative for cough.    Cardiovascular:  Negative for chest pain, palpitations, orthopnea, claudication, leg swelling and PND.   Gastrointestinal:  Negative for abdominal pain.   Musculoskeletal:  Negative for myalgias.   Neurological:  Negative for dizziness.   All other systems reviewed and are negative.           Objective     /56 (BP Location: Left arm, Patient Position: Sitting, BP Cuff Size: Adult)   Pulse 78   Resp 16   Ht 1.727 m (5' 8\")   Wt 66.2 kg (146 lb)   SpO2 98%   BMI 22.20 kg/m²     Physical Exam  Vitals reviewed.   Constitutional:       Appearance: He is well-developed.   HENT:      Head: Normocephalic and atraumatic.   Eyes:      Pupils: Pupils are equal, round, and reactive to light.   Neck:      Vascular: No JVD.   Cardiovascular:      Rate and Rhythm: Normal rate. Rhythm irregular.      Heart sounds: Normal heart sounds.   Pulmonary:      Effort: Pulmonary effort is normal. No respiratory distress.      Breath sounds: Normal breath sounds. No wheezing or rales.   Abdominal:      General: Bowel sounds are normal.      Palpations: Abdomen is soft.   Musculoskeletal:         General: Normal range of motion.      Cervical back: Normal range of motion and neck supple.      Right lower leg: No edema.      Left lower leg: No edema.   Skin:     General: Skin is warm and dry.   Neurological:      General: No focal deficit present.      Mental Status: He is alert and oriented to person, place, and time.   Psychiatric:         Behavior: Behavior normal.       Lab Results   Component Value Date/Time    CHOLSTRLTOT 148 06/02/2023 09:42 AM    LDL 92 06/02/2023 09:42 AM    HDL 44 06/02/2023 09:42 AM    TRIGLYCERIDE 58 06/02/2023 09:42 AM       Lab Results   Component Value Date/Time    SODIUM 141 02/16/2024 11:20 AM    POTASSIUM 5.1 02/16/2024 11:20 AM    CHLORIDE 104 02/16/2024 11:20 AM    " CO2 25 02/16/2024 11:20 AM    GLUCOSE 119 (H) 02/16/2024 11:20 AM    BUN 37 (H) 02/16/2024 11:20 AM    CREATININE 1.32 02/16/2024 11:20 AM     Lab Results   Component Value Date/Time    ALKPHOSPHAT 68 02/16/2024 11:20 AM    ASTSGOT 26 02/16/2024 11:20 AM    ALTSGPT 30 02/16/2024 11:20 AM    TBILIRUBIN 0.3 02/16/2024 11:20 AM      Transthoracic Echo Report 2/7/2018  Normal left ventricular systolic function. Left ventricular ejection fraction is visually estimated to be 55%.   Diastolic function is difficult to assess with atrial fibrillation.  Mild aortic insufficiency.   Mild mitral regurgitation.  No prior study is available for comparison.      Biotel 7/277/2021-8/9/2021  SUMMARY   9 days of monitoring demonstrated sinus rhythm with frequent atrial ectopy.  Occasional ventricular ectopy including up to 14 beats of monomorphic VT which has similar morphology to PVCs.  Symptoms correlated with ventricular tachycardia and atrial tachycardia as well as frequent ectopy.     Transthoracic Echo Report 3/17/2023  Prior echocardiogram 2/7/2018, there is now reduction in left ventricular systolic function and presence of moderate mitral regurgitation.  Severely reduced left ventricular systolic function. The left   ventricular ejection fraction is visually estimated to be 30 to 35%.   Global hypokinesis with regional variation.  Mild aortic insufficiency.  The aortic valve is not well visualized. Calcified aortic valve leaflets. Transvalvular gradients are - Peak: 13 mmHg, Mean: 7 mmHg.    Aortic valve gradients may be underestimated due to low ejection fraction.   Moderate mitral regurgitation.   Normal aortic root for body surface area. The ascending aorta diameter is 3.2 cm.    Transthoracic Echo Report 9/7/2023  Moderately reduced left ventricular systolic function. The left   ventricular ejection fraction is visually estimated to be 35%.  Normal right ventricular size and systolic function.  Moderately dilated left  atrium.  Moderate mitral regurgitation.  Mild aortic insufficiency.  No pericardial effusion.     Compared to the prior study on 3/17/2023, similar findings.    Stress test 9/15/2023   PET IMAGING INTERPRETATION   No evidence of significant jeopardized viable myocardium or prior myocardial infarction.   LVEF calculated to be low at 41% but possibly to due to PVCs, correlate clinically    Transthoracic Echo Report 2/6/2024  Compared to the prior study on 09/28/2023, there has been improvement in left ventricular systolic function.   Mildly reduced left ventricular systolic function. The left ventricular   ejection fraction is visually estimated to be 45%. Global hypokinesis with regional variation.  Moderate mitral regurgitation with eccentric jet.  Moderate aortic insufficiency.        Date 6MWT MLWHF   4/4/2023 292 m in 6 minutes 41   5/9/2023 --- 60                Assessment & Plan     1. ACC/AHA stage C systolic heart failure (HCC)        2. Heart failure, NYHA class 2 (HCC)        3. ICD (implantable cardioverter-defibrillator), single, in situ [Z95.810]        4. Pulmonary embolism on right (HCC)        5. PAF (paroxysmal atrial fibrillation) (HCC)        6. Secondary hypercoagulable state (HCC)        7. Dyslipidemia        8. High risk medication use        9. AICD (automatic cardioverter/defibrillator) present              Medical Decision Making: Today's Assessment/Status/Plan:        HFrEF, Stage C, Class 2, LVEF 45% improved from 35%: Based on physical examination findings, patient is euvolemic. No JVD, lungs are clear to auscultation, no pitting edema in bilateral lower extremities, no ascites.   -Heart failure due to unknown cause, ischemic work-up negative, could consider PVC induced  -Discussed Heart failure trajectory and prognosis with patient. Will continue to optimize medical therapy as tolerated. Advanced HF treatment, need for remote monitoring consideration at every visit.   -ACE-I/ARB/ARNI:  Consider starting this in the future if BP improves, hold off at this time   -Evidence Based Beta-blocker: Continue metoprolol SR 50 mg daily, consider increasing if BP allows in the future for HF and PVCs  -Aldosterone Antagonist: Continue spironolactone 25 mg daily  -Diuretic: Continue furosemide up to 40 mg daily as needed, patient reports no recent use  -SGLT2 inhibitor: Continue Farxiga 10 mg daily  -Other: Consider as needed (Hydralazine/Isosorbide, Vericiguat, Corlanor) patient a part of [facility] trial he is either taking Vericiguat versus placebo, will discuss with study team whether or not patient should continue with study well participating in immunotherapy.  Patient has no problems continuing.  Study drug not likely cause of patient's recurrent cancer  -Labs: no labs due at this time, Will continue to closely monitor for side effects of patient's high risk medication(s) including renal function, liver function NTproBNP/cardiac markers, and electrolytes as needed  -discussed importance of exercise and regular activity  -Discussed/reviewed maintaining a low Sodium and hydration recommendations  -now s/p ICD, denies shocks, last device check 1/18/2024  -moderate MR seen on echo: MR stable at this time, Will discuss referral to structural heart clinic for Mitraclip evaluation as EF or MR did not improve, at this time we will hold off on referral as patient euvolemic and no significant symptoms  -Reinforced s/sx of worsening heart failure with patient and weight monitoring. Pt verbalizes understanding. Pt to call office or RTC if present.    -PUMP line number [phone] (PUMP) reviewed with patient  -Heart Failure Education:   Discussed the Definition of heart failure (linking disease, symptoms, and treatment) and causes of heart failure  Recognition of escalating symptoms and concrete plan for response to particular symptoms  Risk modification for heart failure progression  Specific diet recommendations:  Continue low-sodium diet and adequate hydration   discussed patient to Stay active  Importance of treatment adherence  Behavioral strategies to promote treatment adherence  Patient declines formal education at this time  -Advanced care planning: Advance directive and POLST to be discussed at future visit  -Pharmacotherapy Referral: Patient not referred to pharmacotherapy at this time  -Compliance Barriers: None   -Genetic testing Consideration: none  -Consideration for LVAD and/or Heart transplant as necessary       PE:  -Continue Eliquis 5 mg twice a day     bilateral pleural effusion:  -s/p thoracentesis x3  -Will follow    Paroxysmal A-fib:  -Continue Eliquis 5 mg twice a day  -Continue metoprolol succinate 50 mg daily    Dyslipidemia:  -Recent LDL 92 on 6/2/2023  -Continue ezetimibe 10 mg daily    New onset diabetes:  -Recently started on metformin  -Followed by PCP    Recurrent lung cancer:  -Patient will be following up with oncologist for immunotherapy, Dr. Schulz.    FU in clinic in 1 month for research follow up. Sooner if needed.    Patient verbalizes understanding and agrees with the plan of care.     PLEASE NOTE: This Note was created using voice recognition Software. I have made every reasonable attempt to correct obvious errors, but I expect that there are errors of grammar and possibly content that I did not discover before finalizing the note

## 2024-03-05 ENCOUNTER — RESEARCH ENCOUNTER (OUTPATIENT)
Dept: CARDIOLOGY | Facility: MEDICAL CENTER | Age: 79
End: 2024-03-05

## 2024-03-20 ENCOUNTER — RESEARCH ENCOUNTER (OUTPATIENT)
Dept: CARDIOLOGY | Facility: MEDICAL CENTER | Age: 79
End: 2024-03-20
Attending: NURSE PRACTITIONER
Payer: MEDICARE

## 2024-03-20 ENCOUNTER — PHARMACY VISIT (OUTPATIENT)
Dept: PHARMACY | Facility: MEDICAL CENTER | Age: 79
End: 2024-03-20
Payer: COMMERCIAL

## 2024-03-20 VITALS
BODY MASS INDEX: 21.9 KG/M2 | SYSTOLIC BLOOD PRESSURE: 119 MMHG | WEIGHT: 144 LBS | DIASTOLIC BLOOD PRESSURE: 70 MMHG | HEART RATE: 78 BPM

## 2024-03-20 VITALS
RESPIRATION RATE: 14 BRPM | BODY MASS INDEX: 21.82 KG/M2 | SYSTOLIC BLOOD PRESSURE: 96 MMHG | OXYGEN SATURATION: 96 % | HEART RATE: 82 BPM | HEIGHT: 68 IN | WEIGHT: 144 LBS | DIASTOLIC BLOOD PRESSURE: 52 MMHG

## 2024-03-20 DIAGNOSIS — Z00.6 RESEARCH SUBJECT: ICD-10-CM

## 2024-03-20 DIAGNOSIS — D68.69 SECONDARY HYPERCOAGULABLE STATE (HCC): ICD-10-CM

## 2024-03-20 DIAGNOSIS — Z95.810 ICD (IMPLANTABLE CARDIOVERTER-DEFIBRILLATOR), SINGLE, IN SITU: ICD-10-CM

## 2024-03-20 DIAGNOSIS — E78.5 DYSLIPIDEMIA: ICD-10-CM

## 2024-03-20 DIAGNOSIS — I50.9 HEART FAILURE, NYHA CLASS 2 (HCC): ICD-10-CM

## 2024-03-20 DIAGNOSIS — I50.20 ACC/AHA STAGE C SYSTOLIC HEART FAILURE (HCC): ICD-10-CM

## 2024-03-20 DIAGNOSIS — Z79.899 HIGH RISK MEDICATION USE: ICD-10-CM

## 2024-03-20 DIAGNOSIS — I48.0 PAF (PAROXYSMAL ATRIAL FIBRILLATION) (HCC): ICD-10-CM

## 2024-03-20 PROCEDURE — 3074F SYST BP LT 130 MM HG: CPT | Performed by: NURSE PRACTITIONER

## 2024-03-20 PROCEDURE — 99214 OFFICE O/P EST MOD 30 MIN: CPT | Performed by: NURSE PRACTITIONER

## 2024-03-20 PROCEDURE — 3078F DIAST BP <80 MM HG: CPT | Performed by: NURSE PRACTITIONER

## 2024-03-20 PROCEDURE — 99213 OFFICE O/P EST LOW 20 MIN: CPT | Performed by: NURSE PRACTITIONER

## 2024-03-20 PROCEDURE — RXMED WILLOW AMBULATORY MEDICATION CHARGE: Performed by: NURSE PRACTITIONER

## 2024-03-20 ASSESSMENT — ENCOUNTER SYMPTOMS
PALPITATIONS: 0
ORTHOPNEA: 0
FEVER: 0
CLAUDICATION: 0
DIZZINESS: 0
MYALGIAS: 0
COUGH: 0
SHORTNESS OF BREATH: 1
PND: 0
ABDOMINAL PAIN: 0

## 2024-03-20 ASSESSMENT — FIBROSIS 4 INDEX
FIB4 SCORE: 2.33
FIB4 SCORE: 2.33

## 2024-03-20 NOTE — PROGRESS NOTES
Chief Complaint   Patient presents with    Congestive Heart Failure     F/V DX: ACC/AHA stage C systolic heart failure (HCC)           Subjective     Pito Black is a 79 y.o. male who presents today for follow up on his Heart Failure with his wife, Cydney.    Current patient of the heart failure clinic.  He was last seen in clinic on 2/27/2024 with myself.  During that visit, no changes were made to his regimen.  His echocardiogram showed improvement of his EF to 45%.    Patient reports today that he will be starting immunotherapy next week for his recurrent lung cancer.    He reports he has been doing well, he denies chest pain, palpitations, orthopnea, PND, edema or dizziness/lightheadedness.    He mentions yesterday he did some yard work and had some mild dyspnea and fatigue.  He states he would stop and rest about every 20 minutes.    Patient reports his home weights are stable around 139 pounds.    He denies any shocks from his ICD.    He does report a low-sodium diet.    He reports compliance with his medications.    Pt is not exercising or walking much.  He is wondering if he can start exercising.    Patient is postponing his trip to [facility] to take his family to the place where he was born due to his recurrent lung cancer and pending treatment    Patient will remain on the [facility] Study    Additionally, patient has the following medical problems:     -Hospitalization from 3/24/2023 through 3/26/2023 with shortness of breath.  Patient was found to be hypoxic and required oxygen.  CTA was negative for PE, but did show worsening bilateral pleural effusions L>R.  He had a left thoracentesis.  He was started on Lasix and Aldactone.  Patient was discharged off oxygen.    -hospitalization from 3/17/2023 through 3/19/2023 for shortness of breath and had an echocardiogram which showed an EF of 35%.  Chest x-ray showed pulmonary edema.  Patient was started on IV diuretics.  He did have another thoracentesis.   Patient was found to be in LOCO.  Patient also had nonsustained runs of VT/PSVT.  Cardiology was consulted. Toprol was started for his heart failure, tachycardia and frequent ectopy    -Hospitalization from 2/20/2023 through 2/22/2023.  He presented with shortness of breath.  During this visit, patient, he was found to have a PE and influenza positive and started on antibiotic.    -Hospitalization from 1/31/2023 through 2/2/2023.  Patient presented with to [facility] with shortness of breath and found to have bilateral pleural effusions, right upper lobe mass and concern for left upper lobe pneumonia.  He was started on antibiotics and diuresed with Lasix.  He had a thoracentesis on 2/1/2023 with 1 L removed.  Patient had a recent immunotherapy.  Patient was diagnosed with acute hypoxic respiratory failure likely secondary to community-acquired pneumonia versus malignancy related to pleural effusions versus postradiation pneumonitis versus immunotherapy induced pneumonitis.    -History of lung cancer in remission, s/p wedge resection in 2018, had chemotherapy and radiation    -Hypertension    -Atrial fibrillation: Taking Eliquis    -CKD, stage 3    Past Medical History:   Diagnosis Date    Acute respiratory failure with hypoxia ([facility]) 02/01/2023    AICD (automatic cardioverter/defibrillator) present 12/07/2023    Arrhythmia     hx of a fib    Arthritis 2015    generalized, DDD back    Asthma     inhaler     Backpain 08/06/2018    9/10    Blood clotting disorder (HCC) 02/2023    Pulmonary, right lung    Breath shortness     with exertion    Cancer (HCC) 07/2017    left lung- adenocarcinoma    Cataract     IOL bilateral    Congestive heart failure (HCC)     cardiologist, [facility], Catherine KAUR    Dental disorder     lower partial, removable bridge    Diabetes 08/06/2018    on no meds at this time, diet controlled    Heart valve disease     mild mitral valve prolapse    High cholesterol      Hypertension     Macrocytic anemia     Pneumonia 02/2023    Renal disorder     CKD stage 3    Thrombophilia (HCC)     Type 2 diabetes mellitus with hyperglycemia (HCC) 03/17/2023    This is a chronic condition. Current medications: Insulin:  Biguanide: took metformin historically will restart metformin xr 750 mg daily. GLP1-RA:   SGLT-2i:    Consider for cardiorenal protection DPP4-I:  TZD:  Pk: Sulfonyluria:   Last A1c: 6/2/23 6.5% Last Microalb/Cr ratio: 6/2/23 <1.2 Fasting sugars: Last diabetic foot exam: 6/19/23 Last retinal eye exam: has upcoming appointment with optome     Past Surgical History:   Procedure Laterality Date    IN BRONCHOSCOPY,DIAGNOSTIC N/A 1/16/2024    Procedure: FIBER OPTIC BRONCHOSCOPY WITH  WASH, BRUSH, BRONCHOALVEOLAR LAVAGE, BIOPSY AND FINE NEEDLE ASPIRATION, ENDOBRONCHIAL ULTRASOUND AND NAVIGATION, ROBOTICS;  Surgeon: Vinayak Carbajal M.D.;  Location: [facility];  Service: Pulmonary Robotic    ENDOBRONCHIAL US ADD-ON N/A 1/16/2024    Procedure: ENDOBRONCHIAL ULTRASOUND (EBUS);  Surgeon: Vinayak Carbajal M.D.;  Location: [facility];  Service: Pulmonary Robotic    IN BRONCHOSCOPY,DIAGNOSTIC N/A 12/28/2023    Procedure: FIBER OPTIC BRONCHOSCOPY WITH BRONCHOALVEOLAR LAVAGE AND FINE NEEDLE ASPIRATION, ENDOBRONCHIAL ULTRASOUND AND NAVIGATION, ROBOTICS;  Surgeon: Miriam Martin M.D.;  Location: [facility];  Service: Pulmonary Robotic    ENDOBRONCHIAL US ADD-ON N/A 12/28/2023    Procedure: ENDOBRONCHIAL ULTRASOUND (EBUS);  Surgeon: Miriam Martin M.D.;  Location: [facility];  Service: Pulmonary Robotic    IN BRONCHOSCOPY,DIAGNOSTIC  07/01/2021    Procedure: BRONCHOSCOPY - FIBER OPTIC WITH BRANCHOALVEOLAR LAVAGE BIOPSY, FNA, NAVIGATION;  Surgeon: Vinayak Carbajal M.D.;  Location: [facility];  Service: Pulmonary    ENDOBRONCHIAL US ADD-ON  07/01/2021    Procedure: ENDOBRONCHIAL ULTRASOUND (EBUS).;  Surgeon: Vinayak Carbajal  "M.D.;  Location: SURGERY [facility];  Service: Pulmonary    CATARACT PHACO WITH IOL Left 2018    Procedure: CATARACT PHACO WITH IOL;  Surgeon: Juanito Hager M.D.;  Location: SURGERY SAME DAY [facility];  Service: Ophthalmology    CATARACT PHACO WITH IOL Right 2018    Procedure: CATARACT PHACO WITH IOL;  Surgeon: Juanito Hager M.D.;  Location: SURGERY SAME DAY [facility];  Service: Ophthalmology    THORACOSCOPY Left 2018    Procedure: THORACOSCOPY- WEDGE BIOPSY W/FROZEN SECTION;  Surgeon: Cristhian Galeano M.D.;  Location: SURGERY [facility];  Service: Thoracic    EAR MIDDLE EXPLORATION Right 2015    Procedure: EAR MIDDLE EXPLORATION;  Surgeon: Wliliam Brandon M.D.;  Location: SURGERY SAME DAY [facility];  Service:     OSSICULAR RECONSTRUCTION Right 2015    Procedure: OSSICULAR RECONSTRUCTION CHAIN POSSIBLE;  Surgeon: William Brandon M.D.;  Location: SURGERY SAME DAY [facility];  Service:     KNEE ARTHROPLASTY TOTAL      EAR RECONSTRUCTION Bilateral     ear replacement \"many years ago\"     Family History   Problem Relation Age of Onset    Stroke Mother     Hypertension Mother     Diabetes Mother     Cancer Father         stomach cancer    Heart Disease Brother     Alcohol abuse Brother     Ovarian Cancer Neg Hx     Tubal Cancer Neg Hx     Peritoneal Cancer Neg Hx     Colorectal Cancer Neg Hx     Breast Cancer Neg Hx     Hyperlipidemia Neg Hx      Social History     Socioeconomic History    Marital status:      Spouse name: Not on file    Number of children: Not on file    Years of education: Not on file    Highest education level: 12th grade   Occupational History    Not on file   Tobacco Use    Smoking status: Former     Current packs/day: 0.00     Average packs/day: 0.3 packs/day for 40.0 years (10.0 ttl pk-yrs)     Types: Cigarettes     Start date: 1965     Quit date: 2005     Years since quittin.2    Smokeless tobacco: Never   Vaping Use    " Vaping Use: Never used   Substance and Sexual Activity    Alcohol use: Not Currently     Alcohol/week: 12.6 - 21.0 oz     Types: 21 - 35 Cans of beer per week     Comment: 12 beers a day (coors), quit 2/3/2017    Drug use: No    Sexual activity: Not on file   Other Topics Concern    Not on file   Social History Narrative    Not on file     Social Determinants of Health     Financial Resource Strain: Low Risk  (10/30/2023)    Overall Financial Resource Strain (CARDIA)     Difficulty of Paying Living Expenses: Not very hard   Food Insecurity: No Food Insecurity (10/30/2023)    Hunger Vital Sign     Worried About Running Out of Food in the Last Year: Never true     Ran Out of Food in the Last Year: Never true   Transportation Needs: No Transportation Needs (10/30/2023)    PRAPARE - Transportation     Lack of Transportation (Medical): No     Lack of Transportation (Non-Medical): No   Physical Activity: Inactive (10/30/2023)    Exercise Vital Sign     Days of Exercise per Week: 0 days     Minutes of Exercise per Session: 0 min   Stress: Stress Concern Present (10/20/2023)    [employer] - Occupational Stress Questionnaire     Feeling of Stress : To some extent   Social Connections: Moderately Isolated (10/30/2023)    Social Connection and Isolation Panel [NHANES]     Frequency of Communication with Friends and Family: More than three times a week     Frequency of Social Gatherings with Friends and Family: Once a week     Attends Confucianism Services: Never     Active Member of Clubs or Organizations: No     Attends Club or Organization Meetings: Never     Marital Status:    Intimate Partner Violence: Not on file   Housing Stability: Low Risk  (10/30/2023)    Housing Stability Vital Sign     Unable to Pay for Housing in the Last Year: No     Number of Places Lived in the Last Year: 1     Unstable Housing in the Last Year: No     No Known Allergies  Outpatient Encounter Medications as of  3/20/2024   Medication Sig Dispense Refill    fluticasone-salmeterol (WIXELA INHUB) 250-50 MCG/ACT AEROSOL POWDER, BREATH ACTIVATED Inhale 1 Puff every 12 hours. 1 Each 11    vericiguat or PLACEBO (STUDY DRUG) 10 MG tablet Take 1 Tablet by mouth every day. Participant ID #[id number] 33 Tablet 11    apixaban (ELIQUIS) 5mg Tab Take 1 Tablet by mouth 2 times a day. 180 Tablet 3    metoprolol SR (TOPROL XL) 50 MG TABLET SR 24 HR Take 1 Tablet by mouth every day for 100 days. (Patient taking differently: Take 50 mg by mouth every morning.) 100 Tablet 3    dapagliflozin propanediol (FARXIGA) 10 MG Tab Take 1 tablet by mouth every day. 100 Tablet 3    ezetimibe (ZETIA) 10 MG Tab Take 1 Tablet by mouth every day. (Patient taking differently: Take 10 mg by mouth every morning.) 100 Tablet 3    spironolactone (ALDACTONE) 25 MG Tab Take 1 Tablet by mouth every day for 100 days. (Patient taking differently: Take 25 mg by mouth every morning.) 100 Tablet 3    metFORMIN ER (GLUCOPHAGE XR) 750 MG TABLET SR 24 HR Take 1 tablet by mouth every day. (Patient taking differently: Take  by mouth every evening.) 100 Tablet 3    cyclobenzaprine (FLEXERIL) 10 mg Tab Take 1 Tablet by mouth at bedtime. 100 Tablet 3    Cholecalciferol (VITAMIN D3) 2000 UNIT Cap Take 1 Capsule by mouth every day. 100 Capsule 3    diphenhydrAMINE-APAP, sleep, (TYLENOL PM EXTRA STRENGTH)  MG Tab Take 1-2 Tablets by mouth at bedtime as needed (Mild Pain or Trouble Sleeping).       No facility-administered encounter medications on file as of 3/20/2024.     Review of Systems   Constitutional:  Positive for malaise/fatigue. Negative for fever.   Respiratory:  Positive for shortness of breath. Negative for cough.    Cardiovascular:  Negative for chest pain, palpitations, orthopnea, claudication, leg swelling and PND.   Gastrointestinal:  Negative for abdominal pain.   Musculoskeletal:  Negative for myalgias.   Neurological:  Negative for dizziness.   All other  "systems reviewed and are negative.           Objective     BP 96/52 (BP Location: Left arm, Patient Position: Sitting, BP Cuff Size: Adult)   Pulse 82   Resp 14   Ht 1.727 m (5' 8\")   Wt 65.3 kg (144 lb)   SpO2 96%   BMI 21.90 kg/m²     Physical Exam  Vitals reviewed.   Constitutional:       Appearance: He is well-developed.   HENT:      Head: Normocephalic and atraumatic.   Eyes:      Pupils: Pupils are equal, round, and reactive to light.   Neck:      Vascular: No JVD.   Cardiovascular:      Rate and Rhythm: Normal rate. Rhythm irregular.      Heart sounds: Normal heart sounds.   Pulmonary:      Effort: Pulmonary effort is normal. No respiratory distress.      Breath sounds: Normal breath sounds. No wheezing or rales.   Abdominal:      General: Bowel sounds are normal.      Palpations: Abdomen is soft.   Musculoskeletal:         General: Normal range of motion.      Cervical back: Normal range of motion and neck supple.      Right lower leg: No edema.      Left lower leg: No edema.   Skin:     General: Skin is warm and dry.   Neurological:      General: No focal deficit present.      Mental Status: He is alert and oriented to person, place, and time.   Psychiatric:         Behavior: Behavior normal.       Lab Results   Component Value Date/Time    CHOLSTRLTOT 148 06/02/2023 09:42 AM    LDL 92 06/02/2023 09:42 AM    HDL 44 06/02/2023 09:42 AM    TRIGLYCERIDE 58 06/02/2023 09:42 AM       Lab Results   Component Value Date/Time    SODIUM 141 02/16/2024 11:20 AM    POTASSIUM 5.1 02/16/2024 11:20 AM    CHLORIDE 104 02/16/2024 11:20 AM    CO2 25 02/16/2024 11:20 AM    GLUCOSE 119 (H) 02/16/2024 11:20 AM    BUN 37 (H) 02/16/2024 11:20 AM    CREATININE 1.32 02/16/2024 11:20 AM     Lab Results   Component Value Date/Time    ALKPHOSPHAT 68 02/16/2024 11:20 AM    ASTSGOT 26 02/16/2024 11:20 AM    ALTSGPT 30 02/16/2024 11:20 AM    TBILIRUBIN 0.3 02/16/2024 11:20 AM      Transthoracic Echo Report 2/7/2018  Normal left " ventricular systolic function. Left ventricular ejection fraction is visually estimated to be 55%.   Diastolic function is difficult to assess with atrial fibrillation.  Mild aortic insufficiency.   Mild mitral regurgitation.  No prior study is available for comparison.      Biotel 7/277/2021-8/9/2021  SUMMARY   9 days of monitoring demonstrated sinus rhythm with frequent atrial ectopy.  Occasional ventricular ectopy including up to 14 beats of monomorphic VT which has similar morphology to PVCs.  Symptoms correlated with ventricular tachycardia and atrial tachycardia as well as frequent ectopy.     Transthoracic Echo Report 3/17/2023  Prior echocardiogram 2/7/2018, there is now reduction in left ventricular systolic function and presence of moderate mitral regurgitation.  Severely reduced left ventricular systolic function. The left   ventricular ejection fraction is visually estimated to be 30 to 35%.   Global hypokinesis with regional variation.  Mild aortic insufficiency.  The aortic valve is not well visualized. Calcified aortic valve leaflets. Transvalvular gradients are - Peak: 13 mmHg, Mean: 7 mmHg.    Aortic valve gradients may be underestimated due to low ejection fraction.   Moderate mitral regurgitation.   Normal aortic root for body surface area. The ascending aorta diameter is 3.2 cm.    Transthoracic Echo Report 9/7/2023  Moderately reduced left ventricular systolic function. The left   ventricular ejection fraction is visually estimated to be 35%.  Normal right ventricular size and systolic function.  Moderately dilated left atrium.  Moderate mitral regurgitation.  Mild aortic insufficiency.  No pericardial effusion.     Compared to the prior study on 3/17/2023, similar findings.    Stress test 9/15/2023   PET IMAGING INTERPRETATION   No evidence of significant jeopardized viable myocardium or prior myocardial infarction.   LVEF calculated to be low at 41% but possibly to due to PVCs, correlate  clinically    Transthoracic Echo Report 2/6/2024  Compared to the prior study on 09/28/2023, there has been improvement in left ventricular systolic function.   Mildly reduced left ventricular systolic function. The left ventricular   ejection fraction is visually estimated to be 45%. Global hypokinesis with regional variation.  Moderate mitral regurgitation with eccentric jet.  Moderate aortic insufficiency.        Date 6MWT MLWHF   4/4/2023 292 m in 6 minutes 41   5/9/2023 --- 60                Assessment & Plan     1. PAF (paroxysmal atrial fibrillation) ([facility]) [I48.0]        2. ACC/AHA stage C systolic heart failure ([facility])        3. Heart failure, NYHA class 2 ([facility])        4. ICD (implantable cardioverter-defibrillator), single, in situ [Z95.810]        5. Secondary hypercoagulable state ([facility])        6. Dyslipidemia        7. High risk medication use        8. Research subject              Medical Decision Making: Today's Assessment/Status/Plan:        HFrEF, Stage C, Class 2, LVEF 45% improved from 35%: Based on physical examination findings, patient is euvolemic. No JVD, lungs are clear to auscultation, no pitting edema in bilateral lower extremities, no ascites.   -Heart failure due to unknown cause, ischemic work-up negative, could consider PVC induced  -Discussed Heart failure trajectory and prognosis with patient. Will continue to optimize medical therapy as tolerated. Advanced HF treatment, need for remote monitoring consideration at every visit.   -ACE-I/ARB/ARNI: Consider starting this in the future if BP improves, hold off at this time   -Evidence Based Beta-blocker: Continue metoprolol SR 50 mg daily, consider increasing if BP allows in the future for HF and PVCs  -Aldosterone Antagonist: Continue spironolactone 25 mg daily  -Diuretic: Continue furosemide up to 40 mg daily as needed, patient reports no recent use  -SGLT2 inhibitor: Continue Farxiga 10 mg daily  -Other: Consider as needed  (Hydralazine/Isosorbide, Vericiguat, Corlanor) patient a part of [facility] trial he is either taking Vericiguat versus placebo, pt will remain on study while pursuing treatment for his lung cancer  -Labs: Patient had labs drawn for study today, they will be available in media at a later date, Will continue to closely monitor for side effects of patient's high risk medication(s) including renal function, liver function NTproBNP/cardiac markers, and electrolytes as needed  -discussed importance of exercise and regular activity  -Discussed/reviewed maintaining a low Sodium and hydration recommendations  -now s/p ICD, denies shocks, last device check 1/18/2024  -moderate MR seen on echo: MR stable at this time, Will discuss referral to structural heart clinic for Mitraclip evaluation if EF or MR do not improve, at this time we will hold off on referral as patient euvolemic and no significant symptoms  -Reinforced s/sx of worsening heart failure with patient and weight monitoring. Pt verbalizes understanding. Pt to call office or RTC if present.    -PUMP line number [phone] (PUMP) reviewed with patient  -Heart Failure Education:   Discussed the Definition of heart failure (linking disease, symptoms, and treatment) and causes of heart failure  Recognition of escalating symptoms and concrete plan for response to particular symptoms  Risk modification for heart failure progression  Specific diet recommendations: Continue low-sodium diet and adequate hydration   discussed patient to Stay active, discussed with patient that he can start exercising as he tolerates  Importance of treatment adherence  Behavioral strategies to promote treatment adherence  Patient declines formal education at this time  -Advanced care planning: Advance directive and POLST to be discussed at future visit  -Pharmacotherapy Referral: Patient not referred to pharmacotherapy at this time  -Compliance Barriers: None   -Genetic testing Consideration:  none  -Consideration for LVAD and/or Heart transplant as necessary       PE:  -Continue Eliquis 5 mg twice a day     bilateral pleural effusion:  -s/p thoracentesis x3  -Will follow    Paroxysmal A-fib:  -Continue Eliquis 5 mg twice a day  -Continue metoprolol succinate 50 mg daily    Dyslipidemia:  -Recent LDL 92 on 6/2/2023  -Continue ezetimibe 10 mg daily    New onset diabetes:  -Recently started on metformin  -Followed by PCP    Recurrent lung cancer:  -Patient will be starting immunotherapy next week, he is followed by Dr. Schulz.    MICHELLE in clinic in 6 months, sooner if needed    Patient verbalizes understanding and agrees with the plan of care.     PLEASE NOTE: This Note was created using voice recognition Software. I have made every reasonable attempt to correct obvious errors, but I expect that there are errors of grammar and possibly content that I did not discover before finalizing the note

## 2024-03-20 NOTE — RESEARCH NOTE
Name: Pito Black   MRN: [id number]  Participant ID:  [id number]  : 1945  Visit Date/Time: 3/20/2024 10:30 AM      Study:    CHARLES [id number] - Charles [id number]   Status Consented/Enrolled (2023)   Active Start Date 23   Participant ID [id number]   Coordinator , Tonia Leonardo   NCT [id number]    Marcin Steven M.D.     Assessment and Plan:    Pito and his wife here for Charles study visit 5 week 24. Patient is being [facility] by [facility] for recurrence of his lung cancer with treatment planned to start next week. He reports no symptoms related to this.  Dr. Steven and Pito agreed he is ok to stay on study drug at this time.     No ROZINA or ECI reported.     DON Clark spoke with patient, reviewed vitals, assessed NYHA 2, and authorized keeping study medication dose at 10mg.     Study Drug Dispensed: 3 bottles with 66 pills in each  Component ID/ Lot Trace ID: [id number], [id number], [id number]     Study Drug Returned: Patient accidentally threw away empty pill bottle #[id number] & [id number]  He states he has not missed a dose of study medication and is taking them as directed with dinner.   Component ID: [id number]  Lot Trace ID: [id number]  Pills returned:   Last pill taken 24 at 7pm with dinner.    Vitals:  OMRON BP taken right arm with pt seated quietly for 10 mins starting at 10:36 am  Average: 116/69 Pulse 78    Vitals:    24 1046 24 1048 24 1050   BP: 112/70 117/68 119/70   BP Location: Right arm     Patient Position: Sitting     Pulse: 78 79 78   Weight: 65.3 kg (144 lb)         Labs:             Study labs drawn 10:55am. Processed and shipped today per protocol.  Ambient tracking #[id number]  Frozen tracking #[id number]    Meds:      Current Outpatient Medications   Medication Sig Dispense Refill    fluticasone-salmeterol (WIXELA INHUB) 250-50 MCG/ACT AEROSOL POWDER, BREATH ACTIVATED Inhale 1 Puff  every 12 hours. 1 Each 11    vericiguat or PLACEBO (STUDY DRUG) 10 MG tablet Take 1 Tablet by mouth every day. Participant ID #[id number] Tablet 11    apixaban (ELIQUIS) 5mg Tab Take 1 Tablet by mouth 2 times a day. 180 Tablet 3    metoprolol SR (TOPROL XL) 50 MG TABLET SR 24 HR Take 1 Tablet by mouth every day for 100 days. (Patient taking differently: Take 50 mg by mouth every morning.) 100 Tablet 3    dapagliflozin propanediol (FARXIGA) 10 MG Tab Take 1 tablet by mouth every day. 100 Tablet 3    ezetimibe (ZETIA) 10 MG Tab Take 1 Tablet by mouth every day. (Patient taking differently: Take 10 mg by mouth every morning.) 100 Tablet 3    spironolactone (ALDACTONE) 25 MG Tab Take 1 Tablet by mouth every day for 100 days. (Patient taking differently: Take 25 mg by mouth every morning.) 100 Tablet 3    metFORMIN ER (GLUCOPHAGE XR) 750 MG TABLET SR 24 HR Take 1 tablet by mouth every day. (Patient taking differently: Take  by mouth every evening.) 100 Tablet 3    cyclobenzaprine (FLEXERIL) 10 mg Tab Take 1 Tablet by mouth at bedtime. 100 Tablet 3    Cholecalciferol (VITAMIN D3) 2000 UNIT Cap Take 1 Capsule by mouth every day. 100 Capsule 3    diphenhydrAMINE-APAP, sleep, (TYLENOL PM EXTRA STRENGTH)  MG Tab Take 1-2 Tablets by mouth at bedtime as needed (Mild Pain or Trouble Sleeping).       No current facility-administered medications for this visit.    current meds      Follow-up: Visit 6 W48 on 9/9/24

## 2024-03-23 NOTE — PROGRESS NOTES
"Pharmacy Chemotherapy Calculations    Dx: NSCLC  Cycle 1  Previous treatment = s/p Durvalumab 10/2021-8/2022; Carbo/Taxol 7/2021-8/2021    Protocol: Pembrolizumab  Pembrolizumab 200 mg IV over 30 minutes   21-day cycle until DP/UT or until up to 24 months of therapy has been completed  Or  Pembrolizumab 400 mg IV over 30 minutes   42-day cycle until DP/UT or until up to 24 months of therapy has been completed  NCCN Guidelines for Non-Small Cell Lung Cancer V.1.2024.  Jeremyon EDDIE , et al. J Clin Oncol. 2019;37(28):7534-3429.  Navid M , et al. N Engl J Med. 2016;375(19):7233-6799.  Betty MARTINEZK , et al. Lancet. 2019;393(22809):0030-3157.  Wendi M , et al. Eur J Cancer. 2020;131:68-75.    Allergies:  Patient has no known allergies.       /64   Pulse 78   Temp 36.7 °C (98 °F) (Temporal)   Resp 18   Ht 1.68 m (5' 6.14\")   Wt 66 kg (145 lb 8.1 oz)   SpO2 98%   BMI 23.38 kg/m²  Body surface area is 1.75 meters squared.    Labs 3/26/24:  ANC~ 4720 Plt = 150k   Hgb = 15.4     SCr = 1.24 mg/dL CrCl ~ 45 mL/min   AST/ALT/AP = 24/25/58 TBili = 0.3  TSH/Free T4 = pending    Pembrolizumab 200 mg fixed dose   No calculation required, OK to treat with final dose = 200 mg    Flako Nash, PharmD  "

## 2024-03-25 NOTE — PROGRESS NOTES
"Pharmacy Chemotherapy Verification Note:    Patient Name: Pito Black      Dx: [facility]       Protocol: Pembrolizumab   21-day cycle until disease progression or unacceptable toxicity or until up to 24 months of therapy has been completed   Pembrolizumab 200 mg IV over 30 minutes on Day 1       *Dosing Reference*  NCCNGuidelines® for Non-Small Cell Lung Cancer V.1.2024.   Josy MORGAN , et al. J Clin Oncol. 2019;37(28):4704-8162.c   Navid M , et al. N Engl J Med. 2016;375(19):0551-3920.a   Betty MARTINEZK  et al. Lancet. 2019;393(43785):0002-7082.a 5. Wendi M , et al. Eur J Cancer. 2020;131:68-75.b    Allergies:  Patient has no known allergies.     /64   Pulse 78   Temp 36.7 °C (98 °F) (Temporal)   Resp 18   Ht 1.68 m (5' 6.14\")   Wt 66 kg (145 lb 8.1 oz)   SpO2 98%   BMI 23.38 kg/m²  Body surface area is 1.75 meters squared.    Labs 3/26/24:  ANC~ 4720 Plt = 150k   Hgb = 15.4     SCr = 1.24mg/dL CrCl ~ 44.97mL/min   AST/ALT/AP = 24/25/58 TBili = 0.3    TSH = ---   Free T4 = --- (RN to notify MD if out of range)             Drug Order   (Drug name, dose, route, IV Fluid & volume, frequency, number of doses) Cycle: 1      Previous treatment: NA     Medication = Pembrolizumab  Base Dose = 200 mg  Fixed Dose: no calculation needed  Final Dose = 200 mg  Route = IV  Fluid & Volume = NS 50 mL  Admin Duration = Over 30 minutes          Fixed dose, no calculation necessary       By my signature below, I confirm this process was performed independently with the BSA and all final chemotherapy dosing calculations congruent. I have reviewed the above chemotherapy order and that my calculation of the final dose and BSA (when applicable) corroborate those calculations of the  pharmacist.     Vilma Jurado, PharmD,    "

## 2024-03-26 ENCOUNTER — OUTPATIENT INFUSION SERVICES (OUTPATIENT)
Dept: ONCOLOGY | Facility: MEDICAL CENTER | Age: 79
End: 2024-03-26
Attending: INTERNAL MEDICINE
Payer: MEDICARE

## 2024-03-26 VITALS
SYSTOLIC BLOOD PRESSURE: 122 MMHG | BODY MASS INDEX: 23.38 KG/M2 | DIASTOLIC BLOOD PRESSURE: 64 MMHG | RESPIRATION RATE: 18 BRPM | HEIGHT: 66 IN | WEIGHT: 145.5 LBS | TEMPERATURE: 98 F | OXYGEN SATURATION: 98 % | HEART RATE: 78 BPM

## 2024-03-26 DIAGNOSIS — C34.11 PRIMARY CANCER OF RIGHT UPPER LOBE OF LUNG (HCC): ICD-10-CM

## 2024-03-26 LAB
ALBUMIN SERPL BCP-MCNC: 3.8 G/DL (ref 3.2–4.9)
ALBUMIN/GLOB SERPL: 1.2 G/DL
ALP SERPL-CCNC: 58 U/L (ref 30–99)
ALT SERPL-CCNC: 25 U/L (ref 2–50)
ANION GAP SERPL CALC-SCNC: 9 MMOL/L (ref 7–16)
AST SERPL-CCNC: 24 U/L (ref 12–45)
BASOPHILS # BLD AUTO: 0.3 % (ref 0–1.8)
BASOPHILS # BLD: 0.02 K/UL (ref 0–0.12)
BILIRUB SERPL-MCNC: 0.3 MG/DL (ref 0.1–1.5)
BUN SERPL-MCNC: 30 MG/DL (ref 8–22)
CALCIUM ALBUM COR SERPL-MCNC: 9.5 MG/DL (ref 8.5–10.5)
CALCIUM SERPL-MCNC: 9.3 MG/DL (ref 8.5–10.5)
CHLORIDE SERPL-SCNC: 105 MMOL/L (ref 96–112)
CO2 SERPL-SCNC: 26 MMOL/L (ref 20–33)
CREAT SERPL-MCNC: 1.24 MG/DL (ref 0.5–1.4)
EOSINOPHIL # BLD AUTO: 0.46 K/UL (ref 0–0.51)
EOSINOPHIL NFR BLD: 6.1 % (ref 0–6.9)
ERYTHROCYTE [DISTWIDTH] IN BLOOD BY AUTOMATED COUNT: 46.9 FL (ref 35.9–50)
GFR SERPLBLD CREATININE-BSD FMLA CKD-EPI: 59 ML/MIN/1.73 M 2
GLOBULIN SER CALC-MCNC: 3.3 G/DL (ref 1.9–3.5)
GLUCOSE SERPL-MCNC: 95 MG/DL (ref 65–99)
HCT VFR BLD AUTO: 44.4 % (ref 42–52)
HGB BLD-MCNC: 15.4 G/DL (ref 14–18)
IMM GRANULOCYTES # BLD AUTO: 0.03 K/UL (ref 0–0.11)
IMM GRANULOCYTES NFR BLD AUTO: 0.4 % (ref 0–0.9)
LYMPHOCYTES # BLD AUTO: 1.76 K/UL (ref 1–4.8)
LYMPHOCYTES NFR BLD: 23.5 % (ref 22–41)
MCH RBC QN AUTO: 32.8 PG (ref 27–33)
MCHC RBC AUTO-ENTMCNC: 34.7 G/DL (ref 32.3–36.5)
MCV RBC AUTO: 94.5 FL (ref 81.4–97.8)
MONOCYTES # BLD AUTO: 0.49 K/UL (ref 0–0.85)
MONOCYTES NFR BLD AUTO: 6.6 % (ref 0–13.4)
NEUTROPHILS # BLD AUTO: 4.72 K/UL (ref 1.82–7.42)
NEUTROPHILS NFR BLD: 63.1 % (ref 44–72)
NRBC # BLD AUTO: 0 K/UL
NRBC BLD-RTO: 0 /100 WBC (ref 0–0.2)
OUTPT INFUS CBC COMMENT OICOM: ABNORMAL
PLATELET # BLD AUTO: 150 K/UL (ref 164–446)
PMV BLD AUTO: 9.6 FL (ref 9–12.9)
POTASSIUM SERPL-SCNC: 4.4 MMOL/L (ref 3.6–5.5)
PROT SERPL-MCNC: 7.1 G/DL (ref 6–8.2)
RBC # BLD AUTO: 4.7 M/UL (ref 4.7–6.1)
SODIUM SERPL-SCNC: 140 MMOL/L (ref 135–145)
T4 FREE SERPL-MCNC: 1.17 NG/DL (ref 0.93–1.7)
TSH SERPL DL<=0.005 MIU/L-ACNC: 1.16 UIU/ML (ref 0.38–5.33)
WBC # BLD AUTO: 7.5 K/UL (ref 4.8–10.8)

## 2024-03-26 PROCEDURE — 84439 ASSAY OF FREE THYROXINE: CPT

## 2024-03-26 PROCEDURE — 84443 ASSAY THYROID STIM HORMONE: CPT

## 2024-03-26 PROCEDURE — 700111 HCHG RX REV CODE 636 W/ 250 OVERRIDE (IP): Mod: JZ | Performed by: INTERNAL MEDICINE

## 2024-03-26 PROCEDURE — 96413 CHEMO IV INFUSION 1 HR: CPT

## 2024-03-26 PROCEDURE — 85025 COMPLETE CBC W/AUTO DIFF WBC: CPT

## 2024-03-26 PROCEDURE — 80053 COMPREHEN METABOLIC PANEL: CPT

## 2024-03-26 PROCEDURE — 700105 HCHG RX REV CODE 258: Performed by: INTERNAL MEDICINE

## 2024-03-26 RX ADMIN — SODIUM CHLORIDE 200 MG: 9 INJECTION, SOLUTION INTRAVENOUS at 11:02

## 2024-03-26 ASSESSMENT — FIBROSIS 4 INDEX: FIB4 SCORE: 2.33

## 2024-03-26 ASSESSMENT — PAIN DESCRIPTION - PAIN TYPE: TYPE: CHRONIC PAIN

## 2024-03-26 NOTE — PROGRESS NOTES
Chemotherapy Verification - SECONDARY RN   C1 D1     Height = 168 cm  Weight = 66 kg  BSA = 1.75 m^2       Medication: pembrolizumab (KEYTRUDA)  Dose: 200 mg set dose  Calculated Dose: 200 mg set dose                             (In mg/m2, AUC, mg/kg)     I confirm that this process was performed independently.

## 2024-03-26 NOTE — PROGRESS NOTES
Pito arrives to [facility] accompanied by spouse for Day 1 Cycle 1 Keytruda for lung cancer.  Pito reports receiving previous chemotherapy regimen at [facility].      POC discussed, Pito verbalizes understanding and agreement.  Pito denies any new or acute changes.  PIV placed, brisk blood return observed.  Labs collected and reviewed, parameters met for treatment today.  Keytruda infused as ordered, Pito tolerated well.  PIV flushed and removed, gauze and coban to site.  Discharged in NAD, next appointment confirmed.

## 2024-03-26 NOTE — PROGRESS NOTES
Chemotherapy Verification - PRIMARY RN      Height = 168 cm  Weight = 66 kg  BSA = 1.75 m2       Medication: Keytruda  Dose: 200 mg (set dose)  Calculated Dose: 200 mg                             (In mg/m2, AUC, mg/kg)         I confirm this process was performed independently with the BSA and all final chemotherapy dosing calculations congruent.  Any discrepancies of 10% or greater have been addressed with the chemotherapy pharmacist. The resolution of the discrepancy has been documented in the EPIC progress notes.

## 2024-03-27 ENCOUNTER — NON-PROVIDER VISIT (OUTPATIENT)
Dept: CARDIOLOGY | Facility: MEDICAL CENTER | Age: 79
End: 2024-03-27
Payer: MEDICARE

## 2024-03-28 PROBLEM — N18.31 STAGE 3A CHRONIC KIDNEY DISEASE: Status: ACTIVE | Noted: 2021-06-28

## 2024-04-03 ENCOUNTER — TELEPHONE (OUTPATIENT)
Dept: HEALTH INFORMATION MANAGEMENT | Facility: OTHER | Age: 79
End: 2024-04-03
Payer: MEDICARE

## 2024-04-03 NOTE — TELEPHONE ENCOUNTER
Pt's wife Cydney said that Pito has too many appts scheduled.     CHRIS info was sent to pt via [facility]/ Cydney was agreed.

## 2024-04-08 ENCOUNTER — OFFICE VISIT (OUTPATIENT)
Dept: MEDICAL GROUP | Facility: PHYSICIAN GROUP | Age: 79
End: 2024-04-08
Payer: MEDICARE

## 2024-04-08 VITALS
HEIGHT: 66 IN | SYSTOLIC BLOOD PRESSURE: 118 MMHG | WEIGHT: 147.4 LBS | OXYGEN SATURATION: 95 % | HEART RATE: 72 BPM | DIASTOLIC BLOOD PRESSURE: 78 MMHG | TEMPERATURE: 96.6 F | BODY MASS INDEX: 23.69 KG/M2

## 2024-04-08 DIAGNOSIS — C34.32 PRIMARY CANCER OF LEFT LOWER LOBE OF LUNG (HCC): ICD-10-CM

## 2024-04-08 DIAGNOSIS — E11.69 HYPERLIPIDEMIA ASSOCIATED WITH TYPE 2 DIABETES MELLITUS (HCC): ICD-10-CM

## 2024-04-08 DIAGNOSIS — I50.22 CHRONIC SYSTOLIC HEART FAILURE (HCC): ICD-10-CM

## 2024-04-08 DIAGNOSIS — N18.31 TYPE 2 DIABETES MELLITUS WITH STAGE 3A CHRONIC KIDNEY DISEASE, WITHOUT LONG-TERM CURRENT USE OF INSULIN (HCC): ICD-10-CM

## 2024-04-08 DIAGNOSIS — C34.92 PRIMARY ADENOCARCINOMA OF LEFT LUNG (HCC): ICD-10-CM

## 2024-04-08 DIAGNOSIS — G47.00 INSOMNIA, UNSPECIFIED TYPE: ICD-10-CM

## 2024-04-08 DIAGNOSIS — E78.5 HYPERLIPIDEMIA ASSOCIATED WITH TYPE 2 DIABETES MELLITUS (HCC): ICD-10-CM

## 2024-04-08 DIAGNOSIS — E11.22 TYPE 2 DIABETES MELLITUS WITH STAGE 3A CHRONIC KIDNEY DISEASE, WITHOUT LONG-TERM CURRENT USE OF INSULIN (HCC): ICD-10-CM

## 2024-04-08 DIAGNOSIS — I15.2 HYPERTENSION ASSOCIATED WITH TYPE 2 DIABETES MELLITUS (HCC): ICD-10-CM

## 2024-04-08 DIAGNOSIS — E11.59 HYPERTENSION ASSOCIATED WITH TYPE 2 DIABETES MELLITUS (HCC): ICD-10-CM

## 2024-04-08 PROBLEM — D53.9 MACROCYTIC ANEMIA: Status: RESOLVED | Noted: 2023-02-19 | Resolved: 2024-04-08

## 2024-04-08 PROBLEM — J96.01 ACUTE HYPOXEMIC RESPIRATORY FAILURE (HCC): Status: RESOLVED | Noted: 2023-03-24 | Resolved: 2024-04-08

## 2024-04-08 PROBLEM — E87.70 VOLUME OVERLOAD: Status: RESOLVED | Noted: 2023-03-17 | Resolved: 2024-04-08

## 2024-04-08 PROCEDURE — 3078F DIAST BP <80 MM HG: CPT

## 2024-04-08 PROCEDURE — 99214 OFFICE O/P EST MOD 30 MIN: CPT

## 2024-04-08 PROCEDURE — 3074F SYST BP LT 130 MM HG: CPT

## 2024-04-08 RX ORDER — FLUTICASONE PROPIONATE AND SALMETEROL 250; 50 UG/1; UG/1
1 POWDER RESPIRATORY (INHALATION) EVERY 12 HOURS
Qty: 3 EACH | Refills: 3 | Status: SHIPPED | OUTPATIENT
Start: 2024-04-08

## 2024-04-08 ASSESSMENT — FIBROSIS 4 INDEX: FIB4 SCORE: 2.528

## 2024-04-08 ASSESSMENT — PATIENT HEALTH QUESTIONNAIRE - PHQ9: CLINICAL INTERPRETATION OF PHQ2 SCORE: 0

## 2024-04-08 NOTE — ASSESSMENT & PLAN NOTE
Chronic, improved echo 2/6/24. Denies sob, chest pain. Seeing cardiology   02/06/24 12:45   Left Ventrical Ejection Fraction 45   EC-ECHOCARDIOGRAM COMPLETE W/O CONT Rpt   Rpt: View report in Results Review for more information    Continue spironolactone 25 mg daily, metoprolol 50 mg daily, zetia 10 mg daily, farxiga 10 mg daily, eliquis 5 mg daily.

## 2024-04-08 NOTE — PROGRESS NOTES
"Subjective:     CC: Diagnoses of Chronic systolic heart failure (HCC), Hyperlipidemia associated with type 2 diabetes mellitus (HCC), Primary cancer of left lower lobe of lung (HCC), Hypertension associated with type 2 diabetes mellitus (HCC), Insomnia, unspecified type, Type 2 diabetes mellitus with stage 3a chronic kidney disease, without long-term current use of insulin (HCC), and Primary adenocarcinoma of left lung (HCC) were pertinent to this visit.    HPI:   Pito presents today with    Problem   Insomnia   Type 2 Diabetes Mellitus With Chronic Kidney Disease, Without Long-Term Current Use of Insulin (Hcc)    This is a chronic condition.  Current medications:  Insulin:   Biguanide: metformin er 750 mg daily  GLP1-RA:    SGLT-2i:    farxiga 10 mg   DPP4-I:   TZD:   Pk:  Sulfonyluria:     Last A1c: 6.0% 12/23/23;   6.5% 6/2/23  Last Microalb/Cr ratio: 6/2/23  Fasting sugars:  Last diabetic foot exam: 6/19/23  Last retinal eye exam: 10/12/23  ACEi/ARB?   Statin? zetia 10 mg daily  Aspirin? eliquis 5 mg BID  Concomitant HTN? Spironolactone 25 mg daily; metoprolol sr 50 mg daily  Nightly foot checks? encouraged       Chronic Systolic Heart Failure (Hcc)   Primary Lung Adenocarcinoma (Hcc)   Hyperlipidemia Associated With Type 2 Diabetes Mellitus (Hcc)   Hypertension Associated With Type 2 Diabetes Mellitus (Hcc)   Acute Hypoxemic Respiratory Failure (Hcc) (Resolved)   Volume Overload (Resolved)   Macrocytic Anemia (Resolved)   Dysfunction of Eustachian Tube (Resolved)     ROS:  ROS    Objective:     Exam:  /78 (BP Location: Right arm, Patient Position: Sitting, BP Cuff Size: Adult)   Pulse 72   Temp 35.9 °C (96.6 °F) (Temporal)   Ht 1.676 m (5' 6\")   Wt 66.9 kg (147 lb 6.4 oz)   SpO2 95%   BMI 23.79 kg/m²  Body mass index is 23.79 kg/m².    Physical Exam    Labs:    Latest Reference Range & Units 03/26/24 09:45   WBC 4.8 - 10.8 K/uL 7.5   RBC 4.70 - 6.10 M/uL 4.70   Hemoglobin 14.0 - 18.0 g/dL 15.4 "   Hematocrit 42.0 - 52.0 % 44.4   MCV 81.4 - 97.8 fL 94.5   MCH 27.0 - 33.0 pg 32.8   MCHC 32.3 - 36.5 g/dL 34.7   RDW 35.9 - 50.0 fL 46.9   Platelet Count 164 - 446 K/uL 150 (L)   MPV 9.0 - 12.9 fL 9.6   Neutrophils-Polys 44.00 - 72.00 % 63.10   Neutrophils (Absolute) 1.82 - 7.42 K/uL 4.72   Lymphocytes 22.00 - 41.00 % 23.50   Lymphs (Absolute) 1.00 - 4.80 K/uL 1.76   Monocytes 0.00 - 13.40 % 6.60   Monos (Absolute) 0.00 - 0.85 K/uL 0.49   Eosinophils 0.00 - 6.90 % 6.10   Eos (Absolute) 0.00 - 0.51 K/uL 0.46   Basophils 0.00 - 1.80 % 0.30   Baso (Absolute) 0.00 - 0.12 K/uL 0.02   Immature Granulocytes 0.00 - 0.90 % 0.40   Immature Granulocytes (abs) 0.00 - 0.11 K/uL 0.03   Nucleated RBC 0.00 - 0.20 /100 WBC 0.00   NRBC (Absolute) K/uL 0.00   Outpt Infus CBC Comment  see below   Sodium 135 - 145 mmol/L 140   Potassium 3.6 - 5.5 mmol/L 4.4   Chloride 96 - 112 mmol/L 105   Co2 20 - 33 mmol/L 26   Anion Gap 7.0 - 16.0  9.0   Glucose 65 - 99 mg/dL 95   Bun 8 - 22 mg/dL 30 (H)   Creatinine 0.50 - 1.40 mg/dL 1.24   GFR (CKD-EPI) >60 mL/min/1.73 m 2 59 !   Calcium 8.5 - 10.5 mg/dL 9.3   Correct Calcium 8.5 - 10.5 mg/dL 9.5   AST(SGOT) 12 - 45 U/L 24   ALT(SGPT) 2 - 50 U/L 25   Alkaline Phosphatase 30 - 99 U/L 58   Total Bilirubin 0.1 - 1.5 mg/dL 0.3   Albumin 3.2 - 4.9 g/dL 3.8   Total Protein 6.0 - 8.2 g/dL 7.1   Globulin 1.9 - 3.5 g/dL 3.3   A-G Ratio g/dL 1.2   TSH 0.380 - 5.330 uIU/mL 1.160   Free T-4 0.93 - 1.70 ng/dL 1.17   (L): Data is abnormally low  (H): Data is abnormally high  !: Data is abnormal    Assessment & Plan:     79 y.o. male with the following -     Problem List Items Addressed This Visit       Hypertension associated with type 2 diabetes mellitus (HCC)     Chronic, stable. Continue metoprolol xl 50 mg dialy.         Hyperlipidemia associated with type 2 diabetes mellitus (HCC)     Chronic, stable. Continue          Primary cancer of left lower lobe of lung (HCC)    Relevant Medications     fluticasone-salmeterol (WIXELA INHUB) 250-50 MCG/ACT AEROSOL POWDER, BREATH ACTIVATED    Primary lung adenocarcinoma (HCC)     Chronic, seeing oncology/pulmonology with infusions per oncology orders. F/u monthly after every treatment.         Chronic systolic heart failure (HCC)     Chronic, improved echo 2/6/24. Denies sob, chest pain. Seeing cardiology   02/06/24 12:45   Left Ventrical Ejection Fraction 45   EC-ECHOCARDIOGRAM COMPLETE W/O CONT Rpt   Rpt: View report in Results Review for more information    Continue spironolactone 25 mg daily, metoprolol 50 mg daily, zetia 10 mg daily, farxiga 10 mg daily, eliquis 5 mg daily.           Type 2 diabetes mellitus with chronic kidney disease, without long-term current use of insulin (HCC)     Chronic, stable. Continue metformin  mg daily, continue farxiga 10 mg daily.         Insomnia     Chronic, stable. Continue tylenol pm as needed.          Patient was educated in proper administration of medication(s) ordered today including safety, possible SE, risks, benefits, rationale and alternatives to therapy.   Supportive care, differential diagnoses, and indications for immediate follow-up discussed with patient.    Pathogenesis of diagnosis discussed including typical length and natural progression.    Instructed to return to clinic or nearest emergency department for any change in condition, further concerns, or worsening of symptoms.  Patient states understanding of the plan of care and discharge instructions.    Return in about 6 months (around 10/8/2024) for wellness.    Please note that this dictation was created using voice recognition software. I have made every reasonable attempt to correct obvious errors, but I expect that there are errors of grammar and possibly content that I did not discover before finalizing the note.

## 2024-04-08 NOTE — ASSESSMENT & PLAN NOTE
Chronic, seeing oncology/pulmonology with infusions per oncology orders. F/u monthly after every treatment.

## 2024-04-08 NOTE — ASSESSMENT & PLAN NOTE
Chronic, managed by Dr. Ruiz [facility].   
Chronic, managed by cardiology. Continue metoprolol 30 mg daily  
Chronic, stable continue zetia 10 mg daily   
Chronic, stable. Continue flexeril 10 mg daily.  
Chronic, stable. Taking flexeril 10 mg nightly for chronic back pain.   
Detail Level: Zone
Plan: IL TAC 10 mg/mL (3 cc into large patch/20 injections of mid lower back)
Continue Regimen: SC Skyrizi 150mg q 12 weeks
Render In Strict Bullet Format?: No

## 2024-04-09 ENCOUNTER — TELEPHONE (OUTPATIENT)
Dept: VASCULAR LAB | Facility: MEDICAL CENTER | Age: 79
End: 2024-04-09
Payer: MEDICARE

## 2024-04-09 PROCEDURE — RXMED WILLOW AMBULATORY MEDICATION CHARGE: Performed by: INTERNAL MEDICINE

## 2024-04-09 PROCEDURE — RXMED WILLOW AMBULATORY MEDICATION CHARGE

## 2024-04-09 NOTE — TELEPHONE ENCOUNTER
Patient Phone Number: [phone]   Medication: FARXIGA 10MG TABLETS (Quantity 100, Day Supply 100)  Copay: $ 341  Household size:2  Annual Household Adjusted Gross Income: $60,000  Additional information/consent to FA: YES       BRUCE Corrales, PhT  Vascular Pharmacy Liaison (Rx Coordinator)  P: [phone]  4/9/2024 2:45 PM

## 2024-04-09 NOTE — TELEPHONE ENCOUNTER
Pt's wife called requesting to fill her and Pt's medication as they are running low with most of their maintenance medications. Pt has an existing refill for Farxiga and it appears that his copay is way higher from the last time it was filled. Obtained verbal consent from the Pt and Pt's wife to initiate a enrollment process for the financial assistance.     BRUCE Corrales, PhT  Vascular Pharmacy Liaison (Rx Coordinator)  P: [phone]  4/9/2024 1:59 PM

## 2024-04-09 NOTE — TELEPHONE ENCOUNTER
Contact:  Phone number:[phone] (mobile)    Name of person spoken with and relationship to patient: CYDNEY, WIFE    Patient’s Adherence:  How patient is doing on medication: Very Well    How many missed doses and reason: 0 N/A    Any new medications: no    Any new conditions: no    Any new allergies: no    Any new side effects: no    Any new diagnoses: no    How many doses remainin (FARXIGA) ; 16 (METOPROLOL) ; 8 (SPIRONOLACTONE)     Did patient want to speak with pharmacist: No   Delivery:  Delivery date and method: 04/10/2024 via USPS    Needs by Date: 2024 (FARXIGA) ; 2024 (METOPROLOL) ; 2024 (SPIRONOLACTONE)    Signature required: No     Any additional details for : LEAVE INSIDE THE MAILBOX    Teach Appointment Date:  2023   Shipping Address:  [address] [place], [place] [id number]   Medication(name,strength and dose):  FARXIGA 10MG TABLETS ; METOPROLOL SR 50MG TB24 ; SPIRONOLACTONE 25MG TABLETS    Copay:  $16.27    Payment Method:  Credit card on file   Supplies:  N/A   Additional Information:  Pt's wife, Cydney called requesting to get Pt's medications filled. Ran a test claim for the Farxiga medication and it appears that pt has a high copay of $341/100DS. Offered financial assistance and pt's wife authorized the consent on behalf of the pt. Obtained pt and pt's wife total income. Pt has been approved for the financial lilliam for the full calendar year. Pt's wife thanked me for all the help. Pt's wife agreed to include pt's medication to be delivered via USPS alongside with her medication. Updated pt's refill call date for all medications: 2024 (metoprolol) ; 2024 (farxiga/spironolactone)       Stacy Pierre, BRUCE, PhT  Vascular Pharmacy Liaison (Rx Coordinator)  P: [phone]  2024 3:24 PM

## 2024-04-10 ENCOUNTER — PHARMACY VISIT (OUTPATIENT)
Dept: PHARMACY | Facility: MEDICAL CENTER | Age: 79
End: 2024-04-10
Payer: COMMERCIAL

## 2024-04-16 ENCOUNTER — APPOINTMENT (OUTPATIENT)
Dept: ONCOLOGY | Facility: MEDICAL CENTER | Age: 79
End: 2024-04-16
Payer: MEDICARE

## 2024-04-16 ENCOUNTER — PATIENT OUTREACH (OUTPATIENT)
Dept: HEALTH INFORMATION MANAGEMENT | Facility: OTHER | Age: 79
End: 2024-04-16
Payer: MEDICARE

## 2024-04-16 DIAGNOSIS — N18.31 STAGE 3A CHRONIC KIDNEY DISEASE: ICD-10-CM

## 2024-04-16 DIAGNOSIS — E11.22 TYPE 2 DIABETES MELLITUS WITH STAGE 3A CHRONIC KIDNEY DISEASE, WITHOUT LONG-TERM CURRENT USE OF INSULIN (HCC): ICD-10-CM

## 2024-04-16 DIAGNOSIS — I15.2 HYPERTENSION ASSOCIATED WITH TYPE 2 DIABETES MELLITUS (HCC): ICD-10-CM

## 2024-04-16 DIAGNOSIS — E11.59 HYPERTENSION ASSOCIATED WITH TYPE 2 DIABETES MELLITUS (HCC): ICD-10-CM

## 2024-04-16 DIAGNOSIS — N18.31 TYPE 2 DIABETES MELLITUS WITH STAGE 3A CHRONIC KIDNEY DISEASE, WITHOUT LONG-TERM CURRENT USE OF INSULIN (HCC): ICD-10-CM

## 2024-04-16 NOTE — PROGRESS NOTES
"Assessment/ Education: Spoke with Cydney today for monthly CCM outreach. Cydney reports Pito is doing well, he is staying positive. Cydney is concerned over the \"facility fee\" they will be charged for Pito's infusions, Cydney said she was told the charge would be 20%, but she doesn't know what the 20% is of? She plans on calling [facility] and questioning how much the charge might be?   The [facility] paperwork was completed for their daughter, daughter and son have been very supportive.   [facility] [employer] for the next year.   Pito's last HgbA1c was 6.0 on 12/23/23. His last GFR lab was on 3/26/24 with results of 59. His blood pressure in office has been 96/52 (3/20/24), 118/78 (4/8/24) and 110/56 (2/27/24). No other concerns or questions.     Plan of Care and Goals: Continue current    Barriers: actively receiving cancer treatment    Progress: progressing well, will be ready for discharge next month barring no set-backs    Next outreach: 5/16/2024                           "

## 2024-04-17 NOTE — RESEARCH NOTE
Name: Pito Black   MRN: [id number]  : 1945    Study:    CHARLES [id number] - Charles [id number]   Status Consented/Enrolled (2023)   Active Start Date 23   Participant ID [id number]   Coordinator Stefanie Wade Bridget I   NCT [id number]    Marcin Steven M.D.     Notified today 3/5/2024 of Pito's recurrence of Lung CA. Dr. Steven informed and he filled out AE log stating grade of 2 and not related to study medication.

## 2024-04-18 ENCOUNTER — NON-PROVIDER VISIT (OUTPATIENT)
Dept: CARDIOLOGY | Facility: MEDICAL CENTER | Age: 79
End: 2024-04-18
Payer: MEDICARE

## 2024-04-18 ENCOUNTER — NON-PROVIDER VISIT (OUTPATIENT)
Dept: CARDIOLOGY | Facility: MEDICAL CENTER | Age: 79
End: 2024-04-18

## 2024-04-18 DIAGNOSIS — I50.22 CHRONIC SYSTOLIC HEART FAILURE (HCC): ICD-10-CM

## 2024-04-18 DIAGNOSIS — Z95.810 AICD (AUTOMATIC CARDIOVERTER/DEFIBRILLATOR) PRESENT: ICD-10-CM

## 2024-04-18 PROCEDURE — 93282 PRGRMG EVAL IMPLANTABLE DFB: CPT | Performed by: INTERNAL MEDICINE

## 2024-04-18 NOTE — PROGRESS NOTES
Wound site is healing well. Pt advised to watch for increased redness, swelling, oozing or fever. Pt verbalized understanding. Normal device function and no changes made. See flowsheet.

## 2024-04-19 ENCOUNTER — OUTPATIENT INFUSION SERVICES (OUTPATIENT)
Dept: ONCOLOGY | Facility: MEDICAL CENTER | Age: 79
End: 2024-04-19
Attending: INTERNAL MEDICINE
Payer: MEDICARE

## 2024-04-19 ENCOUNTER — HOSPITAL ENCOUNTER (OUTPATIENT)
Dept: LAB | Facility: MEDICAL CENTER | Age: 79
End: 2024-04-19
Attending: INTERNAL MEDICINE
Payer: MEDICARE

## 2024-04-19 VITALS
TEMPERATURE: 97.8 F | DIASTOLIC BLOOD PRESSURE: 68 MMHG | RESPIRATION RATE: 18 BRPM | OXYGEN SATURATION: 96 % | WEIGHT: 142.42 LBS | HEART RATE: 89 BPM | SYSTOLIC BLOOD PRESSURE: 118 MMHG | HEIGHT: 66 IN | BODY MASS INDEX: 22.89 KG/M2

## 2024-04-19 DIAGNOSIS — C34.11 PRIMARY CANCER OF RIGHT UPPER LOBE OF LUNG (HCC): ICD-10-CM

## 2024-04-19 LAB
ALBUMIN SERPL BCP-MCNC: 4.2 G/DL (ref 3.2–4.9)
ALBUMIN/GLOB SERPL: 1.1 G/DL
ALP SERPL-CCNC: 63 U/L (ref 30–99)
ALT SERPL-CCNC: 21 U/L (ref 2–50)
ANION GAP SERPL CALC-SCNC: 13 MMOL/L (ref 7–16)
AST SERPL-CCNC: 28 U/L (ref 12–45)
BASOPHILS # BLD AUTO: 0.4 % (ref 0–1.8)
BASOPHILS # BLD: 0.04 K/UL (ref 0–0.12)
BILIRUB SERPL-MCNC: 0.4 MG/DL (ref 0.1–1.5)
BUN SERPL-MCNC: 29 MG/DL (ref 8–22)
CALCIUM ALBUM COR SERPL-MCNC: 9.3 MG/DL (ref 8.5–10.5)
CALCIUM SERPL-MCNC: 9.5 MG/DL (ref 8.5–10.5)
CHLORIDE SERPL-SCNC: 103 MMOL/L (ref 96–112)
CO2 SERPL-SCNC: 22 MMOL/L (ref 20–33)
CREAT SERPL-MCNC: 1.24 MG/DL (ref 0.5–1.4)
EOSINOPHIL # BLD AUTO: 0.62 K/UL (ref 0–0.51)
EOSINOPHIL NFR BLD: 6.3 % (ref 0–6.9)
ERYTHROCYTE [DISTWIDTH] IN BLOOD BY AUTOMATED COUNT: 48.7 FL (ref 35.9–50)
GFR SERPLBLD CREATININE-BSD FMLA CKD-EPI: 59 ML/MIN/1.73 M 2
GLOBULIN SER CALC-MCNC: 3.7 G/DL (ref 1.9–3.5)
GLUCOSE SERPL-MCNC: 129 MG/DL (ref 65–99)
HCT VFR BLD AUTO: 48.9 % (ref 42–52)
HGB BLD-MCNC: 16.1 G/DL (ref 14–18)
IMM GRANULOCYTES # BLD AUTO: 0.04 K/UL (ref 0–0.11)
IMM GRANULOCYTES NFR BLD AUTO: 0.4 % (ref 0–0.9)
LYMPHOCYTES # BLD AUTO: 2.77 K/UL (ref 1–4.8)
LYMPHOCYTES NFR BLD: 28 % (ref 22–41)
MCH RBC QN AUTO: 31.5 PG (ref 27–33)
MCHC RBC AUTO-ENTMCNC: 32.9 G/DL (ref 32.3–36.5)
MCV RBC AUTO: 95.7 FL (ref 81.4–97.8)
MONOCYTES # BLD AUTO: 0.72 K/UL (ref 0–0.85)
MONOCYTES NFR BLD AUTO: 7.3 % (ref 0–13.4)
NEUTROPHILS # BLD AUTO: 5.71 K/UL (ref 1.82–7.42)
NEUTROPHILS NFR BLD: 57.6 % (ref 44–72)
NRBC # BLD AUTO: 0 K/UL
NRBC BLD-RTO: 0 /100 WBC (ref 0–0.2)
PLATELET # BLD AUTO: 153 K/UL (ref 164–446)
PMV BLD AUTO: 9.8 FL (ref 9–12.9)
POTASSIUM SERPL-SCNC: 4.3 MMOL/L (ref 3.6–5.5)
PROT SERPL-MCNC: 7.9 G/DL (ref 6–8.2)
RBC # BLD AUTO: 5.11 M/UL (ref 4.7–6.1)
SODIUM SERPL-SCNC: 138 MMOL/L (ref 135–145)
TSH SERPL DL<=0.005 MIU/L-ACNC: 0.99 UIU/ML (ref 0.38–5.33)
WBC # BLD AUTO: 9.9 K/UL (ref 4.8–10.8)

## 2024-04-19 PROCEDURE — 700105 HCHG RX REV CODE 258: Performed by: INTERNAL MEDICINE

## 2024-04-19 PROCEDURE — 700105 HCHG RX REV CODE 258

## 2024-04-19 PROCEDURE — 84443 ASSAY THYROID STIM HORMONE: CPT

## 2024-04-19 PROCEDURE — 700111 HCHG RX REV CODE 636 W/ 250 OVERRIDE (IP)

## 2024-04-19 PROCEDURE — 96413 CHEMO IV INFUSION 1 HR: CPT

## 2024-04-19 PROCEDURE — 85025 COMPLETE CBC W/AUTO DIFF WBC: CPT

## 2024-04-19 PROCEDURE — 36415 COLL VENOUS BLD VENIPUNCTURE: CPT

## 2024-04-19 PROCEDURE — 80053 COMPREHEN METABOLIC PANEL: CPT

## 2024-04-19 RX ADMIN — SODIUM CHLORIDE 200 MG: 9 INJECTION, SOLUTION INTRAVENOUS at 16:00

## 2024-04-19 ASSESSMENT — FIBROSIS 4 INDEX: FIB4 SCORE: 3.15

## 2024-04-19 NOTE — PROGRESS NOTES
"Pharmacy Chemotherapy Calculations    Dx: NSCLC  Cycle 2  Previous treatment = C1 on 3/26/24    Protocol: Pembrolizumab  Pembrolizumab 200 mg IV over 30 minutes   21-day cycle until DP/UT or until up to 24 months of therapy has been completed  Or  Pembrolizumab 400 mg IV over 30 minutes   42-day cycle until DP/UT or until up to 24 months of therapy has been completed  NCCN Guidelines for Non-Small Cell Lung Cancer V.1.2024.  Garon EB , et al. J Clin Oncol. 2019;37(28):7544-1119.  Navid M , et al. N Engl J Med. 2016;375(19):9068-8023.  Betty TSK , et al. Lancet. 2019;393(09536):1340-8093.  Wendi M , et al. Eur J Cancer. 2020;131:68-75.    Allergies:  Patient has no known allergies.       /68   Pulse 89   Temp 36.6 °C (97.8 °F) (Temporal)   Resp 18   Ht 1.68 m (5' 6.14\")   Wt 64.6 kg (142 lb 6.7 oz)   SpO2 96%   BMI 22.89 kg/m²  Body surface area is 1.74 meters squared.    Labs 4/19/24  ANC~ 5710 Plt = 153k   Hgb = 16.1     SCr = 1.24 mg/dL CrCl ~ 44 mL/min   LFT's = WNLs  TBili = 0.4   TSH = 0.99    Pembrolizumab 200 mg fixed dose   No calculation required, OK to treat with final dose = 200 mg IV    Arden Perez, PharmD  "

## 2024-04-19 NOTE — PROGRESS NOTES
Pt presents to [facility] for pembrolizumab. Established PIV in L forearm; brisk blood return observed and pt tolerated well. Labs previously drawn and within treatable parameters. Pembrolizumab infused with no s/s of adverse reactions. PIV flushed and brisk blood return observed before removing; gauze/coband dressing applied. Next appointment confirmed and education provided. Pt discharged to self care with all personal belongings and in NAD.

## 2024-04-19 NOTE — PROGRESS NOTES
Chemotherapy Verification - PRIMARY RN      Height = 1.68 m  Weight = 64.6 kg  BSA = 1.74 m^2       Medication: pembrolizumab  Dose: 200 mg (set dose)  Calculated Dose: 200 mg (set dose)                             (In mg/m2, AUC, mg/kg)     I confirm this process was performed independently with the BSA and all final chemotherapy dosing calculations congruent.  Any discrepancies of 10% or greater have been addressed with the chemotherapy pharmacist. The resolution of the discrepancy has been documented in the EPIC progress notes.

## 2024-04-19 NOTE — PROGRESS NOTES
Chemotherapy Verification - SECONDARY RN   C2 D1     Height = 168 cm  Weight = 64.6 kg  BSA = 1.74 m^2       Medication: pembrolizumab (KEYTRUDA)  Dose: 200 mg set dose  Calculated Dose: 200 mg set dose                             (In mg/m2, AUC, mg/kg)     I confirm that this process was performed independently.

## 2024-04-19 NOTE — PROGRESS NOTES
"Pharmacy Chemotherapy Verification:    Dx: NSCLC         Protocol: Pembrolizumab   *Dosing Reference*  Pembrolizumab 200 mg IV over 30 minutes on Day 1  21-day cycle until disease progression or unacceptable toxicity or until up to 24 months of therapy has been completed        NCCN Guidelines® for Non-Small Cell Lung Cancer V.1.2024.   Josy MORGAN et al. J Clin Oncol. 2019;37(28):7220-5926.c   Navid M, et al. N Engl J Med. 2016;375(19):7910-6176.a   Betty BURNETT et al. Lancet. 2019;393(90219):0252-1981.a   Wendi M, et al. Eur J Cancer. 2020;131:68-75.b    Allergies:  Patient has no known allergies.     /68   Pulse 89   Temp 36.6 °C (97.8 °F) (Temporal)   Resp 18   Ht 1.68 m (5' 6.14\")   Wt 64.6 kg (142 lb 6.7 oz)   SpO2 96%   BMI 22.89 kg/m²  Body surface area is 1.74 meters squared.    Labs 4/19/24:  ANC~ 5710 Plt = 153k   Hgb = 16.1     SCr = 1.24 mg/dL CrCl ~ 43.9 mL/min   AST/ALT/AP = 28/21/63 TBili = 0.4   TSH = 0.990      Drug Order   (Drug name, dose, route, IV Fluid & volume, frequency, number of doses) Cycle 2      Previous treatment: C1 3/26/24     Medication = Pembrolizumab  Base Dose = 200 mg  Fixed Dose: no calc needed  Final Dose = 200 mg  Route = IV  Fluid & Volume = NS 50 mL  Admin Duration = Over 30 minutes   Patient Assistance Medication       Fixed dose, ok to treat with final dose       By my signature below, I confirm this process was performed independently with the BSA and all final chemotherapy dosing calculations congruent. I have reviewed the above chemotherapy order and that my calculation of the final dose and BSA (when applicable) corroborate those calculations of the  pharmacist.     Geovanna Raman, PatienceD  "

## 2024-04-22 ENCOUNTER — PHARMACY VISIT (OUTPATIENT)
Dept: PHARMACY | Facility: MEDICAL CENTER | Age: 79
End: 2024-04-22
Payer: COMMERCIAL

## 2024-04-22 PROCEDURE — RXMED WILLOW AMBULATORY MEDICATION CHARGE

## 2024-04-22 PROCEDURE — RXMED WILLOW AMBULATORY MEDICATION CHARGE: Performed by: DENTIST

## 2024-04-22 NOTE — TELEPHONE ENCOUNTER
Contact:  Phone number:[phone] (mobile)    Name of person spoken with and relationship to patient: CYDNEY, WIFE   Patient’s Adherence:  How patient is doing on medication: Very Well    How many missed doses and reason: 0 N/A    Any new medications: no    Any new conditions: no    Any new allergies: no    Any new side effects: no    Any new diagnoses: no    How many doses remainin (METFORMIN)     Did patient want to speak with pharmacist: No   Delivery:  Delivery date and method: 2024 via     Needs by Date: 2024    Signature required: No     Any additional details for :  leave at front door    Teach Appointment Date:  2023   Shipping Address:  [address] [place] [id number]   Medication(name,strength and dose):  AMOXICILLIN 500MG CAPSULES ; METFORMIN ER 750MG TB24   Copay:  $1.82   Payment Method:  Credit card on file   Supplies:  N/A   Additional Information:  Received a voicemail from pt's wife on  that she has some couple questions about two medications. Called and spoke to Pt's wife, Cydney regarding with the phone call. Per Cydney, she would like to request to refill pt's metformin as he is down to 7 tablets on hand. She also mentioned that pt's dentist sent over a medication for Amoxicillin, and never heard anything from the pharmacy whether it was filled or not. Informed pt's wife that I manually filled pt's medications and will be delivered via  today. When asked pt's wife when would the pt need the medication for Amoxicillin, she said pt would need it on . Informed pt's wife that we will be sending both medications via  for today, . Pt's wife verbalized understanding. Updated pt's next refill call for .      BRUCE Corrales, PhT  Vascular Pharmacy Liaison (Rx Coordinator)  P: [phone]  2024 10:44 AM

## 2024-05-05 NOTE — PROGRESS NOTES
"Pharmacy Chemotherapy Verification:    Dx: NSCLC         Protocol: Pembrolizumab   *Dosing Reference*  Pembrolizumab 200 mg IV over 30 minutes on Day 1  21-day cycle until disease progression or unacceptable toxicity or until up to 24 months of therapy has been completed      NCCN Guidelines® for Non-Small Cell Lung Cancer V.1.2024.   Josy MORGAN et al. J Clin Oncol. 2019;37(28):6291-3191.c   Navid M, et al. N Engl J Med. 2016;375(19):6207-9634.a   Betty BURNETT et al. Lancet. 2019;393(56701):3888-5386.a   Wendi M, et al. Eur J Cancer. 2020;131:68-75.b    Allergies:  Patient has no known allergies.     /61   Pulse 81   Temp 36.6 °C (97.8 °F) (Temporal)   Resp 17   Ht 1.7 m (5' 6.93\")   Wt 67.2 kg (148 lb 2.4 oz)   SpO2 99%   BMI 23.25 kg/m²  Body surface area is 1.78 meters squared.    All labs (58/24) and thyroid panel within treatment plan parameters.      Drug Order   (Drug name, dose, route, IV Fluid & volume, frequency, number of doses) Cycle 3  Previous treatment: C2 on 4/19/24     Medication = Pembrolizumab (Keytruda)  Base Dose = 200 mg  Fixed Dose: no calc needed  Final Dose = 200 mg  Route = IV  Fluid & Volume = NS 50 mL  Admin Duration = Over 30 minutes   Patient Assistance Medication       No calculation required, okay to treat with final dose     By my signature below, I confirm this process was performed independently with the BSA and all final chemotherapy dosing calculations congruent. I have reviewed the above chemotherapy order and that my calculation of the final dose and BSA (when applicable) corroborate those calculations of the  pharmacist.     Marlin Hudson, PharmD  "

## 2024-05-07 ENCOUNTER — APPOINTMENT (OUTPATIENT)
Dept: ONCOLOGY | Facility: MEDICAL CENTER | Age: 79
End: 2024-05-07
Payer: MEDICARE

## 2024-05-08 ENCOUNTER — OFFICE VISIT (OUTPATIENT)
Dept: SLEEP MEDICINE | Facility: MEDICAL CENTER | Age: 79
End: 2024-05-08
Attending: INTERNAL MEDICINE
Payer: MEDICARE

## 2024-05-08 ENCOUNTER — HOSPITAL ENCOUNTER (OUTPATIENT)
Dept: LAB | Facility: MEDICAL CENTER | Age: 79
End: 2024-05-08
Attending: INTERNAL MEDICINE
Payer: MEDICARE

## 2024-05-08 VITALS
HEART RATE: 85 BPM | RESPIRATION RATE: 16 BRPM | HEIGHT: 66 IN | SYSTOLIC BLOOD PRESSURE: 112 MMHG | OXYGEN SATURATION: 96 % | DIASTOLIC BLOOD PRESSURE: 67 MMHG | BODY MASS INDEX: 23.63 KG/M2 | WEIGHT: 147 LBS

## 2024-05-08 DIAGNOSIS — C34.92 PRIMARY ADENOCARCINOMA OF LEFT LUNG (HCC): ICD-10-CM

## 2024-05-08 DIAGNOSIS — R06.09 DYSPNEA ON EXERTION: ICD-10-CM

## 2024-05-08 DIAGNOSIS — I26.99 PULMONARY EMBOLISM ON RIGHT (HCC): ICD-10-CM

## 2024-05-08 DIAGNOSIS — J90 PLEURAL EFFUSION, LEFT: ICD-10-CM

## 2024-05-08 DIAGNOSIS — I50.22 CHRONIC SYSTOLIC HEART FAILURE (HCC): ICD-10-CM

## 2024-05-08 LAB
ALBUMIN SERPL BCP-MCNC: 4 G/DL (ref 3.2–4.9)
ALBUMIN/GLOB SERPL: 1.2 G/DL
ALP SERPL-CCNC: 66 U/L (ref 30–99)
ALT SERPL-CCNC: 26 U/L (ref 2–50)
ANION GAP SERPL CALC-SCNC: 11 MMOL/L (ref 7–16)
AST SERPL-CCNC: 26 U/L (ref 12–45)
BASOPHILS # BLD AUTO: 0.4 % (ref 0–1.8)
BASOPHILS # BLD: 0.04 K/UL (ref 0–0.12)
BILIRUB SERPL-MCNC: 0.4 MG/DL (ref 0.1–1.5)
BUN SERPL-MCNC: 27 MG/DL (ref 8–22)
CALCIUM ALBUM COR SERPL-MCNC: 9.4 MG/DL (ref 8.5–10.5)
CALCIUM SERPL-MCNC: 9.4 MG/DL (ref 8.5–10.5)
CHLORIDE SERPL-SCNC: 102 MMOL/L (ref 96–112)
CO2 SERPL-SCNC: 24 MMOL/L (ref 20–33)
CREAT SERPL-MCNC: 1.13 MG/DL (ref 0.5–1.4)
EOSINOPHIL # BLD AUTO: 0.43 K/UL (ref 0–0.51)
EOSINOPHIL NFR BLD: 4.4 % (ref 0–6.9)
ERYTHROCYTE [DISTWIDTH] IN BLOOD BY AUTOMATED COUNT: 47.8 FL (ref 35.9–50)
GFR SERPLBLD CREATININE-BSD FMLA CKD-EPI: 66 ML/MIN/1.73 M 2
GLOBULIN SER CALC-MCNC: 3.4 G/DL (ref 1.9–3.5)
GLUCOSE SERPL-MCNC: 143 MG/DL (ref 65–99)
HCT VFR BLD AUTO: 49 % (ref 42–52)
HGB BLD-MCNC: 15.9 G/DL (ref 14–18)
IMM GRANULOCYTES # BLD AUTO: 0.06 K/UL (ref 0–0.11)
IMM GRANULOCYTES NFR BLD AUTO: 0.6 % (ref 0–0.9)
LYMPHOCYTES # BLD AUTO: 1.95 K/UL (ref 1–4.8)
LYMPHOCYTES NFR BLD: 19.9 % (ref 22–41)
MCH RBC QN AUTO: 31.3 PG (ref 27–33)
MCHC RBC AUTO-ENTMCNC: 32.4 G/DL (ref 32.3–36.5)
MCV RBC AUTO: 96.5 FL (ref 81.4–97.8)
MONOCYTES # BLD AUTO: 0.83 K/UL (ref 0–0.85)
MONOCYTES NFR BLD AUTO: 8.5 % (ref 0–13.4)
NEUTROPHILS # BLD AUTO: 6.49 K/UL (ref 1.82–7.42)
NEUTROPHILS NFR BLD: 66.2 % (ref 44–72)
NRBC # BLD AUTO: 0 K/UL
NRBC BLD-RTO: 0 /100 WBC (ref 0–0.2)
PLATELET # BLD AUTO: 158 K/UL (ref 164–446)
PMV BLD AUTO: 10.1 FL (ref 9–12.9)
POTASSIUM SERPL-SCNC: 4.3 MMOL/L (ref 3.6–5.5)
PROT SERPL-MCNC: 7.4 G/DL (ref 6–8.2)
RBC # BLD AUTO: 5.08 M/UL (ref 4.7–6.1)
SODIUM SERPL-SCNC: 137 MMOL/L (ref 135–145)
TSH SERPL DL<=0.005 MIU/L-ACNC: 1.01 UIU/ML (ref 0.38–5.33)
WBC # BLD AUTO: 9.8 K/UL (ref 4.8–10.8)

## 2024-05-08 PROCEDURE — 99214 OFFICE O/P EST MOD 30 MIN: CPT | Performed by: INTERNAL MEDICINE

## 2024-05-08 PROCEDURE — 3078F DIAST BP <80 MM HG: CPT | Performed by: INTERNAL MEDICINE

## 2024-05-08 PROCEDURE — 3074F SYST BP LT 130 MM HG: CPT | Performed by: INTERNAL MEDICINE

## 2024-05-08 ASSESSMENT — ENCOUNTER SYMPTOMS
SORE THROAT: 0
CHILLS: 0
SINUS PAIN: 0
STRIDOR: 0
FOCAL WEAKNESS: 0
SENSORY CHANGE: 0
COUGH: 0
EYE PAIN: 0
HEADACHES: 0
PSYCHIATRIC NEGATIVE: 1
FEVER: 0
WHEEZING: 0
WEIGHT LOSS: 0
SPUTUM PRODUCTION: 0
ABDOMINAL PAIN: 0
DIZZINESS: 0
NAUSEA: 0
DIARRHEA: 0
SHORTNESS OF BREATH: 0
LOSS OF CONSCIOUSNESS: 0
VOMITING: 0
MYALGIAS: 0
ORTHOPNEA: 0
EYE DISCHARGE: 0

## 2024-05-08 ASSESSMENT — FIBROSIS 4 INDEX: FIB4 SCORE: 3.15

## 2024-05-08 NOTE — PROGRESS NOTES
Pulmonary Clinic Note    Chief Complaint:  Chief Complaint   Patient presents with    Follow-Up     Primary cancer of left lower lobe of lung. Last seen 12/18/23    Medication Management     Placed on wixela    Results     Bronch 01/16/24    Medication Refill     Wixela-3 month supply     HPI:   Pito Black is a very pleasant 79 y.o. 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  He is followed by Dr. Schulz for oncology care.      He is followed in the pulmonary clinic since hospitalization in early 2023 for hypoxemic respiratory failure which was attributed to bilateral pleural effusions and right upper lobe mass with resultant postobstructive pneumonia treated with antibiotics and steroids for possible radiation versus immunotherapy induced pneumonitis. He was hospitalized again in 2/2023 for parainfluenza pneumonia and right lower lobe PE discharged on Eliquis.    TTE 10/3/2023 showed an EF of 35% with global hypokinesis s/p AICD    Repeated thoracenteses have been consistent with transudate. Low total cell count, however lymphocytic predominant.  Cytology and cultures negative    Last PFTs 2018 showing preserved FEV1 (2.59L, 95%), no BD response, preserved lung volumes and diffusion capacity. He was started on ICS/LABA for trial and felt that it helped his breathing.     PMH otherwise notable for CKD stage III, PAF, hypertension, dyslipidemia    Interval events since last clinic visit in 12/2023:  Underwent bronchoscopy in 12/2023 which was nondiagnostic.  Underwent repeat bronchoscopy in 1/2024, showing metastatic adenocarcinoma at stations 7 and 11 S.  Followed up with Dr. Schulz since procedure.  Started Keytruda in 3/2024.  So far tolerating well.  Scheduled for his third cycle on Friday.  Scheduled to see Dr. Schulz  next week..  Liquid biopsy has been sent for further genomic testing of targets.    No chest imaging or PFTs since bronchoscopy    On Eliquis for history of PE in 2023    Symptomatically stable, has gained about 7lbs since last visit    Multi oximetry walk test last visit showed no desaturations on room air.    Past Medical History:   Diagnosis Date    Acute hypoxemic respiratory failure (HCC)     Acute respiratory failure with hypoxia ([facility]) 02/01/2023    AICD (automatic cardioverter/defibrillator) present 12/07/2023    Arrhythmia     hx of a fib    Arthritis 2015    generalized, DDD back    Asthma     inhaler     Backpain 08/06/2018    9/10    Blood clotting disorder ([facility]) 02/2023    Pulmonary, right lung    Breath shortness     with exertion    Cancer ([facility]) 07/2017    left lung- adenocarcinoma    Cataract     IOL bilateral    Congestive heart failure ([facility])     cardiologist, [facility], Catherine KAUR    Dental disorder     lower partial, removable bridge    Diabetes 08/06/2018    on no meds at this time, diet controlled    Dysfunction of eustachian tube     Heart valve disease     mild mitral valve prolapse    High cholesterol     Hypertension     Macrocytic anemia     Pneumonia 02/2023    Renal disorder     CKD stage 3    Thrombophilia ([facility])     Type 2 diabetes mellitus with hyperglycemia ([facility]) 03/17/2023    This is a chronic condition. Current medications: Insulin:  Biguanide: took metformin historically will restart metformin xr 750 mg daily. GLP1-RA:   SGLT-2i:    Consider for cardiorenal protection DPP4-I:  TZD:  Pk: Sulfonyluria:   Last A1c: 6/2/23 6.5% Last Microalb/Cr ratio: 6/2/23 <1.2 Fasting sugars: Last diabetic foot exam: 6/19/23 Last retinal eye exam: has upcoming appointment with optome    Volume overload      Past Surgical History:   Procedure Laterality Date    NC BRONCHOSCOPY,DIAGNOSTIC N/A 1/16/2024    Procedure: FIBER OPTIC BRONCHOSCOPY WITH  WASH, BRUSH, BRONCHOALVEOLAR LAVAGE,  BIOPSY AND FINE NEEDLE ASPIRATION, ENDOBRONCHIAL ULTRASOUND AND NAVIGATION, ROBOTICS;  Surgeon: Vinayak Carbajal M.D.;  Location: [facility];  Service: Pulmonary Robotic    ENDOBRONCHIAL US ADD-ON N/A 1/16/2024    Procedure: ENDOBRONCHIAL ULTRASOUND (EBUS);  Surgeon: Vinayak Carbajal M.D.;  Location: [facility];  Service: Pulmonary Robotic    DC BRONCHOSCOPY,DIAGNOSTIC N/A 12/28/2023    Procedure: FIBER OPTIC BRONCHOSCOPY WITH BRONCHOALVEOLAR LAVAGE AND FINE NEEDLE ASPIRATION, ENDOBRONCHIAL ULTRASOUND AND NAVIGATION, ROBOTICS;  Surgeon: Miriam Martin M.D.;  Location: [facility];  Service: Pulmonary Robotic    ENDOBRONCHIAL US ADD-ON N/A 12/28/2023    Procedure: ENDOBRONCHIAL ULTRASOUND (EBUS);  Surgeon: Miriam Martin M.D.;  Location: [facility];  Service: Pulmonary Robotic    DC BRONCHOSCOPY,DIAGNOSTIC  07/01/2021    Procedure: BRONCHOSCOPY - FIBER OPTIC WITH BRANCHOALVEOLAR LAVAGE BIOPSY, FNA, NAVIGATION;  Surgeon: Vinayak Carbajal M.D.;  Location: [facility];  Service: Pulmonary    ENDOBRONCHIAL US ADD-ON  07/01/2021    Procedure: ENDOBRONCHIAL ULTRASOUND (EBUS).;  Surgeon: Vinayak Carbajal M.D.;  Location: [facility];  Service: Pulmonary    CATARACT PHACO WITH IOL Left 08/21/2018    Procedure: CATARACT PHACO WITH IOL;  Surgeon: Juanito Hager M.D.;  Location: SURGERY SAME DAY [facility];  Service: Ophthalmology    CATARACT PHACO WITH IOL Right 08/07/2018    Procedure: CATARACT PHACO WITH IOL;  Surgeon: Juanito Hager M.D.;  Location: SURGERY SAME DAY [facility];  Service: Ophthalmology    THORACOSCOPY Left 04/19/2018    Procedure: THORACOSCOPY- WEDGE BIOPSY W/FROZEN SECTION;  Surgeon: Cristhian Galeano M.D.;  Location: [facility];  Service: Thoracic    EAR MIDDLE EXPLORATION Right 06/12/2015    Procedure: EAR MIDDLE EXPLORATION;  Surgeon: William Brandon M.D.;  Location: SURGERY SAME DAY [facility];   "Service:     OSSICULAR RECONSTRUCTION Right 2015    Procedure: OSSICULAR RECONSTRUCTION CHAIN POSSIBLE;  Surgeon: William Brandon M.D.;  Location: SURGERY SAME DAY [facility];  Service:     KNEE ARTHROPLASTY TOTAL      EAR RECONSTRUCTION Bilateral     ear replacement \"many years ago\"     Social History     Socioeconomic History    Marital status:      Spouse name: Not on file    Number of children: Not on file    Years of education: Not on file    Highest education level: 12th grade   Occupational History    Not on file   Tobacco Use    Smoking status: Former     Current packs/day: 0.00     Average packs/day: 0.3 packs/day for 40.0 years (10.0 ttl pk-yrs)     Types: Cigarettes     Start date: 1965     Quit date: 2005     Years since quittin.3    Smokeless tobacco: Never   Vaping Use    Vaping Use: Never used   Substance and Sexual Activity    Alcohol use: Not Currently     Alcohol/week: 12.6 - 21.0 oz     Types: 21 - 35 Cans of beer per week     Comment: 12 beers a day (coors), quit 2/3/2017    Drug use: No    Sexual activity: Not on file   Other Topics Concern    Not on file   Social History Narrative    Not on file     Social Determinants of Health     Financial Resource Strain: Low Risk  (10/30/2023)    Overall Financial Resource Strain (CARDIA)     Difficulty of Paying Living Expenses: Not very hard   Food Insecurity: No Food Insecurity (10/30/2023)    Hunger Vital Sign     Worried About Running Out of Food in the Last Year: Never true     Ran Out of Food in the Last Year: Never true   Transportation Needs: No Transportation Needs (10/30/2023)    PRAPARE - Transportation     Lack of Transportation (Medical): No     Lack of Transportation (Non-Medical): No   Physical Activity: Inactive (10/30/2023)    Exercise Vital Sign     Days of Exercise per Week: 0 days     Minutes of Exercise per Session: 0 min   Stress: Stress Concern Present (10/20/2023)    [facility] " Health - Occupational Stress Questionnaire     Feeling of Stress : To some extent   Social Connections: Moderately Isolated (10/30/2023)    Social Connection and Isolation Panel [NHANES]     Frequency of Communication with Friends and Family: More than three times a week     Frequency of Social Gatherings with Friends and Family: Once a week     Attends [employer] Services: Never     Active Member of Clubs or Organizations: No     Attends Club or Organization Meetings: Never     Marital Status:    Intimate Partner Violence: Not on file   Housing Stability: Low Risk  (10/30/2023)    Housing Stability Vital Sign     Unable to Pay for Housing in the Last Year: No     Number of Places Lived in the Last Year: 1     Unstable Housing in the Last Year: No        Family History   Problem Relation Age of Onset    Stroke Mother     Hypertension Mother     Diabetes Mother     Cancer Father         stomach cancer    Heart Disease Brother     Alcohol abuse Brother     Ovarian Cancer Neg Hx     Tubal Cancer Neg Hx     Peritoneal Cancer Neg Hx     Colorectal Cancer Neg Hx     Breast Cancer Neg Hx     Hyperlipidemia Neg Hx      Current Outpatient Medications on File Prior to Visit   Medication Sig Dispense Refill    amoxicillin (AMOXIL) 500 MG Cap Take 4 capsules by mouth 1 hour prior to scheduled visit 16 Capsule 0    fluticasone-salmeterol (WIXELA INHUB) 250-50 MCG/ACT AEROSOL POWDER, BREATH ACTIVATED Inhale 1 Puff every 12 hours. 3 Each 3    vericiguat or PLACEBO (STUDY DRUG) 10 MG tablet Take 1 Tablet by mouth every day. Participant ID #[id number] 33 Tablet 11    apixaban (ELIQUIS) 5mg Tab Take 1 Tablet by mouth 2 times a day. 180 Tablet 3    metoprolol SR (TOPROL XL) 50 MG TABLET SR 24 HR Take 1 Tablet by mouth every day for 100 days. 100 Tablet 3    dapagliflozin propanediol (FARXIGA) 10 MG Tab Take 1 tablet by mouth every day. 100 Tablet 3    ezetimibe (ZETIA) 10 MG Tab Take 1 Tablet by mouth every day. (Patient taking  "differently: Take 10 mg by mouth every morning.) 100 Tablet 3    spironolactone (ALDACTONE) 25 MG Tab Take 1 Tablet by mouth every day for 100 days. 100 Tablet 3    metFORMIN ER (GLUCOPHAGE XR) 750 MG TABLET SR 24 HR Take 1 tablet by mouth every day. 100 Tablet 3    cyclobenzaprine (FLEXERIL) 10 mg Tab Take 1 Tablet by mouth at bedtime. 100 Tablet 3    Cholecalciferol (VITAMIN D3) 2000 UNIT Cap Take 1 Capsule by mouth every day. 100 Capsule 3    diphenhydrAMINE-APAP, sleep, (TYLENOL PM EXTRA STRENGTH)  MG Tab Take 1-2 Tablets by mouth at bedtime as needed (Mild Pain or Trouble Sleeping).       No current facility-administered medications on file prior to visit.     Allergies: Patient has no known allergies.    ROS:   Review of Systems   Constitutional:  Negative for chills, fever and weight loss.   HENT:  Negative for congestion, sinus pain and sore throat.    Eyes:  Negative for pain and discharge.   Respiratory:  Negative for cough, sputum production, shortness of breath, wheezing and stridor.    Cardiovascular:  Negative for chest pain, orthopnea and leg swelling.   Gastrointestinal:  Negative for abdominal pain, diarrhea, nausea and vomiting.   Genitourinary:  Negative for dysuria, frequency and urgency.   Musculoskeletal:  Negative for myalgias.   Skin:  Negative for rash.   Neurological:  Negative for dizziness, sensory change, focal weakness, loss of consciousness and headaches.   Psychiatric/Behavioral: Negative.     All other systems reviewed and are negative.    Vitals:  /67 (BP Location: Left arm, Patient Position: Sitting, BP Cuff Size: Adult)   Pulse 85   Resp 16   Ht 1.683 m (5' 6.25\")   Wt 66.7 kg (147 lb)   SpO2 96%     Physical Exam:  Physical Exam  Vitals and nursing note reviewed.   Constitutional:       General: He is not in acute distress.     Appearance: Normal appearance. He is well-developed. He is not ill-appearing or diaphoretic.      Comments: Very pleasant  Accompanied by " supportive wife   HENT:      Nose: Nose normal.      Mouth/Throat:      Pharynx: No oropharyngeal exudate.   Eyes:      General: No scleral icterus.        Right eye: No discharge.         Left eye: No discharge.      Conjunctiva/sclera: Conjunctivae normal.      Pupils: Pupils are equal, round, and reactive to light.   Neck:      Thyroid: No thyromegaly.      Vascular: No JVD.      Trachea: No tracheal deviation.   Cardiovascular:      Rate and Rhythm: Normal rate and regular rhythm.      Heart sounds: Normal heart sounds. No murmur heard.  Pulmonary:      Effort: Pulmonary effort is normal. No respiratory distress.      Breath sounds: No stridor. No wheezing or rales.      Comments: Good aeration/breath sounds in the bilateral bases    Abdominal:      General: There is no distension.      Palpations: Abdomen is soft.      Tenderness: There is no abdominal tenderness. There is no guarding.   Musculoskeletal:         General: No tenderness. Normal range of motion.      Cervical back: Neck supple.   Lymphadenopathy:      Cervical: No cervical adenopathy.   Skin:     General: Skin is warm and dry.      Capillary Refill: Capillary refill takes less than 2 seconds.      Coloration: Skin is not pale.      Findings: No erythema.   Neurological:      Mental Status: He is alert and oriented to person, place, and time.      Sensory: No sensory deficit.      Motor: No abnormal muscle tone.      Coordination: Coordination normal.      Deep Tendon Reflexes: Reflexes normal.   Psychiatric:         Behavior: Behavior normal.         Thought Content: Thought content normal.         Judgment: Judgment normal.       Laboratory Data:    PFTs as reviewed by me personally show:  See HPI    Imaging as reviewed by me personally show:    See HPI    Assessment/Plan:    1. Primary adenocarcinoma of left lung (HCC)        2. Dyspnea on exertion        3. Pleural effusion, left        4. Pulmonary embolism on right (HCC)        5. Chronic  systolic heart failure (HCC)          Stage I NSCLC status post wedge resection in 2018. Stage Ia NSCLC  of the right upper lobe treated with SBRT in 2019. Stage Charanjit NSCLC of the left lower lobe treated with carboplatin/Taxol/radiation in 2021, with recurrence in 2024. Followed closely by oncology, on Keytruda cycle #3 end of this week, tolerating well.  Dyspnea stable.  No limitations per patient.  Wife is concerned about his sedentary lifestyle.  He states he does not feel any dyspnea or is limited by his lungs in any way.  PFTs in 2018 showed borderline obstruction, was trialed on Wixela and has been tolerating it well.  He does feel symptomatic relief when using it.  Instructed to rinse mouth after use.  Refills provided.  S/p thoracentesis x 2, transudative 2/2 HF. Resolved with diuretic optimization  2/23: Segmental/Subsegmental. Started eliquis, resolved on repeat CTA 3/2023. As long as risks of bleeding continue to be outweighed by benefits of anticoagulation, recommend indefinite therapeutic anticoagulation with DOAC in setting of underlying malignancy.  HFrEF, Stage C, Class 2, LVEF 35% s/p AICD. GDMT  Return in about 6 months (around 11/8/2024).     This note was generated using voice recognition software which has a chance of producing errors of grammar and possibly content.  I have made every reasonable attempt to find and correct any obvious errors, but it should be expected that some may not be found prior to finalization of this note.    Time spent in record review prior to patient arrival, reviewing results, and in face-to-face encounter totaled 31 min, excluding any procedures if performed.  __________  Vinayak Carbajal MD  Pulmonary and Critical Care Medicine  Replaced by [facility]

## 2024-05-10 ENCOUNTER — OUTPATIENT INFUSION SERVICES (OUTPATIENT)
Dept: ONCOLOGY | Facility: MEDICAL CENTER | Age: 79
End: 2024-05-10
Attending: INTERNAL MEDICINE
Payer: MEDICARE

## 2024-05-10 VITALS
WEIGHT: 148.15 LBS | RESPIRATION RATE: 17 BRPM | DIASTOLIC BLOOD PRESSURE: 61 MMHG | TEMPERATURE: 97.8 F | SYSTOLIC BLOOD PRESSURE: 111 MMHG | HEIGHT: 67 IN | BODY MASS INDEX: 23.25 KG/M2 | HEART RATE: 81 BPM | OXYGEN SATURATION: 99 %

## 2024-05-10 DIAGNOSIS — C34.11 PRIMARY CANCER OF RIGHT UPPER LOBE OF LUNG (HCC): ICD-10-CM

## 2024-05-10 LAB — T4 FREE SERPL-MCNC: 1.08 NG/DL (ref 0.93–1.7)

## 2024-05-10 RX ADMIN — SODIUM CHLORIDE 200 MG: 9 INJECTION, SOLUTION INTRAVENOUS at 14:45

## 2024-05-10 ASSESSMENT — FIBROSIS 4 INDEX: FIB4 SCORE: 2.55

## 2024-05-10 NOTE — PROGRESS NOTES
into [facility] for Day 1/ Cycle 3  of Keytruda for R upper lobe lung Ca. Pt denied having any new or acute complaints today, reports tolerating past treatments well.POC and recent lab results reviewed.     PIV started in L FA x 2 attempts, brisk blood return noted, IVF TKO. Pt given Keytruda as prescribed,filter intact, Pito  tolerated well, denied having any complaints during or after infusion.     PIV discontinued, bleeding controlled with gauze and coban. Confirmed next appointment and Pito was discharged home with his family in no acute distress.

## 2024-05-10 NOTE — PROGRESS NOTES
Chemotherapy Verification - SECONDARY RN       Height = 1.7m  Weight = 67.2kg  BSA = 1.78m2       Medication: pembrolizumab  Dose: flat dose  Calculated Dose: 200mg                             (In mg/m2, AUC, mg/kg)       I confirm that this process was performed independently.

## 2024-05-10 NOTE — PROGRESS NOTES
Chemotherapy Verification - PRIMARY RN      Height =170 cm     Weight = 67.2 kg    BSA =  1.78 m^2      Medication:  Keytruda    Dose:200 mg (set dose)      Calculated Dose:  200 mg (set dose)                             (In mg/m2, AUC, mg/kg)           I confirm this process was performed independently with the BSA and all final chemotherapy dosing calculations congruent.  Any discrepancies of 10% or greater have been addressed with the chemotherapy pharmacist. The resolution of the discrepancy has been documented in the EPIC progress notes.

## 2024-05-15 ENCOUNTER — PHARMACY VISIT (OUTPATIENT)
Dept: PHARMACY | Facility: MEDICAL CENTER | Age: 79
End: 2024-05-15
Payer: COMMERCIAL

## 2024-05-15 ENCOUNTER — PATIENT OUTREACH (OUTPATIENT)
Dept: HEALTH INFORMATION MANAGEMENT | Facility: OTHER | Age: 79
End: 2024-05-15
Payer: MEDICARE

## 2024-05-15 DIAGNOSIS — E11.22 TYPE 2 DIABETES MELLITUS WITH STAGE 3A CHRONIC KIDNEY DISEASE, WITHOUT LONG-TERM CURRENT USE OF INSULIN (HCC): ICD-10-CM

## 2024-05-15 DIAGNOSIS — N18.31 TYPE 2 DIABETES MELLITUS WITH STAGE 3A CHRONIC KIDNEY DISEASE, WITHOUT LONG-TERM CURRENT USE OF INSULIN (HCC): ICD-10-CM

## 2024-05-15 DIAGNOSIS — I15.2 HYPERTENSION ASSOCIATED WITH TYPE 2 DIABETES MELLITUS (HCC): ICD-10-CM

## 2024-05-15 DIAGNOSIS — N18.31 STAGE 3A CHRONIC KIDNEY DISEASE: ICD-10-CM

## 2024-05-15 DIAGNOSIS — E11.59 HYPERTENSION ASSOCIATED WITH TYPE 2 DIABETES MELLITUS (HCC): ICD-10-CM

## 2024-05-15 PROCEDURE — RXMED WILLOW AMBULATORY MEDICATION CHARGE: Performed by: NURSE PRACTITIONER

## 2024-05-15 PROCEDURE — RXMED WILLOW AMBULATORY MEDICATION CHARGE

## 2024-05-15 NOTE — TELEPHONE ENCOUNTER
Contact:  Phone number:[phone] (mobile)    Name of person spoken with and relationship to patient: CYDNEY, WIFE    Patient’s Adherence:  How patient is doing on medication: Very Well    How many missed doses and reason: 0 N/A    Any new medications: no    Any new conditions: no    Any new allergies: no    Any new side effects: no    Any new diagnoses: no    How many doses remainin (CYCLOBENZAPRINE) ; 4 (ELIQUIS)    Did patient want to speak with pharmacist: No   Delivery:  Delivery date and method: 05/15/2024 via     Needs by Date: 2024 (CYCLOBENZAPRINE/ELIQUIS)    Signature required: No     Any additional details for :  LEAVE AT FRONT DOOR    Teach Appointment Date:  N/A   Shipping Address:  [address] [place] [id number]   Medication(name,strength and dose):  ELIQUIS 5MG TABLETS ; CYCLOBENZAPRINE 10MG TABLETS   Copay:  $449.28   Payment Method:  Credit card on file   Supplies:  N/A   Additional Information:  Pt's wife, Cydney, called requesting to set refills for her and for the pt. She states that pt has atleast 4 tablets remaining on hand for Eliquis, and 0 for Cyclobenzaprine. Informed pt's wife that both of them are falling under the coverage gap (donut hole gap). Offered both her and the pt for the urgent lilliam that is accessible at this time. Pt's wife verbally consented and would like to get it started soon. Informed pt's wife that I would need to give the [facility] a call to initiate the process. Pt's wife verbalized understanding. Called [facility] and spoke with Shantelle to initiate the process. Per Shantelle, both pts are eligible for the lilliam and it will take atleast 40mins or so to finish the process. Called pt's wife back letting her know about the status. Pt's wife thanked me for the help and is aware that the lilliam should be able to contact them shortly if they have questions or concerns. Updated pt's next refill call date posted above.        Stacy ALVARADO  BRUCE Pierre, PhT  Vascular Pharmacy Liaison (Rx Coordinator)  P: [phone]  5/15/2024 9:18 AM

## 2024-05-16 NOTE — PROGRESS NOTES
Cydney called and [facility] asking for return call. Returned her call. Cydney is questioning the charges on Pito's Keytruda/placebo infusions. Cydney stated that she was told there would be no charge for the medication/placebo. However, Cydney received a bill from [employer], billing Pito $2187. Cydney has been calling around to find out if this charge includes placebo/medication? I called the Infusion [facility]. I was told the $2187 DOES NOT include the placebo or medication charge, but does include charges for a port access kit, Lidocaine, Saline administration, Zofran, administration and pharmacy charges, etc... Advised Cydney of fee make-up, she verbalized understanding. No other questions or concerns.   Pito's Bp has been stable with recent office visit readings of: 112/67 (5/8/24), 118/78 (4/8/24) and 96/52 (3/20/2024). His last GFR was 66 on 5/8/2024. His last HgbA1c was 6.0 on 12/23/2023, down from previous reading of 6.5 eleven months earlier.   .

## 2024-05-21 PROCEDURE — RXMED WILLOW AMBULATORY MEDICATION CHARGE

## 2024-05-22 ENCOUNTER — PHARMACY VISIT (OUTPATIENT)
Dept: PHARMACY | Facility: MEDICAL CENTER | Age: 79
End: 2024-05-22
Payer: COMMERCIAL

## 2024-05-29 ENCOUNTER — HOSPITAL ENCOUNTER (OUTPATIENT)
Dept: LAB | Facility: MEDICAL CENTER | Age: 79
End: 2024-05-29
Attending: INTERNAL MEDICINE
Payer: MEDICARE

## 2024-05-29 LAB
ALBUMIN SERPL BCP-MCNC: 3.8 G/DL (ref 3.2–4.9)
ALBUMIN/GLOB SERPL: 1 G/DL
ALP SERPL-CCNC: 73 U/L (ref 30–99)
ALT SERPL-CCNC: 21 U/L (ref 2–50)
ANION GAP SERPL CALC-SCNC: 13 MMOL/L (ref 7–16)
AST SERPL-CCNC: 28 U/L (ref 12–45)
BASOPHILS # BLD AUTO: 0.3 % (ref 0–1.8)
BASOPHILS # BLD: 0.03 K/UL (ref 0–0.12)
BILIRUB SERPL-MCNC: 0.5 MG/DL (ref 0.1–1.5)
BUN SERPL-MCNC: 32 MG/DL (ref 8–22)
CALCIUM ALBUM COR SERPL-MCNC: 9.8 MG/DL (ref 8.5–10.5)
CALCIUM SERPL-MCNC: 9.6 MG/DL (ref 8.5–10.5)
CHLORIDE SERPL-SCNC: 100 MMOL/L (ref 96–112)
CO2 SERPL-SCNC: 22 MMOL/L (ref 20–33)
CREAT SERPL-MCNC: 1.17 MG/DL (ref 0.5–1.4)
EOSINOPHIL # BLD AUTO: 0.54 K/UL (ref 0–0.51)
EOSINOPHIL NFR BLD: 5.2 % (ref 0–6.9)
ERYTHROCYTE [DISTWIDTH] IN BLOOD BY AUTOMATED COUNT: 45.5 FL (ref 35.9–50)
GFR SERPLBLD CREATININE-BSD FMLA CKD-EPI: 63 ML/MIN/1.73 M 2
GLOBULIN SER CALC-MCNC: 4 G/DL (ref 1.9–3.5)
GLUCOSE SERPL-MCNC: 121 MG/DL (ref 65–99)
HCT VFR BLD AUTO: 46.8 % (ref 42–52)
HGB BLD-MCNC: 16 G/DL (ref 14–18)
IMM GRANULOCYTES # BLD AUTO: 0.04 K/UL (ref 0–0.11)
IMM GRANULOCYTES NFR BLD AUTO: 0.4 % (ref 0–0.9)
LYMPHOCYTES # BLD AUTO: 2.3 K/UL (ref 1–4.8)
LYMPHOCYTES NFR BLD: 22.1 % (ref 22–41)
MCH RBC QN AUTO: 31.8 PG (ref 27–33)
MCHC RBC AUTO-ENTMCNC: 34.2 G/DL (ref 32.3–36.5)
MCV RBC AUTO: 93 FL (ref 81.4–97.8)
MONOCYTES # BLD AUTO: 0.91 K/UL (ref 0–0.85)
MONOCYTES NFR BLD AUTO: 8.7 % (ref 0–13.4)
NEUTROPHILS # BLD AUTO: 6.61 K/UL (ref 1.82–7.42)
NEUTROPHILS NFR BLD: 63.3 % (ref 44–72)
NRBC # BLD AUTO: 0 K/UL
NRBC BLD-RTO: 0 /100 WBC (ref 0–0.2)
PLATELET # BLD AUTO: 171 K/UL (ref 164–446)
PMV BLD AUTO: 10.1 FL (ref 9–12.9)
POTASSIUM SERPL-SCNC: 4.2 MMOL/L (ref 3.6–5.5)
PROT SERPL-MCNC: 7.8 G/DL (ref 6–8.2)
RBC # BLD AUTO: 5.03 M/UL (ref 4.7–6.1)
SODIUM SERPL-SCNC: 135 MMOL/L (ref 135–145)
TSH SERPL DL<=0.005 MIU/L-ACNC: 0.73 UIU/ML (ref 0.38–5.33)
WBC # BLD AUTO: 10.4 K/UL (ref 4.8–10.8)

## 2024-05-29 NOTE — PROGRESS NOTES
"Pharmacy Chemotherapy Verification:    Dx: NSCLC         Protocol: Pembrolizumab   *Dosing Reference*  Pembrolizumab 200 mg IV over 30 minutes on Day 1  21-day cycle until disease progression or unacceptable toxicity or until up to 24 months of therapy has been completed      NCCN Guidelines® for Non-Small Cell Lung Cancer V.1.2024.   Josy MORGAN et al. J Clin Oncol. 2019;37(28):6707-4546.c   Navid M, et al. N Engl J Med. 2016;375(19):0722-8490.a   Betty BURNETT et al. Lancet. 2019;393(71306):2543-7929.a   Wendi M, et al. Eur J Cancer. 2020;131:68-75.b    Allergies:  Patient has no known allergies.     /66   Pulse 82   Temp 36.7 °C (98.1 °F) (Temporal)   Resp 17   Ht 1.7 m (5' 6.93\")   Wt 65.8 kg (145 lb 1 oz)   SpO2 96%   BMI 22.77 kg/m²  Body surface area is 1.76 meters squared.    Labs 5/29/24:  ANC~ 6610 Plt = 171k   Hgb = 16     SCr = 1.17 mg/dL CrCl ~ 47.4 mL/min   AST/ALT/AP = wnl TBili = 0.5  TSH = 0.728 Free T4 = pending        Drug Order   (Drug name, dose, route, IV Fluid & volume, frequency, number of doses) Cycle 4  Previous treatment: C3 on 5/10/24     Medication = Pembrolizumab (Keytruda)  Base Dose = 200 mg  Fixed Dose: no calc needed  Final Dose = 200 mg  Route = IV  Fluid & Volume = NS 50 mL  Admin Duration = Over 30 minutes   Patient Assistance Medication       No calculation required, okay to treat with final dose     By my signature below, I confirm this process was performed independently with the BSA and all final chemotherapy dosing calculations congruent. I have reviewed the above chemotherapy order and that my calculation of the final dose and BSA (when applicable) corroborate those calculations of the  pharmacist.     Alexandra Quiñonez, PharmD  "

## 2024-05-31 ENCOUNTER — OUTPATIENT INFUSION SERVICES (OUTPATIENT)
Dept: ONCOLOGY | Facility: MEDICAL CENTER | Age: 79
End: 2024-05-31
Attending: INTERNAL MEDICINE
Payer: MEDICARE

## 2024-05-31 VITALS
OXYGEN SATURATION: 96 % | RESPIRATION RATE: 17 BRPM | BODY MASS INDEX: 22.77 KG/M2 | HEIGHT: 67 IN | WEIGHT: 145.06 LBS | DIASTOLIC BLOOD PRESSURE: 66 MMHG | TEMPERATURE: 98.1 F | HEART RATE: 82 BPM | SYSTOLIC BLOOD PRESSURE: 118 MMHG

## 2024-05-31 DIAGNOSIS — C34.11 PRIMARY CANCER OF RIGHT UPPER LOBE OF LUNG (HCC): ICD-10-CM

## 2024-05-31 PROCEDURE — 700105 HCHG RX REV CODE 258

## 2024-05-31 PROCEDURE — 700111 HCHG RX REV CODE 636 W/ 250 OVERRIDE (IP)

## 2024-05-31 PROCEDURE — 700105 HCHG RX REV CODE 258: Performed by: INTERNAL MEDICINE

## 2024-05-31 PROCEDURE — 96413 CHEMO IV INFUSION 1 HR: CPT

## 2024-05-31 PROCEDURE — 700111 HCHG RX REV CODE 636 W/ 250 OVERRIDE (IP): Mod: JZ | Performed by: INTERNAL MEDICINE

## 2024-05-31 RX ADMIN — SODIUM CHLORIDE 200 MG: 9 INJECTION, SOLUTION INTRAVENOUS at 15:23

## 2024-05-31 ASSESSMENT — FIBROSIS 4 INDEX: FIB4 SCORE: 2.82

## 2024-05-31 NOTE — PROGRESS NOTES
Chemotherapy Verification - SECONDARY RN   C4 D1     Height = 170 cm  Weight = 65.8 kg  BSA = 1.76 m^2       Medication: pembrolizumab (KEYTRUDA)  Dose: 200 mg set dose  Calculated Dose: 200 mg set dose                             (In mg/m2, AUC, mg/kg)       I confirm that this process was performed independently.

## 2024-05-31 NOTE — PROGRESS NOTES
[facility] for Day [facility]/ Cycle 4 of Keytruda for R upper lobe lung Ca. Pt denied having any new or acute complaints today, reports tolerating past treatments well. POC and recent lab results reviewed.     PIV started in R AC x 1 attempts, brisk blood return noted, IVF TKO. Pt given Keytruda as prescribed, filter intact, Pito  tolerated well, denied having any complaints during or after infusion.     PIV discontinued, bleeding controlled with gauze and coban. Confirmed next appointment and Pito was discharged home with his family in no acute distress.

## 2024-05-31 NOTE — PROGRESS NOTES
Chemotherapy Verification - PRIMARY RN      Height = 170 cm     Weight =  65.8 kg   BSA =  1.76 m^2      Medication:  Keytruda   Dose: 200 mg (set dose)   Calculated Dose:  200 mg (set dose)                             (In mg/m2, AUC, mg/kg)         I confirm this process was performed independently with the BSA and all final chemotherapy dosing calculations congruent.  Any discrepancies of 10% or greater have been addressed with the chemotherapy pharmacist. The resolution of the discrepancy has been documented in the EPIC progress notes.

## 2024-05-31 NOTE — PROGRESS NOTES
"Pharmacy Chemotherapy Calculations    Dx: NSCLC  Cycle 4  Previous treatment = C3 on 5/10/24    Protocol: Pembrolizumab  Pembrolizumab 200 mg IV over 30 minutes   21-day cycle until DP/UT or until up to 24 months of therapy has been completed  Or  Pembrolizumab 400 mg IV over 30 minutes   42-day cycle until DP/UT or until up to 24 months of therapy has been completed  NCCN Guidelines for Non-Small Cell Lung Cancer V.1.2024.  Garon EB , et al. J Clin Oncol. 2019;37(28):3583-3457.  Navid M , et al. N Engl J Med. 2016;375(19):6272-7791.  Betty TSK , et al. Lancet. 2019;393(38723):2284-1060.  Wendi M , et al. Eur J Cancer. 2020;131:68-75.    Allergies:  Patient has no known allergies.       /66   Pulse 82   Temp 36.7 °C (98.1 °F) (Temporal)   Resp 17   Ht 1.7 m (5' 6.93\")   Wt 65.8 kg (145 lb 1 oz)   SpO2 96%   BMI 22.77 kg/m²  Body surface area is 1.76 meters squared.    Labs 5/29/24  ANC~ 6610 Plt = 171k   Hgb = 16     SCr = 1.17 mg/dL CrCl ~ 47.4 mL/min   LFT's = WNLs  TBili = 0.5   TSH = 0.728    Pembrolizumab 200 mg fixed dose   No calculation required, OK to treat with final dose = 200 mg IV    Arden Perez, PharmD  "

## 2024-06-11 ENCOUNTER — RESEARCH ENCOUNTER (OUTPATIENT)
Dept: CARDIOLOGY | Facility: MEDICAL CENTER | Age: 79
End: 2024-06-11
Payer: MEDICARE

## 2024-06-11 NOTE — RESEARCH NOTE
Spoke with Pito and his wife today. He is doing well with no adverse events, ED or Hospitalizations since last study visit. He confirms taking his study medication daily as directed. He continues with his cancer treatments.   Next scheduled study visit is 9/9/24 and patient knows to call before if has questions or concerns.

## 2024-06-19 ENCOUNTER — PATIENT OUTREACH (OUTPATIENT)
Dept: ONCOLOGY | Facility: MEDICAL CENTER | Age: 79
End: 2024-06-19
Payer: MEDICARE

## 2024-06-19 ENCOUNTER — HOSPITAL ENCOUNTER (OUTPATIENT)
Dept: LAB | Facility: MEDICAL CENTER | Age: 79
End: 2024-06-19
Attending: RADIOLOGY
Payer: MEDICARE

## 2024-06-19 LAB
ALBUMIN SERPL BCP-MCNC: 3.6 G/DL (ref 3.2–4.9)
ALBUMIN/GLOB SERPL: 0.9 G/DL
ALP SERPL-CCNC: 78 U/L (ref 30–99)
ALT SERPL-CCNC: 22 U/L (ref 2–50)
ANION GAP SERPL CALC-SCNC: 12 MMOL/L (ref 7–16)
AST SERPL-CCNC: 25 U/L (ref 12–45)
BASOPHILS # BLD AUTO: 0.3 % (ref 0–1.8)
BASOPHILS # BLD: 0.04 K/UL (ref 0–0.12)
BILIRUB SERPL-MCNC: 0.4 MG/DL (ref 0.1–1.5)
BUN SERPL-MCNC: 28 MG/DL (ref 8–22)
CALCIUM ALBUM COR SERPL-MCNC: 9.8 MG/DL (ref 8.5–10.5)
CALCIUM SERPL-MCNC: 9.5 MG/DL (ref 8.5–10.5)
CHLORIDE SERPL-SCNC: 101 MMOL/L (ref 96–112)
CO2 SERPL-SCNC: 22 MMOL/L (ref 20–33)
CREAT SERPL-MCNC: 1.08 MG/DL (ref 0.5–1.4)
EOSINOPHIL # BLD AUTO: 0.57 K/UL (ref 0–0.51)
EOSINOPHIL NFR BLD: 4.4 % (ref 0–6.9)
ERYTHROCYTE [DISTWIDTH] IN BLOOD BY AUTOMATED COUNT: 47.9 FL (ref 35.9–50)
GFR SERPLBLD CREATININE-BSD FMLA CKD-EPI: 70 ML/MIN/1.73 M 2
GLOBULIN SER CALC-MCNC: 4 G/DL (ref 1.9–3.5)
GLUCOSE SERPL-MCNC: 110 MG/DL (ref 65–99)
HCT VFR BLD AUTO: 49 % (ref 42–52)
HGB BLD-MCNC: 15.7 G/DL (ref 14–18)
IMM GRANULOCYTES # BLD AUTO: 0.06 K/UL (ref 0–0.11)
IMM GRANULOCYTES NFR BLD AUTO: 0.5 % (ref 0–0.9)
LYMPHOCYTES # BLD AUTO: 2.05 K/UL (ref 1–4.8)
LYMPHOCYTES NFR BLD: 15.9 % (ref 22–41)
MCH RBC QN AUTO: 31 PG (ref 27–33)
MCHC RBC AUTO-ENTMCNC: 32 G/DL (ref 32.3–36.5)
MCV RBC AUTO: 96.6 FL (ref 81.4–97.8)
MONOCYTES # BLD AUTO: 0.97 K/UL (ref 0–0.85)
MONOCYTES NFR BLD AUTO: 7.5 % (ref 0–13.4)
NEUTROPHILS # BLD AUTO: 9.21 K/UL (ref 1.82–7.42)
NEUTROPHILS NFR BLD: 71.4 % (ref 44–72)
NRBC # BLD AUTO: 0 K/UL
NRBC BLD-RTO: 0 /100 WBC (ref 0–0.2)
PLATELET # BLD AUTO: 185 K/UL (ref 164–446)
PMV BLD AUTO: 9.8 FL (ref 9–12.9)
POTASSIUM SERPL-SCNC: 4.1 MMOL/L (ref 3.6–5.5)
PROT SERPL-MCNC: 7.6 G/DL (ref 6–8.2)
RBC # BLD AUTO: 5.07 M/UL (ref 4.7–6.1)
SODIUM SERPL-SCNC: 135 MMOL/L (ref 135–145)
TSH SERPL DL<=0.005 MIU/L-ACNC: 1.08 UIU/ML (ref 0.38–5.33)
WBC # BLD AUTO: 12.9 K/UL (ref 4.8–10.8)

## 2024-06-19 PROCEDURE — 85025 COMPLETE CBC W/AUTO DIFF WBC: CPT

## 2024-06-19 PROCEDURE — 80053 COMPREHEN METABOLIC PANEL: CPT

## 2024-06-19 PROCEDURE — 36415 COLL VENOUS BLD VENIPUNCTURE: CPT

## 2024-06-19 PROCEDURE — 84443 ASSAY THYROID STIM HORMONE: CPT

## 2024-06-19 NOTE — PROGRESS NOTES
Call placed to patient, spoke to wife, Cydney, with patient giving feedback in background.  They report treatment has been going well.  Next treatment this Friday.  Wife did report patient had a cough and unsure if from weather change or allergies.  Pt saying cough has gotten better.  They deny any barriers or needs from navigator at this time.  Follow up letter, message and July cancer support calendar sent via [employer] as well.  Available as needed.

## 2024-06-20 NOTE — PROGRESS NOTES
"Pharmacy Chemotherapy Verification:    Dx: NSCLC         Protocol: Pembrolizumab   *Dosing Reference*  Pembrolizumab 200 mg IV over 30 minutes on Day 1  21-day cycle until disease progression or unacceptable toxicity or until up to 24 months of therapy has been completed      NCCN Guidelines® for Non-Small Cell Lung Cancer V.1.2024.   Josy MORGAN et al. J Clin Oncol. 2019;37(28):8555-2739.c   Navid M, et al. N Engl J Med. 2016;375(19):3850-7338.a   Betty BURNETT et al. Lancet. 2019;393(42190):9855-5447.a   Wendi M, et al. Eur J Cancer. 2020;131:68-75.b    Allergies:  Patient has no known allergies.     /70   Pulse 84   Temp 36.3 °C (97.3 °F) (Temporal)   Resp 17   Ht 1.68 m (5' 6.14\")   Wt 66.4 kg (146 lb 6.2 oz)   SpO2 98%   BMI 23.53 kg/m²  Body surface area is 1.76 meters squared.    Labs 6/19/24:  ANC~ 9210 Plt = 185 k   Hgb = 15.7     SCr = 1.08 mg/dL CrCl ~ 52 mL/min   AST/ALT/AP = WNL TBili = 0.4   K+ = 4.1  TSH = 1.08        Drug Order   (Drug name, dose, route, IV Fluid & volume, frequency, number of doses) Cycle 5  Previous treatment: C4 on 5/31/24     Medication = Pembrolizumab (Keytruda)  Base Dose = 200 mg  Fixed Dose: no calc needed  Final Dose = 200 mg  Route = IV  Fluid & Volume = NS 50 mL  Admin Duration = Over 30 minutes   Patient Assistance Medication       No calculation required, okay to treat with final dose     By my signature below, I confirm this process was performed independently with the BSA and all final chemotherapy dosing calculations congruent. I have reviewed the above chemotherapy order and that my calculation of the final dose and BSA (when applicable) corroborate those calculations of the  pharmacist.     Juliana Wooten, PharmD  "

## 2024-06-21 ENCOUNTER — OUTPATIENT INFUSION SERVICES (OUTPATIENT)
Dept: ONCOLOGY | Facility: MEDICAL CENTER | Age: 79
End: 2024-06-21
Attending: INTERNAL MEDICINE
Payer: MEDICARE

## 2024-06-21 VITALS
OXYGEN SATURATION: 98 % | WEIGHT: 146.39 LBS | BODY MASS INDEX: 23.53 KG/M2 | HEART RATE: 84 BPM | RESPIRATION RATE: 17 BRPM | DIASTOLIC BLOOD PRESSURE: 70 MMHG | SYSTOLIC BLOOD PRESSURE: 103 MMHG | HEIGHT: 66 IN | TEMPERATURE: 97.3 F

## 2024-06-21 DIAGNOSIS — C34.11 PRIMARY CANCER OF RIGHT UPPER LOBE OF LUNG (HCC): ICD-10-CM

## 2024-06-21 LAB — T4 FREE SERPL-MCNC: 1.21 NG/DL (ref 0.93–1.7)

## 2024-06-21 PROCEDURE — 700105 HCHG RX REV CODE 258: Performed by: INTERNAL MEDICINE

## 2024-06-21 PROCEDURE — 700111 HCHG RX REV CODE 636 W/ 250 OVERRIDE (IP): Mod: JZ | Performed by: INTERNAL MEDICINE

## 2024-06-21 PROCEDURE — 84439 ASSAY OF FREE THYROXINE: CPT

## 2024-06-21 PROCEDURE — 96413 CHEMO IV INFUSION 1 HR: CPT

## 2024-06-21 PROCEDURE — 700111 HCHG RX REV CODE 636 W/ 250 OVERRIDE (IP)

## 2024-06-21 PROCEDURE — 700105 HCHG RX REV CODE 258

## 2024-06-21 RX ADMIN — SODIUM CHLORIDE 200 MG: 9 INJECTION, SOLUTION INTRAVENOUS at 11:44

## 2024-06-21 ASSESSMENT — FIBROSIS 4 INDEX: FIB4 SCORE: 2.28

## 2024-06-21 NOTE — PROGRESS NOTES
Chemotherapy Verification - PRIMARY RN      Height = 1.68 m  Weight = 66.4 kg  BSA = 1.76 m2       Medication: Pembrolizumab  Dose: SET DOSE  Calculated Dose: 200 mg                             (In mg/m2, AUC, mg/kg)    I confirm this process was performed independently with the BSA and all final chemotherapy dosing calculations congruent.  Any discrepancies of 10% or greater have been addressed with the chemotherapy pharmacist. The resolution of the discrepancy has been documented in the EPIC progress notes.

## 2024-06-21 NOTE — PROGRESS NOTES
Chemotherapy Verification - SECONDARY RN       Height = 1.68m  Weight = 66.4kg  BSA = 1.76m2       Medication: pembrolizumab  Dose: flat dose  Calculated Dose: 200mg                             (In mg/m2, AUC, mg/kg)       I confirm that this process was performed independently.

## 2024-06-21 NOTE — PROGRESS NOTES
Patient arrived to unit for Keytruda. He denies any signs or symptoms. VSS. Labs from 6/19 reviewed. Okay to proceed with treatment.    24G IV established in the left forearm. Blood return noted. Flushed with 10 mL NS. No erythema, edema, or pain noted.    Keytruda administered using a 0.2 micron filter.    Blood return noted from IV. Flushed with 10 mL NS. No erythema, edema, or pain noted. IV removed. Site covered with gauze and coban.    Patient tolerated treatment without any adverse effects. Future appointments confirmed. Patient discharged home in stable condition.

## 2024-06-21 NOTE — PROGRESS NOTES
"Pharmacy Chemotherapy Calculations    Dx: NSCLC  Cycle 5  Previous treatment = C4 on 5/31/24    Protocol: Pembrolizumab  Pembrolizumab 200 mg IV over 30 minutes   21-day cycle until DP/UT or until up to 24 months of therapy has been completed  Or  Pembrolizumab 400 mg IV over 30 minutes   42-day cycle until DP/UT or until up to 24 months of therapy has been completed  NCCN Guidelines for Non-Small Cell Lung Cancer V.1.2024.  Garon EB , et al. J Clin Oncol. 2019;37(28):5717-9317.  Navid M , et al. N Engl J Med. 2016;375(19):8033-1206.  Betty TSK , et al. Lancet. 2019;393(91066):5181-1678.  Wendi M , et al. Eur J Cancer. 2020;131:68-75.    Allergies:  Patient has no known allergies.     /70   Pulse 84   Temp 36.3 °C (97.3 °F) (Temporal)   Resp 17   Ht 1.68 m (5' 6.14\")   Wt 66.4 kg (146 lb 6.2 oz)   SpO2 98%   BMI 23.53 kg/m²  Body surface area is 1.76 meters squared.    Labs 6/19/21  ANC~ 9210 Plt = 185k   Hgb = 15.7     SCr = 1.08 mg/dL CrCl ~ 51.7 mL/min   LFT's = WNLs  TBili = 0.4   TSH = 1.08    Pembrolizumab 200 mg fixed dose   No calculation required, OK to treat with final dose = 200 mg IV    Arden Perez, PharmD  " [facility] - Medical Surgical Unit  Hospital Medicine  Progress Note    Patient Name: Herlinda Valdovinos  MRN: [id number]  Patient Class: IP- Inpatient   Admission Date: 12/31/2022  Length of Stay: 1 days  Attending Physician: Chuy Daugherty IV, DO  Primary Care Provider: Vini Hernandez NP        Subjective:     Principal Problem:Urinary tract infection without hematuria        HPI:  Patient presented to [facility] tonight after her PCP made a house call on her due to her feeling bad and family concerned about her altered mental status. Her PCP called to patient's arrival and reported he treated patient at home for a UTI with 2gm of Rocephin IV along with a liter of NS for dehydation. PCP recommended to family and patient for her to come to the ED for evaluation. EMS reported patient was in the bed and was unable to assist with any transfer to EMS stretcher. EMS also reported patient recognized the medics and called them by name. On arrival to the ED, patient was awake, alert, oriented x3, called nursing staff by name. Patient reports she has been feeling bad for about 8 days but has not told anyone and has not been out of the bed the entire time. Patient has an order for PT/OT outpatient here at [place], the last documented visit was on 12/8/22. Patient is chronically ill appearing with debilitation. Patient's labs were unremarkable, CBC 9.32, H/H 13.2/39.2, Platelets 219, Na 138, K 4.0, Cl 102, BUN 13, Creat 0.76, Glucose 107. UDS positive for benzos and opiates. Urinalysis positive for trace leukocytes and bacteria (cath specimen). CT head was negative, chest x-ray showed infectious/ inflammatory process. Spoke with patient about admission, she was agreeable to be admitted. Spoke with on call hospitalist at [place], Dr. Jacome, admit orders and med rec discussed. Will admit patient to inpatient for UTI, pneumonia and generalized weakness.       Overview/Hospital Course:  1/1/23 Pt was admitted  yesterday from the ER for debiliting illness/decline in health status, probable PNA and UTI .  Pt is well known to the facility from previous swing bed admission.  Pt has blood cultures pending.  She denies cough, fever, chills, sweats and SOB.  She denies abd pain, nausea, dysuria and frequency.  UA showed only trace leuks, 0-5 WBC's and occasional bacterial.  No profound signs of UTI, no UTI symptoms.  She allegedly lives at home with her , who is her caretaker.  She reports she has been in bed the past few weeks.  She now has sacral breakdown from the decreased mobility.  Pt is getting IV Rocephin x5D and PO Azithromycin 500mg x5D.  Pt has consults for PT/OT therapies and wound care.  Will continue the current course of therapies and adjust as needed. I discussed with patient her decline in mobility and health conditions.  I inquired about long term plan for her care at home versus nursing home.  She currently refuses to consider nursing home.        Interval History: Pt is continuing to get IV Rocephin daily x5D and PO Azithromycin 500mg PO x5D.  Pt denies cough, fever, chills, sweats and urinary symptoms.  Pt will have PT/OT evaluation on Monday and will see Wound care this week.      Review of Systems   Constitutional:  Positive for fatigue. Negative for chills, diaphoresis and fever.   HENT:  Negative for congestion.    Respiratory:  Negative for cough, shortness of breath and wheezing.    Cardiovascular:  Negative for chest pain.   Gastrointestinal:  Negative for abdominal pain.   Genitourinary:  Negative for difficulty urinating, dysuria, flank pain and frequency.   Skin:  Positive for wound.   Neurological:  Positive for weakness. Negative for headaches.   Objective:     Vital Signs (Most Recent):  Temp: 98.9 °F (37.2 °C) (01/01/23 0757)  Pulse: 85 (01/01/23 0757)  Resp: 18 (01/01/23 0757)  BP: (!) 132/92 (01/01/23 0757)  SpO2: (!) 94 % (01/01/23 0757)   Vital Signs (24h Range):  Temp:  [98.1 °F  (36.7 °C)-99.8 °F (37.7 °C)] 98.9 °F (37.2 °C)  Pulse:  [73-88] 85  Resp:  [16-20] 18  SpO2:  [92 %-98 %] 94 %  BP: (115-142)/(74-93) 132/92     Weight: 66.3 kg (146 lb 1.6 oz)  Body mass index is 25.08 kg/m².    Intake/Output Summary (Last 24 hours) at 1/1/2023 0930  Last data filed at 1/1/2023 0806  Gross per 24 hour   Intake 240 ml   Output --   Net 240 ml      Physical Exam  Constitutional:       General: She is awake. She is not in acute distress.     Appearance: She is well-developed, well-groomed and overweight. She is ill-appearing (Chronic ill appearing). She is not toxic-appearing.      Interventions: She is not intubated.  HENT:      Head: Normocephalic and atraumatic.   Cardiovascular:      Rate and Rhythm: Normal rate and regular rhythm.      Pulses: Normal pulses.      Heart sounds: Normal heart sounds. No murmur heard.  Pulmonary:      Effort: Pulmonary effort is normal. No tachypnea, bradypnea, accessory muscle usage, prolonged expiration, respiratory distress or retractions. She is not intubated.      Breath sounds: Normal breath sounds and air entry. No stridor, decreased air movement or transmitted upper airway sounds. No decreased breath sounds, wheezing, rhonchi or rales.   Musculoskeletal:      Cervical back: Normal range of motion. No rigidity.   Skin:     General: Skin is warm and dry.      Capillary Refill: Capillary refill takes less than 2 seconds.          Neurological:      General: No focal deficit present.      Mental Status: She is alert and oriented to person, place, and time.      GCS: GCS eye subscore is 4. GCS verbal subscore is 5. GCS motor subscore is 6.   Psychiatric:         Mood and Affect: Mood normal.         Behavior: Behavior is cooperative.       Significant Labs: All pertinent labs within the past 24 hours have been reviewed.    Significant Imaging: I have reviewed all pertinent imaging results/findings within the past 24 hours.      Assessment/Plan:      * Urinary  tract infection without hematuria  Urine culture sent  Patient received 2gm of Rocephin IV at home today per PCP  Patient started on Rocephin daily       Skin breakdown  Consult wound care therapy this week.  Wound care daily.       Pneumonia of upper lobe due to infectious organism  Blood cultures x2 pending  Patient received Rocephin 2gm prior to arrival per PCP  Patient started on Rocephin 1gm daily and Azithromycin 500mg daily      DVT prophylaxis  Patient started on Lovenox 40mg SQ daily  TEDS ordered      Hypertension  Monitor blood pressure  Continue home medications including Norvasc and Valsartan      Muscle weakness  PT/ OT evaluation and treatment       VTE Risk Mitigation (From admission, onward)         Ordered     enoxaparin injection 40 mg  Every 12 hours         01/01/23 0028     IP VTE HIGH RISK PATIENT  Once         01/01/23 0028     Place OCTAVIA hose  Until discontinued         01/01/23 0028                Discharge Planning   POOL:      Code Status: Prior   Is the patient medically ready for discharge?:     Reason for patient still in hospital (select all that apply): Patient trending condition, Treatment and PT / OT recommendations                     KEVAN Fang  Department of Hospital Medicine   [facility] - Medical Surgical Unit

## 2024-06-24 ENCOUNTER — PATIENT OUTREACH (OUTPATIENT)
Dept: HEALTH INFORMATION MANAGEMENT | Facility: OTHER | Age: 79
End: 2024-06-24
Payer: MEDICARE

## 2024-06-24 DIAGNOSIS — E11.59 HYPERTENSION ASSOCIATED WITH TYPE 2 DIABETES MELLITUS (HCC): ICD-10-CM

## 2024-06-24 DIAGNOSIS — I15.2 HYPERTENSION ASSOCIATED WITH TYPE 2 DIABETES MELLITUS (HCC): ICD-10-CM

## 2024-06-24 DIAGNOSIS — N18.31 TYPE 2 DIABETES MELLITUS WITH STAGE 3A CHRONIC KIDNEY DISEASE, WITHOUT LONG-TERM CURRENT USE OF INSULIN (HCC): ICD-10-CM

## 2024-06-24 DIAGNOSIS — E11.22 TYPE 2 DIABETES MELLITUS WITH STAGE 3A CHRONIC KIDNEY DISEASE, WITHOUT LONG-TERM CURRENT USE OF INSULIN (HCC): ICD-10-CM

## 2024-06-24 DIAGNOSIS — N18.31 STAGE 3A CHRONIC KIDNEY DISEASE: ICD-10-CM

## 2024-06-24 NOTE — PROGRESS NOTES
"Cydney called, upset over a recording she heard while trying to schedule a pc appt for Pito. Cydney stated the recoring was on [facility]'s line and it she said it mentioned this \"facility\" closing/merging with [facility]. Advised I have heard nothing about a facility closure. After speaking with Cydney I called the scheduling line, but there was no recording in regards to that?   Cydney was able to schedule an appt w/[facility] tomorrow. Explained that [facility] has been on vacation for a week and her schedule is full, but assured Cydney that Pito will still be able to visit [facility] in the future. Reviewed time of appt and arrival time. Plan is to visit with them at appt tomorrow.   "

## 2024-06-25 ENCOUNTER — OFFICE VISIT (OUTPATIENT)
Dept: MEDICAL GROUP | Facility: PHYSICIAN GROUP | Age: 79
End: 2024-06-25
Payer: MEDICARE

## 2024-06-25 VITALS
TEMPERATURE: 99 F | SYSTOLIC BLOOD PRESSURE: 102 MMHG | DIASTOLIC BLOOD PRESSURE: 68 MMHG | HEART RATE: 100 BPM | OXYGEN SATURATION: 100 % | HEIGHT: 66 IN | BODY MASS INDEX: 23.05 KG/M2 | RESPIRATION RATE: 12 BRPM | WEIGHT: 143.4 LBS

## 2024-06-25 DIAGNOSIS — Z18.9 RETAINED SUTURE, SUBSEQUENT ENCOUNTER: ICD-10-CM

## 2024-06-25 DIAGNOSIS — L72.9 CYST OF SKIN: ICD-10-CM

## 2024-06-25 DIAGNOSIS — M76.62 TENDONITIS, ACHILLES, LEFT: ICD-10-CM

## 2024-06-25 DIAGNOSIS — T81.89XD RETAINED SUTURE, SUBSEQUENT ENCOUNTER: ICD-10-CM

## 2024-06-25 DIAGNOSIS — M72.2 PLANTAR FASCIITIS: ICD-10-CM

## 2024-06-25 PROBLEM — T81.89XA RETAINED SUTURE: Status: ACTIVE | Noted: 2024-06-25

## 2024-06-25 ASSESSMENT — FIBROSIS 4 INDEX: FIB4 SCORE: 2.28

## 2024-06-25 NOTE — PROGRESS NOTES
Chief Complaint   Patient presents with    Other     Lump on R knee     Foot Pain     L foot pain, X 1 week,                                                                                                                                         HPI:   Pito presents today with the following.    The patient attended the consultation today to discuss concerns regarding pain in the left foot and a bump on the right knee.    The chief complaints presented by the patient are the aforementioned pain in the left foot and the bump on the right knee. Regarding the knee, the patient had a knee replacement surgery approximately 14-15 years ago, and one of the stitches did not heal properly. The bump has been increasing in size and contains liquid, but it is not causing any pain. The patient suspects that their body has created a cyst due to the presence of a foreign object (suture) from the surgery.    Concerning the left foot, the patient reports a history of being gouged in their toes a long time ago. The pain initially started at the heel and progressed up the arch. There is swelling at the attachment site of the Achilles tendon, and the patient is concerned about the possibility of bone spurs or arthritis causing inflammation of the tendons.    The patient's current medication regimen includes Keytruda for lung cancer and Varicea for heart issues, both of which have been prescribed since around March. The patient undergoes lab tests every three weeks, two days before their appointments. The patient denies any recent illness or steroid use, such as prednisone or methylprednisolone. Additionally, the patient reports a history of gout, which primarily affects their big toe joint.    The patient's health management strategies and concerns have been discussed, with particular attention to the pain in the left foot, the bump on the right knee, and the potential underlying causes, as well as their current medication regimen and  "history of gout.    ROS:  All systems negative expect as addressed in assessment and plan.     /68 (BP Location: Left arm, Patient Position: Sitting, BP Cuff Size: Adult)   Pulse 100   Temp 37.2 °C (99 °F) (Temporal)   Resp 12   Ht 1.676 m (5' 6\")   Wt 65 kg (143 lb 6.4 oz)   SpO2 100%   BMI 23.15 kg/m²     Objective:    Diagnostic Test Results:  - Laboratory Tests:    - Recent Labs: Kidney function reported as good, no signs of inflammatory processes or gout affecting joints other than the big toe.    - Imaging:    - X-ray: Planned for the left foot if symptoms do not improve with topical treatment, noting that the x-ray machine was down at the time of the visit.    Clinical Observations:  - Physical Examination:    - Left Foot: Swelling at the attachment site of the Achilles tendon, pain originating from the heel and extending up the arch, tenderness and swelling of the plantar fascia.    - Right Knee: Presence of a bump, described as getting bigger with liquid content, located at the site of a previous knee replacement surgery stitch that did not heal like the others.      Assessment and Plan:  79 y.o. male with the following issues.    1. Cyst of skin    2. Retained suture, subsequent encounter    3. Tendonitis, Achilles, left  - diclofenac sodium (VOLTAREN) 1 % Gel; Apply 2 g topically 4 times a day as needed (left foot pain).  Dispense: 100 g; Refill: 3  - DX-FOOT-COMPLETE 3+ LEFT; Future    4. Plantar fasciitis  - diclofenac sodium (VOLTAREN) 1 % Gel; Apply 2 g topically 4 times a day as needed (left foot pain).  Dispense: 100 g; Refill: 3  - DX-FOOT-COMPLETE 3+ LEFT; Future    1. Right Knee Cyst:     - Assessment: Patient reports a growing cyst on the right knee, likely related to a previous knee replacement surgery.     - Plan:          a. Schedule patient for cyst removal on the right knee with DON Womack, on Monday at 10:05 AM.    2. Left Foot Pain and Swelling:     - Assessment: " Patient reports pain in the left foot, starting at the heel and coming up the arch, with swelling at the attachment site of the Achilles tendon. Possible bone spurs or arthritis causing inflammation of the tendons.     - Plan:          a. Prescribe topical anti-inflammatory gel (e.g., diclofenac gel) for the patient to apply twice daily, up to four times a day if needed.          b. Send prescription to [facility].          c. Instruct patient to continue wearing supportive shoes and to contact the clinic if fever or redness develops.          d. Review patient's most recent labs to rule out inflammatory causes.          e. If no improvement in swelling after one week of using the gel, schedule an x-ray for the left foot.          f. Contact provider regarding potential side effects of current medications related to tendon inflammation.    3. Lung Cancer and Heart Issues:     - Assessment: Patient is currently on Keytruda for lung cancer and Varicea for heart issues since March.     - Plan:          a. Continue current medications as prescribed.          b. Monitor labs every three weeks.          c. Communicate with oncologist and provider regarding potential side effects and treatment plans.    Return in about 1 week (around 7/2/2024) for Cyst removal.      Please note that this dictation was created using voice recognition software. I have worked with consultants from the vendor as well as technical experts from [facility] to optimize the interface. I have made every reasonable attempt to correct obvious errors, but I expect that there are errors of grammar and possibly content that I did not discover before finalizing the note.

## 2024-06-25 NOTE — PROGRESS NOTES
Went in to visit with Pito and Cydney before Pito's pc appt today. Pito reports a history of Gout and is c/o foot pain for the past few days. No injuries, no discoloration. He also has a quarter-sized bump on his right knee cap, reports the bump appeared soon after knee arthroplasty and has continued to grow, denies pain.   I told Cydney I was unable to identify the audio message she heard yesterday when she was calling for an appt. Cydney heard a message regarding [facility] and [facility], she thought the message stated that [facility] is taking over [facility]. I explained that [facility] is in partnership with [facility].   Pito's blood pressure has been controlled with office visit readings of 102/68 (6/25/24), 112/67 (5/8/24) and 118/78 (4/8/24) His last HgbA1c on 12/23/2024 was 6.0. Next CCM outreach 7/24/2024.

## 2024-06-26 ENCOUNTER — NON-PROVIDER VISIT (OUTPATIENT)
Dept: CARDIOLOGY | Facility: MEDICAL CENTER | Age: 79
End: 2024-06-26
Payer: MEDICARE

## 2024-07-01 ENCOUNTER — OFFICE VISIT (OUTPATIENT)
Dept: MEDICAL GROUP | Facility: PHYSICIAN GROUP | Age: 79
End: 2024-07-01
Payer: MEDICARE

## 2024-07-01 VITALS
BODY MASS INDEX: 22.98 KG/M2 | HEART RATE: 99 BPM | RESPIRATION RATE: 16 BRPM | DIASTOLIC BLOOD PRESSURE: 60 MMHG | TEMPERATURE: 98.9 F | HEIGHT: 66 IN | WEIGHT: 143 LBS | SYSTOLIC BLOOD PRESSURE: 102 MMHG | OXYGEN SATURATION: 95 %

## 2024-07-01 DIAGNOSIS — L72.9 CYST OF SKIN: ICD-10-CM

## 2024-07-01 PROCEDURE — 11401 EXC TR-EXT B9+MARG 0.6-1 CM: CPT | Performed by: NURSE PRACTITIONER

## 2024-07-01 ASSESSMENT — FIBROSIS 4 INDEX: FIB4 SCORE: 2.28

## 2024-07-03 ENCOUNTER — HOSPITAL ENCOUNTER (OUTPATIENT)
Dept: RADIOLOGY | Facility: MEDICAL CENTER | Age: 79
End: 2024-07-03
Attending: NURSE PRACTITIONER
Payer: MEDICARE

## 2024-07-03 DIAGNOSIS — M72.2 PLANTAR FASCIITIS: ICD-10-CM

## 2024-07-03 DIAGNOSIS — M76.62 TENDONITIS, ACHILLES, LEFT: ICD-10-CM

## 2024-07-03 PROCEDURE — 73630 X-RAY EXAM OF FOOT: CPT | Mod: LT

## 2024-07-09 ENCOUNTER — NON-PROVIDER VISIT (OUTPATIENT)
Dept: MEDICAL GROUP | Facility: PHYSICIAN GROUP | Age: 79
End: 2024-07-09
Payer: MEDICARE

## 2024-07-09 ENCOUNTER — HOSPITAL ENCOUNTER (OUTPATIENT)
Dept: LAB | Facility: MEDICAL CENTER | Age: 79
End: 2024-07-09
Attending: INTERNAL MEDICINE
Payer: MEDICARE

## 2024-07-09 LAB
ALBUMIN SERPL BCP-MCNC: 4 G/DL (ref 3.2–4.9)
ALBUMIN/GLOB SERPL: 1.1 G/DL
ALP SERPL-CCNC: 74 U/L (ref 30–99)
ALT SERPL-CCNC: 27 U/L (ref 2–50)
ANION GAP SERPL CALC-SCNC: 11 MMOL/L (ref 7–16)
AST SERPL-CCNC: 25 U/L (ref 12–45)
BASOPHILS # BLD AUTO: 0.4 % (ref 0–1.8)
BASOPHILS # BLD: 0.04 K/UL (ref 0–0.12)
BILIRUB SERPL-MCNC: 0.4 MG/DL (ref 0.1–1.5)
BUN SERPL-MCNC: 31 MG/DL (ref 8–22)
CALCIUM ALBUM COR SERPL-MCNC: 10.1 MG/DL (ref 8.5–10.5)
CALCIUM SERPL-MCNC: 10.1 MG/DL (ref 8.5–10.5)
CHLORIDE SERPL-SCNC: 101 MMOL/L (ref 96–112)
CO2 SERPL-SCNC: 22 MMOL/L (ref 20–33)
CREAT SERPL-MCNC: 1.16 MG/DL (ref 0.5–1.4)
EOSINOPHIL # BLD AUTO: 0.41 K/UL (ref 0–0.51)
EOSINOPHIL NFR BLD: 4 % (ref 0–6.9)
ERYTHROCYTE [DISTWIDTH] IN BLOOD BY AUTOMATED COUNT: 45.8 FL (ref 35.9–50)
GFR SERPLBLD CREATININE-BSD FMLA CKD-EPI: 64 ML/MIN/1.73 M 2
GLOBULIN SER CALC-MCNC: 3.8 G/DL (ref 1.9–3.5)
GLUCOSE SERPL-MCNC: 108 MG/DL (ref 65–99)
HCT VFR BLD AUTO: 45.6 % (ref 42–52)
HGB BLD-MCNC: 15.5 G/DL (ref 14–18)
IMM GRANULOCYTES # BLD AUTO: 0.06 K/UL (ref 0–0.11)
IMM GRANULOCYTES NFR BLD AUTO: 0.6 % (ref 0–0.9)
LYMPHOCYTES # BLD AUTO: 2.15 K/UL (ref 1–4.8)
LYMPHOCYTES NFR BLD: 21.2 % (ref 22–41)
MCH RBC QN AUTO: 31.8 PG (ref 27–33)
MCHC RBC AUTO-ENTMCNC: 34 G/DL (ref 32.3–36.5)
MCV RBC AUTO: 93.4 FL (ref 81.4–97.8)
MONOCYTES # BLD AUTO: 0.89 K/UL (ref 0–0.85)
MONOCYTES NFR BLD AUTO: 8.8 % (ref 0–13.4)
NEUTROPHILS # BLD AUTO: 6.6 K/UL (ref 1.82–7.42)
NEUTROPHILS NFR BLD: 65 % (ref 44–72)
NRBC # BLD AUTO: 0 K/UL
NRBC BLD-RTO: 0 /100 WBC (ref 0–0.2)
PLATELET # BLD AUTO: 184 K/UL (ref 164–446)
PMV BLD AUTO: 9.9 FL (ref 9–12.9)
POTASSIUM SERPL-SCNC: 4.5 MMOL/L (ref 3.6–5.5)
PROT SERPL-MCNC: 7.8 G/DL (ref 6–8.2)
RBC # BLD AUTO: 4.88 M/UL (ref 4.7–6.1)
SODIUM SERPL-SCNC: 134 MMOL/L (ref 135–145)
TSH SERPL DL<=0.005 MIU/L-ACNC: 1.44 UIU/ML (ref 0.38–5.33)
WBC # BLD AUTO: 10.2 K/UL (ref 4.8–10.8)

## 2024-07-09 PROCEDURE — 85025 COMPLETE CBC W/AUTO DIFF WBC: CPT

## 2024-07-09 PROCEDURE — 84443 ASSAY THYROID STIM HORMONE: CPT

## 2024-07-09 PROCEDURE — 36415 COLL VENOUS BLD VENIPUNCTURE: CPT

## 2024-07-09 PROCEDURE — 80053 COMPREHEN METABOLIC PANEL: CPT

## 2024-07-12 ENCOUNTER — OUTPATIENT INFUSION SERVICES (OUTPATIENT)
Dept: ONCOLOGY | Facility: MEDICAL CENTER | Age: 79
End: 2024-07-12
Attending: INTERNAL MEDICINE
Payer: MEDICARE

## 2024-07-12 VITALS
HEART RATE: 96 BPM | SYSTOLIC BLOOD PRESSURE: 118 MMHG | RESPIRATION RATE: 16 BRPM | TEMPERATURE: 98 F | DIASTOLIC BLOOD PRESSURE: 72 MMHG | HEIGHT: 66 IN | OXYGEN SATURATION: 98 % | BODY MASS INDEX: 22.68 KG/M2 | WEIGHT: 141.09 LBS

## 2024-07-12 DIAGNOSIS — C34.11 PRIMARY CANCER OF RIGHT UPPER LOBE OF LUNG (HCC): ICD-10-CM

## 2024-07-12 PROCEDURE — 700105 HCHG RX REV CODE 258: Performed by: INTERNAL MEDICINE

## 2024-07-12 PROCEDURE — 700111 HCHG RX REV CODE 636 W/ 250 OVERRIDE (IP)

## 2024-07-12 PROCEDURE — 96413 CHEMO IV INFUSION 1 HR: CPT

## 2024-07-12 PROCEDURE — 700111 HCHG RX REV CODE 636 W/ 250 OVERRIDE (IP): Mod: JZ,JG | Performed by: INTERNAL MEDICINE

## 2024-07-12 PROCEDURE — 700105 HCHG RX REV CODE 258

## 2024-07-12 RX ADMIN — SODIUM CHLORIDE 200 MG: 9 INJECTION, SOLUTION INTRAVENOUS at 13:42

## 2024-07-12 ASSESSMENT — FIBROSIS 4 INDEX: FIB4 SCORE: 2.07

## 2024-07-16 PROCEDURE — RXMED WILLOW AMBULATORY MEDICATION CHARGE

## 2024-07-16 PROCEDURE — RXMED WILLOW AMBULATORY MEDICATION CHARGE: Performed by: INTERNAL MEDICINE

## 2024-07-18 ENCOUNTER — PHARMACY VISIT (OUTPATIENT)
Dept: PHARMACY | Facility: MEDICAL CENTER | Age: 79
End: 2024-07-18
Payer: COMMERCIAL

## 2024-07-18 RX ORDER — METFORMIN HYDROCHLORIDE 750 MG/1
TABLET, EXTENDED RELEASE ORAL DAILY
Qty: 100 TABLET | Refills: 0 | Status: SHIPPED | OUTPATIENT
Start: 2024-07-18

## 2024-07-24 PROCEDURE — RXMED WILLOW AMBULATORY MEDICATION CHARGE: Performed by: FAMILY MEDICINE

## 2024-07-25 ENCOUNTER — PHARMACY VISIT (OUTPATIENT)
Dept: PHARMACY | Facility: MEDICAL CENTER | Age: 79
End: 2024-07-25
Payer: COMMERCIAL

## 2024-07-30 ENCOUNTER — HOSPITAL ENCOUNTER (OUTPATIENT)
Dept: LAB | Facility: MEDICAL CENTER | Age: 79
End: 2024-07-30
Attending: INTERNAL MEDICINE
Payer: MEDICARE

## 2024-07-30 LAB
ALBUMIN SERPL BCP-MCNC: 3.7 G/DL (ref 3.2–4.9)
ALBUMIN/GLOB SERPL: 0.9 G/DL
ALP SERPL-CCNC: 70 U/L (ref 30–99)
ALT SERPL-CCNC: 19 U/L (ref 2–50)
ANION GAP SERPL CALC-SCNC: 12 MMOL/L (ref 7–16)
AST SERPL-CCNC: 23 U/L (ref 12–45)
BASOPHILS # BLD AUTO: 0.5 % (ref 0–1.8)
BASOPHILS # BLD: 0.05 K/UL (ref 0–0.12)
BILIRUB SERPL-MCNC: 0.5 MG/DL (ref 0.1–1.5)
BUN SERPL-MCNC: 27 MG/DL (ref 8–22)
CALCIUM ALBUM COR SERPL-MCNC: 9.7 MG/DL (ref 8.5–10.5)
CALCIUM SERPL-MCNC: 9.5 MG/DL (ref 8.5–10.5)
CHLORIDE SERPL-SCNC: 99 MMOL/L (ref 96–112)
CO2 SERPL-SCNC: 21 MMOL/L (ref 20–33)
CREAT SERPL-MCNC: 1.2 MG/DL (ref 0.5–1.4)
EOSINOPHIL # BLD AUTO: 0.43 K/UL (ref 0–0.51)
EOSINOPHIL NFR BLD: 4.1 % (ref 0–6.9)
ERYTHROCYTE [DISTWIDTH] IN BLOOD BY AUTOMATED COUNT: 49.1 FL (ref 35.9–50)
GFR SERPLBLD CREATININE-BSD FMLA CKD-EPI: 61 ML/MIN/1.73 M 2
GLOBULIN SER CALC-MCNC: 4.1 G/DL (ref 1.9–3.5)
GLUCOSE SERPL-MCNC: 113 MG/DL (ref 65–99)
HCT VFR BLD AUTO: 44.4 % (ref 42–52)
HGB BLD-MCNC: 14.7 G/DL (ref 14–18)
IMM GRANULOCYTES # BLD AUTO: 0.07 K/UL (ref 0–0.11)
IMM GRANULOCYTES NFR BLD AUTO: 0.7 % (ref 0–0.9)
LYMPHOCYTES # BLD AUTO: 1.97 K/UL (ref 1–4.8)
LYMPHOCYTES NFR BLD: 18.9 % (ref 22–41)
MCH RBC QN AUTO: 31.3 PG (ref 27–33)
MCHC RBC AUTO-ENTMCNC: 33.1 G/DL (ref 32.3–36.5)
MCV RBC AUTO: 94.7 FL (ref 81.4–97.8)
MONOCYTES # BLD AUTO: 0.94 K/UL (ref 0–0.85)
MONOCYTES NFR BLD AUTO: 9 % (ref 0–13.4)
NEUTROPHILS # BLD AUTO: 6.94 K/UL (ref 1.82–7.42)
NEUTROPHILS NFR BLD: 66.8 % (ref 44–72)
NRBC # BLD AUTO: 0 K/UL
NRBC BLD-RTO: 0 /100 WBC (ref 0–0.2)
PLATELET # BLD AUTO: 133 K/UL (ref 164–446)
PMV BLD AUTO: 10 FL (ref 9–12.9)
POTASSIUM SERPL-SCNC: 4.2 MMOL/L (ref 3.6–5.5)
PROT SERPL-MCNC: 7.8 G/DL (ref 6–8.2)
RBC # BLD AUTO: 4.69 M/UL (ref 4.7–6.1)
SODIUM SERPL-SCNC: 132 MMOL/L (ref 135–145)
TSH SERPL-ACNC: 1.13 UIU/ML (ref 0.35–5.5)
WBC # BLD AUTO: 10.4 K/UL (ref 4.8–10.8)

## 2024-07-30 PROCEDURE — 85025 COMPLETE CBC W/AUTO DIFF WBC: CPT

## 2024-07-30 PROCEDURE — 84443 ASSAY THYROID STIM HORMONE: CPT

## 2024-07-30 PROCEDURE — 80053 COMPREHEN METABOLIC PANEL: CPT

## 2024-07-30 PROCEDURE — 36415 COLL VENOUS BLD VENIPUNCTURE: CPT

## 2024-07-31 ENCOUNTER — OFFICE VISIT (OUTPATIENT)
Dept: URGENT CARE | Facility: PHYSICIAN GROUP | Age: 79
End: 2024-07-31
Payer: MEDICARE

## 2024-07-31 VITALS
HEART RATE: 90 BPM | WEIGHT: 137 LBS | DIASTOLIC BLOOD PRESSURE: 57 MMHG | OXYGEN SATURATION: 96 % | BODY MASS INDEX: 20.76 KG/M2 | HEIGHT: 68 IN | RESPIRATION RATE: 14 BRPM | TEMPERATURE: 98.5 F | SYSTOLIC BLOOD PRESSURE: 112 MMHG

## 2024-07-31 DIAGNOSIS — R51.9 ACUTE NONINTRACTABLE HEADACHE, UNSPECIFIED HEADACHE TYPE: ICD-10-CM

## 2024-07-31 ASSESSMENT — ENCOUNTER SYMPTOMS
CHILLS: 0
HEADACHES: 1
NAUSEA: 0
ABDOMINAL PAIN: 0
DIZZINESS: 0
SORE THROAT: 0
PALPITATIONS: 0
SHORTNESS OF BREATH: 0
VOMITING: 0
WHEEZING: 0
COUGH: 0
DIARRHEA: 0
FEVER: 0

## 2024-07-31 ASSESSMENT — FIBROSIS 4 INDEX: FIB4 SCORE: 3.13

## 2024-08-02 ENCOUNTER — TELEPHONE (OUTPATIENT)
Dept: SLEEP MEDICINE | Facility: MEDICAL CENTER | Age: 79
End: 2024-08-02

## 2024-08-02 ENCOUNTER — OUTPATIENT INFUSION SERVICES (OUTPATIENT)
Dept: ONCOLOGY | Facility: MEDICAL CENTER | Age: 79
End: 2024-08-02
Attending: INTERNAL MEDICINE
Payer: MEDICARE

## 2024-08-02 VITALS
OXYGEN SATURATION: 95 % | HEIGHT: 66 IN | BODY MASS INDEX: 22.85 KG/M2 | SYSTOLIC BLOOD PRESSURE: 110 MMHG | RESPIRATION RATE: 16 BRPM | WEIGHT: 142.2 LBS | HEART RATE: 100 BPM | DIASTOLIC BLOOD PRESSURE: 60 MMHG | TEMPERATURE: 98.9 F

## 2024-08-02 ASSESSMENT — FIBROSIS 4 INDEX: FIB4 SCORE: 3.13

## 2024-08-02 NOTE — PROGRESS NOTES
Orders unsigned for today's treatment. Spoke with Dr. Schulz and provider has asked to hold treatment today due to recent scans. Provider will see pt in his clinic next week. Pt informed by KORY Meyer, that treatment will be held, and pt to follow-up with provider next week. Pt verbalized understanding and left infusion in NAD.

## 2024-08-02 NOTE — TELEPHONE ENCOUNTER
Caller: Cydney - wife    Topic/issue: Called to get patient set up with an appointment for Dr. Vinayak Carbajal - I currently do not have a scheduling template for this provider. I offered to get patient scheduled with another provider and Cydney declined - wants to stay with Dr. Carbajal. Requesting to speak with someone in the office about this - please advise.     Callback Number: [phone]    Thank you,  Ruba SPARKS

## 2024-08-02 NOTE — PROGRESS NOTES
"Pharmacy Chemotherapy Calculations:    Dx: NSCLC  Cycle 6 chemo cancelled due to disease progression   Previous treatment = C5 on 6/21/24    Protocol: Pembrolizumab  Pembrolizumab 200 mg IV over 30 minutes  21-day cycle until DP/UT or until up to 24 months of therapy has been completed  Or  Pembrolizumab 400 mg IV over 30 minutes  42-day cycle until DP/UT or until up to 24 months of therapy has been completed  NCCN Guidelines for Non-Small Cell Lung Cancer V.1.2024.  Garon EB , et al. J Clin Oncol. 2019;37(28):4848-3932.  Navid M , et al. N Engl J Med. 2016;375(19):9485-8804.  Betty TSK , et al. Lancet. 2019;393(96716):8728-1551.  Wendi M , et al. Eur J Cancer. 2020;131:68-75.    Allergies:  Patient has no known allergies.     /60   Pulse 100   Temp 37.2 °C (98.9 °F) (Temporal)   Resp 16   Ht 1.68 m (5' 6.14\")   Wt 64.5 kg (142 lb 3.2 oz)   SpO2 95%   BMI 22.85 kg/m²  Body surface area is 1.73 meters squared.    Geovanna Raman PharmD  "

## 2024-08-05 NOTE — TELEPHONE ENCOUNTER
Unable to leave voice message due to vm box is full. We do not have anything available at this time and recommend to call us frequently to see if there are any cancellations.

## 2024-08-06 ENCOUNTER — PHARMACY VISIT (OUTPATIENT)
Dept: PHARMACY | Facility: MEDICAL CENTER | Age: 79
End: 2024-08-06
Payer: COMMERCIAL

## 2024-08-06 PROCEDURE — RXMED WILLOW AMBULATORY MEDICATION CHARGE: Performed by: INTERNAL MEDICINE

## 2024-08-06 PROCEDURE — RXMED WILLOW AMBULATORY MEDICATION CHARGE

## 2024-08-06 PROCEDURE — RXMED WILLOW AMBULATORY MEDICATION CHARGE: Performed by: NURSE PRACTITIONER

## 2024-08-06 RX ORDER — BENZONATATE 100 MG/1
CAPSULE ORAL
Qty: 90 CAPSULE | Refills: 1 | Status: ON HOLD | OUTPATIENT
Start: 2024-08-06

## 2024-08-13 ENCOUNTER — HOSPITAL ENCOUNTER (INPATIENT)
Facility: MEDICAL CENTER | Age: 79
LOS: 11 days | DRG: 065 | End: 2024-08-24
Attending: EMERGENCY MEDICINE | Admitting: STUDENT IN AN ORGANIZED HEALTH CARE EDUCATION/TRAINING PROGRAM
Payer: MEDICARE

## 2024-08-13 ENCOUNTER — APPOINTMENT (OUTPATIENT)
Dept: RADIOLOGY | Facility: MEDICAL CENTER | Age: 79
DRG: 065 | End: 2024-08-13
Attending: EMERGENCY MEDICINE
Payer: MEDICARE

## 2024-08-13 DIAGNOSIS — I63.511 RIGHT MIDDLE CEREBRAL ARTERY STROKE (HCC): ICD-10-CM

## 2024-08-13 DIAGNOSIS — M54.6 CHRONIC BILATERAL THORACIC BACK PAIN: ICD-10-CM

## 2024-08-13 DIAGNOSIS — I63.9 CEREBROVASCULAR ACCIDENT (CVA), UNSPECIFIED MECHANISM (HCC): ICD-10-CM

## 2024-08-13 DIAGNOSIS — G89.29 CHRONIC BILATERAL THORACIC BACK PAIN: ICD-10-CM

## 2024-08-13 DIAGNOSIS — R47.9 SPEECH DISTURBANCE, UNSPECIFIED TYPE: ICD-10-CM

## 2024-08-13 PROBLEM — R13.12 OROPHARYNGEAL DYSPHAGIA: Status: ACTIVE | Noted: 2024-08-13

## 2024-08-13 PROBLEM — N18.31 TYPE 2 DIABETES MELLITUS WITH STAGE 3A CHRONIC KIDNEY DISEASE, WITHOUT LONG-TERM CURRENT USE OF INSULIN (HCC): Status: ACTIVE | Noted: 2023-10-23

## 2024-08-13 PROBLEM — E87.1 HYPONATREMIA: Status: ACTIVE | Noted: 2024-08-13

## 2024-08-13 PROBLEM — R47.1 DYSARTHRIA: Status: ACTIVE | Noted: 2024-08-13

## 2024-08-13 LAB
ABO GROUP BLD: NORMAL
ALBUMIN SERPL BCP-MCNC: 3.7 G/DL (ref 3.2–4.9)
ALBUMIN/GLOB SERPL: 0.9 G/DL
ALP SERPL-CCNC: 78 U/L (ref 30–99)
ALT SERPL-CCNC: 25 U/L (ref 2–50)
ANION GAP SERPL CALC-SCNC: 14 MMOL/L (ref 7–16)
APTT PPP: 32 SEC (ref 24.7–36)
AST SERPL-CCNC: 26 U/L (ref 12–45)
BASOPHILS # BLD AUTO: 0.4 % (ref 0–1.8)
BASOPHILS # BLD: 0.04 K/UL (ref 0–0.12)
BILIRUB SERPL-MCNC: 0.6 MG/DL (ref 0.1–1.5)
BLD GP AB SCN SERPL QL: NORMAL
BUN SERPL-MCNC: 29 MG/DL (ref 8–22)
CALCIUM ALBUM COR SERPL-MCNC: 9.5 MG/DL (ref 8.5–10.5)
CALCIUM SERPL-MCNC: 9.3 MG/DL (ref 8.5–10.5)
CHLORIDE SERPL-SCNC: 99 MMOL/L (ref 96–112)
CHOLEST SERPL-MCNC: 134 MG/DL (ref 100–199)
CO2 SERPL-SCNC: 20 MMOL/L (ref 20–33)
CREAT SERPL-MCNC: 1.34 MG/DL (ref 0.5–1.4)
EKG IMPRESSION: NORMAL
EOSINOPHIL # BLD AUTO: 0.41 K/UL (ref 0–0.51)
EOSINOPHIL NFR BLD: 3.8 % (ref 0–6.9)
ERYTHROCYTE [DISTWIDTH] IN BLOOD BY AUTOMATED COUNT: 49.1 FL (ref 35.9–50)
EST. AVERAGE GLUCOSE BLD GHB EST-MCNC: 126 MG/DL
GFR SERPLBLD CREATININE-BSD FMLA CKD-EPI: 54 ML/MIN/1.73 M 2
GLOBULIN SER CALC-MCNC: 4.2 G/DL (ref 1.9–3.5)
GLUCOSE SERPL-MCNC: 157 MG/DL (ref 65–99)
HBA1C MFR BLD: 6 % (ref 4–5.6)
HCT VFR BLD AUTO: 44.5 % (ref 42–52)
HDLC SERPL-MCNC: 33 MG/DL
HGB BLD-MCNC: 14.8 G/DL (ref 14–18)
HOLDING TUBE BB 8507: NORMAL
IMM GRANULOCYTES # BLD AUTO: 0.08 K/UL (ref 0–0.11)
IMM GRANULOCYTES NFR BLD AUTO: 0.7 % (ref 0–0.9)
INR PPP: 1.29 (ref 0.87–1.13)
LDLC SERPL CALC-MCNC: 84 MG/DL
LYMPHOCYTES # BLD AUTO: 1.47 K/UL (ref 1–4.8)
LYMPHOCYTES NFR BLD: 13.5 % (ref 22–41)
MCH RBC QN AUTO: 30.8 PG (ref 27–33)
MCHC RBC AUTO-ENTMCNC: 33.3 G/DL (ref 32.3–36.5)
MCV RBC AUTO: 92.7 FL (ref 81.4–97.8)
MONOCYTES # BLD AUTO: 0.92 K/UL (ref 0–0.85)
MONOCYTES NFR BLD AUTO: 8.5 % (ref 0–13.4)
NEUTROPHILS # BLD AUTO: 7.93 K/UL (ref 1.82–7.42)
NEUTROPHILS NFR BLD: 73.1 % (ref 44–72)
NRBC # BLD AUTO: 0 K/UL
NRBC BLD-RTO: 0 /100 WBC (ref 0–0.2)
PLATELET # BLD AUTO: 110 K/UL (ref 164–446)
PMV BLD AUTO: 9.7 FL (ref 9–12.9)
POTASSIUM SERPL-SCNC: 4.8 MMOL/L (ref 3.6–5.5)
PROT SERPL-MCNC: 7.9 G/DL (ref 6–8.2)
PROTHROMBIN TIME: 16.3 SEC (ref 12–14.6)
RBC # BLD AUTO: 4.8 M/UL (ref 4.7–6.1)
RH BLD: NORMAL
SODIUM SERPL-SCNC: 133 MMOL/L (ref 135–145)
TRIGL SERPL-MCNC: 83 MG/DL (ref 0–149)
TROPONIN T SERPL-MCNC: 55 NG/L (ref 6–19)
WBC # BLD AUTO: 10.9 K/UL (ref 4.8–10.8)

## 2024-08-13 PROCEDURE — 85025 COMPLETE CBC W/AUTO DIFF WBC: CPT

## 2024-08-13 PROCEDURE — 700105 HCHG RX REV CODE 258: Performed by: STUDENT IN AN ORGANIZED HEALTH CARE EDUCATION/TRAINING PROGRAM

## 2024-08-13 PROCEDURE — 86901 BLOOD TYPING SEROLOGIC RH(D): CPT

## 2024-08-13 PROCEDURE — 99223 1ST HOSP IP/OBS HIGH 75: CPT | Mod: AI | Performed by: STUDENT IN AN ORGANIZED HEALTH CARE EDUCATION/TRAINING PROGRAM

## 2024-08-13 PROCEDURE — 99285 EMERGENCY DEPT VISIT HI MDM: CPT

## 2024-08-13 PROCEDURE — 86850 RBC ANTIBODY SCREEN: CPT

## 2024-08-13 PROCEDURE — 80061 LIPID PANEL: CPT

## 2024-08-13 PROCEDURE — 70498 CT ANGIOGRAPHY NECK: CPT

## 2024-08-13 PROCEDURE — 71045 X-RAY EXAM CHEST 1 VIEW: CPT

## 2024-08-13 PROCEDURE — 87040 BLOOD CULTURE FOR BACTERIA: CPT

## 2024-08-13 PROCEDURE — 36415 COLL VENOUS BLD VENIPUNCTURE: CPT

## 2024-08-13 PROCEDURE — 92610 EVALUATE SWALLOWING FUNCTION: CPT

## 2024-08-13 PROCEDURE — 80053 COMPREHEN METABOLIC PANEL: CPT

## 2024-08-13 PROCEDURE — 84484 ASSAY OF TROPONIN QUANT: CPT

## 2024-08-13 PROCEDURE — 0042T CT-CEREBRAL PERFUSION ANALYSIS: CPT

## 2024-08-13 PROCEDURE — 70496 CT ANGIOGRAPHY HEAD: CPT

## 2024-08-13 PROCEDURE — 83036 HEMOGLOBIN GLYCOSYLATED A1C: CPT

## 2024-08-13 PROCEDURE — 99222 1ST HOSP IP/OBS MODERATE 55: CPT

## 2024-08-13 PROCEDURE — 770020 HCHG ROOM/CARE - TELE (206)

## 2024-08-13 PROCEDURE — 85730 THROMBOPLASTIN TIME PARTIAL: CPT

## 2024-08-13 PROCEDURE — 94760 N-INVAS EAR/PLS OXIMETRY 1: CPT

## 2024-08-13 PROCEDURE — 85610 PROTHROMBIN TIME: CPT

## 2024-08-13 PROCEDURE — 86900 BLOOD TYPING SEROLOGIC ABO: CPT

## 2024-08-13 PROCEDURE — 93005 ELECTROCARDIOGRAM TRACING: CPT | Performed by: EMERGENCY MEDICINE

## 2024-08-13 PROCEDURE — 700117 HCHG RX CONTRAST REV CODE 255: Performed by: EMERGENCY MEDICINE

## 2024-08-13 RX ORDER — DAPAGLIFLOZIN 10 MG/1
10 TABLET, FILM COATED ORAL DAILY
Status: DISCONTINUED | OUTPATIENT
Start: 2024-08-13 | End: 2024-08-15

## 2024-08-13 RX ORDER — CYCLOBENZAPRINE HCL 10 MG
10 TABLET ORAL
Status: DISCONTINUED | OUTPATIENT
Start: 2024-08-13 | End: 2024-08-15

## 2024-08-13 RX ORDER — EZETIMIBE 10 MG/1
10 TABLET ORAL DAILY
Status: DISCONTINUED | OUTPATIENT
Start: 2024-08-13 | End: 2024-08-15

## 2024-08-13 RX ORDER — SODIUM CHLORIDE 9 MG/ML
500 INJECTION, SOLUTION INTRAVENOUS
Status: ACTIVE | OUTPATIENT
Start: 2024-08-13 | End: 2024-08-15

## 2024-08-13 RX ORDER — SODIUM CHLORIDE, SODIUM LACTATE, POTASSIUM CHLORIDE, CALCIUM CHLORIDE 600; 310; 30; 20 MG/100ML; MG/100ML; MG/100ML; MG/100ML
1000 INJECTION, SOLUTION INTRAVENOUS ONCE
Status: COMPLETED | OUTPATIENT
Start: 2024-08-13 | End: 2024-08-14

## 2024-08-13 RX ORDER — VITAMIN B COMPLEX
2000 TABLET ORAL DAILY
Status: DISCONTINUED | OUTPATIENT
Start: 2024-08-13 | End: 2024-08-15

## 2024-08-13 RX ORDER — BENZONATATE 100 MG/1
100 CAPSULE ORAL 3 TIMES DAILY PRN
Status: DISCONTINUED | OUTPATIENT
Start: 2024-08-13 | End: 2024-08-16

## 2024-08-13 RX ORDER — ONDANSETRON 2 MG/ML
4 INJECTION INTRAMUSCULAR; INTRAVENOUS EVERY 4 HOURS PRN
Status: DISCONTINUED | OUTPATIENT
Start: 2024-08-13 | End: 2024-08-24 | Stop reason: HOSPADM

## 2024-08-13 RX ORDER — ONDANSETRON 4 MG/1
4 TABLET, ORALLY DISINTEGRATING ORAL EVERY 4 HOURS PRN
Status: DISCONTINUED | OUTPATIENT
Start: 2024-08-13 | End: 2024-08-15

## 2024-08-13 RX ORDER — SPIRONOLACTONE 25 MG/1
25 TABLET ORAL DAILY
Status: DISCONTINUED | OUTPATIENT
Start: 2024-08-13 | End: 2024-08-15

## 2024-08-13 RX ORDER — ATORVASTATIN CALCIUM 20 MG/1
20 TABLET, FILM COATED ORAL EVERY EVENING
Status: DISCONTINUED | OUTPATIENT
Start: 2024-08-13 | End: 2024-08-15

## 2024-08-13 RX ORDER — METOPROLOL SUCCINATE 25 MG/1
50 TABLET, EXTENDED RELEASE ORAL DAILY
Status: DISCONTINUED | OUTPATIENT
Start: 2024-08-13 | End: 2024-08-16

## 2024-08-13 RX ADMIN — IOHEXOL 40 ML: 350 INJECTION, SOLUTION INTRAVENOUS at 08:35

## 2024-08-13 RX ADMIN — SODIUM CHLORIDE, POTASSIUM CHLORIDE, SODIUM LACTATE AND CALCIUM CHLORIDE 1000 ML: 600; 310; 30; 20 INJECTION, SOLUTION INTRAVENOUS at 20:29

## 2024-08-13 RX ADMIN — IOHEXOL 80 ML: 350 INJECTION, SOLUTION INTRAVENOUS at 08:36

## 2024-08-13 ASSESSMENT — ENCOUNTER SYMPTOMS
DIZZINESS: 0
NERVOUS/ANXIOUS: 0
BRUISES/BLEEDS EASILY: 0
BACK PAIN: 0
ABDOMINAL PAIN: 0
HEADACHES: 0
FEVER: 0
FLANK PAIN: 0
CONSTIPATION: 0
EYE PAIN: 0
FOCAL WEAKNESS: 1
SORE THROAT: 0
NAUSEA: 0
BLURRED VISION: 0
CHILLS: 0
MYALGIAS: 0
DIARRHEA: 0
SINUS PAIN: 0
DEPRESSION: 0
NECK PAIN: 0
PALPITATIONS: 0
SHORTNESS OF BREATH: 0
SPEECH CHANGE: 1
BLOOD IN STOOL: 0
HEMOPTYSIS: 0
VOMITING: 0

## 2024-08-13 ASSESSMENT — CHA2DS2 SCORE
SEX: MALE
AGE 75 OR GREATER: YES
CHF OR LEFT VENTRICULAR DYSFUNCTION: YES
DIABETES: YES
AGE 65 TO 74: NO
HYPERTENSION: YES
CHA2DS2 VASC SCORE: 5
PRIOR STROKE OR TIA OR THROMBOEMBOLISM: NO
VASCULAR DISEASE: NO

## 2024-08-13 ASSESSMENT — FIBROSIS 4 INDEX: FIB4 SCORE: 3.13

## 2024-08-13 ASSESSMENT — PAIN DESCRIPTION - PAIN TYPE: TYPE: ACUTE PAIN

## 2024-08-13 NOTE — ED NOTES
Bedside report to Heather HORN.  POC discussed with patient. Call light within reach, all needs addressed at this time.         Fall risk interventions in place: in place (all applicable per [place] Fall risk assessment)   Continuous monitoring: Cardiac Leads, Pulse Ox, or Blood Pressure  IVF/IV medications: Not Applicable   Oxygen: Room Air  Bedside sitter: Not Applicable   Isolation: Not Applicable

## 2024-08-13 NOTE — ED NOTES
"Med rec is complete per Patient at bedside.   Pt has been on study drug \" a long time\". Pt confirms over a year.    Allergies reviewed.    Has patient had any outside antibiotics in the last 30 days? N    Any Anticoagulants (rivaroxaban, apixaban, edoxaban, dabigatran, warfarin, enoxaparin) taken in the last 14 days? Y  Anticoagulant name: Eliquis , Last dose: 8/12/24 @ 19:00 pm.        Pharmacy/Pharmacies Pt utilizes : [facility] [phone]         "

## 2024-08-13 NOTE — ED NOTES
Patient brought to [facility] in [facility], placed on continuous monitoring, family at bedside.

## 2024-08-13 NOTE — THERAPY
Speech Language Pathology   Clinical Swallow Evaluation     Patient Name: Pito Black  AGE:  79 y.o., SEX:  male  Medical Record #: [id number]  Date of Service: 2024      History of Present Illness  79M admitted on 24 with L facial droop, slurred speech, and difficulty swallowing, found to have R MCA stroke. Per wife, recent diagnosis of adenocarcinoma (dx 2024), on Keytruda and planned for chemotherapy next week.     PMH notable for afib, EF 35% s/ AICD, HTN, CKD, HLD, DM, stage I NSCLC status post wedge resection in 2018, stage Ia NSCLC of the right upper lobe treated with SBRT in , and stage IIIa NSCLC of the left lower lobe treated with carboplatin/Taxol/radiation in .     Not previously seen by service per EMR.     Imagin24 CXR:   1. Development of interstitial opacities in the lungs, either edema or interstitial infiltrates.  2. Worsening confluent opacities in the right upper lobe and lingula.  3. The remainder is stable.    24 CT/A Head: There is a focal stenosis/occlusion in a distal branch of the right MCA with flow reconstitution.       General Information:  Vitals  O2 Delivery Device: None - Room Air  Vitals Comments: hard of hearing  Level of Consciousness: Alert  Patient Behaviors:  (Cooperative, Pleasant)  Orientation: Oriented x 4  Follows Directives: Yes      Prior Living Situation & Level of Function:  Communication: WFL  Swallowing: WFL       Oral Mechanism Evaluation:  Dentition: Lower partial   Facial Symmetry: Central left facial droop  Facial Sensation: Impaired - left     Labial Observations: Left sided weakness   Lingual Observations: Midline  Motor Speech: Moderate unilateral upper motor neuron dysarthria          Laryngeal Function:  Secretion Management: Drooling  Voice Quality: Harsh, Breathy continuous (moderate)  Cough: Perceptually weak       Subjective  RN cleared patient for evaluation; reported difficulty swallowing meds. Patient alert and  agreeable to evaluation. He reported coughing with meds as well. Patient's wife at bedside. She reported voice change about 2 days prior.      Assessment  Current Method of Nutrition: NPO until cleared by speech pathology  Positioning: Joseph's (60-90 degrees)  Bolus Administration: Patient    O2 Delivery Device: None - Room Air  Factor(s) Affecting Performance: None  Tracheostomy : No      Swallowing Trials:  Thin Liquid (TN0): Impaired  Liquidised (LQ3): WFL      Comments: Adequate oral bolus acceptance; L anterior loss and drooling while talking. Presumed timely oral transit. Minimal L buccal residue of LQ3. Immediate cough with 100% of trials TN0. Vocal quality remained stable throughout PO intake. Single swallow completed per bolus. No signs of esophageal dysfunction. Provided education regarding dysphagia risk factors, signs of aspiration, and indication for MBSS. Procedure described in detail, patient agreeable. RN/MD updated.       Clinical Impressions  Patient presents with clinical indicators of airway invasion, concerning for pharyngeal dysphagia. Moderate dysphonia with breathy, rough vocal quality. Oral mechanism exam remarkable for L sided motor and sensory deficits. Presentation is consistent with acute stroke. A swallowing diagnostic is indicated to guide dysphagia management; recommend NPO with meds crushed in applesauce and ice chips for pleasure in interim. MBSS to be completed tomorrow pending scheduling availability.      Recommendations  Diet Consistency: NPO pending diagnostic; Clear for minimal ice chips/hour after oral care to reduce xerostomia and mitigate disuse atrophy of swallow musculature   Instrumentation: VFSS (MBSS)  Medication: Non Oral, Crush with applesauce, as appropriate  Risk Management : Monitor for left pocketing  Oral Care: Q4h         SLP Treatment Plan  Treatment Plan: Dysphagia Treatment  SLP Frequency: 4x Per Week  Estimated Duration: Until Therapy Goals  "Met      Anticipated Discharge Needs  Discharge Recommendations: Recommend post-acute placement for additional speech therapy services prior to discharge home   Therapy Recommendations Upon DC: Dysphagia Training        Patient / Family Goals  Patient / Family Goal #1: \"I want more\" to applesauce  Short Term Goals  Short Term Goal # 1: Patient will participate in swallow diagnostic to guide dysphagia management.      Lucy Mcgraw, SLP   "

## 2024-08-13 NOTE — PROGRESS NOTES
4 Eyes Skin Assessment Completed by Colten RN and KORY Zhong.    Head WDL  Ears WDL  Nose WDL  Mouth WDL  Neck WDL  Breast/Chest WDL  Shoulder Blades WDL  Spine Redness and Blanching  (R) Arm/Elbow/Hand Redness and Scab  (L) Arm/Elbow/Hand Redness and Blanching  Abdomen WDL  Groin WDL  Scrotum/Coccyx/Buttocks Redness and Blanching  (R) Leg WDL  (L) Leg WDL  (R) Heel/Foot/Toe Redness and Blanching  (L) Heel/Foot/Toe Redness and Blanching          Devices In Places Tele Box and Pulse Ox      Interventions In Place Pillows    Possible Skin Injury No    Pictures Uploaded Into Epic N/A  Wound Consult Placed N/A  RN Wound Prevention Protocol Ordered No

## 2024-08-13 NOTE — ED TRIAGE NOTES
"Chief Complaint   Patient presents with    Possible Stroke     LKW 2100. PT woke up at 0530 and expressed difficulties swallowing. Upon arrival to ED PT has left sided facial droop, slurred speech, and decreased left  strength. Aox4. Stroke Assessment called to Charge Desk. Hx of esophageal CA.      Stroke Assessment called to Charge Desk.     /76   Pulse (!) 121   Temp 37.4 °C (99.4 °F) (Temporal)   Resp 16   Ht 1.676 m (5' 6\")   Wt 64 kg (141 lb)   SpO2 96%   BMI 22.76 kg/m²     "

## 2024-08-13 NOTE — H&P
Hospital Medicine History & Physical Note    Date of Service  8/13/2024    Primary Care Physician  LULÚ Welsh.    Consultants  neurology    Specialist Names: Dr. Mcbride    Code Status  Full Code    Chief Complaint  Chief Complaint   Patient presents with    Possible Stroke     LKW 2100. PT woke up at 0530 and expressed difficulties swallowing. Upon arrival to ED PT has left sided facial droop, slurred speech, and decreased left  strength. Aox4. Stroke Assessment called to Charge Desk. Hx of esophageal CA.        History of Presenting Illness  Pito Black is a 79 y.o. male with lung cancer followed by Luz Maria Jules on apixaban who presented 8/13/2024 with Left facial droop and dysphagia.    He reports last night he went to take some pills and developed inability to swallow them. During swallowing he choked and vomited the pills. At that time he developed dysarthria, Left hand weakness, and Left facial droop. It persisted into this morning for which he decided to come in. He denies blurry vision, headache, dysphoria, pain, bleeding, bowel/bladder dysfunction, presyncope, syncope, falls.    In the ED he underwent CTA H&N which demonstrated distal Right MCA occlusion. Neurology was consulted and he was deemed outside the TPA window. CT perfusions was without penumbra. CXR demonstrated PPM/ICD and bilateral infiltrates. CBC is normal. CMP demonstrates hyponatremia Na 133, stable Cr 1.3. A1c is 6. Troponin 55 comparable to prior values. INR 1.3. EKG sinus tachycardia.    I discussed the plan of care with patient, SLP, and ED provider .    Review of Systems  Review of Systems   Constitutional:  Negative for chills, fever and malaise/fatigue.   HENT:  Negative for ear pain, nosebleeds, sinus pain and sore throat.    Eyes:  Negative for blurred vision and pain.   Respiratory:  Negative for hemoptysis and shortness of breath.    Cardiovascular:  Negative for chest pain, palpitations and  leg swelling.   Gastrointestinal:  Negative for abdominal pain, blood in stool, constipation, diarrhea, melena, nausea and vomiting.   Genitourinary:  Negative for dysuria, flank pain and hematuria.   Musculoskeletal:  Negative for back pain, joint pain, myalgias and neck pain.   Skin:  Negative for rash.   Neurological:  Positive for speech change and focal weakness. Negative for dizziness and headaches.   Endo/Heme/Allergies:  Does not bruise/bleed easily.   Psychiatric/Behavioral:  Negative for depression. The patient is not nervous/anxious.        Past Medical History   has a past medical history of Acute hypoxemic respiratory failure ([facility]), Acute respiratory failure with hypoxia ([facility]) (02/01/2023), AICD (automatic cardioverter/defibrillator) present (12/07/2023), Arrhythmia, Arthritis (2015), Asthma, Backpain (08/06/2018), Blood clotting disorder ([facility]) (02/2023), Breath shortness, Cancer ([facility]) (07/2017), Cataract, Congestive heart failure ([facility]), Dental disorder, Diabetes (08/06/2018), Dysfunction of eustachian tube, Heart valve disease, High cholesterol, Hypertension, Macrocytic anemia, Pneumonia (02/2023), Renal disorder, Thrombophilia (HCC), Type 2 diabetes mellitus with hyperglycemia ([facility]) (03/17/2023), and Volume overload.    Surgical History   has a past surgical history that includes knee arthroplasty total (2005); ear reconstruction (Bilateral); ear middle exploration (Right, 06/12/2015); ossicular reconstruction (Right, 06/12/2015); thoracoscopy (Left, 04/19/2018); cataract phaco with iol (Right, 08/07/2018); cataract phaco with iol (Left, 08/21/2018); pr bronchoscopy,diagnostic (07/01/2021); endobronchial us add-on (07/01/2021); pr bronchoscopy,diagnostic (N/A, 12/28/2023); endobronchial us add-on (N/A, 12/28/2023); pr bronchoscopy,diagnostic (N/A, 1/16/2024); and endobronchial us add-on (N/A, 1/16/2024).     Family History  family history includes Alcohol abuse in his brother; Cancer in his father;  Diabetes in his mother; Heart Disease in his brother; Hypertension in his mother; Stroke in his mother.   Family history reviewed with patient. There is no family history that is pertinent to the chief complaint.     Social History   reports that he quit smoking about 19 years ago. His smoking use included cigarettes. He started smoking about 59 years ago. He has a 10 pack-year smoking history. He has never used smokeless tobacco. He reports that he does not currently use alcohol. He reports that he does not use drugs.    Allergies  No Known Allergies    Medications  Prior to Admission Medications   Prescriptions Last Dose Informant Patient Reported? Taking?   Cholecalciferol (VITAMIN D3) 2000 UNIT Cap unk at unk Patient No Yes   Sig: Take 1 Capsule by mouth every day.   apixaban (ELIQUIS) 5mg Tab 8/12/2024 at 1900 Patient No Yes   Sig: Take 1 Tablet by mouth 2 times a day.   benzonatate (TESSALON) 100 MG Cap 8/12/2024 at pm Patient No Yes   Sig: Take 1 tablet by mouth three times a day as needed for cough   cyclobenzaprine (FLEXERIL) 10 mg Tab 8/12/2024 at am Patient No Yes   Sig: Take 1 Tablet by mouth at bedtime.   dapagliflozin propanediol (FARXIGA) 10 MG Tab 8/12/2024 at am Patient No Yes   Sig: Take 1 tablet by mouth every day.   diclofenac sodium (VOLTAREN) 1 % Gel prn Patient No No   Sig: Apply 2 g topically 4 times a day as needed (left foot pain).   diphenhydrAMINE-APAP, sleep, (TYLENOL PM EXTRA STRENGTH)  MG Tab 8/12/2024 at pm Patient Yes Yes   Sig: Take 1-2 Tablets by mouth at bedtime as needed (Mild Pain or Trouble Sleeping).   ezetimibe (ZETIA) 10 MG Tab 8/12/2024 at am Patient No Yes   Sig: Take 1 Tablet by mouth every day.   metFORMIN ER (GLUCOPHAGE XR) 750 MG TABLET SR 24 HR 8/12/2024 at am Patient No Yes   Sig: Take 1 tablet by mouth every day.   metoprolol SR (TOPROL XL) 50 MG TABLET SR 24 HR 8/12/2024 at am Patient No Yes   Sig: Take 1 Tablet by mouth every day for 100 days.    spironolactone (ALDACTONE) 25 MG Tab 8/12/2024 at am Patient No Yes   Sig: Take 1 Tablet by mouth every day for 100 days.   vericiguat or PLACEBO (STUDY DRUG) 10 MG tablet 8/12/2024 at am Patient No Yes   Sig: Take 1 Tablet by mouth every day. Participant ID #[id number]      Facility-Administered Medications: None       Physical Exam  Temp:  [37.4 °C (99.4 °F)] 37.4 °C (99.4 °F)  Pulse:  [104-121] (P) 104  Resp:  [14-16] (P) 16  BP: ()/(59-76) (P) 119/61  SpO2:  [94 %-97 %] (P) 97 %  Blood Pressure : (P) 119/61   Temperature: 37.4 °C (99.4 °F)   Pulse: (!) (P) 104   Respiration: (P) 16   Pulse Oximetry: (P) 97 %       Physical Exam  Vitals and nursing note reviewed.   Constitutional:       General: He is not in acute distress.     Appearance: He is not ill-appearing, toxic-appearing or diaphoretic.   HENT:      Head:      Comments: Alopecia, bitemporal wasting     Nose: Nose normal.      Mouth/Throat:      Mouth: Mucous membranes are dry.   Eyes:      General: No scleral icterus.     Conjunctiva/sclera: Conjunctivae normal.   Cardiovascular:      Rate and Rhythm: Normal rate and regular rhythm.      Pulses: Normal pulses.      Heart sounds: Normal heart sounds. No murmur heard.     No friction rub. No gallop.   Pulmonary:      Effort: Pulmonary effort is normal. No respiratory distress.      Breath sounds: Normal breath sounds. No wheezing, rhonchi or rales.   Abdominal:      General: Abdomen is flat. Bowel sounds are normal. There is no distension.      Palpations: Abdomen is soft.      Tenderness: There is no abdominal tenderness. There is no guarding or rebound.   Genitourinary:     Comments: No arrington  Musculoskeletal:      Cervical back: Neck supple.      Right lower leg: No edema.      Left lower leg: No edema.   Skin:     General: Skin is warm and dry.   Neurological:      Mental Status: He is alert.      Cranial Nerves: Dysarthria and facial asymmetry (Left facial droop) present.      Motor: Weakness (4/5  "LUE strength, 5/5 BLE and RUE) present.   Psychiatric:         Mood and Affect: Mood normal.         Behavior: Behavior normal.         Thought Content: Thought content normal.         Judgment: Judgment normal.         Laboratory:  Recent Labs     08/13/24  0807   WBC 10.9*   RBC 4.80   HEMOGLOBIN 14.8   HEMATOCRIT 44.5   MCV 92.7   MCH 30.8   MCHC 33.3   RDW 49.1   PLATELETCT 110*   MPV 9.7     Recent Labs     08/13/24  0807   SODIUM 133*   POTASSIUM 4.8   CHLORIDE 99   CO2 20   GLUCOSE 157*   BUN 29*   CREATININE 1.34   CALCIUM 9.3     Recent Labs     08/13/24  0807   ALTSGPT 25   ASTSGOT 26   ALKPHOSPHAT 78   TBILIRUBIN 0.6   GLUCOSE 157*     Recent Labs     08/13/24  0807   APTT 32.0   INR 1.29*     No results for input(s): \"NTPROBNP\" in the last 72 hours.  Recent Labs     08/13/24  0807   TRIGLYCERIDE 83   HDL 33*   LDL 84     Recent Labs     08/13/24  0807   TROPONINT 55*       Imaging:  DX-CHEST-PORTABLE (1 VIEW)   Final Result      1. Development of interstitial opacities in the lungs, either edema or interstitial infiltrates.   2. Worsening confluent opacities in the right upper lobe and lingula.   3. The remainder is stable.      CT-CTA NECK WITH & W/O-POST PROCESSING   Final Result      1. No evidence of flow-limiting stenosis in the cervical carotid or cervical vertebral arteries.   2. Partially visualized airspace opacities/mass in the right upper lobe. Infiltrated right perihilar and mediastinal lymph tavia masses.   3. Bilateral pleural effusions.      CT-CTA HEAD WITH & W/O-POST PROCESS   Final Result         1. There is a focal stenosis/occlusion in a distal branch of the right MCA with flow reconstitution.         Preliminary findings texted to Dr. MEMO CHAIDEZ in the Emergency Department via Vo[employer] on 8/13/2024 9:17 AM            CT-CEREBRAL PERFUSION ANALYSIS   Final Result      1. Cerebral blood flow less than 30% possibly representing completed infarct = 0 mL.      2. T Max more than 6 " "seconds possibly representing combination of completed infarct and ischemia = 18 mL.      3. Mismatched volume possibly representing ischemic brain/penumbra= 18 mL      4.  Please note that this cerebral perfusion study and report is Quantitative and targets supratentorial (cerebral) perfusion for evaluation of large vessel territory acute ischemia/infarction. For example, lacunar infarcts, and brainstem/posterior fossa    ischemia/infarction are not evaluated on this study.  Data acquisition is subject to artifacts which can yield non-anatomically plausible perfusion maps which may be due to motion, bolus timing, signal to noise ratio, or other technical factors.    Perfusion map abnormalities which show non-anatomic distributions are likely artifact.   This study is not \"stand-alone\" and should only be utilized for diagnosis, management/treatment in correlation with CT, CTA, and/or MRI and clinical factors.             X-Ray:  I have personally reviewed the images and compared with prior images. and My impression is: bilateral infiltrates, PPM/ICD single lead  EKG:  I have personally reviewed the images and compared with prior images. and My impression is: sinus tachycardia    Assessment/Plan:  Justification for Admission Status  I anticipate this patient will require at least two midnights for appropriate medical management, necessitating inpatient admission because functionally below baseline AEB Left hand weakness and inability to swallow. Limited further diagnostic evaluation per Neurology consult for which he will be assess by PT/OT/SLP for post-acute needs.    Patient will need a Telemetry bed on MEDICAL service .  The need is secondary to PT/OT/SLP evaluation, montioring for further neurologic injury.    * Right middle cerebral artery stroke (HCC)- (present on admission)  Assessment & Plan  Likely thromboembolic in setting of Afib and malignancy  Neurology consulted and s/o - continue apixaban, no further " workup indicated  Telemetry  PT/OT/SLP    Dysarthria- (present on admission)  Assessment & Plan  Due to MCA stroke  SLP eval and treat    Oropharyngeal dysphagia- (present on admission)  Assessment & Plan  Due to MCA stroke  SLP eval and treat    Hyponatremia- (present on admission)  Assessment & Plan  Mild  Repeat AM BMP    Type 2 diabetes mellitus with stage 3a chronic kidney disease, without long-term current use of insulin (HCC)- (present on admission)  Assessment & Plan  Well-controlled, A1c 6  Continue SGLT2  Initiated on atorvastatin    Chronic systolic heart failure (HCC)- (present on admission)  Assessment & Plan  Appears euvolemic  Continue BB, SGLT2, IFRAH  Unclear if not on ACE/ARB due to CKD  S/p ICD for primary prophylaxis    Primary lung adenocarcinoma (HCC)- (present on admission)  Assessment & Plan  Follows with oncologist Dr. Schulz  On study drug - held on admission per pharmacist    Stage 3a chronic kidney disease- (present on admission)  Assessment & Plan  Seemingly at baseline  Avoid nephrotoxins  Repeat AM BMP    Paroxysmal atrial fibrillation (HCC)- (present on admission)  Assessment & Plan  Continue metoprolol and apixaban  Telemetry for RVR in setting of CVA        VTE prophylaxis: SCDs/TEDs and therapeutic anticoagulation with apixaban

## 2024-08-13 NOTE — ED PROVIDER NOTES
ED Provider Note    CHIEF COMPLAINT  Chief Complaint   Patient presents with    Possible Stroke     LKW 2100. PT woke up at 0530 and expressed difficulties swallowing. Upon arrival to ED PT has left sided facial droop, slurred speech, and decreased left  strength. Aox4. Stroke Assessment called to Charge Desk. Hx of esophageal CA.          HPI/ROS    OUTSIDE HISTORIAN(S):  Significant other who reports patient was having difficulty taking his close off last night, and she noticed a facial droop in the morning.    Pito Black is a 79 y.o. male who presents with left-sided facial weakness and slurring of his speech.  Patient's wife reports that last night right before the went to bed he was having a difficult time taking his pants off and seemed to be slurring his speech.  She did not think much of it, but then when they woke up this morning he had continued slurred speech and was having difficulty taking his pills.  She noticed a facial droop on his left.  Patient reports some mild weakness in his left arm.  Of note patient has a history of esophageal cancer and is currently undergoing chemotherapy.  Patient denies any fevers or chills.  He denies any nausea or vomiting.  Patient denies any dysuria urgency or frequency.  He denies any associated chest pain or pain otherwise.  He did have a headache approximately week ago.  He is on Eliquis for a history of atrial fibrillation.  He also has a history of heart failure and diabetes.    PAST MEDICAL HISTORY   has a past medical history of Acute hypoxemic respiratory failure (HCC), Acute respiratory failure with hypoxia (HCC) (02/01/2023), AICD (automatic cardioverter/defibrillator) present (12/07/2023), Arrhythmia, Arthritis (2015), Asthma, Backpain (08/06/2018), Blood clotting disorder (HCC) (02/2023), Breath shortness, Cancer (HCC) (07/2017), Cataract, Congestive heart failure (HCC), Dental disorder, Diabetes (08/06/2018), Dysfunction of eustachian tube,  Heart valve disease, High cholesterol, Hypertension, Macrocytic anemia, Pneumonia (2023), Renal disorder, Thrombophilia (HCC), Type 2 diabetes mellitus with hyperglycemia (HCC) (2023), and Volume overload.    SURGICAL HISTORY   has a past surgical history that includes knee arthroplasty total (); ear reconstruction (Bilateral); ear middle exploration (Right, 2015); ossicular reconstruction (Right, 2015); thoracoscopy (Left, 2018); cataract phaco with iol (Right, 2018); cataract phaco with iol (Left, 2018); bronchoscopy,diagnostic (2021); endobronchial us add-on (2021); bronchoscopy,diagnostic (N/A, 2023); endobronchial us add-on (N/A, 2023); bronchoscopy,diagnostic (N/A, 2024); and endobronchial us add-on (N/A, 2024).    FAMILY HISTORY  Family History   Problem Relation Age of Onset    Stroke Mother     Hypertension Mother     Diabetes Mother     Cancer Father         stomach cancer    Heart Disease Brother     Alcohol abuse Brother     Ovarian Cancer Neg Hx     Tubal Cancer Neg Hx     Peritoneal Cancer Neg Hx     Colorectal Cancer Neg Hx     Breast Cancer Neg Hx     Hyperlipidemia Neg Hx        SOCIAL HISTORY  Social History     Tobacco Use    Smoking status: Former     Current packs/day: 0.00     Average packs/day: 0.3 packs/day for 40.0 years (10.0 ttl pk-yrs)     Types: Cigarettes     Start date: 1965     Quit date: 2005     Years since quittin.6    Smokeless tobacco: Never   Vaping Use    Vaping status: Never Used   Substance and Sexual Activity    Alcohol use: Not Currently     Alcohol/week: 12.6 - 21.0 oz     Types: 21 - 35 Cans of beer per week     Comment: 12 beers a day (coors), quit 2/3/2017    Drug use: No    Sexual activity: Not on file       CURRENT MEDICATIONS  Home Medications    **Home medications have not yet been reviewed for this encounter**       Audit from Redirected Encounters    **Home medications have  "not yet been reviewed for this encounter**         ALLERGIES  No Known Allergies    PHYSICAL EXAM  VITAL SIGNS: /76   Pulse (!) 121   Temp 37.4 °C (99.4 °F) (Temporal)   Resp 16   Ht 1.676 m (5' 6\")   Wt 64 kg (141 lb)   SpO2 96%   BMI 22.76 kg/m²    Physical Exam  Constitutional:       Appearance: Normal appearance.   HENT:      Head: Normocephalic and atraumatic.      Right Ear: Tympanic membrane normal.      Left Ear: Tympanic membrane normal.      Nose: Nose normal.      Mouth/Throat:      Mouth: Mucous membranes are moist.   Eyes:      Extraocular Movements: Extraocular movements intact.      Pupils: Pupils are equal, round, and reactive to light.   Cardiovascular:      Rate and Rhythm: Normal rate and regular rhythm.   Pulmonary:      Effort: Pulmonary effort is normal. No respiratory distress.      Breath sounds: Normal breath sounds. No stridor. No wheezing or rales.   Chest:      Chest wall: No tenderness.   Abdominal:      General: Abdomen is flat. There is no distension.      Palpations: Abdomen is soft. There is no mass.      Tenderness: There is no abdominal tenderness.   Musculoskeletal:      Cervical back: Normal range of motion.   Skin:     General: Skin is warm.      Capillary Refill: Capillary refill takes less than 2 seconds.   Neurological:      Mental Status: He is alert.      Comments: There are some mild left-sided facial droop.  Mild slurring of speech.  Patient's bilateral upper and lower extremity strength is 5 out of 5.  Sensation intact throughout.  No visual field deficits.  No word finding difficulty.  Alert and oriented x 3.   Psychiatric:         Mood and Affect: Mood normal.           EKG/LABS  Results for orders placed or performed during the hospital encounter of 08/13/24   CBC WITH DIFFERENTIAL   Result Value Ref Range    WBC 10.9 (H) 4.8 - 10.8 K/uL    RBC 4.80 4.70 - 6.10 M/uL    Hemoglobin 14.8 14.0 - 18.0 g/dL    Hematocrit 44.5 42.0 - 52.0 %    MCV 92.7 81.4 - " 97.8 fL    MCH 30.8 27.0 - 33.0 pg    MCHC 33.3 32.3 - 36.5 g/dL    RDW 49.1 35.9 - 50.0 fL    Platelet Count 110 (L) 164 - 446 K/uL    MPV 9.7 9.0 - 12.9 fL    Neutrophils-Polys 73.10 (H) 44.00 - 72.00 %    Lymphocytes 13.50 (L) 22.00 - 41.00 %    Monocytes 8.50 0.00 - 13.40 %    Eosinophils 3.80 0.00 - 6.90 %    Basophils 0.40 0.00 - 1.80 %    Immature Granulocytes 0.70 0.00 - 0.90 %    Nucleated RBC 0.00 0.00 - 0.20 /100 WBC    Neutrophils (Absolute) 7.93 (H) 1.82 - 7.42 K/uL    Lymphs (Absolute) 1.47 1.00 - 4.80 K/uL    Monos (Absolute) 0.92 (H) 0.00 - 0.85 K/uL    Eos (Absolute) 0.41 0.00 - 0.51 K/uL    Baso (Absolute) 0.04 0.00 - 0.12 K/uL    Immature Granulocytes (abs) 0.08 0.00 - 0.11 K/uL    NRBC (Absolute) 0.00 K/uL   COMP METABOLIC PANEL   Result Value Ref Range    Sodium 133 (L) 135 - 145 mmol/L    Potassium 4.8 3.6 - 5.5 mmol/L    Chloride 99 96 - 112 mmol/L    Co2 20 20 - 33 mmol/L    Anion Gap 14.0 7.0 - 16.0    Glucose 157 (H) 65 - 99 mg/dL    Bun 29 (H) 8 - 22 mg/dL    Creatinine 1.34 0.50 - 1.40 mg/dL    Calcium 9.3 8.5 - 10.5 mg/dL    Correct Calcium 9.5 8.5 - 10.5 mg/dL    AST(SGOT) 26 12 - 45 U/L    ALT(SGPT) 25 2 - 50 U/L    Alkaline Phosphatase 78 30 - 99 U/L    Total Bilirubin 0.6 0.1 - 1.5 mg/dL    Albumin 3.7 3.2 - 4.9 g/dL    Total Protein 7.9 6.0 - 8.2 g/dL    Globulin 4.2 (H) 1.9 - 3.5 g/dL    A-G Ratio 0.9 g/dL   PROTHROMBIN TIME   Result Value Ref Range    PT 16.3 (H) 12.0 - 14.6 sec    INR 1.29 (H) 0.87 - 1.13   APTT   Result Value Ref Range    APTT 32.0 24.7 - 36.0 sec   COD (ADULT)   Result Value Ref Range    ABO Grouping Only A     Rh Grouping Only POS     Antibody Screen-Cod NEG    TROPONIN   Result Value Ref Range    Troponin T 55 (H) 6 - 19 ng/L   BLOOD CULTURE x2    Specimen: Peripheral; Blood   Result Value Ref Range    Significant Indicator NEG     Source BLD     Site PERIPHERAL     Culture Result       No Growth  Note: Blood cultures are incubated for 5 days and  are  monitored continuously.Positive blood cultures  are called to the RN and reported as soon as  they are identified.     BLOOD CULTURE x2    Specimen: Peripheral; Blood   Result Value Ref Range    Significant Indicator NEG     Source BLD     Site PERIPHERAL     Culture Result       No Growth  Note: Blood cultures are incubated for 5 days and  are monitored continuously.Positive blood cultures  are called to the RN and reported as soon as  they are identified.     ESTIMATED GFR   Result Value Ref Range    GFR (CKD-EPI) 54 (A) >60 mL/min/1.73 m 2   HOLD BLOOD BANK SPECIMEN (NOT TESTED)   Result Value Ref Range    Holding Tube - Bb DONE    HEMOGLOBIN A1C   Result Value Ref Range    Glycohemoglobin 6.0 (H) 4.0 - 5.6 %    Est Avg Glucose 126 mg/dL   Lipid Profile   Result Value Ref Range    Cholesterol,Tot 134 100 - 199 mg/dL    Triglycerides 83 0 - 149 mg/dL    HDL 33 (A) >=40 mg/dL    LDL 84 <100 mg/dL   EKG (NOW)   Result Value Ref Range    Report       [facility] Emergency Dept.    Test Date:  2024  Pt Name:    DOROTHEA SALINAS                  Department: ER  MRN:        [id number]                      Room:       [facility]  Gender:     Male                         Technician: [id number]  :        1945                   Requested By:MEMO CHAIDEZ  Order #:    [id number]                    Reading MD:    Measurements  Intervals                                Axis  Rate:       105                          P:          55  WI:         152                          QRS:        44  QRSD:       98                           T:          51  QT:         342  QTc:        453    Interpretive Statements  Sinus tachycardia  Borderline low voltage, extremity leads  Compared to ECG 2023 17:13:41  Sinus rhythm no longer present         I have independently interpreted this EKG    RADIOLOGY/PROCEDURES   I have independently interpreted the diagnostic imaging associated with this visit and am waiting the final  reading from the radiologist.   My preliminary interpretation is as follows: No obvious bleed    Radiologist interpretation:  DX-CHEST-PORTABLE (1 VIEW)   Final Result      1. Development of interstitial opacities in the lungs, either edema or interstitial infiltrates.   2. Worsening confluent opacities in the right upper lobe and lingula.   3. The remainder is stable.      CT-CTA NECK WITH & W/O-POST PROCESSING   Final Result      1. No evidence of flow-limiting stenosis in the cervical carotid or cervical vertebral arteries.   2. Partially visualized airspace opacities/mass in the right upper lobe. Infiltrated right perihilar and mediastinal lymph tavia masses.   3. Bilateral pleural effusions.      CT-CTA HEAD WITH & W/O-POST PROCESS   Final Result         1. There is a focal stenosis/occlusion in a distal branch of the right MCA with flow reconstitution.         Preliminary findings texted to Dr. MEMO CHAIDEZ in the Emergency Department via [employer] on 8/13/2024 9:17 AM            CT-CEREBRAL PERFUSION ANALYSIS   Final Result      1. Cerebral blood flow less than 30% possibly representing completed infarct = 0 mL.      2. T Max more than 6 seconds possibly representing combination of completed infarct and ischemia = 18 mL.      3. Mismatched volume possibly representing ischemic brain/penumbra= 18 mL      4.  Please note that this cerebral perfusion study and report is Quantitative and targets supratentorial (cerebral) perfusion for evaluation of large vessel territory acute ischemia/infarction. For example, lacunar infarcts, and brainstem/posterior fossa    ischemia/infarction are not evaluated on this study.  Data acquisition is subject to artifacts which can yield non-anatomically plausible perfusion maps which may be due to motion, bolus timing, signal to noise ratio, or other technical factors.    Perfusion map abnormalities which show non-anatomic distributions are likely artifact.   This study is not  "\"stand-alone\" and should only be utilized for diagnosis, management/treatment in correlation with CT, CTA, and/or MRI and clinical factors.         DX-ESOPHAGUS - [id number]    (Results Pending)         COURSE & MEDICAL DECISION MAKING    ASSESSMENT, COURSE AND PLAN  Care Narrative: Patient here, NIH of 2 with last known well approximately 9 PM, 12 hours prior to arrival.  Patient will go directly for CT angiogram of his head and neck as well as CT perfusion though given patient's anticoagulation and type II symptom onset tPA highly unlikely to be helpful in this regard.  Will ensure there is no associated LVO.  Certainly metabolic causes possible though patient does have some localized findings with some subjective weakness of his extremities and some objective weakness of his left face.  Patient CTA has revealed a distal M1 lesion, with associated penumbra.  I discussed the case emergently with stroke neurology.  As patient is widely outside of the window for  thrombolytics, and is on Eliquis he does not qualify for TNKase at this point.  We discussed this with patient and family.  The lesion is far too distal to be amenable for catheter directed thrombolytics.  Patient will be admitted to hospital service on telemetry monitoring.        DISPOSITION AND DISCUSSIONS  I have discussed management of the patient with the following physicians and EDWIN's: Hospitalist Dr. Mena, vascular neurologist Dr. Butts      Decision tools and prescription drugs considered including, but not limited to: NIH of 2    FINAL DIAGNOSIS  1. Speech disturbance, unspecified type    2. Cerebrovascular accident (CVA), unspecified mechanism (HCC)    3. Right middle cerebral artery stroke (HCC)        "

## 2024-08-13 NOTE — CONSULTS
Vascular Neurology Initial Consult H&P  Neurovascular Service, [facility]    Referring Physician: Benito Mcgill M.D.    Chief Complaint   Patient presents with    Possible Stroke     LKW 2100. PT woke up at 0530 and expressed difficulties swallowing. Upon arrival to ED PT has left sided facial droop, slurred speech, and decreased left  strength. Aox4. Stroke Assessment called to Charge Desk. Hx of esophageal CA.        HPI: Pito Black is a 79 y.o. male  with a PMHX Afib (Eliquis), EF 35% s/p AICD, CKD, HTN, HLD, DM, Lung Ca presenting with difficulty swallowing with slurred speech. Per family last night ~0730 he had mildly slurred speech, difficulty swallowing his medications and putting on his pants. This morning he was unable to take any of his medications due to dysphagia, had persistent slurred speech and L sided facial droop. On arrival to ER noted to have L facial droop, slurred speech and L arm weakness. /76, , . CT stroke protocol completed, which showed small perfusion deficit to R frontal lobe. Neurology consulted for evaluation of above.    Patient evaluated with wife and son at bedside in ER. Confirmed symptom onset last night ~1930, with worsening of symptoms this morning. They report compliance with eliquis, last dose was last night. Unable to take dose this morning. Per chart review patient last chemo 8/2 cancelled d/t disease progression.       Review of systems: In addition to what is detailed in the HPI above, all other systems reviewed and are negative.    Past Medical History:    has a past medical history of Acute hypoxemic respiratory failure (HCC), Acute respiratory failure with hypoxia (HCC) (02/01/2023), AICD (automatic cardioverter/defibrillator) present (12/07/2023), Arrhythmia, Arthritis (2015), Asthma, Backpain (08/06/2018), Blood clotting disorder (HCC) (02/2023), Breath shortness, Cancer ([facility]) (07/2017), Cataract, Congestive  heart failure (HCC), Dental disorder, Diabetes (08/06/2018), Dysfunction of eustachian tube, Heart valve disease, High cholesterol, Hypertension, Macrocytic anemia, Pneumonia (02/2023), Renal disorder, Thrombophilia (HCC), Type 2 diabetes mellitus with hyperglycemia (HCC) (03/17/2023), and Volume overload.    He has no past medical history of Cough, Painful breathing, Sputum production, or Wheezing.    FHx:  family history includes Alcohol abuse in his brother; Cancer in his father; Diabetes in his mother; Heart Disease in his brother; Hypertension in his mother; Stroke in his mother.    SHx:   reports that he quit smoking about 19 years ago. His smoking use included cigarettes. He started smoking about 59 years ago. He has a 10 pack-year smoking history. He has never used smokeless tobacco. He reports that he does not currently use alcohol after a past usage of about 12.6 - 21.0 oz of alcohol per week. He reports that he does not use drugs.    Allergies:  No Known Allergies    Medications:  No current facility-administered medications for this encounter.    Current Outpatient Medications:     benzonatate (TESSALON) 100 MG Cap, Take 1 tablet by mouth three times a day as needed for cough, Disp: 90 Capsule, Rfl: 1    metFORMIN ER (GLUCOPHAGE XR) 750 MG TABLET SR 24 HR, Take 1 tablet by mouth every day., Disp: 100 Tablet, Rfl: 0    diclofenac sodium (VOLTAREN) 1 % Gel, Apply 2 g topically 4 times a day as needed (left foot pain)., Disp: 100 g, Rfl: 3    amoxicillin (AMOXIL) 500 MG Cap, Take 4 capsules by mouth 1 hour prior to scheduled visit (Patient not taking: Reported on 7/1/2024), Disp: 16 Capsule, Rfl: 0    fluticasone-salmeterol (WIXELA INHUB) 250-50 MCG/ACT AEROSOL POWDER, BREATH ACTIVATED, Inhale 1 Puff every 12 hours., Disp: 3 Each, Rfl: 3    vericiguat or PLACEBO (STUDY DRUG) 10 MG tablet, Take 1 Tablet by mouth every day. Participant ID #[id number], Disp: 33 Tablet, Rfl: 11    apixaban (ELIQUIS) 5mg Tab, Take 1  "Tablet by mouth 2 times a day., Disp: 180 Tablet, Rfl: 3    metoprolol SR (TOPROL XL) 50 MG TABLET SR 24 HR, Take 1 Tablet by mouth every day for 100 days., Disp: 100 Tablet, Rfl: 3    dapagliflozin propanediol (FARXIGA) 10 MG Tab, Take 1 tablet by mouth every day., Disp: 100 Tablet, Rfl: 3    ezetimibe (ZETIA) 10 MG Tab, Take 1 Tablet by mouth every day. (Patient taking differently: Take 10 mg by mouth every morning.), Disp: 100 Tablet, Rfl: 3    spironolactone (ALDACTONE) 25 MG Tab, Take 1 Tablet by mouth every day for 100 days., Disp: 100 Tablet, Rfl: 3    cyclobenzaprine (FLEXERIL) 10 mg Tab, Take 1 Tablet by mouth at bedtime., Disp: 100 Tablet, Rfl: 3    Cholecalciferol (VITAMIN D3) 2000 UNIT Cap, Take 1 Capsule by mouth every day., Disp: 100 Capsule, Rfl: 3    diphenhydrAMINE-APAP, sleep, (TYLENOL PM EXTRA STRENGTH)  MG Tab, Take 1-2 Tablets by mouth at bedtime as needed (Mild Pain or Trouble Sleeping)., Disp: , Rfl:     Physical Examination:    BP (P) 96/61   Pulse (!) (P) 105   Temp 37.4 °C (99.4 °F) (Temporal)   Resp (P) 14   Ht 1.676 m (5' 6\")   Wt 64 kg (141 lb)   SpO2 (P) 96%   BMI 22.76 kg/m²       General: Patient is awake and in no acute distress, family at bedside   Eye: Examination of optic disks not indicated at this time given acuity of consult  Neck: There is normal range of motion  Extremities:  clear, dry, intact, without peripheral edema    NEUROLOGICAL EXAM:   Mental status: Awake, alert and fully oriented  Speech and language: Naming and repetition intact, mild dysarthria follows simple, two-step, multi-step and complex commands.  CRANIAL NERVES:  II: Pupils equal and reactive, no VF deficits  III, IV, VI: EOM intact, no gaze preference or deviation, no nystagmus.  V: normal sensation in V1, V2, and V3 segments bilaterally  VII: L facial droop  VIII: normal hearing to conversation  IX, X: normal palatal elevation, no uvular deviation  XI: 5/5 head turn and 5/5 shoulder shrug " bilaterally  XII: midline tongue protrusion      Motor exam: Mild LUE pronator drift, sustained antigravity effort without drift to bed. RUE, BLE with sustained antigravity effort without drift.  Tone is normal. No abnormal movements were seen on exam.    RUE 5/5   LUE 4/5   RLL 5/5   LLL 5/5          Sensory exam: Reacts to tactile in all 4 extremities, no neglect to double stim   Coordination: Mild ataxia LUE  Gait: Deferred         NIHSS: National Institutes of Health Stroke Scale    [0] 1a:Level of Consciousness    0-alert 1-drowsy   2-stupor   3-coma  [0] 1b:LOC Questions                  0-both  1-one      2-neither  [0] 1c:LOC Commands                   0-both  1-one      2-neither  [0] 2: Best Gaze                     0-nl    1-partial  2-forced  [0] 3: Visual Fields                   0-nl    1-partial  2-complete 3-bilat  [2] 4: Facial Paresis                0-nl    1-minor    2-partial  3-full  MOTOR                       0-nl  [0] 5: Right Arm           1-drift  [1] 6: Left Arm             2-some effort vs gravity  [0] 7: Right Leg           3-no effort vs gravity  [0] 8: Left Leg             4-no movement                             x-untestable  [1] 9: Limb Ataxia                    0-abs   1-1_limb   2-2+_limbs       x-untestable  [0] 10:Sensory                        0-nl    1-partial  2-dense  [0] 11:Best Language/Aphasia         0-nl    1-mild/mod 2-severe   3-mute  [1] 12:Dysarthria                     0-nl    1-mild/mod 2-severe       x-untestable  [0] 13:Neglect/Inattention            0-none  1-partial  2-complete  [5] TOTAL        Baseline Modified [place] Scale (MRS): 3 = Moderate disability; requires some help, but able to walk without assistance    Objective Data:    Labs:  Lab Results   Component Value Date/Time    PROTHROMBTM 16.3 (H) 08/13/2024 08:07 AM    INR 1.29 (H) 08/13/2024 08:07 AM      Lab Results   Component Value Date/Time    WBC 10.9 (H) 08/13/2024 08:07 AM    RBC 4.80 08/13/2024  08:07 AM    HEMOGLOBIN 14.8 08/13/2024 08:07 AM    HEMATOCRIT 44.5 08/13/2024 08:07 AM    MCV 92.7 08/13/2024 08:07 AM    MCH 30.8 08/13/2024 08:07 AM    MCHC 33.3 08/13/2024 08:07 AM    MPV 9.7 08/13/2024 08:07 AM    NEUTSPOLYS 73.10 (H) 08/13/2024 08:07 AM    LYMPHOCYTES 13.50 (L) 08/13/2024 08:07 AM    MONOCYTES 8.50 08/13/2024 08:07 AM    EOSINOPHILS 3.80 08/13/2024 08:07 AM    EOSINOPHILS 14 03/25/2023 01:40 PM    BASOPHILS 0.40 08/13/2024 08:07 AM      Lab Results   Component Value Date/Time    SODIUM 133 (L) 08/13/2024 08:07 AM    POTASSIUM 4.8 08/13/2024 08:07 AM    CHLORIDE 99 08/13/2024 08:07 AM    CO2 20 08/13/2024 08:07 AM    GLUCOSE 157 (H) 08/13/2024 08:07 AM    BUN 29 (H) 08/13/2024 08:07 AM    CREATININE 1.34 08/13/2024 08:07 AM      Lab Results   Component Value Date/Time    CHOLSTRLTOT 148 06/02/2023 09:42 AM    LDL 92 06/02/2023 09:42 AM    HDL 44 06/02/2023 09:42 AM    TRIGLYCERIDE 58 06/02/2023 09:42 AM       Lab Results   Component Value Date/Time    ALKPHOSPHAT 78 08/13/2024 08:07 AM    ASTSGOT 26 08/13/2024 08:07 AM    ALTSGPT 25 08/13/2024 08:07 AM    TBILIRUBIN 0.6 08/13/2024 08:07 AM      Lab Results   Component Value Date/Time    HBA1C 6.0 (H) 12/23/2023 07:41 AM         Imaging/Testing:    I interpreted and/or reviewed the patient's neuroimaging    DX-CHEST-PORTABLE (1 VIEW)   Final Result      1. Development of interstitial opacities in the lungs, either edema or interstitial infiltrates.   2. Worsening confluent opacities in the right upper lobe and lingula.   3. The remainder is stable.      CT-CTA NECK WITH & W/O-POST PROCESSING   Final Result      1. No evidence of flow-limiting stenosis in the cervical carotid or cervical vertebral arteries.   2. Partially visualized airspace opacities/mass in the right upper lobe. Infiltrated right perihilar and mediastinal lymph tavia masses.   3. Bilateral pleural effusions.      CT-CTA HEAD WITH & W/O-POST PROCESS   Final Result         1.  "There is a focal stenosis/occlusion in a distal branch of the right MCA with flow reconstitution.         Preliminary findings texted to Dr. MEMO CHAIDEZ in the Emergency Department via [employer] on 8/13/2024 9:17 AM            CT-CEREBRAL PERFUSION ANALYSIS   Final Result      1. Cerebral blood flow less than 30% possibly representing completed infarct = 0 mL.      2. T Max more than 6 seconds possibly representing combination of completed infarct and ischemia = 18 mL.      3. Mismatched volume possibly representing ischemic brain/penumbra= 18 mL      4.  Please note that this cerebral perfusion study and report is Quantitative and targets supratentorial (cerebral) perfusion for evaluation of large vessel territory acute ischemia/infarction. For example, lacunar infarcts, and brainstem/posterior fossa    ischemia/infarction are not evaluated on this study.  Data acquisition is subject to artifacts which can yield non-anatomically plausible perfusion maps which may be due to motion, bolus timing, signal to noise ratio, or other technical factors.    Perfusion map abnormalities which show non-anatomic distributions are likely artifact.   This study is not \"stand-alone\" and should only be utilized for diagnosis, management/treatment in correlation with CT, CTA, and/or MRI and clinical factors.             Assessment and Plan:    Pito Black is a 79 y.o. male with active lung ca and Afib on Eliquis, presenting with >12 hours of dysarthria and dysphagia. This morning around 0730 he also developed L facial droop and LUE weakness. On arrival to ER NIH 5 for L sided facial droop, L arm weakness, L ataxia and mild dysarthria. CTA head revealed small distal occlusion of R MCA M2-M3 with distal reconstitution, and associated perfusion deficit. CTA neck negative for acute dissection or flow limiting stenosis. NCCTH negative for acute ischemic changes and ICH. Unfortunately the occlusion is too distal for mechanical " thrombectomy. Not a candidate for thrombolytic therapy at this time as he had symptom onset >12 hours ago and is on Eliquis. No indication for cardioembolic workup at this time, has known history of Afib and active malignancy on Eliquis. Ok with deferring MRI at this time as it will not change therapy. Will need SLP, PT and OT.       Problem list:  - Distal R MCA stroke  - Active lung CA  - Afib          Plan:  - Neurochecks per unit policy  - Hold antihypertensive medications for 24h, allow BP autoregulation while avoiding hypotension   - Allow -210/ x 24h   - Long-term BP goal 100-130/60-80  - Ok with deferring MRI at this time, will not   - Does not require full cardioembolic workup, no Ziopatch on DC   - Please continue home Eliquis 5 mg BID when cleared to swallow  - LDL 84, goal <70, continue Zetia 10 mg daily, initiate Atorvastatin 20 mg daily   - A1C 6.0, goal <7  - Target normoglycemia  while admitted  - PT/OT/SLP  - DVT prophylaxis: Eliquis  - Follow-up with stroke clinic outpatient      No further recommendations or further studies from an acute neurological standpoint at this time. Please re-consult if you have further questions or there is a change in status.        DON Alvarez  Vascular Neurology, Acute Care Services       The evaluation of the patient, and recommended management, was discussed with Dr. Mcbride, attending Vascular Neurologist.          Please note that this dictation was created using voice recognition software.  I have made every reasonable attempt to correct obvious errors, but I expect that there are errors of grammar and possibly content that I did not discover before finalizing the note.

## 2024-08-13 NOTE — ED NOTES
Rounded with patient, denies needs/concerns at this time.  Remains on continuous monitoring, call light within reach.

## 2024-08-13 NOTE — ED NOTES
Patient transferred to [facility] in stable condition with all belongings.

## 2024-08-13 NOTE — ED NOTES
Bedside report received from KORY Camejo. Assumed care of patient and he is resting, denies any pain or needs. Bed locked and in lowest position. Call light available and within reach. No oxygen requirements at this time. Appropriate fall precautions in place. Patient A&Ox4, GCS 15. Patient on cardiac monitoring, automatic BP and pulse oximeter. See MAR for medications and infusions. No distress noted.

## 2024-08-14 ENCOUNTER — APPOINTMENT (OUTPATIENT)
Dept: RADIOLOGY | Facility: MEDICAL CENTER | Age: 79
DRG: 065 | End: 2024-08-14
Attending: STUDENT IN AN ORGANIZED HEALTH CARE EDUCATION/TRAINING PROGRAM
Payer: MEDICARE

## 2024-08-14 DIAGNOSIS — G89.29 CHRONIC BILATERAL THORACIC BACK PAIN: ICD-10-CM

## 2024-08-14 DIAGNOSIS — M54.6 CHRONIC BILATERAL THORACIC BACK PAIN: ICD-10-CM

## 2024-08-14 LAB
ANION GAP SERPL CALC-SCNC: 16 MMOL/L (ref 7–16)
BUN SERPL-MCNC: 24 MG/DL (ref 8–22)
CALCIUM SERPL-MCNC: 9 MG/DL (ref 8.5–10.5)
CHLORIDE SERPL-SCNC: 103 MMOL/L (ref 96–112)
CO2 SERPL-SCNC: 17 MMOL/L (ref 20–33)
CREAT SERPL-MCNC: 1.03 MG/DL (ref 0.5–1.4)
GFR SERPLBLD CREATININE-BSD FMLA CKD-EPI: 74 ML/MIN/1.73 M 2
GLUCOSE SERPL-MCNC: 111 MG/DL (ref 65–99)
POTASSIUM SERPL-SCNC: 4.4 MMOL/L (ref 3.6–5.5)
SODIUM SERPL-SCNC: 136 MMOL/L (ref 135–145)

## 2024-08-14 PROCEDURE — 97163 PT EVAL HIGH COMPLEX 45 MIN: CPT

## 2024-08-14 PROCEDURE — 92611 MOTION FLUOROSCOPY/SWALLOW: CPT

## 2024-08-14 PROCEDURE — 36415 COLL VENOUS BLD VENIPUNCTURE: CPT

## 2024-08-14 PROCEDURE — 97167 OT EVAL HIGH COMPLEX 60 MIN: CPT

## 2024-08-14 PROCEDURE — 92526 ORAL FUNCTION THERAPY: CPT

## 2024-08-14 PROCEDURE — 770020 HCHG ROOM/CARE - TELE (206)

## 2024-08-14 PROCEDURE — 74230 X-RAY XM SWLNG FUNCJ C+: CPT

## 2024-08-14 PROCEDURE — 99233 SBSQ HOSP IP/OBS HIGH 50: CPT | Performed by: STUDENT IN AN ORGANIZED HEALTH CARE EDUCATION/TRAINING PROGRAM

## 2024-08-14 PROCEDURE — 80048 BASIC METABOLIC PNL TOTAL CA: CPT

## 2024-08-14 RX ORDER — CYCLOBENZAPRINE HCL 10 MG
10 TABLET ORAL
Qty: 100 TABLET | Refills: 3 | OUTPATIENT
Start: 2024-08-14

## 2024-08-14 RX ORDER — LORAZEPAM 2 MG/ML
1 INJECTION INTRAMUSCULAR
Status: DISCONTINUED | OUTPATIENT
Start: 2024-08-14 | End: 2024-08-14

## 2024-08-14 RX ORDER — HYDROMORPHONE HYDROCHLORIDE 1 MG/ML
1 INJECTION, SOLUTION INTRAMUSCULAR; INTRAVENOUS; SUBCUTANEOUS
Status: COMPLETED | OUTPATIENT
Start: 2024-08-14 | End: 2024-08-15

## 2024-08-14 ASSESSMENT — PAIN DESCRIPTION - PAIN TYPE
TYPE: ACUTE PAIN

## 2024-08-14 ASSESSMENT — ENCOUNTER SYMPTOMS
EYES NEGATIVE: 1
MUSCULOSKELETAL NEGATIVE: 1
GASTROINTESTINAL NEGATIVE: 1
CARDIOVASCULAR NEGATIVE: 1
RESPIRATORY NEGATIVE: 1
PSYCHIATRIC NEGATIVE: 1
CONSTITUTIONAL NEGATIVE: 1
NEUROLOGICAL NEGATIVE: 1

## 2024-08-14 ASSESSMENT — GAIT ASSESSMENTS
DISTANCE (FEET): 40
ASSISTIVE DEVICE: HAND HELD ASSIST
DEVIATION: DECREASED HEEL STRIKE;DECREASED TOE OFF;DECREASED BASE OF SUPPORT
GAIT LEVEL OF ASSIST: CONTACT GUARD ASSIST

## 2024-08-14 ASSESSMENT — ACTIVITIES OF DAILY LIVING (ADL): TOILETING: INDEPENDENT

## 2024-08-14 ASSESSMENT — COGNITIVE AND FUNCTIONAL STATUS - GENERAL
WALKING IN HOSPITAL ROOM: A LITTLE
HELP NEEDED FOR BATHING: A LITTLE
TURNING FROM BACK TO SIDE WHILE IN FLAT BAD: A LITTLE
MOVING TO AND FROM BED TO CHAIR: A LITTLE
SUGGESTED CMS G CODE MODIFIER DAILY ACTIVITY: CK
MOVING FROM LYING ON BACK TO SITTING ON SIDE OF FLAT BED: A LITTLE
DRESSING REGULAR LOWER BODY CLOTHING: A LITTLE
MOBILITY SCORE: 16
SUGGESTED CMS G CODE MODIFIER MOBILITY: CK
PERSONAL GROOMING: A LITTLE
DRESSING REGULAR UPPER BODY CLOTHING: A LITTLE
DAILY ACTIVITIY SCORE: 18
CLIMB 3 TO 5 STEPS WITH RAILING: TOTAL
EATING MEALS: A LITTLE
STANDING UP FROM CHAIR USING ARMS: A LITTLE
TOILETING: A LITTLE

## 2024-08-14 ASSESSMENT — FIBROSIS 4 INDEX: FIB4 SCORE: 3.73

## 2024-08-14 NOTE — CARE PLAN
The patient is Stable - Low risk of patient condition declining or worsening    Shift Goals  Clinical Goals: monitor neuro status  Patient Goals: sleep  Family Goals: CINDY    Progress made toward(s) clinical / shift goals:    Problem: Neuro Status  Goal: Neuro status will remain stable or improve  Description: Target End Date:  Prior to discharge or change in level of care    Document on Neuro assessment in the Assessment flowsheet    1.  Assess and monitor neurologic status per provider order/protocol/unit policy  2.  Assess level of consciousness and orientation  3.  Assess for speech, dysarthria, dysphagia, facial symmetry  4.  Assess visual field, eye movements, gaze preference, pupil reaction and size  5.  Assess muscle strength and motor response in all four extremities  6.  Assess for sensation (numbness and tingling)  7.  Assess basic neuro reflexes (cough, gag, corneal)  8.  Identify changes in neuro status and report to provider for testing/treatment orders  Outcome: Progressing     Problem: Knowledge Deficit - Standard  Goal: Patient and family/care givers will demonstrate understanding of plan of care, disease process/condition, diagnostic tests and medications  Description: Target End Date:  1-3 days or as soon as patient condition allows    Document in Patient Education    1.  Patient and family/caregiver oriented to unit, equipment, visitation policy and means for communicating concern  2.  Complete/review Learning Assessment  3.  Assess knowledge level of disease process/condition, treatment plan, diagnostic tests and medications  4.  Explain disease process/condition, treatment plan, diagnostic tests and medications  Outcome: Progressing     Problem: Mobility - Stroke  Goal: Patient's capacity to carry out activities will improve  Description: Target End Date:  Prior to discharge or change in level of care    1.  Assess for barriers to mobility/activity  2.  Implement activity per interdisciplinary  team recommendations  3.  Target activity level identified and patient/family/caregiver aware of goal  4.  Provide assistive devices  5.  Instruct patient/caregiver on proper use of assistive/adaptive devices  6.  Schedule activities and rest periods to decrease effects of fatigue  7.  Encourage mobilization to extent of ability  8.  Maintain proper body alignment  9.  Provide adequate pain management to allow progressive mobilization  10. Implement pace maker precautions as needed  Outcome: Progressing       Patient is not progressing towards the following goals:

## 2024-08-14 NOTE — ASSESSMENT & PLAN NOTE
Due to MCA stroke  SLP eval and treat  Plan for likely inpatient rehab, may likely need PEG tube placed, consult to GI

## 2024-08-14 NOTE — PROGRESS NOTES
Monitor Summary: SR 98 NJ 0.17 QRS 0.11 QT 0.34 with rare couplets/PVCs/bigem per strip from monitor room.

## 2024-08-14 NOTE — CARE PLAN
The patient is Stable - Low risk of patient condition declining or worsening    Shift Goals  Clinical Goals: [facility]  Patient Goals: Rest and Eat  Family Goals: [facility]    Progress made toward(s) clinical / shift goals:    Problem: Neuro Status  Goal: Neuro status will remain stable or improve  Outcome: Progressing  Note: Patient was alert and A&O x4 throughout this shift, his corneal and cough are present although cough does appear weak. Patient has slurred speech and a left facial droop. Patient has no complaints of numbness, tingling or decreased sensation. Patient can move all extremities but upper left extremity is weaker than others.         Patient is not progressing towards the following goals:      Problem: Risk for Aspiration  Goal: Patient's risk for aspiration will be absent or decrease  Outcome: Not Progressing  Note: Patient went down to [facility] [place] with speech this morning and was not approved for a diet. Patient will now need an NG tube, multiple nurses tried placing an NG with no success as it was too painful for the patient and resistance was met at the back of the throat. An iris will be attempted and if successful nutrition, water and medication will be started.

## 2024-08-14 NOTE — ASSESSMENT & PLAN NOTE
Likely thromboembolic in setting of Afib and malignancy  Neurology consulted and s/o - continue apixaban, no further workup indicated  PT/OT/SLP  Likely dispo is inpatient rehab  Has persistent dysphagia and will likely need PEG tube

## 2024-08-14 NOTE — THERAPY
Speech Language Pathology   Videofluoroscopic Swallow Study Evaluation     Patient Name: Pito Black  AGE:  79 y.o., SEX:  male  Medical Record #: [id number]  Date of Service: 8/14/2024      History of Present Illness    79M admitted on 8/13/24 with L facial droop, slurred speech, and difficulty swallowing, found to have R MCA stroke. Per wife, recent diagnosis of adenocarcinoma (dx 1/2024), on Keytruda and planned for chemotherapy next week.     PMH notable for afib, EF 35% s/ AICD, HTN, CKD, HLD, DM, stage I NSCLC status post wedge resection in 2018, stage Ia NSCLC of the right upper lobe treated with SBRT in 2019, and stage IIIa NSCLC of the left lower lobe treated with carboplatin/Taxol/radiation in 2021.       Pertinent Information  Current Method of Nutrition: NPO until cleared by speech pathology  Dentition: Lower partial  Secretion Management: Drooling  Factor(s) Affecting Performance: None      Discussed the risks, benefits, and alternatives of the VFSS procedure. Patient/family acknowledged and agreed to proceed.      Assessment  Videofluoroscopic Swallow Study was conducted in the Lateral, A-P projection(s) to evaluate oropharyngeal swallow function. A radiology tech was present to assist with the procedure.      Positioning: Seated upright in fluoroscopy chair, Seated (AP view)  Anatomic View: WFL  Bolus Administration: SLP, Patient  PO Barium Contrast Trials: Varibar thin, Varibar nectar (mildly thick), Liquidised mixed with Varibar powder      Consistency PAS Score Timing Residue Comments   Thin Liquid 7 During swallow Vallecular Residue: Severe (>50%)  Pyriform Sinus Residue: Severe (>50%)    Mildly Thick 8 During Swallow  Post Swallow Vallecular Residue: Severe (>50%)  Pyriform Sinus Residue: Severe (>50%)    Liquidised 5 During Swallow Vallecular Residue: Severe (>50%)  Pyriform Sinus Residue: Moderate (25%-50%)      Penetration-Aspiration Scale (PAS)  1     No contrast enters airway  2      Contrast enters the airway, remains above the vocal folds, and is ejected from the airway (not seen in the airway at the end of the swallow).  3     Contrast enters the airway, remains above the vocal folds, and is not ejected from the airway (is seen in the airway after the swallow).  4     Contrast enters the airway, contacts the vocal folds, and is ejected from the airway.  5     Contrast enters the airway, contacts the vocal folds, and is not ejected from the airway  6     Contrast enters the airway, crosses the plane of the vocal folds, and is ejected from the airway.  7     Contrast enters the airway, crosses the plane of the vocal folds, and is not ejected from the airway despite effort.  8     Contrast enters the airway, crosses the plane of the vocal folds, is not ejected from the airway and there is no response to aspiration.       Oral phase:  Appropriate acceptance of boluses, however, intermittent poor removal of bolus from spoon 2/2 labial weakness. Anterior loss of bolus noted with both thin and MTL. Oral residue noted after the swallow which pooled at the front of the mouth. Of note, drooling noted prior to PO trials. Piecemeal deglutition noted with thin liquids, MTL, and applesauce. Pharyngeal swallow triggering at the pyriform sinuses with thin liquids and MTL and at level of the vallecular space with apple sauce.    Pharyngeal phase:  Laryngeal mistiming, incomplete laryngeal vestibule closure, incomplete laryngeal elevation, reduced base of tongue retraction, reduced pharyngeal shortening, incomplete epiglottic inversion, weak pharyngeal squeeze, and incomplete UES opening resulted in:  Aspiration during the swallow with both trials of thin liquids via cup sip. Pt with immediate cough response to both evens, however, not ejected from the trachea immediately. Severe vallecular and pyriform sinus residue noted after the swallow. Multiple swallows mildly improves but does not eliminates residue.  Attempted a three-second prep strategy but pt unable to follow which resulted in aspiration during the swallow.  Silent aspiration during the swallow from residue that was penetrated into the laryngeal vestibule and unable to be ejected with cued cough. Consistent penetration during the swallow which remained in the laryngeal vestibule after the swallow and at times fell to the VF. A cued cough was not successful for ejection from the laryngeal vestibule. Severe vallecular and pyriform sinus residue noted after the swallow.  Deep penetration to VF x1 and penetration which remained in the laryngeal vestibule after the swallow with other trials of apple sauce. Severe vallecular and moderate pyriform sinus residue noted after the swallow. A chin tuck was helpful for illiciting epiglottic inversion although did not eliminate residue.    Esophageal phase:  Retention and retrograde flow through UES noted with trials of MTL seen in the lateral position. The pt was positioned in an AP position and when he swallowed apple sauce, some retention noted in the mid-esophagus with minimal retrograde flow. UES appeared patent/air filled but would be best assessed by GI.     Compensatory Strategies:  Three second prep - not successful as pt was unable to follow strategy resulting in aspiration during the swallow  Cued cough - not successful for consistent ejection of residue from the laryngeal vestibule or trachea  Effortful swallow - not successful for significantly reducing pharyngeal residue with MTL  Chin tuck - not successful for eliminating high/deep penetration with MTL,  helpful for illiciting epiglottic inversion although did not eliminate residue with apple sauce.  L head turn - did not significantly improve pharyngeal residue with apple sauce    Severity Rating:   Severity Rating: KAROLINE  KAROLINE: Moderate      Clinical Impressions  The pt presents with a moderate oropharyngeal dysphagia. This is an acute change in swallow  "function s/p R MCA CVA. Currently both swallow safety and efficiency are impaired and no safe oral diet can be recommended at this time. Pt is a good candidate for dysphagia exercises and strategies targeting deficits seen during this evaluation. Recommend NPO with consideration for a non-oral source of nutrition. Okay for small amount of ice chips to minimize xerostomia and maintain integrity of the swallow.         Recommendations  Diet Consistency: NPO with consideration for a non-oral source of nutrition  Medication: Non Oral  Oral Care: Q4h  Additional Instrumentation: Repeat diagnostic study when clinically appropriate         SLP Treatment Plan  Treatment Plan: Dysphagia Treatment  SLP Frequency: 4x Per Week  Estimated Duration: Until Therapy Goals Met      Anticipated Discharge Needs  Discharge Recommendations: Recommend post-acute placement for additional speech therapy services prior to discharge home   Therapy Recommendations Upon DC: Dysphagia Training, Community Re-Integration, Patient / Family / Caregiver Education        Patient / Family Goals  Patient / Family Goal #1: \"I want more\" to applesauce  Goal #1 Outcome: Progressing slower than expected  Short Term Goal # 1: Patient will participate in swallow diagnostic to guide dysphagia management.  Goal Outcome # 1: Goal met  Short Term Goal # 2: Pt will consume prefeeding trials with no overt s/sx of aspiration  Short Term Goal # 3: Pt will complete 40 reps of pharyngeal squeeze, base of tongue retraction, pharyngeal shortening exercises with good accuracy      MARISOL Hickey   "

## 2024-08-14 NOTE — PROGRESS NOTES
Hospital Medicine Daily Progress Note    Date of Service  8/14/2024    Chief Complaint  Pito Black is a 79 y.o. male admitted 8/13/2024 with CVA    Hospital Course  79 y.o. male with lung cancer followed by Luz Maria Jules on apixaban who presented 8/13/2024 with Left facial droop and dysphagia.     He reports last night he went to take some pills and developed inability to swallow them. During swallowing he choked and vomited the pills. At that time he developed dysarthria, Left hand weakness, and Left facial droop. It persisted into this morning for which he decided to come in. He denies blurry vision, headache, dysphoria, pain, bleeding, bowel/bladder dysfunction, presyncope, syncope, falls.     In the ED he underwent CTA H&N which demonstrated distal Right MCA occlusion. Neurology was consulted and he was deemed outside the TPA window. CT perfusions was without penumbra. CXR demonstrated PPM/ICD and bilateral infiltrates. CBC is normal. CMP demonstrates hyponatremia Na 133, stable Cr 1.3. A1c is 6. Troponin 55 comparable to prior values. INR 1.3. EKG sinus tachycardia.    Interval Problem Update  Evaluated at bedside  In no acute distress this morning  Tachycardia in the 120s  Continue holding antineoplastic therapy for now  Keep NPO for now  SPL following, pending MBSS  PT/OT recommended postacute  Labs on AM    I have discussed this patient's plan of care and discharge plan at IDT rounds today with Case Management, Nursing, Nursing leadership, and other members of the IDT team.    Consultants/Specialty  Neurology    Code Status  Full Code    Disposition  The patient is not medically cleared for discharge to home or a post-acute facility.      I have placed the appropriate orders for post-discharge needs.    Review of Systems  Review of Systems   Constitutional: Negative.    HENT: Negative.     Eyes: Negative.    Respiratory: Negative.     Cardiovascular: Negative.    Gastrointestinal: Negative.     Genitourinary: Negative.    Musculoskeletal: Negative.    Skin: Negative.    Neurological: Negative.    Endo/Heme/Allergies: Negative.    Psychiatric/Behavioral: Negative.          Physical Exam  Temp:  [36.3 °C (97.3 °F)-36.7 °C (98.1 °F)] 36.3 °C (97.3 °F)  Pulse:  [104-118] 118  Resp:  [16-18] 18  BP: (106-143)/(68-98) 143/91  SpO2:  [94 %-97 %] 94 %    Physical Exam  Constitutional:       General: He is not in acute distress.     Appearance: Normal appearance.   HENT:      Head: Normocephalic and atraumatic.      Nose: Nose normal. No congestion.      Mouth/Throat:      Mouth: Mucous membranes are moist.   Eyes:      Extraocular Movements: Extraocular movements intact.      Pupils: Pupils are equal, round, and reactive to light.   Cardiovascular:      Rate and Rhythm: Normal rate and regular rhythm.      Pulses: Normal pulses.      Heart sounds: Normal heart sounds.   Pulmonary:      Effort: Pulmonary effort is normal.      Breath sounds: Normal breath sounds.   Abdominal:      General: Bowel sounds are normal.      Palpations: Abdomen is soft.      Tenderness: There is no abdominal tenderness.   Musculoskeletal:         General: No swelling. Normal range of motion.      Cervical back: Normal range of motion and neck supple.   Skin:     General: Skin is warm.      Coloration: Skin is not jaundiced.   Neurological:      Mental Status: He is alert.      Comments: Dysarthria  Left upper extremity weakness   Psychiatric:         Mood and Affect: Mood normal.         Behavior: Behavior normal.         Thought Content: Thought content normal.         Judgment: Judgment normal.         Fluids  No intake or output data in the 24 hours ending 08/14/24 1341    Laboratory  Recent Labs     08/13/24  0807   WBC 10.9*   RBC 4.80   HEMOGLOBIN 14.8   HEMATOCRIT 44.5   MCV 92.7   MCH 30.8   MCHC 33.3   RDW 49.1   PLATELETCT 110*   MPV 9.7     Recent Labs     08/13/24  0807 08/14/24  0317   SODIUM 133* 136   POTASSIUM 4.8 4.4    CHLORIDE 99 103   CO2 20 17*   GLUCOSE 157* 111*   BUN 29* 24*   CREATININE 1.34 1.03   CALCIUM 9.3 9.0     Recent Labs     08/13/24  0807   APTT 32.0   INR 1.29*         Recent Labs     08/13/24  0807   TRIGLYCERIDE 83   HDL 33*   LDL 84       Imaging  DX-ESOPHAGUS - [id number]         DX-CHEST-PORTABLE (1 VIEW)   Final Result      1. Development of interstitial opacities in the lungs, either edema or interstitial infiltrates.   2. Worsening confluent opacities in the right upper lobe and lingula.   3. The remainder is stable.      CT-CTA NECK WITH & W/O-POST PROCESSING   Final Result      1. No evidence of flow-limiting stenosis in the cervical carotid or cervical vertebral arteries.   2. Partially visualized airspace opacities/mass in the right upper lobe. Infiltrated right perihilar and mediastinal lymph tavia masses.   3. Bilateral pleural effusions.      CT-CTA HEAD WITH & W/O-POST PROCESS   Final Result         1. There is a focal stenosis/occlusion in a distal branch of the right MCA with flow reconstitution.         Preliminary findings texted to Dr. MEMO CHAIDEZ in the Emergency Department via [employer] on 8/13/2024 9:17 AM            CT-CEREBRAL PERFUSION ANALYSIS   Final Result      1. Cerebral blood flow less than 30% possibly representing completed infarct = 0 mL.      2. T Max more than 6 seconds possibly representing combination of completed infarct and ischemia = 18 mL.      3. Mismatched volume possibly representing ischemic brain/penumbra= 18 mL      4.  Please note that this cerebral perfusion study and report is Quantitative and targets supratentorial (cerebral) perfusion for evaluation of large vessel territory acute ischemia/infarction. For example, lacunar infarcts, and brainstem/posterior fossa    ischemia/infarction are not evaluated on this study.  Data acquisition is subject to artifacts which can yield non-anatomically plausible perfusion maps which may be due to motion, bolus timing,  "signal to noise ratio, or other technical factors.    Perfusion map abnormalities which show non-anatomic distributions are likely artifact.   This study is not \"stand-alone\" and should only be utilized for diagnosis, management/treatment in correlation with CT, CTA, and/or MRI and clinical factors.              Assessment/Plan  * Right middle cerebral artery stroke (HCC)- (present on admission)  Assessment & Plan  Likely thromboembolic in setting of Afib and malignancy  Neurology consulted and s/o - continue apixaban, no further workup indicated  Telemetry  PT/OT/SLP    Dysarthria- (present on admission)  Assessment & Plan  Due to MCA stroke  SLP eval and treat    Oropharyngeal dysphagia- (present on admission)  Assessment & Plan  Due to MCA stroke  SLP eval and treat    Hyponatremia- (present on admission)  Assessment & Plan  Mild  Repeat AM BMP    Type 2 diabetes mellitus with stage 3a chronic kidney disease, without long-term current use of insulin (HCC)- (present on admission)  Assessment & Plan  Well-controlled, A1c 6  Continue SGLT2  Initiated on atorvastatin    Chronic systolic heart failure (HCC)- (present on admission)  Assessment & Plan  Appears euvolemic  Continue BB, SGLT2, IFRAH  Unclear if not on ACE/ARB due to CKD  S/p ICD for primary prophylaxis    Primary lung adenocarcinoma (HCC)- (present on admission)  Assessment & Plan  Follows with oncologist Dr. Schulz  On study drug - held on admission per pharmacist    Stage 3a chronic kidney disease- (present on admission)  Assessment & Plan  Seemingly at baseline  Avoid nephrotoxins  Repeat AM BMP    Paroxysmal atrial fibrillation (HCC)- (present on admission)  Assessment & Plan  Continue metoprolol and apixaban  Telemetry for RVR in setting of CVA       VTE prophylaxis: Eliquis    I have performed a physical exam and reviewed and updated ROS and Plan today (8/14/2024). In review of yesterday's note (8/13/2024), there are no changes except as documented " above.    Greater than 51 minutes spent prepping to see patient (e.g. review of tests) obtaining and/or reviewing separately obtained history. Performing a medically appropriate examination and/ evaluation.  Counseling and educating the patient/family/caregiver.  Ordering medications, tests, or procedures.  Referring and communicating with other health care professionals.  Documenting clinical information in EPIC.  Independently interpreting results and communicating results to patient/family/caregiver.  Care coordination

## 2024-08-14 NOTE — PROGRESS NOTES
Monitor summary: SR-ST , NM .17, QRS .07, QT .32 with frequent PVCs, occasional bigeminy, rare couplets per strip from monitor room.

## 2024-08-14 NOTE — THERAPY
Speech Language Pathology   Daily Treatment     Patient Name: Pito Black  AGE:  79 y.o., SEX:  male  Medical Record #: [id number]  Date of Service: 8/14/2024      Precautions:  Precautions: Fall Risk, Swallow Precautions         Subjective  Supportive family at bedside.      Assessment  Educated pt and family on results and recommendations from the MBSS. Pt's daughter at bedside works as an MA in [place] and is a good family/pt advocate. Discussed recommendation for consideration for a non-oral source of nutrition which pt/family was agreeable to. Pt consumed trials of single ice chips and intermittent wet vocal quality after the swallow and cough noted which may be concerning for airway invasion. He completed ~25 reps of effortful swallow exercises with good quality and effort. He completed ~5 reps of tongue pull back with good effort. Pt required moderate verbal cues for implementation of exercises. Pt did well with re-education from daughter. Discussed pillars of aspiration and benefits of good oral care and mobility as able.      Clinical Impressions  Pt is presenting with a moderate oropharyngeal dysphagia as evidenced on MBSS today. Recommend NPO with consideration for a non-oral source of nutrition. Okay for small amount of ice chips to minimize xerostomia and maintain integrity of the swallow.         Recommendations  Treatment Completed: Dysphagia Treatment     Dysphagia Treatment  Diet Consistency: NPO with consideration for a non-oral source of nutrition  Instrumentation: Instrumental swallow study pending clinical progress  Medication: Non Oral  Oral Care: Q4h                     SLP Treatment Plan  Treatment Plan: Dysphagia Treatment  SLP Frequency: 4x Per Week  Estimated Duration: Until Therapy Goals Met      Anticipated Discharge Needs  Discharge Recommendations: Recommend post-acute placement for additional speech therapy services prior to discharge home  Therapy Recommendations Upon DC:  "Dysphagia Training, Community Re-Integration, Patient / Family / Caregiver Education      Patient / Family Goals  Patient / Family Goal #1: \"I want more\" to applesauce  Goal #1 Outcome: Progressing slower than expected  Short Term Goals  Short Term Goal # 1: Patient will participate in swallow diagnostic to guide dysphagia management.  Goal Outcome # 1: Goal met  Short Term Goal # 2: Pt will consume prefeeding trials with no overt s/sx of aspiration  Goal Outcome # 2 : Progressing as expected  Short Term Goal # 3: Pt will complete 40 reps of pharyngeal squeeze, base of tongue retraction, pharyngeal shortening exercises with good accuracy  Goal Outcome  # 3: Progressing as expected      MARISOL Hickey  "

## 2024-08-14 NOTE — DISCHARGE PLANNING
[facility] coordinator      PM&R referral from Dr. Mena.  Stroke protocol. Chart review indicates pending PT/OT evaluations . Insurance provider SCP. Return to community support spouse. Physiatry consult pending at this time. Thank you for the consultation. TCC to follow.

## 2024-08-14 NOTE — THERAPY
Physical Therapy   Initial Evaluation     Patient Name: Pito Black  Age:  79 y.o., Sex:  male  Medical Record #: [id number]  Today's Date: 8/14/2024     Precautions  Precautions: (P) Fall Risk;Swallow Precautions    Assessment  Patient is 79 y.o. male 8/13/2024 with left facial droop and dysphagia.   Pt Dx with distal right MCA occlusion. Pt presenting with impaired strength and coordination in LUE, left facial droop,impaired balance, and unsteady gait. Please see below for status, goals and POC.   Pt will benefit from continued PT as well as post acute therapy upon discharge.           Plan    Physical Therapy Initial Treatment Plan   Treatment Plan : (P) Bed Mobility, Family / Caregiver Training, Gait Training, Manual Therapy, Neuro Re-Education / Balance, Self Care / Home Evaluation, Therapeutic Activities, Therapeutic Exercise, Stair Training  Treatment Frequency: (P) 4 Times per Week  Duration: (P) Until Therapy Goals Met    DC Equipment Recommendations: (P) Unable to determine at this time  Discharge Recommendations: (P) Recommend post-acute placement for additional physical therapy services prior to discharge home         Precautions   Precautions Fall Risk;Swallow Precautions   Vitals   O2 Delivery Device None - Room Air   Pain 0 - 10 Group   Therapist Pain Assessment Post Activity Pain Same as Prior to Activity;Nurse Notified;0   Prior Living Situation   Prior Services None   Housing / Facility 1 Story House   Steps Into Home 3   Steps In Home 0   Rail None   Equipment Owned None;Tub / Shower Seat   Lives with - Patient's Self Care Capacity Spouse   Prior Level of Functional Mobility   Bed Mobility Independent   Transfer Status Independent   Ambulation Independent   Ambulation Distance household   Assistive Devices Used None   Stairs Independent   History of Falls   History of Falls No   Cognition    Speech/ Communication Dysarthric   Level of Consciousness Alert   Comments [place]   Strength Upper Body    Left  Impaired   Sensation Upper Body   Upper Extremity Sensation  X   Lt Upper Extremity Light Touch Impaired   Active ROM Lower Body    Active ROM Lower Body  WDL   Strength Lower Body   Lower Body Strength  X   Gross Strength Generalized Weakness, Equal Bilaterally   Sensation Lower Body   Lower Extremity Sensation   WDL   Coordination Upper Body   Coordination X   Fine Motor Coordination impaired   Gross Motor Coordination impaired.   Balance Assessment   Sitting Balance (Static) Fair +   Sitting Balance (Dynamic) Fair   Standing Balance (Static) Fair   Standing Balance (Dynamic) Fair -   Weight Shift Sitting Fair   Weight Shift Standing Fair   Comments HHA   Bed Mobility    Supine to Sit Supervised   Sit to Supine Supervised   Gait Analysis   Gait Level Of Assist Contact Guard Assist   Assistive Device Hand Held Assist   Distance (Feet) 40   # of Times Distance was Traveled 1   Deviation Decreased Heel Strike;Decreased Toe Off;Decreased Base Of Support   # of Stairs Climbed 0   Weight Bearing Status no restrictions   Functional Mobility   Sit to Stand Contact Guard Assist   Bed, Chair, Wheelchair Transfer Contact Guard Assist   Toilet Transfers Contact Guard Assist   6 Clicks Assessment - How much HELP from another person do you currently need... (If the patient hasn't done an activity recently, how much help from another person do you think he/she would need if he/she tried?)   Turning from your back to your side while in a flat bed without using bedrails? 3   Moving from lying on your back to sitting on the side of a flat bed without using bedrails? 3   Moving to and from a bed to a chair (including a wheelchair)? 3   Standing up from a chair using your arms (e.g., wheelchair, or bedside chair)? 3   Walking in hospital room? 3   Climbing 3-5 steps with a railing? 1   6 clicks Mobility Score 16   Short Term Goals    Short Term Goal # 1 Pt will transfer w/o AD a S level by tx tx 6   Short Term Goal # 2 Pt  will ambulate w/o AD for 150 ft with S by tx 6   Short Term Goal # 3 Pt will ascend and descend steps x 3 with CGA by tx 6.   Education Group   Education Provided Role of Physical Therapist   Role of Physical Therapist Patient Response Patient;Family;Acceptance;Explanation;Verbal Demonstration   Physical Therapy Initial Treatment Plan    Treatment Plan  Bed Mobility;Family / Caregiver Training;Gait Training;Manual Therapy;Neuro Re-Education / Balance;Self Care / Home Evaluation;Therapeutic Activities;Therapeutic Exercise;Stair Training   Treatment Frequency 4 Times per Week   Duration Until Therapy Goals Met   Problem List    Problems Impaired Bed Mobility;Impaired Transfers;Impaired Ambulation;Functional Strength Deficit;Impaired Balance;Decreased Activity Tolerance   Anticipated Discharge Equipment and Recommendations   DC Equipment Recommendations Unable to determine at this time   Discharge Recommendations Recommend post-acute placement for additional physical therapy services prior to discharge home   Interdisciplinary Plan of Care Collaboration   IDT Collaboration with  Nursing;Family / Caregiver   Patient Position at End of Therapy In Bed;Tray Table within Reach;Call Light within Reach   Collaboration Comments Staff updated.   Session Information   Date / Session Number  8/14-1 ( 1/4,8/20)

## 2024-08-14 NOTE — THERAPY
Occupational Therapy   Initial Evaluation     Patient Name: Pito Black  Age:  79 y.o., Sex:  male  Medical Record #: [id number]  Today's Date: 8/14/2024     Precautions: Fall Risk, Swallow Precautions    Assessment  Patient is 79 y.o. male admitted for chocking and left side facial droop w/changes in speech. PMhx: lung CA, HTN, DM2, sepsis, insomnia, hearing loss, CKD, and CHF. Pt is getting active CA tx.   This admission pt is dx w/R MCA likely thromboembolic w/AFib and malignancy. Pt presents w/LUE weakness, incoordination and sensory impairment impacting ADL's and txfs as well as swallow/speech deficits.     Plan  Occupational Therapy Initial Treatment Plan   Treatment Interventions: Self Care / Activities of Daily Living, Neuro Re-Education / Balance, Therapeutic Exercises, Therapeutic Activity  Treatment Frequency: 4 Times per Week  Duration: Until Therapy Goals Met    DC Equipment Recommendations: Unable to determine at this time  Discharge Recommendations: Recommend post-acute placement for additional occupational therapy services prior to discharge home     Subjective  Agreeable to therapy      Objective     08/14/24 1402   Time In/Time Out   Therapy Start Time 1342   Therapy End Time 1402   Total Therapy Time 20   Charge Group   OT Evaluation OT Evaluation High   Total Time Spent   OT Time Spent Yes   OT Evaluation (Minutes) 20   OT Total Time Spent (Calculated) 20   Initial Contact Note    Initial Contact Note Order Received and Verified, Occupational Therapy Evaluation in Progress with Full Report to Follow.   Prior Living Situation   Prior Services None   Housing / Facility 1 Story House   Steps Into Home 3   Steps In Home 0   Rail None   Equipment Owned None;Tub / Shower Seat   Lives with - Patient's Self Care Capacity Spouse   Comments Spouse and Dtr present   Prior Level of ADL Function   Self Feeding Independent   Grooming / Hygiene Independent   Bathing Independent   Dressing Independent    Toileting Independent   Prior Level of IADL Function   Medication Management Independent   Laundry Independent   Kitchen Mobility Independent   Finances Independent   Home Management Independent   Shopping Independent   Prior Level Of Mobility Independent Without Device in Community   Comments per dtr very independnet prior   History of Falls   History of Falls No   Precautions   Precautions Fall Risk;Swallow Precautions   Pain 0 - 10 Group   Therapist Pain Assessment 0;Nurse Notified   Cognition    Cognition / Consciousness X   Speech/ Communication Dysarthric   Level of Consciousness Alert   Safety Awareness Impaired   Comments [place] can be impulsive   Passive ROM Upper Body   Passive ROM Upper Body WDL   Active ROM Upper Body   Active ROM Upper Body  X   Dominant Hand Right   Comments LUE w/generalized weakness worse distally   Strength Upper Body   Upper Body Strength  X   Left  Impaired   Comments LUE 3+5  strength 1/5; RUE WFL   Sensation Upper Body   Upper Extremity Sensation  X   Lt Upper Extremity Light Touch Impaired   Upper Body Muscle Tone   Upper Body Muscle Tone  X   Lt Upper Extremity Muscle Tone Hypotonic   Neurological Concerns   Neurological Concerns Yes   Lt Upper Extremity Fine Motor Control Impaired   Lt Upper Extremity Functional Use Impaired   Coordination Upper Body   Coordination X   Fine Motor Coordination impaired finger to nose and finger to thumb oppositiong   Gross Motor Coordination impaired by weakness   Balance Assessment   Sitting Balance (Static) Fair +   Sitting Balance (Dynamic) Fair   Standing Balance (Static) Fair   Standing Balance (Dynamic) Fair -   Weight Shift Sitting Fair   Weight Shift Standing Fair   Comments   (HHA)   Bed Mobility    Supine to Sit Supervised   Sit to Supine Supervised   ADL Assessment   Grooming Minimal Assist;Standing   Upper Body Dressing Minimal Assist   Lower Body Dressing Minimal Assist   How much help from another person does the patient  currently need...   6 Clicks Daily Activity Score 18   Functional Mobility   Sit to Stand Contact Guard Assist   Bed, Chair, Wheelchair Transfer Contact Guard Assist   Toilet Transfers Contact Guard Assist   Mobility walking in room no AD no LOB   Visual Perception   Visual Perception  Not Tested   Activity Tolerance   Comments no overt c/o pain or  fatigue   Patient / Family Goals   Patient / Family Goal #1 to get to rehab per family   Short Term Goals   Short Term Goal # 1 pt will complete grooming standing at sink w/spv   Short Term Goal # 2 pt will complete LB dressing w/spv   Short Term Goal # 3 pt will reach and place 5/5 items w/spv using LUE   Education Group   Role of Occupational Therapist Patient Response Patient;Family;Acceptance;Explanation;Demonstration   Occupational Therapy Initial Treatment Plan    Treatment Interventions Self Care / Activities of Daily Living;Neuro Re-Education / Balance;Therapeutic Exercises;Therapeutic Activity   Treatment Frequency 4 Times per Week   Duration Until Therapy Goals Met   Problem List   Problem List Decreased Active Daily Living Skills;Decreased Functional Mobility;Decreased Activity Tolerance;Impaired Postural Control / Balance;Decreased Upper Extremity Strength Left;Decreased Upper Extremity AROM Left;Impaired Coordination Left Upper Extremity;Impaired Sensation Left Upper Extremity;Impaired Upper Extremity Tone Left   Anticipated Discharge Equipment and Recommendations   DC Equipment Recommendations Unable to determine at this time   Discharge Recommendations Recommend post-acute placement for additional occupational therapy services prior to discharge home   Interdisciplinary Plan of Care Collaboration   IDT Collaboration with  Nursing   Patient Position at End of Therapy In Bed;Tray Table within Reach;Phone within Reach;Call Light within Reach   Collaboration Comments RN aware of OT eval and pts efforts   Session Information   Date / Session Number  8/14 #1  (1/4, 8/20)

## 2024-08-15 ENCOUNTER — APPOINTMENT (OUTPATIENT)
Dept: RADIOLOGY | Facility: MEDICAL CENTER | Age: 79
DRG: 065 | End: 2024-08-15
Payer: MEDICARE

## 2024-08-15 LAB
ANION GAP SERPL CALC-SCNC: 15 MMOL/L (ref 7–16)
BASOPHILS # BLD AUTO: 0.4 % (ref 0–1.8)
BASOPHILS # BLD: 0.04 K/UL (ref 0–0.12)
BUN SERPL-MCNC: 21 MG/DL (ref 8–22)
CALCIUM SERPL-MCNC: 9.6 MG/DL (ref 8.5–10.5)
CHLORIDE SERPL-SCNC: 104 MMOL/L (ref 96–112)
CO2 SERPL-SCNC: 17 MMOL/L (ref 20–33)
CREAT SERPL-MCNC: 1.03 MG/DL (ref 0.5–1.4)
EOSINOPHIL # BLD AUTO: 0.19 K/UL (ref 0–0.51)
EOSINOPHIL NFR BLD: 1.8 % (ref 0–6.9)
ERYTHROCYTE [DISTWIDTH] IN BLOOD BY AUTOMATED COUNT: 47.9 FL (ref 35.9–50)
GFR SERPLBLD CREATININE-BSD FMLA CKD-EPI: 74 ML/MIN/1.73 M 2
GLUCOSE SERPL-MCNC: 121 MG/DL (ref 65–99)
HCT VFR BLD AUTO: 42.8 % (ref 42–52)
HGB BLD-MCNC: 14.6 G/DL (ref 14–18)
IMM GRANULOCYTES # BLD AUTO: 0.05 K/UL (ref 0–0.11)
IMM GRANULOCYTES NFR BLD AUTO: 0.5 % (ref 0–0.9)
LYMPHOCYTES # BLD AUTO: 1.94 K/UL (ref 1–4.8)
LYMPHOCYTES NFR BLD: 18.5 % (ref 22–41)
MCH RBC QN AUTO: 31.3 PG (ref 27–33)
MCHC RBC AUTO-ENTMCNC: 34.1 G/DL (ref 32.3–36.5)
MCV RBC AUTO: 91.6 FL (ref 81.4–97.8)
MONOCYTES # BLD AUTO: 0.92 K/UL (ref 0–0.85)
MONOCYTES NFR BLD AUTO: 8.8 % (ref 0–13.4)
NEUTROPHILS # BLD AUTO: 7.33 K/UL (ref 1.82–7.42)
NEUTROPHILS NFR BLD: 70 % (ref 44–72)
NRBC # BLD AUTO: 0 K/UL
NRBC BLD-RTO: 0 /100 WBC (ref 0–0.2)
PLATELET # BLD AUTO: 127 K/UL (ref 164–446)
PMV BLD AUTO: 9.8 FL (ref 9–12.9)
POTASSIUM SERPL-SCNC: 4.4 MMOL/L (ref 3.6–5.5)
RBC # BLD AUTO: 4.67 M/UL (ref 4.7–6.1)
SODIUM SERPL-SCNC: 136 MMOL/L (ref 135–145)
WBC # BLD AUTO: 10.5 K/UL (ref 4.8–10.8)

## 2024-08-15 PROCEDURE — 80048 BASIC METABOLIC PNL TOTAL CA: CPT

## 2024-08-15 PROCEDURE — 700102 HCHG RX REV CODE 250 W/ 637 OVERRIDE(OP)

## 2024-08-15 PROCEDURE — A9270 NON-COVERED ITEM OR SERVICE: HCPCS

## 2024-08-15 PROCEDURE — 770020 HCHG ROOM/CARE - TELE (206)

## 2024-08-15 PROCEDURE — 85025 COMPLETE CBC W/AUTO DIFF WBC: CPT

## 2024-08-15 PROCEDURE — 99232 SBSQ HOSP IP/OBS MODERATE 35: CPT | Performed by: STUDENT IN AN ORGANIZED HEALTH CARE EDUCATION/TRAINING PROGRAM

## 2024-08-15 PROCEDURE — 700111 HCHG RX REV CODE 636 W/ 250 OVERRIDE (IP): Performed by: STUDENT IN AN ORGANIZED HEALTH CARE EDUCATION/TRAINING PROGRAM

## 2024-08-15 PROCEDURE — 36415 COLL VENOUS BLD VENIPUNCTURE: CPT

## 2024-08-15 RX ORDER — ATORVASTATIN CALCIUM 20 MG/1
20 TABLET, FILM COATED ORAL EVERY EVENING
Status: DISCONTINUED | OUTPATIENT
Start: 2024-08-15 | End: 2024-08-24 | Stop reason: HOSPADM

## 2024-08-15 RX ORDER — DAPAGLIFLOZIN 10 MG/1
10 TABLET, FILM COATED ORAL DAILY
Status: DISCONTINUED | OUTPATIENT
Start: 2024-08-15 | End: 2024-08-24 | Stop reason: HOSPADM

## 2024-08-15 RX ORDER — EZETIMIBE 10 MG/1
10 TABLET ORAL DAILY
Status: DISCONTINUED | OUTPATIENT
Start: 2024-08-15 | End: 2024-08-24 | Stop reason: HOSPADM

## 2024-08-15 RX ORDER — VITAMIN B COMPLEX
2000 TABLET ORAL DAILY
Status: DISCONTINUED | OUTPATIENT
Start: 2024-08-15 | End: 2024-08-24 | Stop reason: HOSPADM

## 2024-08-15 RX ORDER — SPIRONOLACTONE 25 MG/1
25 TABLET ORAL DAILY
Status: DISCONTINUED | OUTPATIENT
Start: 2024-08-15 | End: 2024-08-24 | Stop reason: HOSPADM

## 2024-08-15 RX ORDER — CYCLOBENZAPRINE HCL 10 MG
10 TABLET ORAL
Status: DISCONTINUED | OUTPATIENT
Start: 2024-08-15 | End: 2024-08-24 | Stop reason: HOSPADM

## 2024-08-15 RX ORDER — METOPROLOL TARTRATE 25 MG/1
25 TABLET, FILM COATED ORAL 2 TIMES DAILY
Status: DISCONTINUED | OUTPATIENT
Start: 2024-08-15 | End: 2024-08-16

## 2024-08-15 RX ORDER — ONDANSETRON 4 MG/1
4 TABLET, ORALLY DISINTEGRATING ORAL EVERY 4 HOURS PRN
Status: DISCONTINUED | OUTPATIENT
Start: 2024-08-15 | End: 2024-08-24 | Stop reason: HOSPADM

## 2024-08-15 RX ADMIN — APIXABAN 5 MG: 5 TABLET, FILM COATED ORAL at 05:31

## 2024-08-15 RX ADMIN — METOPROLOL TARTRATE 25 MG: 25 TABLET, FILM COATED ORAL at 05:32

## 2024-08-15 RX ADMIN — EZETIMIBE 10 MG: 10 TABLET ORAL at 05:34

## 2024-08-15 RX ADMIN — SPIRONOLACTONE 25 MG: 25 TABLET ORAL at 05:33

## 2024-08-15 RX ADMIN — METOPROLOL TARTRATE 25 MG: 25 TABLET, FILM COATED ORAL at 17:37

## 2024-08-15 RX ADMIN — APIXABAN 5 MG: 5 TABLET, FILM COATED ORAL at 17:37

## 2024-08-15 RX ADMIN — Medication 2000 UNITS: at 05:31

## 2024-08-15 RX ADMIN — HYDROMORPHONE HYDROCHLORIDE 1 MG: 1 INJECTION, SOLUTION INTRAMUSCULAR; INTRAVENOUS; SUBCUTANEOUS at 01:48

## 2024-08-15 RX ADMIN — DAPAGLIFLOZIN 10 MG: 10 TABLET, FILM COATED ORAL at 05:35

## 2024-08-15 RX ADMIN — ATORVASTATIN CALCIUM 20 MG: 20 TABLET, FILM COATED ORAL at 17:37

## 2024-08-15 ASSESSMENT — FIBROSIS 4 INDEX: FIB4 SCORE: 3.23

## 2024-08-15 ASSESSMENT — PATIENT HEALTH QUESTIONNAIRE - PHQ9
1. LITTLE INTEREST OR PLEASURE IN DOING THINGS: NOT AT ALL
2. FEELING DOWN, DEPRESSED, IRRITABLE, OR HOPELESS: NOT AT ALL
SUM OF ALL RESPONSES TO PHQ9 QUESTIONS 1 AND 2: 0
SUM OF ALL RESPONSES TO PHQ9 QUESTIONS 1 AND 2: 0
2. FEELING DOWN, DEPRESSED, IRRITABLE, OR HOPELESS: NOT AT ALL
1. LITTLE INTEREST OR PLEASURE IN DOING THINGS: NOT AT ALL

## 2024-08-15 ASSESSMENT — ENCOUNTER SYMPTOMS
EYES NEGATIVE: 1
CONSTITUTIONAL NEGATIVE: 1
GASTROINTESTINAL NEGATIVE: 1
CARDIOVASCULAR NEGATIVE: 1
MUSCULOSKELETAL NEGATIVE: 1
NEUROLOGICAL NEGATIVE: 1
RESPIRATORY NEGATIVE: 1
PSYCHIATRIC NEGATIVE: 1

## 2024-08-15 ASSESSMENT — PAIN DESCRIPTION - PAIN TYPE: TYPE: ACUTE PAIN

## 2024-08-15 NOTE — PROGRESS NOTES
Iris Placement    Tube Team verified patient name and medical record number prior to tube placement.  Iris tube (43 inches, 10 Setswana) taped at 65 cm in left nare.  Per Iris picture, tube appears to be in the stomach.  Nursing Instructions: Awaiting KUB to confirm placement before use for medications or feeding. Once placement confirmed, flush tube with 30 ml of water, and then remove and save stylet, in patient medication drawer.

## 2024-08-15 NOTE — CARE PLAN
The patient is Stable - Low risk of patient condition declining or worsening    Shift Goals  Clinical Goals: NG tube placement  Patient Goals: Sleep  Family Goals: CINDY    Progress made toward(s) clinical / shift goals:    Problem: Pain - Standard  Goal: Alleviation of pain or a reduction in pain to the patient’s comfort goal  Description: Target End Date:  Prior to discharge or change in level of care    Document on Vitals flowsheet    1.  Document pain using the appropriate pain scale per order or unit policy  2.  Educate and implement non-pharmacologic comfort measures (i.e. relaxation, distraction, massage, cold/heat therapy, etc.)  3.  Pain management medications as ordered  4.  Reassess pain after pain med administration per policy  5.  If opiods administered assess patient's response to pain medication is appropriate per POSS sedation scale  6.  Follow pain management plan developed in collaboration with patient and interdisciplinary team (including palliative care or pain specialists if applicable)  Outcome: Progressing     Problem: Fall Risk  Goal: Patient will remain free from falls  Description: Target End Date:  Prior to discharge or change in level of care    Document interventions on the Kebede Rei Fall Risk Assessment    1.  Assess for fall risk factors  2.  Implement fall precautions  Outcome: Progressing     Problem: Mobility - Stroke  Goal: Patient's capacity to carry out activities will improve  Description: Target End Date:  Prior to discharge or change in level of care    1.  Assess for barriers to mobility/activity  2.  Implement activity per interdisciplinary team recommendations  3.  Target activity level identified and patient/family/caregiver aware of goal  4.  Provide assistive devices  5.  Instruct patient/caregiver on proper use of assistive/adaptive devices  6.  Schedule activities and rest periods to decrease effects of fatigue  7.  Encourage mobilization to extent of ability  8.   Maintain proper body alignment  9.  Provide adequate pain management to allow progressive mobilization  10. Implement pace maker precautions as needed  Outcome: Progressing     Problem: Neuro Status  Goal: Neuro status will remain stable or improve  Description: Target End Date:  Prior to discharge or change in level of care    Document on Neuro assessment in the Assessment flowsheet    1.  Assess and monitor neurologic status per provider order/protocol/unit policy  2.  Assess level of consciousness and orientation  3.  Assess for speech, dysarthria, dysphagia, facial symmetry  4.  Assess visual field, eye movements, gaze preference, pupil reaction and size  5.  Assess muscle strength and motor response in all four extremities  6.  Assess for sensation (numbness and tingling)  7.  Assess basic neuro reflexes (cough, gag, corneal)  8.  Identify changes in neuro status and report to provider for testing/treatment orders  Outcome: Progressing       Patient is not progressing towards the following goals:

## 2024-08-15 NOTE — PROGRESS NOTES
Hospital Medicine Daily Progress Note    Date of Service  8/15/2024    Chief Complaint  Pito Black is a 79 y.o. male admitted 8/13/2024 with CVA    Hospital Course  79 y.o. male with lung cancer followed by Luz Maria Jules on apixaban who presented 8/13/2024 with Left facial droop and dysphagia.     He reports last night he went to take some pills and developed inability to swallow them. During swallowing he choked and vomited the pills. At that time he developed dysarthria, Left hand weakness, and Left facial droop. It persisted into this morning for which he decided to come in. He denies blurry vision, headache, dysphoria, pain, bleeding, bowel/bladder dysfunction, presyncope, syncope, falls.     In the ED he underwent CTA H&N which demonstrated distal Right MCA occlusion. Neurology was consulted and he was deemed outside the TPA window. CT perfusions was without penumbra. CXR demonstrated PPM/ICD and bilateral infiltrates. CBC is normal. CMP demonstrates hyponatremia Na 133, stable Cr 1.3. A1c is 6. Troponin 55 comparable to prior values. INR 1.3. EKG sinus tachycardia.    Interval Problem Update  Evaluated at bedside  In no acute distress this morning  Tachycardia in the 100s  Keep NPO  On TF  Continue holding antineoplastic therapy  PT/OT recommended postacute  SNF/PMR referrals placed  Labs on AM    I have discussed this patient's plan of care and discharge plan at IDT rounds today with Case Management, Nursing, Nursing leadership, and other members of the IDT team.    Consultants/Specialty  Neurology    Code Status  Full Code    Disposition  The patient is not medically cleared for discharge to home or a post-acute facility.    I have placed the appropriate orders for post-discharge needs.    Review of Systems  Review of Systems   Constitutional: Negative.    HENT: Negative.     Eyes: Negative.    Respiratory: Negative.     Cardiovascular: Negative.    Gastrointestinal: Negative.    Genitourinary:  Negative.    Musculoskeletal: Negative.    Skin: Negative.    Neurological: Negative.    Endo/Heme/Allergies: Negative.    Psychiatric/Behavioral: Negative.          Physical Exam  Temp:  [36.3 °C (97.3 °F)-36.8 °C (98.2 °F)] 36.5 °C (97.7 °F)  Pulse:  [] 99  Resp:  [16-18] 16  BP: (128-152)/(70-91) 128/70  SpO2:  [93 %-95 %] 93 %    Physical Exam  Constitutional:       General: He is not in acute distress.     Appearance: Normal appearance.   HENT:      Head: Normocephalic and atraumatic.      Nose: Nose normal. No congestion.      Mouth/Throat:      Mouth: Mucous membranes are moist.   Eyes:      Extraocular Movements: Extraocular movements intact.      Pupils: Pupils are equal, round, and reactive to light.   Cardiovascular:      Rate and Rhythm: Normal rate and regular rhythm.      Pulses: Normal pulses.      Heart sounds: Normal heart sounds.   Pulmonary:      Effort: Pulmonary effort is normal.      Breath sounds: Normal breath sounds.   Abdominal:      General: Bowel sounds are normal.      Palpations: Abdomen is soft.      Tenderness: There is no abdominal tenderness.   Musculoskeletal:         General: No swelling. Normal range of motion.      Cervical back: Normal range of motion and neck supple.   Skin:     General: Skin is warm.      Coloration: Skin is not jaundiced.   Neurological:      Mental Status: He is alert.      Comments: Dysarthria  Left upper extremity weakness   Psychiatric:         Mood and Affect: Mood normal.         Behavior: Behavior normal.         Thought Content: Thought content normal.         Judgment: Judgment normal.       Fluids  No intake or output data in the 24 hours ending 08/15/24 0936    Laboratory  Recent Labs     08/13/24  0807 08/15/24  0221   WBC 10.9* 10.5   RBC 4.80 4.67*   HEMOGLOBIN 14.8 14.6   HEMATOCRIT 44.5 42.8   MCV 92.7 91.6   MCH 30.8 31.3   MCHC 33.3 34.1   RDW 49.1 47.9   PLATELETCT 110* 127*   MPV 9.7 9.8     Recent Labs     08/13/24  0807  08/14/24  0317 08/15/24  0221   SODIUM 133* 136 136   POTASSIUM 4.8 4.4 4.4   CHLORIDE 99 103 104   CO2 20 17* 17*   GLUCOSE 157* 111* 121*   BUN 29* 24* 21   CREATININE 1.34 1.03 1.03   CALCIUM 9.3 9.0 9.6     Recent Labs     08/13/24  0807   APTT 32.0   INR 1.29*         Recent Labs     08/13/24  0807   TRIGLYCERIDE 83   HDL 33*   LDL 84       Imaging  DX-ABDOMEN FOR TUBE PLACEMENT   Final Result         1.  Nonspecific bowel gas pattern in the upper abdomen.   2.  Dobbhoff tube tip terminates overlying the expected location of the gastric fundus.   3.  Bilateral pulmonary infiltrates, greatest in the right upper lobe.   4.  Trace bilateral pleural effusions      DX-ESOPHAGUS - [id number]   Final Result      DX-CHEST-PORTABLE (1 VIEW)   Final Result      1. Development of interstitial opacities in the lungs, either edema or interstitial infiltrates.   2. Worsening confluent opacities in the right upper lobe and lingula.   3. The remainder is stable.      CT-CTA NECK WITH & W/O-POST PROCESSING   Final Result      1. No evidence of flow-limiting stenosis in the cervical carotid or cervical vertebral arteries.   2. Partially visualized airspace opacities/mass in the right upper lobe. Infiltrated right perihilar and mediastinal lymph tavia masses.   3. Bilateral pleural effusions.      CT-CTA HEAD WITH & W/O-POST PROCESS   Final Result         1. There is a focal stenosis/occlusion in a distal branch of the right MCA with flow reconstitution.         Preliminary findings texted to Dr. MEMO CHAIDEZ in the Emergency Department via [employer] on 8/13/2024 9:17 AM            CT-CEREBRAL PERFUSION ANALYSIS   Final Result      1. Cerebral blood flow less than 30% possibly representing completed infarct = 0 mL.      2. T Max more than 6 seconds possibly representing combination of completed infarct and ischemia = 18 mL.      3. Mismatched volume possibly representing ischemic brain/penumbra= 18 mL      4.  Please note that  "this cerebral perfusion study and report is Quantitative and targets supratentorial (cerebral) perfusion for evaluation of large vessel territory acute ischemia/infarction. For example, lacunar infarcts, and brainstem/posterior fossa    ischemia/infarction are not evaluated on this study.  Data acquisition is subject to artifacts which can yield non-anatomically plausible perfusion maps which may be due to motion, bolus timing, signal to noise ratio, or other technical factors.    Perfusion map abnormalities which show non-anatomic distributions are likely artifact.   This study is not \"stand-alone\" and should only be utilized for diagnosis, management/treatment in correlation with CT, CTA, and/or MRI and clinical factors.              Assessment/Plan  * Right middle cerebral artery stroke (HCC)- (present on admission)  Assessment & Plan  Likely thromboembolic in setting of Afib and malignancy  Neurology consulted and s/o - continue apixaban, no further workup indicated  Telemetry  PT/OT/SLP    Dysarthria- (present on admission)  Assessment & Plan  Due to MCA stroke  SLP eval and treat    Oropharyngeal dysphagia- (present on admission)  Assessment & Plan  Due to MCA stroke  SLP eval and treat    Hyponatremia- (present on admission)  Assessment & Plan  Mild  Repeat AM BMP    Type 2 diabetes mellitus with stage 3a chronic kidney disease, without long-term current use of insulin (HCC)- (present on admission)  Assessment & Plan  Well-controlled, A1c 6  Continue SGLT2  Initiated on atorvastatin    Chronic systolic heart failure (HCC)- (present on admission)  Assessment & Plan  Appears euvolemic  Continue BB, SGLT2, IFRAH  Unclear if not on ACE/ARB due to CKD  S/p ICD for primary prophylaxis    Primary lung adenocarcinoma (HCC)- (present on admission)  Assessment & Plan  Follows with oncologist Dr. Schulz  On study drug - held on admission per pharmacist    Stage 3a chronic kidney disease- (present on " admission)  Assessment & Plan  Seemingly at baseline  Avoid nephrotoxins  Repeat AM BMP    Paroxysmal atrial fibrillation (HCC)- (present on admission)  Assessment & Plan  Continue metoprolol and apixaban  Telemetry for RVR in setting of CVA       VTE prophylaxis: Eliquis    I have performed a physical exam and reviewed and updated ROS and Plan today (8/15/2024). In review of yesterday's note (8/14/2024), there are no changes except as documented above.    Greater than 51 minutes spent prepping to see patient (e.g. review of tests) obtaining and/or reviewing separately obtained history. Performing a medically appropriate examination and/ evaluation.  Counseling and educating the patient/family/caregiver.  Ordering medications, tests, or procedures.  Referring and communicating with other health care professionals.  Documenting clinical information in EPIC.  Independently interpreting results and communicating results to patient/family/caregiver.  Care coordination

## 2024-08-15 NOTE — CONSULTS
Physical Medicine and Rehabilitation Consultation          Date of initial consultation: 8/15/2024  Consulting provider: González Mena M.D.   Reason for consultation: assess for acute inpatient rehab appropriateness  LOS: 2 Day(s)    Chief complaint: R MCA stroke     HPI: The patient is a 79 y.o. right hand dominant male with a past medical history of lung cancer followed by Dr. Schulz, A-fib on apixaban, HF with reduced EF of 35%, Status post AICD, CKD, hypertension, hyperlipidemia, diabetes;  who presented on 8/13/2024  8:07 AM with slurred speech, left facial droop, left arm weakness.  Initial NIH score of 5.  Workup with CT perfusion found 18 mm penumbra, CTA found focal stenosis/occlusion in the distal right MCA.  Patient had a delayed presentation and was not a candidate for thrombolytics or thrombectomy.  Etiology for stroke is likely thromboembolic in the setting of A-fib and malignancy    The patient currently reports difficulty speaking, difficulty swallowing. He had MBSS on 8/14 and continues to be NPO with NG tube in left nare. Patient has difficulty following commands on exam and family states this is new since the stroke.     ROS  Pertinent positives are mentioned in the HPI, all others reviewed and are negative.    Social Hx:  1 SH  3 ABBY  With: Spouse    THERAPY:  Restrictions: Fall risk, swallow precautions  PT: Functional mobility   8/14: Walking 40 feet x 1 at contact-guard assist with hand-held assist    OT: ADLs  8/14: Min assist ADLs    SLP:   8/14: N.p.o., Moderate oropharyngeal dysphagia    IMAGING:  CTA head and neck 8/13/2024  1. There is a focal stenosis/occlusion in a distal branch of the right MCA with flow reconstitution.     PROCEDURES:  None    PMH:  Past Medical History:   Diagnosis Date    Acute hypoxemic respiratory failure (HCC)     Acute respiratory failure with hypoxia (HCC) 02/01/2023    AICD (automatic cardioverter/defibrillator) present 12/07/2023    Arrhythmia     hx  of a fib    Arthritis 2015    generalized, DDD back    Asthma     inhaler     Backpain 08/06/2018    9/10    Blood clotting disorder (HCC) 02/2023    Pulmonary, right lung    Breath shortness     with exertion    Cancer (HCC) 07/2017    left lung- adenocarcinoma    Cataract     IOL bilateral    Congestive heart failure (HCC)     cardiologist, [facility], Catherine KAUR    Dental disorder     lower partial, removable bridge    Diabetes 08/06/2018    on no meds at this time, diet controlled    Dysfunction of eustachian tube     Heart valve disease     mild mitral valve prolapse    High cholesterol     Hypertension     Macrocytic anemia     Pneumonia 02/2023    Renal disorder     CKD stage 3    Thrombophilia ([facility])     Type 2 diabetes mellitus with hyperglycemia ([facility]) 03/17/2023    This is a chronic condition. Current medications: Insulin:  Biguanide: took metformin historically will restart metformin xr 750 mg daily. GLP1-RA:   SGLT-2i:    Consider for cardiorenal protection DPP4-I:  TZD:  Pk: Sulfonyluria:   Last A1c: 6/2/23 6.5% Last Microalb/Cr ratio: 6/2/23 <1.2 Fasting sugars: Last diabetic foot exam: 6/19/23 Last retinal eye exam: has upcoming appointment with optome    Volume overload        PSH:  Past Surgical History:   Procedure Laterality Date    NM BRONCHOSCOPY,DIAGNOSTIC N/A 1/16/2024    Procedure: FIBER OPTIC BRONCHOSCOPY WITH  WASH, BRUSH, BRONCHOALVEOLAR LAVAGE, BIOPSY AND FINE NEEDLE ASPIRATION, ENDOBRONCHIAL ULTRASOUND AND NAVIGATION, ROBOTICS;  Surgeon: Vinayak Carbajal M.D.;  Location: [facility];  Service: Pulmonary Robotic    ENDOBRONCHIAL US ADD-ON N/A 1/16/2024    Procedure: ENDOBRONCHIAL ULTRASOUND (EBUS);  Surgeon: Vinayak Carbajal M.D.;  Location: [facility];  Service: Pulmonary Robotic    NM BRONCHOSCOPY,DIAGNOSTIC N/A 12/28/2023    Procedure: FIBER OPTIC BRONCHOSCOPY WITH BRONCHOALVEOLAR LAVAGE AND FINE NEEDLE ASPIRATION, ENDOBRONCHIAL ULTRASOUND AND  "NAVIGATION, ROBOTICS;  Surgeon: Miriam Mratin M.D.;  Location: [facility];  Service: Pulmonary Robotic    ENDOBRONCHIAL US ADD-ON N/A 12/28/2023    Procedure: ENDOBRONCHIAL ULTRASOUND (EBUS);  Surgeon: Miriam Martin M.D.;  Location: [facility];  Service: Pulmonary Robotic    WI BRONCHOSCOPY,DIAGNOSTIC  07/01/2021    Procedure: BRONCHOSCOPY - FIBER OPTIC WITH BRANCHOALVEOLAR LAVAGE BIOPSY, FNA, NAVIGATION;  Surgeon: Vinayak Carbajal M.D.;  Location: [facility];  Service: Pulmonary    ENDOBRONCHIAL US ADD-ON  07/01/2021    Procedure: ENDOBRONCHIAL ULTRASOUND (EBUS).;  Surgeon: Vinayak Carbajal M.D.;  Location: [facility];  Service: Pulmonary    CATARACT PHACO WITH IOL Left 08/21/2018    Procedure: CATARACT PHACO WITH IOL;  Surgeon: Juanito Hager M.D.;  Location: SURGERY SAME DAY [facility];  Service: Ophthalmology    CATARACT PHACO WITH IOL Right 08/07/2018    Procedure: CATARACT PHACO WITH IOL;  Surgeon: Juanito Hager M.D.;  Location: SURGERY SAME DAY [facility];  Service: Ophthalmology    THORACOSCOPY Left 04/19/2018    Procedure: THORACOSCOPY- WEDGE BIOPSY W/FROZEN SECTION;  Surgeon: Cristhian Galeano M.D.;  Location: [facility];  Service: Thoracic    EAR MIDDLE EXPLORATION Right 06/12/2015    Procedure: EAR MIDDLE EXPLORATION;  Surgeon: William Brandon M.D.;  Location: SURGERY SAME DAY [facility];  Service:     OSSICULAR RECONSTRUCTION Right 06/12/2015    Procedure: OSSICULAR RECONSTRUCTION CHAIN POSSIBLE;  Surgeon: William Brandon M.D.;  Location: SURGERY SAME DAY [facility];  Service:     KNEE ARTHROPLASTY TOTAL  2005    EAR RECONSTRUCTION Bilateral     ear replacement \"many years ago\"       FHX:  Family History   Problem Relation Age of Onset    Stroke Mother     Hypertension Mother     Diabetes Mother     Cancer Father         stomach cancer    Heart Disease Brother     Alcohol abuse Brother     Ovarian Cancer Neg Hx     " "Tubal Cancer Neg Hx     Peritoneal Cancer Neg Hx     Colorectal Cancer Neg Hx     Breast Cancer Neg Hx     Hyperlipidemia Neg Hx        Medications:  Current Facility-Administered Medications   Medication Dose    Pharmacy Consult: Enteral tube insertion - review meds/change route/product selection      apixaban (Eliquis) tablet 5 mg  5 mg    atorvastatin (Lipitor) tablet 20 mg  20 mg    dapagliflozin propanediol (Farxiga) tablet 10 mg  10 mg    ezetimibe (Zetia) tablet 10 mg  10 mg    metoprolol tartrate (Lopressor) tablet 25 mg  25 mg    spironolactone (Aldactone) tablet 25 mg  25 mg    vitamin D3 (Cholecalciferol) tablet 2,000 Units  2,000 Units    cyclobenzaprine (Flexeril) tablet 10 mg  10 mg    ondansetron (Zofran ODT) dispertab 4 mg  4 mg    vericiguat or PLACEBO (STUDY DRUG) 10 MG tablet 10 mg  10 mg    [Held by provider] benzonatate (Tessalon) capsule 100 mg  100 mg    diclofenac sodium (Voltaren) 1 % gel 2 g  2 g    [Held by provider] metoprolol SR (Toprol XL) tablet 50 mg  50 mg    ondansetron (Zofran) syringe/vial injection 4 mg  4 mg    Blood pressure control per admin instructions      NS (Bolus) 0.9 % infusion 500 mL  500 mL       Allergies:  No Known Allergies    Physical Exam:  Vitals: /73   Pulse (!) 106   Temp 36.7 °C (98.1 °F) (Temporal)   Resp 16   Ht 1.676 m (5' 6\")   Wt 65 kg (143 lb 4.8 oz)   SpO2 96%   Gen: NAD  Head:  NC/AT. Left facial droop. NG tube left nare   Eyes/ Nose/ Mouth: PERRLA, moist mucous membranes  Cardio: RRR, good distal perfusion, warm extremities  Pulm: normal respiratory effort, no cyanosis   Abd: Soft NTND, negative borborygmi   Ext: No peripheral edema. No calf tenderness. No clubbing.    Mental status:  A&Ox4 (person, place, date, situation) answers questions appropriately follows commands  Speech: fluent, no aphasia or dysarthria    Motor:      Upper Extremity  Myotome R L   Shoulder flexion C5 5 4/5   Elbow flexion C5 5 4/5   Wrist extension C6 5 4/5 "   Elbow extension C7 5 4/5   Finger flexion C8 5 4/5   Finger abduction T1 5 4/5     Lower Extremity Myotome R L   Hip flexion L2 5 4/5   Knee extension L3 5 4/5   Ankle dorsiflexion L4 5 5   Toe extension L5 5 5   Ankle plantarflexion S1 5 5     Sensory:   intact to light touch through out    Labs: Reviewed and significant for   Recent Labs     08/13/24  0807 08/15/24  0221   RBC 4.80 4.67*   HEMOGLOBIN 14.8 14.6   HEMATOCRIT 44.5 42.8   PLATELETCT 110* 127*   PROTHROMBTM 16.3*  --    APTT 32.0  --    INR 1.29*  --      Recent Labs     08/13/24  0807 08/14/24  0317 08/15/24  0221   SODIUM 133* 136 136   POTASSIUM 4.8 4.4 4.4   CHLORIDE 99 103 104   CO2 20 17* 17*   GLUCOSE 157* 111* 121*   BUN 29* 24* 21   CREATININE 1.34 1.03 1.03   CALCIUM 9.3 9.0 9.6     Recent Results (from the past 24 hour(s))   Basic Metabolic Panel    Collection Time: 08/15/24  2:21 AM   Result Value Ref Range    Sodium 136 135 - 145 mmol/L    Potassium 4.4 3.6 - 5.5 mmol/L    Chloride 104 96 - 112 mmol/L    Co2 17 (L) 20 - 33 mmol/L    Glucose 121 (H) 65 - 99 mg/dL    Bun 21 8 - 22 mg/dL    Creatinine 1.03 0.50 - 1.40 mg/dL    Calcium 9.6 8.5 - 10.5 mg/dL    Anion Gap 15.0 7.0 - 16.0   CBC WITH DIFFERENTIAL    Collection Time: 08/15/24  2:21 AM   Result Value Ref Range    WBC 10.5 4.8 - 10.8 K/uL    RBC 4.67 (L) 4.70 - 6.10 M/uL    Hemoglobin 14.6 14.0 - 18.0 g/dL    Hematocrit 42.8 42.0 - 52.0 %    MCV 91.6 81.4 - 97.8 fL    MCH 31.3 27.0 - 33.0 pg    MCHC 34.1 32.3 - 36.5 g/dL    RDW 47.9 35.9 - 50.0 fL    Platelet Count 127 (L) 164 - 446 K/uL    MPV 9.8 9.0 - 12.9 fL    Neutrophils-Polys 70.00 44.00 - 72.00 %    Lymphocytes 18.50 (L) 22.00 - 41.00 %    Monocytes 8.80 0.00 - 13.40 %    Eosinophils 1.80 0.00 - 6.90 %    Basophils 0.40 0.00 - 1.80 %    Immature Granulocytes 0.50 0.00 - 0.90 %    Nucleated RBC 0.00 0.00 - 0.20 /100 WBC    Neutrophils (Absolute) 7.33 1.82 - 7.42 K/uL    Lymphs (Absolute) 1.94 1.00 - 4.80 K/uL    Monos  (Absolute) 0.92 (H) 0.00 - 0.85 K/uL    Eos (Absolute) 0.19 0.00 - 0.51 K/uL    Baso (Absolute) 0.04 0.00 - 0.12 K/uL    Immature Granulocytes (abs) 0.05 0.00 - 0.11 K/uL    NRBC (Absolute) 0.00 K/uL   ESTIMATED GFR    Collection Time: 08/15/24  2:21 AM   Result Value Ref Range    GFR (CKD-EPI) 74 >60 mL/min/1.73 m 2         ASSESSMENT:  Patient is a 79 y.o. male admitted with right MCA stroke      Rehabilitation: Impaired ADLs and mobility  Barriers to transfer include: Insurance authorization, TCCs to verify disposition, medical clearance and bed availability. All cases are subject to administrative review.     [facility] Code / Diagnosis to Support: 0001.1 - Stroke: Left Body Involvement (Right Brain)    Disposition recommendations:  - Good candidate for IPR. His swallow may be a limiting factor. No improvement seen on MBSS. [facility] will need to see some improvement or have a PEG tube. I believe he will improve and we might need to wait until we start to see some improvement before transferring to IPR. Case forwarded to [facility] SLP for review.   -PMR to follow in the periphery for rehab appropriateness, please reach out with questions or request for medical management    Medical Complexity:    Right MCA ischemic stroke   - NIH 5  - CT evidence of distal right MCA occlusion   - MRI not required due to not changing management   - Functional deficits of expressive and receptive aphasia, dysphagia, and left sided weakness  - Eliquis 5 mg BID   - Atorvastatin 20mg daily, eztimibe 10mg daily  - Continue PT/OT while in house    Dysphagia   - [facility] reviewed case. He is silent aspirating. Poor prognosis for a functional swallow for the next 4-6 weeks. PEG tube recommended. Family does not want this. Can watch patient and increase SLP training and retest with MBSS over the weekend to gauge improvement, then if not improving, get PEG and over to IPR.    Hypertension   - Per neurology, BP goal 100-130/60-80   - Metoprolol XL 50mg and  tartrate 25mg BID  - Aldactone 25mg daily     Thrombocytopenia   - Platelets 127   - Monitoring with serial labs     Diabetes   - Farxiga 10mg daily     DVT PPX: Eliquis       Thank you for allowing us to participate in the care of this patient.     Patient was seen for >80 minutes on unit/floor of which > 50% of time was spent on counseling and coordination of care regarding the above, including prognosis, risk reduction, benefits of treatment, and options for next stage of care.    Kelechi Mensah, DO   Physical Medicine and Rehabilitation     Please note that this dictation was created using voice recognition software. I have made every reasonable attempt to correct obvious errors, but there may be errors of grammar and possibly content that I did not discover before finalizing the note.

## 2024-08-15 NOTE — DISCHARGE PLANNING
HTH/SCP TCN chart review completed. Collaborated with LINDA Alamo prior to meeting with the pt. The most current review of medical record, knowledge of pt's PLOF and social support, LACE+ score of 67 was considered.  No 6 clicks scores.  Per Kardex, patient ambulating 15 feet X 2 with HHA.  Per chart review, pt Dx with distal right MCA occlusion and Hx of adenocarcinoma (Dx 1/2024).     Pt seen at bedside. Introduced TCN program. Provided education regarding post acute levels of care. Education provided regarding case management policy for blanket SNF referrals. Discussed HTH/SCP plan benefits. Pt verbalizes understanding.     Patient lives with his spouse in a one level home with 1-3 steps and was independent with ADL's, IADL's and mobility (no AD) at his baseline.  He is currently on RA.  Patient states he is not at his baseline level of function and is agreeable to post-acute or HH depending on therapy recommendations.  Patient and spouse state if he needs post-acute placement then IRF is their first choice.  TCC is following.      Choice proactively obtained for IRF, HH & DME (AD), faxed to [facility] and given to CM.      In collaboration with CM, current discharge considerations are noted to be likely for post-acute placement depending on therapy recommendations.       TCN will continue to follow and collaborate with discharge planning team as additional post acute needs arise. Thank you.     Completed today:  PT/OT orders in chart.    ST recommends post-acute placement on 8/14.  Choice obtained: IRF, HH & DME (AD).  Pt aware of [facility]'s blanket referral policy    Addendum:  1640-  Per chart review, PT & OT recommend post-acute placement on 8/14/24.  6 clicks scores now 18 ADL's and 16 mobility.

## 2024-08-15 NOTE — DIETARY
"Nutrition Services: Initial Assessment  Day 2 of admit.  Pito Black is a 79 y.o. male with admitting DX of R MCA stroke.     Consult received for TF.      Current hospital problems list:  R MCA stroke  Hyponatremia  Oropharyngeal dysphagia  Dysarthria  T2DM with stage 3 CKD, without long-term use of insulin  CHF  Lung adenocarcinoma  Stage 3a CKD  Atrial fibrillation     Nutrition Assessment:   Height: 167.6 cm (5' 6\")  Weight: 65 kg (143 lb 4.8 oz)  Weight to Use in Calculations: 64 kg (141 lb 1.5 oz) - stand up scale  Body mass index is 23.13 kg/m²., BMI classification: normal   Wt Readings from Last 5 Encounters:   08/15/24 65 kg (143 lb 4.8 oz)   24 64.5 kg (142 lb 3.2 oz)   24 62.1 kg (137 lb)   24 64 kg (141 lb 1.5 oz)   24 64.9 kg (143 lb)        Calculation/Equation: MSJ x 1.2 = 1559 kcals  Total Calories / day: 1550 - 1850  (Calories / k - 29)  Total Grams Protein / day: 77 - 96  (Grams Protein / k.2 - 1.5)     Objective:   Pertinent medical hx:   Pt failed MBSS on , plan for TF starting today per RN. Discussed with RN and MD, I do not see a consult at this time.   IRIS placed for enteral access and confirmed in gastric fundus on KUB.   Pertinent labs:  Glucose 121  Pertinent meds: lipitor, zetia, aldatone, D3, anti-emetics prn  Skin/wounds: No wounds or edema noted.   Food Allergies: NKA to food.   Last BM: PTA   Standard formula indicated with blood glucose controlled.     Subjective:   Patient reported UBW: Unknown. Pt seen at bedside, difficult to understand overall but does deny weight loss. Unable to tell what pt is answering about PO intake PTA and was wanting to get to bathroom. Pt appears thin.   Nutrition Focused Physical Exam (NFPE)   Weight loss: Weight hx is stable over past 8 months per weight hx.  Muscle mass:  Mild wasting to the clavicles, temples, triceps, calves.  Subcutaneous fat: Mild wasting to the calves, clavicles, arms.  Fluid " Accumulation: No edema noted.   Reduced  Strength N/A in acute care setting.      Nutrition Diagnosis: (PES)      Moderate malnutrition in context of chronic illness related to lung cancer as evidenced by mild muscle and mild fat wasting. Asked MD to add to problem list.    Swallowing difficulty r/t oropharyngeal dysphagia as evidenced by need for nutrition support.     Nutrition Interventions:   Start TF Jevity 1.2 at 25 ml/hr and advance per protocol to goal rate 60 ml/hr to provide 1728 kcals, 80 gm protein, and 1162 ml free water per day.   Fluids per MD.   Diet upgrades per SLP/MD.   Patient aware of active plan of care as appropriate.     Nutrition Monitoring and Evaluation:   Monitor for TF advancement to goal rate and tolerance.  Monitor blood glucose lab trend.   Monitor nutrition POC  Monitor vital signs pertinent to nutrition (labs, meds, weight trend)      RD following.

## 2024-08-15 NOTE — PROGRESS NOTES
Monitor summary: -120, MO -0.15, QRS -0.09, QT -0.36, with rare PVCs, cuplet PVCs, bigeminal, trigeminal, 5 bts of PSVT and up to 169 bpm per strip from the monitor room.

## 2024-08-15 NOTE — PROGRESS NOTES
Monitor Summary: -122, LA 0.17, QRS 0.07, QT 0.30, with frequent bigeminal couplet PVCs per strip from monitor room.

## 2024-08-15 NOTE — DISCHARGE PLANNING
"TCN following. HTH/SCP chart reviewed. No new TCN needs identified. Discharge considerations IRF vs. SNF pending IRF acceptance.  Noted per TCC note on 8/15, \"Physiatry to consult.\"  Please see prior TCN note from 8/14 for additional discharge planning considerations if indicated.     Completed:  PT/OT/SLP recommend post acute placement - 8/14  Choice obtained: IRF, HH & DME (AD).  Pt aware of [facility]'s blanket referral policy; [facility] accepted, 5 pending.   "

## 2024-08-15 NOTE — CARE PLAN
The patient is Stable - Low risk of patient condition declining or worsening    Shift Goals  Clinical Goals: monitor neuro status, initiate tube feeding  Patient Goals: rest, sleep  Family Goals: diandra    Problem: Optimal Care of the Stroke Patient  Goal: Optimal emergency care for the stroke patient  8/15/2024 1452 by Yashira Tran R.N.  Outcome: Progressing  8/15/2024 1448 by Yashira Tran R.N.  Outcome: Progressing  Goal: Optimal acute care for the stroke patient  8/15/2024 1452 by Yashira Tran R.N.  Outcome: Progressing  8/15/2024 1448 by Yashira Tran R.N.  Outcome: Progressing     Problem: Knowledge Deficit - Stroke Education  Goal: Patient's knowledge of stroke and risk factors will improve  8/15/2024 1452 by Yashira Tran R.N.  Outcome: Progressing  8/15/2024 1448 by Yashira Tran R.N.  Outcome: Progressing     Problem: Psychosocial - Patient Condition  Goal: Patient's ability to verbalize feelings about condition will improve  8/15/2024 1452 by Yashira Tran R.N.  Outcome: Progressing  8/15/2024 1448 by Yashira Tran R.N.  Outcome: Progressing  Goal: Patient's ability to re-evaluate and adapt role responsibilities will improve  8/15/2024 1452 by Yashira Tran R.N.  Outcome: Progressing  8/15/2024 1448 by Yashira Tran R.N.  Outcome: Progressing     Problem: Discharge Planning - Stroke  Goal: Ensure Stroke Core Measures are met prior to discharge  8/15/2024 1452 by Yashira Tran R.N.  Outcome: Progressing  8/15/2024 1448 by Yashira Tran R.N.  Outcome: Progressing  Goal: Patient’s continuum of care needs will be met  8/15/2024 1452 by Yashira Tran R.N.  Outcome: Progressing  8/15/2024 1448 by Yashira Tran R.N.  Outcome: Progressing     Problem: Neuro Status  Goal: Neuro status will remain stable or improve  8/15/2024 1452 by Yashira Tran R.N.  Outcome: Progressing  8/15/2024 1448 by Yashira Tran R.N.  Outcome: Progressing     Problem: Hemodynamic  Monitoring  Goal: Patient's hemodynamics, fluid balance and neurologic status will be stable or improve  8/15/2024 1452 by STEPHEN UmanzorN.  Outcome: Progressing  8/15/2024 1448 by SAMMIE Umanzor.ARIEL.  Outcome: Progressing     Problem: Respiratory - Stroke Patient  Goal: Patient will achieve/maintain optimum respiratory rate/effort  8/15/2024 1452 by Yashira Tran R.N.  Outcome: Progressing  8/15/2024 1448 by Yashira Tran R.N.  Outcome: Progressing     Problem: Dysphagia  Goal: Dysphagia will improve  8/15/2024 1452 by STEPHEN UmanzorN.  Outcome: Progressing  8/15/2024 1448 by SAMMIE Umanzor.N.  Outcome: Progressing     Problem: Risk for Aspiration  Goal: Patient's risk for aspiration will be absent or decrease  8/15/2024 1452 by Yashira Tran R.N.  Outcome: Progressing  8/15/2024 1448 by Yashira Tran R.N.  Outcome: Progressing     Problem: Urinary Elimination  Goal: Establish and maintain regular urinary output  8/15/2024 1452 by Yashira Tran R.N.  Outcome: Progressing  8/15/2024 1448 by Yashira Tran R.N.  Outcome: Progressing     Problem: Bowel Elimination  Goal: Establish and maintain regular bowel function  8/15/2024 1452 by Yashira Tran R.N.  Outcome: Progressing  8/15/2024 1448 by Yashira Tran R.N.  Outcome: Progressing     Problem: Mobility - Stroke  Goal: Patient's capacity to carry out activities will improve  8/15/2024 1452 by Yashira Tran R.N.  Outcome: Progressing  8/15/2024 1448 by Yashira Tran, R.N.  Outcome: Progressing  Goal: Spasticity will be prevented or improved  8/15/2024 1452 by Yashira Tran R.N.  Outcome: Progressing  8/15/2024 1448 by Yashira Tran R.N.  Outcome: Progressing  Goal: Subluxation will be prevented or improved  Outcome: Progressing     Problem: Self Care  Goal: Patient will have the ability to perform ADLs independently or with assistance (bathe, groom, dress, toilet and feed)  8/15/2024 1452 by Yashira Tran,  RHeladioN.  Outcome: Progressing  8/15/2024 1448 by Yashira Tran R.N.  Outcome: Progressing     Problem: Knowledge Deficit - Standard  Goal: Patient and family/care givers will demonstrate understanding of plan of care, disease process/condition, diagnostic tests and medications  8/15/2024 1452 by Yashira Tran R.N.  Outcome: Progressing  8/15/2024 1448 by Yashira Tran R.N.  Outcome: Progressing     Problem: Skin Integrity  Goal: Skin integrity is maintained or improved  8/15/2024 1452 by Yashira Tran R.N.  Outcome: Progressing  8/15/2024 1448 by Yashira Tran R.N.  Outcome: Progressing     Problem: Fall Risk  Goal: Patient will remain free from falls  8/15/2024 1452 by Yashira Tran R.N.  Outcome: Progressing  8/15/2024 1448 by Yashira Tran R.N.  Outcome: Progressing     Problem: Pain - Standard  Goal: Alleviation of pain or a reduction in pain to the patient’s comfort goal  8/15/2024 1452 by Yashira Tran R.N.  Outcome: Progressing  8/15/2024 1448 by Yashira Tran R.N.  Outcome: Progressing     Progress made toward(s) clinical / shift goals:        Pt resting comfortably in bed. Jevity 1.2 started @25 ml/hr. Neuro checks completed Q4.     Patient is not progressing towards the following goals:  N/A

## 2024-08-16 ENCOUNTER — APPOINTMENT (OUTPATIENT)
Dept: RADIOLOGY | Facility: MEDICAL CENTER | Age: 79
DRG: 065 | End: 2024-08-16
Attending: STUDENT IN AN ORGANIZED HEALTH CARE EDUCATION/TRAINING PROGRAM
Payer: MEDICARE

## 2024-08-16 LAB
ANION GAP SERPL CALC-SCNC: 17 MMOL/L (ref 7–16)
BASOPHILS # BLD AUTO: 0.4 % (ref 0–1.8)
BASOPHILS # BLD: 0.04 K/UL (ref 0–0.12)
BUN SERPL-MCNC: 23 MG/DL (ref 8–22)
CALCIUM SERPL-MCNC: 9.6 MG/DL (ref 8.5–10.5)
CHLORIDE SERPL-SCNC: 105 MMOL/L (ref 96–112)
CO2 SERPL-SCNC: 17 MMOL/L (ref 20–33)
CREAT SERPL-MCNC: 0.97 MG/DL (ref 0.5–1.4)
CRP SERPL HS-MCNC: 6.18 MG/DL (ref 0–0.75)
EOSINOPHIL # BLD AUTO: 0.36 K/UL (ref 0–0.51)
EOSINOPHIL NFR BLD: 3.6 % (ref 0–6.9)
ERYTHROCYTE [DISTWIDTH] IN BLOOD BY AUTOMATED COUNT: 47.2 FL (ref 35.9–50)
GFR SERPLBLD CREATININE-BSD FMLA CKD-EPI: 79 ML/MIN/1.73 M 2
GLUCOSE SERPL-MCNC: 162 MG/DL (ref 65–99)
HCT VFR BLD AUTO: 44 % (ref 42–52)
HGB BLD-MCNC: 15 G/DL (ref 14–18)
IMM GRANULOCYTES # BLD AUTO: 0.04 K/UL (ref 0–0.11)
IMM GRANULOCYTES NFR BLD AUTO: 0.4 % (ref 0–0.9)
LYMPHOCYTES # BLD AUTO: 1.58 K/UL (ref 1–4.8)
LYMPHOCYTES NFR BLD: 15.6 % (ref 22–41)
MCH RBC QN AUTO: 31 PG (ref 27–33)
MCHC RBC AUTO-ENTMCNC: 34.1 G/DL (ref 32.3–36.5)
MCV RBC AUTO: 90.9 FL (ref 81.4–97.8)
MONOCYTES # BLD AUTO: 0.93 K/UL (ref 0–0.85)
MONOCYTES NFR BLD AUTO: 9.2 % (ref 0–13.4)
NEUTROPHILS # BLD AUTO: 7.16 K/UL (ref 1.82–7.42)
NEUTROPHILS NFR BLD: 70.8 % (ref 44–72)
NRBC # BLD AUTO: 0 K/UL
NRBC BLD-RTO: 0 /100 WBC (ref 0–0.2)
PLATELET # BLD AUTO: 116 K/UL (ref 164–446)
PMV BLD AUTO: 9.6 FL (ref 9–12.9)
POTASSIUM SERPL-SCNC: 4.4 MMOL/L (ref 3.6–5.5)
PREALB SERPL-MCNC: 12.5 MG/DL (ref 18–38)
RBC # BLD AUTO: 4.84 M/UL (ref 4.7–6.1)
SODIUM SERPL-SCNC: 139 MMOL/L (ref 135–145)
WBC # BLD AUTO: 10.1 K/UL (ref 4.8–10.8)

## 2024-08-16 PROCEDURE — 97110 THERAPEUTIC EXERCISES: CPT

## 2024-08-16 PROCEDURE — 86140 C-REACTIVE PROTEIN: CPT

## 2024-08-16 PROCEDURE — 97116 GAIT TRAINING THERAPY: CPT

## 2024-08-16 PROCEDURE — 92523 SPEECH SOUND LANG COMPREHEN: CPT

## 2024-08-16 PROCEDURE — 770020 HCHG ROOM/CARE - TELE (206)

## 2024-08-16 PROCEDURE — 700102 HCHG RX REV CODE 250 W/ 637 OVERRIDE(OP): Performed by: STUDENT IN AN ORGANIZED HEALTH CARE EDUCATION/TRAINING PROGRAM

## 2024-08-16 PROCEDURE — 84134 ASSAY OF PREALBUMIN: CPT

## 2024-08-16 PROCEDURE — 92526 ORAL FUNCTION THERAPY: CPT

## 2024-08-16 PROCEDURE — 85025 COMPLETE CBC W/AUTO DIFF WBC: CPT

## 2024-08-16 PROCEDURE — 36415 COLL VENOUS BLD VENIPUNCTURE: CPT

## 2024-08-16 PROCEDURE — 97535 SELF CARE MNGMENT TRAINING: CPT

## 2024-08-16 PROCEDURE — A9270 NON-COVERED ITEM OR SERVICE: HCPCS | Performed by: STUDENT IN AN ORGANIZED HEALTH CARE EDUCATION/TRAINING PROGRAM

## 2024-08-16 PROCEDURE — A9270 NON-COVERED ITEM OR SERVICE: HCPCS

## 2024-08-16 PROCEDURE — 80048 BASIC METABOLIC PNL TOTAL CA: CPT

## 2024-08-16 PROCEDURE — 700102 HCHG RX REV CODE 250 W/ 637 OVERRIDE(OP)

## 2024-08-16 PROCEDURE — 99232 SBSQ HOSP IP/OBS MODERATE 35: CPT | Performed by: STUDENT IN AN ORGANIZED HEALTH CARE EDUCATION/TRAINING PROGRAM

## 2024-08-16 RX ORDER — METOPROLOL TARTRATE 25 MG/1
50 TABLET, FILM COATED ORAL 2 TIMES DAILY
Status: DISCONTINUED | OUTPATIENT
Start: 2024-08-16 | End: 2024-08-24 | Stop reason: HOSPADM

## 2024-08-16 RX ADMIN — METOPROLOL TARTRATE 25 MG: 25 TABLET, FILM COATED ORAL at 04:55

## 2024-08-16 RX ADMIN — APIXABAN 5 MG: 5 TABLET, FILM COATED ORAL at 17:39

## 2024-08-16 RX ADMIN — Medication 2000 UNITS: at 04:54

## 2024-08-16 RX ADMIN — DAPAGLIFLOZIN 10 MG: 10 TABLET, FILM COATED ORAL at 04:55

## 2024-08-16 RX ADMIN — METOPROLOL TARTRATE 50 MG: 25 TABLET, FILM COATED ORAL at 17:40

## 2024-08-16 RX ADMIN — SPIRONOLACTONE 25 MG: 25 TABLET ORAL at 04:55

## 2024-08-16 RX ADMIN — APIXABAN 5 MG: 5 TABLET, FILM COATED ORAL at 04:54

## 2024-08-16 RX ADMIN — EZETIMIBE 10 MG: 10 TABLET ORAL at 04:55

## 2024-08-16 RX ADMIN — ATORVASTATIN CALCIUM 20 MG: 20 TABLET, FILM COATED ORAL at 17:39

## 2024-08-16 ASSESSMENT — COGNITIVE AND FUNCTIONAL STATUS - GENERAL
PERSONAL GROOMING: A LITTLE
SUGGESTED CMS G CODE MODIFIER MOBILITY: CK
MOVING FROM LYING ON BACK TO SITTING ON SIDE OF FLAT BED: A LITTLE
CLIMB 3 TO 5 STEPS WITH RAILING: TOTAL
STANDING UP FROM CHAIR USING ARMS: A LITTLE
DAILY ACTIVITIY SCORE: 18
HELP NEEDED FOR BATHING: A LITTLE
MOBILITY SCORE: 16
TURNING FROM BACK TO SIDE WHILE IN FLAT BAD: A LITTLE
DRESSING REGULAR UPPER BODY CLOTHING: A LITTLE
WALKING IN HOSPITAL ROOM: A LITTLE
TOILETING: A LITTLE
DRESSING REGULAR LOWER BODY CLOTHING: A LITTLE
SUGGESTED CMS G CODE MODIFIER DAILY ACTIVITY: CK
EATING MEALS: A LITTLE
MOVING TO AND FROM BED TO CHAIR: A LITTLE

## 2024-08-16 ASSESSMENT — PAIN DESCRIPTION - PAIN TYPE
TYPE: ACUTE PAIN

## 2024-08-16 ASSESSMENT — GAIT ASSESSMENTS
GAIT LEVEL OF ASSIST: MINIMAL ASSIST
DISTANCE (FEET): 80
ASSISTIVE DEVICE: FRONT WHEEL WALKER
DEVIATION: BRADYKINETIC;SHUFFLED GAIT;DECREASED HEEL STRIKE;DECREASED TOE OFF

## 2024-08-16 ASSESSMENT — ENCOUNTER SYMPTOMS
MUSCULOSKELETAL NEGATIVE: 1
PSYCHIATRIC NEGATIVE: 1
NEUROLOGICAL NEGATIVE: 1
RESPIRATORY NEGATIVE: 1
CARDIOVASCULAR NEGATIVE: 1
CONSTITUTIONAL NEGATIVE: 1
GASTROINTESTINAL NEGATIVE: 1
EYES NEGATIVE: 1

## 2024-08-16 ASSESSMENT — PATIENT HEALTH QUESTIONNAIRE - PHQ9
1. LITTLE INTEREST OR PLEASURE IN DOING THINGS: NOT AT ALL
2. FEELING DOWN, DEPRESSED, IRRITABLE, OR HOPELESS: NOT AT ALL
SUM OF ALL RESPONSES TO PHQ9 QUESTIONS 1 AND 2: 0

## 2024-08-16 ASSESSMENT — FIBROSIS 4 INDEX: FIB4 SCORE: 3.54

## 2024-08-16 NOTE — THERAPY
Speech Language Pathology   Daily Dysphagia Treatment     Patient Name: Pito Black  AGE:  79 y.o., SEX:  male  Medical Record #: [id number]  Date of Service: 8/16/2024    Precautions:  Precautions: Fall Risk, Swallow Precautions, Nasogastric Tube     Subjective  Pt seen A&Ox3 saturating on room air w/ NGT in situ. His family was present for the latter half of the session.     Assessment  Pt completed 30x effortful swallows targeting BOT retraction & PPW constriction w/ min cueing, using ice chips to aid in elicitation of the swallow.   Pt completed 20 trials of a modified version of the Lisandro dysphagia therapy protocol, progressing through 5 and 10 mL amounts of puree textures. Noted w/ delayed coughing on +4 trials.    Printed materials on the effortful swallow were left for pt & family w/ instructions to complete exercises 2x/day in addition to therapy tx.     Clinical Impressions  Continued signs of oropharyngeal dysphagia, consistent w/ moderate oropharyngeal dysphagia noted on MBS 8/14/24. Pt remains NPO w/ the exam indicating high risk for aspiration. Pt w/ multiple instances of silent aspiration and repeat instrumentation is indicated prior to making PO diet changes. Consider repeat instrumentation in 8/19-21 as indicated by bedside clinical progress. Pt is a motivated candidate for ongoing swallow rehabilitation.     Recommendations  Treatment Completed: Dysphagia Treatment     Dysphagia Treatment  Diet Consistency: NPO w/ alternate means of nutrition/hydration. < 1 oz chips/time OK for swallow rehabilitation and oral gratification (Pt currently w/ NGT)  Instrumentation: Instrumental swallow study pending clinical progress  Medication: Non Oral  Risk Management : Physical mobility, as tolerated  Oral Care: Q4h    SLP Treatment Plan  Treatment Plan: Dysphagia Treatment  SLP Frequency: 4x Per Week  Estimated Duration: Until Therapy Goals Met    Anticipated Discharge Needs  Discharge Recommendations:  "Recommend post-acute placement for additional speech therapy services prior to discharge home  Therapy Recommendations Upon DC: Dysphagia Training    Patient / Family Goals  Patient / Family Goal #1: \"I want more\" to applesauce  Goal #1 Outcome: Progressing slower than expected  Short Term Goals  Short Term Goal # 1: Patient will participate in swallow diagnostic to guide dysphagia management.  Goal Outcome # 1: Goal met  Short Term Goal # 2: Pt will consume prefeeding trials with no overt s/sx of aspiration  Goal Outcome # 2 : Goal met  Short Term Goal # 3: Pt will complete 50 reps of pharyngeal squeeze, base of tongue retraction, pharyngeal shortening exercises with good accuracy and min cueing  Goal Outcome  # 3: Progressing as expected  Short Term Goal # 4: Pt will participate in repeat instrumentation in 5-7 days as indicated by bedside progress    Rachna Colvin, MARISOL  "

## 2024-08-16 NOTE — THERAPY
Speech Language Pathology   Speech-Language/Cognitive-Linguistic Evaluation     Patient Name: Pito Black  AGE:  79 y.o., SEX:  male  Medical Record #: [id number]  Date of Service: 2024    History of Present Illness  79M admitted on 24 with L facial droop, slurred speech, and difficulty swallowing, found to have R MCA stroke. Per wife, recent diagnosis of adenocarcinoma (dx 2024), on Keytruda and planned for chemotherapy next week.      PMH notable for afib, EF 35% s/ AICD, HTN, CKD, HLD, DM, stage I NSCLC status post wedge resection in 2018, stage Ia NSCLC of the right upper lobe treated with SBRT in , and stage IIIa NSCLC of the left lower lobe treated with carboplatin/Taxol/radiation in .      Not previously seen by service per EMR.      Imagin24 CXR:   1. Development of interstitial opacities in the lungs, either edema or interstitial infiltrates.  2. Worsening confluent opacities in the right upper lobe and lingula.  3. The remainder is stable.     24 CT/A Head: There is a focal stenosis/occlusion in a distal branch of the right MCA with flow reconstitution.     General Information  Vitals  O2 Delivery Device: None - Room Air  Level of Consciousness: Alert     Orientation: Oriented x 4  Follows Directives: Yes    Prior Living Situation & Level of Function  Housing / Facility: [address]  Lives with - Patient's Self Care Capacity: Spouse     Communication: WNL  Swallowing: WNL     Oral Mechanism Evaluation  Dentition: Lower partial  Facial Symmetry: Central left facial droop  Facial Sensation: Impaired - left  Labial Observations: Left sided weakness  Lingual Observations: Midline  Motor Speech: Moderate unilateral upper motor neuron dysarthria    Laryngeal Function Exam  Secretion Management: Drooling  Voice Quality: Harsh, Breathy continuous  Cough: Perceptually weak    Subjective  Pt seen with his wife and daughter present at bedside.  Per their report, pt was  "driving and independent for iADLs prior to this admission. He lives with his wife. The daughter is considering moving in with them.   Family reported that pt moved to the [place] at the age of 16 from [place]- they reported he is fluent in English.     Communication Domain(s)  Expressive Language: WFL  Receptive Language: WFL  Cognitive-Linguistic: Moderate  Reading:  (Tested at the single word large print level- further testing may be indicated)  Social/Pragmatic: Mild    Assessment  The patient was seen this date for a Speech-Language/Cognitive evaluation.    Cognistat  Orientation: Average  Attention: Mild  Comprehension: Average  Repetition: Average  Naming: Average  Memory: Moderate (50% recalled independently, though 80% w/ cueing. This was in memory testing stimuli as well as additional informal stimuli presented by clinician)  Calculations: Severe (pt's wife reported he would have to \"write out\" math problems at baseline so suspect some chronic difficulty- but endorsed that his current performance is worse than usual). Pt w/ problem solving deficits noted  Similarities: Moderate - appears to have difficulty with reasoning and abstract thinking  Judgement: Mild    Speech: Quick rushes of speech w/ reduced vocal intensity. Notable articulatory imprecision. With max cueing, pt stimulable to reach 80% intelligibility at the word level, but he continued to max/ heavy cueing to maintain use of speech strategies at the conversational level.     Clinical Impressions  Signs of moderate cognitive-linguistic deficits, likely acute-on-chronic in the context of acute CVA. Family also reports chronic cognitive decline in the area of memory. Pt also w/ acute moderate UMN impacting his intelligibility at the word level. Recommend ST tx to optimize independence post d/c.     NOTE: It is not within the scope of practice of Speech-Language Pathologists to determine patient capacity. Please defer to the physician or psych to " "complete this assessment.     Recommendations  Supervision Needs Upons Discharge: Direct assistance with IADLs (see below)  IADLs: Medication management, Financial management, Appointment management  Consult Referral(s): Neuropsychologist    SLP Treatment Plan  Treatment Plan: Dysphagia Treatment, Cognitive Treatment, Speech-Language Treatment, Patient/Family/Caregiver Training  SLP Frequency: 4x Per Week  Estimated Duration: Until Therapy Goals Met    Anticipated Discharge Needs  Discharge Recommendations: Recommend post-acute placement for additional speech therapy services prior to discharge home  Therapy Recommendations Upon DC: Dysphagia Training, Cognitive-Linguistic Training, Expression Training, Patient / Family / Caregiver Education    Patient / Family Goals  Patient / Family Goal #1: \"I want more\" to applesauce  Goal #1 Outcome: Progressing slower than expected  Short Term Goal # 1: Pt will use compensatory speech strategies at the functional phrase level w/ min cueing to improve his intelligibility to 80%  Goal Outcome # 1: Goal met  Short Term Goal # 2: Pt will consume prefeeding trials with no overt s/sx of aspiration  Goal Outcome # 2 : Goal met  Short Term Goal # 3: Pt will complete 50 reps of pharyngeal squeeze, base of tongue retraction, pharyngeal shortening exercises with good accuracy and min cueing  Goal Outcome  # 3: Progressing as expected  Short Term Goal # 4: Pt will participate in repeat instrumentation in 5-7 days as indicated by bedside progress  Short Term Goal # 5: Pt will complete functional STM tasks w/ 80% accuracy and mod verbal cueing    Rachna Colvin, SLP  "

## 2024-08-16 NOTE — THERAPY
Physical Therapy   Daily Treatment     Patient Name: Pito Black  Age:  79 y.o., Sex:  male  Medical Record #: [id number]  Today's Date: 8/16/2024     Precautions  Precautions: Fall Risk;Swallow Precautions;Nasogastric Tube  Comments: [place]    Assessment    Pt seen for PT treatment including toilet transfers, gait with FWW in hallway and BLE therapeutic exercises.   Pt demonstrating improvement in activity tolerance and gait tolerance. Please see below for status.   Pt services to follow per treatment plan.     Plan    Treatment Plan Status: Continue Current Treatment Plan  Type of Treatment: Bed Mobility, Family / Caregiver Training, Gait Training, Manual Therapy, Neuro Re-Education / Balance, Self Care / Home Evaluation, Therapeutic Activities, Therapeutic Exercise, Stair Training  Treatment Frequency: 4 Times per Week  Treatment Duration: Until Therapy Goals Met    DC Equipment Recommendations: Unable to determine at this time  Discharge Recommendations: Recommend post-acute placement for additional physical therapy services prior to discharge home        Precautions   Precautions Fall Risk;Swallow Precautions;Nasogastric Tube   Comments [place]   Vitals   O2 Delivery Device None - Room Air   Pain 0 - 10 Group   Therapist Pain Assessment Post Activity Pain Same as Prior to Activity;0   Cognition    Level of Consciousness Alert   Safety Awareness Impaired   Comments [place], impulsive at times, cues to slow pace.   Strength Lower Body   Gross Strength Generalized Weakness, Equal Bilaterally   Sitting Lower Body Exercises   Sitting Lower Body Exercises Yes   Ankle Pumps 2 sets of 10;Bilateral   Hip Flexion 1 set of 10;Bilateral   Hip Adduction 1 set of 10;Bilateral;Light Resistance Thera band   Long Arc Quad 1 set of 10;Bilateral   Marching Reciprocal;2 sets of 10   Comments seated HS curls with pink T-band x 10 reps   Balance   Sitting Balance (Static) Good   Sitting Balance (Dynamic) Fair +   Standing Balance (Static)  Fair   Standing Balance (Dynamic) Fair -   Weight Shift Sitting Fair   Weight Shift Standing Fair   Skilled Intervention Verbal Cuing;Tactile Cuing   Comments w/FWW   Bed Mobility    Comments up in chair pre and pos session.   Gait Analysis   Gait Level Of Assist Minimal Assist   Assistive Device Front Wheel Walker   Distance (Feet) 80   # of Times Distance was Traveled 1   Deviation Bradykinetic;Shuffled Gait;Decreased Heel Strike;Decreased Toe Off  (Heavy reliance on FWW. Cues and manual assist with FWW guidance and stability, Cues to slow pace and complete turns.)   # of Stairs Climbed 0   Weight Bearing Status no restrictions.   Skilled Intervention Sequencing;Tactile Cuing;Verbal Cuing   Functional Mobility   Sit to Stand Contact Guard Assist   Bed, Chair, Wheelchair Transfer Contact Guard Assist   Toilet Transfers Minimal Assist  (lowering assistance required.)   Transfer Method Stand Step   Comments decreased eccentric control.   6 Clicks Assessment - How much HELP from another person do you currently need... (If the patient hasn't done an activity recently, how much help from another person do you think he/she would need if he/she tried?)   Turning from your back to your side while in a flat bed without using bedrails? 3   Moving from lying on your back to sitting on the side of a flat bed without using bedrails? 3   Moving to and from a bed to a chair (including a wheelchair)? 3   Standing up from a chair using your arms (e.g., wheelchair, or bedside chair)? 3   Walking in hospital room? 3   Climbing 3-5 steps with a railing? 1   6 clicks Mobility Score 16   Activity Tolerance   Sitting in Chair > 2 hrs with family supervision.   Standing 4 min   Short Term Goals    Short Term Goal # 1 Pt will transfer w/o AD a S level by tx tx 6   Goal Outcome # 1 Progressing as expected   Short Term Goal # 2 Pt will ambulate w/o AD for 150 ft with S by tx 6   Goal Outcome # 2 Progressing slower than expected   Short Term  Goal # 3 Pt will ascend and descend steps x 3 with CGA by tx 6.   Goal Outcome # 3 Goal not met   Education Group   Education Provided Role of Physical Therapist;Gait Training;Use of Assistive Device;Exercises - Seated   Role of Physical Therapist Patient Response Patient;Family;Acceptance;Explanation;Verbal Demonstration   Gait Training Patient Response Patient;Family;Acceptance;Explanation;Demonstration;Reinforcement Needed   Use of Assistive Device Patient Response Patient;Family;Acceptance;Explanation;Demonstration;Reinforcement Needed   Exercises - Seated Patient Response Patient;Family;Acceptance;Explanation;Demonstration;Reinforcement Needed   Physical Therapy Treatment Plan   Physical Therapy Treatment Plan Continue Current Treatment Plan   Anticipated Discharge Equipment and Recommendations   DC Equipment Recommendations Unable to determine at this time   Discharge Recommendations Recommend post-acute placement for additional physical therapy services prior to discharge home   Interdisciplinary Plan of Care Collaboration   IDT Collaboration with  Nursing;Family / Caregiver;Liaison / Rehab Coordinator;Occupational Therapist   Patient Position at End of Therapy In Bed;Chair Alarm On;Call Light within Reach;Tray Table within Reach;Phone within Reach;Family / Friend in Room   Collaboration Comments Staff updated.   Session Information   Date / Session Number  8/16-2 ( 2/4, 8/20)

## 2024-08-16 NOTE — PROGRESS NOTES
Bedside report received, Assume care.     A&O x 4, Able to make needs known.  Pain Assessment: 0/10 . Medication provided per MAR.  Continuous Cardiac and [place] monitoring in place.     Bed in low position and locked, Bed alarm on.  pt resting comfortably now, call light with in reach, all needs met at this time. Interventions will be executed per plan of care.   0938- SPL at bedside.   1219- NG tube noted to left nare appeared to have possibly moved. Retaped NG tube, Place tube placement xray for confirmation NG in correct placement.

## 2024-08-16 NOTE — PROGRESS NOTES
Monitor Summary: SR-ST , TN .0.15, QRS .0.09, QT .0.35 with rare-frequent PVCs, rare-occasional couplets and PSVT up to 180 bpm per strip from monitor room

## 2024-08-16 NOTE — CARE PLAN
Problem: Neuro Status  Goal: Neuro status will remain stable or improve  Outcome: Progressing     Problem: Knowledge Deficit - Standard  Goal: Patient and family/care givers will demonstrate understanding of plan of care, disease process/condition, diagnostic tests and medications  Outcome: Progressing  Note: Family at bedside, Pt and family able to verbalize understanding POC.     Problem: Fall Risk  Goal: Patient will remain free from falls  Outcome: Progressing  Note: Non skid socks, fall band on, hourly rounding in place, call light within reach, path free of clutter. Pt calls appropriately. Education provided on need to call staff for assistance with mobility and pt states understanding.   The patient is Stable - Low risk of patient condition declining or worsening    Shift Goals  Clinical Goals: monitor neuro status,titrate feeding  Patient Goals: sleep  Family Goals: diandra    Progress made toward(s) clinical / shift goals:  NG tube feeding, ambulation, pain management.     Patient is not progressing towards the following goals:

## 2024-08-16 NOTE — DISCHARGE PLANNING
"TCN following. HTH/SCP chart reviewed. No new TCN needs identified.  Discharge considerations for IRF vs. SNF.  Noted per physiatry note, \"Good candidate for IPR...will need to see some improvement or have a PEG tube. I believe he will improve and we might need to wait until we start to see some improvement before transferring to IPR.\"      Please see prior TCN note from 8/14 for additional discharge planning considerations if indicated.     Completed:  PT/OT recommend post acute placement - 8/14  SLP recommend post acute placement - 8/16  Choice obtained: IRF, HH & DME (AD).  Pt aware of [facility]'s blanket referral policy; [facility] accepted, 5 pending.      "

## 2024-08-16 NOTE — PROGRESS NOTES
Hospital Medicine Daily Progress Note    Date of Service  8/16/2024    Chief Complaint  Pito Black is a 79 y.o. male admitted 8/13/2024 with CVA    Hospital Course  79 y.o. male with lung cancer followed by Luz Maria Jules on apixaban who presented 8/13/2024 with Left facial droop and dysphagia.     He reports last night he went to take some pills and developed inability to swallow them. During swallowing he choked and vomited the pills. At that time he developed dysarthria, Left hand weakness, and Left facial droop. It persisted into this morning for which he decided to come in. He denies blurry vision, headache, dysphoria, pain, bleeding, bowel/bladder dysfunction, presyncope, syncope, falls.     In the ED he underwent CTA H&N which demonstrated distal Right MCA occlusion. Neurology was consulted and he was deemed outside the TPA window. CT perfusions was without penumbra. CXR demonstrated PPM/ICD and bilateral infiltrates. CBC is normal. CMP demonstrates hyponatremia Na 133, stable Cr 1.3. A1c is 6. Troponin 55 comparable to prior values. INR 1.3. EKG sinus tachycardia.    Interval Problem Update  Evaluated at bedside  In no acute distress this morning  Working with PT  Tachycardia in the 110s  Increase metoprolol dose  Continue holding antineoplastic therapy  On TF  SPL following  PT/OT recommended postacute  SNF/PMR referrals placed  Labs on AM    I have discussed this patient's plan of care and discharge plan at IDT rounds today with Case Management, Nursing, Nursing leadership, and other members of the IDT team.    Consultants/Specialty  Neurology    Code Status  Full Code    Disposition  The patient is not medically cleared for discharge to home or a post-acute facility.     I have placed the appropriate orders for post-discharge needs.    Review of Systems  Review of Systems   Constitutional: Negative.    HENT: Negative.     Eyes: Negative.    Respiratory: Negative.     Cardiovascular: Negative.     Gastrointestinal: Negative.    Genitourinary: Negative.    Musculoskeletal: Negative.    Skin: Negative.    Neurological: Negative.    Endo/Heme/Allergies: Negative.    Psychiatric/Behavioral: Negative.          Physical Exam  Temp:  [36.2 °C (97.1 °F)-36.8 °C (98.2 °F)] 36.6 °C (97.9 °F)  Pulse:  [100-114] 114  Resp:  [16-18] 17  BP: (126-130)/(73-76) 130/74  SpO2:  [92 %-96 %] 92 %    Physical Exam  Constitutional:       General: He is not in acute distress.     Appearance: Normal appearance.   HENT:      Head: Normocephalic and atraumatic.      Nose: Nose normal. No congestion.      Mouth/Throat:      Mouth: Mucous membranes are moist.   Eyes:      Extraocular Movements: Extraocular movements intact.      Pupils: Pupils are equal, round, and reactive to light.   Cardiovascular:      Rate and Rhythm: Normal rate and regular rhythm.      Pulses: Normal pulses.      Heart sounds: Normal heart sounds.   Pulmonary:      Effort: Pulmonary effort is normal.      Breath sounds: Normal breath sounds.   Abdominal:      General: Bowel sounds are normal.      Palpations: Abdomen is soft.      Tenderness: There is no abdominal tenderness.   Musculoskeletal:         General: No swelling. Normal range of motion.      Cervical back: Normal range of motion and neck supple.   Skin:     General: Skin is warm.      Coloration: Skin is not jaundiced.   Neurological:      Mental Status: He is alert.      Comments: Dysarthria  Left upper extremity weakness   Psychiatric:         Mood and Affect: Mood normal.         Behavior: Behavior normal.         Thought Content: Thought content normal.         Judgment: Judgment normal.         Fluids  No intake or output data in the 24 hours ending 08/16/24 0811    Laboratory  Recent Labs     08/15/24  0221 08/16/24  0239   WBC 10.5 10.1   RBC 4.67* 4.84   HEMOGLOBIN 14.6 15.0   HEMATOCRIT 42.8 44.0   MCV 91.6 90.9   MCH 31.3 31.0   MCHC 34.1 34.1   RDW 47.9 47.2   PLATELETCT 127* 116*   MPV  9.8 9.6     Recent Labs     08/14/24  0317 08/15/24  0221   SODIUM 136 136   POTASSIUM 4.4 4.4   CHLORIDE 103 104   CO2 17* 17*   GLUCOSE 111* 121*   BUN 24* 21   CREATININE 1.03 1.03   CALCIUM 9.0 9.6                       Imaging  DX-ABDOMEN FOR TUBE PLACEMENT   Final Result         1.  Nonspecific bowel gas pattern in the upper abdomen.   2.  Dobbhoff tube tip terminates overlying the expected location of the gastric fundus.   3.  Bilateral pulmonary infiltrates, greatest in the right upper lobe.   4.  Trace bilateral pleural effusions      DX-ESOPHAGUS - [id number]   Final Result      DX-CHEST-PORTABLE (1 VIEW)   Final Result      1. Development of interstitial opacities in the lungs, either edema or interstitial infiltrates.   2. Worsening confluent opacities in the right upper lobe and lingula.   3. The remainder is stable.      CT-CTA NECK WITH & W/O-POST PROCESSING   Final Result      1. No evidence of flow-limiting stenosis in the cervical carotid or cervical vertebral arteries.   2. Partially visualized airspace opacities/mass in the right upper lobe. Infiltrated right perihilar and mediastinal lymph tavia masses.   3. Bilateral pleural effusions.      CT-CTA HEAD WITH & W/O-POST PROCESS   Final Result         1. There is a focal stenosis/occlusion in a distal branch of the right MCA with flow reconstitution.         Preliminary findings texted to Dr. MEMO CHAIDEZ in the Emergency Department via [employer] on 8/13/2024 9:17 AM            CT-CEREBRAL PERFUSION ANALYSIS   Final Result      1. Cerebral blood flow less than 30% possibly representing completed infarct = 0 mL.      2. T Max more than 6 seconds possibly representing combination of completed infarct and ischemia = 18 mL.      3. Mismatched volume possibly representing ischemic brain/penumbra= 18 mL      4.  Please note that this cerebral perfusion study and report is Quantitative and targets supratentorial (cerebral) perfusion for evaluation of  "large vessel territory acute ischemia/infarction. For example, lacunar infarcts, and brainstem/posterior fossa    ischemia/infarction are not evaluated on this study.  Data acquisition is subject to artifacts which can yield non-anatomically plausible perfusion maps which may be due to motion, bolus timing, signal to noise ratio, or other technical factors.    Perfusion map abnormalities which show non-anatomic distributions are likely artifact.   This study is not \"stand-alone\" and should only be utilized for diagnosis, management/treatment in correlation with CT, CTA, and/or MRI and clinical factors.              Assessment/Plan  * Right middle cerebral artery stroke (HCC)- (present on admission)  Assessment & Plan  Likely thromboembolic in setting of Afib and malignancy  Neurology consulted and s/o - continue apixaban, no further workup indicated  Telemetry  PT/OT/SLP    Dysarthria- (present on admission)  Assessment & Plan  Due to MCA stroke  SLP eval and treat    Oropharyngeal dysphagia- (present on admission)  Assessment & Plan  Due to MCA stroke  SLP eval and treat    Hyponatremia- (present on admission)  Assessment & Plan  Mild  Repeat AM BMP    Type 2 diabetes mellitus with stage 3a chronic kidney disease, without long-term current use of insulin (HCC)- (present on admission)  Assessment & Plan  Well-controlled, A1c 6  Continue SGLT2  Initiated on atorvastatin    Chronic systolic heart failure (HCC)- (present on admission)  Assessment & Plan  Appears euvolemic  Continue BB, SGLT2, IFRAH  Unclear if not on ACE/ARB due to CKD  S/p ICD for primary prophylaxis    Primary lung adenocarcinoma (HCC)- (present on admission)  Assessment & Plan  Follows with oncologist Dr. Schulz  On study drug - held on admission per pharmacist    Stage 3a chronic kidney disease- (present on admission)  Assessment & Plan  Seemingly at baseline  Avoid nephrotoxins  Repeat AM BMP    Paroxysmal atrial fibrillation (HCC)- (present on " admission)  Assessment & Plan  Continue metoprolol and apixaban  Telemetry for RVR in setting of CVA       VTE prophylaxis: Eliquis    I have performed a physical exam and reviewed and updated ROS and Plan today (8/16/2024). In review of yesterday's note (8/15/2024), there are no changes except as documented above.

## 2024-08-16 NOTE — CARE PLAN
Problem: Optimal Care of the Stroke Patient  Goal: Optimal emergency care for the stroke patient  Description: Target End Date:  End of day 1    Time of Onset    1.  Time of last known well obtained  2.  Patient and family/caregiver verbalize understanding of diagnosis, medications and testing  3.  NIHSS performed and documented, including date and time, for ischemic stroke patients prior to any acute recanalization therapy (thrombolytics or mechanical) or within 12 hours of arrival if no intervention is warranted  4.  Consults and referrals placed to appropriate departments    Medications Administration as Ordered:    1.  Implement appropriate reversal agents for INR greater than 1.5  2.  Pre-alteplase administration of antihypertensives for SBP >185 DBP >110  3.  Post-alteplase administration of antihypertensives for SBP >185, DBP >105  4.  Thrombolytic Therapy for qualifying ischemic stroke patients who arrive within 4.5 hours of time of Last Known Well. Thrombolytic therapy administered within 30 minutes or a documented reason for delay  Outcome: Progressing     Problem: Neuro Status  Goal: Neuro status will remain stable or improve  Description: Target End Date:  Prior to discharge or change in level of care    Document on Neuro assessment in the Assessment flowsheet    1.  Assess and monitor neurologic status per provider order/protocol/unit policy  2.  Assess level of consciousness and orientation  3.  Assess for speech, dysarthria, dysphagia, facial symmetry  4.  Assess visual field, eye movements, gaze preference, pupil reaction and size  5.  Assess muscle strength and motor response in all four extremities  6.  Assess for sensation (numbness and tingling)  7.  Assess basic neuro reflexes (cough, gag, corneal)  8.  Identify changes in neuro status and report to provider for testing/treatment orders  Outcome: Progressing     Problem: Hemodynamic Monitoring  Goal: Patient's hemodynamics, fluid balance and  neurologic status will be stable or improve  Description: Target End Date:  Prior to discharge or change in level of care    1.  Vital signs, pulse oximetry and cardiac monitor per provider order and/or policy  2.  Frequent pulse checks performed post thrombectomy  3.  Frequent monitoring for signs of bleeding post TPA administration  4.  Proper management of IV infusions  5.  Intake and output monitored per provider order  6.  Daily weight obtained per unit policy or provider order  7.  Peripheral pulses and capillary refill assessed as needed  8.  Monitor for signs/symptoms of excessive bleeding  9.  Body temperature assessed and fevers treated  10. Patient positioned for maximum circulation/cardiac output  Outcome: Progressing     The patient is Stable - Low risk of patient condition declining or worsening    Shift Goals  Clinical Goals: monitor neuro status,titrate feeding  Patient Goals: sleep  Family Goals: diandra    Progress made toward(s) clinical / shift goals:  patient is A0x4 ,NIH of 4. Able to mobilized using minimal assist . Hourly rounds to ensure safety and comfort . No acute change noted .     Patient is not progressing towards the following goals:

## 2024-08-16 NOTE — PROGRESS NOTES
Monitor summary: SR/ST , [place] -0.17, QRS -0.10, QT -0.22, with (O) PVCs and couplet PVCs per strip from the monitor room.

## 2024-08-16 NOTE — THERAPY
Occupational Therapy  Daily Treatment     Patient Name: Pito Black  Age:  79 y.o., Sex:  male  Medical Record #: [id number]  Today's Date: 8/16/2024     Precautions  Precautions: (P) Fall Risk, Swallow Precautions, Nasogastric Tube  Comments: (P) [place]    Assessment    Pt seen for follow up OT tx session, pt making steady improvement with functional mobility and ADLs, requiring less overall assistance with functional tasks still limited by LUE weakness/sensory issues. Will continue to see for skilled therapy while admitted as well as recommend post-acute placement.    Plan    Treatment Plan Status: (P) Continue Current Treatment Plan  Type of Treatment: Self Care / Activities of Daily Living, Neuro Re-Education / Balance, Therapeutic Exercises, Therapeutic Activity  Treatment Frequency: 4 Times per Week  Treatment Duration: Until Therapy Goals Met    DC Equipment Recommendations: (P) Unable to determine at this time  Discharge Recommendations: (P) Recommend post-acute placement for additional occupational therapy services prior to discharge home    Objective       08/16/24 1304   Precautions   Precautions Fall Risk;Swallow Precautions;Nasogastric Tube   Comments [place]   Pain 0 - 10 Group   Therapist Pain Assessment Post Activity Pain Same as Prior to Activity;Nurse Notified   Cognition    Cognition / Consciousness X   Speech/ Communication Dysarthric   Level of Consciousness Alert   Safety Awareness Impaired   Comments [place], impulsive   Strength Upper Body   Left  Impaired   Comments 2-4 th digits pt states feel like a muscle strain when flexing   Sensation Upper Body   Upper Extremity Sensation  X   Lt Upper Extremity Light Touch Impaired   Lt Upper Extremity Proprioception Impaired   Upper Body Muscle Tone   Upper Body Muscle Tone  X   Lt Upper Extremity Muscle Tone Hypotonic   Balance   Sitting Balance (Static) Good   Sitting Balance (Dynamic) Fair +   Standing Balance (Static) Fair   Standing Balance  (Dynamic) Fair -   Weight Shift Sitting Fair   Weight Shift Standing Fair   Skilled Intervention Verbal Cuing;Facilitation   Comments w/ FWW and walker splint on L   Bed Mobility    Sit to Supine Supervised   Comments found seated in chair   Activities of Daily Living   Grooming Supervision;Seated   Upper Body Dressing Minimal Assist   Lower Body Dressing Contact Guard Assist   Skilled Intervention Verbal Cuing;Facilitation   How much help from another person does the patient currently need...   Putting on and taking off regular lower body clothing? 3   Bathing (including washing, rinsing, and drying)? 3   Toileting, which includes using a toilet, bedpan, or urinal? 3   Putting on and taking off regular upper body clothing? 3   Taking care of personal grooming such as brushing teeth? 3   Eating meals? 3   6 Clicks Daily Activity Score 18   Functional Mobility   Sit to Stand Contact Guard Assist   Bed, Chair, Wheelchair Transfer Contact Guard Assist   Transfer Method Stand Step   Mobility STS from chair, back to bed   Skilled Intervention Verbal Cuing   Comments w/ FWW   Activity Tolerance   Sitting in Chair up in chair upon arrival   Standing 4 min   Patient / Family Goals   Patient / Family Goal #1 to get to rehab per family   Goal #1 Outcome Progressing as expected   Short Term Goals   Short Term Goal # 1 pt will complete grooming standing at sink w/spv   Goal Outcome # 1 Progressing slower than expected   Short Term Goal # 2 pt will complete LB dressing w/spv   Goal Outcome # 2 Progressing as expected   Short Term Goal # 3 pt will reach and place 5/5 items w/spv using LUE   Goal Outcome # 3 Goal not met   Education Group   Education Provided Role of Occupational Therapist   Role of Occupational Therapist Patient Response Patient;Family;Acceptance;Explanation;Demonstration   Occupational Therapy Treatment Plan    O.T. Treatment Plan Continue Current Treatment Plan   Anticipated Discharge Equipment and  Recommendations   DC Equipment Recommendations Unable to determine at this time   Discharge Recommendations Recommend post-acute placement for additional occupational therapy services prior to discharge home   Interdisciplinary Plan of Care Collaboration   IDT Collaboration with  Nursing;Family / Caregiver   Patient Position at End of Therapy In Bed;Bed Alarm On;Call Light within Reach;Tray Table within Reach;Phone within Reach;Family / Friend in Room   Collaboration Comments RN updated

## 2024-08-16 NOTE — DIETARY
Nutrition Services Brief Update:    Problem: Nutritional:  Goal: Nutrition support tolerated and meeting greater than 85% of estimated needs  Outcome: Met    TF Jevity 1.2 is running at goal rate 60 mL/hr per flowsheets.    RD continues to follow.

## 2024-08-17 PROCEDURE — A9270 NON-COVERED ITEM OR SERVICE: HCPCS | Performed by: STUDENT IN AN ORGANIZED HEALTH CARE EDUCATION/TRAINING PROGRAM

## 2024-08-17 PROCEDURE — 99232 SBSQ HOSP IP/OBS MODERATE 35: CPT | Performed by: STUDENT IN AN ORGANIZED HEALTH CARE EDUCATION/TRAINING PROGRAM

## 2024-08-17 PROCEDURE — A9270 NON-COVERED ITEM OR SERVICE: HCPCS

## 2024-08-17 PROCEDURE — 700102 HCHG RX REV CODE 250 W/ 637 OVERRIDE(OP): Performed by: STUDENT IN AN ORGANIZED HEALTH CARE EDUCATION/TRAINING PROGRAM

## 2024-08-17 PROCEDURE — 700102 HCHG RX REV CODE 250 W/ 637 OVERRIDE(OP)

## 2024-08-17 PROCEDURE — 770020 HCHG ROOM/CARE - TELE (206)

## 2024-08-17 RX ORDER — TRAZODONE HYDROCHLORIDE 100 MG/1
50 TABLET ORAL
Status: DISCONTINUED | OUTPATIENT
Start: 2024-08-17 | End: 2024-08-18

## 2024-08-17 RX ORDER — TRAZODONE HYDROCHLORIDE 100 MG/1
50 TABLET ORAL
Status: DISCONTINUED | OUTPATIENT
Start: 2024-08-17 | End: 2024-08-17

## 2024-08-17 RX ADMIN — EZETIMIBE 10 MG: 10 TABLET ORAL at 05:39

## 2024-08-17 RX ADMIN — METOPROLOL TARTRATE 50 MG: 25 TABLET, FILM COATED ORAL at 05:39

## 2024-08-17 RX ADMIN — Medication 2000 UNITS: at 05:38

## 2024-08-17 RX ADMIN — SPIRONOLACTONE 25 MG: 25 TABLET ORAL at 05:39

## 2024-08-17 RX ADMIN — APIXABAN 5 MG: 5 TABLET, FILM COATED ORAL at 05:39

## 2024-08-17 RX ADMIN — APIXABAN 5 MG: 5 TABLET, FILM COATED ORAL at 18:00

## 2024-08-17 RX ADMIN — METOPROLOL TARTRATE 50 MG: 25 TABLET, FILM COATED ORAL at 18:00

## 2024-08-17 RX ADMIN — Medication 5 MG: at 20:28

## 2024-08-17 RX ADMIN — ATORVASTATIN CALCIUM 20 MG: 20 TABLET, FILM COATED ORAL at 18:00

## 2024-08-17 RX ADMIN — DAPAGLIFLOZIN 10 MG: 10 TABLET, FILM COATED ORAL at 05:39

## 2024-08-17 ASSESSMENT — ENCOUNTER SYMPTOMS
MUSCULOSKELETAL NEGATIVE: 1
NEUROLOGICAL NEGATIVE: 1
CARDIOVASCULAR NEGATIVE: 1
CONSTITUTIONAL NEGATIVE: 1
EYES NEGATIVE: 1
PSYCHIATRIC NEGATIVE: 1
GASTROINTESTINAL NEGATIVE: 1
RESPIRATORY NEGATIVE: 1

## 2024-08-17 ASSESSMENT — PAIN DESCRIPTION - PAIN TYPE: TYPE: ACUTE PAIN

## 2024-08-17 ASSESSMENT — FIBROSIS 4 INDEX: FIB4 SCORE: 3.54

## 2024-08-17 NOTE — PROGRESS NOTES
Monitor Summary  Rhythm ST  Rate 107-122  Ectopy couplet PVCs  NH 0.14  QRS 0.07  QT 0.33  Per monitor room

## 2024-08-17 NOTE — CARE PLAN
Problem: Optimal Care of the Stroke Patient  Goal: Optimal emergency care for the stroke patient  Description: Target End Date:  End of day 1    Time of Onset    1.  Time of last known well obtained  2.  Patient and family/caregiver verbalize understanding of diagnosis, medications and testing  3.  NIHSS performed and documented, including date and time, for ischemic stroke patients prior to any acute recanalization therapy (thrombolytics or mechanical) or within 12 hours of arrival if no intervention is warranted  4.  Consults and referrals placed to appropriate departments    Medications Administration as Ordered:    1.  Implement appropriate reversal agents for INR greater than 1.5  2.  Pre-alteplase administration of antihypertensives for SBP >185 DBP >110  3.  Post-alteplase administration of antihypertensives for SBP >185, DBP >105  4.  Thrombolytic Therapy for qualifying ischemic stroke patients who arrive within 4.5 hours of time of Last Known Well. Thrombolytic therapy administered within 30 minutes or a documented reason for delay  Outcome: Progressing     Problem: Neuro Status  Goal: Neuro status will remain stable or improve  Description: Target End Date:  Prior to discharge or change in level of care    Document on Neuro assessment in the Assessment flowsheet    1.  Assess and monitor neurologic status per provider order/protocol/unit policy  2.  Assess level of consciousness and orientation  3.  Assess for speech, dysarthria, dysphagia, facial symmetry  4.  Assess visual field, eye movements, gaze preference, pupil reaction and size  5.  Assess muscle strength and motor response in all four extremities  6.  Assess for sensation (numbness and tingling)  7.  Assess basic neuro reflexes (cough, gag, corneal)  8.  Identify changes in neuro status and report to provider for testing/treatment orders  Outcome: Progressing     Problem: Mobility - Stroke  Goal: Patient's capacity to carry out activities will  improve  Description: Target End Date:  Prior to discharge or change in level of care    1.  Assess for barriers to mobility/activity  2.  Implement activity per interdisciplinary team recommendations  3.  Target activity level identified and patient/family/caregiver aware of goal  4.  Provide assistive devices  5.  Instruct patient/caregiver on proper use of assistive/adaptive devices  6.  Schedule activities and rest periods to decrease effects of fatigue  7.  Encourage mobilization to extent of ability  8.  Maintain proper body alignment  9.  Provide adequate pain management to allow progressive mobilization  10. Implement pace maker precautions as needed  Outcome: Progressing   The patient is Stable - Low risk of patient condition declining or worsening    Shift Goals  Clinical Goals: monitor neuro status  Patient Goals: rest  Family Goals: diandra    Progress made toward(s) clinical / shift goals:  patient is A0x4, still on NPO , Ngt feeding at goal . NIH remain unchanged. He is using front wheel walker with minimal assistance. No acute change noted. Hourly rounds to ensure safety and comfort.     Patient is not progressing towards the following goals:

## 2024-08-17 NOTE — CARE PLAN
The patient is Stable - Low risk of patient condition declining or worsening    Shift Goals  Clinical Goals: NGT care, q4 neuro, mobility  Patient Goals: updates on POC, mobility  Family Goals: updates    Progress made toward(s) clinical / shift goals:    Problem: Optimal Care of the Stroke Patient  Goal: Optimal acute care for the stroke patient  Outcome: Progressing  Note: - Vital signs and neuro checks performed and documented per order - NIHSS completed and documented per order - Continuous telemetry monitoring for 72 hours or until discontinued by provider - Head CT without contrast obtained - Consideration of MRI/MRA - MRI screening form completed in worklist if MRI ordered - Echocardiogram with Bubble Study ordered/completed with consideration of JP - Carotid Doppler ordered/completed (Not required if CTA of neck completed in ED) - Lipid Panel obtained within 48 hours of admission - PT, PTT, INR obtained per Anticoagulation orders (if applicable) - Antithrombotic therapy by end of hospital day 2 for ischemic stroke. Provider must document reason if contraindicated. - Venous Thromboembolism (VTE) Prophylaxis by end of hospital day 2 for ischemic and hemorrhagic stroke. Provider must document reason if contraindicated - Dysphagia screen completed and documented prior to any PO intake. Patient to remain NPO until Speech Therapy evaluation if thrombolytic or thrombectomy performed - Rehabilitation assessment including PT/OT/SLP evaluations for referral to Physical Medicine and Rehabilitation services. If none needed, provider needs to document reason - Neurology consult placed - Consideration of cardiology consult for cryptogenic strokes      Problem: Discharge Planning - Stroke  Goal: Ensure Stroke Core Measures are met prior to discharge  Outcome: Progressing  Note: 1. Patient discharged on antithrombotic therapy (Ischemic Stroke) 2. Patient discharged on intensive statin if LDL is greater than or equal to 70  mg/dl (Ischemic Stroke) 3. Patient discharged on anticoagulation therapy for patients with atrial fibrillation/flutter (Ischemic Stroke) 4. Smoking education/cessation provided if applicable      Problem: Neuro Status  Goal: Neuro status will remain stable or improve  Outcome: Progressing  Note: 1. Assess and monitor neurologic status per provider order/protocol/unit policy 2. Assess level of consciousness and orientation 3. Assess for speech, dysarthria, dysphagia, facial symmetry 4. Assess visual field, eye movements, gaze preference, pupil reaction and size 5. Assess muscle strength and motor response in all four extremities 6. Assess for sensation (numbness and tingling) 7. Assess basic neuro reflexes (cough, gag, corneal) 8. Identify changes in neuro status and report to provider for testing/treatment orders      Problem: Risk for Aspiration  Goal: Patient's risk for aspiration will be absent or decrease  Outcome: Progressing  Note: 1. Complete dysphagia screening on admission 2. NPO until dysphagia screening complete or medically cleared 3. Collaborate with Speech Therapy, Clinical Dietitian and interdisciplinary team 4. Implement aspiration precautions 5. Assist patient up to chair for meals 6. Elevate head of bed 90 degrees if patient is unable to get out of bed 7. Encourage small bites 8. Ensure foods/liquids are of appropriate consistency 9. Assess for any signs/symptoms of aspiration 10. Assess breath sounds and vital signs after oral intake        Patient is not progressing towards the following goals:

## 2024-08-17 NOTE — PROGRESS NOTES
Monitor summary: SR/ST, HR , HI 0.17, QRS 0.09, QT 0.39 with (R&O)Couplts, (R)Bigs, (R)Trip, (F)PVCs per strip from monitor room

## 2024-08-17 NOTE — PROGRESS NOTES
Hospital Medicine Daily Progress Note    Date of Service  8/17/2024    Chief Complaint  Pito Black is a 79 y.o. male admitted 8/13/2024 with CVA    Hospital Course  79 y.o. male with lung cancer followed by Luz Maria Jules on apixaban who presented 8/13/2024 with Left facial droop and dysphagia.     He reports last night he went to take some pills and developed inability to swallow them. During swallowing he choked and vomited the pills. At that time he developed dysarthria, Left hand weakness, and Left facial droop. It persisted into this morning for which he decided to come in. He denies blurry vision, headache, dysphoria, pain, bleeding, bowel/bladder dysfunction, presyncope, syncope, falls.     In the ED he underwent CTA H&N which demonstrated distal Right MCA occlusion. Neurology was consulted and he was deemed outside the TPA window. CT perfusions was without penumbra. CXR demonstrated PPM/ICD and bilateral infiltrates. CBC is normal. CMP demonstrates hyponatremia Na 133, stable Cr 1.3. A1c is 6. Troponin 55 comparable to prior values. INR 1.3. EKG sinus tachycardia.    Interval Problem Update  Evaluated at bedside  In no acute distress this morning  Reports poor sleep  Tachycardia better today   Start melatonin and PRN Trazodone  Continue holding antineoplastic therapy  On TF  SPL following  PT/OT recommended postacute  SNF/PMR referrals placed    I have discussed this patient's plan of care and discharge plan at IDT rounds today with Case Management, Nursing, Nursing leadership, and other members of the IDT team.    Consultants/Specialty  Neurology    Code Status  Full Code    Disposition  The patient is not medically cleared for discharge to home or a post-acute facility.     I have placed the appropriate orders for post-discharge needs.    Review of Systems  Review of Systems   Constitutional: Negative.    HENT: Negative.     Eyes: Negative.    Respiratory: Negative.     Cardiovascular:  Negative.    Gastrointestinal: Negative.    Genitourinary: Negative.    Musculoskeletal: Negative.    Skin: Negative.    Neurological: Negative.    Endo/Heme/Allergies: Negative.    Psychiatric/Behavioral: Negative.          Physical Exam  Temp:  [36.4 °C (97.5 °F)-36.6 °C (97.9 °F)] 36.6 °C (97.9 °F)  Pulse:  [] 99  Resp:  [17-19] 19  BP: (125-151)/(78-79) 151/78  SpO2:  [93 %-96 %] 96 %    Physical Exam  Constitutional:       General: He is not in acute distress.     Appearance: Normal appearance.   HENT:      Head: Normocephalic and atraumatic.      Nose: Nose normal. No congestion.      Mouth/Throat:      Mouth: Mucous membranes are moist.   Eyes:      Extraocular Movements: Extraocular movements intact.      Pupils: Pupils are equal, round, and reactive to light.   Cardiovascular:      Rate and Rhythm: Normal rate and regular rhythm.      Pulses: Normal pulses.      Heart sounds: Normal heart sounds.   Pulmonary:      Effort: Pulmonary effort is normal.      Breath sounds: Normal breath sounds.   Abdominal:      General: Bowel sounds are normal.      Palpations: Abdomen is soft.      Tenderness: There is no abdominal tenderness.   Musculoskeletal:         General: No swelling. Normal range of motion.      Cervical back: Normal range of motion and neck supple.   Skin:     General: Skin is warm.      Coloration: Skin is not jaundiced.   Neurological:      Mental Status: He is alert.      Comments: Dysarthria  Left upper extremity weakness   Psychiatric:         Mood and Affect: Mood normal.         Behavior: Behavior normal.         Thought Content: Thought content normal.         Judgment: Judgment normal.         Fluids    Intake/Output Summary (Last 24 hours) at 8/17/2024 1342  Last data filed at 8/17/2024 1128  Gross per 24 hour   Intake 90 ml   Output --   Net 90 ml       Laboratory  Recent Labs     08/15/24  0221 08/16/24  0239   WBC 10.5 10.1   RBC 4.67* 4.84   HEMOGLOBIN 14.6 15.0   HEMATOCRIT 42.8  44.0   MCV 91.6 90.9   MCH 31.3 31.0   MCHC 34.1 34.1   RDW 47.9 47.2   PLATELETCT 127* 116*   MPV 9.8 9.6     Recent Labs     08/15/24  0221 08/16/24  0239   SODIUM 136 139   POTASSIUM 4.4 4.4   CHLORIDE 104 105   CO2 17* 17*   GLUCOSE 121* 162*   BUN 21 23*   CREATININE 1.03 0.97   CALCIUM 9.6 9.6                       Imaging  DX-ABDOMEN FOR TUBE PLACEMENT   Final Result         Feeding tube with tip projecting over the expected area of the stomach.      DX-ABDOMEN FOR TUBE PLACEMENT   Final Result         1.  Nonspecific bowel gas pattern in the upper abdomen.   2.  Dobbhoff tube tip terminates overlying the expected location of the gastric fundus.   3.  Bilateral pulmonary infiltrates, greatest in the right upper lobe.   4.  Trace bilateral pleural effusions      DX-ESOPHAGUS - [id number]   Final Result      DX-CHEST-PORTABLE (1 VIEW)   Final Result      1. Development of interstitial opacities in the lungs, either edema or interstitial infiltrates.   2. Worsening confluent opacities in the right upper lobe and lingula.   3. The remainder is stable.      CT-CTA NECK WITH & W/O-POST PROCESSING   Final Result      1. No evidence of flow-limiting stenosis in the cervical carotid or cervical vertebral arteries.   2. Partially visualized airspace opacities/mass in the right upper lobe. Infiltrated right perihilar and mediastinal lymph tavia masses.   3. Bilateral pleural effusions.      CT-CTA HEAD WITH & W/O-POST PROCESS   Final Result         1. There is a focal stenosis/occlusion in a distal branch of the right MCA with flow reconstitution.         Preliminary findings texted to Dr. MEMO CHAIDEZ in the Emergency Department via Voalte on 8/13/2024 9:17 AM            CT-CEREBRAL PERFUSION ANALYSIS   Final Result      1. Cerebral blood flow less than 30% possibly representing completed infarct = 0 mL.      2. T Max more than 6 seconds possibly representing combination of completed infarct and ischemia = 18 mL.  "     3. Mismatched volume possibly representing ischemic brain/penumbra= 18 mL      4.  Please note that this cerebral perfusion study and report is Quantitative and targets supratentorial (cerebral) perfusion for evaluation of large vessel territory acute ischemia/infarction. For example, lacunar infarcts, and brainstem/posterior fossa    ischemia/infarction are not evaluated on this study.  Data acquisition is subject to artifacts which can yield non-anatomically plausible perfusion maps which may be due to motion, bolus timing, signal to noise ratio, or other technical factors.    Perfusion map abnormalities which show non-anatomic distributions are likely artifact.   This study is not \"stand-alone\" and should only be utilized for diagnosis, management/treatment in correlation with CT, CTA, and/or MRI and clinical factors.              Assessment/Plan  * Right middle cerebral artery stroke (HCC)- (present on admission)  Assessment & Plan  Likely thromboembolic in setting of Afib and malignancy  Neurology consulted and s/o - continue apixaban, no further workup indicated  Telemetry  PT/OT/SLP    Dysarthria- (present on admission)  Assessment & Plan  Due to MCA stroke  SLP eval and treat    Oropharyngeal dysphagia- (present on admission)  Assessment & Plan  Due to MCA stroke  SLP eval and treat    Hyponatremia- (present on admission)  Assessment & Plan  Mild  Repeat AM BMP    Type 2 diabetes mellitus with stage 3a chronic kidney disease, without long-term current use of insulin (HCC)- (present on admission)  Assessment & Plan  Well-controlled, A1c 6  Continue SGLT2  Initiated on atorvastatin    Chronic systolic heart failure (HCC)- (present on admission)  Assessment & Plan  Appears euvolemic  Continue BB, SGLT2, IFRAH  Unclear if not on ACE/ARB due to CKD  S/p ICD for primary prophylaxis    Primary lung adenocarcinoma (HCC)- (present on admission)  Assessment & Plan  Follows with oncologist Dr. Schulz  On study drug " - held on admission per pharmacist    Stage 3a chronic kidney disease- (present on admission)  Assessment & Plan  Seemingly at baseline  Avoid nephrotoxins  Repeat AM BMP    Paroxysmal atrial fibrillation (HCC)- (present on admission)  Assessment & Plan  Continue metoprolol and apixaban  Telemetry for RVR in setting of CVA       VTE prophylaxis: Eliquis    I have performed a physical exam and reviewed and updated ROS and Plan today (8/17/2024). In review of yesterday's note (8/16/2024), there are no changes except as documented above.

## 2024-08-17 NOTE — PROGRESS NOTES
Monitor summary: SR/ST , AL -0.17, QRS -0.06, QT -0.30, with rare PVCs, couplet and multifocal PVCs per strip from the monitor room.

## 2024-08-18 ENCOUNTER — APPOINTMENT (OUTPATIENT)
Dept: RADIOLOGY | Facility: MEDICAL CENTER | Age: 79
DRG: 065 | End: 2024-08-18
Attending: STUDENT IN AN ORGANIZED HEALTH CARE EDUCATION/TRAINING PROGRAM
Payer: MEDICARE

## 2024-08-18 PROCEDURE — 71045 X-RAY EXAM CHEST 1 VIEW: CPT

## 2024-08-18 PROCEDURE — 700102 HCHG RX REV CODE 250 W/ 637 OVERRIDE(OP)

## 2024-08-18 PROCEDURE — 700102 HCHG RX REV CODE 250 W/ 637 OVERRIDE(OP): Performed by: STUDENT IN AN ORGANIZED HEALTH CARE EDUCATION/TRAINING PROGRAM

## 2024-08-18 PROCEDURE — 92526 ORAL FUNCTION THERAPY: CPT | Performed by: SPEECH-LANGUAGE PATHOLOGIST

## 2024-08-18 PROCEDURE — 74018 RADEX ABDOMEN 1 VIEW: CPT

## 2024-08-18 PROCEDURE — 99232 SBSQ HOSP IP/OBS MODERATE 35: CPT | Performed by: STUDENT IN AN ORGANIZED HEALTH CARE EDUCATION/TRAINING PROGRAM

## 2024-08-18 PROCEDURE — A9270 NON-COVERED ITEM OR SERVICE: HCPCS | Performed by: STUDENT IN AN ORGANIZED HEALTH CARE EDUCATION/TRAINING PROGRAM

## 2024-08-18 PROCEDURE — 700111 HCHG RX REV CODE 636 W/ 250 OVERRIDE (IP): Mod: JZ | Performed by: STUDENT IN AN ORGANIZED HEALTH CARE EDUCATION/TRAINING PROGRAM

## 2024-08-18 PROCEDURE — A9270 NON-COVERED ITEM OR SERVICE: HCPCS

## 2024-08-18 PROCEDURE — 770020 HCHG ROOM/CARE - TELE (206)

## 2024-08-18 RX ORDER — ACETAMINOPHEN 500 MG
1000 TABLET ORAL 3 TIMES DAILY PRN
Status: DISCONTINUED | OUTPATIENT
Start: 2024-08-18 | End: 2024-08-24 | Stop reason: HOSPADM

## 2024-08-18 RX ADMIN — ONDANSETRON 4 MG: 2 INJECTION INTRAMUSCULAR; INTRAVENOUS at 11:16

## 2024-08-18 RX ADMIN — APIXABAN 5 MG: 5 TABLET, FILM COATED ORAL at 17:29

## 2024-08-18 RX ADMIN — EZETIMIBE 10 MG: 10 TABLET ORAL at 04:38

## 2024-08-18 RX ADMIN — APIXABAN 5 MG: 5 TABLET, FILM COATED ORAL at 04:36

## 2024-08-18 RX ADMIN — ATORVASTATIN CALCIUM 20 MG: 20 TABLET, FILM COATED ORAL at 17:29

## 2024-08-18 RX ADMIN — DAPAGLIFLOZIN 10 MG: 10 TABLET, FILM COATED ORAL at 04:36

## 2024-08-18 RX ADMIN — METOPROLOL TARTRATE 50 MG: 25 TABLET, FILM COATED ORAL at 17:29

## 2024-08-18 RX ADMIN — ACETAMINOPHEN 1000 MG: 500 TABLET ORAL at 13:36

## 2024-08-18 RX ADMIN — SPIRONOLACTONE 25 MG: 25 TABLET ORAL at 04:36

## 2024-08-18 RX ADMIN — METOPROLOL TARTRATE 50 MG: 25 TABLET, FILM COATED ORAL at 04:36

## 2024-08-18 RX ADMIN — Medication 2000 UNITS: at 04:36

## 2024-08-18 ASSESSMENT — ENCOUNTER SYMPTOMS
MUSCULOSKELETAL NEGATIVE: 1
RESPIRATORY NEGATIVE: 1
NEUROLOGICAL NEGATIVE: 1
PSYCHIATRIC NEGATIVE: 1
CARDIOVASCULAR NEGATIVE: 1
EYES NEGATIVE: 1
CONSTITUTIONAL NEGATIVE: 1
GASTROINTESTINAL NEGATIVE: 1

## 2024-08-18 ASSESSMENT — PAIN DESCRIPTION - PAIN TYPE
TYPE: ACUTE PAIN

## 2024-08-18 ASSESSMENT — FIBROSIS 4 INDEX: FIB4 SCORE: 3.54

## 2024-08-18 NOTE — PROGRESS NOTES
Hospital Medicine Daily Progress Note    Date of Service  8/18/2024    Chief Complaint  Pito Black is a 79 y.o. male admitted 8/13/2024 with CVA    Hospital Course  79 y.o. male with lung cancer followed by Luz Maria Jules on apixaban who presented 8/13/2024 with Left facial droop and dysphagia.     He reports last night he went to take some pills and developed inability to swallow them. During swallowing he choked and vomited the pills. At that time he developed dysarthria, Left hand weakness, and Left facial droop. It persisted into this morning for which he decided to come in. He denies blurry vision, headache, dysphoria, pain, bleeding, bowel/bladder dysfunction, presyncope, syncope, falls.     In the ED he underwent CTA H&N which demonstrated distal Right MCA occlusion. Neurology was consulted and he was deemed outside the TPA window. CT perfusions was without penumbra. CXR demonstrated PPM/ICD and bilateral infiltrates. CBC is normal. CMP demonstrates hyponatremia Na 133, stable Cr 1.3. A1c is 6. Troponin 55 comparable to prior values. INR 1.3. EKG sinus tachycardia.    Interval Problem Update  Evaluated at bedside  Good sleep with melatonin yesterday  More sleepy today  Continue holding antineoplastic therapy  On TF  SPL following, aim MBSS next week  PT/OT recommended postacute  SNF/PMR referrals placed    I have discussed this patient's plan of care and discharge plan at IDT rounds today with Case Management, Nursing, Nursing leadership, and other members of the IDT team.    Consultants/Specialty  Neurology    Code Status  Full Code    Disposition  The patient is not medically cleared for discharge to home or a post-acute facility.     I have placed the appropriate orders for post-discharge needs.    Review of Systems  Review of Systems   Constitutional: Negative.    HENT: Negative.     Eyes: Negative.    Respiratory: Negative.     Cardiovascular: Negative.    Gastrointestinal: Negative.     Genitourinary: Negative.    Musculoskeletal: Negative.    Skin: Negative.    Neurological: Negative.    Endo/Heme/Allergies: Negative.    Psychiatric/Behavioral: Negative.          Physical Exam  Temp:  [35.9 °C (96.6 °F)-36.6 °C (97.9 °F)] 36.6 °C (97.9 °F)  Pulse:  [] 95  Resp:  [16-19] 18  BP: (115-151)/(67-96) 119/72  SpO2:  [93 %-96 %] 95 %    Physical Exam  Constitutional:       General: He is not in acute distress.     Appearance: Normal appearance.   HENT:      Head: Normocephalic and atraumatic.      Nose: Nose normal. No congestion.      Mouth/Throat:      Mouth: Mucous membranes are moist.   Eyes:      Extraocular Movements: Extraocular movements intact.      Pupils: Pupils are equal, round, and reactive to light.   Cardiovascular:      Rate and Rhythm: Normal rate and regular rhythm.      Pulses: Normal pulses.      Heart sounds: Normal heart sounds.   Pulmonary:      Effort: Pulmonary effort is normal.      Breath sounds: Normal breath sounds.   Abdominal:      General: Bowel sounds are normal.      Palpations: Abdomen is soft.      Tenderness: There is no abdominal tenderness.   Musculoskeletal:         General: No swelling. Normal range of motion.      Cervical back: Normal range of motion and neck supple.   Skin:     General: Skin is warm.      Coloration: Skin is not jaundiced.   Neurological:      Mental Status: He is alert.      Comments: Dysarthria  Left upper extremity weakness   Psychiatric:         Mood and Affect: Mood normal.         Behavior: Behavior normal.         Thought Content: Thought content normal.         Judgment: Judgment normal.         Fluids    Intake/Output Summary (Last 24 hours) at 8/18/2024 1044  Last data filed at 8/18/2024 0900  Gross per 24 hour   Intake 180 ml   Output --   Net 180 ml       Laboratory  Recent Labs     08/16/24  0239   WBC 10.1   RBC 4.84   HEMOGLOBIN 15.0   HEMATOCRIT 44.0   MCV 90.9   MCH 31.0   MCHC 34.1   RDW 47.2   PLATELETCT 116*   MPV  9.6     Recent Labs     08/16/24  0239   SODIUM 139   POTASSIUM 4.4   CHLORIDE 105   CO2 17*   GLUCOSE 162*   BUN 23*   CREATININE 0.97   CALCIUM 9.6                       Imaging  DX-ABDOMEN FOR TUBE PLACEMENT   Final Result         Feeding tube with tip projecting over the expected area of the stomach.      DX-ABDOMEN FOR TUBE PLACEMENT   Final Result         1.  Nonspecific bowel gas pattern in the upper abdomen.   2.  Dobbhoff tube tip terminates overlying the expected location of the gastric fundus.   3.  Bilateral pulmonary infiltrates, greatest in the right upper lobe.   4.  Trace bilateral pleural effusions      DX-ESOPHAGUS - [id number]   Final Result      DX-CHEST-PORTABLE (1 VIEW)   Final Result      1. Development of interstitial opacities in the lungs, either edema or interstitial infiltrates.   2. Worsening confluent opacities in the right upper lobe and lingula.   3. The remainder is stable.      CT-CTA NECK WITH & W/O-POST PROCESSING   Final Result      1. No evidence of flow-limiting stenosis in the cervical carotid or cervical vertebral arteries.   2. Partially visualized airspace opacities/mass in the right upper lobe. Infiltrated right perihilar and mediastinal lymph tavia masses.   3. Bilateral pleural effusions.      CT-CTA HEAD WITH & W/O-POST PROCESS   Final Result         1. There is a focal stenosis/occlusion in a distal branch of the right MCA with flow reconstitution.         Preliminary findings texted to Dr. MEMO CHAIDEZ in the Emergency Department via Voalte on 8/13/2024 9:17 AM            CT-CEREBRAL PERFUSION ANALYSIS   Final Result      1. Cerebral blood flow less than 30% possibly representing completed infarct = 0 mL.      2. T Max more than 6 seconds possibly representing combination of completed infarct and ischemia = 18 mL.      3. Mismatched volume possibly representing ischemic brain/penumbra= 18 mL      4.  Please note that this cerebral perfusion study and report is  "Quantitative and targets supratentorial (cerebral) perfusion for evaluation of large vessel territory acute ischemia/infarction. For example, lacunar infarcts, and brainstem/posterior fossa    ischemia/infarction are not evaluated on this study.  Data acquisition is subject to artifacts which can yield non-anatomically plausible perfusion maps which may be due to motion, bolus timing, signal to noise ratio, or other technical factors.    Perfusion map abnormalities which show non-anatomic distributions are likely artifact.   This study is not \"stand-alone\" and should only be utilized for diagnosis, management/treatment in correlation with CT, CTA, and/or MRI and clinical factors.              Assessment/Plan  * Right middle cerebral artery stroke (HCC)- (present on admission)  Assessment & Plan  Likely thromboembolic in setting of Afib and malignancy  Neurology consulted and s/o - continue apixaban, no further workup indicated  Telemetry  PT/OT/SLP    Dysarthria- (present on admission)  Assessment & Plan  Due to MCA stroke  SLP eval and treat    Oropharyngeal dysphagia- (present on admission)  Assessment & Plan  Due to MCA stroke  SLP eval and treat    Hyponatremia- (present on admission)  Assessment & Plan  Mild  Repeat AM BMP    Type 2 diabetes mellitus with stage 3a chronic kidney disease, without long-term current use of insulin (HCC)- (present on admission)  Assessment & Plan  Well-controlled, A1c 6  Continue SGLT2  Initiated on atorvastatin    Chronic systolic heart failure (HCC)- (present on admission)  Assessment & Plan  Appears euvolemic  Continue BB, SGLT2, IFRAH  Unclear if not on ACE/ARB due to CKD  S/p ICD for primary prophylaxis    Primary lung adenocarcinoma (HCC)- (present on admission)  Assessment & Plan  Follows with oncologist Dr. Schulz  On study drug - held on admission per pharmacist    Stage 3a chronic kidney disease- (present on admission)  Assessment & Plan  Seemingly at baseline  Avoid " nephrotoxins  Repeat AM BMP    Paroxysmal atrial fibrillation (HCC)- (present on admission)  Assessment & Plan  Continue metoprolol and apixaban  Telemetry for RVR in setting of CVA       VTE prophylaxis: Eliquis    I have performed a physical exam and reviewed and updated ROS and Plan today (8/18/2024). In review of yesterday's note (8/17/2024), there are no changes except as documented above.

## 2024-08-18 NOTE — PROGRESS NOTES
Monitor summary: SR/ST , IN 0.16, QRS 0.07, QT 0.34, with occasional PVCs and rare couplets/trigems per strip from monitor room.

## 2024-08-18 NOTE — PROGRESS NOTES
Monitor summary: SR/ST , RI -0.17, QRS -0.08, QT -0.35, with PVCs, couplet PVCs and triplet PVCs per strip from the monitor room.

## 2024-08-18 NOTE — PROGRESS NOTES
Assumed care of patient and received report from Kasey HORN. Assessment completed.Pt A&Ox 4. Respirations are even and unlabored on room air. Tele monitor in place, call light and belongings are within reach. POC updated. Pt educated on room and call light, pt verbalized understanding. Needs met.

## 2024-08-18 NOTE — CARE PLAN
The patient is Stable - Low risk of patient condition declining or worsening    Shift Goals  Clinical Goals: NGT care, q4 neuro, mobility  Patient Goals: rest  Family Goals: not present      Problem: Knowledge Deficit - Stroke Education  Goal: Patient's knowledge of stroke and risk factors will improve  Outcome: Progressing     Problem: Neuro Status  Goal: Neuro status will remain stable or improve  Outcome: Progressing     Problem: Dysphagia  Goal: Dysphagia will improve  Outcome: Progressing

## 2024-08-18 NOTE — THERAPY
Speech Language Pathology   Daily Treatment     Patient Name: Pito Black  AGE:  79 y.o., SEX:  male  Medical Record #: [id number]  Date of Service: 8/18/2024      Subjective  Pt was alert and somewhat drowsy for session. He participated to his full capacity. His family was in the room.       Assessment  Pt and his family reported that he is trying to do his Effortful swallows when he has ice chips but he felt that the NG tube is making his throat raw and it is painful to swallow. He still wanted to try today.   Pt completed X20 effortful swallows with max cues. He benefited from pushing down on the chair to increase effort. Pt noted to have frequent coughing with ice chips today and he fatigued rapidly.   Pt completed X10 tongue base retraction exercises with SLP holding anterior tongue and pt pulling back for resistance. He required SLP to hold his head still to isolate movement of the tongue base only.   Pt's daughter reported that when pt is in bed, in a semi-reclined position, he tolerates the ice chips better. She was educated that this position may be beneficial and to ask SLP completing repeat MBSS to trial that position as a compensatory strategy.       Clinical Impressions  Pt continues to demonstrate oropharyngeal dysphagia. Repeat MBSS to occur this coming week per report from family. Recommend trialing a reclined position during MBSS.       Recommendations  Treatment Completed: Dysphagia Treatment  Dysphagia Treatment  Diet Consistency: NPO w/ alternate means of nutrition/hydration. < 1 oz chips/time OK for swallow rehabilitation and oral gratification  Instrumentation: VFSS (MBSS)  Medication: Non Oral  Supervision: 1:1 feeding with constant supervision  Oral Care: Q2h          SLP Treatment Plan  Treatment Plan: Dysphagia Treatment, Speech-Language Treatment, Cognitive Treatment, Patient/Family/Caregiver Training  SLP Frequency: 4x Per Week  Estimated Duration: Until Therapy Goals  "Met      Anticipated Discharge Needs  Discharge Recommendations: Recommend post-acute placement for additional speech therapy services prior to discharge home  Therapy Recommendations Upon DC: Dysphagia Training, Comprehension Training, Expression Training, Patient / Family / Caregiver Education      Patient / Family Goals  Patient / Family Goal #1: \"I want more\" to applesauce  Goal #1 Outcome: Progressing slower than expected  Short Term Goals  Short Term Goal # 1: Pt will use compensatory speech strategies at the functional phrase level w/ min cueing to improve his intelligibility to 80%  Goal Outcome # 1: Goal met  Short Term Goal # 2: Pt will consume prefeeding trials with no overt s/sx of aspiration  Goal Outcome # 2 : Goal met  Short Term Goal # 3: Pt will complete 50 reps of pharyngeal squeeze, base of tongue retraction, pharyngeal shortening exercises with good accuracy and min cueing  Goal Outcome  # 3: Progressing as expected  Short Term Goal # 4: Pt will participate in repeat instrumentation in 5-7 days as indicated by bedside progress  Goal Outcome  # 4: Progressing as expected      Lucy Alvarez MS, CCC-SLP  "

## 2024-08-18 NOTE — CARE PLAN
The patient is Stable - Low risk of patient condition declining or worsening    Shift Goals  Clinical Goals: TF/NG care, q4 neuro, OOB to chair  Patient Goals: ice chips  Family Goals: stay updated    Progress made toward(s) clinical / shift goals:      Problem: Risk for Aspiration  Goal: Patient's risk for aspiration will be absent or decrease  Outcome: Progressing  Note: Maintained HOB greater than 30 degrees for TF. Frequent oral care completed for ice chips.      Problem: Mobility - Stroke  Goal: Patient's capacity to carry out activities will improve  Outcome: Progressing  Note: X1 assist to walk in room/up to bathroom.

## 2024-08-19 ENCOUNTER — APPOINTMENT (OUTPATIENT)
Dept: RADIOLOGY | Facility: MEDICAL CENTER | Age: 79
DRG: 065 | End: 2024-08-19
Attending: NURSE PRACTITIONER
Payer: MEDICARE

## 2024-08-19 LAB
CRP SERPL HS-MCNC: 5.35 MG/DL (ref 0–0.75)
PREALB SERPL-MCNC: 13.7 MG/DL (ref 18–38)

## 2024-08-19 PROCEDURE — 71045 X-RAY EXAM CHEST 1 VIEW: CPT

## 2024-08-19 PROCEDURE — 700102 HCHG RX REV CODE 250 W/ 637 OVERRIDE(OP): Performed by: STUDENT IN AN ORGANIZED HEALTH CARE EDUCATION/TRAINING PROGRAM

## 2024-08-19 PROCEDURE — 99232 SBSQ HOSP IP/OBS MODERATE 35: CPT | Performed by: STUDENT IN AN ORGANIZED HEALTH CARE EDUCATION/TRAINING PROGRAM

## 2024-08-19 PROCEDURE — A9270 NON-COVERED ITEM OR SERVICE: HCPCS | Performed by: STUDENT IN AN ORGANIZED HEALTH CARE EDUCATION/TRAINING PROGRAM

## 2024-08-19 PROCEDURE — 770020 HCHG ROOM/CARE - TELE (206)

## 2024-08-19 PROCEDURE — 700102 HCHG RX REV CODE 250 W/ 637 OVERRIDE(OP)

## 2024-08-19 PROCEDURE — 86140 C-REACTIVE PROTEIN: CPT

## 2024-08-19 PROCEDURE — 36415 COLL VENOUS BLD VENIPUNCTURE: CPT

## 2024-08-19 PROCEDURE — A9270 NON-COVERED ITEM OR SERVICE: HCPCS

## 2024-08-19 PROCEDURE — 84134 ASSAY OF PREALBUMIN: CPT

## 2024-08-19 RX ADMIN — METOPROLOL TARTRATE 50 MG: 25 TABLET, FILM COATED ORAL at 17:15

## 2024-08-19 RX ADMIN — METOPROLOL TARTRATE 50 MG: 25 TABLET, FILM COATED ORAL at 05:09

## 2024-08-19 RX ADMIN — SPIRONOLACTONE 25 MG: 25 TABLET ORAL at 05:09

## 2024-08-19 RX ADMIN — ATORVASTATIN CALCIUM 20 MG: 20 TABLET, FILM COATED ORAL at 17:15

## 2024-08-19 RX ADMIN — Medication 2000 UNITS: at 05:09

## 2024-08-19 RX ADMIN — APIXABAN 5 MG: 5 TABLET, FILM COATED ORAL at 17:15

## 2024-08-19 RX ADMIN — APIXABAN 5 MG: 5 TABLET, FILM COATED ORAL at 05:09

## 2024-08-19 RX ADMIN — DAPAGLIFLOZIN 10 MG: 10 TABLET, FILM COATED ORAL at 05:09

## 2024-08-19 RX ADMIN — EZETIMIBE 10 MG: 10 TABLET ORAL at 05:09

## 2024-08-19 ASSESSMENT — ENCOUNTER SYMPTOMS
MUSCULOSKELETAL NEGATIVE: 1
CARDIOVASCULAR NEGATIVE: 1
EYES NEGATIVE: 1
GASTROINTESTINAL NEGATIVE: 1
RESPIRATORY NEGATIVE: 1
PSYCHIATRIC NEGATIVE: 1
CONSTITUTIONAL NEGATIVE: 1
NEUROLOGICAL NEGATIVE: 1

## 2024-08-19 ASSESSMENT — FIBROSIS 4 INDEX: FIB4 SCORE: 3.54

## 2024-08-19 ASSESSMENT — PAIN DESCRIPTION - PAIN TYPE
TYPE: ACUTE PAIN
TYPE: ACUTE PAIN

## 2024-08-19 NOTE — CARE PLAN
The patient is Stable - Low risk of patient condition declining or worsening    Shift Goals  Clinical Goals: Barium Swallow, Neuro Checks  Patient Goals: Ice Chips, Rest  Family Goals: Updates    Progress made toward(s) clinical / shift goals:    Problem: Optimal Care of the Stroke Patient  Goal: Optimal emergency care for the stroke patient  Outcome: Progressing     Problem: Neuro Status  Goal: Neuro status will remain stable or improve  Outcome: Progressing       Patient is not progressing towards the following goals:

## 2024-08-19 NOTE — DISCHARGE PLANNING
Case Management Discharge Planning    Admission Date: 8/13/2024  GMLOS: 2.9  ALOS: 6    6-Clicks ADL Score: 18  6-Clicks Mobility Score: 16  PT and/or OT Eval ordered: Yes  Post-acute Referrals Ordered: Yes  Post-acute Choice Obtained: Yes  Has referral(s) been sent to post-acute provider:  Yes      Anticipated Discharge Dispo: Discharge Disposition: Disch to IP rehab facility or distinct part unit (62)    DME Needed: No    Action(s) Taken: DC Assessment Complete (See below) and Choice obtained    Escalations Completed: Provider    Medically Clear: No    Next Steps: f/u with pt and medical team to discuss dc needs and barriers.      Barriers to Discharge: Medical clearance, Pending Placement, and Pending Procedures    Is the patient up for discharge tomorrow: No      Care Transition Team Assessment    RN LINDA met with pt, his wife and daughter at bedside to complete assessment. Pt A&Ox4 but Pt's daughter answered most of the questions. Pt lives with his wife in a single story house with 3 steps to enter in [facility]. On Baseline, Pt was independent  in all his ADL's and IADL's. Pt has not had previous HH nor does he use DME or home O2. Pt's PCP is  Dr. Kalli Payne. Pt's wife and daughter is a good support for Pt. Pt and his family is agreeable for post acute placement and prefers [facility].        Information Source  Orientation Level: Oriented X4  Information Given By: Other (Comments) (daughter)  Who is responsible for making decisions for patient? : Patient    Readmission Evaluation  Is this a readmission?: No    Elopement Risk  Legal Hold: No  Ambulatory or Self Mobile in Wheelchair: Yes  Disoriented: No  Psychiatric Symptoms: None  History of Wandering: No  Elopement this Admit: No  Vocalizing Wanting to Leave: No  Displays Behaviors, Body Language Wanting to Leave: No-Not at Risk for Elopement  Elopement Risk: Not at Risk for Elopement    Interdisciplinary Discharge Planning  Lives with - Patient's Self  Care Capacity: Spouse  Housing / Facility: [address] [facility]  Prior Services: None    Discharge Preparedness  What is your plan after discharge?: Uncertain - pending medical team collaboration  What are your discharge supports?: Spouse, Child  Prior Functional Level: Ambulatory, Drives Self, Independent with Activities of Daily Living  Difficulity with ADLs: None  Difficulity with IADLs: None    Functional Assesment  Prior Functional Level: Ambulatory, Drives Self, Independent with Activities of Daily Living    Finances  Financial Barriers to Discharge: No  Prescription Coverage: Yes    Vision / Hearing Impairment  Right Eye Vision: Wears Glasses  Left Eye Vision: Wears Glasses  Hearing Impairment: Hearing Device Not Available, Both Ears      Advance Directive  Advance Directive?: None    Domestic Abuse  Have you ever been the victim of abuse or violence?: No    Psychological Assessment  History of Substance Abuse: None    Discharge Risks or Barriers  Discharge risks or barriers?: Complex medical needs  Patient risk factors: Complex medical needs    Anticipated Discharge Information  Discharge Disposition: Disch to  rehab facility or distinct part unit (62)

## 2024-08-19 NOTE — CARE PLAN
The patient is Stable - Low risk of patient condition declining or worsening    Shift Goals  Clinical Goals: q4 neuro, NG care  Patient Goals: ice chips  Family Goals: not present      Problem: Neuro Status  Goal: Neuro status will remain stable or improve  Outcome: Progressing     Problem: Hemodynamic Monitoring  Goal: Patient's hemodynamics, fluid balance and neurologic status will be stable or improve  Outcome: Progressing     Problem: Dysphagia  Goal: Dysphagia will improve  Outcome: Progressing

## 2024-08-19 NOTE — PROGRESS NOTES
Assumed care of patient and received report from Ezequiel HORN. Assessment completed.Pt A&Ox 4. Respirations are even and unlabored on room air. Tele monitor in place, call light and belongings are within reach. POC updated. Pt educated on room and call light, pt verbalized understanding. Needs met.

## 2024-08-19 NOTE — DISCHARGE PLANNING
TCN following. HTH/SCP chart reviewed. No new TCN needs identified. Please see prior TCN note from 8/16/24 for most recent discharge planning considerations if indicated.  Per collaboration with CM, ongoing dysphagia plan for possible MBS today.      Completed:  PT/OT recommend post acute placement - 8/16  SLP recommend post acute placement - 8/18  Choice obtained: IRF, HH & DME (AD).  Pt aware of [facility] blanket referral policy; [facility], 3 pending.

## 2024-08-19 NOTE — PROGRESS NOTES
Hospital Medicine Daily Progress Note    Date of Service  8/19/2024    Chief Complaint  Pito Black is a 79 y.o. male admitted 8/13/2024 with CVA    Hospital Course  79 y.o. male with lung cancer followed by Luz Maria Jules on apixaban who presented 8/13/2024 with left facial droop and dysphagia secondary to right MCA CVA noted on head CTA.  Neurology following for which patient was deemed outside of tPA window.  He was restarted on Eliquis and no further workup recommended by neurology as CVA likely secondary to cardioembolic etiologies in the setting of chronic atrial fibrillation.  He was eval by speech pathology for which she was noted for aspiration on exam and he was placed n.p.o. and core track was placed on 8/15/2024.  Prior to admission patient was on antineoplastic trial medicine which has been held while inpatient.     Interval Problem Update  Evaluated at bedside  In no acute distress this morning  Stable vitals and on room air  On TF  SPL following, aim for MBSS on AM  PT/OT recommended postacute  SNF/PMR referrals placed  Aim for IPR    I have discussed this patient's plan of care and discharge plan at IDT rounds today with Case Management, Nursing, Nursing leadership, and other members of the IDT team.    Consultants/Specialty  Neurology    Code Status  Full Code    Disposition  The patient is not medically cleared for discharge to home or a post-acute facility.     I have placed the appropriate orders for post-discharge needs.    Review of Systems  Review of Systems   Constitutional: Negative.    HENT: Negative.     Eyes: Negative.    Respiratory: Negative.     Cardiovascular: Negative.    Gastrointestinal: Negative.    Genitourinary: Negative.    Musculoskeletal: Negative.    Skin: Negative.    Neurological: Negative.    Endo/Heme/Allergies: Negative.    Psychiatric/Behavioral: Negative.          Physical Exam  Temp:  [35.9 °C (96.7 °F)-37.2 °C (98.9 °F)] 35.9 °C (96.7 °F)  Pulse:   [] 84  Resp:  [18] 18  BP: (111-134)/(58-82) 132/82  SpO2:  [91 %-96 %] 91 %    Physical Exam  Constitutional:       General: He is not in acute distress.     Appearance: Normal appearance.   HENT:      Head: Normocephalic and atraumatic.      Nose: Nose normal. No congestion.      Mouth/Throat:      Mouth: Mucous membranes are moist.   Eyes:      Extraocular Movements: Extraocular movements intact.      Pupils: Pupils are equal, round, and reactive to light.   Cardiovascular:      Rate and Rhythm: Normal rate and regular rhythm.      Pulses: Normal pulses.      Heart sounds: Normal heart sounds.   Pulmonary:      Effort: Pulmonary effort is normal.      Breath sounds: Normal breath sounds.   Abdominal:      General: Bowel sounds are normal.      Palpations: Abdomen is soft.      Tenderness: There is no abdominal tenderness.   Musculoskeletal:         General: No swelling. Normal range of motion.      Cervical back: Normal range of motion and neck supple.   Skin:     General: Skin is warm.      Coloration: Skin is not jaundiced.   Neurological:      Mental Status: He is alert.      Comments: Dysarthria  Left upper extremity weakness   Psychiatric:         Mood and Affect: Mood normal.         Behavior: Behavior normal.         Thought Content: Thought content normal.         Judgment: Judgment normal.         Fluids    Intake/Output Summary (Last 24 hours) at 8/19/2024 0755  Last data filed at 8/19/2024 0328  Gross per 24 hour   Intake 180 ml   Output --   Net 180 ml       Laboratory                                Imaging  DX-CHEST-PORTABLE (1 VIEW)   Final Result         1. No significant interval change.      [id number] VIEW   Final Result         No specific finding to suggest small bowel obstruction.      DX-ABDOMEN FOR TUBE PLACEMENT   Final Result         Feeding tube with tip projecting over the expected area of the stomach.      DX-ABDOMEN FOR TUBE PLACEMENT   Final Result         1.  Nonspecific  bowel gas pattern in the upper abdomen.   2.  Dobbhoff tube tip terminates overlying the expected location of the gastric fundus.   3.  Bilateral pulmonary infiltrates, greatest in the right upper lobe.   4.  Trace bilateral pleural effusions      DX-ESOPHAGUS - [id number]   Final Result      DX-CHEST-PORTABLE (1 VIEW)   Final Result      1. Development of interstitial opacities in the lungs, either edema or interstitial infiltrates.   2. Worsening confluent opacities in the right upper lobe and lingula.   3. The remainder is stable.      CT-CTA NECK WITH & W/O-POST PROCESSING   Final Result      1. No evidence of flow-limiting stenosis in the cervical carotid or cervical vertebral arteries.   2. Partially visualized airspace opacities/mass in the right upper lobe. Infiltrated right perihilar and mediastinal lymph tavia masses.   3. Bilateral pleural effusions.      CT-CTA HEAD WITH & W/O-POST PROCESS   Final Result         1. There is a focal stenosis/occlusion in a distal branch of the right MCA with flow reconstitution.         Preliminary findings texted to Dr. MEMO CHAIDEZ in the Emergency Department via [employer] on 8/13/2024 9:17 AM            CT-CEREBRAL PERFUSION ANALYSIS   Final Result      1. Cerebral blood flow less than 30% possibly representing completed infarct = 0 mL.      2. T Max more than 6 seconds possibly representing combination of completed infarct and ischemia = 18 mL.      3. Mismatched volume possibly representing ischemic brain/penumbra= 18 mL      4.  Please note that this cerebral perfusion study and report is Quantitative and targets supratentorial (cerebral) perfusion for evaluation of large vessel territory acute ischemia/infarction. For example, lacunar infarcts, and brainstem/posterior fossa    ischemia/infarction are not evaluated on this study.  Data acquisition is subject to artifacts which can yield non-anatomically plausible perfusion maps which may be due to motion, bolus  "timing, signal to noise ratio, or other technical factors.    Perfusion map abnormalities which show non-anatomic distributions are likely artifact.   This study is not \"stand-alone\" and should only be utilized for diagnosis, management/treatment in correlation with CT, CTA, and/or MRI and clinical factors.              Assessment/Plan  * Right middle cerebral artery stroke (HCC)- (present on admission)  Assessment & Plan  Likely thromboembolic in setting of Afib and malignancy  Neurology consulted and s/o - continue apixaban, no further workup indicated  Telemetry  PT/OT/SLP    Dysarthria- (present on admission)  Assessment & Plan  Due to MCA stroke  SLP eval and treat    Oropharyngeal dysphagia- (present on admission)  Assessment & Plan  Due to MCA stroke  SLP eval and treat    Hyponatremia- (present on admission)  Assessment & Plan  Mild  Repeat AM BMP    Type 2 diabetes mellitus with stage 3a chronic kidney disease, without long-term current use of insulin (HCC)- (present on admission)  Assessment & Plan  Well-controlled, A1c 6  Continue SGLT2  Initiated on atorvastatin    Chronic systolic heart failure (HCC)- (present on admission)  Assessment & Plan  Appears euvolemic  Continue BB, SGLT2, IFRAH  Unclear if not on ACE/ARB due to CKD  S/p ICD for primary prophylaxis    Primary lung adenocarcinoma (HCC)- (present on admission)  Assessment & Plan  Follows with oncologist Dr. Schulz  On study drug - held on admission per pharmacist    Stage 3a chronic kidney disease- (present on admission)  Assessment & Plan  Seemingly at baseline  Avoid nephrotoxins  Repeat AM BMP    Paroxysmal atrial fibrillation (HCC)- (present on admission)  Assessment & Plan  Continue metoprolol and apixaban  Telemetry for RVR in setting of CVA       VTE prophylaxis: Eliquis    I have performed a physical exam and reviewed and updated ROS and Plan today (8/19/2024). In review of yesterday's note (8/18/2024), there are no changes except as " documented above.

## 2024-08-19 NOTE — PROGRESS NOTES
Monitor summary: SR/ST , [place] -0.16, QRS -0.07, QT -0.33, with rare PVCs, couplet PVCs, triplet PVCs and 7 beats of VTACH per strip from the monitor room.

## 2024-08-20 ENCOUNTER — APPOINTMENT (OUTPATIENT)
Dept: RADIOLOGY | Facility: MEDICAL CENTER | Age: 79
DRG: 065 | End: 2024-08-20
Attending: STUDENT IN AN ORGANIZED HEALTH CARE EDUCATION/TRAINING PROGRAM
Payer: MEDICARE

## 2024-08-20 LAB
ALBUMIN SERPL BCP-MCNC: 3.7 G/DL (ref 3.2–4.9)
ALBUMIN/GLOB SERPL: 0.8 G/DL
ALP SERPL-CCNC: 94 U/L (ref 30–99)
ALT SERPL-CCNC: 48 U/L (ref 2–50)
ANION GAP SERPL CALC-SCNC: 15 MMOL/L (ref 7–16)
AST SERPL-CCNC: 45 U/L (ref 12–45)
BILIRUB SERPL-MCNC: 0.6 MG/DL (ref 0.1–1.5)
BUN SERPL-MCNC: 37 MG/DL (ref 8–22)
CALCIUM ALBUM COR SERPL-MCNC: 10 MG/DL (ref 8.5–10.5)
CALCIUM SERPL-MCNC: 9.8 MG/DL (ref 8.5–10.5)
CHLORIDE SERPL-SCNC: 104 MMOL/L (ref 96–112)
CO2 SERPL-SCNC: 21 MMOL/L (ref 20–33)
CREAT SERPL-MCNC: 1.09 MG/DL (ref 0.5–1.4)
GFR SERPLBLD CREATININE-BSD FMLA CKD-EPI: 69 ML/MIN/1.73 M 2
GLOBULIN SER CALC-MCNC: 4.8 G/DL (ref 1.9–3.5)
GLUCOSE SERPL-MCNC: 213 MG/DL (ref 65–99)
MAGNESIUM SERPL-MCNC: 2.7 MG/DL (ref 1.5–2.5)
PHOSPHATE SERPL-MCNC: 4.1 MG/DL (ref 2.5–4.5)
POTASSIUM SERPL-SCNC: 4.7 MMOL/L (ref 3.6–5.5)
PROT SERPL-MCNC: 8.5 G/DL (ref 6–8.2)
SODIUM SERPL-SCNC: 140 MMOL/L (ref 135–145)

## 2024-08-20 PROCEDURE — 97116 GAIT TRAINING THERAPY: CPT | Mod: CQ

## 2024-08-20 PROCEDURE — 700102 HCHG RX REV CODE 250 W/ 637 OVERRIDE(OP): Performed by: STUDENT IN AN ORGANIZED HEALTH CARE EDUCATION/TRAINING PROGRAM

## 2024-08-20 PROCEDURE — 92611 MOTION FLUOROSCOPY/SWALLOW: CPT

## 2024-08-20 PROCEDURE — A9270 NON-COVERED ITEM OR SERVICE: HCPCS

## 2024-08-20 PROCEDURE — 700102 HCHG RX REV CODE 250 W/ 637 OVERRIDE(OP)

## 2024-08-20 PROCEDURE — A9270 NON-COVERED ITEM OR SERVICE: HCPCS | Performed by: STUDENT IN AN ORGANIZED HEALTH CARE EDUCATION/TRAINING PROGRAM

## 2024-08-20 PROCEDURE — 83735 ASSAY OF MAGNESIUM: CPT

## 2024-08-20 PROCEDURE — 770020 HCHG ROOM/CARE - TELE (206)

## 2024-08-20 PROCEDURE — 84100 ASSAY OF PHOSPHORUS: CPT

## 2024-08-20 PROCEDURE — 97530 THERAPEUTIC ACTIVITIES: CPT

## 2024-08-20 PROCEDURE — 80053 COMPREHEN METABOLIC PANEL: CPT

## 2024-08-20 PROCEDURE — 97530 THERAPEUTIC ACTIVITIES: CPT | Mod: CQ

## 2024-08-20 PROCEDURE — 36415 COLL VENOUS BLD VENIPUNCTURE: CPT

## 2024-08-20 PROCEDURE — 97110 THERAPEUTIC EXERCISES: CPT | Mod: CQ

## 2024-08-20 PROCEDURE — 74230 X-RAY XM SWLNG FUNCJ C+: CPT

## 2024-08-20 RX ORDER — POLYETHYLENE GLYCOL 3350 17 G/17G
1 POWDER, FOR SOLUTION ORAL
Status: DISCONTINUED | OUTPATIENT
Start: 2024-08-20 | End: 2024-08-24 | Stop reason: HOSPADM

## 2024-08-20 RX ORDER — POLYETHYLENE GLYCOL 3350 17 G/17G
1 POWDER, FOR SOLUTION ORAL
Status: DISCONTINUED | OUTPATIENT
Start: 2024-08-20 | End: 2024-08-20

## 2024-08-20 RX ORDER — AMOXICILLIN 250 MG
2 CAPSULE ORAL EVERY EVENING
Status: DISCONTINUED | OUTPATIENT
Start: 2024-08-20 | End: 2024-08-20

## 2024-08-20 RX ORDER — AMOXICILLIN 250 MG
2 CAPSULE ORAL EVERY EVENING
Status: DISCONTINUED | OUTPATIENT
Start: 2024-08-20 | End: 2024-08-24 | Stop reason: HOSPADM

## 2024-08-20 RX ADMIN — APIXABAN 5 MG: 5 TABLET, FILM COATED ORAL at 05:07

## 2024-08-20 RX ADMIN — EZETIMIBE 10 MG: 10 TABLET ORAL at 05:06

## 2024-08-20 RX ADMIN — METOPROLOL TARTRATE 50 MG: 25 TABLET, FILM COATED ORAL at 16:37

## 2024-08-20 RX ADMIN — Medication 5 MG: at 21:14

## 2024-08-20 RX ADMIN — ACETAMINOPHEN 1000 MG: 500 TABLET ORAL at 21:14

## 2024-08-20 RX ADMIN — ATORVASTATIN CALCIUM 20 MG: 20 TABLET, FILM COATED ORAL at 16:37

## 2024-08-20 RX ADMIN — SPIRONOLACTONE 25 MG: 25 TABLET ORAL at 05:07

## 2024-08-20 RX ADMIN — DAPAGLIFLOZIN 10 MG: 10 TABLET, FILM COATED ORAL at 05:07

## 2024-08-20 RX ADMIN — METOPROLOL TARTRATE 50 MG: 25 TABLET, FILM COATED ORAL at 05:07

## 2024-08-20 RX ADMIN — APIXABAN 5 MG: 5 TABLET, FILM COATED ORAL at 16:37

## 2024-08-20 RX ADMIN — Medication 2000 UNITS: at 05:07

## 2024-08-20 ASSESSMENT — COGNITIVE AND FUNCTIONAL STATUS - GENERAL
SUGGESTED CMS G CODE MODIFIER DAILY ACTIVITY: CK
CLIMB 3 TO 5 STEPS WITH RAILING: A LOT
MOVING TO AND FROM BED TO CHAIR: A LITTLE
MOVING FROM LYING ON BACK TO SITTING ON SIDE OF FLAT BED: A LITTLE
WALKING IN HOSPITAL ROOM: A LITTLE
STANDING UP FROM CHAIR USING ARMS: A LITTLE
MOBILITY SCORE: 17
HELP NEEDED FOR BATHING: A LITTLE
DRESSING REGULAR LOWER BODY CLOTHING: A LITTLE
PERSONAL GROOMING: A LITTLE
DRESSING REGULAR UPPER BODY CLOTHING: A LITTLE
DAILY ACTIVITIY SCORE: 18
SUGGESTED CMS G CODE MODIFIER MOBILITY: CK
EATING MEALS: A LITTLE
TURNING FROM BACK TO SIDE WHILE IN FLAT BAD: A LITTLE
TOILETING: A LITTLE

## 2024-08-20 ASSESSMENT — FIBROSIS 4 INDEX: FIB4 SCORE: 3.54

## 2024-08-20 ASSESSMENT — ENCOUNTER SYMPTOMS
GASTROINTESTINAL NEGATIVE: 1
EYES NEGATIVE: 1
MUSCULOSKELETAL NEGATIVE: 1
CARDIOVASCULAR NEGATIVE: 1
CONSTITUTIONAL NEGATIVE: 1
NEUROLOGICAL NEGATIVE: 1
RESPIRATORY NEGATIVE: 1
PSYCHIATRIC NEGATIVE: 1

## 2024-08-20 ASSESSMENT — GAIT ASSESSMENTS
GAIT LEVEL OF ASSIST: MINIMAL ASSIST
ASSISTIVE DEVICE: FRONT WHEEL WALKER

## 2024-08-20 ASSESSMENT — PAIN DESCRIPTION - PAIN TYPE: TYPE: ACUTE PAIN

## 2024-08-20 NOTE — THERAPY
Physical Therapy   Daily Treatment     Patient Name: Pito Black  Age:  79 y.o., Sex:  male  Medical Record #: [id number]  Today's Date: 8/20/2024     Precautions  Precautions: Nasogastric Tube;Swallow Precautions;Fall Risk  Comments: [place]    Assessment    The pt willing to participate, + NG tube. Functionally the pt is supervision w/sup<->sit transitions, CGA w/STS and transfers, and Min assist amb w/FWW. The pt did manage increase distances, assist still required for his Lft side weakness and balance deficits.   The pt is very pleasant, would benefit from short stay @ [facility].   PT will cont to follow.     Plan    Treatment Plan Status: Continue Current Treatment Plan  Type of Treatment: Bed Mobility, Family / Caregiver Training, Gait Training, Manual Therapy, Neuro Re-Education / Balance, Self Care / Home Evaluation, Therapeutic Activities, Therapeutic Exercise, Stair Training  Treatment Frequency: 4 Times per Week  Treatment Duration: Until Therapy Goals Met    DC Equipment Recommendations: Unable to determine at this time  Discharge Recommendations: Recommend post-acute placement for additional physical therapy services prior to discharge home    Objective       08/20/24 0843   Time In/Time Out   Therapy Start Time 0815   Therapy End Time 0843   Total Therapy Time 28   Charge Group   PT Gait Training (Units) 1   PT Therapeutic Activities (Units) 1   Total Time Spent   PT Gait Training Time Spent (Mins) 15   PT Therapeutic Activities Time Spent (Mins) 13   PT Total Time Spent (Calculated) 28   Precautions   Precautions Nasogastric Tube;Swallow Precautions;Fall Risk   Comments [place]   Pain 0 - 10 Group   Pain Rating Scale (NPRS) 0   Other Treatments   Other Treatments Provided Assisted pt w/oral hygiene while seated in chair.   Balance   Sitting Balance (Static) Good   Sitting Balance (Dynamic) Fair +   Standing Balance (Static) Fair   Standing Balance (Dynamic) Fair -   Weight Shift Sitting Fair   Weight Shift  Standing Fair   Skilled Intervention Verbal Cuing   Comments w/FWW   Bed Mobility    Supine to Sit Supervised   Sit to Supine Supervised   Scooting Supervised   Rolling Supervised   Gait Analysis   Gait Level Of Assist Minimal Assist   Assistive Device Front Wheel Walker   Distance (Feet)   (hallway distances w/facilitation of Lft hand on the walker.)   # of Times Distance was Traveled 1   Skilled Intervention Verbal Cuing;Postural Facilitation;Compensatory Strategies   Comments Improvement w/distance travelled from previous PT session. The pt conts to require min assist 2* Lft UE/LE weakness. Short distance wo/AD, more assist on the Lft side. The pt was able to walk and stop wo/LOB and maintained his head looking up after cueing.   Functional Mobility   Sit to Stand Contact Guard Assist   Bed, Chair, Wheelchair Transfer Contact Guard Assist   Skilled Intervention Verbal Cuing   Comments w/FWW   6 Clicks Assessment - How much HELP from from another person do you currently need... (If the patient hasn't done an activity recently, how much help from another person do you think he/she would need if he/she tried?)   Turning from your back to your side while in a flat bed without using bedrails? 3   Moving from lying on your back to sitting on the side of a flat bed without using bedrails? 3   Moving to and from a bed to a chair (including a wheelchair)? 3   Standing up from a chair using your arms (e.g., wheelchair, or bedside chair)? 3   Walking in hospital room? 3   Climbing 3-5 steps with a railing? 2   6 clicks Mobility Score 17   Short Term Goals    Short Term Goal # 1 Pt will transfer w/o AD a S level by tx tx 6   Goal Outcome # 1 goal not met   Short Term Goal # 2 Pt will ambulate w/o AD for 150 ft with S by tx 6   Goal Outcome # 2 Goal not met   Short Term Goal # 3 Pt will ascend and descend steps x 3 with CGA by tx 6.   Goal Outcome # 3 Goal not met   Education Group   Gait Training Patient Response  Patient;Acceptance;Explanation;Action Demonstration   Physical Therapy Treatment Plan   Physical Therapy Treatment Plan Continue Current Treatment Plan   Anticipated Discharge Equipment and Recommendations   DC Equipment Recommendations Unable to determine at this time   Discharge Recommendations Recommend post-acute placement for additional physical therapy services prior to discharge home   Interdisciplinary Plan of Care Collaboration   IDT Collaboration with  Nursing   Patient Position at End of Therapy Seated;Chair Alarm On;Call Light within Reach;Tray Table within Reach;Phone within Reach   Collaboration Comments Nrsg notified of pts tx efforts   Session Information   Date / Session Number  8/20--3 (3/4, 8/20) PTA/1   Supervising Physical Therapist (PTA Treatments Only)   Supervising Physical Therapist Brenda Beltre

## 2024-08-20 NOTE — PROGRESS NOTES
Monitor Summary: SR/ST , [place] 0.14, QRS 0.08, QT 0.37, with PVCs, couplets, bigeminy and trigeminy per strip from monitor room.

## 2024-08-20 NOTE — DISCHARGE PLANNING
"TCN following. HTH/SCP chart reviewed. No new TCN needs identified. Discharge considerations are IRF (first choice) and SNF if patient not accepted/authorized for IRF.  Noted patient has multiple accepting.  Per TCC, TCC following for \"permanent source of nutrition\" as well as therapy evaluations.  Please see prior TCN note from 8/14 for additional discharge planning considerations if indicated.     Completed:  PT recommends post acute placement - 8/20   OT recommend post acute placement - 8/16  SLP recommend post acute placement - 8/18  Choice obtained: IRF, HH & DME (AD).  Pt aware of [facility] blanket referral policy; [facility], 3 pending.   "

## 2024-08-20 NOTE — CARE PLAN
The patient is Watcher - Medium risk of patient condition declining or worsening    Shift Goals  Clinical Goals: Stable neuro exams, monitor secretions and respiratory fuction  Patient Goals: Rest  Family Goals: CINDY    Progress made toward(s) clinical / shift goals:  Patient is AAOx4, he is able to communicate his needs. Education provided on his bed being at a 30 degree angle to prevent aspiration and while he has a feeding tube. Patient demonstrates understanding of education provided. Chest x-ray completed, patient has new crackle sound to upper lungs. No new infiltrates noted. Bed low, locked and call light in reach. Hourly rounding continues.    Problem: Optimal Care of the Stroke Patient  Goal: Optimal acute care for the stroke patient  Outcome: Progressing   Core measures in place, NIH completed, anticoagulant in place, neuro checks and stroke education provided.  Problem: Knowledge Deficit - Stroke Education  Goal: Patient's knowledge of stroke and risk factors will improve  Outcome: Progressing   Education provided on risk factors, signs and symptoms of a stroke and medication compliance. All questions answered.  Problem: Neuro Status  Goal: Neuro status will remain stable or improve  Outcome: Progressing   Q4 hour neuro checks in place, patient remains AAOx4.    Patient is not progressing towards the following goals:    Problem: Risk for Aspiration  Goal: Patient's risk for aspiration will be absent or decrease  Outcome: Not Progressing   Patient continues to have a feeding tube for dysphagia, patient had new crackles to his lung sounds, x-ray completed, no new infiltrates noted. Monitoring for aspiration in place.

## 2024-08-20 NOTE — THERAPY
Speech Language Pathology   Videofluoroscopic Swallow Study Evaluation     Patient Name: Pito Black  AGE:  79 y.o., SEX:  male  Medical Record #: [id number]  Date of Service: 8/20/2024      History of Present Illness    79M admitted on 8/13/24 with L facial droop, slurred speech, and difficulty swallowing, found to have R MCA stroke. Per wife, recent diagnosis of adenocarcinoma (dx 1/2024), on Keytruda and planned for chemotherapy next week.     PMH notable for afib, EF 35% s/ AICD, HTN, CKD, HLD, DM, stage I NSCLC status post wedge resection in 2018, stage Ia NSCLC of the right upper lobe treated with SBRT in 2019, and stage IIIa NSCLC of the left lower lobe treated with carboplatin/Taxol/radiation in 2021.       Pertinent Information  Current Method of Nutrition: NGT  Dentition: Lower denture  Secretion Management: Drooling  Feeding Tube: NGT intact to left nare  Tracheostomy: No   Factor(s) Affecting Performance: Impaired command following      Discussed the risks, benefits, and alternatives of the VFSS procedure. Patient/family acknowledged and agreed to proceed.      Assessment  Videofluoroscopic Swallow Study was conducted in the Lateral projection(s) to evaluate oropharyngeal swallow function. A radiology tech was present to assist with the procedure.      Positioning: Seated upright in fluoroscopy chair  Anatomic View: WFL  Bolus Administration: SLP  PO Barium Contrast Trials: Varibar thin, Varibar nectar (mildly thick), Liquidised mixed with Varibar powder      Consistency PAS Score Timing Residue Comments   Thin Liquid 7 During swallow, Post swallow Vallecular Residue: Severe (>50%)  Pyriform Sinus Residue: Moderate (25%-50%)    Mildly Thick (MTL) 8 During Swallow, Post Swallow Vallecular Residue: Severe (>50%)  Pyriform Sinus Residue: Severe (>50%)    Liquidised 3 During Swallow Vallecular Residue: Severe (>50%)  Pyriform Sinus Residue: Mild (5%-25%)      Penetration-Aspiration Scale (PAS)  1      No contrast enters airway  2     Contrast enters the airway, remains above the vocal folds, and is ejected from the airway (not seen in the airway at the end of the swallow).  3     Contrast enters the airway, remains above the vocal folds, and is not ejected from the airway (is seen in the airway after the swallow).  4     Contrast enters the airway, contacts the vocal folds, and is ejected from the airway.  5     Contrast enters the airway, contacts the vocal folds, and is not ejected from the airway  6     Contrast enters the airway, crosses the plane of the vocal folds, and is ejected from the airway.  7     Contrast enters the airway, crosses the plane of the vocal folds, and is not ejected from the airway despite effort.  8     Contrast enters the airway, crosses the plane of the vocal folds, is not ejected from the airway and there is no response to aspiration.       Oral phase:  Pharyngeal swallow triggering at the pyriform sinuses with MTL and at level of the vallecular space with apple sauce and thin liquids. Piecemeal deglutition/oral residue noted with apple sauce. Anterior loss of bolus/secretions with thin and MTL. Reduced AP propulsion with all textures.    Pharyngeal phase:  Weak pharyngeal squeeze, incomplete epiglottic inversion, reduced base of tongue retraction, pharyngeal shortening, incomplete laryngeal vestibule closure, reduced laryngeal elevation, and impaired sensation resulted in:  Aspiration after the swallow from residue which was unable to be ejected from the laryngeal vestibule at the end of the swallow with thin liquids. Severe vallecular and pyriform sinus residue noted after the swallow. A chin tuck was not successful for preventing penetration or aspiration. Multiple swallows were not successful for improving pharyngeal residue after the swallow.  Gross aspiration noted during the swallow with L head turn with MTL. There was silent aspiration during and after the swallow from  residue which was not ejected during the swallow. A chin tuck was not successful for eliminating deep penetration or aspiration. Pt had difficulty following chin tuck strategy at times requiring multiple verbal and gestrual cues from this clinician. A cued cough was not successful for ejecting aspirate from trachea or deep penetration from the laryngeal vestibule. Multiple swallows were not successful for improving pharyngeal residue after the swallow.  Penetration during the swallow which was not ejected at the end of the swallow. Severe vallecular residue noted after the swallow. An effortful swallow and multiple swallows were not successful for reducing pharyngeal residue.     Compensatory Strategies:  Chin tuck - not successful for preventing penetration of thin liquids, MTL, and apple sauce  Cued cough - not successful for ejecting aspirate from trachea or deep penetration from the laryngeal vestibule  Effortful swallow + multiple swallows - not successful for improving pharyngeal residue after the swallow.    Severity Rating:   Severity Rating: KAROLINE  KAROLINE: Moderate-Severe      Clinical Impressions  The pt presents with a moderate-severe oropharyngeal dysphagia. Unfortunately, swallow function has not improved significantly from the first MBSS on 8/14. Currently swallow safety and efficacy are both impaired and no safe diet can be recommended at this time. Continue to recommend NPO with non-oral source of nutrition. Per pt's daughter, agreeable to PEG placement prior to admit to [facility] but pt and pt's spouse did not verbalize acceptance to this clinician. Pt will benefit from exercise based dysphagia therapy targeting deficits seen during this evaluation.      Recommendations  Diet Consistency: NPO w/ alternate means of nutrition/hydration. < 1 oz chips/time OK for swallow rehabilitation and oral gratification  Medication: As tolerated  Supervision: 1:1 feeding with constant supervision  Oral Care:  "Q2h  Additional Instrumentation: Repeat diagnostic study when clinically appropriate         SLP Treatment Plan  Treatment Plan: Dysphagia Treatment, Cognitive Treatment, Patient/Family/Caregiver Training  SLP Frequency: 4x Per Week  Estimated Duration: Until Therapy Goals Met      Anticipated Discharge Needs  Discharge Recommendations: Recommend post-acute placement for additional speech therapy services prior to discharge home   Therapy Recommendations Upon DC: Dysphagia Training, Cognitive-Linguistic Training, Community Re-Integration, Patient / Family / Caregiver Education        Patient / Family Goals  Patient / Family Goal #1: \"I want more\" to applesauce  Goal #1 Outcome: Progressing slower than expected  Short Term Goal # 1: Pt will use compensatory speech strategies at the functional phrase level w/ min cueing to improve his intelligibility to 80%  Goal Outcome # 1: Progressing slower than expected  Short Term Goal # 2: Pt will consume prefeeding trials with no overt s/sx of aspiration  Goal Outcome # 2 : Progressing slower than expected  Short Term Goal # 3: Pt will complete 50 reps of pharyngeal squeeze, base of tongue retraction, pharyngeal shortening exercises with good accuracy and min cueing  Goal Outcome  # 3: Other (see comments)  Short Term Goal # 4: Pt will participate in repeat instrumentation in 5-7 days as indicated by bedside progress  Goal Outcome  # 4: Goal met  Short Term Goal # 5: Pt will complete functional STM tasks w/ 80% accuracy and mod verbal cueing  Goal Outcome  # 5: Other (see comments)      Gina Elliott, SLP   "

## 2024-08-20 NOTE — PROGRESS NOTES
Monitor summary: SR, HR 86-98, DE 0.14, QRS 0.07, QT 0.35 with (F&O)PVCs, (R)Bigems, (O)couplets per strip from monitor room

## 2024-08-20 NOTE — DIETARY
"Nutrition Services: Brief Nutrition Support Update    Pt with glucose 213 today on standard formula, indication for change to CHO-controlled formula. A1c is 6.0 on  and therefore started on standard formula. With glucose over 200, CHO controlled formula is indicated.     Assessment:  Weight: 59.2 kg (130 lbs)  Height: 5'6\" (167.6 cm)  BMI: 21.07 (normal)    Calculation/Equation: MSJ x 1.2 = 1559 kcals  Total Calories / day: 1550 - 1850  (Calories / k - 29)  Total Grams Protein / day: 77 - 96  (Grams Protein / k.2 - 1.5)    Skin: No wounds or edema.   Labs: glucose 213, BUN 37, total protein 8.5, Mag 2.7, A1C 6.0  Meds: lipitor, senna, aldactone, D3, anti-emetics prn, miralax prn  Last BM:  Type 6    Recommendations/Plan:   Change TF to Glucerna 1.2 with goal rate of 55 ml/hr to provide 1584 kcals, 79 gm protein, and 1063 ml free water per day.   Fluids per MD.   Diet upgrades per SLP/MD.     RD following.   "

## 2024-08-20 NOTE — PROGRESS NOTES
Hospital Medicine Daily Progress Note    Date of Service  8/20/2024    Chief Complaint  Pito Black is a 79 y.o. male admitted 8/13/2024 with CVA    Hospital Course  79 y.o. male with lung cancer followed by Luz Maria Jules on apixaban who presented 8/13/2024 with left facial droop and dysphagia secondary to right MCA CVA noted on head CTA.  Neurology following for which patient was deemed outside of tPA window.  He was restarted on Eliquis and no further workup recommended by neurology as CVA likely secondary to cardioembolic etiologies in the setting of chronic atrial fibrillation.  He was eval by speech pathology for which she was noted for aspiration on exam and he was placed n.p.o. and core track was placed on 8/15/2024.  Prior to admission patient was on antineoplastic trial medicine which has been held while inpatient.     Interval Problem Update  Evaluated at bedside  In no acute distress this morning  Labs stable  Had MBSS today did not do well, will likely need PEG tube, I have placed a call out to GI    I have discussed this patient's plan of care and discharge plan at IDT rounds today with Case Management, Nursing, Nursing leadership, and other members of the IDT team.    Consultants/Specialty  Neurology    Code Status  Full Code    Disposition  The patient is not medically cleared for discharge to home or a post-acute facility.     I have placed the appropriate orders for post-discharge needs.    Review of Systems  Review of Systems   Constitutional: Negative.    HENT: Negative.     Eyes: Negative.    Respiratory: Negative.     Cardiovascular: Negative.    Gastrointestinal: Negative.    Genitourinary: Negative.    Musculoskeletal: Negative.    Skin: Negative.    Neurological: Negative.    Endo/Heme/Allergies: Negative.    Psychiatric/Behavioral: Negative.          Physical Exam  Temp:  [36.1 °C (97 °F)-36.5 °C (97.7 °F)] 36.4 °C (97.5 °F)  Pulse:  [] 95  Resp:  [18-19] 18  BP:  (112-146)/(67-85) 146/85  SpO2:  [92 %-95 %] 93 %    Physical Exam  Constitutional:       General: He is not in acute distress.     Appearance: Normal appearance.   HENT:      Head: Normocephalic and atraumatic.      Nose: Nose normal. No congestion.      Mouth/Throat:      Mouth: Mucous membranes are moist.   Eyes:      Extraocular Movements: Extraocular movements intact.      Pupils: Pupils are equal, round, and reactive to light.   Cardiovascular:      Rate and Rhythm: Normal rate and regular rhythm.      Pulses: Normal pulses.      Heart sounds: Normal heart sounds.   Pulmonary:      Effort: Pulmonary effort is normal.      Breath sounds: Normal breath sounds.   Abdominal:      General: Bowel sounds are normal.      Palpations: Abdomen is soft.      Tenderness: There is no abdominal tenderness.   Musculoskeletal:         General: No swelling. Normal range of motion.      Cervical back: Normal range of motion and neck supple.   Skin:     General: Skin is warm.      Coloration: Skin is not jaundiced.   Neurological:      Mental Status: He is alert.      Comments: Dysarthria  Left upper extremity weakness   Psychiatric:         Mood and Affect: Mood normal.         Behavior: Behavior normal.         Thought Content: Thought content normal.         Judgment: Judgment normal.         Fluids    Intake/Output Summary (Last 24 hours) at 8/20/2024 1529  Last data filed at 8/20/2024 1128  Gross per 24 hour   Intake 60 ml   Output --   Net 60 ml       Laboratory        Recent Labs     08/20/24  0835   SODIUM 140   POTASSIUM 4.7   CHLORIDE 104   CO2 21   GLUCOSE 213*   BUN 37*   CREATININE 1.09   CALCIUM 9.8                         Imaging  DX-CHEST-PORTABLE (1 VIEW)   Final Result         No new abnormalities have developed on portable chest radiograph since the prior examination.  No new infiltrates or consolidations are identified. Opacifications right lung apex and left lower lung again identified with no change. No new  infiltrates or    consolidations.      DX-CHEST-PORTABLE (1 VIEW)   Final Result         1. No significant interval change.      [id number] VIEW   Final Result         No specific finding to suggest small bowel obstruction.      DX-ABDOMEN FOR TUBE PLACEMENT   Final Result         Feeding tube with tip projecting over the expected area of the stomach.      DX-ABDOMEN FOR TUBE PLACEMENT   Final Result         1.  Nonspecific bowel gas pattern in the upper abdomen.   2.  Dobbhoff tube tip terminates overlying the expected location of the gastric fundus.   3.  Bilateral pulmonary infiltrates, greatest in the right upper lobe.   4.  Trace bilateral pleural effusions      DX-ESOPHAGUS - [id number]   Final Result      DX-CHEST-PORTABLE (1 VIEW)   Final Result      1. Development of interstitial opacities in the lungs, either edema or interstitial infiltrates.   2. Worsening confluent opacities in the right upper lobe and lingula.   3. The remainder is stable.      CT-CTA NECK WITH & W/O-POST PROCESSING   Final Result      1. No evidence of flow-limiting stenosis in the cervical carotid or cervical vertebral arteries.   2. Partially visualized airspace opacities/mass in the right upper lobe. Infiltrated right perihilar and mediastinal lymph tavia masses.   3. Bilateral pleural effusions.      CT-CTA HEAD WITH & W/O-POST PROCESS   Final Result         1. There is a focal stenosis/occlusion in a distal branch of the right MCA with flow reconstitution.         Preliminary findings texted to Dr. MEMO CHAIDEZ in the Emergency Department via [employer] on 8/13/2024 9:17 AM            CT-CEREBRAL PERFUSION ANALYSIS   Final Result      1. Cerebral blood flow less than 30% possibly representing completed infarct = 0 mL.      2. T Max more than 6 seconds possibly representing combination of completed infarct and ischemia = 18 mL.      3. Mismatched volume possibly representing ischemic brain/penumbra= 18 mL      4.  Please note  "that this cerebral perfusion study and report is Quantitative and targets supratentorial (cerebral) perfusion for evaluation of large vessel territory acute ischemia/infarction. For example, lacunar infarcts, and brainstem/posterior fossa    ischemia/infarction are not evaluated on this study.  Data acquisition is subject to artifacts which can yield non-anatomically plausible perfusion maps which may be due to motion, bolus timing, signal to noise ratio, or other technical factors.    Perfusion map abnormalities which show non-anatomic distributions are likely artifact.   This study is not \"stand-alone\" and should only be utilized for diagnosis, management/treatment in correlation with CT, CTA, and/or MRI and clinical factors.         DX-ESOPHAGUS - [id number]    (Results Pending)        Assessment/Plan  * Right middle cerebral artery stroke (HCC)- (present on admission)  Assessment & Plan  Likely thromboembolic in setting of Afib and malignancy  Neurology consulted and s/o - continue apixaban, no further workup indicated  PT/OT/SLP  Likely dispo is inpatient rehab  Has persistent dysphagia and will likely need PEG tube    Dysarthria- (present on admission)  Assessment & Plan  Due to MCA stroke  SLP eval and treat    Oropharyngeal dysphagia- (present on admission)  Assessment & Plan  Due to MCA stroke  SLP eval and treat  Plan for likely inpatient rehab, may likely need PEG tube placed, consult to GI     Hyponatremia- (present on admission)  Assessment & Plan  Resolved, sodium within normal limits    Type 2 diabetes mellitus with stage 3a chronic kidney disease, without long-term current use of insulin (HCC)- (present on admission)  Assessment & Plan  Well-controlled, A1c 6  Continue SGLT2  Initiated on atorvastatin  Continuing current treatment    Chronic systolic heart failure (HCC)- (present on admission)  Assessment & Plan  Appears euvolemic  Continue BB, SGLT2, IFRAH  Consider ACE/ARB      Primary lung " adenocarcinoma (HCC)- (present on admission)  Assessment & Plan  Follows with oncologist Dr. Schulz  On study drug - held on admission per pharmacist    Stage 3a chronic kidney disease- (present on admission)  Assessment & Plan  At baseline  Avoid nephrotoxins  Repeat AM BMP    Paroxysmal atrial fibrillation (HCC)- (present on admission)  Assessment & Plan  Continue metoprolol and apixaban  Telemetry for RVR in setting of CVA       VTE prophylaxis: Eliquis    I have performed a physical exam and reviewed and updated ROS and Plan today (8/20/2024). In review of yesterday's note (8/19/2024), there are no changes except as documented above.

## 2024-08-20 NOTE — THERAPY
Occupational Therapy  Daily Treatment     Patient Name: Pito Black  Age:  79 y.o., Sex:  male  Medical Record #: [id number]  Today's Date: 8/20/2024     Precautions: Fall Risk, Swallow Precautions, Nasogastric Tube  Comments: [place]    Assessment    Pt seen for OT tx when emphasis on LUE strengthening, ROM, and coordination activities. Pt was provided with orange theraputty and trained on exercises (i.e., squeezing putty, rolling putty, and pinching putty) as well as towel scrunches to improve hand strength and fine motor skills. Pt was also provided with pink slo-foam for hand strengthening. Pt completed seated UB activities involving weight shifting, reaching, grasping, releasing, and crossover activities with emphasis on utilizing LUE. Pt demo difficulty with coordination and motor planning when completing tasks. He is currently limited by LUE weakness, balance deficits, decreased functional mobility, and decreased activity tolerance. Will continue to follow for ongoing acute OT services.     Plan    Treatment Plan Status: Continue Current Treatment Plan  Type of Treatment: Self Care / Activities of Daily Living, Neuro Re-Education / Balance, Therapeutic Exercises, Therapeutic Activity  Treatment Frequency: 4 Times per Week  Treatment Duration: Until Therapy Goals Met    DC Equipment Recommendations: Unable to determine at this time  Discharge Recommendations: Recommend post-acute placement for additional occupational therapy services prior to discharge home     Objective      Vitals   O2 Delivery Device None - Room Air   Pain 0 - 10 Group   Therapist Pain Assessment Post Activity Pain Same as Prior to Activity;Nurse Notified  (Not rated, agreeable to activity)   Cognition    Speech/ Communication Dysarthric   Level of Consciousness Alert   Comments Pleasant and cooperative   Other Treatments   Other Treatments Provided Pt was provided with orange theraputty and trained on exercises (i.e., squeezing putty,  rolling putty, and pinching putty) as well as towel scrunches to improve hand strength and fine motor skills. Pt was also provided with pink slo-foam for hand strengthening. Pt completed seated UB activities involving weight shifting, reaching, grasping, releasing, and crossover activities with emphasis on utilizing LUE. Pt demo difficulty with coordination and motor planning when completing tasks.   Balance   Sitting Balance (Static) Good   Sitting Balance (Dynamic) Fair +   Standing Balance (Static) Fair   Standing Balance (Dynamic) Fair -   Weight Shift Sitting Fair   Weight Shift Standing Fair   Skilled Intervention Verbal Cuing   Comments w/FWW; left walker splint   Bed Mobility    Supine to Sit Supervised   Sit to Supine   (Up in chair post)   Scooting Supervised   Rolling Supervised   Skilled Intervention Verbal Cuing   Comments HOB sightly elevated   Activities of Daily Living   Eating   (Tube feed)   Lower Body Dressing Standby Assist  (Don slip on shoes)   Skilled Intervention Verbal Cuing   How much help from another person does the patient currently need...   6 Clicks Daily Activity Score 18   Functional Mobility   Sit to Stand Contact Guard Assist   Bed, Chair, Wheelchair Transfer Contact Guard Assist   Mobility Functional mobility in room w/FWW   Skilled Intervention Verbal Cuing   Activity Tolerance   Sitting in Chair Up to chair post   Sitting Edge of Bed 5 min   Standing 5 min   Patient / Family Goals   Patient / Family Goal #1 to get to rehab per family   Goal #1 Outcome Progressing as expected   Short Term Goals   Short Term Goal # 1 pt will complete grooming standing at sink w/spv   Goal Outcome # 1 Progressing as expected   Short Term Goal # 2 pt will complete LB dressing w/spv   Goal Outcome # 2 Progressing as expected   Short Term Goal # 3 pt will reach and place 5/5 items w/spv using LUE   Goal Outcome # 3 Progressing as expected   Education Group   Education Provided Role of Occupational  Therapist;Coordination;Upper Extremity Range of Motion;Upper Extremity Strengthening   Role of Occupational Therapist Patient Response Patient;Acceptance;Explanation;Verbal Demonstration   Upper Ext ROM Patient Response Patient;Acceptance;Explanation;Verbal Demonstration;Action Demonstration;Reinforcement Needed;Demonstration;Teach Back   UE Strengthening Patient Response Patient;Acceptance;Explanation;Demonstration;Teach Back;Verbal Demonstration;Action Demonstration;Reinforcement Needed   Coordination Patient Response Patient;Acceptance;Explanation;Demonstration;Teach Back;Verbal Demonstration;Action Demonstration;Reinforcement Needed

## 2024-08-20 NOTE — CARE PLAN
The patient is Stable - Low risk of patient condition declining or worsening    Shift Goals  Clinical Goals: Stable Neuros, MBS  Patient Goals: REst  Family Goals: CINDY    Progress made toward(s) clinical / shift goals:    Problem: Discharge Planning - Stroke  Goal: Ensure Stroke Core Measures are met prior to discharge  Outcome: Progressing     Problem: Neuro Status  Goal: Neuro status will remain stable or improve  Outcome: Progressing     Problem: Dysphagia  Goal: Dysphagia will improve  Outcome: Progressing slower than expected    Unable to safely be put on PO diet following MBS       Patient is not progressing towards the following goals:

## 2024-08-21 ENCOUNTER — APPOINTMENT (OUTPATIENT)
Dept: RADIOLOGY | Facility: MEDICAL CENTER | Age: 79
DRG: 065 | End: 2024-08-21
Attending: STUDENT IN AN ORGANIZED HEALTH CARE EDUCATION/TRAINING PROGRAM
Payer: MEDICARE

## 2024-08-21 LAB
ANION GAP SERPL CALC-SCNC: 13 MMOL/L (ref 7–16)
BUN SERPL-MCNC: 45 MG/DL (ref 8–22)
CALCIUM SERPL-MCNC: 9.6 MG/DL (ref 8.5–10.5)
CHLORIDE SERPL-SCNC: 108 MMOL/L (ref 96–112)
CO2 SERPL-SCNC: 21 MMOL/L (ref 20–33)
CREAT SERPL-MCNC: 1.33 MG/DL (ref 0.5–1.4)
ERYTHROCYTE [DISTWIDTH] IN BLOOD BY AUTOMATED COUNT: 49.9 FL (ref 35.9–50)
GFR SERPLBLD CREATININE-BSD FMLA CKD-EPI: 54 ML/MIN/1.73 M 2
GLUCOSE SERPL-MCNC: 157 MG/DL (ref 65–99)
HCT VFR BLD AUTO: 46.5 % (ref 42–52)
HGB BLD-MCNC: 15.1 G/DL (ref 14–18)
MCH RBC QN AUTO: 30.9 PG (ref 27–33)
MCHC RBC AUTO-ENTMCNC: 32.5 G/DL (ref 32.3–36.5)
MCV RBC AUTO: 95.3 FL (ref 81.4–97.8)
PLATELET # BLD AUTO: 107 K/UL (ref 164–446)
PMV BLD AUTO: 10.3 FL (ref 9–12.9)
POTASSIUM SERPL-SCNC: 4.7 MMOL/L (ref 3.6–5.5)
RBC # BLD AUTO: 4.88 M/UL (ref 4.7–6.1)
SODIUM SERPL-SCNC: 142 MMOL/L (ref 135–145)
WBC # BLD AUTO: 15.9 K/UL (ref 4.8–10.8)

## 2024-08-21 PROCEDURE — 85027 COMPLETE CBC AUTOMATED: CPT

## 2024-08-21 PROCEDURE — 770020 HCHG ROOM/CARE - TELE (206)

## 2024-08-21 PROCEDURE — 36415 COLL VENOUS BLD VENIPUNCTURE: CPT

## 2024-08-21 PROCEDURE — 700102 HCHG RX REV CODE 250 W/ 637 OVERRIDE(OP): Performed by: STUDENT IN AN ORGANIZED HEALTH CARE EDUCATION/TRAINING PROGRAM

## 2024-08-21 PROCEDURE — A9270 NON-COVERED ITEM OR SERVICE: HCPCS

## 2024-08-21 PROCEDURE — 99222 1ST HOSP IP/OBS MODERATE 55: CPT | Mod: AI | Performed by: NURSE PRACTITIONER

## 2024-08-21 PROCEDURE — 80048 BASIC METABOLIC PNL TOTAL CA: CPT

## 2024-08-21 PROCEDURE — A9270 NON-COVERED ITEM OR SERVICE: HCPCS | Performed by: STUDENT IN AN ORGANIZED HEALTH CARE EDUCATION/TRAINING PROGRAM

## 2024-08-21 PROCEDURE — 700102 HCHG RX REV CODE 250 W/ 637 OVERRIDE(OP)

## 2024-08-21 RX ADMIN — Medication 5 MG: at 22:07

## 2024-08-21 RX ADMIN — EZETIMIBE 10 MG: 10 TABLET ORAL at 05:05

## 2024-08-21 RX ADMIN — METOPROLOL TARTRATE 50 MG: 25 TABLET, FILM COATED ORAL at 05:05

## 2024-08-21 RX ADMIN — Medication 2000 UNITS: at 05:05

## 2024-08-21 RX ADMIN — APIXABAN 5 MG: 5 TABLET, FILM COATED ORAL at 05:05

## 2024-08-21 RX ADMIN — CYCLOBENZAPRINE 10 MG: 10 TABLET, FILM COATED ORAL at 22:07

## 2024-08-21 RX ADMIN — METOPROLOL TARTRATE 50 MG: 25 TABLET, FILM COATED ORAL at 16:56

## 2024-08-21 RX ADMIN — ATORVASTATIN CALCIUM 20 MG: 20 TABLET, FILM COATED ORAL at 16:57

## 2024-08-21 RX ADMIN — SENNOSIDES AND DOCUSATE SODIUM 2 TABLET: 50; 8.6 TABLET ORAL at 16:56

## 2024-08-21 RX ADMIN — DAPAGLIFLOZIN 10 MG: 10 TABLET, FILM COATED ORAL at 05:06

## 2024-08-21 RX ADMIN — SPIRONOLACTONE 25 MG: 25 TABLET ORAL at 05:06

## 2024-08-21 RX ADMIN — ACETAMINOPHEN 1000 MG: 500 TABLET ORAL at 08:24

## 2024-08-21 ASSESSMENT — ENCOUNTER SYMPTOMS
DIARRHEA: 0
FEVER: 0
VOMITING: 0
NAUSEA: 0
SINUS PAIN: 1
ABDOMINAL PAIN: 0
CONSTITUTIONAL NEGATIVE: 1
MYALGIAS: 0
CONSTIPATION: 0
WEAKNESS: 1
CHILLS: 0
FALLS: 0
FLANK PAIN: 0
SHORTNESS OF BREATH: 0
PSYCHIATRIC NEGATIVE: 1
CARDIOVASCULAR NEGATIVE: 1
FOCAL WEAKNESS: 1
SORE THROAT: 0
GASTROINTESTINAL NEGATIVE: 1
EYES NEGATIVE: 1
MUSCULOSKELETAL NEGATIVE: 1
RESPIRATORY NEGATIVE: 1
NEUROLOGICAL NEGATIVE: 1
COUGH: 0
BLOOD IN STOOL: 0
INSOMNIA: 0
NERVOUS/ANXIOUS: 0

## 2024-08-21 ASSESSMENT — FIBROSIS 4 INDEX: FIB4 SCORE: 4.42

## 2024-08-21 ASSESSMENT — PAIN DESCRIPTION - PAIN TYPE
TYPE: ACUTE PAIN
TYPE: ACUTE PAIN

## 2024-08-21 NOTE — H&P (VIEW-ONLY)
Date of Consultation:  8/21/2024    Patient: : Pito Black  MRN: [id number]    Referring Physician:  Sergio Rai MD     GI:JILL Keller     Reason for Consultation: PEG Tube placement for dysphagia    History of Present Illness: This is a 79-year-old male with a past medical history including lung cancer (followed by Dr. Sukhwinder davison), atrial fibrillation on apixaban, AICD in situ, pulmonary embolism (2023), type 2 diabetes mellitus, CKD, heart failure reduced ejection fraction (EF 35-45), hyperlipidemia who presented to [facility] on 8/13/2024 following acute onset of left facial droop and dysphagia.  Patient found to have right MCA CVA noted on head CT.  Neurology evaluated patient for which patient was deemed outside of tPA window.  He was started on Eliquis and no further workup was recommended by neurology as CVA was likely secondary to cardioembolic etiology in the setting of chronic atrial fibrillation.  During admission, patient has been evaluated by speech-language pathology.  Videofluoroscopic swallow study evaluation done 8/20/2024 showed moderate-severe oropharyngeal dysphagia.  Swallow function has not improved significantly from the first MBSS on 8/14/2024.  PEG tube recommended        Tobacco use: Previous 10-pack-year tobacco use from 3720-1183  Alcohol use: None since 2017  Illicit drug use: Denies    Last EGD: denies  Last colonoscopy: per family in the last 10-15 years- normal  NSAID/ASA use: None  Anticoagulation use: Eliquis, last dose 8/21/2024 in 0500 hour.       Past Medical History:   Diagnosis Date    AICD (automatic cardioverter/defibrillator) present 12/07/2023    Type 2 diabetes mellitus with hyperglycemia (HCC) 03/17/2023    This is a chronic condition. Current medications: Insulin:  Biguanide: took metformin historically will restart metformin xr 750 mg daily. GLP1-RA:   SGLT-2i:    Consider for cardiorenal protection DPP4-I:  TZD:  Pk:  Sulfonyluria:   Last A1c: 6/2/23 6.5% Last Microalb/Cr ratio: 6/2/23 <1.2 Fasting sugars: Last diabetic foot exam: 6/19/23 Last retinal eye exam: has upcoming appointment with optome    Acute respiratory failure with hypoxia (HCC) 02/01/2023    Blood clotting disorder (HCC) 02/2023    Pulmonary, right lung    Pneumonia 02/2023    Backpain 08/06/2018    9/10    Diabetes 08/06/2018    on no meds at this time, diet controlled    Cancer (HCC) 07/2017    left lung- adenocarcinoma    Arthritis 2015    generalized, DDD back    Acute hypoxemic respiratory failure (HCC)     Arrhythmia     hx of a fib    Asthma     inhaler     Breath shortness     with exertion    Cataract     IOL bilateral    Congestive heart failure (HCC)     cardiologist, [facility], Catherine KAUR    Dental disorder     lower partial, removable bridge    Dysfunction of eustachian tube     Heart valve disease     mild mitral valve prolapse    High cholesterol     Hypertension     Macrocytic anemia     Renal disorder     CKD stage 3    Thrombophilia (HCC)     Volume overload          Past Surgical History:   Procedure Laterality Date    AL BRONCHOSCOPY,DIAGNOSTIC N/A 1/16/2024    Procedure: FIBER OPTIC BRONCHOSCOPY WITH  WASH, BRUSH, BRONCHOALVEOLAR LAVAGE, BIOPSY AND FINE NEEDLE ASPIRATION, ENDOBRONCHIAL ULTRASOUND AND NAVIGATION, ROBOTICS;  Surgeon: Vinayak Carbajal M.D.;  Location: [facility];  Service: Pulmonary Robotic    ENDOBRONCHIAL US ADD-ON N/A 1/16/2024    Procedure: ENDOBRONCHIAL ULTRASOUND (EBUS);  Surgeon: Vinayak Carbajal M.D.;  Location: [facility];  Service: Pulmonary Robotic    AL BRONCHOSCOPY,DIAGNOSTIC N/A 12/28/2023    Procedure: FIBER OPTIC BRONCHOSCOPY WITH BRONCHOALVEOLAR LAVAGE AND FINE NEEDLE ASPIRATION, ENDOBRONCHIAL ULTRASOUND AND NAVIGATION, ROBOTICS;  Surgeon: Miriam Martin M.D.;  Location: [facility];  Service: Pulmonary Robotic    ENDOBRONCHIAL US ADD-ON N/A 12/28/2023     "Procedure: ENDOBRONCHIAL ULTRASOUND (EBUS);  Surgeon: Miriam Martin M.D.;  Location: SURGERY [facility];  Service: Pulmonary Robotic    WA BRONCHOSCOPY,DIAGNOSTIC  07/01/2021    Procedure: BRONCHOSCOPY - FIBER OPTIC WITH BRANCHOALVEOLAR LAVAGE BIOPSY, FNA, NAVIGATION;  Surgeon: Vinayak Carbajal M.D.;  Location: [facility];  Service: Pulmonary    ENDOBRONCHIAL US ADD-ON  07/01/2021    Procedure: ENDOBRONCHIAL ULTRASOUND (EBUS).;  Surgeon: Vinayak Carbajal M.D.;  Location: [facility];  Service: Pulmonary    CATARACT PHACO WITH IOL Left 08/21/2018    Procedure: CATARACT PHACO WITH IOL;  Surgeon: Juanito Hager M.D.;  Location: SURGERY SAME DAY [facility];  Service: Ophthalmology    CATARACT PHACO WITH IOL Right 08/07/2018    Procedure: CATARACT PHACO WITH IOL;  Surgeon: Juanito Hager M.D.;  Location: SURGERY SAME DAY [facility];  Service: Ophthalmology    THORACOSCOPY Left 04/19/2018    Procedure: THORACOSCOPY- WEDGE BIOPSY W/FROZEN SECTION;  Surgeon: Cristhian Galeano M.D.;  Location: SURGERY [facility];  Service: Thoracic    EAR MIDDLE EXPLORATION Right 06/12/2015    Procedure: EAR MIDDLE EXPLORATION;  Surgeon: William Brandon M.D.;  Location: SURGERY SAME DAY [facility];  Service:     OSSICULAR RECONSTRUCTION Right 06/12/2015    Procedure: OSSICULAR RECONSTRUCTION CHAIN POSSIBLE;  Surgeon: William Brandon M.D.;  Location: SURGERY SAME DAY [facility];  Service:     KNEE ARTHROPLASTY TOTAL  2005    EAR RECONSTRUCTION Bilateral     ear replacement \"many years ago\"       Family History   Problem Relation Age of Onset    Stroke Mother     Hypertension Mother     Diabetes Mother     Cancer Father         stomach cancer    Heart Disease Brother     Alcohol abuse Brother     Ovarian Cancer Neg Hx     Tubal Cancer Neg Hx     Peritoneal Cancer Neg Hx     Colorectal Cancer Neg Hx     Breast Cancer Neg Hx     Hyperlipidemia Neg Hx        Social History     Socioeconomic History "    Marital status:     Highest education level: 12th grade   Tobacco Use    Smoking status: Former     Current packs/day: 0.00     Average packs/day: 0.3 packs/day for 40.0 years (10.0 ttl pk-yrs)     Types: Cigarettes     Start date: 1965     Quit date: 2005     Years since quittin.6    Smokeless tobacco: Never   Vaping Use    Vaping status: Never Used   Substance and Sexual Activity    Alcohol use: Not Currently     Comment: Quit 2/3/2017    Drug use: No     Social Determinants of Health     Financial Resource Strain: Low Risk  (10/30/2023)    Overall Financial Resource Strain (CARDIA)     Difficulty of Paying Living Expenses: Not very hard   Food Insecurity: No Food Insecurity (10/30/2023)    Hunger Vital Sign     Worried About Running Out of Food in the Last Year: Never true     Ran Out of Food in the Last Year: Never true   Transportation Needs: No Transportation Needs (10/30/2023)    PRAPARE - Transportation     Lack of Transportation (Medical): No     Lack of Transportation (Non-Medical): No   Physical Activity: Inactive (10/30/2023)    Exercise Vital Sign     Days of Exercise per Week: 0 days     Minutes of Exercise per Session: 0 min   Stress: Stress Concern Present (10/20/2023)    [place] [employer] - Occupational Stress Questionnaire     Feeling of Stress : To some extent   Social Connections: Moderately Isolated (10/30/2023)    Social Connection and Isolation Panel [NHANES]     Frequency of Communication with Friends and Family: More than three times a week     Frequency of Social Gatherings with Friends and Family: Once a week     Attends Congregation Services: Never     Active Member of Clubs or Organizations: No     Attends Club or Organization Meetings: Never     Marital Status:    Housing Stability: Low Risk  (10/30/2023)    Housing Stability Vital Sign     Unable to Pay for Housing in the Last Year: No     Number of Places Lived in the Last Year: 1      Unstable Housing in the Last Year: No       Review of systems:  Review of Systems   Constitutional:  Positive for malaise/fatigue. Negative for chills and fever.   HENT:  Positive for sinus pain (nasal discomfort from NG tube). Negative for hearing loss and sore throat.    Respiratory:  Negative for cough and shortness of breath.    Cardiovascular:  Negative for chest pain and leg swelling.   Gastrointestinal:  Negative for abdominal pain, blood in stool, constipation, diarrhea, melena, nausea and vomiting.   Genitourinary:  Negative for dysuria and flank pain.   Musculoskeletal:  Negative for falls and myalgias.   Neurological:  Positive for focal weakness and weakness.   Psychiatric/Behavioral:  The patient is not nervous/anxious and does not have insomnia.    All other systems reviewed and are negative.        Physical Exam:  Vitals:    08/20/24 2344 08/21/24 0356 08/21/24 0507 08/21/24 0700   BP: 120/56 131/72 132/74 132/83   Pulse: 87 99 (!) 106 87   Resp: 17 17  17   Temp: 36.2 °C (97.2 °F) 36.3 °C (97.3 °F)  36.6 °C (97.8 °F)   TempSrc: Temporal Temporal  Temporal   SpO2: 91% 91%  93%   Weight:  59.9 kg (132 lb 0.9 oz)     Height:           Physical Exam  Vitals reviewed.   Constitutional:       General: He is awake. He is not in acute distress.     Appearance: He is normal weight. He is ill-appearing.   HENT:      Head: Normocephalic.      Nose: Nose normal. No congestion.      Comments: NG tube in place     Mouth/Throat:      Mouth: Mucous membranes are moist.      Pharynx: Oropharynx is clear.   Eyes:      General: No scleral icterus.     Extraocular Movements: Extraocular movements intact.      Conjunctiva/sclera: Conjunctivae normal.   Cardiovascular:      Rate and Rhythm: Normal rate and regular rhythm.      Pulses: Normal pulses.      Heart sounds: Normal heart sounds.   Pulmonary:      Effort: Pulmonary effort is normal.      Breath sounds: Normal breath sounds.   Abdominal:      General: Abdomen is  flat. Bowel sounds are normal. There is no distension.      Palpations: Abdomen is soft.      Tenderness: There is no abdominal tenderness. There is no guarding.   Musculoskeletal:      Right lower leg: No edema.      Left lower leg: No edema.   Skin:     General: Skin is warm and dry.      Capillary Refill: Capillary refill takes less than 2 seconds.      Coloration: Skin is not jaundiced.   Neurological:      General: No focal deficit present.      Mental Status: He is alert and oriented to person, place, and time. Mental status is at baseline.      Motor: Weakness present.   Psychiatric:         Mood and Affect: Mood normal.         Behavior: Behavior normal. Behavior is cooperative.           Labs:  Recent Labs     08/21/24  0614   WBC 15.9*   RBC 4.88   HEMOGLOBIN 15.1   HEMATOCRIT 46.5   MCV 95.3   MCH 30.9   MCHC 32.5   RDW 49.9   PLATELETCT 107*   MPV 10.3     Recent Labs     08/20/24  0835 08/21/24  0614   SODIUM 140 142   POTASSIUM 4.7 4.7   CHLORIDE 104 108   CO2 21 21   GLUCOSE 213* 157*   BUN 37* 45*           Recent Labs     08/20/24  0835   ASTSGOT 45   ALTSGPT 48   TBILIRUBIN 0.6   ALKPHOSPHAT 94   GLOBULIN 4.8*         Imaging:  DX-ABDOMEN FOR TUBE PLACEMENT  Narrative: 8/21/2024 3:35 AM    HISTORY/REASON FOR EXAM:  Line evaluation.    TECHNIQUE/EXAM DESCRIPTION AND NUMBER OF VIEWS:  1 view(s) of the abdomen.    COMPARISON:  None.    FINDINGS:  Enteric tube has been placed.    The tip projects over the proximal stomach.    The bowel gas pattern is within normal limits.  Impression: Dobbhoff tube tip projects over the proximal stomach            Impressions:  Moderate-severe oropharyngeal dysphagia  S/p right MCA CVA  Atrial fibrillation on Eliquis-last dose 8/21/2024 0500 hour  Hx of lung cancer   AICD in situ   type 2 diabetes mellitus  CKD  heart failure reduced ejection fraction (EF 35-45)  hyperlipidemia     Recommendations:  Hold apixaban x 48 hours  Continue tube feeds  Will plan for PEG tube  placement on 8/23/2024 after apixaban has been held for 48 hours.    Will see patient tomorrow    Discussed with patient, family bedside, Dr. Rai, nursing, Dr. Barnes.      This note was generated using voice recognition software which has a small chance of producing errors of grammar and possibly content. I have made every reasonable attempt to find and correct any obvious errors, but expect that some may not be found prior to finalization of this note.

## 2024-08-21 NOTE — PROGRESS NOTES
Monitor Summary: SR 85-96, MA 0.14, QRS 0.09, QT 0.35, with frequent PVCs, rare couplets, and rare bigeminy per strip from monitor room.

## 2024-08-21 NOTE — PROGRESS NOTES
Patient found to have tube feed dripping from his nose. Patient states that he sneezed and the iris was dislodged. TICU rapid nurse notified and asked to replace iris. New iris in place.

## 2024-08-21 NOTE — PROGRESS NOTES
Iris Placement    Tube Team verified patient name and medical record number prior to tube placement. Iris feeding tube (43 inches, 10 Cymraes) placed at 62cm in right nare. Per Iris picture, tube appears to be in the stomach.    Nursing Instructions: Await KUB to confirm placement before use for medications or feeding. Stylet, may be removed, please place in labeled bag with insufflation bulb and save in patient medication drawer.

## 2024-08-21 NOTE — DISCHARGE PLANNING
"TCN following. HTH/SCP chart reviewed. No new TCN needs identified.  Discharge considerations are IRF (first choice) and SNF if patient not accepted/authorized for IRF.  Noted patient has multiple accepting.  Per [facility], \"Following for possible PEG placement.\"     Please see prior TCN note from 8/14 for additional discharge planning considerations if indicated.      Completed:  PT/OT/SLP  recommends post acute placement - 8/20   Choice obtained: IRF, HH & DME ([facility]).  Pt aware of [facility]'s blanket referral policy; [facility] accepted, 3 pending  "

## 2024-08-21 NOTE — CONSULTS
Date of Consultation:  8/21/2024    Patient: : Pito Black  MRN: [id number]    Referring Physician:  Sergio Rai MD     GI:JILL Keller     Reason for Consultation: PEG Tube placement for dysphagia    History of Present Illness: This is a 79-year-old male with a past medical history including lung cancer (followed by Dr. Sukhwinder davison), atrial fibrillation on apixaban, AICD in situ, pulmonary embolism (2023), type 2 diabetes mellitus, CKD, heart failure reduced ejection fraction (EF 35-45), hyperlipidemia who presented to [facility] on 8/13/2024 following acute onset of left facial droop and dysphagia.  Patient found to have right MCA CVA noted on head CT.  Neurology evaluated patient for which patient was deemed outside of tPA window.  He was started on Eliquis and no further workup was recommended by neurology as CVA was likely secondary to cardioembolic etiology in the setting of chronic atrial fibrillation.  During admission, patient has been evaluated by speech-language pathology.  Videofluoroscopic swallow study evaluation done 8/20/2024 showed moderate-severe oropharyngeal dysphagia.  Swallow function has not improved significantly from the first MBSS on 8/14/2024.  PEG tube recommended        Tobacco use: Previous 10-pack-year tobacco use from 6949-2769  Alcohol use: None since 2017  Illicit drug use: Denies    Last EGD: denies  Last colonoscopy: per family in the last 10-15 years- normal  NSAID/ASA use: None  Anticoagulation use: Eliquis, last dose 8/21/2024 in 0500 hour.       Past Medical History:   Diagnosis Date    AICD (automatic cardioverter/defibrillator) present 12/07/2023    Type 2 diabetes mellitus with hyperglycemia (HCC) 03/17/2023    This is a chronic condition. Current medications: Insulin:  Biguanide: took metformin historically will restart metformin xr 750 mg daily. GLP1-RA:   SGLT-2i:    Consider for cardiorenal protection DPP4-I:  TZD:  Pk:  Sulfonyluria:   Last A1c: 6/2/23 6.5% Last Microalb/Cr ratio: 6/2/23 <1.2 Fasting sugars: Last diabetic foot exam: 6/19/23 Last retinal eye exam: has upcoming appointment with optome    Acute respiratory failure with hypoxia (HCC) 02/01/2023    Blood clotting disorder (HCC) 02/2023    Pulmonary, right lung    Pneumonia 02/2023    Backpain 08/06/2018    9/10    Diabetes 08/06/2018    on no meds at this time, diet controlled    Cancer (HCC) 07/2017    left lung- adenocarcinoma    Arthritis 2015    generalized, DDD back    Acute hypoxemic respiratory failure (HCC)     Arrhythmia     hx of a fib    Asthma     inhaler     Breath shortness     with exertion    Cataract     IOL bilateral    Congestive heart failure (HCC)     cardiologist, [facility], Catherine KAUR    Dental disorder     lower partial, removable bridge    Dysfunction of eustachian tube     Heart valve disease     mild mitral valve prolapse    High cholesterol     Hypertension     Macrocytic anemia     Renal disorder     CKD stage 3    Thrombophilia (HCC)     Volume overload          Past Surgical History:   Procedure Laterality Date    DC BRONCHOSCOPY,DIAGNOSTIC N/A 1/16/2024    Procedure: FIBER OPTIC BRONCHOSCOPY WITH  WASH, BRUSH, BRONCHOALVEOLAR LAVAGE, BIOPSY AND FINE NEEDLE ASPIRATION, ENDOBRONCHIAL ULTRASOUND AND NAVIGATION, ROBOTICS;  Surgeon: Vinayak Carbajal M.D.;  Location: [facility];  Service: Pulmonary Robotic    ENDOBRONCHIAL US ADD-ON N/A 1/16/2024    Procedure: ENDOBRONCHIAL ULTRASOUND (EBUS);  Surgeon: Vinayak Carbajal M.D.;  Location: [facility];  Service: Pulmonary Robotic    DC BRONCHOSCOPY,DIAGNOSTIC N/A 12/28/2023    Procedure: FIBER OPTIC BRONCHOSCOPY WITH BRONCHOALVEOLAR LAVAGE AND FINE NEEDLE ASPIRATION, ENDOBRONCHIAL ULTRASOUND AND NAVIGATION, ROBOTICS;  Surgeon: Miriam Martin M.D.;  Location: [facility];  Service: Pulmonary Robotic    ENDOBRONCHIAL US ADD-ON N/A 12/28/2023     "Procedure: ENDOBRONCHIAL ULTRASOUND (EBUS);  Surgeon: Miriam Martin M.D.;  Location: SURGERY [facility];  Service: Pulmonary Robotic    DC BRONCHOSCOPY,DIAGNOSTIC  07/01/2021    Procedure: BRONCHOSCOPY - FIBER OPTIC WITH BRANCHOALVEOLAR LAVAGE BIOPSY, FNA, NAVIGATION;  Surgeon: Vinayak Carbajal M.D.;  Location: [facility];  Service: Pulmonary    ENDOBRONCHIAL US ADD-ON  07/01/2021    Procedure: ENDOBRONCHIAL ULTRASOUND (EBUS).;  Surgeon: Vinayak Carbajal M.D.;  Location: [facility];  Service: Pulmonary    CATARACT PHACO WITH IOL Left 08/21/2018    Procedure: CATARACT PHACO WITH IOL;  Surgeon: Juanito Hager M.D.;  Location: SURGERY SAME DAY [facility];  Service: Ophthalmology    CATARACT PHACO WITH IOL Right 08/07/2018    Procedure: CATARACT PHACO WITH IOL;  Surgeon: Juanito Hager M.D.;  Location: SURGERY SAME DAY [facility];  Service: Ophthalmology    THORACOSCOPY Left 04/19/2018    Procedure: THORACOSCOPY- WEDGE BIOPSY W/FROZEN SECTION;  Surgeon: Cristhian Galeano M.D.;  Location: SURGERY [facility];  Service: Thoracic    EAR MIDDLE EXPLORATION Right 06/12/2015    Procedure: EAR MIDDLE EXPLORATION;  Surgeon: William Brandon M.D.;  Location: SURGERY SAME DAY [facility];  Service:     OSSICULAR RECONSTRUCTION Right 06/12/2015    Procedure: OSSICULAR RECONSTRUCTION CHAIN POSSIBLE;  Surgeon: William Brandon M.D.;  Location: SURGERY SAME DAY [facility];  Service:     KNEE ARTHROPLASTY TOTAL  2005    EAR RECONSTRUCTION Bilateral     ear replacement \"many years ago\"       Family History   Problem Relation Age of Onset    Stroke Mother     Hypertension Mother     Diabetes Mother     Cancer Father         stomach cancer    Heart Disease Brother     Alcohol abuse Brother     Ovarian Cancer Neg Hx     Tubal Cancer Neg Hx     Peritoneal Cancer Neg Hx     Colorectal Cancer Neg Hx     Breast Cancer Neg Hx     Hyperlipidemia Neg Hx        Social History     Socioeconomic History "    Marital status:     Highest education level: 12th grade   Tobacco Use    Smoking status: Former     Current packs/day: 0.00     Average packs/day: 0.3 packs/day for 40.0 years (10.0 ttl pk-yrs)     Types: Cigarettes     Start date: 1965     Quit date: 2005     Years since quittin.6    Smokeless tobacco: Never   Vaping Use    Vaping status: Never Used   Substance and Sexual Activity    Alcohol use: Not Currently     Comment: Quit 2/3/2017    Drug use: No     Social Determinants of Health     Financial Resource Strain: Low Risk  (10/30/2023)    Overall Financial Resource Strain (CARDIA)     Difficulty of Paying Living Expenses: Not very hard   Food Insecurity: No Food Insecurity (10/30/2023)    Hunger Vital Sign     Worried About Running Out of Food in the Last Year: Never true     Ran Out of Food in the Last Year: Never true   Transportation Needs: No Transportation Needs (10/30/2023)    PRAPARE - Transportation     Lack of Transportation (Medical): No     Lack of Transportation (Non-Medical): No   Physical Activity: Inactive (10/30/2023)    Exercise Vital Sign     Days of Exercise per Week: 0 days     Minutes of Exercise per Session: 0 min   Stress: Stress Concern Present (10/20/2023)    [place] [employer] - Occupational Stress Questionnaire     Feeling of Stress : To some extent   Social Connections: Moderately Isolated (10/30/2023)    Social Connection and Isolation Panel [NHANES]     Frequency of Communication with Friends and Family: More than three times a week     Frequency of Social Gatherings with Friends and Family: Once a week     Attends Religion Services: Never     Active Member of Clubs or Organizations: No     Attends Club or Organization Meetings: Never     Marital Status:    Housing Stability: Low Risk  (10/30/2023)    Housing Stability Vital Sign     Unable to Pay for Housing in the Last Year: No     Number of Places Lived in the Last Year: 1      Unstable Housing in the Last Year: No       Review of systems:  Review of Systems   Constitutional:  Positive for malaise/fatigue. Negative for chills and fever.   HENT:  Positive for sinus pain (nasal discomfort from NG tube). Negative for hearing loss and sore throat.    Respiratory:  Negative for cough and shortness of breath.    Cardiovascular:  Negative for chest pain and leg swelling.   Gastrointestinal:  Negative for abdominal pain, blood in stool, constipation, diarrhea, melena, nausea and vomiting.   Genitourinary:  Negative for dysuria and flank pain.   Musculoskeletal:  Negative for falls and myalgias.   Neurological:  Positive for focal weakness and weakness.   Psychiatric/Behavioral:  The patient is not nervous/anxious and does not have insomnia.    All other systems reviewed and are negative.        Physical Exam:  Vitals:    08/20/24 2344 08/21/24 0356 08/21/24 0507 08/21/24 0700   BP: 120/56 131/72 132/74 132/83   Pulse: 87 99 (!) 106 87   Resp: 17 17  17   Temp: 36.2 °C (97.2 °F) 36.3 °C (97.3 °F)  36.6 °C (97.8 °F)   TempSrc: Temporal Temporal  Temporal   SpO2: 91% 91%  93%   Weight:  59.9 kg (132 lb 0.9 oz)     Height:           Physical Exam  Vitals reviewed.   Constitutional:       General: He is awake. He is not in acute distress.     Appearance: He is normal weight. He is ill-appearing.   HENT:      Head: Normocephalic.      Nose: Nose normal. No congestion.      Comments: NG tube in place     Mouth/Throat:      Mouth: Mucous membranes are moist.      Pharynx: Oropharynx is clear.   Eyes:      General: No scleral icterus.     Extraocular Movements: Extraocular movements intact.      Conjunctiva/sclera: Conjunctivae normal.   Cardiovascular:      Rate and Rhythm: Normal rate and regular rhythm.      Pulses: Normal pulses.      Heart sounds: Normal heart sounds.   Pulmonary:      Effort: Pulmonary effort is normal.      Breath sounds: Normal breath sounds.   Abdominal:      General: Abdomen is  flat. Bowel sounds are normal. There is no distension.      Palpations: Abdomen is soft.      Tenderness: There is no abdominal tenderness. There is no guarding.   Musculoskeletal:      Right lower leg: No edema.      Left lower leg: No edema.   Skin:     General: Skin is warm and dry.      Capillary Refill: Capillary refill takes less than 2 seconds.      Coloration: Skin is not jaundiced.   Neurological:      General: No focal deficit present.      Mental Status: He is alert and oriented to person, place, and time. Mental status is at baseline.      Motor: Weakness present.   Psychiatric:         Mood and Affect: Mood normal.         Behavior: Behavior normal. Behavior is cooperative.           Labs:  Recent Labs     08/21/24  0614   WBC 15.9*   RBC 4.88   HEMOGLOBIN 15.1   HEMATOCRIT 46.5   MCV 95.3   MCH 30.9   MCHC 32.5   RDW 49.9   PLATELETCT 107*   MPV 10.3     Recent Labs     08/20/24  0835 08/21/24  0614   SODIUM 140 142   POTASSIUM 4.7 4.7   CHLORIDE 104 108   CO2 21 21   GLUCOSE 213* 157*   BUN 37* 45*           Recent Labs     08/20/24  0835   ASTSGOT 45   ALTSGPT 48   TBILIRUBIN 0.6   ALKPHOSPHAT 94   GLOBULIN 4.8*         Imaging:  DX-ABDOMEN FOR TUBE PLACEMENT  Narrative: 8/21/2024 3:35 AM    HISTORY/REASON FOR EXAM:  Line evaluation.    TECHNIQUE/EXAM DESCRIPTION AND NUMBER OF VIEWS:  1 view(s) of the abdomen.    COMPARISON:  None.    FINDINGS:  Enteric tube has been placed.    The tip projects over the proximal stomach.    The bowel gas pattern is within normal limits.  Impression: Dobbhoff tube tip projects over the proximal stomach            Impressions:  Moderate-severe oropharyngeal dysphagia  S/p right MCA CVA  Atrial fibrillation on Eliquis-last dose 8/21/2024 0500 hour  Hx of lung cancer   AICD in situ   type 2 diabetes mellitus  CKD  heart failure reduced ejection fraction (EF 35-45)  hyperlipidemia     Recommendations:  Hold apixaban x 48 hours  Continue tube feeds  Will plan for PEG tube  placement on 8/23/2024 after apixaban has been held for 48 hours.    Will see patient tomorrow    Discussed with patient, family bedside, Dr. Rai, nursing, Dr. Barnes.      This note was generated using voice recognition software which has a small chance of producing errors of grammar and possibly content. I have made every reasonable attempt to find and correct any obvious errors, but expect that some may not be found prior to finalization of this note.

## 2024-08-21 NOTE — PROGRESS NOTES
Hospital Medicine Daily Progress Note    Date of Service  8/21/2024    Chief Complaint  Pito Black is a 79 y.o. male admitted 8/13/2024 with CVA    Hospital Course  79 y.o. male with lung cancer followed by Luz Maria Jules on apixaban who presented 8/13/2024 with left facial droop and dysphagia secondary to right MCA CVA noted on head CTA.  Neurology following for which patient was deemed outside of tPA window.  He was restarted on Eliquis and no further workup recommended by neurology as CVA likely secondary to cardioembolic etiologies in the setting of chronic atrial fibrillation.  He was eval by speech pathology for which she was noted for aspiration on exam and he was placed n.p.o. and core track was placed on 8/15/2024.  Prior to admission patient was on antineoplastic trial medicine which has been held while inpatient.     Interval Problem Update  Evaluated at bedside  I no new complaints  Patient has been feeling tired and sleeping a lot of the day  Discussed with family at bedside  They would consider palliative approach, discussed that perhaps we should have this conversation prior to having a peg tube placement they are in agreement    I have discussed this patient's plan of care and discharge plan at IDT rounds today with Case Management, Nursing, Nursing leadership, and other members of the IDT team.    Consultants/Specialty  Neurology    Code Status  Full Code    Disposition  The patient is not medically cleared for discharge to home or a post-acute facility.     I have placed the appropriate orders for post-discharge needs.    Review of Systems  Review of Systems   Constitutional: Negative.    HENT: Negative.     Eyes: Negative.    Respiratory: Negative.     Cardiovascular: Negative.    Gastrointestinal: Negative.    Genitourinary: Negative.    Musculoskeletal: Negative.    Skin: Negative.    Neurological: Negative.    Endo/Heme/Allergies: Negative.    Psychiatric/Behavioral: Negative.           Physical Exam  Temp:  [36.2 °C (97.2 °F)-36.6 °C (97.8 °F)] 36.6 °C (97.8 °F)  Pulse:  [] 87  Resp:  [16-17] 17  BP: (120-137)/(56-83) 132/83  SpO2:  [91 %-96 %] 93 %    Physical Exam  Constitutional:       General: He is not in acute distress.     Appearance: Normal appearance.   HENT:      Head: Normocephalic and atraumatic.      Nose: Nose normal. No congestion.      Mouth/Throat:      Mouth: Mucous membranes are moist.   Eyes:      Extraocular Movements: Extraocular movements intact.      Pupils: Pupils are equal, round, and reactive to light.   Cardiovascular:      Rate and Rhythm: Normal rate and regular rhythm.      Pulses: Normal pulses.      Heart sounds: Normal heart sounds.   Pulmonary:      Effort: Pulmonary effort is normal.      Breath sounds: Normal breath sounds.   Abdominal:      General: Bowel sounds are normal.      Palpations: Abdomen is soft.      Tenderness: There is no abdominal tenderness.   Musculoskeletal:         General: No swelling. Normal range of motion.      Cervical back: Normal range of motion and neck supple.   Skin:     General: Skin is warm.      Coloration: Skin is not jaundiced.   Neurological:      Mental Status: He is alert.      Comments: Dysarthria  Left upper extremity weakness   Psychiatric:         Mood and Affect: Mood normal.         Behavior: Behavior normal.         Thought Content: Thought content normal.         Judgment: Judgment normal.         Fluids    Intake/Output Summary (Last 24 hours) at 8/21/2024 1026  Last data filed at 8/20/2024 2000  Gross per 24 hour   Intake 90 ml   Output --   Net 90 ml       Laboratory  Recent Labs     08/21/24  0614   WBC 15.9*   RBC 4.88   HEMOGLOBIN 15.1   HEMATOCRIT 46.5   MCV 95.3   MCH 30.9   MCHC 32.5   RDW 49.9   PLATELETCT 107*   MPV 10.3       Recent Labs     08/20/24  0835 08/21/24  0614   SODIUM 140 142   POTASSIUM 4.7 4.7   CHLORIDE 104 108   CO2 21 21   GLUCOSE 213* 157*   BUN 37* 45*   CREATININE 1.09 1.33    CALCIUM 9.8 9.6                         Imaging  DX-ABDOMEN FOR TUBE PLACEMENT   Final Result      Dobbhoff tube tip projects over the proximal stomach      DX-ESOPHAGUS - [id number]   Final Result      DX-CHEST-PORTABLE (1 VIEW)   Final Result         No new abnormalities have developed on portable chest radiograph since the prior examination.  No new infiltrates or consolidations are identified. Opacifications right lung apex and left lower lung again identified with no change. No new infiltrates or    consolidations.      DX-CHEST-PORTABLE (1 VIEW)   Final Result         1. No significant interval change.      [id number] VIEW   Final Result         No specific finding to suggest small bowel obstruction.      DX-ABDOMEN FOR TUBE PLACEMENT   Final Result         Feeding tube with tip projecting over the expected area of the stomach.      DX-ABDOMEN FOR TUBE PLACEMENT   Final Result         1.  Nonspecific bowel gas pattern in the upper abdomen.   2.  Dobbhoff tube tip terminates overlying the expected location of the gastric fundus.   3.  Bilateral pulmonary infiltrates, greatest in the right upper lobe.   4.  Trace bilateral pleural effusions      DX-ESOPHAGUS - [id number]   Final Result      DX-CHEST-PORTABLE (1 VIEW)   Final Result      1. Development of interstitial opacities in the lungs, either edema or interstitial infiltrates.   2. Worsening confluent opacities in the right upper lobe and lingula.   3. The remainder is stable.      CT-CTA NECK WITH & W/O-POST PROCESSING   Final Result      1. No evidence of flow-limiting stenosis in the cervical carotid or cervical vertebral arteries.   2. Partially visualized airspace opacities/mass in the right upper lobe. Infiltrated right perihilar and mediastinal lymph tavia masses.   3. Bilateral pleural effusions.      CT-CTA HEAD WITH & W/O-POST PROCESS   Final Result         1. There is a focal stenosis/occlusion in a distal branch of the right MCA with  "flow reconstitution.         Preliminary findings texted to Dr. MEMO CHAIDEZ in the Emergency Department via [employer] on 8/13/2024 9:17 AM            CT-CEREBRAL PERFUSION ANALYSIS   Final Result      1. Cerebral blood flow less than 30% possibly representing completed infarct = 0 mL.      2. T Max more than 6 seconds possibly representing combination of completed infarct and ischemia = 18 mL.      3. Mismatched volume possibly representing ischemic brain/penumbra= 18 mL      4.  Please note that this cerebral perfusion study and report is Quantitative and targets supratentorial (cerebral) perfusion for evaluation of large vessel territory acute ischemia/infarction. For example, lacunar infarcts, and brainstem/posterior fossa    ischemia/infarction are not evaluated on this study.  Data acquisition is subject to artifacts which can yield non-anatomically plausible perfusion maps which may be due to motion, bolus timing, signal to noise ratio, or other technical factors.    Perfusion map abnormalities which show non-anatomic distributions are likely artifact.   This study is not \"stand-alone\" and should only be utilized for diagnosis, management/treatment in correlation with CT, CTA, and/or MRI and clinical factors.              Assessment/Plan  * Right middle cerebral artery stroke (HCC)- (present on admission)  Assessment & Plan  Likely thromboembolic in setting of Afib and malignancy  Neurology consulted and s/o - continue apixaban, no further workup indicated  PT/OT/SLP  Likely dispo is inpatient rehab  Has persistent dysphagia and will likely need PEG tube    Dysarthria- (present on admission)  Assessment & Plan  Due to MCA stroke  SLP eval and treat    Oropharyngeal dysphagia- (present on admission)  Assessment & Plan  Due to MCA stroke  SLP eval and treat  Plan for likely inpatient rehab, may likely need PEG tube placed, consult to GI     Hyponatremia- (present on admission)  Assessment & Plan  Resolved, sodium " within normal limits    Type 2 diabetes mellitus with stage 3a chronic kidney disease, without long-term current use of insulin (HCC)- (present on admission)  Assessment & Plan  Well-controlled, A1c 6  Continue SGLT2  Initiated on atorvastatin  Continuing current treatment    Chronic systolic heart failure (HCC)- (present on admission)  Assessment & Plan  Appears euvolemic  Continue BB, SGLT2, IFRAH  Consider ACE/ARB      Primary lung adenocarcinoma (HCC)- (present on admission)  Assessment & Plan  Follows with oncologist Dr. Schulz  On study drug - held on admission per pharmacist    Stage 3a chronic kidney disease- (present on admission)  Assessment & Plan  At baseline  Avoid nephrotoxins  Repeat AM BMP    Paroxysmal atrial fibrillation (HCC)- (present on admission)  Assessment & Plan  Continue metoprolol and apixaban  Telemetry for RVR in setting of CVA       VTE prophylaxis: Eliquis    I have performed a physical exam and reviewed and updated ROS and Plan today (8/21/2024). In review of yesterday's note (8/20/2024), there are no changes except as documented above.

## 2024-08-21 NOTE — PROGRESS NOTES
Monitor Summary: SR/ST  ID 0.21 QRS 0.06 QT 0.35 with frequent PVCs, rare couplets, rare bigem, trigem 1st degree HB per strip from monitor room.

## 2024-08-21 NOTE — CARE PLAN
The patient is Stable - Low risk of patient condition declining or worsening    Shift Goals  Clinical Goals: neuro monitoring and comfort  Patient Goals: pain management  Family Goals: CINDY    Progress made toward(s) clinical / shift goals:    Problem: Knowledge Deficit - Stroke Education  Goal: Patient's knowledge of stroke and risk factors will improve  Outcome: Progressing  Note: Patient educated on relevant risk factors associated with stroke such as atrial fibrillation and HTN and how to mitigate them.      Problem: Neuro Status  Goal: Neuro status will remain stable or improve  Outcome: Progressing  Note: Q 4 neuro checks in place. Patient A&O x 4. FSI. Left facial droop and dysarthria present.      Problem: Hemodynamic Monitoring  Goal: Patient's hemodynamics, fluid balance and neurologic status will be stable or improve  Outcome: Progressing  Note: Systolic BP monitored to main systolic goal of less than 180 mmHg.        Patient is not progressing towards the following goals: N/A

## 2024-08-21 NOTE — CARE PLAN
The patient is Watcher - Medium risk of patient condition declining or worsening    Shift Goals  Clinical Goals: Neuro Checks  Patient Goals: Comfort  Family Goals: CINDY    Progress made toward(s) clinical / shift goals:    Problem: Psychosocial - Patient Condition  Goal: Patient's ability to verbalize feelings about condition will improve  Outcome: Progressing     Problem: Discharge Planning - Stroke  Goal: Ensure Stroke Core Measures are met prior to discharge  Outcome: Progressing     Problem: Neuro Status  Goal: Neuro status will remain stable or improve  Outcome: Progressing       Patient is not progressing towards the following goals:

## 2024-08-22 PROCEDURE — 770020 HCHG ROOM/CARE - TELE (206)

## 2024-08-22 PROCEDURE — 700102 HCHG RX REV CODE 250 W/ 637 OVERRIDE(OP): Performed by: STUDENT IN AN ORGANIZED HEALTH CARE EDUCATION/TRAINING PROGRAM

## 2024-08-22 PROCEDURE — A9270 NON-COVERED ITEM OR SERVICE: HCPCS | Performed by: STUDENT IN AN ORGANIZED HEALTH CARE EDUCATION/TRAINING PROGRAM

## 2024-08-22 PROCEDURE — 92526 ORAL FUNCTION THERAPY: CPT

## 2024-08-22 PROCEDURE — 99232 SBSQ HOSP IP/OBS MODERATE 35: CPT | Performed by: NURSE PRACTITIONER

## 2024-08-22 PROCEDURE — A9270 NON-COVERED ITEM OR SERVICE: HCPCS

## 2024-08-22 PROCEDURE — 99497 ADVNCD CARE PLAN 30 MIN: CPT | Performed by: NURSE PRACTITIONER

## 2024-08-22 PROCEDURE — 700102 HCHG RX REV CODE 250 W/ 637 OVERRIDE(OP)

## 2024-08-22 PROCEDURE — 99223 1ST HOSP IP/OBS HIGH 75: CPT | Mod: 25 | Performed by: NURSE PRACTITIONER

## 2024-08-22 RX ORDER — DIPHENHYDRAMINE HCL 25 MG
25 TABLET ORAL NIGHTLY PRN
Status: DISCONTINUED | OUTPATIENT
Start: 2024-08-22 | End: 2024-08-24 | Stop reason: HOSPADM

## 2024-08-22 RX ORDER — DIPHENHYDRAMINE HCL 25 MG
25 TABLET ORAL NIGHTLY PRN
Status: DISCONTINUED | OUTPATIENT
Start: 2024-08-22 | End: 2024-08-22

## 2024-08-22 RX ADMIN — ATORVASTATIN CALCIUM 20 MG: 20 TABLET, FILM COATED ORAL at 16:28

## 2024-08-22 RX ADMIN — SPIRONOLACTONE 25 MG: 25 TABLET ORAL at 04:27

## 2024-08-22 RX ADMIN — METOPROLOL TARTRATE 50 MG: 25 TABLET, FILM COATED ORAL at 04:27

## 2024-08-22 RX ADMIN — CYCLOBENZAPRINE 10 MG: 10 TABLET, FILM COATED ORAL at 20:30

## 2024-08-22 RX ADMIN — DIPHENHYDRAMINE HYDROCHLORIDE 25 MG: 25 TABLET ORAL at 20:30

## 2024-08-22 RX ADMIN — DAPAGLIFLOZIN 10 MG: 10 TABLET, FILM COATED ORAL at 04:27

## 2024-08-22 RX ADMIN — EZETIMIBE 10 MG: 10 TABLET ORAL at 04:27

## 2024-08-22 RX ADMIN — Medication 5 MG: at 20:30

## 2024-08-22 RX ADMIN — Medication 2000 UNITS: at 04:27

## 2024-08-22 RX ADMIN — DIPHENHYDRAMINE HYDROCHLORIDE 25 MG: 25 TABLET ORAL at 01:04

## 2024-08-22 RX ADMIN — METOPROLOL TARTRATE 50 MG: 25 TABLET, FILM COATED ORAL at 16:28

## 2024-08-22 RX ADMIN — SENNOSIDES AND DOCUSATE SODIUM 2 TABLET: 50; 8.6 TABLET ORAL at 16:28

## 2024-08-22 ASSESSMENT — ENCOUNTER SYMPTOMS
FEVER: 0
SORE THROAT: 1
SINUS PAIN: 1
CHILLS: 0
NAUSEA: 0
COUGH: 0
FALLS: 0
WEAKNESS: 1
SHORTNESS OF BREATH: 0
FLANK PAIN: 0
ABDOMINAL PAIN: 0
DIARRHEA: 0
MYALGIAS: 0
NERVOUS/ANXIOUS: 0
BLOOD IN STOOL: 0
CONSTIPATION: 0
VOMITING: 0
INSOMNIA: 0
FOCAL WEAKNESS: 1
HEADACHES: 0

## 2024-08-22 ASSESSMENT — FIBROSIS 4 INDEX: FIB4 SCORE: 4.8

## 2024-08-22 ASSESSMENT — PAIN DESCRIPTION - PAIN TYPE
TYPE: ACUTE PAIN
TYPE: ACUTE PAIN

## 2024-08-22 NOTE — THERAPY
Physical Therapy Contact Note    PT treatment attempted. Patient refused, reported he wants to stay in bed. Complaining of wet substance in bed, appeared to be from NG tube? RN aware. Will re attempt as able and appropriate.     Lucy Hurley, PT, DPT  [phone]

## 2024-08-22 NOTE — PROGRESS NOTES
Monitor summary: SR , CA 0.14, QRS 0.08, QT 0.36, with rare PVCs, couplets and 7 beats vtach per strip from monitor room.

## 2024-08-22 NOTE — CONSULTS
MRN: [id number]  Date of palliative consult: 2024  Reason for consult: ACP/GOC  Referring provider: Dr. Rai  Location of consult: [facility]  Additional consulting services: neurology, PM&R, GI    HPI:   Pito Black is a 79 y.o. male with medical history significant for lung cancer followed by Dr. Schulz, afib on apixaban, HFrEF 35%, AICD, CKD, HTN, HLD, DM admitted 2024 with left arm weakness and facial droop. CT-cerebral perfusion analysis with 18mm penumbra, CTA with focal stenosis and occlusion of the distal right MCA. Patient was outside of the window for thrombolytics or thrombectomy. MBSS on 2024 with continued n.p.o. status recommended, pending PEG tube placement. Palliative care was consulted to assist with overall GOC discussions.     ROS:    Review of Systems   Gastrointestinal:  Heartburn: aphasia.       PE:    Recent vital signs  BMI: Body mass index is 21.35 kg/m².    Temp (24hrs), Av.3 °C (97.4 °F), Min:36.2 °C (97.1 °F), Max:36.6 °C (97.8 °F)  Temperature: 36.3 °C (97.3 °F)  Pulse  Av.9  Min: 79  Max: 124   Blood Pressure : (!) 146/89       Physical Exam  Constitutional:       Appearance: He is underweight. He is ill-appearing.   HENT:      Nose:      Comments: NGT  Pulmonary:      Effort: Pulmonary effort is normal.   Neurological:      Mental Status: He is alert.         ASSESSMENT/PLAN WITH SHARED DECISION MAKING:   PHYSICAL ASPECTS OF CARE  Palliative Performance Scale: 50%    # Distal R MCA occlusion  # Expressive and receptive aphasia  # Dysphagia with NGT for artificial nutrition  # A-fib on Eliquis  # Lung cancer  # HTN  # Thrombocytopenia  # DM     SOCIAL ASPECTS OF CARE  Prior to admission, patient was independent in ADLs and IADLs.  Lives at home with his spouse, Cydney.  He has two adult children. Pito Jordan Lives about one minute from patient and his spouse. His daughter, Benoit, lives in [place]. He has a local adult grandson who plans to move in with pt/spouse  to assist in providing care.     SPIRITUAL ASPECTS OF CARE   Deferred during this encounter.    GOALS OF CARE/SERIOUS ILLNESS CONVERSATION  Consult received and EMR reviewed. Case discussed with MD, updates appreciated.    PC APRN met with patient and family at bedside. Introduced self, role of palliative care, and reason for consultation. Patient is dysarthric but able to communicate appropriately and discuss GOC. He and family are agreeable to consultation/ACP discussion at this time. Patient's son, Pito Jordan was included via telephone.     Assessed patient/family's understanding of current medical status, overall health picture, and options for future care. Demonstrates a good understanding of the current clinical picture.    Patient and family provided update on the social and medical history. Prior to admission patient was independent in ADLs/IADLs and acted as a caregiver for his spouse. He is quite active. He was planning to start chemotherapy as an outpatient with Dr. Schulz. Patient's daughter is interested in learning if patient is still a chemotherapy candidate. Patient does indicate at this time that he is not interested in proceeding with chemotherapy even if he were a candidate, his wife is in agreement with this. Voalte sent to patient's primary oncologist, Dr. Schulz with brief update regarding current clinical condition.      Discussed results of the MBSS indicated moderate-severe oropharyngeal dysphagia with recommendation for consideration of PEG tube for long term artificial nutrition/hydration. Discussed proceeding with PEG tube vs eating despite risk. Discussed risks of aspiration PNA, respiratory failure, and/or death if eating despite risk. Family and patient are all in agreement that they would like to proceed with PEG tube placement at this time. GI APRN notified.     Patient and family are considering IPR. Patient's children are hoping to tour [facility] prior to agreeing to discharge  "there. They are concerned that patient may not tolerate a clinical setting much longer. Alternatively, discussed discharging home with HH vs hospice. Brief discussion regarding the philosophy of hospice and services covered by the hospice medicare benefit. Patient's son, Pito Jordan, has experience with hospice and all family verbalized understanding of this. Discussed that hospice benefit does not generally cover artificial nutrition and they understand thi.     Discussed the issue of code status. Measures employed in a full code described and patient indicated that he would not want resuscitation attempted in the event of a cardiac arrest. He stated \"if life isn't going to be the same, let me go.\"  Pt and all family is in agreement with DNAR status. Additionally, discussed the issue of intubation prior to cardiac arrest. Patient stated \"no more tubes\" and is in favor of DNI status. Again, all family is in agreement with this. Confirmed that patient and family would like code status updated to reflect DNAR/DNI status at this time. POLST to be completed prior to discharge once POC determined.     Queried if pt has ever discussed healthcare wishes and values or completed an Advance Directive in the past. Patient does have an AD at home that his son will attempt to bring to bedside later today.     Active listening, reflection, reminiscing, validation & normalization, and empathic support utilized throughout this encounter.  All questions answered and contact information provided.     Addendum ([id number]): Case discussed with Dr. Schulz. Plan was to initiate chemotherapy on 8/27 given slow progression of disease on immunotherapy. Per Dr. Schulz, not a chemotherapy candidate at this time while recovering from CVA. Could be candidate following IPR depending on functional status. Hospice also reasonable if this is what the patient decides. Updates appreciated.   (3306): PC APRN returned to bedside. No family present. Update " "provided to patient regarding discussion with Dr. Schulz. Pt verbalized understanding. He shared that his family was able to tour [facility] and liked it. Patient is willing to trial rehab at this time and is excited to exercise. He stated \"I'm going to try like hell.\" MD notified.     Code Status: DNAR/DNI    ACP Documents: None    22 minutes spent discussing advance care planning, this time excludes any other billed services.    Interval diagnostic studies and medical documentation entries pertinent to this case were reviewed independently by me. This patient has at least one acute or chronic illness or injury that poses a threat to life or bodily function. This patient suffers from a high risk of morbidity from additional invasive diagnostic testing or intensive treatment. Discussion of recommendations and coordination of care undertaken with primary provider/treatment team.      DON Smith  Palliative Care Nurse Practitioner   [phone]    "

## 2024-08-22 NOTE — DISCHARGE PLANNING
Following for possible PEG placement.     1258-PEG planned for tomorrow am @ this time.  Spoke with Benoit, daughter to discuss [facility] & D/C resources/support.  She stated that she and possibly her family would like to take a tour of [facility].  Info provided to her.  She will call me back once they have toured so we may discuss further plans.

## 2024-08-22 NOTE — DISCHARGE PLANNING
"TCN following. HTH/SCP chart reviewed. No new TCN needs identified.  Discharge consideration are 1) IRF.  Noted per TCC note, \"Following for possible PEG placement.\" Per MD note on 8/21, \"Will plan for PEG tube placement on 8/23/2024.\" Per palliative consult on 8/22, \" Palliative care was consulted to assist with overall GOC discussions.\"     Completed:  PT/OT/SLP  recommends post acute placement - 8/20   Choice obtained: IRF, HH & DME (AD).  Pt aware of [facility] blanket referral policy; [place] [facility] accepted, 3 pending  "

## 2024-08-22 NOTE — PROGRESS NOTES
Monitor Summary: SR/ST , IL 0.14, QRS 0.10, QT 0.40, with occasional/rare PVCs, rare couplets, and rare bigeminy per strip from monitor room.

## 2024-08-22 NOTE — PROGRESS NOTES
Gastroenterology Progress Note               Author:  JILL Keller Date & Time Created: 8/22/2024 11:20 AM       Patient ID:  Name:             Pito Black  YOB: 1945  Age:                 79 y.o.  male  MRN:               [id number]        Medical Decision Making, by Problem:  Active Hospital Problems    Diagnosis     Right middle cerebral artery stroke (HCC) [I63.511]     Hyponatremia [E87.1]     Oropharyngeal dysphagia [R13.12]     Dysarthria [R47.1]     Type 2 diabetes mellitus with stage 3a chronic kidney disease, without long-term current use of insulin (HCC) [E11.22, N18.31]     Chronic systolic heart failure (HCC) [I50.22]     Primary lung adenocarcinoma (HCC) [C34.90]     Stage 3a chronic kidney disease [N18.31]     Paroxysmal atrial fibrillation (HCC) [I48.0]            Presenting Chief Complaint:  PEG Tube placement for dysphagia     History of Present Illness: This is a 79-year-old male with a past medical history including lung cancer (followed by Dr. Sukhwinder davison), atrial fibrillation on apixaban, AICD in situ, pulmonary embolism (2023), type 2 diabetes mellitus, CKD, heart failure reduced ejection fraction (EF 35-45), hyperlipidemia who presented to [facility] on 8/13/2024 following acute onset of left facial droop and dysphagia.  Patient found to have right MCA CVA noted on head CT.  Neurology evaluated patient for which patient was deemed outside of tPA window.  He was started on Eliquis and no further workup was recommended by neurology as CVA was likely secondary to cardioembolic etiology in the setting of chronic atrial fibrillation.  During admission, patient has been evaluated by speech-language pathology.  Videofluoroscopic swallow study evaluation done 8/20/2024 showed moderate-severe oropharyngeal dysphagia.  Swallow function has not improved significantly from the first MBSS on 8/14/2024.  PEG tube recommended         Interval  History:  8/22/2024: Patient seen at bedside with family.  NG tube in place with tube feeding in progress without difficulty.  Patient is more awake today dysarthric but communicates appropriately.  Per primary team, palliative care to see patient today to discuss goals of care.  Will plan for PEG tube placement tomorrow unless there are any changes after discussion with palliative care.        Hospital Medications:  Current Facility-Administered Medications   Medication Dose Frequency Provider Last Rate Last Admin    diphenhydrAMINE (Benadryl) tablet/capsule 25 mg  25 mg HS PRN Sergio Rai M.D.        melatonin tablet 5 mg  5 mg Nightly Sergio Rai M.D.   5 mg at 08/21/24 2207    senna-docusate (Pericolace Or Senokot S) 8.6-50 MG per tablet 2 Tablet  2 Tablet Q EVENING Sergio Rai M.D.   2 Tablet at 08/21/24 1656    And    polyethylene glycol/lytes (Miralax) Packet 1 Packet  1 Packet QDAY PRN Sergio Rai M.D.        acetaminophen (Tylenol) tablet 1,000 mg  1,000 mg TID PRN Felipe Alicia M.D.   1,000 mg at 08/21/24 0824    metoprolol tartrate (Lopressor) tablet 50 mg  50 mg BID Felipe Alicia M.D.   50 mg at 08/22/24 0427    Pharmacy Consult: Enteral tube insertion - review meds/change route/product selection   PHARMACY TO DOSE NATALYA GonzalesN.P.        [Held by provider] apixaban (Eliquis) tablet 5 mg  5 mg BID NATALYA GonzalesN.P.   5 mg at 08/21/24 0505    atorvastatin (Lipitor) tablet 20 mg  20 mg Q EVENING UNA Gonzales.N.P.   20 mg at 08/21/24 1657    dapagliflozin propanediol (Farxiga) tablet 10 mg  10 mg DAILY NATALYA GonzalesN.P.   10 mg at 08/22/24 0427    ezetimibe (Zetia) tablet 10 mg  10 mg DAILY NATALYA GonzalesN.P.   10 mg at 08/22/24 0427    spironolactone (Aldactone) tablet 25 mg  25 mg DAILY Felipe Alicia M.D.   25 mg at 08/22/24 0427    vitamin D3 (Cholecalciferol) tablet 2,000 Units  2,000 Units DAILY NATALYA GonzalesN.P.   2,000  "Units at 08/22/24 0427    cyclobenzaprine (Flexeril) tablet 10 mg  10 mg QHS PRN NATALYA GonzalesNHeladioP.   10 mg at 08/21/24 2207    ondansetron (Zofran ODT) dispertab 4 mg  4 mg Q4HRS PRN NATALYA GonzalesN.P.        vericiguat or PLACEBO (STUDY DRUG) 10 MG tablet 10 mg  10 mg DAILY Felipe Alicia M.D.   10 mg at 08/22/24 0427    diclofenac sodium (Voltaren) 1 % gel 2 g  2 g 4X/DAY PRN González Mena M.D.        ondansetron (Zofran) syringe/vial injection 4 mg  4 mg Q4HRS PRN González Mena M.D.   4 mg at 08/18/24 1116   Last reviewed on 8/13/2024 10:04 AM by Angelic Magallon T       Review of Systems:  Review of Systems   Constitutional:  Negative for chills, fever and malaise/fatigue.   HENT:  Positive for sinus pain (Secondary to NG tube) and sore throat (Secondary to NG tube).    Respiratory:  Negative for cough and shortness of breath.    Cardiovascular:  Negative for chest pain and leg swelling.   Gastrointestinal:  Negative for abdominal pain, blood in stool, constipation, diarrhea, melena, nausea and vomiting.   Genitourinary:  Negative for dysuria and flank pain.   Musculoskeletal:  Negative for falls and myalgias.   Neurological:  Positive for focal weakness and weakness. Negative for headaches.   Psychiatric/Behavioral:  The patient is not nervous/anxious and does not have insomnia.    All other systems reviewed and are negative.        Vital signs:  Weight/BMI: Body mass index is 21.35 kg/m².  BP (!) 146/89   Pulse 92   Temp 36.3 °C (97.3 °F) (Temporal)   Resp 17   Ht 1.676 m (5' 6\")   Wt 60 kg (132 lb 4.4 oz)   SpO2 91%   Vitals:    08/22/24 0321 08/22/24 0324 08/22/24 0426 08/22/24 0718   BP: (!) 146/77  (!) 145/77 (!) 146/89   Pulse: (!) 101  (!) 112 92   Resp: 16   17   Temp: 36.2 °C (97.1 °F)   36.3 °C (97.3 °F)   TempSrc: Temporal   Temporal   SpO2: 94%   91%   Weight:  60 kg (132 lb 4.4 oz)     Height:         Oxygen Therapy:  Pulse Oximetry: 91 %, O2 (LPM): 0, O2 Delivery " Device: None - Room Air    Intake/Output Summary (Last 24 hours) at 8/22/2024 1120  Last data filed at 8/22/2024 1057  Gross per 24 hour   Intake 90 ml   Output --   Net 90 ml         Physical Exam:  Physical Exam  Vitals and nursing note reviewed.   Constitutional:       General: He is awake. He is not in acute distress.     Appearance: Normal appearance. He is well-groomed. He is ill-appearing.   HENT:      Head: Normocephalic and atraumatic.      Nose: Nose normal. No congestion.      Comments: NG tube in place with tube feed in progress without difficulty  Eyes:      Extraocular Movements: Extraocular movements intact.      Conjunctiva/sclera: Conjunctivae normal.   Cardiovascular:      Rate and Rhythm: Normal rate and regular rhythm.      Pulses: Normal pulses.      Heart sounds: Normal heart sounds.   Pulmonary:      Effort: Pulmonary effort is normal.   Abdominal:      General: Abdomen is flat. Bowel sounds are normal. There is no distension.      Palpations: Abdomen is soft.      Tenderness: There is no abdominal tenderness. There is no guarding.   Musculoskeletal:      Right lower leg: No edema.      Left lower leg: No edema.   Skin:     General: Skin is warm and dry.      Capillary Refill: Capillary refill takes less than 2 seconds.      Coloration: Skin is not jaundiced.   Neurological:      Mental Status: He is alert and oriented to person, place, and time.      Motor: Weakness present.   Psychiatric:         Mood and Affect: Mood normal.         Behavior: Behavior normal. Behavior is cooperative.             Labs:  Recent Labs     08/20/24  0835 08/21/24  0614   SODIUM 140 142   POTASSIUM 4.7 4.7   CHLORIDE 104 108   CO2 21 21   BUN 37* 45*   CREATININE 1.09 1.33   MAGNESIUM 2.7*  --    PHOSPHORUS 4.1  --    CALCIUM 9.8 9.6     Recent Labs     08/19/24  1124 08/20/24  0835 08/21/24  0614   ALTSGPT  --  48  --    ASTSGOT  --  45  --    ALKPHOSPHAT  --  94  --    TBILIRUBIN  --  0.6  --    PREALBUMIN  13.7*  --   --    GLUCOSE  --  213* 157*     Recent Labs     08/20/24  0835 08/21/24  0614   WBC  --  15.9*   ASTSGOT 45  --    ALTSGPT 48  --    ALKPHOSPHAT 94  --    TBILIRUBIN 0.6  --      Recent Labs     08/21/24  0614   RBC 4.88   HEMOGLOBIN 15.1   HEMATOCRIT 46.5   PLATELETCT 107*     No results found for this or any previous visit (from the past 24 hour(s)).    Radiology Review:  DX-ABDOMEN FOR TUBE PLACEMENT   Final Result      Dobbhoff tube tip projects over the proximal stomach      DX-ESOPHAGUS - [id number]   Final Result      DX-CHEST-PORTABLE (1 VIEW)   Final Result         No new abnormalities have developed on portable chest radiograph since the prior examination.  No new infiltrates or consolidations are identified. Opacifications right lung apex and left lower lung again identified with no change. No new infiltrates or    consolidations.      DX-CHEST-PORTABLE (1 VIEW)   Final Result         1. No significant interval change.      [id number] VIEW   Final Result         No specific finding to suggest small bowel obstruction.      DX-ABDOMEN FOR TUBE PLACEMENT   Final Result         Feeding tube with tip projecting over the expected area of the stomach.      DX-ABDOMEN FOR TUBE PLACEMENT   Final Result         1.  Nonspecific bowel gas pattern in the upper abdomen.   2.  Dobbhoff tube tip terminates overlying the expected location of the gastric fundus.   3.  Bilateral pulmonary infiltrates, greatest in the right upper lobe.   4.  Trace bilateral pleural effusions      DX-ESOPHAGUS - [id number]   Final Result      DX-CHEST-PORTABLE (1 VIEW)   Final Result      1. Development of interstitial opacities in the lungs, either edema or interstitial infiltrates.   2. Worsening confluent opacities in the right upper lobe and lingula.   3. The remainder is stable.      CT-CTA NECK WITH & W/O-POST PROCESSING   Final Result      1. No evidence of flow-limiting stenosis in the cervical carotid or cervical  "vertebral arteries.   2. Partially visualized airspace opacities/mass in the right upper lobe. Infiltrated right perihilar and mediastinal lymph tavia masses.   3. Bilateral pleural effusions.      CT-CTA HEAD WITH & W/O-POST PROCESS   Final Result         1. There is a focal stenosis/occlusion in a distal branch of the right MCA with flow reconstitution.         Preliminary findings texted to Dr. MEMO CHAIDEZ in the Emergency Department via Voalte on 8/13/2024 9:17 AM            CT-CEREBRAL PERFUSION ANALYSIS   Final Result      1. Cerebral blood flow less than 30% possibly representing completed infarct = 0 mL.      2. T Max more than 6 seconds possibly representing combination of completed infarct and ischemia = 18 mL.      3. Mismatched volume possibly representing ischemic brain/penumbra= 18 mL      4.  Please note that this cerebral perfusion study and report is Quantitative and targets supratentorial (cerebral) perfusion for evaluation of large vessel territory acute ischemia/infarction. For example, lacunar infarcts, and brainstem/posterior fossa    ischemia/infarction are not evaluated on this study.  Data acquisition is subject to artifacts which can yield non-anatomically plausible perfusion maps which may be due to motion, bolus timing, signal to noise ratio, or other technical factors.    Perfusion map abnormalities which show non-anatomic distributions are likely artifact.   This study is not \"stand-alone\" and should only be utilized for diagnosis, management/treatment in correlation with CT, CTA, and/or MRI and clinical factors.               MDM (Data Review):   -Records reviewed and summarized in current documentation  -I personally reviewed and interpreted the laboratory results  -I personally reviewed the radiology images    Assessment/Recommendations:  Impressions:  Moderate-severe oropharyngeal dysphagia  S/p right MCA CVA  Atrial fibrillation on Eliquis-last dose 8/21/2024 0500 hour  Hx of " lung cancer   AICD in situ   type 2 diabetes mellitus  CKD  heart failure reduced ejection fraction (EF 35-45)  hyperlipidemia      Recommendations:  Continue to hold apixaban  Continue tube feeds; NPO at MN  Will plan for PEG tube placement on 8/23/2024    Discussed with patient, family at bedside, Dr. Rai, Dr. Meadows      Core Quality Measures   Reviewed items::  Labs, Medications and Radiology reports reviewed

## 2024-08-22 NOTE — CARE PLAN
The patient is Watcher - Medium risk of patient condition declining or worsening    Shift Goals  Clinical Goals: Stable Neuros, Monitor for aspiration  Patient Goals: Ice chips  Family Goals: CINDY    Progress made toward(s) clinical / shift goals:    Problem: Neuro Status  Goal: Neuro status will remain stable or improve  Outcome: Progressing     Problem: Discharge Planning - Stroke  Goal: Ensure Stroke Core Measures are met prior to discharge  Outcome: Progressing     Problem: Bowel Elimination  Goal: Establish and maintain regular bowel function  Outcome: Progressing       Patient is not progressing towards the following goals:

## 2024-08-22 NOTE — THERAPY
Speech Language Pathology   Daily Treatment     Patient Name: Pito Black  AGE:  79 y.o., SEX:  male  Medical Record #: [id number]  Date of Service: 8/22/2024    Precautions: Fall Risk, Swallow Precautions, Nasogastric Tube      Subjective  Patient seen on this date for dysphagia intervention. Patient independently repositioned self to EOB for session. Dysarthria persists but oriented. Patient eager for PO trials and participatory in therapeutic objectives.       Assessment  PO trials consisted of 12-13 trials of single ice chips with cues for 3 effortful swallows per bolus. Patient had intermittent increase in wet vocal quality but able to achieve a clear voice with cued throat clear or cough followed by swallow. Patient completed 10 reps falsetto with fair-good accuracy given min-mod verbal cues. Patient completed 5 reps tongue pull back and 10 reps x3 of CTAR with good accuracy given min A.       Clinical Impressions  Patient is presenting with clinical signs of moderate-severe oropharyngeal dysphagia which is consistent with results of MBSS completed on 8/20/24. Pt NPO at midnight for planned PEG tube placement on 8/23/24. SLP will continue to follow for ongoing cognitive-linguistic and dysphagia intervention as appropriate.       Recommendations  Diet Consistency: NPO/NGT (OK for 10-15 single ice chips following oral care)  Instrumentation: Instrumental swallow study pending clinical progress  Medication: Non Oral  Supervision: 1:1 feeding with constant supervision (ice chips)  Positioning: Fully upright and midline during oral intake  Oral Care: Q2h      SLP Treatment Plan  Treatment Plan: Dysphagia Treatment, Speech-Language Treatment, Cognitive Treatment, Patient/Family/Caregiver Training  SLP Frequency: 4x Per Week  Estimated Duration: Until Therapy Goals Met        Anticipated Discharge Needs  Discharge Recommendations: Recommend post-acute placement for additional speech therapy services prior to  "discharge home  Therapy Recommendations Upon DC: Dysphagia Training, Patient / Family / Caregiver Education      Patient / Family Goals  Patient / Family Goal #1: \"I want more\" to applesauce  Goal #1 Outcome: Progressing slower than expected  Short Term Goals  Short Term Goal # 2: Pt will consume prefeeding trials with no overt s/sx of aspiration  Goal Outcome # 2 : Progressing slower than expected  Short Term Goal # 3: Pt will complete 50 reps of pharyngeal squeeze, base of tongue retraction, pharyngeal shortening exercises with good accuracy and min cueing  Goal Outcome  # 3: Progressing as expected      Marlin Gold SLP  "

## 2024-08-23 ENCOUNTER — ANESTHESIA EVENT (OUTPATIENT)
Dept: SURGERY | Facility: MEDICAL CENTER | Age: 79
DRG: 065 | End: 2024-08-23
Payer: MEDICARE

## 2024-08-23 ENCOUNTER — APPOINTMENT (OUTPATIENT)
Dept: RADIOLOGY | Facility: MEDICAL CENTER | Age: 79
DRG: 065 | End: 2024-08-23
Attending: STUDENT IN AN ORGANIZED HEALTH CARE EDUCATION/TRAINING PROGRAM
Payer: MEDICARE

## 2024-08-23 ENCOUNTER — ANESTHESIA (OUTPATIENT)
Dept: SURGERY | Facility: MEDICAL CENTER | Age: 79
DRG: 065 | End: 2024-08-23
Payer: MEDICARE

## 2024-08-23 ENCOUNTER — PATIENT OUTREACH (OUTPATIENT)
Dept: HEALTH INFORMATION MANAGEMENT | Facility: OTHER | Age: 79
End: 2024-08-23
Payer: MEDICARE

## 2024-08-23 DIAGNOSIS — E11.59 HYPERTENSION ASSOCIATED WITH TYPE 2 DIABETES MELLITUS (HCC): ICD-10-CM

## 2024-08-23 DIAGNOSIS — I15.2 HYPERTENSION ASSOCIATED WITH TYPE 2 DIABETES MELLITUS (HCC): ICD-10-CM

## 2024-08-23 LAB — EKG IMPRESSION: NORMAL

## 2024-08-23 PROCEDURE — 160201 HCHG ENDO MINUTES - 1ST 30 MINS LEVEL 2: Performed by: INTERNAL MEDICINE

## 2024-08-23 PROCEDURE — 160035 HCHG PACU - 1ST 60 MINS PHASE I: Performed by: INTERNAL MEDICINE

## 2024-08-23 PROCEDURE — 700111 HCHG RX REV CODE 636 W/ 250 OVERRIDE (IP): Performed by: STUDENT IN AN ORGANIZED HEALTH CARE EDUCATION/TRAINING PROGRAM

## 2024-08-23 PROCEDURE — 160048 HCHG OR STATISTICAL LEVEL 1-5: Performed by: INTERNAL MEDICINE

## 2024-08-23 PROCEDURE — A9270 NON-COVERED ITEM OR SERVICE: HCPCS

## 2024-08-23 PROCEDURE — 43246 EGD PLACE GASTROSTOMY TUBE: CPT | Performed by: INTERNAL MEDICINE

## 2024-08-23 PROCEDURE — 700111 HCHG RX REV CODE 636 W/ 250 OVERRIDE (IP): Mod: JZ | Performed by: STUDENT IN AN ORGANIZED HEALTH CARE EDUCATION/TRAINING PROGRAM

## 2024-08-23 PROCEDURE — 770020 HCHG ROOM/CARE - TELE (206)

## 2024-08-23 PROCEDURE — A9270 NON-COVERED ITEM OR SERVICE: HCPCS | Performed by: STUDENT IN AN ORGANIZED HEALTH CARE EDUCATION/TRAINING PROGRAM

## 2024-08-23 PROCEDURE — 700105 HCHG RX REV CODE 258: Performed by: STUDENT IN AN ORGANIZED HEALTH CARE EDUCATION/TRAINING PROGRAM

## 2024-08-23 PROCEDURE — 93005 ELECTROCARDIOGRAM TRACING: CPT | Performed by: STUDENT IN AN ORGANIZED HEALTH CARE EDUCATION/TRAINING PROGRAM

## 2024-08-23 PROCEDURE — 700102 HCHG RX REV CODE 250 W/ 637 OVERRIDE(OP)

## 2024-08-23 PROCEDURE — 160002 HCHG RECOVERY MINUTES (STAT): Performed by: INTERNAL MEDICINE

## 2024-08-23 PROCEDURE — 160009 HCHG ANES TIME/MIN: Performed by: INTERNAL MEDICINE

## 2024-08-23 PROCEDURE — 700102 HCHG RX REV CODE 250 W/ 637 OVERRIDE(OP): Performed by: STUDENT IN AN ORGANIZED HEALTH CARE EDUCATION/TRAINING PROGRAM

## 2024-08-23 PROCEDURE — 700101 HCHG RX REV CODE 250: Performed by: STUDENT IN AN ORGANIZED HEALTH CARE EDUCATION/TRAINING PROGRAM

## 2024-08-23 PROCEDURE — 71045 X-RAY EXAM CHEST 1 VIEW: CPT

## 2024-08-23 PROCEDURE — 99497 ADVNCD CARE PLAN 30 MIN: CPT | Performed by: NURSE PRACTITIONER

## 2024-08-23 PROCEDURE — 0DH63UZ INSERTION OF FEEDING DEVICE INTO STOMACH, PERCUTANEOUS APPROACH: ICD-10-PCS | Performed by: INTERNAL MEDICINE

## 2024-08-23 RX ORDER — OXYCODONE HCL 5 MG/5 ML
5 SOLUTION, ORAL ORAL
Status: DISCONTINUED | OUTPATIENT
Start: 2024-08-23 | End: 2024-08-23 | Stop reason: HOSPADM

## 2024-08-23 RX ORDER — DIPHENHYDRAMINE HYDROCHLORIDE 50 MG/ML
12.5 INJECTION INTRAMUSCULAR; INTRAVENOUS
Status: DISCONTINUED | OUTPATIENT
Start: 2024-08-23 | End: 2024-08-23 | Stop reason: HOSPADM

## 2024-08-23 RX ORDER — SODIUM CHLORIDE, SODIUM LACTATE, POTASSIUM CHLORIDE, AND CALCIUM CHLORIDE .6; .31; .03; .02 G/100ML; G/100ML; G/100ML; G/100ML
500 INJECTION, SOLUTION INTRAVENOUS ONCE
Status: COMPLETED | OUTPATIENT
Start: 2024-08-23 | End: 2024-08-23

## 2024-08-23 RX ORDER — OXYCODONE HCL 5 MG/5 ML
10 SOLUTION, ORAL ORAL
Status: DISCONTINUED | OUTPATIENT
Start: 2024-08-23 | End: 2024-08-23 | Stop reason: HOSPADM

## 2024-08-23 RX ORDER — HALOPERIDOL 5 MG/ML
1 INJECTION INTRAMUSCULAR
Status: DISCONTINUED | OUTPATIENT
Start: 2024-08-23 | End: 2024-08-23 | Stop reason: HOSPADM

## 2024-08-23 RX ORDER — LIDOCAINE HYDROCHLORIDE 20 MG/ML
INJECTION, SOLUTION EPIDURAL; INFILTRATION; INTRACAUDAL; PERINEURAL PRN
Status: DISCONTINUED | OUTPATIENT
Start: 2024-08-23 | End: 2024-08-23 | Stop reason: SURG

## 2024-08-23 RX ORDER — METOPROLOL TARTRATE 50 MG
50 TABLET ORAL 2 TIMES DAILY
Qty: 60 TABLET | Refills: 0 | Status: SHIPPED | OUTPATIENT
Start: 2024-08-23 | End: 2024-08-23

## 2024-08-23 RX ORDER — MORPHINE SULFATE 4 MG/ML
2 INJECTION INTRAVENOUS
Status: DISCONTINUED | OUTPATIENT
Start: 2024-08-23 | End: 2024-08-24 | Stop reason: HOSPADM

## 2024-08-23 RX ORDER — HYDROMORPHONE HYDROCHLORIDE 1 MG/ML
0.1 INJECTION, SOLUTION INTRAMUSCULAR; INTRAVENOUS; SUBCUTANEOUS
Status: DISCONTINUED | OUTPATIENT
Start: 2024-08-23 | End: 2024-08-23 | Stop reason: HOSPADM

## 2024-08-23 RX ORDER — EPHEDRINE SULFATE 50 MG/ML
5 INJECTION, SOLUTION INTRAVENOUS
Status: DISCONTINUED | OUTPATIENT
Start: 2024-08-23 | End: 2024-08-23 | Stop reason: HOSPADM

## 2024-08-23 RX ORDER — CEFAZOLIN SODIUM 1 G/3ML
INJECTION, POWDER, FOR SOLUTION INTRAMUSCULAR; INTRAVENOUS PRN
Status: DISCONTINUED | OUTPATIENT
Start: 2024-08-23 | End: 2024-08-23 | Stop reason: SURG

## 2024-08-23 RX ORDER — HYDROMORPHONE HYDROCHLORIDE 1 MG/ML
0.2 INJECTION, SOLUTION INTRAMUSCULAR; INTRAVENOUS; SUBCUTANEOUS
Status: DISCONTINUED | OUTPATIENT
Start: 2024-08-23 | End: 2024-08-23 | Stop reason: HOSPADM

## 2024-08-23 RX ORDER — SODIUM CHLORIDE, SODIUM LACTATE, POTASSIUM CHLORIDE, CALCIUM CHLORIDE 600; 310; 30; 20 MG/100ML; MG/100ML; MG/100ML; MG/100ML
INJECTION, SOLUTION INTRAVENOUS
Status: DISCONTINUED | OUTPATIENT
Start: 2024-08-23 | End: 2024-08-23 | Stop reason: SURG

## 2024-08-23 RX ORDER — ATORVASTATIN CALCIUM 20 MG/1
20 TABLET, FILM COATED ORAL EVERY EVENING
Status: ON HOLD
Start: 2024-08-23

## 2024-08-23 RX ORDER — SODIUM CHLORIDE, SODIUM LACTATE, POTASSIUM CHLORIDE, CALCIUM CHLORIDE 600; 310; 30; 20 MG/100ML; MG/100ML; MG/100ML; MG/100ML
INJECTION, SOLUTION INTRAVENOUS CONTINUOUS
Status: ACTIVE | OUTPATIENT
Start: 2024-08-23 | End: 2024-08-23

## 2024-08-23 RX ORDER — ONDANSETRON 2 MG/ML
4 INJECTION INTRAMUSCULAR; INTRAVENOUS
Status: DISCONTINUED | OUTPATIENT
Start: 2024-08-23 | End: 2024-08-23 | Stop reason: HOSPADM

## 2024-08-23 RX ORDER — HYDRALAZINE HYDROCHLORIDE 20 MG/ML
5 INJECTION INTRAMUSCULAR; INTRAVENOUS
Status: DISCONTINUED | OUTPATIENT
Start: 2024-08-23 | End: 2024-08-23 | Stop reason: HOSPADM

## 2024-08-23 RX ORDER — HYDROMORPHONE HYDROCHLORIDE 1 MG/ML
0.4 INJECTION, SOLUTION INTRAMUSCULAR; INTRAVENOUS; SUBCUTANEOUS
Status: DISCONTINUED | OUTPATIENT
Start: 2024-08-23 | End: 2024-08-23 | Stop reason: HOSPADM

## 2024-08-23 RX ORDER — SODIUM CHLORIDE, SODIUM LACTATE, POTASSIUM CHLORIDE, CALCIUM CHLORIDE 600; 310; 30; 20 MG/100ML; MG/100ML; MG/100ML; MG/100ML
INJECTION, SOLUTION INTRAVENOUS CONTINUOUS
Status: DISCONTINUED | OUTPATIENT
Start: 2024-08-23 | End: 2024-08-23 | Stop reason: HOSPADM

## 2024-08-23 RX ORDER — ALBUTEROL SULFATE 5 MG/ML
2.5 SOLUTION RESPIRATORY (INHALATION)
Status: DISCONTINUED | OUTPATIENT
Start: 2024-08-23 | End: 2024-08-23 | Stop reason: HOSPADM

## 2024-08-23 RX ADMIN — PROPOFOL 70 MG: 10 INJECTION, EMULSION INTRAVENOUS at 09:35

## 2024-08-23 RX ADMIN — Medication 5 MG: at 20:28

## 2024-08-23 RX ADMIN — DAPAGLIFLOZIN 10 MG: 10 TABLET, FILM COATED ORAL at 04:37

## 2024-08-23 RX ADMIN — ONDANSETRON 4 MG: 2 INJECTION INTRAMUSCULAR; INTRAVENOUS at 15:01

## 2024-08-23 RX ADMIN — Medication 2000 UNITS: at 04:36

## 2024-08-23 RX ADMIN — EZETIMIBE 10 MG: 10 TABLET ORAL at 04:37

## 2024-08-23 RX ADMIN — SENNOSIDES AND DOCUSATE SODIUM 2 TABLET: 50; 8.6 TABLET ORAL at 17:37

## 2024-08-23 RX ADMIN — ACETAMINOPHEN 1000 MG: 500 TABLET ORAL at 17:42

## 2024-08-23 RX ADMIN — MORPHINE SULFATE 2 MG: 4 INJECTION, SOLUTION INTRAMUSCULAR; INTRAVENOUS at 15:01

## 2024-08-23 RX ADMIN — METOPROLOL TARTRATE 50 MG: 25 TABLET, FILM COATED ORAL at 17:37

## 2024-08-23 RX ADMIN — SODIUM CHLORIDE, POTASSIUM CHLORIDE, SODIUM LACTATE AND CALCIUM CHLORIDE 500 ML: 600; 310; 30; 20 INJECTION, SOLUTION INTRAVENOUS at 18:06

## 2024-08-23 RX ADMIN — PROPOFOL 125 MCG/KG/MIN: 10 INJECTION, EMULSION INTRAVENOUS at 09:36

## 2024-08-23 RX ADMIN — SPIRONOLACTONE 25 MG: 25 TABLET ORAL at 04:37

## 2024-08-23 RX ADMIN — MORPHINE SULFATE 2 MG: 4 INJECTION, SOLUTION INTRAMUSCULAR; INTRAVENOUS at 12:04

## 2024-08-23 RX ADMIN — SODIUM CHLORIDE, POTASSIUM CHLORIDE, SODIUM LACTATE AND CALCIUM CHLORIDE: 600; 310; 30; 20 INJECTION, SOLUTION INTRAVENOUS at 09:32

## 2024-08-23 RX ADMIN — LIDOCAINE HYDROCHLORIDE 30 MG: 20 INJECTION, SOLUTION EPIDURAL; INFILTRATION; INTRACAUDAL at 09:35

## 2024-08-23 RX ADMIN — METOPROLOL TARTRATE 50 MG: 25 TABLET, FILM COATED ORAL at 04:37

## 2024-08-23 RX ADMIN — ATORVASTATIN CALCIUM 20 MG: 20 TABLET, FILM COATED ORAL at 17:38

## 2024-08-23 RX ADMIN — CEFAZOLIN 2 G: 1 INJECTION, POWDER, FOR SOLUTION INTRAMUSCULAR; INTRAVENOUS at 09:36

## 2024-08-23 ASSESSMENT — PAIN DESCRIPTION - PAIN TYPE
TYPE: SURGICAL PAIN
TYPE: ACUTE PAIN;SURGICAL PAIN
TYPE: ACUTE PAIN
TYPE: ACUTE PAIN
TYPE: SURGICAL PAIN
TYPE: ACUTE PAIN

## 2024-08-23 ASSESSMENT — ENCOUNTER SYMPTOMS
EYES NEGATIVE: 1
NEUROLOGICAL NEGATIVE: 1
RESPIRATORY NEGATIVE: 1
CARDIOVASCULAR NEGATIVE: 1
GASTROINTESTINAL NEGATIVE: 1
MUSCULOSKELETAL NEGATIVE: 1
PSYCHIATRIC NEGATIVE: 1
CONSTITUTIONAL NEGATIVE: 1

## 2024-08-23 ASSESSMENT — FIBROSIS 4 INDEX: FIB4 SCORE: 4.8

## 2024-08-23 ASSESSMENT — PAIN SCALES - GENERAL: PAIN_LEVEL: 3

## 2024-08-23 ASSESSMENT — PAIN SCALES - WONG BAKER: WONGBAKER_NUMERICALRESPONSE: HURTS JUST A LITTLE BIT

## 2024-08-23 NOTE — DISCHARGE SUMMARY
Discharge Summary    CHIEF COMPLAINT ON ADMISSION  Chief Complaint   Patient presents with    Possible Stroke     LKW 2100. PT woke up at 0530 and expressed difficulties swallowing. Upon arrival to ED PT has left sided facial droop, slurred speech, and decreased left  strength. Aox4. Stroke Assessment called to Charge Desk. Hx of esophageal CA.        Reason for Admission  Difficulty swallowing     Admission Date  8/13/2024    CODE STATUS  DNAR/DNI    HPI & HOSPITAL COURSE  79 y.o. male with lung cancer followed by Luz Maria Jules on apixaban who presented 8/13/2024 with left facial droop and dysphagia secondary to right MCA CVA noted on head CTA. Neurology following for which patient was deemed outside of tPA window. He was restarted on Eliquis and no further workup recommended by neurology as CVA likely secondary to cardioembolic etiologies in the setting of chronic atrial fibrillation. He was eval by speech pathology for which she was noted for aspiration on exam and he was placed n.p.o. and core track was placed on 8/15/2024. Prior to admission patient was on antineoplastic trial medicine which has been held while inpatient.  Neurology was consulted, recommended continued apixaban without further workup indicated.  PT/OT/SLP was ordered, patient had persistent dysphagia and ultimately required PEG tube placement.  Discussed with family palliative consult prior to PEG tube placement which they were agreeable to however ultimately decided to proceed with PEG tube and pursuit of rehab.  Patient had some postoperative pain and tachycardia which has improved.  On day of discharge patient was feeling improved and was ready to transfer to acute inpatient rehab.    Therefore, he is discharged in fair and stable condition to an inpatient rehabilitation hospital.    The patient met 2-midnight criteria for an inpatient stay at the time of discharge.    Discharge Date  8/24    FOLLOW UP ITEMS POST DISCHARGE  Follow-up  with stroke clinic, PCP, oncology  DISCHARGE DIAGNOSES  Principal Problem:    Right middle cerebral artery stroke (HCC) (POA: Yes)  Active Problems:    Paroxysmal atrial fibrillation (HCC) (POA: Yes)    Stage 3a chronic kidney disease (POA: Yes)    Primary lung adenocarcinoma (HCC) (POA: Yes)    Chronic systolic heart failure (HCC) (POA: Yes)    Type 2 diabetes mellitus with stage 3a chronic kidney disease, without long-term current use of insulin (HCC) (POA: Yes)      Overview: This is a chronic condition.      Current medications:      Insulin:       Biguanide: metformin er 750 mg daily      GLP1-RA:        SGLT-2i:    farxiga 10 mg       DPP4-I:       TZD:       Pk:      Sulfonyluria:             Last A1c: 6.0% 12/23/23;   6.5% 6/2/23      Last Microalb/Cr ratio: 6/2/23      Fasting sugars:      Last diabetic foot exam: 6/19/23      Last retinal eye exam: 10/12/23      ACEi/ARB?       Statin? zetia 10 mg daily      Aspirin? eliquis 5 mg BID      Concomitant HTN? Spironolactone 25 mg daily; metoprolol sr 50 mg daily      Nightly foot checks? encouraged          Hyponatremia (POA: Yes)    Oropharyngeal dysphagia (POA: Yes)    Dysarthria (POA: Yes)  Resolved Problems:    * No resolved hospital problems. *      FOLLOW UP  Future Appointments   Date Time Provider Department Center   9/9/2024 12:45 PM JILL Clark None   9/9/2024  2:00 PM RESEARCH COORDINATOR - JORGE MCMILLAN None   9/13/2024 11:00 AM [facility] [employer] [facility]   9/14/2024  2:15 PM [facility] [employer] [facility]   9/18/2024 11:00 AM COREY Middleton None   9/26/2024 11:40 AM [facility] JONA Patterson   10/4/2024 11:00 AM [facility] [employer] [facility]   10/5/2024  2:15 PM [facility] [employer] [facility]   10/8/2024 10:20 AM [facility] JONA [facility]   10/18/2024  1:15 PM PACER CHECK-[facility] None     No follow-up provider specified.    MEDICATIONS ON DISCHARGE      Medication List        START taking these medications        Instructions   atorvastatin 20 MG Tabs  Commonly known as: Lipitor   1 Tablet by Enteral Tube route every evening.  Dose: 20 mg            CONTINUE taking these medications        Instructions   benzonatate 100 MG Caps  Commonly known as: Tessalon   Take 1 tablet by mouth three times a day as needed for cough     cyclobenzaprine 10 mg Tabs  Commonly known as: Flexeril   Doctor's comments: Place in pill packs  Take 1 Tablet by mouth at bedtime.  Dose: 10 mg     diclofenac sodium 1 % Gel  Commonly known as: Voltaren   Apply 2 g topically 4 times a day as needed (left foot pain).  Dose: 2 g     Eliquis 5 MG Tabs  Generic drug: apixaban   Take 1 Tablet by mouth 2 times a day.  Dose: 5 mg     ezetimibe 10 MG Tabs  Commonly known as: Zetia   Doctor's comments: Place in pill packs  Take 1 Tablet by mouth every day.  Dose: 10 mg     Farxiga 10 MG Tabs  Generic drug: dapagliflozin propanediol   Take 1 tablet by mouth every day.  Dose: 10 mg     metFORMIN  MG Tb24  Commonly known as: Glucophage XR   Take 1 tablet by mouth every day.     metoprolol SR 50 MG Tb24  Commonly known as: Toprol XL   Doctor's comments: Place in pill packs  Take 1 Tablet by mouth every day for 100 days.  Dose: 50 mg     spironolactone 25 MG Tabs  Commonly known as: Aldactone   Doctor's comments: Place in pill packs  Take 1 Tablet by mouth every day for 100 days.  Dose: 25 mg     Vitamin D3 2000 UNIT Caps   Doctor's comments: Place in pill packs  Take 1 Capsule by mouth every day.  Dose: 2,000 Units            STOP taking these medications      Tylenol PM Extra Strength 500-25 MG Tabs  Generic drug: diphenhydrAMINE-APAP (sleep)     vericiguat or PLACEBO 10 MG tablet  Commonly known as: STUDY DRUG              Allergies  No Known Allergies    DIET  Orders Placed This Encounter   Procedures    Diet NPO Restrict to: Sips with Medications (TURN TUBE FEED OFF AT MIDNIGHT)     Standing  Status:   Standing     Number of Occurrences:   8     Order Specific Question:   Diet NPO Restrict to:     Answer:   Sips with Medications [3]     Comments:   TURN TUBE FEED OFF AT MIDNIGHT    Diet: Diet Tube Feed; Formula: Bolus Only (Provide 1/2 carton Glucerna 1.2 at first bolus feed and advance by 1/2 carton per feed until goal is reached. Goal is 6 cartons per day); Select Bolus (If Indicated): Glucerna 1.2 Carton; Bolus Frequency:...     Standing Status:   Standing     Number of Occurrences:   1     Order Specific Question:   Diet     Answer:   Diet Tube Feed [35]     Order Specific Question:   Formula:     Answer:   Bolus Only     Comments:   Provide 1/2 carton Glucerna 1.2 at first bolus feed and advance by 1/2 carton per feed until goal is reached. Goal is 6 cartons per day     Order Specific Question:   Route     Answer:   Gtube/PEG     Order Specific Question:   Select Bolus (If Indicated):     Answer:   Glucerna 1.2 Carton     Order Specific Question:   Bolus Frequency     Answer:   TID     Comments:   Suggest 2 cartons with meal times (breakfast, lunch, dinner times)     Order Specific Question:   Number of Cartons Per Day:     Answer:   Six       ACTIVITY  As tolerated.  Weight bearing as tolerated    CONSULTATIONS  Neurology, gastroenterology    PROCEDURES  PEG tube placement    LABORATORY  Lab Results   Component Value Date    SODIUM 142 08/21/2024    POTASSIUM 4.7 08/21/2024    CHLORIDE 108 08/21/2024    CO2 21 08/21/2024    GLUCOSE 157 (H) 08/21/2024    BUN 45 (H) 08/21/2024    CREATININE 1.33 08/21/2024        Lab Results   Component Value Date    WBC 15.9 (H) 08/21/2024    HEMOGLOBIN 15.1 08/21/2024    HEMATOCRIT 46.5 08/21/2024    PLATELETCT 107 (L) 08/21/2024        Total time of the discharge process exceeds 41 minutes.

## 2024-08-23 NOTE — PROGRESS NOTES
Hospital Medicine Daily Progress Note    Date of Service  8/22/2024    Chief Complaint  Pito Black is a 79 y.o. male admitted 8/13/2024 with CVA    Hospital Course  79 y.o. male with lung cancer followed by Luz Maria Jules on apixaban who presented 8/13/2024 with left facial droop and dysphagia secondary to right MCA CVA noted on head CTA.  Neurology following for which patient was deemed outside of tPA window.  He was restarted on Eliquis and no further workup recommended by neurology as CVA likely secondary to cardioembolic etiologies in the setting of chronic atrial fibrillation.  He was eval by speech pathology for which she was noted for aspiration on exam and he was placed n.p.o. and core track was placed on 8/15/2024.  Prior to admission patient was on antineoplastic trial medicine which has been held while inpatient.     Interval Problem Update  Evaluated at bedside  Had PEG tube placement today  Has some post procedural pain and soreness, treating  Possibly can be going to inpatient rehab, awaiting decision    I have discussed this patient's plan of care and discharge plan at IDT rounds today with Case Management, Nursing, Nursing leadership, and other members of the IDT team.    Consultants/Specialty  Neurology    Code Status  DNAR/DNI    Disposition  The patient is not medically cleared for discharge to home or a post-acute facility.     I have placed the appropriate orders for post-discharge needs.    Review of Systems  Review of Systems   Constitutional: Negative.    HENT: Negative.     Eyes: Negative.    Respiratory: Negative.     Cardiovascular: Negative.    Gastrointestinal: Negative.    Genitourinary: Negative.    Musculoskeletal: Negative.    Skin: Negative.    Neurological: Negative.    Endo/Heme/Allergies: Negative.    Psychiatric/Behavioral: Negative.          Physical Exam  Temp:  [36.1 °C (97 °F)-36.6 °C (97.8 °F)] 36.2 °C (97.1 °F)  Pulse:  [] 96  Resp:  [16-18] 16  BP:  (127-147)/(70-95) 133/70  SpO2:  [93 %-97 %] 95 %    Physical Exam  Constitutional:       General: He is not in acute distress.     Appearance: Normal appearance.   HENT:      Head: Normocephalic and atraumatic.      Nose: Nose normal. No congestion.      Mouth/Throat:      Mouth: Mucous membranes are moist.   Eyes:      Extraocular Movements: Extraocular movements intact.      Pupils: Pupils are equal, round, and reactive to light.   Cardiovascular:      Rate and Rhythm: Normal rate and regular rhythm.      Pulses: Normal pulses.      Heart sounds: Normal heart sounds.   Pulmonary:      Effort: Pulmonary effort is normal.      Breath sounds: Normal breath sounds.   Abdominal:      General: Bowel sounds are normal.      Palpations: Abdomen is soft.      Tenderness: There is no abdominal tenderness.   Musculoskeletal:         General: No swelling. Normal range of motion.      Cervical back: Normal range of motion and neck supple.   Skin:     General: Skin is warm.      Coloration: Skin is not jaundiced.   Neurological:      Mental Status: He is alert.      Comments: Dysarthria  Left upper extremity weakness   Psychiatric:         Mood and Affect: Mood normal.         Behavior: Behavior normal.         Thought Content: Thought content normal.         Judgment: Judgment normal.         Fluids    Intake/Output Summary (Last 24 hours) at 8/23/2024 0912  Last data filed at 8/23/2024 0600  Gross per 24 hour   Intake 450 ml   Output 1 ml   Net 449 ml       Laboratory  Recent Labs     08/21/24  0614   WBC 15.9*   RBC 4.88   HEMOGLOBIN 15.1   HEMATOCRIT 46.5   MCV 95.3   MCH 30.9   MCHC 32.5   RDW 49.9   PLATELETCT 107*   MPV 10.3       Recent Labs     08/21/24  0614   SODIUM 142   POTASSIUM 4.7   CHLORIDE 108   CO2 21   GLUCOSE 157*   BUN 45*   CREATININE 1.33   CALCIUM 9.6                         Imaging  DX-ABDOMEN FOR TUBE PLACEMENT   Final Result      Dobbhoff tube tip projects over the proximal stomach      DX-ESOPHAGUS  - [id number]   Final Result      DX-CHEST-PORTABLE (1 VIEW)   Final Result         No new abnormalities have developed on portable chest radiograph since the prior examination.  No new infiltrates or consolidations are identified. Opacifications right lung apex and left lower lung again identified with no change. No new infiltrates or    consolidations.      DX-CHEST-PORTABLE (1 VIEW)   Final Result         1. No significant interval change.      [id number] VIEW   Final Result         No specific finding to suggest small bowel obstruction.      DX-ABDOMEN FOR TUBE PLACEMENT   Final Result         Feeding tube with tip projecting over the expected area of the stomach.      DX-ABDOMEN FOR TUBE PLACEMENT   Final Result         1.  Nonspecific bowel gas pattern in the upper abdomen.   2.  Dobbhoff tube tip terminates overlying the expected location of the gastric fundus.   3.  Bilateral pulmonary infiltrates, greatest in the right upper lobe.   4.  Trace bilateral pleural effusions      DX-ESOPHAGUS - [id number]   Final Result      DX-CHEST-PORTABLE (1 VIEW)   Final Result      1. Development of interstitial opacities in the lungs, either edema or interstitial infiltrates.   2. Worsening confluent opacities in the right upper lobe and lingula.   3. The remainder is stable.      CT-CTA NECK WITH & W/O-POST PROCESSING   Final Result      1. No evidence of flow-limiting stenosis in the cervical carotid or cervical vertebral arteries.   2. Partially visualized airspace opacities/mass in the right upper lobe. Infiltrated right perihilar and mediastinal lymph tavia masses.   3. Bilateral pleural effusions.      CT-CTA HEAD WITH & W/O-POST PROCESS   Final Result         1. There is a focal stenosis/occlusion in a distal branch of the right MCA with flow reconstitution.         Preliminary findings texted to Dr. MEMO CHAIDEZ in the Emergency Department via Vo[employer] on 8/13/2024 9:17 AM            CT-CEREBRAL PERFUSION  "ANALYSIS   Final Result      1. Cerebral blood flow less than 30% possibly representing completed infarct = 0 mL.      2. T Max more than 6 seconds possibly representing combination of completed infarct and ischemia = 18 mL.      3. Mismatched volume possibly representing ischemic brain/penumbra= 18 mL      4.  Please note that this cerebral perfusion study and report is Quantitative and targets supratentorial (cerebral) perfusion for evaluation of large vessel territory acute ischemia/infarction. For example, lacunar infarcts, and brainstem/posterior fossa    ischemia/infarction are not evaluated on this study.  Data acquisition is subject to artifacts which can yield non-anatomically plausible perfusion maps which may be due to motion, bolus timing, signal to noise ratio, or other technical factors.    Perfusion map abnormalities which show non-anatomic distributions are likely artifact.   This study is not \"stand-alone\" and should only be utilized for diagnosis, management/treatment in correlation with CT, CTA, and/or MRI and clinical factors.              Assessment/Plan  * Right middle cerebral artery stroke (HCC)- (present on admission)  Assessment & Plan  Likely thromboembolic in setting of Afib and malignancy  Neurology consulted and s/o - continue apixaban, no further workup indicated  PT/OT/SLP  Likely dispo is inpatient rehab  Has persistent dysphagia and will likely need PEG tube    Dysarthria- (present on admission)  Assessment & Plan  Due to MCA stroke  SLP eval and treat    Oropharyngeal dysphagia- (present on admission)  Assessment & Plan  Due to MCA stroke  SLP eval and treat  Plan for likely inpatient rehab, may likely need PEG tube placed, consult to GI     Hyponatremia- (present on admission)  Assessment & Plan  Resolved, sodium within normal limits    Type 2 diabetes mellitus with stage 3a chronic kidney disease, without long-term current use of insulin (HCC)- (present on admission)  Assessment " & Plan  Well-controlled, A1c 6  Continue SGLT2  Initiated on atorvastatin  Continuing current treatment    Chronic systolic heart failure (HCC)- (present on admission)  Assessment & Plan  Appears euvolemic  Continue BB, SGLT2, IFRAH  Consider ACE/ARB      Primary lung adenocarcinoma (HCC)- (present on admission)  Assessment & Plan  Follows with oncologist Dr. Schulz  On study drug - held on admission per pharmacist    Stage 3a chronic kidney disease- (present on admission)  Assessment & Plan  At baseline  Avoid nephrotoxins  Repeat AM BMP    Paroxysmal atrial fibrillation (HCC)- (present on admission)  Assessment & Plan  Continue metoprolol and apixaban  Telemetry for RVR in setting of CVA       VTE prophylaxis: Eliquis    I have performed a physical exam and reviewed and updated ROS and Plan today (8/23/2024). In review of yesterday's note (8/22/2024), there are no changes except as documented above.

## 2024-08-23 NOTE — CARE PLAN
The patient is Stable - Low risk of patient condition declining or worsening    Shift Goals  Clinical Goals: Stable neuros, NPO at midnight  Patient Goals: Get some sleep tonight  Family Goals: CINDY    Progress made toward(s) clinical / shift goals:  Patient is A&Ox3, disoriented to year. Educated patient on POC. Patient NPO since midnight for PEG tube placement. Patient ambulated up to restroom. Medicated patient per MAR for sleep. Bed is low and locked. Bed alarm on. Call light within reach. Hourly rounding continues.       Problem: Neuro Status  Goal: Neuro status will remain stable or improve  Outcome: Progressing     Problem: Hemodynamic Monitoring  Goal: Patient's hemodynamics, fluid balance and neurologic status will be stable or improve  Outcome: Progressing     Problem: Fall Risk  Goal: Patient will remain free from falls  Outcome: Progressing       Patient is not progressing towards the following goals:

## 2024-08-23 NOTE — OR NURSING
0953 - Pt to PACU from OR. Report from anesthesia and OR RN. On 6L O2 via mask. Respirations even and unlabored. VSS.   Dressing to abdomen CDI.    1040 - Report given to Ana Laura Johnson S1. All questions answered, verbalizes understanding. Patient transport request placed. Order present that pt may be transported off of tele.    1049 - Pt urinated 200 ml yellow urine into urinal.     1051 - Pt transported back to room via gurney with room air and all personal belongings, including telebox. Accompanied by transport.

## 2024-08-23 NOTE — PROGRESS NOTES
4 Eyes Skin Assessment Completed by KORY Azul and KORY Rosado.    Head WDL  Ears WDL  Nose WDL  Mouth WDL  Neck WDL  Breast/Chest WDL  Shoulder Blades WDL  Spine WDL  (R) Arm/Elbow/Hand Redness and Blanching  (L) Arm/Elbow/Hand Redness and Blanching  Abdomen Peg tube  Groin WDL  Scrotum/Coccyx/Buttocks WDL  (R) Leg WDL  (L) Leg WDL  (R) Heel/Foot/Toe WDL  (L) Heel/Foot/Toe WDL          Devices In Places Tele Box, Blood Pressure Cuff, and Pulse Ox      Interventions In Place Pressure Redistribution Mattress    Possible Skin Injury No    Pictures Uploaded Into Epic N/A  Wound Consult Placed N/A  RN Wound Prevention Protocol Ordered No

## 2024-08-23 NOTE — DISCHARGE PLANNING
TCN following. HTH/SCP chart reviewed. No new TCN needs identified. Current discharge considerations are IRF vs. HH/DME (AD) pending family decision and medical clearance (see TCN note).      Completed:  PT/OT recommends post acute placement - 8/20   SLP recommends post acute placement - 8/22  Choice obtained: IRF, HH & DME (AD).  Pt aware of [facility]'s blanket referral policy; [facility] accepted, 3 pending      *Update*  TCN requested auth from [facility] for IRF for potentially tomorrow per TCC request.

## 2024-08-23 NOTE — PREADMISSION SCREENING NOTE
Pre-Admission Screening Form    Patient Information:   Name: Pito Black     MRN: [id number]       : 1945      Age: 79 y.o.   Gender: male      Race: White [7]       Marital Status:  [2]  Family Contact: Benoit Aguilar Elaine M  WaynePito HOWARD        Relationship: Daughter [2]  Spouse [17]  Son [15]  Home Phone:     [phone]           Cell Phone: [phone]  Advanced Directives:  Yes  Code Status:  DNAR/DNI  Current Attending Provider: Sergio Rai M.D.  Referring Physician: Dr. Mena  Physiatrist Consult: Dr. Mensah   Referral Date: 24  Primary Payor Source:  [facility] [employer]  Secondary Payor Source:      Medical Information:   Date of Admission to Acute Care [place]2024  Room Number: 90[facility]  Rehabilitation Diagnosis: 0001.1 - Stroke: Left Body Involvement (Right Brain)  Immunization History   Administered Date(s) Administered    COVID-19 Vaccine, unspecified - HISTORICAL DATA 2021    Influenza Vaccine Adult HD 2017, 10/01/2022, 10/23/2023    PFIZER WOOD CAP SARS-COV-2 VACCINATION (12+) 2023    PFIZER PURPLE CAP SARS-COV-2 VACCINATION (12+) 2021    Pneumococcal Conjugate Vaccine (Prevnar/PCV-13) 2017    Pneumococcal polysaccharide vaccine (PPSV-23) 2013    RSV ABRYSVO VACCINE 10/31/2023    Tdap Vaccine 2023    Zoster Vaccine Recombinant (RZV) (SHINGRIX) 2024     No Known Allergies  Past Medical History:   Diagnosis Date    Acute hypoxemic respiratory failure (HCC)     Acute respiratory failure with hypoxia (HCC) 2023    AICD (automatic cardioverter/defibrillator) present 2023    Arrhythmia     hx of a fib    Arthritis     generalized, DDD back    Asthma     inhaler     Backpain 2018    9/10    Blood clotting disorder (HCC) 2023    Pulmonary, right lung    Breath shortness     with exertion    Cancer (HCC) 2017    left lung- adenocarcinoma    Cataract      IOL bilateral    Congestive heart failure (HCC)     cardiologist, [facility], Catherine KAUR    Dental disorder     lower partial, removable bridge    Diabetes 08/06/2018    on no meds at this time, diet controlled    Dysfunction of eustachian tube     Heart valve disease     mild mitral valve prolapse    High cholesterol     Hypertension     Macrocytic anemia     Pneumonia 02/2023    Renal disorder     CKD stage 3    Thrombophilia (HCC)     Type 2 diabetes mellitus with hyperglycemia ([facility]) 03/17/2023    This is a chronic condition. Current medications: Insulin:  Biguanide: took metformin historically will restart metformin xr 750 mg daily. GLP1-RA:   SGLT-2i:    Consider for cardiorenal protection DPP4-I:  TZD:  Pk: Sulfonyluria:   Last A1c: 6/2/23 6.5% Last Microalb/Cr ratio: 6/2/23 <1.2 Fasting sugars: Last diabetic foot exam: 6/19/23 Last retinal eye exam: has upcoming appointment with optome    Volume overload      Past Surgical History:   Procedure Laterality Date    IA PLACE PERCUT GASTROSTOMY TUBE N/A 8/23/2024    Procedure: EGD, WITH PEG TUBE INSERTION;  Surgeon: Issa Meadows M.D.;  Location: SURGERY SAME DAY [facility];  Service: Gastroenterology    IA BRONCHOSCOPY,DIAGNOSTIC N/A 1/16/2024    Procedure: FIBER OPTIC BRONCHOSCOPY WITH  WASH, BRUSH, BRONCHOALVEOLAR LAVAGE, BIOPSY AND FINE NEEDLE ASPIRATION, ENDOBRONCHIAL ULTRASOUND AND NAVIGATION, ROBOTICS;  Surgeon: Vinayak Carbajal M.D.;  Location: [facility];  Service: Pulmonary Robotic    ENDOBRONCHIAL US ADD-ON N/A 1/16/2024    Procedure: ENDOBRONCHIAL ULTRASOUND (EBUS);  Surgeon: Vinayak Carbajal M.D.;  Location: [facility];  Service: Pulmonary Robotic    IA BRONCHOSCOPY,DIAGNOSTIC N/A 12/28/2023    Procedure: FIBER OPTIC BRONCHOSCOPY WITH BRONCHOALVEOLAR LAVAGE AND FINE NEEDLE ASPIRATION, ENDOBRONCHIAL ULTRASOUND AND NAVIGATION, ROBOTICS;  Surgeon: Miriam Martin M.D.;  Location: [facility];   "Service: Pulmonary Robotic    ENDOBRONCHIAL US ADD-ON N/A 12/28/2023    Procedure: ENDOBRONCHIAL ULTRASOUND (EBUS);  Surgeon: Miriam Martin M.D.;  Location: [facility];  Service: Pulmonary Robotic    MA BRONCHOSCOPY,DIAGNOSTIC  07/01/2021    Procedure: BRONCHOSCOPY - FIBER OPTIC WITH BRANCHOALVEOLAR LAVAGE BIOPSY, FNA, NAVIGATION;  Surgeon: Vinayak Carbajal M.D.;  Location: [facility];  Service: Pulmonary    ENDOBRONCHIAL US ADD-ON  07/01/2021    Procedure: ENDOBRONCHIAL ULTRASOUND (EBUS).;  Surgeon: Vinayak Carbajal M.D.;  Location: [facility];  Service: Pulmonary    CATARACT PHACO WITH IOL Left 08/21/2018    Procedure: CATARACT PHACO WITH IOL;  Surgeon: Juanito Hager M.D.;  Location: SURGERY SAME DAY [facility];  Service: Ophthalmology    CATARACT PHACO WITH IOL Right 08/07/2018    Procedure: CATARACT PHACO WITH IOL;  Surgeon: Juanito Hager M.D.;  Location: SURGERY SAME DAY [facility];  Service: Ophthalmology    THORACOSCOPY Left 04/19/2018    Procedure: THORACOSCOPY- WEDGE BIOPSY W/FROZEN SECTION;  Surgeon: Cristhian Galeano M.D.;  Location: SURGERY [facility];  Service: Thoracic    EAR MIDDLE EXPLORATION Right 06/12/2015    Procedure: EAR MIDDLE EXPLORATION;  Surgeon: William Brandon M.D.;  Location: SURGERY SAME DAY [facility];  Service:     OSSICULAR RECONSTRUCTION Right 06/12/2015    Procedure: OSSICULAR RECONSTRUCTION CHAIN POSSIBLE;  Surgeon: William Brandon M.D.;  Location: SURGERY SAME DAY [facility];  Service:     KNEE ARTHROPLASTY TOTAL  2005    EAR RECONSTRUCTION Bilateral     ear replacement \"many years ago\"       History Leading to Admission, Conditions that Caused the Need for Rehab (CMS):     Dr. Mena H&P:  Chief Complaint  Patient presents with   Possible Stroke      LKW 2100. PT woke up at 0530 and expressed difficulties swallowing. Upon arrival to ED PT has left sided facial droop, slurred speech, and decreased left  strength. " Aox4. Stroke Assessment called to Charge Desk. Hx of esophageal CA.      History of Presenting Illness  Pito Black is a 79 y.o. male with lung cancer followed by Dr. Schulz, Afib on apixaban who presented 8/13/2024 with Left facial droop and dysphagia.     He reports last night he went to take some pills and developed inability to swallow them. During swallowing he choked and vomited the pills. At that time he developed dysarthria, Left hand weakness, and Left facial droop. It persisted into this morning for which he decided to come in. He denies blurry vision, headache, dysphoria, pain, bleeding, bowel/bladder dysfunction, presyncope, syncope, falls.     In the ED he underwent CTA H&N which demonstrated distal Right MCA occlusion. Neurology was consulted and he was deemed outside the TPA window. CT perfusions was without penumbra. CXR demonstrated PPM/ICD and bilateral infiltrates. CBC is normal. CMP demonstrates hyponatremia Na 133, stable Cr 1.3. A1c is 6. Troponin 55 comparable to prior values. INR 1.3. EKG sinus tachycardia.     I discussed the plan of care with patient, SLP, and ED provider .    Assessment/Plan:  Justification for Admission Status  I anticipate this patient will require at least two midnights for appropriate medical management, necessitating inpatient admission because functionally below baseline AEB Left hand weakness and inability to swallow. Limited further diagnostic evaluation per Neurology consult for which he will be assess by PT/OT/SLP for post-acute needs.     Patient will need a Telemetry bed on MEDICAL service .  The need is secondary to PT/OT/SLP evaluation, montioring for further neurologic injury.     * Right middle cerebral artery stroke (HCC)- (present on admission)  Assessment & Plan  Likely thromboembolic in setting of Afib and malignancy  Neurology consulted and s/o - continue apixaban, no further workup indicated  Telemetry  PT/OT/SLP     Dysarthria- (present on  admission)  Assessment & Plan  Due to MCA stroke  SLP eval and treat     Oropharyngeal dysphagia- (present on admission)  Assessment & Plan  Due to MCA stroke  SLP eval and treat     Hyponatremia- (present on admission)  Assessment & Plan  Mild  Repeat AM BMP     Type 2 diabetes mellitus with stage 3a chronic kidney disease, without long-term current use of insulin (HCC)- (present on admission)  Assessment & Plan  Well-controlled, A1c 6  Continue SGLT2  Initiated on atorvastatin     Chronic systolic heart failure (HCC)- (present on admission)  Assessment & Plan  Appears euvolemic  Continue BB, SGLT2, IFRAH  Unclear if not on ACE/ARB due to CKD  S/p ICD for primary prophylaxis     Primary lung adenocarcinoma (HCC)- (present on admission)  Assessment & Plan  Follows with oncologist Dr. Schulz  On study drug - held on admission per pharmacist     Stage 3a chronic kidney disease- (present on admission)  Assessment & Plan  Seemingly at baseline  Avoid nephrotoxins  Repeat AM BMP     Paroxysmal atrial fibrillation (HCC)- (present on admission)  Assessment & Plan  Continue metoprolol and apixaban  Telemetry for RVR in setting of CVA     VTE prophylaxis: SCDs/TEDs and therapeutic anticoagulation with apixaban    Dr. Meadows (Gastroenterology) recommendations:  Impressions:  Moderate-severe oropharyngeal dysphagia  S/p right MCA CVA  Atrial fibrillation on Eliquis-last dose 8/21/2024 0500 hour  Hx of lung cancer   AICD in situ   type 2 diabetes mellitus  CKD  heart failure reduced ejection fraction (EF 35-45)  hyperlipidemia      Recommendations:  Hold apixaban x 48 hours  Continue tube feeds  Will plan for PEG tube placement on 8/23/2024 after apixaban has been held for 48 hours.    DON Desai (Neurology) recommendations:  Assessment and Plan:     Pito Black is a 79 y.o. male with active lung ca and Afib on Eliquis, presenting with >12 hours of dysarthria and dysphagia. This morning around 0730 he also  developed L facial droop and LUE weakness. On arrival to ER NIH 5 for L sided facial droop, L arm weakness, L ataxia and mild dysarthria. CTA head revealed small distal occlusion of R MCA M2-M3 with distal reconstitution, and associated perfusion deficit. CTA neck negative for acute dissection or flow limiting stenosis. NCCTH negative for acute ischemic changes and ICH. Unfortunately the occlusion is too distal for mechanical thrombectomy. Not a candidate for thrombolytic therapy at this time as he had symptom onset >12 hours ago and is on Eliquis. No indication for cardioembolic workup at this time, has known history of Afib and active malignancy on Eliquis. Ok with deferring MRI at this time as it will not change therapy. Will need SLP, PT and OT.      Problem list:  - Distal R MCA stroke  - Active lung CA  - Afib     Plan:  - Neurochecks per unit policy  - Hold antihypertensive medications for 24h, allow BP autoregulation while avoiding hypotension              - Allow -210/ x 24h              - Long-term BP goal 100-130/60-80  - Ok with deferring MRI at this time, will not   - Does not require full cardioembolic workup, no Ziopatch on DC   - Please continue home Eliquis 5 mg BID when cleared to swallow  - LDL 84, goal <70, continue Zetia 10 mg daily, initiate Atorvastatin 20 mg daily   - A1C 6.0, goal <7  - Target normoglycemia  while admitted  - PT/OT/SLP  - DVT prophylaxis: Eliquis  - Follow-up with stroke clinic outpatient    Dr. Mensah (Physiatry) recommendations:  ASSESSMENT:  Patient is a 79 y.o. male admitted with right MCA stroke       Rehabilitation: Impaired ADLs and mobility  Barriers to transfer include: Insurance authorization, TCCs to verify disposition, medical clearance and bed availability. All cases are subject to administrative review.      [facility] Code / Diagnosis to Support: 0001.1 - Stroke: Left Body Involvement (Right Brain)     Disposition  recommendations:  - Good candidate for IPR. His swallow may be a limiting factor. No improvement seen on MBSS. [facility] will need to see some improvement or have a PEG tube. I believe he will improve and we might need to wait until we start to see some improvement before transferring to [facility]. Case forwarded to [facility] SLP for review.   -PMR to follow in the periphery for rehab appropriateness, please reach out with questions or request for medical management     Medical Complexity:     Right MCA ischemic stroke   - NIH 5  - CT evidence of distal right MCA occlusion   - MRI not required due to not changing management   - Functional deficits of expressive and receptive aphasia, dysphagia, and left sided weakness  - Eliquis 5 mg BID   - Atorvastatin 20mg daily, eztimibe 10mg daily  - Continue PT/OT while in house     Dysphagia   - [facility] reviewed case. He is silent aspirating. Poor prognosis for a functional swallow for the next 4-6 weeks. PEG tube recommended. Family does not want this. Can watch patient and increase SLP training and retest with MBSS over the weekend to gauge improvement, then if not improving, get PEG and over to [facility].     Hypertension   - Per neurology, BP goal 100-130/60-80   - Metoprolol XL 50mg and tartrate 25mg BID  - Aldactone 25mg daily      Thrombocytopenia   - Platelets 127   - Monitoring with serial labs      Diabetes   - Farxiga 10mg daily      DVT PPX: Eliquis     Dr. Rai progress note:  Date of Service  8/22/2024     Chief Complaint  Pito Black is a 79 y.o. male admitted 8/13/2024 with CVA     Hospital Course  79 y.o. male with lung cancer followed by Luz Maria Jules on apixaban who presented 8/13/2024 with left facial droop and dysphagia secondary to right MCA CVA noted on head CTA.  Neurology following for which patient was deemed outside of tPA window.  He was restarted on Eliquis and no further workup recommended by neurology as CVA likely secondary to cardioembolic etiologies in  the setting of chronic atrial fibrillation.  He was eval by speech pathology for which she was noted for aspiration on exam and he was placed n.p.o. and core track was placed on 8/15/2024.  Prior to admission patient was on antineoplastic trial medicine which has been held while inpatient.     Interval Problem Update  Evaluated at bedside  Had PEG tube placement today  Has some post procedural pain and soreness, treating  Possibly can be going to inpatient rehab, awaiting decision     I have discussed this patient's plan of care and discharge plan at IDT rounds today with Case Management, Nursing, Nursing leadership, and other members of the IDT team.     Consultants/Specialty  Neurology     Code Status  DNAR/DNI     Disposition  The patient is not medically cleared for discharge to home or a post-acute facility.      I have placed the appropriate orders for post-discharge needs.     Review of Systems  Review of Systems   Constitutional: Negative.    HENT: Negative.     Eyes: Negative.    Respiratory: Negative.     Cardiovascular: Negative.    Gastrointestinal: Negative.    Genitourinary: Negative.    Musculoskeletal: Negative.    Skin: Negative.    Neurological: Negative.    Endo/Heme/Allergies: Negative.    Psychiatric/Behavioral: Negative.        Physical Exam  Temp:  [36.1 °C (97 °F)-36.6 °C (97.8 °F)] 36.2 °C (97.1 °F)  Pulse:  [] 96  Resp:  [16-18] 16  BP: (127-147)/(70-95) 133/70  SpO2:  [93 %-97 %] 95 %     Physical Exam  Constitutional:       General: He is not in acute distress.     Appearance: Normal appearance.   HENT:      Head: Normocephalic and atraumatic.      Nose: Nose normal. No congestion.      Mouth/Throat:      Mouth: Mucous membranes are moist.   Eyes:      Extraocular Movements: Extraocular movements intact.      Pupils: Pupils are equal, round, and reactive to light.   Cardiovascular:      Rate and Rhythm: Normal rate and regular rhythm.      Pulses: Normal pulses.      Heart sounds:  Normal heart sounds.   Pulmonary:      Effort: Pulmonary effort is normal.      Breath sounds: Normal breath sounds.   Abdominal:      General: Bowel sounds are normal.      Palpations: Abdomen is soft.      Tenderness: There is no abdominal tenderness.   Musculoskeletal:         General: No swelling. Normal range of motion.      Cervical back: Normal range of motion and neck supple.   Skin:     General: Skin is warm.      Coloration: Skin is not jaundiced.   Neurological:      Mental Status: He is alert.      Comments: Dysarthria  Left upper extremity weakness   Psychiatric:         Mood and Affect: Mood normal.         Behavior: Behavior normal.         Thought Content: Thought content normal.         Judgment: Judgment normal.      Fluids     Intake/Output Summary (Last 24 hours) at 8/23/2024 0912  Last data filed at 8/23/2024 0600  Gross per 24 hour  Intake 450 ml  Output 1 ml  Net 449 ml     Laboratory  Recent Labs    08/21/24  0614  WBC 15.9*  RBC 4.88  HEMOGLOBIN 15.1  HEMATOCRIT 46.5  MCV 95.3  MCH 30.9  MCHC 32.5  RDW 49.9  PLATELETCT 107*  MPV 10.3     Recent Labs    08/21/24  0614  SODIUM 142  POTASSIUM 4.7  CHLORIDE 108  CO2 21  GLUCOSE 157*  BUN 45*  CREATININE 1.33  CALCIUM 9.6     Imaging  DX-ABDOMEN FOR TUBE PLACEMENT  Final Result     Dobbhoff tube tip projects over the proximal stomach     DX-ESOPHAGUS - [id number]  Final Result     DX-CHEST-PORTABLE (1 VIEW)  Final Result     No new abnormalities have developed on portable chest radiograph since the prior examination.  No new infiltrates or consolidations are identified. Opacifications right lung apex and left lower lung again identified with no change. No new infiltrates or   consolidations.     DX-CHEST-PORTABLE (1 VIEW)  Final Result       1. No significant interval change.     [id number] VIEW  Final Result     No specific finding to suggest small bowel obstruction.     DX-ABDOMEN FOR TUBE PLACEMENT  Final Result     Feeding tube with tip  projecting over the expected area of the stomach.     DX-ABDOMEN FOR TUBE PLACEMENT  Final Result     1.  Nonspecific bowel gas pattern in the upper abdomen.  2.  Dobbhoff tube tip terminates overlying the expected location of the gastric fundus.  3.  Bilateral pulmonary infiltrates, greatest in the right upper lobe.  4.  Trace bilateral pleural effusions     DX-ESOPHAGUS - [id number]  Final Result     DX-CHEST-PORTABLE (1 VIEW)  Final Result     1. Development of interstitial opacities in the lungs, either edema or interstitial infiltrates.  2. Worsening confluent opacities in the right upper lobe and lingula.  3. The remainder is stable.     CT-CTA NECK WITH & W/O-POST PROCESSING  Final Result     1. No evidence of flow-limiting stenosis in the cervical carotid or cervical vertebral arteries.  2. Partially visualized airspace opacities/mass in the right upper lobe. Infiltrated right perihilar and mediastinal lymph tavia masses.  3. Bilateral pleural effusions.     CT-CTA HEAD WITH & W/O-POST PROCESS  Final Result     1. There is a focal stenosis/occlusion in a distal branch of the right MCA with flow reconstitution.     Preliminary findings texted to Dr. MEMO CHAIDEZ in the Emergency Department via Voalte on 8/13/2024 9:17 AM     CT-CEREBRAL PERFUSION ANALYSIS  Final Result     1. Cerebral blood flow less than 30% possibly representing completed infarct = 0 mL.     2. T Max more than 6 seconds possibly representing combination of completed infarct and ischemia = 18 mL.     3. Mismatched volume possibly representing ischemic brain/penumbra= 18 mL     4.  Please note that this cerebral perfusion study and report is Quantitative and targets supratentorial (cerebral) perfusion for evaluation of large vessel territory acute ischemia/infarction. For example, lacunar infarcts, and brainstem/posterior fossa   ischemia/infarction are not evaluated on this study.  Data acquisition is subject to artifacts which can yield  "non-anatomically plausible perfusion maps which may be due to motion, bolus timing, signal to noise ratio, or other technical factors.   Perfusion map abnormalities which show non-anatomic distributions are likely artifact.   This study is not \"stand-alone\" and should only be utilized for diagnosis, management/treatment in correlation with CT, CTA, and/or MRI and clinical factors.     Assessment/Plan  * Right middle cerebral artery stroke (HCC)- (present on admission)  Assessment & Plan  Likely thromboembolic in setting of Afib and malignancy  Neurology consulted and s/o - continue apixaban, no further workup indicated  PT/OT/SLP  Likely dispo is inpatient rehab  Has persistent dysphagia and will likely need PEG tube     Dysarthria- (present on admission)  Assessment & Plan  Due to MCA stroke  SLP eval and treat     Oropharyngeal dysphagia- (present on admission)  Assessment & Plan  Due to MCA stroke  SLP eval and treat  Plan for likely inpatient rehab, may likely need PEG tube placed, consult to GI      Hyponatremia- (present on admission)  Assessment & Plan  Resolved, sodium within normal limits     Type 2 diabetes mellitus with stage 3a chronic kidney disease, without long-term current use of insulin (HCC)- (present on admission)  Assessment & Plan  Well-controlled, A1c 6  Continue SGLT2  Initiated on atorvastatin  Continuing current treatment     Chronic systolic heart failure (HCC)- (present on admission)  Assessment & Plan  Appears euvolemic  Continue BB, SGLT2, IFRAH  Consider ACE/ARB     Primary lung adenocarcinoma (HCC)- (present on admission)  Assessment & Plan  Follows with oncologist Dr. Schulz  On study drug - held on admission per pharmacist     Stage 3a chronic kidney disease- (present on admission)  Assessment & Plan  At baseline  Avoid nephrotoxins  Repeat AM BMP     Paroxysmal atrial fibrillation (HCC)- (present on admission)  Assessment & Plan  Continue metoprolol and apixaban  Telemetry for RVR in " setting of CVA     VTE prophylaxis: Eliquis    PreOp Diagnosis: Dysphagia, malnutrition        PostOp Diagnosis: PEG tube placement 24 Georgian.     Procedure(s):  EGD, WITH PEG TUBE INSERTION - Wound Class: Clean     Surgeon(s):  Issa Meadows M.D.     Anesthesiologist/Type of Anesthesia:  Anesthesiologist: Johnny Rivas D.O./MAC     Surgical Staff:  Endoscopy Technician: Charity Simms  Endoscopy Nurse: Susan Elizondo R.N.; Alivia Davis R.N.     Specimens removed if any:  * No specimens in log *     CONSENT: The risks, benefits and alternatives of the procedure were discussed in detail. The risks include and are not limited to bleeding, infection, perforation, missed lesions, and sedations risks (cardiopulmonary compromise and allergic reaction to medications).     DESCRIPTION:    The patient presented to the operating room.  A time out was performed prior to beginning the procedure.   The patient was placed in the supine position.   Patient was sedated by anesthesia: Propofol.     Medications given: Ancef 2 gram IVPB for prophylaxis     FINDINGS:     Esophagus: Normal  Stomach: Diffuse mild to moderate gastritis.  Duodenum: Normal to the second portion.     PEG placement: An appropriate site was selected and the stomach was trans-illuminated and an optimal position for the PEG tube was identified using good 1:1 transmission method. The skin was infiltrated with local anesthetic and the trocar and sheath were inserted through the abdomen into the stomach under direct visualization. The needle was removed and a guidewire was inserted through the sheath. The guidewire was grasped within the stomach with a snare. It was removed completely out of the mouth and the PEG tube was secured to the guidewire.     The guidewire and  24 PEG tube were then pulled through the mouth and esophagus and snug to the abdominal wall.The external bumper fixer was at 3 cm at the level of the skin. There was no  evidence of bleeding.   The Bolster was placed on the PEG site.      The patient tolerated the procedure well and was transferred to recovery room in stable condition     Blood loss: Minimal     The patient tolerated the procedure well.       There were no immediate complications.     IMPRESSION:  /24French PEG tube successfully placed.     RECOMMENDATIONS:  Please ensure proper PEG care and tube feeding:  - keep head of bed elevated to at least 30 degrees during tube feeding. The largest factor in preventing aspiration during tube feeding is not the placement of the tube into the stomach or jejunum, but keeping the head elevated to at least 30 degrees during feeding  - Medications and water can be started 6 hours after placement. Tube feeds can be started tomorrow morning.   - PEG needs 0.5-1cm of laxity freely mobile in and out of the gastrostomy tract. Overtightening may impair tract maturation by inducing localized ischemia. Over tightening may also result in buried bumper syndrome.  No NSAIDS for 7 days. For full dose anti-coagulation, please hold 24 hours after the procedure. Anti-platelet per the primary/cardiology team.     8/13/2024 8:25 AM     HISTORY/REASON FOR EXAM:  Emergency Medical Condition ? Stroke     TECHNIQUE/EXAM DESCRIPTION:  CT angiogram of the [place] without and with contrast.  Initial precontrast images were obtained of the head from the skull base through the vertex.  Postcontrast images were obtained of the [place] following the power injection of   nonionic contrast at 5.0 mL/sec. Thin-section helical images were obtained with overlapping reconstruction interval. Coronal and sagittal multiplanar volume reformats were generated.  3D angiographic images were reviewed on PACS.  Maximum intensity   projection (MIP) images were generated and reviewed.     80 mL of Omnipaque 350 nonionic contrast was injected intravenously.     Low dose optimization technique was utilized for  this CT exam including automated exposure control and adjustment of the mA and/or kV according to patient size.     COMPARISON:  None.     FINDINGS:  The posterior circulation shows the distal vertebral arteries to be patent. The vertebrobasilar confluence is intact. The basilar artery is patent. No aneurysm or occlusive lesion is evident.     There is focal stenosis/occlusion in a distal branch of the right MCA with flow reconstitution (image 127 series 8).     No aneurysm is evident about the [place].     No acute intracranial hemorrhage.     3D angiographic/MIP images of the vasculature confirm the vascular findings as described above.  ___________________________________     IMPRESSION:     1. There is a focal stenosis/occlusion in a distal branch of the right MCA with flow reconstitution.    Co-morbidities:  See PMH  Potential Risk - Complications: Aphasia, Cognitive Impairment, Contractures, Deep Vein Thrombosis, Dysphagia, Incontinence, Malnutrition, Pain, Paralysis, Perceptual Impairment, Pneumonia, Pressure Ulcer, Seizures, and Urinary Tract Infection  Level of Risk: High    Ongoing Medical Management Needed (Medical/Nursing Needs):   Patient Active Problem List    Diagnosis Date Noted    Right middle cerebral artery stroke (HCC) 08/13/2024    Hyponatremia 08/13/2024    Oropharyngeal dysphagia 08/13/2024    Dysarthria 08/13/2024    Cyst of skin 06/25/2024    Retained suture 06/25/2024    Tendonitis, Achilles, left 06/25/2024    Insomnia 01/08/2024    Pain, dental 11/02/2023    Type 2 diabetes mellitus with stage 3a chronic kidney disease, without long-term current use of insulin (HCC) 10/23/2023    Secondary hyperaldosteronism (HCC) 06/19/2023    Other thrombophilia (HCC) 06/19/2023    Dyspnea on exertion 04/06/2023    Nonsustained ventricular tachycardia (HCC) 03/24/2023    Chronic systolic heart failure (HCC) 03/18/2023    Cardiorenal syndrome 03/17/2023    ACP (advance care planning) 03/17/2023  "   Parainfluenza 02/21/2023    Sepsis without acute organ dysfunction (HCC) 02/20/2023    Pulmonary embolism on right (HCC) 02/19/2023    Vitamin D deficiency 02/19/2023    Lactic acidosis 02/19/2023    Primary lung adenocarcinoma (HCC) 02/01/2023    Pleural effusion, left 02/01/2023    Lung mass 02/01/2023    Primary cancer of left lower lobe of lung (HCC) 07/06/2021    Stage 3a chronic kidney disease 06/28/2021    Hyperlipidemia associated with type 2 diabetes mellitus (HCC) 06/28/2021    Primary cancer of right upper lobe of lung (HCC) 11/18/2019    Mass of lower lobe of left lung 02/06/2018    Hypertension associated with type 2 diabetes mellitus (HCC) 02/06/2018    Paroxysmal atrial fibrillation (HCC) 02/06/2018    Neural hearing loss, bilateral 06/12/2015    Radiculopathy 11/20/2013    Thoracic back pain 11/20/2013     Alert with periods of forgetfulness.    Current Vital Signs:   Temperature: 36.3 °C (97.4 °F) Pulse: (!) 101 Respiration: 20 Blood Pressure : 135/74  Weight: 57 kg (125 lb 10.6 oz) Height: 167.6 cm (5' 6\")  Pulse Oximetry: 92 % O2 (LPM): 0      Completed Laboratory Reports:  Recent Labs     08/21/24  0614   WBC 15.9*   HEMOGLOBIN 15.1   HEMATOCRIT 46.5   PLATELETCT 107*   SODIUM 142   POTASSIUM 4.7   BUN 45*   CREATININE 1.33   GLUCOSE 157*     Additional Labs: Not Applicable    Prior Living Situation:   Housing / Facility: 1 Story House  Steps Into Home: 3  Steps In Home: 0  Lives with - Patient's Self Care Capacity: Spouse  Equipment Owned: None, Tub / Shower Seat    Prior Level of Function / Living Situation:   Physical Therapy: Prior Services: None  Housing / Facility: 1 Story House  Steps Into Home: 3  Steps In Home: 0  Rail: None  Equipment Owned: None, Tub / Shower Seat  Lives with - Patient's Self Care Capacity: Spouse  Bed Mobility: Independent  Transfer Status: Independent  Ambulation: Independent  Assistive Devices Used: None  Stairs: Independent  Current Level of Function:   Gait " Level Of Assist: Minimal Assist  Assistive Device: Front Wheel Walker  Distance (Feet):  (hallway distances w/facilitation of Lft hand on the walker.)  Deviation: Bradykinetic, Shuffled Gait, Decreased Heel Strike, Decreased Toe Off (Heavy reliance on FWW. Cues and manual assist with FWW guidance and stability, Cues to slow pace and complete turns.)  # of Stairs Climbed: 0  Weight Bearing Status: no restrictions.  Skilled Intervention: Verbal Cuing, Postural Facilitation, Compensatory Strategies  Supine to Sit: Supervised  Sit to Supine:  (Up in chair post)  Scooting: Supervised  Rolling: Supervised  Skilled Intervention: Verbal Cuing  Comments: HOB sightly elevated  Sit to Stand: Contact Guard Assist  Bed, Chair, Wheelchair Transfer: Contact Guard Assist  Toilet Transfers: Minimal Assist (lowering assistance required.)  Transfer Method: Stand Step  Skilled Intervention: Verbal Cuing  Sitting in Chair: Up to chair post  Sitting Edge of Bed: 5 min  Standin min  Occupational Therapy:   Self Feeding: Independent  Grooming / Hygiene: Independent  Bathing: Independent  Dressing: Independent  Toileting: Independent  Medication Management: Independent  Laundry: Independent  Kitchen Mobility: Independent  Finances: Independent  Home Management: Independent  Shopping: Independent  Prior Level Of Mobility: Independent Without Device in Community  Prior Services: None  Housing / Facility: [address] [place]  Current Level of Function:   Eating:  (Tube feed)  Upper Body Dressing: Minimal Assist  Lower Body Dressing: Standby Assist (Don slip on shoes)  Skilled Intervention: Verbal Cuing  Speech Language Pathology:      Rehabilitation Prognosis/Potential: Good  Estimated Length of Stay: 10-12 days    Nursing:      Incontinent    Scope/Intensity of Services Recommended:  Physical Therapy: 1 hr / day  5 days / week. Therapeutic Interventions Required: Maximize Endurance, Mobility, Strength, and Safety  Occupational Therapy: 1 hr /  day 5 days / week. Therapeutic Interventions Required: Maximize Self Care, ADLs, IADLs, and Energy Conservation  Speech & Language Pathology: 1 hr / day 5 days / week. Therapeutic Interventions Required: Maximize Cognition, Swallowing, and Safety  Rehabilitation Nursin/7. Therapeutic Interventions Required: Monitor Pain, Skin, Wound(s), Vital Signs, Intake and Output, Labs, Safety, Aspiration Risk, and Family Training  Rehabilitation Physician: 3 - 5 days / week. Therapeutic Interventions Required: Medical Management  Dietician: Tube Feedings. Therapeutic Interventions Required: .    He requires 24-hour rehabilitation nursing to manage bowel and bladder function, skin care, surgical incision, nutrition and fluid intake, pain control, safety, medication management, and patient/family goals. In addition, rehabilitation nursing will reiterate and reinforce therapy skills and equipment use, including ADLs, as well as provide education to the patient and family. Pito Black is willing to participate in and is able to tolerate the proposed plan of care.    Rehabilitation Goals and Plan (Expected frequency & duration of treatment in the IRF):   Return to the Community, Modified Independent Level of Care, and Outpatient Support  Anticipated Date of Rehabilitation Admission: 24  Patient/Family oriented IRF level of care/facility/plan: Yes  Patient/Family willing to participate in IRF care/facility/plan: Yes  Patient able to tolerate IRF level of care proposed: Yes  Patient has potential to benefit IRF level of care proposed: Yes  Comments: Not Applicable    Special Needs or Precautions - Medical Necessity:  Tube Feedings   Safety Concerns/Precautions:  Fall Risk / High Risk for Falls, Balance, Cognition, and Bed / Chair Alarm  Complex Wound Care: Surgical  Pain Management  IV Site: Peripheral  Current Medications:    Current Facility-Administered Medications Ordered in Epic   Medication Dose Route  Frequency Provider Last Rate Last Admin    morphine 4 MG/ML injection 2 mg  2 mg Intravenous Q3HRS PRN Sergio Rai M.D.   2 mg at 08/23/24 1204    diphenhydrAMINE (Benadryl) tablet/capsule 25 mg  25 mg Enteral Tube HS PRN Sergio Rai M.D.   25 mg at 08/22/24 2030    melatonin tablet 5 mg  5 mg Enteral Tube Nightly Sergio Rai M.D.   5 mg at 08/22/24 2030    senna-docusate (Pericolace Or Senokot S) 8.6-50 MG per tablet 2 Tablet  2 Tablet Enteral Tube Q EVENING Sergio Rai M.D.   2 Tablet at 08/22/24 1628    And    polyethylene glycol/lytes (Miralax) Packet 1 Packet  1 Packet Enteral Tube QDAY PRN Sergio Rai M.D.        acetaminophen (Tylenol) tablet 1,000 mg  1,000 mg Enteral Tube TID PRN Felipe Alicia M.D.   1,000 mg at 08/21/24 0824    metoprolol tartrate (Lopressor) tablet 50 mg  50 mg Enteral Tube BID Felipe Alicia M.D.   50 mg at 08/23/24 0437    Pharmacy Consult: Enteral tube insertion - review meds/change route/product selection   Other PHARMACY TO DOSE NATALYA GonzalesN.P.        [Held by provider] apixaban (Eliquis) tablet 5 mg  5 mg Enteral Tube BID NATALYA GonzalesN.P.   5 mg at 08/21/24 0505    atorvastatin (Lipitor) tablet 20 mg  20 mg Enteral Tube Q EVENING NATALYA GonzalesN.P.   20 mg at 08/22/24 1628    dapagliflozin propanediol (Farxiga) tablet 10 mg  10 mg Enteral Tube DAILY NATALYA GonzalesN.P.   10 mg at 08/23/24 0437    ezetimibe (Zetia) tablet 10 mg  10 mg Enteral Tube DAILY NATALYA GonzalesN.P.   10 mg at 08/23/24 0437    spironolactone (Aldactone) tablet 25 mg  25 mg Enteral Tube DAILY Felipe Alicia M.D.   25 mg at 08/23/24 0437    vitamin D3 (Cholecalciferol) tablet 2,000 Units  2,000 Units Enteral Tube DAILY NATALYA GonzalesN.P.   2,000 Units at 08/23/24 0436    cyclobenzaprine (Flexeril) tablet 10 mg  10 mg Enteral Tube QHS PRN NATALYA GonzalesN.P.   10 mg at 08/22/24 2030    ondansetron (Zofran ODT) dispertab 4 mg  4  mg Enteral Tube Q4HRS PRN Desi Mccollum D.N.P.        vericiguat or PLACEBO (STUDY DRUG) 10 MG tablet 10 mg  10 mg Enteral Tube DAILY Felipe Alicia M.D.   10 mg at 08/23/24 0437    diclofenac sodium (Voltaren) 1 % gel 2 g  2 g Topical 4X/DAY PRN González Mena M.D.        ondansetron (Zofran) syringe/vial injection 4 mg  4 mg Intravenous Q4HRS PRN González Mena M.D.   4 mg at 08/18/24 1116     No current Epic-ordered outpatient medications on file.     Diet:   DIET ORDERS (From admission to next 24h)       Start     Ordered    08/22/24 2359  Diet NPO Restrict to: Sips with Medications (TURN TUBE FEED OFF AT MIDNIGHT)  AT MIDNIGHT      Question:  Diet NPO Restrict to:  Answer:  Sips with Medications  Comment:  TURN TUBE FEED OFF AT MIDNIGHT    08/22/24 1124    08/20/24 1500  Diet: Diet Tube Feed; Formula: Glucerna; Glucerna: Glucerna 1.2 RTH; Goal Rate (mL/Hour): 55; Duration: 24 HR  ALL MEALS        Question Answer Comment   Diet Diet Tube Feed    Formula: Glucerna    Glucerna: Glucerna 1.2 RTH    Goal Rate (mL/Hour) 55    Route Enteral Tube    Duration 24 HR        08/20/24 1459                    Anticipated Discharge Destination / Patient/Family Goal:  Destination: Home with Assistance Support System: Spouse and Family   Anticipated home health services: OT, PT, and SLP  Previously used HH service/ provider: Not Applicable  Anticipated DME Needs: [employer]  Outpatient Services: OT, PT, and SLP  Alternative resources to address additional identified needs:   Future Appointments   Date Time Provider Department Center   9/9/2024 12:45 PM JILL Clark None   9/9/2024  2:00 PM RESEARCH COORDINATOR - JORGE MCMILLAN None   9/13/2024 11:00 AM [facility] ON [employer] [place]   9/14/2024  2:15 PM [facility] [employer] [place]   9/18/2024 11:00 AM Jj Hicks M.D. [facility] None   9/26/2024 11:40 AM Kalli Payne A.P.R.N. [facility] JONA Patterson   10/4/2024 11:00 AM [facility]  [facility]   10/5/2024  2:15 PM [facility]   10/8/2024 10:20 AM JILL Welsh   10/18/2024  1:15 PM PACER CHECK-CAM B [facility] None      Pre-Screen Completed: 8/23/2024 2:38 PM Constantino Alvarez L.P.N.

## 2024-08-23 NOTE — PROGRESS NOTES
Pts work of breathing is increasing, pt is nauseated and in pain. Lungs sound raspy, oxygen down to 88 on RA. Pt placed on 1LNC, RR 28, . Morphine given per mar along with nausea medicine. MD notified

## 2024-08-23 NOTE — THERAPY
Occupational Therapy Contact Note    Patient Name: Pito Black  Age:  79 y.o., Sex:  male  Medical Record #: [id number]  Today's Date: 8/23/2024 08/23/24 0930   Interdisciplinary Plan of Care Collaboration   Collaboration Comments Pt out of room for PEG placement, Will hold OT tx and round back as able

## 2024-08-23 NOTE — OP REPORT
PreOp Diagnosis: Dysphagia, malnutrition      PostOp Diagnosis: PEG tube placement 24 [place].      Procedure(s):  EGD, WITH PEG TUBE INSERTION - Wound Class: Clean    Surgeon(s):  Issa Meadows M.D.    Anesthesiologist/Type of Anesthesia:  Anesthesiologist: Johnny Rivas D.O./MAC    Surgical Staff:  Endoscopy Technician: Charity Simms  Endoscopy Nurse: Susan Elizondo R.N.; Alivia Davis R.N.    Specimens removed if any:  * No specimens in log *    CONSENT: The risks, benefits and alternatives of the procedure were discussed in detail. The risks include and are not limited to bleeding, infection, perforation, missed lesions, and sedations risks (cardiopulmonary compromise and allergic reaction to medications).    DESCRIPTION:    The patient presented to the operating room.  A time out was performed prior to beginning the procedure.   The patient was placed in the supine position.   Patient was sedated by anesthesia: Propofol.    Medications given: Ancef 2 gram IVPB for prophylaxis    FINDINGS:    Esophagus: Normal  Stomach: Diffuse mild to moderate gastritis.  Duodenum: Normal to the second portion.    PEG placement: An appropriate site was selected and the stomach was trans-illuminated and an optimal position for the PEG tube was identified using good 1:1 transmission method. The skin was infiltrated with local anesthetic and the trocar and sheath were inserted through the abdomen into the stomach under direct visualization. The needle was removed and a guidewire was inserted through the sheath. The guidewire was grasped within the stomach with a snare. It was removed completely out of the mouth and the PEG tube was secured to the guidewire.    The guidewire and  24 PEG tube were then pulled through the mouth and esophagus and snug to the abdominal wall.The external bumper fixer was at 3 cm at the level of the skin. There was no evidence of bleeding.   The Bolster was placed on the PEG site.      The patient tolerated the procedure well and was transferred to recovery room in stable condition    Blood loss: Minimal    The patient tolerated the procedure well.      There were no immediate complications.    IMPRESSION:  /24French PEG tube successfully placed.    RECOMMENDATIONS:  Please ensure proper PEG care and tube feeding:  - keep head of bed elevated to at least 30 degrees during tube feeding. The largest factor in preventing aspiration during tube feeding is not the placement of the tube into the stomach or jejunum, but keeping the head elevated to at least 30 degrees during feeding  - Medications and water can be started 6 hours after placement. Tube feeds can be started tomorrow morning.   - PEG needs 0.5-1cm of laxity freely mobile in and out of the gastrostomy tract. Overtightening may impair tract maturation by inducing localized ischemia. Over tightening may also result in buried bumper syndrome.  No NSAIDS for 7 days. For full dose anti-coagulation, please hold 24 hours after the procedure. Anti-platelet per the primary/cardiology team.

## 2024-08-23 NOTE — DISCHARGE PLANNING
[facility] Transitional Care Coordination    Family toured [facility].  Family considering options for return to community support upon completion of program - family vs hired caregiver support - definitive decision pending.  Family aware potential admission pending PEG placement.

## 2024-08-23 NOTE — THERAPY
Physical Therapy Contact Note    EMR indicates patient undergoing PEG placement today. Will re attempt as able and appropriate.     Lucy Hurley, PT, DPT  [phone]

## 2024-08-23 NOTE — INTERVAL H&P NOTE
I have seen and evaluated the patient.  He remains dysarthric but is oriented x 3.  Discussed risks and benefits of PEG tube placement.  Patient wishes to proceed.

## 2024-08-23 NOTE — CARE PLAN
The patient is Watcher - Medium risk of patient condition declining or worsening    Shift Goals  Clinical Goals: Peg tube, increase mobility  Patient Goals: Peg tube  Family Goals: CINDY    Progress made toward(s) clinical / shift goals:    PEG tube placed.  Pt up to chair and ambulated to hallway and to bathroom.       Problem: Optimal Care of the Stroke Patient  Goal: Optimal acute care for the stroke patient  Outcome: Progressing     Problem: Knowledge Deficit - Stroke Education  Goal: Patient's knowledge of stroke and risk factors will improve  Outcome: Progressing  Note: Stroke education provided based on personalized risk factors.      Problem: Discharge Planning - Stroke  Goal: Ensure Stroke Core Measures are met prior to discharge  Outcome: Progressing  Note: Pt plan to discharge to rehab.      Problem: Mobility - Stroke  Goal: Patient's capacity to carry out activities will improve  Outcome: Progressing  Note: Pt is up ambulating with 1 assist contact guard.        Patient is not progressing towards the following goals:      Problem: Dysphagia  Goal: Dysphagia will improve  Outcome: Not Progressing  Note: PEG tube placed today.

## 2024-08-23 NOTE — PROGRESS NOTES
Monitor summary: SR/ST , VT 0.14, QRS 0.10, QT 0.40, with rare PVCs and PACs, coup bigem, trigem per strip from monitor room.

## 2024-08-23 NOTE — ANESTHESIA PREPROCEDURE EVALUATION
Case: [id number] Date/Time: 08/23/24 0907    Procedure: EGD, WITH PEG TUBE INSERTION    Anesthesia type: General    Location: [facility] ROOM 26 / SURGERY SAME DAY [facility]    Surgeons: Issa Meadows M.D.            Relevant Problems   PULMONARY   (positive) Dyspnea on exertion      NEURO   (positive) Right middle cerebral artery stroke (HCC)      CARDIAC   (positive) Dyspnea on exertion   (positive) Hypertension associated with type 2 diabetes mellitus (HCC)   (positive) Paroxysmal atrial fibrillation (HCC)   (positive) Pulmonary embolism on right (HCC)         (positive) Cardiorenal syndrome   (positive) Stage 3a chronic kidney disease      ENDO   (positive) Type 2 diabetes mellitus with stage 3a chronic kidney disease, without long-term current use of insulin (HCC)       Physical Exam    Airway   Mallampati: II  TM distance: >3 FB  Neck ROM: full       Cardiovascular - normal exam  Rhythm: regular  Rate: normal  (-) murmur     Dental - normal exam           Pulmonary - normal exam  Breath sounds clear to auscultation     Abdominal    Neurological - abnormal exam                   Anesthesia Plan    ASA 4   ASA physical status 4 criteria: CVA or TIA - recent (< 3 months)    Plan - general       Airway plan will be natural airway          Induction: intravenous    Postoperative Plan: Postoperative administration of opioids is intended.    Pertinent diagnostic labs and testing reviewed    Informed Consent:    Anesthetic plan and risks discussed with patient.    Use of blood products discussed with: patient whom consented to blood products.

## 2024-08-23 NOTE — PROGRESS NOTES
Pito is currently inpatient after being admitted on 8/13/2024 for stroke. Deferring this month's outreach d/t hospitalization.

## 2024-08-23 NOTE — PROGRESS NOTES
Hospital Medicine Daily Progress Note    Date of Service  8/22/2024    Chief Complaint  Pito Black is a 79 y.o. male admitted 8/13/2024 with CVA    Hospital Course  79 y.o. male with lung cancer followed by Luz Maria Jules on apixaban who presented 8/13/2024 with left facial droop and dysphagia secondary to right MCA CVA noted on head CTA.  Neurology following for which patient was deemed outside of tPA window.  He was restarted on Eliquis and no further workup recommended by neurology as CVA likely secondary to cardioembolic etiologies in the setting of chronic atrial fibrillation.  He was eval by speech pathology for which she was noted for aspiration on exam and he was placed n.p.o. and core track was placed on 8/15/2024.  Prior to admission patient was on antineoplastic trial medicine which has been held while inpatient.     Interval Problem Update  Evaluated at bedside  No new complaints  Appears tired  Discussed with patient and family, will have palliative care discussed goals of care etc.  Ultimately they have decided to proceed with PEG tube however are open to more comfort based approach eventually  Discussed with palliative care after consult, appreciate discussion and recommendations  Patient is now DNR    I have discussed this patient's plan of care and discharge plan at IDT rounds today with Case Management, Nursing, Nursing leadership, and other members of the IDT team.    Consultants/Specialty  Neurology    Code Status  DNAR/DNI    Disposition  The patient is not medically cleared for discharge to home or a post-acute facility.     I have placed the appropriate orders for post-discharge needs.    Review of Systems  Review of Systems   Constitutional: Negative.    HENT: Negative.     Eyes: Negative.    Respiratory: Negative.     Cardiovascular: Negative.    Gastrointestinal: Negative.    Genitourinary: Negative.    Musculoskeletal: Negative.    Skin: Negative.    Neurological: Negative.     Endo/Heme/Allergies: Negative.    Psychiatric/Behavioral: Negative.          Physical Exam  Temp:  [36.1 °C (97 °F)-36.6 °C (97.8 °F)] 36.2 °C (97.1 °F)  Pulse:  [] 96  Resp:  [16-18] 16  BP: (127-147)/(70-95) 133/70  SpO2:  [93 %-97 %] 95 %    Physical Exam  Constitutional:       General: He is not in acute distress.     Appearance: Normal appearance.   HENT:      Head: Normocephalic and atraumatic.      Nose: Nose normal. No congestion.      Mouth/Throat:      Mouth: Mucous membranes are moist.   Eyes:      Extraocular Movements: Extraocular movements intact.      Pupils: Pupils are equal, round, and reactive to light.   Cardiovascular:      Rate and Rhythm: Normal rate and regular rhythm.      Pulses: Normal pulses.      Heart sounds: Normal heart sounds.   Pulmonary:      Effort: Pulmonary effort is normal.      Breath sounds: Normal breath sounds.   Abdominal:      General: Bowel sounds are normal.      Palpations: Abdomen is soft.      Tenderness: There is no abdominal tenderness.   Musculoskeletal:         General: No swelling. Normal range of motion.      Cervical back: Normal range of motion and neck supple.   Skin:     General: Skin is warm.      Coloration: Skin is not jaundiced.   Neurological:      Mental Status: He is alert.      Comments: Dysarthria  Left upper extremity weakness   Psychiatric:         Mood and Affect: Mood normal.         Behavior: Behavior normal.         Thought Content: Thought content normal.         Judgment: Judgment normal.         Fluids    Intake/Output Summary (Last 24 hours) at 8/23/2024 0911  Last data filed at 8/23/2024 0600  Gross per 24 hour   Intake 450 ml   Output 1 ml   Net 449 ml       Laboratory  Recent Labs     08/21/24  0614   WBC 15.9*   RBC 4.88   HEMOGLOBIN 15.1   HEMATOCRIT 46.5   MCV 95.3   MCH 30.9   MCHC 32.5   RDW 49.9   PLATELETCT 107*   MPV 10.3       Recent Labs     08/21/24  0614   SODIUM 142   POTASSIUM 4.7   CHLORIDE 108   CO2 21   GLUCOSE  157*   BUN 45*   CREATININE 1.33   CALCIUM 9.6                         Imaging  DX-ABDOMEN FOR TUBE PLACEMENT   Final Result      Dobbhoff tube tip projects over the proximal stomach      DX-ESOPHAGUS - [id number]   Final Result      DX-CHEST-PORTABLE (1 VIEW)   Final Result         No new abnormalities have developed on portable chest radiograph since the prior examination.  No new infiltrates or consolidations are identified. Opacifications right lung apex and left lower lung again identified with no change. No new infiltrates or    consolidations.      DX-CHEST-PORTABLE (1 VIEW)   Final Result         1. No significant interval change.      [id number] VIEW   Final Result         No specific finding to suggest small bowel obstruction.      DX-ABDOMEN FOR TUBE PLACEMENT   Final Result         Feeding tube with tip projecting over the expected area of the stomach.      DX-ABDOMEN FOR TUBE PLACEMENT   Final Result         1.  Nonspecific bowel gas pattern in the upper abdomen.   2.  Dobbhoff tube tip terminates overlying the expected location of the gastric fundus.   3.  Bilateral pulmonary infiltrates, greatest in the right upper lobe.   4.  Trace bilateral pleural effusions      DX-ESOPHAGUS - [id number]   Final Result      DX-CHEST-PORTABLE (1 VIEW)   Final Result      1. Development of interstitial opacities in the lungs, either edema or interstitial infiltrates.   2. Worsening confluent opacities in the right upper lobe and lingula.   3. The remainder is stable.      CT-CTA NECK WITH & W/O-POST PROCESSING   Final Result      1. No evidence of flow-limiting stenosis in the cervical carotid or cervical vertebral arteries.   2. Partially visualized airspace opacities/mass in the right upper lobe. Infiltrated right perihilar and mediastinal lymph tavia masses.   3. Bilateral pleural effusions.      CT-CTA HEAD WITH & W/O-POST PROCESS   Final Result         1. There is a focal stenosis/occlusion in a distal  "branch of the right MCA with flow reconstitution.         Preliminary findings texted to Dr. MEMO CHAIDEZ in the Emergency Department via Voalte on 8/13/2024 9:17 AM            CT-CEREBRAL PERFUSION ANALYSIS   Final Result      1. Cerebral blood flow less than 30% possibly representing completed infarct = 0 mL.      2. T Max more than 6 seconds possibly representing combination of completed infarct and ischemia = 18 mL.      3. Mismatched volume possibly representing ischemic brain/penumbra= 18 mL      4.  Please note that this cerebral perfusion study and report is Quantitative and targets supratentorial (cerebral) perfusion for evaluation of large vessel territory acute ischemia/infarction. For example, lacunar infarcts, and brainstem/posterior fossa    ischemia/infarction are not evaluated on this study.  Data acquisition is subject to artifacts which can yield non-anatomically plausible perfusion maps which may be due to motion, bolus timing, signal to noise ratio, or other technical factors.    Perfusion map abnormalities which show non-anatomic distributions are likely artifact.   This study is not \"stand-alone\" and should only be utilized for diagnosis, management/treatment in correlation with CT, CTA, and/or MRI and clinical factors.              Assessment/Plan  * Right middle cerebral artery stroke (HCC)- (present on admission)  Assessment & Plan  Likely thromboembolic in setting of Afib and malignancy  Neurology consulted and s/o - continue apixaban, no further workup indicated  PT/OT/SLP  Likely dispo is inpatient rehab  Has persistent dysphagia and will likely need PEG tube    Dysarthria- (present on admission)  Assessment & Plan  Due to MCA stroke  SLP eval and treat    Oropharyngeal dysphagia- (present on admission)  Assessment & Plan  Due to MCA stroke  SLP eval and treat  Plan for likely inpatient rehab, may likely need PEG tube placed, consult to GI     Hyponatremia- (present on " admission)  Assessment & Plan  Resolved, sodium within normal limits    Type 2 diabetes mellitus with stage 3a chronic kidney disease, without long-term current use of insulin (HCC)- (present on admission)  Assessment & Plan  Well-controlled, A1c 6  Continue SGLT2  Initiated on atorvastatin  Continuing current treatment    Chronic systolic heart failure (HCC)- (present on admission)  Assessment & Plan  Appears euvolemic  Continue BB, SGLT2, IFRAH  Consider ACE/ARB      Primary lung adenocarcinoma (HCC)- (present on admission)  Assessment & Plan  Follows with oncologist Dr. Schulz  On study drug - held on admission per pharmacist    Stage 3a chronic kidney disease- (present on admission)  Assessment & Plan  At baseline  Avoid nephrotoxins  Repeat AM BMP    Paroxysmal atrial fibrillation (HCC)- (present on admission)  Assessment & Plan  Continue metoprolol and apixaban  Telemetry for RVR in setting of CVA       VTE prophylaxis: Eliquis    I have performed a physical exam and reviewed and updated ROS and Plan today (8/23/2024). In review of yesterday's note (8/22/2024), there are no changes except as documented above.     [Outpatient] : Outpatient [Ambulatory] : Patient is ambulatory. [THIS CHAMBER HAS BEEN CLEANED / DISINFECTED] : This chamber has been cleaned / disinfected according to local and hospital policy and procedure prior to this treatment. [Patient demonstrated and verbalized proper technique for using air break mask] : Patient demonstrated and verbalized proper technique for using air break mask [Patient educated on the risks of SMOKING prior to HBOT with understanding] : Patient educated on the risks of SMOKING prior to HBOT with understanding [Patient educated on the risks of CONSUMING ALCOHOL prior to HBOT with understanding] : Patient educated on the risks of CONSUMING ALCOHOL prior to HBOT with understanding [100% Cotton] : 100% cotton [Empty all pockets] : empty all pockets [No hair oils, wigs, hairpieces, pins] : no hair oils, wigs, hairpieces, pins  [Pre tx medications] : pre tx medications  [No make-up, creams] : no make-up, creams  [No jewelry] : no jewelry  [No matches, cigarettes, lighters] : no matches, cigarettes, lighters  [Hearing aid removed] : hearing aid removed [Dentures removed] : dentures removed [Ground bracelet on pt's wrist] : ground bracelet on pt's wrist  [Contacts removed] : contacts removed  [Remove nail polish] : remove nail polish  [No reading material] : no reading material  [Bra, undergarments removed] : bra, undergarments removed  [No contraindicated dressings] : no contraindicated dressings [Ground Wire - VISUAL Verification - Intact/Free of Obstruction] : Ground Wire - VISUAL Verification - Intact/Free of Obstruction  [Ground Continuity - Verified < 1 ohm w/ Wrist Strap Mila] : Ground Continuity - Verified < 1 ohm w/ Wrist Strap Mila [Number: ___] : Number: [unfilled] [Diagnosis: ___] : Diagnosis: [unfilled] [____] : Post-Dive: Time - [unfilled] [___] : Post-Dive: Value - [unfilled] mg/dL [Clear all fields] : clear all fields [] : No [FreeTextEntry4] : 100 [FreeTextEntry6] : 08 : 32 [FreeTextEntry8] : 08 : 42 [de-identified] : 09 : 12 [de-identified] : 09 : 17 [de-identified] : 09 : 47 [de-identified] : 09 : 52 [de-identified] : 10 : 22 [de-identified] : 10 : 32 [de-identified] : 120 MINUTES

## 2024-08-23 NOTE — ANESTHESIA TIME REPORT
Anesthesia Start and Stop Event Times       Date Time Event    8/23/2024 0918 Ready for Procedure     0932 Anesthesia Start     0954 Anesthesia Stop          Responsible Staff  08/23/24      Name Role Begin End    Johnny Rivas D.O. Anesth [id number]          Overtime Reason:  no overtime (within assigned shift)    Comments:

## 2024-08-23 NOTE — DISCHARGE PLANNING
PEG placement this am. Following for choice.     2252-Spoke with Benoit, daughter regarding [facility] & D/C resources/support. She is agreeable with an admission.  Pito will return to his [address] home with 3ST to enter with his spouse.  Spouse is not able to provide physical assistance.  Their grandson will be moving into assist.  Grand son does work 12 hour shifts for 4 days a week.  Pito will need to be CARMELITA to return home. Case is under review by Dr. Maciel.    2789-Submitted to insurance.

## 2024-08-23 NOTE — PROGRESS NOTES
Monitor summary: SR/ST , DE .14, QRS .07, QT .38 with frequent PVC's per strip from monitor room.

## 2024-08-23 NOTE — DIETARY
"Nutrition Services: Follow-up for Reassessment for bolus feeds  Day 10 of admit.  Pito Black is a 79 y.o. male with admitting DX of R MCA stroke.     Nutrition Assessment:   Height: 167.6 cm (5' 6\")  Weight: 57 kg (125 lb 10.6 oz)  Weight to Use in Calculations: 64 kg (141 lb 1.5 oz)   Body mass index is 20.28 kg/m²., BMI classification: normal   Weight trend: Pt has lost weight between stand up scale weights, 64 kg on 8/13 and 57 kg today, which is 7 kg (10.9%) weight loss in 10 days. Pt with CHF, likely fluid-related as pt is on diuretic.    Weight used: 57 kg  Calculation/Equation: MSJ x 1.2 = 1474 kcals  Calories/day: 1500 - 1800 (26 - 32 kcals/kg)  Grams Protein/day: 68 - 86 (1.2 - 1.5 gm/kg)  Fluids/day: 1425 (25 ml/kg)    TF on hold. PEG tube place today by GI, plan to start feeds tomorrow.   Skin: No wounds/edema noted.   Pertinent Labs(last taken 8/21): glucose 15, BUN 45, GFR 54, A1C 6.0  Pertinent Meds:lipitor, zetia, senna, aldactone, D3, anti-emetics prn, bowel meds prn  Last BM: 8/21 Type 4  CHO-controlled formula is indicated as pt previously failed standard formula with blood glucose in 200s.      Nutrition Diagnosis: (PES)      Moderate malnutrition in context of chronic illness related to lung cancer as evidenced by mild muscle and mild fat wasting.     Swallowing difficulty r/t oropharyngeal dysphagia as evidenced by need for nutrition support.     Nutrition Dx Status: Ongoing    Nutrition Interventions:   Hold continuous TF and start bolus regimen at dinner time tonight. Provide 1/2 carton of Glucerna 1.2 at first feed and advance by 1/2 carton per day until goal reached. Goal is 6 cartons per day (suggest 2 cartons with meal times) which provides 1704 kcals, 85 gm protein, and 1146 ml free water per day.   Fluids per MD. Consider 150 ml BID to provide daily total of 1446 ml (TF + water flushes). Will discuss with MD to change.  Patient aware of active plan of care as appropriate.    "   Nutrition Monitoring and Evaluation:   Monitor for TF advancement to bolus goal and tolerance.   Monitor nutrition POC  Monitor vital signs pertinent to nutrition     RD following.

## 2024-08-24 ENCOUNTER — HOSPITAL ENCOUNTER (INPATIENT)
Facility: REHABILITATION | Age: 79
DRG: 057 | End: 2024-08-24
Attending: PHYSICAL MEDICINE & REHABILITATION | Admitting: PHYSICAL MEDICINE & REHABILITATION
Payer: MEDICARE

## 2024-08-24 VITALS
WEIGHT: 125.66 LBS | DIASTOLIC BLOOD PRESSURE: 63 MMHG | OXYGEN SATURATION: 92 % | HEIGHT: 66 IN | RESPIRATION RATE: 16 BRPM | TEMPERATURE: 97.8 F | SYSTOLIC BLOOD PRESSURE: 122 MMHG | BODY MASS INDEX: 20.2 KG/M2 | HEART RATE: 99 BPM

## 2024-08-24 PROBLEM — I63.9 RIGHT SIDED CEREBRAL HEMISPHERE CEREBROVASCULAR ACCIDENT (CVA) (HCC): Status: ACTIVE | Noted: 2024-08-24

## 2024-08-24 LAB
GLUCOSE BLD STRIP.AUTO-MCNC: 136 MG/DL (ref 65–99)
GLUCOSE BLD STRIP.AUTO-MCNC: 140 MG/DL (ref 65–99)

## 2024-08-24 PROCEDURE — A9270 NON-COVERED ITEM OR SERVICE: HCPCS | Performed by: PHYSICAL MEDICINE & REHABILITATION

## 2024-08-24 PROCEDURE — 700102 HCHG RX REV CODE 250 W/ 637 OVERRIDE(OP): Performed by: STUDENT IN AN ORGANIZED HEALTH CARE EDUCATION/TRAINING PROGRAM

## 2024-08-24 PROCEDURE — 99232 SBSQ HOSP IP/OBS MODERATE 35: CPT | Performed by: NURSE PRACTITIONER

## 2024-08-24 PROCEDURE — 700102 HCHG RX REV CODE 250 W/ 637 OVERRIDE(OP)

## 2024-08-24 PROCEDURE — 770010 HCHG ROOM/CARE - REHAB SEMI PRIVAT*

## 2024-08-24 PROCEDURE — 94760 N-INVAS EAR/PLS OXIMETRY 1: CPT

## 2024-08-24 PROCEDURE — 700102 HCHG RX REV CODE 250 W/ 637 OVERRIDE(OP): Performed by: PHYSICAL MEDICINE & REHABILITATION

## 2024-08-24 PROCEDURE — A9270 NON-COVERED ITEM OR SERVICE: HCPCS | Performed by: STUDENT IN AN ORGANIZED HEALTH CARE EDUCATION/TRAINING PROGRAM

## 2024-08-24 PROCEDURE — 82962 GLUCOSE BLOOD TEST: CPT | Mod: 91

## 2024-08-24 PROCEDURE — A9270 NON-COVERED ITEM OR SERVICE: HCPCS

## 2024-08-24 PROCEDURE — 99223 1ST HOSP IP/OBS HIGH 75: CPT | Performed by: PHYSICAL MEDICINE & REHABILITATION

## 2024-08-24 RX ORDER — SPIRONOLACTONE 25 MG/1
25 TABLET ORAL DAILY
Status: CANCELLED | OUTPATIENT
Start: 2024-08-25

## 2024-08-24 RX ORDER — ATORVASTATIN CALCIUM 20 MG/1
20 TABLET, FILM COATED ORAL EVERY EVENING
Status: CANCELLED | OUTPATIENT
Start: 2024-08-24

## 2024-08-24 RX ORDER — CYCLOBENZAPRINE HCL 10 MG
10 TABLET ORAL
Status: CANCELLED | OUTPATIENT
Start: 2024-08-24

## 2024-08-24 RX ORDER — CARBOXYMETHYLCELLULOSE SODIUM 5 MG/ML
1 SOLUTION/ DROPS OPHTHALMIC PRN
Status: DISCONTINUED | OUTPATIENT
Start: 2024-08-24 | End: 2024-09-05 | Stop reason: HOSPADM

## 2024-08-24 RX ORDER — ECHINACEA PURPUREA EXTRACT 125 MG
2 TABLET ORAL PRN
Status: DISCONTINUED | OUTPATIENT
Start: 2024-08-24 | End: 2024-09-05 | Stop reason: HOSPADM

## 2024-08-24 RX ORDER — SPIRONOLACTONE 25 MG/1
25 TABLET ORAL DAILY
Status: DISCONTINUED | OUTPATIENT
Start: 2024-08-25 | End: 2024-08-26

## 2024-08-24 RX ORDER — OXYCODONE HYDROCHLORIDE 5 MG/1
5 TABLET ORAL
Status: DISCONTINUED | OUTPATIENT
Start: 2024-08-24 | End: 2024-09-05 | Stop reason: HOSPADM

## 2024-08-24 RX ORDER — LANOLIN ALCOHOL/MO/W.PET/CERES
6 CREAM (GRAM) TOPICAL NIGHTLY
Status: DISCONTINUED | OUTPATIENT
Start: 2024-08-24 | End: 2024-09-05 | Stop reason: HOSPADM

## 2024-08-24 RX ORDER — DAPAGLIFLOZIN 10 MG/1
10 TABLET, FILM COATED ORAL DAILY
Status: CANCELLED | OUTPATIENT
Start: 2024-08-25

## 2024-08-24 RX ORDER — POLYETHYLENE GLYCOL 3350 17 G/17G
1 POWDER, FOR SOLUTION ORAL
Status: DISCONTINUED | OUTPATIENT
Start: 2024-08-24 | End: 2024-09-05 | Stop reason: HOSPADM

## 2024-08-24 RX ORDER — ONDANSETRON 4 MG/1
4 TABLET, ORALLY DISINTEGRATING ORAL 4 TIMES DAILY PRN
Status: DISCONTINUED | OUTPATIENT
Start: 2024-08-24 | End: 2024-09-05 | Stop reason: HOSPADM

## 2024-08-24 RX ORDER — OXYCODONE HYDROCHLORIDE 5 MG/1
2.5 TABLET ORAL
Status: DISCONTINUED | OUTPATIENT
Start: 2024-08-24 | End: 2024-09-05 | Stop reason: HOSPADM

## 2024-08-24 RX ORDER — HYDRALAZINE HYDROCHLORIDE 25 MG/1
25 TABLET, FILM COATED ORAL EVERY 8 HOURS PRN
Status: DISCONTINUED | OUTPATIENT
Start: 2024-08-24 | End: 2024-09-05 | Stop reason: HOSPADM

## 2024-08-24 RX ORDER — ATORVASTATIN CALCIUM 10 MG/1
20 TABLET, FILM COATED ORAL EVERY EVENING
Status: DISCONTINUED | OUTPATIENT
Start: 2024-08-24 | End: 2024-08-25

## 2024-08-24 RX ORDER — EZETIMIBE 10 MG/1
10 TABLET ORAL DAILY
Status: DISCONTINUED | OUTPATIENT
Start: 2024-08-25 | End: 2024-09-05 | Stop reason: HOSPADM

## 2024-08-24 RX ORDER — TRAZODONE HYDROCHLORIDE 50 MG/1
50 TABLET, FILM COATED ORAL
Status: DISCONTINUED | OUTPATIENT
Start: 2024-08-24 | End: 2024-09-05 | Stop reason: HOSPADM

## 2024-08-24 RX ORDER — ONDANSETRON 2 MG/ML
4 INJECTION INTRAMUSCULAR; INTRAVENOUS 4 TIMES DAILY PRN
Status: DISCONTINUED | OUTPATIENT
Start: 2024-08-24 | End: 2024-09-05 | Stop reason: HOSPADM

## 2024-08-24 RX ORDER — DAPAGLIFLOZIN 10 MG/1
10 TABLET, FILM COATED ORAL DAILY
Status: DISCONTINUED | OUTPATIENT
Start: 2024-08-25 | End: 2024-09-05 | Stop reason: HOSPADM

## 2024-08-24 RX ORDER — AMOXICILLIN 250 MG
2 CAPSULE ORAL 2 TIMES DAILY
Status: DISCONTINUED | OUTPATIENT
Start: 2024-08-24 | End: 2024-09-05 | Stop reason: HOSPADM

## 2024-08-24 RX ORDER — METOPROLOL TARTRATE 25 MG/1
50 TABLET, FILM COATED ORAL 2 TIMES DAILY
Status: DISCONTINUED | OUTPATIENT
Start: 2024-08-24 | End: 2024-08-25

## 2024-08-24 RX ORDER — VITAMIN B COMPLEX
2000 TABLET ORAL DAILY
Status: CANCELLED | OUTPATIENT
Start: 2024-08-25

## 2024-08-24 RX ORDER — ACETAMINOPHEN 325 MG/1
650 TABLET ORAL EVERY 4 HOURS PRN
Status: DISCONTINUED | OUTPATIENT
Start: 2024-08-24 | End: 2024-09-05 | Stop reason: HOSPADM

## 2024-08-24 RX ORDER — MIDAZOLAM HYDROCHLORIDE 5 MG/ML
5 INJECTION INTRAMUSCULAR; INTRAVENOUS PRN
Status: DISCONTINUED | OUTPATIENT
Start: 2024-08-24 | End: 2024-09-05 | Stop reason: HOSPADM

## 2024-08-24 RX ORDER — ALUMINA, MAGNESIA, AND SIMETHICONE 2400; 2400; 240 MG/30ML; MG/30ML; MG/30ML
20 SUSPENSION ORAL
Status: DISCONTINUED | OUTPATIENT
Start: 2024-08-24 | End: 2024-09-05 | Stop reason: HOSPADM

## 2024-08-24 RX ORDER — EZETIMIBE 10 MG/1
10 TABLET ORAL DAILY
Status: CANCELLED | OUTPATIENT
Start: 2024-08-25

## 2024-08-24 RX ORDER — CYCLOBENZAPRINE HCL 10 MG
10 TABLET ORAL
Status: DISCONTINUED | OUTPATIENT
Start: 2024-08-24 | End: 2024-09-05 | Stop reason: HOSPADM

## 2024-08-24 RX ORDER — VITAMIN B COMPLEX
2000 TABLET ORAL DAILY
Status: DISCONTINUED | OUTPATIENT
Start: 2024-08-25 | End: 2024-09-05 | Stop reason: HOSPADM

## 2024-08-24 RX ORDER — TRAMADOL HYDROCHLORIDE 50 MG/1
50 TABLET ORAL EVERY 4 HOURS PRN
Status: DISCONTINUED | OUTPATIENT
Start: 2024-08-24 | End: 2024-09-05 | Stop reason: HOSPADM

## 2024-08-24 RX ORDER — METOPROLOL TARTRATE 25 MG/1
50 TABLET, FILM COATED ORAL 2 TIMES DAILY
Status: CANCELLED | OUTPATIENT
Start: 2024-08-24

## 2024-08-24 RX ADMIN — METOPROLOL TARTRATE 50 MG: 25 TABLET, FILM COATED ORAL at 21:03

## 2024-08-24 RX ADMIN — APIXABAN 5 MG: 5 TABLET, FILM COATED ORAL at 21:02

## 2024-08-24 RX ADMIN — SENNOSIDES AND DOCUSATE SODIUM 2 TABLET: 50; 8.6 TABLET ORAL at 21:02

## 2024-08-24 RX ADMIN — ATORVASTATIN CALCIUM 20 MG: 10 TABLET, FILM COATED ORAL at 21:02

## 2024-08-24 RX ADMIN — TRAZODONE HYDROCHLORIDE 50 MG: 50 TABLET ORAL at 23:36

## 2024-08-24 RX ADMIN — DAPAGLIFLOZIN 10 MG: 10 TABLET, FILM COATED ORAL at 04:58

## 2024-08-24 RX ADMIN — EZETIMIBE 10 MG: 10 TABLET ORAL at 04:57

## 2024-08-24 RX ADMIN — POLYETHYLENE GLYCOL 3350 1 PACKET: 17 POWDER, FOR SOLUTION ORAL at 04:58

## 2024-08-24 RX ADMIN — Medication 2000 UNITS: at 04:57

## 2024-08-24 RX ADMIN — Medication 6 MG: at 21:03

## 2024-08-24 RX ADMIN — METOPROLOL TARTRATE 50 MG: 25 TABLET, FILM COATED ORAL at 04:58

## 2024-08-24 ASSESSMENT — PATIENT HEALTH QUESTIONNAIRE - PHQ9
SUM OF ALL RESPONSES TO PHQ9 QUESTIONS 1 AND 2: 0
2. FEELING DOWN, DEPRESSED, IRRITABLE, OR HOPELESS: NOT AT ALL
1. LITTLE INTEREST OR PLEASURE IN DOING THINGS: NOT AT ALL
SUM OF ALL RESPONSES TO PHQ9 QUESTIONS 1 AND 2: 0
2. FEELING DOWN, DEPRESSED, IRRITABLE, OR HOPELESS: NOT AT ALL
1. LITTLE INTEREST OR PLEASURE IN DOING THINGS: NOT AT ALL
1. LITTLE INTEREST OR PLEASURE IN DOING THINGS: NOT AT ALL
2. FEELING DOWN, DEPRESSED, IRRITABLE, OR HOPELESS: NOT AT ALL
SUM OF ALL RESPONSES TO PHQ9 QUESTIONS 1 AND 2: 0

## 2024-08-24 ASSESSMENT — LIFESTYLE VARIABLES
HOW MANY TIMES IN THE PAST YEAR HAVE YOU HAD 5 OR MORE DRINKS IN A DAY: 0
HAVE PEOPLE ANNOYED YOU BY CRITICIZING YOUR DRINKING: NO
CONSUMPTION TOTAL: NEGATIVE
AVERAGE NUMBER OF DAYS PER WEEK YOU HAVE A DRINK CONTAINING ALCOHOL: 0
TOTAL SCORE: 0
TOTAL SCORE: 0
EVER HAD A DRINK FIRST THING IN THE MORNING TO STEADY YOUR NERVES TO GET RID OF A HANGOVER: NO
ON A TYPICAL DAY WHEN YOU DRINK ALCOHOL HOW MANY DRINKS DO YOU HAVE: 0
HAVE YOU EVER FELT YOU SHOULD CUT DOWN ON YOUR DRINKING: NO
DOES PATIENT WANT TO STOP DRINKING: NO
ALCOHOL_USE: NO
EVER FELT BAD OR GUILTY ABOUT YOUR DRINKING: NO
TOTAL SCORE: 0
EVER_SMOKED: YES

## 2024-08-24 ASSESSMENT — ENCOUNTER SYMPTOMS
SINUS PAIN: 0
SHORTNESS OF BREATH: 0
WEAKNESS: 1
ABDOMINAL PAIN: 0
MYALGIAS: 0
BLOOD IN STOOL: 0
FOCAL WEAKNESS: 1
CHILLS: 0
VOMITING: 0
NERVOUS/ANXIOUS: 0
FALLS: 0
COUGH: 0
DIARRHEA: 0
FEVER: 0
FLANK PAIN: 0
CONSTIPATION: 0
NAUSEA: 0
INSOMNIA: 0
HEADACHES: 0
SORE THROAT: 0

## 2024-08-24 ASSESSMENT — COGNITIVE AND FUNCTIONAL STATUS - GENERAL
DRESSING REGULAR UPPER BODY CLOTHING: A LITTLE
DAILY ACTIVITIY SCORE: 18
MOVING TO AND FROM BED TO CHAIR: A LITTLE
HELP NEEDED FOR BATHING: A LITTLE
CLIMB 3 TO 5 STEPS WITH RAILING: A LOT
SUGGESTED CMS G CODE MODIFIER DAILY ACTIVITY: CK
STANDING UP FROM CHAIR USING ARMS: A LITTLE
DRESSING REGULAR LOWER BODY CLOTHING: A LITTLE
WALKING IN HOSPITAL ROOM: A LITTLE
TURNING FROM BACK TO SIDE WHILE IN FLAT BAD: A LITTLE
SUGGESTED CMS G CODE MODIFIER MOBILITY: CK
PERSONAL GROOMING: A LITTLE
EATING MEALS: A LITTLE
TOILETING: A LITTLE
MOVING FROM LYING ON BACK TO SITTING ON SIDE OF FLAT BED: A LITTLE
MOBILITY SCORE: 17

## 2024-08-24 ASSESSMENT — PAIN DESCRIPTION - PAIN TYPE: TYPE: ACUTE PAIN

## 2024-08-24 ASSESSMENT — FIBROSIS 4 INDEX
FIB4 SCORE: 4.8
FIB4 SCORE: 4.8

## 2024-08-24 NOTE — DISCHARGE PLANNING
Care Transition Team Final Discharge Disposition    Actual Discharge Information  Discharge Disposition: Disch to IP rehab facility or distinct part unit (62)    Case Management Discharge Planning    Admission Date: 8/13/2024  GMLOS: 2.9  ALOS: 11    6-Clicks ADL Score: 18  6-Clicks Mobility Score: 17  PT and/or OT Eval ordered: Yes  Post-acute Referrals Ordered: Yes  Post-acute Choice Obtained: Yes  Has referral(s) been sent to post-acute provider:  Yes      Anticipated Discharge Dispo: Discharge Disposition: Disch to IP rehab facility or distinct part unit (62)  Discharge Address: [address] [place] [id number]  Discharge Contact Phone Number: [phone]    DME Needed: No    Action(s) Taken: Updated Provider/Nurse on Discharge Plan, Transport Arranged , and OTHER    LMSW received message via Voalte from [facility] that pt transport has been arranged between [phone], Dr. Maciel accepting.  LMSW met with pt at bedside, family present and aware of transfer.  Pt signed transfer form.  Cobra packet completed and given to bedside RN.      Escalations Completed: None    Medically Clear: Yes    Barriers to Discharge: None    Is the patient up for discharge tomorrow: No

## 2024-08-24 NOTE — DISCHARGE PLANNING
GMT to transport patient to [facility] today via W/C 1-1:30  Dr. Maciel accepting  Bedside RN to call [phone] for nurse to nurse report.  Care team notified via Voalte.

## 2024-08-24 NOTE — FLOWSHEET NOTE
08/24/24 1351   Events/Summary/Plan   Events/Summary/Plan RT Consult   Vital Signs   Pulse (!) 103   Respiration 16   Pulse Oximetry 92 %   $ Pulse Oximetry (Spot Check) Yes   Respiratory Assessment   Level of Consciousness Alert   Chest Exam   Work Of Breathing / Effort Within Normal Limits   Breath Sounds   RUL Breath Sounds Clear   RML Breath Sounds Clear   RLL Breath Sounds Diminished   MARIA M Breath Sounds Clear   LLL Breath Sounds Diminished   Oxygen   O2 Delivery Device Room air w/o2 available   Smoking History   Have you ever smoked Yes   Have you smoked in the last 12 months No   Confirm Quit Date   (Quit 30 years ago)

## 2024-08-24 NOTE — PROGRESS NOTES
1400 Pt arrived at [facility] from  via transport. Dr. Maciel to follow. Initial assessment initiated. Pt oriented to room and facility routine and safety measures; pt education binder provided and discussed. Pt A/O x 2-4, Continent of bowel and bladder. min assist for transfers. All wounds photographed and documented; photos uploaded to . Pt denies pain or discomfort at this time. Pt positioned for comfort in bed. Call light within reach, safety measures in place. Will continue to monitor.

## 2024-08-24 NOTE — H&P
Physical Medicine & Rehabilitation  History and Physical (H&P)  &     Post Admission Physician Evaluation (ZHANG)       Date of Admission: 8/24/2024  Date of Service: 8/24/2024   Pito Black    [facility] Code to Support Admission: 0001.1 - Stroke: Left Body Involvement (Right Brain)  Etiologic Diagnosis: Right middle cerebral artery stroke (HCC)    _______________________________________________    Chief Complaint: Decreased mobility, dysphagia    History of Present Illness:  Adapted from the PM&R Consult by Dr. Mensah:   The patient is a 79 y.o. right hand dominant male with a past medical history of lung cancer followed by Dr. Schulz, A-fib on apixaban, HF with reduced EF of 35%, Status post AICD, CKD, hypertension, hyperlipidemia, diabetes;  who presented on 8/13/2024  8:07 AM with slurred speech, left facial droop, left arm weakness.  Initial NIH score of 5.  Workup with CT perfusion found 18 mm penumbra, CTA found focal stenosis/occlusion in the distal right MCA.  Patient had a delayed presentation and was not a candidate for thrombolytics or thrombectomy.  Etiology for stroke is likely thromboembolic in the setting of A-fib and malignancy. Per neurology continue Eliquis. His stay was prolonged as his dysphagia was not improving and failed multiple swallow tests so on 8/23/24 he underwent PEG tube placement.     Patient tolerated transfer to [facility]. He is dysarthric and has some problems understanding due to combination of cognitive changes and hearing loss. Wife and family at bedside provide history. They reports his strength has improved in his arm somewhat but speed of using his arm has not. Denies pain at rest. Denies NVD. He is very motivated to eat again.        Review of Systems:     Comprehensive 14 point ROS was reviewed and all were negative except as noted elsewhere in this document.     Past Medical History:  Past Medical History:   Diagnosis Date    Acute hypoxemic respiratory failure (HCC)      Acute respiratory failure with hypoxia (HCC) 02/01/2023    AICD (automatic cardioverter/defibrillator) present 12/07/2023    Arrhythmia     hx of a fib    Arthritis 2015    generalized, DDD back    Asthma     inhaler     Backpain 08/06/2018 9/10    Blood clotting disorder (HCC) 02/2023    Pulmonary, right lung    Breath shortness     with exertion    Cancer (HCC) 07/2017    left lung- adenocarcinoma    Cataract     IOL bilateral    Congestive heart failure (HCC)     cardiologist, [facility], Catherine KAUR    Dental disorder     lower partial, removable bridge    Diabetes 08/06/2018    on no meds at this time, diet controlled    Dysfunction of eustachian tube     Heart valve disease     mild mitral valve prolapse    High cholesterol     Hypertension     Macrocytic anemia     Pneumonia 02/2023    Renal disorder     CKD stage 3    Thrombophilia ([facility])     Type 2 diabetes mellitus with hyperglycemia ([facility]) 03/17/2023    This is a chronic condition. Current medications: Insulin:  Biguanide: took metformin historically will restart metformin xr 750 mg daily. GLP1-RA:   SGLT-2i:    Consider for cardiorenal protection DPP4-I:  TZD:  Pk: Sulfonyluria:   Last A1c: 6/2/23 6.5% Last Microalb/Cr ratio: 6/2/23 <1.2 Fasting sugars: Last diabetic foot exam: 6/19/23 Last retinal eye exam: has upcoming appointment with optome    Volume overload        Past Surgical History:  Past Surgical History:   Procedure Laterality Date    CO PLACE PERCUT GASTROSTOMY TUBE N/A 8/23/2024    Procedure: EGD, WITH PEG TUBE INSERTION;  Surgeon: Issa Meadows M.D.;  Location: SURGERY SAME DAY [facility];  Service: Gastroenterology    CO BRONCHOSCOPY,DIAGNOSTIC N/A 1/16/2024    Procedure: FIBER OPTIC BRONCHOSCOPY WITH  WASH, BRUSH, BRONCHOALVEOLAR LAVAGE, BIOPSY AND FINE NEEDLE ASPIRATION, ENDOBRONCHIAL ULTRASOUND AND NAVIGATION, ROBOTICS;  Surgeon: Vinayak Carbajal M.D.;  Location: SURGERY [facility];  Service: Pulmonary Robotic     ENDOBRONCHIAL US ADD-ON N/A 1/16/2024    Procedure: ENDOBRONCHIAL ULTRASOUND (EBUS);  Surgeon: Vinayak Carbajal M.D.;  Location: [facility];  Service: Pulmonary Robotic    OK BRONCHOSCOPY,DIAGNOSTIC N/A 12/28/2023    Procedure: FIBER OPTIC BRONCHOSCOPY WITH BRONCHOALVEOLAR LAVAGE AND FINE NEEDLE ASPIRATION, ENDOBRONCHIAL ULTRASOUND AND NAVIGATION, ROBOTICS;  Surgeon: Miriam Martin M.D.;  Location: [facility];  Service: Pulmonary Robotic    ENDOBRONCHIAL US ADD-ON N/A 12/28/2023    Procedure: ENDOBRONCHIAL ULTRASOUND (EBUS);  Surgeon: Miriam Martin M.D.;  Location: [facility];  Service: Pulmonary Robotic    OK BRONCHOSCOPY,DIAGNOSTIC  07/01/2021    Procedure: BRONCHOSCOPY - FIBER OPTIC WITH BRANCHOALVEOLAR LAVAGE BIOPSY, FNA, NAVIGATION;  Surgeon: Vinayak Carbajal M.D.;  Location: [facility];  Service: Pulmonary    ENDOBRONCHIAL US ADD-ON  07/01/2021    Procedure: ENDOBRONCHIAL ULTRASOUND (EBUS).;  Surgeon: Vinayak Carbajal M.D.;  Location: [facility];  Service: Pulmonary    CATARACT PHACO WITH IOL Left 08/21/2018    Procedure: CATARACT PHACO WITH IOL;  Surgeon: Juanito Hager M.D.;  Location: SURGERY SAME DAY [facility];  Service: Ophthalmology    CATARACT PHACO WITH IOL Right 08/07/2018    Procedure: CATARACT PHACO WITH IOL;  Surgeon: Juanito Hager M.D.;  Location: SURGERY SAME DAY [facility];  Service: Ophthalmology    THORACOSCOPY Left 04/19/2018    Procedure: THORACOSCOPY- WEDGE BIOPSY W/FROZEN SECTION;  Surgeon: Cristhian Galeano M.D.;  Location: SURGERY [facility];  Service: Thoracic    EAR MIDDLE EXPLORATION Right 06/12/2015    Procedure: EAR MIDDLE EXPLORATION;  Surgeon: William Brandon M.D.;  Location: SURGERY SAME DAY [facility];  Service:     OSSICULAR RECONSTRUCTION Right 06/12/2015    Procedure: OSSICULAR RECONSTRUCTION CHAIN POSSIBLE;  Surgeon: William Brandon M.D.;  Location: SURGERY SAME DAY [facility];   "Service:     KNEE ARTHROPLASTY TOTAL  2005    EAR RECONSTRUCTION Bilateral     ear replacement \"many years ago\"       Family History:  Family History   Problem Relation Age of Onset    Stroke Mother     Hypertension Mother     Diabetes Mother     Cancer Father         stomach cancer    Heart Disease Brother     Alcohol abuse Brother     Ovarian Cancer Neg Hx     Tubal Cancer Neg Hx     Peritoneal Cancer Neg Hx     Colorectal Cancer Neg Hx     Breast Cancer Neg Hx     Hyperlipidemia Neg Hx        Medications:  No current facility-administered medications for this encounter.       Allergies:  Patient has no known allergies.    Psychosocial History:  Housing / Facility: 1 Story House  Steps Into Home: 3  Steps In Home: 0  Lives with - Patient's Self Care Capacity: Spouse  Equipment Owned: None, Tub / Shower Seat     Prior Level of Function / Living Situation:   Physical Therapy: Prior Services: None  Housing / Facility: 1 Story House  Steps Into Home: 3  Steps In Home: 0  Rail: None  Equipment Owned: None, Tub / Shower Seat  Lives with - Patient's Self Care Capacity: Spouse  Bed Mobility: Independent  Transfer Status: Independent  Ambulation: Independent  Assistive Devices Used: None  Stairs: Independent  Current Level of Function:   Gait Level Of Assist: Minimal Assist  Assistive Device: Front Wheel Walker  Distance (Feet):  (hallway distances w/facilitation of Lft hand on the walker.)  Deviation: Bradykinetic, Shuffled Gait, Decreased Heel Strike, Decreased Toe Off (Heavy reliance on FWW. Cues and manual assist with FWW guidance and stability, Cues to slow pace and complete turns.)  # of Stairs Climbed: 0  Weight Bearing Status: no restrictions.  Skilled Intervention: Verbal Cuing, Postural Facilitation, Compensatory Strategies  Supine to Sit: Supervised  Sit to Supine:  (Up in chair post)  Scooting: Supervised  Rolling: Supervised  Skilled Intervention: Verbal Cuing  Comments: HOB sightly elevated  Sit to Stand: " "Contact Guard Assist  Bed, Chair, Wheelchair Transfer: Contact Guard Assist  Toilet Transfers: Minimal Assist (lowering assistance required.)  Transfer Method: Stand Step  Skilled Intervention: Verbal Cuing  Sitting in Chair: Up to chair post  Sitting Edge of Bed: 5 min  Standin min  Occupational Therapy:   Self Feeding: Independent  Grooming / Hygiene: Independent  Bathing: Independent  Dressing: Independent  Toileting: Independent  Medication Management: Independent  Laundry: Independent  Kitchen Mobility: Independent  Finances: Independent  Home Management: Independent  Shopping: Independent  Prior Level Of Mobility: Independent Without Device in Community  Prior Services: None  Housing / Facility: [address]  Current Level of Function:   Eating:  (Tube feed)  Upper Body Dressing: Minimal Assist  Lower Body Dressing: Standby Assist (Don slip on shoes)  Skilled Intervention: Verbal Cuing    CURRENT LEVEL OF FUNCTION:   Same as level of function prior to admission to [facility]    Physical Examination:     VITAL SIGNS:   height is 1.753 m (5' 9\") and weight is 60 kg (132 lb 4.4 oz). His oral temperature is 36.7 °C (98.1 °F). His blood pressure is 124/83 and his pulse is 103 (abnormal). His respiration is 16 and oxygen saturation is 92%.   GENERAL: No apparent distress  HEENT: Normocephalic/atraumatic, EOMI, and PERRL  CARDIAC: Regular rate and rhythm, normal S1, S2   LUNGS: Clear to auscultation   ABDOMINAL: bowel sounds present, soft, and nontender    EXTREMITIES: no contractures, spasticity, or edema.   NEURO:  Mental status: follows commands  Speech: dysarthric, difficult to understand at times due to secretions  CRANIAL NERVES: Left facial droop, tongue to the left    Motor:    Shoulder flexors:  Right -  5/5, Left -  5/5  Elbow flexors:  Right -  5/5, Left -  5/5  Wrist extensors:  Right -  5/5, Left -  4/5  Elbow extensors:  Right -  5/5, Left -  4/5  Finger flexors:  Right -  5/5, " Left -  4/5  Finger abductors:  Right -  5/5, Left -  4/5  Hip flexors:  Right -  5/5, Left -  4/5  Knee ext:  Right -  5/5, Left -  5/5  Dorsiflexors:  Right -  5/5, Left -  5/5  EHL:  Right -  5/5, Left -  5/5  Plantar flexors:  Right -  5/5, Left -  5/5  Sensory: intact to light touch through out      Radiology:    Results for orders placed during the hospital encounter of 01/04/24    MR-BRAIN-WITH & W/O    Impression  1.  Moderate age-related cerebral atrophy.  2.  Mild-moderate supratentorial white matter disease consistent with microvascular ischemic change.  3.  Subcentimeter old lacunar size infarcts in the left and right cerebellar hemispheres, left greater than right.  4.  No evidence of enhancing brain metastasis, acute infarction, or hemorrhagic lesion.                       Results for orders placed during the hospital encounter of 09/20/19                  CTA Head     IMPRESSION:        1. There is a focal stenosis/occlusion in a distal branch of the right MCA with flow reconstitution.      Laboratory Values:     Pending admission            Primary Rehabilitation Diagnosis:    This patient is a 79 y.o. male admitted for acute inpatient rehabilitation with Right middle cerebral artery stroke (HCC).    Impairments:   Cognitive  ADLs/IADLs  Mobility  Speech  Swallow    Secondary Diagnosis/Medical Co-morbidities Affecting Function  Lung adenocarcinoma  Dysphagia  HTN/sCHF  HLD  DM2 with hyperglycemia  CKD3a  Leukocytosis  Thrombocytopenia    Relevant Changes Since Preadmission Evaluation:    Status unchanged    The patient's rehabilitation potential is Good  The patient's medical prognosis is fair    Rehabilitation Plan:   Discussion and Recommendations:   1. The patient requires an acute inpatient rehabilitation program with a coordinated program of care at an intensity and frequency not available at a lower level of care. This recommendation is substantiated by the patient's medical physicians who  recommend that the patient's intervention and assessment of medical issues needs to be done at an acute level of care for patient's safety and maximum outcome.   2. A coordinated program of care will be supplied by an interdisciplinary team of physical therapy, occupational therapy, rehab physician, rehab nursing, and, if needed, speech therapy and rehab psychology. Rehab team presents a patient-specific rehabilitation and education program concentrating on prevention of future problems related to accessibility, mobility, skin, bowel, bladder, sexuality, and psychosocial and medical/surgical problems.   3. Need for Rehabilitation Physician: The rehab physician will be evaluating the patient on a multi-weekly basis to help coordinate the program of care. The rehab physician communicates between medical physicians, therapists, and nurses to maximize the patient's potential outcome. Specific areas in which the rehab physician will be providing daily assessment include the following:   A. Assessing the patient's heart rate and blood pressure response (vitals monitoring) to activity and making adjustments in medications or conservative measures as needed.   B. The rehab physician will be assessing the frequency at which the program can be increased to allow the patient to reach optimal functional outcome.   C. The rehab physician will also provide assessments in daily skin care, especially in light of patient's impairments in mobility.   D. The rehab physician will provide special expertise in understanding how to work with functional impairment and recommend appropriate interventions, compensatory techniques, and education that will facilitate the patient's outcome.   4. Rehab R.N.   The rehab RN will be working with patient to carry over in room mobility and activities of daily living when the patient is not in 3 hours of skilled therapy. Rehab nursing will be working in conjunction with rehab physician to address all  the medical issues above and continue to assess laboratory work and discuss abnormalities with the treating physicians, assess vitals, and response to activity, and discuss and report abnormalities with the rehab physician. Rehab RN will also continue daily skin care, supervise bladder/bowel program, instruct in medication administration, and ensure patient safety.   5. Rehab Therapy: Therapies to treat at intensity and frequency of (may change after completion of evaluation by all therapeutic disciplines):       PT:  Physical therapy to address mobility, transfer, gait training and evaluation for adaptive equipment needs 1 hour/day at least 5 days/week for the duration of the ELOS (see below)       OT:  Occupational therapy to address ADLs, self-care, home management training, functional mobility/transfers and assistive device evaluation, and community re-integration 1  hour/day at least 5 days/week for the duration of the ELOS (see below).        ST/Dysphagia:  Speech therapy to address speech, language, and cognitive deficits as well as swallowing difficulties with retraining/dysphagia management and community re-integration with comprehension, expression, cognitive training 1  hour/day at least 5 days/week for the duration of the ELOS (see below).     Medical management / Rehabilitation Issues/ Adverse Potential as part of rehabilitation plan     Rehabilitation Issues/Adverse Potential  1.  CVA (Cerebrovascular Accident): Cont Eliquis for secondary prophylaxis as well as lipid and blood pressure management. Patient demonstrates functional deficits in strength, balance, coordination, and ADL's. Patient is admitted to [facility] for comprehensive rehabilitation therapy as described below.   Rehabilitation nursing monitors bowel and bladder control, educates on medication administration, co-morbidities and monitors patient safety.    2.  Neurostimulants: None at this time but continue to assess  daily for need to initiate should status change.    3.  DVT prophylaxis:  Patient is on Eliquis for anticoagulation upon transfer. Encourage OOB. Monitor daily for signs and symptoms of DVT including but not limited to swelling and pain to prevent the development of DVT that may interfere with therapies.    4.  GI prophylaxis:  On prilosec to prevent gastritis/dyspepsia which may interfere with therapies.    5.  Pain: No issues with pain currently / Controlled with APAP/Tramadol    6.  Nutrition/Dysphagia: Dietician monitors nutrient intake, recommend supplements prn and provide nutrition education to pt/family to promote optimal nutrition for wound healing/recovery.     7.  Bladder/bowel:  Start bowel and bladder program as described below, to prevent constipation, urinary retention (which may lead to UTI), and urinary incontinence (which will impact upon pt's functional independence).   - Post void bladder scans, I&O cath for PVRs >400  - up to commode after meal     8.  Skin/dermal ulcer prophylaxis: Monitor for new skin conditions with q.2 h. turns as required to prevent the development of skin breakdown.     9.  Cognition/Behavior: As needed psychologist provides adjustment counseling to illness and psychosocial barriers that may be potential barriers to rehabilitation.     10. Respiratory therapy: RT performs O2 management prn, breathing retraining, pulmonary hygiene and bronchospasm management prn to optimize participation in therapies.     Medical Co-Morbidities/Adverse Potential Affecting Function:  R CVA - Patient with R CVA not a candidate for TNK per neurology. Restarted on Eliquis  -PT and OT for mobility and ADLs. Per guidelines, 15 hours per week between PT, OT and/or SLP.  -Follow-up Neurology stroke bridge     Lung adenocarcinoma - s/p Resection. Patient on Vericiguat which is an experimental medication.    Dysphagia - Severe dysphagia. PEG placed 8/23. NPO with tube feeds. Dietitian to consult. SLP  for swallow    HTN/sCHF - Patient on Farxiga 10 mg, Metoprolol 50 mg BID and Spironolactone. EF of 35%    HLD - Patient on Atorvastatin 20 mg QHS and Zetia 10 mg daily.     DM2 with hyperglycemia - Will start SSI     CKD3a - Avoid nephrotoxic agents. Check AM CMP    Leukocytosis - Check AM CBC    Thrombocytopenia - Check AM CBC    Pain - Patient on APAP and Tramadol PRN    Skin - Patient at risk for skin breakdown due to debility in areas including sacrum, achilles, elbows and head in addition to other sites. Nursing to assess skin daily.     GI Ppx - Patient on Prilosec for GERD prophylaxis. Patient on Senna-docusate for constipation prophylaxis.        DVT Ppx - Patient Eliquis on transfer.    I personally performed a complete drug regimen review and no potential clinically significant medication issues were identified.     Goals/Expected Level of Function Based on Current Medical and Functional Status:  (may change based on patient's medical status and rate of impairment recovery)  Transfers:   Supervision  Mobility/Gait:   Supervision  ADL's:   Supervision  Cognition:  supervision  Swallowing:  least restrictive diet    DISPOSITION: Discharge to pre-morbid independent living setting with the supportive care of patient's family.    ELOS: 10-17 days  ____________________________________    T. Tim Maciel MD/PhD  [facility] - Physical Medicine & Rehabilitation   [facility] - Brain Injury Medicine   ____________________________________    Pt was seen today for 76 min. Time spent included pre-admission screening, pre-admission review of vitals and laboratory values/tests, unit/floor time, face-to-face time with the patient including physical examination, care coordination, counseling of patient and/or family, ordering medications/procedures/tests, discussion with other healthcare providers, and/or documentation in the electronic medical record.

## 2024-08-24 NOTE — PROGRESS NOTES
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  Allergies: Patient has no known allergies.    Safety  Patient Assist  min a  Patient Precautions     Precaution Comments     Bed Transfer Status     Toilet Transfer Status      Assistive Devices     Oxygen  Room air w/o2 available  Diet/Therapeutic Dining  Current Diet Order   Procedures    Diet NPO Restrict to: Sips with Medications (TURN TUBE FEED OFF AT MIDNIGHT)    Diet: Diet Tube Feed; Formula: Bolus Only (Provide 1/2 carton Glucerna 1.2 at first bolus feed and advance by 1/2 carton per feed until goal is reached. Goal is 6 cartons per day); Select Bolus (If Indicated): Glucerna 1.2 Carton; Bolus Frequency:...     Pill Administration  PEG tube  Agitated Behavioral Scale     ABS Level of Severity       Fall Risk  Has the patient had a fall this admission?   No  Yandy Minaya Fall Risk Scoring  10, LOW RISK  Fall Risk Safety Measures  bed alarm and chair alarm    Vitals  Temperature: 36.7 °C (98.1 °F)  Temp src: Oral  Pulse: (!) 103  Respiration: 16  Blood Pressure : 124/83  Blood Pressure MAP (Calculated): 97 MM HG  BP Location: Left, Upper Arm  Patient BP Position: Sitting     Oxygen  Pulse Oximetry: 92 %  O2 Delivery Device: Room air w/o2 available    Bowel and Bladder  Last Bowel Movement     Stool Type     Bowel Device     Continent  Bladder: Did not void   Bowel: No movement  Bladder Function     Genitourinary Assessment   Bladder Assessment (WDL):  Within Defined Limits  Bladder Scan: Post Void  $ Bladder Scan Results (mL): 169    Skin  Evan Score   16  Sensory Interventions   Bed Types: Standard/Trauma Mattress  Moisture Interventions  Moisturizers/Barriers: Moisturizer       Pain  Pain Rating Scale  0 - No Pain  Pain Location     Pain Location  Orientation     Pain Interventions   Declines    ADLs    Bathing      Linen Change      Personal Hygiene     Chlorhexidine Bath      Oral Care     Teeth/Dentures     Shave     Nutrition Percentage Eaten  Ice Chips, NPO at this Time  Environmental Precautions     Patient Turns/Positioning  Patient Turns Self from Side to Side  Patient Turns Assistance/Tolerance     Bed Positions     Head of Bed Elevated         Psychosocial/Neurologic Assessment  Psychosocial Assessment  Psychosocial (WDL):  Within Defined Limits  Neurologic Assessment  Level of Consciousness: Alert  EENT (WDL):  WDL Except    Cardio/Pulmonary Assessment  Edema      Respiratory Breath Sounds  RUL Breath Sounds: Clear  RML Breath Sounds: Clear  RLL Breath Sounds: Diminished  MARIA M Breath Sounds: Clear  LLL Breath Sounds: Diminished  Cardiac Assessment   Cardiac (WDL):  WDL Except (CHF)

## 2024-08-24 NOTE — PROGRESS NOTES
4 Eyes Skin Assessment Completed by KORY Jasso and KORY Gottlieb.    Head WDL  Ears WDL  Nose WDL  Mouth WDL  Neck WDL  Breast/Chest WDL  Shoulder Blades WDL  Spine WDL  (R) Arm/Elbow/Hand Bruising  (L) Arm/Elbow/Hand Bruising  Abdomen PEG tube  Groin WDL  Scrotum/Coccyx/Buttocks Redness  (R) Leg WDL  (L) Leg WDL  (R) Heel/Foot/Toe Redness  (L) Heel/Foot/Toe Redness          Devices In Places PEG tube      Interventions In Place Waffle Overlay    Possible Skin Injury No    Pictures Uploaded Into Epic Yes  Wound Consult Placed N/A  RN Wound Prevention Protocol Ordered No

## 2024-08-24 NOTE — PROGRESS NOTES
Gastroenterology Progress Note               Author:  JILL Keller Date & Time Created: 8/24/2024 1:19 PM       Patient ID:  Name:             Pito Black  YOB: 1945  Age:                 79 y.o.  male  MRN:               [id number]        Medical Decision Making, by Problem:  Active Hospital Problems    Diagnosis     Right middle cerebral artery stroke (HCC) [I63.511]     Hyponatremia [E87.1]     Oropharyngeal dysphagia [R13.12]     Dysarthria [R47.1]     Type 2 diabetes mellitus with stage 3a chronic kidney disease, without long-term current use of insulin (HCC) [E11.22, N18.31]     Chronic systolic heart failure (HCC) [I50.22]     Primary lung adenocarcinoma (HCC) [C34.90]     Stage 3a chronic kidney disease [N18.31]     Paroxysmal atrial fibrillation (HCC) [I48.0]            Presenting Chief Complaint:  PEG Tube placement for dysphagia     History of Present Illness: This is a 79-year-old male with a past medical history including lung cancer (followed by Dr. Sukhwinder davison), atrial fibrillation on apixaban, AICD in situ, pulmonary embolism (2023), type 2 diabetes mellitus, CKD, heart failure reduced ejection fraction (EF 35-45), hyperlipidemia who presented to [facility] on 8/13/2024 following acute onset of left facial droop and dysphagia.  Patient found to have right MCA CVA noted on head CT.  Neurology evaluated patient for which patient was deemed outside of tPA window.  He was started on Eliquis and no further workup was recommended by neurology as CVA was likely secondary to cardioembolic etiology in the setting of chronic atrial fibrillation.  During admission, patient has been evaluated by speech-language pathology.  Videofluoroscopic swallow study evaluation done 8/20/2024 showed moderate-severe oropharyngeal dysphagia.  Swallow function has not improved significantly from the first MBSS on 8/14/2024.  PEG tube recommended         Interval  History:  8/22/2024: Patient seen at bedside with family.  NG tube in place with tube feeding in progress without difficulty.  Patient is more awake today dysarthric but communicates appropriately.  Per primary team, palliative care to see patient today to discuss goals of care.  Will plan for PEG tube placement tomorrow unless there are any changes after discussion with palliative care.    8/24/2024: Patient seen at bedside with family.  Postprocedure day #1 s/p PEG tube placement.  Patient reports pain at insertion site but otherwise tube feed infusing without difficulty.  Hemoglobin stable.  No surrounding erythema, edema, drainage.  PEG tube freely rotates 360 degrees    Hospital Medications:  Current Facility-Administered Medications   Medication Dose Frequency Provider Last Rate Last Admin    morphine 4 MG/ML injection 2 mg  2 mg Q3HRS PRN Sergio Rai M.D.   2 mg at 08/23/24 1501    diphenhydrAMINE (Benadryl) tablet/capsule 25 mg  25 mg HS PRN Sergio Rai M.D.   25 mg at 08/22/24 2030    melatonin tablet 5 mg  5 mg Nightly Sergio Rai M.D.   5 mg at 08/23/24 2028    senna-docusate (Pericolace Or Senokot S) 8.6-50 MG per tablet 2 Tablet  2 Tablet Q EVENING Sergio Rai M.D.   2 Tablet at 08/23/24 1737    And    polyethylene glycol/lytes (Miralax) Packet 1 Packet  1 Packet QDAY PRN Sergio Rai M.D.   1 Packet at 08/24/24 0458    acetaminophen (Tylenol) tablet 1,000 mg  1,000 mg TID PRN Felipe Alicia M.D.   1,000 mg at 08/23/24 1742    metoprolol tartrate (Lopressor) tablet 50 mg  50 mg BID Felipe Alicia M.D.   50 mg at 08/24/24 0458    Pharmacy Consult: Enteral tube insertion - review meds/change route/product selection   PHARMACY TO DOSE NATALYA GonzalesN.P.        [Held by provider] apixaban (Eliquis) tablet 5 mg  5 mg BID ASHLYN GonzalesP.   5 mg at 08/21/24 0505    atorvastatin (Lipitor) tablet 20 mg  20 mg Q EVENING NATALYA GonzalesN.P.   20 mg at  08/23/24 1738    dapagliflozin propanediol (Farxiga) tablet 10 mg  10 mg DAILY NATALYA GonzalesN.P.   10 mg at 08/24/24 0458    ezetimibe (Zetia) tablet 10 mg  10 mg DAILY NATALYA GonzalesN.P.   10 mg at 08/24/24 0457    [Held by provider] spironolactone (Aldactone) tablet 25 mg  25 mg DAILY Felipe Alicia M.D.   25 mg at 08/23/24 0437    vitamin D3 (Cholecalciferol) tablet 2,000 Units  2,000 Units DAILY NATALYA GonzalesN.P.   2,000 Units at 08/24/24 0457    cyclobenzaprine (Flexeril) tablet 10 mg  10 mg QHS PRN NATALYA GonzalesN.P.   10 mg at 08/22/24 2030    ondansetron (Zofran ODT) dispertab 4 mg  4 mg Q4HRS PRN NATALYA GonzalesN.P.        vericiguat or PLACEBO (STUDY DRUG) 10 MG tablet 10 mg  10 mg DAILY Felipe Alicia M.D.   10 mg at 08/24/24 0458    diclofenac sodium (Voltaren) 1 % gel 2 g  2 g 4X/DAY PRN González Mena M.D.        ondansetron (Zofran) syringe/vial injection 4 mg  4 mg Q4HRS PRN González Mena M.D.   4 mg at 08/23/24 1501   Last reviewed on 8/23/2024  7:56 AM by Nina Luo R.N.       Review of Systems:  Review of Systems   Constitutional:  Negative for chills, fever and malaise/fatigue.   HENT:  Negative for sinus pain and sore throat (Secondary to NG tube).    Respiratory:  Negative for cough and shortness of breath.    Cardiovascular:  Negative for chest pain and leg swelling.   Gastrointestinal:  Negative for abdominal pain, blood in stool, constipation, diarrhea, melena, nausea and vomiting.   Genitourinary:  Negative for dysuria and flank pain.   Musculoskeletal:  Negative for falls and myalgias.   Neurological:  Positive for focal weakness and weakness. Negative for headaches.   Psychiatric/Behavioral:  The patient is not nervous/anxious and does not have insomnia.    All other systems reviewed and are negative.        Vital signs:  Weight/BMI: Body mass index is 20.29 kg/m².  /63   Pulse 99   Temp 36.6 °C (97.8 °F) (Temporal)   Resp 16   Ht 1.676 m  "(5' 5.98\")   Wt 57 kg (125 lb 10.6 oz)   SpO2 92%   Vitals:    08/24/24 0337 08/24/24 0458 08/24/24 0750 08/24/24 1235   BP: 128/69 (!) 143/80 121/62 122/63   Pulse: (!) 103 (!) 108 92 99   Resp: 18  16 16   Temp: 38 °C (100.4 °F)  36.8 °C (98.2 °F) 36.6 °C (97.8 °F)   TempSrc: Temporal  Temporal Temporal   SpO2: 92%  93% 92%   Weight: 57 kg (125 lb 10.6 oz)      Height:         Oxygen Therapy:  Pulse Oximetry: 92 %, O2 (LPM): 0, O2 Delivery Device: None - Room Air    Intake/Output Summary (Last 24 hours) at 8/24/2024 1319  Last data filed at 8/23/2024 1824  Gross per 24 hour   Intake 60 ml   Output 200 ml   Net -140 ml         Physical Exam:  Physical Exam  Vitals and nursing note reviewed.   Constitutional:       General: He is awake. He is not in acute distress.     Appearance: Normal appearance. He is well-groomed. He is ill-appearing.   HENT:      Head: Normocephalic and atraumatic.      Nose: Nose normal. No congestion.      Comments: NG tube in place with tube feed in progress without difficulty  Eyes:      Extraocular Movements: Extraocular movements intact.      Conjunctiva/sclera: Conjunctivae normal.   Cardiovascular:      Rate and Rhythm: Normal rate and regular rhythm.      Pulses: Normal pulses.      Heart sounds: Normal heart sounds.   Pulmonary:      Effort: Pulmonary effort is normal.   Abdominal:      General: Abdomen is flat. Bowel sounds are normal. There is no distension.      Palpations: Abdomen is soft.      Tenderness: There is no abdominal tenderness. There is no guarding.      Comments: LUQ PEG tube.  No surrounding erythema, edema, drainage.  Freely rotates 3 to 60 degrees.   Musculoskeletal:      Right lower leg: No edema.      Left lower leg: No edema.   Skin:     General: Skin is warm and dry.      Capillary Refill: Capillary refill takes less than 2 seconds.      Coloration: Skin is not jaundiced.   Neurological:      Mental Status: He is alert and oriented to person, place, and " "time.      Motor: Weakness present.   Psychiatric:         Mood and Affect: Mood normal.         Behavior: Behavior normal. Behavior is cooperative.             Labs:  No results for input(s): \"SODIUM\", \"POTASSIUM\", \"CHLORIDE\", \"CO2\", \"BUN\", \"CREATININE\", \"MAGNESIUM\", \"PHOSPHORUS\", \"CALCIUM\" in the last 72 hours.    No results for input(s): \"ALTSGPT\", \"ASTSGOT\", \"ALKPHOSPHAT\", \"TBILIRUBIN\", \"DBILIRUBIN\", \"GAMMAGT\", \"AMYLASE\", \"LIPASE\", \"ALB\", \"PREALBUMIN\", \"GLUCOSE\" in the last 72 hours.          No results for input(s): \"RBC\", \"HEMOGLOBIN\", \"HEMATOCRIT\", \"PLATELETCT\", \"PROTHROMBTM\", \"APTT\", \"INR\", \"IRON\", \"FERRITIN\", \"TOTIRONBC\" in the last 72 hours.    Recent Results (from the past 24 hour(s))   EKG    Collection Time: 24  6:04 PM   Result Value Ref Range    Report       [facility]    Test Date:  2024  Pt Name:    DOROTHEA SALINAS                  Department: [facility]  MRN:        [id number]                      Room:       [facility]  Gender:     Male                         Technician: [facility]  :        1945                   Requested By:ANAHY GIBBONS  Order #:    [id number]                    Reading MD: Honorio Jang MD    Measurements  Intervals                                Axis  Rate:       108                          P:          43  ID:         140                          QRS:        26  QRSD:       90                           T:          78  QT:         342  QTc:        459    Interpretive Statements  Sinus tachycardia  Minimal ST depression, inferior leads    Electronically Signed On 2024 18:15:55 PDT by Honorio Jang MD         Radiology Review:  DX-CHEST-PORTABLE (1 VIEW)   Final Result         1. No significant interval change.      DX-ABDOMEN FOR TUBE PLACEMENT   Final Result      Dobbhoff tube tip projects over the proximal stomach      DX-ESOPHAGUS - [id number]   Final Result      DX-CHEST-PORTABLE (1 VIEW)   Final Result         No new abnormalities have " developed on portable chest radiograph since the prior examination.  No new infiltrates or consolidations are identified. Opacifications right lung apex and left lower lung again identified with no change. No new infiltrates or    consolidations.      DX-CHEST-PORTABLE (1 VIEW)   Final Result         1. No significant interval change.      [id number] VIEW   Final Result         No specific finding to suggest small bowel obstruction.      DX-ABDOMEN FOR TUBE PLACEMENT   Final Result         Feeding tube with tip projecting over the expected area of the stomach.      DX-ABDOMEN FOR TUBE PLACEMENT   Final Result         1.  Nonspecific bowel gas pattern in the upper abdomen.   2.  Dobbhoff tube tip terminates overlying the expected location of the gastric fundus.   3.  Bilateral pulmonary infiltrates, greatest in the right upper lobe.   4.  Trace bilateral pleural effusions      DX-ESOPHAGUS - [id number]   Final Result      DX-CHEST-PORTABLE (1 VIEW)   Final Result      1. Development of interstitial opacities in the lungs, either edema or interstitial infiltrates.   2. Worsening confluent opacities in the right upper lobe and lingula.   3. The remainder is stable.      CT-CTA NECK WITH & W/O-POST PROCESSING   Final Result      1. No evidence of flow-limiting stenosis in the cervical carotid or cervical vertebral arteries.   2. Partially visualized airspace opacities/mass in the right upper lobe. Infiltrated right perihilar and mediastinal lymph tavia masses.   3. Bilateral pleural effusions.      CT-CTA HEAD WITH & W/O-POST PROCESS   Final Result         1. There is a focal stenosis/occlusion in a distal branch of the right MCA with flow reconstitution.         Preliminary findings texted to Dr. MEMO CHAIDEZ in the Emergency Department via [employer] on 8/13/2024 9:17 AM            CT-CEREBRAL PERFUSION ANALYSIS   Final Result      1. Cerebral blood flow less than 30% possibly representing completed infarct = 0 mL.  "     2. T Max more than 6 seconds possibly representing combination of completed infarct and ischemia = 18 mL.      3. Mismatched volume possibly representing ischemic brain/penumbra= 18 mL      4.  Please note that this cerebral perfusion study and report is Quantitative and targets supratentorial (cerebral) perfusion for evaluation of large vessel territory acute ischemia/infarction. For example, lacunar infarcts, and brainstem/posterior fossa    ischemia/infarction are not evaluated on this study.  Data acquisition is subject to artifacts which can yield non-anatomically plausible perfusion maps which may be due to motion, bolus timing, signal to noise ratio, or other technical factors.    Perfusion map abnormalities which show non-anatomic distributions are likely artifact.   This study is not \"stand-alone\" and should only be utilized for diagnosis, management/treatment in correlation with CT, CTA, and/or MRI and clinical factors.               MDM (Data Review):   -Records reviewed and summarized in current documentation  -I personally reviewed and interpreted the laboratory results  -I personally reviewed the radiology images    Assessment/Recommendations:  Impressions:  Moderate-severe oropharyngeal dysphagia  S/p right MCA CVA  Atrial fibrillation on Eliquis-last dose 8/21/2024 0500 hour  Hx of lung cancer   AICD in situ   type 2 diabetes mellitus  CKD  heart failure reduced ejection fraction (EF 35-45)  hyperlipidemia      Recommendations:  Please ensure proper PEG care and tube feeding:  - keep head of bed elevated to at least 30 degrees during tube feeding. The largest factor in preventing aspiration during tube feeding is not the placement of the tube into the stomach or jejunum, but keeping the head elevated to at least 30 degrees during feeding    - PEG needs 0.5-1cm of laxity freely mobile in and out of the gastrostomy tract. Overtightening may impair tract maturation by inducing localized ischemia. " Over tightening may also result in buried bumper syndrome.    No NSAIDS for 7 days. For full dose anti-coagulation, please hold 24 hours after the procedure. Anti-platelet per the primary/cardiology team.     No further interventions from acute GI service. GI to sign off. Please reconsult for any further questions or concerns.    Discussed with patient, family at bedside, Dr. Meadows.    Core Quality Measures   Reviewed items::  Labs, Medications and Radiology reports reviewed

## 2024-08-24 NOTE — PROGRESS NOTES
Monitor summary: SR-ST, HR , NC .16, QRS .10 QT .39 with rare pvc, coup, occasional pvc, big, frequent pvc per strip from monitor room  Pt in surgery from [id number]

## 2024-08-24 NOTE — CARE PLAN
The patient is Stable - Low risk of patient condition declining or worsening    Shift Goals  Clinical Goals: Safety, Pain control  Patient Goals: Safety  Family Goals: Education    Progress made toward(s) clinical / shift goals:  VSS, pt denies pain, all admission questions addressed.

## 2024-08-24 NOTE — PROGRESS NOTES
Inpatient Palliative Care     Location: [facility]     HPI:   Pito Black is a 79 y.o. male with medical history significant for lung cancer followed by gigi Jules on apixaban, HFrEF 35%, AICD, CKD, HTN, HLD, DM admitted 8/13/2024 with left arm weakness and facial droop. CT-cerebral perfusion analysis with 18mm penumbra, CTA with focal stenosis and occlusion of the distal right MCA. Patient was outside of the window for thrombolytics or thrombectomy. MBSS on 8/14/2024 with continued n.p.o. status recommended, pending PEG tube placement. Palliative care was consulted to assist with overall GOC discussions. Patient was initially seen 8/22/2024.     Interval: Patient is s/p PEG tube this morning (8/23). Patient experiencing some post-surgical pain per family and was recently medicated for this. He is resting comfortably when PC APRN to bedside and family requested that he is not disturbed.    Summary:  Discussed GOC with family. Pt and all family are all in agreement with discharging to inpatient rehab. Further discussed the option of hospice as a future care option. Patient and family are aware of this. Pt not disturbed for POLST completion. POLST prior to discharge if able, otherwise can complete at rehab.    Plan:     1) Discharge to [facility] per primary team  2) POLST prior to discharge if able    Thank you for allowing me the opportunity to participate in the care of  Pito Black.    19 minutes spent discussing advance care planning, this time excludes any other billed services.     DON Smith  Palliative Care Nurse Practitioner   [phone]

## 2024-08-25 ENCOUNTER — APPOINTMENT (OUTPATIENT)
Dept: OCCUPATIONAL THERAPY | Facility: REHABILITATION | Age: 79
DRG: 057 | End: 2024-08-25
Attending: PHYSICAL MEDICINE & REHABILITATION
Payer: MEDICARE

## 2024-08-25 ENCOUNTER — APPOINTMENT (OUTPATIENT)
Dept: SPEECH THERAPY | Facility: REHABILITATION | Age: 79
DRG: 057 | End: 2024-08-25
Attending: PHYSICAL MEDICINE & REHABILITATION
Payer: MEDICARE

## 2024-08-25 ENCOUNTER — APPOINTMENT (OUTPATIENT)
Dept: PHYSICAL THERAPY | Facility: REHABILITATION | Age: 79
DRG: 057 | End: 2024-08-25
Attending: PHYSICAL MEDICINE & REHABILITATION
Payer: MEDICARE

## 2024-08-25 PROBLEM — R74.01 TRANSAMINASEMIA: Status: ACTIVE | Noted: 2024-08-25

## 2024-08-25 PROBLEM — D72.829 LEUKOCYTOSIS: Status: ACTIVE | Noted: 2024-08-25

## 2024-08-25 PROBLEM — R74.01 TRANSAMINITIS: Status: ACTIVE | Noted: 2024-08-25

## 2024-08-25 LAB
25(OH)D3 SERPL-MCNC: 59 NG/ML (ref 30–100)
ALBUMIN SERPL BCP-MCNC: 3 G/DL (ref 3.2–4.9)
ALBUMIN/GLOB SERPL: 0.7 G/DL
ALP SERPL-CCNC: 86 U/L (ref 30–99)
ALT SERPL-CCNC: 84 U/L (ref 2–50)
ANION GAP SERPL CALC-SCNC: 12 MMOL/L (ref 7–16)
AST SERPL-CCNC: 90 U/L (ref 12–45)
BASOPHILS # BLD AUTO: 0.3 % (ref 0–1.8)
BASOPHILS # BLD: 0.04 K/UL (ref 0–0.12)
BILIRUB SERPL-MCNC: 0.7 MG/DL (ref 0.1–1.5)
BUN SERPL-MCNC: 44 MG/DL (ref 8–22)
CALCIUM ALBUM COR SERPL-MCNC: 9.6 MG/DL (ref 8.5–10.5)
CALCIUM SERPL-MCNC: 8.8 MG/DL (ref 8.5–10.5)
CHLORIDE SERPL-SCNC: 113 MMOL/L (ref 96–112)
CO2 SERPL-SCNC: 21 MMOL/L (ref 20–33)
CREAT SERPL-MCNC: 1.27 MG/DL (ref 0.5–1.4)
EOSINOPHIL # BLD AUTO: 0.18 K/UL (ref 0–0.51)
EOSINOPHIL NFR BLD: 1.3 % (ref 0–6.9)
ERYTHROCYTE [DISTWIDTH] IN BLOOD BY AUTOMATED COUNT: 48.5 FL (ref 35.9–50)
EST. AVERAGE GLUCOSE BLD GHB EST-MCNC: 137 MG/DL
GFR SERPLBLD CREATININE-BSD FMLA CKD-EPI: 57 ML/MIN/1.73 M 2
GLOBULIN SER CALC-MCNC: 4.1 G/DL (ref 1.9–3.5)
GLUCOSE BLD STRIP.AUTO-MCNC: 159 MG/DL (ref 65–99)
GLUCOSE BLD STRIP.AUTO-MCNC: 188 MG/DL (ref 65–99)
GLUCOSE BLD STRIP.AUTO-MCNC: 209 MG/DL (ref 65–99)
GLUCOSE BLD STRIP.AUTO-MCNC: 231 MG/DL (ref 65–99)
GLUCOSE SERPL-MCNC: 158 MG/DL (ref 65–99)
HBA1C MFR BLD: 6.4 % (ref 4–5.6)
HCT VFR BLD AUTO: 42.7 % (ref 42–52)
HGB BLD-MCNC: 14 G/DL (ref 14–18)
IMM GRANULOCYTES # BLD AUTO: 0.11 K/UL (ref 0–0.11)
IMM GRANULOCYTES NFR BLD AUTO: 0.8 % (ref 0–0.9)
LYMPHOCYTES # BLD AUTO: 2.08 K/UL (ref 1–4.8)
LYMPHOCYTES NFR BLD: 15.3 % (ref 22–41)
MCH RBC QN AUTO: 31.2 PG (ref 27–33)
MCHC RBC AUTO-ENTMCNC: 32.8 G/DL (ref 32.3–36.5)
MCV RBC AUTO: 95.1 FL (ref 81.4–97.8)
MONOCYTES # BLD AUTO: 0.91 K/UL (ref 0–0.85)
MONOCYTES NFR BLD AUTO: 6.7 % (ref 0–13.4)
NEUTROPHILS # BLD AUTO: 10.3 K/UL (ref 1.82–7.42)
NEUTROPHILS NFR BLD: 75.6 % (ref 44–72)
NRBC # BLD AUTO: 0 K/UL
NRBC BLD-RTO: 0 /100 WBC (ref 0–0.2)
PLATELET # BLD AUTO: 94 K/UL (ref 164–446)
PLATELETS.RETICULATED NFR BLD AUTO: 3.2 % (ref 0.6–13.1)
PMV BLD AUTO: 10.7 FL (ref 9–12.9)
POTASSIUM SERPL-SCNC: 4.3 MMOL/L (ref 3.6–5.5)
PROT SERPL-MCNC: 7.1 G/DL (ref 6–8.2)
RBC # BLD AUTO: 4.49 M/UL (ref 4.7–6.1)
SODIUM SERPL-SCNC: 146 MMOL/L (ref 135–145)
TSH SERPL DL<=0.005 MIU/L-ACNC: 0.61 UIU/ML (ref 0.38–5.33)
WBC # BLD AUTO: 13.6 K/UL (ref 4.8–10.8)

## 2024-08-25 PROCEDURE — 82306 VITAMIN D 25 HYDROXY: CPT

## 2024-08-25 PROCEDURE — 97535 SELF CARE MNGMENT TRAINING: CPT

## 2024-08-25 PROCEDURE — 92610 EVALUATE SWALLOWING FUNCTION: CPT

## 2024-08-25 PROCEDURE — 97166 OT EVAL MOD COMPLEX 45 MIN: CPT

## 2024-08-25 PROCEDURE — 770010 HCHG ROOM/CARE - REHAB SEMI PRIVAT*

## 2024-08-25 PROCEDURE — 82962 GLUCOSE BLOOD TEST: CPT

## 2024-08-25 PROCEDURE — 99233 SBSQ HOSP IP/OBS HIGH 50: CPT | Performed by: PHYSICAL MEDICINE & REHABILITATION

## 2024-08-25 PROCEDURE — 85055 RETICULATED PLATELET ASSAY: CPT

## 2024-08-25 PROCEDURE — 700102 HCHG RX REV CODE 250 W/ 637 OVERRIDE(OP): Performed by: HOSPITALIST

## 2024-08-25 PROCEDURE — 97110 THERAPEUTIC EXERCISES: CPT

## 2024-08-25 PROCEDURE — 85025 COMPLETE CBC W/AUTO DIFF WBC: CPT

## 2024-08-25 PROCEDURE — A9270 NON-COVERED ITEM OR SERVICE: HCPCS | Performed by: PHYSICAL MEDICINE & REHABILITATION

## 2024-08-25 PROCEDURE — 83036 HEMOGLOBIN GLYCOSYLATED A1C: CPT

## 2024-08-25 PROCEDURE — 36415 COLL VENOUS BLD VENIPUNCTURE: CPT

## 2024-08-25 PROCEDURE — A9270 NON-COVERED ITEM OR SERVICE: HCPCS | Performed by: HOSPITALIST

## 2024-08-25 PROCEDURE — 92523 SPEECH SOUND LANG COMPREHEN: CPT

## 2024-08-25 PROCEDURE — 99223 1ST HOSP IP/OBS HIGH 75: CPT | Mod: AI | Performed by: HOSPITALIST

## 2024-08-25 PROCEDURE — 80053 COMPREHEN METABOLIC PANEL: CPT

## 2024-08-25 PROCEDURE — 700102 HCHG RX REV CODE 250 W/ 637 OVERRIDE(OP): Performed by: PHYSICAL MEDICINE & REHABILITATION

## 2024-08-25 PROCEDURE — 84443 ASSAY THYROID STIM HORMONE: CPT

## 2024-08-25 PROCEDURE — 97161 PT EVAL LOW COMPLEX 20 MIN: CPT

## 2024-08-25 PROCEDURE — 97602 WOUND(S) CARE NON-SELECTIVE: CPT

## 2024-08-25 RX ORDER — INSULIN LISPRO 100 [IU]/ML
2-12 INJECTION, SOLUTION INTRAVENOUS; SUBCUTANEOUS
Status: DISCONTINUED | OUTPATIENT
Start: 2024-08-25 | End: 2024-08-30

## 2024-08-25 RX ORDER — METOPROLOL TARTRATE 25 MG/1
62.5 TABLET, FILM COATED ORAL 2 TIMES DAILY
Status: DISCONTINUED | OUTPATIENT
Start: 2024-08-25 | End: 2024-09-05 | Stop reason: HOSPADM

## 2024-08-25 RX ORDER — METOPROLOL TARTRATE 25 MG/1
12.5 TABLET, FILM COATED ORAL ONCE
Status: COMPLETED | OUTPATIENT
Start: 2024-08-25 | End: 2024-08-25

## 2024-08-25 RX ADMIN — METOPROLOL TARTRATE 12.5 MG: 25 TABLET, FILM COATED ORAL at 15:10

## 2024-08-25 RX ADMIN — EZETIMIBE 10 MG: 10 TABLET ORAL at 08:11

## 2024-08-25 RX ADMIN — POLYETHYLENE GLYCOL 3350 1 PACKET: 17 POWDER, FOR SOLUTION ORAL at 08:23

## 2024-08-25 RX ADMIN — Medication 6 MG: at 21:05

## 2024-08-25 RX ADMIN — INSULIN HUMAN 2 UNITS: 100 INJECTION, SOLUTION PARENTERAL at 11:30

## 2024-08-25 RX ADMIN — SPIRONOLACTONE 25 MG: 25 TABLET ORAL at 08:11

## 2024-08-25 RX ADMIN — INSULIN HUMAN 1 UNITS: 100 INJECTION, SOLUTION PARENTERAL at 05:45

## 2024-08-25 RX ADMIN — APIXABAN 5 MG: 5 TABLET, FILM COATED ORAL at 21:04

## 2024-08-25 RX ADMIN — Medication 2000 UNITS: at 08:11

## 2024-08-25 RX ADMIN — TRAZODONE HYDROCHLORIDE 50 MG: 50 TABLET ORAL at 23:30

## 2024-08-25 RX ADMIN — INSULIN LISPRO 4 UNITS: 100 INJECTION, SOLUTION INTRAVENOUS; SUBCUTANEOUS at 21:08

## 2024-08-25 RX ADMIN — OMEPRAZOLE 20 MG: 20 CAPSULE, DELAYED RELEASE ORAL at 08:11

## 2024-08-25 RX ADMIN — SENNOSIDES AND DOCUSATE SODIUM 2 TABLET: 50; 8.6 TABLET ORAL at 21:03

## 2024-08-25 RX ADMIN — APIXABAN 5 MG: 5 TABLET, FILM COATED ORAL at 08:11

## 2024-08-25 RX ADMIN — SENNOSIDES AND DOCUSATE SODIUM 2 TABLET: 50; 8.6 TABLET ORAL at 08:11

## 2024-08-25 RX ADMIN — METOPROLOL TARTRATE 50 MG: 25 TABLET, FILM COATED ORAL at 08:11

## 2024-08-25 RX ADMIN — METOPROLOL TARTRATE 62.5 MG: 25 TABLET, FILM COATED ORAL at 21:03

## 2024-08-25 RX ADMIN — DAPAGLIFLOZIN 10 MG: 10 TABLET, FILM COATED ORAL at 08:10

## 2024-08-25 RX ADMIN — INSULIN LISPRO 2 UNITS: 100 INJECTION, SOLUTION INTRAVENOUS; SUBCUTANEOUS at 17:06

## 2024-08-25 RX ADMIN — ACETAMINOPHEN 650 MG: 325 TABLET ORAL at 08:16

## 2024-08-25 ASSESSMENT — BRIEF INTERVIEW FOR MENTAL STATUS (BIMS)
ASKED TO RECALL BED: NO, COULD NOT RECALL
WHAT MONTH IS IT: MISSED BY MORE THAN 1 MONTH
WHAT YEAR IS IT: CORRECT
BIMS SUMMARY SCORE: 4
ASKED TO RECALL BLUE: NO, COULD NOT RECALL
ASKED TO RECALL SOCK: NO, COULD NOT RECALL
INITIAL REPETITION OF BED BLUE SOCK - FIRST ATTEMPT: 1
WHAT DAY OF THE WEEK IS IT: INCORRECT

## 2024-08-25 ASSESSMENT — GAIT ASSESSMENTS
DISTANCE (FEET): 75
ASSISTIVE DEVICE: FRONT WHEEL WALKER
GAIT LEVEL OF ASSIST: CONTACT GUARD ASSIST

## 2024-08-25 ASSESSMENT — ACTIVITIES OF DAILY LIVING (ADL)
TOILETING: INDEPENDENT
TUB_SHOWER_TRANSFER_DESCRIPTION: GRAB BAR;SHOWER BENCH;SET-UP OF EQUIPMENT;SUPERVISION FOR SAFETY
BED_CHAIR_WHEELCHAIR_TRANSFER_DESCRIPTION: VERBAL CUEING;SET-UP OF EQUIPMENT;INITIAL PREPARATION FOR TASK
BED_CHAIR_WHEELCHAIR_TRANSFER_DESCRIPTION: INCREASED TIME;SUPERVISION FOR SAFETY
TOILET_TRANSFER_DESCRIPTION: INCREASED TIME;GRAB BAR;SUPERVISION FOR SAFETY;VERBAL CUEING;SET-UP OF EQUIPMENT

## 2024-08-25 ASSESSMENT — PAIN DESCRIPTION - PAIN TYPE: TYPE: ACUTE PAIN

## 2024-08-25 NOTE — ASSESSMENT & PLAN NOTE
HbA1c 6.4  Continue Farxiga  Discontinued FSBS and SSI  Outpt meds include Farxiga and Metformin  mg qd

## 2024-08-25 NOTE — PROGRESS NOTES
"  Physical Medicine & Rehabilitation Progress Note    Encounter Date: 2024    Chief Complaint: Decreased mobility, dysarthria    Interval Events (Subjective):  Patient sitting up in room. Gives thumbs up for sleeping well. He is tolerating feedings. Discussed would consult Hospitalist for WBC elevation and DM    Objective:  VITAL SIGNS: /58   Pulse 93   Temp 36.9 °C (98.5 °F) (Axillary)   Resp 16   Ht 1.753 m (5' 9\")   Wt 60 kg (132 lb 4.4 oz)   SpO2 97%   BMI 19.53 kg/m²   Gen: NAD  Psych: Mood and affect appropriate  CV: RRR, 0 edema  Resp: CTAB, no upper airway sounds  Abd: NTND  Neuro: Dysarthric, follows commands    Laboratory Values:  Recent Results (from the past 72 hour(s))   EKG    Collection Time: 24  6:04 PM   Result Value Ref Range    Report       [facility]    Test Date:  2024  Pt Name:    DOROTHEA SALINAS                  Department: [facility]  MRN:        [id number]                      Room:       [facility]  Gender:     Male                         Technician: MITCHELL  :        1945                   Requested By:ANAHY GIBBONS  Order #:    [id number]                    Reading MD: Honorio Jang MD    Measurements  Intervals                                Axis  Rate:       108                          P:          43  UT:         140                          QRS:        26  QRSD:       90                           T:          78  QT:         342  QTc:        459    Interpretive Statements  Sinus tachycardia  Minimal ST depression, inferior leads    Electronically Signed On 2024 18:15:55 PDT by Honorio Jang MD     POCT glucose device results    Collection Time: 24  4:51 PM   Result Value Ref Range    POC Glucose, Blood 136 (H) 65 - 99 mg/dL   POCT glucose device results    Collection Time: 24 11:41 PM   Result Value Ref Range    POC Glucose, Blood 140 (H) 65 - 99 mg/dL   CBC with Differential    Collection Time: 24  5:26 AM   Result Value " Ref Range    WBC 13.6 (H) 4.8 - 10.8 K/uL    RBC 4.49 (L) 4.70 - 6.10 M/uL    Hemoglobin 14.0 14.0 - 18.0 g/dL    Hematocrit 42.7 42.0 - 52.0 %    MCV 95.1 81.4 - 97.8 fL    MCH 31.2 27.0 - 33.0 pg    MCHC 32.8 32.3 - 36.5 g/dL    RDW 48.5 35.9 - 50.0 fL    Platelet Count 94 (L) 164 - 446 K/uL    MPV 10.7 9.0 - 12.9 fL    Neutrophils-Polys 75.60 (H) 44.00 - 72.00 %    Lymphocytes 15.30 (L) 22.00 - 41.00 %    Monocytes 6.70 0.00 - 13.40 %    Eosinophils 1.30 0.00 - 6.90 %    Basophils 0.30 0.00 - 1.80 %    Immature Granulocytes 0.80 0.00 - 0.90 %    Nucleated RBC 0.00 0.00 - 0.20 /100 WBC    Neutrophils (Absolute) 10.30 (H) 1.82 - 7.42 K/uL    Lymphs (Absolute) 2.08 1.00 - 4.80 K/uL    Monos (Absolute) 0.91 (H) 0.00 - 0.85 K/uL    Eos (Absolute) 0.18 0.00 - 0.51 K/uL    Baso (Absolute) 0.04 0.00 - 0.12 K/uL    Immature Granulocytes (abs) 0.11 0.00 - 0.11 K/uL    NRBC (Absolute) 0.00 K/uL   Comp Metabolic Panel (CMP)    Collection Time: 08/25/24  5:26 AM   Result Value Ref Range    Sodium 146 (H) 135 - 145 mmol/L    Potassium 4.3 3.6 - 5.5 mmol/L    Chloride 113 (H) 96 - 112 mmol/L    Co2 21 20 - 33 mmol/L    Anion Gap 12.0 7.0 - 16.0    Glucose 158 (H) 65 - 99 mg/dL    Bun 44 (H) 8 - 22 mg/dL    Creatinine 1.27 0.50 - 1.40 mg/dL    Calcium 8.8 8.5 - 10.5 mg/dL    Correct Calcium 9.6 8.5 - 10.5 mg/dL    AST(SGOT) 90 (H) 12 - 45 U/L    ALT(SGPT) 84 (H) 2 - 50 U/L    Alkaline Phosphatase 86 30 - 99 U/L    Total Bilirubin 0.7 0.1 - 1.5 mg/dL    Albumin 3.0 (L) 3.2 - 4.9 g/dL    Total Protein 7.1 6.0 - 8.2 g/dL    Globulin 4.1 (H) 1.9 - 3.5 g/dL    A-G Ratio 0.7 g/dL   TSH with Reflex to FT4    Collection Time: 08/25/24  5:26 AM   Result Value Ref Range    TSH 0.608 0.380 - 5.330 uIU/mL   Vitamin D, 25-hydroxy (blood)    Collection Time: 08/25/24  5:26 AM   Result Value Ref Range    25-Hydroxy   Vitamin D 25 59 30 - 100 ng/mL   IMMATURE PLT FRACTION    Collection Time: 08/25/24  5:26 AM   Result Value Ref Range    Imm.  Plt Fraction 3.2 0.6 - 13.1 %   ESTIMATED GFR    Collection Time: 08/25/24  5:26 AM   Result Value Ref Range    GFR (CKD-EPI) 57 (A) >60 mL/min/1.73 m 2   POCT glucose device results    Collection Time: 08/25/24  5:41 AM   Result Value Ref Range    POC Glucose, Blood 159 (H) 65 - 99 mg/dL       Medications:  Scheduled Medications   Medication Dose Frequency    Pharmacy Consult Request  1 Each PHARMACY TO DOSE    senna-docusate  2 Tablet BID    omeprazole  20 mg DAILY    apixaban  5 mg BID    atorvastatin  20 mg Q EVENING    dapagliflozin propanediol  10 mg DAILY    ezetimibe  10 mg DAILY    melatonin  6 mg Nightly    metoprolol tartrate  50 mg BID    spironolactone  25 mg DAILY    Non Formulary Request  10 mg DAILY    vitamin D3  2,000 Units DAILY    insulin regular  1-6 Units Q6HRS     PRN medications: Respiratory Therapy Consult, hydrALAZINE, acetaminophen, senna-docusate **AND** polyethylene glycol/lytes, docusate sodium, magnesium hydroxide, carboxymethylcellulose, benzocaine-menthol, mag hydrox-al hydrox-simeth, ondansetron **OR** ondansetron, traZODone, sodium chloride, oxyCODONE immediate-release **OR** oxyCODONE immediate-release, traMADol, midazolam, cyclobenzaprine, diclofenac sodium, insulin regular **AND** POC blood glucose manual result **AND** NOTIFY MD and PharmD **AND** Administer 20 grams of glucose (approximately 8 ounces of fruit juice) every 15 minutes PRN FSBG less than 70 mg/dL **AND** dextrose bolus    Diet:  Current Diet Order   Procedures    Diet NPO Restrict to: Sips with Medications (TURN TUBE FEED OFF AT MIDNIGHT)    Diet: Diet Tube Feed; Formula: Bolus Only (Provide 1/2 carton Glucerna 1.2 at first bolus feed and advance by 1/2 carton per feed until goal is reached. Goal is 6 cartons per day); Select Bolus (If Indicated): Glucerna 1.2 Carton; Bolus Frequency:...       Medical Decision Making and Plan:  R CVA - Patient with R CVA not a candidate for TNK per neurology. Restarted on  Eliquis  -PT and OT for mobility and ADLs. Per guidelines, 15 hours per week between PT, OT and/or SLP.  -Follow-up Neurology stroke bridge      Lung adenocarcinoma - s/p Resection. Patient on Vericiguat which is an experimental medication.     Dysphagia - Severe dysphagia. PEG placed 8/23. NPO with tube feeds. Dietitian to consult. SLP for swallow     HTN/sCHF - Patient on Farxiga 10 mg, Metoprolol 50 mg BID and Spironolactone. EF of 35%  -Consult hospitalist     HLD - Patient on Atorvastatin 20 mg QHS and Zetia 10 mg daily.      DM2 with hyperglycemia - Will start SSI. Consult hospitalist     CKD3a - Avoid nephrotoxic agents. Check AM CMP - Cr 1.27, BUN 44, will monitor    Hypernatremia - 146 on admission, consult hospitalist     Leukocytosis - Check AM CBC -13.6 on admission down from 15.9, consult hospitalist     Thrombocytopenia - Check AM CBC - 94 on admission, will monitor     Pain - Patient on APAP and Tramadol PRN     Skin - Patient at risk for skin breakdown due to debility in areas including sacrum, achilles, elbows and head in addition to other sites. Nursing to assess skin daily.      GI Ppx - Patient on Prilosec for GERD prophylaxis. Patient on Senna-docusate for constipation prophylaxis.      DVT Ppx - Patient Eliquis on transfer.  ____________________________________    T. Tim Maciel MD/PhD  [facility] - Physical Medicine & Rehabilitation   [facility] - Brain Injury Medicine   ____________________________________    Total time:  50 minutes. Time spent included pre-rounding review of vitals and tests, unit/floor time, face-to-face time with the patient including physical examination, care coordination, counseling of patient and/or family, ordering medications/procedures/tests, discussion with CM, PT, OT, SLP and/or other healthcare providers, and documentation in the electronic medical record. Topics discussed included admission labs, discussion with patient/family on dysarthria, hypernatremia,  leukocytosis, and consult hospitalist. Patient's case was discussed face to face with Hospitalist on [facility] floor.

## 2024-08-25 NOTE — PROGRESS NOTES
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  Allergies: Patient has no known allergies.    Safety  Patient Assist  minimum assist  Patient Precautions  Fall Risk, PEG Tube, Swallow Precautions  Precaution Comments  [place], dysarthria, lower partial dentures, bilateral HAs  Bed Transfer Status  Contact Guard Assist  Toilet Transfer Status   Contact Guard Assist  Assistive Devices  Rails, Wheelchair  Oxygen  None - Room Air  Diet/Therapeutic Dining  Current Diet Order   Procedures    Diet NPO Restrict to: Sips with Medications (TURN TUBE FEED OFF AT MIDNIGHT)    Diet: Diet Tube Feed; Formula: Bolus Only (Provide 1/2 carton Glucerna 1.2 at first bolus feed and advance by 1/2 carton per feed until goal is reached. Goal is 6 cartons per day); Select Bolus (If Indicated): Glucerna 1.2 Carton; Bolus Frequency:...     Pill Administration  PEG  Agitated Behavioral Scale  17  ABS Level of Severity  No Agitation    Fall Risk  Has the patient had a fall this admission?   No  Yandy Minaya Fall Risk Scoring  18, HIGH RISK  Fall Risk Safety Measures  bed alarm and chair alarm    Vitals  Temperature: 37.1 °C (98.8 °F)  Temp src: Oral  Pulse: (!) 101  Respiration: 16  Blood Pressure : 137/69  Blood Pressure MAP (Calculated): 92 MM HG  BP Location: Left, Upper Arm  Patient BP Position: Supine     Oxygen  Pulse Oximetry: 96 %  O2 (LPM): 0  O2 Delivery Device: None - Room Air    Bowel and Bladder  Last Bowel Movement  08/21/24 (per report)  Stool Type     Bowel Device     Continent  Bladder: Did not void   Bowel: No movement  Bladder Function  Number of Times Voided: 1  Urine Color: Unable To Evaluate  Genitourinary Assessment   Bladder Assessment (WDL):  Within Defined Limits  Urine Color: Unable To Evaluate  Bladder Scan: Post  Void  $ Bladder Scan Results (mL): 50    Skin  Evan Score   16  Sensory Interventions   Bed Types: Standard/Trauma Mattress  Moisture Interventions  Moisturizers/Barriers: Moisturizer       Pain  Pain Rating Scale  0 - No Pain  Pain Location     Pain Location Orientation     Pain Interventions   Declines    ADLs    Bathing      Linen Change      Personal Hygiene  Moist Nicolasa Wipes  Chlorhexidine Bath      Oral Care  Mouth Swabbed (1 ice cube only  )  Teeth/Dentures     Shave     Nutrition Percentage Eaten  Ice Chips (1 ice chip only)  Environmental Precautions     Patient Turns/Positioning  Patient Turns Self from Side to Side  Patient Turns Assistance/Tolerance     Bed Positions     Head of Bed Elevated         Psychosocial/Neurologic Assessment  Psychosocial Assessment  Psychosocial (WDL):  Within Defined Limits  Neurologic Assessment  Neuro (WDL): Exceptions to WDL  Level of Consciousness: Alert  EENT (WDL):  WDL Except    Cardio/Pulmonary Assessment  Edema      Respiratory Breath Sounds  RUL Breath Sounds: Clear  RML Breath Sounds: Clear  RLL Breath Sounds: Diminished  MARIA M Breath Sounds: Clear  LLL Breath Sounds: Diminished  Cardiac Assessment   Cardiac (WDL):  WDL Except (CHF)

## 2024-08-25 NOTE — CARE PLAN
The patient is Stable - Low risk of patient condition declining or worsening    Shift Goals  Clinical Goals: Safety, Pain control  Patient Goals: Safety  Family Goals: Education    Progress made toward(s) clinical / shift goals: Pt NPO, VSS, denies pain, aphasic, given PRN bowel medication, able to participate in therapy.

## 2024-08-25 NOTE — THERAPY
Occupational Therapy   Initial Evaluation     Patient Name: Pito Black  Age:  79 y.o., Sex:  male  Medical Record #: [id number]  Today's Date: 8/25/2024     Subjective    Pt found resting in bed and agreeable to OT evaluation this morning. Pt with significant dysarthria, therefore, background info pulled from chart reviews. In addition unable to before BIMS assessment as a result.      Objective       08/25/24 0701   OT Charge Group   Charges Yes   OT Self Care / ADL (Units) 1   OT Evaluation OT Evaluation Mod   OT Total Time Spent   OT Individual Total Time Spent (Mins) 60   Prior Living Situation   Prior Services Unable To Determine At This Time   Housing / Facility 1 Story House   Steps Into Home 3   Steps In Home 0   Rail None   Equipment Owned None   Lives with - Patient's Self Care Capacity Spouse   Prior Level of ADL Function   Self Feeding Independent   Grooming / Hygiene Independent   Bathing Independent   Dressing Independent   Toileting Independent   Prior Functioning: Everyday Activities   Self Care Independent   Indoor Mobility (Ambulation) Independent   Stairs Independent   Functional Cognition Independent   Prior Device Use None of the given options   Vitals   Pulse (!) 119   Patient BP Position Supine  (after completing shower, dressing, toileting)   Blood Pressure  129/79   Pulse Oximetry 95 %   O2 (LPM) 0   O2 Delivery Device None - Room Air   Pain   Intervention Declines   Cognition    Level of Consciousness Alert   Brief Interview for Mental Status (BIMS)   Repetition of Three Words (First Attempt)   (unable to comprehend pt due to severe dysarthria)   Confusion Assessment Method (CAM)   Is there evidence of an acute change in mental status from the patient's baseline? Yes   Inattention Behavior not present   Disorganized thinking Behavior present, fluctuates (comes and goes, changes in severity)   Altered level of consciousness Behavior not present   Passive ROM Upper Body   Passive ROM  Upper Body WDL   Active ROM Upper Body   Active ROM Upper Body  WDL   Dominant Hand Right   Strength Upper Body   Upper Body Strength  X   Comments RUE 5/5 LUE 4/5   Balance Assessment   Sitting Balance (Static) Fair   Sitting Balance (Dynamic) Fair -   Bed Mobility    Supine to Sit Minimal Assist   Sit to Supine Minimal Assist   Sit to Stand Minimal Assist   Scooting Moderate Assist   Hearing, Speech, and Vision   Ability to Hear Highly impaired   Ability to See in Adequate Light Adequate   Expression of Ideas and Wants Some difficulty   Understanding Verbal and Non-Verbal Content Usually understands   Functional Level of Assist   Bathing Moderate Assist   Bathing Description Hand held shower;Tub bench;Assit with back;Assit wtih lower extremities;Increased time;Set-up of equipment;Set up for shower sleeve;Supervision for safety;Grab bar   Upper Body Dressing Moderate Assist   Upper Body Dressing Description Assit with threading arms through sleeves;Assist with pulling shirt over head;Increased time;Supervision for safety;Set-up of equipment   Lower Body Dressing Moderate Assist   Lower Body Dressing Description Grab bar;Increased time;Set-up of equipment;Supervision for safety;Assist with threading into pant leg   Toileting Moderate Assist   Toileting Description Assist to pull pants up;Assist for hygiene;Grab bar;Increased time   Bed, Chair, Wheelchair Transfer Minimal Assist   Bed Chair Wheelchair Transfer Description Increased time;Supervision for safety   Toilet Transfers Contact Guard Assist   Toilet Transfer Description Increased time;Grab bar;Supervision for safety;Verbal cueing;Set-up of equipment   Tub / Shower Transfers Minimal Assist   Tub Shower Transfer Description Grab bar;Shower bench;Set-up of equipment;Supervision for safety   Eating   Assistance Needed Physical assistance   Physical Assistance Level Total assistance   Comment peg tube   CARE Score - Eating 1   Eating Discharge Goal   Discharge  Goal 5   Oral Hygiene   Assistance Needed Physical assistance   Physical Assistance Level 25% or less   CARE Score - Oral Hygiene 3   Oral Hygiene Discharge Goal   Discharge Goal 6   Shower/Bathe Self   Assistance Needed Physical assistance   Physical Assistance Level 51%-75%   CARE Score - Shower/Bathe Self 2   Shower/Bathe Self Discharge Goal   Discharge Goal 6   Upper Body Dressing   Assistance Needed Physical assistance   Physical Assistance Level 26%-50%   CARE Score - Upper Body Dressing 3   Upper Body Dressing Discharge Goal   Discharge Goal 6   Lower Body Dressing   Assistance Needed Physical assistance   Physical Assistance Level 51%-75%   CARE Score - Lower Body Dressing 2   Lower Body Dressing Discharge Goal   Discharge Goal 5   Putting On/Taking Off Footwear   Assistance Needed Physical assistance   Physical Assistance Level 26%-50%   CARE Score - Putting On/Taking Off Footwear 3   Putting On/Taking Off Footwear Discharge Goal   Discharge Goal 6   Toileting Hygiene   Assistance Needed Physical assistance   Physical Assistance Level 26%-50%   CARE Score - Toileting Hygiene 3   Toileting Hygiene Discharge Goal   Discharge Goal 4   Toilet Transfer   Assistance Needed Physical assistance   Physical Assistance Level 26%-50%   CARE Score - Toilet Transfer 3   Toilet Transfer Discharge Goal   Discharge Goal 6   Problem List   Problem List Decreased Active Daily Living Skills;Decreased Upper Extremity Strength Left;Decreased Functional Mobility;Decreased Activity Tolerance;Safety Awareness Deficits / Cognition;Impaired Cognitive Function;Impaired Postural Control / Balance   Precautions   Precautions Fall Risk;PEG Tube  (dysarthria; hearing loss)   Current Discharge Plan   Current Discharge Plan Return to Prior Living Situation   Benefit    Therapy Benefit Patient Would Benefit from Inpatient Rehab Occupational Therapy to Maximize [place] with ADLs, IADLs and Functional Mobility.   Interdisciplinary Plan of  "Care Collaboration   IDT Collaboration with  Nursing   Patient Position at End of Therapy In Bed;Bed Alarm On;Call Light within Reach;Tray Table within Reach;Phone within Reach   Collaboration Comments RN re: CLOF and prep for shower routine   Strengths & Barriers   Strengths Independent prior level of function;Motivated for self care and independence;Pleasant and cooperative;Supportive family;Willingly participates in therapeutic activities   Barriers Generalized weakness;Impaired activity tolerance;Impaired balance;Impaired functional cognition       Assessment  Per H&P: \" The patient is a 79 y.o. right hand dominant male with a past medical history of lung cancer followed by Dr. Schulz, A-fib on apixaban, HF with reduced EF of 35%, Status post AICD, CKD, hypertension, hyperlipidemia, diabetes;  who presented on 8/13/2024  8:07 AM with slurred speech, left facial droop, left arm weakness.  Initial NIH score of 5.  Workup with CT perfusion found 18 mm penumbra, CTA found focal stenosis/occlusion in the distal right MCA.  Patient had a delayed presentation and was not a candidate for thrombolytics or thrombectomy.  Etiology for stroke is likely thromboembolic in the setting of A-fib and malignancy. Per neurology continue Eliquis. His stay was prolonged as his dysphagia was not improving and failed multiple swallow tests so on 8/23/24 he underwent PEG tube placement. Patient tolerated transfer to [facility]. He is dysarthric and has some problems understanding due to combination of cognitive changes and hearing loss. Wife and family at bedside provide history. They reports his strength has improved in his arm somewhat but speed of using his arm has not. Denies pain at rest. Denies NVD. He is very motivated to eat again.\"    Additional factors influencing patient status / progress (ie: cognitive factors, co-morbidities, social support, etc): strong family support, mostly independent PLOF, good participation and motivation.  "     Plan  Recommend Occupational Therapy  minutes per day 5-7 days per week for 1-2 weeks for the following treatments:  OT Self Care/ADL, OT Cognitive Skill Dev, OT Neuro Re-Ed/Balance, and OT Therapeutic Exercise.    Passport items to be completed:  Perform bathroom transfers, complete dressing, complete feeding, get ready for the day, prepare a simple meal, participate in household tasks, adapt home for safety needs, demonstrate home exercise program, complete caregiver training     Goals:  Long term and short term goals have been discussed with patient and they are in agreement.    Occupational Therapy Goals (Active)       Problem: Bathing       Dates: Start:  08/25/24         Goal: STG-Within one week, patient will bathe min A       Dates: Start:  08/25/24               Problem: Dressing       Dates: Start:  08/25/24         Goal: STG-Within one week, patient will dress UB min A       Dates: Start:  08/25/24            Goal: STG-Within one week, patient will dress LB min A       Dates: Start:  08/25/24               Problem: OT Long Term Goals       Dates: Start:  08/25/24         Goal: LTG-By discharge, patient will complete basic self care tasks I-supv       Dates: Start:  08/25/24            Goal: LTG-By discharge, patient will perform bathroom transfers I-supv       Dates: Start:  08/25/24               Problem: Toileting       Dates: Start:  08/25/24         Goal: STG-Within one week, patient will complete toileting tasks min A       Dates: Start:  08/25/24

## 2024-08-25 NOTE — ASSESSMENT & PLAN NOTE
Renal function improved off Aldactone and S/P NS x 1 L  Monitor for worsening BUN/Cr back on Lasix

## 2024-08-25 NOTE — THERAPY
Physical Therapy   Initial Evaluation     Patient Name: Pito Black  Age:  79 y.o., Sex:  male  Medical Record #: [id number]  Today's Date: 8/25/2024     Subjective    Patient in bed, agreeable to PT eval; becomes impulsive with urinary urgency or request to get back to bed     Objective       08/25/24 1031   PT Charge Group   PT Therapeutic Exercise (Units) 1   PT Evaluation PT Evaluation Low   PT Total Time Spent   PT Individual Total Time Spent (Mins) 60   Prior Living Situation   Housing / Facility 1 Story House   Steps Into Home 3   Rail None  (family willing to install grab bar/railing for home entrance)   Equipment Owned None   Lives with - Patient's Self Care Capacity Spouse;Adult Children   Comments patient dysarthric, hesitant to speak; dtr provided home set-up and PLOF   Prior Functioning: Everyday Activities   Indoor Mobility (Ambulation) Independent   Stairs Independent   Prior Device Use None of the given options   Pain 0 - 10 Group   Therapist Pain Assessment During Activity;Prior to Activity  (denies pain)   Strength Lower Body   Lower Body Strength  WDL   Comments right stronger than left; unable to provided MMT- due to [place], poor motor planning?   Balance Assessment   Standing Balance (Static) Fair   Standing Balance (Dynamic) Fair -   Weight Shift Standing Fair   Roll Left and Right   Assistance Needed Verbal cues;Supervision;Adaptive equipment   CARE Score - Roll Left and Right 4   Roll Left and Right Discharge Goal   Discharge Goal 6   Sit to Lying   Assistance Needed Verbal cues;Supervision;Adaptive equipment   CARE Score - Sit to Lying 4   Sit to Lying Discharge Goal   Discharge Goal 6   Lying to Sitting on Side of Bed   Assistance Needed Physical assistance   Physical Assistance Level 25% or less   CARE Score - Lying to Sitting on Side of Bed 3   Lying to Sitting on Side of Bed Discharge Goal   Discharge Goal 6   Sit to Stand   Assistance Needed Incidental touching;Verbal cues;Set-up /  clean-up;Adaptive equipment   CARE Score - Sit to Stand 4   Sit to Stand Discharge Goal   Discharge Goal 6   Chair/Bed-to-Chair Transfer   Assistance Needed Incidental touching;Verbal cues;Set-up / clean-up;Adaptive equipment   CARE Score - Chair/Bed-to-Chair Transfer 4   Chair/Bed-to-Chair Transfer Discharge Goal   Discharge Goal 6   Car Transfer   Reason if not Attempted Safety concerns   CARE Score - Car Transfer 88   Car Transfer Discharge Goal   Discharge Goal 6   Walk 10 Feet   Assistance Needed Incidental touching;Verbal cues;Set-up / clean-up;Adaptive equipment   CARE Score - Walk 10 Feet 4   Walk 10 Feet Discharge Goal   Discharge Goal 6   Walk 50 Feet with Two Turns   Assistance Needed Incidental touching;Verbal cues;Set-up / clean-up;Adaptive equipment   CARE Score - Walk 50 Feet with Two Turns 4   Walk 50 Feet with Two Turns Discharge Goal   Discharge Goal 6   Walk 150 Feet   Reason if not Attempted Safety concerns   CARE Score - Walk 150 Feet 88   Walk 150 Feet Discharge Goal   Discharge Goal 6   Walking 10 Feet on Uneven Surfaces   Reason if not Attempted Safety concerns   CARE Score - Walking 10 Feet on Uneven Surfaces 88   Walking 10 Feet on Uneven Surfaces Discharge Goal   Discharge Goal 4   1 Step (Curb)   Reason if not Attempted Safety concerns   CARE Score - 1 Step (Curb) 88   1 Step (Curb) Discharge Goal   Discharge Goal 4   4 Steps   Reason if not Attempted Safety concerns   CARE Score - 4 Steps 88   4 Steps Discharge Goal   Discharge Goal 4   12 Steps   Reason if not Attempted Activity not applicable   CARE Score - 12 Steps 9   12 Steps Discharge Goal   Discharge Goal 9   Picking Up Object   Reason if not Attempted Safety concerns   CARE Score - Picking Up Object 88   Picking Up Object Discharge Goal   Discharge Goal 4   Wheel 50 Feet with Two Turns   Reason if not Attempted Activity not applicable   CARE Score - Wheel 50 Feet with Two Turns 9   Type of Wheelchair/Scooter Manual   Wheel 50  Feet with Two Turns Discharge Goal   Discharge Goal 9   Wheel 150 Feet   Reason if not Attempted Activity not applicable   CARE Score - Wheel 150 Feet 9   Type of Wheelchair/Scooter Manual   Wheel 150 Feet Discharge Goal   Discharge Goal 9   Gait Functional Level of Assist    Gait Level Of Assist Contact Guard Assist   Assistive Device Front Wheel Walker   Distance (Feet) 75  (75 x 2)   # of Times Distance was Traveled 2   Deviation   (occasional assist for left hand placement, cues/demonstration for left foot placement)   Wheelchair Functional Level of Assist   Wheelchair Assist Moderate Assist   Distance Wheelchair (Feet or Distance) 50   Wheelchair Description   (bilateral UE, left fatigues quickly)   Stairs Functional Level of Assist   Level of Assist with Stairs Unable to Participate   Transfer Functional Level of Assist   Bed, Chair, Wheelchair Transfer Contact Guard Assist   Bed Chair Wheelchair Transfer Description Verbal cueing;Set-up of equipment;Initial preparation for task   Problem List    Problems Impaired Ambulation;Impaired Balance;Safety Awareness Deficits / Cognition;Motor Planning / Sequencing;Decreased Activity Tolerance   Precautions   Precautions Fall Risk;PEG Tube;Swallow Precautions   Comments [place], dysarthria, lower partial dentures, bilateral HAs   Current Discharge Plan   Current Discharge Plan Return to Prior Living Situation   Interdisciplinary Plan of Care Collaboration   IDT Collaboration with  Occupational Therapist;Nursing;Family / Caregiver;Physician   Collaboration Comments family present for end of eval; baseline eval scores- recommend less PT and additional SLP time; unhook feeding lines for PT eval   Benefit   Therapy Benefit Patient Would Benefit from Inpatient Rehabilitation Physical Therapy to Maximize Functional [place] with ADLs, IADLs and Mobility.   Strengths & Barriers   Strengths Able to follow instructions;Supportive family;Willingly participates in therapeutic  activities   Barriers Aphasia expressive;Hearing impairment;Generalized weakness;Impaired activity tolerance;Impaired balance;Impulsive       Assessment  Patient is 79 y.o. male with a diagnosis of right middle cerebral artery stroke, c/o swallow and left UE impairment.     [Adapted from the PM&R Consult by Dr. Mensah:   The patient is a 79 y.o. right hand dominant male with a past medical history of lung cancer followed by Dr. Schulz, A-fib on apixaban, HF with reduced EF of 35%, Status post AICD, CKD, hypertension, hyperlipidemia, diabetes;  who presented on 8/13/2024  8:07 AM with slurred speech, left facial droop, left arm weakness.  Initial NIH score of 5.  Workup with CT perfusion found 18 mm penumbra, CTA found focal stenosis/occlusion in the distal right MCA.  Patient had a delayed presentation and was not a candidate for thrombolytics or thrombectomy.  Etiology for stroke is likely thromboembolic in the setting of A-fib and malignancy. Per neurology continue Eliquis. His stay was prolonged as his dysphagia was not improving and failed multiple swallow tests so on 8/23/24 he underwent PEG tube placement.      Patient tolerated transfer to [facility]. He is dysarthric and has some problems understanding due to combination of cognitive changes and hearing loss. Wife and family at bedside provide history. They reports his strength has improved in his arm somewhat but speed of using his arm has not. Denies pain at rest. Denies NVD. He is very motivated to eat again]     Additional factors influencing patient status / progress (ie: cognitive factors, co-morbidities, social support, etc): dysarthria, PEG tube, [place]; patient has 3 stairs for home entrance.      Plan  Recommend Physical Therapy 30-60 minutes per day 5-7 days per week for 10-14 days for the following treatments:  PT Gait Training, PT Therapeutic Exercises, PT Neuro Re-Ed/Balance, PT Therapeutic Activity, and PT Evaluation.    Passport items to be  completed:  Get in/out of bed safely, in/out of a vehicle, safely use mobility device, walk or wheel around home/community, navigate up and down stairs, show how to get up/down from the ground, ensure home is accessible, demonstrate HEP, complete caregiver training    Goals:  Long term and short term goals have been discussed with patient and spouse and they are in agreement.    Physical Therapy Problems (Active)       Problem: Balance       Dates: Start:  08/25/24         Goal: STG-Within one week, patient will perform 5x STS w/c to FWW in 45sec with CGA       Dates: Start:  08/25/24               Problem: Mobility       Dates: Start:  08/25/24         Goal: STG-Within one week, patient will ambulate household distance 50ft x 4 with FWW SBA       Dates: Start:  08/25/24            Goal: STG-Within one week, patient will ascend and descend four to six stairs right rail min assist       Dates: Start:  08/25/24               Problem: Mobility Transfers       Dates: Start:  08/25/24         Goal: STG-Within one week, patient will transfer bed to chair SBA SPT       Dates: Start:  08/25/24               Problem: PT-Long Term Goals       Dates: Start:  08/25/24         Goal: LTG-By discharge, patient will ambulate 100ft x 4 with FWW mod I indoors       Dates: Start:  08/25/24            Goal: LTG-By discharge, patient will transfer one surface to another mod I SPT safely and consistently       Dates: Start:  08/25/24            Goal: LTG-By discharge, patient will perform home exercise program seated/standing for LE strengthening to be done 3x/day in safe, independent home environment       Dates: Start:  08/25/24            Goal: LTG-By discharge, patient will ambulate up/down 4-6 stairs with right rail with SPV       Dates: Start:  08/25/24            Goal: LTG-By discharge, patient will perform 5x STS in 30sec with SPV       Dates: Start:  08/25/24

## 2024-08-25 NOTE — PROGRESS NOTES
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  Allergies: Patient has no known allergies.    Safety  Patient Assist  minimum assist  Patient Precautions     Precaution Comments     Bed Transfer Status     Toilet Transfer Status      Assistive Devices  Rails, Wheelchair  Oxygen  None - Room Air  Diet/Therapeutic Dining  Current Diet Order   Procedures    Diet NPO Restrict to: Sips with Medications (TURN TUBE FEED OFF AT MIDNIGHT)    Diet: Diet Tube Feed; Formula: Bolus Only (Provide 1/2 carton Glucerna 1.2 at first bolus feed and advance by 1/2 carton per feed until goal is reached. Goal is 6 cartons per day); Select Bolus (If Indicated): Glucerna 1.2 Carton; Bolus Frequency:...     Pill Administration  crushed and PEG  Agitated Behavioral Scale  17  ABS Level of Severity  No Agitation    Fall Risk  Has the patient had a fall this admission?   No  Yandy Minaya Fall Risk Scoring  18, HIGH RISK  Fall Risk Safety Measures  bed alarm, chair alarm, poor balance, and low vision/ hearing    Vitals  Temperature: 36.7 °C (98.1 °F)  Temp src: Oral  Pulse: (!) 103  Respiration: 16  Blood Pressure : 123/68  Blood Pressure MAP (Calculated): 86 MM HG  BP Location: Left, Upper Arm  Patient BP Position: Supine     Oxygen  Pulse Oximetry: 92 %  O2 Delivery Device: None - Room Air    Bowel and Bladder  Last Bowel Movement  08/21/24 (per report)  Stool Type     Bowel Device     Continent  Bladder: Did not void   Bowel: No movement  Bladder Function  Number of Times Voided: 1  Urine Color: Yellow  Genitourinary Assessment   Bladder Assessment (WDL):  Within Defined Limits  Urine Color: Yellow  Bladder Scan: Post Void  $ Bladder Scan Results (mL): 50    Skin  Evan Score   16  Sensory Interventions   Bed Types: Standard/Trauma  Mattress  Moisture Interventions  Moisturizers/Barriers: Moisturizer       Pain  Pain Rating Scale  0 - No Pain  Pain Location     Pain Location Orientation     Pain Interventions   Declines    ADLs    Bathing      Linen Change      Personal Hygiene  Moist Nicolasa Wipes  Chlorhexidine Bath      Oral Care     Teeth/Dentures     Shave     Nutrition Percentage Eaten  NPO at this Time  Environmental Precautions     Patient Turns/Positioning  Patient Turns Self from Side to Side  Patient Turns Assistance/Tolerance     Bed Positions     Head of Bed Elevated         Psychosocial/Neurologic Assessment  Psychosocial Assessment  Psychosocial (WDL):  Within Defined Limits  Neurologic Assessment  Neuro (WDL): Exceptions to WDL  Level of Consciousness: Alert  EENT (WDL):  WDL Except    Cardio/Pulmonary Assessment  Edema      Respiratory Breath Sounds  RUL Breath Sounds: Clear  RML Breath Sounds: Clear  RLL Breath Sounds: Diminished  MARIA M Breath Sounds: Clear  LLL Breath Sounds: Diminished  Cardiac Assessment   Cardiac (WDL):  WDL Except (CHF)

## 2024-08-25 NOTE — THERAPY
"Speech Language Pathology   Initial Assessment     Patient Name: Pito Black  AGE:  79 y.o., SEX:  male  Medical Record #: [id number]  Today's Date: 8/25/2024     Subjective    Per Dr. Maciel HPI: \"Adapted from the PM&R Consult by Dr. Mensah:   The patient is a 79 y.o. right hand dominant male with a past medical history of lung cancer followed by Dr. Schulz, A-fib on apixaban, HF with reduced EF of 35%, Status post AICD, CKD, hypertension, hyperlipidemia, diabetes;  who presented on 8/13/2024  8:07 AM with slurred speech, left facial droop, left arm weakness.  Initial NIH score of 5.  Workup with CT perfusion found 18 mm penumbra, CTA found focal stenosis/occlusion in the distal right MCA.  Patient had a delayed presentation and was not a candidate for thrombolytics or thrombectomy.  Etiology for stroke is likely thromboembolic in the setting of A-fib and malignancy. Per neurology continue Eliquis. His stay was prolonged as his dysphagia was not improving and failed multiple swallow tests so on 8/23/24 he underwent PEG tube placement.      Patient tolerated transfer to [facility]. He is dysarthric and has some problems understanding due to combination of cognitive changes and hearing loss. Wife and family at bedside provide history. They reports his strength has improved in his arm somewhat but speed of using his arm has not. Denies pain at rest. Denies NVD. He is very motivated to eat again.\"    Pt seen at bedside, reports independent PLOF for driving, cooking, cleaning and shopping. Pt reports spouse manages meds and finances. 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   Pt is [facility] with bilateral hearing aids. Only left hearing aid in place for evaluation, reports right hearing aid has gone missing since transfer to [facility]. Pt wear's partial lower denture which was in place for evaluation. Pt endorses abdominal pain at PEG site, " given Tylenol with a.m. meds.     Objective       08/25/24 0804   Evaluation Charges   Charges Yes   SLP Speech Language Evaluation Speech Sound Language Comprehension   SLP Oral Pharyngeal Evaluation Oral Pharyngeal Evaluation   SLP Total Time Spent   SLP Individual Total Time Spent (Mins) 75   Precautions   Precautions Fall Risk;PEG Tube;Swallow Precautions   Comments [place], dysarthria, lower partial dentures, bilateral HAs   Prior Living Situation   Prior Services Unable To Determine At This Time   Housing / Facility 1 Story House   Lives with - Patient's Self Care Capacity Adult Children;Spouse   Prior Level Of Function   Communication Within Functional Limits   Swallow Within Functional Limits   Dentition Poor Quality    Dentures Lowers;Partials  (upper dentition intact)   Hearing Impaired Both Ears   Hearing Aid Right;Left  (only left hearing aid in place for eval, right missing since admission to [facility] per patient)   Vision Reading ;Distance   Patient's Primary Language English   Occupation (Pre-Hospital Vocational) Retired Due To Age  (maintenance for school district in [place] - 24 years)   Comments CTA found focal stenosis/occlusion in the distal right MCA.  Patient had a delayed presentation and was not a candidate for thrombolytics or thrombectomy.  Etiology for stroke is likely thromboembolic in the setting of A-fib and malignancy   Receptive Language / Auditory Comprehension   Receptive Language / Auditory Comprehension X   Identifies Pictures Moderate (3)   Answers Yes / No Personal Questions Moderate (3)   Answers Yes / No Simple / Contextual Questions Moderate (3)   Follows One Unit Commands Moderate (3)   Follows Two Unit Commands Severe (2)   Follows Three Unit Commands Severe (2)   Understands Simple, Structured Conversation  Moderate (3)   Expressive Language   Expressive Language (WDL) X   Naming Severe (2)   Repeats Speech Accurately Severe (2)   Automatic Language Appropriate Moderate (3)  "  Verbalizes Wants / Needs Moderate (3)   Reading Comprehension    Reading Comprehension (WDL) X   Reading Words Moderate (3)   Reading Sentences Severe (2)   Functional Reading Materials Severe (2)   Written Language Expression   Written Language Expression (WDL) X   Dominant Hand Right   To Dictation Severe (2)   Functional Writing (Name, Address) Supervision (5)   Spelling Accuracy Severe (2)   Legibility Minimal (4)   Cognition    Cognition / Consciousness X   Speech/ Communication Hard of Hearing;Dysarthric;Slurred   Orientation Level Not Oriented to Month;Not Oriented to Day   Level of Consciousness Alert   Ability To Follow Commands 1 Step   Cognition - Detailed   Cognitive-Linguistic (WDL) X   Attention to Task Moderate (3)   Simple Attention Severe (2)   Moderate Attention Severe (2)   Complex Attention Profound (1)   Orientation  Moderate (3)   Visual Scanning / Cancellation Skills Severe (2)   Verbal Short Term Memory 30 Minutes   Visual Short Term Memory Severe (2)   Prospective Memory Severe (2)   Simple Reasoning / Problem Solving Severe (2)   Insight into Deficits Severe (2)   Functional Math / Financial Management Severe (2)   Clock Drawing Omissions;Numeric Errors;Poor Planning;Perseveration    ABS (Agitated Behavior Scale)   Agitated Behavior Scale Performed No   Cognitive Pattern Assessment   Cognitive Pattern Assessment Used BIMS   Brief Interview for Mental Status (BIMS)   Repetition of Three Words (First Attempt) 1   Temporal Orientation: Year Correct   Temporal Orientation: Month Missed by more than 1 month   Temporal Orientation: Day Incorrect   Recall: \"Sock\" No, could not recall   Recall: \"Blue\" No, could not recall   Recall: \"Bed\" No, could not recall   BIMS Summary Score 4   Confusion Assessment Method (CAM)   Is there evidence of an acute change in mental status from the patient's baseline? Yes   Inattention Behavior present, fluctuates (comes and goes, changes in severity) "   Disorganized thinking Behavior continuously present, does not fluctuate   Altered level of consciousness Behavior not present   Social / Pragmatic Communication   Social / Pragmatic Communication X   Topic Maintenance Moderate (3)   Body Language Minimal (4)   Attention to Social Cues Minimal (4)   Tracheostomy   Tracheostomy No   Speech Mechanisms / Voice Production   Speech Mechanisms / Voice Production (WDL) X   Voice Quality Reduced Intensity;Breathy;Hoarse   Facial Weakness Left Moderate (3)   Oral Movements Are Voluntary And Coordinated Moderate (3)   Speaks Fluently Moderate (3)   Articulatory Precision Moderate (3)   Single Word Intelligibility Moderate (3)   Sentence Level Intelligibility Moderate (3)   Conversational Intelligibility Severe (2)   Loudness: Soft   Labial Function   Labial Function (WDL) X   Labial Structure At Rest Moderate   Labial Vowel Production / I /, / U / Moderate   Labial Sequence / I /, / U / Moderate   Lingual Function   Lingual Function (WDL) X   Lingual Structure At Rest Minimal   Lingual Protrude Moderate   Lingual Retract Minimal   Elevate Outside Mouth Moderate   Lateralization Moderate Right;Moderate Left   Lick Lips (Circular) Moderate   / Pa / 5X's Minimal   / Ta / 5X's Moderate   / Ka / 5X's Moderate   / [place] / 5X's Moderate   Jaw Function   Jaw Function (WDL) WDL   Velar Function   Velar Function (WDL) WDL   Laryngeal Function   Laryngeal Function (WDL) X   Voice Quality Moderate   Volutional Cough Minimal   Excursion Upon Swallow Complete   Swallowing   Swallowing (WDL) X   Ice Chips Minimal   Dysphagia Strategies / Recommendations   Strategies / Interventions Recommended (Yes / No) Yes   Compensatory Strategies Strict 1:1 Feeding   Diet / Liquid Recommendation NPO;Pre-Feeding Trials with SLP Only   Medication Administration  Via Gastric Tube   Therapy Interventions Dysphagia Therapy By Speech Language Pathologist;MBSS Evaluation;Oral Motor Exercises;Pharyngeal /  Laryngeal Exercises   Barriers To Oral Feeding   Barriers To Oral Feeding Generalized Weakness   Cervical Ausculatation Not Tested   Pre-Feeding Oral Stimulation Trial Not Tested   Swallowing/Nutritional Status   Swallowing/Nutritional Status Tube/parenteral feeding   Eating   Assistance Needed Physical assistance   Physical Assistance Level Total assistance   CARE Score - Eating 1   Eating Discharge Goal   Discharge Goal 5   Functional Level of Assist   Eating Total Assist   Eating Description Tube feed bolus;Staff administers tube feed/parenteral nutrition/IVF;Set-up of equipment or meal/tube feeding   Comprehension Maximal Assist   Comprehension Description Verbal cues;Glasses;Hearing aids/amplifiers   Expression Moderate Assist   Expression Description Verbal cueing   Social Interaction Minimal Assist   Social Interaction Description Verbal cues;Increased time   Problem Solving Maximal Assist   Problem Solving Description Verbal cueing;Therapy schedule;Seat belt;Increased time;Bed/chair alarm   Memory Maximal Assist   Memory Description Verbal cueing;Therapy schedule;Seat belt;Increased time;Bed/chair alarm   Outcome Measures   Outcome Measures Utilized SCCAN;MASA   MASA (Drake Assessment of Swallowing Ability)   Alertness 10   Cooperation 10   Auditory Comprehension 6   Respiration 10   Respiratory Rate for Swallow 5   Dysphasia 4   Dyspraxia 5   Dysarthria 3   Saliva 5   Lip Seal 4   Tongue Movement 8   Tongue Strength 8   Tongue Coordination 8   Oral Preparation 8   Gag 5   Palate 10   Bolus Clearance 10   Oral Transit 10   Cough Reflex 5   Voluntary Cough 10   Voice 6   Trache 10   Pharygneal Phase 10   Pharyngeal Response 10   Diet Recommendations Nothing by mouth, risk too great   Fluid Recommendations Nothing by mouth   Swallow Integrity Probable dysphagia/aspiration  (given recent hx of silent aspiration on instrumental assessments)   Total Score 180   Dysphagia Severity No abnormality detected    Aspiration Severity No abnormality detected   SCCAN (Scales of Cognitive and Communicative Ability for Neurorehabilitation)   Oral Expression - Raw Score 10   Oral Expression - Scale Performance Score 53   Orientation - Raw Score 9   Orientation - Scale Performance Score 75   Memory - Raw Score 5   Memory - Scale Performance Score 26   Speech Comprehension - Raw Score 5   Speech Comprehension - Scale Performance Score 38   Reading Comprehension - Raw Score 4   Reading Comprehension - Scale Performance Score 33   Writing - Raw Score 3   Writing - Scale Performance Score 43   Attention - Raw Score 3   Attention - Scale Performance Score 19   Problem Solving - Raw Score 7   Problem Solving - Scale Performance Score 30   SCCAN Total Raw Score 38   SCCAN Degree of Severity Severe Impairment   Problem List   Problem List Cognitive-Linguistic Deficits;Dysarthia;Dysphagia;Hearing Deficit   Current Discharge Plan   Current Discharge Plan Return to Prior Living Situation   Benefit   Therapy Benefit Patient would benefit from Inpatient Rehab Speech-Language Pathology to address above identified deficits.   Interdisciplinary Plan of Care Collaboration   IDT Collaboration with  Nursing   Patient Position at End of Therapy In Bed;Call Light within Reach;Tray Table within Reach   Collaboration Comments CLOF with nursing and POC   Strengths & Barriers   Strengths Motivated for self care and independence;Pleasant and cooperative;Supportive family;Willingly participates in therapeutic activities   Barriers Aspiration risk;Decreased endurance;Difficulty following instructions;Hearing impairment;Impaired carryover of learning;Impaired insight/denial of deficits;Impaired functional cognition;Tube feeding   Speech Language Pathologist Assigned   Assigned SLP / Treatment Time / Comments EA/60 cog/swallow       Assessment    Patient is 79 y.o. male with a diagnosis of right MCA CVA.  Additional factors influencing patient status/progress  (ie: cognitive factors, co-morbidities, social support, etc): hx of lung CA, HTN, A-Fib, DM, CKD, hyperlipidemia.      Pt seen for clinical swallow and cognitive linguistic assessments this date.   SWALLOW: Pt presents with very dry oral cavity, dried mucous coating teeth surfaces, soft palate and posterior pharyngeal wall. Oral care completed at bedside and would encourage Q4 hours at this time, set up at the sink or at bedside with use of suction toothbrush. Pt presents reduced ROM to labial and buccal musculature, reduced lingual ROM, coordination and strength with moderate to severe dysarthria noted at the phrase and conversation level. Reduced intelligibility at the single word level, but if patient increased volume he is comprehensible.   Given pt's hx of silent aspiration and need for recent PEG placement on 8/23, only ice chips provided this session. Pt able to demonstrate adequate acceptance of bolus, good oral containment with 1-2 ice chips at a time, timely swallow initiation. Pt able to execute serial swallows per bolus and given cues to complete effortful swallow. Vocal quality remained clear, however, pt is at high risk for aspiration given findings on 2 previous instrumental assessments.   RECOMMEND: Pt remain NPO with pre-feeding trials with SLP - pt is cleared for ice chips with staff while seated at 90 degrees following thorough oral care to aid in reducing xerostomia and maintaining swallow integrity. Recommend intensive exercise based dysphagia therapy with follow up MBSS to be scheduled thereafter to further assess progress and updates to POC.     COGNITIVE:   Administration of The Scales of Cognitive and Communicative Ability for Neurorehabilitation (SCCAN) which assesses cognitive-communicative deficits and functional ability in patients in rehabilitation hospitals, clinics, and skilled nursing facilities. The SCCAN is appropriate for a broad range of neurological patients, provides a measure  of both impairment and functional ability. Pt performed as follows:   Oral Expression - Raw Score: 10  Oral Expression - Scale Performance Score: 53  Orientation - Raw Score: 9  Orientation - Scale Performance Score: 75  Memory - Raw Score: 5  Memory - Scale Performance Score: 26  Speech Comprehension - Raw Score: 5  Speech Comprehension - Scale Performance Score: 38  Reading Comprehension - Raw Score: 4  Reading Comprehension - Scale Performance Score: 33  Writing - Raw Score: 3  Writing - Scale Performance Score: 43  Attention - Raw Score: 3  Attention - Scale Performance Score: 19  Problem Solving - Raw Score: 7  Problem Solving - Scale Performance Score: 30  SCCAN Total Raw Score: 38  SCCAN Degree of Severity: Severe Impairment    Pt presents with severe deficits across all domains with relative strength noted in orientation. Given pt's hearing loss and missing one hearing aid this may have attributed to difficulty with comprehension of material. Recommend skilled ST to address attention, recall, clear speech strategies and auditory comprehension to aid in increased independence and return to prior environment with support of family.       Plan  Recommend Speech Therapy 30-60 minutes per day 5-7 days per week for 3 weeks for the following treatments:  SLP Speech Language Treatment, SLP Swallowing Dysfunction Treatment, SLP Oral Pharyngeal Evaluation, SLP Video Swallow Evaluation, SLP Cognitive Skill Development, and SLP Group Treatment.    Passport items to be completed:  Express basic needs, understand food/liquid recommendations, consistently follow swallow precautions, manage finances, manage medications, arrive to therapy appointments on time, complete daily memory log entries, solve problems related to safety situations, review education related to hospitalization, complete caregiver training     Goals:  Long term and short term goals have been discussed with patient and they are in agreement.    Speech  Therapy Problems (Active)       Problem: Expression STGs       Dates: Start:  08/25/24         Goal: STG-Within one week, patient will implement clear speech strategies at the single word and short phrase (2-4 words) level to answer open ended questions with MIN cues       Dates: Start:  08/25/24               Problem: Problem Solving STGs       Dates: Start:  08/25/24         Goal: STG-Within one week, patient will follow 2- step directives during functional tasks with no more than 1 repetition.       Dates: Start:  08/25/24               Problem: Speech/Swallowing LTGs       Dates: Start:  08/25/24         Goal: LTG-By discharge, patient will safely swallow Level 5 minced/moist textures and thin liquids with no overt s/sx of pen/asp, MOD I for use of swallow strategies.        Dates: Start:  08/25/24            Goal: LTG-By discharge, patient will express wants and needs effectively to different communication partners, maintain safety and participate socially in functional living environment with SPV       Dates: Start:  08/25/24            Goal: LTG-By discharge, patient will solve basic problems by utilizing compensatory intervention for memory and problem-solving to allow for safe completion of daily activities with SPV in order to prepare for safe d/c home        Dates: Start:  08/25/24               Problem: Swallowing STGs       Dates: Start:  08/25/24         Goal: STG-Within one week, patient will participate in MBSS evaluation to determine safety of PO diet and/or appropriate POC for dysphagia management       Dates: Start:  08/25/24            Goal: STG-Within one week, patient will complete 75 swallows in 60 minutes in conjunction with sEMG for visual biofeedback       Dates: Start:  08/25/24

## 2024-08-25 NOTE — ASSESSMENT & PLAN NOTE
AFVSS and non-toxic appearing  PCT 0.25  UA negative  CXR stable RUL/LLL masses  Follow Temp and WBC  No indication to initiate empiric antimicrobial therapy at this time

## 2024-08-25 NOTE — ASSESSMENT & PLAN NOTE
Has dysarthria and dysphagia  S/P PEG on 8/23/24 by Dr. Meadows  On Eliquis and TF  Ongoing management per Physiatry

## 2024-08-25 NOTE — ASSESSMENT & PLAN NOTE
H/O AICD  Echo 2/6/24 EF 45%, global hypokinesis, mod MR/AI  BNP trending up  CXR increased left pleural effusion  Continue Farxiga, Metoprolol, and Lasix  Aldactone discontinued for hypotension and azotemia  SOB improving back on diuretics

## 2024-08-25 NOTE — CONSULTS
DATE OF SERVICE:  8/25/24    REQUESTING PHYSICIAN:  Tim Maciel MD    CHIEF COMPLAINT / REASON FOR CONSULTATION:   Afib  CHF  Transaminitis  CKD  Leukocytosis    HISTORY OF PRESENT ILLNESS:  This is a 78 y/o male with a PMH significant for hx of lung cancer followed by Dr. Schulz, A-fib on apixaban, HF with reduced EF of 35%, s/p AICD, CKD, hypertension, hyperlipidemia, and diabetes who presented on 8/13/2024 with slurred speech, left facial droop, and left arm weakness.  Initial NIH score of 5.  Workup with CT perfusion found 18 mm penumbra, CTA found focal stenosis/occlusion in the distal right MCA.  Patient had a delayed presentation and was not a candidate for thrombolytics or thrombectomy.  Etiology for stroke is likely thromboembolic in the setting of A-fib and malignancy.  Per Neurology continue Eliquis.  His stay was prolonged as his dysphagia was not improving and failed multiple swallow tests so on 8/23/24 he underwent PEG tube placement.       Because of the patient's weakness and debility, Rehab was consulted, evaluated the patient, and was deemed a good Rehab candidate.  The patient was transferred over to the Rehab facility on 8/24/24.    The patient denies fever, chills, nausea, vomiting, headaches, blurry vision, or chest pain.    REVIEW OF SYSTEMS: All review of systems are negative pre AMA and CMS criteria except for that stated in the HPI.    PAST MEDICAL HISTORY:  Past Medical History:   Diagnosis Date    Acute hypoxemic respiratory failure (HCC)     Acute respiratory failure with hypoxia (HCC) 02/01/2023    AICD (automatic cardioverter/defibrillator) present 12/07/2023    Arrhythmia     hx of a fib    Arthritis 2015    generalized, DDD back    Asthma     inhaler     Backpain 08/06/2018    9/10    Blood clotting disorder (HCC) 02/2023    Pulmonary, right lung    Breath shortness     with exertion    Cancer (HCC) 07/2017    left lung- adenocarcinoma    Cataract     IOL bilateral    Congestive  heart failure ([facility])     cardiologist, [facility], Catherine KAUR    Dental disorder     lower partial, removable bridge    Diabetes 08/06/2018    on no meds at this time, diet controlled    Dysfunction of eustachian tube     Heart valve disease     mild mitral valve prolapse    High cholesterol     Hypertension     Macrocytic anemia     Pneumonia 02/2023    Renal disorder     CKD stage 3    Thrombophilia ([facility])     Type 2 diabetes mellitus with hyperglycemia ([facility]) 03/17/2023    This is a chronic condition. Current medications: Insulin:  Biguanide: took metformin historically will restart metformin xr 750 mg daily. GLP1-RA:   SGLT-2i:    Consider for cardiorenal protection DPP4-I:  TZD:  Pk: Sulfonyluria:   Last A1c: 6/2/23 6.5% Last Microalb/Cr ratio: 6/2/23 <1.2 Fasting sugars: Last diabetic foot exam: 6/19/23 Last retinal eye exam: has upcoming appointment with optome    Volume overload        PAST SURGICAL HISTORY:  Past Surgical History:   Procedure Laterality Date    MI PLACE PERCUT GASTROSTOMY TUBE N/A 8/23/2024    Procedure: EGD, WITH PEG TUBE INSERTION;  Surgeon: Issa Meadows M.D.;  Location: SURGERY SAME DAY [facility];  Service: Gastroenterology    MI BRONCHOSCOPY,DIAGNOSTIC N/A 1/16/2024    Procedure: FIBER OPTIC BRONCHOSCOPY WITH  WASH, BRUSH, BRONCHOALVEOLAR LAVAGE, BIOPSY AND FINE NEEDLE ASPIRATION, ENDOBRONCHIAL ULTRASOUND AND NAVIGATION, ROBOTICS;  Surgeon: Vinayak Carbajal M.D.;  Location: [facility];  Service: Pulmonary Robotic    ENDOBRONCHIAL US ADD-ON N/A 1/16/2024    Procedure: ENDOBRONCHIAL ULTRASOUND (EBUS);  Surgeon: Vinayak Carbajal M.D.;  Location: [facility];  Service: Pulmonary Robotic    MI BRONCHOSCOPY,DIAGNOSTIC N/A 12/28/2023    Procedure: FIBER OPTIC BRONCHOSCOPY WITH BRONCHOALVEOLAR LAVAGE AND FINE NEEDLE ASPIRATION, ENDOBRONCHIAL ULTRASOUND AND NAVIGATION, ROBOTICS;  Surgeon: Miriam Martin M.D.;  Location: [facility];  Service:  "Pulmonary Robotic    ENDOBRONCHIAL US ADD-ON N/A 12/28/2023    Procedure: ENDOBRONCHIAL ULTRASOUND (EBUS);  Surgeon: Miriam Martin M.D.;  Location: [facility];  Service: Pulmonary Robotic    RI BRONCHOSCOPY,DIAGNOSTIC  07/01/2021    Procedure: BRONCHOSCOPY - FIBER OPTIC WITH BRANCHOALVEOLAR LAVAGE BIOPSY, FNA, NAVIGATION;  Surgeon: Vinayak Carbajal M.D.;  Location: [facility];  Service: Pulmonary    ENDOBRONCHIAL US ADD-ON  07/01/2021    Procedure: ENDOBRONCHIAL ULTRASOUND (EBUS).;  Surgeon: Vinayak Carbajal M.D.;  Location: [facility];  Service: Pulmonary    CATARACT PHACO WITH IOL Left 08/21/2018    Procedure: CATARACT PHACO WITH IOL;  Surgeon: Juanito Hager M.D.;  Location: SURGERY SAME DAY [facility];  Service: Ophthalmology    CATARACT PHACO WITH IOL Right 08/07/2018    Procedure: CATARACT PHACO WITH IOL;  Surgeon: Juanito Hager M.D.;  Location: SURGERY SAME DAY [facility];  Service: Ophthalmology    THORACOSCOPY Left 04/19/2018    Procedure: THORACOSCOPY- WEDGE BIOPSY W/FROZEN SECTION;  Surgeon: Cristhian Galeano M.D.;  Location: SURGERY [facility];  Service: Thoracic    EAR MIDDLE EXPLORATION Right 06/12/2015    Procedure: EAR MIDDLE EXPLORATION;  Surgeon: William Brandon M.D.;  Location: SURGERY SAME DAY [facility];  Service:     OSSICULAR RECONSTRUCTION Right 06/12/2015    Procedure: OSSICULAR RECONSTRUCTION CHAIN POSSIBLE;  Surgeon: William Brandon M.D.;  Location: SURGERY SAME DAY [facility];  Service:     KNEE ARTHROPLASTY TOTAL  2005    EAR RECONSTRUCTION Bilateral     ear replacement \"many years ago\"       No Known Allergies    CURRENT MEDICATIONS:    Current Facility-Administered Medications:     insulin lispro    metoprolol tartrate    metoprolol tartrate    Respiratory Therapy Consult    Pharmacy Consult Request    hydrALAZINE    acetaminophen    senna-docusate **AND** polyethylene glycol/lytes    docusate sodium    magnesium hydroxide    " omeprazole    carboxymethylcellulose    benzocaine-menthol    mag hydrox-al hydrox-simeth    ondansetron **OR** ondansetron    traZODone    sodium chloride    oxyCODONE immediate-release **OR** oxyCODONE immediate-release    traMADol    midazolam    apixaban    dapagliflozin propanediol    ezetimibe    melatonin    spironolactone    Non Formulary Request    vitamin D3    cyclobenzaprine    diclofenac sodium    [DISCONTINUED] insulin regular **AND** POC blood glucose manual result **AND** NOTIFY MD and PharmD **AND** Administer 20 grams of glucose (approximately 8 ounces of fruit juice) every 15 minutes PRN FSBG less than 70 mg/dL **AND** dextrose bolus    Social History     Socioeconomic History    Marital status:     Highest education level: 12th grade   Tobacco Use    Smoking status: Former     Current packs/day: 0.00     Average packs/day: 0.3 packs/day for 40.0 years (10.0 ttl pk-yrs)     Types: Cigarettes     Start date: 1965     Quit date: 2005     Years since quittin.6    Smokeless tobacco: Never   Vaping Use    Vaping status: Never Used   Substance and Sexual Activity    Alcohol use: Not Currently     Comment: Quit 2/3/2017    Drug use: No     Social Determinants of Health     Financial Resource Strain: Low Risk  (10/30/2023)    Overall Financial Resource Strain (CARDIA)     Difficulty of Paying Living Expenses: Not very hard   Food Insecurity: No Food Insecurity (10/30/2023)    Hunger Vital Sign     Worried About Running Out of Food in the Last Year: Never true     Ran Out of Food in the Last Year: Never true   Transportation Needs: No Transportation Needs (10/30/2023)    PRAPARE - Transportation     Lack of Transportation (Medical): No     Lack of Transportation (Non-Medical): No   Physical Activity: Inactive (10/30/2023)    Exercise Vital Sign     Days of Exercise per Week: 0 days     Minutes of Exercise per Session: 0 min   Stress: Stress Concern Present (10/20/2023)    [place]  [place] [employer] - Occupational Stress Questionnaire     Feeling of Stress : To some extent   Social Connections: Moderately Isolated (10/30/2023)    Social Connection and Isolation Panel [NHANES]     Frequency of Communication with Friends and Family: More than three times a week     Frequency of Social Gatherings with Friends and Family: Once a week     Attends Hinduism Services: Never     Active Member of Clubs or Organizations: No     Attends Club or Organization Meetings: Never     Marital Status:    Housing Stability: Low Risk  (10/30/2023)    Housing Stability Vital Sign     Unable to Pay for Housing in the Last Year: No     Number of Places Lived in the Last Year: 1     Unstable Housing in the Last Year: No       FAMILY HISTORY:  was reviewed and is not pertinent to this consultation.    PHYSICAL EXAMINATION:  VITAL SIGNS:  Temp is 98.5, blood pressure is 107/58, heart rate is , respiratory rate is 16.  GENERAL:  Patient was lying in bed in no distress.  HEENT:  Pupils were equal, round and reactive to light and accomodation.  Oral mucosa was pink and moist.  NECK:  Soft.  Supple.  No JVD.  HEART:  Regular rate and rhythm.  Normal S1 and S2.  No murmurs were appreciated.  LUNGS:  Are clear to auscultation bilaterally.  ABDOMEN:  Soft, non tender, non distended.  Bowels sound were positive in all four quadrants.  PEG (+).  EXTREMITIES:  No clubbing, cyanosis.  There was no lower extremity edema.  NEUROLOGIC:  Cranial nerves two through twelve were grossly intact.    LABS:  Lab Results   Component Value Date/Time    SODIUM 146 (H) 08/25/2024 05:26 AM    POTASSIUM 4.3 08/25/2024 05:26 AM    CHLORIDE 113 (H) 08/25/2024 05:26 AM    CO2 21 08/25/2024 05:26 AM    GLUCOSE 158 (H) 08/25/2024 05:26 AM    BUN 44 (H) 08/25/2024 05:26 AM    CREATININE 1.27 08/25/2024 05:26 AM      Lab Results   Component Value Date/Time    WBC 13.6 (H) 08/25/2024 05:26 AM    RBC 4.49 (L) 08/25/2024 05:26 AM     HEMOGLOBIN 14.0 08/25/2024 05:26 AM    HEMATOCRIT 42.7 08/25/2024 05:26 AM    MCV 95.1 08/25/2024 05:26 AM    MCH 31.2 08/25/2024 05:26 AM    MCHC 32.8 08/25/2024 05:26 AM    MPV 10.7 08/25/2024 05:26 AM    NEUTSPOLYS 75.60 (H) 08/25/2024 05:26 AM    LYMPHOCYTES 15.30 (L) 08/25/2024 05:26 AM    MONOCYTES 6.70 08/25/2024 05:26 AM    EOSINOPHILS 1.30 08/25/2024 05:26 AM    EOSINOPHILS 14 03/25/2023 01:40 PM    BASOPHILS 0.30 08/25/2024 05:26 AM      Lab Results   Component Value Date/Time    PROTHROMBTM 16.3 (H) 08/13/2024 08:07 AM    INR 1.29 (H) 08/13/2024 08:07 AM        A-fib ([facility])  HR:   Exam 8/25 showed NSR  Cont Lopressor --> will increase dose a little  Cont Aldactone  Cont Eliquis  Note: has an AICD  Note: has a Zio patch  Cont to monitor    Chronic systolic heart failure (HCC)  Echo: EF 35%  Cont Lopressor  Cont Aldactone  Cont Farxiga  Note: has an AICD    Leukocytosis  WBC's slowly normalizing  Has been afebrile  Likely reactionary with recent PEG placement  Monitor for now    Primary lung adenocarcinoma (HCC)  Undergoing treatment (got interrupted 2nd to this last adm)    Right middle cerebral artery stroke (HCC)  S/P CVA  Was not a candidate for thrombolytics or thrombectomy  Etiology for stroke is thought to be 2nd to thromboembolic in the setting of A-fib and malignancy  Has dysarthria  Has PEG with TF's    Stage 3a chronic kidney disease  Bun a little more elevated than baseline  Is on TF's with free water --> will increase dose  Cont to monitor    Transaminitis  ALT: 45 (8/20) --> 90 (8/25)  AST: 48 (8/20 --> 84 (8/25)  ? 2nd to new med of Lipitor -- will d/c  Cont to monitor    Type 2 diabetes mellitus with stage 3a chronic kidney disease, without long-term current use of insulin ([facility])  Hba1c: 6.0 (8/13)  BS: 136-159  Cont Farxiga  Note: home meds include Farxiga and Metformin  mg daily  Will monitor another day to see trend before adjusting meds  Cont to monitor      This case has  been discussed with the attending Physiatrist.    Thank you for the consultation.  Will follow the patient with you.

## 2024-08-25 NOTE — CARE PLAN
"  Problem: Fall Risk - Rehab  Goal: Patient will remain free from falls  8/25/2024 0012 by Dorota Khan R.N.  Outcome: Progressing  Note: Yandy Minaya Fall risk Assessment Score: 18    High fall risk Interventions   - Alarming seatbelt  - Wander guard  - Bed and strip alarm   - Yellow sign by the door   - Yellow wrist band \"Fall risk\"  - Room near to the nurse station  - Do not leave patient unattended in the bathroom  - Fall risk education provided      Problem: Pain - Standard  Goal: Alleviation of pain or a reduction in pain to the patient’s comfort goal  Outcome: Progressing  Note: Assessed for pain and discomfort , pain under control, needs anticipated and attended.     Problem: Risk for Aspiration  Goal: Patient's risk for aspiration will be absent or decrease  Outcome: Progressing  Note: Aspiration precautions observed , head of bed elevated during med pass, pt with PEG tube , meds crushed given via PEG, tolerated .   The patient is Watcher - Medium risk of patient condition declining or worsening    Shift Goals  Clinical Goals: Safety, Pain control  Patient Goals: Safety  Family Goals: Education    Progress made toward(s) clinical / shift goals:  Pt free from fall and injury.    "

## 2024-08-25 NOTE — WOUND TEAM
[facility]  Inpatient Services  Wound and Skin Care Brief Evaluation    Admission Date: 8/24/2024     Last order of IP CONSULT TO WOUND CARE was found on 8/25/2024 from Hospital Encounter on 8/24/2024     HPI, PMH, SH: Reviewed    No chief complaint on file.    Diagnosis: Right sided cerebral hemisphere cerebrovascular accident (CVA) (HCC) [I63.9]    Unit where seen by Wound Team: RH09/02     Wound consult placed regarding bilateral heels. Chart and images reviewed. This discussed with bedside RNRomero. This clinician in to assess patient. Patient pleasant and agreeable. Non-selectively debrided with Moist warm washcloth.     No pressure injuries or advanced wound care needs identified. Bilateral heels are normal coloration for skin tone, intact and blanching. Wound consult completed. No further follow up unless indicated and consulted.          PREVENTATIVE INTERVENTIONS:    Q shift Evan - performed per nursing policy  Q shift pressure point assessments - performed per nursing policy    Surface/Positioning  Standard/trauma mattress - Currently in Place    Offloading/Redistribution  Float Heels off Bed with Pillows - Currently in Place           Respiratory    N/A    Containment/Moisture Prevention    N/A    Mobilization      Up to chair, Ambulate , and therapies

## 2024-08-26 ENCOUNTER — APPOINTMENT (OUTPATIENT)
Dept: PHYSICAL THERAPY | Facility: REHABILITATION | Age: 79
DRG: 057 | End: 2024-08-26
Attending: PHYSICAL MEDICINE & REHABILITATION
Payer: MEDICARE

## 2024-08-26 ENCOUNTER — APPOINTMENT (OUTPATIENT)
Dept: RADIOLOGY | Facility: REHABILITATION | Age: 79
DRG: 057 | End: 2024-08-26
Attending: HOSPITALIST
Payer: MEDICARE

## 2024-08-26 ENCOUNTER — APPOINTMENT (OUTPATIENT)
Dept: SPEECH THERAPY | Facility: REHABILITATION | Age: 79
DRG: 057 | End: 2024-08-26
Attending: PHYSICAL MEDICINE & REHABILITATION
Payer: MEDICARE

## 2024-08-26 ENCOUNTER — RESEARCH ENCOUNTER (OUTPATIENT)
Dept: CARDIOLOGY | Facility: MEDICAL CENTER | Age: 79
End: 2024-08-26
Payer: MEDICARE

## 2024-08-26 ENCOUNTER — APPOINTMENT (OUTPATIENT)
Dept: OCCUPATIONAL THERAPY | Facility: REHABILITATION | Age: 79
DRG: 057 | End: 2024-08-26
Attending: PHYSICAL MEDICINE & REHABILITATION
Payer: MEDICARE

## 2024-08-26 PROBLEM — R79.89 AZOTEMIA: Status: ACTIVE | Noted: 2021-06-28

## 2024-08-26 PROBLEM — D69.6 THROMBOCYTOPENIA (HCC): Status: ACTIVE | Noted: 2024-08-26

## 2024-08-26 LAB
GLUCOSE BLD STRIP.AUTO-MCNC: 149 MG/DL (ref 65–99)
GLUCOSE BLD STRIP.AUTO-MCNC: 174 MG/DL (ref 65–99)
GLUCOSE BLD STRIP.AUTO-MCNC: 203 MG/DL (ref 65–99)
GLUCOSE BLD STRIP.AUTO-MCNC: 222 MG/DL (ref 65–99)

## 2024-08-26 PROCEDURE — 82962 GLUCOSE BLOOD TEST: CPT | Mod: 91

## 2024-08-26 PROCEDURE — 97112 NEUROMUSCULAR REEDUCATION: CPT

## 2024-08-26 PROCEDURE — 770010 HCHG ROOM/CARE - REHAB SEMI PRIVAT*

## 2024-08-26 PROCEDURE — 700102 HCHG RX REV CODE 250 W/ 637 OVERRIDE(OP): Performed by: PHYSICAL MEDICINE & REHABILITATION

## 2024-08-26 PROCEDURE — A9270 NON-COVERED ITEM OR SERVICE: HCPCS | Performed by: HOSPITALIST

## 2024-08-26 PROCEDURE — 71045 X-RAY EXAM CHEST 1 VIEW: CPT

## 2024-08-26 PROCEDURE — A9270 NON-COVERED ITEM OR SERVICE: HCPCS | Performed by: PHYSICAL MEDICINE & REHABILITATION

## 2024-08-26 PROCEDURE — 700102 HCHG RX REV CODE 250 W/ 637 OVERRIDE(OP): Performed by: HOSPITALIST

## 2024-08-26 PROCEDURE — 97110 THERAPEUTIC EXERCISES: CPT

## 2024-08-26 PROCEDURE — 97535 SELF CARE MNGMENT TRAINING: CPT

## 2024-08-26 PROCEDURE — 97116 GAIT TRAINING THERAPY: CPT

## 2024-08-26 PROCEDURE — 94760 N-INVAS EAR/PLS OXIMETRY 1: CPT

## 2024-08-26 PROCEDURE — 99232 SBSQ HOSP IP/OBS MODERATE 35: CPT | Performed by: HOSPITALIST

## 2024-08-26 PROCEDURE — 92526 ORAL FUNCTION THERAPY: CPT

## 2024-08-26 PROCEDURE — 99232 SBSQ HOSP IP/OBS MODERATE 35: CPT | Performed by: PHYSICAL MEDICINE & REHABILITATION

## 2024-08-26 RX ADMIN — DAPAGLIFLOZIN 10 MG: 10 TABLET, FILM COATED ORAL at 08:30

## 2024-08-26 RX ADMIN — SENNOSIDES AND DOCUSATE SODIUM 2 TABLET: 50; 8.6 TABLET ORAL at 21:33

## 2024-08-26 RX ADMIN — OMEPRAZOLE 20 MG: 20 CAPSULE, DELAYED RELEASE ORAL at 08:30

## 2024-08-26 RX ADMIN — INSULIN LISPRO 4 UNITS: 100 INJECTION, SOLUTION INTRAVENOUS; SUBCUTANEOUS at 17:06

## 2024-08-26 RX ADMIN — APIXABAN 5 MG: 5 TABLET, FILM COATED ORAL at 21:33

## 2024-08-26 RX ADMIN — EZETIMIBE 10 MG: 10 TABLET ORAL at 08:29

## 2024-08-26 RX ADMIN — APIXABAN 5 MG: 5 TABLET, FILM COATED ORAL at 08:30

## 2024-08-26 RX ADMIN — SPIRONOLACTONE 25 MG: 25 TABLET ORAL at 08:30

## 2024-08-26 RX ADMIN — Medication 2000 UNITS: at 08:30

## 2024-08-26 RX ADMIN — INSULIN LISPRO 4 UNITS: 100 INJECTION, SOLUTION INTRAVENOUS; SUBCUTANEOUS at 21:48

## 2024-08-26 RX ADMIN — INSULIN LISPRO 2 UNITS: 100 INJECTION, SOLUTION INTRAVENOUS; SUBCUTANEOUS at 11:19

## 2024-08-26 RX ADMIN — SENNOSIDES AND DOCUSATE SODIUM 2 TABLET: 50; 8.6 TABLET ORAL at 08:29

## 2024-08-26 RX ADMIN — METOPROLOL TARTRATE 62.5 MG: 25 TABLET, FILM COATED ORAL at 08:29

## 2024-08-26 RX ADMIN — Medication 6 MG: at 21:33

## 2024-08-26 RX ADMIN — METOPROLOL TARTRATE 62.5 MG: 25 TABLET, FILM COATED ORAL at 21:33

## 2024-08-26 ASSESSMENT — PATIENT HEALTH QUESTIONNAIRE - PHQ9
1. LITTLE INTEREST OR PLEASURE IN DOING THINGS: NOT AT ALL
1. LITTLE INTEREST OR PLEASURE IN DOING THINGS: NOT AT ALL
SUM OF ALL RESPONSES TO PHQ9 QUESTIONS 1 AND 2: 0
2. FEELING DOWN, DEPRESSED, IRRITABLE, OR HOPELESS: NOT AT ALL
SUM OF ALL RESPONSES TO PHQ9 QUESTIONS 1 AND 2: 0
2. FEELING DOWN, DEPRESSED, IRRITABLE, OR HOPELESS: NOT AT ALL

## 2024-08-26 ASSESSMENT — ENCOUNTER SYMPTOMS
POLYDIPSIA: 0
PALPITATIONS: 0
COUGH: 0
CHILLS: 0
VOMITING: 0
SHORTNESS OF BREATH: 0
NAUSEA: 0
ABDOMINAL PAIN: 0
BRUISES/BLEEDS EASILY: 0
FEVER: 0
EYES NEGATIVE: 1

## 2024-08-26 ASSESSMENT — GAIT ASSESSMENTS
ASSISTIVE DEVICE: FRONT WHEEL WALKER
DEVIATION: OTHER (COMMENT)
DISTANCE (FEET): 110
GAIT LEVEL OF ASSIST: CONTACT GUARD ASSIST

## 2024-08-26 ASSESSMENT — PAIN DESCRIPTION - PAIN TYPE
TYPE: ACUTE PAIN
TYPE: ACUTE PAIN

## 2024-08-26 ASSESSMENT — ACTIVITIES OF DAILY LIVING (ADL)
TOILET_TRANSFER_DESCRIPTION: GRAB BAR;INCREASED TIME;SET-UP OF EQUIPMENT;SUPERVISION FOR SAFETY;VERBAL CUEING
BED_CHAIR_WHEELCHAIR_TRANSFER_DESCRIPTION: ADAPTIVE EQUIPMENT;INCREASED TIME;SET-UP OF EQUIPMENT;SUPERVISION FOR SAFETY;VERBAL CUEING

## 2024-08-26 NOTE — CARE PLAN
The patient is Stable - Low risk of patient condition declining or worsening    Shift Goals  Clinical Goals: nutritional and fluid intake    Patient Goals: Safety  Family Goals: Education    Progress made toward(s) clinical / shift goals:    Problem: Nutrition  Goal: Patient's nutritional and fluid intake will be adequate or improve  Outcome: Progressing     Patient's PEG tube flow/patency and residuals checked, head of bed remained elevated at 30 degrees while feedings. Tolerated formula Glucerna 1.2. No s/s of aspiration noted, no cough no SOB, lungs sound clear.

## 2024-08-26 NOTE — THERAPY
Speech Language Pathology  Daily Treatment     Patient Name: Pito Black  Age:  79 y.o., Sex:  male  Medical Record #: [id number]  Today's Date: 8/26/2024     Precautions  Precautions: Fall Risk, PEG Tube, Swallow Precautions  Comments: [place], dysarthria, lower partial dentures, bilateral HAs    Subjective    Pt pleasant and agreeable to therapy, pt's spouse and daughter present towards end of session. Pt and family both voicing primary goal of ST to get pt to eating again.      Objective       08/26/24 1104   Treatment Charges   Charges Yes   SLP Swallowing Dysfunction Treatment Swallowing Dysfunction Treatment   SLP Total Time Spent   SLP Individual Total Time Spent (Mins) 60   Dysphagia    Diet / Liquid Recommendation NPO;Pre-Feeding Trials with SLP Only   Medication Administration  Via Gastric Tube   Compensatory Strategies Strict 1:1 Feeding   Nutritional Liquid Intake Rating Scale Nothing by mouth   Nutritional Food Intake Rating Scale Nothing by mouth   Therapy Interventions Dysphagia Therapy By Speech Language Pathologist;MBSS Evaluation   Skilled Intervention Compensatory Strategies;Tactile Cueing;Gestural Cueing;Verbal Cueing   Interdisciplinary Plan of Care Collaboration   IDT Collaboration with  Family / Caregiver;Nursing;Respiratory Therapist   Patient Position at End of Therapy In Bed;Call Light within Reach;Family / Friend in Room   Collaboration Comments spouse and daughter present towards end of session. collaboration with nursing and RT re: O2         Assessment    Oral care completed set up at the sink using standard toothbrush and toothpaste. Pt demonstrates difficulty swishing and spitting to clear mouth and would benefit from use of suction to reduce likelihood of aspirating when attempting to rinse mouth.   Education provided to patient and family on need for oral care prior to consumption of ice chips with disposable suction toothbrushes, both confirmed understanding.   Trials of ice chips  (1-2) with emphasis on effortful swallow. Pt attempts to tuck chin in order to recruit accessory muscles with an effortful swallow. Cues to neutral head position. Pt demonstrating 1 instances of strong cough during trials, vocal quality remaining clear, however, given pt's hx of silent aspiration, he is at risk with oral intake at this time. Pt with WOB noted as trials progressed, unable to get a pulse ox reading with finger probe. Using ear probe in pt's room, SPO2 at 83, SLP informed nursing and RT, pt placed on 2 L O2 via nasal canula on wall unit. RT to assess. Per nursing pt's lungs clear.     Strengths: Motivated for self care and independence, Pleasant and cooperative, Supportive family, Willingly participates in therapeutic activities  Barriers: Aspiration risk, Decreased endurance, Difficulty following instructions, Hearing impairment, Impaired carryover of learning, Impaired insight/denial of deficits, Impaired functional cognition, Tube feeding    Plan    Initiate sEMG in conjunction with effortful swallows for use of visual biofeedback.  Trials of ice chips following thorough oral care    Passport items to be completed:  Express basic needs, understand food/liquid recommendations, consistently follow swallow precautions, manage finances, manage medications, arrive to therapy appointments on time, complete daily memory log entries, solve problems related to safety situations, review education related to hospitalization, complete caregiver training     Speech Therapy Problems (Active)       Problem: Expression STGs       Dates: Start:  08/25/24         Goal: STG-Within one week, patient will implement clear speech strategies at the single word and short phrase (2-4 words) level to answer open ended questions with MIN cues       Dates: Start:  08/25/24               Problem: Problem Solving STGs       Dates: Start:  08/25/24         Goal: STG-Within one week, patient will follow 2- step directives during  functional tasks with no more than 1 repetition.       Dates: Start:  08/25/24               Problem: Speech/Swallowing LTGs       Dates: Start:  08/25/24         Goal: LTG-By discharge, patient will safely swallow Level 5 minced/moist textures and thin liquids with no overt s/sx of pen/asp, MOD I for use of swallow strategies.        Dates: Start:  08/25/24            Goal: LTG-By discharge, patient will express wants and needs effectively to different communication partners, maintain safety and participate socially in functional living environment with SPV       Dates: Start:  08/25/24            Goal: LTG-By discharge, patient will solve basic problems by utilizing compensatory intervention for memory and problem-solving to allow for safe completion of daily activities with SPV in order to prepare for safe d/c home        Dates: Start:  08/25/24               Problem: Swallowing STGs       Dates: Start:  08/25/24         Goal: STG-Within one week, patient will participate in MBSS evaluation to determine safety of PO diet and/or appropriate POC for dysphagia management       Dates: Start:  08/25/24            Goal: STG-Within one week, patient will complete 75 swallows in 60 minutes in conjunction with sEMG for visual biofeedback       Dates: Start:  08/25/24

## 2024-08-26 NOTE — THERAPY
Physical Therapy   Daily Treatment     Patient Name: Pito Black  Age:  79 y.o., Sex:  male  Medical Record #: [id number]  Today's Date: 8/26/2024     Precautions  Precautions: (P) Fall Risk, PEG Tube, Swallow Precautions  Comments: (P) [place], dysarthria, lower partial dentures, bilateral HAs    Subjective    Pt in room and agreeable to therapy. Pt is dysarthric and chose not to speak for most of the session, even with prompting. Able to communicate that he needed to use the restroom.      Objective       08/26/24 1401   PT Charge Group   PT Gait Training (Units) 2   PT Neuromuscular Re-Education / Balance (Units) 2   PT Total Time Spent   PT Individual Total Time Spent (Mins) 60   Precautions   Precautions Fall Risk;PEG Tube;Swallow Precautions   Comments [place], dysarthria, lower partial dentures, bilateral HAs   Vitals   Pulse 92   Patient BP Position Sitting   Blood Pressure  114/72   Pulse Oximetry 96 %   O2 (LPM) 0   O2 Delivery Device Room air w/o2 available   Vitals Comments After ambulation   Cognition    Speech/ Communication Hard of Hearing;Dysarthric;Slurred   Level of Consciousness Alert   Ability To Follow Commands 1 Step   Gait Functional Level of Assist    Gait Level Of Assist Contact Guard Assist   Assistive Device Front Wheel Walker   Distance (Feet) 110   # of Times Distance was Traveled 2   Deviation Other (Comment)  (Pt tends to walk on left side of walker, requires frequent cueing to maintain center of walker and to not let L foot go outside boundaries of walker. L hand placement assist.)   Transfer Functional Level of Assist   Bed, Chair, Wheelchair Transfer Minimal Assist   Bed Chair Wheelchair Transfer Description Supervision for safety;Verbal cueing;Set-up of equipment;Initial preparation for task;Adaptive equipment  (stand step wc <> EOM with FWW. Shayna for sequencing and hand placement. CGA for rise from chair.)   Bed Mobility    Sit to Supine Contact Guard Assist   Sit to Stand Minimal  Assist   Neuro-Muscular Treatments   Neuro-Muscular Treatments Biofeedback;Tactile Cuing;Other (See Comments)   Comments walker proximity, sequencing for transfers   Interdisciplinary Plan of Care Collaboration   IDT Collaboration with  Nursing   Patient Position at End of Therapy In Bed;Call Light within Reach;Tray Table within Reach;Bed Alarm On   Collaboration Comments Nursing to disconnect PEG tube for PT     Session was focused on L sided inattention and proximity to walker during gait.   - Figure 8 through cones 15' x 4   - 110' ambulation with turns in both directions     Toileting completed CGA with use of grab bars. SBA for hygiene and pants management     Assessment  Pt has difficulty attenuating to L side of body and has difficulty correcting his proximity to the center of the walker. Often times, especially around R sided turns, will allow his LLE to drag outside of the frame of the walker. PT gave mutlimodal cueing to bring body into center of walker, but often times pt would correct by moving the walker in order to center his body. Pt also had numerous collisions with obstacles on his L side and required cueing to attenuate to L. Assist for L hand placement on walker before rising from chair.     Strengths: Able to follow instructions, Supportive family, Willingly participates in therapeutic activities  Barriers: Aphasia expressive, Hearing impairment, Generalized weakness, Impaired activity tolerance, Impaired balance, Impulsive    Plan    Neglect assessment   BITS training for visual tracking and forced LUE use   Balance assessment/treatment   Obstacle negotiation   Collaborate with SLP for useful cueing for speech    DME       Passport items to be completed:  Get in/out of bed safely, in/out of a vehicle, safely use mobility device, walk or wheel around home/community, navigate up and down stairs, show how to get up/down from the ground, ensure home is accessible, demonstrate HEP, complete caregiver  training    Physical Therapy Problems (Active)       Problem: Balance       Dates: Start:  08/25/24         Goal: STG-Within one week, patient will perform 5x STS w/c to FWW in 45sec with CGA       Dates: Start:  08/25/24               Problem: Mobility       Dates: Start:  08/25/24         Goal: STG-Within one week, patient will ambulate household distance 50ft x 4 with FWW SBA       Dates: Start:  08/25/24            Goal: STG-Within one week, patient will ascend and descend four to six stairs right rail min assist       Dates: Start:  08/25/24               Problem: Mobility Transfers       Dates: Start:  08/25/24         Goal: STG-Within one week, patient will transfer bed to chair SBA SPT       Dates: Start:  08/25/24               Problem: PT-Long Term Goals       Dates: Start:  08/25/24         Goal: LTG-By discharge, patient will ambulate 100ft x 4 with FWW mod I indoors       Dates: Start:  08/25/24            Goal: LTG-By discharge, patient will transfer one surface to another mod I SPT safely and consistently       Dates: Start:  08/25/24            Goal: LTG-By discharge, patient will perform home exercise program seated/standing for LE strengthening to be done 3x/day in safe, independent home environment       Dates: Start:  08/25/24            Goal: LTG-By discharge, patient will ambulate up/down 4-6 stairs with right rail with SPV       Dates: Start:  08/25/24            Goal: LTG-By discharge, patient will perform 5x STS in 30sec with SPV       Dates: Start:  08/25/24

## 2024-08-26 NOTE — PROGRESS NOTES
Hospital Medicine Daily Progress Note      Chief Complaint  Atrial Fibrillation  Congestive Heart Failure  Transaminitis  Leukocytosis    Interval Problem Update  No chest pain, shortness of breath, or palpitations.    Review of Systems  Review of Systems   Constitutional:  Negative for chills and fever.   HENT: Negative.     Eyes: Negative.    Respiratory:  Negative for cough and shortness of breath.    Cardiovascular:  Negative for chest pain and palpitations.   Gastrointestinal:  Negative for abdominal pain, nausea and vomiting.   Musculoskeletal:         Wound pain   Skin:  Negative for itching and rash.   Endo/Heme/Allergies:  Negative for polydipsia. Does not bruise/bleed easily.        Physical Exam  Temp:  [36.3 °C (97.3 °F)-37.1 °C (98.8 °F)] 36.5 °C (97.7 °F)  Pulse:  [] 90  Resp:  [16-18] 18  BP: ()/(60-80) 98/60  SpO2:  [93 %-96 %] 94 %    Physical Exam  Vitals reviewed.   Constitutional:       General: He is not in acute distress.     Appearance: Normal appearance. He is not ill-appearing.   HENT:      Head: Normocephalic and atraumatic.      Right Ear: External ear normal.      Left Ear: External ear normal.      Nose: Nose normal.      Mouth/Throat:      Pharynx: Oropharynx is clear.   Eyes:      General:         Right eye: No discharge.         Left eye: No discharge.      Extraocular Movements: Extraocular movements intact.      Conjunctiva/sclera: Conjunctivae normal.   Cardiovascular:      Rate and Rhythm: Normal rate and regular rhythm.   Pulmonary:      Effort: No respiratory distress.      Breath sounds: No wheezing.      Comments: Decreased BS  Abdominal:      General: Bowel sounds are normal. There is no distension.      Palpations: Abdomen is soft.      Tenderness: There is no abdominal tenderness. There is no guarding or rebound.      Comments: +TTP at PEG site   Musculoskeletal:      Cervical back: Normal range of motion and neck supple.      Right lower leg: No edema.       Left lower leg: No edema.   Skin:     General: Skin is warm and dry.   Neurological:      Mental Status: He is alert.      Comments: Awake and alert         Fluids    Intake/Output Summary (Last 24 hours) at 8/26/2024 1413  Last data filed at 8/26/2024 1400  Gross per 24 hour   Intake 2325 ml   Output 400 ml   Net 1925 ml       Laboratory  Recent Labs     08/25/24  0526   WBC 13.6*   RBC 4.49*   HEMOGLOBIN 14.0   HEMATOCRIT 42.7   MCV 95.1   MCH 31.2   MCHC 32.8   RDW 48.5   PLATELETCT 94*   MPV 10.7     Recent Labs     08/25/24  0526   SODIUM 146*   POTASSIUM 4.3   CHLORIDE 113*   CO2 21   GLUCOSE 158*   BUN 44*   CREATININE 1.27   CALCIUM 8.8                   Assessment/Plan  * Right middle cerebral artery stroke (HCC)- (present on admission)  Assessment & Plan  Has dysarthria and dysphagia  S/P PEG on 8/23/24 by Dr. Meadows  On [facility]  Ongoing management per Physiatry    Thrombocytopenia (HCC)  Assessment & Plan  Follow PLT  Check F/U labs in AM     Leukocytosis  Assessment & Plan  Check PCT, UA, and CXR  Follow Temp and WBC  Check F/U labs in AM    Transaminitis  Assessment & Plan  Lipitor discontinued  Follow LFT's  Check F/U labs in AM     Type 2 diabetes mellitus with stage 3a chronic kidney disease, without long-term current use of insulin (HCC)- (present on admission)  Assessment & Plan  HbA1c 6.4  Observe serum glucose trends on Farxiga and SSI  Outpt meds include Farxiga and Metformin  mg qd    Chronic systolic heart failure (HCC)- (present on admission)  Assessment & Plan  H/O AICD  Echo 2/6/24 EF 45%, global hypokinesis, mod MR/AI  On Farxiga, Metoprolol, and Aldactone  Check BNP and CXR    Primary lung adenocarcinoma (HCC)- (present on admission)  Assessment & Plan  S/P resection  Outpt F/U    Hyperlipidemia associated with type 2 diabetes mellitus (HCC)- (present on admission)  Assessment & Plan  On Zetia    Azotemia- (present on admission)  Assessment & Plan  Also has elevated NaCl  Will  discontinue Aldactone  Follow renal function  Check F/U labs in AM     A-fib (HCC)  Assessment & Plan  Also has ZioPatch  On Metoprolol for rate control  Anticoagulated on Eliquis    Hypertension associated with type 2 diabetes mellitus (HCC)- (present on admission)  Assessment & Plan  On Metoprolol and Aldactone  Will discontinue Aldactone for hypotensive trends    DNR

## 2024-08-26 NOTE — PROGRESS NOTES
"  Physical Medicine & Rehabilitation Progress Note    Encounter Date: 2024    Chief Complaint: Decreased mobility, dysarthria    Interval Events (Subjective):  Patient sitting up in room. He has a little bleeding around PEG site. Discussed will monitor. Otherwise gives a thumbs up for sleeping well. He is able to say \"I appreciate you.\"     Objective:  VITAL SIGNS: BP 98/60   Pulse 90   Temp 36.5 °C (97.7 °F) (Axillary)   Resp 18   Ht 1.753 m (5' 9\")   Wt 60 kg (132 lb 4.4 oz)   SpO2 94%   BMI 19.53 kg/m²   Gen: NAD  Psych: Mood and affect appropriate  CV: RRR, 0 edema  Resp: CTAB, no upper airway sounds  Abd: NTND  Neuro: Dysarthric, follows commands  Unchanged from 24    Laboratory Values:  Recent Results (from the past 72 hour(s))   EKG    Collection Time: 24  6:04 PM   Result Value Ref Range    Report       [facility]    Test Date:  2024  Pt Name:    DOROTHEA SALINAS                  Department: [facility]  MRN:        [id number]                      Room:       [facility]  Gender:     Male                         Technician: CARMELO  :        1945                   Requested By:ANAHY GIBBONS  Order #:    [id number]                    Reading MD: Honorio Jang MD    Measurements  Intervals                                Axis  Rate:       108                          P:          43  HI:         140                          QRS:        26  QRSD:       90                           T:          78  QT:         342  QTc:        459    Interpretive Statements  Sinus tachycardia  Minimal ST depression, inferior leads    Electronically Signed On 2024 18:15:55 PDT by Honorio Jang MD     POCT glucose device results    Collection Time: 24  4:51 PM   Result Value Ref Range    POC Glucose, Blood 136 (H) 65 - 99 mg/dL   POCT glucose device results    Collection Time: 24 11:41 PM   Result Value Ref Range    POC Glucose, Blood 140 (H) 65 - 99 mg/dL   CBC with Differential    " Collection Time: 08/25/24  5:26 AM   Result Value Ref Range    WBC 13.6 (H) 4.8 - 10.8 K/uL    RBC 4.49 (L) 4.70 - 6.10 M/uL    Hemoglobin 14.0 14.0 - 18.0 g/dL    Hematocrit 42.7 42.0 - 52.0 %    MCV 95.1 81.4 - 97.8 fL    MCH 31.2 27.0 - 33.0 pg    MCHC 32.8 32.3 - 36.5 g/dL    RDW 48.5 35.9 - 50.0 fL    Platelet Count 94 (L) 164 - 446 K/uL    MPV 10.7 9.0 - 12.9 fL    Neutrophils-Polys 75.60 (H) 44.00 - 72.00 %    Lymphocytes 15.30 (L) 22.00 - 41.00 %    Monocytes 6.70 0.00 - 13.40 %    Eosinophils 1.30 0.00 - 6.90 %    Basophils 0.30 0.00 - 1.80 %    Immature Granulocytes 0.80 0.00 - 0.90 %    Nucleated RBC 0.00 0.00 - 0.20 /100 WBC    Neutrophils (Absolute) 10.30 (H) 1.82 - 7.42 K/uL    Lymphs (Absolute) 2.08 1.00 - 4.80 K/uL    Monos (Absolute) 0.91 (H) 0.00 - 0.85 K/uL    Eos (Absolute) 0.18 0.00 - 0.51 K/uL    Baso (Absolute) 0.04 0.00 - 0.12 K/uL    Immature Granulocytes (abs) 0.11 0.00 - 0.11 K/uL    NRBC (Absolute) 0.00 K/uL   Comp Metabolic Panel (CMP)    Collection Time: 08/25/24  5:26 AM   Result Value Ref Range    Sodium 146 (H) 135 - 145 mmol/L    Potassium 4.3 3.6 - 5.5 mmol/L    Chloride 113 (H) 96 - 112 mmol/L    Co2 21 20 - 33 mmol/L    Anion Gap 12.0 7.0 - 16.0    Glucose 158 (H) 65 - 99 mg/dL    Bun 44 (H) 8 - 22 mg/dL    Creatinine 1.27 0.50 - 1.40 mg/dL    Calcium 8.8 8.5 - 10.5 mg/dL    Correct Calcium 9.6 8.5 - 10.5 mg/dL    AST(SGOT) 90 (H) 12 - 45 U/L    ALT(SGPT) 84 (H) 2 - 50 U/L    Alkaline Phosphatase 86 30 - 99 U/L    Total Bilirubin 0.7 0.1 - 1.5 mg/dL    Albumin 3.0 (L) 3.2 - 4.9 g/dL    Total Protein 7.1 6.0 - 8.2 g/dL    Globulin 4.1 (H) 1.9 - 3.5 g/dL    A-G Ratio 0.7 g/dL   HEMOGLOBIN A1C    Collection Time: 08/25/24  5:26 AM   Result Value Ref Range    Glycohemoglobin 6.4 (H) 4.0 - 5.6 %    Est Avg Glucose 137 mg/dL   TSH with Reflex to FT4    Collection Time: 08/25/24  5:26 AM   Result Value Ref Range    TSH 0.608 0.380 - 5.330 uIU/mL   Vitamin D, 25-hydroxy (blood)     Collection Time: 08/25/24  5:26 AM   Result Value Ref Range    25-Hydroxy   Vitamin D 25 59 30 - 100 ng/mL   IMMATURE PLT FRACTION    Collection Time: 08/25/24  5:26 AM   Result Value Ref Range    Imm. Plt Fraction 3.2 0.6 - 13.1 %   ESTIMATED GFR    Collection Time: 08/25/24  5:26 AM   Result Value Ref Range    GFR (CKD-EPI) 57 (A) >60 mL/min/1.73 m 2   POCT glucose device results    Collection Time: 08/25/24  5:41 AM   Result Value Ref Range    POC Glucose, Blood 159 (H) 65 - 99 mg/dL   POCT glucose device results    Collection Time: 08/25/24 11:27 AM   Result Value Ref Range    POC Glucose, Blood 231 (H) 65 - 99 mg/dL   POCT glucose device results    Collection Time: 08/25/24  5:03 PM   Result Value Ref Range    POC Glucose, Blood 188 (H) 65 - 99 mg/dL   POCT glucose device results    Collection Time: 08/25/24  9:07 PM   Result Value Ref Range    POC Glucose, Blood 209 (H) 65 - 99 mg/dL   POCT glucose device results    Collection Time: 08/26/24  7:30 AM   Result Value Ref Range    POC Glucose, Blood 149 (H) 65 - 99 mg/dL   POCT glucose device results    Collection Time: 08/26/24 11:16 AM   Result Value Ref Range    POC Glucose, Blood 174 (H) 65 - 99 mg/dL       Medications:  Scheduled Medications   Medication Dose Frequency    insulin lispro  2-12 Units 4X/DAY ACHS    metoprolol tartrate  62.5 mg BID    Pharmacy Consult Request  1 Each PHARMACY TO DOSE    senna-docusate  2 Tablet BID    omeprazole  20 mg DAILY    apixaban  5 mg BID    dapagliflozin propanediol  10 mg DAILY    ezetimibe  10 mg DAILY    melatonin  6 mg Nightly    Non Formulary Request  10 mg DAILY    vitamin D3  2,000 Units DAILY     PRN medications: Respiratory Therapy Consult, hydrALAZINE, acetaminophen, senna-docusate **AND** polyethylene glycol/lytes, docusate sodium, magnesium hydroxide, carboxymethylcellulose, benzocaine-menthol, mag hydrox-al hydrox-simeth, ondansetron **OR** ondansetron, traZODone, sodium chloride, oxyCODONE  immediate-release **OR** oxyCODONE immediate-release, traMADol, midazolam, cyclobenzaprine, diclofenac sodium, [DISCONTINUED] insulin regular **AND** POC blood glucose manual result **AND** NOTIFY MD and PharmD **AND** Administer 20 grams of glucose (approximately 8 ounces of fruit juice) every 15 minutes PRN FSBG less than 70 mg/dL **AND** dextrose bolus    Diet:  Current Diet Order   Procedures    Diet NPO Restrict to: Sips with Medications (TURN TUBE FEED OFF AT MIDNIGHT)    Diet: Diet Tube Feed; Formula: Bolus Only (Provide 1/2 carton Glucerna 1.2 at first bolus feed and advance by 1/2 carton per feed until goal is reached. Goal is 6 cartons per day); Select Bolus (If Indicated): Glucerna 1.2 Carton; Bolus Frequency:...       Medical Decision Making and Plan:  R CVA - Patient with R CVA not a candidate for TNK per neurology. Restarted on Eliquis  -PT and OT for mobility and ADLs. Per guidelines, 15 hours per week between PT, OT and/or SLP.  -Follow-up Neurology stroke bridge      Lung adenocarcinoma - s/p Resection. Patient on Vericiguat which is an experimental medication.     Dysphagia - Severe dysphagia. PEG placed 8/23. NPO with tube feeds. Dietitian to consult. SLP for swallow. CXR on 8/26 without signs of aspiration pneumonia per my read.      HTN/sCHF - Patient on Farxiga 10 mg, Metoprolol 50 mg BID and Spironolactone. EF of 35%  -Consult hospitalist.  Metoprolol increased to 62.5 mg and spironolactone discontinued. Continue Metoprolol and Farxiga 10 mg     HLD - Patient on Atorvastatin 20 mg QHS and Zetia 10 mg daily.      DM2 with hyperglycemia - Will start SSI. Consult hospitalist. Blood sugars into 200s, Continue SSI     CKD3a - Avoid nephrotoxic agents. Check AM CMP - Cr 1.27, BUN 44, will monitor    Hypernatremia - 146 on admission, consult hospitalist     Leukocytosis - Check AM CBC -13.6 on admission down from 15.9, consult hospitalist     Thrombocytopenia - Check AM CBC - 94 on admission, will  monitor     Pain - Patient on APAP and Tramadol PRN     Skin - Patient at risk for skin breakdown due to debility in areas including sacrum, achilles, elbows and head in addition to other sites. Nursing to assess skin daily.      GI Ppx - Patient on Prilosec for GERD prophylaxis. Patient on Senna-docusate for constipation prophylaxis.      DVT Ppx - Patient Eliquis on transfer.  ____________________________________    T. Tim Maciel MD/PhD  [facility] - Physical Medicine & Rehabilitation   [facility] - Brain Injury Medicine   ____________________________________

## 2024-08-26 NOTE — DIETARY
"Nutrition Services: Initial Assessment     Day 2 of admit. Pito Black is a 79 y.o. male with admitting DX of Right sided cerebral hemisphere cerebrovascular accident (CVA) (Formerly [facility]) [I63.9]     Consult received for enteral nutrition.     Nutrition Assessment:      Height: 175.3 cm (5' 9\")  Weight: 60 kg (132 lb 4.4 oz)  Weight taken via bed scale  Body mass index is 19.53 kg/m². BMI classification: Underweight for age.    Wt Readings from Last 6 Encounters:   08/24/24 60 kg (132 lb 4.4 oz)   08/24/24 57 kg (125 lb 10.6 oz)   08/02/24 64.5 kg (142 lb 3.2 oz)   07/31/24 62.1 kg (137 lb)   07/12/24 64 kg (141 lb 1.5 oz)   07/01/24 64.9 kg (143 lb)      Calculation/Equation:   MSJ: 1306 kcals x 1.5=1464 kcals    - 6411-8016 kcals (28-30 kcals/kg)    - 72-84 g of protein (1.2-1.4 grams/kg)    - 1817-2411 mL water (25-30 mL/kg)       Objective:  Pertinent medical hx:   Past Medical History:   Diagnosis Date    Acute hypoxemic respiratory failure (Formerly [facility])     AICD (automatic cardioverter/defibrillator) present 12/07/2023    Arrhythmia     hx of a fib    Cancer (Formerly [facility]) 07/2017    left lung- adenocarcinoma    Congestive heart failure (HCC)     Dental disorder     lower partial, removable bridge    Diabetes 08/06/2018    Heart valve disease     mild mitral valve prolapse    High cholesterol     Hypertension     Macrocytic anemia     Renal disorder     CKD stage 3    Thrombophilia (Formerly [facility])      Nutrition support: Indicated due to dx of dysphagia. SLP following  Enteral access: G-tube  Pertinent labs:   Lab Results   Component Value Date/Time    SODIUM 146 (H) 08/25/2024 05:26 AM    POTASSIUM 4.3 08/25/2024 05:26 AM    CO2 21 08/25/2024 05:26 AM    CHLORIDE 113 (H) 08/25/2024 05:26 AM    BUN 44 (H) 08/25/2024 05:26 AM    CREATININE 1.27 08/25/2024 05:26 AM    CALCIUM 8.8 08/25/2024 05:26 AM    PHOSPHORUS 4.1 08/20/2024 08:35 AM    MAGNESIUM 2.7 (H) 08/20/2024 08:35 AM    GLUCOSE 158 (H) 08/25/2024 05:26 AM      Pertinent meds: "   Scheduled Medications   Medication Dose Frequency    insulin lispro  2-12 Units 4X/DAY ACHS    metoprolol tartrate  62.5 mg BID    senna-docusate  2 Tablet BID    omeprazole  20 mg DAILY    dapagliflozin propanediol  10 mg DAILY    ezetimibe  10 mg DAILY    vitamin D3  2,000 Units DAILY    PRN Miralax provided 1 packet 8/25/24      Last BM: 08/21/24 (per report) per flowsheets  Most appropriate formula based on RD evaluation: Glucerna 1.2 Marshal. Pt is currently receiving 2 cartons TID at meal times. This is providing 1710 kcals, 85 g of protein, and 1146 mL of free water. Nurse reports that pt is tolerating tube feed.   Pt also has an order for 250 mL of water Q 4 hours providing 1500 mL. With free water from tube feed, pt is receiving 2646 mL of water. This should meet pt's hydration needs.      Current diet order:   NPO        Nutrition Diagnosis:      Swallowing difficulty related to recent CVA as evidenced by dx of dysphagia w/ order for NPO and tube feed to meet his estimated nutrition needs.      Per RD's note at [facility] on 8/15: Moderate malnutrition in context of chronic illness related to lung cancer as evidenced by mild muscle and mild fat wasting.     Nutrition Interventions:      Continue with boluses of 500 mL of Glucerna 1.2 Marshal TID at meal times.   Goal tube feed volume provides 1710 kcals, 85 g protein, and 1500 ml free water daily.    Provide 250 mL of fluids Q 4 hours as ordered.   Initiation and advance of diet per SLP  Patient aware of active plan of care as appropriate.     Nutrition Monitoring and Evaluation:     Monitor nutrition POC  Additional fluids per MD/DO  Monitor vital signs pertinent to nutrition       RD following and will provide updated recommendations as indicated.     Sara Tobias R.D.

## 2024-08-26 NOTE — RESEARCH NOTE
Spoke with Pito's wife and daughter today. Pito is unable to swallow due to CVA and any pills given have to be crushed. They have decided to discontinue study medication as of today but are agreeable to phone contact.   Bottles returned Lot#/ Component #  [id number]/ [id number] 0 pills  [id number]- 0 pills  [id number] 36 pills

## 2024-08-26 NOTE — PROGRESS NOTES
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  Allergies: Patient has no known allergies.    Safety  Patient Assist  minimum assist  Patient Precautions  Fall Risk, PEG Tube, Swallow Precautions  Precaution Comments  [place], dysarthria, lower partial dentures, bilateral HAs  Bed Transfer Status  Contact Guard Assist  Toilet Transfer Status   Contact Guard Assist  Assistive Devices  Rails, Wheelchair  Oxygen  None - Room Air  Diet/Therapeutic Dining  Current Diet Order   Procedures    Diet NPO Restrict to: Sips with Medications (TURN TUBE FEED OFF AT MIDNIGHT)    Diet: Diet Tube Feed; Formula: Bolus Only (Provide 1/2 carton Glucerna 1.2 at first bolus feed and advance by 1/2 carton per feed until goal is reached. Goal is 6 cartons per day); Select Bolus (If Indicated): Glucerna 1.2 Carton; Bolus Frequency:...     Pill Administration  crushed and peg  Agitated Behavioral Scale  16  ABS Level of Severity  No Agitation    Fall Risk  Has the patient had a fall this admission?   No  Yandy Minaya Fall Risk Scoring  18, HIGH RISK  Fall Risk Safety Measures  bed alarm, chair alarm, poor balance, and low vision/ hearing    Vitals  Temperature: 37.1 °C (98.8 °F)  Temp src: Oral  Pulse: 96  Respiration: 16  Blood Pressure : 126/78  Blood Pressure MAP (Calculated): 94 MM HG  BP Location: Left, Upper Arm  Patient BP Position: Joseph's Position     Oxygen  Pulse Oximetry: 96 %  O2 (LPM): 0  O2 Delivery Device: None - Room Air    Bowel and Bladder  Last Bowel Movement  08/21/24 (per report)  Stool Type     Bowel Device     Continent  Bladder: Did not void   Bowel: No movement  Bladder Function  Urine Void (mL):  (bathroom)  Number of Times Voided: 1  Urine Color: Yellow  Genitourinary Assessment   Bladder Assessment (WDL):  Within  Defined Limits  Urine Color: Yellow  Bladder Scan: Post Void  $ Bladder Scan Results (mL): 75    Skin  Evan Score   16  Sensory Interventions   Bed Types: Standard/Trauma Mattress  Moisture Interventions  Moisturizers/Barriers: Moisturizer       Pain  Pain Rating Scale  0 - No Pain  Pain Location     Pain Location Orientation     Pain Interventions   Declines    ADLs    Bathing      Linen Change   Partial  Personal Hygiene  Change Nicolasa Pads, Perineal Care, Moist Nicolasa Wipes  Chlorhexidine Bath      Oral Care  Mouth Swabbed (self)  Teeth/Dentures     Shave     Nutrition Percentage Eaten  NPO at this Time  Environmental Precautions  Treaded Slipper Socks on Patient, Personal Belongings, Wastebasket, Call Bell etc. in Easy Reach, Bed in Low Position  Patient Turns/Positioning  Patient Turns Self from Side to Side  Patient Turns Assistance/Tolerance     Bed Positions  Bed Controls On, Bed Locked  Head of Bed Elevated  Self regulated      Psychosocial/Neurologic Assessment  Psychosocial Assessment  Psychosocial (WDL):  Within Defined Limits  Neurologic Assessment  Neuro (WDL): Exceptions to WDL  Level of Consciousness: Alert  EENT (WDL):  WDL Except    Cardio/Pulmonary Assessment  Edema      Respiratory Breath Sounds  RUL Breath Sounds: Clear  RML Breath Sounds: Clear  RLL Breath Sounds: Diminished  MARIA M Breath Sounds: Clear  LLL Breath Sounds: Diminished  Cardiac Assessment   Cardiac (WDL):  WDL Except (CHF)

## 2024-08-26 NOTE — CARE PLAN
"Pt with PEG TUBE , Aspiration precautions observed, head of bed elevated during med pass, no s/s of aspiration noted. Peg tube dressing changed,  Pt impulsive, reinforced safety measures, frequent rounding done to ensure safety and free from fall. Call light within easy reach , pt room adjacent to nurses station, Oral care done, sips of water given. Medicated of trazodone for sleep, pt awake on and off. Assisted oob to bathroom with contact guard assist , voiding freely.   Finger stick monitored 209, due coverage needed. To verify in am about the F/s monitoring order whether it 's Fs ac and HS or FS every 6 hrs. Peg tube flushed every 4 hrs per protocol.    Problem: Fall Risk - Rehab  Goal: Patient will remain free from falls  Outcome: Progressing  Note: Yandy Minaya Fall risk Assessment Score: 18    High fall risk Interventions   - Bed and strip alarm   - Yellow sign by the door   - Yellow wrist band \"Fall risk\"  - Room near to the nurse station  - Do not leave patient unattended in the bathroom  - Fall risk education provided      Problem: Pain - Standard  Goal: Alleviation of pain or a reduction in pain to the patient’s comfort goal  Outcome: Progressing  Note: Assessed for pain and discomfort , pain under control. Needs anticipated and attended.   The patient is Watcher - Medium risk of patient condition declining or worsening    Shift Goals  Clinical Goals: Safety, Pain control  Patient Goals: Safety  Family Goals: Education    Progress made toward(s) clinical / shift goals:  Pt free from fall and injury.    "

## 2024-08-26 NOTE — THERAPY
Occupational Therapy  Daily Treatment     Patient Name: Pito Black  Age:  79 y.o., Sex:  male  Medical Record #: [id number]  Today's Date: 8/26/2024     Precautions  Precautions: (P) Fall Risk, PEG Tube, Swallow Precautions  Comments: (P) [place], dysarthria, lower partial dentures, bilateral HAs         Subjective    Pt encountered for OT in bathroom with CNA for toileting needs. Pt pleasant and agreeable to participate. Requesting water. OTR educated on NPO status and recommended ice chips per WB instructions.      Objective       08/26/24 0701   OT Charge Group   Charges Yes   OT Self Care / ADL (Units) 3   OT Therapeutic Exercise (Units) 1   OT Total Time Spent   OT Individual Total Time Spent (Mins) 60   Precautions   Precautions Fall Risk;PEG Tube;Swallow Precautions   Comments [place], dysarthria, lower partial dentures, bilateral HAs   Functional Level of Assist   Grooming Contact Guard Assist;Seated   Grooming Description Verbal cueing;Supervision for safety;Set-up of equipment;Seated in wheelchair at sink;Increased time  (oral hygiene)   Upper Body Dressing Minimal Assist   Upper Body Dressing Description Assit with threading arms through sleeves;Assist with pulling shirt over head;Increased time;Set-up of equipment;Supervision for safety  (Mod to Shayna + VC for motor planning. Shayna with clothing set-up and cueing)   Bed, Chair, Wheelchair Transfer Minimal Assist  (Shayna to CGA)   Bed Chair Wheelchair Transfer Description Adaptive equipment;Increased time;Set-up of equipment;Supervision for safety;Verbal cueing  (Stand step from WC <> EOM/nustep using FWW at Shayna for sequencing/motor planning. Stand step from WC <> bed using rail at CGA.)   Toilet Transfers Contact Guard Assist   Toilet Transfer Description Grab bar;Increased time;Set-up of equipment;Supervision for safety;Verbal cueing  (stand step from toilet <> WC)   Sitting Lower Body Exercises   Sitting Lower Body Exercises Yes   Nustep Resistance Level 2    Comments 4:54 min for general strength/endurance/bilateral coordination. Max to mod VC/TC to maintain L hand on handle.   Bed Mobility    Sit to Supine Contact Guard Assist   Skilled Intervention Facilitation   Interdisciplinary Plan of Care Collaboration   IDT Collaboration with  Certified Nursing Assistant;Hospitalist RN   Patient Position at End of Therapy In Bed;Bed Alarm On;Call Light within Reach   Collaboration Comments RN for med pass; CNA RE CLOF/POC     Ice chips: moderate VC to take only a couple ice chips at a time (pt noted to take 3-4 at a time) and to take slow bits prior to getting more ice chips.     OTR reviewed passport and updated fall card.     Assessment    Pt tolerated AM session well focused on ADLs and seated thera ex, although was somewhat limited by abdominal discomfort.Pt required max to mod cueing to maintain a L grasp on the nustep, may benefit from tasks to focus on L-sided attention/motor coordination.     Strengths: Independent prior level of function, Motivated for self care and independence, Pleasant and cooperative, Supportive family, Willingly participates in therapeutic activities  Barriers: Generalized weakness, Impaired activity tolerance, Impaired balance, Impaired functional cognition    Plan    Cont to address ADLs, functional transfers (using FWW), neuro re-ed/balance, and thera act/ex to maximize functional recovery for safe DC home.     DME       Passport items to be completed:  Perform bathroom transfers, complete dressing, complete feeding, get ready for the day, prepare a simple meal, participate in household tasks, adapt home for safety needs, demonstrate home exercise program, complete caregiver training     Occupational Therapy Goals (Active)       Problem: Bathing       Dates: Start:  08/25/24         Goal: STG-Within one week, patient will bathe min A       Dates: Start:  08/25/24               Problem: Dressing       Dates: Start:  08/25/24         Goal:  STG-Within one week, patient will dress UB min A       Dates: Start:  08/25/24            Goal: STG-Within one week, patient will dress LB min A       Dates: Start:  08/25/24               Problem: OT Long Term Goals       Dates: Start:  08/25/24         Goal: LTG-By discharge, patient will complete basic self care tasks I-supv       Dates: Start:  08/25/24            Goal: LTG-By discharge, patient will perform bathroom transfers I-supv       Dates: Start:  08/25/24               Problem: Toileting       Dates: Start:  08/25/24         Goal: STG-Within one week, patient will complete toileting tasks min A       Dates: Start:  08/25/24

## 2024-08-26 NOTE — DISCHARGE PLANNING
CASE MANAGEMENT INITIAL ASSESSMENT    Admit Date:  8/24/2024     CM to meet with patient and family to discuss role of case management / discharge planning / team conference.   Patient is a  79 y.o. male transferred from [facility].    Diagnosis: Right sided cerebral hemisphere cerebrovascular accident (CVA) (HCC) [I63.9]    Co-morbidities:   Patient Active Problem List    Diagnosis Date Noted    Transaminitis 08/25/2024    Leukocytosis 08/25/2024    Right sided cerebral hemisphere cerebrovascular accident (CVA) (HCC) 08/24/2024    Right middle cerebral artery stroke (HCC) 08/13/2024    Hyponatremia 08/13/2024    Oropharyngeal dysphagia 08/13/2024    Dysarthria 08/13/2024    Cyst of skin 06/25/2024    Retained suture 06/25/2024    Tendonitis, Achilles, left 06/25/2024    Insomnia 01/08/2024    Pain, dental 11/02/2023    Type 2 diabetes mellitus with stage 3a chronic kidney disease, without long-term current use of insulin ([facility]) 10/23/2023    Secondary hyperaldosteronism (HCC) 06/19/2023    Other thrombophilia ([facility]) 06/19/2023    Dyspnea on exertion 04/06/2023    Nonsustained ventricular tachycardia (HCC) 03/24/2023    Chronic systolic heart failure ([facility]) 03/18/2023    Cardiorenal syndrome 03/17/2023    ACP (advance care planning) 03/17/2023    Parainfluenza 02/21/2023    Sepsis without acute organ dysfunction ([facility]) 02/20/2023    Pulmonary embolism on right ([facility]) 02/19/2023    Vitamin D deficiency 02/19/2023    Lactic acidosis 02/19/2023    Primary lung adenocarcinoma (HCC) 02/01/2023    Pleural effusion, left 02/01/2023    Lung mass 02/01/2023    Primary cancer of left lower lobe of lung (HCC) 07/06/2021    Stage 3a chronic kidney disease 06/28/2021    Hyperlipidemia associated with type 2 diabetes mellitus (HCC) 06/28/2021    Primary cancer of right upper lobe of lung (HCC) 11/18/2019    Mass of lower lobe of left lung 02/06/2018    Hypertension associated with type 2 diabetes mellitus (HCC) 02/06/2018    A-fib ([facility])  02/06/2018    Neural hearing loss, bilateral 06/12/2015    Radiculopathy 11/20/2013    Thoracic back pain 11/20/2013     Prior Living Situation:  Housing / Facility: 1 Story House  Lives with - Patient's Self Care Capacity: Spouse, Adult Children    Prior Level of Function:       Support Systems:  Primary : Benoit-dtr: [phone]  Other support systems: Ealine-spouse: [phone]       Previous Services Utilized:   Equipment Owned: None  Prior Services: Unable To Determine At This Time    Other Information:  Occupation (Pre-Hospital 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 Continue to follow patient through hospitalization and provide discharge planning in collaboration with patient, family, physicians and ancillary services.     2. Utilize community resources to ensure a safe discharge.

## 2024-08-27 ENCOUNTER — APPOINTMENT (OUTPATIENT)
Dept: OCCUPATIONAL THERAPY | Facility: REHABILITATION | Age: 79
DRG: 057 | End: 2024-08-27
Attending: PHYSICAL MEDICINE & REHABILITATION
Payer: MEDICARE

## 2024-08-27 ENCOUNTER — APPOINTMENT (OUTPATIENT)
Dept: PHYSICAL THERAPY | Facility: REHABILITATION | Age: 79
DRG: 057 | End: 2024-08-27
Attending: PHYSICAL MEDICINE & REHABILITATION
Payer: MEDICARE

## 2024-08-27 ENCOUNTER — APPOINTMENT (OUTPATIENT)
Dept: SPEECH THERAPY | Facility: REHABILITATION | Age: 79
DRG: 057 | End: 2024-08-27
Attending: PHYSICAL MEDICINE & REHABILITATION
Payer: MEDICARE

## 2024-08-27 LAB
ALBUMIN SERPL BCP-MCNC: 3.1 G/DL (ref 3.2–4.9)
ALBUMIN/GLOB SERPL: 0.8 G/DL
ALP SERPL-CCNC: 98 U/L (ref 30–99)
ALT SERPL-CCNC: 123 U/L (ref 2–50)
ANION GAP SERPL CALC-SCNC: 12 MMOL/L (ref 7–16)
APPEARANCE UR: CLEAR
AST SERPL-CCNC: 86 U/L (ref 12–45)
BILIRUB SERPL-MCNC: 0.5 MG/DL (ref 0.1–1.5)
BILIRUB UR QL STRIP.AUTO: NEGATIVE
BUN SERPL-MCNC: 43 MG/DL (ref 8–22)
CALCIUM ALBUM COR SERPL-MCNC: 9.2 MG/DL (ref 8.5–10.5)
CALCIUM SERPL-MCNC: 8.5 MG/DL (ref 8.5–10.5)
CHLORIDE SERPL-SCNC: 112 MMOL/L (ref 96–112)
CO2 SERPL-SCNC: 21 MMOL/L (ref 20–33)
COLOR UR: YELLOW
CREAT SERPL-MCNC: 1.16 MG/DL (ref 0.5–1.4)
ERYTHROCYTE [DISTWIDTH] IN BLOOD BY AUTOMATED COUNT: 49.3 FL (ref 35.9–50)
GFR SERPLBLD CREATININE-BSD FMLA CKD-EPI: 64 ML/MIN/1.73 M 2
GLOBULIN SER CALC-MCNC: 3.8 G/DL (ref 1.9–3.5)
GLUCOSE BLD STRIP.AUTO-MCNC: 127 MG/DL (ref 65–99)
GLUCOSE BLD STRIP.AUTO-MCNC: 177 MG/DL (ref 65–99)
GLUCOSE BLD STRIP.AUTO-MCNC: 189 MG/DL (ref 65–99)
GLUCOSE BLD STRIP.AUTO-MCNC: 209 MG/DL (ref 65–99)
GLUCOSE SERPL-MCNC: 148 MG/DL (ref 65–99)
GLUCOSE UR STRIP.AUTO-MCNC: 250 MG/DL
HCT VFR BLD AUTO: 45.3 % (ref 42–52)
HGB BLD-MCNC: 14.7 G/DL (ref 14–18)
KETONES UR STRIP.AUTO-MCNC: ABNORMAL MG/DL
LEUKOCYTE ESTERASE UR QL STRIP.AUTO: NEGATIVE
MCH RBC QN AUTO: 31.1 PG (ref 27–33)
MCHC RBC AUTO-ENTMCNC: 32.5 G/DL (ref 32.3–36.5)
MCV RBC AUTO: 95.8 FL (ref 81.4–97.8)
MICRO URNS: ABNORMAL
NITRITE UR QL STRIP.AUTO: NEGATIVE
NT-PROBNP SERPL IA-MCNC: 1452 PG/ML (ref 0–125)
PH UR STRIP.AUTO: 7.5 [PH] (ref 5–8)
PLATELET # BLD AUTO: 82 K/UL (ref 164–446)
PLATELETS.RETICULATED NFR BLD AUTO: 4.7 % (ref 0.6–13.1)
PMV BLD AUTO: 11.3 FL (ref 9–12.9)
POTASSIUM SERPL-SCNC: 4.7 MMOL/L (ref 3.6–5.5)
PROCALCITONIN SERPL-MCNC: 0.25 NG/ML
PROT SERPL-MCNC: 6.9 G/DL (ref 6–8.2)
PROT UR QL STRIP: NEGATIVE MG/DL
RBC # BLD AUTO: 4.73 M/UL (ref 4.7–6.1)
RBC UR QL AUTO: NEGATIVE
SODIUM SERPL-SCNC: 145 MMOL/L (ref 135–145)
SP GR UR STRIP.AUTO: 1.03
UROBILINOGEN UR STRIP.AUTO-MCNC: 1 MG/DL
WBC # BLD AUTO: 14.9 K/UL (ref 4.8–10.8)

## 2024-08-27 PROCEDURE — 97530 THERAPEUTIC ACTIVITIES: CPT

## 2024-08-27 PROCEDURE — 700102 HCHG RX REV CODE 250 W/ 637 OVERRIDE(OP): Performed by: HOSPITALIST

## 2024-08-27 PROCEDURE — 99232 SBSQ HOSP IP/OBS MODERATE 35: CPT | Performed by: HOSPITALIST

## 2024-08-27 PROCEDURE — 81003 URINALYSIS AUTO W/O SCOPE: CPT

## 2024-08-27 PROCEDURE — 92526 ORAL FUNCTION THERAPY: CPT

## 2024-08-27 PROCEDURE — 97110 THERAPEUTIC EXERCISES: CPT

## 2024-08-27 PROCEDURE — 700105 HCHG RX REV CODE 258: Performed by: HOSPITALIST

## 2024-08-27 PROCEDURE — 83880 ASSAY OF NATRIURETIC PEPTIDE: CPT

## 2024-08-27 PROCEDURE — 97112 NEUROMUSCULAR REEDUCATION: CPT

## 2024-08-27 PROCEDURE — 84145 PROCALCITONIN (PCT): CPT

## 2024-08-27 PROCEDURE — 99233 SBSQ HOSP IP/OBS HIGH 50: CPT | Performed by: PHYSICAL MEDICINE & REHABILITATION

## 2024-08-27 PROCEDURE — 82962 GLUCOSE BLOOD TEST: CPT | Mod: 91

## 2024-08-27 PROCEDURE — 770010 HCHG ROOM/CARE - REHAB SEMI PRIVAT*

## 2024-08-27 PROCEDURE — 80053 COMPREHEN METABOLIC PANEL: CPT

## 2024-08-27 PROCEDURE — A9270 NON-COVERED ITEM OR SERVICE: HCPCS | Performed by: PHYSICAL MEDICINE & REHABILITATION

## 2024-08-27 PROCEDURE — A9270 NON-COVERED ITEM OR SERVICE: HCPCS | Performed by: HOSPITALIST

## 2024-08-27 PROCEDURE — 97116 GAIT TRAINING THERAPY: CPT

## 2024-08-27 PROCEDURE — 94760 N-INVAS EAR/PLS OXIMETRY 1: CPT

## 2024-08-27 PROCEDURE — 85027 COMPLETE CBC AUTOMATED: CPT

## 2024-08-27 PROCEDURE — 700102 HCHG RX REV CODE 250 W/ 637 OVERRIDE(OP): Performed by: PHYSICAL MEDICINE & REHABILITATION

## 2024-08-27 PROCEDURE — 36415 COLL VENOUS BLD VENIPUNCTURE: CPT

## 2024-08-27 PROCEDURE — 85055 RETICULATED PLATELET ASSAY: CPT

## 2024-08-27 RX ORDER — SODIUM CHLORIDE 9 MG/ML
INJECTION, SOLUTION INTRAVENOUS ONCE
Status: COMPLETED | OUTPATIENT
Start: 2024-08-27 | End: 2024-08-27

## 2024-08-27 RX ADMIN — EZETIMIBE 10 MG: 10 TABLET ORAL at 09:03

## 2024-08-27 RX ADMIN — OXYCODONE 5 MG: 5 TABLET ORAL at 21:02

## 2024-08-27 RX ADMIN — Medication 2000 UNITS: at 09:02

## 2024-08-27 RX ADMIN — OMEPRAZOLE 20 MG: 20 CAPSULE, DELAYED RELEASE ORAL at 09:01

## 2024-08-27 RX ADMIN — SENNOSIDES AND DOCUSATE SODIUM 2 TABLET: 50; 8.6 TABLET ORAL at 21:00

## 2024-08-27 RX ADMIN — METOPROLOL TARTRATE 62.5 MG: 25 TABLET, FILM COATED ORAL at 09:02

## 2024-08-27 RX ADMIN — SENNOSIDES AND DOCUSATE SODIUM 2 TABLET: 50; 8.6 TABLET ORAL at 09:03

## 2024-08-27 RX ADMIN — Medication 6 MG: at 21:16

## 2024-08-27 RX ADMIN — INSULIN LISPRO 2 UNITS: 100 INJECTION, SOLUTION INTRAVENOUS; SUBCUTANEOUS at 11:22

## 2024-08-27 RX ADMIN — METOPROLOL TARTRATE 62.5 MG: 25 TABLET, FILM COATED ORAL at 21:00

## 2024-08-27 RX ADMIN — APIXABAN 5 MG: 5 TABLET, FILM COATED ORAL at 21:00

## 2024-08-27 RX ADMIN — INSULIN LISPRO 2 UNITS: 100 INJECTION, SOLUTION INTRAVENOUS; SUBCUTANEOUS at 17:26

## 2024-08-27 RX ADMIN — APIXABAN 5 MG: 5 TABLET, FILM COATED ORAL at 09:02

## 2024-08-27 RX ADMIN — DAPAGLIFLOZIN 10 MG: 10 TABLET, FILM COATED ORAL at 09:02

## 2024-08-27 RX ADMIN — INSULIN LISPRO 4 UNITS: 100 INJECTION, SOLUTION INTRAVENOUS; SUBCUTANEOUS at 21:14

## 2024-08-27 RX ADMIN — SODIUM CHLORIDE: 9 INJECTION, SOLUTION INTRAVENOUS at 11:21

## 2024-08-27 ASSESSMENT — ENCOUNTER SYMPTOMS
CHILLS: 0
VOMITING: 0
EYES NEGATIVE: 1
BRUISES/BLEEDS EASILY: 0
PALPITATIONS: 0
POLYDIPSIA: 0
SHORTNESS OF BREATH: 0
ABDOMINAL PAIN: 0
NAUSEA: 0
FEVER: 0
COUGH: 0

## 2024-08-27 ASSESSMENT — BALANCE ASSESSMENTS
STANDING UNSUPPORTED: 3
STANDING UNSUPPORTED WITH FEET TOGETHER: 1
TRANSFERS: 2
STANDING UNSUPPORTED ONE FOOT IN FRONT: 2
SITTING TO STANDING: 4
SITTING UNSUPPORTED: 4
LONG VERSION TOTAL SCORE (MAX 56): 31
PICK UP OBJECT FROM THE FLOOR FROM A STANDING POSITION: 3
LONG VERSION TOTAL SCORE (MAX 56): 31
MEDICARE IMPAIRMENT PERCENTAGE: 45
TURN 360 DEGREES: 1
STANDING ON ONE LEG: 1
PLACE ALTERNATE FOOT ON STEP OR STOOL WHILE STANDING UNSUPPORTED: 1
REACHING FORWARD WITH OUTSTRETCHED ARM WHILE STANDING: 1
STANDING TO SITTING: 4
STANDING UNSUPPORTED WITH EYES CLOSED: 3
LOOK OVER LEFT AND RIGHT SHOULDERS WHILE STANDING: 1

## 2024-08-27 ASSESSMENT — CHA2DS2 SCORE
PRIOR STROKE OR TIA OR THROMBOEMBOLISM: NO
CHF OR LEFT VENTRICULAR DYSFUNCTION: YES
VASCULAR DISEASE: NO
AGE 75 OR GREATER: YES
AGE 65 TO 74: NO
SEX: MALE
HYPERTENSION: YES
DIABETES: YES
CHA2DS2 VASC SCORE: 5

## 2024-08-27 ASSESSMENT — PAIN DESCRIPTION - PAIN TYPE: TYPE: ACUTE PAIN

## 2024-08-27 ASSESSMENT — GAIT ASSESSMENTS
DEVIATION: SHUFFLED GAIT
GAIT LEVEL OF ASSIST: CONTACT GUARD ASSIST
ASSISTIVE DEVICE: FRONT WHEEL WALKER

## 2024-08-27 ASSESSMENT — ACTIVITIES OF DAILY LIVING (ADL)
BED_CHAIR_WHEELCHAIR_TRANSFER_DESCRIPTION: ADAPTIVE EQUIPMENT;INCREASED TIME;INITIAL PREPARATION FOR TASK;SUPERVISION FOR SAFETY;VERBAL CUEING
BED_CHAIR_WHEELCHAIR_TRANSFER_DESCRIPTION: ADAPTIVE EQUIPMENT;INCREASED TIME;INITIAL PREPARATION FOR TASK;SUPERVISION FOR SAFETY;VERBAL CUEING

## 2024-08-27 NOTE — THERAPY
Occupational Therapy  Daily Treatment     Patient Name: Pito Black  Age:  79 y.o., Sex:  male  Medical Record #: [id number]  Today's Date: 8/27/2024     Precautions  Precautions: Fall Risk, PEG Tube, Swallow Precautions  Comments: [place], dysarthria, lower partial dentures, bilateral HAs         Subjective    Pt seated in w/c upon arrival, pleasant and cooperative, agreeable to therapy. Family present in room at end of session      Objective       08/27/24 1331   OT Charge Group   OT Therapy Activity (Units) 2   OT Total Time Spent   OT Individual Total Time Spent (Mins) 30   Fine Motor / Dexterity    Comments  LUE strength/coordination - using red resistive clothespin x 20. L hand coordination - using Manual Dexterity test pieces, pt place and removed disc's, but was unable to turn disc   Bed Mobility    Sit to Supine Standby Assist   Interdisciplinary Plan of Care Collaboration   IDT Collaboration with  Nursing;Certified Nursing Assistant   Patient Position at End of Therapy In Bed;Bed Alarm On;Family / Friend in Room;Call Light within Reach;Tray Table within Reach   Collaboration Comments re: request for ice chips and reporting throat burning pain at end of session     Functional mobility at FWW level: CGA room to main gym, slightly impulsive to get back into bed    Assessment    Pt completed LUE coordination tasks to the best of his abilities, however frequently dropping items and difficulty manipulating large pieces. Also requires intermittent cues to use L hand only    Strengths: Independent prior level of function, Motivated for self care and independence, Pleasant and cooperative, Supportive family, Willingly participates in therapeutic activities  Barriers: Generalized weakness, Impaired activity tolerance, Impaired balance, Impaired functional cognition    Plan    Refer to Primary OT POC/goals     Occupational Therapy Goals (Active)       Problem: Bathing       Dates: Start:  08/25/24         Goal:  STG-Within one week, patient will bathe min A       Dates: Start:  08/25/24               Problem: Dressing       Dates: Start:  08/25/24         Goal: STG-Within one week, patient will dress LB min A       Dates: Start:  08/25/24            Goal: STG-Within one week, patient will dress UB at SBA using LRD       Dates: Start:  08/27/24               Problem: OT Long Term Goals       Dates: Start:  08/25/24         Goal: LTG-By discharge, patient will complete basic self care tasks I-supv       Dates: Start:  08/25/24            Goal: LTG-By discharge, patient will perform bathroom transfers I-supv       Dates: Start:  08/25/24               Problem: Toileting       Dates: Start:  08/25/24         Goal: STG-Within one week, patient will complete toileting tasks min A       Dates: Start:  08/25/24

## 2024-08-27 NOTE — CARE PLAN
Problem: Balance  Goal: STG-Within one week, patient will perform 5x STS w/c to FWW in 45sec with CGA  Outcome: Met     Problem: Mobility  Goal: STG-Within one week, patient will ambulate household distance 50ft x 4 with FWW SBA  Outcome: Progressing  Goal: STG-Within one week, patient will ascend and descend four to six stairs right rail min assist  Outcome: Progressing     Problem: Mobility Transfers  Goal: STG-Within one week, patient will transfer bed to chair SBA SPT  Outcome: Progressing

## 2024-08-27 NOTE — CARE PLAN
Problem: Dressing  Goal: STG-Within one week, patient will dress LB min A  Outcome: Not Met     Problem: Toileting  Goal: STG-Within one week, patient will complete toileting tasks min A  Outcome: Not Met     Problem: Dressing  Goal: STG-Within one week, patient will dress UB min A  Outcome: Met

## 2024-08-27 NOTE — PROGRESS NOTES
Hospital Medicine Daily Progress Note      Chief Complaint  Atrial Fibrillation  Congestive Heart Failure  Transaminitis  Leukocytosis    Interval Problem Update  No overnight events reported to me.  Lab and imaging results reviewed.    Review of Systems  Review of Systems   Constitutional:  Negative for chills and fever.   HENT: Negative.     Eyes: Negative.    Respiratory:  Negative for cough and shortness of breath.    Cardiovascular:  Negative for chest pain and palpitations.   Gastrointestinal:  Negative for abdominal pain, nausea and vomiting.   Musculoskeletal:         Wound pain   Skin:  Negative for itching and rash.   Endo/Heme/Allergies:  Negative for polydipsia. Does not bruise/bleed easily.        Physical Exam  Temp:  [36.7 °C (98 °F)-37.1 °C (98.8 °F)] 36.7 °C (98 °F)  Pulse:  [] 98  Resp:  [16] 16  BP: (105-114)/(63-76) 105/63  SpO2:  [94 %-96 %] 96 %    Physical Exam  Vitals reviewed.   Constitutional:       General: He is not in acute distress.     Appearance: Normal appearance. He is not ill-appearing.   HENT:      Head: Normocephalic and atraumatic.      Right Ear: External ear normal.      Left Ear: External ear normal.      Nose: Nose normal.      Mouth/Throat:      Pharynx: Oropharynx is clear.   Eyes:      General:         Right eye: No discharge.         Left eye: No discharge.      Extraocular Movements: Extraocular movements intact.      Conjunctiva/sclera: Conjunctivae normal.   Cardiovascular:      Rate and Rhythm: Normal rate and regular rhythm.   Pulmonary:      Effort: No respiratory distress.      Breath sounds: No wheezing.      Comments: Decreased BS  Abdominal:      General: Bowel sounds are normal. There is no distension.      Palpations: Abdomen is soft.      Tenderness: There is no abdominal tenderness. There is no guarding or rebound.      Comments: +TTP at PEG site   Musculoskeletal:      Cervical back: Normal range of motion and neck supple.      Right lower leg: No  edema.      Left lower leg: No edema.   Skin:     General: Skin is warm and dry.   Neurological:      Mental Status: He is alert.      Comments: Awake         Fluids    Intake/Output Summary (Last 24 hours) at 8/27/2024 1009  Last data filed at 8/27/2024 0510  Gross per 24 hour   Intake 1500 ml   Output 600 ml   Net 900 ml       Laboratory  Recent Labs     08/25/24  0526 08/27/24  0556   WBC 13.6* 14.9*   RBC 4.49* 4.73   HEMOGLOBIN 14.0 14.7   HEMATOCRIT 42.7 45.3   MCV 95.1 95.8   MCH 31.2 31.1   MCHC 32.8 32.5   RDW 48.5 49.3   PLATELETCT 94* 82*   MPV 10.7 11.3     Recent Labs     08/25/24  0526 08/27/24  0556   SODIUM 146* 145   POTASSIUM 4.3 4.7   CHLORIDE 113* 112   CO2 21 21   GLUCOSE 158* 148*   BUN 44* 43*   CREATININE 1.27 1.16   CALCIUM 8.8 8.5                   Assessment/Plan  * Right middle cerebral artery stroke (HCC)- (present on admission)  Assessment & Plan  Has dysarthria and dysphagia  S/P PEG on 8/23/24 by Dr. Meadows  On Eliquis  Ongoing management per Physiatry    Thrombocytopenia (HCC)  Assessment & Plan  May be 2/2 meds  Possible culprits include Eliquis, Omeprazole, and Zetia  Follow PLT    Leukocytosis  Assessment & Plan  AFVSS and non-toxic appearing  PCT 0.25  UA negative  CXR stable RUL/LLL masses  Follow Temp and WBC  No indication to initiate empiric antimicrobial therapy at this time    Transaminitis  Assessment & Plan  Lipitor discontinued  Follow LFT's    Type 2 diabetes mellitus with stage 3a chronic kidney disease, without long-term current use of insulin (HCC)- (present on admission)  Assessment & Plan  HbA1c 6.4  Observe serum glucose trends on Farxiga and SSI  Outpt meds include Farxiga and Metformin  mg qd    Chronic systolic heart failure (HCC)- (present on admission)  Assessment & Plan  H/O AICD  Echo 2/6/24 EF 45%, global hypokinesis, mod MR/AI  On Farxiga and Metoprolol  Aldactone discontinued for hypotension and azotemia  BNP 1452  CXR stable RUL/LLL  masses    Primary lung adenocarcinoma (HCC)- (present on admission)  Assessment & Plan  S/P resection  On experimental drug  Outpt F/U    Hyperlipidemia associated with type 2 diabetes mellitus (HCC)- (present on admission)  Assessment & Plan  On Zetia    Azotemia- (present on admission)  Assessment & Plan  Discontinued Aldactone  Will start cautious IVF  Follow renal function    A-fib (HCC)  Assessment & Plan  Also has ZioPatch  On Metoprolol for rate control  Anticoagulated on Eliquis    Hypertension associated with type 2 diabetes mellitus (HCC)- (present on admission)  Assessment & Plan  Observe blood pressure trends on Metoprolol  Aldactone discontinued for hypotension    DNR

## 2024-08-27 NOTE — PROGRESS NOTES
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  Allergies: Patient has no known allergies.    Safety  Patient Assist  omin  Patient Precautions  oFall Risk, PEG Tube, Swallow Precautions  Precaution Comments  oHOH, dysarthria, lower partial dentures, bilateral HAs  Bed Transfer Status  oMinimal Assist  Toilet Transfer Status  oContact Guard Assist  Assistive Devices  oRails, Wheelchair push  Oxygen  oNone - Room Air  Diet/Therapeutic Dining  o  Current Diet Order   Procedures    Diet NPO Restrict to: Sips with Medications (TURN TUBE FEED OFF AT MIDNIGHT)    Diet: Diet Tube Feed; Formula: Bolus Only (Provide 1/2 carton Glucerna 1.2 at first bolus feed and advance by 1/2 carton per feed until goal is reached. Goal is 6 cartons per day); Select Bolus (If Indicated): Glucerna 1.2 Carton; Bolus Frequency:...     Pill Administration  ocrushed and via peg tube  Agitated Behavioral Scale  o15  ABS Level of Severity  Teodora Agitation    Fall Risk  Has the patient had a fall this admission?  Teodora  Yandy Minaya Fall Risk Scoring  o18, HIGH RISK  Fall Risk Safety Measures  kiesha alarm and chair alarm    Vitals  Temperature: 36.7 °C (98 °F)  Temp src: Oral  Pulse: 98  Respiration: 16  Blood Pressure : 105/63  Blood Pressure MAP (Calculated): 77 MM HG  BP Location: Right, Upper Arm  Patient BP Position: Supine    Oxygen  Pulse Oximetry: 96 %  O2 (LPM): 0  O2 Delivery Device: None - Room Air    Bowel and Bladder  Last Bowel Movement  o08/26/24  Stool Type  oType 6: Fluffy pieces with ragged edges, a mushy stool  Bowel Device  o   Continent  oBladder: Did not void  oBowel: No movement  Bladder Function  oUrine Void (mL): 200 ml  Number of Times Voided: 1  Urine Color: Yellow  Genitourinary Assessment  oBladder Assessment (WDL):  Within Defined Limits  Gutierrez Catheter: Not Applicable  Urine  Color: Yellow  Bladder Device: Bathroom  Bladder Scan: Post Void  $ Bladder Scan Results (mL): 16    Skin  Evan Score  o 16  Sensory Interventions  o Bed Types: Standard/Trauma Mattress  Moisture Interventions  oMoisturizers/Barriers: Moisturizer , Barrier Wipes      Pain  Pain Rating Scale  o0 - No Pain  Pain Location  o   Pain Location Orientation  o   Pain Interventions  oDeclines    ADLs    Bathing  o   Linen Change  oPartial  Personal Hygiene  oChange Nicolasa Pads, Perineal Care, Moist Nicolasa Wipes  Chlorhexidine Bath  o   Oral Care  oBrushed Teeth  Teeth/Dentures  o   Shave  o (Done by family )  Nutrition Percentage Eaten  oNPO at this Time  Environmental Precautions  oTreaded Slipper Socks on Patient, Personal Belongings, Wastebasket, Call Bell etc. in Easy Reach, Transferred to Stronger Side, Bed in Low Position  Patient Turns/Positioning  oPatient Turns Self from Side to Side  Patient Turns Assistance/Tolerance  o   Bed Positions  Td Controls On, Bed Locked  Head of Bed Elevated  oSelf regulated      Psychosocial/Neurologic Assessment  Psychosocial Assessment  oPsychosocial (WDL):  Within Defined Limits  Neurologic Assessment  oNeuro (WDL): Exceptions to WDL  Level of Consciousness: Alert  Orientation Level: Oriented to place, Oriented to situation, Oriented to person, Disoriented to time  Cognition: Follows commands  Speech: Delayed responses  Pupil Assesment: No  Motor Function/Sensation Assessment: Motor strength  Muscle Strength Right Arm: Good Strength Against Gravity and Moderate Resistance  Muscle Strength Left Arm: Good Strength Against Gravity and Moderate Resistance  Muscle Strength Right Leg: Good Strength Against Gravity and Moderate Resistance  Muscle Strength Left Leg: Good Strength Against Gravity and Moderate Resistance  oEENT (WDL):  WDL Except    Cardio/Pulmonary Assessment  Edema  o   Respiratory Breath Sounds  oRUL Breath Sounds: Clear  RML Breath Sounds: Clear  RLL Breath Sounds:  Diminished  MARIA M Breath Sounds: Clear  LLL Breath Sounds: Diminished  Cardiac Assessment  oCardiac (WDL):  WDL Except

## 2024-08-27 NOTE — CARE PLAN
Problem: Swallowing STGs  Goal: STG-Within one week, patient will participate in MBSS evaluation to determine safety of PO diet and/or appropriate POC for dysphagia management  Outcome: Not Met  Note: Anticipate will schedule for end of this week.  Goal: STG-Within one week, patient will complete 75 swallows in 60 minutes in conjunction with sEMG for visual biofeedback  Outcome: Not Met  Note: Initiated today, able to complete 30 swallows, will continue to target.     Problem: Expression STGs  Goal: STG-Within one week, patient will implement clear speech strategies at the single word and short phrase (2-4 words) level to answer open ended questions with MIN cues  Outcome: Not Met  Note: Will continue to target     Problem: Problem Solving STGs  Goal: STG-Within one week, patient will follow 2- step directives during functional tasks with no more than 1 repetition.  Outcome: Not Met  Note: Will continue to target.

## 2024-08-27 NOTE — THERAPY
Physical Therapy   Daily Treatment     Patient Name: Pito Black  Age:  79 y.o., Sex:  male  Medical Record #: [id number]  Today's Date: 8/27/2024     Precautions  Precautions: (P) Fall Risk, PEG Tube, Swallow Precautions  Comments: (P) [place], dysarthria, lower partial dentures, bilateral HAs    Subjective    Patient in bed and agreeable to therapy, requesting to use the bathroom urgently.     Objective       08/27/24 1031   PT Charge Group   PT Gait Training (Units) 1   PT Neuromuscular Re-Education / Balance (Units) 2   PT Therapeutic Activities (Units) 1   PT Total Time Spent   PT Individual Total Time Spent (Mins) 60   Precautions   Precautions Fall Risk;PEG Tube;Swallow Precautions   Comments [place], dysarthria, lower partial dentures, bilateral HAs   Pain 0 - 10 Group   Location Abdomen   Location Orientation Anterior   Gait Functional Level of Assist    Gait Level Of Assist Contact Guard Assist   Assistive Device Front Wheel Walker   Distance (Feet)   (125 ftx1, 300 ftx1)   Deviation Shuffled Gait  (impaired L hemibody motor control)   Transfer Functional Level of Assist   Bed, Chair, Wheelchair Transfer Contact Guard Assist   Bed Chair Wheelchair Transfer Description Adaptive equipment;Increased time;Initial preparation for task;Supervision for safety;Verbal cueing  (stand step transfer with FWW)   Bed Mobility    Supine to Sit Standby Assist   Sit to Supine Standby Assist   Sit to Stand Standby Assist  (to CGA)   Scooting Standby Assist   Rolling Standby Assist   5x STS (Five Times Sit to Stand Test)   Height of Sitting Surface (inches) 17   5x Sit to STand (seconds) 10.41   Sit to Stand Comments ih SBA-CGA   Monson Balance Scale   Sitting Unsupported (Score 0-4) 4   Change Of Positon: Sitting To Standing (Score 0-4) 4   Change Of Positon: Standing To Sitting (Score 0-4) 4   Transfers (Score 0-4) 2   Standing Unsupported (Score 0-4) 3   Standing With Eyes Closed (Score 0-4) 3   Standing With Feet Together  (Score 0-4) 1   Tandem Standing (Score 0-4) 2   Standing On One Leg (Score 0-4) 1   Turning Trunk (Feet Fixed) (Score 0-4) 1   Retrieving Objects From Floor (Score 0-4) 3   Turning 360 Degrees (Score 0-4) 1   Stool Stepping (Score 0-4) 1   Reaching Forward While Standing (Score 0-4) 1   Monson Balance Total Score (0-56) 31   Interdisciplinary Plan of Care Collaboration   IDT Collaboration with  Certified Nursing Assistant;Family / Caregiver;Nursing;Occupational Therapist   Patient Position at End of Therapy In Bed;Bed Alarm On;Call Light within Reach;Tray Table within Reach;Phone within Reach;Family / Friend in Room   Collaboration Comments CLOF, POC   Physical Therapist Assigned   Assigned PT / Treatment Time / Comments Estelle     Toilet transfer with FWW approach and grab bar, assist for hygiene and LB dressing, as well as management of slight bowel incontinence. Standing hand hygiene with CGA and no UE support.    Education provided to family regarding patient's CLOF, and d/c preparations.    Assessment    Patient tolerated session fairly, reporting abdominal pain, both cramping as well as at PEG tube site. Requiring supine rest breaks throughout Monson Balance Scale testing; patient scored 31/56 indicating high fall risk, although some scoring deductions were due to difficulty comprehending directions affecting performance. Mild inattention noted with patient hitting walker into obstacle during gait training.    Strengths: Able to follow instructions, Supportive family, Willingly participates in therapeutic activities  Barriers: Aphasia expressive, Hearing impairment, Generalized weakness, Impaired activity tolerance, Impaired balance, Impulsive    Plan    Gait training with FWW, progress AD as appropriate  Stair negotiation  Neglect assessment   BITS training for visual tracking and forced LUE use   Balance assessment/treatment   Obstacle negotiation   Collaborate with SLP for useful cueing for speech     DME         Passport items to be completed:  Get in/out of bed safely, in/out of a vehicle, safely use mobility device, walk or wheel around home/community, navigate up and down stairs, show how to get up/down from the ground, ensure home is accessible, demonstrate HEP, complete caregiver training    Physical Therapy Problems (Active)       Problem: Balance       Dates: Start:  08/25/24         Goal: STG-Within one week, patient will perform 5x STS w/c to FWW in 45sec with CGA       Dates: Start:  08/25/24               Problem: Mobility       Dates: Start:  08/25/24         Goal: STG-Within one week, patient will ambulate household distance 50ft x 4 with FWW SBA       Dates: Start:  08/25/24            Goal: STG-Within one week, patient will ascend and descend four to six stairs right rail min assist       Dates: Start:  08/25/24               Problem: Mobility Transfers       Dates: Start:  08/25/24         Goal: STG-Within one week, patient will transfer bed to chair SBA SPT       Dates: Start:  08/25/24               Problem: PT-Long Term Goals       Dates: Start:  08/25/24         Goal: LTG-By discharge, patient will ambulate 100ft x 4 with FWW mod I indoors       Dates: Start:  08/25/24            Goal: LTG-By discharge, patient will transfer one surface to another mod I SPT safely and consistently       Dates: Start:  08/25/24            Goal: LTG-By discharge, patient will perform home exercise program seated/standing for LE strengthening to be done 3x/day in safe, independent home environment       Dates: Start:  08/25/24            Goal: LTG-By discharge, patient will ambulate up/down 4-6 stairs with right rail with SPV       Dates: Start:  08/25/24            Goal: LTG-By discharge, patient will perform 5x STS in 30sec with SPV       Dates: Start:  08/25/24

## 2024-08-27 NOTE — THERAPY
Speech Language Pathology  Daily Treatment     Patient Name: Pito Black  Age:  79 y.o., Sex:  male  Medical Record #: [id number]  Today's Date: 8/27/2024     Precautions  Precautions: Fall Risk, PEG Tube, Swallow Precautions  Comments: [place], dysarthria, lower partial dentures, bilateral HAs    Subjective    Pt up in w/c ready for therapy. Highly motivated to improve swallow function     Objective       08/27/24 0804   Treatment Charges   SLP Swallowing Dysfunction Treatment Swallowing Dysfunction Treatment   SLP Total Time Spent   SLP Individual Total Time Spent (Mins) 60   Dysphagia    Dysphagia X   Positioning / Behavior Modification Cough / Clear after Swallow;Effortful Swallow;Multiple Swallows   Oral / Pharyngeal / Laryngeal Exercises Base of Tongue Exercises;Pharyngeal Constriction Exercises   Other Treatments rials of ice chips with sEMG   Interdisciplinary Plan of Care Collaboration   IDT Collaboration with  Nursing;Respiratory Therapist   Patient Position at End of Therapy In Bed;Call Light within Reach   Collaboration Comments transfer of care to nursing, CLOF with RT         Assessment    Pt used sEMG biofeedback to perform 27/30 effortful swallows with 1-2 ice chips per trial (approximately 50% of swallows were with ice chips and 50% of swallows were dry saliva swallows) to achieve 115% of his maximum for typical swallows (average was 48mV) (average peak measured at 84 mV for effortful swallows). Pt was given verbal encouragement throughout this session and was provided with education following each swallow that did not meet the target.  Pt with WOB noted as trials progressed, frequent breaks needed. Skin was clean and intact after removal of electrodes.      Isometric tongue exercises completed.  tongue strength measured at an average of 12 kPA over 3 trials.  tongue endurance measured at 6 kPA for an average of 1-3 seconds at 50% of the patients max strength.  Pt demonstrating difficulty  sustaining lingual press with pressure bulb for any length of time. Frequently attempting to push against teeth, with replacement of bulb to achieve proper placement throughout activity.      Strengths: Independent prior level of function, Motivated for self care and independence, Pleasant and cooperative, Supportive family, Willingly participates in therapeutic activities  Barriers: Aspiration risk, Decreased endurance, Difficulty following instructions, Impaired activity tolerance (dysarthria)    Plan    Continue strengthening exercises, effortful swallow, lingual press, Use of visual biofeedback to gauge performance helpful    Passport items to be completed:  Express basic needs, understand food/liquid recommendations, consistently follow swallow precautions, manage finances, manage medications, arrive to therapy appointments on time, complete daily memory log entries, solve problems related to safety situations, review education related to hospitalization, complete caregiver training     Speech Therapy Problems (Active)       Problem: Expression STGs       Dates: Start:  08/25/24         Goal: STG-Within one week, patient will implement clear speech strategies at the single word and short phrase (2-4 words) level to answer open ended questions with MIN cues       Dates: Start:  08/25/24         Goal Note filed on 08/27/24 1009 by Chante Raymundo MS,CCC-SLP       Will continue to target                 Problem: Problem Solving STGs       Dates: Start:  08/25/24         Goal: STG-Within one week, patient will follow 2- step directives during functional tasks with no more than 1 repetition.       Dates: Start:  08/25/24         Goal Note filed on 08/27/24 1009 by Chante Raymundo MS,CCC-SLP       Will continue to target.                 Problem: Speech/Swallowing LTGs       Dates: Start:  08/25/24         Goal: LTG-By discharge, patient will safely swallow Level 5 minced/moist textures and thin liquids with no overt  s/sx of pen/asp, MOD I for use of swallow strategies.        Dates: Start:  08/25/24            Goal: LTG-By discharge, patient will express wants and needs effectively to different communication partners, maintain safety and participate socially in functional living environment with SPV       Dates: Start:  08/25/24            Goal: LTG-By discharge, patient will solve basic problems by utilizing compensatory intervention for memory and problem-solving to allow for safe completion of daily activities with SPV in order to prepare for safe d/c home        Dates: Start:  08/25/24               Problem: Swallowing STGs       Dates: Start:  08/25/24         Goal: STG-Within one week, patient will participate in MBSS evaluation to determine safety of PO diet and/or appropriate POC for dysphagia management       Dates: Start:  08/25/24         Goal Note filed on 08/27/24 1009 by Chante Raymundo MS,CCC-SLP       Anticipate will schedule for end of this week.              Goal: STG-Within one week, patient will complete 75 swallows in 60 minutes in conjunction with sEMG for visual biofeedback       Dates: Start:  08/25/24         Goal Note filed on 08/27/24 1009 by Chante Raymundo MS,CCC-SLP       Initiated today, able to complete 30 swallows, will continue to target.

## 2024-08-27 NOTE — PROGRESS NOTES
Physical Medicine & Rehabilitation Progress Note    Encounter Date: 8/27/2024    Chief Complaint: Decreased mobility, dysarthria    Interval Events (Subjective):  Patient sitting up in room. He reports he is doing OK. Difficult to understand but sounds like he was able to work with ice chips today. Will follow-up with SLP. Discussed with patient that we would have IDT later today to discuss discharge planning.     _____________________________________  Interdisciplinary Team Conference   Most recent IDT on 8/27/2024    IAnastasiia M.D./Ph.D., was present and led the interdisciplinary team conference on 8/27/2024.  I led the IDT conference and agree with the IDT conference documentation and plan of care as noted below.     Nursing:  Diet Current Diet Order   Procedures    Diet NPO Restrict to: Sips with Medications (TURN TUBE FEED OFF AT MIDNIGHT)    Diet: Diet Tube Feed; Formula: Bolus Only (Provide 1/2 carton Glucerna 1.2 at first bolus feed and advance by 1/2 carton per feed until goal is reached. Goal is 6 cartons per day); Select Bolus (If Indicated): Glucerna 1.2 Carton; Bolus Frequency:...       Eating ADL Total Assist  Tube feed bolus, Staff administers tube feed/parenteral nutrition/IVF, Set-up of equipment or meal/tube feeding   % of Last Meal  Oral Nutrition: NPO at this Time   Sleep    Bowel Last BM: 08/26/24   Bladder Incontinent   Barriers to Discharge Home:  Very impulsive when needs to void    Physical Therapy:  Bed Mobility    Transfers Minimal Assist  Supervision for safety, Verbal cueing, Set-up of equipment, Initial preparation for task, Adaptive equipment (stand step wc <> EOM with FWW. Shayna for sequencing and hand placement. CGA for rise from chair.)   Mobility Contact Guard Assist   Stairs    Barriers to Discharge Home:  Inattention  Poor motor control on left  Difficulty pacing    Occupational Therapy:  Grooming Contact Guard Assist, Seated   Bathing Moderate Assist   UB  "Dressing Minimal Assist   LB Dressing Moderate Assist   Toileting Moderate Assist   Shower & Transfer    Barriers to Discharge Home:  Limited coordination  Cues to attend to left side    Speech-Language Pathology:  Comprehension:  Moderate Assist  Comprehension Description:  Verbal cues, Hearing aids/amplifiers, Glasses  Expression:  Moderate Assist  Expression Description:  Verbal cueing  Social Interaction:  Supervision  Social Interaction Description:  Increased time, Verbal cues  Problem Solving:  Maximal Assist  Problem Solving Description:  Verbal cueing, Therapy schedule, Bed/chair alarm, Increased time, Seat belt  Memory:  Maximal Assist  Memory Description:  Seat belt, Increased time, Verbal cueing, Bed/chair alarm, Therapy schedule  Barriers to Discharge Home:  Severe on [employer]    Respiratory Therapy:  O2 (LPM): 0  O2 Delivery Device: None - Room Air    Case Management:  Continues to work on disposition and DME needs.      Discharge Date/Disposition:  9/7/24  _____________________________________    Objective:  VITAL SIGNS: /70   Pulse 89   Temp 36.9 °C (98.4 °F) (Oral)   Resp 16   Ht 1.753 m (5' 9\")   Wt 60 kg (132 lb 4.4 oz)   SpO2 96%   BMI 19.53 kg/m²   Gen: NAD  Psych: Mood and affect appropriate  CV: RRR, 0 edema  Resp: CTAB, no upper airway sounds  Abd: NTND  Neuro: Severe dysarthria, following commands    Laboratory Values:  Recent Results (from the past 72 hour(s))   POCT glucose device results    Collection Time: 08/24/24  4:51 PM   Result Value Ref Range    POC Glucose, Blood 136 (H) 65 - 99 mg/dL   POCT glucose device results    Collection Time: 08/24/24 11:41 PM   Result Value Ref Range    POC Glucose, Blood 140 (H) 65 - 99 mg/dL   CBC with Differential    Collection Time: 08/25/24  5:26 AM   Result Value Ref Range    WBC 13.6 (H) 4.8 - 10.8 K/uL    RBC 4.49 (L) 4.70 - 6.10 M/uL    Hemoglobin 14.0 14.0 - 18.0 g/dL    Hematocrit 42.7 42.0 - 52.0 %    MCV 95.1 81.4 - 97.8 fL    MCH " 31.2 27.0 - 33.0 pg    MCHC 32.8 32.3 - 36.5 g/dL    RDW 48.5 35.9 - 50.0 fL    Platelet Count 94 (L) 164 - 446 K/uL    MPV 10.7 9.0 - 12.9 fL    Neutrophils-Polys 75.60 (H) 44.00 - 72.00 %    Lymphocytes 15.30 (L) 22.00 - 41.00 %    Monocytes 6.70 0.00 - 13.40 %    Eosinophils 1.30 0.00 - 6.90 %    Basophils 0.30 0.00 - 1.80 %    Immature Granulocytes 0.80 0.00 - 0.90 %    Nucleated RBC 0.00 0.00 - 0.20 /100 WBC    Neutrophils (Absolute) 10.30 (H) 1.82 - 7.42 K/uL    Lymphs (Absolute) 2.08 1.00 - 4.80 K/uL    Monos (Absolute) 0.91 (H) 0.00 - 0.85 K/uL    Eos (Absolute) 0.18 0.00 - 0.51 K/uL    Baso (Absolute) 0.04 0.00 - 0.12 K/uL    Immature Granulocytes (abs) 0.11 0.00 - 0.11 K/uL    NRBC (Absolute) 0.00 K/uL   Comp Metabolic Panel (CMP)    Collection Time: 08/25/24  5:26 AM   Result Value Ref Range    Sodium 146 (H) 135 - 145 mmol/L    Potassium 4.3 3.6 - 5.5 mmol/L    Chloride 113 (H) 96 - 112 mmol/L    Co2 21 20 - 33 mmol/L    Anion Gap 12.0 7.0 - 16.0    Glucose 158 (H) 65 - 99 mg/dL    Bun 44 (H) 8 - 22 mg/dL    Creatinine 1.27 0.50 - 1.40 mg/dL    Calcium 8.8 8.5 - 10.5 mg/dL    Correct Calcium 9.6 8.5 - 10.5 mg/dL    AST(SGOT) 90 (H) 12 - 45 U/L    ALT(SGPT) 84 (H) 2 - 50 U/L    Alkaline Phosphatase 86 30 - 99 U/L    Total Bilirubin 0.7 0.1 - 1.5 mg/dL    Albumin 3.0 (L) 3.2 - 4.9 g/dL    Total Protein 7.1 6.0 - 8.2 g/dL    Globulin 4.1 (H) 1.9 - 3.5 g/dL    A-G Ratio 0.7 g/dL   HEMOGLOBIN A1C    Collection Time: 08/25/24  5:26 AM   Result Value Ref Range    Glycohemoglobin 6.4 (H) 4.0 - 5.6 %    Est Avg Glucose 137 mg/dL   TSH with Reflex to FT4    Collection Time: 08/25/24  5:26 AM   Result Value Ref Range    TSH 0.608 0.380 - 5.330 uIU/mL   Vitamin D, 25-hydroxy (blood)    Collection Time: 08/25/24  5:26 AM   Result Value Ref Range    25-Hydroxy   Vitamin D 25 59 30 - 100 ng/mL   IMMATURE PLT FRACTION    Collection Time: 08/25/24  5:26 AM   Result Value Ref Range    Imm. Plt Fraction 3.2 0.6 - 13.1 %    ESTIMATED GFR    Collection Time: 08/25/24  5:26 AM   Result Value Ref Range    GFR (CKD-EPI) 57 (A) >60 mL/min/1.73 m 2   POCT glucose device results    Collection Time: 08/25/24  5:41 AM   Result Value Ref Range    POC Glucose, Blood 159 (H) 65 - 99 mg/dL   POCT glucose device results    Collection Time: 08/25/24 11:27 AM   Result Value Ref Range    POC Glucose, Blood 231 (H) 65 - 99 mg/dL   POCT glucose device results    Collection Time: 08/25/24  5:03 PM   Result Value Ref Range    POC Glucose, Blood 188 (H) 65 - 99 mg/dL   POCT glucose device results    Collection Time: 08/25/24  9:07 PM   Result Value Ref Range    POC Glucose, Blood 209 (H) 65 - 99 mg/dL   POCT glucose device results    Collection Time: 08/26/24  7:30 AM   Result Value Ref Range    POC Glucose, Blood 149 (H) 65 - 99 mg/dL   POCT glucose device results    Collection Time: 08/26/24 11:16 AM   Result Value Ref Range    POC Glucose, Blood 174 (H) 65 - 99 mg/dL   POCT glucose device results    Collection Time: 08/26/24  5:04 PM   Result Value Ref Range    POC Glucose, Blood 222 (H) 65 - 99 mg/dL   POCT glucose device results    Collection Time: 08/26/24  9:38 PM   Result Value Ref Range    POC Glucose, Blood 203 (H) 65 - 99 mg/dL   URINALYSIS    Collection Time: 08/27/24  5:08 AM    Specimen: Urine, Clean Catch   Result Value Ref Range    Color Yellow     Character Clear     Specific Gravity 1.031 <1.035    Ph 7.5 5.0 - 8.0    Glucose 250 (A) Negative mg/dL    Ketones Trace (A) Negative mg/dL    Protein Negative Negative mg/dL    Bilirubin Negative Negative    Urobilinogen, Urine 1.0 Negative    Nitrite Negative Negative    Leukocyte Esterase Negative Negative    Occult Blood Negative Negative    Micro Urine Req see below    CBC WITHOUT DIFFERENTIAL    Collection Time: 08/27/24  5:56 AM   Result Value Ref Range    WBC 14.9 (H) 4.8 - 10.8 K/uL    RBC 4.73 4.70 - 6.10 M/uL    Hemoglobin 14.7 14.0 - 18.0 g/dL    Hematocrit 45.3 42.0 - 52.0 %    MCV  95.8 81.4 - 97.8 fL    MCH 31.1 27.0 - 33.0 pg    MCHC 32.5 32.3 - 36.5 g/dL    RDW 49.3 35.9 - 50.0 fL    Platelet Count 82 (L) 164 - 446 K/uL    MPV 11.3 9.0 - 12.9 fL   Comp Metabolic Panel    Collection Time: 08/27/24  5:56 AM   Result Value Ref Range    Sodium 145 135 - 145 mmol/L    Potassium 4.7 3.6 - 5.5 mmol/L    Chloride 112 96 - 112 mmol/L    Co2 21 20 - 33 mmol/L    Anion Gap 12.0 7.0 - 16.0    Glucose 148 (H) 65 - 99 mg/dL    Bun 43 (H) 8 - 22 mg/dL    Creatinine 1.16 0.50 - 1.40 mg/dL    Calcium 8.5 8.5 - 10.5 mg/dL    Correct Calcium 9.2 8.5 - 10.5 mg/dL    AST(SGOT) 86 (H) 12 - 45 U/L    ALT(SGPT) 123 (H) 2 - 50 U/L    Alkaline Phosphatase 98 30 - 99 U/L    Total Bilirubin 0.5 0.1 - 1.5 mg/dL    Albumin 3.1 (L) 3.2 - 4.9 g/dL    Total Protein 6.9 6.0 - 8.2 g/dL    Globulin 3.8 (H) 1.9 - 3.5 g/dL    A-G Ratio 0.8 g/dL   proBrain Natriuretic Peptide, NT    Collection Time: 08/27/24  5:56 AM   Result Value Ref Range    NT-proBNP 1452 (H) 0 - 125 pg/mL   PROCALCITONIN    Collection Time: 08/27/24  5:56 AM   Result Value Ref Range    Procalcitonin 0.25 (H) <0.25 ng/mL   IMMATURE PLT FRACTION    Collection Time: 08/27/24  5:56 AM   Result Value Ref Range    Imm. Plt Fraction 4.7 0.6 - 13.1 %   ESTIMATED GFR    Collection Time: 08/27/24  5:56 AM   Result Value Ref Range    GFR (CKD-EPI) 64 >60 mL/min/1.73 m 2   POCT glucose device results    Collection Time: 08/27/24  7:13 AM   Result Value Ref Range    POC Glucose, Blood 127 (H) 65 - 99 mg/dL       Medications:  Scheduled Medications   Medication Dose Frequency    insulin lispro  2-12 Units 4X/DAY ACHS    metoprolol tartrate  62.5 mg BID    senna-docusate  2 Tablet BID    omeprazole  20 mg DAILY    apixaban  5 mg BID    dapagliflozin propanediol  10 mg DAILY    ezetimibe  10 mg DAILY    melatonin  6 mg Nightly    Non Formulary Request  10 mg DAILY    vitamin D3  2,000 Units DAILY     PRN medications: Respiratory Therapy Consult, hydrALAZINE, acetaminophen,  senna-docusate **AND** polyethylene glycol/lytes, docusate sodium, magnesium hydroxide, carboxymethylcellulose, benzocaine-menthol, mag hydrox-al hydrox-simeth, ondansetron **OR** ondansetron, traZODone, sodium chloride, oxyCODONE immediate-release **OR** oxyCODONE immediate-release, traMADol, midazolam, cyclobenzaprine, diclofenac sodium, [DISCONTINUED] insulin regular **AND** POC blood glucose manual result **AND** NOTIFY MD and PharmD **AND** Administer 20 grams of glucose (approximately 8 ounces of fruit juice) every 15 minutes PRN FSBG less than 70 mg/dL **AND** dextrose bolus    Diet:  Current Diet Order   Procedures    Diet NPO Restrict to: Sips with Medications (TURN TUBE FEED OFF AT MIDNIGHT)    Diet: Diet Tube Feed; Formula: Bolus Only (Provide 1/2 carton Glucerna 1.2 at first bolus feed and advance by 1/2 carton per feed until goal is reached. Goal is 6 cartons per day); Select Bolus (If Indicated): Glucerna 1.2 Carton; Bolus Frequency:...       Medical Decision Making and Plan:  R CVA - Patient with R CVA not a candidate for TNK per neurology. Restarted on Eliquis  -PT and OT for mobility and ADLs. Per guidelines, 15 hours per week between PT, OT and/or SLP.  -Follow-up Neurology stroke bridge      Lung adenocarcinoma - s/p Resection. Patient on Vericiguat which is an experimental medication.     Dysphagia - Severe dysphagia. PEG placed 8/23. NPO with tube feeds. Dietitian to consult. SLP for swallow. CXR on 8/26 without signs of aspiration pneumonia per my read.      HTN/sCHF - Patient on Farxiga 10 mg, Metoprolol 50 mg BID and Spironolactone. EF of 35%  -Consult hospitalist.  Metoprolol increased to 62.5 mg and spironolactone discontinued. SBP into 100s HR into 80s, continue Metoprolol 62.5 mg     HLD - Patient on Atorvastatin 20 mg QHS and Zetia 10 mg daily.      DM2 with hyperglycemia - Will start SSI. Consult hospitalist. Blood sugars into 200s, Continue SSI     CKD3a - Avoid nephrotoxic agents.  Check AM CMP - Cr 1.27, BUN 44, will monitor. IVF on 8/27/24    Hypernatremia - 146 on admission, consult hospitalist     Leukocytosis - Check AM CBC -13.6 on admission down from 15.9, consult hospitalist     Thrombocytopenia - Check AM CBC - 94 on admission, will monitor     Pain - Patient on APAP and Tramadol PRN     Skin - Patient at risk for skin breakdown due to debility in areas including sacrum, achilles, elbows and head in addition to other sites. Nursing to assess skin daily.      GI Ppx - Patient on Prilosec for GERD prophylaxis. Patient on Senna-docusate for constipation prophylaxis.      DVT Ppx - Patient Eliquis on transfer.  ____________________________________    T. Tim Maciel MD/PhD  [facility] - Physical Medicine & Rehabilitation   [facility] - Brain Injury Medicine   ____________________________________    Total time:  50 minutes. Time spent included pre-rounding review of vitals and tests, unit/floor time, face-to-face time with the patient including physical examination, care coordination, counseling of patient and/or family, ordering medications/procedures/tests, discussion with CM, PT, OT, SLP and/or other healthcare providers, and documentation in the electronic medical record. Topics discussed included dehydration, give IV fluids, increase free water to 350, continue BB and discharge planning. Patient was discussed separately in IDT today; please see details above.

## 2024-08-27 NOTE — CARE PLAN
The patient is Stable - Low risk of patient condition declining or worsening    Shift Goals  Clinical Goals: safety  Patient Goals: sleep well  Family Goals: Education    Progress made toward(s) clinical / shift goals:    Problem: Fall Risk - Rehab  Goal: Patient will remain free from falls  Outcome: Progressing. Patient bed in lowest locked position with bed alarm on and call light within reach. Patient calls appropriately with call light appropriately and has not attempted to self transfer over shift.      Problem: Pain - Standard  Goal: Alleviation of pain or a reduction in pain to the patient’s comfort goal  Outcome: Progressing. Patient denies having any pain over shift. Patient has prn tylenol available for pain if needed.

## 2024-08-27 NOTE — CARE PLAN
The patient is Watcher - Medium risk of patient condition declining or worsening    Shift Goals  Clinical Goals: Safety, monitor peg tube  Patient Goals: Safety  Family Goals: Education    Progress made toward(s) clinical / shift goals:    Problem: Hemodynamics  Goal: Patient's hemodynamics, fluid balance and neurologic status will be stable or improve    Labs monitored  Outcome: Progressing  Note:    Latest Reference Range & Units 08/27/24 05:56   WBC 4.8 - 10.8 K/uL 14.9 (H)   RBC 4.70 - 6.10 M/uL 4.73   Hemoglobin 14.0 - 18.0 g/dL 14.7   Hematocrit 42.0 - 52.0 % 45.3   MCV 81.4 - 97.8 fL 95.8   MCH 27.0 - 33.0 pg 31.1   MCHC 32.3 - 36.5 g/dL 32.5   RDW 35.9 - 50.0 fL 49.3   Platelet Count 164 - 446 K/uL 82 (L)   MPV 9.0 - 12.9 fL 11.3   Imm. Plt Fraction 0.6 - 13.1 % 4.7   Sodium 135 - 145 mmol/L 145   Potassium 3.6 - 5.5 mmol/L 4.7   Chloride 96 - 112 mmol/L 112   Co2 20 - 33 mmol/L 21   Anion Gap 7.0 - 16.0  12.0   Glucose 65 - 99 mg/dL 148 (H)   Bun 8 - 22 mg/dL 43 (H)   Creatinine 0.50 - 1.40 mg/dL 1.16   GFR (CKD-EPI) >60 mL/min/1.73 m 2 64   Calcium 8.5 - 10.5 mg/dL 8.5   Correct Calcium 8.5 - 10.5 mg/dL 9.2   AST(SGOT) 12 - 45 U/L 86 (H)   ALT(SGPT) 2 - 50 U/L 123 (H)   Alkaline Phosphatase 30 - 99 U/L 98   Total Bilirubin 0.1 - 1.5 mg/dL 0.5   Albumin 3.2 - 4.9 g/dL 3.1 (L)   Total Protein 6.0 - 8.2 g/dL 6.9   Globulin 1.9 - 3.5 g/dL 3.8 (H)   A-G Ratio g/dL 0.8   NT-proBNP 0 - 125 pg/mL 1452 (H)   Procalcitonin <0.25 ng/mL 0.25 (H)      Problem: Nutrition  Goal: Patient's nutritional and fluid intake will be adequate or improve  Outcome: Progressing  Flowsheets  Taken 8/27/2024 1525 by Leila Morel RMOIZ  Free Water: 250 mL  Taken 8/26/2024 1800 by Vannessa Polk C.N.A.  Oral Nutrition: NPO at this Time  Taken 8/26/2024 1328 by Vannessa Polk C.N.A.  P.O.: (2 ice cubes) --  Taken 8/25/2024 1600 by Lb Trimble R.NHeladio  Medications (PO/Enteral Liquids): 200 ml  Note: Cartons  available for bolus feedings.       Patient is not progressing towards the following goals:

## 2024-08-27 NOTE — THERAPY
Occupational Therapy  Daily Treatment     Patient Name: Pito Black  Age:  79 y.o., Sex:  male  Medical Record #: [id number]  Today's Date: 8/27/2024     Precautions  Precautions: (P) Fall Risk, PEG Tube, Swallow Precautions  Comments: (P) [place], dysarthria, lower partial dentures, bilateral HAs         Subjective    Pt encountered for OT in todd's position in bed with wife at bedside. Pleasant and agreeable to participate.      Objective       08/27/24 1301   OT Charge Group   OT Therapeutic Exercise (Units) 2   OT Total Time Spent   OT Individual Total Time Spent (Mins) 30   Precautions   Precautions Fall Risk;PEG Tube;Swallow Precautions   Comments [place], dysarthria, lower partial dentures, bilateral HAs   Functional Level of Assist   Bed, Chair, Wheelchair Transfer Contact Guard Assist   Bed Chair Wheelchair Transfer Description Adaptive equipment;Increased time;Initial preparation for task;Supervision for safety;Verbal cueing  (stand step using FWW)   Hand Strengthening   Hand Strengthening Left Pinch;Right Pinch;Gross Grasp Right;Gross Grasp Left;Theraputty (Comment on Resistance)  (strength testing: R gross = 54#; R key pinch = 13#; L gross = 27#; L key pinch = 4#)   Comment Yellow TP: Bilateral gross grasp, x10; pinch and pull, x10; hid and seek with 5 beads; Moderate difficulty with pinch/pull using the L hand; VC needed to use the L hand duirng BC tasks   Outcome Measures   Outcome Measures Utilized Box and Blocks Test   Box and Blocks Test   Right hand blocks moved in 60 seconds 24   Left hand blocks moved in 60 seconds 14   Interdisciplinary Plan of Care Collaboration   IDT Collaboration with  Family / Caregiver;Occupational Therapist   Patient Position at End of Therapy Seated;Other (Comments)  (hand off to OT in main gym)   Collaboration Comments CLOF/POC       Assessment    Pt tolerated session well focused standardized assessment testing to determine baseline values for fine motor strength and  bilateral coordination. Pt is R hand dominant and presents with significant deficits in /pinch strength and unilateral dexterity compared to the contralateral hand. Pt provided TP exercises to perform in between sessions to facilitate progress. Pt with fair understanding via return demo and would benefit from review for carryover.           Strengths: Independent prior level of function, Motivated for self care and independence, Pleasant and cooperative, Supportive family, Willingly participates in therapeutic activities  Barriers: Generalized weakness, Impaired activity tolerance, Impaired balance, Impaired functional cognition    Plan    Cont to address ADLs, functional transfers (using FWW), neuro re-ed/balance, and thera act/ex to maximize functional recovery for safe DC home.     DME       Passport items to be completed:  Perform bathroom transfers, complete dressing, complete feeding, get ready for the day, prepare a simple meal, participate in household tasks, adapt home for safety needs, demonstrate home exercise program, complete caregiver training     Occupational Therapy Goals (Active)       Problem: Bathing       Dates: Start:  08/25/24         Goal: STG-Within one week, patient will bathe min A       Dates: Start:  08/25/24               Problem: Dressing       Dates: Start:  08/25/24         Goal: STG-Within one week, patient will dress LB min A       Dates: Start:  08/25/24            Goal: STG-Within one week, patient will dress UB at SBA using LRD       Dates: Start:  08/27/24               Problem: OT Long Term Goals       Dates: Start:  08/25/24         Goal: LTG-By discharge, patient will complete basic self care tasks I-supv       Dates: Start:  08/25/24            Goal: LTG-By discharge, patient will perform bathroom transfers I-supv       Dates: Start:  08/25/24               Problem: Toileting       Dates: Start:  08/25/24         Goal: STG-Within one week, patient will complete toileting  tasks min A       Dates: Start:  08/25/24

## 2024-08-27 NOTE — PROGRESS NOTES
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  Allergies: Patient has no known allergies.    Safety  Patient Assist  Min assist  Patient Precautions  Fall Risk, PEG Tube, Swallow Precautions  Precaution Comments  [place], dysarthria, lower partial dentures, bilateral HAs  Bed Transfer Status  Minimal Assist  Toilet Transfer Status   Contact Guard Assist  Assistive Devices  Rails, Wheelchair  Oxygen  Room air w/o2 available  Diet/Therapeutic Dining  Current Diet Order   Procedures    Diet NPO Restrict to: Sips with Medications (TURN TUBE FEED OFF AT MIDNIGHT)    Diet: Diet Tube Feed; Formula: Bolus Only (Provide 1/2 carton Glucerna 1.2 at first bolus feed and advance by 1/2 carton per feed until goal is reached. Goal is 6 cartons per day); Select Bolus (If Indicated): Glucerna 1.2 Carton; Bolus Frequency:...     Pill Administration  crushed  Agitated Behavioral Scale  15  ABS Level of Severity  No Agitation    Fall Risk  Has the patient had a fall this admission?   No  Yandy Minaya Fall Risk Scoring  18, HIGH RISK  Fall Risk Safety Measures  bed alarm and chair alarm    Vitals  Temperature: 37.1 °C (98.8 °F)  Temp src: Oral  Pulse: (!) 104  Respiration: 16  Blood Pressure : 114/76  Blood Pressure MAP (Calculated): 89 MM HG  BP Location: Right, Upper Arm  Patient BP Position: Sitting     Oxygen  Pulse Oximetry: 94 %  O2 (LPM): 0  O2 Delivery Device: Room air w/o2 available    Bowel and Bladder  Last Bowel Movement  08/26/24  Stool Type  Type 6: Fluffy pieces with ragged edges, a mushy stool  Bowel Device     Continent  Bladder: Did not void   Bowel: No movement  Bladder Function  Urine Void (mL): 400 ml  Number of Times Voided: 1  Urine Color: Unable To Evaluate  Genitourinary Assessment   Bladder Assessment (WDL):   Within Defined Limits  Urine Color: Unable To Evaluate  Bladder Scan: Post Void  $ Bladder Scan Results (mL): 16    Skin  Evan Score   16  Sensory Interventions   Bed Types: Standard/Trauma Mattress  Moisture Interventions  Moisturizers/Barriers: Moisturizer       Pain  Pain Rating Scale  0 - No Pain  Pain Location     Pain Location Orientation     Pain Interventions   Declines    ADLs    Bathing      Linen Change   Partial  Personal Hygiene  Change Nicolasa Pads, Perineal Care, Moist Nicolasa Wipes  Chlorhexidine Bath      Oral Care  Brushed Teeth  Teeth/Dentures     Shave   (Done by family )  Nutrition Percentage Eaten  NPO at this Time  Environmental Precautions  Treaded Slipper Socks on Patient, Bed in Low Position  Patient Turns/Positioning  Patient Turns Self from Side to Side  Patient Turns Assistance/Tolerance     Bed Positions  Bed Controls On, Bed Locked  Head of Bed Elevated  Self regulated      Psychosocial/Neurologic Assessment  Psychosocial Assessment  Psychosocial (WDL):  Within Defined Limits  Neurologic Assessment  Neuro (WDL): Exceptions to WDL  Level of Consciousness: Alert  Orientation Level: Disoriented to time  Cognition: Follows commands  Speech: Delayed responses  Pupil Assesment: No  EENT (WDL):  WDL Except    Cardio/Pulmonary Assessment  Edema      Respiratory Breath Sounds  RUL Breath Sounds: Clear  RML Breath Sounds: Clear  RLL Breath Sounds: Diminished  MARIA M Breath Sounds: Clear  LLL Breath Sounds: Diminished  Cardiac Assessment   Cardiac (WDL):  WDL Except (CHF)

## 2024-08-28 ENCOUNTER — APPOINTMENT (OUTPATIENT)
Dept: SPEECH THERAPY | Facility: REHABILITATION | Age: 79
DRG: 057 | End: 2024-08-28
Attending: PHYSICAL MEDICINE & REHABILITATION
Payer: MEDICARE

## 2024-08-28 ENCOUNTER — APPOINTMENT (OUTPATIENT)
Dept: PHYSICAL THERAPY | Facility: REHABILITATION | Age: 79
DRG: 057 | End: 2024-08-28
Attending: PHYSICAL MEDICINE & REHABILITATION
Payer: MEDICARE

## 2024-08-28 ENCOUNTER — APPOINTMENT (OUTPATIENT)
Dept: OCCUPATIONAL THERAPY | Facility: REHABILITATION | Age: 79
DRG: 057 | End: 2024-08-28
Attending: PHYSICAL MEDICINE & REHABILITATION
Payer: MEDICARE

## 2024-08-28 LAB
GLUCOSE BLD STRIP.AUTO-MCNC: 136 MG/DL (ref 65–99)
GLUCOSE BLD STRIP.AUTO-MCNC: 140 MG/DL (ref 65–99)
GLUCOSE BLD STRIP.AUTO-MCNC: 167 MG/DL (ref 65–99)
GLUCOSE BLD STRIP.AUTO-MCNC: 171 MG/DL (ref 65–99)

## 2024-08-28 PROCEDURE — 94640 AIRWAY INHALATION TREATMENT: CPT

## 2024-08-28 PROCEDURE — 97112 NEUROMUSCULAR REEDUCATION: CPT

## 2024-08-28 PROCEDURE — 82962 GLUCOSE BLOOD TEST: CPT

## 2024-08-28 PROCEDURE — 700102 HCHG RX REV CODE 250 W/ 637 OVERRIDE(OP): Performed by: PHYSICAL MEDICINE & REHABILITATION

## 2024-08-28 PROCEDURE — 700102 HCHG RX REV CODE 250 W/ 637 OVERRIDE(OP): Performed by: HOSPITALIST

## 2024-08-28 PROCEDURE — A9270 NON-COVERED ITEM OR SERVICE: HCPCS | Performed by: HOSPITALIST

## 2024-08-28 PROCEDURE — 97116 GAIT TRAINING THERAPY: CPT

## 2024-08-28 PROCEDURE — 97535 SELF CARE MNGMENT TRAINING: CPT

## 2024-08-28 PROCEDURE — 94760 N-INVAS EAR/PLS OXIMETRY 1: CPT

## 2024-08-28 PROCEDURE — 99231 SBSQ HOSP IP/OBS SF/LOW 25: CPT | Performed by: HOSPITALIST

## 2024-08-28 PROCEDURE — A9270 NON-COVERED ITEM OR SERVICE: HCPCS | Performed by: PHYSICAL MEDICINE & REHABILITATION

## 2024-08-28 PROCEDURE — 92526 ORAL FUNCTION THERAPY: CPT

## 2024-08-28 PROCEDURE — 99232 SBSQ HOSP IP/OBS MODERATE 35: CPT | Performed by: PHYSICAL MEDICINE & REHABILITATION

## 2024-08-28 PROCEDURE — 97530 THERAPEUTIC ACTIVITIES: CPT

## 2024-08-28 PROCEDURE — 770010 HCHG ROOM/CARE - REHAB SEMI PRIVAT*

## 2024-08-28 RX ORDER — ALBUTEROL SULFATE 90 UG/1
2 INHALANT RESPIRATORY (INHALATION)
Status: DISCONTINUED | OUTPATIENT
Start: 2024-08-28 | End: 2024-09-05 | Stop reason: HOSPADM

## 2024-08-28 RX ADMIN — SENNOSIDES AND DOCUSATE SODIUM 2 TABLET: 50; 8.6 TABLET ORAL at 21:05

## 2024-08-28 RX ADMIN — OMEPRAZOLE 20 MG: 20 CAPSULE, DELAYED RELEASE ORAL at 09:00

## 2024-08-28 RX ADMIN — EZETIMIBE 10 MG: 10 TABLET ORAL at 09:00

## 2024-08-28 RX ADMIN — APIXABAN 5 MG: 5 TABLET, FILM COATED ORAL at 21:06

## 2024-08-28 RX ADMIN — SENNOSIDES AND DOCUSATE SODIUM 2 TABLET: 50; 8.6 TABLET ORAL at 09:00

## 2024-08-28 RX ADMIN — APIXABAN 5 MG: 5 TABLET, FILM COATED ORAL at 09:00

## 2024-08-28 RX ADMIN — ALBUTEROL SULFATE 2 PUFF: 90 AEROSOL, METERED RESPIRATORY (INHALATION) at 17:24

## 2024-08-28 RX ADMIN — Medication 6 MG: at 21:05

## 2024-08-28 RX ADMIN — INSULIN LISPRO 2 UNITS: 100 INJECTION, SOLUTION INTRAVENOUS; SUBCUTANEOUS at 11:18

## 2024-08-28 RX ADMIN — METOPROLOL TARTRATE 62.5 MG: 25 TABLET, FILM COATED ORAL at 21:05

## 2024-08-28 RX ADMIN — INSULIN LISPRO 2 UNITS: 100 INJECTION, SOLUTION INTRAVENOUS; SUBCUTANEOUS at 17:20

## 2024-08-28 RX ADMIN — ALBUTEROL SULFATE 2 PUFF: 90 AEROSOL, METERED RESPIRATORY (INHALATION) at 21:49

## 2024-08-28 RX ADMIN — DAPAGLIFLOZIN 10 MG: 10 TABLET, FILM COATED ORAL at 09:00

## 2024-08-28 RX ADMIN — METOPROLOL TARTRATE 62.5 MG: 25 TABLET, FILM COATED ORAL at 09:00

## 2024-08-28 RX ADMIN — Medication 2000 UNITS: at 09:00

## 2024-08-28 ASSESSMENT — ENCOUNTER SYMPTOMS
COUGH: 0
FEVER: 0
SHORTNESS OF BREATH: 0
CHILLS: 0
ABDOMINAL PAIN: 0
POLYDIPSIA: 0
VOMITING: 0
NAUSEA: 0
BRUISES/BLEEDS EASILY: 0
PALPITATIONS: 0
EYES NEGATIVE: 1

## 2024-08-28 ASSESSMENT — ACTIVITIES OF DAILY LIVING (ADL)
TUB_SHOWER_TRANSFER_DESCRIPTION: GRAB BAR;SHOWER BENCH;INCREASED TIME;SUPERVISION FOR SAFETY
BED_CHAIR_WHEELCHAIR_TRANSFER_DESCRIPTION: ADAPTIVE EQUIPMENT;INCREASED TIME;SET-UP OF EQUIPMENT;SUPERVISION FOR SAFETY;VERBAL CUEING
BED_CHAIR_WHEELCHAIR_TRANSFER_DESCRIPTION: ADAPTIVE EQUIPMENT;INCREASED TIME;SET-UP OF EQUIPMENT;SUPERVISION FOR SAFETY;VERBAL CUEING

## 2024-08-28 ASSESSMENT — GAIT ASSESSMENTS
DEVIATION: SHUFFLED GAIT
GAIT LEVEL OF ASSIST: CONTACT GUARD ASSIST
DISTANCE (FEET): 150
ASSISTIVE DEVICE: FRONT WHEEL WALKER

## 2024-08-28 ASSESSMENT — PAIN DESCRIPTION - PAIN TYPE: TYPE: ACUTE PAIN

## 2024-08-28 NOTE — FLOWSHEET NOTE
08/28/24 0837   Oxygen   O2 (LPM) 2  (Placed on 02 while working with SLP)   O2 Delivery Device Silicone Nasal Cannula

## 2024-08-28 NOTE — FLOWSHEET NOTE
08/27/24 1909   Events/Summary/Plan   Events/Summary/Plan DC oxygen after 25hr room air trial   Vital Signs   Pulse (!) 110   Respiration 16   Pulse Oximetry 95 %   $ Pulse Oximetry (Spot Check) Yes   Oxygen   O2 Delivery Device None - Room Air   Room Air Challenge Pass

## 2024-08-28 NOTE — CARE PLAN
The patient is Watcher - Medium risk of patient condition declining or worsening    Shift Goals  Clinical Goals: Safety, monitor peg tube  Patient Goals: Safety  Family Goals: Education    Progress made toward(s) clinical / shift goals:    Problem: Skin Integrity  Goal: Skin integrity is maintained or improved  Outcome: Progressing  Note: Patient's skin remains intact and free from new or accidental injury this shift.  Will continue to monitor.     Problem: Dysphagia  Goal: Dysphagia will improve  Note: Bolus feeding tolerated well, no signs of aspirations noted.      Problem: Risk for Aspiration  Goal: Patient's risk for aspiration will be absent or decrease  Outcome: Progressing     Problem: Urinary Elimination  Goal: Establish and maintain regular urinary output  Outcome: Progressing  Note: Continent of urine, OOB-BR.      Problem: Infection - Standard  Goal: Patient will remain free from infection  Outcome: Progressing  Note: Patient remains free from s/s infection; afebrile.  Will continue to monitor.       Patient is not progressing towards the following goals:

## 2024-08-28 NOTE — PROGRESS NOTES
Patient care assumed. Report received from [facility] ST[facility]LA [facility]. Patient is alert and calm, resting in bed. Call light and bedside table within reach. Will continue to monitor.

## 2024-08-28 NOTE — PROGRESS NOTES
"  Physical Medicine & Rehabilitation Progress Note    Encounter Date: 8/28/2024    Chief Complaint: Decreased mobility, dysarthria    Interval Events (Subjective):  Patient sitting up in room. He reports he is doing well with a thumbs up. Denies pain.     _____________________________________  Interdisciplinary Team Conference   Most recent IDT on 8/27/2024    Discharge Date/Disposition:  9/7/24  _____________________________________    Objective:  VITAL SIGNS: /52   Pulse (!) 110   Temp 36.6 °C (97.8 °F) (Oral)   Resp 16   Ht 1.753 m (5' 9\")   Wt 60 kg (132 lb 4.4 oz)   SpO2 97%   BMI 19.53 kg/m²   Gen: NAD  Psych: Mood and affect appropriate  CV: Irregularly irregular, 0 edema  Resp: CTAB, no upper airway sounds  Abd: NTND  Neuro: Severe dysarthria, following commands  Unchanged from 8/27/24    Laboratory Values:  Recent Results (from the past 72 hour(s))   POCT glucose device results    Collection Time: 08/25/24  5:03 PM   Result Value Ref Range    POC Glucose, Blood 188 (H) 65 - 99 mg/dL   POCT glucose device results    Collection Time: 08/25/24  9:07 PM   Result Value Ref Range    POC Glucose, Blood 209 (H) 65 - 99 mg/dL   POCT glucose device results    Collection Time: 08/26/24  7:30 AM   Result Value Ref Range    POC Glucose, Blood 149 (H) 65 - 99 mg/dL   POCT glucose device results    Collection Time: 08/26/24 11:16 AM   Result Value Ref Range    POC Glucose, Blood 174 (H) 65 - 99 mg/dL   POCT glucose device results    Collection Time: 08/26/24  5:04 PM   Result Value Ref Range    POC Glucose, Blood 222 (H) 65 - 99 mg/dL   POCT glucose device results    Collection Time: 08/26/24  9:38 PM   Result Value Ref Range    POC Glucose, Blood 203 (H) 65 - 99 mg/dL   URINALYSIS    Collection Time: 08/27/24  5:08 AM    Specimen: Urine, Clean Catch   Result Value Ref Range    Color Yellow     Character Clear     Specific Gravity 1.031 <1.035    Ph 7.5 5.0 - 8.0    Glucose 250 (A) Negative mg/dL    Ketones " Trace (A) Negative mg/dL    Protein Negative Negative mg/dL    Bilirubin Negative Negative    Urobilinogen, Urine 1.0 Negative    Nitrite Negative Negative    Leukocyte Esterase Negative Negative    Occult Blood Negative Negative    Micro Urine Req see below    CBC WITHOUT DIFFERENTIAL    Collection Time: 08/27/24  5:56 AM   Result Value Ref Range    WBC 14.9 (H) 4.8 - 10.8 K/uL    RBC 4.73 4.70 - 6.10 M/uL    Hemoglobin 14.7 14.0 - 18.0 g/dL    Hematocrit 45.3 42.0 - 52.0 %    MCV 95.8 81.4 - 97.8 fL    MCH 31.1 27.0 - 33.0 pg    MCHC 32.5 32.3 - 36.5 g/dL    RDW 49.3 35.9 - 50.0 fL    Platelet Count 82 (L) 164 - 446 K/uL    MPV 11.3 9.0 - 12.9 fL   Comp Metabolic Panel    Collection Time: 08/27/24  5:56 AM   Result Value Ref Range    Sodium 145 135 - 145 mmol/L    Potassium 4.7 3.6 - 5.5 mmol/L    Chloride 112 96 - 112 mmol/L    Co2 21 20 - 33 mmol/L    Anion Gap 12.0 7.0 - 16.0    Glucose 148 (H) 65 - 99 mg/dL    Bun 43 (H) 8 - 22 mg/dL    Creatinine 1.16 0.50 - 1.40 mg/dL    Calcium 8.5 8.5 - 10.5 mg/dL    Correct Calcium 9.2 8.5 - 10.5 mg/dL    AST(SGOT) 86 (H) 12 - 45 U/L    ALT(SGPT) 123 (H) 2 - 50 U/L    Alkaline Phosphatase 98 30 - 99 U/L    Total Bilirubin 0.5 0.1 - 1.5 mg/dL    Albumin 3.1 (L) 3.2 - 4.9 g/dL    Total Protein 6.9 6.0 - 8.2 g/dL    Globulin 3.8 (H) 1.9 - 3.5 g/dL    A-G Ratio 0.8 g/dL   proBrain Natriuretic Peptide, NT    Collection Time: 08/27/24  5:56 AM   Result Value Ref Range    NT-proBNP 1452 (H) 0 - 125 pg/mL   PROCALCITONIN    Collection Time: 08/27/24  5:56 AM   Result Value Ref Range    Procalcitonin 0.25 (H) <0.25 ng/mL   IMMATURE PLT FRACTION    Collection Time: 08/27/24  5:56 AM   Result Value Ref Range    Imm. Plt Fraction 4.7 0.6 - 13.1 %   ESTIMATED GFR    Collection Time: 08/27/24  5:56 AM   Result Value Ref Range    GFR (CKD-EPI) 64 >60 mL/min/1.73 m 2   POCT glucose device results    Collection Time: 08/27/24  7:13 AM   Result Value Ref Range    POC Glucose, Blood 127 (H)  65 - 99 mg/dL   POCT glucose device results    Collection Time: 08/27/24 11:16 AM   Result Value Ref Range    POC Glucose, Blood 189 (H) 65 - 99 mg/dL   POCT glucose device results    Collection Time: 08/27/24  5:25 PM   Result Value Ref Range    POC Glucose, Blood 177 (H) 65 - 99 mg/dL   POCT glucose device results    Collection Time: 08/27/24  8:57 PM   Result Value Ref Range    POC Glucose, Blood 209 (H) 65 - 99 mg/dL   POCT glucose device results    Collection Time: 08/28/24  7:07 AM   Result Value Ref Range    POC Glucose, Blood 136 (H) 65 - 99 mg/dL   POCT glucose device results    Collection Time: 08/28/24 11:10 AM   Result Value Ref Range    POC Glucose, Blood 167 (H) 65 - 99 mg/dL       Medications:  Scheduled Medications   Medication Dose Frequency    insulin lispro  2-12 Units 4X/DAY ACHS    metoprolol tartrate  62.5 mg BID    senna-docusate  2 Tablet BID    omeprazole  20 mg DAILY    apixaban  5 mg BID    dapagliflozin propanediol  10 mg DAILY    ezetimibe  10 mg DAILY    melatonin  6 mg Nightly    vitamin D3  2,000 Units DAILY     PRN medications: albuterol, Respiratory Therapy Consult, hydrALAZINE, acetaminophen, senna-docusate **AND** polyethylene glycol/lytes, docusate sodium, magnesium hydroxide, carboxymethylcellulose, benzocaine-menthol, mag hydrox-al hydrox-simeth, ondansetron **OR** ondansetron, traZODone, sodium chloride, oxyCODONE immediate-release **OR** oxyCODONE immediate-release, traMADol, midazolam, cyclobenzaprine, diclofenac sodium, [DISCONTINUED] insulin regular **AND** POC blood glucose manual result **AND** NOTIFY MD and PharmD **AND** Administer 20 grams of glucose (approximately 8 ounces of fruit juice) every 15 minutes PRN FSBG less than 70 mg/dL **AND** dextrose bolus    Diet:  Current Diet Order   Procedures    Diet NPO Restrict to: Sips with Medications (TURN TUBE FEED OFF AT MIDNIGHT)    Diet: Diet Tube Feed; Formula: Bolus Only (Provide 1/2 carton Glucerna 1.2 at first bolus  feed and advance by 1/2 carton per feed until goal is reached. Goal is 6 cartons per day); Select Bolus (If Indicated): Glucerna 1.2 Carton; Bolus Frequency:...       Medical Decision Making and Plan:  R CVA - Patient with R CVA not a candidate for TNK per neurology. Restarted on Eliquis  -PT and OT for mobility and ADLs. Per guidelines, 15 hours per week between PT, OT and/or SLP.  -Follow-up Neurology stroke bridge      Lung adenocarcinoma - s/p Resection. Patient on Vericiguat which is an experimental medication.     Dysphagia - Severe dysphagia. PEG placed 8/23. NPO with tube feeds. Dietitian to consult. SLP for swallow. CXR on 8/26 without signs of aspiration pneumonia per my read.      HTN/sCHF/A fib - Patient on Farxiga 10 mg, Metoprolol 50 mg BID and Spironolactone. EF of 35%  -Consult hospitalist.  Metoprolol increased to 62.5 mg and spironolactone discontinued. HR into 100s, continue Metoprolol 62.5 mg      HLD - Patient on Atorvastatin 20 mg QHS and Zetia 10 mg daily.      DM2 with hyperglycemia - Will start SSI. Consult hospitalist. Previously on Metformin, continue SSI for now     CKD3a - Avoid nephrotoxic agents. Check AM CMP - Cr 1.27, BUN 44, will monitor. IVF on 8/27/24    Hypernatremia - 146 on admission, consult hospitalist     Leukocytosis - Check AM CBC -13.6 on admission down from 15.9, consult hospitalist     Thrombocytopenia - Check AM CBC - 94 on admission, will monitor     Pain - Patient on APAP and Tramadol PRN     Skin - Patient at risk for skin breakdown due to debility in areas including sacrum, achilles, elbows and head in addition to other sites. Nursing to assess skin daily.      GI Ppx - Patient on Prilosec for GERD prophylaxis. Patient on Senna-docusate for constipation prophylaxis.      DVT Ppx - Patient Eliquis on transfer.  ____________________________________    T. Tim Maciel MD/PhD  [facility] - Physical Medicine & Rehabilitation   [facility] - Brain Injury Medicine    ____________________________________

## 2024-08-28 NOTE — THERAPY
Speech Language Pathology  Daily Treatment     Patient Name: Pito Black  Age:  79 y.o., Sex:  male  Medical Record #: [id number]  Today's Date: 8/28/2024     Precautions  Precautions: Fall Risk, PEG Tube, Swallow Precautions  Comments: [place], dysarthria, lower partial dentures, bilateral HAs    Subjective    Patient agreeable to therapy.       Objective       08/28/24 1001   Treatment Charges   SLP Swallowing Dysfunction Treatment Swallowing Dysfunction Treatment   SLP Total Time Spent   SLP Individual Total Time Spent (Mins) 30   Dysphagia    Other Treatments iopi   therapeutic trials of ice chips.         Assessment    Patient participated in lingual strengthening with Iopi he was able to generate average peak of 13 kPA.  Was able to sustain at peak for 2 seconds x 4.  Patient able to sustain at 6 kPA for 2 sec x 4 before fatiguing.   Patient displayed weak hyolaryngeal excursion for therapeutic single ice chip trials.  Patient displayed wet voice post swallow of 4/5 Ice chip trials with the first being the only clear voicing post trial.   No spontaneous coughing.  Patient was prompted to cough followed by saliva swallow with partial voice clearing after 4 of the trials.    Strengths: Independent prior level of function, Motivated for self care and independence, Pleasant and cooperative, Supportive family, Willingly participates in therapeutic activities  Barriers: Aspiration risk, Decreased endurance, Difficulty following instructions, Impaired activity tolerance (dysarthria)    Plan    Target use of clear speech strategies to improve intelligbility, initiate swallows in single ice chip trials.    Passport items to be completed:  Express basic needs, understand food/liquid recommendations, consistently follow swallow precautions, manage finances, manage medications, arrive to therapy appointments on time, complete daily memory log entries, solve problems related to safety situations, review education related  to hospitalization, complete caregiver training     Speech Therapy Problems (Active)       Problem: Expression STGs       Dates: Start:  08/25/24         Goal: STG-Within one week, patient will implement clear speech strategies at the single word and short phrase (2-4 words) level to answer open ended questions with MIN cues       Dates: Start:  08/25/24         Goal Note filed on 08/27/24 1009 by Chante Raymundo MS,CCC-SLP       Will continue to target                 Problem: Problem Solving STGs       Dates: Start:  08/25/24         Goal: STG-Within one week, patient will follow 2- step directives during functional tasks with no more than 1 repetition.       Dates: Start:  08/25/24         Goal Note filed on 08/27/24 1009 by Chante Raymundo MS,CCC-SLP       Will continue to target.                 Problem: Speech/Swallowing LTGs       Dates: Start:  08/25/24         Goal: LTG-By discharge, patient will safely swallow Level 5 minced/moist textures and thin liquids with no overt s/sx of pen/asp, MOD I for use of swallow strategies.        Dates: Start:  08/25/24            Goal: LTG-By discharge, patient will express wants and needs effectively to different communication partners, maintain safety and participate socially in functional living environment with SPV       Dates: Start:  08/25/24            Goal: LTG-By discharge, patient will solve basic problems by utilizing compensatory intervention for memory and problem-solving to allow for safe completion of daily activities with SPV in order to prepare for safe d/c home        Dates: Start:  08/25/24               Problem: Swallowing STGs       Dates: Start:  08/25/24         Goal: STG-Within one week, patient will participate in MBSS evaluation to determine safety of PO diet and/or appropriate POC for dysphagia management       Dates: Start:  08/25/24         Goal Note filed on 08/27/24 1009 by Chante Raymundo MS,CCC-SLP       Anticipate will schedule for end of  this week.              Goal: STG-Within one week, patient will complete 75 swallows in 60 minutes in conjunction with sEMG for visual biofeedback       Dates: Start:  08/25/24         Goal Note filed on 08/27/24 1009 by Chante Raymundo MS,CCC-SLP       Initiated today, able to complete 30 swallows, will continue to target.

## 2024-08-28 NOTE — PROGRESS NOTES
Hospital Medicine Daily Progress Note      Chief Complaint  Atrial Fibrillation  Congestive Heart Failure  Transaminitis  Leukocytosis    Interval Problem Update  No 24 hour clinical changes.    Review of Systems  Review of Systems   Constitutional:  Negative for chills and fever.   HENT: Negative.     Eyes: Negative.    Respiratory:  Negative for cough and shortness of breath.    Cardiovascular:  Negative for chest pain and palpitations.   Gastrointestinal:  Negative for abdominal pain, nausea and vomiting.   Musculoskeletal:         Wound pain   Skin:  Negative for itching and rash.   Endo/Heme/Allergies:  Negative for polydipsia. Does not bruise/bleed easily.        Physical Exam  Temp:  [36.4 °C (97.6 °F)-36.8 °C (98.2 °F)] 36.8 °C (98.2 °F)  Pulse:  [] 94  Resp:  [18-28] 18  BP: (109-125)/(55-71) 109/60  SpO2:  [94 %-99 %] 95 %    Physical Exam  Vitals reviewed.   Constitutional:       General: He is not in acute distress.     Appearance: Normal appearance. He is not ill-appearing.   HENT:      Head: Normocephalic and atraumatic.      Right Ear: External ear normal.      Left Ear: External ear normal.      Nose: Nose normal.      Mouth/Throat:      Pharynx: Oropharynx is clear.   Eyes:      General:         Right eye: No discharge.         Left eye: No discharge.      Extraocular Movements: Extraocular movements intact.      Conjunctiva/sclera: Conjunctivae normal.   Cardiovascular:      Rate and Rhythm: Normal rate and regular rhythm.   Pulmonary:      Effort: No respiratory distress.      Breath sounds: No wheezing.      Comments: Decreased BS  Abdominal:      General: Bowel sounds are normal. There is no distension.      Palpations: Abdomen is soft.      Tenderness: There is no abdominal tenderness. There is no guarding or rebound.      Comments: +TTP at PEG site   Musculoskeletal:      Cervical back: Normal range of motion and neck supple.      Right lower leg: No edema.      Left lower leg: No edema.    Skin:     General: Skin is warm and dry.   Neurological:      Mental Status: He is alert.      Comments: Awake         Fluids      Laboratory            Assessment/Plan  * Right middle cerebral artery stroke (HCC)- (present on admission)  Assessment & Plan  Has dysarthria and dysphagia  S/P PEG on 8/23/24 by Dr. Meadows  On Eliquis  Ongoing management per Physiatry    Thrombocytopenia (HCC)  Assessment & Plan  May be 2/2 meds  Possible culprits include Eliquis, Omeprazole, and Zetia  Follow PLT  Check F/U labs in AM     Leukocytosis  Assessment & Plan  AFVSS and non-toxic appearing  PCT 0.25  UA negative  CXR stable RUL/LLL masses  Follow Temp and WBC  No indication to initiate empiric antimicrobial therapy at this time    Transaminitis  Assessment & Plan  Lipitor discontinued  Follow LFT's    Type 2 diabetes mellitus with stage 3a chronic kidney disease, without long-term current use of insulin (HCC)- (present on admission)  Assessment & Plan  HbA1c 6.4  Observe serum glucose trends on Farxiga and SSI  Outpt meds include Farxiga and Metformin  mg qd    Chronic systolic heart failure (HCC)- (present on admission)  Assessment & Plan  H/O AICD  Echo 2/6/24 EF 45%, global hypokinesis, mod MR/AI  On Farxiga and Metoprolol  Aldactone discontinued for hypotension and azotemia  Follow BNP  CXR stable RUL/LLL masses    Primary lung adenocarcinoma (HCC)- (present on admission)  Assessment & Plan  S/P resection  On experimental drug  Outpt F/U    Hyperlipidemia associated with type 2 diabetes mellitus (HCC)- (present on admission)  Assessment & Plan  On Zetia    Azotemia- (present on admission)  Assessment & Plan  Discontinued Aldactone  S/P NS x 1 L  Follow renal function  Check F/U labs in AM     A-fib (HCC)  Assessment & Plan  Also has ZioPatch  On Metoprolol for rate control  Anticoagulated on Eliquis    Hypertension associated with type 2 diabetes mellitus (HCC)- (present on admission)  Assessment & Plan  Observe  blood pressure trends on Metoprolol  Aldactone discontinued for hypotension    DNR

## 2024-08-28 NOTE — CARE PLAN
The patient is Stable - Low risk of patient condition declining or worsening    Shift Goals  Clinical Goals: safety  Patient Goals: safety  Family Goals: Education    Problem: Fall Risk - Rehab  Goal: Patient will remain free from falls  Outcome: Progressing Pt uses call light consistently and appropriately. Waits for assistance does not attempt self transfer this shift. Able to verbalize needs.     Problem: Pain - Standard  Goal: Alleviation of pain or a reduction in pain to the patient’s comfort goal  Outcome: Progressing Patient able to verbalize pain level and verbalize an acceptable level of pain.

## 2024-08-28 NOTE — THERAPY
Physical Therapy   Daily Treatment     Patient Name: Pito Black  Age:  79 y.o., Sex:  male  Medical Record #: [id number]  Today's Date: 8/28/2024     Precautions  Precautions: Fall Risk, PEG Tube, Swallow Precautions  Comments: [place], dysarthria, lower partial dentures, bilateral HAs    Subjective    Pt in w/c in room and agreeable to participate in therapy.      Objective       08/28/24 1201   PT Charge Group   PT Gait Training (Units) 2   PT Neuromuscular Re-Education / Balance (Units) 1   PT Therapeutic Activities (Units) 1   PT Total Time Spent   PT Individual Total Time Spent (Mins) 60   Precautions   Precautions Fall Risk;PEG Tube;Swallow Precautions   Comments [place], dysarthria, lower partial dentures, bilateral HAs   Gait Functional Level of Assist    Gait Level Of Assist Contact Guard Assist   Assistive Device Front Wheel Walker   Distance (Feet) 150   # of Times Distance was Traveled 2   Deviation Shuffled Gait  (Drifts to the L side of walker, requires cueing for center of walker)   Transfer Functional Level of Assist   Bed, Chair, Wheelchair Transfer Contact Guard Assist   Bed Chair Wheelchair Transfer Description Adaptive equipment;Increased time;Set-up of equipment;Supervision for safety;Verbal cueing   Sitting Lower Body Exercises   Sit to Stand 3 sets of 10  (Monitoring O2 saturation)   Bed Mobility    Sit to Stand Standby Assist   Neuro-Muscular Treatments   Neuro-Muscular Treatments Biofeedback;Other (See Comments)   Comments walker proximity and sequencing for STS   Interdisciplinary Plan of Care Collaboration   IDT Collaboration with  Family / Caregiver   Patient Position at End of Therapy Seated;Chair Alarm On;Call Light within Reach;Tray Table within Reach;Family / Friend in Room   Collaboration Comments handoff to family in room/nursing notified for PEG tube     Session was focused on demonstrating L neglect to pt and then implementing compensatory strategies and working on walker proximity:      - Cone finding during ambulation with FWW (3/5 cones placed on L side, 2/5 cones placed on R side) x 2 bouts. 0/3 cones found on L side without cueing, 2/2 found on R side.     - Figure 8 obstacle negotiation using FWW 30' x 4 with cueing to not allow left side of body to leave the frame of the walker. Cueing to avoid obstacles on his L side.     Pt was on 1.5 L O2 in bed, but was saturating around 95% on RA with exercise.    Assessment    Pt has obvious deficits with L sided attention to self and environment. Was not able to find L sided objects until cued to scan environment to L, and has a proclivity to only attend to R side. Also requires assist for L hand placement on FWW. Pt receptive to education regarding L sided neglect and to compensatory strategies, but was unsuccessful in implementing scanning strategies upon further cone-finding attempts. Frequent cueing to not allow L side of body to leave frame of walker around turns, which pt was able to improve with multiple attempts.     Strengths: Able to follow instructions, Supportive family, Willingly participates in therapeutic activities  Barriers: Aphasia expressive, Hearing impairment, Generalized weakness, Impaired activity tolerance, Impaired balance, Impulsive    Plan    Gait training with FWW, progress AD as appropriate  Stair negotiation  Neglect assessment   BITS training for visual tracking and forced LUE use   Balance assessment/treatment   Obstacle negotiation   Collaborate with SLP for useful cueing for speech    DME       Passport items to be completed:  Get in/out of bed safely, in/out of a vehicle, safely use mobility device, walk or wheel around home/community, navigate up and down stairs, show how to get up/down from the ground, ensure home is accessible, demonstrate HEP, complete caregiver training    Physical Therapy Problems (Active)       Problem: Mobility       Dates: Start:  08/25/24         Goal: STG-Within one week, patient will  ambulate household distance 50ft x 4 with FWW SBA       Dates: Start:  08/25/24            Goal: STG-Within one week, patient will ascend and descend four to six stairs right rail min assist       Dates: Start:  08/25/24               Problem: Mobility Transfers       Dates: Start:  08/25/24         Goal: STG-Within one week, patient will transfer bed to chair SBA SPT       Dates: Start:  08/25/24               Problem: PT-Long Term Goals       Dates: Start:  08/25/24         Goal: LTG-By discharge, patient will ambulate 100ft x 4 with FWW mod I indoors       Dates: Start:  08/25/24            Goal: LTG-By discharge, patient will transfer one surface to another mod I SPT safely and consistently       Dates: Start:  08/25/24            Goal: LTG-By discharge, patient will perform home exercise program seated/standing for LE strengthening to be done 3x/day in safe, independent home environment       Dates: Start:  08/25/24            Goal: LTG-By discharge, patient will ambulate up/down 4-6 stairs with right rail with SPV       Dates: Start:  08/25/24            Goal: LTG-By discharge, patient will perform 5x STS in 30sec with SPV       Dates: Start:  08/25/24

## 2024-08-28 NOTE — THERAPY
Speech Language Pathology  Daily Treatment     Patient Name: Pito Black  Age:  79 y.o., Sex:  male  Medical Record #: [id number]  Today's Date: 8/28/2024     Precautions  Precautions: Fall Risk, PEG Tube, Swallow Precautions  Comments: [place], dysarthria, lower partial dentures, bilateral HAs    Subjective    Pt up in w/c agreeable to therapy. Vocal intensity continues to improve as well as speech intelligibility at the single word level.      Objective       08/28/24 0804   Treatment Charges   SLP Swallowing Dysfunction Treatment Swallowing Dysfunction Treatment   SLP Total Time Spent   SLP Individual Total Time Spent (Mins) 60   Dysphagia    Other Treatments sEMG with trials of ice chips   Interdisciplinary Plan of Care Collaboration   IDT Collaboration with  Respiratory Therapist   Patient Position at End of Therapy In Bed;Call Light within Reach   Collaboration Comments RT examining pt due to SOB, placed on 2L O2 via nasal canula         Assessment    Pt used sEMG biofeedback to perform 5/50 effortful swallows with 1-2 ice chips (approximately 66% of swallows were with ice chips and 33% of swallows were dry saliva swallows) to achieve 125% of his maximum for typical swallows (typical swallow measured at 85mV) (average peak measured at 81 mV for effortful swallows). Pt was given verbal encouragement throughout this session and was provided with education following each swallow that did not meet the target. Despite pt not meeting on majority of swallows, swallow coordination and timing much improved as evidenced by visual biofeedback of swallows. Less tongue pumping and extra movement noted prior to initiation of swallow (swallow pattern shown in a smooth peak this date). Pt demonstrating 3 instances of cough with trials, increased WOB. SPO2 measured with this SLP's pulse oximter at 87%, , RT informed, pt placed on 2L via nasal canula. At end of session, rechecked with SPO2 at 95%, .  Pt with cues  for deep breaths through nose, which he demonstrates difficulty doing, using mostly clavicular breathing pattern. Skin was clean and intact after removal of electrodes.        Strengths: Independent prior level of function, Motivated for self care and independence, Pleasant and cooperative, Supportive family, Willingly participates in therapeutic activities  Barriers: Aspiration risk, Decreased endurance, Difficulty following instructions, Impaired activity tolerance (dysarthria)    Plan    Continue intensive strengthening exercises for lingual, pharyngeal musculature, schedule MBSS for Friday    Passport items to be completed:  Express basic needs, understand food/liquid recommendations, consistently follow swallow precautions, manage finances, manage medications, arrive to therapy appointments on time, complete daily memory log entries, solve problems related to safety situations, review education related to hospitalization, complete caregiver training     Speech Therapy Problems (Active)       Problem: Expression STGs       Dates: Start:  08/25/24         Goal: STG-Within one week, patient will implement clear speech strategies at the single word and short phrase (2-4 words) level to answer open ended questions with MIN cues       Dates: Start:  08/25/24         Goal Note filed on 08/27/24 1009 by Chante Raymundo MS,CCC-SLP       Will continue to target                 Problem: Problem Solving STGs       Dates: Start:  08/25/24         Goal: STG-Within one week, patient will follow 2- step directives during functional tasks with no more than 1 repetition.       Dates: Start:  08/25/24         Goal Note filed on 08/27/24 1009 by Chante Raymundo MS,CCC-SLP       Will continue to target.                 Problem: Speech/Swallowing LTGs       Dates: Start:  08/25/24         Goal: LTG-By discharge, patient will safely swallow Level 5 minced/moist textures and thin liquids with no overt s/sx of pen/asp, MOD I for use of  swallow strategies.        Dates: Start:  08/25/24            Goal: LTG-By discharge, patient will express wants and needs effectively to different communication partners, maintain safety and participate socially in functional living environment with SPV       Dates: Start:  08/25/24            Goal: LTG-By discharge, patient will solve basic problems by utilizing compensatory intervention for memory and problem-solving to allow for safe completion of daily activities with SPV in order to prepare for safe d/c home        Dates: Start:  08/25/24               Problem: Swallowing STGs       Dates: Start:  08/25/24         Goal: STG-Within one week, patient will participate in MBSS evaluation to determine safety of PO diet and/or appropriate POC for dysphagia management       Dates: Start:  08/25/24         Goal Note filed on 08/27/24 1009 by Chante Raymundo MS,CCC-SLP       Anticipate will schedule for end of this week.              Goal: STG-Within one week, patient will complete 75 swallows in 60 minutes in conjunction with sEMG for visual biofeedback       Dates: Start:  08/25/24         Goal Note filed on 08/27/24 1009 by Chante Raymundo MS,CCC-SLP       Initiated today, able to complete 30 swallows, will continue to target.

## 2024-08-28 NOTE — DISCHARGE PLANNING
Case Management/IDT follow up.   Projected dc date:  IDT continues to recommend IRF level of care as patient continue to make progress with all therapies.     DC needs  Home health:  PT/OT/SLP/RN/CNA/SW  DME: MG  Family training: TBD     Provided family with care giver list.    I met with patient and family providing update from IDT and discussed plan of care.  They are in agreement w/ plan.     Plan:  Continue to follow

## 2024-08-28 NOTE — FLOWSHEET NOTE
08/28/24 0752   Events/Summary/Plan   Events/Summary/Plan 02 pulse ox check. RN requested RT check on patient   Vital Signs   Pulse (!) 110   Respiration 16   Pulse Oximetry 97 %   $ Pulse Oximetry (Spot Check) Yes   Respiratory Assessment   Level of Consciousness Alert   Chest Exam   Work Of Breathing / Effort Within Normal Limits   Oxygen   O2 Delivery Device Room air w/o2 available

## 2024-08-28 NOTE — PROGRESS NOTES
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  Allergies: Patient has no known allergies.    Safety  Patient Assist  min  Patient Precautions  Fall Risk, PEG Tube, Swallow Precautions  Precaution Comments  [place], dysarthria, lower partial dentures, bilateral HAs  Bed Transfer Status  Contact Guard Assist  Toilet Transfer Status   Contact Guard Assist  Assistive Devices  Rails, Wheelchair push  Oxygen  Room air w/o2 available  Diet/Therapeutic Dining  Current Diet Order   Procedures    Diet NPO Restrict to: Sips with Medications (TURN TUBE FEED OFF AT MIDNIGHT)    Diet: Diet Tube Feed; Formula: Bolus Only (Provide 1/2 carton Glucerna 1.2 at first bolus feed and advance by 1/2 carton per feed until goal is reached. Goal is 6 cartons per day); Select Bolus (If Indicated): Glucerna 1.2 Carton; Bolus Frequency:...     Pill Administration  Crushed through peg tube  Agitated Behavioral Scale  15  ABS Level of Severity  No Agitation    Fall Risk  Has the patient had a fall this admission?   No  Yandy Minaya Fall Risk Scoring  15, HIGH RISK  Fall Risk Safety Measures  bed alarm, chair alarm, and poor balance    Vitals  Temperature: 36.3 °C (97.4 °F)  Temp src: Axillary  Pulse: (!) 102  Respiration: 16  Blood Pressure : 130/58  Blood Pressure MAP (Calculated): 82 MM HG  BP Location: Upper Arm, Left  Patient BP Position: Prone     Oxygen  Pulse Oximetry: 96 %  O2 (LPM): 0  O2 Delivery Device: Room air w/o2 available    Bowel and Bladder  Last Bowel Movement  08/26/24  Stool Type  Type 6: Fluffy pieces with ragged edges, a mushy stool  Bowel Device     Continent  Bladder: Did not void   Bowel: No movement  Bladder Function  Urine Void (mL): 200 ml  Number of Times Voided: 1  Urine Color: Unable To Evaluate  Genitourinary  Assessment   Bladder Assessment (WDL):  Within Defined Limits  Gutierrez Catheter: Not Applicable  Urine Color: Unable To Evaluate  Bladder Device: Bathroom  Bladder Scan: Post Void  $ Bladder Scan Results (mL): 16    Skin  Evan Score   16  Sensory Interventions   Bed Types: Standard/Trauma Mattress  Moisture Interventions  Moisturizers/Barriers: Barrier Wipes      Pain  Pain Rating Scale  0 - No Pain  Pain Location  Abdomen  Pain Location Orientation  Anterior  Pain Interventions   Declines    ADLs    Bathing      Linen Change   Complete  Personal Hygiene  Change Nicolasa Pads, Perineal Care, Moist Nicolasa Wipes  Chlorhexidine Bath      Oral Care  Brushed Teeth  Teeth/Dentures     Shave   (Done by family )  Nutrition Percentage Eaten  Ice Chips  Environmental Precautions  Treaded Slipper Socks on Patient, Personal Belongings, Wastebasket, Call Bell etc. in Easy Reach, Transferred to Stronger Side, Bed in Low Position  Patient Turns/Positioning  Patient Turns Self from Side to Side  Patient Turns Assistance/Tolerance     Bed Positions  Bed Controls On, Bed Locked  Head of Bed Elevated  Self regulated      Psychosocial/Neurologic Assessment  Psychosocial Assessment  Psychosocial (WDL):  Within Defined Limits  Neurologic Assessment  Neuro (WDL): Exceptions to WDL  Level of Consciousness: Alert  Orientation Level: Oriented to place, Oriented to situation, Oriented to person, Disoriented to time  Cognition: Follows commands  Speech: Delayed responses  Pupil Assesment: No  Motor Function/Sensation Assessment: Motor strength  Muscle Strength Right Arm: Good Strength Against Gravity and Moderate Resistance  Muscle Strength Left Arm: Good Strength Against Gravity and Moderate Resistance  Muscle Strength Right Leg: Good Strength Against Gravity and Moderate Resistance  Muscle Strength Left Leg: Good Strength Against Gravity and Moderate Resistance  EENT (WDL):  WDL Except    Cardio/Pulmonary Assessment  Edema      Respiratory Breath  Sounds  RUL Breath Sounds: Clear  RML Breath Sounds: Clear  RLL Breath Sounds: Diminished  MARIA M Breath Sounds: Clear  LLL Breath Sounds: Diminished  Cardiac Assessment   Cardiac (WDL):  WDL Except

## 2024-08-28 NOTE — THERAPY
Occupational Therapy  Daily Treatment     Patient Name: Pito Black  Age:  79 y.o., Sex:  male  Medical Record #: [id number]  Today's Date: 8/28/2024     Precautions  Precautions: Fall Risk, PEG Tube, Swallow Precautions  Comments: [place], dysarthria, lower partial dentures, bilateral HAs         Subjective    Pt encountered for OT supine in bed asleep, upon awaking pt pleasant and agreeable to participate.      Objective       08/28/24 0701   OT Charge Group   OT Self Care / ADL (Units) 4   OT Total Time Spent   OT Individual Total Time Spent (Mins) 60   Precautions   Precautions Fall Risk;PEG Tube;Swallow Precautions   Comments [place], dysarthria, lower partial dentures, bilateral HAs   Vitals   Pulse Oximetry 98 %   O2 Delivery Device None - Room Air   Functional Level of Assist   Bathing Minimal Assist   Bathing Description Adaptive equipment;Grab bar;Hand held shower;Assit with back;Increased time;Set-up of equipment;Set up for wound protection;Supervision for safety   Upper Body Dressing Minimal Assist   Upper Body Dressing Description Assit with threading arms through sleeves;Assist with pulling shirt over head;Increased time;Set-up of equipment;Supervision for safety   Lower Body Dressing Minimal Assist   Lower Body Dressing Description Grab bar;Increased time;Set-up of equipment;Supervision for safety  (Shae to thread LLE and L sock. CGA to SBA for standing balance while pulling up brief/pants.)   Bed, Chair, Wheelchair Transfer Contact Guard Assist   Bed Chair Wheelchair Transfer Description Adaptive equipment;Increased time;Set-up of equipment;Supervision for safety;Verbal cueing  (SPT using FWW)   Tub / Shower Transfers Contact Guard Assist   Tub Shower Transfer Description Grab bar;Shower bench;Increased time;Supervision for safety  (FWW approach)   Interdisciplinary Plan of Care Collaboration   IDT Collaboration with  Nursing;Respiratory Therapist   Patient Position at End of Therapy Seated;Chair Alarm  On;Call Light within Reach;Tray Table within Reach;Phone within Reach   Collaboration Comments RN fro peg tub dressing change post shower; RT RE increase in wheezing noted during shower.     Functional mobility from bed <> shower using FWW at CGA to SBA.     Assessment    Pt tolerated AM ADLs well, but was noted to present with effortful breathing and wheezing which was a change in condition from last session. RN/RT notified and assessed. Pt progressed well towards his bathing and dressing goals as he improved to [place] for LBD and bathing requiring assistance for the L>R d/t impaired mobility and LUE strength.       Strengths: Independent prior level of function, Motivated for self care and independence, Pleasant and cooperative, Supportive family, Willingly participates in therapeutic activities  Barriers: Generalized weakness, Impaired activity tolerance, Impaired balance, Impaired functional cognition    Plan    Cont to address ADLs, functional transfers using FWW, neuro re-ed/balance, and thera act/ex to maximize functional recovery for safe DC home.     DME       Passport items to be completed:  Perform bathroom transfers, complete dressing, complete feeding, get ready for the day, prepare a simple meal, participate in household tasks, adapt home for safety needs, demonstrate home exercise program, complete caregiver training     Occupational Therapy Goals (Active)       Problem: Bathing       Dates: Start:  08/25/24         Goal: STG-Within one week, patient will bathe min A       Dates: Start:  08/25/24               Problem: Dressing       Dates: Start:  08/25/24         Goal: STG-Within one week, patient will dress LB min A       Dates: Start:  08/25/24            Goal: STG-Within one week, patient will dress UB at SBA using LRD       Dates: Start:  08/27/24               Problem: OT Long Term Goals       Dates: Start:  08/25/24         Goal: LTG-By discharge, patient will complete basic self care tasks  I-supv       Dates: Start:  08/25/24            Goal: LTG-By discharge, patient will perform bathroom transfers I-supv       Dates: Start:  08/25/24               Problem: Toileting       Dates: Start:  08/25/24         Goal: STG-Within one week, patient will complete toileting tasks min A       Dates: Start:  08/25/24

## 2024-08-29 ENCOUNTER — APPOINTMENT (OUTPATIENT)
Dept: PHYSICAL THERAPY | Facility: REHABILITATION | Age: 79
DRG: 057 | End: 2024-08-29
Attending: PHYSICAL MEDICINE & REHABILITATION
Payer: MEDICARE

## 2024-08-29 ENCOUNTER — APPOINTMENT (OUTPATIENT)
Dept: SPEECH THERAPY | Facility: REHABILITATION | Age: 79
DRG: 057 | End: 2024-08-29
Attending: PHYSICAL MEDICINE & REHABILITATION
Payer: MEDICARE

## 2024-08-29 ENCOUNTER — APPOINTMENT (OUTPATIENT)
Dept: OCCUPATIONAL THERAPY | Facility: REHABILITATION | Age: 79
DRG: 057 | End: 2024-08-29
Attending: PHYSICAL MEDICINE & REHABILITATION
Payer: MEDICARE

## 2024-08-29 ENCOUNTER — APPOINTMENT (OUTPATIENT)
Dept: RADIOLOGY | Facility: REHABILITATION | Age: 79
DRG: 057 | End: 2024-08-29
Attending: HOSPITALIST
Payer: MEDICARE

## 2024-08-29 ENCOUNTER — APPOINTMENT (OUTPATIENT)
Dept: RADIOLOGY | Facility: REHABILITATION | Age: 79
DRG: 057 | End: 2024-08-29
Attending: PHYSICAL MEDICINE & REHABILITATION
Payer: MEDICARE

## 2024-08-29 PROBLEM — R10.9 ABDOMINAL PAIN: Status: ACTIVE | Noted: 2024-08-29

## 2024-08-29 LAB
ANION GAP SERPL CALC-SCNC: 13 MMOL/L (ref 7–16)
BUN SERPL-MCNC: 34 MG/DL (ref 8–22)
CALCIUM SERPL-MCNC: 8.7 MG/DL (ref 8.5–10.5)
CHLORIDE SERPL-SCNC: 107 MMOL/L (ref 96–112)
CO2 SERPL-SCNC: 23 MMOL/L (ref 20–33)
CREAT SERPL-MCNC: 1.22 MG/DL (ref 0.5–1.4)
ERYTHROCYTE [DISTWIDTH] IN BLOOD BY AUTOMATED COUNT: 50.5 FL (ref 35.9–50)
GFR SERPLBLD CREATININE-BSD FMLA CKD-EPI: 60 ML/MIN/1.73 M 2
GLUCOSE BLD STRIP.AUTO-MCNC: 127 MG/DL (ref 65–99)
GLUCOSE BLD STRIP.AUTO-MCNC: 141 MG/DL (ref 65–99)
GLUCOSE BLD STRIP.AUTO-MCNC: 167 MG/DL (ref 65–99)
GLUCOSE BLD STRIP.AUTO-MCNC: 178 MG/DL (ref 65–99)
GLUCOSE SERPL-MCNC: 151 MG/DL (ref 65–99)
HCT VFR BLD AUTO: 41.7 % (ref 42–52)
HGB BLD-MCNC: 13.2 G/DL (ref 14–18)
MCH RBC QN AUTO: 30.6 PG (ref 27–33)
MCHC RBC AUTO-ENTMCNC: 31.7 G/DL (ref 32.3–36.5)
MCV RBC AUTO: 96.5 FL (ref 81.4–97.8)
NT-PROBNP SERPL IA-MCNC: 2828 PG/ML (ref 0–125)
PLATELET # BLD AUTO: 76 K/UL (ref 164–446)
PLATELETS.RETICULATED NFR BLD AUTO: 6.1 % (ref 0.6–13.1)
PMV BLD AUTO: 11.6 FL (ref 9–12.9)
POTASSIUM SERPL-SCNC: 4.4 MMOL/L (ref 3.6–5.5)
RBC # BLD AUTO: 4.32 M/UL (ref 4.7–6.1)
SODIUM SERPL-SCNC: 143 MMOL/L (ref 135–145)
WBC # BLD AUTO: 15.4 K/UL (ref 4.8–10.8)

## 2024-08-29 PROCEDURE — A9270 NON-COVERED ITEM OR SERVICE: HCPCS | Performed by: PHYSICAL MEDICINE & REHABILITATION

## 2024-08-29 PROCEDURE — 97530 THERAPEUTIC ACTIVITIES: CPT

## 2024-08-29 PROCEDURE — 700102 HCHG RX REV CODE 250 W/ 637 OVERRIDE(OP): Performed by: HOSPITALIST

## 2024-08-29 PROCEDURE — 82962 GLUCOSE BLOOD TEST: CPT | Mod: 91

## 2024-08-29 PROCEDURE — 99232 SBSQ HOSP IP/OBS MODERATE 35: CPT | Performed by: HOSPITALIST

## 2024-08-29 PROCEDURE — 92526 ORAL FUNCTION THERAPY: CPT

## 2024-08-29 PROCEDURE — 97116 GAIT TRAINING THERAPY: CPT | Mod: CQ

## 2024-08-29 PROCEDURE — 74018 RADEX ABDOMEN 1 VIEW: CPT

## 2024-08-29 PROCEDURE — 85055 RETICULATED PLATELET ASSAY: CPT

## 2024-08-29 PROCEDURE — 80048 BASIC METABOLIC PNL TOTAL CA: CPT

## 2024-08-29 PROCEDURE — 83880 ASSAY OF NATRIURETIC PEPTIDE: CPT

## 2024-08-29 PROCEDURE — 770010 HCHG ROOM/CARE - REHAB SEMI PRIVAT*

## 2024-08-29 PROCEDURE — 99232 SBSQ HOSP IP/OBS MODERATE 35: CPT | Performed by: PHYSICAL MEDICINE & REHABILITATION

## 2024-08-29 PROCEDURE — 36415 COLL VENOUS BLD VENIPUNCTURE: CPT

## 2024-08-29 PROCEDURE — 85027 COMPLETE CBC AUTOMATED: CPT

## 2024-08-29 PROCEDURE — 700102 HCHG RX REV CODE 250 W/ 637 OVERRIDE(OP): Performed by: PHYSICAL MEDICINE & REHABILITATION

## 2024-08-29 PROCEDURE — A9270 NON-COVERED ITEM OR SERVICE: HCPCS | Performed by: HOSPITALIST

## 2024-08-29 PROCEDURE — 71045 X-RAY EXAM CHEST 1 VIEW: CPT

## 2024-08-29 PROCEDURE — 97110 THERAPEUTIC EXERCISES: CPT

## 2024-08-29 RX ORDER — FUROSEMIDE 20 MG
20 TABLET ORAL
Status: DISCONTINUED | OUTPATIENT
Start: 2024-08-29 | End: 2024-09-05 | Stop reason: HOSPADM

## 2024-08-29 RX ORDER — LANSOPRAZOLE 30 MG/1
30 TABLET, ORALLY DISINTEGRATING, DELAYED RELEASE ORAL DAILY
Status: DISCONTINUED | OUTPATIENT
Start: 2024-08-29 | End: 2024-09-05 | Stop reason: HOSPADM

## 2024-08-29 RX ADMIN — ALBUTEROL SULFATE 2 PUFF: 90 AEROSOL, METERED RESPIRATORY (INHALATION) at 22:05

## 2024-08-29 RX ADMIN — Medication 6 MG: at 20:38

## 2024-08-29 RX ADMIN — DAPAGLIFLOZIN 10 MG: 10 TABLET, FILM COATED ORAL at 08:19

## 2024-08-29 RX ADMIN — SENNOSIDES AND DOCUSATE SODIUM 2 TABLET: 50; 8.6 TABLET ORAL at 20:38

## 2024-08-29 RX ADMIN — APIXABAN 5 MG: 5 TABLET, FILM COATED ORAL at 20:38

## 2024-08-29 RX ADMIN — APIXABAN 5 MG: 5 TABLET, FILM COATED ORAL at 08:20

## 2024-08-29 RX ADMIN — SENNOSIDES AND DOCUSATE SODIUM 2 TABLET: 50; 8.6 TABLET ORAL at 08:19

## 2024-08-29 RX ADMIN — INSULIN LISPRO 2 UNITS: 100 INJECTION, SOLUTION INTRAVENOUS; SUBCUTANEOUS at 20:50

## 2024-08-29 RX ADMIN — METOPROLOL TARTRATE 62.5 MG: 25 TABLET, FILM COATED ORAL at 20:37

## 2024-08-29 RX ADMIN — FUROSEMIDE 20 MG: 20 TABLET ORAL at 13:39

## 2024-08-29 RX ADMIN — Medication 2000 UNITS: at 08:20

## 2024-08-29 RX ADMIN — OMEPRAZOLE 20 MG: 20 CAPSULE, DELAYED RELEASE ORAL at 08:20

## 2024-08-29 RX ADMIN — INSULIN LISPRO 2 UNITS: 100 INJECTION, SOLUTION INTRAVENOUS; SUBCUTANEOUS at 11:04

## 2024-08-29 RX ADMIN — METOPROLOL TARTRATE 62.5 MG: 25 TABLET, FILM COATED ORAL at 08:18

## 2024-08-29 RX ADMIN — EZETIMIBE 10 MG: 10 TABLET ORAL at 08:20

## 2024-08-29 ASSESSMENT — ENCOUNTER SYMPTOMS
BRUISES/BLEEDS EASILY: 0
NAUSEA: 0
FEVER: 0
COUGH: 0
CHILLS: 0
VOMITING: 0
SHORTNESS OF BREATH: 1
PALPITATIONS: 0
POLYDIPSIA: 0
EYES NEGATIVE: 1
ABDOMINAL PAIN: 0

## 2024-08-29 ASSESSMENT — GAIT ASSESSMENTS
DEVIATION: SHUFFLED GAIT
GAIT LEVEL OF ASSIST: CONTACT GUARD ASSIST
ASSISTIVE DEVICE: FRONT WHEEL WALKER
DISTANCE (FEET): 150

## 2024-08-29 ASSESSMENT — PAIN DESCRIPTION - PAIN TYPE: TYPE: ACUTE PAIN

## 2024-08-29 ASSESSMENT — ACTIVITIES OF DAILY LIVING (ADL)
BED_CHAIR_WHEELCHAIR_TRANSFER_DESCRIPTION: ADAPTIVE EQUIPMENT;INITIAL PREPARATION FOR TASK;SET-UP OF EQUIPMENT;SUPERVISION FOR SAFETY;VERBAL CUEING
BED_CHAIR_WHEELCHAIR_TRANSFER_DESCRIPTION: SET-UP OF EQUIPMENT;SUPERVISION FOR SAFETY;VERBAL CUEING

## 2024-08-29 NOTE — PROGRESS NOTES
Hospital Medicine Daily Progress Note      Chief Complaint  Atrial Fibrillation  Congestive Heart Failure  Transaminitis  Leukocytosis    Interval Problem Update  Pt c/o SOB; CXR done.  Lab and imaging results reviewed.    Review of Systems  Review of Systems   Constitutional:  Negative for chills and fever.   HENT: Negative.     Eyes: Negative.    Respiratory:  Positive for shortness of breath. Negative for cough.    Cardiovascular:  Negative for chest pain and palpitations.   Gastrointestinal:  Negative for abdominal pain, nausea and vomiting.   Musculoskeletal:         Wound pain   Skin:  Negative for itching and rash.   Endo/Heme/Allergies:  Negative for polydipsia. Does not bruise/bleed easily.        Physical Exam  Temp:  [36.4 °C (97.6 °F)-36.8 °C (98.2 °F)] 36.8 °C (98.2 °F)  Pulse:  [] 94  Resp:  [18-28] 18  BP: (109-125)/(55-71) 109/60  SpO2:  [94 %-99 %] 95 %    Physical Exam  Vitals reviewed.   Constitutional:       General: He is not in acute distress.     Appearance: Normal appearance. He is not ill-appearing.   HENT:      Head: Normocephalic and atraumatic.      Right Ear: External ear normal.      Left Ear: External ear normal.      Nose: Nose normal.      Mouth/Throat:      Pharynx: Oropharynx is clear.   Eyes:      General:         Right eye: No discharge.         Left eye: No discharge.      Extraocular Movements: Extraocular movements intact.      Conjunctiva/sclera: Conjunctivae normal.   Cardiovascular:      Rate and Rhythm: Normal rate and regular rhythm.   Pulmonary:      Effort: No respiratory distress.      Breath sounds: No wheezing.      Comments: Decreased BS  Abdominal:      General: Bowel sounds are normal. There is no distension.      Palpations: Abdomen is soft.      Tenderness: There is no abdominal tenderness. There is no guarding or rebound.      Comments: +TTP at PEG site   Musculoskeletal:      Cervical back: Normal range of motion and neck supple.      Right lower leg: No  edema.      Left lower leg: No edema.   Skin:     General: Skin is warm and dry.   Neurological:      Mental Status: He is alert.      Comments: Awake         Fluids      Laboratory            Assessment/Plan  * Right middle cerebral artery stroke (HCC)- (present on admission)  Assessment & Plan  Has dysarthria and dysphagia  S/P PEG on 8/23/24 by Dr. Meadows  On Eliquis and TF  Ongoing management per Physiatry    Abdominal pain  Assessment & Plan  Likely post-op pain from PEG  But will check KUB    Thrombocytopenia (HCC)  Assessment & Plan  May be 2/2 meds  Possible culprits include Eliquis, Omeprazole, and Zetia  Follow PLT    Leukocytosis  Assessment & Plan  AFVSS and non-toxic appearing  PCT 0.25  UA negative  CXR stable RUL/LLL masses  Follow Temp and WBC  No indication to initiate empiric antimicrobial therapy at this time    Transaminitis  Assessment & Plan  Lipitor discontinued  Follow LFT's    Type 2 diabetes mellitus with stage 3a chronic kidney disease, without long-term current use of insulin (HCC)- (present on admission)  Assessment & Plan  HbA1c 6.4  Observe serum glucose trends on Farxiga and SSI  Outpt meds include Farxiga and Metformin  mg qd    Chronic systolic heart failure (HCC)- (present on admission)  Assessment & Plan  H/O AICD  Echo 2/6/24 EF 45%, global hypokinesis, mod MR/AI  On Farxiga and Metoprolol  Aldactone discontinued for hypotension and azotemia  BNP trending up  CXR increased left pleural effusion  Will start Lasix    Primary lung adenocarcinoma (HCC)- (present on admission)  Assessment & Plan  S/P resection  On experimental drug  Outpt F/U    Hyperlipidemia associated with type 2 diabetes mellitus (HCC)- (present on admission)  Assessment & Plan  On Zetia    Azotemia- (present on admission)  Assessment & Plan  Renal function improved off Aldactone and S/P NS x 1 L  Monitor for worsening BUN/Cr back on Lasix    A-fib (HCC)  Assessment & Plan  Also has ZioPatch  On Metoprolol  for rate control  Anticoagulated on Eliquis    Hypertension associated with type 2 diabetes mellitus (HCC)- (present on admission)  Assessment & Plan  Observe blood pressure trends on Metoprolol and Lasix  Aldactone discontinued for hypotension    DNR    Reviewed w/ RN and Dr. Maciel

## 2024-08-29 NOTE — THERAPY
Occupational Therapy  Daily Treatment     Patient Name: Pito Black  Age:  79 y.o., Sex:  male  Medical Record #: [id number]  Today's Date: 8/29/2024     Precautions  Precautions: Fall Risk, PEG Tube, Swallow Precautions  Comments: [place], dysarthria, lower partial dentures, bilateral HAs, Zio patch, NPO         Subjective    Pt encountered for OT supine in bed reporting fatigue, but pleasant and agreeable to engage in supine thera ex.      Objective       08/29/24 0931   OT Charge Group   OT Therapeutic Exercise (Units) 2   OT Total Time Spent   OT Individual Total Time Spent (Mins) 30   Precautions   Precautions Fall Risk;PEG Tube;Swallow Precautions   Comments [place], dysarthria, lower partial dentures, bilateral HAs   Supine Upper Body Exercises   Supine Upper Body Exercises Yes   Bicep Curl 2 sets of 10;Bilateral;Light Resistance Theraband   Elbow Extension 2 sets of 10;Bilateral;Light Resistance Theraband   Comments 2-3 min RB in between sets   Hand Strengthening   Hand Strengthening Right ;Right Pinch;Left ;Left Pinch;Theraputty (Comment on Resistance)   Comment Yellow TB:  Bilateral gross grasp, x10; pinch and pull, x10; hid and seek with 5 beads; Moderate difficulty with pinch/pull using the L hand; VC needed to use the L hand during BC tasks   Interdisciplinary Plan of Care Collaboration   IDT Collaboration with  Speech Therapist   Patient Position at End of Therapy In Bed;Bed Alarm On;Call Light within Reach   Collaboration Comments RE fatigue       Assessment    Pt with fair tolerance to supine thera ex with frequent RB needed d/t fatigue. Pt did require TC/VC for motor planning exercises. Better response to TC.     Strengths: Independent prior level of function, Motivated for self care and independence, Pleasant and cooperative, Supportive family, Willingly participates in therapeutic activities  Barriers: Generalized weakness, Impaired activity tolerance, Impaired balance, Impaired functional  cognition    Plan    Cont to address ADLs, functional transfers using FWW, neuro re-ed/balance, and thera act/ex to maximize functional recovery for safe DC home.     DME       Passport items to be completed:  Perform bathroom transfers, complete dressing, complete feeding, get ready for the day, prepare a simple meal, participate in household tasks, adapt home for safety needs, demonstrate home exercise program, complete caregiver training     Occupational Therapy Goals (Active)       Problem: Bathing       Dates: Start:  08/25/24         Goal: STG-Within one week, patient will bathe min A       Dates: Start:  08/25/24               Problem: Dressing       Dates: Start:  08/25/24         Goal: STG-Within one week, patient will dress LB min A       Dates: Start:  08/25/24            Goal: STG-Within one week, patient will dress UB at SBA using LRD       Dates: Start:  08/27/24               Problem: OT Long Term Goals       Dates: Start:  08/25/24         Goal: LTG-By discharge, patient will complete basic self care tasks I-supv       Dates: Start:  08/25/24            Goal: LTG-By discharge, patient will perform bathroom transfers I-supv       Dates: Start:  08/25/24               Problem: Toileting       Dates: Start:  08/25/24         Goal: STG-Within one week, patient will complete toileting tasks min A       Dates: Start:  08/25/24

## 2024-08-29 NOTE — CARE PLAN
"The patient is Watcher - Medium risk of patient condition declining or worsening    Shift Goals  Clinical Goals: Rest and safety  Patient Goals: Safety  Family Goals: Education    Progress made toward(s) clinical / shift goals:  See notes    Problem: Skin Integrity  Goal: Skin integrity is maintained or improved  Outcome: Progressing  Note: Patient's PEG tube site had trace amounts of drainage, but is otherwise intact and shows no signs of infection.      Problem: Fall Risk - Rehab  Goal: Patient will remain free from falls  Outcome: Progressing  Note: Yandy Minaya Fall risk Assessment Score: 15    High fall risk Interventions   - Bed and strip alarm   - Yellow sign by the door   - Yellow wrist band \"Fall risk\"  - Room near to the nurse station  - Do not leave patient unattended in the bathroom  - Fall risk education provided       Patient is not progressing towards the following goals:      "

## 2024-08-29 NOTE — THERAPY
Speech Language Pathology  Daily Treatment     Patient Name: Pito Black  Age:  79 y.o., Sex:  male  Medical Record #: [id number]  Today's Date: 8/29/2024     Precautions  Precautions: Fall Risk, Swallow Precautions, PEG Tube  Comments: [place], dysarthria, lower partial dentures, bilateral HAs, Zio patch, NPO    Subjective    Pt seen at 0830 and again at 1134 for dysphagia intervention. Pt fatigued during second session, having completed therapy sessions back to back between PT/OT/ST.      Objective       08/29/24 1134   Treatment Charges   SLP Swallowing Dysfunction Treatment Swallowing Dysfunction Treatment   SLP Total Time Spent   SLP Individual Total Time Spent (Mins) 90   Dysphagia    Other Treatments iopi, sEMG   Interdisciplinary Plan of Care Collaboration   IDT Collaboration with  Family / Caregiver   Patient Position at End of Therapy In Bed;Call Light within Reach;Family / Friend in Room   Collaboration Comments O2 connected to wall unit, family in room at bedside         Assessment    0830:   Pt used sEMG biofeedback to perform 0/48 effortful swallows with 1-2 ice chips (approximately 50% of swallows were with ice chips and 50% of swallows were dry saliva swallows) to achieve 125% of his maximum for typical swallows (measured at 80mV) (average peak measured at 63 mV for effortful swallows). Pt demonstrated difficulty initiating 3 swallows per bolus as compared to day prior, only able to do 1 saliva swallow following bolus. Pt was given verbal encouragement throughout this session and was provided with education following each swallow that did not meet the target.  Frequent breaks needed 2/2 fatigue. Pt's swallow coordination is improved, however, pt very weak in strength when completing effortful swallow. 1 instance of spontaneous cough and 2 instances of wet vocal quality noted with intake. Skin was clean and intact after removal of electrodes.      Isometric tongue exercises completed.  tongue  strength measured at an average of 19 kPA over 3 trials.  tongue endurance measured at 9 kPA for an average of 1-2 seconds at 50% of the patients max strength.  Pt continues to demonstrate difficulty sustaining pressure for any length of time, despite cues and placement of pressure bulb        Strengths: Independent prior level of function, Motivated for self care and independence, Pleasant and cooperative, Supportive family, Willingly participates in therapeutic activities  Barriers: Aspiration risk, Decreased endurance, Difficulty following instructions, Impaired activity tolerance (dysarthria)    Plan    Complete mBSS with ongoing adjustments to POC as appropriate    Passport items to be completed:  Express basic needs, understand food/liquid recommendations, consistently follow swallow precautions, manage finances, manage medications, arrive to therapy appointments on time, complete daily memory log entries, solve problems related to safety situations, review education related to hospitalization, complete caregiver training     Speech Therapy Problems (Active)       Problem: Expression STGs       Dates: Start:  08/25/24         Goal: STG-Within one week, patient will implement clear speech strategies at the single word and short phrase (2-4 words) level to answer open ended questions with MIN cues       Dates: Start:  08/25/24         Goal Note filed on 08/27/24 1009 by Chante Raymundo MS,CCC-SLP       Will continue to target                 Problem: Problem Solving STGs       Dates: Start:  08/25/24         Goal: STG-Within one week, patient will follow 2- step directives during functional tasks with no more than 1 repetition.       Dates: Start:  08/25/24         Goal Note filed on 08/27/24 1009 by Chante Raymundo, MS,CCC-SLP       Will continue to target.                 Problem: Speech/Swallowing LTGs       Dates: Start:  08/25/24         Goal: LTG-By discharge, patient will safely swallow Level 5 minced/moist  textures and thin liquids with no overt s/sx of pen/asp, MOD I for use of swallow strategies.        Dates: Start:  08/25/24            Goal: LTG-By discharge, patient will express wants and needs effectively to different communication partners, maintain safety and participate socially in functional living environment with SPV       Dates: Start:  08/25/24            Goal: LTG-By discharge, patient will solve basic problems by utilizing compensatory intervention for memory and problem-solving to allow for safe completion of daily activities with SPV in order to prepare for safe d/c home        Dates: Start:  08/25/24               Problem: Swallowing STGs       Dates: Start:  08/25/24         Goal: STG-Within one week, patient will participate in MBSS evaluation to determine safety of PO diet and/or appropriate POC for dysphagia management       Dates: Start:  08/25/24         Goal Note filed on 08/27/24 1009 by Chante Raymundo MS,CCC-SLP       Anticipate will schedule for end of this week.              Goal: STG-Within one week, patient will complete 75 swallows in 60 minutes in conjunction with sEMG for visual biofeedback       Dates: Start:  08/25/24         Goal Note filed on 08/27/24 1009 by Chante Raymundo MS,CCC-SLP       Initiated today, able to complete 30 swallows, will continue to target.

## 2024-08-29 NOTE — THERAPY
Occupational Therapy  Daily Treatment     Patient Name: Pito Black  Age:  79 y.o., Sex:  male  Medical Record #: [id number]  Today's Date: 8/29/2024     Precautions  Precautions: (P) Fall Risk, Swallow Precautions, PEG Tube  Comments: (P) [place], dysarthria, lower partial dentures, bilateral HAs, Zio patch, NPO         Subjective    Patient was resting in bed receiving bolus into PEG tube upon OT arrival.  Family present, but stayed in room during session.       Objective       08/29/24 1301   OT Charge Group   OT Therapy Activity (Units) 1   OT Therapeutic Exercise (Units) 1   OT Total Time Spent   OT Individual Total Time Spent (Mins) 30   Precautions   Precautions Fall Risk;Swallow Precautions;PEG Tube   Comments [place], dysarthria, lower partial dentures, bilateral HAs, Zio patch, NPO   Vitals   Room Air Oximetry 94   Cognition    Speech/ Communication Dysarthric;Hard of Hearing   Functional Level of Assist   Bed, Chair, Wheelchair Transfer Contact Guard Assist   Bed Chair Wheelchair Transfer Description Set-up of equipment;Supervision for safety;Verbal cueing  (bed <> w/c with CGA SPT; impulsive)   Sitting Upper Body Exercises   Sitting Upper Body Exercises Yes   Upper Extremity Bike Minutes / Rest Breaks (See Comments)  (UB motomed cycle gear 3 x 9 minutes with two rest breaks)   Comments cues to attempt to use L UE the same as R UE and to squeeze handle with left hand to maintain    Bed Mobility    Supine to Sit Standby Assist   Sit to Supine Standby Assist   Scooting Standby Assist   Skilled Intervention Verbal Cuing   Interdisciplinary Plan of Care Collaboration   IDT Collaboration with  Nursing;Family / Caregiver   Patient Position at End of Therapy In Bed;Bed Alarm On;Call Light within Reach;Tray Table within Reach;Phone within Reach;Family / Friend in Room   Collaboration Comments RN bolusing food through PEG tube for a couple minutes at start of session; family present     Monitored oxygen on room  air during session.    Assessment    Patient began transfers from bed to w/c and w/c to bed prior to therapist was ready and before left w/c brake was locked during bed to w/c transfer.  He required cues to decrease use of R UE and increase use of L UE during motomed and actively flex fingers of left hand to maintain grasp.  Strengths: Independent prior level of function, Motivated for self care and independence, Pleasant and cooperative, Supportive family, Willingly participates in therapeutic activities  Barriers: Generalized weakness, Impaired activity tolerance, Impaired balance, Impaired functional cognition    Plan    Cont to address ADLs, functional transfers using FWW, neuro re-ed/balance, and thera act/ex to maximize functional recovery for safe DC home.     Occupational Therapy Goals (Active)       Problem: Bathing       Dates: Start:  08/25/24         Goal: STG-Within one week, patient will bathe min A       Dates: Start:  08/25/24               Problem: Dressing       Dates: Start:  08/25/24         Goal: STG-Within one week, patient will dress LB min A       Dates: Start:  08/25/24            Goal: STG-Within one week, patient will dress UB at SBA using LRD       Dates: Start:  08/27/24               Problem: OT Long Term Goals       Dates: Start:  08/25/24         Goal: LTG-By discharge, patient will complete basic self care tasks I-supv       Dates: Start:  08/25/24            Goal: LTG-By discharge, patient will perform bathroom transfers I-supv       Dates: Start:  08/25/24               Problem: Toileting       Dates: Start:  08/25/24         Goal: STG-Within one week, patient will complete toileting tasks min A       Dates: Start:  08/25/24

## 2024-08-29 NOTE — PROGRESS NOTES
"  Physical Medicine & Rehabilitation Progress Note    Encounter Date: 8/29/2024    Chief Complaint: Decreased mobility, dysarthria    Interval Events (Subjective):  Patient sitting up in room. He reports he is SOB at times. Discussed will check CXR, will reduce FWF, and discussed case with hospitalist.     _____________________________________  Interdisciplinary Team Conference   Most recent IDT on 8/27/2024    Discharge Date/Disposition:  9/7/24  _____________________________________    Objective:  VITAL SIGNS: /60   Pulse 94   Temp 36.8 °C (98.2 °F) (Oral)   Resp 18   Ht 1.753 m (5' 9\")   Wt 60 kg (132 lb 4.4 oz)   SpO2 95%   BMI 19.53 kg/m²   Gen: NAD  Psych: Mood and affect appropriate  CV: RRR, 0 edema  Resp: CTAB, no upper airway sounds  Abd: NTND  Neuro: AOx4, following commands    Laboratory Values:  Recent Results (from the past 72 hour(s))   POCT glucose device results    Collection Time: 08/26/24  5:04 PM   Result Value Ref Range    POC Glucose, Blood 222 (H) 65 - 99 mg/dL   POCT glucose device results    Collection Time: 08/26/24  9:38 PM   Result Value Ref Range    POC Glucose, Blood 203 (H) 65 - 99 mg/dL   URINALYSIS    Collection Time: 08/27/24  5:08 AM    Specimen: Urine, Clean Catch   Result Value Ref Range    Color Yellow     Character Clear     Specific Gravity 1.031 <1.035    Ph 7.5 5.0 - 8.0    Glucose 250 (A) Negative mg/dL    Ketones Trace (A) Negative mg/dL    Protein Negative Negative mg/dL    Bilirubin Negative Negative    Urobilinogen, Urine 1.0 Negative    Nitrite Negative Negative    Leukocyte Esterase Negative Negative    Occult Blood Negative Negative    Micro Urine Req see below    CBC WITHOUT DIFFERENTIAL    Collection Time: 08/27/24  5:56 AM   Result Value Ref Range    WBC 14.9 (H) 4.8 - 10.8 K/uL    RBC 4.73 4.70 - 6.10 M/uL    Hemoglobin 14.7 14.0 - 18.0 g/dL    Hematocrit 45.3 42.0 - 52.0 %    MCV 95.8 81.4 - 97.8 fL    MCH 31.1 27.0 - 33.0 pg    MCHC 32.5 32.3 - " 36.5 g/dL    RDW 49.3 35.9 - 50.0 fL    Platelet Count 82 (L) 164 - 446 K/uL    MPV 11.3 9.0 - 12.9 fL   Comp Metabolic Panel    Collection Time: 08/27/24  5:56 AM   Result Value Ref Range    Sodium 145 135 - 145 mmol/L    Potassium 4.7 3.6 - 5.5 mmol/L    Chloride 112 96 - 112 mmol/L    Co2 21 20 - 33 mmol/L    Anion Gap 12.0 7.0 - 16.0    Glucose 148 (H) 65 - 99 mg/dL    Bun 43 (H) 8 - 22 mg/dL    Creatinine 1.16 0.50 - 1.40 mg/dL    Calcium 8.5 8.5 - 10.5 mg/dL    Correct Calcium 9.2 8.5 - 10.5 mg/dL    AST(SGOT) 86 (H) 12 - 45 U/L    ALT(SGPT) 123 (H) 2 - 50 U/L    Alkaline Phosphatase 98 30 - 99 U/L    Total Bilirubin 0.5 0.1 - 1.5 mg/dL    Albumin 3.1 (L) 3.2 - 4.9 g/dL    Total Protein 6.9 6.0 - 8.2 g/dL    Globulin 3.8 (H) 1.9 - 3.5 g/dL    A-G Ratio 0.8 g/dL   proBrain Natriuretic Peptide, NT    Collection Time: 08/27/24  5:56 AM   Result Value Ref Range    NT-proBNP 1452 (H) 0 - 125 pg/mL   PROCALCITONIN    Collection Time: 08/27/24  5:56 AM   Result Value Ref Range    Procalcitonin 0.25 (H) <0.25 ng/mL   IMMATURE PLT FRACTION    Collection Time: 08/27/24  5:56 AM   Result Value Ref Range    Imm. Plt Fraction 4.7 0.6 - 13.1 %   ESTIMATED GFR    Collection Time: 08/27/24  5:56 AM   Result Value Ref Range    GFR (CKD-EPI) 64 >60 mL/min/1.73 m 2   POCT glucose device results    Collection Time: 08/27/24  7:13 AM   Result Value Ref Range    POC Glucose, Blood 127 (H) 65 - 99 mg/dL   POCT glucose device results    Collection Time: 08/27/24 11:16 AM   Result Value Ref Range    POC Glucose, Blood 189 (H) 65 - 99 mg/dL   POCT glucose device results    Collection Time: 08/27/24  5:25 PM   Result Value Ref Range    POC Glucose, Blood 177 (H) 65 - 99 mg/dL   POCT glucose device results    Collection Time: 08/27/24  8:57 PM   Result Value Ref Range    POC Glucose, Blood 209 (H) 65 - 99 mg/dL   POCT glucose device results    Collection Time: 08/28/24  7:07 AM   Result Value Ref Range    POC Glucose, Blood 136 (H) 65 -  99 mg/dL   POCT glucose device results    Collection Time: 08/28/24 11:10 AM   Result Value Ref Range    POC Glucose, Blood 167 (H) 65 - 99 mg/dL   POCT glucose device results    Collection Time: 08/28/24  5:18 PM   Result Value Ref Range    POC Glucose, Blood 171 (H) 65 - 99 mg/dL   POCT glucose device results    Collection Time: 08/28/24  8:54 PM   Result Value Ref Range    POC Glucose, Blood 140 (H) 65 - 99 mg/dL   proBrain Natriuretic Peptide, NT    Collection Time: 08/29/24  5:32 AM   Result Value Ref Range    NT-proBNP 2828 (H) 0 - 125 pg/mL   CBC WITHOUT DIFFERENTIAL    Collection Time: 08/29/24  5:32 AM   Result Value Ref Range    WBC 15.4 (H) 4.8 - 10.8 K/uL    RBC 4.32 (L) 4.70 - 6.10 M/uL    Hemoglobin 13.2 (L) 14.0 - 18.0 g/dL    Hematocrit 41.7 (L) 42.0 - 52.0 %    MCV 96.5 81.4 - 97.8 fL    MCH 30.6 27.0 - 33.0 pg    MCHC 31.7 (L) 32.3 - 36.5 g/dL    RDW 50.5 (H) 35.9 - 50.0 fL    Platelet Count 76 (L) 164 - 446 K/uL    MPV 11.6 9.0 - 12.9 fL   Basic Metabolic Panel    Collection Time: 08/29/24  5:32 AM   Result Value Ref Range    Sodium 143 135 - 145 mmol/L    Potassium 4.4 3.6 - 5.5 mmol/L    Chloride 107 96 - 112 mmol/L    Co2 23 20 - 33 mmol/L    Glucose 151 (H) 65 - 99 mg/dL    Bun 34 (H) 8 - 22 mg/dL    Creatinine 1.22 0.50 - 1.40 mg/dL    Calcium 8.7 8.5 - 10.5 mg/dL    Anion Gap 13.0 7.0 - 16.0   IMMATURE PLT FRACTION    Collection Time: 08/29/24  5:32 AM   Result Value Ref Range    Imm. Plt Fraction 6.1 0.6 - 13.1 %   ESTIMATED GFR    Collection Time: 08/29/24  5:32 AM   Result Value Ref Range    GFR (CKD-EPI) 60 >60 mL/min/1.73 m 2   POCT glucose device results    Collection Time: 08/29/24  7:37 AM   Result Value Ref Range    POC Glucose, Blood 127 (H) 65 - 99 mg/dL   POCT glucose device results    Collection Time: 08/29/24 11:03 AM   Result Value Ref Range    POC Glucose, Blood 178 (H) 65 - 99 mg/dL       Medications:  Scheduled Medications   Medication Dose Frequency    lansoprazole  30 mg  DAILY    insulin lispro  2-12 Units 4X/DAY ACHS    metoprolol tartrate  62.5 mg BID    senna-docusate  2 Tablet BID    apixaban  5 mg BID    dapagliflozin propanediol  10 mg DAILY    ezetimibe  10 mg DAILY    melatonin  6 mg Nightly    vitamin D3  2,000 Units DAILY     PRN medications: albuterol, Respiratory Therapy Consult, hydrALAZINE, acetaminophen, senna-docusate **AND** polyethylene glycol/lytes, docusate sodium, magnesium hydroxide, carboxymethylcellulose, benzocaine-menthol, mag hydrox-al hydrox-simeth, ondansetron **OR** ondansetron, traZODone, sodium chloride, oxyCODONE immediate-release **OR** oxyCODONE immediate-release, traMADol, midazolam, cyclobenzaprine, diclofenac sodium, [DISCONTINUED] insulin regular **AND** POC blood glucose manual result **AND** NOTIFY MD and PharmD **AND** Administer 20 grams of glucose (approximately 8 ounces of fruit juice) every 15 minutes PRN FSBG less than 70 mg/dL **AND** dextrose bolus    Diet:  Current Diet Order   Procedures    Diet NPO Restrict to: Sips with Medications (TURN TUBE FEED OFF AT MIDNIGHT)    Diet: Diet Tube Feed; Formula: Bolus Only (Provide 1/2 carton Glucerna 1.2 at first bolus feed and advance by 1/2 carton per feed until goal is reached. Goal is 6 cartons per day); Select Bolus (If Indicated): Glucerna 1.2 Carton; Bolus Frequency:...       Medical Decision Making and Plan:  R CVA - Patient with R CVA not a candidate for TNK per neurology. Restarted on Eliquis  -PT and OT for mobility and ADLs. Per guidelines, 15 hours per week between PT, OT and/or SLP.  -Follow-up Neurology stroke bridge      Lung adenocarcinoma - s/p Resection. Patient on Vericiguat which is an experimental medication.     SOB - Requiring intermittent 2L, check CXR, reduce FWF as may be fluid overloaded.     Dysphagia - Severe dysphagia. PEG placed 8/23. NPO with tube feeds. Dietitian to consult. SLP for swallow. CXR on 8/26 without signs of aspiration pneumonia per my read.       HTN/sCHF/A fib - Patient on Farxiga 10 mg, Metoprolol 50 mg BID and Spironolactone. EF of 35%  -Consult hospitalist.  Metoprolol increased to 62.5 mg and spironolactone discontinued. HR into 90s, Continue Metoprolol 62.5 mg     HLD - Patient on Atorvastatin 20 mg QHS and Zetia 10 mg daily.      DM2 with hyperglycemia - Will start SSI. Consult hospitalist. Previously on Metformin. Blood sugars into 200s, continue SSI and Farxiga     CKD3a - Avoid nephrotoxic agents. Check AM CMP - Cr 1.27, BUN 44, will monitor. IVF on 8/27/24    Hypernatremia - 146 on admission, consult hospitalist     Leukocytosis - Check AM CBC -13.6 on admission down from 15.9, consult hospitalist     Thrombocytopenia - Check AM CBC - 94 on admission, will monitor     Pain - Patient on APAP and Tramadol PRN     Skin - Patient at risk for skin breakdown due to debility in areas including sacrum, achilles, elbows and head in addition to other sites. Nursing to assess skin daily.      GI Ppx - Patient on Prilosec for GERD prophylaxis. Patient on Senna-docusate for constipation prophylaxis.      DVT Ppx - Patient Eliquis on transfer.  ____________________________________    T. Tim Maciel MD/PhD  [facility], [place] - Physical Medicine & Rehabilitation   [facility] [place] - Brain Injury Medicine   ____________________________________

## 2024-08-29 NOTE — THERAPY
Physical Therapy   Daily Treatment     Patient Name: Pito Black  Age:  79 y.o., Sex:  male  Medical Record #: [id number]  Today's Date: 8/29/2024     Precautions  Precautions: Fall Risk, PEG Tube, Swallow Precautions  Comments: (P) [place], dysarthria, lower partial dentures, bilateral HAs, Zio patch, NPO    Subjective    Pt was resting in bed w/ grandson at bedside, agreeable to session.     Objective       08/29/24 1101   PT Charge Group   PT Gait Training (Units) 2   Supervising Physical Therapist Estelle Christiansen   PT Total Time Spent   PT Individual Total Time Spent (Mins) 30   Precautions   Comments [place], dysarthria, lower partial dentures, bilateral HAs, Zio patch, NPO   Vitals   Pulse 94   Pulse Oximetry 95 %   O2 (LPM) 2   O2 Delivery Device Nasal Cannula   Cognition    Level of Consciousness Alert   Gait Functional Level of Assist    Gait Level Of Assist Contact Guard Assist   Assistive Device Front Wheel Walker   Distance (Feet) 150   # of Times Distance was Traveled 4   Deviation Shuffled Gait  (Drifts to the L side of walker)   Transfer Functional Level of Assist   Bed, Chair, Wheelchair Transfer Standby Assist   Bed Chair Wheelchair Transfer Description Adaptive equipment;Initial preparation for task;Set-up of equipment;Supervision for safety;Verbal cueing  (SPT w/ FWW v.s reach pivot)   Bed Mobility    Supine to Sit Standby Assist   Sit to Stand Standby Assist  (cues for hand placement during STS)   Interdisciplinary Plan of Care Collaboration   IDT Collaboration with  Speech Therapist   Patient Position at End of Therapy Seated   Collaboration Comments transition care to SLP     Multi tasking skills- having pt identify 5 items in various color.    Assessment    Pt tolerated session well, he is improving in his ambulation endurance. However he was not able to multitask during ambulation, he had to stop and search, and required cues for finding. Safety awareness is questionable, was walking w/ FWW and had  his hands off the walker while saying hi to another therapist.      Strengths: Able to follow instructions, Supportive family, Willingly participates in therapeutic activities  Barriers: Aphasia expressive, Hearing impairment, Generalized weakness, Impaired activity tolerance, Impaired balance, Impulsive    Plan    Gait training with FWW, progress AD as appropriate  Stair negotiation  Neglect assessment   BITS training for visual tracking and forced LUE use   Balance assessment/treatment   Obstacle negotiation   Collaborate with SLP for useful cueing for speech     DME        Passport items to be completed:  Get in/out of bed safely, in/out of a vehicle, safely use mobility device, walk or wheel around home/community, navigate up and down stairs, show how to get up/down from the ground, ensure home is accessible, demonstrate HEP, complete caregiver training    Physical Therapy Problems (Active)       Problem: Mobility       Dates: Start:  08/25/24         Goal: STG-Within one week, patient will ambulate household distance 50ft x 4 with FWW SBA       Dates: Start:  08/25/24            Goal: STG-Within one week, patient will ascend and descend four to six stairs right rail min assist       Dates: Start:  08/25/24               Problem: Mobility Transfers       Dates: Start:  08/25/24         Goal: STG-Within one week, patient will transfer bed to chair SBA SPT       Dates: Start:  08/25/24               Problem: PT-Long Term Goals       Dates: Start:  08/25/24         Goal: LTG-By discharge, patient will ambulate 100ft x 4 with FWW mod I indoors       Dates: Start:  08/25/24            Goal: LTG-By discharge, patient will transfer one surface to another mod I SPT safely and consistently       Dates: Start:  08/25/24            Goal: LTG-By discharge, patient will perform home exercise program seated/standing for LE strengthening to be done 3x/day in safe, independent home environment       Dates: Start:  08/25/24             Goal: LTG-By discharge, patient will ambulate up/down 4-6 stairs with right rail with SPV       Dates: Start:  08/25/24            Goal: LTG-By discharge, patient will perform 5x STS in 30sec with SPV       Dates: Start:  08/25/24

## 2024-08-29 NOTE — FLOWSHEET NOTE
08/28/24 1723   Events/Summary/Plan   Events/Summary/Plan mdi   Vital Signs   Pulse 92   Respiration 20   Pulse Oximetry 97 %   $ Pulse Oximetry (Spot Check) Yes   Respiratory Assessment   Level of Consciousness Alert   Chest Exam   Work Of Breathing / Effort Within Normal Limits;Mild   Breath Sounds   RUL Breath Sounds Expiratory Wheezes   RML Breath Sounds Expiratory Wheezes   RLL Breath Sounds Expiratory Wheezes   MARIA M Breath Sounds Expiratory Wheezes   LLL Breath Sounds Diminished   Oxygen   O2 (LPM) 2   O2 Delivery Device Silicone Nasal Cannula

## 2024-08-30 ENCOUNTER — APPOINTMENT (OUTPATIENT)
Dept: RADIOLOGY | Facility: REHABILITATION | Age: 79
DRG: 057 | End: 2024-08-30
Attending: HOSPITALIST
Payer: MEDICARE

## 2024-08-30 ENCOUNTER — PATIENT OUTREACH (OUTPATIENT)
Dept: HEALTH INFORMATION MANAGEMENT | Facility: OTHER | Age: 79
End: 2024-08-30
Payer: MEDICARE

## 2024-08-30 ENCOUNTER — APPOINTMENT (OUTPATIENT)
Dept: RADIOLOGY | Facility: REHABILITATION | Age: 79
DRG: 057 | End: 2024-08-30
Attending: PHYSICAL MEDICINE & REHABILITATION
Payer: MEDICARE

## 2024-08-30 ENCOUNTER — APPOINTMENT (OUTPATIENT)
Dept: PHYSICAL THERAPY | Facility: REHABILITATION | Age: 79
DRG: 057 | End: 2024-08-30
Attending: PHYSICAL MEDICINE & REHABILITATION
Payer: MEDICARE

## 2024-08-30 ENCOUNTER — APPOINTMENT (OUTPATIENT)
Dept: OCCUPATIONAL THERAPY | Facility: REHABILITATION | Age: 79
DRG: 057 | End: 2024-08-30
Attending: PHYSICAL MEDICINE & REHABILITATION
Payer: MEDICARE

## 2024-08-30 ENCOUNTER — APPOINTMENT (OUTPATIENT)
Dept: SPEECH THERAPY | Facility: REHABILITATION | Age: 79
DRG: 057 | End: 2024-08-30
Attending: PHYSICAL MEDICINE & REHABILITATION
Payer: MEDICARE

## 2024-08-30 DIAGNOSIS — E11.59 HYPERTENSION ASSOCIATED WITH TYPE 2 DIABETES MELLITUS (HCC): ICD-10-CM

## 2024-08-30 DIAGNOSIS — I15.2 HYPERTENSION ASSOCIATED WITH TYPE 2 DIABETES MELLITUS (HCC): ICD-10-CM

## 2024-08-30 LAB
EKG IMPRESSION: NORMAL
GLUCOSE BLD STRIP.AUTO-MCNC: 126 MG/DL (ref 65–99)

## 2024-08-30 PROCEDURE — 74230 X-RAY XM SWLNG FUNCJ C+: CPT

## 2024-08-30 PROCEDURE — 770010 HCHG ROOM/CARE - REHAB SEMI PRIVAT*

## 2024-08-30 PROCEDURE — 92526 ORAL FUNCTION THERAPY: CPT

## 2024-08-30 PROCEDURE — 700105 HCHG RX REV CODE 258: Performed by: HOSPITALIST

## 2024-08-30 PROCEDURE — 93005 ELECTROCARDIOGRAM TRACING: CPT | Performed by: HOSPITALIST

## 2024-08-30 PROCEDURE — 71045 X-RAY EXAM CHEST 1 VIEW: CPT

## 2024-08-30 PROCEDURE — 700102 HCHG RX REV CODE 250 W/ 637 OVERRIDE(OP): Performed by: HOSPITALIST

## 2024-08-30 PROCEDURE — 700111 HCHG RX REV CODE 636 W/ 250 OVERRIDE (IP): Performed by: HOSPITALIST

## 2024-08-30 PROCEDURE — 99233 SBSQ HOSP IP/OBS HIGH 50: CPT | Performed by: HOSPITALIST

## 2024-08-30 PROCEDURE — A9270 NON-COVERED ITEM OR SERVICE: HCPCS | Performed by: PHYSICAL MEDICINE & REHABILITATION

## 2024-08-30 PROCEDURE — 94640 AIRWAY INHALATION TREATMENT: CPT

## 2024-08-30 PROCEDURE — 700102 HCHG RX REV CODE 250 W/ 637 OVERRIDE(OP): Performed by: PHYSICAL MEDICINE & REHABILITATION

## 2024-08-30 PROCEDURE — 82962 GLUCOSE BLOOD TEST: CPT

## 2024-08-30 PROCEDURE — 97535 SELF CARE MNGMENT TRAINING: CPT

## 2024-08-30 PROCEDURE — 99232 SBSQ HOSP IP/OBS MODERATE 35: CPT | Performed by: PHYSICAL MEDICINE & REHABILITATION

## 2024-08-30 PROCEDURE — A9270 NON-COVERED ITEM OR SERVICE: HCPCS | Performed by: HOSPITALIST

## 2024-08-30 PROCEDURE — 94760 N-INVAS EAR/PLS OXIMETRY 1: CPT

## 2024-08-30 PROCEDURE — 93010 ELECTROCARDIOGRAM REPORT: CPT | Performed by: INTERNAL MEDICINE

## 2024-08-30 RX ADMIN — MOMETASONE FUROATE AND FORMOTEROL FUMARATE DIHYDRATE 2 PUFF: 200; 5 AEROSOL RESPIRATORY (INHALATION) at 20:46

## 2024-08-30 RX ADMIN — SENNOSIDES AND DOCUSATE SODIUM 2 TABLET: 50; 8.6 TABLET ORAL at 20:53

## 2024-08-30 RX ADMIN — LANSOPRAZOLE 30 MG: 30 TABLET, ORALLY DISINTEGRATING ORAL at 09:01

## 2024-08-30 RX ADMIN — Medication 2000 UNITS: at 09:01

## 2024-08-30 RX ADMIN — FUROSEMIDE 20 MG: 20 TABLET ORAL at 05:52

## 2024-08-30 RX ADMIN — METOPROLOL TARTRATE 62.5 MG: 25 TABLET, FILM COATED ORAL at 20:50

## 2024-08-30 RX ADMIN — AMPICILLIN AND SULBACTAM 1.5 G: 1; .5 INJECTION, POWDER, FOR SOLUTION INTRAMUSCULAR; INTRAVENOUS at 23:58

## 2024-08-30 RX ADMIN — APIXABAN 5 MG: 5 TABLET, FILM COATED ORAL at 09:01

## 2024-08-30 RX ADMIN — DAPAGLIFLOZIN 10 MG: 10 TABLET, FILM COATED ORAL at 09:01

## 2024-08-30 RX ADMIN — ALBUTEROL SULFATE 2 PUFF: 90 AEROSOL, METERED RESPIRATORY (INHALATION) at 05:31

## 2024-08-30 RX ADMIN — AMPICILLIN AND SULBACTAM 1.5 G: 1; .5 INJECTION, POWDER, FOR SOLUTION INTRAMUSCULAR; INTRAVENOUS at 17:54

## 2024-08-30 RX ADMIN — APIXABAN 5 MG: 5 TABLET, FILM COATED ORAL at 20:51

## 2024-08-30 RX ADMIN — MOMETASONE FUROATE AND FORMOTEROL FUMARATE DIHYDRATE 2 PUFF: 200; 5 AEROSOL RESPIRATORY (INHALATION) at 07:45

## 2024-08-30 RX ADMIN — Medication 6 MG: at 20:50

## 2024-08-30 RX ADMIN — AMPICILLIN AND SULBACTAM 1.5 G: 1; .5 INJECTION, POWDER, FOR SOLUTION INTRAMUSCULAR; INTRAVENOUS at 12:36

## 2024-08-30 RX ADMIN — EZETIMIBE 10 MG: 10 TABLET ORAL at 09:01

## 2024-08-30 RX ADMIN — METOPROLOL TARTRATE 62.5 MG: 25 TABLET, FILM COATED ORAL at 09:01

## 2024-08-30 ASSESSMENT — ENCOUNTER SYMPTOMS
ABDOMINAL PAIN: 0
BRUISES/BLEEDS EASILY: 0
SHORTNESS OF BREATH: 0
CHILLS: 0
PALPITATIONS: 0
NAUSEA: 0
EYES NEGATIVE: 1
POLYDIPSIA: 0
VOMITING: 0
COUGH: 0
FEVER: 0

## 2024-08-30 ASSESSMENT — PAIN DESCRIPTION - PAIN TYPE: TYPE: ACUTE PAIN

## 2024-08-30 ASSESSMENT — ACTIVITIES OF DAILY LIVING (ADL): BED_CHAIR_WHEELCHAIR_TRANSFER_DESCRIPTION: SET-UP OF EQUIPMENT;SUPERVISION FOR SAFETY;VERBAL CUEING

## 2024-08-30 NOTE — PROGRESS NOTES
Community Health Worker Follow-Up    Reason for outreach: Resource outreach    CHW Interventions: CHW contacted pt's spouse to follow up on resources that were mailed out. Spouse discussed that they are doing well. Spouse discussed that pt is to be expected to be discharged from the rehab hospital next Saturday. Pt currently has a feeding tube and will be needing supplemental nutrition shakes after he is discharged. Spouse was interested to know if there were services that could help cover portions of the shakes or if they would have to pay OOP. CHW discussed the [facility] services and informed that prescription would be needed. CHW recommended they get a prescription at the time of discharge. Spouse acknowledged. CHW informed spouse that the application would be sent out to them.   Specific Resources Provided:  Housing/Shelter: n/a  Transportation: n/a  Food: n/a  Financial: Care Chest  Social Supports: n/a  Other: n/a    Plan: CHW mailed out resources to pt's home. CHW will follow up with spouse in one week. Encouraged family to call with questions or concerns.

## 2024-08-30 NOTE — CARE PLAN
"The patient is Watcher - Medium risk of patient condition declining or worsening    Shift Goals  Clinical Goals: Rest and safety  Patient Goals: Safety  Family Goals: Education    Progress made toward(s) clinical / shift goals:  See notes    Problem: Skin Integrity  Goal: Skin integrity is maintained or improved  Outcome: Progressing  Note: Patient is able to turn himself in bed without help from staff. His PEG tube site shows not signs of infection, and the rest of his skin is intact.      Problem: Fall Risk - Rehab  Goal: Patient will remain free from falls  Outcome: Progressing  Note: Yandy Minaya Fall risk Assessment Score: 14    Moderate fall risk Interventions  - Bed and strip alarm   - Yellow sign by the door   - Yellow wrist band \"Fall risk\"  - Room near to the nurse station  - Do not leave patient unattended in the bathroom  - Fall risk education provided       Patient is not progressing towards the following goals:      "

## 2024-08-30 NOTE — THERAPY
08/30/24 1301   Therapy Missed   Missed Therapy (Minutes) 60   Reason For Missed Therapy Medical - Patient on Hold from Therapy     Pt with harder time breathing today. Received additional breathing treatments last night and is now on IV antibiotics for possible pneumonia. MD and RN aware of impairments and medical hold for PT.

## 2024-08-30 NOTE — THERAPY
"Speech Language Pathology  Video Swallow     Patient Name:  Pito Black  AGE:  79 y.o., SEX:  male  Medical Record #:  [id number]  Today's Date: 8/30/2024     Objective       08/30/24 0904   SLP Total Time Spent   SLP Individual Total Time Spent (Mins) 30   History / Background Information   Prior Level of Function for Eating / Swallowing prior to hospitalization pt consuming regular textures/thin liquids   Diagnosis R MCA CVA   Onset Date Of Dysphagia 8/13/24   Dysphagia Symptoms Warranting Video Swallow NPO with PEG placement as of 8/23/24   General Anatomy / Physiology tissue prominence on posterior pharyngeal wall   \"Dry\" / Saliva Swallow Observations not tested   Dentition Poor Quality    Procedure   Patient Seated in  w/c   Seated at (Degrees) 90   Views Completed Lateral   Fluoroscopy Time 163.8 seconds   Consistency   Consistency Thin Liquid;Mildly Thick Liquid;Pudding   Thin Liquid   PAS Score 7   Timing Pre Swallow;During swallow   Vallecular Residue Moderate (25%-50%)   Pyriform Sinus Residue Moderate (25%-50%)   Mildly Thick Liquid   PAS Score 2   Timing During Swallow   Vallecular Residue Moderate (25%-50%)   Pyriform Sinus Residue Mild (5%-25%)   Pudding   PAS Score 1   Timing N/A   Vallecular Residue Moderate (25%-50%)   Pyriform Sinus Residue Mild (5%-25%)   Oral Phase   Oral Phase X   Ice Chips Premature Spillage Into Valleculae;Premature Spillage Into Lateral Channels   Mildly Thick (2) - (Nectar Thick) Premature Spillage Into Valleculae;Premature Spillage Into Lateral Channels;Premature Spillage Into Pyriform Sinus;Oral Residue After the Swallow   Thin (0) Oral Residue After the Swallow;Premature Spillage Into Valleculae;Premature Spillage Into Lateral Channels;Premature Spillage Into Pyriform Sinus   Pureed (4) Oral Residue After the Swallow;Premature Spillage Into Valleculae   Pharyngeal Phase   Pharyngeal Phase X   Ice Chips Residue On Posterior Pharyngeal Wall;Residue In Pyriform " Sinus;Residue In Valleculae;Reduced Tongue Base Retraction   Mildly Thick (2) - (Nectar Thick) Penetration During Swallow;Residue In Valleculae;Residue In Pyriform Sinus;Residue On Posterior Pharyngeal Wall;Reduced Tongue Base Retraction  (penetration with head in neutral position, no penetration with chin tuck)   Thin (0) Aspiration During Swallow;Penetration During Swallow;Penetration Before Swallow;Aspiration Before Swallow;Residue In Valleculae;Residue In Pyriform Sinus;Residue On Posterior Pharyngeal Wall;Reduced Tongue Base Retraction   Pureed (4) Reduced Tongue Base Retraction ;Residue In Valleculae;Residue In Pyriform Sinus;Residue On Posterior Pharyngeal Wall   Esophageal Phase   Esophageal Phase X   Ice Chips Other (See Comments)   Mildly Thick (2) - (Nectar Thick) Other (See Comments)   Thin (0) Other (See Comments)   Puree Other (See Comments)   Esophageal Phase Comments ascending motility at UES at the end of each swallow   Compensatory Strategies Attempted   Compensatory Strategies Attempted Yes   Chin Down  increase epiglottic inversion, reduced pharyngeal residue and no penetration/aspiration noted with trials done with chin tuck   Multiple Swallows effective when paired with chin tuck   Effortful Swallows not effective as pt's swallow continues to be difusely weak   Cough / Clear After Swallow not effective to clear nadiya aspiration from trachea   Alternate Liquids and Solids effective when paired with chin tuck and multiple swallows   Three Second Prep better containment of bolus in oral cavity, paired best with chin tuck   Impression   Dysphagia Present Yes   Oral - Pharyngeal Moderate - Severe Impairment   Prognosis   Prognosis for Improvement Fair   Barriers to Improvement BOT weakness, reduced epiglottic inversion, reduced laryngeal elevation, fatigues quickly   Positive Indicators for Improvement sensate to aspiration, highly motivated, able to follow instructions to implement strategies    Recommendations   Diet / Liquid Recommendation NPO;Pre-Feeding Trials with SLP Only   Medication Administration  Via Gastric Tube   Strategies / Precautions Supervision Required;Small Bites;Small Sips;Bite / Sip Size Controlled by [employer];No Straws;Liquids by Teaspoon;No Talking while Eating;Multiple Swallows;Effortful Swallow;Tuck Chin while Swallowing;Oral Care After Meals;Monitor Temperature and Lung Sounds   Oral Care Q4h  (before and after oral trials)   Interventions Dysphagia Therapy by SLP;Compensatory Safe Swallow Strategy Training;Oral Strengthening Exercises;Pharyngeal - Laryngeal Strengthening Exercises;Surface Electromyographic Biofeedback (SEMG);Follow Up Video Swallow;Patient / Caregiver Education / Training   SLP Contact Information (Name / Extension) Chante Raymundo MS, CCC-SLP   Interdisciplinary Plan of Care Collaboration   Patient Position at End of Therapy Seated;Chair Alarm On;Self Releasing Lap Belt Applied  (pt seen by this SLP immediately following MBSS)         Discussed the risks, benefits, and alternatives of the VFSS procedure. Patient/family acknowledged and agreed to proceed.    Assessment    Oral Phase: Patient with adequate acceptance and labial seal around spoon and cup without anterior loss of bolus. Premature spillage to the pyriforms with thin liquids. With 3 second prep and with chin tuck pt able to demonstrate improved oral containment with premature spillage only to valleculae. Premature spillage to vallecula with puree consistently.     Pharyngeal phase:   -Loss of oral bolus control and incomplete laryngeal vestibule closure resulted in aspiration before and during the swallow with thin liquids. Immediate cough response demonstrated.   - Reduced tongue base retraction resulting in moderate residue for both mildly thick and puree  - reduced epiglottic inversion impacting ability to clear vallecular residue and transport bolus through pharynx.   -impaired pharyngeal constriction  resulting in residue on the posterior pharyngeal wall    Esophageal phase:   - ascending movement of residue immediately after the swallow increasing risk for penetration/aspiration after the swallow    COMPENSATORY STRATEGIES:   3-second prep: better containment in oral cavity with less premature spillage noted with mildly thick and puree  Chin tuck: most effective strategy with increase epiglottic inversion, decreased residue in valleculae and better airway protection resulting in no penetration or aspiration when a chin tuck was implemented with mildly thick and puree trials  Multiple swallows: minimally effective in clearing residue when head in a neutral position, when paired with a chin tuck was more beneficial  Liquid wash: effective to clear some residue in oral cavity and valleculae, but not able to clear entirely.     Consistency PAS Score Timing Residue Comments   Thin Liquid 7 Pre Swallow, During swallow Vallecular Residue: Moderate (25%-50%)  Pyriform Sinus Residue: Moderate (25%-50%) 5mL - nadiya aspiration before and during the swallow. Pt with immediate cough response   Mildly Thick 2 During Swallow Vallecular Residue: Moderate (25%-50%)  Pyriform Sinus Residue: Mild (5%-25%) 5mL - attempted no strategies (PAS 2), 3 second prep and chin tuck (PAS 1)  Cup sip - with chin tuck   Liquidised             Pudding 1 N/A Vallecular Residue: Moderate (25%-50%)  Pyriform Sinus Residue: Mild (5%-25%) 2.5 mL bolus with and without chin tuck   Soft & Bite Sized             Regular Solid             Mixed             Barium Tablet               Penetration-Aspiration Scale (PAS)  1     No contrast enters airway  2     Contrast enters the airway, remains above the vocal folds, and is ejected from the airway (not seen in the airway at the end of the swallow).  3     Contrast enters the airway, remains above the vocal folds, and is not ejected from the airway (is seen in the airway after the swallow).  4     Contrast  enters the airway, contacts the vocal folds, and is ejected from the airway.  5     Contrast enters the airway, contacts the vocal folds, and is not ejected from the airway  6     Contrast enters the airway, crosses the plane of the vocal folds, and is ejected from the airway.  7     Contrast enters the airway, crosses the plane of the vocal folds, and is not ejected from the airway despite effort.  8     Contrast enters the airway, crosses the plane of the vocal folds, is not ejected from the airway and there is no response to aspiration.     Plan    Initiate oral trials with SLP ONLY of 4- Puree (Dysphagia 1) via 2.5-5mL bolus and 2- Mildly Thick liquids via 5mL bolus  Use of chin tuck and effortful swallows for all trials  Serial swallows to clear oral and pharyngeal residue  Alternate solids and liquids to aid in clearance of residue  Oral care completed before and after ALL trials  Frequent breaks 2/2 fatigue  Continue use of sEMG for visual biofeedback on conjunction with effortful swallows  Continue intensive strengthening exercises  Monitor temp and lung sounds for any changes  Recommend follow up MBSS prior to pt d/c from [facility]

## 2024-08-30 NOTE — CARE PLAN
The patient is Stable - Low risk of patient condition declining or worsening    Shift Goals  Clinical Goals: Monitor labs, lung sounds  Patient Goals: Rest  Family Goals: Education    Progress made toward(s) clinical / shift goals:    Problem: Hemodynamics  Goal: Patient's hemodynamics, fluid balance and neurologic status will be stable or improve  Outcome: Progressing  Note:    Latest Reference Range & Units 08/29/24 05:32   WBC 4.8 - 10.8 K/uL 15.4 (H)   RBC 4.70 - 6.10 M/uL 4.32 (L)   Hemoglobin 14.0 - 18.0 g/dL 13.2 (L)   Hematocrit 42.0 - 52.0 % 41.7 (L)   MCV 81.4 - 97.8 fL 96.5   MCH 27.0 - 33.0 pg 30.6   MCHC 32.3 - 36.5 g/dL 31.7 (L)   RDW 35.9 - 50.0 fL 50.5 (H)   Platelet Count 164 - 446 K/uL 76 (L)   MPV 9.0 - 12.9 fL 11.6   Imm. Plt Fraction 0.6 - 13.1 % 6.1   Sodium 135 - 145 mmol/L 143   Potassium 3.6 - 5.5 mmol/L 4.4   Chloride 96 - 112 mmol/L 107   Co2 20 - 33 mmol/L 23   Anion Gap 7.0 - 16.0  13.0   Glucose 65 - 99 mg/dL 151 (H)   Bun 8 - 22 mg/dL 34 (H)   Creatinine 0.50 - 1.40 mg/dL 1.22   GFR (CKD-EPI) >60 mL/min/1.73 m 2 60   Calcium 8.5 - 10.5 mg/dL 8.7   NT-proBNP 0 - 125 pg/mL 2828 (H)      Problem: Respiratory  Goal: Patient will achieve/maintain optimum respiratory ventilation and gas exchange  Outcome: Progressing  Note: Inhalers used on night shift, Respiratory team ordered and administered Dulera Qday inhaler to mimic home regime. Patient has 2L NC PRN.       Patient is not progressing towards the following goals:

## 2024-08-30 NOTE — PROGRESS NOTES
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  Allergies: Patient has no known allergies.    Safety  Patient Assist  MIn A  Patient Precautions  Fall Risk, Swallow Precautions, PEG Tube  Precaution Comments  [place], dysarthria, lower partial dentures, bilateral HAs, Zio patch, NPO  Bed Transfer Status  Contact Guard Assist  Toilet Transfer Status   Contact Guard Assist  Assistive Devices  Wheelchair, Rails  Oxygen  Nasal Cannula  Diet/Therapeutic Dining  Current Diet Order   Procedures    Diet NPO Restrict to: Sips with Medications (TURN TUBE FEED OFF AT MIDNIGHT)    Diet: Diet Tube Feed; Formula: Bolus Only (Provide 1/2 carton Glucerna 1.2 at first bolus feed and advance by 1/2 carton per feed until goal is reached. Goal is 6 cartons per day); Select Bolus (If Indicated): Glucerna 1.2 Carton; Bolus Frequency:...     Pill Administration  PEG tube  Agitated Behavioral Scale  15  ABS Level of Severity  No Agitation    Fall Risk  Has the patient had a fall this admission?   No  Yandy Minaya Fall Risk Scoring  14, MODERATE RISK  Fall Risk Safety Measures  bed alarm, chair alarm, and poor balance    Vitals  Temperature: 36.4 °C (97.5 °F)  Temp src: Oral  Pulse: (!) 104  Respiration: 20  Blood Pressure : 114/66  Blood Pressure MAP (Calculated): 82 MM HG  BP Location: Left, Upper Arm  Patient BP Position: Supine     Oxygen  Pulse Oximetry: 97 %  O2 (LPM): 2  O2 Delivery Device: Nasal Cannula    Bowel and Bladder  Last Bowel Movement  08/29/24  Stool Type  Type 6: Fluffy pieces with ragged edges, a mushy stool  Bowel Device     Continent  Bladder: Did not void   Bowel: No movement  Bladder Function  Urine Void (mL):  (Moderate)  Number of Times Voided: 1  Urine Color: Yellow  Genitourinary Assessment   Bladder Assessment  (WDL):  Within Defined Limits  Gutierrez Catheter: Not Applicable  Urine Color: Yellow  Bladder Device: Bathroom  Time Void: No  Bladder Scan: Post Void  $ Bladder Scan Results (mL): 83    Skin  Evan Score   17  Sensory Interventions   Bed Types: Standard/Trauma Mattress with Overlay  Skin Preventative Measures: Waffle Overlay  Moisture Interventions  Moisturizers/Barriers: Barrier Wipes      Pain  Pain Rating Scale  0 - No Pain  Pain Location  Abdomen  Pain Location Orientation  Anterior  Pain Interventions   Declines    ADLs    Bathing      Linen Change   Complete  Personal Hygiene  Perineal Care, Moist Nicolasa Wipes  Chlorhexidine Bath      Oral Care  Brushed Teeth  Teeth/Dentures     Shave   (Done by family )  Nutrition Percentage Eaten  NPO at this Time  Environmental Precautions  Treaded Slipper Socks on Patient, Communication Sign for Patients & Families, Mobility Assessed & Appropriate Sign Placed  Patient Turns/Positioning  Patient Turns Self from Side to Side  Patient Turns Assistance/Tolerance     Bed Positions  Bed Controls On, Bed Locked  Head of Bed Elevated  Self regulated      Psychosocial/Neurologic Assessment  Psychosocial Assessment  Psychosocial (WDL):  Within Defined Limits  Patient Behaviors: Fatigue  Neurologic Assessment  Neuro (WDL): Within Defined Limits  Level of Consciousness: Alert  Orientation Level: Oriented X4  Cognition: Appropriate judgement  Speech: Delayed responses  Pupil Assesment: No  Motor Function/Sensation Assessment: Motor strength  Muscle Strength Right Arm: Good Strength Against Gravity and Moderate Resistance  Muscle Strength Left Arm: Good Strength Against Gravity and Moderate Resistance  Muscle Strength Right Leg: Fair Strength against Gravity but No Resistance  Muscle Strength Left Leg: Fair Strength against Gravity but No Resistance  EENT (WDL):  WDL Except    Cardio/Pulmonary Assessment  Edema      Respiratory Breath Sounds  RUL Breath Sounds: Expiratory Wheezes  RML  Breath Sounds: Expiratory Wheezes  RLL Breath Sounds: Diminished, Expiratory Wheezes  MARIA M Breath Sounds: Expiratory Wheezes  LLL Breath Sounds: Diminished, Expiratory Wheezes  Cardiac Assessment   Cardiac (WDL):  Within Defined Limits

## 2024-08-30 NOTE — PROGRESS NOTES
Patient's daughter, Gloridavid, prepared and administered tube feed. Patient's daughter washed hands, donned gloves, administered tube feed and water flush, vocalized understanding of process, closed valve and clamp, then secured tube to STAT lock. Patient was positioned at 45 degrees.

## 2024-08-30 NOTE — FLOWSHEET NOTE
08/30/24 0821   Events/Summary/Plan   Events/Summary/Plan SpO2 check   Vital Signs   Pulse (!) 107   Respiration 16   Pulse Oximetry 95 %   $ Pulse Oximetry (Spot Check) Yes   Respiratory Assessment   Respiratory Pattern Within Normal Limits   Level of Consciousness Alert   Oxygen   O2 Delivery Device None - Room Air

## 2024-08-30 NOTE — THERAPY
"Occupational Therapy  Daily Treatment     Patient Name: Pito Black  Age:  79 y.o., Sex:  male  Medical Record #: [id number]  Today's Date: 8/30/2024     Precautions  Precautions: Fall Risk, Swallow Precautions, PEG Tube  Comments: [place], dysarthria, lower partial dentures, bilateral HAs, Zio patch, NPO         Subjective    Pt encountered for OT supine in bed, breathing heavily. Requesting to sit in his WC. Pt agreeable to participate in therapy; however, 30 min into session pt reports, \"I can barely breath, I'm sorry but I can't do this.\"       Objective       08/30/24 1031   Therapy Missed   Missed Therapy (Minutes) 30   Reason For Missed Therapy Medical - Patient not Able To Participate;Medical - Other (Please Comment)  (Pt coughing and reporting difficulty breathing; unable to participate in therapy.)   OT Charge Group   OT Self Care / ADL (Units) 2   OT Total Time Spent   OT Individual Total Time Spent (Mins) 30   Precautions   Precautions Fall Risk;Swallow Precautions;PEG Tube   Comments [place], dysarthria, lower partial dentures, bilateral HAs, Zio patch, NPO   Vitals   Pulse 98   Patient BP Position Sitting   Blood Pressure  110/60   Pulse Oximetry 94 %   O2 Delivery Device None - Room Air   Functional Level of Assist   Bed, Chair, Wheelchair Transfer Contact Guard Assist   Bed Chair Wheelchair Transfer Description Set-up of equipment;Supervision for safety;Verbal cueing  (bed <> w/c using FWW at [facility]; impulsive)   IADL Treatments   IADL Treatments Kitchen mobility education   Kitchen Mobility Education Pt obtained 4/4 cones from various heights using FWW at [facility]. Moderated VC for visual scanning to locate items.   Interdisciplinary Plan of Care Collaboration   IDT Collaboration with  Nursing;Physician;Family / Caregiver   Patient Position at End of Therapy In Bed;Bed Alarm On;Call Light within Reach;Tray Table within Reach;Phone within Reach;Family / Friend in Room  (family at bedside (daughter and wife)) "   Collaboration Comments INT with team RE current change in condition/status       Assessment    Pt with poor tolerance during AM OT session limited by difficulty breathing and overall poor endurance/strength. INT with MD/RN/PT. Pt does cont to appear motivated to participate to the best of his abilities and would benefit from cont therapy.       Strengths: Independent prior level of function, Motivated for self care and independence, Pleasant and cooperative, Supportive family, Willingly participates in therapeutic activities  Barriers: Generalized weakness, Impaired activity tolerance, Impaired balance, Impaired functional cognition    Plan    Cont to address ADLs, functional transfers using FWW, neuro re-ed/balance, and thera act/ex to maximize functional recovery for safe DC home.     DME       Passport items to be completed:  Perform bathroom transfers, complete dressing, complete feeding, get ready for the day, prepare a simple meal, participate in household tasks, adapt home for safety needs, demonstrate home exercise program, complete caregiver training     Occupational Therapy Goals (Active)       Problem: Bathing       Dates: Start:  08/25/24         Goal: STG-Within one week, patient will bathe min A       Dates: Start:  08/25/24               Problem: Dressing       Dates: Start:  08/25/24         Goal: STG-Within one week, patient will dress LB min A       Dates: Start:  08/25/24            Goal: STG-Within one week, patient will dress UB at SBA using LRD       Dates: Start:  08/27/24               Problem: OT Long Term Goals       Dates: Start:  08/25/24         Goal: LTG-By discharge, patient will complete basic self care tasks I-supv       Dates: Start:  08/25/24            Goal: LTG-By discharge, patient will perform bathroom transfers I-supv       Dates: Start:  08/25/24               Problem: Toileting       Dates: Start:  08/25/24         Goal: STG-Within one week, patient will complete toileting  tasks min A       Dates: Start:  08/25/24

## 2024-08-30 NOTE — PROGRESS NOTES
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  Allergies: Patient has no known allergies.    Safety  Patient Assist  Min assist  Patient Precautions  Fall Risk, Swallow Precautions, PEG Tube  Precaution Comments  [place], dysarthria, lower partial dentures, bilateral HAs, Zio patch, NPO  Bed Transfer Status  Contact Guard Assist  Toilet Transfer Status   Contact Guard Assist  Assistive Devices  Wheelchair  Oxygen  Nasal Cannula  Diet/Therapeutic Dining  Current Diet Order   Procedures    Diet NPO Restrict to: Sips with Medications (TURN TUBE FEED OFF AT MIDNIGHT)    Diet: Diet Tube Feed; Formula: Bolus Only (Provide 1/2 carton Glucerna 1.2 at first bolus feed and advance by 1/2 carton per feed until goal is reached. Goal is 6 cartons per day); Select Bolus (If Indicated): Glucerna 1.2 Carton; Bolus Frequency:...     Pill Administration  crushed and flushed through peg   Agitated Behavioral Scale  15  ABS Level of Severity  No Agitation    Fall Risk  Has the patient had a fall this admission?   No  [place] Fall Risk Scoring  15, HIGH RISK  Fall Risk Safety Measures  bed alarm, chair alarm, poor balance, and low vision/ hearing    Vitals  Temperature: 36.6 °C (97.8 °F)  Temp src: Oral  Pulse: 71  Respiration: 18  Blood Pressure : 108/75  Blood Pressure MAP (Calculated): 86 MM HG  BP Location: Left, Upper Arm  Patient BP Position: Supine     Oxygen  Pulse Oximetry: 94 %  O2 (LPM): 2  O2 Delivery Device: Nasal Cannula    Bowel and Bladder  Last Bowel Movement  08/29/24  Stool Type  Type 5: Soft blob with clear cut edges (passed easily)  Bowel Device     Continent  Bladder: Did not void   Bowel: No movement  Bladder Function  Urine Void (mL):  (large)  Number of Times Voided: 1  Urine Color: Yellow  Genitourinary  Assessment   Bladder Assessment (WDL):  Within Defined Limits  Gutierrez Catheter: Not Applicable  Urine Color: Yellow  Bladder Device: Bathroom  Bladder Scan: Post Void  $ Bladder Scan Results (mL): 47    Skin  Evan Score   17  Sensory Interventions   Bed Types: Standard/Trauma Mattress  Skin Preventative Measures: Waffle Overlay  Moisture Interventions  Moisturizers/Barriers: Barrier Wipes      Pain  Pain Rating Scale  0 - No Pain  Pain Location  Abdomen  Pain Location Orientation  Anterior  Pain Interventions   Declines    ADLs    Bathing      Linen Change   Complete  Personal Hygiene  Perineal Care, Moist Nicolasa Wipes  Chlorhexidine Bath      Oral Care  Brushed Teeth  Teeth/Dentures     Shave   (Done by family )  Nutrition Percentage Eaten  NPO at this Time  Environmental Precautions  Treaded Slipper Socks on Patient, Communication Sign for Patients & Families, Mobility Assessed & Appropriate Sign Placed  Patient Turns/Positioning  Patient Turns Self from Side to Side  Patient Turns Assistance/Tolerance     Bed Positions  Bed Controls On, Bed Locked  Head of Bed Elevated  Self regulated      Psychosocial/Neurologic Assessment  Psychosocial Assessment  Psychosocial (WDL):  WDL Except  Patient Behaviors: Fatigue  Neurologic Assessment  Neuro (WDL): Exceptions to WDL  Level of Consciousness: Alert  Orientation Level: Disoriented to time  Cognition: Follows commands  Speech: Delayed responses  Pupil Assesment: No  Motor Function/Sensation Assessment: Motor strength  Muscle Strength Right Arm: Good Strength Against Gravity and Moderate Resistance  Muscle Strength Left Arm: Good Strength Against Gravity and Moderate Resistance  Muscle Strength Right Leg: Good Strength Against Gravity and Moderate Resistance  Muscle Strength Left Leg: Good Strength Against Gravity and Moderate Resistance  EENT (WDL):  WDL Except    Cardio/Pulmonary Assessment  Edema      Respiratory Breath Sounds  RUL Breath Sounds: Clear  RML Breath  Sounds: Clear  RLL Breath Sounds: Diminished  MARIA M Breath Sounds: Clear  LLL Breath Sounds: Diminished  Cardiac Assessment   Cardiac (WDL):  WDL Except (Ziopatch in place)

## 2024-08-30 NOTE — PROGRESS NOTES
Hospital Medicine Daily Progress Note      Chief Complaint  Atrial Fibrillation  Congestive Heart Failure  Transaminitis  Leukocytosis    Interval Problem Update  Pt reports shortness of breath much better today.  Last 24 hours FSBS range:  127-178.  Imaging results reviewed.    Review of Systems  Review of Systems   Constitutional:  Negative for chills and fever.   HENT: Negative.     Eyes: Negative.    Respiratory:  Negative for cough and shortness of breath.    Cardiovascular:  Negative for chest pain and palpitations.   Gastrointestinal:  Negative for abdominal pain, nausea and vomiting.   Musculoskeletal:         Wound pain   Skin:  Negative for itching and rash.   Endo/Heme/Allergies:  Negative for polydipsia. Does not bruise/bleed easily.        Physical Exam  Temp:  [36.4 °C (97.5 °F)-37.1 °C (98.8 °F)] 36.4 °C (97.5 °F)  Pulse:  [] 98  Resp:  [16-28] 17  BP: ()/(56-75) 110/60  SpO2:  [94 %-97 %] 94 %    Physical Exam  Vitals reviewed.   Constitutional:       General: He is not in acute distress.     Appearance: Normal appearance. He is not ill-appearing.   HENT:      Head: Normocephalic and atraumatic.      Right Ear: External ear normal.      Left Ear: External ear normal.      Nose: Nose normal.      Mouth/Throat:      Pharynx: Oropharynx is clear.   Eyes:      General:         Right eye: No discharge.         Left eye: No discharge.      Extraocular Movements: Extraocular movements intact.      Conjunctiva/sclera: Conjunctivae normal.   Cardiovascular:      Rate and Rhythm: Normal rate and regular rhythm.   Pulmonary:      Effort: No respiratory distress.      Breath sounds: No wheezing.      Comments: Decreased BS  Abdominal:      General: Bowel sounds are normal. There is no distension.      Palpations: Abdomen is soft.      Tenderness: There is no abdominal tenderness. There is no guarding or rebound.      Comments: +TTP at PEG site   Musculoskeletal:      Cervical back: Normal range of  motion and neck supple.      Right lower leg: No edema.      Left lower leg: No edema.   Skin:     General: Skin is warm and dry.   Neurological:      Mental Status: He is alert.      Comments: Awake and alert         Laboratory            Assessment/Plan  * Right middle cerebral artery stroke (HCC)- (present on admission)  Assessment & Plan  Has dysarthria and dysphagia  S/P PEG on 8/23/24 by Dr. Meadows  On Eliquis and TF  Ongoing management per Physiatry    Abdominal pain  Assessment & Plan  Likely post-op pain from PEG  KUB unremarkable    Thrombocytopenia (HCC)  Assessment & Plan  May be 2/2 meds  Possible culprits include Eliquis, Omeprazole, and Zetia  Follow PLT  Check F/U labs in AM     Leukocytosis  Assessment & Plan  AFVSS and non-toxic appearing  PCT 0.25  UA negative  CXR stable RUL/LLL masses  Follow Temp and WBC  No indication to initiate empiric antimicrobial therapy at this time    Transaminitis  Assessment & Plan  Lipitor discontinued  Follow LFT's  Check F/U labs in AM     Type 2 diabetes mellitus with stage 3a chronic kidney disease, without long-term current use of insulin (HCC)- (present on admission)  Assessment & Plan  HbA1c 6.4  Serum glucose trends controlled  Continue Farxiga  Discontinue FSBS and SSI  Outpt meds include Farxiga and Metformin  mg qd    Chronic systolic heart failure (HCC)- (present on admission)  Assessment & Plan  H/O AICD  Echo 2/6/24 EF 45%, global hypokinesis, mod MR/AI  BNP trending up  CXR increased left pleural effusion  Continue Farxiga, Metoprolol, and Lasix  Aldactone discontinued for hypotension and azotemia  SOB improving back on diuretics    Primary lung adenocarcinoma (HCC)- (present on admission)  Assessment & Plan  S/P resection  On experimental drug, presently on hold  Outpt Onc F/U    Hyperlipidemia associated with type 2 diabetes mellitus (HCC)- (present on admission)  Assessment & Plan  On Zetia    Azotemia- (present on admission)  Assessment &  Plan  Renal function improved off Aldactone and S/P NS x 1 L  Monitor for worsening BUN/Cr back on Lasix  Check F/U labs in AM     A-fib (HCC)  Assessment & Plan  Also has ZioPatch  On Metoprolol for rate control  Anticoagulated on Eliquis    Hypertension associated with type 2 diabetes mellitus (HCC)- (present on admission)  Assessment & Plan  Observe blood pressure trends on Metoprolol and Lasix  Aldactone discontinued for hypotension    DNR    ADDENDUM  Pt appears to have increased work of breathing after therapies and swallow study, concern for aspiration.  Request STAT CXR and EKG for tachycardia; start empiric abx.  Discussed w/ family and Dr. Maciel.

## 2024-08-30 NOTE — THERAPY
Speech Language Pathology  Daily Treatment     Patient Name: Pito Black  Age:  79 y.o., Sex:  male  Medical Record #: [id number]  Today's Date: 8/30/2024     Precautions  Precautions: Fall Risk, Swallow Precautions, PEG Tube  Comments: [place], dysarthria, lower partial dentures, bilateral HAs, Zio patch, NPO    Subjective    Pt seen immediately following MBSS for review and education.      Objective       08/30/24 0960   Treatment Charges   SLP Swallowing Dysfunction Treatment Swallowing Dysfunction Treatment   SLP Total Time Spent   SLP Individual Total Time Spent (Mins) 30   Interdisciplinary Plan of Care Collaboration   IDT Collaboration with  Physician;Nursing   Patient Position at End of Therapy In Bed;Call Light within Reach   Collaboration Comments CLOF and POC with nursing and physician         Assessment    Review of MBSS and results of study completed immediately prior to this session. Education on use of strategies including 3-second prep and chin tuck being beneficial with MT2 and puree consistencies. Education provided on POC for continued intensive strengthening exercises, initiation of oral trials with SLP ONLY of mildly thick by 5mL and puree by 2.5-5mL with use of chin tuck, effortful swallows and multiple swallows. Alternating solids and liquids will be beneficial with previously mentioned strategies as well. Pt confirmed understanding of education, all questions answered at this time and pt is in agreement with POC    Strengths: Independent prior level of function, Motivated for self care and independence, Pleasant and cooperative, Supportive family, Willingly participates in therapeutic activities  Barriers: Aspiration risk, Decreased endurance, Difficulty following instructions, Impaired activity tolerance (dysarthria)    Plan    Initiate oral trials with SLP ONLY of mildly thick by 5mL and puree by 2.5-5mL bolus amounts  Oral care before and after all trials  Use of chin tuck with effortful  swallows  Serial swallows to clear residue  Alternate solids and liquids  Continue sEMG in conjunction with effortful swallows  Continue IOPI    Passport items to be completed:  Express basic needs, understand food/liquid recommendations, consistently follow swallow precautions, manage finances, manage medications, arrive to therapy appointments on time, complete daily memory log entries, solve problems related to safety situations, review education related to hospitalization, complete caregiver training     Speech Therapy Problems (Active)       Problem: Expression STGs       Dates: Start:  08/25/24         Goal: STG-Within one week, patient will implement clear speech strategies at the single word and short phrase (2-4 words) level to answer open ended questions with MIN cues       Dates: Start:  08/25/24         Goal Note filed on 08/27/24 1009 by Chante Raymundo MS,CCC-SLP       Will continue to target                 Problem: Problem Solving STGs       Dates: Start:  08/25/24         Goal: STG-Within one week, patient will follow 2- step directives during functional tasks with no more than 1 repetition.       Dates: Start:  08/25/24         Goal Note filed on 08/27/24 1009 by Chante Raymundo MS,CCC-SLP       Will continue to target.                 Problem: Speech/Swallowing LTGs       Dates: Start:  08/25/24         Goal: LTG-By discharge, patient will safely swallow Level 5 minced/moist textures and thin liquids with no overt s/sx of pen/asp, MOD I for use of swallow strategies.        Dates: Start:  08/25/24            Goal: LTG-By discharge, patient will express wants and needs effectively to different communication partners, maintain safety and participate socially in functional living environment with SPV       Dates: Start:  08/25/24            Goal: LTG-By discharge, patient will solve basic problems by utilizing compensatory intervention for memory and problem-solving to allow for safe completion of  daily activities with SPV in order to prepare for safe d/c home        Dates: Start:  08/25/24               Problem: Swallowing STGs       Dates: Start:  08/25/24         Goal: STG-Within one week, patient will participate in MBSS evaluation to determine safety of PO diet and/or appropriate POC for dysphagia management       Dates: Start:  08/25/24         Goal Note filed on 08/27/24 1009 by Chante Raymundo MS,CCC-SLP       Anticipate will schedule for end of this week.              Goal: STG-Within one week, patient will complete 75 swallows in 60 minutes in conjunction with sEMG for visual biofeedback       Dates: Start:  08/25/24         Goal Note filed on 08/27/24 1009 by Chante Raymundo MS,CCC-SLP       Initiated today, able to complete 30 swallows, will continue to target.

## 2024-08-30 NOTE — PROGRESS NOTES
"  Physical Medicine & Rehabilitation Progress Note    Encounter Date: 8/30/2024    Chief Complaint: Decreased mobility, dysarthria    Interval Events (Subjective):  Patient sitting up in room. He reports therapy is going well. He denies pain. He reports poor sleep at night, he indicates he cannot catch his breath. Discussed with RT and adding Dulera and will give 2L oxygen at night.     _____________________________________  Interdisciplinary Team Conference   Most recent IDT on 8/27/2024    Discharge Date/Disposition:  9/7/24  _____________________________________    Objective:  VITAL SIGNS: /60   Pulse 98   Temp 36.4 °C (97.5 °F) (Oral)   Resp 17   Ht 1.753 m (5' 9\")   Wt 60 kg (132 lb 4.4 oz)   SpO2 94%   BMI 19.53 kg/m²   Gen: NAD  Psych: Mood and affect appropriate  CV: RRR, 0 edema  Resp: CTAB, no upper airway sounds  Abd: NTND  Neuro: AOx4, following commands  Unchanged from 8/29/24    Laboratory Values:  Recent Results (from the past 72 hour(s))   POCT glucose device results    Collection Time: 08/27/24  5:25 PM   Result Value Ref Range    POC Glucose, Blood 177 (H) 65 - 99 mg/dL   POCT glucose device results    Collection Time: 08/27/24  8:57 PM   Result Value Ref Range    POC Glucose, Blood 209 (H) 65 - 99 mg/dL   POCT glucose device results    Collection Time: 08/28/24  7:07 AM   Result Value Ref Range    POC Glucose, Blood 136 (H) 65 - 99 mg/dL   POCT glucose device results    Collection Time: 08/28/24 11:10 AM   Result Value Ref Range    POC Glucose, Blood 167 (H) 65 - 99 mg/dL   POCT glucose device results    Collection Time: 08/28/24  5:18 PM   Result Value Ref Range    POC Glucose, Blood 171 (H) 65 - 99 mg/dL   POCT glucose device results    Collection Time: 08/28/24  8:54 PM   Result Value Ref Range    POC Glucose, Blood 140 (H) 65 - 99 mg/dL   proBrain Natriuretic Peptide, NT    Collection Time: 08/29/24  5:32 AM   Result Value Ref Range    NT-proBNP 2828 (H) 0 - 125 pg/mL   CBC " WITHOUT DIFFERENTIAL    Collection Time: 08/29/24  5:32 AM   Result Value Ref Range    WBC 15.4 (H) 4.8 - 10.8 K/uL    RBC 4.32 (L) 4.70 - 6.10 M/uL    Hemoglobin 13.2 (L) 14.0 - 18.0 g/dL    Hematocrit 41.7 (L) 42.0 - 52.0 %    MCV 96.5 81.4 - 97.8 fL    MCH 30.6 27.0 - 33.0 pg    MCHC 31.7 (L) 32.3 - 36.5 g/dL    RDW 50.5 (H) 35.9 - 50.0 fL    Platelet Count 76 (L) 164 - 446 K/uL    MPV 11.6 9.0 - 12.9 fL   Basic Metabolic Panel    Collection Time: 08/29/24  5:32 AM   Result Value Ref Range    Sodium 143 135 - 145 mmol/L    Potassium 4.4 3.6 - 5.5 mmol/L    Chloride 107 96 - 112 mmol/L    Co2 23 20 - 33 mmol/L    Glucose 151 (H) 65 - 99 mg/dL    Bun 34 (H) 8 - 22 mg/dL    Creatinine 1.22 0.50 - 1.40 mg/dL    Calcium 8.7 8.5 - 10.5 mg/dL    Anion Gap 13.0 7.0 - 16.0   IMMATURE PLT FRACTION    Collection Time: 08/29/24  5:32 AM   Result Value Ref Range    Imm. Plt Fraction 6.1 0.6 - 13.1 %   ESTIMATED GFR    Collection Time: 08/29/24  5:32 AM   Result Value Ref Range    GFR (CKD-EPI) 60 >60 mL/min/1.73 m 2   POCT glucose device results    Collection Time: 08/29/24  7:37 AM   Result Value Ref Range    POC Glucose, Blood 127 (H) 65 - 99 mg/dL   POCT glucose device results    Collection Time: 08/29/24 11:03 AM   Result Value Ref Range    POC Glucose, Blood 178 (H) 65 - 99 mg/dL   POCT glucose device results    Collection Time: 08/29/24  5:27 PM   Result Value Ref Range    POC Glucose, Blood 141 (H) 65 - 99 mg/dL   POCT glucose device results    Collection Time: 08/29/24  8:37 PM   Result Value Ref Range    POC Glucose, Blood 167 (H) 65 - 99 mg/dL   POCT glucose device results    Collection Time: 08/30/24  7:19 AM   Result Value Ref Range    POC Glucose, Blood 126 (H) 65 - 99 mg/dL       Medications:  Scheduled Medications   Medication Dose Frequency    mometasone-formoterol  2 Puff BID (RT)    ampicillin/sulbactam (Unasyn) 1.5 g in  mL IVPB  1.5 g Q6HRS    lansoprazole  30 mg DAILY    furosemide  20 mg Q DAY     metoprolol tartrate  62.5 mg BID    senna-docusate  2 Tablet BID    apixaban  5 mg BID    dapagliflozin propanediol  10 mg DAILY    ezetimibe  10 mg DAILY    melatonin  6 mg Nightly    vitamin D3  2,000 Units DAILY     PRN medications: albuterol, Respiratory Therapy Consult, hydrALAZINE, acetaminophen, senna-docusate **AND** polyethylene glycol/lytes, docusate sodium, magnesium hydroxide, carboxymethylcellulose, benzocaine-menthol, mag hydrox-al hydrox-simeth, ondansetron **OR** ondansetron, traZODone, sodium chloride, oxyCODONE immediate-release **OR** oxyCODONE immediate-release, traMADol, midazolam, cyclobenzaprine, diclofenac sodium    Diet:  Current Diet Order   Procedures    Diet NPO Restrict to: Sips with Medications (TURN TUBE FEED OFF AT MIDNIGHT)    Diet: Diet Tube Feed; Formula: Bolus Only (Provide 1/2 carton Glucerna 1.2 at first bolus feed and advance by 1/2 carton per feed until goal is reached. Goal is 6 cartons per day); Select Bolus (If Indicated): Glucerna 1.2 Carton; Bolus Frequency:...       Medical Decision Making and Plan:  R CVA - Patient with R CVA not a candidate for TNK per neurology. Restarted on Eliquis  -PT and OT for mobility and ADLs. Per guidelines, 15 hours per week between PT, OT and/or SLP.  -Follow-up Neurology stroke bridge      Lung adenocarcinoma - s/p Resection. Patient on Vericiguat which is an experimental medication.     SOB - Requiring intermittent 2L, check CXR, reduce FWF as may be fluid overloaded.   -Concern for possible pneumonia, started on Unasyn    Dysphagia - Severe dysphagia. PEG placed 8/23. NPO with tube feeds. Dietitian to consult. SLP for swallow. CXR on 8/26 without signs of aspiration pneumonia per my read.   -Advancing to trials of pureed with SLP     HTN/sCHF/A fib - Patient on Farxiga 10 mg, Metoprolol 50 mg BID and Spironolactone. EF of 35%  -Consult hospitalist.  Metoprolol increased to 62.5 mg and spironolactone discontinued. HR into 90s, Continue  Metoprolol      HLD - Patient on Atorvastatin 20 mg QHS and Zetia 10 mg daily.      DM2 with hyperglycemia - Will start SSI. Consult hospitalist. Previously on Metformin. Blood sugars into 200s, continue SSI and Farxiga     CKD3a - Avoid nephrotoxic agents. Check AM CMP - Cr 1.27, BUN 44, will monitor. IVF on 8/27/24    Hypernatremia - 146 on admission, consult hospitalist     Leukocytosis - Check AM CBC -13.6 on admission down from 15.9, consult hospitalist     Thrombocytopenia - Check AM CBC - 94 on admission, will monitor     Pain - Patient on APAP and Tramadol PRN     Skin - Patient at risk for skin breakdown due to debility in areas including sacrum, achilles, elbows and head in addition to other sites. Nursing to assess skin daily.      GI Ppx - Patient on Prilosec for GERD prophylaxis. Patient on Senna-docusate for constipation prophylaxis.      DVT Ppx - Patient Eliquis on transfer.  ____________________________________    T. Tim Maciel MD/PhD  [facility] - Physical Medicine & Rehabilitation   [facility] - Brain Injury Medicine   ____________________________________

## 2024-08-31 ENCOUNTER — APPOINTMENT (OUTPATIENT)
Dept: SPEECH THERAPY | Facility: REHABILITATION | Age: 79
DRG: 057 | End: 2024-08-31
Attending: PHYSICAL MEDICINE & REHABILITATION
Payer: MEDICARE

## 2024-08-31 VITALS
RESPIRATION RATE: 19 BRPM | SYSTOLIC BLOOD PRESSURE: 103 MMHG | BODY MASS INDEX: 19.59 KG/M2 | DIASTOLIC BLOOD PRESSURE: 67 MMHG | TEMPERATURE: 98.2 F | HEART RATE: 102 BPM | HEIGHT: 69 IN | WEIGHT: 132.28 LBS | OXYGEN SATURATION: 95 %

## 2024-08-31 PROBLEM — R06.02 SOB (SHORTNESS OF BREATH): Status: ACTIVE | Noted: 2023-04-06

## 2024-08-31 LAB
ALBUMIN SERPL BCP-MCNC: 2.8 G/DL (ref 3.2–4.9)
ALBUMIN/GLOB SERPL: 0.8 G/DL
ALP SERPL-CCNC: 73 U/L (ref 30–99)
ALT SERPL-CCNC: 49 U/L (ref 2–50)
ANION GAP SERPL CALC-SCNC: 12 MMOL/L (ref 7–16)
AST SERPL-CCNC: 35 U/L (ref 12–45)
BILIRUB SERPL-MCNC: 0.5 MG/DL (ref 0.1–1.5)
BUN SERPL-MCNC: 31 MG/DL (ref 8–22)
CALCIUM ALBUM COR SERPL-MCNC: 9.5 MG/DL (ref 8.5–10.5)
CALCIUM SERPL-MCNC: 8.5 MG/DL (ref 8.5–10.5)
CHLORIDE SERPL-SCNC: 100 MMOL/L (ref 96–112)
CO2 SERPL-SCNC: 23 MMOL/L (ref 20–33)
CREAT SERPL-MCNC: 1.09 MG/DL (ref 0.5–1.4)
ERYTHROCYTE [DISTWIDTH] IN BLOOD BY AUTOMATED COUNT: 50.3 FL (ref 35.9–50)
GFR SERPLBLD CREATININE-BSD FMLA CKD-EPI: 69 ML/MIN/1.73 M 2
GLOBULIN SER CALC-MCNC: 3.6 G/DL (ref 1.9–3.5)
GLUCOSE SERPL-MCNC: 162 MG/DL (ref 65–99)
HCT VFR BLD AUTO: 39.4 % (ref 42–52)
HGB BLD-MCNC: 12.9 G/DL (ref 14–18)
MAGNESIUM SERPL-MCNC: 2.3 MG/DL (ref 1.5–2.5)
MCH RBC QN AUTO: 31.5 PG (ref 27–33)
MCHC RBC AUTO-ENTMCNC: 32.7 G/DL (ref 32.3–36.5)
MCV RBC AUTO: 96.1 FL (ref 81.4–97.8)
NT-PROBNP SERPL IA-MCNC: 2803 PG/ML (ref 0–125)
PHOSPHATE SERPL-MCNC: 3.3 MG/DL (ref 2.5–4.5)
PLATELET # BLD AUTO: 75 K/UL (ref 164–446)
PLATELETS.RETICULATED NFR BLD AUTO: 5.6 % (ref 0.6–13.1)
PMV BLD AUTO: 11.7 FL (ref 9–12.9)
POTASSIUM SERPL-SCNC: 4.4 MMOL/L (ref 3.6–5.5)
PROT SERPL-MCNC: 6.4 G/DL (ref 6–8.2)
RBC # BLD AUTO: 4.1 M/UL (ref 4.7–6.1)
SODIUM SERPL-SCNC: 135 MMOL/L (ref 135–145)
WBC # BLD AUTO: 15.6 K/UL (ref 4.8–10.8)

## 2024-08-31 PROCEDURE — 700105 HCHG RX REV CODE 258: Performed by: HOSPITALIST

## 2024-08-31 PROCEDURE — A9270 NON-COVERED ITEM OR SERVICE: HCPCS | Performed by: PHYSICAL MEDICINE & REHABILITATION

## 2024-08-31 PROCEDURE — 92526 ORAL FUNCTION THERAPY: CPT

## 2024-08-31 PROCEDURE — A9270 NON-COVERED ITEM OR SERVICE: HCPCS | Performed by: HOSPITALIST

## 2024-08-31 PROCEDURE — 84100 ASSAY OF PHOSPHORUS: CPT

## 2024-08-31 PROCEDURE — 700102 HCHG RX REV CODE 250 W/ 637 OVERRIDE(OP): Performed by: PHYSICAL MEDICINE & REHABILITATION

## 2024-08-31 PROCEDURE — 83735 ASSAY OF MAGNESIUM: CPT

## 2024-08-31 PROCEDURE — 700102 HCHG RX REV CODE 250 W/ 637 OVERRIDE(OP): Performed by: HOSPITALIST

## 2024-08-31 PROCEDURE — 94640 AIRWAY INHALATION TREATMENT: CPT

## 2024-08-31 PROCEDURE — 85055 RETICULATED PLATELET ASSAY: CPT

## 2024-08-31 PROCEDURE — 700111 HCHG RX REV CODE 636 W/ 250 OVERRIDE (IP): Performed by: HOSPITALIST

## 2024-08-31 PROCEDURE — 85027 COMPLETE CBC AUTOMATED: CPT

## 2024-08-31 PROCEDURE — 80053 COMPREHEN METABOLIC PANEL: CPT

## 2024-08-31 PROCEDURE — 83880 ASSAY OF NATRIURETIC PEPTIDE: CPT

## 2024-08-31 PROCEDURE — 94760 N-INVAS EAR/PLS OXIMETRY 1: CPT

## 2024-08-31 PROCEDURE — 770010 HCHG ROOM/CARE - REHAB SEMI PRIVAT*

## 2024-08-31 PROCEDURE — 36415 COLL VENOUS BLD VENIPUNCTURE: CPT

## 2024-08-31 PROCEDURE — 99232 SBSQ HOSP IP/OBS MODERATE 35: CPT | Performed by: HOSPITALIST

## 2024-08-31 RX ORDER — NYSTATIN 100000 [USP'U]/ML
5 SUSPENSION ORAL 4 TIMES DAILY
Status: DISCONTINUED | OUTPATIENT
Start: 2024-08-31 | End: 2024-09-05 | Stop reason: HOSPADM

## 2024-08-31 RX ADMIN — DAPAGLIFLOZIN 10 MG: 10 TABLET, FILM COATED ORAL at 10:18

## 2024-08-31 RX ADMIN — Medication 6 MG: at 20:43

## 2024-08-31 RX ADMIN — METOPROLOL TARTRATE 62.5 MG: 25 TABLET, FILM COATED ORAL at 10:17

## 2024-08-31 RX ADMIN — TIOTROPIUM BROMIDE INHALATION SPRAY 5 MCG: 3.12 SPRAY, METERED RESPIRATORY (INHALATION) at 10:08

## 2024-08-31 RX ADMIN — NYSTATIN 500000 UNITS: 100000 SUSPENSION ORAL at 20:45

## 2024-08-31 RX ADMIN — ALBUTEROL SULFATE 2 PUFF: 90 AEROSOL, METERED RESPIRATORY (INHALATION) at 06:40

## 2024-08-31 RX ADMIN — SENNOSIDES AND DOCUSATE SODIUM 2 TABLET: 50; 8.6 TABLET ORAL at 10:17

## 2024-08-31 RX ADMIN — MOMETASONE FUROATE AND FORMOTEROL FUMARATE DIHYDRATE 2 PUFF: 200; 5 AEROSOL RESPIRATORY (INHALATION) at 21:01

## 2024-08-31 RX ADMIN — APIXABAN 5 MG: 5 TABLET, FILM COATED ORAL at 10:18

## 2024-08-31 RX ADMIN — OXYCODONE 5 MG: 5 TABLET ORAL at 17:30

## 2024-08-31 RX ADMIN — Medication 2000 UNITS: at 10:17

## 2024-08-31 RX ADMIN — TRAZODONE HYDROCHLORIDE 50 MG: 50 TABLET ORAL at 17:29

## 2024-08-31 RX ADMIN — APIXABAN 5 MG: 5 TABLET, FILM COATED ORAL at 20:44

## 2024-08-31 RX ADMIN — NYSTATIN 500000 UNITS: 100000 SUSPENSION ORAL at 17:23

## 2024-08-31 RX ADMIN — AMPICILLIN AND SULBACTAM 1.5 G: 1; .5 INJECTION, POWDER, FOR SOLUTION INTRAMUSCULAR; INTRAVENOUS at 05:09

## 2024-08-31 RX ADMIN — FUROSEMIDE 20 MG: 20 TABLET ORAL at 04:57

## 2024-08-31 RX ADMIN — SENNOSIDES AND DOCUSATE SODIUM 2 TABLET: 50; 8.6 TABLET ORAL at 20:44

## 2024-08-31 RX ADMIN — MOMETASONE FUROATE AND FORMOTEROL FUMARATE DIHYDRATE 2 PUFF: 200; 5 AEROSOL RESPIRATORY (INHALATION) at 07:30

## 2024-08-31 RX ADMIN — AMPICILLIN AND SULBACTAM 1.5 G: 1; .5 INJECTION, POWDER, FOR SOLUTION INTRAMUSCULAR; INTRAVENOUS at 17:26

## 2024-08-31 RX ADMIN — LANSOPRAZOLE 30 MG: 30 TABLET, ORALLY DISINTEGRATING ORAL at 10:18

## 2024-08-31 RX ADMIN — NYSTATIN 500000 UNITS: 100000 SUSPENSION ORAL at 12:39

## 2024-08-31 RX ADMIN — AMPICILLIN AND SULBACTAM 1.5 G: 1; .5 INJECTION, POWDER, FOR SOLUTION INTRAMUSCULAR; INTRAVENOUS at 12:39

## 2024-08-31 RX ADMIN — EZETIMIBE 10 MG: 10 TABLET ORAL at 10:17

## 2024-08-31 RX ADMIN — METOPROLOL TARTRATE 62.5 MG: 25 TABLET, FILM COATED ORAL at 20:42

## 2024-08-31 ASSESSMENT — ENCOUNTER SYMPTOMS
ABDOMINAL PAIN: 0
POLYDIPSIA: 0
BRUISES/BLEEDS EASILY: 0
CHILLS: 0
SHORTNESS OF BREATH: 0
FEVER: 0
COUGH: 0
PALPITATIONS: 0
VOMITING: 0
EYES NEGATIVE: 1
NAUSEA: 0

## 2024-08-31 NOTE — PROGRESS NOTES
NURSING DAILY NOTE    Name: Pito Black   Date of Admission: 8/24/2024   Admitting Diagnosis: Right middle cerebral artery stroke (HCC)  Attending Physician: Tk Bai D.o.  Allergies: Patient has no known allergies.    Safety  Patient Assist  sb cg a  Patient Precautions  Fall Risk, Swallow Precautions, PEG Tube  Precaution Comments  [place], dysarthria, lower partial dentures, bilateral HAs, Zio patch, NPO  Bed Transfer Status  Contact Guard Assist  Toilet Transfer Status   Contact Guard Assist  Assistive Devices  Walker - front wheel, Rails, Gait Belt  Oxygen  None - Room Air  Diet/Therapeutic Dining  Current Diet Order   Procedures    Diet NPO Restrict to: Sips with Medications (TURN TUBE FEED OFF AT MIDNIGHT)    Diet: Diet Tube Feed; Formula: Bolus Only (Provide 1/2 carton Glucerna 1.2 at first bolus feed and advance by 1/2 carton per feed until goal is reached. Goal is 6 cartons per day); Select Bolus (If Indicated): Glucerna 1.2 Carton; Bolus Frequency:...     Pill Administration  crushed and enteral tube  Agitated Behavioral Scale  15  ABS Level of Severity  No Agitation    Fall Risk  Has the patient had a fall this admission?   No  Yandy Minaya Fall Risk Scoring  15, HIGH RISK  Fall Risk Safety Measures  bed alarm, chair alarm, poor balance, and low vision/ hearing    Vitals  Temperature: 36.6 °C (97.8 °F)  Temp src: Oral  Pulse: (!) 105  Respiration: 17  Blood Pressure : 121/65  Blood Pressure MAP (Calculated): 84 MM HG  BP Location: Left, Upper Arm  Patient BP Position: Sitting     Oxygen  Pulse Oximetry: 95 %  O2 (LPM): 2  O2 Delivery Device: None - Room Air    Bowel and Bladder  Last Bowel Movement  08/29/24  Stool Type  Type 6: Fluffy pieces with ragged edges, a mushy stool  Bowel Device     Continent  Bladder: Did not void   Bowel: No movement  Bladder Function  Urine Void (mL):  (Moderate)  Number of Times Voided: 1  Urine Color:  Yellow  Genitourinary Assessment   Bladder Assessment (WDL):  Within Defined Limits  Gutierrez Catheter: Not Applicable  Urine Color: Yellow  Bladder Device: Bathroom  Time Void: No  Bladder Scan: Post Void  $ Bladder Scan Results (mL): 83    Skin  Evan Score   17  Sensory Interventions   Bed Types: Standard/Trauma Mattress with Overlay  Skin Preventative Measures: Waffle Overlay  Moisture Interventions  Moisturizers/Barriers: Barrier Wipes      Pain  Pain Rating Scale  0 - No Pain  Pain Location  Abdomen  Pain Location Orientation  Anterior  Pain Interventions   Declines    ADLs    Bathing      Linen Change   Complete  Personal Hygiene  Perineal Care, Moist Nicolasa Wipes  Chlorhexidine Bath      Oral Care  Brushed Teeth  Teeth/Dentures     Shave   (Done by family )  Nutrition Percentage Eaten  NPO at this Time  Environmental Precautions  Treaded Slipper Socks on Patient, Communication Sign for Patients & Families, Mobility Assessed & Appropriate Sign Placed  Patient Turns/Positioning  Patient Turns Self from Side to Side  Patient Turns Assistance/Tolerance     Bed Positions  Bed Controls On, Bed Locked  Head of Bed Elevated  Self regulated      Psychosocial/Neurologic Assessment  Psychosocial Assessment  Psychosocial (WDL):  Within Defined Limits  Patient Behaviors: Fatigue  Neurologic Assessment  Neuro (WDL): Within Defined Limits  Level of Consciousness: Alert  Orientation Level: Oriented X4  Cognition: Appropriate judgement  Speech: Delayed responses  Pupil Assesment: No  Motor Function/Sensation Assessment: Motor strength  Muscle Strength Right Arm: Good Strength Against Gravity and Moderate Resistance  Muscle Strength Left Arm: Good Strength Against Gravity and Moderate Resistance  Muscle Strength Right Leg: Fair Strength against Gravity but No Resistance  Muscle Strength Left Leg: Fair Strength against Gravity but No Resistance  EENT (WDL):  WDL Except    Cardio/Pulmonary Assessment  Edema      Respiratory Breath  Sounds  RUL Breath Sounds: Expiratory Wheezes  RML Breath Sounds: Expiratory Wheezes  RLL Breath Sounds: Diminished, Expiratory Wheezes  MARIA M Breath Sounds: Expiratory Wheezes  LLL Breath Sounds: Diminished, Expiratory Wheezes  Cardiac Assessment   Cardiac (WDL):  Within Defined Limits

## 2024-08-31 NOTE — PROGRESS NOTES
NURSING DAILY NOTE    Name: Pito Black   Date of Admission: 8/24/2024   Admitting Diagnosis: Right middle cerebral artery stroke (HCC)  Attending Physician: Tk Bai D.o.  Allergies: Patient has no known allergies.    Safety  Patient Assist  sb cg a  Patient Precautions  Fall Risk, Swallow Precautions, PEG Tube  Precaution Comments  [place], dysarthria, lower partial dentures, bilateral HAs, Zio patch, NPO  Bed Transfer Status  Contact Guard Assist  Toilet Transfer Status   Contact Guard Assist  Assistive Devices  Walker - front wheel, Rails, Gait Belt  Oxygen  Nasal Cannula  Diet/Therapeutic Dining  Current Diet Order   Procedures    Diet NPO Restrict to: Sips with Medications (TURN TUBE FEED OFF AT MIDNIGHT)    Diet: Diet Tube Feed; Formula: Bolus Only (Provide 1/2 carton Glucerna 1.2 at first bolus feed and advance by 1/2 carton per feed until goal is reached. Goal is 6 cartons per day); Select Bolus (If Indicated): Glucerna 1.2 Carton; Bolus Frequency:...     Pill Administration  crushed via PEG tube  Agitated Behavioral Scale  15  ABS Level of Severity  No Agitation    Fall Risk  Has the patient had a fall this admission?   No  Yandy Minaya Fall Risk Scoring  15, HIGH RISK  Fall Risk Safety Measures  bed alarm and chair alarm    Vitals  Temperature: 36.8 °C (98.2 °F)  Temp src: Oral  Pulse: (!) 104  Respiration: 16  Blood Pressure : 113/60  Blood Pressure MAP (Calculated): 78 MM HG  BP Location: Left, Upper Arm  Patient BP Position: Supine     Oxygen  Pulse Oximetry: 100 %  O2 (LPM): 2  O2 Delivery Device: Nasal Cannula    Bowel and Bladder  Last Bowel Movement  08/29/24  Stool Type  Type 6: Fluffy pieces with ragged edges, a mushy stool  Bowel Device     Continent  Bladder: Did not void   Bowel: No movement  Bladder Function  Urine Void (mL):  (Moderate)  Number of Times Voided: 1  Urine Color: Yellow  Genitourinary Assessment   Bladder  Assessment (WDL):  Within Defined Limits  Gutierrez Catheter: Not Applicable  Urine Color: Yellow  Bladder Device: Bathroom  Time Void: No  Bladder Scan: Post Void  $ Bladder Scan Results (mL): 83    Skin  Evan Score   17  Sensory Interventions   Bed Types: Standard/Trauma Mattress with Overlay  Skin Preventative Measures: Waffle Overlay  Moisture Interventions  Moisturizers/Barriers: Barrier Wipes      Pain  Pain Rating Scale  0 - No Pain  Pain Location  Abdomen  Pain Location Orientation  Anterior  Pain Interventions   Declines    ADLs    Bathing      Linen Change   Complete  Personal Hygiene  Perineal Care, Moist Nicolasa Wipes  Chlorhexidine Bath      Oral Care  Brushed Teeth  Teeth/Dentures     Shave   (Done by family )  Nutrition Percentage Eaten  NPO at this Time  Environmental Precautions  Treaded Slipper Socks on Patient, Communication Sign for Patients & Families, Mobility Assessed & Appropriate Sign Placed  Patient Turns/Positioning  Patient Turns Self from Side to Side  Patient Turns Assistance/Tolerance     Bed Positions  Bed Controls On, Bed Locked  Head of Bed Elevated  Self regulated      Psychosocial/Neurologic Assessment  Psychosocial Assessment  Psychosocial (WDL):  Within Defined Limits  Patient Behaviors: Fatigue  Neurologic Assessment  Neuro (WDL): Within Defined Limits  Level of Consciousness: Alert  Orientation Level: Oriented X4  Cognition: Appropriate judgement  Speech: Delayed responses  Pupil Assesment: No  Motor Function/Sensation Assessment: Motor strength  Muscle Strength Right Arm: Good Strength Against Gravity and Moderate Resistance  Muscle Strength Left Arm: Good Strength Against Gravity and Moderate Resistance  Muscle Strength Right Leg: Fair Strength against Gravity but No Resistance  Muscle Strength Left Leg: Fair Strength against Gravity but No Resistance  EENT (WDL):  WDL Except    Cardio/Pulmonary Assessment  Edema      Respiratory Breath Sounds  RUL Breath Sounds: Expiratory  Wheezes  RML Breath Sounds: Expiratory Wheezes  RLL Breath Sounds: Diminished, Expiratory Wheezes  MARIA M Breath Sounds: Expiratory Wheezes  LLL Breath Sounds: Diminished, Expiratory Wheezes  Cardiac Assessment   Cardiac (WDL):  Within Defined Limits

## 2024-08-31 NOTE — CARE PLAN
The patient is Watcher - Medium risk of patient condition declining or worsening      Problem: Fall Risk - Rehab  Goal: Patient will remain free from falls  Outcome: Progressing     Problem: Infection - Standard  Goal: Patient will remain free from infection  Outcome: Progressing

## 2024-08-31 NOTE — FLOWSHEET NOTE
08/31/24 0639   Events/Summary/Plan   Events/Summary/Plan mdis   Vital Signs   Pulse (!) 105   Respiration 18   Pulse Oximetry 95 %   $ Pulse Oximetry (Spot Check) Yes   Respiratory Assessment   Level of Consciousness Alert   Chest Exam   Work Of Breathing / Effort Mild   Breath Sounds   RUL Breath Sounds Crackles   RML Breath Sounds Crackles   RLL Breath Sounds Diminished   MARIA M Breath Sounds Crackles   LLL Breath Sounds Diminished   Secretions   Cough Non Productive   Oxygen   O2 (LPM) 2  (titrated to 1)   O2 Delivery Device Silicone Nasal Cannula

## 2024-08-31 NOTE — THERAPY
Speech Language Pathology  Daily Treatment     Patient Name: Pito Black  Age:  79 y.o., Sex:  male  Medical Record #: [id number]  Today's Date: 8/31/2024     Precautions  Precautions: Fall Risk, Swallow Precautions, PEG Tube  Comments: [place], dysarthria, lower partial dentures, bilateral HAs, Zio patch, NPO    Subjective    Patient was willing to participate in ST. Daughter present for session.      Objective       08/31/24 1102   Treatment Charges   SLP Swallowing Dysfunction Treatment Swallowing Dysfunction Treatment   SLP Total Time Spent   SLP Individual Total Time Spent (Mins) 60   Interdisciplinary Plan of Care Collaboration   Patient Position at End of Therapy In Bed;Bed Alarm On;Call Light within Reach;Tray Table within Reach;Phone within Reach;Family / Friend in Room         Assessment    Pt used sEMG biofeedback to perform 20/40 effortful swallows with 2.5ml of mildly thick  liquids  between a range of 115% and 125% of his maximum for typical swallows (average peak measured at 56 mV for effortful swallows). Pt was given verbal encouragement throughout this session and was provided with education following each swallow that did not meet the target. Occasional throat clearing was noted. Patient required frequent rest breaks between swallows. Increased WOB also noted, however SPO2 remained above 95%  Skin was clean and intact after removal of electrodes.        Strengths: Independent prior level of function, Motivated for self care and independence, Pleasant and cooperative, Supportive family, Willingly participates in therapeutic activities  Barriers: Aspiration risk, Decreased endurance, Difficulty following instructions, Impaired activity tolerance (dysarthria)    Plan    SEMG      Speech Therapy Problems (Active)       Problem: Expression STGs       Dates: Start:  08/25/24         Goal: STG-Within one week, patient will implement clear speech strategies at the single word and short phrase (2-4 words)  level to answer open ended questions with MIN cues       Dates: Start:  08/25/24         Goal Note filed on 08/27/24 1009 by Chante Raymundo MS,CCC-SLP       Will continue to target                 Problem: Problem Solving STGs       Dates: Start:  08/25/24         Goal: STG-Within one week, patient will follow 2- step directives during functional tasks with no more than 1 repetition.       Dates: Start:  08/25/24         Goal Note filed on 08/27/24 1009 by Chante Raymundo MS,CCC-SLP       Will continue to target.                 Problem: Speech/Swallowing LTGs       Dates: Start:  08/25/24         Goal: LTG-By discharge, patient will safely swallow Level 5 minced/moist textures and thin liquids with no overt s/sx of pen/asp, MOD I for use of swallow strategies.        Dates: Start:  08/25/24            Goal: LTG-By discharge, patient will express wants and needs effectively to different communication partners, maintain safety and participate socially in functional living environment with SPV       Dates: Start:  08/25/24            Goal: LTG-By discharge, patient will solve basic problems by utilizing compensatory intervention for memory and problem-solving to allow for safe completion of daily activities with SPV in order to prepare for safe d/c home        Dates: Start:  08/25/24               Problem: Swallowing STGs       Dates: Start:  08/25/24         Goal: STG-Within one week, patient will participate in MBSS evaluation to determine safety of PO diet and/or appropriate POC for dysphagia management       Dates: Start:  08/25/24         Goal Note filed on 08/27/24 1009 by Chante Raymundo MS,CCC-SLP       Anticipate will schedule for end of this week.              Goal: STG-Within one week, patient will complete 75 swallows in 60 minutes in conjunction with sEMG for visual biofeedback       Dates: Start:  08/25/24         Goal Note filed on 08/27/24 1009 by Chante Raymundo MS,CCC-SLP       Initiated today, able to  complete 30 swallows, will continue to target.

## 2024-08-31 NOTE — FLOWSHEET NOTE
08/31/24 0716   Events/Summary/Plan   Events/Summary/Plan 02 pulse ox check   Vital Signs   Pulse (!) 116   Respiration 20   Pulse Oximetry 95 %   $ Pulse Oximetry (Spot Check) Yes   Respiratory Assessment   Level of Consciousness Alert   Chest Exam   Work Of Breathing / Effort Mild   Oxygen   O2 (LPM) 1   O2 Delivery Device Silicone Nasal Cannula

## 2024-08-31 NOTE — PROGRESS NOTES
Hospital Medicine Daily Progress Note      Chief Complaint  Atrial Fibrillation  Congestive Heart Failure  Transaminitis  Leukocytosis    Interval Problem Update  Shortness of breath better on abx.  Labs and imaging reviewed.  EKG sinus tachycardia.    Review of Systems  Review of Systems   Constitutional:  Negative for chills and fever.   HENT: Negative.     Eyes: Negative.    Respiratory:  Negative for cough and shortness of breath.    Cardiovascular:  Negative for chest pain and palpitations.   Gastrointestinal:  Negative for abdominal pain, nausea and vomiting.   Musculoskeletal:         Wound pain   Skin:  Negative for itching and rash.   Endo/Heme/Allergies:  Negative for polydipsia. Does not bruise/bleed easily.        Physical Exam  Temp:  [36.4 °C (97.5 °F)-36.8 °C (98.2 °F)] 36.8 °C (98.2 °F)  Pulse:  [] 116  Resp:  [16-20] 20  BP: (100-121)/(60-66) 113/60  SpO2:  [93 %-100 %] 95 %    Physical Exam  Vitals reviewed.   Constitutional:       General: He is not in acute distress.     Appearance: Normal appearance. He is not ill-appearing.   HENT:      Head: Normocephalic and atraumatic.      Right Ear: External ear normal.      Left Ear: External ear normal.      Nose: Nose normal.      Mouth/Throat:      Pharynx: Oropharynx is clear.   Eyes:      General:         Right eye: No discharge.         Left eye: No discharge.      Extraocular Movements: Extraocular movements intact.      Conjunctiva/sclera: Conjunctivae normal.   Cardiovascular:      Rate and Rhythm: Normal rate and regular rhythm.   Pulmonary:      Effort: No respiratory distress.      Breath sounds: No wheezing.      Comments: Decreased BS  Abdominal:      General: Bowel sounds are normal. There is no distension.      Palpations: Abdomen is soft.      Tenderness: There is no abdominal tenderness. There is no guarding or rebound.      Comments: +TTP at PEG site   Musculoskeletal:      Cervical back: Normal range of motion and neck supple.       Right lower leg: No edema.      Left lower leg: No edema.   Skin:     General: Skin is warm and dry.   Neurological:      Mental Status: He is alert.      Comments: Awake and alert         Laboratory            Assessment/Plan  * Right middle cerebral artery stroke (HCC)- (present on admission)  Assessment & Plan  Has dysarthria and dysphagia  S/P PEG on 8/23/24 by Dr. Meadows  On Eliquis and TF  Ongoing management per Physiatry    Abdominal pain  Assessment & Plan  Likely post-op pain from PEG  KUB unremarkable    Thrombocytopenia (HCC)  Assessment & Plan  May be 2/2 meds  Possible culprits include Eliquis, Omeprazole, and Zetia  Follow PLT    Transaminitis  Assessment & Plan  Lipitor discontinued  LFT's resolved    Type 2 diabetes mellitus with stage 3a chronic kidney disease, without long-term current use of insulin (HCC)- (present on admission)  Assessment & Plan  HbA1c 6.4  Continue Farxiga  Discontinued FSBS and SSI  Outpt meds include Farxiga and Metformin  mg qd    SOB (shortness of breath)- (present on admission)  Assessment & Plan  CXR no change in RUL/LLL masses, small left pleural effusion  Concern for post-obstructive PNA  Continue Unasyn and Lasix  Add Spiriva to Dulera  RT protocol    Chronic systolic heart failure (HCC)- (present on admission)  Assessment & Plan  H/O AICD  Echo 2/6/24 EF 45%, global hypokinesis, mod MR/AI  BNP trending up  CXR increased left pleural effusion  Continue Farxiga, Metoprolol, and Lasix  Aldactone discontinued for hypotension and azotemia  SOB improving back on diuretics    Primary lung adenocarcinoma (HCC)- (present on admission)  Assessment & Plan  S/P resection  On experimental drug, presently on hold  Outpt Onc F/U    Hyperlipidemia associated with type 2 diabetes mellitus (HCC)- (present on admission)  Assessment & Plan  On Zetia    Azotemia- (present on admission)  Assessment & Plan  Renal function improved off Aldactone and S/P NS x 1 L  Monitor for  worsening BUN/Cr back on Lasix    A-fib (HCC)  Assessment & Plan  Also has ZioPatch  On Metoprolol for rate control  Anticoagulated on Eliquis    Hypertension associated with type 2 diabetes mellitus (HCC)- (present on admission)  Assessment & Plan  Observe blood pressure trends on Metoprolol and Lasix  Aldactone discontinued for hypotension    DNR

## 2024-08-31 NOTE — ASSESSMENT & PLAN NOTE
CXR no change in RUL/LLL masses, small left pleural effusion  Concern for post-obstructive PNA  Continue Unasyn and Lasix  Add Spiriva to Dulera  RT protocol

## 2024-08-31 NOTE — CARE PLAN
The patient is Stable - Low risk of patient condition declining or worsening    Problem: Knowledge Deficit - Standard  Goal: Patient and family/care givers will demonstrate understanding of plan of care, disease process/condition, diagnostic tests and medications  Outcome: Progressing. Reviewed POC, all questions answered.        Problem: Fall Risk - Rehab  Goal: Patient will remain free from falls  Outcome: Progressing. Call light within reach, pt educated to use for assistance for safe transferring.         Shift Goals  Clinical Goals: Safety  Patient Goals: Participate in therapy

## 2024-08-31 NOTE — FLOWSHEET NOTE
08/31/24 1007   Events/Summary/Plan   Events/Summary/Plan mdi   Vital Signs   Pulse (!) 109   Respiration 20   Pulse Oximetry 96 %   $ Pulse Oximetry (Spot Check) Yes   Respiratory Assessment   Level of Consciousness Alert   Chest Exam   Work Of Breathing / Effort Mild   Breath Sounds   RUL Breath Sounds Crackles   RML Breath Sounds Crackles   RLL Breath Sounds Diminished   MARIA M Breath Sounds Crackles   LLL Breath Sounds Diminished   Oxygen   O2 (LPM) 1   O2 Delivery Device Silicone Nasal Cannula

## 2024-09-01 VITALS
WEIGHT: 132.28 LBS | HEIGHT: 69 IN | SYSTOLIC BLOOD PRESSURE: 107 MMHG | DIASTOLIC BLOOD PRESSURE: 53 MMHG | RESPIRATION RATE: 40 BRPM | OXYGEN SATURATION: 91 % | TEMPERATURE: 97.9 F | HEART RATE: 118 BPM | BODY MASS INDEX: 19.59 KG/M2

## 2024-09-01 PROCEDURE — 700111 HCHG RX REV CODE 636 W/ 250 OVERRIDE (IP): Performed by: HOSPITALIST

## 2024-09-01 PROCEDURE — 700102 HCHG RX REV CODE 250 W/ 637 OVERRIDE(OP): Performed by: HOSPITALIST

## 2024-09-01 PROCEDURE — A9270 NON-COVERED ITEM OR SERVICE: HCPCS | Performed by: HOSPITALIST

## 2024-09-01 PROCEDURE — 94640 AIRWAY INHALATION TREATMENT: CPT

## 2024-09-01 PROCEDURE — 770010 HCHG ROOM/CARE - REHAB SEMI PRIVAT*

## 2024-09-01 PROCEDURE — 99999 PR NO CHARGE: CPT | Performed by: STUDENT IN AN ORGANIZED HEALTH CARE EDUCATION/TRAINING PROGRAM

## 2024-09-01 PROCEDURE — 92526 ORAL FUNCTION THERAPY: CPT

## 2024-09-01 PROCEDURE — A9270 NON-COVERED ITEM OR SERVICE: HCPCS | Performed by: PHYSICAL MEDICINE & REHABILITATION

## 2024-09-01 PROCEDURE — 94760 N-INVAS EAR/PLS OXIMETRY 1: CPT

## 2024-09-01 PROCEDURE — 700102 HCHG RX REV CODE 250 W/ 637 OVERRIDE(OP): Performed by: PHYSICAL MEDICINE & REHABILITATION

## 2024-09-01 PROCEDURE — 700105 HCHG RX REV CODE 258: Performed by: HOSPITALIST

## 2024-09-01 PROCEDURE — 99232 SBSQ HOSP IP/OBS MODERATE 35: CPT | Performed by: HOSPITALIST

## 2024-09-01 PROCEDURE — 700101 HCHG RX REV CODE 250: Performed by: STUDENT IN AN ORGANIZED HEALTH CARE EDUCATION/TRAINING PROGRAM

## 2024-09-01 PROCEDURE — 700101 HCHG RX REV CODE 250

## 2024-09-01 RX ORDER — ALBUTEROL SULFATE 0.83 MG/ML
SOLUTION RESPIRATORY (INHALATION)
Status: COMPLETED
Start: 2024-09-01 | End: 2024-09-01

## 2024-09-01 RX ORDER — ALBUTEROL SULFATE 5 MG/ML
2.5 SOLUTION RESPIRATORY (INHALATION)
Status: COMPLETED | OUTPATIENT
Start: 2024-09-01 | End: 2024-09-01

## 2024-09-01 RX ORDER — HYDROXYZINE HYDROCHLORIDE 25 MG/1
25 TABLET, FILM COATED ORAL 3 TIMES DAILY PRN
Status: DISCONTINUED | OUTPATIENT
Start: 2024-09-01 | End: 2024-09-05 | Stop reason: HOSPADM

## 2024-09-01 RX ADMIN — AMPICILLIN AND SULBACTAM 1.5 G: 1; .5 INJECTION, POWDER, FOR SOLUTION INTRAMUSCULAR; INTRAVENOUS at 05:48

## 2024-09-01 RX ADMIN — HYDROXYZINE HYDROCHLORIDE 25 MG: 25 TABLET ORAL at 12:14

## 2024-09-01 RX ADMIN — NYSTATIN 500000 UNITS: 100000 SUSPENSION ORAL at 19:52

## 2024-09-01 RX ADMIN — MOMETASONE FUROATE AND FORMOTEROL FUMARATE DIHYDRATE 2 PUFF: 200; 5 AEROSOL RESPIRATORY (INHALATION) at 06:55

## 2024-09-01 RX ADMIN — MOMETASONE FUROATE AND FORMOTEROL FUMARATE DIHYDRATE 2 PUFF: 200; 5 AEROSOL RESPIRATORY (INHALATION) at 17:33

## 2024-09-01 RX ADMIN — APIXABAN 5 MG: 5 TABLET, FILM COATED ORAL at 19:52

## 2024-09-01 RX ADMIN — Medication 6 MG: at 19:52

## 2024-09-01 RX ADMIN — AMPICILLIN AND SULBACTAM 1.5 G: 1; .5 INJECTION, POWDER, FOR SOLUTION INTRAMUSCULAR; INTRAVENOUS at 17:44

## 2024-09-01 RX ADMIN — EZETIMIBE 10 MG: 10 TABLET ORAL at 09:29

## 2024-09-01 RX ADMIN — HYDROXYZINE HYDROCHLORIDE 25 MG: 25 TABLET ORAL at 21:15

## 2024-09-01 RX ADMIN — METOPROLOL TARTRATE 62.5 MG: 25 TABLET, FILM COATED ORAL at 19:52

## 2024-09-01 RX ADMIN — ALBUTEROL SULFATE 2 PUFF: 90 AEROSOL, METERED RESPIRATORY (INHALATION) at 21:09

## 2024-09-01 RX ADMIN — SENNOSIDES AND DOCUSATE SODIUM 2 TABLET: 50; 8.6 TABLET ORAL at 09:30

## 2024-09-01 RX ADMIN — ALBUTEROL SULFATE 2 PUFF: 90 AEROSOL, METERED RESPIRATORY (INHALATION) at 17:15

## 2024-09-01 RX ADMIN — SENNOSIDES AND DOCUSATE SODIUM 2 TABLET: 50; 8.6 TABLET ORAL at 19:52

## 2024-09-01 RX ADMIN — ALBUTEROL SULFATE 2.5 MG: 2.5 SOLUTION RESPIRATORY (INHALATION) at 17:30

## 2024-09-01 RX ADMIN — NYSTATIN 500000 UNITS: 100000 SUSPENSION ORAL at 12:15

## 2024-09-01 RX ADMIN — DAPAGLIFLOZIN 10 MG: 10 TABLET, FILM COATED ORAL at 09:29

## 2024-09-01 RX ADMIN — NYSTATIN 500000 UNITS: 100000 SUSPENSION ORAL at 16:44

## 2024-09-01 RX ADMIN — NYSTATIN 500000 UNITS: 100000 SUSPENSION ORAL at 09:29

## 2024-09-01 RX ADMIN — Medication 2000 UNITS: at 09:29

## 2024-09-01 RX ADMIN — FUROSEMIDE 20 MG: 20 TABLET ORAL at 05:41

## 2024-09-01 RX ADMIN — LANSOPRAZOLE 30 MG: 30 TABLET, ORALLY DISINTEGRATING ORAL at 09:29

## 2024-09-01 RX ADMIN — AMPICILLIN AND SULBACTAM 1.5 G: 1; .5 INJECTION, POWDER, FOR SOLUTION INTRAMUSCULAR; INTRAVENOUS at 12:18

## 2024-09-01 RX ADMIN — METOPROLOL TARTRATE 62.5 MG: 25 TABLET, FILM COATED ORAL at 09:29

## 2024-09-01 RX ADMIN — APIXABAN 5 MG: 5 TABLET, FILM COATED ORAL at 09:29

## 2024-09-01 RX ADMIN — AMPICILLIN AND SULBACTAM 1.5 G: 1; .5 INJECTION, POWDER, FOR SOLUTION INTRAMUSCULAR; INTRAVENOUS at 00:45

## 2024-09-01 RX ADMIN — TIOTROPIUM BROMIDE INHALATION SPRAY 5 MCG: 3.12 SPRAY, METERED RESPIRATORY (INHALATION) at 06:56

## 2024-09-01 RX ADMIN — ALBUTEROL SULFATE 0.83 MG: 2.5 SOLUTION RESPIRATORY (INHALATION) at 17:30

## 2024-09-01 ASSESSMENT — ENCOUNTER SYMPTOMS
VOMITING: 0
NAUSEA: 0
POLYDIPSIA: 0
ABDOMINAL PAIN: 0
PALPITATIONS: 0
COUGH: 0
FEVER: 0
EYES NEGATIVE: 1
CHILLS: 0
BRUISES/BLEEDS EASILY: 0
SHORTNESS OF BREATH: 0

## 2024-09-01 ASSESSMENT — PAIN DESCRIPTION - PAIN TYPE: TYPE: ACUTE PAIN

## 2024-09-01 NOTE — PROGRESS NOTES
NURSING DAILY NOTE    Name: Pito Black   Date of Admission: 8/24/2024   Admitting Diagnosis: Right middle cerebral artery stroke (HCC)  Attending Physician: Tk Bai D.o.  Allergies: Patient has no known allergies.    Safety  Patient Assist  CGA  Patient Precautions  Fall Risk, Swallow Precautions, PEG Tube  Precaution Comments  [place], dysarthria, lower partial dentures, bilateral HAs, Zio patch, NPO  Bed Transfer Status  Contact Guard Assist  Toilet Transfer Status   Contact Guard Assist  Assistive Devices  Wheelchair, Rails, Gait Belt  Oxygen  None - Room Air  Diet/Therapeutic Dining  Current Diet Order   Procedures    Diet NPO Restrict to: Sips with Medications (TURN TUBE FEED OFF AT MIDNIGHT)    Diet: Diet Tube Feed; Formula: Bolus Only (Provide 1/2 carton Glucerna 1.2 at first bolus feed and advance by 1/2 carton per feed until goal is reached. Goal is 6 cartons per day); Select Bolus (If Indicated): Glucerna 1.2 Carton; Bolus Frequency:...     Pill Administration  crushed and PEG  Agitated Behavioral Scale  15  ABS Level of Severity  No Agitation    Fall Risk  Has the patient had a fall this admission?   No  Yandy Minaya Fall Risk Scoring  15, HIGH RISK  Fall Risk Safety Measures  bed alarm, chair alarm, poor balance, and low vision/ hearing    Vitals  Temperature: 36.9 °C (98.4 °F)  Temp src: Oral  Pulse: (!) 107  Respiration: 16  Blood Pressure : 110/65  Blood Pressure MAP (Calculated): 80 MM HG  BP Location: Left, Upper Arm  Patient BP Position: Supine     Oxygen  Pulse Oximetry: 92 %  O2 (LPM): 2  O2 Delivery Device: None - Room Air    Bowel and Bladder  Last Bowel Movement  08/31/24  Stool Type  Type 4: Like a sausage or snake, smooth and soft  Bowel Device     Continent  Bladder: Did not void   Bowel: No movement  Bladder Function  Urine Void (mL):  (moderate)  Number of Times Voided: 1  Urine Color: Yellow  Genitourinary Assessment    Bladder Assessment (WDL):  Within Defined Limits  Gutierrez Catheter: Not Applicable  Urine Color: Yellow  Bladder Device: Bathroom  Time Void: No  Bladder Scan: Post Void  $ Bladder Scan Results (mL): 83    Skin  Evan Score   17  Sensory Interventions   Bed Types: Standard/Trauma Mattress  Skin Preventative Measures: Waffle Overlay  Moisture Interventions  Moisturizers/Barriers: Barrier Wipes      Pain  Pain Rating Scale  0 - No Pain  Pain Location  Abdomen  Pain Location Orientation  Anterior  Pain Interventions   Declines    ADLs    Bathing   Shower, * * With Assistance from, Staff  Linen Change   Complete  Personal Hygiene  Perineal Care, Moist Nicolasa Wipes  Chlorhexidine Bath      Oral Care  Mouth Swabbed  Teeth/Dentures     Shave   (Done by family )  Nutrition Percentage Eaten  NPO at this Time  Environmental Precautions  Treaded Slipper Socks on Patient, Communication Sign for Patients & Families, Mobility Assessed & Appropriate Sign Placed  Patient Turns/Positioning  Patient Turns Self from Side to Side  Patient Turns Assistance/Tolerance     Bed Positions  Bed Controls On, Bed Locked  Head of Bed Elevated  Self regulated      Psychosocial/Neurologic Assessment  Psychosocial Assessment  Psychosocial (WDL):  Within Defined Limits  Patient Behaviors: Fatigue  Neurologic Assessment  Neuro (WDL): Within Defined Limits  Level of Consciousness: Alert  Orientation Level: Oriented X4  Cognition: Appropriate judgement  Speech: Delayed responses  Pupil Assesment: No  Motor Function/Sensation Assessment: Motor strength  Muscle Strength Right Arm: Good Strength Against Gravity and Moderate Resistance  Muscle Strength Left Arm: Good Strength Against Gravity and Moderate Resistance  Muscle Strength Right Leg: Fair Strength against Gravity but No Resistance  Muscle Strength Left Leg: Fair Strength against Gravity but No Resistance  EENT (WDL):  WDL Except    Cardio/Pulmonary Assessment  Edema      Respiratory Breath  Sounds  RUL Breath Sounds: Crackles  RML Breath Sounds: Crackles  RLL Breath Sounds: Diminished  MARIA M Breath Sounds: Crackles  LLL Breath Sounds: Diminished  Cardiac Assessment   Cardiac (WDL):  Within Defined Limits

## 2024-09-01 NOTE — PROGRESS NOTES
NURSING DAILY NOTE    Name: Pito Black   Date of Admission: 8/24/2024   Admitting Diagnosis: Right middle cerebral artery stroke (HCC)  Attending Physician: Tk Bai D.o.  Allergies: Patient has no known allergies.    Safety  Patient Assist  sb cg a  Patient Precautions  Fall Risk, Swallow Precautions, PEG Tube  Precaution Comments  [place], dysarthria, lower partial dentures, bilateral HAs, Zio patch, NPO  Bed Transfer Status  Contact Guard Assist  Toilet Transfer Status   Contact Guard Assist  Assistive Devices  Wheelchair, Rails, Gait Belt  Oxygen  Room air w/o2 available  Diet/Therapeutic Dining  Current Diet Order   Procedures    Diet NPO Restrict to: Sips with Medications (TURN TUBE FEED OFF AT MIDNIGHT)    Diet: Diet Tube Feed; Formula: Bolus Only (Provide 1/2 carton Glucerna 1.2 at first bolus feed and advance by 1/2 carton per feed until goal is reached. Goal is 6 cartons per day); Select Bolus (If Indicated): Glucerna 1.2 Carton; Bolus Frequency:...     Pill Administration  crushed and flush through Peg tube  Agitated Behavioral Scale  15  ABS Level of Severity  No Agitation    Fall Risk  Has the patient had a fall this admission?   No  Yandy Minaya Fall Risk Scoring  15, HIGH RISK  Fall Risk Safety Measures  bed alarm, chair alarm, poor balance, and low vision/ hearing    Vitals  Temperature: 36.8 °C (98.2 °F)  Temp src: Oral  Pulse: 100  Respiration: 19  Blood Pressure : 131/85  Blood Pressure MAP (Calculated): 100 MM HG  BP Location: Left, Upper Arm  Patient BP Position: Sitting     Oxygen  Pulse Oximetry: 94 %  O2 (LPM): 1  O2 Delivery Device: Room air w/o2 available    Bowel and Bladder  Last Bowel Movement  08/31/24  Stool Type  Type 4: Like a sausage or snake, smooth and soft  Bowel Device     Continent  Bladder: Did not void   Bowel: No movement  Bladder Function  Urine Void (mL):  (Moderate)  Number of Times Voided: 1  Urine  Color: Yellow  Genitourinary Assessment   Bladder Assessment (WDL):  Within Defined Limits  Gutierrez Catheter: Not Applicable  Urine Color: Yellow  Bladder Device: Bathroom  Time Void: No  Bladder Scan: Post Void  $ Bladder Scan Results (mL): 83    Skin  Evan Score   17  Sensory Interventions   Bed Types: Standard/Trauma Mattress with Overlay  Skin Preventative Measures: Waffle Overlay  Moisture Interventions  Moisturizers/Barriers: Barrier Wipes      Pain  Pain Rating Scale  9 - Can't bear the pain, unable to do anything  Pain Location  Abdomen  Pain Location Orientation  Anterior  Pain Interventions   Declines    ADLs    Bathing   Shower, * * With Assistance from, Staff  Linen Change   Complete  Personal Hygiene  Perineal Care, Moist Nicolasa Wipes  Chlorhexidine Bath      Oral Care  Mouth Swabbed  Teeth/Dentures     Shave   (Done by family )  Nutrition Percentage Eaten  NPO at this Time  Environmental Precautions  Treaded Slipper Socks on Patient, Communication Sign for Patients & Families, Mobility Assessed & Appropriate Sign Placed  Patient Turns/Positioning  Patient Turns Self from Side to Side  Patient Turns Assistance/Tolerance     Bed Positions  Bed Controls On, Bed Locked  Head of Bed Elevated  Self regulated      Psychosocial/Neurologic Assessment  Psychosocial Assessment  Psychosocial (WDL):  Within Defined Limits  Patient Behaviors: Fatigue  Neurologic Assessment  Neuro (WDL): Within Defined Limits  Level of Consciousness: Alert  Orientation Level: Oriented X4  Cognition: Appropriate judgement  Speech: Delayed responses  Pupil Assesment: No  Motor Function/Sensation Assessment: Motor strength  Muscle Strength Right Arm: Good Strength Against Gravity and Moderate Resistance  Muscle Strength Left Arm: Good Strength Against Gravity and Moderate Resistance  Muscle Strength Right Leg: Fair Strength against Gravity but No Resistance  Muscle Strength Left Leg: Fair Strength against Gravity but No Resistance  EENT  (WDL):  WDL Except    Cardio/Pulmonary Assessment  Edema      Respiratory Breath Sounds  RUL Breath Sounds: Crackles  RML Breath Sounds: Crackles  RLL Breath Sounds: Diminished  MARIA M Breath Sounds: Crackles  LLL Breath Sounds: Diminished  Cardiac Assessment   Cardiac (WDL):  Within Defined Limits

## 2024-09-01 NOTE — CARE PLAN
"The patient is Stable - Low risk of patient condition declining or worsening    Shift Goals  Clinical Goals: Safety  Patient Goals: Participate in therapy  Family Goals: Education    Progress made toward(s) clinical / shift goals:      Problem: Fall Risk - Rehab  Goal: Patient will remain free from falls  Outcome: Progressing  Note: Yandy Minaya Fall risk Assessment Score: 15    High fall risk Interventions   - Alarming seatbelt  - Bed and strip alarm   - Yellow sign by the door   - Yellow wrist band \"Fall risk\"  - Room near to the nurse station  - Do not leave patient unattended in the bathroom  - Fall risk education provided       Problem: Pain - Standard  Goal: Alleviation of pain or a reduction in pain to the patient’s comfort goal  Outcome: Progressing  Note: Pt denies pain or discomfort tonight. Sleeps good. Will continue to monitor.       Patient is not progressing towards the following goals:      "

## 2024-09-01 NOTE — PROGRESS NOTES
Hospital Medicine Daily Progress Note      Chief Complaint  Atrial Fibrillation  Congestive Heart Failure  Transaminitis  Leukocytosis    Interval Problem Update  No 24 hour clinical changes.    Review of Systems  Review of Systems   Constitutional:  Negative for chills and fever.   HENT: Negative.     Eyes: Negative.    Respiratory:  Negative for cough and shortness of breath.    Cardiovascular:  Negative for chest pain and palpitations.   Gastrointestinal:  Negative for abdominal pain, nausea and vomiting.   Musculoskeletal:         Wound pain   Skin:  Negative for itching and rash.   Endo/Heme/Allergies:  Negative for polydipsia. Does not bruise/bleed easily.        Physical Exam  Temp:  [36.4 °C (97.5 °F)-36.9 °C (98.4 °F)] 36.4 °C (97.5 °F)  Pulse:  [] 98  Resp:  [16-19] 17  BP: ()/(52-85) 95/52  SpO2:  [92 %-97 %] 97 %    Physical Exam  Vitals reviewed.   Constitutional:       General: He is not in acute distress.     Appearance: Normal appearance. He is not ill-appearing.   HENT:      Head: Normocephalic and atraumatic.      Right Ear: External ear normal.      Left Ear: External ear normal.      Nose: Nose normal.      Mouth/Throat:      Pharynx: Oropharynx is clear.   Eyes:      General:         Right eye: No discharge.         Left eye: No discharge.      Extraocular Movements: Extraocular movements intact.      Conjunctiva/sclera: Conjunctivae normal.   Cardiovascular:      Rate and Rhythm: Normal rate and regular rhythm.   Pulmonary:      Effort: No respiratory distress.      Breath sounds: No wheezing.      Comments: Decreased BS  Abdominal:      General: Bowel sounds are normal. There is no distension.      Palpations: Abdomen is soft.      Tenderness: There is no abdominal tenderness. There is no guarding or rebound.      Comments: +TTP at PEG site   Musculoskeletal:      Cervical back: Normal range of motion and neck supple.      Right lower leg: No edema.      Left lower leg: No edema.    Skin:     General: Skin is warm and dry.   Neurological:      Mental Status: He is alert.      Comments: Awake and alert         Laboratory            Assessment/Plan  * Right middle cerebral artery stroke (HCC)- (present on admission)  Assessment & Plan  Has dysarthria and dysphagia  S/P PEG on 8/23/24 by Dr. Meadows  On Eliquis and TF  Ongoing management per Physiatry    Abdominal pain  Assessment & Plan  Likely post-op pain from PEG  KUB unremarkable    Thrombocytopenia (HCC)  Assessment & Plan  May be 2/2 meds  Possible culprits include Eliquis, Omeprazole, and Zetia  Follow PLT  Check F/U labs in AM     Type 2 diabetes mellitus with stage 3a chronic kidney disease, without long-term current use of insulin (HCC)- (present on admission)  Assessment & Plan  HbA1c 6.4  Continue Farxiga  Discontinued FSBS and SSI  Outpt meds include Farxiga and Metformin  mg qd    SOB (shortness of breath)- (present on admission)  Assessment & Plan  CXR no change in RUL/LLL masses, small left pleural effusion  Concern for post-obstructive PNA  Continue Unasyn and Lasix  Dulera, Spiriva, and RT protocol  Check F/U labs in AM    Chronic systolic heart failure (HCC)- (present on admission)  Assessment & Plan  H/O AICD  Echo 2/6/24 EF 45%, global hypokinesis, mod MR/AI  BNP trending up  CXR increased left pleural effusion  Continue Farxiga, Metoprolol, and Lasix  Aldactone previously discontinued for hypotension and azotemia  SOB improving back on diuretics  Check F/U BNP in AM    Primary lung adenocarcinoma (HCC)- (present on admission)  Assessment & Plan  S/P resection  On experimental drug, presently on hold  Outpt Onc F/U    Hyperlipidemia associated with type 2 diabetes mellitus (HCC)- (present on admission)  Assessment & Plan  On Zetia    Azotemia- (present on admission)  Assessment & Plan  Renal function improved off Aldactone and S/P NS x 1 L  Monitor for worsening BUN/Cr back on Lasix  Check F/U labs in AM     A-fib  (HCC)  Assessment & Plan  Also has ZioPatch  On Metoprolol for rate control  Anticoagulated on Eliquis    Hypertension associated with type 2 diabetes mellitus (HCC)- (present on admission)  Assessment & Plan  Observe blood pressure trends on Metoprolol and Lasix  Aldactone discontinued for hypotension    DNR

## 2024-09-01 NOTE — THERAPY
Speech Language Pathology  Daily Treatment     Patient Name: Pito Black  Age:  79 y.o., Sex:  male  Medical Record #: [id number]  Today's Date: 9/1/2024     Precautions  Precautions: Fall Risk, Swallow Precautions, PEG Tube  Comments: [place], dysarthria, lower partial dentures, bilateral HAs, Zio patch, NPO    Subjective    Patient agreeable to therapy.  Reported that he had been coughing all night.  Patient had visible dried mucous on velum but he had low tolerance for assistance with oral care to the back of oral cavity.  Patient attempted to clean on his own with swab, but was not able.       Objective       09/01/24 0801   Treatment Charges   SLP Swallowing Dysfunction Treatment Swallowing Dysfunction Treatment   SLP Total Time Spent   SLP Individual Total Time Spent (Mins) 60   Dysphagia    Positioning / Behavior Modification Cough / Clear after Swallow;Effortful Swallow;Multiple Swallows;Chin Down   Oral / Pharyngeal / Laryngeal Exercises Base of Tongue Exercises;Pharyngeal Constriction Exercises   Other Treatments sEMG   Diet / Liquid Recommendation NPO;Pre-Feeding Trials with SLP Only   Therapy Interventions Dysphagia Therapy By Speech Language Pathologist   Skilled Intervention Compensatory Strategies;Verbal Cueing;Tactile Cueing         Assessment    Patient participated in sEMG.   Patient with moderate stubble with some difficulty with keeping sensors in place. Patient thought that his daughter had brought an electric razor but was not able to locate it.   Requested that CNA assist shaving patient's face and neck for sensor placement tomorrow. 3/7 effortful swallows with 2.5ml of mildly thick  liquids  between a range of 115% and 125% of his maximum for typical swallows (average peak measured at 69 mV for effortful swallows). Patient displayed increased coughing with mildly thick.  Needed break and cues for coughing to clear.  Shifted to single small ice chips for a total of 11 efforful swallow  attempts with 0% above target range of 125%). 10 additional saliva swallows with chin tuck and effortful swallows. Patient benefits from use of chin tuck in conjunction with effortful and muliple swallows.    Strengths: Independent prior level of function, Motivated for self care and independence, Pleasant and cooperative, Supportive family, Willingly participates in therapeutic activities  Barriers: Aspiration risk, Decreased endurance, Difficulty following instructions, Impaired activity tolerance (dysarthria)    Plan    Target increase stregth of tongue base retraction and pharyngeal constriction with use of sEMG for safe oral intake.  Target clear speech and following 2 step directions.    Passport items to be completed:  Express basic needs, understand food/liquid recommendations, consistently follow swallow precautions, manage finances, manage medications, arrive to therapy appointments on time, complete daily memory log entries, solve problems related to safety situations, review education related to hospitalization, complete caregiver training     Speech Therapy Problems (Active)       Problem: Expression STGs       Dates: Start:  08/25/24         Goal: STG-Within one week, patient will implement clear speech strategies at the single word and short phrase (2-4 words) level to answer open ended questions with MIN cues       Dates: Start:  08/25/24         Goal Note filed on 08/27/24 1009 by Chante Raymundo MS,CCC-SLP       Will continue to target                 Problem: Problem Solving STGs       Dates: Start:  08/25/24         Goal: STG-Within one week, patient will follow 2- step directives during functional tasks with no more than 1 repetition.       Dates: Start:  08/25/24         Goal Note filed on 08/27/24 1009 by Chante Raymundo MS,CCC-SLP       Will continue to target.                 Problem: Speech/Swallowing LTGs       Dates: Start:  08/25/24         Goal: LTG-By discharge, patient will safely  swallow Level 5 minced/moist textures and thin liquids with no overt s/sx of pen/asp, MOD I for use of swallow strategies.        Dates: Start:  08/25/24            Goal: LTG-By discharge, patient will express wants and needs effectively to different communication partners, maintain safety and participate socially in functional living environment with SPV       Dates: Start:  08/25/24            Goal: LTG-By discharge, patient will solve basic problems by utilizing compensatory intervention for memory and problem-solving to allow for safe completion of daily activities with SPV in order to prepare for safe d/c home        Dates: Start:  08/25/24               Problem: Swallowing STGs       Dates: Start:  08/25/24         Goal: STG-Within one week, patient will participate in MBSS evaluation to determine safety of PO diet and/or appropriate POC for dysphagia management       Dates: Start:  08/25/24         Goal Note filed on 08/27/24 1009 by Chante Raymundo MS,CCC-SLP       Anticipate will schedule for end of this week.              Goal: STG-Within one week, patient will complete 75 swallows in 60 minutes in conjunction with sEMG for visual biofeedback       Dates: Start:  08/25/24         Goal Note filed on 08/27/24 1009 by Chante Raymundo MS,CCC-SLP       Initiated today, able to complete 30 swallows, will continue to target.

## 2024-09-02 PROBLEM — R91.8 MASS OF LOWER LOBE OF LEFT LUNG: Status: RESOLVED | Noted: 2018-02-06 | Resolved: 2024-01-01

## 2024-09-02 PROBLEM — J96.01 ACUTE HYPOXIC RESPIRATORY FAILURE (HCC): Status: ACTIVE | Noted: 2024-01-01

## 2024-09-02 NOTE — PROGRESS NOTES
4 Eyes Skin Assessment Completed by KORY Wood and KORY Bo.    Head WDL  Ears WDL  Nose WDL  Mouth WDL  Neck WDL  Breast/Chest WDL  Shoulder Blades WDL  Spine WDL  (R) Arm/Elbow/Hand WDL  (L) Arm/Elbow/Hand WDL  Abdomen WDL  Groin WDL  Scrotum/Coccyx/Buttocks Redness and Blanching (slow to oyan)  (R) Leg WDL  (L) Leg WDL  (R) Heel/Foot/Toe WDL  (L) Heel/Foot/Toe WDL          Devices In Places Tele Box, Pulse Ox, and Nasal Cannula      Interventions In Place Gray Ear Foams, Pillows, and Q2 Turns    Possible Skin Injury No    Pictures Uploaded Into Epic N/A  Wound Consult Placed N/A  RN Wound Prevention Protocol Ordered No

## 2024-09-02 NOTE — ASSESSMENT & PLAN NOTE
Status post resection of the lung  On experimental chemotherapy as an outpatient currently on hold

## 2024-09-02 NOTE — ED NOTES
Assist RN: Patient given medication as per MAR, education given patient and family at bedside , patient verbalize understanding.    Patient placed on 3 liters O2 via nasal canula, patient was complaining of discomfort with oxy mask, maintaining spo2 96% at this time.

## 2024-09-02 NOTE — PROGRESS NOTES
NURSING DAILY NOTE    Name: Pito Black   Date of Admission: 8/24/2024   Admitting Diagnosis: Right middle cerebral artery stroke (HCC)  Attending Physician: Tk Bai D.o.  Allergies: Patient has no known allergies.    Safety  Patient Assist  SBA  Patient Precautions  Fall Risk, Swallow Precautions, PEG Tube  Precaution Comments  [place], dysarthria, lower partial dentures, bilateral HAs, Zio patch, NPO  Bed Transfer Status  Contact Guard Assist  Toilet Transfer Status   Contact Guard Assist  Assistive Devices  Wheelchair, Rails, Gait Belt  Oxygen  Nasal Cannula  Diet/Therapeutic Dining  Current Diet Order   Procedures    Diet NPO Restrict to: Sips with Medications (TURN TUBE FEED OFF AT MIDNIGHT)    Diet: Diet Tube Feed; Formula: Bolus Only (Provide 1/2 carton Glucerna 1.2 at first bolus feed and advance by 1/2 carton per feed until goal is reached. Goal is 6 cartons per day); Select Bolus (If Indicated): Glucerna 1.2 Carton; Bolus Frequency:...     Pill Administration  crushed and through peg   Agitated Behavioral Scale  15  ABS Level of Severity  No Agitation    Fall Risk  Has the patient had a fall this admission?   No  [place] Minaya Fall Risk Scoring  15, HIGH RISK  Fall Risk Safety Measures  bed alarm, chair alarm, poor balance, and low vision/ hearing    Vitals  Temperature: 36.1 °C (97 °F)  Temp src: Oral  Pulse: 98  Respiration: (!) 22  Blood Pressure : 100/66  Blood Pressure MAP (Calculated): 77 MM HG  BP Location: Left, Upper Arm  Patient BP Position: Supine     Oxygen  Pulse Oximetry: 93 %  O2 (LPM): 2  O2 Delivery Device: Nasal Cannula    Bowel and Bladder  Last Bowel Movement  08/31/24  Stool Type  Type 4: Like a sausage or snake, smooth and soft  Bowel Device     Continent  Bladder: Did not void   Bowel: No movement  Bladder Function  Urine Void (mL):  (moderate)  Number of Times Voided: 1  Urine Color: Yellow  Genitourinary Assessment    Bladder Assessment (WDL):  Within Defined Limits  Gutierrez Catheter: Not Applicable  Urine Color: Yellow  Bladder Device: Bathroom  Time Void: No  Bladder Scan: Post Void  $ Bladder Scan Results (mL): 83    Skin  Evan Score   17  Sensory Interventions   Bed Types: Standard/Trauma Mattress with Overlay  Skin Preventative Measures: Pillows in Use for Support / Positioning  Moisture Interventions  Moisturizers/Barriers: Barrier Wipes      Pain  Pain Rating Scale  0 - No Pain  Pain Location  Abdomen  Pain Location Orientation  Anterior  Pain Interventions   Declines    ADLs    Bathing   Shower, * * With Assistance from, Staff  Linen Change   Partial  Personal Hygiene  Change Nicolasa Pads  Chlorhexidine Bath      Oral Care  Mouth Swabbed  Teeth/Dentures     Shave  Full Assist  Nutrition Percentage Eaten  NPO at this Time  Environmental Precautions  Treaded Slipper Socks on Patient, Communication Sign for Patients & Families, Mobility Assessed & Appropriate Sign Placed  Patient Turns/Positioning  Patient Turns Self from Side to Side  Patient Turns Assistance/Tolerance     Bed Positions  Bed Controls On, Bed Locked  Head of Bed Elevated  Self regulated      Psychosocial/Neurologic Assessment  Psychosocial Assessment  Psychosocial (WDL):  Within Defined Limits  Patient Behaviors: Fatigue  Neurologic Assessment  Neuro (WDL): Within Defined Limits  Level of Consciousness: Alert  Orientation Level: Oriented X4  Cognition: Appropriate judgement  Speech: Delayed responses  Pupil Assesment: No  Motor Function/Sensation Assessment: Motor strength  Muscle Strength Right Arm: Good Strength Against Gravity and Moderate Resistance  Muscle Strength Left Arm: Good Strength Against Gravity and Moderate Resistance  Muscle Strength Right Leg: Fair Strength against Gravity but No Resistance  Muscle Strength Left Leg: Fair Strength against Gravity but No Resistance  EENT (WDL):  WDL Except    Cardio/Pulmonary Assessment  Edema      Respiratory  Breath Sounds  RUL Breath Sounds: Expiratory Wheezes  RML Breath Sounds: Expiratory Wheezes  RLL Breath Sounds: Diminished  MARIA M Breath Sounds: Expiratory Wheezes  LLL Breath Sounds: Diminished  Cardiac Assessment   Cardiac (WDL):  Within Defined Limits

## 2024-09-02 NOTE — ASSESSMENT & PLAN NOTE
Optimize blood pressure management keep systolic blood pressure less than 140 diastolic under 90  Continue with metoprolol hydralazine  Vitals:    09/04/24 1323   BP: 123/73   Pulse: (!) 128   Resp:    Temp:    SpO2:      Stable BP

## 2024-09-02 NOTE — PROGRESS NOTES
Hospital Medicine Daily Progress Note    Date of Service  9/2/2024    Chief Complaint  Pito Black is a 79 y.o. male admitted 9/2/2024 with shortness of breath    Hospital Course  Patient was at [facility] rehab rehabilitating from a stroke.  Patient became short of breath and was sent over.  Evaluation emergency room reveals respiratory failure with a combination of factors including pneumonia, heart failure, pleural effusion, lung cancer.  Patient at this point has been placed on oxygen support.  At this point initiation of antibiotics has been done.      Interval Problem Update  Monitor cultures  RT protocol  Oxygen support and titration  Antibiotics  PT OT evaluation and treatment  Nutritional support  Pain management    I have discussed this patient's plan of care and discharge plan at IDT rounds today with Case Management, Nursing, Nursing leadership, and other members of the IDT team.    Consultants/Specialty  None    Code Status  DNAR/DNI    Disposition  The patient is not medically cleared for discharge to home or a post-acute facility.  Anticipate discharge to: an inpatient rehabilitation hospital    I have placed the appropriate orders for post-discharge needs.    Review of Systems  Review of Systems   Constitutional:  Positive for malaise/fatigue and weight loss. Negative for chills, diaphoresis and fever.   HENT: Negative.  Negative for nosebleeds and sinus pain.    Eyes: Negative.  Negative for double vision, photophobia, discharge and redness.   Respiratory:  Positive for cough and shortness of breath. Negative for sputum production, wheezing and stridor.    Cardiovascular: Negative.  Negative for chest pain, orthopnea, leg swelling and PND.   Gastrointestinal: Negative.  Negative for abdominal pain, blood in stool and heartburn.   Genitourinary: Negative.  Negative for dysuria, flank pain and frequency.   Musculoskeletal: Negative.  Negative for back pain, falls and myalgias.   Skin: Negative.   Negative for itching.   Neurological:  Positive for weakness. Negative for dizziness, tingling, sensory change, focal weakness and seizures.   Endo/Heme/Allergies: Negative.  Negative for polydipsia. Does not bruise/bleed easily.   Psychiatric/Behavioral: Negative.  Negative for depression, substance abuse and suicidal ideas. The patient does not have insomnia.         Physical Exam  Temp:  [36.1 °C (97 °F)-37.2 °C (98.9 °F)] 36.1 °C (97 °F)  Pulse:  [] 110  Resp:  [17-40] 21  BP: (100-122)/(53-72) 112/62  SpO2:  [90 %-99 %] 99 %    Physical Exam  Constitutional:       General: He is awake. He is not in acute distress.     Appearance: Normal appearance. He is well-developed, well-groomed and normal weight. He is ill-appearing. He is not diaphoretic.   HENT:      Head: Normocephalic and atraumatic.      Jaw: There is normal jaw occlusion. No trismus.      Salivary Glands: Right salivary gland is not tender. Left salivary gland is not tender.      Right Ear: External ear normal.      Left Ear: External ear normal.      Nose: Congestion present.      Mouth/Throat:      Mouth: Mucous membranes are moist.      Pharynx: Oropharynx is clear.   Eyes:      General: Lids are normal. Vision grossly intact. No scleral icterus.        Right eye: No discharge.         Left eye: No discharge.      Extraocular Movements: Extraocular movements intact.      Conjunctiva/sclera: Conjunctivae normal.      Right eye: Right conjunctiva is not injected. No exudate.     Left eye: Left conjunctiva is not injected. No exudate.     Pupils: Pupils are equal, round, and reactive to light.   Neck:      Thyroid: No thyroid mass.      Vascular: No hepatojugular reflux or JVD.      Trachea: No abnormal tracheal secretions or tracheal deviation.   Cardiovascular:      Rate and Rhythm: Regular rhythm. Tachycardia present. Occasional Extrasystoles are present.     Pulses: Normal pulses.      Heart sounds: Normal heart sounds.   Pulmonary:       Effort: Tachypnea and accessory muscle usage present.      Breath sounds: Decreased air movement present. Examination of the right-upper field reveals decreased breath sounds. Examination of the left-upper field reveals decreased breath sounds. Examination of the right-middle field reveals decreased breath sounds. Examination of the left-middle field reveals decreased breath sounds. Examination of the right-lower field reveals decreased breath sounds and rhonchi. Examination of the left-lower field reveals decreased breath sounds and rhonchi. Decreased breath sounds and rhonchi present.   Abdominal:      General: Abdomen is flat. Bowel sounds are normal. There is no distension.      Palpations: Abdomen is soft. There is no mass.   Musculoskeletal:         General: Normal range of motion.      Cervical back: Full passive range of motion without pain, normal range of motion and neck supple. No rigidity. No muscular tenderness.      Right lower leg: No edema.      Left lower leg: No edema.   Lymphadenopathy:      Head:      Right side of head: No submental adenopathy.      Left side of head: No submental adenopathy.      Cervical: No cervical adenopathy.      Right cervical: No superficial cervical adenopathy.     Left cervical: No superficial cervical adenopathy.      Upper Body:      Right upper body: No supraclavicular adenopathy.      Left upper body: No supraclavicular adenopathy.   Skin:     General: Skin is warm and moist.      Capillary Refill: Capillary refill takes less than 2 seconds.      Coloration: Skin is not cyanotic.      Findings: No abrasion.   Neurological:      General: No focal deficit present.      Mental Status: He is alert and oriented to person, place, and time. Mental status is at baseline.   Psychiatric:         Attention and Perception: Attention and perception normal.         Mood and Affect: Mood and affect normal.         Speech: Speech normal.         Behavior: Behavior normal. Behavior  is cooperative.         Thought Content: Thought content normal.         Cognition and Memory: Cognition and memory normal.         Judgment: Judgment normal.         Fluids    Intake/Output Summary (Last 24 hours) at 9/2/2024 1436  Last data filed at 9/1/2024 2006  Gross per 24 hour   Intake 500 ml   Output --   Net 500 ml        Laboratory  Recent Labs     08/31/24  0530 09/02/24  0013   WBC 15.6* 15.8*   RBC 4.10* 4.52*   HEMOGLOBIN 12.9* 13.8*   HEMATOCRIT 39.4* 42.9   MCV 96.1 94.9   MCH 31.5 30.5   MCHC 32.7 32.2*   RDW 50.3* 50.4*   PLATELETCT 75* 106*   MPV 11.7 11.5     Recent Labs     08/31/24 0530 09/02/24  0013   SODIUM 135 139   POTASSIUM 4.4 4.9   CHLORIDE 100 101   CO2 23 27   GLUCOSE 162* 176*   BUN 31* 37*   CREATININE 1.09 1.16   CALCIUM 8.5 8.9                   Imaging  DX-CHEST-PORTABLE (1 VIEW)   Final Result         1.  Pulmonary edema and/or infiltrates, slightly increased since prior study   2.  Trace bilateral pleural effusions   3.  Cardiomegaly   4.  Atherosclerosis           Assessment/Plan  * Acute hypoxic respiratory failure (HCC)- (present on admission)  Assessment & Plan  Patient was undergoing rehab where the patient was noted to be in respiratory distress and was sent over to the emergency room  Patient initially hypoxic and had to be placed on oxygen support  Patient at this point seems to have pneumonia with ongoing heart failure and pleural effusion and lung cancer  Patient will need aggressive oxygenation to keep oxygen saturations above 90%    Right sided cerebral hemisphere cerebrovascular accident (CVA) (HCC)- (present on admission)  Assessment & Plan  Was at rehab for recovery from stroke  Continue with PT OT and speech  Continue with Lipitor and Eliquis    Primary cancer of left lower lobe of lung (HCC)- (present on admission)  Assessment & Plan  Status post resection of the lung  On experimental chemotherapy as an outpatient currently on hold    Thrombocytopenia (HCC)-  (present on admission)  Assessment & Plan  Thrombocytopenia secondary most likely to the cancerous process  Current level 106  Currently does not need a transfusion    Leukocytosis- (present on admission)  Assessment & Plan  Secondary to pneumonia patient has developed leukocytosis    Type 2 diabetes mellitus with stage 3a chronic kidney disease, without long-term current use of insulin (HCC)- (present on admission)  Assessment & Plan  Diabetic diet  Diabetes management with a combination of Farxiga and metformin  Monitor renal functions avoid nephrotoxic medications  Most recent hemoglobin A1c 6.4    Chronic systolic heart failure (HCC)- (present on admission)  Assessment & Plan  Optimize medication management and diuresis    Pleural effusion, left- (present on admission)  Assessment & Plan  Monitor pleural effusion if necessary drain    A-fib (HCC)- (present on admission)  Assessment & Plan  Rate control and anticoagulation to prevent additional strokes    Hypertension associated with type 2 diabetes mellitus (HCC)- (present on admission)  Assessment & Plan  Optimize blood pressure management keep systolic blood pressure less than 140 diastolic under 90  Continue with metoprolol hydralazine    Vitamin D deficiency- (present on admission)  Assessment & Plan  Vitamin D supplementation    Hyperlipidemia associated with type 2 diabetes mellitus (HCC)- (present on admission)  Assessment & Plan  Low-fat low-cholesterol diet  Statin and Zetia  Fasting lipid panel         VTE prophylaxis: VTE Selection    I have performed a physical exam and reviewed and updated ROS and Plan today (9/2/2024). In review of yesterday's note (9/1/2024), there are no changes except as documented above.

## 2024-09-02 NOTE — ASSESSMENT & PLAN NOTE
Patient was undergoing rehab where the patient was noted to be in respiratory distress and was sent over to the emergency room  Patient initially hypoxic and had to be placed on oxygen support  Patient at this point seems to have pneumonia with ongoing heart failure and pleural effusion and lung cancer  Patient will need aggressive oxygenation to keep oxygen saturations above 90%    COPD exac, pneumonia possible CHF  Antibiotics COPD protocol steroids and levalbuterol  Lasix increased. Monitor kidney function

## 2024-09-02 NOTE — ED PROVIDER NOTES
ED Provider Note        CHIEF COMPLAINT  Chief Complaint   Patient presents with    Shortness of Breath     Pt [facility] he is recovering from a stroke. Pt began complaining of SOB since this morning. O2 sat has been dropping. Pt has hx of COPD and baseline on 2L oxymask. He was given one albuterol tx at rehab with no relief. One albuterol tx given en route. Pt has inspiratory and expiratory wheezes and is currently satting 95% on 4 L. Denies chest pain and dizziness.         HPI  LIMITATION TO HISTORY   Select: Dysphagia from prior stroke  OUTSIDE HISTORIAN(S):  Family daughter provides supporting history    Pito Black is a 79 y.o. male who presents to the Emergency Department from rehab with increased work of breathing, respiratory distress, hypoxemia.  The patient is in rehab for any acute stroke.  According to family he was diagnosed several days ago with pneumonia and has been on Unasyn for this.  By report, the patient was reporting increased work of breathing.  He was given a dose of albuterol and his oxy mask was uptitrated from 2 L a minute to 4 L a minute.  He currently denies any fevers, chest pain, shortness of breath, or any other acute complaints.    REVIEW OF SYSTEMS  See HPI for further details. All other systems are negative.     PAST MEDICAL HISTORY     Past Medical History:   Diagnosis Date    Acute hypoxemic respiratory failure (HCC)     Acute respiratory failure with hypoxia ([facility]) 02/01/2023    AICD (automatic cardioverter/defibrillator) present 12/07/2023    Arrhythmia     hx of a fib    Arthritis 2015    generalized, DDD back    Asthma     inhaler     Backpain 08/06/2018    9/10    Blood clotting disorder (HCC) 02/2023    Pulmonary, right lung    Breath shortness     with exertion    Cancer (HCC) 07/2017    left lung- adenocarcinoma    Cataract     IOL bilateral    Congestive heart failure (HCC)     cardiologist, [facility], Catherine KAUR    Dental  disorder     lower partial, removable bridge    Diabetes 08/06/2018    on no meds at this time, diet controlled    Dysfunction of eustachian tube     Heart valve disease     mild mitral valve prolapse    High cholesterol     Hypertension     Macrocytic anemia     Pneumonia 02/2023    Renal disorder     CKD stage 3    Thrombophilia (HCC)     Type 2 diabetes mellitus with hyperglycemia (HCC) 03/17/2023    This is a chronic condition. Current medications: Insulin:  Biguanide: took metformin historically will restart metformin xr 750 mg daily. GLP1-RA:   SGLT-2i:    Consider for cardiorenal protection DPP4-I:  TZD:  Pk: Sulfonyluria:   Last A1c: 6/2/23 6.5% Last Microalb/Cr ratio: 6/2/23 <1.2 Fasting sugars: Last diabetic foot exam: 6/19/23 Last retinal eye exam: has upcoming appointment with optome    Volume overload        SURGICAL HISTORY  Past Surgical History:   Procedure Laterality Date    GA PLACE PERCUT GASTROSTOMY TUBE N/A 8/23/2024    Procedure: EGD, WITH PEG TUBE INSERTION;  Surgeon: Issa Meadows M.D.;  Location: SURGERY SAME DAY [facility];  Service: Gastroenterology    GA BRONCHOSCOPY,DIAGNOSTIC N/A 1/16/2024    Procedure: FIBER OPTIC BRONCHOSCOPY WITH  WASH, BRUSH, BRONCHOALVEOLAR LAVAGE, BIOPSY AND FINE NEEDLE ASPIRATION, ENDOBRONCHIAL ULTRASOUND AND NAVIGATION, ROBOTICS;  Surgeon: Vinayak Carbajal M.D.;  Location: [facility];  Service: Pulmonary Robotic    ENDOBRONCHIAL US ADD-ON N/A 1/16/2024    Procedure: ENDOBRONCHIAL ULTRASOUND (EBUS);  Surgeon: Vinayak Carbajal M.D.;  Location: [facility];  Service: Pulmonary Robotic    GA BRONCHOSCOPY,DIAGNOSTIC N/A 12/28/2023    Procedure: FIBER OPTIC BRONCHOSCOPY WITH BRONCHOALVEOLAR LAVAGE AND FINE NEEDLE ASPIRATION, ENDOBRONCHIAL ULTRASOUND AND NAVIGATION, ROBOTICS;  Surgeon: Miriam Martin M.D.;  Location: SURGERY [facility];  Service: Pulmonary Robotic    ENDOBRONCHIAL US ADD-ON N/A 12/28/2023    Procedure: ENDOBRONCHIAL  "ULTRASOUND (EBUS);  Surgeon: Miriam Martin M.D.;  Location: [facility];  Service: Pulmonary Robotic    MA BRONCHOSCOPY,DIAGNOSTIC  07/01/2021    Procedure: BRONCHOSCOPY - FIBER OPTIC WITH BRANCHOALVEOLAR LAVAGE BIOPSY, FNA, NAVIGATION;  Surgeon: Vinayak Carbajal M.D.;  Location: [facility];  Service: Pulmonary    ENDOBRONCHIAL US ADD-ON  07/01/2021    Procedure: ENDOBRONCHIAL ULTRASOUND (EBUS).;  Surgeon: Vinayak Carbajal M.D.;  Location: [facility];  Service: Pulmonary    CATARACT PHACO WITH IOL Left 08/21/2018    Procedure: CATARACT PHACO WITH IOL;  Surgeon: Juanito Hager M.D.;  Location: SURGERY SAME DAY [facility];  Service: Ophthalmology    CATARACT PHACO WITH IOL Right 08/07/2018    Procedure: CATARACT PHACO WITH IOL;  Surgeon: Juanito Hager M.D.;  Location: SURGERY SAME DAY [facility];  Service: Ophthalmology    THORACOSCOPY Left 04/19/2018    Procedure: THORACOSCOPY- WEDGE BIOPSY W/FROZEN SECTION;  Surgeon: Cristhian Galeano M.D.;  Location: [facility];  Service: Thoracic    EAR MIDDLE EXPLORATION Right 06/12/2015    Procedure: EAR MIDDLE EXPLORATION;  Surgeon: William Brandon M.D.;  Location: SURGERY SAME DAY [facility];  Service:     OSSICULAR RECONSTRUCTION Right 06/12/2015    Procedure: OSSICULAR RECONSTRUCTION CHAIN POSSIBLE;  Surgeon: William Brandon M.D.;  Location: SURGERY SAME DAY [facility];  Service:     KNEE ARTHROPLASTY TOTAL  2005    EAR RECONSTRUCTION Bilateral     ear replacement \"many years ago\"       FAMILY HISTORY  Family History   Problem Relation Age of Onset    Stroke Mother     Hypertension Mother     Diabetes Mother     Cancer Father         stomach cancer    Heart Disease Brother     Alcohol abuse Brother     Ovarian Cancer Neg Hx     Tubal Cancer Neg Hx     Peritoneal Cancer Neg Hx     Colorectal Cancer Neg Hx     Breast Cancer Neg Hx     Hyperlipidemia Neg Hx        SOCIAL HISTORY    reports that he quit smoking " about 19 years ago. His smoking use included cigarettes. He started smoking about 59 years ago. He has a 10 pack-year smoking history. He has never used smokeless tobacco. He reports that he does not currently use alcohol. He reports that he does not use drugs.    CURRENT MEDICATIONS  Home Medications       Reviewed by Edmundo Russell (Pharmacy Tech) on 09/02/24 at 0253  Med List Status: Complete     Medication Last Dose Status   albuterol (PROVENTIL) 2.5mg/0.5ml Nebu Soln 9/1/2024 Active   albuterol 108 (90 Base) MCG/ACT Aero Soln inhalation aerosol 9/1/2024 Active   apixaban (ELIQUIS) 5mg Tab 9/1/2024 Active   atorvastatin (LIPITOR) 20 MG Tab Not Taking Active   benzonatate (TESSALON) 100 MG Cap Not Taking Active   Cholecalciferol (VITAMIN D3) 2000 UNIT Cap 9/1/2024 Active   cyclobenzaprine (FLEXERIL) 10 mg Tab PRN Active   dapagliflozin propanediol (FARXIGA) 10 MG Tab 9/1/2024 Active   diclofenac sodium (VOLTAREN) 1 % Gel PRN Active   ezetimibe (ZETIA) 10 MG Tab 9/1/2024 Active   furosemide (LASIX) 20 MG Tab 9/1/2024 Active   hydrOXYzine HCl (ATARAX) 25 MG Tab 9/1/2024 Active   lansoprazole (PREVACID) 30 MG Tablet Delayed Release Dispersible 9/1/2024 Active   MELATONIN PO 9/1/2024 Active   metFORMIN ER (GLUCOPHAGE XR) 750 MG TABLET SR 24 HR Not Taking Active   METOPROLOL TARTRATE PO 9/1/2024 Active   mometasone-formoterol (DULERA) 200-5 MCG/ACT Aerosol 9/1/2024 Active   NS SOLN 100 mL with ampicillin/sulbactam 1.5 (1-0.5) g SOLR 1.5 g 9/1/2024 Active   nystatin (MYCOSTATIN) 958319 UNIT/ML Suspension 9/1/2024 Active   oxyCODONE immediate-release (ROXICODONE) 5 MG Tab 8/31/2024 Active   senna-docusate (PERICOLACE OR SENOKOT S) 8.6-50 MG Tab 9/1/2024 Active   spironolactone (ALDACTONE) 25 MG Tab Not Taking Active   tiotropium (SPIRIVA RESPIMAT) 2.5 mcg/Act Aero Soln 9/1/2024 Active   traZODone (DESYREL) 50 MG Tab 8/31/2024 Active                  Audit from Redirected Encounters    **Home medications have not  "yet been reviewed for this encounter**         ALLERGIES  No Known Allergies    PHYSICAL EXAM  VITAL SIGNS: /55   Pulse (!) 109   Temp 36.7 °C (98.1 °F) (Temporal)   Resp (!) 28   Ht 1.727 m (5' 8\")   Wt 59.9 kg (132 lb)   SpO2 90%   BMI 20.07 kg/m²   Gen: Alert  HENT: ATNC  Eyes: Normal conjunctiva  Neck: trachea midline  Resp: no respiratory distress, on oxy mask.  Bilateral wheezes.  No crackles noted  CV: No JVD, RRR, no m/r/g. Equal radial pulses  Abd: non-distended, non-tender  Ext: No deformities, no edema  Psych: normal mood  Neuro: Dysphagia present, moves all extremities    DIAGNOSTIC STUDIES / PROCEDURES  EKG  Results for orders placed or performed during the hospital encounter of 24   EKG   Result Value Ref Range    Report       [facility] Emergency Dept.    Test Date:  2024  Pt Name:    DOROTHEA SALINAS                  Department: ER  MRN:        [id number]                      Room:        12  Gender:     Male                         Technician: [id number]  :        1945                   Requested By:ER TRIAGE PROTOCOL  Order #:    [id number]                    Reading MD: Wang Roman    Measurements  Intervals                                Axis  Rate:       109                          P:          51  WY:         126                          QRS:        26  QRSD:       93                           T:          43  QT:         334  QTc:        450    Interpretive Statements  Sinus tachycardia  Ventricular premature complex  Borderline low voltage, extremity leads  Compared to ECG 2024 14:44:24  Ventricular premature complex(es) now present  Early repolarization no longer present  ST (T wave) deviation no longer present  Electronically Signed On 2024 00:24:27 PDT by Wang garber I independently interpreted this EKG    LABS  Labs Reviewed   CBC WITH DIFFERENTIAL - Abnormal; Notable for the following components:       Result Value    WBC 15.8 (*) "     RBC 4.52 (*)     Hemoglobin 13.8 (*)     MCHC 32.2 (*)     RDW 50.4 (*)     Platelet Count 106 (*)     Neutrophils-Polys 77.60 (*)     Lymphocytes 13.00 (*)     Neutrophils (Absolute) 12.30 (*)     Monos (Absolute) 1.06 (*)     Immature Granulocytes (abs) 0.14 (*)     All other components within normal limits   COMP METABOLIC PANEL - Abnormal; Notable for the following components:    Glucose 176 (*)     Bun 37 (*)     Albumin 3.1 (*)     Globulin 3.9 (*)     All other components within normal limits   PROBRAIN NATRIURETIC PEPTIDE, NT - Abnormal; Notable for the following components:    NT-proBNP 3469 (*)     All other components within normal limits   LACTIC ACID   ESTIMATED GFR   URINALYSIS   URINE CULTURE(NEW)   POCT COV-2, FLU A/B, RSV BY PCR   POC COV-2, FLU A/B, RSV BY PCR         RADIOLOGY  I have independently interpreted the diagnostic imaging associated with this visit.  My preliminary interpretation is as follows: Diffuse pulmonary infiltrates versus edema  Radiologist interpretation:    DX-CHEST-PORTABLE (1 VIEW)   Final Result         1.  Pulmonary edema and/or infiltrates, slightly increased since prior study   2.  Trace bilateral pleural effusions   3.  Cardiomegaly   4.  Atherosclerosis          COURSE & MEDICAL DECISION MAKING  Pertinent Labs & Imaging studies were reviewed. (See chart for details)    EXTERNAL RECORDS REVIEWED  Inpatient Notes patient hospitalized for recent stroke, currently in rehab.  Nursing notes reports elevated respiratory rate tonight, on 4 L oxy mask up from 2 L at current baseline      INITIAL ASSESSMENT AND PLAN  Care Narrative: Patient presents with report of shortness of breath, has wheezing, history of COPD.  He is currently on treatment for pneumonia.  He does have slightly elevated pulse rate as well as high respiratory rate.  Reviewing his records from the rehab, his pulse rate varies from , and physical exam from earlier today demonstrated no wheezing but  decreased breath sounds.    On arrival to the emergency department, he demonstrates no increased work of breathing, but does have wheezing, will provide additional treatment, screen for sepsis given concern for potential of pneumonia, repeat chest x-ray, and viral testing.  He is on anticoagulation, and shows no signs of DVT therefore do not believe CTA for PE indicated.    He does have a history of CHF but no evidence of fluid overload and chest x-ray demonstrates concern for potential pulmonary edema versus atypical pneumonia.  He has a persistent leukocytosis, but has increasing NT-proBNP concerning for potential heart failure.  The patient was ordered for dose of furosemide in case this is a heart failure, as well as azithromycin for atypical pneumonia.  Viral testing is negative.    Given the patient's increased oxygen demand, will plan for hospitalization.    ADDITIONAL PROBLEM LIST AND DISPOSITION      I have discussed management of the patient with the following medical professionals: See below      Patient is referred to primary care provider for blood pressure, diabetes and all other preventative health services      FINAL IMPRESSION  1. Respiratory distress    2. Increased oxygen demand             Case discussed with Dr. Gamez, who will evaluate the patient for hospitalization. Patient will be hospitalized in guarded condition.      This dictation was created using voice recognition software. The accuracy of the dictation is limited to the abilities of the software. I expect there may be some errors of grammar and possibly content. The nursing notes were reviewed and certain aspects of this information were incorporated into this note.

## 2024-09-02 NOTE — CARE PLAN
The patient is Watcher - Medium risk of patient condition declining or worsening    Shift Goals  Clinical Goals: Monitor respiratory status    Progress made toward(s) clinical / shift goals:  Patient is on 3l of O2. POC discussed with patient and patient's family. IV antibiotics given per MAR.     Patient is not progressing towards the following goals:

## 2024-09-02 NOTE — ED TRIAGE NOTES
"Chief Complaint   Patient presents with    Shortness of Breath     Pt BIB [facility] from [facility] where he is recovering from a stroke. Pt began complaining of SOB since this morning. O2 sat has been dropping. Pt has hx of COPD and baseline on 2L oxymask. He was given one albuterol tx at rehab with no relief. One albuterol tx given en route. Pt has inspiratory and expiratory wheezes and is currently satting 95% on 4 L. Denies chest pain and dizziness.       BIB EMS to [facility], pt on monitor and in gown, labs drawn and sent.     Medications given en route: albuterol.  Sepsis and SOB protocols ordered. EKG performed.     /62   Pulse (!) 108   Temp 36.7 °C (98.1 °F) (Temporal)   Resp (!) 31   Ht 1.727 m (5' 8\")   Wt 59.9 kg (132 lb)   SpO2 95%   BMI 20.07 kg/m²     "

## 2024-09-02 NOTE — ED NOTES
Med rec complete per patient's MAR from [facility]    Val King CPhT

## 2024-09-02 NOTE — PROGRESS NOTES
"Received report and assumed care of patient (pt) at shift change.    Assessment completed.  Pt is A&O x 3, disoriented to situation.  Pt denies any  pain att his time. Pt laying in bed. Vs /72   Pulse (!) 102   Temp 37.2 °C (98.9 °F) (Oral)   Resp (!) 31   Ht 1.753 m (5' 9\")   Wt 60 kg (132 lb 4.4 oz)   SpO2 93% 4l oxymask.   PEG site, CD.      Fall precaution in place: Bed is in lowest, locked position, bed alarm is on, call light and belongings are within reach. Pt educated to call for assistance OOB.  Plan of care discussed and all questions answered. All other needs met at this time. Care continuous.   "

## 2024-09-02 NOTE — DIETARY
"Nutrition Services: Initial Assessment  Day 0 of admit.  Pito Black is a 79 y.o. male with admitting DX of Acute hypoxic respiratory failure.      Consult received for TF.      Current hospital problems list:  Acute hypoxic respiratory failure  Thrombocytopenia  Leukocytosis  R-sided cerebral hemisphere CVA  T2DM with stage 3a CKD, without long-term use of insulin  Chronic systolic HF  Vitamin D deficiency  Pleural effusion, L  Primary cancer of L lower lobe of lung  HLD associated with T2DM  HTN associated with T2DM  A-fib     Nutrition Assessment:   Height: 172.7 cm (5' 8\")  Weight: 59.9 kg (132 lb) - other healthcare  Weight to Use in Calculations: 59.9 kg (132 lb 0.9 oz)   Body mass index is 20.07 kg/m²., BMI classification: normal   Wt Readings from Last 5 Encounters:   24 59.9 kg (132 lb)   24 60 kg (132 lb 4.4 oz)   24 57 kg (125 lb 10.6 oz)   24 64.5 kg (142 lb 3.2 oz)   24 62.1 kg (137 lb)        Calculation/Equation: MSJ x 1.2 = 1548 kcals  Total Calories / day: 1500 - 1800  (Calories / k - 30)  Total Grams Protein / day: 72 - 90  (Grams Protein / k.2 - 1.5)     Objective:   Pertinent medical hx: PNA, HF, pleural effusion, and lung cancer.   Pt with PEG tube in place, recent stroke and dysphagia, nutrition support is indicated.   PEG tube POA.   Pertinent labs: glucose 176, BUN 37  Pertinent meds: lipitor, lasix, zetia  Skin/wounds: No wounds or edema noted.   Food Allergies: NKA to food.   Last BM: PTA   CHO-controlled formula is indicated with hx of T2DM for blood sugar control. Pt previously failed standard formula at previous admit 1 week ago with blood sugars in 200s.      Current diet order/intake: NPO      Nutrition Diagnosis: (PES)      Per recent RD note on 8/15: Moderate malnutrition in context of chronic illness related to lung cancer as evidenced by mild muscle and mild fat wasting.     Nutrition Interventions:   Start TF Glucerna 1.2 bolus " regimen with goal 6 cartons per day (suggest 2 with meal times) to provide 1704 kcals, 85 gm protein, and 1146 ml free water per day.   Fluids per MD.   Diet upgrades per SLP/MD.   Patient aware of active plan of care as appropriate.     Nutrition Monitoring and Evaluation:   Monitor for TF advancement to goal rate and tolerance.   Monitor nutrition POC  Monitor vital signs pertinent to nutrition       RD following.

## 2024-09-02 NOTE — PROGRESS NOTES
"Noted  pt increased of work of breathing, expiratory wheezes and and crackles on lung sounds.  Pt also complains SOB, medicated pt per PRN ( albuterol) and titrated O2, from 2L to 4L oxymask. Vs follows /72   Pulse (!) 102   Temp 36.1 °C (97 °F) (Oral)   Resp (!) 31   Ht 1.753 m (5' 9\")   Wt 60 kg (132 lb 4.4 oz)   SpO2 93% . Will  re-evaluate pt condition and care continuous.     "

## 2024-09-02 NOTE — ASSESSMENT & PLAN NOTE
Diabetic diet  Diabetes management with a combination of Farxiga and metformin  Monitor renal functions avoid nephrotoxic medications  Most recent hemoglobin A1c 6.4

## 2024-09-02 NOTE — H&P
Hospital Medicine History & Physical Note    Date of Service  9/2/2024    Primary Care Physician  LULÚ Welsh.    Consultants  None     Code Status  Full Code    Chief Complaint  Chief Complaint   Patient presents with    Shortness of Breath     Pt BIB [facility] from [facility] where he is recovering from a stroke. Pt began complaining of SOB since this morning. O2 sat has been dropping. Pt has hx of COPD and baseline on 2L oxymask. He was given one albuterol tx at rehab with no relief. One albuterol tx given en route. Pt has inspiratory and expiratory wheezes and is currently satting 95% on 4 L. Denies chest pain and dizziness.       History of Presenting Illness  Pito Black is a 79 y.o. male with past medical history of CVA status post PEG placement, diabetes who presented 9/2/2024 with shortness of breath this morning from rehab.  Patient was currently getting treated for pneumonia been taking Unasyn. In the ER, chest x-ray done showing increased pulmonary edema.  Viral panel returned negative.  BNP returned back at 3469.  He was given dose of Lasix and IV antibiotics. Patient will be admitted to the hospital for diuresis.      I discussed the plan of care with {Discussed with...:23608}.    Review of Systems  ROS    Past Medical History   has a past medical history of Acute hypoxemic respiratory failure (HCC), Acute respiratory failure with hypoxia (HCC) (02/01/2023), AICD (automatic cardioverter/defibrillator) present (12/07/2023), Arrhythmia, Arthritis (2015), Asthma, Backpain (08/06/2018), Blood clotting disorder (HCC) (02/2023), Breath shortness, Cancer ([facility]) (07/2017), Cataract, Congestive heart failure ([facility]), Dental disorder, Diabetes (08/06/2018), Dysfunction of eustachian tube, Heart valve disease, High cholesterol, Hypertension, Macrocytic anemia, Pneumonia (02/2023), Renal disorder, Thrombophilia ([facility]), Type 2 diabetes mellitus with hyperglycemia ([facility]) (03/17/2023), and Volume  overload.    Surgical History   has a past surgical history that includes knee arthroplasty total (2005); ear reconstruction (Bilateral); ear middle exploration (Right, 06/12/2015); ossicular reconstruction (Right, 06/12/2015); thoracoscopy (Left, 04/19/2018); cataract phaco with iol (Right, 08/07/2018); cataract phaco with iol (Left, 08/21/2018); pr bronchoscopy,diagnostic (07/01/2021); endobronchial us add-on (07/01/2021); pr bronchoscopy,diagnostic (N/A, 12/28/2023); endobronchial us add-on (N/A, 12/28/2023); pr bronchoscopy,diagnostic (N/A, 1/16/2024); endobronchial us add-on (N/A, 1/16/2024); and pr place percut gastrostomy tube (N/A, 8/23/2024).     Family History  family history includes Alcohol abuse in his brother; Cancer in his father; Diabetes in his mother; Heart Disease in his brother; Hypertension in his mother; Stroke in his mother.   Family history reviewed with patient. There {is/is no:21100} family history that is pertinent to the chief complaint.     Social History   reports that he quit smoking about 19 years ago. His smoking use included cigarettes. He started smoking about 59 years ago. He has a 10 pack-year smoking history. He has never used smokeless tobacco. He reports that he does not currently use alcohol. He reports that he does not use drugs.    Allergies  No Known Allergies    Medications  Prior to Admission Medications   Prescriptions Last Dose Informant Patient Reported? Taking?   Cholecalciferol (VITAMIN D3) 2000 UNIT Cap  Patient No No   Sig: Take 1 Capsule by mouth every day.   NS SOLN 100 mL with ampicillin/sulbactam 1.5 (1-0.5) g SOLR 1.5 g 9/1/2024 at 1814  Yes Yes   Sig: Infuse 1.5 g into a venous catheter every 6 hours. Start 8/30/24, End 9/6/24   albuterol (PROVENTIL) 2.5mg/0.5ml Nebu Soln 9/1/2024 at 1730  Yes Yes   Sig: Take 2.5 mg by nebulization Once.   albuterol 108 (90 Base) MCG/ACT Aero Soln inhalation aerosol 9/1/2024 at 2109  Yes Yes   Sig: Inhale 2 Puffs every four  hours as needed for Shortness of Breath.   apixaban (ELIQUIS) 5mg Tab 2024 at 1952 Patient No Yes   Sig: Take 1 Tablet by mouth 2 times a day.   atorvastatin (LIPITOR) 20 MG Tab   No No   Si Tablet by Enteral Tube route every evening.   benzonatate (TESSALON) 100 MG Cap  Patient No No   Sig: Take 1 tablet by mouth three times a day as needed for cough   cyclobenzaprine (FLEXERIL) 10 mg Tab  Patient No No   Sig: Take 1 Tablet by mouth at bedtime.   dapagliflozin propanediol (FARXIGA) 10 MG Tab  Patient No No   Sig: Take 1 tablet by mouth every day.   diclofenac sodium (VOLTAREN) 1 % Gel  Patient No No   Sig: Apply 2 g topically 4 times a day as needed (left foot pain).   ezetimibe (ZETIA) 10 MG Tab  Patient No No   Sig: Take 1 Tablet by mouth every day.   metFORMIN ER (GLUCOPHAGE XR) 750 MG TABLET SR 24 HR  Patient No No   Sig: Take 1 tablet by mouth every day.   metoprolol SR (TOPROL XL) 50 MG TABLET SR 24 HR  Patient No No   Sig: Take 1 Tablet by mouth every day for 100 days.   spironolactone (ALDACTONE) 25 MG Tab  Patient No No   Sig: Take 1 Tablet by mouth every day for 100 days.      Facility-Administered Medications: None       Physical Exam  Temp:  [36.1 °C (97 °F)-37.2 °C (98.9 °F)] 36.7 °C (98.1 °F)  Pulse:  [] 105  Resp:  [16-40] 29  BP: ()/(52-72) 110/55  SpO2:  [91 %-97 %] 95 %  Blood Pressure : 110/55   Temperature: 36.7 °C (98.1 °F)   Pulse: (!) 105   Respiration: (!) 29   Pulse Oximetry: 95 %       Physical Exam    Laboratory:  Recent Labs     24  0530 24  0013   WBC 15.6* 15.8*   RBC 4.10* 4.52*   HEMOGLOBIN 12.9* 13.8*   HEMATOCRIT 39.4* 42.9   MCV 96.1 94.9   MCH 31.5 30.5   MCHC 32.7 32.2*   RDW 50.3* 50.4*   PLATELETCT 75* 106*   MPV 11.7 11.5     Recent Labs     24  0530 24  0013   SODIUM 135 139   POTASSIUM 4.4 4.9   CHLORIDE 100 101   CO2 23 27   GLUCOSE 162* 176*   BUN 31* 37*   CREATININE 1.09 1.16   CALCIUM 8.5 8.9     Recent Labs      "08/31/24  0530 09/02/24  0013   ALTSGPT 49 45   ASTSGOT 35 35   ALKPHOSPHAT 73 84   TBILIRUBIN 0.5 0.4   GLUCOSE 162* 176*         Recent Labs     08/31/24  0530 09/02/24  0013   NTPROBNP 2803* 3469*         No results for input(s): \"TROPONINT\" in the last 72 hours.    Imaging:  DX-CHEST-PORTABLE (1 VIEW)   Final Result         1.  Pulmonary edema and/or infiltrates, slightly increased since prior study   2.  Trace bilateral pleural effusions   3.  Cardiomegaly   4.  Atherosclerosis          {xray/ekg interpretation:19765}    Assessment/Plan:  Justification for Admission Status  I anticipate this patient {Anticipated Status:02316}    Patient will need a Telemetry bed on MEDICAL service .  The need is secondary to ***.    No new Assessment & Plan notes have been filed under this hospital service since the last note was generated.  Service: Hospital Medicine      VTE prophylaxis: {VTE selection:47300}  "

## 2024-09-02 NOTE — PROGRESS NOTES
Notified Dr. Alvarado regarding on patient condition, patient complains of SOB despite given PRN albuterol with no relief. Pt denies any chest pain.  Pt also having high RR 40's and titrated oxygen to 5L to 6L o2 saturation at 91%. Received order per DR Alvarado to transfer pt to [facility]. Care continuous.

## 2024-09-02 NOTE — ASSESSMENT & PLAN NOTE
Was at rehab for recovery from stroke  Continue with PT OT and speech  Continue with Lipitor and Eliquis

## 2024-09-02 NOTE — ASSESSMENT & PLAN NOTE
Rate control and anticoagulation to prevent additional strokes  Vitals:    09/04/24 1323   BP: 123/73   Pulse: (!) 128   Resp:    Temp:    SpO2:      HR elevated, BB added Cardiology consulted  If respiratory status stable restart BB

## 2024-09-02 NOTE — CARE PLAN
The patient is Stable - Low risk of patient condition declining or worsening    Shift Goals  Clinical Goals: Vss, trend labs, maintain spo2> 90%,and infused IV abx  Patient Goals: Rest  Family Goals: No family at bedside    Progress made toward(s) clinical / shift goals:  Vss, trend labs, pt will maintain respiratory optimum ventilation and gas exchange. Medicated pt per PRN ( albuterol for SOB), assessed respiratory lung sound per encounter.   Problem: Skin Integrity  Goal: Skin integrity is maintained or improved  Outcome: Progressing     Problem: Hemodynamics  Goal: Patient's hemodynamics, fluid balance and neurologic status will be stable or improve  Outcome: Progressing    Problem: Urinary Elimination  Goal: Establish and maintain regular urinary output  Outcome: Progressing    Patient is not progressing towards the following goals:    Problem: Respiratory  Goal: Patient will achieve/maintain optimum respiratory ventilation and gas exchange  Outcome: Not Progressing

## 2024-09-02 NOTE — PROGRESS NOTES
2329 called 911 dispatched to transfer pt to [facility] Called E.SAMMIE and given report to Dane.     2331: Notified patient daughter for transfer.     2345: JIMENA arrived and take the patient.     2350: [facility] left rehab.

## 2024-09-02 NOTE — ASSESSMENT & PLAN NOTE
Thrombocytopenia secondary most likely to the cancerous process  Current level 106  Currently does not need a transfusion

## 2024-09-03 PROCEDURE — 99999 PR NO CHARGE: CPT | Performed by: STUDENT IN AN ORGANIZED HEALTH CARE EDUCATION/TRAINING PROGRAM

## 2024-09-03 NOTE — CARE PLAN
Problem: Dressing  Goal: STG-Within one week, patient will dress UB at SBA using LRD  Outcome: Not Met     Problem: Bathing  Goal: STG-Within one week, patient will bathe min A  Outcome: Met     Problem: Dressing  Goal: STG-Within one week, patient will dress LB min A  Outcome: Met

## 2024-09-03 NOTE — CARE PLAN
The patient is Stable - Low risk of patient condition declining or worsening    Shift Goals  Clinical Goals: monitor respiratory status  Patient Goals: sleep  Family Goals: updates    Progress made toward(s) clinical / shift goals:    Pt aox2-3, family at bedside. Pt currently on 3L NC. Pt receiving bolus feeds, peg site clean, dry, intact. MD notified of increase in LA, Pt currently resting comfortably in bed. Plan of care discussed with Pt and family     Problem: Knowledge Deficit - Standard  Goal: Patient and family/care givers will demonstrate understanding of plan of care, disease process/condition, diagnostic tests and medications  Outcome: Progressing     Problem: Skin Integrity  Goal: Skin integrity is maintained or improved  Outcome: Progressing     Problem: Fall Risk  Goal: Patient will remain free from falls  Outcome: Progressing       Patient is not progressing towards the following goals:    N/A

## 2024-09-03 NOTE — PROGRESS NOTES
_____________________________________  Interdisciplinary Team Conference   Most recent IDT on 9/3/2024    Tk BUCK D.O., was present and led the interdisciplinary team conference on 9/3/2024.  I led the IDT conference and agree with the IDT conference documentation and plan of care as noted below.     Nursing:  Diet Current Diet Order   Procedures    Diet NPO Restrict to: Sips with Medications (TURN TUBE FEED OFF AT MIDNIGHT)    Diet: Diet Tube Feed; Formula: Bolus Only (Provide 1/2 carton Glucerna 1.2 at first bolus feed and advance by 1/2 carton per feed until goal is reached. Goal is 6 cartons per day); Select Bolus (If Indicated): Glucerna 1.2 Carton; Bolus Frequency:...       Eating ADL Total Assist  Tube feed bolus, Staff administers tube feed/parenteral nutrition/IVF, Set-up of equipment or meal/tube feeding   % of Last Meal  Oral Nutrition: NPO at this Time   Sleep    Bowel Last BM: 09/01/24   Bladder    Barriers to Discharge Home:  Poor activity tolerance, neglect, poor safety awareness        Physical Therapy:  Bed Mobility    Transfers Contact Guard Assist  Set-up of equipment, Supervision for safety, Verbal cueing (bed <> w/c using FWW at CGA; impulsive)   Mobility Contact Guard Assist   Stairs    Barriers to Discharge Home:  Poor activity tolerance, neglect, poor safety awareness      Occupational Therapy:  Grooming Contact Guard Assist, Seated   Bathing Minimal Assist   UB Dressing Minimal Assist   LB Dressing Minimal Assist   Toileting Moderate Assist   Shower & Transfer    Barriers to Discharge Home:  Poor activity tolerance, neglect, poor safety awareness    Speech-Language Pathology:  Comprehension:  Moderate Assist  Comprehension Description:  Hearing aids/amplifiers, Glasses, Verbal cues  Expression:  Moderate Assist  Expression Description:  Verbal cueing  Social Interaction:  Supervision  Social Interaction Description:  Increased time  Problem Solving:  Maximal Assist  Problem Solving  Description:  Verbal cueing, Therapy schedule, Bed/chair alarm, Increased time, Seat belt  Memory:  Maximal Assist  Memory Description:  Verbal cueing, Therapy schedule, Bed/chair alarm, Increased time, Seat belt  Barriers to Discharge Home:    Respiratory Therapy:  O2 (LPM): 5  O2 Delivery Device: Nasal Cannula    Case Management:  Continues to work on disposition and DME needs.      Discharge Date/Disposition:  9/7/24  ____________________________________      ____________________________________    Tk Bai D.O.  Physical Medicine & Rehabilitation   ____________________________________  09/03/24  3:22 PM

## 2024-09-03 NOTE — DISCHARGE PLANNING
HTH/SCP TCN chart review completed. Per review, noted patient was admitted to [facility] previous to current acute hospitalization following acute hospitalization 8/13-8/24 in the setting of right MCA CVA. The most current review of medical record, knowledge of pt's PLOF and social support, LACE+ score of 75 were considered.      Per review, prior to acute hospitalization 8/13 patient resides with his spouse in a one level home with 1-3 steps and was independent with ADL's, IADL's and mobility (no AD) at his baseline.     TCN met with patient wife and daughter at bedside with patient noted to be resting throughout TCN visit. Introduced TCN program. Provided education regarding post acute levels of care. Education provided regarding case management policy for blanket SNF referrals.     Wife and daughter stated patient complex medical course noting they wished for patient to participate in palliative consult prior to considering providing choice for return to post-acute placement to IRF level of care or SNF. Voalte sent to attending physician, Dr. Diaz, and LINDA Cisneros regarding family wishes for palliative consult at this time. Explained that TCN would likely follow in the periphery pending patient/ family decision.     In collaboration with CM, noted current discharge considerations are currently pending medical course, palliative consult and patient/ family decision. TCN will continue to follow and collaborate with discharge planning team as additional post acute needs arise. Thank you.     Completed today:  Orders noted for PT/OT and SLP. Order also noted for palliative consult at this time  Choice obtained: none (see above; as noted family requesting palliative consult prior to providing choice  Education provided on [facility]'s blanket referral policy  SCP with [facility] PCP

## 2024-09-03 NOTE — PROGRESS NOTES
Pt's tele monitor showed 5 beats of v-tach. MD notified and Pt vitals obtained. Pt blood pressure stable at 121/67. HR of 122.

## 2024-09-03 NOTE — CARE PLAN
Problem: Fall Risk  Goal: Patient will remain free from falls  Description: Target End Date:  Prior to discharge or change in level of care    Document interventions on the Kebede Rei Fall Risk Assessment    1.  Assess for fall risk factors  2.  Implement fall precautions  9/3/2024 0252 by Virgilio Cedeño R.N.  Outcome: Progressing  9/3/2024 0249 by Virgilio Cedeño R.N.  Outcome: Progressing     Problem: Knowledge Deficit - Standard  Goal: Patient and family/care givers will demonstrate understanding of plan of care, disease process/condition, diagnostic tests and medications  Description: Target End Date:  1-3 days or as soon as patient condition allows    Document in Patient Education    1.  Patient and family/caregiver oriented to unit, equipment, visitation policy and means for communicating concern  2.  Complete/review Learning Assessment  3.  Assess knowledge level of disease process/condition, treatment plan, diagnostic tests and medications  4.  Explain disease process/condition, treatment plan, diagnostic tests and medications  9/3/2024 0252 by Virgilio Cedeño R.N.  Outcome: Progressing  9/3/2024 0249 by Virgilio Cedeño R.N.  Outcome: Progressing   The patient is Stable - Low risk of patient condition declining or worsening    Shift Goals  Clinical Goals: Monitor respiratory status/breathing  Patient Goals: sleep  Family Goals: sleep    Progress made toward(s) clinical / shift goals:  patient is A0x2, mobilized with one person assist . Assessed breathing and effort, prn aerosol medication given and oxygenation maintained for support . Wheezes still noted. Hourly rounds to ensure safety and comfort .     Patient is not progressing towards the following goals:       verbalization

## 2024-09-03 NOTE — CARE PLAN
Problem: Mobility  Goal: STG-Within one week, patient will ambulate household distance 50ft x 4 with FWW SBA  Outcome: Not Met  Goal: STG-Within one week, patient will ascend and descend four to six stairs right rail min assist  Outcome: Not Met     Problem: Mobility Transfers  Goal: STG-Within one week, patient will transfer bed to chair SBA SPT  Outcome: Not Met     Patient sent to [facility] 9/1/24.

## 2024-09-03 NOTE — PROGRESS NOTES
Monitor Summary: -128, AZ 0.13, QRS 0.09, QT 0.34, with rare couplets, rare bigeminy, and frequent PVCs per strip from monitor room.

## 2024-09-03 NOTE — PROGRESS NOTES
Assessed Patient noticed increased work of breathing and use of accessory muscles. PRN aerosol was given . Referred to hospitalist for additional respiratory medications. Order was made . Vitals signs checked and recorded .

## 2024-09-03 NOTE — DISCHARGE PLANNING
Pito was sent acute from [facility].  Will need a PMR consult referral as well as [place] chloe to cogitate a return once medically cleared.

## 2024-09-03 NOTE — CARE PLAN
Problem: Swallowing STGs  Goal: STG-Within one week, patient will complete 75 swallows in 60 minutes in conjunction with sEMG for visual biofeedback  Outcome: Not Met     Problem: Expression STGs  Goal: STG-Within one week, patient will implement clear speech strategies at the single word and short phrase (2-4 words) level to answer open ended questions with MIN cues  Outcome: Not Met     Problem: Problem Solving STGs  Goal: STG-Within one week, patient will follow 2- step directives during functional tasks with no more than 1 repetition.  Outcome: Not Met     Problem: Swallowing STGs  Goal: STG-Within one week, patient will participate in MBSS evaluation to determine safety of PO diet and/or appropriate POC for dysphagia management  Outcome: Met

## 2024-09-03 NOTE — THERAPY
Speech Language Pathology   Clinical Swallow Evaluation     Patient Name: Pito Black  AGE:  79 y.o., SEX:  male  Medical Record #: [id number]  Date of Service: 9/3/2024      History of Present Illness  78 y/o admitted on 9/2 for SOB from [facility]. Being seen by SLP at [facility] and recently started on puree sips and pudding bites with SLP only.    Dx chest 9/2:  1.  Pulmonary edema and/or infiltrates, slightly increased since prior study  2.  Trace bilateral pleural effusions  3.  Cardiomegaly  4.  Atherosclerosis    PMH: CVA, lung CA, diabetes, HTN, PNA      General Information:  Vitals  O2 (LPM): 3  O2 Delivery Device: Silicone Nasal Cannula  Level of Consciousness: Drowsy  Patient Behaviors: Fatigue  Orientation: Oriented x 4  Follows Directives: Yes      Prior Living Situation & Level of Function:  Lives with - Patient's Self Care Capacity: Spouse  Comments: Pt transfer from [facility]  Swallowing: hx of dysphagia s/p CVA       Oral Mechanism Evaluation:  Dentition: Lower denture   Facial Symmetry: Central left facial droop  Labial Observations: Left sided weakness   Lingual Observations: Midline  Motor Speech: moderate dysarthria            Laryngeal Function:  Secretion Management: Expectoration of secretions  Voice Quality: Wet  Cough: Perceptually weak         Subjective  Pt asleep upon entry into room but woke with min verbal cues. Supportive family at bedside. This clinician is familiar with this pt as he was followed by SLP during previous admit.       Assessment  Current Method of Nutrition: PEG tube  Positioning: Joseph's (60-90 degrees)  Bolus Administration: SLP  O2 (LPM): 3 O2 Delivery Device: Silicone Nasal Cannula  Factor(s) Affecting Performance: None       Swallowing Trials:  Swallowing Trials  Ice: Impaired  Thin Liquid (TN0): Impaired      Comments: Pt with mild improvement in dysarthria and facial droop from previous admit. Pt on 3L 02 and endorsed mild SOB prior to PO trials.  "Oral care provided prior to PO trials which was completed by pt. Intermittent wet cough noted after the swallow with minimal trials of ice chips which is concerning for airway invasion. Pt followed directives for effortful swallow appropriately. Pt with increased WOB and c/o increased SOB after the swallow so breaks were provided in between ice chips. Increased WOB noted as session progressed so further PO trials were discontinued. Discussed with pt/family recommendation for a repeat MBSS while admitted, but will hold at this time given current respiratory status. Per family, pt was [facility].      Clinical Impressions  Pt is presenting with an acute on chronic dysphagia given hx of CVA and new onset of acute respiratory failure. Recommend continuation of NPO with PEG for nutrition. Okay for small amount of ice chips to minimize xerostomia and maintain integrity of the swallow following oral care. Recommend set of Yankour given weak/unproductive cough; RN to assist. Hold PO with lethargy.      Recommendations  Diet Consistency: NPO with PEG  Instrumentation: VFSS (MBSS) (when more medically appropriate)  Medication: Non Oral  Oral Care: Q2h         SLP Treatment Plan  Treatment Plan: Dysphagia Treatment  SLP Frequency: 3x Per Week  Estimated Duration: Until Therapy Goals Met      Anticipated Discharge Needs  Discharge Recommendations: Recommend post-acute placement for additional speech therapy services prior to discharge home   Therapy Recommendations Upon DC: Dysphagia Training, Patient / Family / Caregiver Education        Patient / Family Goals  Patient / Family Goal #1: \"more ice\"  Short Term Goals  Short Term Goal # 1: Pt will consume prefeeding trials with no overt s/sx of aspiration  Short Term Goal # 2: Pt will participate in a repeat MBSS as medically appropriate      MARISOL Hickey   "

## 2024-09-04 PROBLEM — I50.20 HFREF (HEART FAILURE WITH REDUCED EJECTION FRACTION) (HCC): Status: ACTIVE | Noted: 2024-01-01

## 2024-09-04 NOTE — THERAPY
Occupational Therapy   Initial Evaluation     Patient Name: Pito Black  Age:  79 y.o., Sex:  male  Medical Record #: [id number]  Today's Date: 9/4/2024     Precautions: Fall Risk, Swallow Precautions, PEG Tube  Comments: [place]    Assessment  Patient is 79 y.o. male admitted from [facility] with increased work of breathing, respiratory distress, hypoxemia. According to family he was diagnosed several days ago with pneumonia and has been on Unasyn for this. 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 PMHx of lung CA, HTN, DM2, sepsis, insomnia, hearing loss, CKD, and CHF. Pt seen for OT eval.     Pt's family was at bedside and reported that pt was doing well while at [facility] and making progress towards increasing independence with ADLs. But after having goals of care conversation with palliative, the pt and his family reported that they would like to return home with home health services. They anticipate that they will move the pt to his daughter's home where he will have round the clock care. During OT eval, pt demo ability to complete ADLs, functional mobility, and txfs with SBA-min A using FWW. Pt demo increased WOB when completing OOB ADLs requiring need for rest breaks. Currently recommend home health for further OT services following DC. Will continue to follow for ongoing acute OT services.     Plan    Occupational Therapy Initial Treatment Plan   Treatment Interventions: Self Care / Activities of Daily Living, Adaptive Equipment, Neuro Re-Education / Balance, Therapeutic Exercises, Therapeutic Activity, Family / Caregiver Training  Treatment Frequency: 3 Times per Week  Duration: Until Therapy Goals Met    DC Equipment Recommendations: Unable to determine at this time  Discharge Recommendations: Recommend home health for continued occupational therapy services (per family perference  as they are able to provide round the clock care for the pt)      Objective      Prior Living Situation   Prior Services None   Housing / Facility 1 Story House   Steps Into Home 0   Steps In Home 0   Rail None   Bathroom Set up Walk In Shower;Shower Chair   Equipment Owned Tub / Shower Seat   Lives with - Patient's Self Care Capacity Spouse;Adult Children   Comments Pt was admitted from [facility]. Family was present at bedside and reported that they wish to take the pt home with home health rather than txf back to [facility]. Pt's daughter reports that they anticipate moving the pt down to her hosue in [place], [place] (which is depicted above), where he will have access to round the clock care.   Prior Level of ADL Function   Self Feeding Independent   Grooming / Hygiene Independent   Bathing Independent   Dressing Independent   Toileting Independent   Prior Level of IADL Function   Medication Management Independent   Laundry Independent   Kitchen Mobility Independent   Finances Independent   Home Management Independent   Shopping Independent   Prior Level Of Mobility Independent Without Device in Community   Precautions   Precautions Fall Risk;Swallow Precautions;PEG Tube   Comments [facility]   Vitals   O2 (LPM) 1   O2 Delivery Device Silicone Nasal Cannula   Pain 0 - 10 Group   Therapist Pain Assessment Post Activity Pain Same as Prior to Activity;Nurse Notified  (Not rated, agreeable to activity)   Cognition    Cognition / Consciousness X   Speech/ Communication Dysarthric   Level of Consciousness Alert   Safety Awareness Impaired   Comments Pleasant and cooperative   Active ROM Upper Body   Dominant Hand Right   Strength Upper Body   Comments LUE Grossly 4/5   Balance Assessment   Sitting Balance (Static) Fair +   Sitting Balance (Dynamic) Fair   Standing Balance (Static) Fair   Standing Balance (Dynamic) Fair -   Weight Shift Sitting Fair   Weight Shift Standing Fair   Comments w/FWW   Bed Mobility    Supine to Sit Standby  Assist   Sit to Supine   (Up with PT post)   Scooting Standby Assist   Comments HOB slightly elevated   ADL Assessment   Eating Total Assist  (Tube feed)   Grooming Standby Assist;Seated   Upper Body Dressing Minimal Assist   Lower Body Dressing Standby Assist   Toileting   (NT; declined need during session)   How much help from another person does the patient currently need...   6 Clicks Daily Activity Score 16   Functional Mobility   Sit to Stand Contact Guard Assist   Bed, Chair, Wheelchair Transfer Contact Guard Assist   Toilet Transfers   (NT; declined need during session)   Mobility Functional mobility in room and hallway (to assess endurance for household distances); w/FWW   Activity Tolerance   Standing 5 min   Comments Demo increased WOB with activity   Patient / Family Goals   Patient / Family Goal #1 To go home   Short Term Goals   Short Term Goal # 1 Pt will complete ADL txfs with supv   Short Term Goal # 2 Pt will complete LB dressing with supv   Short Term Goal # 3 Pt will complete standing g/h routine with supv   Short Term Goal # 4 Pt will complete toileting ADLs with supv   Education Group   Education Provided Role of Occupational Therapist   Role of Occupational Therapist Patient Response Patient;Family;Acceptance;Explanation;Verbal Demonstration

## 2024-09-04 NOTE — ACP (ADVANCE CARE PLANNING)
MRN: [id number]  Date of palliative consult: 2024  Reason for consult: Goals of care    HPI:   Pito Black is a 79 y.o. male with medical history significant for MCA stroke, respiratory failure, dysphagia, aspiration/pneumonia.  Patient admitted to [facility] rehab after MCA stroke.  Patient had PEG tube placed for dysphagia and aspiration on 2024. Patient was slated to be discharged on  from rehab to home, however on  sounds to have had an aspiration event and subsequent pneumonia with respiratory failure requiring acute inpatient admission.      I met with the patient as well as his wife and daughter at bedside this morning.  At the time of evaluation patient denies somatic complaints reports breathing significantly better than 2 days ago.  Family reports this is the best the patient has looked.  Patient has been out of bed with assistance to the bathroom.  Patient remains n.p.o. after failing swallow study with speech therapy.        Medication Allergy/Sensitivities:  No Known Allergies    ROS:    ROS    PE:   Recent vital signs  BMI: Body mass index is 20.07 kg/m².    Temp (24hrs), Av.5 °C (97.7 °F), Min:36.2 °C (97.2 °F), Max:37.1 °C (98.8 °F)  Temperature: 36.3 °C (97.3 °F)  Pulse  Av.7  Min: 71  Max: 128   Blood Pressure : 124/74       Physical Exam  Recent Labs     24  0013 24  0018   SODIUM 139 137   POTASSIUM 4.9 4.4   CHLORIDE 101 100   CO2 27 25   GLUCOSE 176* 323*   BUN 37* 38*   CREATININE 1.16 1.09   CALCIUM 8.9 8.2*     Recent Labs     24  0013 24  1636 24  0018   WBC 15.8* 10.7 11.5*   RBC 4.52* 3.67* 3.53*   HEMOGLOBIN 13.8* 11.4* 11.1*   HEMATOCRIT 42.9 35.4* 33.9*   MCV 94.9 96.5 96.0   MCH 30.5 31.1 31.4   MCHC 32.2* 32.2* 32.7   RDW 50.4* 52.5* 51.1*   PLATELETCT 106* 93* 110*   MPV 11.5 11.1 11.1       ASSESSMENT/PLAN WITH SHARED DECISION MAKING:   Review  Pertinent imaging reviewed.    PHYSICAL ASPECTS OF CARE  Palliative Performance  "Scale: 55%      SOCIAL ASPECTS OF CARE  Patient and family understand the need for significant in home care and assistance in the home.  Discussion with the family was had during which daughter has offered to take the patient and mother to [place] to live with them for a trial of rehabilitation.      GOALS OF CARE/SERIOUS ILLNESS CONVERSATION  I met with the patient as well as wife and daughter at bedside.  After introducing myself asked if now is a good time to sit down and have conversation.  Patient and family affirmed this.    The patient as well as his wife live in [place] independently.  Prior to this recent stroke the patient was completely independent performing all of his activities of daily living.  The patient was previously at [facility] for 1 week just prior to this acute care admission.  Patient reports to me that he was improving significantly at [facility] and hopes to go home.  I asked the patient whether or not he would be willing to go back to a rehab facility to which he responded adamantly that he would not go to back to a rehab.     Reviewing the patient's advance care planning documentation and advance directive patient would not want CPR or intubation.  Patient in his own words would not \"want to be hooked up to machines.\"  I spoke with the patient and family about tube feedings and using the PEG tube going forward.  There was initially some confusion on the patient's part as to usage of the PEG tube in the home.  I have reassured the patient that the PEG tube is able to be used in the home however would require some teaching.      Given his desire to \"not be hooked up to machines\" I spoke with the patient about usage of the PEG tube and whether or not this was in line with his goals.  The patient informed me that his dysphagia was improving during rehab and hopes to continue improving with the goal of being liberated from PEG tube feeds.  I did voice my concern that the PEG tube may be a " lifelong crutch to be used for nutrition.  The patient and family understand this.  I spoke with the patient about his autonomy and dictating care specifically with regards to tube feeds.  The patient understands that he can object at any time.  I described comfort care and specifically comfort feeds to the patient and family as well as home hospice.  I further described the potential of an aspiration event even despite having a PEG tube and not eating or drinking by mouth.  Neither the patient nor the family are ready for comfort care or hospice at this time.  They understand that they can change their mind and reach out at any time.    Provided palliative care contact information and encouraged patient and family to reach out with any questions/needs.     I met with case management to inform them of the patient's desire to go home potentially to [place] with their daughter.  Case management will follow-up with the primary team as well as the patient and family to assist in coordination of care and discharge planning.    Code Status: DNR      60 minutes spent discussing advance care planning, this time excludes any other billed services.    Interval diagnostic studies and medical documentation entries pertinent to this case were reviewed independently by me. This patient has at least one acute or chronic illness or injury that poses a threat to life or bodily function. This patient suffers from a high risk of morbidity from additional invasive diagnostic testing or intensive treatment. Discussion of recommendations and coordination of care undertaken with primary provider/treatment team.      Mo Hernandez DO  Palliative Medicine Fellow    ------------    This is a consult note.     Discussed case in detail with fellow. Agree with assessment and plan. I did not conduct F2F visit and thus this is a No Bill.    Arnold Fuentes DO, [facility]   Hospice and Palliative Care  [facility]  Director  [address] [place] [address]  P: [phone]  F: [phone]  C: [phone]

## 2024-09-04 NOTE — DISCHARGE PLANNING
TCN following. HTH/SCP chart reviewed. No new TCN needs identified. Please see prior TCN note from 9/3 for most recent discharge planning considerations if indicated. Would note that palliative consult has been completed and per review, appears likely that pt will continue with tx, etc. Note that pt was admitted from [facility] and readmitted from [facility] prior to planned dc date of 9/7. Note that RIRF/TCC is following as well. Anticipating return to [facility] if appropriate*. Note that PT and OT consults are now in place and will monitor for therapy recs for dc planning considerations.    Completed:  PT/OT consults in place*  Choice obtained: none; awaiting therapy/(possible PMR) as noted above/prior TCN note from 9/3    *noted in PM, PT and OT recommending HH      *note discussed with CM in PM as pt is post palliative consult, CM discussed with pt and family and note pt/family are now anticipating pt will dc to home with HH services, ADs as recommended and family support; will round back to obtain HH and DME choice (TCN attempted in PM and pt defers to family who is currently not available; will ananth-attempt as able/appropriate; would note per bedside RN, family was ok with dc to home with HH/DMEs)      **discussed with CM; note family agreeable to HH and HH choice is now in place for H; DME choice also in place for 02 via SIL[facility] per discussion as well

## 2024-09-04 NOTE — CARE PLAN
The patient is Stable - Low risk of patient condition declining or worsening    Shift Goals  Clinical Goals: monitor respiratory status  Patient Goals: sleep  Family Goals: updates    Progress made toward(s) clinical / shift goals:    Problem: Fall Risk  Goal: Patient will remain free from falls  Outcome: Progressing  Note: Patient educated on fall precautions. Call light within reach. Bed alarm on. Bed locked and low. Family member at bedside. Patient calls appropriately before getting up.      Problem: Risk for Aspiration  Goal: Patient's risk for aspiration will be absent or decrease  Outcome: Progressing  Flowsheets (Taken 9/4/2024 0225)  Head Of Bed: Greater than 30 degrees  Note: Patient showing no signs of aspiration. Patient receiving tube feeding to prevent aspiration.        Patient is not progressing towards the following goals:

## 2024-09-04 NOTE — CONSULTS
Palliative Care follow-up  Consult completed by Dr. Hernandez. Please see ACP note from today (9/4) at 1041 for full consult note.     Thank you for allowing Palliative Care to support this patient and family. Contact [phone] for additional assistance, change in patient status, or with any questions/concerns.      DON Smith  Palliative Care Nurse Practitioner   [phone]

## 2024-09-04 NOTE — CARE PLAN
The patient is Stable - Low risk of patient condition declining or worsening    Shift Goals  Clinical Goals: monitor respiratory status  Patient Goals: sleep, rest  Family Goals: updates    Progress made toward(s) clinical / shift goals:    Pt aox3, respiratory status stable. Pt on 3 liters NC, receiving breathing treatments from RT.  Hourly rounding completed. Plan of care discussed with patient and family at bedside.     Problem: Knowledge Deficit - Standard  Goal: Patient and family/care givers will demonstrate understanding of plan of care, disease process/condition, diagnostic tests and medications  Outcome: Progressing     Problem: Skin Integrity  Goal: Skin integrity is maintained or improved  Outcome: Progressing     Problem: Fall Risk  Goal: Patient will remain free from falls  Outcome: Progressing     Problem: Fluid Volume  Goal: Fluid volume balance will be maintained  Outcome: Progressing     Problem: Urinary - Renal Perfusion  Goal: Ability to achieve and maintain adequate renal perfusion and functioning will improve  Outcome: Progressing     Problem: Respiratory  Goal: Patient will achieve/maintain optimum respiratory ventilation and gas exchange  Outcome: Progressing     Problem: Mechanical Ventilation  Goal: Safe management of artificial airway and ventilation  Outcome: Progressing  Goal: Successful weaning off mechanical ventilator, spontaneously maintains adequate gas exchange  Outcome: Progressing  Goal: Patient will be able to express needs and understand communication  Outcome: Progressing     Problem: Physical Regulation  Goal: Diagnostic test results will improve  Outcome: Progressing  Goal: Signs and symptoms of infection will decrease  Outcome: Progressing     Problem: Knowledge Deficit - COPD  Goal: Patient/significant other demonstrates understanding of disease process, utilization of the Action Plan, medications and discharge instruction  Outcome: Progressing     Problem: Risk for  Infection - COPD  Goal: Patient will remain free from signs and symptoms of infection  Outcome: Progressing     Problem: Nutrition - Advanced  Goal: Patient will display progressive weight gain toward goal have adequate food and fluid intake  Outcome: Progressing     Problem: Ineffective Airway Clearance  Goal: Patient will maintain patent airway with clear/clearing breath sounds  Outcome: Progressing     Problem: Impaired Gas Exchange  Goal: Patient will demonstrate improved ventilation and adequate oxygenation and participate in treatment regimen within the level of ability/situation.  Outcome: Progressing     Problem: Risk for Aspiration  Goal: Patient's risk for aspiration will be absent or decrease  Outcome: Progressing     Problem: Self Care  Goal: Patient will have the ability to perform ADLs independently or with assistance (bathe, groom, dress, toilet and feed)  Outcome: Progressing       Patient is not progressing towards the following goals:    Pt tachycardic. MD notified. Pt medicated to manage HR, see MAR    Problem: Hemodynamics  Goal: Patient's hemodynamics, fluid balance and neurologic status will be stable or improve  Outcome: Not Progressing

## 2024-09-04 NOTE — DISCHARGE PLANNING
ATTN: Case Management  RE: Referral for Home Health    As of 9/4/24, we have accepted the Home Health referral for the patient listed above.    A [facility] clinician will be out to see the patient within 48 hours. If you have any questions or concerns regarding the patient’s transition to Home Health, please do not hesitate to contact us at [phone].      We look forward to collaborating with you,  [facility] Home Health Team

## 2024-09-04 NOTE — CONSULTS
Cardiology Initial Consult Note    Date of note:    9/4/2024      Consulting Physician: Juan C Diaz M.D.    Name:   Pito Black   YOB: 1945  Age:   79 y.o.  male   MRN:   [id number]    Assessment/Recommendations:   Moderate to severe mitral regurgitation.  I do not think it is significantly worse compared to prior echo of 2/24.  Discussed pathophysiology of mitral regurgitation with the patient and family.  Patient would not be a great candidate for anything invasive at this point in time as he has had a recent stroke.  Part of his shortness of breath currently seems to be more pulmonary with wheezing.  His clinical exam does not suggest he is significantly fluid overloaded.  His chest x-ray is hard to say if it is edema versus infiltrate.  Input and output has not been completely very accurate.  Patient has a lot of wheezing which sounds like it could be more respiratory related versus all fluid overload.  Increase lasix to 40 mg IV BID  Treat possible respiratory related causes  Followup as outpatient for mitral regurgitation.  HFrEF.    Lasix 40 mg IV BID  Change to bisoprolol 5 mg po qd (can increase to 10 to take place of metoprolol tartrate)  Add back spironolactone  A. Fib currently sinus tachycardia.    Continue with eliquis for stroke prevention    Reason for Consultation: severe mitral regurgitation, fluid overload    HPI:  Pito Black is a 79 y.o. male with history of atrial fibrillation on Eliquis, history of pulmonary embolism, history of heart failure with decreased ejection fraction with presence of a AICD, hyperlipidemia, history of lung cancer who recently suffered a stroke in August 2024.  Patient had dysphagia and required a PEG tube placement.  [facility] with complaints of shortness of breath.  He had decreased O2 saturation.    Patient also has been seen by palliative care  and although patient is a DNR/DNI, they are not yet ready for comfort care or hospice.    Wife is at bedside.  Patient is still short of breath, but better then when he came in.  He now is having some nose bleed.  Denies chest pain.    Problem list:  Principal Problem:    Acute hypoxic respiratory failure (HCC) (POA: Yes)  Active Problems:    Hypertension associated with type 2 diabetes mellitus (HCC) (POA: Yes)    A-fib (HCC) (POA: Yes)    Hyperlipidemia associated with type 2 diabetes mellitus (HCC) (POA: Yes)    Primary cancer of left lower lobe of lung (HCC) (POA: Yes)    Pleural effusion, left (POA: Yes)    Vitamin D deficiency (POA: Yes)    Chronic systolic heart failure (HCC) (POA: Yes)    Type 2 diabetes mellitus with stage 3a chronic kidney disease, without long-term current use of insulin (HCC) (POA: Yes)      Overview: This is a chronic condition.      Current medications:      Insulin:       Biguanide: metformin er 750 mg daily      GLP1-RA:        SGLT-2i:    farxiga 10 mg       DPP4-I:       TZD:       Pk:      Sulfonyluria:             Last A1c: 6.0% 12/23/23;   6.5% 6/2/23      Last Microalb/Cr ratio: 6/2/23      Fasting sugars:      Last diabetic foot exam: 6/19/23      Last retinal eye exam: 10/12/23      ACEi/ARB?       Statin? zetia 10 mg daily      Aspirin? eliquis 5 mg BID      Concomitant HTN? Spironolactone 25 mg daily; metoprolol sr 50 mg daily      Nightly foot checks? encouraged          Right sided cerebral hemisphere cerebrovascular accident (CVA) (HCC) (POA: Yes)    Leukocytosis (POA: Yes)    Thrombocytopenia (HCC) (POA: Yes)  Resolved Problems:    Mass of lower lobe of left lung (POA: Yes)      Past Medical History:   Diagnosis Date    Acute hypoxemic respiratory failure (HCC)     Acute respiratory failure with hypoxia (HCC) 02/01/2023    AICD (automatic cardioverter/defibrillator) present 12/07/2023    Arrhythmia     hx of a fib    Arthritis 2015    generalized, DDD back    Asthma      inhaler     Backpain 08/06/2018    9/10    Blood clotting disorder (HCC) 02/2023    Pulmonary, right lung    Breath shortness     with exertion    Cancer ([facility]) 07/2017    left lung- adenocarcinoma    Cataract     IOL bilateral    Congestive heart failure ([facility])     cardiologist, [facility], Catherine KAUR    Dental disorder     lower partial, removable bridge    Diabetes 08/06/2018    on no meds at this time, diet controlled    Dysfunction of eustachian tube     Heart valve disease     mild mitral valve prolapse    High cholesterol     Hypertension     Macrocytic anemia     Pneumonia 02/2023    Renal disorder     CKD stage 3    Thrombophilia ([facility])     Type 2 diabetes mellitus with hyperglycemia ([facility]) 03/17/2023    This is a chronic condition. Current medications: Insulin:  Biguanide: took metformin historically will restart metformin xr 750 mg daily. GLP1-RA:   SGLT-2i:    Consider for cardiorenal protection DPP4-I:  TZD:  Pk: Sulfonyluria:   Last A1c: 6/2/23 6.5% Last Microalb/Cr ratio: 6/2/23 <1.2 Fasting sugars: Last diabetic foot exam: 6/19/23 Last retinal eye exam: has upcoming appointment with optome    Volume overload        Past Surgical History:   Procedure Laterality Date    AZ PLACE PERCUT GASTROSTOMY TUBE N/A 8/23/2024    Procedure: EGD, WITH PEG TUBE INSERTION;  Surgeon: Issa Meadows M.D.;  Location: SURGERY SAME DAY [facility];  Service: Gastroenterology    AZ BRONCHOSCOPY,DIAGNOSTIC N/A 1/16/2024    Procedure: FIBER OPTIC BRONCHOSCOPY WITH  WASH, BRUSH, BRONCHOALVEOLAR LAVAGE, BIOPSY AND FINE NEEDLE ASPIRATION, ENDOBRONCHIAL ULTRASOUND AND NAVIGATION, ROBOTICS;  Surgeon: Vinayak Carbajal M.D.;  Location: SURGERY [facility];  Service: Pulmonary Robotic    ENDOBRONCHIAL US ADD-ON N/A 1/16/2024    Procedure: ENDOBRONCHIAL ULTRASOUND (EBUS);  Surgeon: Vinayak Carbajal M.D.;  Location: SURGERY [facility];  Service: Pulmonary Robotic    AZ BRONCHOSCOPY,DIAGNOSTIC N/A 12/28/2023     "Procedure: FIBER OPTIC BRONCHOSCOPY WITH BRONCHOALVEOLAR LAVAGE AND FINE NEEDLE ASPIRATION, ENDOBRONCHIAL ULTRASOUND AND NAVIGATION, ROBOTICS;  Surgeon: Miriam Martin M.D.;  Location: [facility];  Service: Pulmonary Robotic    ENDOBRONCHIAL US ADD-ON N/A 12/28/2023    Procedure: ENDOBRONCHIAL ULTRASOUND (EBUS);  Surgeon: Miriam Martin M.D.;  Location: [facility];  Service: Pulmonary Robotic    OK BRONCHOSCOPY,DIAGNOSTIC  07/01/2021    Procedure: BRONCHOSCOPY - FIBER OPTIC WITH BRANCHOALVEOLAR LAVAGE BIOPSY, FNA, NAVIGATION;  Surgeon: Vinayak Carbajal M.D.;  Location: [facility];  Service: Pulmonary    ENDOBRONCHIAL US ADD-ON  07/01/2021    Procedure: ENDOBRONCHIAL ULTRASOUND (EBUS).;  Surgeon: Vinayak Carbajal M.D.;  Location: [facility];  Service: Pulmonary    CATARACT PHACO WITH IOL Left 08/21/2018    Procedure: CATARACT PHACO WITH IOL;  Surgeon: uJanito Hager M.D.;  Location: SURGERY SAME DAY [facility];  Service: Ophthalmology    CATARACT PHACO WITH IOL Right 08/07/2018    Procedure: CATARACT PHACO WITH IOL;  Surgeon: Juanito Hager M.D.;  Location: SURGERY SAME DAY [facility];  Service: Ophthalmology    THORACOSCOPY Left 04/19/2018    Procedure: THORACOSCOPY- WEDGE BIOPSY W/FROZEN SECTION;  Surgeon: Cristhian Galeano M.D.;  Location: [facility];  Service: Thoracic    EAR MIDDLE EXPLORATION Right 06/12/2015    Procedure: EAR MIDDLE EXPLORATION;  Surgeon: William Brandon M.D.;  Location: SURGERY SAME DAY [facility];  Service:     OSSICULAR RECONSTRUCTION Right 06/12/2015    Procedure: OSSICULAR RECONSTRUCTION CHAIN POSSIBLE;  Surgeon: William Brandon M.D.;  Location: SURGERY SAME DAY [facility];  Service:     KNEE ARTHROPLASTY TOTAL  2005    EAR RECONSTRUCTION Bilateral     ear replacement \"many years ago\"         Medications Prior to Admission   Medication Sig Dispense Refill Last Dose    albuterol (PROVENTIL) 2.5mg/0.5ml Nebu " Soln Take 2.5 mg by nebulization Once.   9/1/2024 at 1730    albuterol 108 (90 Base) MCG/ACT Aero Soln inhalation aerosol Inhale 2 Puffs every four hours as needed for Shortness of Breath.   9/1/2024 at 2109    NS SOLN 100 mL with ampicillin/sulbactam 1.5 (1-0.5) g SOLR 1.5 g Infuse 1.5 g into a venous catheter every 6 hours. Start 8/30/24, End 9/6/24 9/1/2024 at 1814    furosemide (LASIX) 20 MG Tab Take 20 mg by mouth every day.   9/1/2024 at 0541    hydrOXYzine HCl (ATARAX) 25 MG Tab Take 25 mg by mouth 3 times a day as needed for Anxiety.   9/1/2024 at 2115    lansoprazole (PREVACID) 30 MG Tablet Delayed Release Dispersible 30 mg by Enteral Tube route every day.   9/1/2024 at 0929    MELATONIN PO 6 mg by Enteral Tube route at bedtime.   9/1/2024 at 1952    METOPROLOL TARTRATE PO 62.5 mg by Enteral Tube route 2 times a day.   9/1/2024 at 1952    mometasone-formoterol (DULERA) 200-5 MCG/ACT Aerosol Inhale 2 Puffs 2 times a day.   9/1/2024 at 1733    nystatin (MYCOSTATIN) 670459 UNIT/ML Suspension Take 500,000 Units by mouth 4 times a day. Swish and spit   9/1/2024 at 1952    oxyCODONE immediate-release (ROXICODONE) 5 MG Tab Take 5 mg by mouth every 3 hours as needed for Severe Pain.   8/31/2024 at 10368    senna-docusate (PERICOLACE OR SENOKOT S) 8.6-50 MG Tab Take 2 Tablets by mouth 2 times a day.   9/1/2024 at 1952    tiotropium (SPIRIVA RESPIMAT) 2.5 mcg/Act Aero Soln Inhale 5 mcg every day.   9/1/2024 at 0656    traZODone (DESYREL) 50 MG Tab Take 50 mg by mouth at bedtime as needed for Sleep.   8/31/2024 at 1729    apixaban (ELIQUIS) 5mg Tab Take 1 Tablet by mouth 2 times a day. (Patient taking differently: 5 mg by Enteral Tube route 2 times a day.) 180 Tablet 3 9/1/2024 at 1952    dapagliflozin propanediol (FARXIGA) 10 MG Tab Take 1 tablet by mouth every day. (Patient taking differently: 10 mg by Enteral Tube route every day.) 100 Tablet 3 9/1/2024 at 0929    ezetimibe (ZETIA) 10 MG Tab Take 1 Tablet by  mouth every day. (Patient taking differently: 10 mg by Enteral Tube route every day.) 100 Tablet 3 9/1/2024 at 0929    Cholecalciferol (VITAMIN D3) 2000 UNIT Cap Take 1 Capsule by mouth every day. (Patient taking differently: 2,000 Units by Enteral Tube route every day.) 100 Capsule 3 9/1/2024 at 0929    atorvastatin (LIPITOR) 20 MG Tab 1 Tablet by Enteral Tube route every evening. (Patient not taking: Reported on 9/2/2024)   Not Taking    benzonatate (TESSALON) 100 MG Cap Take 1 tablet by mouth three times a day as needed for cough (Patient not taking: Reported on 9/2/2024) 90 Capsule 1 Not Taking    metFORMIN ER (GLUCOPHAGE XR) 750 MG TABLET SR 24 HR Take 1 tablet by mouth every day. (Patient not taking: Reported on 9/2/2024) 100 Tablet 0 Not Taking    diclofenac sodium (VOLTAREN) 1 % Gel Apply 2 g topically 4 times a day as needed (left foot pain). 100 g 3 PRN at PRN    spironolactone (ALDACTONE) 25 MG Tab Take 1 Tablet by mouth every day for 100 days. (Patient not taking: Reported on 9/2/2024) 100 Tablet 3 Not Taking    cyclobenzaprine (FLEXERIL) 10 mg Tab Take 1 Tablet by mouth at bedtime. (Patient taking differently: Take 10 mg by mouth at bedtime as needed.) 100 Tablet 3 PRN at PRN     Current Facility-Administered Medications   Medication Dose Route Frequency Provider Last Rate Last Admin    insulin regular (HumuLIN R,NovoLIN R) injection  1-6 Units Subcutaneous 4X/DAY ACHS Angelic Orr, A.P.R.N.   2 Units at 09/04/24 1215    And    dextrose 50% (D50W) injection 25 g  25 g Intravenous Q15 MIN PRN Angelic Orr, HARSHAD.P.R.N.        mometasone-formoterol (Dulera) 200-5 MCG/ACT inhaler 2 Puff  2 Puff Inhalation BID (RT) Juan C Diaz M.D.   2 Puff at 09/04/24 1156    tiotropium (Spiriva Respimat) 2.5 mcg/Act inhalation spray 5 mcg  5 mcg Inhalation BID (RT) Juan C Diaz M.D.   5 mcg at 09/04/24 1201    metoprolol tartrate (Lopressor) tablet 25 mg  25 mg Oral Q8HRS Juan C Diaz M.D.   25  mg at 09/04/24 1323    albuterol (Proventil) 2.5mg/0.5ml nebulizer solution 2.5 mg  2.5 mg Nebulization 4X/DAY (RT) Juan C Diaz M.D.        albuterol (Proventil) 2.5mg/0.5ml nebulizer solution 2.5 mg  2.5 mg Nebulization Q2HRS PRN (RT) Juan C Diaz M.D.        LR (Bolus) infusion 500 mL  500 mL Intravenous Once PRN Juan C Diaz M.D.        Respiratory Therapy Consult   Nebulization Continuous RT Juan C Diaz M.D.        predniSONE (Deltasone) tablet 40 mg  40 mg Enteral Tube DAILY Juan C Diaz M.D.   40 mg at 09/04/24 0453    furosemide (Lasix) injection 40 mg  40 mg Intravenous Q DAY Juan C Diaz M.D.   40 mg at 09/04/24 0453    ampicillin/sulbactam (Unasyn) 3 g in  mL IVPB  3 g Intravenous Q6HRS Jeffrey Gamez M.D.   Stopped at 09/04/24 1253    Pharmacy Consult: Enteral tube insertion - review meds/change route/product selection  1 Each Other PHARMACY TO DOSE Jeffrey Gamez M.D.        apixaban (Eliquis) tablet 5 mg  5 mg Enteral Tube BID Jeffrey Gamez M.D.   5 mg at 09/04/24 0453    atorvastatin (Lipitor) tablet 20 mg  20 mg Enteral Tube Q EVENING Jeffrey Gamez M.D.   20 mg at 09/03/24 1705    ezetimibe (Zetia) tablet 10 mg  10 mg Enteral Tube DAILY Jeffrey Gamez M.D.   10 mg at 09/04/24 0453    melatonin tablet 5 mg  5 mg Oral HS PRN Edith Alejandro A.P.R.NHeladio   5 mg at 09/03/24 2208     Facility-Administered Medications Ordered in Other Encounters   Medication Dose Route Frequency Provider Last Rate Last Admin    [MAR Hold - Suspended Admission] hydrOXYzine HCl (Atarax) tablet 25 mg  25 mg Oral TID PRN Black Alvarado D.O.   25 mg at 09/01/24 2115    [MAR Hold - Suspended Admission] tiotropium (Spiriva Respimat) 2.5 mcg/Act inhalation spray 5 mcg  5 mcg Inhalation QDAILY (RT) Mita Noel M.D.   5 mcg at 09/01/24 0656    [MAR Hold - Suspended Admission] nystatin (Mycostatin) 565965 UNIT/ML suspension 500,000 Units  5 mL Swish & Spit 4X/DAY Black Alvarado D.O.    500,000 Units at 09/01/24 1952    [MAR Hold - Suspended Admission] mometasone-formoterol (Dulera) 200-5 MCG/ACT inhaler 2 Puff  2 Puff Inhalation BID (RT) Anastasiia Maciel M.D.   2 Puff at 09/01/24 1733    [MAR Hold - Suspended Admission] ampicillin/sulbactam (Unasyn) 1.5 g in  mL IVPB  1.5 g Intravenous Q6HRS Mita Noel M.D.   Stopped at 09/01/24 1814    [MAR Hold - Suspended Admission] lansoprazole (Prevacid) solutab 30 mg  30 mg Enteral Tube DAILY Anastasiia Maciel M.D.   30 mg at 09/01/24 0929    [MAR Hold - Suspended Admission] furosemide (Lasix) tablet 20 mg  20 mg Oral Q DAY Mita Noel M.D.   20 mg at 09/01/24 0541    [MAR Hold - Suspended Admission] albuterol inhaler 2 Puff  2 Puff Inhalation Q4H PRN (RT) Anastasiia Maciel M.D.   2 Puff at 09/01/24 2109    [MAR Hold - Suspended Admission] metoprolol tartrate (Lopressor) tablet 62.5 mg  62.5 mg Enteral Tube BID Herbert Don M.D.   62.5 mg at 09/01/24 1952    [MAR Hold - Suspended Admission] Respiratory Therapy Consult   Nebulization Continuous RT Anastasiia Maciel M.D.        [MAR Hold - Suspended Admission] hydrALAZINE (Apresoline) tablet 25 mg  25 mg Oral Q8HRS PRN Anastasiia Maciel M.D.        [MAR Hold - Suspended Admission] acetaminophen (Tylenol) tablet 650 mg  650 mg Oral Q4HRS PRN Anastasiia Maciel M.D.   650 mg at 08/25/24 0816    [MAR Hold - Suspended Admission] senna-docusate (Pericolace Or Senokot S) 8.6-50 MG per tablet 2 Tablet  2 Tablet Oral BID Anastasiia Maciel M.D.   2 Tablet at 09/01/24 1952    And    [MAR Hold - Suspended Admission] polyethylene glycol/lytes (Miralax) Packet 1 Packet  1 Packet Oral QDAY PRN JENNY Yanez.D.   1 Packet at 08/25/24 0823    [MAR Hold - Suspended Admission] docusate sodium (Enemeez) enema 283 mg  283 mg Rectal QDAY KYA Maciel M.D.        [MAR Hold - Suspended Admission] magnesium hydroxide (Milk Of Magnesia) suspension 30 mL   30 mL Oral QDAY PRARIEL Maciel M.D.        [MAR Hold - Suspended Admission] carboxymethylcellulose (Refresh Tears) 0.5 % ophthalmic drops 1 Drop  1 Drop Both Eyes PRARIEL Maciel M.D.        [MAR Hold - Suspended Admission] benzocaine-menthol (Cepacol) lozenge 1 Lozenge  1 Lozenge Mouth/Throat Q2HRS PRARIEL Maciel M.D.        [MAR Hold - Suspended Admission] mag hydrox-al hydrox-simeth (Maalox Plus Es Or Mylanta Ds) suspension 20 mL  20 mL Oral Q2HRS PRN Anastasiia Maciel M.D.        [MAR Hold - Suspended Admission] ondansetron (Zofran ODT) dispertab 4 mg  4 mg Oral 4X/DAY PRARIEL Maciel M.D.        Or    [MAR Hold - Suspended Admission] ondansetron (Zofran) syringe/vial injection 4 mg  4 mg Intramuscular 4X/DAY PRARIEL Maciel M.D.        [MAR Hold - Suspended Admission] traZODone (Desyrel) tablet 50 mg  50 mg Oral QHS PRN Anastasiia Maciel M.D.   50 mg at 08/31/24 1729    [MAR Hold - Suspended Admission] sodium chloride (Ocean) 0.65 % nasal spray 2 Spray  2 Spray Nasal PRARIEL Maciel M.D.        [MAR Hold - Suspended Admission] oxyCODONE immediate-release (Roxicodone) tablet 2.5 mg  2.5 mg Oral Q3HRS PRARIEL Maciel M.D.        Or    [MAR Hold - Suspended Admission] oxyCODONE immediate-release (Roxicodone) tablet 5 mg  5 mg Oral Q3HRS KYA Maciel M.D.   5 mg at 08/31/24 1730    [MAR Hold - Suspended Admission] traMADol (Ultram) 50 MG tablet 50 mg  50 mg Oral Q4HRS PRARIEL Maciel M.D.        [MAR Hold - Suspended Admission] midazolam (VERSED) 5 mg/mL (1 mL vial)  5 mg Nasal PRN Anastasiia Maciel M.D.        [MAR Hold - Suspended Admission] apixaban (Eliquis) tablet 5 mg  5 mg Enteral Tube BID Anastasiia Maciel M.D.   5 mg at 09/01/24 1952    [MAR Hold - Suspended Admission] dapagliflozin propanediol (Farxiga) tablet 10 mg  10 mg Enteral Tube DAILY Anastasiia Dumont  COREY Maciel   10 mg at 24    [MAR Hold - Suspended Admission] ezetimibe (Zetia) tablet 10 mg  10 mg Enteral Tube DAILY Anastasiia Maciel M.D.   10 mg at 24    [MAR Hold - Suspended Admission] melatonin tablet 6 mg  6 mg Enteral Tube Nightly Anastasiia Maciel M.D.   6 mg at 24    [MAR Hold - Suspended Admission] vitamin D3 (Cholecalciferol) tablet 2,000 Units  2,000 Units Enteral Tube DAILY Anastasiia Maciel M.D.   2,000 Units at 24    [MAR Hold - Suspended Admission] cyclobenzaprine (Flexeril) tablet 10 mg  10 mg Enteral Tube QHS PRN Anastasiia Maciel M.D.        [MAR Hold - Suspended Admission] diclofenac sodium (Voltaren) 1 % gel 2 g  2 g Topical 4X/DAY PRN Anastasiia Maciel M.D.           No Known Allergies    Family History   Problem Relation Age of Onset    Stroke Mother     Hypertension Mother     Diabetes Mother     Cancer Father         stomach cancer    Heart Disease Brother     Alcohol abuse Brother     Ovarian Cancer Neg Hx     Tubal Cancer Neg Hx     Peritoneal Cancer Neg Hx     Colorectal Cancer Neg Hx     Breast Cancer Neg Hx     Hyperlipidemia Neg Hx        Social History     Socioeconomic History    Marital status:      Spouse name: Not on file    Number of children: Not on file    Years of education: Not on file    Highest education level: 12th grade   Occupational History    Not on file   Tobacco Use    Smoking status: Former     Current packs/day: 0.00     Average packs/day: 0.3 packs/day for 40.0 years (10.0 ttl pk-yrs)     Types: Cigarettes     Start date: 1965     Quit date: 2005     Years since quittin.6    Smokeless tobacco: Never   Vaping Use    Vaping status: Never Used   Substance and Sexual Activity    Alcohol use: Not Currently     Comment: Quit 2/3/2017    Drug use: No    Sexual activity: Not on file   Other Topics Concern    Not on file   Social History Narrative    Not on file      Social Determinants of Health     Financial Resource Strain: Low Risk  (10/30/2023)    Overall Financial Resource Strain (CARDIA)     Difficulty of Paying Living Expenses: Not very hard   Food Insecurity: No Food Insecurity (10/30/2023)    Hunger Vital Sign     Worried About Running Out of Food in the Last Year: Never true     Ran Out of Food in the Last Year: Never true   Transportation Needs: No Transportation Needs (8/26/2024)    PRAPARE - Transportation     Lack of Transportation (Medical): No     Lack of Transportation (Non-Medical): No   Physical Activity: Inactive (10/30/2023)    Exercise Vital Sign     Days of Exercise per Week: 0 days     Minutes of Exercise per Session: 0 min   Stress: Stress Concern Present (10/20/2023)    [employer] - Occupational Stress Questionnaire     Feeling of Stress : To some extent   Social Connections: Moderately Isolated (10/30/2023)    Social Connection and Isolation Panel [NHANES]     Frequency of Communication with Friends and Family: More than three times a week     Frequency of Social Gatherings with Friends and Family: Once a week     Attends Jehovah's witness Services: Never     Active Member of Clubs or Organizations: No     Attends Club or Organization Meetings: Never     Marital Status:    Intimate Partner Violence: Not on file   Housing Stability: Low Risk  (10/30/2023)    Housing Stability Vital Sign     Unable to Pay for Housing in the Last Year: No     Number of Places Lived in the Last Year: 1     Unstable Housing in the Last Year: No         Intake/Output Summary (Last 24 hours) at 9/4/2024 1514  Last data filed at 9/4/2024 1400  Gross per 24 hour   Intake 474 ml   Output --   Net 474 ml         Review of Systems   Constitutional: Negative for diaphoresis and fever.   Respiratory:  Negative for cough, hemoptysis and wheezing.    Gastrointestinal:  Negative for hematochezia and melena.   Genitourinary:  Negative for hematuria.     "    Physical Exam  Body mass index is 20.08 kg/m².  /73   Pulse (!) 128   Temp 36.3 °C (97.3 °F) (Temporal)   Resp 19   Ht 1.727 m (5' 7.99\")   Wt 59.9 kg (132 lb 0.9 oz)   SpO2 95%   Vitals:    09/04/24 1217 09/04/24 1224 09/04/24 1308 09/04/24 1323   BP:  128/72  123/73   Pulse: (!) 118 98  (!) 128   Resp: 20 19     Temp:  36.3 °C (97.3 °F)     TempSrc:  Temporal     SpO2: 96% 95%     Weight:   59.9 kg (132 lb 0.9 oz)    Height:   1.727 m (5' 7.99\")      Oxygen Therapy:  Pulse Oximetry: 95 %, O2 (LPM): 1 (found on 1 lpm-returned to 1 lpm), O2 Delivery Device: Silicone Nasal Cannula    Physical Exam  Constitutional:       Appearance: Normal appearance. He is underweight.      Comments: Mild respiratory distress   Eyes:      Extraocular Movements: Extraocular movements intact.      Conjunctiva/sclera: Conjunctivae normal.   Neck:      Vascular: No carotid bruit or JVD.   Cardiovascular:      Rate and Rhythm: Regular rhythm. Tachycardia present.      Pulses:           Radial pulses are 1+ on the right side and 1+ on the left side.        Posterior tibial pulses are 1+ on the right side and 1+ on the left side.      Heart sounds: Murmur heard.      Systolic murmur is present with a grade of 1/6.      No friction rub. No gallop.   Pulmonary:      Effort: Pulmonary effort is normal. No respiratory distress.      Breath sounds: Examination of the right-middle field reveals wheezing. Examination of the left-middle field reveals wheezing. Examination of the right-lower field reveals wheezing. Examination of the left-lower field reveals wheezing. Wheezing present. No rales.   Abdominal:      General: Bowel sounds are normal.      Palpations: Abdomen is soft.   Musculoskeletal:      Cervical back: Neck supple.      Right lower leg: No edema.      Left lower leg: No edema.   Skin:     General: Skin is warm and dry.   Neurological:      Mental Status: He is alert and oriented to person, place, and time. Mental " status is at baseline.   Psychiatric:         Mood and Affect: Mood normal.          Labs (personally reviewed and notable for):   Lab Results   Component Value Date/Time    SODIUM 137 09/04/2024 12:18 AM    POTASSIUM 4.4 09/04/2024 12:18 AM    CHLORIDE 100 09/04/2024 12:18 AM    CO2 25 09/04/2024 12:18 AM    GLUCOSE 323 (H) 09/04/2024 12:18 AM    BUN 38 (H) 09/04/2024 12:18 AM    CREATININE 1.09 09/04/2024 12:18 AM      Lab Results   Component Value Date/Time    WBC 11.5 (H) 09/04/2024 12:18 AM    RBC 3.53 (L) 09/04/2024 12:18 AM    HEMOGLOBIN 11.1 (L) 09/04/2024 12:18 AM    HEMATOCRIT 33.9 (L) 09/04/2024 12:18 AM    MCV 96.0 09/04/2024 12:18 AM    MCH 31.4 09/04/2024 12:18 AM    MCHC 32.7 09/04/2024 12:18 AM    MPV 11.1 09/04/2024 12:18 AM    NEUTSPOLYS 77.30 (H) 09/03/2024 04:36 PM    LYMPHOCYTES 10.00 (L) 09/03/2024 04:36 PM    MONOCYTES 8.30 09/03/2024 04:36 PM    EOSINOPHILS 3.40 09/03/2024 04:36 PM    EOSINOPHILS 14 03/25/2023 01:40 PM    BASOPHILS 0.30 09/03/2024 04:36 PM    INR 1.29 (H) 08/13/2024 08:07 AM      Lab Results   Component Value Date/Time    CHOLSTRLTOT 134 08/13/2024 08:07 AM    LDL 84 08/13/2024 08:07 AM    HDL 33 (A) 08/13/2024 08:07 AM    TRIGLYCERIDE 83 08/13/2024 08:07 AM       Lab Results   Component Value Date/Time    TROPONINT 55 (H) 08/13/2024 0807     Lab Results   Component Value Date/Time    NTPROBNP 3469 (H) 09/02/2024 0013     Lab Results   Component Value Date/Time    HBA1C 6.4 (H) 08/25/2024 0526         Cardiac Imaging and Procedures Review (personally reviewed and notable for):    EKG dated 9/2/2024: Sinus tachycardia, 109 bpm, premature ventricular beat, low voltage on the limb leads, no significant change from prior ECG    Echo dated 9/4/2024: Normal left ventricular cavity size, wall thickness, and mildly to moderately decreased left ventricular systolic function, visually estimated LVEF of 40%, overall global hypokinesis.  There is mitral annular calcification with  thickened leaflets and moderate, may be severe mitral regurgitation.  There is mild aortic regurgitation.  When compared to prior echo of 2/6/2024, I do not think there is been significant interval changes.    Nuclear Perfusion Imaging 9/15/2023: No evidence of ischemia or infarction with LVEF of 41%.    Telemetry (last 24 hours): Sinus tachycardia with short runs of atrial tachycardia/atrial fibrillation    Radiology test Review:  EC-ECHOCARDIOGRAM COMPLETE W/O CONT   Final Result      DX-CHEST-PORTABLE (1 VIEW)   Final Result         1.  Pulmonary edema and/or infiltrates, slightly increased since prior study   2.  Trace bilateral pleural effusions   3.  Cardiomegaly   4.  Atherosclerosis          Thank you for allowing me to participate in the care of this patient.  Please contact me with any questions.      Nathaly Connell M.D.  Cardiologist, [facility] [place]     This note was generated using voice recognition software which has a small chance of producing errors of grammar and possibly content. I have made every reasonable attempt to find and correct any obvious errors, but expect that some may not be found prior to finalization of this note.

## 2024-09-04 NOTE — DISCHARGE PLANNING
Case Management Discharge Planning    Admission Date: 9/2/2024  GMLOS: 4.1  ALOS: 2    6-Clicks ADL Score:    6-Clicks Mobility Score:    PT and/or OT Eval ordered: Yes  Post-acute Referrals Ordered: No  Post-acute Choice Obtained: Yes  Has referral(s) been sent to post-acute provider:  No      Anticipated Discharge Dispo: Discharge Disposition: D/T to home under [facility] care in anticipation of covered skilled care (06)    DME Needed: No    Action(s) Taken: DC Assessment Complete (See below)  RN CM received verbal approval for [facility]. Choice for obtained and completed, faxed to [facility] for processing.    Escalations Completed: Provider    Medically Clear: No    Next Steps: F/u with pt and medical team to discuss dc needs and barriers.    Barriers to Discharge: Medical clearance and Outpatient referrals pending    Is the patient up for discharge tomorrow: No      Care Transition Team Assessment    [facility] due to Acute Hypoxic Respiratory Failure. Prior admission, Pt lives with his wife in a single story house in [facility] [facility]. At baseline, Pt was independent with his ADL's and AIDL's, does not use O2 or any DME's. Pt's daughter, Benoit is in town and is a good support for Pt. DC plan is to go home with . [facility]     Pt's daughter is comfortable in doing tube feeds.       Information Source  Orientation Level: Oriented to place, Oriented to time, Oriented to person, Disoriented to situation  Information Given By: Other (Comments) (Daughter)  Informant's Name: Benoit Aguilar  Who is responsible for making decisions for patient? : Patient    Readmission Evaluation  Is this a readmission?: Yes - unplanned readmission    Elopement Risk  Legal Hold: No  Ambulatory or Self Mobile in Wheelchair: No-Not an Elopement Risk  Disoriented: Situation-At Risk for Elopement  Psychiatric Symptoms: None  History of Wandering: No  Elopement this Admit: No  Vocalizing Wanting to  Leave: No  Displays Behaviors, Body Language Wanting to Leave: No-Not at Risk for Elopement  Elopement Risk: Not at Risk for Elopement    Interdisciplinary Discharge Planning  Lives with - Patient's Self Care Capacity: Spouse    Discharge Preparedness  What is your plan after discharge?: Home health care  What are your discharge supports?: Child, Spouse  Prior Functional Level: Ambulatory, Independent with Activities of Daily Living  Difficulity with ADLs: None  Difficulity with IADLs: None    Functional Assesment  Prior Functional Level: Ambulatory, Independent with Activities of Daily Living    Finances  Financial Barriers to Discharge: No  Prescription Coverage: Yes    Vision / Hearing Impairment  Right Eye Vision: Wears Glasses  Left Eye Vision: Wears Glasses  Hearing Impairment: Hearing Device Not Available, Both Ears         Advance Directive  Advance Directive?: DPOA for Health Care  Durable Power of  Name and Contact : Cydney Black    Domestic Abuse  Have you ever been the victim of abuse or violence?: No    Psychological Assessment  History of Substance Abuse: None    Discharge Risks or Barriers  Discharge risks or barriers?: Complex medical needs  Patient risk factors: Complex medical needs    Anticipated Discharge Information  Discharge Disposition: D/T to home under [facility] care in anticipation of covered skilled care (06)

## 2024-09-04 NOTE — FLOWSHEET NOTE
RESPIRATORY ASSESSMENT  Pt seen at request of his care team. Orderset utilized but filtered Treatment given and follow   SVN 4 x day and then reassess; Daily inhalers Dulera -Spiriva restarted  Encourage IS use     09/04/24 1217   Events/Summary/Plan   Events/Summary/Plan Tx given by COPD team during visit orders updated MD placed COPD filtered orderset call to review with him -follow RT Protocol   Vital Signs   Pulse (!) 118   Respiration 20   Pulse Oximetry 96 %   Respiratory Assessment   Level of Consciousness Alert   Breath Sounds   RML Breath Sounds Transmitted Upper Airway Sound  (cleared with throat clearing and cough- asphasia)   RLL Breath Sounds Fine Crackles;Expiratory Wheezes  (improved after tx)   LLL Breath Sounds Diminished;Fine Crackles   Secretions   Cough Moist;Non Productive;Strong   How Sputum Obtained Cough on Request  (encourage cough -clearing throat)   Oxygen   O2 (LPM) 1  (found on 1 lpm-returned to 1 lpm)

## 2024-09-04 NOTE — DIETARY
Nutrition Services Brief Update:    Problem: Nutritional:  Goal: Nutrition support tolerated and meeting greater than 85% of estimated needs  Outcome: MET    Pt is receiving TF Glucerna 1.2 at goal of 6 cartons per day for bolus regimen. Glucose 323 today on prednisone, SSI added to MAR. Also on lasix.     RD following.

## 2024-09-04 NOTE — PROGRESS NOTES
Monitor Summary: -125, GA .0.12, QRS .0.08, QT .0.31 with rare/occasioonal/frequent PVCs, rare couplets, rare bigem and 5 beats of V tach per strip from monitor room

## 2024-09-04 NOTE — CARE PLAN
SVN 4 x day and then re-assess in 24hrs  Encourage IS use    Problem: Bronchoconstriction  Goal: Improve in air movement and diminished wheezing  Description: Target End Date:  2 to 3 days    1.  Implement inhaled treatments  2.  Evaluate and manage medication effects  Outcome: Progressing

## 2024-09-04 NOTE — PROGRESS NOTES
Monitor Summary: -121, TX 0.12, QRS 0.08, QT 0.33, with 7 beats of PSVT, rare couplets and rare/occasional PVCs per strip from monitor room.

## 2024-09-04 NOTE — PROGRESS NOTES
Hospital Medicine Daily Progress Note    Date of Service  9/3/2024    Chief Complaint  Pito Black is a 79 y.o. male admitted 9/2/2024 with shortness of breath    Hospital Course  Patient was at [facility] rehab rehabilitating from a stroke.  Patient became short of breath and was sent over.  Evaluation emergency room reveals respiratory failure with a combination of factors including pneumonia, heart failure, pleural effusion, lung cancer.  Patient at this point has been placed on oxygen support.  At this point initiation of antibiotics has been done.      Interval Problem Update  Patient seen and examine at bedside  78yo frail M w/ h/o lung cancer followed by Luz Maria Jules on apixaban who presented 8/13/2024 with L facial droop and dysphagia. HE choked on his pills then developed L sided facial droop and hand weakness, persisting to the morning. At the ED afebrile, normotensive.  CTA HnN demonstrated R MCA occlusion. Neurology felt he was outside tPA window. CT perf no penumbra, CXR demonstrated PPM/ICD and bilateral infiltrates. CBC is normal. CMP demonstrates hyponatremia Na 133, stable Cr 1.3. A1c is 6. Troponin 55 comparable to prior values. INR 1.3. EKG sinus tachycardia.   Neurology consulted, recommends continued Eliquis and Zetia. GI consulted for dysphagia, s/p PEG tube placement by Dr. Ernandez on 8/23. Tube feeds started.     Managing stroke w/ Afib/anticoag in the setting of lung ca/COPD/hypoxia. There was also reports of CHF but no echo. On O2, continue tube feeds, antibiotics for pneumonia. He was wheezing on my exam, hence started steroids, levalbuterol (given tachycardia but SBP stable). Started antibiotics, as the CXR ordered by me showed bilateral infiltrates and has mild lactic acidosis. Once respiratory statis stable can RESTART BB TOMORROW for rate control; at this time BP stable and avoid digoxin given known CKD? Cr- 1.16. Monitor K+, renal function and blood pressures. Increased PO  Lasix given possible increased edema on CXR and I ordered an echo PENDING. Rehab planned.     I reviewed the chart along with vitals, labs, imaging, test (both pending and resulted) and recommendations from specialists and interdisciplinary team.      I have discussed this patient's plan of care and discharge plan at IDT rounds today with Case Management, Nursing, Nursing leadership, and other members of the IDT team.    Consultants/Specialty  None    Code Status  DNAR/DNI    Disposition  Medically Cleared  I have placed the appropriate orders for post-discharge needs.    Review of Systems  Review of Systems   Constitutional:  Positive for malaise/fatigue and weight loss. Negative for chills, diaphoresis and fever.   HENT: Negative.  Negative for nosebleeds and sinus pain.    Eyes: Negative.  Negative for double vision, photophobia, discharge and redness.   Respiratory:  Positive for cough and shortness of breath. Negative for sputum production, wheezing and stridor.    Cardiovascular: Negative.  Negative for chest pain, orthopnea, leg swelling and PND.   Gastrointestinal: Negative.  Negative for abdominal pain, blood in stool and heartburn.   Genitourinary: Negative.  Negative for dysuria, flank pain and frequency.   Musculoskeletal: Negative.  Negative for back pain, falls and myalgias.   Skin: Negative.  Negative for itching.   Neurological:  Positive for weakness. Negative for dizziness, tingling, sensory change, focal weakness and seizures.   Endo/Heme/Allergies: Negative.  Negative for polydipsia. Does not bruise/bleed easily.   Psychiatric/Behavioral: Negative.  Negative for depression, substance abuse and suicidal ideas. The patient does not have insomnia.         Physical Exam  Temp:  [36.3 °C (97.3 °F)-36.6 °C (97.8 °F)] 36.6 °C (97.8 °F)  Pulse:  [118-128] 118  Resp:  [16-28] 16  BP: (117-123)/(67-68) 121/67  SpO2:  [92 %-98 %] 96 %    Physical Exam  Constitutional:       General: He is awake. He is not  in acute distress.     Appearance: Normal appearance. He is well-developed, well-groomed and normal weight. He is ill-appearing. He is not diaphoretic.   HENT:      Head: Normocephalic and atraumatic.      Jaw: There is normal jaw occlusion. No trismus.      Salivary Glands: Right salivary gland is not tender. Left salivary gland is not tender.      Right Ear: External ear normal.      Left Ear: External ear normal.      Nose: Congestion present.      Mouth/Throat:      Mouth: Mucous membranes are moist.      Pharynx: Oropharynx is clear.   Eyes:      General: Lids are normal. Vision grossly intact. No scleral icterus.        Right eye: No discharge.         Left eye: No discharge.      Extraocular Movements: Extraocular movements intact.      Conjunctiva/sclera: Conjunctivae normal.      Right eye: Right conjunctiva is not injected. No exudate.     Left eye: Left conjunctiva is not injected. No exudate.     Pupils: Pupils are equal, round, and reactive to light.   Neck:      Thyroid: No thyroid mass.      Vascular: No hepatojugular reflux or JVD.      Trachea: No abnormal tracheal secretions or tracheal deviation.   Cardiovascular:      Rate and Rhythm: Regular rhythm. Tachycardia present. Occasional Extrasystoles are present.     Pulses: Normal pulses.      Heart sounds: Normal heart sounds.   Pulmonary:      Effort: Tachypnea and accessory muscle usage present.      Breath sounds: Decreased air movement present. Examination of the right-upper field reveals decreased breath sounds. Examination of the left-upper field reveals decreased breath sounds. Examination of the right-middle field reveals decreased breath sounds. Examination of the left-middle field reveals decreased breath sounds. Examination of the right-lower field reveals decreased breath sounds and rhonchi. Examination of the left-lower field reveals decreased breath sounds and rhonchi. Decreased breath sounds and rhonchi present.   Abdominal:       General: Abdomen is flat. Bowel sounds are normal. There is no distension.      Palpations: Abdomen is soft. There is no mass.   Musculoskeletal:         General: Normal range of motion.      Cervical back: Full passive range of motion without pain, normal range of motion and neck supple. No rigidity. No muscular tenderness.      Right lower leg: No edema.      Left lower leg: No edema.   Lymphadenopathy:      Head:      Right side of head: No submental adenopathy.      Left side of head: No submental adenopathy.      Cervical: No cervical adenopathy.      Right cervical: No superficial cervical adenopathy.     Left cervical: No superficial cervical adenopathy.      Upper Body:      Right upper body: No supraclavicular adenopathy.      Left upper body: No supraclavicular adenopathy.   Skin:     General: Skin is warm and moist.      Capillary Refill: Capillary refill takes less than 2 seconds.      Coloration: Skin is not cyanotic.      Findings: No abrasion.   Neurological:      General: No focal deficit present.      Mental Status: He is alert and oriented to person, place, and time. Mental status is at baseline.   Psychiatric:         Attention and Perception: Attention and perception normal.         Mood and Affect: Mood and affect normal.         Speech: Speech normal.         Behavior: Behavior normal. Behavior is cooperative.         Thought Content: Thought content normal.         Cognition and Memory: Cognition and memory normal.         Judgment: Judgment normal.         Fluids    Intake/Output Summary (Last 24 hours) at 9/3/2024 1730  Last data filed at 9/3/2024 1100  Gross per 24 hour   Intake 948 ml   Output --   Net 948 ml        Laboratory  Recent Labs     09/02/24  0013 09/03/24  1636   WBC 15.8* 10.7   RBC 4.52* 3.67*   HEMOGLOBIN 13.8* 11.4*   HEMATOCRIT 42.9 35.4*   MCV 94.9 96.5   MCH 30.5 31.1   MCHC 32.2* 32.2*   RDW 50.4* 52.5*   PLATELETCT 106* 93*   MPV 11.5 11.1     Recent Labs      09/02/24  0013   SODIUM 139   POTASSIUM 4.9   CHLORIDE 101   CO2 27   GLUCOSE 176*   BUN 37*   CREATININE 1.16   CALCIUM 8.9                   Imaging  DX-CHEST-PORTABLE (1 VIEW)   Final Result         1.  Pulmonary edema and/or infiltrates, slightly increased since prior study   2.  Trace bilateral pleural effusions   3.  Cardiomegaly   4.  Atherosclerosis      EC-ECHOCARDIOGRAM COMPLETE W/O CONT    (Results Pending)        Assessment/Plan  * Acute hypoxic respiratory failure (HCC)- (present on admission)  Assessment & Plan  Patient was undergoing rehab where the patient was noted to be in respiratory distress and was sent over to the emergency room  Patient initially hypoxic and had to be placed on oxygen support  Patient at this point seems to have pneumonia with ongoing heart failure and pleural effusion and lung cancer  Patient will need aggressive oxygenation to keep oxygen saturations above 90%    COPD exac, pneumonia possible CHF  Antibiotics COPD protocol steroids and levalbuterol  Lasix increased. Monitor kidney function    Thrombocytopenia (HCC)- (present on admission)  Assessment & Plan  Thrombocytopenia secondary most likely to the cancerous process  Current level 106  Currently does not need a transfusion    Leukocytosis- (present on admission)  Assessment & Plan  Secondary to pneumonia patient has developed leukocytosis    Right sided cerebral hemisphere cerebrovascular accident (CVA) (HCC)- (present on admission)  Assessment & Plan  Was at rehab for recovery from stroke  Continue with PT OT and speech  Continue with Lipitor and Eliquis    Type 2 diabetes mellitus with stage 3a chronic kidney disease, without long-term current use of insulin (HCC)- (present on admission)  Assessment & Plan  Diabetic diet  Diabetes management with a combination of Farxiga and metformin  Monitor renal functions avoid nephrotoxic medications  Most recent hemoglobin A1c 6.4    Chronic systolic heart failure (HCC)-  (present on admission)  Assessment & Plan  Optimize medication management and diuresis    Vitamin D deficiency- (present on admission)  Assessment & Plan  Vitamin D supplementation    Pleural effusion, left- (present on admission)  Assessment & Plan  Monitor pleural effusion if necessary drain    Primary cancer of left lower lobe of lung (HCC)- (present on admission)  Assessment & Plan  Status post resection of the lung  On experimental chemotherapy as an outpatient currently on hold    Hyperlipidemia associated with type 2 diabetes mellitus (HCC)- (present on admission)  Assessment & Plan  Low-fat low-cholesterol diet  Statin and Zetia  Fasting lipid panel    A-fib (HCC)- (present on admission)  Assessment & Plan  Rate control and anticoagulation to prevent additional strokes  Vitals:    09/03/24 1924   BP: 119/67   Pulse: (!) 121   Resp: 16   Temp: 37.1 °C (98.8 °F)   SpO2: 94%     HR elevated but BP stable  If respiratory status stable restart BB    Hypertension associated with type 2 diabetes mellitus (HCC)- (present on admission)  Assessment & Plan  Optimize blood pressure management keep systolic blood pressure less than 140 diastolic under 90  Continue with metoprolol hydralazine  Vitals:    09/03/24 1924   BP: 119/67   Pulse: (!) 121   Resp: 16   Temp: 37.1 °C (98.8 °F)   SpO2: 94%     Stable BP         VTE prophylaxis: Eliquis    I have performed a physical exam and reviewed and updated ROS and Plan today (9/3/2024). In review of yesterday's note (9/2/2024), there are no changes except as documented above.  Patient is has a high medical complexity, complex decision making and is at high risk for complication, morbidity, and mortality, thus requiring the highest level of my preparedness for sudden, emergent intervention. Medical decision making is therefore complex. I provided  services, which included ordering labs and/or imaging, and discussing the case with various consultants.medication orders, frequent  reevaluations of the patient's condition and response to treatment. Time was also devoted to counseling and coordinating care including review of records, pertinent lab data and studies, as well as discussing diagnostic evaluation and work up, planned therapeutic interventions and future disposition of care. Where indicated, the assessment and plan reflect discussion of patient with consultants, other healthcare providers, family members, and additional research needed to obtain further information in formulating the plan of care for Pito Black. Total time spent was 65 minutes.

## 2024-09-04 NOTE — RESPIRATORY CARE
EDUCATION by COPD CLINICAL EDUCATOR  9/4/2024 at 12:42 PM by Maryam Heck, RRT     Patient interviewed by education team Patient seen at the request of his admitting team. He has a history of Asthma and Lung Ca with a PFT noted below from 2018. Treatment given and reviewed his home inhalers. Reached out to his care team and they were restarted.:Dulera 2 puffs twice daily , Spiriva Respimat 2 puffs Daily    Lung Screen  COPD Population Screener  During the past 4 weeks, how much did you feel short of breath?: None/Little of the time  Do you ever cough up any mucus or phlegm?: No/only with occasional colds or infections  In the past 12 months, you do less than you used to because of your breathing problems: Agree  Have you smoked at least 100 cigarettes in your entire life?: Yes  How old are you?: 60+  COPD Screening Score: 5    Lung Assessment  COPD Clinical Specialists ONLY  Education Initiated: Yes--Short Intervention (pt-family seen at requst of admitting MD filtered O/S used pt has Asthma l Lung CA      PFT:  2/14/2018; no obstruction FEV1-95, Fev1/FVC Ratio-71  restarted Dulera and Spiriva-   [facility]we[facility] O2 -1 lpm;  quit smoking >20yrs    Is this a COPD exacerbation patient?: No (Orderset utilized and filtered call to MD -Dr Diaz to clarify-follow RT protocol- notified Pulm of filtered O/S use not copd exacerbation)  DME Company: none prior  DME Equipment Type: had 1-2 lpm prn at Rehab facility  Physician Name: DON Welsh  Palliative ordered and has seen pt  Interdisciplinary Rounds: Attendance at Rounds (30 Min)

## 2024-09-04 NOTE — DISCHARGE PLANNING
Pito was sent acute from [facility]. Will need a PMR consult referral as well as [place] chloe to cogitate a return once medically cleared.

## 2024-09-04 NOTE — THERAPY
Physical Therapy   Initial Evaluation     Patient Name: Pito Black  Age:  79 y.o., Sex:  male  Medical Record #: [id number]  Today's Date: 9/4/2024     Precautions  Precautions: Fall Risk;Swallow Precautions  Comments: [place]    Assessment  Patient is 79 y.o. male from rehab, is recovering from a stroke. Pt began complaining of SOB since this morning. O2 sat has been dropping. Patient at this point seems to have pneumonia with ongoing heart failure and pleural effusion and lung cancer. Hx of lung CA, thrombocytopenia, DM, CHF, Afib, HTN. Pt's family in the room, very supportive.    Pt with a PT clinical presentation of Evolving with the following Problems: Pain, Impaired Bed Mobility, Impaired Transfers, Impaired Ambulation, Decreased Activity Tolerance. Pt with decreased coordination L UE and impaired placement of hand on walker, causes pt min pathway deviation and unsteady with walker with amb. Pt requiring CGA. Possible trial of cane or HHA at next visit as appropriate. Will need to practice stair training with family. Pt to possibly DC home with family, as pt does not want to return to rehab. Will continue to follow. Pt is currently below their functional baseline and anticipate that pt will benefit from home with home health and home with family assistance upon DC from hospital.       Plan    Physical Therapy Initial Treatment Plan   Treatment Plan : Bed Mobility, Gait Training, Neuro Re-Education / Balance, Therapeutic Activities, Therapeutic Exercise  Treatment Frequency: 4 Times per Week  Duration: Until Therapy Goals Met    DC Equipment Recommendations: Front-Wheel Walker  Discharge Recommendations: Recommend home health for continued physical therapy services (and family assistance with amb)       Subjective    Pt amb in halls with OT. Pt agreed to PT.      Objective       09/04/24 1105   Time In/Time Out   Therapy Start Time 1042   Therapy End Time 1105   Total Therapy Time 23   Initial Contact Note     Initial Contact Note Order Received and Verified, Physical Therapy Evaluation in Progress with Full Report to Follow.   Precautions   Precautions Fall Risk;Swallow Precautions   Comments [place]   Vitals   Pulse (!) 131  (after amb, decreased to 113 quickly when resting in chair)   O2 (LPM) 1   O2 Delivery Device Silicone Nasal Cannula   Vitals Comments min SOB with exertion   Pain 0 - 10 Group   Therapist Pain Assessment   (no c/o pain during session)   Prior Living Situation   Housing / Facility 1 Story House   Steps Into Home 3   Steps In Home 0   Rail None   Lives with - Patient's Self Care Capacity Spouse   Comments initially pt was going to [place] with family, yet TCC stated that pt will return to his home here so he can have HH and then possibly move to [place] later   Prior Level of Functional Mobility   Comments pt was at [facility], amb with FWW   Cognition    Cognition / Consciousness X   Speech/ Communication Dysarthric;Hard of Hearing   Level of Consciousness Alert   Safety Awareness Impaired   Comments Pleasant, cooperative, occasionally gestures vs talks   Active ROM Lower Body    Active ROM Lower Body  WDL   Strength Lower Body   Lower Body Strength  X   Gross Strength Generalized Weakness, Equal Bilaterally   Coordination Lower Body    Coordination Lower Body  WDL   Vision   Vision Comments wears glasses   Balance Assessment   Sitting Balance (Static) Fair +   Sitting Balance (Dynamic) Fair   Standing Balance (Static) Fair   Standing Balance (Dynamic) Fair -   Weight Shift Sitting Good   Weight Shift Standing Good   Comments standing assessed with FWW   Bed Mobility    Sit to Supine Standby Assist   Scooting Standby Assist   Rolling Standby Assist   Comments HOBE   Gait Analysis   Gait Level Of Assist Contact Guard Assist   Assistive Device Front Wheel Walker   Distance (Feet) 60   # of Times Distance was Traveled 1   Deviation Bradykinetic;Shuffled Gait   # of Stairs Climbed 0   Comments pt with  decreased coordination of L UE holding onto walker causing min unsteadiness with moving the walker   Functional Mobility   Sit to Stand Standby Assist   Bed, Chair, Wheelchair Transfer Standby Assist   Transfer Method Stand Step   6 Clicks Assessment - How much HELP from from another person do you currently need... (If the patient hasn't done an activity recently, how much help from another person do you think he/she would need if he/she tried?)   Turning from your back to your side while in a flat bed without using bedrails? 3   Moving from lying on your back to sitting on the side of a flat bed without using bedrails? 3   Moving to and from a bed to a chair (including a wheelchair)? 3   Standing up from a chair using your arms (e.g., wheelchair, or bedside chair)? 3   Walking in hospital room? 3   Climbing 3-5 steps with a railing? 2   6 clicks Mobility Score 17   Activity Tolerance   Comments limted by min SOB   Short Term Goals    Short Term Goal # 1 Pt will perform supine to/from sit with flat bed and no features supervised in 6 visits to progress bed mobility   Short Term Goal # 2 Pt will perform sit to/from stand with FWW supervised in 6 visits to progress transfers   Short Term Goal # 3 Pt will amb with FWW 200ft supervised in 6 visits to progress with functional household mobility.   Short Term Goal # 4 Pt will be able to ascend/descend 3 steps with HHA/CGA in 6 visits to access home.   Education Group   Education Provided Role of Physical Therapist   Role of Physical Therapist Patient Response Patient;Acceptance;Explanation;Verbal Demonstration;Family;Significant Other   Gait Training Patient Response Patient;Family;Acceptance;Explanation;Action Demonstration;Significant Other   Use of Assistive Device Patient Response Patient;Acceptance;Family;Significant Other;Demonstration;Verbal Demonstration   Physical Therapy Initial Treatment Plan    Treatment Plan  Bed Mobility;Gait Training;Neuro Re-Education /  Balance;Therapeutic Activities;Therapeutic Exercise   Treatment Frequency 4 Times per Week   Duration Until Therapy Goals Met   Problem List    Problems Pain;Impaired Bed Mobility;Impaired Transfers;Impaired Ambulation;Decreased Activity Tolerance   Anticipated Discharge Equipment and Recommendations   DC Equipment Recommendations Front-Wheel Walker   Discharge Recommendations Recommend home health for continued physical therapy services  (and family assistance with amb)   Interdisciplinary Plan of Care Collaboration   IDT Collaboration with  Nursing;Physical Therapist Assistant (PTA);Occupational Therapist  (dovetail with OT)   Patient Position at End of Therapy Seated;Chair Alarm On;Call Light within Reach;Tray Table within Reach;Family / Friend in Room

## 2024-09-05 NOTE — CARE PLAN
The patient is Watcher - Medium risk of patient condition declining or worsening    Shift Goals  Clinical Goals: wean O2, pulmonary hygiene  Patient Goals: sleep  Family Goals: updates    Progress made toward(s) clinical / shift goals:    Problem: Fall Risk  Goal: Patient will remain free from falls  Outcome: Progressing     Problem: Respiratory  Goal: Patient will achieve/maintain optimum respiratory ventilation and gas exchange  Outcome: Progressing     Problem: Ineffective Airway Clearance  Goal: Patient will maintain patent airway with clear/clearing breath sounds  Outcome: Progressing       Patient is not progressing towards the following goals:

## 2024-09-05 NOTE — PROGRESS NOTES
Monitor Summary: SR/ST , AR 0.14, QRS 0.11, QT 0.36, with rare PVCs, rare bigeminy, rare triplets, and rare couplets per strip from monitor room.

## 2024-09-05 NOTE — ASSESSMENT & PLAN NOTE
Lasix  BB, spironolactone.  Will ask Cardiology if Entresto vs ACEI/ARB and Farxiga/Jardiance indicated

## 2024-09-05 NOTE — DISCHARGE PLANNING
1532  Agency/Facility Name: [facility]   Spoke To: Kiar[facility]  Outcome: DPA inquired follow up on o2, per [facility] o2 has been delivered.  Advised RN CM via teams

## 2024-09-05 NOTE — CARE PLAN
The patient is Stable - Low risk of patient condition declining or worsening    Shift Goals  Clinical Goals: comfort, ambulation  Patient Goals: discharge home  Family Goals: diandra    Progress made toward(s) clinical / shift goals:    Problem: Knowledge Deficit - Standard  Goal: Patient and family/care givers will demonstrate understanding of plan of care, disease process/condition, diagnostic tests and medications  Outcome: Progressing     Problem: Skin Integrity  Goal: Skin integrity is maintained or improved  Outcome: Progressing     Problem: Fall Risk  Goal: Patient will remain free from falls  Outcome: Progressing       Patient is not progressing towards the following goals:

## 2024-09-05 NOTE — FACE TO FACE
Face to Face Note  -  Durable Medical Equipment    Juan C Diaz M.D. - NPI: [id number]  I certify that this patient is under my care and that they had a durable medical equipment(DME)face to face encounter by myself that meets the physician DME face-to-face encounter requirements with this patient on:    Date of encounter:   Patient:                    MRN:                       YOB: 2024  Pito Black  [id number]  1945     The encounter with the patient was in whole, or in part, for the following medical condition, which is the primary reason for durable medical equipment:  COPD    I certify that, based on my findings, the following durable medical equipment is medically necessary:    Oxygen   HOME O2 Saturation Measurements:(Values must be present for Home Oxygen orders)  Room air sat at rest: 83  Room air sat with amb: 81  With liters of O2: 1, O2 sat at rest with O2: 95  With Liters of O2: 1, O2 sat with amb with O2 : 94  Is the patient mobile?: Yes  If patient feels more short of breath, they can go up to 6 liters per minute and contact healthcare provider.    Supporting Symptoms: hypoxia    My Clinical findings support the need for the above equipment due to:  Hypoxia

## 2024-09-05 NOTE — THERAPY
Speech Language Therapy Contact Note    Patient Name: Pito Black  Age:  79 y.o., Sex:  male  Medical Record #: [id number]  Today's Date: 9/5/2024 09/05/24 1145   Treatment Variance   Reason For Missed Therapy Medical - Patient with Nursing   Interdisciplinary Plan of Care Collaboration   IDT Collaboration with  Nursing;Family / Caregiver   Collaboration Comments Attempted to see pt for dysphagia therapy, however, pt working with RN and about to receive bolus feeding. SLP to follow at a later time.

## 2024-09-05 NOTE — PROGRESS NOTES
Hospital Medicine Daily Progress Note    Date of Service  9/4/2024    Chief Complaint  Pito Black is a 79 y.o. male admitted 9/2/2024 with shortness of breath    Hospital Course  Patient was at [facility] rehab rehabilitating from a stroke.  Patient became short of breath and was sent over.  Evaluation emergency room reveals respiratory failure with a combination of factors including pneumonia, heart failure, pleural effusion, lung cancer.  Patient at this point has been placed on oxygen support.  At this point initiation of antibiotics has been done.      Interval Problem Update  Patient seen and examine at bedside  78yo frail M w/ h/o lung cancer followed by Luz Maria Jules on apixaban who presented 8/13/2024 with L facial droop and dysphagia. HE choked on his pills then developed L sided facial droop and hand weakness, persisting to the morning. At the ED afebrile, normotensive.  CTA HnN demonstrated R MCA occlusion. Neurology felt he was outside tPA window. CT perf no penumbra, CXR demonstrated PPM/ICD and bilateral infiltrates. CBC is normal. CMP demonstrates hyponatremia Na 133, stable Cr 1.3. A1c is 6. Troponin 55 comparable to prior values. INR 1.3. EKG sinus tachycardia.   Neurology consulted, recommends continued Eliquis and Zetia. GI consulted for dysphagia, s/p PEG tube placement by Dr. Ernandez on 8/23. Tube feeds started.     Managing stroke w/ Afib/anticoag in the setting of lung ca/COPD/hypoxia. There was also reports of CHF but no echo. On O2, continue tube feeds, antibiotics for pneumonia. He was wheezing on my exam, hence started steroids, levalbuterol (given tachycardia but SBP stable).  likely from steroids, A1c 6.7, but I increased SS insulin.  Started antibiotics, as the CXR ordered by me showed bilateral infiltrates and has mild lactic acidosis. Restarted BB as respiratory status is stable. Avoid digoxin given known CKD? Monitor K+, renal function and blood pressures. K 4.4 Cr-  1.16-1.09 Increased PO Lasix given possible increased edema on CXR and I ordered an echo which came back severe MR, diastolic 2 dysfunction EF 40%. Likely HFrEF exac. I spoke with Dr. Matos and consulted Dr. Connell, Cardiology. Agreed with Lasix and increased it. Switched BB to bisoprolol. Spironolactone added back. Will ask Cardiology if Entresto vs ACEI/ARB and Farxiga/Jardiance indicated. Rehab planned.     I reviewed the chart along with vitals, labs, imaging, test (both pending and resulted) and recommendations from specialists and interdisciplinary team.      I have discussed this patient's plan of care and discharge plan at IDT rounds today with Case Management, Nursing, Nursing leadership, and other members of the IDT team.    Consultants/Specialty  None    Code Status  DNAR/DNI    Disposition  The patient is medically cleared for discharge to home or a post-acute facility.  Anticipate discharge to: an inpatient rehabilitation hospital    I have placed the appropriate orders for post-discharge needs.    Review of Systems  Review of Systems   Constitutional:  Positive for malaise/fatigue and weight loss. Negative for chills, diaphoresis and fever.   HENT: Negative.  Negative for nosebleeds and sinus pain.    Eyes: Negative.  Negative for double vision, photophobia, discharge and redness.   Respiratory:  Positive for cough and shortness of breath. Negative for sputum production, wheezing and stridor.    Cardiovascular: Negative.  Negative for chest pain, orthopnea, leg swelling and PND.   Gastrointestinal: Negative.  Negative for abdominal pain, blood in stool and heartburn.   Genitourinary: Negative.  Negative for dysuria, flank pain and frequency.   Musculoskeletal: Negative.  Negative for back pain, falls and myalgias.   Skin: Negative.  Negative for itching.   Neurological:  Positive for weakness. Negative for dizziness, tingling, sensory change, focal weakness and seizures.   Endo/Heme/Allergies: Negative.   Negative for polydipsia. Does not bruise/bleed easily.   Psychiatric/Behavioral: Negative.  Negative for depression, substance abuse and suicidal ideas. The patient does not have insomnia.         Physical Exam  Temp:  [36.2 °C (97.2 °F)-37.1 °C (98.8 °F)] 36.3 °C (97.3 °F)  Pulse:  [] 128  Resp:  [16-22] 19  BP: (119-128)/(67-74) 123/73  SpO2:  [93 %-96 %] 95 %    Physical Exam  Constitutional:       General: He is awake. He is not in acute distress.     Appearance: Normal appearance. He is well-developed, well-groomed and normal weight. He is ill-appearing. He is not diaphoretic.   HENT:      Head: Normocephalic and atraumatic.      Jaw: There is normal jaw occlusion. No trismus.      Salivary Glands: Right salivary gland is not tender. Left salivary gland is not tender.      Right Ear: External ear normal.      Left Ear: External ear normal.      Nose: Congestion present.      Mouth/Throat:      Mouth: Mucous membranes are moist.      Pharynx: Oropharynx is clear.   Eyes:      General: Lids are normal. Vision grossly intact. No scleral icterus.        Right eye: No discharge.         Left eye: No discharge.      Extraocular Movements: Extraocular movements intact.      Conjunctiva/sclera: Conjunctivae normal.      Right eye: Right conjunctiva is not injected. No exudate.     Left eye: Left conjunctiva is not injected. No exudate.     Pupils: Pupils are equal, round, and reactive to light.   Neck:      Thyroid: No thyroid mass.      Vascular: No hepatojugular reflux or JVD.      Trachea: No abnormal tracheal secretions or tracheal deviation.   Cardiovascular:      Rate and Rhythm: Regular rhythm. Tachycardia present. Occasional Extrasystoles are present.     Pulses: Normal pulses.      Heart sounds: Normal heart sounds.   Pulmonary:      Effort: Tachypnea and accessory muscle usage present.      Breath sounds: Decreased air movement present. Examination of the right-upper field reveals decreased breath  sounds. Examination of the left-upper field reveals decreased breath sounds. Examination of the right-middle field reveals decreased breath sounds. Examination of the left-middle field reveals decreased breath sounds. Examination of the right-lower field reveals decreased breath sounds and rhonchi. Examination of the left-lower field reveals decreased breath sounds and rhonchi. Decreased breath sounds and rhonchi present.   Abdominal:      General: Abdomen is flat. Bowel sounds are normal. There is no distension.      Palpations: Abdomen is soft. There is no mass.   Musculoskeletal:         General: Normal range of motion.      Cervical back: Full passive range of motion without pain, normal range of motion and neck supple. No rigidity. No muscular tenderness.      Right lower leg: No edema.      Left lower leg: No edema.   Lymphadenopathy:      Head:      Right side of head: No submental adenopathy.      Left side of head: No submental adenopathy.      Cervical: No cervical adenopathy.      Right cervical: No superficial cervical adenopathy.     Left cervical: No superficial cervical adenopathy.      Upper Body:      Right upper body: No supraclavicular adenopathy.      Left upper body: No supraclavicular adenopathy.   Skin:     General: Skin is warm and moist.      Capillary Refill: Capillary refill takes less than 2 seconds.      Coloration: Skin is not cyanotic.      Findings: No abrasion.   Neurological:      General: No focal deficit present.      Mental Status: He is alert and oriented to person, place, and time. Mental status is at baseline.   Psychiatric:         Attention and Perception: Attention and perception normal.         Mood and Affect: Mood and affect normal.         Speech: Speech normal.         Behavior: Behavior normal. Behavior is cooperative.         Thought Content: Thought content normal.         Cognition and Memory: Cognition and memory normal.         Judgment: Judgment normal.          Fluids    Intake/Output Summary (Last 24 hours) at 9/4/2024 1741  Last data filed at 9/4/2024 1400  Gross per 24 hour   Intake 474 ml   Output --   Net 474 ml        Laboratory  Recent Labs     09/02/24  0013 09/03/24  1636 09/04/24  0018   WBC 15.8* 10.7 11.5*   RBC 4.52* 3.67* 3.53*   HEMOGLOBIN 13.8* 11.4* 11.1*   HEMATOCRIT 42.9 35.4* 33.9*   MCV 94.9 96.5 96.0   MCH 30.5 31.1 31.4   MCHC 32.2* 32.2* 32.7   RDW 50.4* 52.5* 51.1*   PLATELETCT 106* 93* 110*   MPV 11.5 11.1 11.1     Recent Labs     09/02/24  0013 09/04/24  0018   SODIUM 139 137   POTASSIUM 4.9 4.4   CHLORIDE 101 100   CO2 27 25   GLUCOSE 176* 323*   BUN 37* 38*   CREATININE 1.16 1.09   CALCIUM 8.9 8.2*                   Imaging  EC-ECHOCARDIOGRAM COMPLETE W/O CONT   Final Result      DX-CHEST-PORTABLE (1 VIEW)   Final Result         1.  Pulmonary edema and/or infiltrates, slightly increased since prior study   2.  Trace bilateral pleural effusions   3.  Cardiomegaly   4.  Atherosclerosis           Assessment/Plan  * Acute hypoxic respiratory failure (HCC)- (present on admission)  Assessment & Plan  Patient was undergoing rehab where the patient was noted to be in respiratory distress and was sent over to the emergency room  Patient initially hypoxic and had to be placed on oxygen support  Patient at this point seems to have pneumonia with ongoing heart failure and pleural effusion and lung cancer  Patient will need aggressive oxygenation to keep oxygen saturations above 90%    COPD exac, pneumonia possible CHF  Antibiotics COPD protocol steroids and levalbuterol  Lasix increased. Monitor kidney function    HFrEF (heart failure with reduced ejection fraction) (HCC)  Assessment & Plan  Lasix  BB, spironolactone.  Will ask Cardiology if Entresto vs ACEI/ARB and Farxiga/Jardiance indicated    Thrombocytopenia (HCC)- (present on admission)  Assessment & Plan  Thrombocytopenia secondary most likely to the cancerous process  Current level  106  Currently does not need a transfusion    Leukocytosis- (present on admission)  Assessment & Plan  Secondary to pneumonia patient has developed leukocytosis    Right sided cerebral hemisphere cerebrovascular accident (CVA) (HCC)- (present on admission)  Assessment & Plan  Was at rehab for recovery from stroke  Continue with PT OT and speech  Continue with Lipitor and Eliquis    Type 2 diabetes mellitus with stage 3a chronic kidney disease, without long-term current use of insulin (HCC)- (present on admission)  Assessment & Plan  Diabetic diet  Diabetes management with a combination of Farxiga and metformin  Monitor renal functions avoid nephrotoxic medications  Most recent hemoglobin A1c 6.4    Chronic systolic heart failure (HCC)- (present on admission)  Assessment & Plan  Optimize medication management and diuresis    Vitamin D deficiency- (present on admission)  Assessment & Plan  Vitamin D supplementation    Pleural effusion, left- (present on admission)  Assessment & Plan  Monitor pleural effusion if necessary drain    Primary cancer of left lower lobe of lung (HCC)- (present on admission)  Assessment & Plan  Status post resection of the lung  On experimental chemotherapy as an outpatient currently on hold    Hyperlipidemia associated with type 2 diabetes mellitus (HCC)- (present on admission)  Assessment & Plan  Low-fat low-cholesterol diet  Statin and Zetia  Fasting lipid panel    A-fib (HCC)- (present on admission)  Assessment & Plan  Rate control and anticoagulation to prevent additional strokes  Vitals:    09/04/24 1323   BP: 123/73   Pulse: (!) 128   Resp:    Temp:    SpO2:      HR elevated, BB added Cardiology consulted  If respiratory status stable restart BB    Hypertension associated with type 2 diabetes mellitus (HCC)- (present on admission)  Assessment & Plan  Optimize blood pressure management keep systolic blood pressure less than 140 diastolic under 90  Continue with metoprolol  hydralazine  Vitals:    09/04/24 1323   BP: 123/73   Pulse: (!) 128   Resp:    Temp:    SpO2:      Stable BP         VTE prophylaxis: Eliquis    I have performed a physical exam and reviewed and updated ROS and Plan today (9/4/2024). In review of yesterday's note (9/3/2024), there are no changes except as documented above.  Patient is has a high medical complexity, complex decision making and is at high risk for complication, morbidity, and mortality, thus requiring the highest level of my preparedness for sudden, emergent intervention. Medical decision making is therefore complex. I provided  services, which included ordering labs and/or imaging, and discussing the case with various consultants.medication orders, frequent reevaluations of the patient's condition and response to treatment. Time was also devoted to counseling and coordinating care including review of records, pertinent lab data and studies, as well as discussing diagnostic evaluation and work up, planned therapeutic interventions and future disposition of care. Where indicated, the assessment and plan reflect discussion of patient with consultants, other healthcare providers, family members, and additional research needed to obtain further information in formulating the plan of care for Pito Black. Total time spent was 52 minutes.

## 2024-09-05 NOTE — DISCHARGE SUMMARY
Physical Medicine & Rehabilitation Discharge Summary    Admission Date: 8/24/2024    Discharge Date: 9/1/2024    Attending Provider: Tk Bai D.O.    Admission Diagnosis:   Active Hospital Problems    Diagnosis     *Right middle cerebral artery stroke (HCC)     Abdominal pain     Thrombocytopenia (HCC)     Transaminitis     Leukocytosis     Type 2 diabetes mellitus with stage 3a chronic kidney disease, without long-term current use of insulin (HCC)     SOB (shortness of breath)     Chronic systolic heart failure (HCC)     Primary lung adenocarcinoma (HCC)     Azotemia     Hyperlipidemia associated with type 2 diabetes mellitus (HCC)     A-fib (HCC)     Hypertension associated with type 2 diabetes mellitus (HCC)        Discharge Diagnosis:  Active Hospital Problems    Diagnosis     *Right middle cerebral artery stroke (HCC)     Abdominal pain     Thrombocytopenia (HCC)     Transaminitis     Leukocytosis     Type 2 diabetes mellitus with stage 3a chronic kidney disease, without long-term current use of insulin (HCC)     SOB (shortness of breath)     Chronic systolic heart failure (HCC)     Primary lung adenocarcinoma (HCC)     Azotemia     Hyperlipidemia associated with type 2 diabetes mellitus (HCC)     A-fib (HCC)     Hypertension associated with type 2 diabetes mellitus (HCC)        HPI per Admission History & Physical:  The patient is a 79 y.o. right hand dominant male with a past medical history of lung cancer followed by Dr. Schulz, HARSHAD-gloria on apixaban, HF with reduced EF of 35%, Status post AICD, CKD, hypertension, hyperlipidemia, diabetes;  who presented on 8/13/2024  8:07 AM with slurred speech, left facial droop, left arm weakness.  Initial NIH score of 5.  Workup with CT perfusion found 18 mm penumbra, CTA found focal stenosis/occlusion in the distal right MCA.  Patient had a delayed presentation and was not a candidate for thrombolytics or thrombectomy.  Etiology for stroke is likely thromboembolic  in the setting of A-fib and malignancy. Per neurology continue Eliquis. His stay was prolonged as his dysphagia was not improving and failed multiple swallow tests so on 8/23/24 he underwent PEG tube placement.      Patient tolerated transfer to [facility]. He is dysarthric and has some problems understanding due to combination of cognitive changes and hearing loss. Wife and family at bedside provide history. They reports his strength has improved in his arm somewhat but speed of using his arm has not. Denies pain at rest. Denies NVD. He is very motivated to eat again.     Patient was admitted to [facility] on 8/24/2024.     Hospital Course by Problem List:  The patient was participating in comprehensive rehabilitative program including PT, OT and ST 15 hours/week.  However on 9/1/2024, the patient had increasing oxygen requirements up to 5 L as well as increasing tachycardia.  The decision was made to send him for higher level of care to the [facility] ED.  The rest of his rehab hospital course as noted below:    R CVA - Patient with R CVA not a candidate for TNK per neurology. Restarted on Eliquis  -Participated in PT, OT, and ST 15 hr/week while in rehab  -Follow-up Neurology stroke bridge once appropriate     Lung adenocarcinoma - s/p Resection. Patient on Vericiguat which is an experimental medication.     SOB - Requiring intermittent 2L, check CXR, reduce FWF as may be fluid overloaded.   -Concern for possible pneumonia, started on Unasyn     Dysphagia - Severe dysphagia. PEG placed 8/23. NPO with tube feeds. Dietitian to consult. SLP for swallow. CXR on 8/26 without signs of aspiration pneumonia per my read.   -Advancing to trials of pureed with SLP     HTN/sCHF/A fib - Patient on Farxiga 10 mg, Metoprolol 50 mg BID and Spironolactone. EF of 35%  -Consult hospitalist.  Metoprolol increased to 62.5 mg and spironolactone discontinued. HR into 90s, Continue Metoprolol      HLD - Patient on Atorvastatin  20 mg QHS and Zetia 10 mg daily.      DM2 with hyperglycemia - Will start SSI. Consult hospitalist. Previously on Metformin. Blood sugars into 200s, continue SSI and Farxiga     CKD3a - Avoid nephrotoxic agents. Check AM CMP - Cr 1.27, BUN 44, will monitor. IVF on 8/27/24     Hypernatremia - 146 on admission, consult hospitalist     Leukocytosis - Check AM CBC -13.6 on admission down from 15.9, consult hospitalist     Thrombocytopenia - Check AM CBC - 94 on admission, will monitor     Pain - Patient on APAP and Tramadol PRN     Skin - Patient at risk for skin breakdown due to debility in areas including sacrum, achilles, elbows and head in addition to other sites. Nursing to assess skin daily.      GI Ppx - Patient on Prilosec for GERD prophylaxis. Patient on Senna-docusate for constipation prophylaxis.      DVT Ppx - Patient Eliquis on transfer.    Functional Status at Discharge  Eating:  Total Assist  Eating Description:  Tube feed bolus, Staff administers tube feed/parenteral nutrition/IVF, Set-up of equipment or meal/tube feeding  Grooming:  Contact Guard Assist, Seated  Grooming Description:  Verbal cueing, Supervision for safety, Set-up of equipment, Seated in wheelchair at sink, Increased time (oral hygiene)  Bathing:  Minimal Assist  Bathing Description:  Adaptive equipment, Grab bar, Hand held shower, Assit with back, Increased time, Set-up of equipment, Set up for wound protection, Supervision for safety  Upper Body Dressing:  Minimal Assist  Upper Body Dressing Description:  Assit with threading arms through sleeves, Assist with pulling shirt over head, Increased time, Set-up of equipment, Supervision for safety  Lower Body Dressing:  Minimal Assist  Lower Body Dressing Description:  Grab bar, Increased time, Set-up of equipment, Supervision for safety (Shae to thread LLE and L sock. CGA to SBA for standing balance while pulling up brief/pants.)     Walk:  Contact Guard Assist  Distance Walked:   150  Number of Times Distance Was Traveled:  4  Assistive Device:  Front Wheel Walker  Gait Deviation:  Shuffled Gait (Drifts to the L side of walker)  Wheelchair:  Moderate Assist  Distance Propelled:  50   Wheelchair Description:   (bilateral UE, left fatigues quickly)  Stairs Unable to Participate  Stairs Description       Comprehension:  Moderate Assist  Comprehension Description:  Hearing aids/amplifiers, Glasses, Verbal cues  Expression:  Moderate Assist  Expression Description:  Verbal cueing  Social Interaction:  Supervision  Social Interaction Description:  Increased time  Problem Solving:  Maximal Assist  Problem Solving Description:  Verbal cueing, Therapy schedule, Bed/chair alarm, Increased time, Seat belt  Memory:  Maximal Assist  Memory Description:  Verbal cueing, Therapy schedule, Bed/chair alarm, Increased time, Seat belt       Tk BUCK D.O., personally performed a complete drug regimen review and no potential clinically significant medication issues were identified.   Discharge Medication:     Medication List        ASK your doctor about these medications        Instructions   * albuterol 2.5mg/0.5ml Nebu  Commonly known as: Proventil   Take 2.5 mg by nebulization Once.  Dose: 2.5 mg     * albuterol 108 (90 Base) MCG/ACT Aers inhalation aerosol   Inhale 2 Puffs every four hours as needed for Shortness of Breath.  Dose: 2 Puff     atorvastatin 20 MG Tabs  Commonly known as: Lipitor   1 Tablet by Enteral Tube route every evening.  Dose: 20 mg     benzonatate 100 MG Caps  Commonly known as: Tessalon   Take 1 tablet by mouth three times a day as needed for cough     cyclobenzaprine 10 mg Tabs  Commonly known as: Flexeril   Doctor's comments: Place in pill packs  Take 1 Tablet by mouth at bedtime.  Dose: 10 mg     diclofenac sodium 1 % Gel  Commonly known as: Voltaren   Apply 2 g topically 4 times a day as needed (left foot pain).  Dose: 2 g     Dulera 200-5 MCG/ACT Aero  Generic drug:  mometasone-formoterol   Inhale 2 Puffs 2 times a day.  Dose: 2 Puff     Eliquis 5 MG Tabs  Generic drug: apixaban   Take 1 Tablet by mouth 2 times a day.  Dose: 5 mg     ezetimibe 10 MG Tabs  Commonly known as: Zetia   Doctor's comments: Place in pill packs  Take 1 Tablet by mouth every day.  Dose: 10 mg     Farxiga 10 MG Tabs  Generic drug: dapagliflozin propanediol   Take 1 tablet by mouth every day.  Dose: 10 mg     furosemide 20 MG Tabs  Commonly known as: Lasix   Take 20 mg by mouth every day.  Dose: 20 mg     hydrOXYzine HCl 25 MG Tabs  Commonly known as: Atarax   Take 25 mg by mouth 3 times a day as needed for Anxiety.  Dose: 25 mg     lansoprazole 30 MG Tbdd  Commonly known as: Prevacid   30 mg by Enteral Tube route every day.  Dose: 30 mg     MELATONIN PO   6 mg by Enteral Tube route at bedtime.  Dose: 6 mg     metFORMIN  MG Tb24  Commonly known as: Glucophage XR   Take 1 tablet by mouth every day.     METOPROLOL TARTRATE PO   62.5 mg by Enteral Tube route 2 times a day.  Dose: 62.5 mg     NS SOLN 100 mL with ampicillin/sulbactam 1.5 (1-0.5) g SOLR 1.5 g   Infuse 1.5 g into a venous catheter every 6 hours. Start 8/30/24, End 9/6/24  Dose: 1.5 g     nystatin 900734 UNIT/ML Susp  Commonly known as: Mycostatin   Take 500,000 Units by mouth 4 times a day. Swish and spit  Dose: 500,000 Units     oxyCODONE immediate-release 5 MG Tabs  Commonly known as: Roxicodone   Take 5 mg by mouth every 3 hours as needed for Severe Pain.  Dose: 5 mg     senna-docusate 8.6-50 MG Tabs  Commonly known as: Pericolace Or Senokot S   Take 2 Tablets by mouth 2 times a day.  Dose: 2 Tablet     Spiriva Respimat 2.5 MCG/ACT Aers  Generic drug: tiotropium   Inhale 5 mcg every day.  Dose: 5 mcg     spironolactone 25 MG Tabs  Commonly known as: Aldactone   Doctor's comments: Place in pill packs  Take 1 Tablet by mouth every day for 100 days.  Dose: 25 mg     traZODone 50 MG Tabs  Commonly known as: Desyrel   Take 50 mg by mouth at  bedtime as needed for Sleep.  Dose: 50 mg     Vitamin D3 2000 UNIT Caps   Doctor's comments: Place in pill packs  Take 1 Capsule by mouth every day.  Dose: 2,000 Units           * This list has 2 medication(s) that are the same as other medications prescribed for you. Read the directions carefully, and ask your doctor or other care provider to review them with you.                  Discharge Diet:  DC to acute      Discharge Activity:  DC to acute    Disposition:  Patient to discharge to ED for medical management.    Equipment:  DC to acute    Follow-up & Discharge Instructions:  DC to acute    Future Appointments   Date Time Provider Department Center   9/9/2024 12:45 PM JILL Clark None   9/9/2024  2:00 PM RESEARCH COORDINATOR - JORGE MCMILLAN None   9/13/2024 11:00 AM [facility] [employer] [place]   9[facility]14[facility]   9/18/2024 11:00 AM CORE[facility] Middleton None   9/26/2024 11:40 AM JILL Welsh [facility] JONA Carlotta   9/27/2024 11:00 AM CORE[facility] Midd[place] None   10/4/2024 11:00 AM [facility] [employer] [place]   10/5/2024  [facility]   10/8/2024 10:20 AM JILL Welsh [facility] JONA Carlotta   10/18/2024  1:15 PM PACER CHECK-CAM B [facility] None       Condition on Discharge:  Jose Bai D.O.    Date of Service: 9/1/2024

## 2024-09-05 NOTE — DIETARY
Nutrition Services: Brief Update    Floor staff reaching out, [facility] is wanting justification for CHO-controlled formula.     Pt does need CHO-controlled formula as pt has hx of T2DM, A1C is 6.4. Pt previously failed standard formula during last admit on 8/23 as noted by myself d/t pt's blood sugars were in the 200s. During this admit, while on CHO-controlled formula blood sugars have been <180 up until 9/4-9/5 when they increased to 260-323 while on steroid prednisone. SSI has been added to MAR and utilized.     If insurance/DME company unable to supply Glucerna 1.2, please consider CHO-controlled equivalent with goal of 6 cartons per day to provide ~1704 kcas, 85 gm protein, and 1146 ml free water per day. In addition free water flushes of 100 ml TID to provide daily total of 1446 ml.     RD following. In discussion with floor staff to aid with discharge plan.

## 2024-09-05 NOTE — DISCHARGE PLANNING
Case Management Discharge Planning    Admission Date: 9/2/2024  GMLOS: 4.1  ALOS: 3    6-Clicks ADL Score: 16  6-Clicks Mobility Score: 17  PT and/or OT Eval ordered: Yes  Post-acute Referrals Ordered: Yes  Post-acute Choice Obtained: Yes  Has referral(s) been sent to post-acute provider:  Yes      Anticipated Discharge Dispo: Discharge Disposition: D/T to home under [facility] care in anticipation of covered skilled care (06)    DME Needed: No    Action(s) Taken: Updated Provider/Nurse on Discharge Plan    Pt has been accepted by [facility] . [facility] for Enter[facility].     Pt's daughter updated through phone.     O2 referral sent to [facility].     1200- RN CM received a VM from [facility] that they just need Enteral feeding order with indication of length of need. Dr. Diaz informed.    1530- RN CM received a communication from [facility] that O2 was delivered at bedside by [facility].    Escalations Completed: Provider    Medically Clear: No    Next Steps: RN CM to continue to assist in Pt's discharge needs.     Barriers to Discharge: Medical clearance and Outpatient referrals pending    Is the patient up for discharge tomorrow: No

## 2024-09-05 NOTE — DISCHARGE PLANNING
HTH/SCP TCN chart review completed. Collaborated with LINDA Cisneros. Current discharge considerations are now for dc to home with HH services and supplemental 02. Note [facility] has accepted and FTF for supplemental 02 is in chart and referrals for [facility] and supplemental 02 are in place as well. No additional TCN needs at this time and please see TCN note from 9/4 if indicated for current dc planning considerations,etc. TCN will continue to follow and collaborate with discharge planning team as additional post acute needs arise. Thank you.    Completed:  PT/OT with recommendations for HH on 9/4  Choice obtained: HH ([facility]) DME (02 via [facility])  Note that pt has upcoming outpatient vascular appts per review

## 2024-09-05 NOTE — DISCHARGE PLANNING
Per documentation, Pito is declining a return to [facility].  TCC will no longer follow.  Please reach out to myself with any questions.

## 2024-09-05 NOTE — PROGRESS NOTES
Cardiology Follow-up Consult Note    Date of Service:    9/5/2024      Consulting Physician: Juan C Diaz M.D.    Name:   Pito Black   YOB: 1945  Age:   79 y.o.  male   MRN:   [id number]      Assessment/Recommendations:   1.  Moderate to severe mitral regurgitation on echo.  Is probably not significantly worse compared to prior echo 2/24.  As previously discussed, patient is not a candidate for anything invasive with progressive lung cancer and recent stroke.  Daughter was able to give a better picture and that patient had been undergoing immunotherapy for lung cancer but the tumor continued to grow.  Patient was about to start chemotherapy when he suffered a stroke.  He no longer qualifies for undergoing chemo either.  They are opting for palliative care.  - Change IV Lasix over to oral Lasix 40 mg p.o. twice daily and as needed  - Continue with bisoprolol 5 mg p.o. daily  - Continue with a atorvastatin 20 mg p.o. nightly    2.  Heart failure with reduced ejection fraction.  Please see above.  If blood pressure remains okay add back spironolactone.  Patient has a follow-up with Catherine Doss in heart failure clinic on 9/9/2024.    3.  History of atrial fibrillation currently sinus tachycardia.  Eliquis is on hold, would see if need to renally adjust over for weight.  Check with pharmacy    4.  Presence of AICD, routine checks.    Disposition: Cardiology will sign off at this time, please call with any questions or concerns.    Subjective:  Patient is sleeping, wife and daughter at bedside.  Daughter thinks breathing is still not really normal and at times struggles to breathe.    All other review of systems reviewed and negative.    Past medical, surgical, social, and family history reviewed and unchanged from admission except as noted in assessment and plan.    Medications Prior to Admission   Medication Sig Dispense Refill Last Dose    albuterol (PROVENTIL) 2.5mg/0.5ml Nebu Soln Take  2.5 mg by nebulization Once.   9/1/2024 at 1730    albuterol 108 (90 Base) MCG/ACT Aero Soln inhalation aerosol Inhale 2 Puffs every four hours as needed for Shortness of Breath.   9/1/2024 at 2109    NS SOLN 100 mL with ampicillin/sulbactam 1.5 (1-0.5) g SOLR 1.5 g Infuse 1.5 g into a venous catheter every 6 hours. Start 8/30/24, End 9/6/24 9/1/2024 at 1814    furosemide (LASIX) 20 MG Tab Take 20 mg by mouth every day.   9/1/2024 at 0541    hydrOXYzine HCl (ATARAX) 25 MG Tab Take 25 mg by mouth 3 times a day as needed for Anxiety.   9/1/2024 at 2115    lansoprazole (PREVACID) 30 MG Tablet Delayed Release Dispersible 30 mg by Enteral Tube route every day.   9/1/2024 at 0929    MELATONIN PO 6 mg by Enteral Tube route at bedtime.   9/1/2024 at 1952    METOPROLOL TARTRATE PO 62.5 mg by Enteral Tube route 2 times a day.   9/1/2024 at 1952    mometasone-formoterol (DULERA) 200-5 MCG/ACT Aerosol Inhale 2 Puffs 2 times a day.   9/1/2024 at 1733    nystatin (MYCOSTATIN) 045973 UNIT/ML Suspension Take 500,000 Units by mouth 4 times a day. Swish and spit   9/1/2024 at 1952    oxyCODONE immediate-release (ROXICODONE) 5 MG Tab Take 5 mg by mouth every 3 hours as needed for Severe Pain.   8/31/2024 at 61584    senna-docusate (PERICOLACE OR SENOKOT S) 8.6-50 MG Tab Take 2 Tablets by mouth 2 times a day.   9/1/2024 at 1952    tiotropium (SPIRIVA RESPIMAT) 2.5 mcg/Act Aero Soln Inhale 5 mcg every day.   9/1/2024 at 0656    traZODone (DESYREL) 50 MG Tab Take 50 mg by mouth at bedtime as needed for Sleep.   8/31/2024 at 1729    apixaban (ELIQUIS) 5mg Tab Take 1 Tablet by mouth 2 times a day. (Patient taking differently: 5 mg by Enteral Tube route 2 times a day.) 180 Tablet 3 9/1/2024 at 1952    dapagliflozin propanediol (FARXIGA) 10 MG Tab Take 1 tablet by mouth every day. (Patient taking differently: 10 mg by Enteral Tube route every day.) 100 Tablet 3 9/1/2024 at 0929    ezetimibe (ZETIA) 10 MG Tab Take 1 Tablet by mouth every  day. (Patient taking differently: 10 mg by Enteral Tube route every day.) 100 Tablet 3 9/1/2024 at 0929    Cholecalciferol (VITAMIN D3) 2000 UNIT Cap Take 1 Capsule by mouth every day. (Patient taking differently: 2,000 Units by Enteral Tube route every day.) 100 Capsule 3 9/1/2024 at 0929    atorvastatin (LIPITOR) 20 MG Tab 1 Tablet by Enteral Tube route every evening. (Patient not taking: Reported on 9/2/2024)   Not Taking    benzonatate (TESSALON) 100 MG Cap Take 1 tablet by mouth three times a day as needed for cough (Patient not taking: Reported on 9/2/2024) 90 Capsule 1 Not Taking    metFORMIN ER (GLUCOPHAGE XR) 750 MG TABLET SR 24 HR Take 1 tablet by mouth every day. (Patient not taking: Reported on 9/2/2024) 100 Tablet 0 Not Taking    diclofenac sodium (VOLTAREN) 1 % Gel Apply 2 g topically 4 times a day as needed (left foot pain). 100 g 3 PRN at PRN    spironolactone (ALDACTONE) 25 MG Tab Take 1 Tablet by mouth every day for 100 days. (Patient not taking: Reported on 9/2/2024) 100 Tablet 3 Not Taking    cyclobenzaprine (FLEXERIL) 10 mg Tab Take 1 Tablet by mouth at bedtime. (Patient taking differently: Take 10 mg by mouth at bedtime as needed.) 100 Tablet 3 PRN at PRN     Current Facility-Administered Medications   Medication Dose Frequency Provider Last Rate Last Admin    [START ON 9/6/2024] bisoprolol (Zebeta) tablet 5 mg  5 mg Q DAY Juan C Diaz M.D.        melatonin tablet 5 mg  5 mg HS PRN Juan C Diaz M.D.        losartan (Cozaar) tablet 25 mg  25 mg Q EVENING Cheri Rogers, A.P.N.        furosemide (Lasix) tablet 40 mg  40 mg BID DIURETIC Cheri Fesjaspal, A.P.N.   40 mg at 09/05/24 1133    mometasone-formoterol (Dulera) 200-5 MCG/ACT inhaler 2 Puff  2 Puff BID (RT) Juan C Diaz M.D.   2 Puff at 09/05/24 0907    tiotropium (Spiriva Respimat) 2.5 mcg/Act inhalation spray 5 mcg  5 mcg BID (RT) Juan C Diaz M.D.   5 mcg at 09/05/24 0909    levalbuterol (Xopenex) 1.25 MG/3ML  "nebulizer solution 1.25 mg  1.25 mg Q4HRS (RT) Juan C Diaz M.D.   1.25 mg at 09/05/24 1506    ipratropium (Atrovent) 0.02 % nebulizer solution 0.5 mg  0.5 mg Q4HRS (RT) Juan C Diaz M.D.   0.5 mg at 09/05/24 1506    ipratropium (Atrovent) 0.02 % nebulizer solution 0.5 mg  0.5 mg Q2HRS PRN (RT) Juan C Diaz M.D.        levalbuterol (Xopenex) 1.25 MG/3ML nebulizer solution 1.25 mg  1.25 mg Q2HRS PRN (RT) Juan C Diaz M.D.        insulin regular (HumuLIN R,NovoLIN R) injection  1-20 Units 4X/DAY ACHS Juan C Diaz M.D.   6 Units at 09/05/24 1341    And    dextrose 50% (D50W) injection 25 g  25 g Q15 MIN PRN Juan C Diaz M.D.        LR (Bolus) infusion 500 mL  500 mL Once PRN Juan C Diaz M.D.        Respiratory Therapy Consult   Continuous RT Juan C Diaz M.D.        predniSONE (Deltasone) tablet 40 mg  40 mg DAILY Juan C Diaz M.D.   40 mg at 09/05/24 0604    ampicillin/sulbactam (Unasyn) 3 g in  mL IVPB  3 g Q6HRS Jeffrey Gamez M.D.   Stopped at 09/05/24 1232    Pharmacy Consult: Enteral tube insertion - review meds/change route/product selection  1 Each PHARMACY TO DOSE Jeffrey Gamez M.D.        [Held by provider] apixaban (Eliquis) tablet 5 mg  5 mg BID Jeffrey Gamez M.D.   5 mg at 09/04/24 0453    atorvastatin (Lipitor) tablet 20 mg  20 mg Q EVENING Jeffrey Gamez M.D.   20 mg at 09/04/24 1712    ezetimibe (Zetia) tablet 10 mg  10 mg DAILY Jeffrey Gamez M.D.   10 mg at 09/05/24 0605   Last reviewed on 9/2/2024  2:53 AM by Edmundo Russell     No Known Allergies      Intake/Output Summary (Last 24 hours) at 9/5/2024 1509  Last data filed at 9/5/2024 1029  Gross per 24 hour   Intake 1068 ml   Output --   Net 1068 ml        Physical Exam  Body mass index is 20.08 kg/m².  /62   Pulse 98   Temp 36.2 °C (97.2 °F) (Temporal)   Resp 18   Ht 1.727 m (5' 7.99\")   Wt 59.9 kg (132 lb 0.9 oz)   SpO2 96%   Vitals:    09/05/24 0245 09/05/24 0301 09/05/24 " 0747 09/05/24 0843   BP:  109/67  111/62   Pulse: 98 (!) 102 94 98   Resp: 18 18 (!) 36 18   Temp:  36.1 °C (97 °F)  36.2 °C (97.2 °F)   TempSrc:  Temporal  Temporal   SpO2: 97% 95% 99% 96%   Weight:       Height:         Oxygen Therapy:  Pulse Oximetry: 96 %, O2 (LPM): 2, O2 Delivery Device: Silicone Nasal Cannula    Physical Exam  Constitutional:       Appearance: He is cachectic.   Neck:      Vascular: No JVD.   Cardiovascular:      Rate and Rhythm: Normal rate and regular rhythm.      Pulses: Normal pulses and intact distal pulses.      Heart sounds: Normal heart sounds, S1 normal and S2 normal. No murmur heard.     No friction rub. No S3 or S4 sounds.   Pulmonary:      Effort: Pulmonary effort is normal. No respiratory distress.      Breath sounds: Normal breath sounds. No wheezing or rales.   Abdominal:      General: Bowel sounds are normal.      Palpations: Abdomen is soft.   Musculoskeletal:      Cervical back: Neck supple.   Skin:     General: Skin is warm and dry.   Neurological:      Mental Status: He is alert.       Labs (personally reviewed and notable for):   Lab Results   Component Value Date/Time    SODIUM 144 09/05/2024 08:20 AM    POTASSIUM 3.8 09/05/2024 08:20 AM    CHLORIDE 104 09/05/2024 08:20 AM    CO2 27 09/05/2024 08:20 AM    GLUCOSE 260 (H) 09/05/2024 08:20 AM    BUN 44 (H) 09/05/2024 08:20 AM    CREATININE 1.13 09/05/2024 08:20 AM      Lab Results   Component Value Date/Time    WBC 17.3 (H) 09/05/2024 08:20 AM    RBC 3.96 (L) 09/05/2024 08:20 AM    HEMOGLOBIN 12.1 (L) 09/05/2024 08:20 AM    HEMATOCRIT 38.1 (L) 09/05/2024 08:20 AM    MCV 96.2 09/05/2024 08:20 AM    MCH 30.6 09/05/2024 08:20 AM    MCHC 31.8 (L) 09/05/2024 08:20 AM    MPV 11.0 09/05/2024 08:20 AM    NEUTSPOLYS 77.30 (H) 09/03/2024 04:36 PM    LYMPHOCYTES 10.00 (L) 09/03/2024 04:36 PM    MONOCYTES 8.30 09/03/2024 04:36 PM    EOSINOPHILS 3.40 09/03/2024 04:36 PM    EOSINOPHILS 14 03/25/2023 01:40 PM    BASOPHILS 0.30 09/03/2024  04:36 PM      Lab Results   Component Value Date/Time    CHOLSTRLTOT 134 08/13/2024 08:07 AM    LDL 84 08/13/2024 08:07 AM    HDL 33 (A) 08/13/2024 08:07 AM    TRIGLYCERIDE 83 08/13/2024 08:07 AM       Lab Results   Component Value Date/Time    TROPONINT 55 (H) 08/13/2024 0807     Lab Results   Component Value Date/Time    NTPROBNP 3469 (H) 09/02/2024 0013       Telemetry Reviewed (personally reviewed) sinus rhythm    Radiology test Review:  EC-ECHOCARDIOGRAM COMPLETE W/O CONT   Final Result      DX-CHEST-PORTABLE (1 VIEW)   Final Result         1.  Pulmonary edema and/or infiltrates, slightly increased since prior study   2.  Trace bilateral pleural effusions   3.  Cardiomegaly   4.  Atherosclerosis          I personally reviewed all blood test results, EKG tracings and images of his cardiac testings.     Nathaly Connell MD  Cardiologist, [facility]    Please note that this dictation was created using voice recognition software. I have made every reasonable attempt to correct obvious errors, but it is possible there are errors of grammar and possibly content that I did not discover before finalizing the note.

## 2024-09-05 NOTE — CARE PLAN
Problem: Bronchoconstriction  Goal: Improve in air movement and diminished wheezing  Description: Target End Date:  2 to 3 days    1.  Implement inhaled treatments  2.  Evaluate and manage medication effects  Outcome: Progressing     Atrovent & Xopenex Q4

## 2024-09-05 NOTE — PROGRESS NOTES
Monitor summary: -124, ME 0  14, QRS 0.09, QT 0.33, with occasional couplets, rare trigems, occasional PVCs per strip from monitor room.

## 2024-09-06 NOTE — PROGRESS NOTES
Community Health Worker Follow-Up    Reason for outreach: Resource Follow Up    CHW Interventions: CHW contacted pt's spouse for follow up regarding resources that were mailed out. Spouse discussed that they got the information yesterday, after reviewing the necessary information, they came to the conclusion that they are well over the income limit, decided it's best to discard the application. CHW agreed. Spouse discussed that pt will be discharged home today with home health set up. Spouse discussed that they have plans to possibly stay with their daughter for a few weeks in [place] after being released from home health to have a bit more support during pt's recovery. Spouse discussed that they were advised to terminate their insurance if they were going to be going to [place], spouse was confused as to why they were told to do that if it would only be temporary and would coming back about 6 weeks later. CHW encouraged spouse to not terminate insurance unless they plan on moving to [place]. Spouse was clear that it would only be temporary. Spouse did request a list of care services that they could get set up with in [place] while they are there. CHW informed spouse that a list would be mailed out to her. Spouse states that they have no other needs at this time.     Specific Resources Provided:  Housing/Shelter: n/a  Transportation: n/a  Food: n/a  Financial: n/a  Social Supports: caregiver services in [place]   Other: n/a    Plan: CHW mailed out caregiver list. CHW will follow up with pt in one week. Encouraged pt to call with questions or concerns.

## 2024-09-06 NOTE — CARE PLAN
The patient is Watcher - Medium risk of patient condition declining or worsening    Shift Goals  Clinical Goals: Patient oxygen saturation will remain above 88% this shift  Patient Goals: Go home  Family Goals: no family present at this time    Progress made toward(s) clinical / shift goals:  Patient remained on 1L throughout duration of shift with o2 saturation. Patient remains with dyspnea upon exertion, auscultated expiratory wheezes in all lung sounds prior to any interventions. Patient educated on elevating head of bed and received inhalers as ordered per mar    Problem: Respiratory  Goal: Patient will achieve/maintain optimum respiratory ventilation and gas exchange  Outcome: Progressing     Problem: Impaired Gas Exchange  Goal: Patient will demonstrate improved ventilation and adequate oxygenation and participate in treatment regimen within the level of ability/situation.  Outcome: Progressing

## 2024-09-06 NOTE — DISCHARGE PLANNING
Case Management Discharge Planning    Admission Date: 9/2/2024  GMLOS: 4.1  ALOS: 4    6-Clicks ADL Score: 16  6-Clicks Mobility Score: 17  PT and/or OT Eval ordered: Yes  Post-acute Referrals Ordered: Yes  Post-acute Choice Obtained: Yes  Has referral(s) been sent to post-acute provider:  Yes      Anticipated Discharge Dispo: Discharge Disposition: D/T to home under HHA care in anticipation of covered skilled care (06)    DME Needed: No    Action(s) Taken: Updated Provider/Nurse on Discharge Plan    RN CM received an order form through email for PEG tube from [facility]. Dr. Diaz signed. RN CM emailed back to [facility] for their dietician to Review.     Pt has been accepted by [facility] . O2 delivered at bedside from [facility].     1230- RN CM called and left VM to Nina from [facility] and see if they can verify with Pt's PCP can follow through with the Enteral feeding orders, waiting for a callback.     1310-RN CM received a VM from Nina of [facility], per Nina, all is set in their end for Pt's enteral feeding, auth was received from insurance and they will deliver the tube feed later in the day or tomorrow. Their team is touching base with Pt's family. Dr. Diaz and bedside nurse informed.     1338- RN CM spoke with Pt's daughter, per Benoit [facility] called her that tube feeds will be delivered later today.     [facility] informed that Pt is discharging today.     IMM delivered.     Dr. Diaz ordered DME Nebulizer. Referral sent to [facility]. Pt''s daughter is ok for nebulizer to be delivered home. Dr. Diaz informed.       Escalations Completed: Provider    Medically Clear: No    Next Steps: RN CM to continue to assist in Pt's discharge needs.     Barriers to Discharge: None    Is the patient up for discharge tomorrow: No  Pt is discharging today.

## 2024-09-06 NOTE — PROGRESS NOTES
Monitor summary: SR/ST , AK 0.14, QRS 0.08, QT 0.38, with rare PVCs, bigem, trigem and 6bts vtach per strip from monitor room.

## 2024-09-06 NOTE — DISCHARGE INSTRUCTIONS
Special Equipment    You are being discharged with the following special equipment:  Oxygen and Walker    Diet    Resume your normal diet as tolerated.  A diet low in cholesterol, fat, and sodium is recommended for good health.     Activity    Resume Your Normal Activity    You may resume your normal activity as tolerated.  Rest as needed.

## 2024-09-06 NOTE — PROGRESS NOTES
Bedside report received, Assumed care of patient 1900     Patient is A&O 4 , pt calls for assistance appropriately  Patient pain level declines    Patient on 2L nc  Mobility handheld upself  Assistance level one  Voiding +  Last BM 9/5     Hourly rounding in place, call light within reach, bed locked in lowest position, low airloss, frame alarm on. All patient needs met at this time.

## 2024-09-06 NOTE — DISCHARGE PLANNING
HTH/SCP TCN chart review completed. No new TCN needs identified at this time. Current discharge considerations are now for dc to home with  services and supplemental 02. Pt has been accepted by [facility]. O2 delivered at bedside from [facility]. Enteral feeding set through [facility]. No additional TCN needs at this time and please see TCN note from 9/4 if indicated for current dc planning considerations,etc. TCN will continue to follow and collaborate with discharge planning team as additional post acute needs arise. Thank you.     Completed:  PT/OT with recommendations for HH on 9/4  Choice obtained: TIA ([facility]) DME (02 via [facility])  Note that pt has upcoming outpatient vascular appts per review

## 2024-09-06 NOTE — PROGRESS NOTES
Monitor summary: -112, RI -0.13, QRS -0.09, QT -0.38, with (O) PVCs, rare couplet and rare bigeminal PVCs per strip from the monitor room.

## 2024-09-06 NOTE — THERAPY
"Speech Language Pathology   Daily Treatment     Patient Name: Pito Black  AGE:  79 y.o., SEX:  male  Medical Record #: [id number]  Date of Service: 9/6/2024      Precautions:  Precautions: Fall Risk, Swallow Precautions     Subjective  RN cleared patient for dysphagia tx session. Patient sleeping, but rousing easily to verbal cues. Patient with flat affect, but pleasant and cooperative. Patient NPO with PEG tube.     Assessment  Patient noted to have weak, hoarse vocal quality. Dried, dark red blood noted on dental and left lower labial surface; RN reported he bit his lip last night. Patient was provided with oral care and improvement noted. Patient consumed PO trials of single ice chips only. Bolus acceptance was adequate. Mastication was timely w/ delayed initiation of pharyngeal swallow. No overt s/sx of aspiration noted on single ice chips, but mild SOB noted, which can be consistent with possible silent aspiration. Patient noted to demonstrate slow, effortful swallows and endorsed difficulty triggering a swallow when asked. Patient was educated on Effortful swallow exercise and complete ~10 with \"fair\" accuracy. Attempted CTAR and other exercises but patient reported he was too fatigued for same. No further PO trials were given d/t fatigue and risk for aspiration.    Clinical Impressions  Patient presents with known severe oropharyngeal dysphagia with recommendation for continuance of NPO status with TF via PEG. Ok for ~5 single ice chips per hour after oral care when awake/alert. SLP is following. Patient will benefit from repeat MBSS when less fatigued and SOB improves.     Recommendations  Treatment Completed: Dysphagia Treatment     Dysphagia Treatment  Diet Consistency: NPO/PEG  Instrumentation: VFSS (MBSS) (when more medically appropriate)  Medication: Non Oral  Supervision: 1:1 feeding with constant supervision (Ice chips)  Positioning: Fully upright and midline during oral intake  Oral Care: " "Q2h    SLP Treatment Plan  Treatment Plan: Dysphagia Treatment  SLP Frequency: 3x Per Week  Estimated Duration: Until Therapy Goals Met    Anticipated Discharge Needs  Discharge Recommendations: Recommend post-acute placement for additional speech therapy services prior to discharge home  Therapy Recommendations Upon DC: Dysphagia Training, Community Re-Integration, Patient / Family / Caregiver Education    Patient / Family Goals  Patient / Family Goal #1: \"more ice\"  Goal #1 Outcome: Progressing as expected  Short Term Goals  Short Term Goal # 1: Pt will consume prefeeding trials with no overt s/sx of aspiration  Goal Outcome # 1: Progressing slower than expected  Short Term Goal # 2: Pt will participate in a repeat MBSS as medically appropriate  Goal Outcome # 2 : Goal not met    MARISOL Reinoso  "

## 2024-09-06 NOTE — DISCHARGE PLANNING
MOSES sent nebulizer order to [facility]. KORY MCKEON asking MD if DME can be sent to pt's house rather than to bedside.     1558  MOSES spoke to Kenny with [facility], said DME order can be delivered to pt's address as RN LINDA got approval by MD.     1625  MOSES received call from Esperanza with [facility], questioning walker. MOSES informed Esperanza that a FWW was already delivered to pt by [facility]. Will continue to process order for nebulizer.

## 2024-09-06 NOTE — PROGRESS NOTES
Hospital Medicine Daily Progress Note    Date of Service  9/5/2024    Chief Complaint  Pito Black is a 79 y.o. male admitted 9/2/2024 with shortness of breath    Hospital Course  Patient was at [facility] rehab rehabilitating from a stroke.  Patient became short of breath and was sent over.  Evaluation emergency room reveals respiratory failure with a combination of factors including pneumonia, heart failure, pleural effusion, lung cancer.  Patient at this point has been placed on oxygen support.  At this point initiation of antibiotics has been done.      Interval Problem Update  Patient seen and examine at bedside  78yo frail M w/ h/o lung cancer followed by Luz Maria Jules on apixaban who presented 8/13/2024 with L facial droop and dysphagia. HE choked on his pills then developed L sided facial droop and hand weakness, persisting to the morning. At the ED afebrile, normotensive.  CTA HnN demonstrated R MCA occlusion. Neurology felt he was outside tPA window. CT perf no penumbra, CXR demonstrated PPM/ICD and bilateral infiltrates. CBC is normal. CMP demonstrates hyponatremia Na 133, stable Cr 1.3. A1c is 6. Troponin 55 comparable to prior values. INR 1.3. EKG sinus tachycardia.   Neurology consulted, recommends continued Eliquis and Zetia. GI consulted for dysphagia, s/p PEG tube placement by Dr. Ernandez on 8/23. Tube feeds started.     Managing stroke w/ Afib/anticoag in the setting of lung ca/COPD/hypoxia. There was also reports of CHF but no echo. On O2, continue tube feeds, antibiotics for pneumonia. He was wheezing on my exam, hence started steroids, levalbuterol (given tachycardia but SBP stable).  likely from steroids, A1c 6.7, but I increased SS insulin.  Started antibiotics, as the CXR ordered by me showed bilateral infiltrates and has mild lactic acidosis. Restarted BB as respiratory status is stable. Avoid digoxin given known CKD? Monitor K+, renal function and blood pressures. K 4.4-3.8 Cr-  1.16-1.09-1.13 Increased PO Lasix given possible increased edema on CXR and I ordered an echo which came back severe MR, diastolic 2 dysfunction EF 40%. Likely HFrEF exac. I spoke with Dr. Matos and consulted Dr. Connell, Cardiology. Agreed with Lasix and increased it. Switched BB to bisoprolol. Spironolactone added back. Will ask Cardiology if Entresto vs ACEI/ARB and Farxiga/Jardiance indicated. Cardiolgy recommended bisoprol and losartan, no spironolactone and Farxiga/Jardiance or Entresto for now. SW/CM working hard on option care and I ordered tube feeds. Approval may be Monday or earlier. Discharge when this is all approved.     I reviewed the chart along with vitals, labs, imaging, test (both pending and resulted) and recommendations from specialists and interdisciplinary team.      I have discussed this patient's plan of care and discharge plan at IDT rounds today with Case Management, Nursing, Nursing leadership, and other members of the IDT team.    Consultants/Specialty  None    Code Status  DNAR/DNI    Disposition  Medically Cleared  I have placed the appropriate orders for post-discharge needs.    Review of Systems  Review of Systems   Constitutional:  Positive for malaise/fatigue and weight loss. Negative for chills, diaphoresis and fever.   HENT: Negative.  Negative for nosebleeds and sinus pain.    Eyes: Negative.  Negative for double vision, photophobia, discharge and redness.   Respiratory:  Positive for cough and shortness of breath. Negative for sputum production, wheezing and stridor.    Cardiovascular: Negative.  Negative for chest pain, orthopnea, leg swelling and PND.   Gastrointestinal: Negative.  Negative for abdominal pain, blood in stool and heartburn.   Genitourinary: Negative.  Negative for dysuria, flank pain and frequency.   Musculoskeletal: Negative.  Negative for back pain, falls and myalgias.   Skin: Negative.  Negative for itching.   Neurological:  Positive for weakness. Negative  for dizziness, tingling, sensory change, focal weakness and seizures.   Endo/Heme/Allergies: Negative.  Negative for polydipsia. Does not bruise/bleed easily.   Psychiatric/Behavioral: Negative.  Negative for depression, substance abuse and suicidal ideas. The patient does not have insomnia.         Physical Exam  Temp:  [36.1 °C (97 °F)-36.4 °C (97.5 °F)] 36.4 °C (97.5 °F)  Pulse:  [] 104  Resp:  [18-36] 24  BP: (109-136)/(62-71) 112/66  SpO2:  [94 %-99 %] 95 %    Physical Exam  Constitutional:       General: He is awake. He is not in acute distress.     Appearance: Normal appearance. He is well-developed, well-groomed and normal weight. He is ill-appearing. He is not diaphoretic.   HENT:      Head: Normocephalic and atraumatic.      Jaw: There is normal jaw occlusion. No trismus.      Salivary Glands: Right salivary gland is not tender. Left salivary gland is not tender.      Right Ear: External ear normal.      Left Ear: External ear normal.      Nose: Congestion present.      Mouth/Throat:      Mouth: Mucous membranes are moist.      Pharynx: Oropharynx is clear.   Eyes:      General: Lids are normal. Vision grossly intact. No scleral icterus.        Right eye: No discharge.         Left eye: No discharge.      Extraocular Movements: Extraocular movements intact.      Conjunctiva/sclera: Conjunctivae normal.      Right eye: Right conjunctiva is not injected. No exudate.     Left eye: Left conjunctiva is not injected. No exudate.     Pupils: Pupils are equal, round, and reactive to light.   Neck:      Thyroid: No thyroid mass.      Vascular: No hepatojugular reflux or JVD.      Trachea: No abnormal tracheal secretions or tracheal deviation.   Cardiovascular:      Rate and Rhythm: Regular rhythm. Tachycardia present. Occasional Extrasystoles are present.     Pulses: Normal pulses.      Heart sounds: Normal heart sounds.   Pulmonary:      Effort: Tachypnea and accessory muscle usage present.      Breath  sounds: Decreased air movement present. Examination of the right-upper field reveals decreased breath sounds. Examination of the left-upper field reveals decreased breath sounds. Examination of the right-middle field reveals decreased breath sounds. Examination of the left-middle field reveals decreased breath sounds. Examination of the right-lower field reveals decreased breath sounds and rhonchi. Examination of the left-lower field reveals decreased breath sounds and rhonchi. Decreased breath sounds and rhonchi present.   Abdominal:      General: Abdomen is flat. Bowel sounds are normal. There is no distension.      Palpations: Abdomen is soft. There is no mass.   Musculoskeletal:         General: Normal range of motion.      Cervical back: Full passive range of motion without pain, normal range of motion and neck supple. No rigidity. No muscular tenderness.      Right lower leg: No edema.      Left lower leg: No edema.   Lymphadenopathy:      Head:      Right side of head: No submental adenopathy.      Left side of head: No submental adenopathy.      Cervical: No cervical adenopathy.      Right cervical: No superficial cervical adenopathy.     Left cervical: No superficial cervical adenopathy.      Upper Body:      Right upper body: No supraclavicular adenopathy.      Left upper body: No supraclavicular adenopathy.   Skin:     General: Skin is warm and moist.      Capillary Refill: Capillary refill takes less than 2 seconds.      Coloration: Skin is not cyanotic.      Findings: No abrasion.   Neurological:      General: No focal deficit present.      Mental Status: He is alert and oriented to person, place, and time. Mental status is at baseline.   Psychiatric:         Attention and Perception: Attention and perception normal.         Mood and Affect: Mood and affect normal.         Speech: Speech normal.         Behavior: Behavior normal. Behavior is cooperative.         Thought Content: Thought content normal.          Cognition and Memory: Cognition and memory normal.         Judgment: Judgment normal.         Fluids    Intake/Output Summary (Last 24 hours) at 9/5/2024 1805  Last data filed at 9/5/2024 1029  Gross per 24 hour   Intake 1068 ml   Output --   Net 1068 ml        Laboratory  Recent Labs     09/03/24  1636 09/04/24  0018 09/04/24  1727 09/05/24  0820   WBC 10.7 11.5*  --  17.3*   RBC 3.67* 3.53*  --  3.96*   HEMOGLOBIN 11.4* 11.1* 11.5* 12.1*   HEMATOCRIT 35.4* 33.9* 35.1* 38.1*   MCV 96.5 96.0  --  96.2   MCH 31.1 31.4  --  30.6   MCHC 32.2* 32.7  --  31.8*   RDW 52.5* 51.1*  --  51.3*   PLATELETCT 93* 110*  --  114*   MPV 11.1 11.1  --  11.0     Recent Labs     09/04/24  0018 09/05/24  0820   SODIUM 137 144   POTASSIUM 4.4 3.8   CHLORIDE 100 104   CO2 25 27   GLUCOSE 323* 260*   BUN 38* 44*   CREATININE 1.09 1.13   CALCIUM 8.2* 8.9                   Imaging  EC-ECHOCARDIOGRAM COMPLETE W/O CONT   Final Result      DX-CHEST-PORTABLE (1 VIEW)   Final Result         1.  Pulmonary edema and/or infiltrates, slightly increased since prior study   2.  Trace bilateral pleural effusions   3.  Cardiomegaly   4.  Atherosclerosis           Assessment/Plan  * Acute hypoxic respiratory failure (HCC)- (present on admission)  Assessment & Plan  Patient was undergoing rehab where the patient was noted to be in respiratory distress and was sent over to the emergency room  Patient initially hypoxic and had to be placed on oxygen support  Patient at this point seems to have pneumonia with ongoing heart failure and pleural effusion and lung cancer  Patient will need aggressive oxygenation to keep oxygen saturations above 90%    COPD exac, pneumonia possible CHF  Antibiotics COPD protocol steroids and levalbuterol  Lasix increased. Monitor kidney function    HFrEF (heart failure with reduced ejection fraction) ([facility])  Assessment & Plan  Lasix  BB, spironolactone.  Will ask Cardiology if Entresto vs ACEI/ARB and Farxiga/Jardiance  indicated    Thrombocytopenia (HCC)- (present on admission)  Assessment & Plan  Thrombocytopenia secondary most likely to the cancerous process  Current level 106  Currently does not need a transfusion    Leukocytosis- (present on admission)  Assessment & Plan  Secondary to pneumonia patient has developed leukocytosis    Right sided cerebral hemisphere cerebrovascular accident (CVA) (HCC)- (present on admission)  Assessment & Plan  Was at rehab for recovery from stroke  Continue with PT OT and speech  Continue with Lipitor and Eliquis    Type 2 diabetes mellitus with stage 3a chronic kidney disease, without long-term current use of insulin (HCC)- (present on admission)  Assessment & Plan  Diabetic diet  Diabetes management with a combination of Farxiga and metformin  Monitor renal functions avoid nephrotoxic medications  Most recent hemoglobin A1c 6.4  BG can go up to 300s bec of steroids, will recommend storngly resuming metoformin and her other diabetes meds and strict diabetic diet    Chronic systolic heart failure (HCC)- (present on admission)  Assessment & Plan  Optimize medication management and diuresis    Vitamin D deficiency- (present on admission)  Assessment & Plan  Vitamin D supplementation    Pleural effusion, left- (present on admission)  Assessment & Plan  Monitor pleural effusion if necessary drain    Primary cancer of left lower lobe of lung (HCC)- (present on admission)  Assessment & Plan  Status post resection of the lung  On experimental chemotherapy as an outpatient currently on hold    Hyperlipidemia associated with type 2 diabetes mellitus (HCC)- (present on admission)  Assessment & Plan  Low-fat low-cholesterol diet  Statin and Zetia  Fasting lipid panel    A-fib (HCC)- (present on admission)  Assessment & Plan  HR improved    HR elevated, BB added Cardiology consulted  If respiratory status stable restart BB    Hypertension associated with type 2 diabetes mellitus (HCC)- (present on  admission)  Assessment & Plan      Stable BP         VTE prophylaxis: Eliquis    I have performed a physical exam and reviewed and updated ROS and Plan today (9/5/2024). In review of yesterday's note (9/4/2024), there are no changes except as documented above.  Patient is has a high medical complexity, complex decision making and is at high risk for complication, morbidity, and mortality, thus requiring the highest level of my preparedness for sudden, emergent intervention. Medical decision making is therefore complex. I provided  services, which included ordering labs and/or imaging, and discussing the case with various consultants.medication orders, frequent reevaluations of the patient's condition and response to treatment. Time was also devoted to counseling and coordinating care including review of records, pertinent lab data and studies, as well as discussing diagnostic evaluation and work up, planned therapeutic interventions and future disposition of care. Where indicated, the assessment and plan reflect discussion of patient with consultants, other healthcare providers, family members, and additional research needed to obtain further information in formulating the plan of care for Pito Black. Total time spent was 65 minutes.

## 2024-09-06 NOTE — CARE PLAN
Problem: Bronchoconstriction  Goal: Improve in air movement and diminished wheezing  Description: Target End Date:  2 to 3 days    1.  Implement inhaled treatments  2.  Evaluate and manage medication effects  Outcome: Progressing     Problem: Hyperinflation  Goal: Prevent or improve atelectasis  Description: Target End Date:  3 to 4 days    1. Instruct incentive spirometry usage  2.  Perform hyperinflation therapy as indicated  Outcome: Progressing     Atrovent & Xopenex QID  PEP QID

## 2024-09-07 PROBLEM — J96.90 RESPIRATORY FAILURE (HCC): Status: ACTIVE | Noted: 2024-01-01

## 2024-09-07 NOTE — ASSESSMENT & PLAN NOTE
Patient was CVA in August 2024 with residual left-sided deficit and dysphagia with G-tube dependency.   -Continue G-tube feeds as accepted by patient   -Aspiration precautions, n.p.o.

## 2024-09-07 NOTE — ASSESSMENT & PLAN NOTE
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  Comfort Care.  -Morphine and Ativan for pain/anxiety  -Scopolamine patch for secretions  -Continue respiratory treatments for comfort

## 2024-09-07 NOTE — ED PROVIDER NOTES
ED Provider Note    CHIEF COMPLAINT  Chief Complaint   Patient presents with    Difficulty Breathing       EXTERNAL RECORDS REVIEWED  Inpatient Notes multiple recent admissions.  The most recent of which was had for hypoxic respiratory failure 1 week prior.  Was discharged yesterday to home health.    HPI/ROS  LIMITATION TO HISTORY   Hypoxia and respiratory distress  OUTSIDE HISTORIAN(S):  Wife and daughter at bedside provide additional history that they called EMS today due to inability to keep patient comfortable      Pito Black is a 79 y.o. male who presents to the emergency department with chief complaint of respiratory distress.  Patient had recent stroke.  Recent diagnosis of hypoxic respiratory failure.  Known left-sided lung adenocarcinoma known left pleural effusion.  Throughout the morning today patient had increasing work of breathing increasing restlessness very uncomfortable.  Daughter reports that they did give 2 Tylenol PM to try and help comfort him however this was unsuccessful and he was having increasing work of breathing and seem to be tiring out therefore EMS was called.  Upon EMS arrival patient was very tachypneic hypoxic to 88 on room air.  Somewhat confused.  He had moderate cough no recent fevers.  He denies chest pain.    PAST MEDICAL HISTORY   has a past medical history of Acute hypoxemic respiratory failure ([facility]), Acute respiratory failure with hypoxia ([facility]) (02/01/2023), AICD (automatic cardioverter/defibrillator) present (12/07/2023), Arrhythmia, Arthritis (2015), Asthma, Backpain (08/06/2018), Blood clotting disorder ([facility]) (02/2023), Breath shortness, Cancer ([facility]) (07/2017), Cataract, Congestive heart failure ([facility]), Dental disorder, Diabetes (08/06/2018), Dysfunction of eustachian tube, Heart valve disease, High cholesterol, Hypertension, Macrocytic anemia, Pneumonia (02/2023), Renal disorder, Thrombophilia ([facility]), Type 2 diabetes mellitus with hyperglycemia ([facility])  (2023), and Volume overload.    SURGICAL HISTORY   has a past surgical history that includes knee arthroplasty total (); ear reconstruction (Bilateral); ear middle exploration (Right, 2015); ossicular reconstruction (Right, 2015); thoracoscopy (Left, 2018); cataract phaco with iol (Right, 2018); cataract phaco with iol (Left, 2018); bronchoscopy,diagnostic (2021); endobronchial us add-on (2021); bronchoscopy,diagnostic (N/A, 2023); endobronchial us add-on (N/A, 2023); bronchoscopy,diagnostic (N/A, 2024); endobronchial us add-on (N/A, 2024); and place percut gastrostomy tube (N/A, 2024).    FAMILY HISTORY  Family History   Problem Relation Age of Onset    Stroke Mother     Hypertension Mother     Diabetes Mother     Cancer Father         stomach cancer    Heart Disease Brother     Alcohol abuse Brother     Ovarian Cancer Neg Hx     Tubal Cancer Neg Hx     Peritoneal Cancer Neg Hx     Colorectal Cancer Neg Hx     Breast Cancer Neg Hx     Hyperlipidemia Neg Hx        SOCIAL HISTORY  Social History     Tobacco Use    Smoking status: Former     Current packs/day: 0.00     Average packs/day: 0.3 packs/day for 40.0 years (10.0 ttl pk-yrs)     Types: Cigarettes     Start date: 1965     Quit date: 2005     Years since quittin.6    Smokeless tobacco: Never   Vaping Use    Vaping status: Never Used   Substance and Sexual Activity    Alcohol use: Not Currently     Comment: Quit 2/3/2017    Drug use: No    Sexual activity: Not on file       CURRENT MEDICATIONS  Home Medications       Reviewed by Michelle Tomlinson R.N. (Registered Nurse) on 24 at 1016  Med List Status: <None>     Medication Last Dose Status   albuterol (PROVENTIL) 2.5mg/0.5ml Nebu Soln  Active   albuterol 108 (90 Base) MCG/ACT Aero Soln inhalation aerosol  Active   amoxicillin-clavulanate (AUGMENTIN) 875-125 MG Tab  Active   apixaban (ELIQUIS) 5mg Tab  Active  "  atorvastatin (LIPITOR) 20 MG Tab  Active   benzonatate (TESSALON) 100 MG Cap  Active   bisoprolol (ZEBETA) 5 MG Tab  Active   Cholecalciferol (VITAMIN D3) 2000 UNIT Cap  Active   cyclobenzaprine (FLEXERIL) 10 mg Tab  Active   diclofenac sodium (VOLTAREN) 1 % Gel  Active   ezetimibe (ZETIA) 10 MG Tab  Active   furosemide (LASIX) 40 MG Tab  Active   hydrOXYzine HCl (ATARAX) 25 MG Tab  Active   ipratropium (ATROVENT) 0.02 % Solution  Active   lansoprazole (PREVACID) 30 MG Tablet Delayed Release Dispersible  Active   levalbuterol (XOPENEX) 1.25 MG/3ML Nebu Soln  Active   losartan (COZAAR) 25 MG Tab  Active   MELATONIN PO  Active   metFORMIN ER (GLUCOPHAGE XR) 750 MG TABLET SR 24 HR  Active   mometasone-formoterol (DULERA) 200-5 MCG/ACT Aerosol  Active   NS SOLN 100 mL with ampicillin/sulbactam 1.5 (1-0.5) g SOLR 1.5 g  Active   nystatin (MYCOSTATIN) 821173 UNIT/ML Suspension  Active   oxyCODONE immediate-release (ROXICODONE) 5 MG Tab  Active   predniSONE (DELTASONE) 10 MG Tab  Active   senna-docusate (PERICOLACE OR SENOKOT S) 8.6-50 MG Tab  Active   tiotropium (SPIRIVA RESPIMAT) 2.5 mcg/Act Aero Soln  Active   traZODone (DESYREL) 50 MG Tab  Active                  Audit from Redirected Encounters    **Home medications have not yet been reviewed for this encounter**         ALLERGIES  No Known Allergies    PHYSICAL EXAM  VITAL SIGNS: /62   Pulse (!) 109   Temp 36.8 °C (98.2 °F) (Temporal)   Resp (!) 24   Ht 1.702 m (5' 7\")   Wt 60 kg (132 lb 4.4 oz)   SpO2 100%   BMI 20.72 kg/m²    Pulse ox interpretation: Maintaining oxygen on CPAP  Constitutional: Somnolent, severe distress  HEENT: Atraumatic normocephalic, pupils are equal round reactive to light extraocular movements are intact. The nares is clear, external ears are normal, mouth shows moist mucous membranes normal dentition for age  Neck: Supple, no JVD no tracheal deviation  Cardiovascular: Tachycardic no murmur rub or gallop 2+ pulses peripherally " x4  Thorax & Lungs: Tachypneic, decreased air movement throughout the left lung field  GI: Soft nontender nondistended positive bowel sounds, no peritoneal signs  Skin: Warm dry no acute rash or lesion  Musculoskeletal:  no acute  deformity  Neurologic: Arousable to noxious stimuli  Psychiatric: Somnolent        EKG/LABS  Results for orders placed or performed during the hospital encounter of 09/07/24   Lactic Acid   Result Value Ref Range    Lactic Acid 3.7 (H) 0.5 - 2.0 mmol/L   CBC with Differential   Result Value Ref Range    WBC 18.4 (H) 4.8 - 10.8 K/uL    RBC 3.91 (L) 4.70 - 6.10 M/uL    Hemoglobin 12.2 (L) 14.0 - 18.0 g/dL    Hematocrit 39.3 (L) 42.0 - 52.0 %    .5 (H) 81.4 - 97.8 fL    MCH 31.2 27.0 - 33.0 pg    MCHC 31.0 (L) 32.3 - 36.5 g/dL    RDW 56.7 (H) 35.9 - 50.0 fL    Platelet Count 92 (L) 164 - 446 K/uL    MPV 11.3 9.0 - 12.9 fL    Neutrophils-Polys 89.80 (H) 44.00 - 72.00 %    Lymphocytes 6.40 (L) 22.00 - 41.00 %    Monocytes 2.40 0.00 - 13.40 %    Eosinophils 0.40 0.00 - 6.90 %    Basophils 0.20 0.00 - 1.80 %    Immature Granulocytes 0.80 0.00 - 0.90 %    Nucleated RBC 0.30 (H) 0.00 - 0.20 /100 WBC    Neutrophils (Absolute) 16.54 (H) 1.82 - 7.42 K/uL    Lymphs (Absolute) 1.17 1.00 - 4.80 K/uL    Monos (Absolute) 0.44 0.00 - 0.85 K/uL    Eos (Absolute) 0.07 0.00 - 0.51 K/uL    Baso (Absolute) 0.04 0.00 - 0.12 K/uL    Immature Granulocytes (abs) 0.15 (H) 0.00 - 0.11 K/uL    NRBC (Absolute) 0.05 K/uL   Complete Metabolic Panel   Result Value Ref Range    Sodium 141 135 - 145 mmol/L    Potassium 4.5 3.6 - 5.5 mmol/L    Chloride 104 96 - 112 mmol/L    Co2 22 20 - 33 mmol/L    Anion Gap 15.0 7.0 - 16.0    Glucose 326 (H) 65 - 99 mg/dL    Bun 58 (H) 8 - 22 mg/dL    Creatinine 1.41 (H) 0.50 - 1.40 mg/dL    Calcium 8.3 (L) 8.5 - 10.5 mg/dL    Correct Calcium 9.2 8.5 - 10.5 mg/dL    AST(SGOT) 46 (H) 12 - 45 U/L    ALT(SGPT) 51 (H) 2 - 50 U/L    Alkaline Phosphatase 77 30 - 99 U/L    Total Bilirubin  0.4 0.1 - 1.5 mg/dL    Albumin 2.9 (L) 3.2 - 4.9 g/dL    Total Protein 6.5 6.0 - 8.2 g/dL    Globulin 3.6 (H) 1.9 - 3.5 g/dL    A-G Ratio 0.8 g/dL   Urinalysis    Specimen: Urine   Result Value Ref Range    Color Yellow     Character Clear     Specific Gravity 1.021 <1.035    Ph 7.0 5.0 - 8.0    Glucose Negative Negative mg/dL    Ketones Negative Negative mg/dL    Protein Negative Negative mg/dL    Bilirubin Negative Negative    Urobilinogen, Urine 1.0 Negative    Nitrite Negative Negative    Leukocyte Esterase Negative Negative    Occult Blood Negative Negative    Micro Urine Req see below    proBrain Natriuretic Peptide, NT   Result Value Ref Range    NT-proBNP 6673 (H) 0 - 125 pg/mL   PROCALCITONIN   Result Value Ref Range    Procalcitonin 0.15 <0.25 ng/mL   TROPONIN   Result Value Ref Range    Troponin T 109 (H) 6 - 19 ng/L   IMMATURE PLT FRACTION   Result Value Ref Range    Imm. Plt Fraction 4.8 0.6 - 13.1 %   ESTIMATED GFR   Result Value Ref Range    GFR (CKD-EPI) 50 (A) >60 mL/min/1.73 m 2      I have independently interpreted this EKG    RADIOLOGY/PROCEDURES   Point of Care Ultrasound    ED POINT OF CARE ULTRASOUND: LIMITED CARDIAC    Indication for exam: Tachycardia and shortness of breath known heart failure  LVEF: Diminished  Pericardial Effusion:not present  RV Strain:not present  Pulmonary B Lines:not present    Image retained through [facility] as seen below:             Additional interpretation: Moderately decreased ejection fraction visually estimated to be at 30% with large left pleural effusion    This study is a limited ultrasound examination performed and interpreted to evaluate for limited conditions as outlined above. There may be other clinically important information contained in the images that is outside this scope. When clinically warranted, a comprehensive ultrasound through the appropriate department is considered.      Radiologist interpretation:  DX-CHEST-PORTABLE (1 VIEW)   Final Result  "     Stable bilateral airspace disease and left pleural effusion. Overall, no change.      CT-CHEST (THORAX) WITH    (Results Pending)       COURSE & MEDICAL DECISION MAKING    ASSESSMENT, COURSE AND PLAN  Care Narrative: 79-year-old male with known lung cancer known pleural effusion worsening respiratory distress today.  X-ray appears stable from previous however is increasingly hypoxic and having increased work of breathing.  He is kept on CPAP here.  Discussed goals of treatment extensively with wife and daughter at bedside.  Patient is DNR DNI.  At this time we will maintain CPAP we will obtain CT scan to stage extent of pulmonary involvement/cancer burden.  I have placed inpatient consult for hospice and been discussing the case with .  Patient given 4 mg of morphine and 1 mg of Ativan for comfort at this time.      12:05 PM  Case discussed at length with social work.  We have also attempted to contact hospice with hopes of enrolling patient in hospice however not available at this time as it is a weekend morning.  Patient want CAT scan as detailed above to further delineate the extent of his disease.  At this point he will require extensive care with main goal of making him comfortable at this point.  I discussed this at length up to this point with the resident housestaff and patient will be admitted to the hospital for ongoing evaluation and treatment.  Admitted in guarded condition.      /62   Pulse (!) 109   Temp 36.8 °C (98.2 °F) (Temporal)   Resp (!) 24   Ht 1.702 m (5' 7\")   Wt 60 kg (132 lb 4.4 oz)   SpO2 100%   BMI 20.72 kg/m²           FINAL DIAGNOSIS  1. Acute hypoxic respiratory failure (HCC)    2. Lung mass    3. Pleural effusion, left    4. Primary adenocarcinoma of left lung (HCC)    5.  POCUS echocardiogram by ERP     Electronically signed by: Michael Robert M.D.      "

## 2024-09-07 NOTE — ED NOTES
Med rec is complete per historic med rec on 9/2/24 and family member at bedside.   Pt was discharged from hospital yesterday, was prescribed several new medications, and has taken several of the new ones yesterday and today.  Outpatient antibiotics within the last 30 days: Started Augmentin 875/125 BID. Last dose 9/7 in AM.  Anticoagulants: Patient is taking Eliquis 5mg BID. Last dose 9/7 in AM.  Patient's home pharmacy - [facility] ([phone])    Sammi Beltrán, PhT

## 2024-09-07 NOTE — ED NOTES
donte from Lab called with critical result of troponin at 109. Critical lab result read back to Donte.   Dr. Robert notified of critical lab result at 1115.  Critical lab result read back by Dr. Gonzalez.

## 2024-09-07 NOTE — H&P
[facility] MEDICINE HISTORY AND PHYSICAL     PATIENT ID:  NAME:  Pito Black  MRN:               [id number]  YOB: 1945    Date of Admission: 9/7/2024     Attending: Roger Em M.d.    Resident: Simon Cook MD    Primary Care Physician:  JILL Welsh    CC:    Chief Complaint   Patient presents with    Difficulty Breathing       HPI: Pito Black is a 79 y.o. male with a PMH of CHF EF 55%, recent stroke on 8/13 with left-sided deficit and dysphagia PEG dependent, adenocarcinoma of the lung, atrial fibrillation, type 2 diabetes with CKD 3 who presented 9/7 with respiratory distress after discharging 9/6 with pneumonia, pleural effusion, COPD exacerbation and possible CHF.      History was gathered from the wife and the daughter as the patient was unable to cooperate with exam; they report that since getting home yesterday the patient attempted to sleep but was unable to find a comfortable position and was having difficulty breathing all night despite inhaler usage, steroids, antibiotics, and supplemental oxygen.  They felt their efforts were not providing support to the patient and thought the only place to go with the hospital.  They report the patient himself did not want to go to the hospital again.  Review of systems per wife and daughter are positive for cough with bloody mucus, lethargy, and increased respiratory effort.  ROS negative for nausea, vomiting, diarrhea, rashes, swelling, or fevers.  They are unsure when his last bowel movement was    When discussing the patient's past medical history they indicated interest in hospice for this patient and believe it is what he would want.    ERCourse:  Vitals show afebrile at 98.2 °F, pulse ranged from , respirating on 6 L oxime mask between 18 and 26 times per minute saturating 98+ percent.  Blood pressure ranging /55-62. CBC with elevated white blood cells at 18.4 and a left shift of 89.8% (on prednisone),  H/H12.2/39.3, platelets 92.  Electrolytes in range, glucose 326, BUN 58, creatinine 1.41, GFR 50.  AST/ALT 46/51 with normal alkaline phosphatase and total bili.  Albumin low at 2.9.  Lactic acid 3.7 and then 1.9.  Troponin elevated at 109, BN P 6637.  UA showed clear yellow urine with negative nitrite, LE, ketones, glucose.  Pro-Marshal negative at 0.15.  Respiratory viral panel negative.  Blood culture and urine culture drawn.  Chest x-ray redemonstrated bilateral airspace disease and a left pleural effusion stable from 9/2.  CT thorax shows interval progression of metastatic disease with extensive lymphadenopathy, enlarging right suprahilar mass consistent with history of lung cancer, moderate to large pleural effusion, small scattered sclerotic osseous lesions, and cholelithiasis.  He received 1 mg of Ativan, 4 mg of morphine, 4 mg of Zofran, 1 dose of Zosyn.    REVIEW OF SYSTEMS:   Ten systems reviewed and were negative except as noted in the HPI.                PAST MEDICAL HISTORY:  Past Medical History:   Diagnosis Date    Acute hypoxemic respiratory failure (HCC)     Acute respiratory failure with hypoxia (HCC) 02/01/2023    AICD (automatic cardioverter/defibrillator) present 12/07/2023    Arrhythmia     hx of a fib    Arthritis 2015    generalized, DDD back    Asthma     inhaler     Backpain 08/06/2018    9/10    Blood clotting disorder (HCC) 02/2023    Pulmonary, right lung    Breath shortness     with exertion    Cancer (HCC) 07/2017    left lung- adenocarcinoma    Cataract     IOL bilateral    Congestive heart failure (HCC)     cardiologist, [facility], Catherine KAUR    Dental disorder     lower partial, removable bridge    Diabetes 08/06/2018    on no meds at this time, diet controlled    Dysfunction of eustachian tube     Heart valve disease     mild mitral valve prolapse    High cholesterol     Hypertension     Macrocytic anemia     Pneumonia 02/2023    Renal disorder     CKD stage 3     Thrombophilia (HCC)     Type 2 diabetes mellitus with hyperglycemia (HCC) 03/17/2023    This is a chronic condition. Current medications: Insulin:  Biguanide: took metformin historically will restart metformin xr 750 mg daily. GLP1-RA:   SGLT-2i:    Consider for cardiorenal protection DPP4-I:  TZD:  Pk: Sulfonyluria:   Last A1c: 6/2/23 6.5% Last Microalb/Cr ratio: 6/2/23 <1.2 Fasting sugars: Last diabetic foot exam: 6/19/23 Last retinal eye exam: has upcoming appointment with optome    Volume overload        PAST SURGICAL HISTORY:  Past Surgical History:   Procedure Laterality Date    AL PLACE PERCUT GASTROSTOMY TUBE N/A 8/23/2024    Procedure: EGD, WITH PEG TUBE INSERTION;  Surgeon: Issa Meadows M.D.;  Location: SURGERY SAME DAY [facility];  Service: Gastroenterology    AL BRONCHOSCOPY,DIAGNOSTIC N/A 1/16/2024    Procedure: FIBER OPTIC BRONCHOSCOPY WITH  WASH, BRUSH, BRONCHOALVEOLAR LAVAGE, BIOPSY AND FINE NEEDLE ASPIRATION, ENDOBRONCHIAL ULTRASOUND AND NAVIGATION, ROBOTICS;  Surgeon: Vinayak Carbajal M.D.;  Location: [facility];  Service: Pulmonary Robotic    ENDOBRONCHIAL US ADD-ON N/A 1/16/2024    Procedure: ENDOBRONCHIAL ULTRASOUND (EBUS);  Surgeon: Vinayak Carbajal M.D.;  Location: [facility];  Service: Pulmonary Robotic    AL BRONCHOSCOPY,DIAGNOSTIC N/A 12/28/2023    Procedure: FIBER OPTIC BRONCHOSCOPY WITH BRONCHOALVEOLAR LAVAGE AND FINE NEEDLE ASPIRATION, ENDOBRONCHIAL ULTRASOUND AND NAVIGATION, ROBOTICS;  Surgeon: Miriam Martin M.D.;  Location: [facility];  Service: Pulmonary Robotic    ENDOBRONCHIAL US ADD-ON N/A 12/28/2023    Procedure: ENDOBRONCHIAL ULTRASOUND (EBUS);  Surgeon: Miriam Martin M.D.;  Location: [facility];  Service: Pulmonary Robotic    AL BRONCHOSCOPY,DIAGNOSTIC  07/01/2021    Procedure: BRONCHOSCOPY - FIBER OPTIC WITH BRANCHOALVEOLAR LAVAGE BIOPSY, FNA, NAVIGATION;  Surgeon: Vinayak Carbajal M.D.;  [facility];  Service: Pulmonary    ENDOBRONCHIAL US ADD-ON  07/01/2021    Procedure: ENDOBRONCHIAL ULTRASOUND (EBUS).;  Surgeon: Vinayak Carbajal M.D.;  Location: SURGERY [facility];  Service: Pulmonary    CATARACT PHACO WITH IOL Left 08/21/2018    Procedure: CATARACT PHACO WITH IOL;  Surgeon: Juanito Hager M.D.;  Location: SURGERY SAME DAY [facility];  Service: Ophthalmology    CATARACT PHACO WITH IOL Right 08/07/2018    Procedure: CATARACT PHACO WITH IOL;  Surgeon: Juanito Hager M.D.;  Location: SURGERY SAME DAY [facility];  Service: Ophthalmology    THORACOSCOPY Left 04/19/2018    Procedure: THORACOSCOPY- WEDGE BIOPSY W/FROZEN SECTION;  Surgeon: Cristhian Galeano M.D.;  Location: SURGERY [facility];  Service: Thoracic    EAR MIDDLE EXPLORATION Right 06/12/2015    Procedure: EAR MIDDLE EXPLORATION;  Surgeon: William Brandon M.D.;  Location: SURGERY SAME DAY [facility];  Service:     OSSICULAR RECONSTRUCTION Right 06/12/2015    Procedure: OSSICULAR RECONSTRUCTION CHAIN POSSIBLE;  Surgeon: William Brandon M.D.;  Location: SURGERY SAME DAY [facility];  Service:     KNEE ARTHROPLASTY TOTAL  2005    EAR RECONSTRUCTION Bilateral     ear replacement \"many years ago\"       FAMILY HISTORY:  Family History   Problem Relation Age of Onset    Stroke Mother     Hypertension Mother     Diabetes Mother     Cancer Father         stomach cancer    Heart Disease Brother     Alcohol abuse Brother     Ovarian Cancer Neg Hx     Tubal Cancer Neg Hx     Peritoneal Cancer Neg Hx     Colorectal Cancer Neg Hx     Breast Cancer Neg Hx     Hyperlipidemia Neg Hx        SOCIAL HISTORY:   Pt lives in [place] with family  Smoking former smoker, quit roughly 15 years ago and smoked roughly 1 pack/day x 15 years.  Also notable was patient worked as a  in [employer]  Etoh use denies  Drug use denies    DIET:   No orders of the defined types were placed in this encounter.      ALLERGIES:  No Known Allergies    OUTPATIENT " MEDICATIONS:    Current Facility-Administered Medications:     Respiratory Therapy Consult, , Nebulization, Continuous RT, Simon Cook M.D.    senna-docusate (Pericolace Or Senokot S) 8.6-50 MG per tablet 2 Tablet, 2 Tablet, Oral, Q EVENING **AND** polyethylene glycol/lytes (Miralax) Packet 1 Packet, 1 Packet, Oral, QDAY PRN, Simon Cook M.D.    amoxicillin-clavulanate (Augmentin) 875-125 MG per tablet 1 Tablet, 1 Tablet, Enteral Tube, BID, Simon Cook M.D.    apixaban (Eliquis) tablet 5 mg, 5 mg, Enteral Tube, BID, Simon Cook M.D.    [START ON 9/8/2024] bisoprolol (Zebeta) tablet 5 mg, 5 mg, Enteral Tube, DAILY, Simon Cook M.D.    [START ON 9/8/2024] vitamin D3 (Cholecalciferol) tablet 2,000 Units, 2,000 Units, Enteral Tube, DAILY, Simon Cook M.D.    [START ON 9/8/2024] ezetimibe (Zetia) tablet 10 mg, 10 mg, Oral, DAILY, Simon Cook M.D.    [START ON 9/8/2024] furosemide (Lasix) tablet 40 mg, 40 mg, Enteral Tube, DAILY, Simon Cook M.D.    hydrOXYzine HCl (Atarax) tablet 25 mg, 25 mg, Enteral Tube, TID PRN, Simon Cook M.D.    ipratropium (Atrovent) 0.02 % nebulizer solution 0.5 mg, 0.5 mg, Nebulization, 4X/DAY, Simon Cook M.D., 0.5 mg at 09/07/24 1430    lansoprazole (Prevacid) solutab 30 mg, 30 mg, Enteral Tube, DAILY, Simon Cook M.D.    levalbuterol (Xopenex) 1.25 MG/3ML nebulizer solution 1.25 mg, 1.25 mg, Nebulization, 4X/DAY, Simon Cook M.D., 1.25 mg at 09/07/24 1430    losartan (Cozaar) tablet 25 mg, 25 mg, Enteral Tube, Q EVENING, Simon Cook M.D.    melatonin tablet 6 mg, 6 mg, Enteral Tube, QHS, Simon Cook M.D.    [Held by provider] metFORMIN (Glucophage) tablet 750 mg, 750 mg, Oral, DAILY, Simon Cook M.D.    nystatin (Mycostatin) 709830 UNIT/ML suspension 500,000 Units, 5 mL, Swish & Spit, 4XDAY, Simon Cook M.D.    oxyCODONE immediate-release (Roxicodone) tablet 5 mg, 5 mg, Enteral Tube, Q3HRS PRN, Simon Cook,  M.D.    [START ON 9/8/2024] predniSONE (Deltasone) tablet 30 mg, 30 mg, Enteral Tube, DAILY **FOLLOWED BY** [START ON 9/10/2024] predniSONE (Deltasone) tablet 20 mg, 20 mg, Enteral Tube, DAILY **FOLLOWED BY** [START ON 9/13/2024] predniSONE (Deltasone) tablet 10 mg, 10 mg, Enteral Tube, DAILY **FOLLOWED BY** [START ON 9/16/2024] predniSONE (Deltasone) tablet 5 mg, 5 mg, Enteral Tube, DAILY, Simon Cook M.D.    tiotropium (Spiriva Respimat) 2.5 mcg/Act inhalation spray 5 mcg, 5 mcg, Inhalation, DAILY, Simon Cook M.D.    traZODone (Desyrel) tablet 50 mg, 50 mg, Oral, HS PRN, Simon Cook M.D.    morphine 4 MG/ML injection 2 mg, 2 mg, Intravenous, Once, Simon Cook M.D.    morphine 4 MG/ML injection 2 mg, 2 mg, Intravenous, Q2HRS PRN, Simon Cook M.D., 2 mg at 09/07/24 1516    insulin GLARGINE (Lantus,Semglee) injection, 6 Units, Subcutaneous, Q EVENING, Simon Cook M.D.    Current Outpatient Medications:     amoxicillin-clavulanate (AUGMENTIN) 875-125 MG Tab, 1 Tablet by Enteral Tube route 2 times a day for 2 days., Disp: 4 Tablet, Rfl: 0    bisoprolol (ZEBETA) 5 MG Tab, 1 Tablet by Enteral Tube route every day., Disp: 30 Tablet, Rfl: 0    furosemide (LASIX) 40 MG Tab, 1 Tablet by Enteral Tube route every day for 14 days., Disp: 14 Tablet, Rfl: 0    predniSONE (DELTASONE) 10 MG Tab, Take 3 Tablets by mouth every day for 3 days, THEN 2 Tablets every day for 3 days, THEN 1 Tablet every day for 3 days, THEN 0.5 Tablets every day for 3 days., Disp: 20 Tablet, Rfl: 0    ipratropium (ATROVENT) 0.02 % Solution, Inhale 1 vial (2.5 mL) by nebulization 4 times a day., Disp: 75 mL, Rfl: 0    losartan (COZAAR) 25 MG Tab, 1 Tablet by Enteral Tube route every evening., Disp: 30 Tablet, Rfl: 0    albuterol (PROVENTIL) 2.5mg/0.5ml Nebu Soln, Take 2.5 mg by nebulization Once., Disp: , Rfl:     albuterol 108 (90 Base) MCG/ACT Aero Soln inhalation aerosol, Inhale 2 Puffs every four hours as needed for  Shortness of Breath., Disp: , Rfl:     metFORMIN ER (GLUCOPHAGE XR) 750 MG TABLET SR 24 HR, Take 1 tablet by mouth every day., Disp: 100 Tablet, Rfl: 0    apixaban (ELIQUIS) 5mg Tab, Take 1 Tablet by mouth 2 times a day., Disp: 180 Tablet, Rfl: 3    levalbuterol (XOPENEX) 1.25 MG/3ML Nebu Soln, Inhale 1 vial (3 mL) by nebulization 4 times a day., Disp: 75 mL, Rfl: 0    NS SOLN 100 mL with ampicillin/sulbactam 1.5 (1-0.5) g SOLR 1.5 g, Infuse 1.5 g into a venous catheter every 6 hours. Start 8/30/24, End 9/6/24, Disp: , Rfl:     hydrOXYzine HCl (ATARAX) 25 MG Tab, Take 25 mg by mouth 3 times a day as needed for Anxiety., Disp: , Rfl:     lansoprazole (PREVACID) 30 MG Tablet Delayed Release Dispersible, 30 mg by Enteral Tube route every day., Disp: , Rfl:     MELATONIN PO, 6 mg by Enteral Tube route at bedtime., Disp: , Rfl:     nystatin (MYCOSTATIN) 644965 UNIT/ML Suspension, Take 500,000 Units by mouth 4 times a day. Swish and spit, Disp: , Rfl:     oxyCODONE immediate-release (ROXICODONE) 5 MG Tab, Take 5 mg by mouth every 3 hours as needed for Severe Pain., Disp: , Rfl:     senna-docusate (PERICOLACE OR SENOKOT S) 8.6-50 MG Tab, Take 2 Tablets by mouth 2 times a day., Disp: , Rfl:     tiotropium (SPIRIVA RESPIMAT) 2.5 mcg/Act Aero Soln, Inhale 5 mcg every day., Disp: , Rfl:     traZODone (DESYREL) 50 MG Tab, Take 50 mg by mouth at bedtime as needed for Sleep., Disp: , Rfl:     atorvastatin (LIPITOR) 20 MG Tab, 1 Tablet by Enteral Tube route every evening. (Patient not taking: Reported on 9/2/2024), Disp: , Rfl:     benzonatate (TESSALON) 100 MG Cap, Take 1 tablet by mouth three times a day as needed for cough (Patient not taking: Reported on 9/2/2024), Disp: 90 Capsule, Rfl: 1    diclofenac sodium (VOLTAREN) 1 % Gel, Apply 2 g topically 4 times a day as needed (left foot pain)., Disp: 100 g, Rfl: 3    ezetimibe (ZETIA) 10 MG Tab, Take 1 Tablet by mouth every day., Disp: 100 Tablet, Rfl: 3    cyclobenzaprine  (FLEXERIL) 10 mg Tab, Take 1 Tablet by mouth at bedtime. (Patient taking differently: Take 10 mg by mouth at bedtime as needed.), Disp: 100 Tablet, Rfl: 3    Cholecalciferol (VITAMIN D3) 2000 UNIT Cap, Take 1 Capsule by mouth every day., Disp: 100 Capsule, Rfl: 3    PHYSICAL EXAM:  Vitals:    24 1303 24 1403 24 1430 24 1503   BP: 97/55 103/59  112/60   Pulse: 96 93 97 97   Resp: 18 18 (!) 25 19   Temp:       TempSrc:       SpO2: 99% 100% 99% 99%   Weight:       Height:       , Temp (24hrs), Av.8 °C (98.2 °F), Min:36.8 °C (98.2 °F), Max:36.8 °C (98.2 °F)  , Pulse Oximetry: 99 %, O2 (LPM): 7, O2 Delivery Device: Oxymask    General: Patient laying with oxy mask & mouth open, nonparticipant in exam and history gathering.  Cachectic and chronically ill-appearing  Skin: Skin is warm aside from right foot being lightly cool to touch.  Scattered bruising at bilateral upper extremities  HEENT: NC/AT.  Dry mucous membranes. No nasal discharge. Oropharynx is dry with dried blood on the throat and palate  Lungs: Increased work of breathing, adventitious breath sounds can be heard without stethoscope.  On auscultation upper airway noises transmitted through bilateral lung fields.  Air movement is poor.  There are decreased breath sounds on bilateral bases L>R.  Wheezing is also heard.  Cardiovascular: RRR, no murmurs appreciated though this is a difficult exam given adventitious lung sounds.  No JVD, no hepatojugular reflex  Abdomen:  BS+, Soft, NT/ND. No masses noted.  G-tube exiting left abdomen with a small amount of discharge on dressing  Extremities: No lower extremity edema.  Right foot mildly cool to touch with 1+ DP pulse  CNS: Arousable to verbal stimuli but otherwise nonparticipant in care      LAB TESTS:   Recent Labs     24  1727 24  0820 24  1029   WBC  --  17.3* 18.4*   RBC  --  3.96* 3.91*   HEMOGLOBIN 11.5* 12.1* 12.2*   HEMATOCRIT 35.1* 38.1* 39.3*   MCV  --  96.2  100.5*   MCH  --  30.6 31.2   RDW  --  51.3* 56.7*   PLATELETCT  --  114* 92*   MPV  --  11.0 11.3   NEUTSPOLYS  --   --  89.80*   LYMPHOCYTES  --   --  6.40*   MONOCYTES  --   --  2.40   EOSINOPHILS  --   --  0.40   BASOPHILS  --   --  0.20         Recent Labs     09/05/24  0820 09/07/24  1029   SODIUM 144 141   POTASSIUM 3.8 4.5   CHLORIDE 104 104   CO2 27 22   BUN 44* 58*   CREATININE 1.13 1.41*   CALCIUM 8.9 8.3*   PHOSPHORUS 3.9  --    ALBUMIN 3.0* 2.9*       CULTURES:   Results       Procedure Component Value Units Date/Time    Blood Culture - Draw one from central line and one from peripheral site [[id number]] Collected: 09/07/24 1100    Order Status: Completed Specimen: Blood from Peripheral Updated: 09/07/24 1408     Significant Indicator NEG     Source BLD     Site PERIPHERAL     Culture Result No Growth  Note: Blood cultures are incubated for 5 days and  are monitored continuously.Positive blood cultures  are called to the RN and reported as soon as  they are identified.      Urine Culture (New) [[id number]] Collected: 09/07/24 1045    Order Status: Sent Specimen: Urine Updated: 09/07/24 1205    Urinalysis [[id number]] Collected: 09/07/24 1045    Order Status: Completed Specimen: Urine Updated: 09/07/24 1106     Color Yellow     Character Clear     Specific Gravity 1.021     Ph 7.0     Glucose Negative mg/dL      Ketones Negative mg/dL      Protein Negative mg/dL      Bilirubin Negative     Urobilinogen, Urine 1.0     Nitrite Negative     Leukocyte Esterase Negative     Occult Blood Negative     Micro Urine Req see below     Comment: Microscopic examination not performed when specimen is clear  and chemically negative for protein, blood, leukocyte esterase  and nitrite.         Blood Culture - Draw one from central line and one from peripheral site [[id number]]     Order Status: Sent Specimen: Blood from Line             IMAGES:  CT-CHEST (THORAX) WITH   Final Result      1.  Interval progression of  metastatic disease with extensive lymphadenopathy as described.   2.  Enlarging right upper lobe suprahilar masslike opacity presumably in keeping with a history of lung cancer.   3.  Moderate to large pleural effusions.   4.  A few small sclerotic osseous lesions could BE metastatic.   5.   Cholelithiasis.      Fleischner Society pulmonary nodule recommendations:   Not Applicable         DX-CHEST-PORTABLE (1 VIEW)   Final Result      Stable bilateral airspace disease and left pleural effusion. Overall, no change.          CONSULTS:   Hospice    ASSESSMENT/PLAN: 79 y.o. male admitted for respiratory distress.  On further interview with mother and daughter they expressed that the patient desired not to come to the hospital but they felt overwhelmed at home and were concerned for his condition.  There were discussions about hospice which they were agreeable to and with more information regarding hospice they feel this is what he was they would want as well.  We have consulted hospice and will work to create a plan the aids his breathing and provides comfort which daughter and wife are agreeable to.  Hospice has been consulted from the ED and from this provider.    Respiratory failure (HCC)  Patient presents with respiratory failure following discharge day prior; treated for pneumonia, left pleural effusion,?  Heart failure, and adenocarcinoma of the lung that is metastatic.  Wife and daughter provided history and reported Leon was not interested in, the hospital but they did not feel prepared to help him at home in his current state so brought him to the hospital; they are interested in hospice and report who is a would be interested in an comfort minded a care goal.  With that said, discussions with wife and daughter revolve around patient's comfort and we have decided to continue his steroid taper and complete his course of Augmentin which she has 3.5 days left.  We will not be attempting thoracentesis for pleural  effusion and no further IV antibiotics will be administered.  Hospice has been consulted from the ED and from this provider.  On exam the patient has coarse breath sounds throughout with poor air movement and wheezing.  -Admit to medicine  -Hospice consult  -Complete home steroid taper which has been Rx'd  -Complete Augmentin course  -RT consulted  -Continue home levo albuterol  -Morphine 2 mg every 2 hours as needed for air hunger    Primary cancer of left lower lobe of lung (HCC)  Patient with known adenocarcinoma of the left lower lung.  This is metastatic.  Patient's family reports a history of radiation to the site, chemotherapy, despite these interventions they report continued growth of this cancerous lesion and metastasis.  We are moving forward with hospice  -Hospice consult    Type 2 diabetes mellitus with stage 3a chronic kidney disease, without long-term current use of insulin (HCC)  Patient with known history of type 2 diabetes with CKD.  Was on monotherapy metformin 750 mg ER daily, recent A1c 2 weeks ago at 6.4%.  Patient on steroids and presenting blood sugar was 326.  Patient with lactate of 3.7 trended down to 1.9.  Last dose of metformin was this morning.  While inpatient we will discontinue metformin and provide long-acting insulin Lantus starting with 6 units daily and uptitrate as needed.  Notably, patient is receiving prednisone taper at this time.  -Lantus 6 units nightly  -Continue to monitor blood glucose  -I will have a further discussion with family regarding nightly and mealtime blood sugar checks as a care will be directed towards comfort    Hyperlipidemia associated with type 2 diabetes mellitus (HCC)  Patient with known history of hyperlipidemia.  On ezetimibe outpatient.  We will discontinue this while inpatient and focusing on comfort care.    HFrEF (heart failure with reduced ejection fraction) (HCC)  Patient with known history of heart failure with reduced ejection fraction.  As  early as 2023 his ejection fraction was 35% but interestingly it is increased to 55% most recently on 9/4/2024.  Patient will be getting G-tube feeds, no need for IV fluids at this time.  We will continue to monitor intake and output and his respiratory status.  Patient is on Lasix outpatient which will be continued as this is a comfort measure in addition to his supplemental potassium  -Continue Lasix 40 mg daily oral  -Continue K-Dur 10 mg daily    Pleural effusion, left  Patient with known pleural effusion on the left side.  This is unchanged on chest x-ray on day of admission versus 9/2; he also has known adenocarcinoma the left lung base which is likely contributing factor.  Patient is moving to a comfort care status and no need for thoracentesis.    Right sided cerebral hemisphere cerebrovascular accident (CVA) ([facility])  Patient was CVA in August 2024.  He has residual left-sided deficit and dysphagia; G-tube dependent.  We will continue G-tube feeds and aspiration precautions while he is admitted to the hospital and work towards hospice.    A-fib ([facility])  Patient with paroxysmal atrial fibrillation on Eliquis at home.  During my exam he was not in A-fib.  We will continue home meds:  -bisoprolol 5 mh   -losartan 25 mg   -Eliquis 5 mg twice daily      Core Measures:  Fluids: Per G-tube  Lines: G-tube and PIV  Abx: Augmentin  Diet: G-tube feeds  PPX: Continue home Eliquis  DISPO: Admitted for respiratory failure and hospice consult    CODE STATUS: DNR/DNI    Simon Cook MD  PGY2  [facility]

## 2024-09-07 NOTE — PROGRESS NOTES
Discharge Instructions    Discharged to home by car with relative. Discharged via wheelchair, hospital escort: Yes.  Special equipment needed: Oxygen and Walker and Nebulizer    Be sure to schedule a follow-up appointment with your primary care doctor or any specialists as instructed.     Discharge Plan:   Diet Plan: Discussed  Activity Level: Discussed  Confirmed Follow up Appointment: Appointment Scheduled  Confirmed Symptoms Management: Discussed  Medication Reconciliation Updated: Yes    I understand that a diet low in cholesterol, fat, and sodium is recommended for good health. Unless I have been given specific instructions below for another diet, I accept this instruction as my diet prescription.   Other diet: Regular    Special Instructions: None    -Is this patient being discharged with medication to prevent blood clots?  Eliiquis    Is patient discharged on Warfarin / Coumadin?   No

## 2024-09-07 NOTE — ASSESSMENT & PLAN NOTE
Medicines discontinued on Comfort Care.  -Oxygen prn for comfort  -Morphine and ativan for air hunger/anxiety/pain

## 2024-09-07 NOTE — PROGRESS NOTES
Monitor Summary: -109, MT 0.12, QRS 0.10, QT 0.36, with rare PVC's, rare bigeminy, and rare trigeminy per strip from monitor room.

## 2024-09-07 NOTE — DISCHARGE SUMMARY
Discharge Summary    CHIEF COMPLAINT ON ADMISSION  Chief Complaint   Patient presents with    Shortness of Breath     Pt BIB [facility] from [facility] where he is recovering from a stroke. Pt began complaining of SOB since this morning. O2 sat has been dropping. Pt has hx of COPD and baseline on 2L oxymask. He was given one albuterol tx at rehab with no relief. One albuterol tx given en route. Pt has inspiratory and expiratory wheezes and is currently satting 95% on 4 L. Denies chest pain and dizziness.       Reason for Admission  Acute hypoxic respiratory failure *    Admission Date  9/2/2024     CODE STATUS  DNAR/DNI    HPI & HOSPITAL COURSE  This is a 79 y.o. male here with Shortness of Breath (Pt BIB [facility] from [facility] where he is recovering from a stroke. Pt began complaining of SOB since this morning. O2 sat has been dropping. Pt has hx of COPD and baseline on 2L oxymask. He was given one albuterol tx at rehab with no relief. One albuterol tx given en route. Pt has inspiratory and expiratory wheezes and is currently satting 95% on 4 L. Denies chest pain and dizziness.)  Please review Dr. Jeffrey Gamez M.D. notes for further details of history of present illness, past medical/social/family histories, allergies and medications. Please review Dr. Pete, Neurology,  Dr. Cervantes Gastro, Dr. Connell Cardiologyconsultation notes.  80yo frail M w/ h/o lung cancer followed by Luz Maria Jules on apixaban who presented 8/13/2024 with L facial droop and dysphagia. HE choked on his pills then developed L sided facial droop and hand weakness, persisting to the morning. At the ED afebrile, normotensive.  CTA HnN demonstrated R MCA occlusion. Neurology felt he was outside tPA window. CT perf no penumbra, CXR demonstrated PPM/ICD and bilateral infiltrates. CBC is normal. CMP demonstrates hyponatremia Na 133, stable Cr 1.3. A1c is 6. Troponin 55 comparable to prior values. INR 1.3. EKG sinus tachycardia.   Neurology  consulted, recommends continued Eliquis and Zetia. GI consulted for dysphagia, s/p PEG tube placement by Dr. Ernandez on 8/23. Tube feeds started.      Managing stroke w/ Afib/anticoag in the setting of lung ca/COPD/hypoxia. There was also reports of CHF but no echo. On O2, continue tube feeds, antibiotics for pneumonia. He was wheezing on my exam, hence started steroids, levalbuterol (given tachycardia but SBP stable).  likely from steroids, A1c 6.7, but I increased SS insulin.  Started antibiotics, as the CXR ordered by me showed bilateral infiltrates and has mild lactic acidosis. Restarted BB as respiratory status is stable. Avoid digoxin given known CKD? Monitor K+, renal function and blood pressures. K 4.4-3.8 Cr- 1.16-1.09-1.13 Increased PO Lasix given possible increased edema on CXR and I ordered an echo which came back severe MR, diastolic 2 dysfunction EF 40%. Likely HFrEF exac. I spoke with Dr. Matos and consulted Dr. Connell, Cardiology. Agreed with Lasix and increased it. Switched BB to bisoprolol. Spironolactone added back. Will ask Cardiology if Entresto vs ACEI/ARB and Farxiga/Jardiance indicated. Dr. Connell,m Cardiolgy recommended bisoprol and losartan, no spironolactone and Farxiga/Jardiance or Entresto for now. Patient also comleting Augmentin for pneumonia andsteroids, inhalers, nebulizers for COPD exac. SW/CM working hard on option care and I ordered tube feeds. Approval may be Monday or earlier. Discharge when this is all approved.   At discharge date, Pito Black afebrile and hemodynamically stable.  Pito Black wanted to be discharged today.    Imaging  EC-ECHOCARDIOGRAM COMPLETE W/O CONT   Final Result      DX-CHEST-PORTABLE (1 VIEW)   Final Result         1.  Pulmonary edema and/or infiltrates, slightly increased since prior study   2.  Trace bilateral pleural effusions   3.  Cardiomegaly   4.  Atherosclerosis                Therefore, he is discharged in fair and stable  condition to home with organized home healthcare and close outpatient follow-up.    The patient met 2-midnight criteria for an inpatient stay at the time of discharge.      FOLLOW UP ITEMS POST DISCHARGE      DISCHARGE DIAGNOSES  Principal Problem:    Acute hypoxic respiratory failure (HCC) (POA: Yes)  Active Problems:    Hypertension associated with type 2 diabetes mellitus (HCC) (POA: Yes)    A-fib (HCC) (POA: Yes)    Hyperlipidemia associated with type 2 diabetes mellitus (HCC) (POA: Yes)    Primary cancer of left lower lobe of lung (HCC) (POA: Yes)    Pleural effusion, left (POA: Yes)    Vitamin D deficiency (POA: Yes)    Chronic systolic heart failure (HCC) (POA: Yes)    Type 2 diabetes mellitus with stage 3a chronic kidney disease, without long-term current use of insulin (HCC) (POA: Yes)      Overview: This is a chronic condition.      Current medications:      Insulin:       Biguanide: metformin er 750 mg daily      GLP1-RA:        SGLT-2i:    farxiga 10 mg       DPP4-I:       TZD:       Pk:      Sulfonyluria:             Last A1c: 6.0% 12/23/23;   6.5% 6/2/23      Last Microalb/Cr ratio: 6/2/23      Fasting sugars:      Last diabetic foot exam: 6/19/23      Last retinal eye exam: 10/12/23      ACEi/ARB?       Statin? zetia 10 mg daily      Aspirin? eliquis 5 mg BID      Concomitant HTN? Spironolactone 25 mg daily; metoprolol sr 50 mg daily      Nightly foot checks? encouraged          Right sided cerebral hemisphere cerebrovascular accident (CVA) (HCC) (POA: Yes)    Leukocytosis (POA: Yes)    Thrombocytopenia (HCC) (POA: Yes)    HFrEF (heart failure with reduced ejection fraction) (HCC) (POA: Unknown)    Mass of lower lobe of left lung (POA: Yes)      FOLLOW UP  Future Appointments   Date Time Provider Department Center   9/9/2024 12:45 PM JILL Clark None   9/9/2024  2:00 PM RESEARCH COORDINATOR - JORGE MCMILLAN None   9/13/2024 11:00 AM [facility]   9/14/2024  2:15  PM [facility] [place]   9/18/2024  9:00 AM JILL Welsh [facility] [place]   9/18/2024 11:00 AM Jj Hicks M.D. [facility] None   9/26/2024 11:40 AM JILL Welsh [facility] [place]   10/4/2024 11:00 AM [facility] [place]   10/5/2024  [facility]   10/8/2024 10:20 AM JILL Welsh [facility] [place]   10/18/2024  1:15 PM PACER CHECK-CAM B [facility] None     [facility]  [id number] [id number]  [place] [address]  [phone]        Follow up with JILL Welsh in 1 week Follow up Dr. Carbajal Pulmnologist in 1 week for O2 use, chronic edema COPD Dr. Schulz follow up in 1 week for lung ca Follow up with Neurology in 1 week for stroke Follow up with Dr. Doss [facility] Cardiol[facility] in 1 week. NURSING provide resources/pamphlets for please record weights daily and log in to your dry weight (best weight where you aren't short of breath or swollen) for primary provider to titrate diuretics, respiratory status, volume status, log/monitor blood pressures at least twice a day and HR (keep /70 or below and HR 60-80), take your cardioprotective and blood pressure medications; healthy low salt cardiac diet with no more than 2000L fluid per day, see your primary provider as you will be on diuretics as they will need to check renal function and potassium levels. Per Cardiology BB, ARB, for now, defer spironolactone, Farxiga to dr. Doss. Check and record blood pressures at home at least twice a day for primary provider to review     Collection Information      MEDICATIONS ON DISCHARGE     Medication List        START taking these medications        Instructions   amoxicillin-clavulanate 875-125 MG Tabs  Commonly known as: Augmentin   1 Tablet by Enteral Tube route 2 times a day for 2 days.  Dose: 1 Tablet     bisoprolol 5 MG Tabs  Start taking on: September 7, 2024  Commonly known as: Zebeta   1  Tablet by Enteral Tube route every day.  Dose: 5 mg     ipratropium 0.02 % Soln  Commonly known as: Atrovent   Inhale 1 vial (2.5 mL) by nebulization 4 times a day.  Dose: 0.5 mg     levalbuterol 1.25 MG/3ML Nebu  Commonly known as: Xopenex   Inhale 1 vial (3 mL) by nebulization 4 times a day.  Dose: 1.25 mg     losartan 25 MG Tabs  Commonly known as: Cozaar   1 Tablet by Enteral Tube route every evening.  Dose: 25 mg     predniSONE 10 MG Tabs  Start taking on: September 6, 2024  Commonly known as: Deltasone   Take 3 Tablets by mouth every day for 3 days, THEN 2 Tablets every day for 3 days, THEN 1 Tablet every day for 3 days, THEN 0.5 Tablets every day for 3 days.            CHANGE how you take these medications        Instructions   Eliquis 5 MG Tabs  What changed: how to take this  Generic drug: apixaban   Take 1 Tablet by mouth 2 times a day.  Dose: 5 mg     ezetimibe 10 MG Tabs  What changed: how to take this  Commonly known as: Zetia   Doctor's comments: Place in pill packs  Take 1 Tablet by mouth every day.  Dose: 10 mg     furosemide 40 MG Tabs  What changed:   medication strength  how much to take  how to take this  Commonly known as: Lasix   1 Tablet by Enteral Tube route every day for 14 days.  Dose: 40 mg     Vitamin D3 2000 UNIT Caps  What changed: how to take this   Doctor's comments: Place in pill packs  Take 1 Capsule by mouth every day.  Dose: 2,000 Units            CONTINUE taking these medications        Instructions   * albuterol 2.5mg/0.5ml Nebu  Commonly known as: Proventil   Take 2.5 mg by nebulization Once.  Dose: 2.5 mg     * albuterol 108 (90 Base) MCG/ACT Aers inhalation aerosol   Inhale 2 Puffs every four hours as needed for Shortness of Breath.  Dose: 2 Puff     cyclobenzaprine 10 mg Tabs  Commonly known as: Flexeril   Doctor's comments: Place in pill packs  Take 1 Tablet by mouth at bedtime.  Dose: 10 mg     diclofenac sodium 1 % Gel  Commonly known as: Voltaren   Apply 2 g topically 4  times a day as needed (left foot pain).  Dose: 2 g     Dulera 200-5 MCG/ACT Aero  Generic drug: mometasone-formoterol   Inhale 2 Puffs 2 times a day.  Dose: 2 Puff     hydrOXYzine HCl 25 MG Tabs  Commonly known as: Atarax   Take 25 mg by mouth 3 times a day as needed for Anxiety.  Dose: 25 mg     lansoprazole 30 MG Tbdd  Commonly known as: Prevacid   30 mg by Enteral Tube route every day.  Dose: 30 mg     MELATONIN PO   6 mg by Enteral Tube route at bedtime.  Dose: 6 mg     NS SOLN 100 mL with ampicillin/sulbactam 1.5 (1-0.5) g SOLR 1.5 g   Infuse 1.5 g into a venous catheter every 6 hours. Start 8/30/24, End 9/6/24  Dose: 1.5 g     nystatin 153667 UNIT/ML Susp  Commonly known as: Mycostatin   Take 500,000 Units by mouth 4 times a day. Swish and spit  Dose: 500,000 Units     oxyCODONE immediate-release 5 MG Tabs  Commonly known as: Roxicodone   Take 5 mg by mouth every 3 hours as needed for Severe Pain.  Dose: 5 mg     senna-docusate 8.6-50 MG Tabs  Commonly known as: Pericolace Or Senokot S   Take 2 Tablets by mouth 2 times a day.  Dose: 2 Tablet     Spiriva Respimat 2.5 MCG/ACT Aers  Generic drug: tiotropium   Inhale 5 mcg every day.  Dose: 5 mcg     traZODone 50 MG Tabs  Commonly known as: Desyrel   Take 50 mg by mouth at bedtime as needed for Sleep.  Dose: 50 mg           * This list has 2 medication(s) that are the same as other medications prescribed for you. Read the directions carefully, and ask your doctor or other care provider to review them with you.                STOP taking these medications      Farxiga 10 MG Tabs  Generic drug: dapagliflozin propanediol     METOPROLOL TARTRATE PO     spironolactone 25 MG Tabs  Commonly known as: Aldactone            ASK your doctor about these medications        Instructions   atorvastatin 20 MG Tabs  Commonly known as: Lipitor   1 Tablet by Enteral Tube route every evening.  Dose: 20 mg     benzonatate 100 MG Caps  Commonly known as: Tessalon   Take 1 tablet by  mouth three times a day as needed for cough     metFORMIN  MG Tb24  Commonly known as: Glucophage XR   Take 1 tablet by mouth every day.              Allergies  No Known Allergies    DIET  Orders Placed This Encounter   Procedures    Diet: Diet Tube Feed; Formula: Bolus Only; Select Bolus (If Indicated): Glucerna 1.2 Carton; Bolus Frequency: TID (Suggest 2 cartons with meal times (breakfast, lunch, dinner)); Number of Cartons Per Day: Six     Standing Status:   Standing     Number of Occurrences:   1     Order Specific Question:   Diet     Answer:   Diet Tube Feed [35]     Order Specific Question:   Formula:     Answer:   Bolus Only     Order Specific Question:   Route     Answer:   Gtube/PEG     Order Specific Question:   Select Bolus (If Indicated):     Answer:   Glucerna 1.2 Carton     Order Specific Question:   Bolus Frequency     Answer:   TID     Comments:   Suggest 2 cartons with meal times (breakfast, lunch, dinner)     Order Specific Question:   Number of Cartons Per Day:     Answer:   Six    Diet: Diet Tube Feed; Formula: Glucerna (1.2 Glucerna or equivalent); Goal Rate (mL/Hour): 25; Glucerna: Glucerna 1.2 Carton (bolus type feeding breakfast lunch and dinner (6 cartons per day) for 90 days); Select Bolus (If Indicated): Glucerna 1.2...     Start at 25mL/hr. Do not advance until dietitian consult completed.     Standing Status:   Standing     Number of Occurrences:   1     Order Specific Question:   Diet     Answer:   Diet Tube Feed [35]     Order Specific Question:   Formula:     Answer:   Glucerna     Comments:   1.2 Glucerna or equivalent     Order Specific Question:   Goal Rate (mL/Hour)     Answer:   25     Order Specific Question:   Glucerna:     Answer:   Glucerna 1.2 Carton     Comments:   bolus type feeding breakfast lunch and dinner (6 cartons per day) for 90 days     Order Specific Question:   Select Bolus (If Indicated):     Answer:   Glucerna 1.2 Carton     Order Specific Question:    Bolus Frequency     Answer:   TID     Order Specific Question:   Number of Cartons Per Day:     Answer:   Six       ACTIVITY  Per [facility]    LINES, DRAINS, AND WOUNDS  This is an automated list. Peripheral IVs will be removed prior to discharge.  Peripheral IV 09/05/24 20 G Anterior;Distal;Right Forearm (Active)   Site Assessment Clean;Dry;Intact 09/06/24 0800   Dressing Type Transparent 09/06/24 0800   Line Status Scrubbed the hub prior to access;Flushed;Saline locked 09/06/24 0800   Dressing Status Clean;Dry;Intact 09/06/24 0800   Dressing Intervention N/A 09/06/24 0800   Dressing Change Due 09/07/24 09/06/24 0800   Infiltration Grading ([facility]) 0 09/06/24 0800   Phlebitis Scale ([facility] Only) 0 09/06/24 0800          Peripheral IV 09/05/24 20 G Anterior;Distal;Right Forearm (Active)   Site Assessment Clean;Dry;Intact 09/06/24 0800   Dressing Type Transparent 09/06/24 0800   Line Status Scrubbed the hub prior to access;Flushed;Saline locked 09/06/24 0800   Dressing Status Clean;Dry;Intact 09/06/24 0800   Dressing Intervention N/A 09/06/24 0800   Dressing Change Due 09/07/24 09/06/24 0800   Infiltration Grading ([facility]) 0 09/06/24 0800   Phlebitis Scale ([facility] Only) 0 09/06/24 0800               MENTAL STATUS ON TRANSFER             CONSULTATIONS  See above    PROCEDURES  See Dr. Ernandez GI proc note PEG placement    LABORATORY  Lab Results   Component Value Date    SODIUM 144 09/05/2024    POTASSIUM 3.8 09/05/2024    CHLORIDE 104 09/05/2024    CO2 27 09/05/2024    GLUCOSE 260 (H) 09/05/2024    BUN 44 (H) 09/05/2024    CREATININE 1.13 09/05/2024        Lab Results   Component Value Date    WBC 17.3 (H) 09/05/2024    HEMOGLOBIN 12.1 (L) 09/05/2024    HEMATOCRIT 38.1 (L) 09/05/2024    PLATELETCT 114 (L) 09/05/2024        Total time of the discharge process exceeds 60 minutes.

## 2024-09-08 NOTE — CARE PLAN
The patient is Watcher - Medium risk of patient condition declining or worsening    Shift Goals  Clinical Goals: monitor o2 saturation  Patient Goals: rest  Family Goals: updates    Progress made toward(s) clinical / shift goals:  Pts oxygen monitored throughout shift. Pt up to bedside commode. Bed locked in lowest position with bed alarm on. All needs met at this time.     Problem: Pain - Standard  Goal: Alleviation of pain or a reduction in pain to the patient’s comfort goal  Outcome: Progressing     Problem: Knowledge Deficit - Standard  Goal: Patient and family/care givers will demonstrate understanding of plan of care, disease process/condition, diagnostic tests and medications  Outcome: Progressing     Problem: Fall Risk  Goal: Patient will remain free from falls  Outcome: Progressing       Patient is not progressing towards the following goals:

## 2024-09-08 NOTE — DIETARY
"Nutrition Services: Initial Assessment     Day 1 of admit. Pito Black is a 79 y.o. male with admitting DX of Respiratory failure ([facility]) [J96.90]     Consult received for enteral nutrition.     Nutrition Assessment:      Height: 170.2 cm (5' 7\")  Weight: 60 kg (132 lb 4.4 oz)  Weight taken via other healthcare provider  Body mass index is 20.72 kg/m². BMI classification: Normal, below ideal given advanced age >75 y.o.  Wt Readings from Last 6 Encounters:   09/07/24 60 kg (132 lb 4.4 oz)   09/04/24 59.9 kg (132 lb 0.9 oz)   08/24/24 60 kg (132 lb 4.4 oz)   08/24/24 57 kg (125 lb 10.6 oz)   08/02/24 64.5 kg (142 lb 3.2 oz)   07/31/24 62.1 kg (137 lb)      Calculation/Equation:   REE per MSJ = 1275 kcal x 1.2 = 1530    Total calories/day: 1500 - 1800 (25-30 kcal/kg)  Total Grams Protein/Day: 72 - 90 (1.2-1.5 g/kg)     Objective:  Pt presented to ED with respiratory distress after dc from [facility] on 9/6.  Pertinent medical hx:   Past Medical History:   Diagnosis Date    Acute hypoxemic respiratory failure (HCC)     Acute respiratory failure with hypoxia (HCC) 02/01/2023    AICD (automatic cardioverter/defibrillator) present 12/07/2023    Arrhythmia     hx of a fib    Arthritis 2015    generalized, DDD back    Asthma     inhaler     Backpain 08/06/2018    9/10    Blood clotting disorder (HCC) 02/2023    Pulmonary, right lung    Breath shortness     with exertion    Cancer ([facility]) 07/2017    left lung- adenocarcinoma    Cataract     IOL bilateral    Congestive heart failure (HCC)     cardiologist, [facility], Catherine KAUR    Dental disorder     lower partial, removable bridge    Diabetes 08/06/2018    on no meds at this time, diet controlled    Dysfunction of eustachian tube     Heart valve disease     mild mitral valve prolapse    High cholesterol     Hypertension     Macrocytic anemia     Pneumonia 02/2023    Renal disorder     CKD stage 3    Thrombophilia (HCC)     Type 2 diabetes mellitus with " hyperglycemia (HCC) 03/17/2023    This is a chronic condition. Current medications: Insulin:  Biguanide: took metformin historically will restart metformin xr 750 mg daily. GLP1-RA:   SGLT-2i:    Consider for cardiorenal protection DPP4-I:  TZD:  Pk: Sulfonyluria:   Last A1c: 6/2/23 6.5% Last Microalb/Cr ratio: 6/2/23 <1.2 Fasting sugars: Last diabetic foot exam: 6/19/23 Last retinal eye exam: has upcoming appointment with optome    Volume overload    Enteral access: G-tube placed 8/23/24  Pertinent labs:   Lab Results   Component Value Date/Time    SODIUM 141 09/07/2024 10:29 AM    POTASSIUM 4.5 09/07/2024 10:29 AM    CO2 22 09/07/2024 10:29 AM    CHLORIDE 104 09/07/2024 10:29 AM    BUN 58 (H) 09/07/2024 10:29 AM    CREATININE 1.41 (H) 09/07/2024 10:29 AM    CALCIUM 8.3 (L) 09/07/2024 10:29 AM    PHOSPHORUS 3.9 09/05/2024 08:20 AM    MAGNESIUM 2.3 08/31/2024 05:30 AM    GLUCOSE 326 (H) 09/07/2024 10:29 AM   Pertinent meds:   Scheduled Medications   Medication Dose Frequency    senna-docusate  2 Tablet Q EVENING    amoxicillin-clavulanate  1 Tablet BID    apixaban  5 mg BID    bisoprolol  5 mg DAILY    vitamin D3  2,000 Units DAILY    ezetimibe  10 mg DAILY    furosemide  40 mg DAILY    ipratropium  0.5 mg 4X/DAY    lansoprazole  30 mg DAILY    levalbuterol  1.25 mg 4X/DAY    losartan  25 mg Q EVENING    melatonin  6 mg QHS    [Held by provider] metFORMIN  750 mg DAILY    nystatin  5 mL 4XDAY    predniSONE  30 mg DAILY    Followed by    [START ON 9/10/2024] predniSONE  20 mg DAILY    Followed by    [START ON 9/13/2024] predniSONE  10 mg DAILY    Followed by    [START ON 9/16/2024] predniSONE  5 mg DAILY    tiotropium  5 mcg DAILY    morphine injection  2 mg Once    insulin GLARGINE  6 Units Q EVENING   Skin/wounds: no wounds or edema noted   Food Allergies: NKFA  Last BM: 09/08/24 per flowsheets  Most appropriate formula based on RD evaluation: Glucerna 1.2 Marshal given T2DM, A1c 6.4 (8/25/24), hx of poor BG control  on non-carb controlled formula     Current diet order:   NPO     Nutrition Diagnosis:      Per recent RD note on 8/15: Moderate malnutrition in context of chronic illness related to lung cancer as evidenced by mild muscle and mild fat wasting.       Nutrition Interventions:      Continue tube feeding regimen as established during last [facility] admit: Glucerna 1.2 Marshal bolus regimen with goal of 6 cartons per day (suggest 2 at meal times; B, L, D) to provide 1704 kcals, 85 g protein, and 1146 ml free water daily.   Fluids per MD  Diet upgrades per SLP/MD  Patient aware of active plan of care as appropriate.     Nutrition Monitoring and Evaluation:     Monitor nutrition POC  Additional fluids per MD/DO  Obtain current weight  Monitor vital signs pertinent to nutrition       RD following and will provide updated recommendations as indicated.

## 2024-09-08 NOTE — CARE PLAN
The patient is Unstable - High likelihood or risk of patient condition declining or worsening    Shift Goals  Clinical Goals: Patients O2 requirements will be less than 4 liters by the end of shift  Patient Goals: rest    Progress made toward(s) clinical / shift goals:   Patient is A+Ox2. Disoriented to time and situation. Patients O2 saturation is 95 on 6L via oxymask. Call light within reach, however; patient does not call for assistance. Frequently takes off his oxymask despite explanation of its importance. Placed in room close to nurses station. Bed alarm on, three side rails up, and treaded socks on.     Patient is not progressing towards the following goals:      Problem: Knowledge Deficit - Standard  Goal: Patient and family/care givers will demonstrate understanding of plan of care, disease process/condition, diagnostic tests and medications  9/8/2024 0649 by Danielle Calderón, R.N.  Outcome: Not Progressing       Problem: Fall Risk  Goal: Patient will remain free from falls  9/8/2024 0649 by Danielle Calderón, R.N.  Outcome: Not Progressing

## 2024-09-08 NOTE — PROGRESS NOTES
4 Eyes Skin Assessment Completed by KORY Allan and KORY Carmona.    Head WDL  Ears WDL  Nose WDL  Mouth WDL  Neck WDL  Breast/Chest WDL  Shoulder Blades WDL  Spine WDL  (R) Arm/Elbow/Hand Redness, Blanching, and Bruising  (L) Arm/Elbow/Hand redness, slow to yoan, bruising  Abdomen G-tube in place on LLQ  Groin WDL  Scrotum/Coccyx/Buttocks Redness and Excoriation, Blanching  (R) Leg Scar on knee  (L) Leg WDL  (R) Heel/Foot/Toe Redness and Blanching; dryness  (L) Heel/Foot/Toe Redness and Blanching; dryness          Devices In Places Blood Pressure Cuff, Oxy Mask, and G Tube      Interventions In Place Heel Mepilex, Sacral Mepilex, and Barrier Cream, tap glide sheet    Possible Skin Injury No                  Pictures Uploaded Into Epic Yes  Wound Consult Placed N/A  RN Wound Prevention Protocol Ordered Yes

## 2024-09-08 NOTE — WOUND TEAM
[facility]  Inpatient Services  Wound and Skin Care Brief Evaluation    Admission Date: 9/7/2024     Last order of IP CONSULT TO WOUND CARE was found on 9/7/2024 from Hospital Encounter on 9/7/2024     HPI, PMH, SH: Reviewed    Chief Complaint   Patient presents with    Difficulty Breathing     Diagnosis: Respiratory failure (HCC) [J96.90]    Unit where seen by Wound Team: S633/02     Wound consult placed regarding G tube. Chart and images reviewed. This clinician in to assess patient. Patient pleasant and agreeable. Old dressing surrounding G tube insertion site removed. Periskin pink and intact. Non-selectively debrided with Normal Saline and Gauze. Family at bedside educated on how to perform dressing change at home. New fenestrated guaze applied. Nursing to perform regular drain care.    Patient sacrum and heels also assessed during encounter. Patient sacrum and BL heels intact with blanchable redness. Offloading dressings applied.    No pressure injuries or advanced wound care needs identified. Wound consult completed. No further follow up unless indicated and consulted.     G-tube insertion site      G-tube insertion site      Sacrum      Left Heel      Right Heel           PREVENTATIVE INTERVENTIONS:    Q shift Evan - performed per nursing policy  Q shift pressure point assessments - performed per nursing policy    Surface/Positioning  Low Airloss - Currently in Place  Reposition q 2 hours - Applied this Visit    Offloading/Redistribution  Sacral offloading dressing (Silicone dressing) - Applied this Visit  Heel offloading dressing (Silicone dressing) - Applied this Visit  Float Heels off Bed with Pillows - Applied this Visit

## 2024-09-08 NOTE — PROGRESS NOTES
[facility] [place] FAMILY MEDICINE PROGRESS NOTE        Attending:   Roger Em MD    Resident:   Rosemary Gan DO    PATIENT:   Pito Black; [id number]; 1945    ID:   Pito Black is a 79 y.o. male with a PMH of CHF EF 55%, recent stroke on 8/13 with left-sided deficit and dysphagia PEG dependent, adenocarcinoma of the lung, atrial fibrillation, type 2 diabetes with CKD 3 who presented with respiratory distress after discharging 9/6 with pneumonia, pleural effusion, COPD exacerbation and possible CHF.  Family reporting they are unable to take care of his needs at home.    SUBJECTIVE:   Patient evaluated this morning and able to answer yes/no questions.  He denies any headache, worsening dyspnea, abdominal pain.  Denies any needs at this time.    Revisited later in the day with many family members bedside.  They confirmed they want comfort focused care for him at this time.  They wish to continue his tube feeds do not want any fingersticks for blood glucose monitoring.    OBJECTIVE:  Vitals:    09/07/24 2315 09/08/24 0010 09/08/24 0354 09/08/24 0810   BP:   110/62 116/78   Pulse: (!) 105  (!) 102 99   Resp: (!) 24 (!) 22 19 18   Temp:   36.4 °C (97.5 °F) 36.5 °C (97.7 °F)   TempSrc:   Temporal Temporal   SpO2: 94%  97% 97%   Weight:       Height:           Intake/Output Summary (Last 24 hours) at 9/8/2024 1034  Last data filed at 9/7/2024 2130  Gross per 24 hour   Intake --   Output 300 ml   Net -300 ml       PHYSICAL EXAM:  General: Mildly distressed, afebrile, cachectic, sitting upright in bed  HEENT: NC/AT.  Oxy mask in place, mouth breathing, somewhat dry mucous membranes  Cardiovascular: Difficult to auscultate heart sounds secondary to sonorous respirations, capillary refill greater than 5 seconds upper and lower extremities, extremities cool to touch  Respiratory: Loud sonorous respirations throughout, normal respiratory rate  Abdomen: Scaphoid, soft, nontender, G-tube in place  EXT: Thin extremities, upper and  "lower extremities all cool to the touch  Skin: Pallor throughout, dry  Neuro: Nonverbal, left-sided deficit at baseline    LABS:  Recent Labs     09/07/24  1029   WBC 18.4*   RBC 3.91*   HEMOGLOBIN 12.2*   HEMATOCRIT 39.3*   .5*   MCH 31.2   RDW 56.7*   PLATELETCT 92*   MPV 11.3   NEUTSPOLYS 89.80*   LYMPHOCYTES 6.40*   MONOCYTES 2.40   EOSINOPHILS 0.40   BASOPHILS 0.20     Recent Labs     09/07/24  1029   SODIUM 141   POTASSIUM 4.5   CHLORIDE 104   CO2 22   BUN 58*   CREATININE 1.41*   CALCIUM 8.3*   ALBUMIN 2.9*     Estimated GFR/CRCL = Estimated Creatinine Clearance: 36.1 mL/min (A) (by C-G formula based on SCr of 1.41 mg/dL (H)).  Recent Labs     09/07/24  1029   GLUCOSE 326*     Recent Labs     09/07/24  1029   ASTSGOT 46*   ALTSGPT 51*   TBILIRUBIN 0.4   ALKPHOSPHAT 77   GLOBULIN 3.6*             No results for input(s): \"INR\", \"APTT\", \"FIBRINOGEN\" in the last 72 hours.    Invalid input(s): \"DIMER\"    IMAGING:  CT-CHEST (THORAX) WITH   Final Result      1.  Interval progression of metastatic disease with extensive lymphadenopathy as described.   2.  Enlarging right upper lobe suprahilar masslike opacity presumably in keeping with a history of lung cancer.   3.  Moderate to large pleural effusions.   4.  A few small sclerotic osseous lesions could BE metastatic.   5.   Cholelithiasis.      Fleischner Society pulmonary nodule recommendations:   Not Applicable         DX-CHEST-PORTABLE (1 VIEW)   Final Result      Stable bilateral airspace disease and left pleural effusion. Overall, no change.          MEDS:  Current Facility-Administered Medications   Medication Last Admin    Respiratory Therapy Consult      senna-docusate (Pericolace Or Senokot S) 8.6-50 MG per tablet 2 Tablet 2 Tablet at 09/07/24 1947    And    polyethylene glycol/lytes (Miralax) Packet 1 Packet      amoxicillin-clavulanate (Augmentin) 875-125 MG per tablet 1 Tablet 1 Tablet at 09/08/24 0516    apixaban (Eliquis) tablet 5 mg 5 mg at " 09/08/24 0515    bisoprolol (Zebeta) tablet 5 mg 5 mg at 09/08/24 0515    vitamin D3 (Cholecalciferol) tablet 2,000 Units 2,000 Units at 09/08/24 0516    ezetimibe (Zetia) tablet 10 mg 10 mg at 09/08/24 0515    furosemide (Lasix) tablet 40 mg 40 mg at 09/08/24 0516    hydrOXYzine HCl (Atarax) tablet 25 mg      ipratropium (Atrovent) 0.02 % nebulizer solution 0.5 mg 0.5 mg at 09/08/24 1441    lansoprazole (Prevacid) solutab 30 mg 30 mg at 09/08/24 0516    levalbuterol (Xopenex) 1.25 MG/3ML nebulizer solution 1.25 mg 1.25 mg at 09/08/24 1440    losartan (Cozaar) tablet 25 mg      melatonin tablet 6 mg 6 mg at 09/07/24 2125    [Held by provider] metFORMIN (Glucophage) tablet 750 mg      nystatin (Mycostatin) 270131 UNIT/ML suspension 500,000 Units 500,000 Units at 09/08/24 1502    oxyCODONE immediate-release (Roxicodone) tablet 5 mg 5 mg at 09/08/24 0918    predniSONE (Deltasone) tablet 30 mg 30 mg at 09/08/24 0516    Followed by    [START ON 9/10/2024] predniSONE (Deltasone) tablet 20 mg      Followed by    [START ON 9/13/2024] predniSONE (Deltasone) tablet 10 mg      Followed by    [START ON 9/16/2024] predniSONE (Deltasone) tablet 5 mg      tiotropium (Spiriva Respimat) 2.5 mcg/Act inhalation spray 5 mcg 5 mcg at 09/08/24 1119    traZODone (Desyrel) tablet 50 mg      morphine 4 MG/ML injection 2 mg 2 mg at 09/08/24 0229    insulin GLARGINE (Lantus,Semglee) injection 6 Units at 09/07/24 1943       ASSESSMENT/PLAN:  Pito Black is a 79 y.o. male with a PMH of CHF EF 55%, recent stroke on 8/13 with left-sided deficit and dysphagia PEG dependent, adenocarcinoma of the lung, atrial fibrillation, type 2 diabetes with CKD 3 who presented with respiratory distress after discharging 9/6 with pneumonia, pleural effusion, COPD exacerbation and possible CHF.  Family expressing their desire to move to comfort focused care.    * Respiratory failure (HCC)- (present on admission)  Assessment & Plan  Continues to have mild  respiratory distress with sonorous respirations.  Family focusing on patient's comfort and we have decided to continue his steroid taper and complete his course of Augmentin.  Will defer thoracentesis and IV antibiotics.    -Hospice consult placed, will follow-up on Monday  -Complete home steroid taper   -Complete Augmentin course  -RT consulted  -Continue home levo albuterol  -Morphine 2 mg every 2 hours as needed for air hunger    HFrEF (heart failure with reduced ejection fraction) ([facility])- (present on admission)  Assessment & Plan  Patient with known history of heart failure with reduced ejection fraction. G-tube feeds, no need for IV fluids at this time.  Will continue Lasix and potassium for comfort focused care  -Lasix 40 mg daily   -K-Dur 10 mg daily    Right sided cerebral hemisphere cerebrovascular accident (CVA) ([facility])- (present on admission)  Assessment & Plan  Patient was CVA in August 2024 with residual left-sided deficit and dysphagia with G-tube dependency.  Family wishing to continue G-tube feeds with Glucerna 2 boxes 3 times daily while pursuing comfort focused care  -Continue G-tube feeds with Glucerna 2 boxes 3 times daily  -Aspiration precautions, n.p.o.      Type 2 diabetes mellitus with stage 3a chronic kidney disease, without long-term current use of insulin ([facility])- (present on admission)  Assessment & Plan  Patient with known history of type 2 diabetes with CKD.  On steroids and presenting blood sugar was 326. Patient is receiving prednisone taper at this time.  Patient family requesting comfort focused care and no further blood glucose checks  -Lantus 6 units nightly    Pleural effusion, left- (present on admission)  Assessment & Plan  Patient with known pleural effusion on the left side. Known adenocarcinoma the left lung base which is likely contributing factor.  Patient is moving to a comfort care status and no need for thoracentesis.    Primary cancer of left lower lobe of lung ([facility])-  (present on admission)  Assessment & Plan  Patient with known metastatic adenocarcinoma of the left lower lung.  Family desiring to move forward with hospice and comfort focused care.   -Hospice consult placed, follow up Monday    Hyperlipidemia associated with type 2 diabetes mellitus (HCC)- (present on admission)  Assessment & Plan  Patient with known history of hyperlipidemia.  On ezetimibe outpatient.  We will discontinue this while inpatient and focusing on comfort care.    A-fib (HCC)- (present on admission)  Assessment & Plan  Patient with paroxysmal atrial fibrillation on Eliquis at home.   -bisoprolol 5 mh   -losartan 25 mg   -Eliquis 5 mg twice daily         Core Measures  IV Fluids: None  Antbx: Augmentin  DVT ppx: Takes Eliquis, SCDs  Code status: DNAR/DNI  Dispo: inpatient for management of respiratory failure, comfort focused care pending hospice    Rosemary Gan D.O.  PGY-1  [facility] Resident

## 2024-09-09 NOTE — DISCHARGE PLANNING
Referral Management Team    Liaison meet with patient and spouse Cydney at bed side and provided hospice information. Cydney called her daughter to have her present for support. Meeting set at 1200 at bed side.       1230 Liaison meet with patient and family at bed side and provided hospice information.  Family requested referral to be set to [facility] hospice per choice.     1336 Referral declined by [facility]     1400 Spoke with patient's daughter to select a new hospice agency she selected   1 [facility]  2 [facility]     She also requested discharge tomorrow.     Referral sent per choice to [facility]

## 2024-09-09 NOTE — PROGRESS NOTES
Pt. Seen by [employer] team this morning, pt. Now switched to CC. Charge RN aware. Family at bedside at this time. Informed pt. And family about room availability. Pt. Refused to have tube feeds this morning, offered ice chips at bedside. Needs attended.

## 2024-09-09 NOTE — CARE PLAN
The patient is Watcher - Medium risk of patient condition declining or worsening    Shift Goals  Clinical Goals: O2 saturations to remain above 95%  Patient Goals: rest, comfort  Family Goals: updates    Progress made toward(s) clinical / shift goals:  Patient on comfort care with comfort care medications  Family at bedside and acknowledge understanding of disease process and plan of care.  Patient to remain fall free for the duration of the shift.  Patient is not progressing towards the following goals:

## 2024-09-09 NOTE — PROGRESS NOTES
"    [facility] FAMILY MEDICINE PROGRESS NOTE        Attending:   Charis Sow MD    Resident:   Rosemary Gan DO    PATIENT:   Pito Black; [id number]; 1945    ID:   Pito Black is a 79 y.o. male with a PMH of CHF EF 55%, recent stroke on 8/13 with left-sided deficit and dysphagia PEG dependent, adenocarcinoma of the lung, atrial fibrillation, type 2 diabetes with CKD 3 who presented with respiratory distress after discharging 9/6 with pneumonia, pleural effusion, COPD exacerbation and possible CHF.  Family reporting they are unable to take care of his needs at home.    SUBJECTIVE:   Patient evaluated this morning.  He keeps stating \"breathe.\"  He is also asking for ice.  He is otherwise unable to answer questions or convey what he is feeling.    OBJECTIVE:  Vitals:    09/08/24 1522 09/08/24 1942 09/08/24 2020 09/09/24 0403   BP: 130/69 115/52  106/49   Pulse:  97 94 96   Resp: 18 16 18 19   Temp: 36.2 °C (97.2 °F) 36.1 °C (97 °F)  36.7 °C (98 °F)   TempSrc: Temporal Temporal  Temporal   SpO2: 99% 100% 98% 99%   Weight:       Height:             Intake/Output Summary (Last 24 hours) at 9/9/2024 1541  Last data filed at 9/8/2024 2359  Gross per 24 hour   Intake 1068 ml   Output 300 ml   Net 768 ml       PHYSICAL EXAM:   General: Moderately distressed, afebrile, cachectic, sitting upright in bed  HEENT: NC/AT.  Oxy mask displaced over chin, mouth breathing, very dry mucous membranes Cardiovascular: Difficult to auscultate heart sounds secondary to sonorous respirations, capillary refill greater than 5 seconds upper and lower extremities, extremities cool to touch  Respiratory: Loud sonorous respirations throughout, tachypneic, increased work of breathing   Abdomen: Scaphoid, soft, nontender, G-tube in place  EXT: Thin extremities, upper and lower extremities all cool to the touch  Skin: Pallor throughout, dry  Neuro: Nonverbal, left-sided deficit at baseline    LABS:  Recent Labs     09/07/24  1029   WBC 18.4*   RBC " "3.91*   HEMOGLOBIN 12.2*   HEMATOCRIT 39.3*   .5*   MCH 31.2   RDW 56.7*   PLATELETCT 92*   MPV 11.3   NEUTSPOLYS 89.80*   LYMPHOCYTES 6.40*   MONOCYTES 2.40   EOSINOPHILS 0.40   BASOPHILS 0.20     Recent Labs     09/07/24  1029   SODIUM 141   POTASSIUM 4.5   CHLORIDE 104   CO2 22   BUN 58*   CREATININE 1.41*   CALCIUM 8.3*   ALBUMIN 2.9*     Estimated GFR/CRCL = Estimated Creatinine Clearance: 36.1 mL/min (A) (by C-G formula based on SCr of 1.41 mg/dL (H)).  Recent Labs     09/07/24  1029   GLUCOSE 326*     Recent Labs     09/07/24  1029   ASTSGOT 46*   ALTSGPT 51*   TBILIRUBIN 0.4   ALKPHOSPHAT 77   GLOBULIN 3.6*             No results for input(s): \"INR\", \"APTT\", \"FIBRINOGEN\" in the last 72 hours.    Invalid input(s): \"DIMER\"    IMAGING:  CT-CHEST (THORAX) WITH   Final Result      1.  Interval progression of metastatic disease with extensive lymphadenopathy as described.   2.  Enlarging right upper lobe suprahilar masslike opacity presumably in keeping with a history of lung cancer.   3.  Moderate to large pleural effusions.   4.  A few small sclerotic osseous lesions could BE metastatic.   5.   Cholelithiasis.      Fleischner Society pulmonary nodule recommendations:   Not Applicable         DX-CHEST-PORTABLE (1 VIEW)   Final Result      Stable bilateral airspace disease and left pleural effusion. Overall, no change.          MEDS:  Current Facility-Administered Medications   Medication Last Admin    Respiratory Therapy Consult      senna-docusate (Pericolace Or Senokot S) 8.6-50 MG per tablet 2 Tablet 2 Tablet at 09/08/24 1751    And    polyethylene glycol/lytes (Miralax) Packet 1 Packet      amoxicillin-clavulanate (Augmentin) 875-125 MG per tablet 1 Tablet 1 Tablet at 09/09/24 0556    apixaban (Eliquis) tablet 5 mg 5 mg at 09/09/24 0556    bisoprolol (Zebeta) tablet 5 mg 5 mg at 09/09/24 0556    vitamin D3 (Cholecalciferol) tablet 2,000 Units 2,000 Units at 09/09/24 0556    ezetimibe (Zetia) tablet 10 " mg 10 mg at 09/09/24 0556    furosemide (Lasix) tablet 40 mg 40 mg at 09/09/24 0556    hydrOXYzine HCl (Atarax) tablet 25 mg      ipratropium (Atrovent) 0.02 % nebulizer solution 0.5 mg 0.5 mg at 09/08/24 2022    lansoprazole (Prevacid) solutab 30 mg 30 mg at 09/09/24 0556    levalbuterol (Xopenex) 1.25 MG/3ML nebulizer solution 1.25 mg 1.25 mg at 09/08/24 2022    losartan (Cozaar) tablet 25 mg 25 mg at 09/08/24 1749    melatonin tablet 6 mg 6 mg at 09/08/24 2033    [Held by provider] metFORMIN (Glucophage) tablet 750 mg      nystatin (Mycostatin) 454918 UNIT/ML suspension 500,000 Units 500,000 Units at 09/08/24 2033    oxyCODONE immediate-release (Roxicodone) tablet 5 mg 5 mg at 09/08/24 0918    [START ON 9/10/2024] predniSONE (Deltasone) tablet 20 mg      Followed by    [START ON 9/13/2024] predniSONE (Deltasone) tablet 10 mg      Followed by    [START ON 9/16/2024] predniSONE (Deltasone) tablet 5 mg      tiotropium (Spiriva Respimat) 2.5 mcg/Act inhalation spray 5 mcg 5 mcg at 09/09/24 0559    traZODone (Desyrel) tablet 50 mg      morphine 4 MG/ML injection 2 mg 2 mg at 09/08/24 0229    insulin GLARGINE (Lantus,Semglee) injection 6 Units at 09/08/24 1748       ASSESSMENT/PLAN:  Pito Black is a 79 y.o. male with a PMH of CHF EF 55%, recent stroke on 8/13 with left-sided deficit and dysphagia PEG dependent, adenocarcinoma of the lung, atrial fibrillation, type 2 diabetes with CKD 3 who presented with respiratory distress after discharging 9/6 with pneumonia, pleural effusion, COPD exacerbation and possible CHF.  Now being transition to comfort care.    * Respiratory failure (HCC)- (present on admission)  Assessment & Plan  Patient in moderate respiratory distress when initially evaluated this morning, resolved after administration of morphine and he was resting comfortably .  Patient now on comfort care.  No further antibiotics will be given.  -Case management working on hospice acceptance  -RT consulted,  following  -Morphine 5 mg every 1 hour as needed for air hunger    HFrEF (heart failure with reduced ejection fraction) ([facility])- (present on admission)  Assessment & Plan  Patient with known history of heart failure with reduced ejection fraction. G-tube feeds, no need for IV fluids at this time.  Medications discontinued as patient is now comfort care.    Right sided cerebral hemisphere cerebrovascular accident (CVA) ([facility])- (present on admission)  Assessment & Plan  Patient was CVA in August 2024 with residual left-sided deficit and dysphagia with G-tube dependency.  Family wishing to continue G-tube feeds with Glucerna 2 boxes 3 times daily while patient is on comfort care.  Patient refusing tube feeds today  -Continue G-tube feeds as accepted by patient   -Aspiration precautions, n.p.o.      Type 2 diabetes mellitus with stage 3a chronic kidney disease, without long-term current use of insulin ([facility])- (present on admission)  Assessment & Plan  No further medications or blood glucose monitoring.  Patient is now comfort care    Pleural effusion, left- (present on admission)  Assessment & Plan  No need for thoracentesis.  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  Patient transition to comfort care today.  -Case management working on hospice acceptance and transfer of care  -Managing air hunger with morphine and Ativan  -Scopolamine patch for secretions  -Continue respiratory treatments for comfort    Hyperlipidemia associated with type 2 diabetes mellitus ([facility])- (present on admission)  Assessment & Plan  Patient with known history of hyperlipidemia.  Patient is comfort care.    A-fib ([facility])- (present on admission)  Assessment & Plan  Medications discontinued, patient is now comfort care.       Core Measures  IV Fluids: None  Antbx: None  DVT ppx: None, comfort care  Code  status: Comfort Care  Dispo: Pending hospice acceptance and transfer    Rosemary Gan D.O.  PGY-1  [facility] Resident

## 2024-09-09 NOTE — CARE PLAN
The patient is Unstable - High likelihood or risk of patient condition declining or worsening    Shift Goals  Clinical Goals: Patients O2 saturation will be maintained at or above 95% throughout shift  Patient Goals: rest  Family Goals: updates    Progress made toward(s) clinical / shift goals:  Patient is alert and oriented to self only. Oxygen saturation maintained above 95% on 4.5L throughout shift. Bed in lowest position, brakes on, call light within reach. Bed alarm on. Patient resting comfortably.       Problem: Pain - Standard  Goal: Alleviation of pain or a reduction in pain to the patient’s comfort goal  Outcome: Progressing     Problem: Knowledge Deficit - Standard  Goal: Patient and family/care givers will demonstrate understanding of plan of care, disease process/condition, diagnostic tests and medications  Outcome: Progressing     Problem: Fall Risk  Goal: Patient will remain free from falls  Outcome: Progressing       Patient is not progressing towards the following goals:

## 2024-09-09 NOTE — DISCHARGE PLANNING
Referral Management Team    Hospice referral received via Epic- T will round to obtain choice ASAP  If there are any questions, please contact me directly.

## 2024-09-09 NOTE — DOCUMENTATION QUERY
[facility]                                                                       Query Response Note      PATIENT:               DOROTHEA SALINAS  ACCT #:                  [id number]  MRN:                     [id number]  :                      1945  ADMIT DATE:       2024 12:10 AM  DISCH DATE:        2024 5:53 PM  RESPONDING  PROVIDER #:        [id number]           QUERY TEXT:    Patient admitted for acute hypoxic respiratory failure.  Per ED provider note -  on 4 L oxy mask up from 2 L at current baseline. Please clarify the type of respiratory failure.    Note: If you agree with a diagnosis listed, please remember to include it in your concurrent daily documentation and onto the Discharge Summary.    The patient's Clinical Indicators include:  ED provider note -  on 4 L oxy mask up from 2 L at current baseline   PN - Dx Acute hypoxic respiratory failure    Treatment - Supplemental O2 up to 4L NC    Risk factors - PNA, acute exacerbation COPD, lung cancer, chronic systolic heart failure, pleural effusion    Thank you,  Mayra Le BSN  Clinical   Connect via [email or web address]  Options provided:   -- Acute respiratory failure with hypoxia only   -- Acute on chronic respiratory failure with hypoxia   -- Unable to determine      Query created by: Mayra Le on 2024 2:11 PM    RESPONSE TEXT:    Acute on chronic respiratory failure with hypoxia          Electronically signed by:  MARC RODRIGUEZ MD 2024 4:29 PM

## 2024-09-09 NOTE — DISCHARGE PLANNING
"TCN following. HTH/SCP chart review completed. Collaborated with CM. Per chart review, plan for possible hospice consult/Comfort Care.  Plan for possible comfort/care/hospice with re: dc planning.  Please see Family Medicine note by Rosemary Gan D.O. on 9/8/24 at 3:23 PM, \"Family reporting they are unable to take care of his needs at home.  They confirmed they want comfort focused care for him at this time.  Family desiring to move forward with hospice and comfort focused care. Hospice consult placed, follow up Monday\".      TCN will monitor if change in POC/status though will not actively be involved given plan for possible comfort care status/hospice plan.   "

## 2024-09-10 NOTE — DISCHARGE PLANNING
Referral Management Team      Choice obtained for: [facility]   Agency acceptance status: Accepted     Family involved in care:Cydney ( spouse), Benoit (daughter)   Support available at home: Patient discharging home with spouse and daughter     DC transport scheduled at 1000 via GMT to   [address] [facility] [place] [address]    Family requested defibrillator to be disconnected prior to discharge.    Care team members informed: Medical Team

## 2024-09-10 NOTE — DISCHARGE PLANNING
HTH/SCP chart review completed. Per chart review, noted patient is now comfort care with choice provided for hospice. As such, [facility] will no longer follow and defer to [facility] CM team given comfort care status/hospice plan. Thank you.

## 2024-09-10 NOTE — DISCHARGE INSTRUCTIONS
HF Patient Discharge Instructions  Monitor your weight daily, and maintain a weight chart, to track your weight changes.   Activity as tolerated, unless your Doctor has ordered otherwise.  Follow a low fat, low cholesterol, low salt diet unless instructed otherwise by your Doctor. Read the labels on the back of food products and track your intake of fat, cholesterol and salt.   Fluid Restriction No. If a Fluid Restriction has been ordered by your Doctor, measure fluids with a measuring cup to ensure that you are not exceeding the restriction.   No smoking.  Oxygen No. If your Doctor has ordered that you wear Oxygen at home, it is important to wear it as ordered.  Did you receive an explanation from staff on the importance of taking each of your medications and why it is necessary to keep taking them unless your doctor says to stop? Yes  Were all of your questions answered about how to manage your heart failure and what to do if you have increased signs and symptoms after you go home? Yes  Do you feel like your heart failure care team involved you in the care treatment plan and allowed you to make decisions regarding your care while in the hospital and addressed any discharge needs you might have? Yes    See the educational handout provided at discharge for more information on monitoring your daily weight, activity and diet. This also explains more about Heart Failure, symptoms of a flare-up and some of the tests that you have undergone.     Warning Signs of a Flare-Up include:  Swelling in the ankles or lower legs.  Shortness of breath, while at rest, or while doing normal activities.   Shortness of breath at night when in bed, or coughing in bed.   Requiring more pillows to sleep at night, or needing to sit up at night to sleep.  Feeling weak, dizzy or fatigued.     When to call your Doctor:  Call your Primary Care Physician or Cardiologist if:   You experience any pain radiating to your jaw or neck.  You have  any difficulty breathing.  You experience weight gain of 3 lbs in a day or 5 lbs in a week.   You feel any palpitations or irregular heartbeats.  You become dizzy or lose consciousness.   If you have had an angiogram or had a pacemaker or AICD placed, and experience:  Bleeding, drainage or swelling at the surgical / puncture site.  Fever greater than 100.0 F  Shock from internal defibrillator.  Cool and / or numb extremities.     Please access the AHA My HF Guide/Heart Failure Interactive Workbook:   [email or web address]

## 2024-09-10 NOTE — DISCHARGE SUMMARY
[facility] Discharge Summary    Attending: Charis Sow M.d.  Senior Resident: Dr. Cook  Intern:  Dr. Gan  Contact Number: [phone]    CHIEF COMPLAINT ON ADMISSION  Chief Complaint   Patient presents with    Difficulty Breathing       Reason for Admission  Dyspnea     Admission Date  9/7/2024    CODE STATUS  Comfort Care/DNR    HPI & HOSPITAL COURSE  HPI from Dr. Cook's H&P on admission: Pito Black is a 79 y.o. male with a PMH of CHF EF 55%, recent stroke on 8/13 with left-sided deficit and dysphagia PEG dependent, adenocarcinoma of the lung, atrial fibrillation, type 2 diabetes with CKD 3 who presented 9/7 with respiratory distress after discharging 9/6 with pneumonia, pleural effusion, COPD exacerbation and possible CHF.  History was gathered from the wife and the daughter as the patient was unable to cooperate with exam; they report that since getting home yesterday the patient attempted to sleep but was unable to find a comfortable position and was having difficulty breathing all night despite inhaler usage, steroids, antibiotics, and supplemental oxygen.  They felt their efforts were not providing support to the patient and thought the only place to go with the hospital.  Review of systems per wife and daughter are positive for cough with bloody mucus, lethargy, and increased respiratory effort.  ROS negative for nausea, vomiting, diarrhea, rashes, swelling, or fevers.  They are unsure when his last bowel movement was. When discussing the patient's past medical history they indicated interest in hospice for this patient and believe it is what he would want.    Hospital Course: Patient was admitted with family expressing desire to transition him to comfort care. His main complaint was significant dyspnea in the setting of respiratory failure. His medications were initially continued and hospice was consulted. His comfort was prioritized during admission with family minimizing needle  sticks for blood draws and BGL monitoring. Feeds were continued by G-tube; however, patient also began refusing these the night prior to discharge. His air hunger and anxiety were managed well with morphine. Ativan was also ordered but not needed during admission. Ultimately, family decided to transition to comfort care with home hospice. All medications except those for comfort were discontinued, and patient was comfortable while awaiting transfer.     Therefore, he is discharged in guarded condition to hospice.    The patient met 2-midnight criteria for an inpatient stay at the time of discharge.    Discharge Date  09/10/2024    Physical Exam on Day of Discharge    General: Mild distress, afebrile, cachectic, sitting upright in bed asking for air  HEENT: NC/AT.  Oxy mask, mouth breathing, very dry mucous membranes   Cardiovascular: Capillary refill greater than 5 seconds upper and lower extremities, extremities cool to touch  Respiratory: Loud sonorous respirations, mildly increased work of breathing  EXT: Thin extremities, upper and lower extremities all cool to the touch  Skin: Pallor throughout, dry  Neuro: Nonverbal, left-sided deficit at baseline    FOLLOW UP ITEMS POST DISCHARGE  Nothing to follow.    DISCHARGE DIAGNOSES    Respiratory failure (HCC)  Medicines discontinued on Comfort Care.  -Oxygen prn for comfort  -Morphine and ativan for air hunger/anxiety/pain    Primary cancer of left lower lobe of lung (HCC)  Patient with known metastatic adenocarcinoma of the left lower lung.  Comfort Care.  -Morphine and Ativan for pain/anxiety  -Scopolamine patch for secretions  -Continue respiratory treatments for comfort    Type 2 diabetes mellitus with stage 3a chronic kidney disease, without long-term current use of insulin (HCC)  Medicines discontinued on Comfort Care.    Hyperlipidemia associated with type 2 diabetes mellitus (HCC)  Patient with known history of hyperlipidemia. Medicines discontinued on Comfort  Care.    HFrEF (heart failure with reduced ejection fraction) ([facility])  Medicines discontinued on Comfort Care.    Pleural effusion, left  No need for thoracentesis.  Patient is Comfort Care.    Right sided cerebral hemisphere cerebrovascular accident (CVA) ([facility])  Patient was CVA in August 2024 with residual left-sided deficit and dysphagia with G-tube dependency.   -Continue G-tube feeds as accepted by patient   -Aspiration precautions, n.p.o.      A-fib ([facility])  Medicines discontinued on Comfort Care. Defibrillator disconnected.    FOLLOW UP    No follow-up provider specified.    MEDICATIONS ON DISCHARGE     Medication List        START taking these medications        Instructions   benzonatate 100 MG Caps  Commonly known as: Tessalon   Take 1 tablet by mouth three times a day as needed for cough     carboxymethylcellulose 0.5 % Soln  Commonly known as: Refresh Tears   Administer 1 Drop into both eyes as needed (dry eyes).  Dose: 1 Drop     LORazepam 2 MG/ML Conc  Commonly known as: Ativan   Place 0.5 mL under the tongue every 1 hour as needed (Anxiety/Restlessness) for up to 15 days.  Dose: 1 mg     ondansetron 8 MG Tbdp  Commonly known as: Zofran ODT   Dissolve 1 Tablet by Enteral Tube route every 8 hours as needed for Nausea/Vomiting.  Dose: 8 mg     scopolamine 1 mg/72hr Pt72  Start taking on: September 12, 2024  Commonly known as: Transderm-Scop   Place 1 Patch on the skin (behind the ear) and change every 72 hours.  Dose: 1 Patch            CHANGE how you take these medications        Instructions   albuterol 2.5mg/0.5ml Nebu  What changed: Another medication with the same name was removed. Continue taking this medication, and follow the directions you see here.  Commonly known as: Proventil   Take 2.5 mg by nebulization Once.  Dose: 2.5 mg     cyclobenzaprine 10 mg Tabs  What changed:   when to take this  reasons to take this  Commonly known as: Flexeril   Doctor's comments: Place in pill packs  Take 1 Tablet by  mouth at bedtime.  Dose: 10 mg     furosemide 40 MG Tabs  What changed:   when to take this  reasons to take this  Commonly known as: Lasix   1 Tablet by Enteral Tube route 1 time a day as needed (difficulty breathing) for up to 15 days.  Dose: 40 mg            CONTINUE taking these medications        Instructions   bisoprolol 5 MG Tabs  Commonly known as: Zebeta   1 Tablet by Enteral Tube route every day.  Dose: 5 mg     diclofenac sodium 1 % Gel  Commonly known as: Voltaren   Apply 2 g topically 4 times a day as needed (left foot pain).  Dose: 2 g     hydrOXYzine HCl 25 MG Tabs  Commonly known as: Atarax   Take 25 mg by mouth 3 times a day as needed for Anxiety.  Dose: 25 mg     ipratropium 0.02 % Soln  Commonly known as: Atrovent   Inhale 1 vial (2.5 mL) by nebulization 4 times a day.  Dose: 0.5 mg     MELATONIN PO   6 mg by Enteral Tube route at bedtime.  Dose: 6 mg     oxyCODONE immediate-release 5 MG Tabs  Commonly known as: Roxicodone   Take 5 mg by mouth every 3 hours as needed for Severe Pain.  Dose: 5 mg     predniSONE 10 MG Tabs  Start taking on: September 6, 2024  Commonly known as: Deltasone   Take 3 Tablets by mouth every day for 3 days, THEN 2 Tablets every day for 3 days, THEN 1 Tablet every day for 3 days, THEN 0.5 Tablets every day for 3 days.     Spiriva Respimat 2.5 MCG/ACT Aers  Generic drug: tiotropium   Inhale 5 mcg every day.  Dose: 5 mcg     traZODone 50 MG Tabs  Commonly known as: Desyrel   Take 50 mg by mouth at bedtime as needed for Sleep.  Dose: 50 mg            STOP taking these medications      amoxicillin-clavulanate 875-125 MG Tabs  Commonly known as: Augmentin     atorvastatin 20 MG Tabs  Commonly known as: Lipitor     Eliquis 5 MG Tabs  Generic drug: apixaban     ezetimibe 10 MG Tabs  Commonly known as: Zetia     lansoprazole 30 MG Tbdd  Commonly known as: Prevacid     levalbuterol 1.25 MG/3ML Nebu  Commonly known as: Xopenex     losartan 25 MG Tabs  Commonly known as: Cozaar      metFORMIN  MG Tb24  Commonly known as: Glucophage XR     NS SOLN 100 mL with ampicillin/sulbactam 1.5 (1-0.5) g SOLR 1.5 g     nystatin 173038 UNIT/ML Susp  Commonly known as: Mycostatin     senna-docusate 8.6-50 MG Tabs  Commonly known as: Pericolace Or Senokot S     Vitamin D3 2000 UNIT Caps              Allergies  No Known Allergies    DIET  Orders Placed This Encounter   Procedures    Diet NPO Restrict to: Strict     Standing Status:   Standing     Number of Occurrences:   1     Order Specific Question:   Diet NPO Restrict to:     Answer:   Strict [1]    Diet: Diet Tube Feed; Formula: Glucerna; Glucerna: Glucerna 1.2 Carton; Select Bolus (If Indicated): Glucerna 1.2 Carton; Bolus Frequency: TID (2 cartons/mealtime); Number of Cartons Per Day: Six     Standing Status:   Standing     Number of Occurrences:   1     Order Specific Question:   Diet     Answer:   Diet Tube Feed [35]     Order Specific Question:   Formula:     Answer:   Glucerna     Order Specific Question:   Glucerna:     Answer:   Glucerna 1.2 Carton     Order Specific Question:   Route     Answer:   Gtube/PEG     Order Specific Question:   Select Bolus (If Indicated):     Answer:   Glucerna 1.2 Carton     Order Specific Question:   Bolus Frequency     Answer:   TID     Comments:   2 cartons/mealtime     Order Specific Question:   Number of Cartons Per Day:     Answer:   Six       ACTIVITY  As tolerated.  Weight bearing as tolerated    LINES, DRAINS, AND WOUNDS  This is an automated list. Peripheral IVs will be removed prior to discharge.  Peripheral IV 09/07/24 20 G Right Forearm (Active)   Site Assessment Clean;Dry;Intact 09/09/24 0800   Dressing Type Occlusive 09/09/24 0800   Line Status Saline locked;Flushed;Scrubbed the hub prior to access 09/09/24 0800   Dressing Status Clean;Dry;Intact 09/09/24 0800   Dressing Intervention N/A 09/09/24 0800   Dressing Change Due 09/14/24 09/09/24 0800   Infiltration Grading ([facility], [facility]) 0 09/08/24  2100   Phlebitis Scale ([facility] Only) 0 09/08/24 2100          Peripheral IV 09/07/24 20 G Right Forearm (Active)   Site Assessment Clean;Dry;Intact 09/09/24 0800   Dressing Type Occlusive 09/09/24 0800   Line Status Saline locked;Flushed;Scrubbed the hub prior to access 09/09/24 0800   Dressing Status Clean;Dry;Intact 09/09/24 0800   Dressing Intervention N/A 09/09/24 0800   Dressing Change Due 09/14/24 09/09/24 0800   Infiltration Grading ([facility], [facility]) 0 09/08/24 2100   Phlebitis Scale ([facility] Only) 0 09/08/24 2100               MENTAL STATUS ON TRANSFER  Alert     CONSULTATIONS  Hospice    PROCEDURES  None    LABORATORY  Lab Results   Component Value Date    SODIUM 141 09/07/2024    POTASSIUM 4.5 09/07/2024    CHLORIDE 104 09/07/2024    CO2 22 09/07/2024    GLUCOSE 326 (H) 09/07/2024    BUN 58 (H) 09/07/2024    CREATININE 1.41 (H) 09/07/2024        Lab Results   Component Value Date    WBC 18.4 (H) 09/07/2024    HEMOGLOBIN 12.2 (L) 09/07/2024    HEMATOCRIT 39.3 (L) 09/07/2024    PLATELETCT 92 (L) 09/07/2024        Rosemary Gan D.O.  PGY-1  [facility] Resident

## 2024-09-10 NOTE — CARE PLAN
The patient is Stable - Low risk of patient condition declining or worsening    Shift Goals  Clinical Goals: ensure comfort  Patient Goals: sleep  Family Goals: diandra    Progress made toward(s) clinical / shift goals:    Problem: Pain - Standard  Goal: Alleviation of pain or a reduction in pain to the patient’s comfort goal  Outcome: Progressing   Medicated for pain w/effect per MAR.     Patient not speaking, nods to yes/no questions . Hourly rounding in place.   Patient is not progressing towards the following goals:

## 2024-09-10 NOTE — PROGRESS NOTES
Benitez from [employer] present to disconnect defibrillator at bedside. Defibrillator disconnected and magnet removed.

## 2024-09-10 NOTE — PROGRESS NOTES
Called Jennifer, spoke to Benitez motley from [employer] ([phone]) and they will bring the  tomorrow but a RN will have to push button for off. Lilly HORN aware.

## 2024-09-10 NOTE — DISCHARGE PLANNING
DC Transport Scheduled    Transport Company Scheduled:  [facility]  Spoke with Shiloh at [facility] to schedule transport.    Scheduled Date: 9/10/2024  Scheduled Time: 1300    Destination: Home   Destination address: [address] [place] [address] [place] [address]    Notified care team of scheduled transport via Voalte.     If there are any changes needed to the DC transportation scheduled, please contact [facility] at ext. [phone] between the hours of 1733-0832 Mon-Fri. If outside those hours, contact the ED Case Manager at ext. [phone].

## 2024-09-10 NOTE — PROGRESS NOTES
Received report from day RN, care assumed. Patient drowsy,easily aroused. Opens eyes spontaneously, pt not able to respond with words. Able to nods and gesture. Needs addressed at this time. Call light within reach. Has a tele sitter for safety. On oxygen via oxy mask. Hourly rounding in place.     0115- Patient restless, taking out his oxy mask and attempting to get out of bed. Medicated for pain.scheduled nystatin wasted, pt refuses to open his mouth.

## 2024-09-11 NOTE — TELEPHONE ENCOUNTER
Incoming call from Cydney reporting that Pito passed this morning at home. I have sent Cydney grief resources (see her chart). Marked Pito's chart as .

## 2024-09-14 ENCOUNTER — APPOINTMENT (OUTPATIENT)
Dept: ONCOLOGY | Facility: MEDICAL CENTER | Age: 79
End: 2024-09-14
Payer: MEDICARE

## 2024-09-18 ENCOUNTER — APPOINTMENT (OUTPATIENT)
Dept: NEUROLOGY | Facility: MEDICAL CENTER | Age: 79
End: 2024-09-18
Attending: PSYCHIATRY & NEUROLOGY
Payer: MEDICARE

## 2024-09-23 ENCOUNTER — TELEPHONE (OUTPATIENT)
Dept: CARDIOLOGY | Facility: MEDICAL CENTER | Age: 79
End: 2024-09-23
Payer: MEDICARE

## 2024-09-23 NOTE — TELEPHONE ENCOUNTER
BRETT    Caller: Guille Henning [employer]     Topic/issue: Guille called to go over urgent results. He would like a callback.     Ref # [id number]    Please advise.    Callback Number: [phone]      Thank you,    Tracy HOANG

## 2024-09-23 NOTE — TELEPHONE ENCOUNTER
Urgent ZioAT EOS to 's nurse, Sabi, on 9/23/2024    Ventricular Tachycardia    Preliminary findings:    87 episodes of VT  with an avg rate of 150 bpm    137 episodes of SVT  with an avg rate of 134 bpm    Sinus rhythm  with an avg rate of 105 bpm    Rare supraventricular ectopy    2.9% isolated VE burden, rare couplets and triplets  Bigeminy and trigeminy present    No recorded patient events

## 2024-09-24 NOTE — TELEPHONE ENCOUNTER
Returned Guille's call. Informed him that per chart review, pt was admitted on 9/7/24 and placed on hospice with a passing date of 9/11/24.

## 2024-09-24 NOTE — TELEPHONE ENCOUNTER
BRETT    Caller: Guille COCHRAN @ [facility]     Topic/issue: Calling back to follow up, he states that the reported urgent zio results were delayed and they are wanting to know if that delayed message had any effect on the patient's passing, please advise     Callback Number: [phone]    Reference Number -[id number]

## 2024-09-27 ENCOUNTER — APPOINTMENT (OUTPATIENT)
Dept: NEUROLOGY | Facility: MEDICAL CENTER | Age: 79
End: 2024-09-27
Attending: PSYCHIATRY & NEUROLOGY
Payer: MEDICARE

## 2024-10-04 ENCOUNTER — APPOINTMENT (OUTPATIENT)
Dept: ONCOLOGY | Facility: MEDICAL CENTER | Age: 79
End: 2024-10-04
Payer: MEDICARE

## 2024-10-05 ENCOUNTER — APPOINTMENT (OUTPATIENT)
Dept: ONCOLOGY | Facility: MEDICAL CENTER | Age: 79
End: 2024-10-05
Payer: MEDICARE

## 2024-10-07 NOTE — PROGRESS NOTES
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  Allergies: Patient has no known allergies.    Safety  Patient Assist  Min assist  Patient Precautions  Fall Risk, PEG Tube, Swallow Precautions  Precaution Comments  [place], dysarthria, lower partial dentures, bilateral HAs  Bed Transfer Status  Contact Guard Assist  Toilet Transfer Status   Contact Guard Assist  Assistive Devices  Walker - front wheel  Oxygen  Nasal Cannula  Diet/Therapeutic Dining  Current Diet Order   Procedures    Diet NPO Restrict to: Sips with Medications (TURN TUBE FEED OFF AT MIDNIGHT)    Diet: Diet Tube Feed; Formula: Bolus Only (Provide 1/2 carton Glucerna 1.2 at first bolus feed and advance by 1/2 carton per feed until goal is reached. Goal is 6 cartons per day); Select Bolus (If Indicated): Glucerna 1.2 Carton; Bolus Frequency:...     Pill Administration  whole  Agitated Behavioral Scale  15  ABS Level of Severity  No Agitation    Fall Risk  Has the patient had a fall this admission?   No  Yandy Minaya Fall Risk Scoring  15, HIGH RISK  Fall Risk Safety Measures  bed alarm, chair alarm, and poor balance    Vitals  Temperature: 36.4 °C (97.6 °F)  Temp src: Oral  Pulse: 98  Respiration: 20  Blood Pressure : 116/71  Blood Pressure MAP (Calculated): 86 MM HG  BP Location: Upper Arm, Left  Patient BP Position: Supine     Oxygen  Pulse Oximetry: 99 %  O2 (LPM): 2  O2 Delivery Device: Nasal Cannula    Bowel and Bladder  Last Bowel Movement  08/28/24  Stool Type  Type 6: Fluffy pieces with ragged edges, a mushy stool  Bowel Device     Continent  Bladder: Did not void   Bowel: No movement  Bladder Function  Urine Void (mL):  (large)  Number of Times Voided: 1  Urine Color: Unable To Evaluate  Genitourinary Assessment   Bladder Assessment (WDL):   Within Defined Limits  Gutierrez Catheter: Not Applicable  Urine Color: Unable To Evaluate  Bladder Device: Bathroom  Bladder Scan: Post Void  $ Bladder Scan Results (mL): 47    Skin  Evan Score   18  Sensory Interventions   Bed Types: Standard/Trauma Mattress with Overlay  Skin Preventative Measures: Waffle Overlay  Moisture Interventions  Moisturizers/Barriers: Barrier Wipes      Pain  Pain Rating Scale  0 - No Pain  Pain Location  Abdomen  Pain Location Orientation  Anterior  Pain Interventions   Declines    ADLs    Bathing      Linen Change   Complete  Personal Hygiene  Perineal Care, Moist Nicolasa Wipes  Chlorhexidine Bath      Oral Care  Brushed Teeth  Teeth/Dentures     Shave   (Done by family )  Nutrition Percentage Eaten  NPO at this Time  Environmental Precautions  Treaded Slipper Socks on Patient, Communication Sign for Patients & Families, Mobility Assessed & Appropriate Sign Placed  Patient Turns/Positioning  Patient Turns Self from Side to Side  Patient Turns Assistance/Tolerance     Bed Positions  Bed Controls On, Bed Locked  Head of Bed Elevated  Self regulated      Psychosocial/Neurologic Assessment  Psychosocial Assessment  Psychosocial (WDL):  Within Defined Limits  Patient Behaviors: Fatigue  Neurologic Assessment  Neuro (WDL): Within Defined Limits  Level of Consciousness: Alert  Orientation Level: Oriented to place, Oriented to situation, Oriented to person, Disoriented to time  Cognition: Appropriate judgement, Follows commands  Speech: Clear, Delayed responses  Pupil Assesment: No  Motor Function/Sensation Assessment: Motor strength  Muscle Strength Right Arm: Good Strength Against Gravity and Moderate Resistance  Muscle Strength Left Arm: Good Strength Against Gravity and Moderate Resistance  Muscle Strength Right Leg: Fair Strength against Gravity but No Resistance  Muscle Strength Left Leg: Fair Strength against Gravity but No Resistance  EENT (WDL):  WDL Except    Cardio/Pulmonary  Assessment  Edema      Respiratory Breath Sounds  RUL Breath Sounds: Expiratory Wheezes  RML Breath Sounds: Expiratory Wheezes  RLL Breath Sounds: Diminished, Expiratory Wheezes  MARIA M Breath Sounds: Expiratory Wheezes  LLL Breath Sounds: Diminished, Expiratory Wheezes  Cardiac Assessment   Cardiac (WDL):  Within Defined Limits         well-groomed/no distress/obese

## 2025-04-15 NOTE — ED TRIAGE NOTES
BIBA with report of Difficulty Breathing    Pt with extensive pulmonary history.  Presents on CPAP.  Per EMS O2 SAT 89% on RA    ERP at bedside on arrival    RT at bedside on arrival, CPAP continued by RT    Pt placed onto cardiac monitor with auto b/p and continuous pulse oximetry.    
other
[New Patient Visit] : a new patient visit
[FreeTextEntry1] : pineal cyst

## (undated) DEVICE — GOWN SURGEONS LARGE - (32/CA)

## (undated) DEVICE — ROBOTIC SURGERY SERVICES

## (undated) DEVICE — CON SEDATION/>5 YR 1ST 15 MIN

## (undated) DEVICE — Device

## (undated) DEVICE — PROBE ENDOSCOPIC PERIPHERAL VISION ION 1.5 IF1000 (1/EA)

## (undated) DEVICE — GLOVE BIOGEL SZ 6 PF LATEX - (50EA/BX 4BX/CA)

## (undated) DEVICE — LACTATED RINGERS INJ 1000 ML - (14EA/CA 60CA/PF)

## (undated) DEVICE — KIT ANESTHESIA W/CIRCUIT & 3/LT BAG W/FILTER (20EA/CA)

## (undated) DEVICE — WATER IRRIGATION STERILE 1000ML (12EA/CA)

## (undated) DEVICE — NEEDLE BIOPSY FLEXISION OD19 GA IF1000 (5EA/BX)

## (undated) DEVICE — CATHETER IV SAFETY 24 GA X 3/4 (50EA/BX)

## (undated) DEVICE — BLOCK BITE MAXI DENTAL RETENTION RIM (100EA/BX)

## (undated) DEVICE — CATHETER IV SAFETY 22 GA X 1 (50EA/BX)

## (undated) DEVICE — TIP POLYMER I&A

## (undated) DEVICE — SYRINGE SAFETY 10 ML 18 GA X 1 1/2 BLUNT LL (100/BX 4BX/CA)

## (undated) DEVICE — DRAPE CHEST/BREAST - (12EA/CA)

## (undated) DEVICE — SET EXTENSION WITH 2 PORTS (48EA/CA) ***PART #2C8610 IS A SUBSTITUTE*****

## (undated) DEVICE — ELECTRODE 850 FOAM ADHESIVE - HYDROGEL RADIOTRNSPRNT (50/PK)

## (undated) DEVICE — TUBE SUCTION YANKAUER  1/4 X 6FT (20EA/CA)"

## (undated) DEVICE — KIT  I.V. START (100EA/CA)

## (undated) DEVICE — TUBE E-T HI-LO CUFF 8.0MM (10EA/PK)

## (undated) DEVICE — CANNULA O2 COMFORT SOFT EAR ADULT 7 FT TUBING (50/CA)

## (undated) DEVICE — EXTRACTOR CORTEX ANGL LT 26GA - (10/BX) BINKHORST

## (undated) DEVICE — SODIUM CHL. INJ. 0.9% 500ML (24EA/CA 50CA/PF)

## (undated) DEVICE — NEEDLE NON SAFETY HYPO 22 GA X 1 1/2 IN (100/BX)

## (undated) DEVICE — KIT CUSTOM PROCEDURE SINGLE FOR ENDO (15/CA)

## (undated) DEVICE — NEEDLE BIOPSY 21G W/LOCKABLE SYRINGE FOR EBUS (5EA/BX)

## (undated) DEVICE — TUBE E-T HI-LO CUFF 7.5MM (10EA/PK)

## (undated) DEVICE — SLEEVE, VASO, THIGH, MED

## (undated) DEVICE — TUBE CONNECTING SUCTION - CLEAR PLASTIC STERILE 72 IN (50EA/CA)

## (undated) DEVICE — GLOVE BIOGEL INDICATOR SZ 7.5 SURGICAL PF LTX - (50PR/BX 4BX/CA)

## (undated) DEVICE — NEPTUNE 4 PORT MANIFOLD - (20/PK)

## (undated) DEVICE — TUBING CLEARLINK DUO-VENT - C-FLO (48EA/CA)

## (undated) DEVICE — EVICEL 5ML - (1/BX)REFRIGERATE UPON RECEIPT

## (undated) DEVICE — CANISTER SUCTION RIGID RED 1500CC (40EA/CA)

## (undated) DEVICE — CATHETER IV SAFETY 20 GA X 1-1/4 (50/BX)

## (undated) DEVICE — TUBE E-T HI-LO CUFF 7.0MM (10EA/PK)

## (undated) DEVICE — GLOVE, LITE (PAIR)

## (undated) DEVICE — SYRINGE SAFETY 5 ML 18 GA X 1-1/2 BLUNT LL (100/BX 4BX/CA)

## (undated) DEVICE — ATOMIC EDGE ACCURATE DEPTH 550 MICRON (10/CA)

## (undated) DEVICE — SODIUM CHL IRRIGATION 0.9% 1000ML (12EA/CA)

## (undated) DEVICE — BITE BLOCK ADULT 60FR (100EA/CA)

## (undated) DEVICE — PORT AUXILLARY WATER (50EA/BX)

## (undated) DEVICE — TUBE E-T HI-LO CUFF 6.5MM (10EA/BX)

## (undated) DEVICE — CATHETER IV 20 GA X 1-1/4 ---SURG.& SDS ONLY--- (50EA/BX)

## (undated) DEVICE — CANISTER SUCTION 3000ML MECHANICAL FILTER AUTO SHUTOFF MEDI-VAC NONSTERILE LF DISP  (40EA/CA)

## (undated) DEVICE — CHLORAPREP 26 ML APPLICATOR - ORANGE TINT(25/CA)

## (undated) DEVICE — MASK WITH FACE SHIELD (25/BX 4BX/CA)

## (undated) DEVICE — SENSOR SPO2 NEO LNCS ADHESIVE (20/BX) SEE USER NOTES

## (undated) DEVICE — GLOVE BIOGEL SZ 8 SURGICAL PF LTX - (50PR/BX 4BX/CA)

## (undated) DEVICE — SUCTION INSTRUMENT YANKAUER BULBOUS TIP W/O VENT (50EA/CA)

## (undated) DEVICE — DRAPESURG STERI-DRAPE LONG - (10/BX 4BX/CA)

## (undated) DEVICE — DRAPE IOBAN II INCISE 23X17 - (10EA/BX 4BX/CA)

## (undated) DEVICE — BAG RETRIEVAL LARGE 10EA/BX

## (undated) DEVICE — MASK ANESTHESIA ADULT  - (100/CA)

## (undated) DEVICE — SET LEADWIRE 5 LEAD BEDSIDE DISPOSABLE ECG (1SET OF 5/EA)

## (undated) DEVICE — STAPLE 45MM BLUE 3.5MM ECHELON (12EA/BX)

## (undated) DEVICE — BRONCHOSCOPE EXALT MODEL B

## (undated) DEVICE — SET I.V. EXTENSION DIAL-A- - FLOW REGULATOR (48EA/CA)

## (undated) DEVICE — TOWEL STOP TIMEOUT SAFETY FLAG (40EA/CA)

## (undated) DEVICE — GLOVE BIOGEL SZ 7.5 SURGICAL PF LTX - (50PR/BX 4BX/CA)

## (undated) DEVICE — KIT CUSTOM PROCEDURE SINGLE FOR ENDO  (15/CA)

## (undated) DEVICE — SENSOR OXIMETER ADULT SPO2 RD SET (20EA/BX)

## (undated) DEVICE — SPONGE GAUZE NON-STERILE 4X4 - (2000/CA 10PK/CA)

## (undated) DEVICE — SYRINGE SAFETY 3 ML 18 GA X 1 1/2 BLUNT LL (100/BX 8BX/CA)

## (undated) DEVICE — KNIFE STEP 1.1 (6EA/BX)

## (undated) DEVICE — GLOVE BIOGEL PI INDICATOR SZ 6.5 SURGICAL PF LF - (50/BX 4BX/CA)

## (undated) DEVICE — SENSOR SPO2 ADULT LNCS ADTX (20/BX) ORDER ITEM #19593

## (undated) DEVICE — DEVICE BIOPSY ACQUIRE NEEDLE EBUS 22GA (1EA)

## (undated) DEVICE — TROCAR 12MM THORACOPORT (6EA/BX)

## (undated) DEVICE — KIT ENDOVIVE 24FR SAFETY PEG PULL WITH ENFIT (2EA/BX)

## (undated) DEVICE — GLOVE BIOGEL INDICATOR SZ 8.5 SURGICAL PF LTX - (50/BX 4BX/CA)

## (undated) DEVICE — ANTI-FOG SOLUTION - 60BTL/CA

## (undated) DEVICE — ELECTRODE 5MM LHK LAPSCP STERILE DISP- MEGADYNE  (5/CA)

## (undated) DEVICE — STAPLER SKIN DISP - (6/BX 10BX/CA) VISISTAT

## (undated) DEVICE — PROTECTOR ULNA NERVE - (36PR/CA)

## (undated) DEVICE — SUTURE GENERAL

## (undated) DEVICE — COVER LIGHT HANDLE ALC PLUS DISP (18EA/BX)

## (undated) DEVICE — TROCAR 5X100 NON BLADED Z-TH - READ KII (6/BX)

## (undated) DEVICE — TUBE E-T HI-LO CUFF 8.5MM (10EA/PK)

## (undated) DEVICE — DRAIN CHEST ADULT (6EA/CA) DELETED ITEM  ORDER #15909

## (undated) DEVICE — CANNULA INJECTION 27G (EYE) - 10/BX ALCON

## (undated) DEVICE — COVER LIGHT HANDLE FLEXIBLE - SOFT (2EA/PK 80PK/CA)

## (undated) DEVICE — SYRINGE DISP. 60 CC LL - (30/BX, 12BX/CA)**WHEN THESE ARE GONE ORDER #500206**

## (undated) DEVICE — CONNECTOR HUBLESS DRAINAGE - ONE WAY (20/BX)

## (undated) DEVICE — MASK PANORAMIC OXYGEN PRO2 (30EA/CA)

## (undated) DEVICE — TIP EVICEL 45CM FLEXIBLE - (3/BX)

## (undated) DEVICE — NEEDLE CYSTOTOME OPTH VSTC  0.4MM X 16MM - (10/CA)

## (undated) DEVICE — TUBE ENDOBRONCHEAL 39FR - (1/BX)

## (undated) DEVICE — GLOVE BIOGEL SURGICAL PF LATEX M SIZE 8.5 (50PR/BX 4BX/CA)

## (undated) DEVICE — SET SUCTION/IRRIGATION WITH DISPOSABLE TIP (6/CA )PART #0250-070-520 IS A SUB

## (undated) DEVICE — BAG IV VISION ION IF1000 (10EA/BX)

## (undated) DEVICE — BUTTON ENDOSCOPY DISPOSABLE

## (undated) DEVICE — SUTURE 4-0 VICRYL PLUS FS-2 - 27 INCH (36/BX)

## (undated) DEVICE — TRAY CATHETER FOLEY URINE METER W/STATLOCK 350ML (10EA/CA)

## (undated) DEVICE — GOWN SURGEONS X-LARGE - DISP. (30/CA)

## (undated) DEVICE — PACK MAJOR BASIN - (2EA/CA)

## (undated) DEVICE — SUTURE 2-0 VICRYL PLUS CT-1 36 (36PK/BX)"

## (undated) DEVICE — BLADE SURGICAL #11 - (50/BX)

## (undated) DEVICE — CLOSURE SKIN STRIP 1/2 X 4 IN - (STERI STRIP) (50/BX 4BX/CA)

## (undated) DEVICE — CANNULA DIVIDED ADULT CO2 - SAMPLE W/FEMALE CONNCT (25/CA)

## (undated) DEVICE — GLOVE BIOGEL PI INDICATOR SZ 8.0 SURGICAL PF LF -(50/BX 4BX/CA)

## (undated) DEVICE — CATHETER GUIDING ION (1/EA)

## (undated) DEVICE — SUTURE 0 SILK CT-1 (36PK/BX)